# Patient Record
Sex: FEMALE | Race: WHITE | NOT HISPANIC OR LATINO | URBAN - METROPOLITAN AREA
[De-identification: names, ages, dates, MRNs, and addresses within clinical notes are randomized per-mention and may not be internally consistent; named-entity substitution may affect disease eponyms.]

---

## 2017-05-22 ENCOUNTER — EMERGENCY (EMERGENCY)
Facility: HOSPITAL | Age: 82
LOS: 1 days | Discharge: HOME | End: 2017-05-22
Attending: EMERGENCY MEDICINE | Admitting: EMERGENCY MEDICINE
Payer: MEDICARE

## 2017-05-22 VITALS
HEART RATE: 92 BPM | RESPIRATION RATE: 14 BRPM | DIASTOLIC BLOOD PRESSURE: 72 MMHG | TEMPERATURE: 99 F | SYSTOLIC BLOOD PRESSURE: 130 MMHG | OXYGEN SATURATION: 99 %

## 2017-05-22 VITALS
SYSTOLIC BLOOD PRESSURE: 122 MMHG | RESPIRATION RATE: 18 BRPM | WEIGHT: 125 LBS | OXYGEN SATURATION: 93 % | HEART RATE: 92 BPM | TEMPERATURE: 99 F | DIASTOLIC BLOOD PRESSURE: 77 MMHG

## 2017-05-22 DIAGNOSIS — I10 ESSENTIAL (PRIMARY) HYPERTENSION: ICD-10-CM

## 2017-05-22 DIAGNOSIS — I69.959 HEMIPLEGIA AND HEMIPARESIS FOLLOWING UNSPECIFIED CEREBROVASCULAR DISEASE AFFECTING UNSPECIFIED SIDE: ICD-10-CM

## 2017-05-22 DIAGNOSIS — Z79.01 LONG TERM (CURRENT) USE OF ANTICOAGULANTS: ICD-10-CM

## 2017-05-22 DIAGNOSIS — K57.32 DIVERTICULITIS OF LARGE INTESTINE WITHOUT PERFORATION OR ABSCESS WITHOUT BLEEDING: ICD-10-CM

## 2017-05-22 DIAGNOSIS — Z79.82 LONG TERM (CURRENT) USE OF ASPIRIN: ICD-10-CM

## 2017-05-22 DIAGNOSIS — F32.9 MAJOR DEPRESSIVE DISORDER, SINGLE EPISODE, UNSPECIFIED: ICD-10-CM

## 2017-05-22 DIAGNOSIS — R50.9 FEVER, UNSPECIFIED: ICD-10-CM

## 2017-05-22 LAB
ALBUMIN SERPL ELPH-MCNC: 3.4 G/DL — SIGNIFICANT CHANGE UP (ref 3.3–5)
ALP SERPL-CCNC: 58 U/L — SIGNIFICANT CHANGE UP (ref 40–120)
ALT FLD-CCNC: 15 U/L — SIGNIFICANT CHANGE UP (ref 12–78)
ANION GAP SERPL CALC-SCNC: 8 MMOL/L — SIGNIFICANT CHANGE UP (ref 5–17)
ANISOCYTOSIS BLD QL: SLIGHT — SIGNIFICANT CHANGE UP
APTT BLD: 44.1 SEC — HIGH (ref 27.5–37.4)
AST SERPL-CCNC: 19 U/L — SIGNIFICANT CHANGE UP (ref 15–37)
BASOPHILS # BLD AUTO: 0.1 K/UL — SIGNIFICANT CHANGE UP (ref 0–0.2)
BASOPHILS NFR BLD AUTO: 1.4 % — SIGNIFICANT CHANGE UP (ref 0–2)
BILIRUB SERPL-MCNC: 0.4 MG/DL — SIGNIFICANT CHANGE UP (ref 0.2–1.2)
BLD GP AB SCN SERPL QL: SIGNIFICANT CHANGE UP
BUN SERPL-MCNC: 13 MG/DL — SIGNIFICANT CHANGE UP (ref 7–23)
CALCIUM SERPL-MCNC: 8.9 MG/DL — SIGNIFICANT CHANGE UP (ref 8.5–10.1)
CHLORIDE SERPL-SCNC: 103 MMOL/L — SIGNIFICANT CHANGE UP (ref 96–108)
CO2 SERPL-SCNC: 29 MMOL/L — SIGNIFICANT CHANGE UP (ref 22–31)
CREAT SERPL-MCNC: 0.74 MG/DL — SIGNIFICANT CHANGE UP (ref 0.5–1.3)
EOSINOPHIL # BLD AUTO: 0 K/UL — SIGNIFICANT CHANGE UP (ref 0–0.5)
EOSINOPHIL NFR BLD AUTO: 0.7 % — SIGNIFICANT CHANGE UP (ref 0–6)
GLUCOSE SERPL-MCNC: 101 MG/DL — HIGH (ref 70–99)
HCT VFR BLD CALC: 48.5 % — HIGH (ref 34.5–45)
HGB BLD-MCNC: 16.2 G/DL — HIGH (ref 11.5–15.5)
HYPOCHROMIA BLD QL: SLIGHT — SIGNIFICANT CHANGE UP
INR BLD: 4.01 RATIO — HIGH (ref 0.88–1.16)
LACTATE SERPL-SCNC: 1.1 MMOL/L — SIGNIFICANT CHANGE UP (ref 0.7–2)
LG PLATELETS BLD QL AUTO: SLIGHT — SIGNIFICANT CHANGE UP
LYMPHOCYTES # BLD AUTO: 0.6 K/UL — LOW (ref 1–3.3)
LYMPHOCYTES # BLD AUTO: 12 % — LOW (ref 13–44)
MACROCYTES BLD QL: SLIGHT — SIGNIFICANT CHANGE UP
MCHC RBC-ENTMCNC: 33.3 GM/DL — SIGNIFICANT CHANGE UP (ref 32–36)
MCHC RBC-ENTMCNC: 36.3 PG — HIGH (ref 27–34)
MCV RBC AUTO: 109.1 FL — HIGH (ref 80–100)
MONOCYTES # BLD AUTO: 0.5 K/UL — SIGNIFICANT CHANGE UP (ref 0–0.9)
MONOCYTES NFR BLD AUTO: 9.1 % — HIGH (ref 1–9)
NEUTROPHILS # BLD AUTO: 4.2 K/UL — SIGNIFICANT CHANGE UP (ref 1.8–7.4)
NEUTROPHILS NFR BLD AUTO: 76.8 % — SIGNIFICANT CHANGE UP (ref 43–77)
OB PNL STL: NEGATIVE — SIGNIFICANT CHANGE UP
OVALOCYTES BLD QL SMEAR: SLIGHT — SIGNIFICANT CHANGE UP
PLAT MORPH BLD: NORMAL — SIGNIFICANT CHANGE UP
PLATELET # BLD AUTO: 700 K/UL — HIGH (ref 150–400)
POIKILOCYTOSIS BLD QL AUTO: SLIGHT — SIGNIFICANT CHANGE UP
POTASSIUM SERPL-MCNC: 4.1 MMOL/L — SIGNIFICANT CHANGE UP (ref 3.5–5.3)
POTASSIUM SERPL-SCNC: 4.1 MMOL/L — SIGNIFICANT CHANGE UP (ref 3.5–5.3)
PROT SERPL-MCNC: 7.1 G/DL — SIGNIFICANT CHANGE UP (ref 6–8.3)
PROTHROM AB SERPL-ACNC: 45 SEC — HIGH (ref 9.8–12.7)
RBC # BLD: 4.45 M/UL — SIGNIFICANT CHANGE UP (ref 3.8–5.2)
RBC # FLD: 13.5 % — SIGNIFICANT CHANGE UP (ref 10.3–14.5)
RBC BLD AUTO: ABNORMAL
SODIUM SERPL-SCNC: 140 MMOL/L — SIGNIFICANT CHANGE UP (ref 135–145)
WBC # BLD: 5.4 K/UL — SIGNIFICANT CHANGE UP (ref 3.8–10.5)
WBC # FLD AUTO: 5.4 K/UL — SIGNIFICANT CHANGE UP (ref 3.8–10.5)

## 2017-05-22 PROCEDURE — 71010: CPT | Mod: 26

## 2017-05-22 PROCEDURE — 99285 EMERGENCY DEPT VISIT HI MDM: CPT

## 2017-05-22 PROCEDURE — 74176 CT ABD & PELVIS W/O CONTRAST: CPT | Mod: 26

## 2017-05-22 RX ORDER — SODIUM CHLORIDE 9 MG/ML
1000 INJECTION INTRAMUSCULAR; INTRAVENOUS; SUBCUTANEOUS ONCE
Qty: 0 | Refills: 0 | Status: COMPLETED | OUTPATIENT
Start: 2017-05-22 | End: 2017-05-22

## 2017-05-22 RX ORDER — METRONIDAZOLE 500 MG
500 TABLET ORAL ONCE
Qty: 0 | Refills: 0 | Status: COMPLETED | OUTPATIENT
Start: 2017-05-22 | End: 2017-05-22

## 2017-05-22 RX ORDER — PIPERACILLIN AND TAZOBACTAM 4; .5 G/20ML; G/20ML
3.38 INJECTION, POWDER, LYOPHILIZED, FOR SOLUTION INTRAVENOUS ONCE
Qty: 0 | Refills: 0 | Status: COMPLETED | OUTPATIENT
Start: 2017-05-22 | End: 2017-05-22

## 2017-05-22 RX ORDER — SODIUM CHLORIDE 9 MG/ML
1000 INJECTION INTRAMUSCULAR; INTRAVENOUS; SUBCUTANEOUS
Qty: 0 | Refills: 0 | Status: DISCONTINUED | OUTPATIENT
Start: 2017-05-22 | End: 2017-05-26

## 2017-05-22 RX ORDER — LACTOBACILLUS ACIDOPHILUS 100MM CELL
2 CAPSULE ORAL
Qty: 60 | Refills: 0 | OUTPATIENT
Start: 2017-05-22 | End: 2017-06-21

## 2017-05-22 RX ORDER — METRONIDAZOLE 500 MG
1 TABLET ORAL
Qty: 21 | Refills: 0 | OUTPATIENT
Start: 2017-05-22 | End: 2017-05-29

## 2017-05-22 RX ORDER — ONDANSETRON 8 MG/1
4 TABLET, FILM COATED ORAL ONCE
Qty: 0 | Refills: 0 | Status: COMPLETED | OUTPATIENT
Start: 2017-05-22 | End: 2017-05-22

## 2017-05-22 RX ORDER — SODIUM CHLORIDE 9 MG/ML
3 INJECTION INTRAMUSCULAR; INTRAVENOUS; SUBCUTANEOUS ONCE
Qty: 0 | Refills: 0 | Status: COMPLETED | OUTPATIENT
Start: 2017-05-22 | End: 2017-05-22

## 2017-05-22 RX ORDER — IOHEXOL 300 MG/ML
30 INJECTION, SOLUTION INTRAVENOUS ONCE
Qty: 0 | Refills: 0 | Status: COMPLETED | OUTPATIENT
Start: 2017-05-22 | End: 2017-05-22

## 2017-05-22 RX ADMIN — SODIUM CHLORIDE 1000 MILLILITER(S): 9 INJECTION INTRAMUSCULAR; INTRAVENOUS; SUBCUTANEOUS at 09:27

## 2017-05-22 RX ADMIN — IOHEXOL 30 MILLILITER(S): 300 INJECTION, SOLUTION INTRAVENOUS at 09:28

## 2017-05-22 RX ADMIN — ONDANSETRON 4 MILLIGRAM(S): 8 TABLET, FILM COATED ORAL at 09:27

## 2017-05-22 RX ADMIN — SODIUM CHLORIDE 3 MILLILITER(S): 9 INJECTION INTRAMUSCULAR; INTRAVENOUS; SUBCUTANEOUS at 09:26

## 2017-05-22 RX ADMIN — PIPERACILLIN AND TAZOBACTAM 200 GRAM(S): 4; .5 INJECTION, POWDER, LYOPHILIZED, FOR SOLUTION INTRAVENOUS at 13:33

## 2017-05-22 RX ADMIN — Medication 100 MILLIGRAM(S): at 13:33

## 2017-05-22 NOTE — ED PROVIDER NOTE - PROGRESS NOTE DETAILS
as per rn, pt had a voluminous watery diarrhea. stool studies sent pt stable dr josee cheung. dr josee cheung.- aware agrees with plan

## 2017-05-22 NOTE — ED PROVIDER NOTE - CARE PLAN
Principal Discharge DX:	Diarrhea Principal Discharge DX:	Diarrhea  Secondary Diagnosis:	Sigmoid diverticulitis

## 2017-05-22 NOTE — ED PROVIDER NOTE - OBJECTIVE STATEMENT
pt is an  84 yo f who has hx of pt is an  84 yo f who has hx of CVA with r sided weakness, she has hx of diverticulitis pmd dr saucedo.  she also has hx of constipation on laxatives and fiber. but the past 3 days she has been having diarrhea. and today is reported to have had a fever of 100.8. so she was sent to er for eval.  no cp no sob no vomiting but she does report some nausea, no ill contatcs she eats only the foods prepared for her at the assisted living facility. bib ems for eval

## 2017-05-22 NOTE — ED PROVIDER NOTE - MUSCULOSKELETAL, MLM
Spine appears normal, range of motion is not limited, no muscle or joint tenderness EXCEPT TO THE R SIDE (CVA WEAKNESS)

## 2017-05-22 NOTE — ED ADULT NURSE REASSESSMENT NOTE - NS ED NURSE REASSESS COMMENT FT1
Pt had an episode of liquid green diarrhea. All stool cultures sent. Pt straight cathed for urine as stool is mixing with urine.

## 2017-05-22 NOTE — ED ADULT NURSE NOTE - OBJECTIVE STATEMENT
Presents to ER w fever and diarrhea since Saturday.  BS + x4Q, denies abd pain,vomiting.  Rectal temp here in ER 99 Presents to ER w fever and diarrhea since Saturday.  BS + x4Q, denies abd pain,vomiting.  Rectal temp here in ER 99. Pt resides at [facility] secondary to recent CVA this past October.  Right side flaccid.  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 Presents to ER w fever and diarrhea since Saturday.  BS + x4Q, denies abd pain,vomiting.  Rectal temp here in ER 99. Pt resides at [facility] secondary to recent CVA this past October.  Right side flaccid.  Pt states she is wheel chair bound. 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

## 2017-05-23 ENCOUNTER — INPATIENT (INPATIENT)
Facility: HOSPITAL | Age: 82
LOS: 2 days | Discharge: HOME | DRG: 392 | End: 2017-05-26
Attending: INTERNAL MEDICINE | Admitting: INTERNAL MEDICINE
Payer: MEDICARE

## 2017-05-23 VITALS
SYSTOLIC BLOOD PRESSURE: 105 MMHG | DIASTOLIC BLOOD PRESSURE: 71 MMHG | OXYGEN SATURATION: 98 % | WEIGHT: 119.93 LBS | HEART RATE: 90 BPM | TEMPERATURE: 99 F | HEIGHT: 66 IN | RESPIRATION RATE: 14 BRPM

## 2017-05-23 DIAGNOSIS — I10 ESSENTIAL (PRIMARY) HYPERTENSION: ICD-10-CM

## 2017-05-23 DIAGNOSIS — R19.7 DIARRHEA, UNSPECIFIED: ICD-10-CM

## 2017-05-23 DIAGNOSIS — K57.32 DIVERTICULITIS OF LARGE INTESTINE WITHOUT PERFORATION OR ABSCESS WITHOUT BLEEDING: ICD-10-CM

## 2017-05-23 DIAGNOSIS — R10.30 LOWER ABDOMINAL PAIN, UNSPECIFIED: ICD-10-CM

## 2017-05-23 LAB
ALBUMIN SERPL ELPH-MCNC: 3.2 G/DL — LOW (ref 3.3–5)
ALP SERPL-CCNC: 52 U/L — SIGNIFICANT CHANGE UP (ref 40–120)
ALT FLD-CCNC: 17 U/L — SIGNIFICANT CHANGE UP (ref 12–78)
ANION GAP SERPL CALC-SCNC: 13 MMOL/L — SIGNIFICANT CHANGE UP (ref 5–17)
APTT BLD: 59.5 SEC — HIGH (ref 27.5–37.4)
AST SERPL-CCNC: 22 U/L — SIGNIFICANT CHANGE UP (ref 15–37)
BASOPHILS # BLD AUTO: 0 K/UL — SIGNIFICANT CHANGE UP (ref 0–0.2)
BASOPHILS NFR BLD AUTO: 0.6 % — SIGNIFICANT CHANGE UP (ref 0–2)
BILIRUB SERPL-MCNC: 0.4 MG/DL — SIGNIFICANT CHANGE UP (ref 0.2–1.2)
BUN SERPL-MCNC: 10 MG/DL — SIGNIFICANT CHANGE UP (ref 7–23)
CALCIUM SERPL-MCNC: 8.4 MG/DL — LOW (ref 8.5–10.1)
CHLORIDE SERPL-SCNC: 107 MMOL/L — SIGNIFICANT CHANGE UP (ref 96–108)
CO2 SERPL-SCNC: 21 MMOL/L — LOW (ref 22–31)
CREAT SERPL-MCNC: 0.67 MG/DL — SIGNIFICANT CHANGE UP (ref 0.5–1.3)
EOSINOPHIL # BLD AUTO: 0 K/UL — SIGNIFICANT CHANGE UP (ref 0–0.5)
EOSINOPHIL NFR BLD AUTO: 0.2 % — SIGNIFICANT CHANGE UP (ref 0–6)
GLUCOSE SERPL-MCNC: 92 MG/DL — SIGNIFICANT CHANGE UP (ref 70–99)
HCT VFR BLD CALC: 47.5 % — HIGH (ref 34.5–45)
HGB BLD-MCNC: 15.7 G/DL — HIGH (ref 11.5–15.5)
INR BLD: 6.34 RATIO — CRITICAL HIGH (ref 0.88–1.16)
LYMPHOCYTES # BLD AUTO: 0.8 K/UL — LOW (ref 1–3.3)
LYMPHOCYTES # BLD AUTO: 9.9 % — LOW (ref 13–44)
MCHC RBC-ENTMCNC: 33.1 GM/DL — SIGNIFICANT CHANGE UP (ref 32–36)
MCHC RBC-ENTMCNC: 35.7 PG — HIGH (ref 27–34)
MCV RBC AUTO: 107.6 FL — HIGH (ref 80–100)
MONOCYTES # BLD AUTO: 0.7 K/UL — SIGNIFICANT CHANGE UP (ref 0–0.9)
MONOCYTES NFR BLD AUTO: 8.3 % — SIGNIFICANT CHANGE UP (ref 1–9)
NEUTROPHILS # BLD AUTO: 6.9 K/UL — SIGNIFICANT CHANGE UP (ref 1.8–7.4)
NEUTROPHILS NFR BLD AUTO: 81 % — HIGH (ref 43–77)
PLATELET # BLD AUTO: 759 K/UL — HIGH (ref 150–400)
POTASSIUM SERPL-MCNC: 4.7 MMOL/L — SIGNIFICANT CHANGE UP (ref 3.5–5.3)
POTASSIUM SERPL-SCNC: 4.7 MMOL/L — SIGNIFICANT CHANGE UP (ref 3.5–5.3)
PROT SERPL-MCNC: 6.3 G/DL — SIGNIFICANT CHANGE UP (ref 6–8.3)
PROTHROM AB SERPL-ACNC: 71.8 SEC — HIGH (ref 9.8–12.7)
RBC # BLD: 4.41 M/UL — SIGNIFICANT CHANGE UP (ref 3.8–5.2)
RBC # FLD: 12.9 % — SIGNIFICANT CHANGE UP (ref 10.3–14.5)
SODIUM SERPL-SCNC: 141 MMOL/L — SIGNIFICANT CHANGE UP (ref 135–145)
WBC # BLD: 8.5 K/UL — SIGNIFICANT CHANGE UP (ref 3.8–10.5)
WBC # FLD AUTO: 8.5 K/UL — SIGNIFICANT CHANGE UP (ref 3.8–10.5)

## 2017-05-23 PROCEDURE — 93010 ELECTROCARDIOGRAM REPORT: CPT

## 2017-05-23 PROCEDURE — 99285 EMERGENCY DEPT VISIT HI MDM: CPT

## 2017-05-23 RX ORDER — HYDROXYUREA 500 MG/1
500 CAPSULE ORAL DAILY
Qty: 0 | Refills: 0 | Status: DISCONTINUED | OUTPATIENT
Start: 2017-05-23 | End: 2017-05-24

## 2017-05-23 RX ORDER — GABAPENTIN 400 MG/1
100 CAPSULE ORAL
Qty: 0 | Refills: 0 | Status: DISCONTINUED | OUTPATIENT
Start: 2017-05-23 | End: 2017-05-26

## 2017-05-23 RX ORDER — ACETAMINOPHEN 500 MG
650 TABLET ORAL EVERY 6 HOURS
Qty: 0 | Refills: 0 | Status: DISCONTINUED | OUTPATIENT
Start: 2017-05-23 | End: 2017-05-26

## 2017-05-23 RX ORDER — ESCITALOPRAM OXALATE 10 MG/1
20 TABLET, FILM COATED ORAL DAILY
Qty: 0 | Refills: 0 | Status: DISCONTINUED | OUTPATIENT
Start: 2017-05-23 | End: 2017-05-26

## 2017-05-23 RX ORDER — ONDANSETRON 8 MG/1
4 TABLET, FILM COATED ORAL EVERY 6 HOURS
Qty: 0 | Refills: 0 | Status: DISCONTINUED | OUTPATIENT
Start: 2017-05-23 | End: 2017-05-26

## 2017-05-23 RX ORDER — ONDANSETRON 8 MG/1
4 TABLET, FILM COATED ORAL ONCE
Qty: 0 | Refills: 0 | Status: COMPLETED | OUTPATIENT
Start: 2017-05-23 | End: 2017-05-23

## 2017-05-23 RX ORDER — SODIUM CHLORIDE 9 MG/ML
1000 INJECTION INTRAMUSCULAR; INTRAVENOUS; SUBCUTANEOUS
Qty: 0 | Refills: 0 | Status: DISCONTINUED | OUTPATIENT
Start: 2017-05-23 | End: 2017-05-26

## 2017-05-23 RX ORDER — PIPERACILLIN AND TAZOBACTAM 4; .5 G/20ML; G/20ML
3.38 INJECTION, POWDER, LYOPHILIZED, FOR SOLUTION INTRAVENOUS ONCE
Qty: 0 | Refills: 0 | Status: COMPLETED | OUTPATIENT
Start: 2017-05-23 | End: 2017-05-23

## 2017-05-23 RX ORDER — PIPERACILLIN AND TAZOBACTAM 4; .5 G/20ML; G/20ML
3.38 INJECTION, POWDER, LYOPHILIZED, FOR SOLUTION INTRAVENOUS EVERY 8 HOURS
Qty: 0 | Refills: 0 | Status: DISCONTINUED | OUTPATIENT
Start: 2017-05-23 | End: 2017-05-24

## 2017-05-23 RX ORDER — METOPROLOL TARTRATE 50 MG
50 TABLET ORAL
Qty: 0 | Refills: 0 | Status: DISCONTINUED | OUTPATIENT
Start: 2017-05-23 | End: 2017-05-26

## 2017-05-23 RX ORDER — LACTOBACILLUS ACIDOPHILUS 100MM CELL
1 CAPSULE ORAL EVERY 8 HOURS
Qty: 0 | Refills: 0 | Status: DISCONTINUED | OUTPATIENT
Start: 2017-05-23 | End: 2017-05-26

## 2017-05-23 RX ORDER — SODIUM CHLORIDE 9 MG/ML
1000 INJECTION INTRAMUSCULAR; INTRAVENOUS; SUBCUTANEOUS ONCE
Qty: 0 | Refills: 0 | Status: COMPLETED | OUTPATIENT
Start: 2017-05-23 | End: 2017-05-23

## 2017-05-23 RX ORDER — SIMVASTATIN 20 MG/1
10 TABLET, FILM COATED ORAL AT BEDTIME
Qty: 0 | Refills: 0 | Status: DISCONTINUED | OUTPATIENT
Start: 2017-05-23 | End: 2017-05-26

## 2017-05-23 RX ORDER — MORPHINE SULFATE 50 MG/1
1 CAPSULE, EXTENDED RELEASE ORAL EVERY 4 HOURS
Qty: 0 | Refills: 0 | Status: DISCONTINUED | OUTPATIENT
Start: 2017-05-23 | End: 2017-05-26

## 2017-05-23 RX ORDER — TRAMADOL HYDROCHLORIDE 50 MG/1
50 TABLET ORAL EVERY 12 HOURS
Qty: 0 | Refills: 0 | Status: DISCONTINUED | OUTPATIENT
Start: 2017-05-23 | End: 2017-05-23

## 2017-05-23 RX ORDER — PANTOPRAZOLE SODIUM 20 MG/1
40 TABLET, DELAYED RELEASE ORAL
Qty: 0 | Refills: 0 | Status: DISCONTINUED | OUTPATIENT
Start: 2017-05-23 | End: 2017-05-26

## 2017-05-23 RX ADMIN — SIMVASTATIN 10 MILLIGRAM(S): 20 TABLET, FILM COATED ORAL at 23:53

## 2017-05-23 RX ADMIN — Medication 1 TABLET(S): at 23:53

## 2017-05-23 RX ADMIN — HYDROXYUREA 500 MILLIGRAM(S): 500 CAPSULE ORAL at 23:53

## 2017-05-23 RX ADMIN — PIPERACILLIN AND TAZOBACTAM 25 GRAM(S): 4; .5 INJECTION, POWDER, LYOPHILIZED, FOR SOLUTION INTRAVENOUS at 23:52

## 2017-05-23 RX ADMIN — ONDANSETRON 4 MILLIGRAM(S): 8 TABLET, FILM COATED ORAL at 13:41

## 2017-05-23 RX ADMIN — Medication 50 MILLIGRAM(S): at 17:42

## 2017-05-23 RX ADMIN — SODIUM CHLORIDE 1000 MILLILITER(S): 9 INJECTION INTRAMUSCULAR; INTRAVENOUS; SUBCUTANEOUS at 13:42

## 2017-05-23 RX ADMIN — PIPERACILLIN AND TAZOBACTAM 200 GRAM(S): 4; .5 INJECTION, POWDER, LYOPHILIZED, FOR SOLUTION INTRAVENOUS at 13:42

## 2017-05-23 RX ADMIN — GABAPENTIN 100 MILLIGRAM(S): 400 CAPSULE ORAL at 17:42

## 2017-05-23 NOTE — H&P ADULT - ATTENDING COMMENTS
84 yo female admitted with nausea, vomiting and loose BM x 2 days CT abd showed uncomplicated diverticulitis Started on zosyn ,septic workup sent No evidence of sepsis so far.

## 2017-05-23 NOTE — H&P ADULT - RS GEN PE MLT RESP DETAILS PC
airway patent/clear to auscultation bilaterally/respirations non-labored/no chest wall tenderness/good air movement/breath sounds equal

## 2017-05-23 NOTE — ED PROVIDER NOTE - OBJECTIVE STATEMENT
84 y/o F c/o abdominal pain and vomiting for the past 2 days.  Seen here yesterday for same.  Found to have uncomplicated sigmoid diverticulitis on CT.  D/c'ed to [facility] on augmentin and flagyl.  Sent back as she still is having pain and vomiting.  Denies fever.  Denies abdominal pain at present.

## 2017-05-23 NOTE — H&P ADULT - HISTORY OF PRESENT ILLNESS
This is an 85y Female complaining of vomiting, abdominal pain and vomiting for the past 2 days.  Seen at [facility] yesterday for same.  Found to have uncomplicated sigmoid diverticulitis on CT.  Sent back as she still is having pain and vomiting.  Denies fever.  Denies abdominal pain at present. This is an 85y Female complaining of abdominal pain and vomiting for the past 2 days.  Seen at [facility] yesterday for same.  Found to have uncomplicated sigmoid diverticulitis on CT.  Sent back as she still is having pain and vomiting.  Denies fever.  No abdominal pain at present.

## 2017-05-24 LAB
-  AMIKACIN: SIGNIFICANT CHANGE UP
-  AMPICILLIN/SULBACTAM: SIGNIFICANT CHANGE UP
-  AMPICILLIN: SIGNIFICANT CHANGE UP
-  AZTREONAM: SIGNIFICANT CHANGE UP
-  CEFAZOLIN: SIGNIFICANT CHANGE UP
-  CEFEPIME: SIGNIFICANT CHANGE UP
-  CEFOXITIN: SIGNIFICANT CHANGE UP
-  CEFTAZIDIME: SIGNIFICANT CHANGE UP
-  CEFTRIAXONE: SIGNIFICANT CHANGE UP
-  CIPROFLOXACIN: SIGNIFICANT CHANGE UP
-  ERTAPENEM: SIGNIFICANT CHANGE UP
-  GENTAMICIN: SIGNIFICANT CHANGE UP
-  IMIPENEM: SIGNIFICANT CHANGE UP
-  LEVOFLOXACIN: SIGNIFICANT CHANGE UP
-  MEROPENEM: SIGNIFICANT CHANGE UP
-  NITROFURANTOIN: SIGNIFICANT CHANGE UP
-  PIPERACILLIN/TAZOBACTAM: SIGNIFICANT CHANGE UP
-  TOBRAMYCIN: SIGNIFICANT CHANGE UP
-  TRIMETHOPRIM/SULFAMETHOXAZOLE: SIGNIFICANT CHANGE UP
ANION GAP SERPL CALC-SCNC: 6 MMOL/L — SIGNIFICANT CHANGE UP (ref 5–17)
BUN SERPL-MCNC: 5 MG/DL — LOW (ref 7–23)
CALCIUM SERPL-MCNC: 8 MG/DL — LOW (ref 8.5–10.1)
CHLORIDE SERPL-SCNC: 113 MMOL/L — HIGH (ref 96–108)
CO2 SERPL-SCNC: 27 MMOL/L — SIGNIFICANT CHANGE UP (ref 22–31)
CREAT SERPL-MCNC: 0.65 MG/DL — SIGNIFICANT CHANGE UP (ref 0.5–1.3)
CULTURE RESULTS: SIGNIFICANT CHANGE UP
GLUCOSE SERPL-MCNC: 96 MG/DL — SIGNIFICANT CHANGE UP (ref 70–99)
HCT VFR BLD CALC: 39.4 % — SIGNIFICANT CHANGE UP (ref 34.5–45)
HGB BLD-MCNC: 13.1 G/DL — SIGNIFICANT CHANGE UP (ref 11.5–15.5)
INR BLD: 7.1 RATIO — CRITICAL HIGH (ref 0.88–1.16)
MCHC RBC-ENTMCNC: 33.3 GM/DL — SIGNIFICANT CHANGE UP (ref 32–36)
MCHC RBC-ENTMCNC: 35.8 PG — HIGH (ref 27–34)
MCV RBC AUTO: 107.6 FL — HIGH (ref 80–100)
METHOD TYPE: SIGNIFICANT CHANGE UP
ORGANISM # SPEC MICROSCOPIC CNT: SIGNIFICANT CHANGE UP
ORGANISM # SPEC MICROSCOPIC CNT: SIGNIFICANT CHANGE UP
PLATELET # BLD AUTO: 635 K/UL — HIGH (ref 150–400)
POTASSIUM SERPL-MCNC: 3.9 MMOL/L — SIGNIFICANT CHANGE UP (ref 3.5–5.3)
POTASSIUM SERPL-SCNC: 3.9 MMOL/L — SIGNIFICANT CHANGE UP (ref 3.5–5.3)
PROTHROM AB SERPL-ACNC: 80.5 SEC — HIGH (ref 9.8–12.7)
RBC # BLD: 3.66 M/UL — LOW (ref 3.8–5.2)
RBC # FLD: 13.4 % — SIGNIFICANT CHANGE UP (ref 10.3–14.5)
SODIUM SERPL-SCNC: 146 MMOL/L — HIGH (ref 135–145)
SPECIMEN SOURCE: SIGNIFICANT CHANGE UP
WBC # BLD: 5.9 K/UL — SIGNIFICANT CHANGE UP (ref 3.8–10.5)
WBC # FLD AUTO: 5.9 K/UL — SIGNIFICANT CHANGE UP (ref 3.8–10.5)

## 2017-05-24 RX ORDER — CIPROFLOXACIN LACTATE 400MG/40ML
400 VIAL (ML) INTRAVENOUS EVERY 12 HOURS
Qty: 0 | Refills: 0 | Status: DISCONTINUED | OUTPATIENT
Start: 2017-05-25 | End: 2017-05-26

## 2017-05-24 RX ORDER — PHYTONADIONE (VIT K1) 5 MG
2.5 TABLET ORAL ONCE
Qty: 0 | Refills: 0 | Status: COMPLETED | OUTPATIENT
Start: 2017-05-24 | End: 2017-05-24

## 2017-05-24 RX ORDER — METRONIDAZOLE 500 MG
500 TABLET ORAL EVERY 8 HOURS
Qty: 0 | Refills: 0 | Status: DISCONTINUED | OUTPATIENT
Start: 2017-05-25 | End: 2017-05-26

## 2017-05-24 RX ORDER — HYDROXYUREA 500 MG/1
1000 CAPSULE ORAL DAILY
Qty: 0 | Refills: 0 | Status: DISCONTINUED | OUTPATIENT
Start: 2017-05-24 | End: 2017-05-26

## 2017-05-24 RX ORDER — NYSTATIN CREAM 100000 [USP'U]/G
1 CREAM TOPICAL THREE TIMES A DAY
Qty: 0 | Refills: 0 | Status: DISCONTINUED | OUTPATIENT
Start: 2017-05-24 | End: 2017-05-26

## 2017-05-24 RX ADMIN — Medication 50 MILLIGRAM(S): at 06:40

## 2017-05-24 RX ADMIN — Medication 1 TABLET(S): at 06:40

## 2017-05-24 RX ADMIN — ESCITALOPRAM OXALATE 20 MILLIGRAM(S): 10 TABLET, FILM COATED ORAL at 11:35

## 2017-05-24 RX ADMIN — SIMVASTATIN 10 MILLIGRAM(S): 20 TABLET, FILM COATED ORAL at 21:35

## 2017-05-24 RX ADMIN — NYSTATIN CREAM 1 APPLICATION(S): 100000 CREAM TOPICAL at 21:36

## 2017-05-24 RX ADMIN — GABAPENTIN 100 MILLIGRAM(S): 400 CAPSULE ORAL at 17:16

## 2017-05-24 RX ADMIN — HYDROXYUREA 500 MILLIGRAM(S): 500 CAPSULE ORAL at 11:35

## 2017-05-24 RX ADMIN — GABAPENTIN 100 MILLIGRAM(S): 400 CAPSULE ORAL at 06:40

## 2017-05-24 RX ADMIN — PANTOPRAZOLE SODIUM 40 MILLIGRAM(S): 20 TABLET, DELAYED RELEASE ORAL at 06:40

## 2017-05-24 RX ADMIN — Medication 50 MILLIGRAM(S): at 17:16

## 2017-05-24 RX ADMIN — Medication 2.5 MILLIGRAM(S): at 09:50

## 2017-05-24 RX ADMIN — PIPERACILLIN AND TAZOBACTAM 25 GRAM(S): 4; .5 INJECTION, POWDER, LYOPHILIZED, FOR SOLUTION INTRAVENOUS at 13:15

## 2017-05-24 RX ADMIN — SODIUM CHLORIDE 50 MILLILITER(S): 9 INJECTION INTRAMUSCULAR; INTRAVENOUS; SUBCUTANEOUS at 13:16

## 2017-05-24 RX ADMIN — Medication 1 TABLET(S): at 21:35

## 2017-05-24 RX ADMIN — PIPERACILLIN AND TAZOBACTAM 25 GRAM(S): 4; .5 INJECTION, POWDER, LYOPHILIZED, FOR SOLUTION INTRAVENOUS at 06:38

## 2017-05-24 RX ADMIN — PIPERACILLIN AND TAZOBACTAM 25 GRAM(S): 4; .5 INJECTION, POWDER, LYOPHILIZED, FOR SOLUTION INTRAVENOUS at 21:35

## 2017-05-24 RX ADMIN — Medication 2.5 MILLIGRAM(S): at 06:40

## 2017-05-24 RX ADMIN — Medication 1 TABLET(S): at 14:00

## 2017-05-24 NOTE — CONSULT NOTE ADULT - PROBLEM SELECTOR RECOMMENDATION 9
IVF  IVAB   advance diet as tolerated  can dc in AM if tolerates diet on 10 day total of cipro /flagyl

## 2017-05-24 NOTE — CONSULT NOTE ADULT - SUBJECTIVE AND OBJECTIVE BOX
Patient is a 85y old  Female who presents with a chief complaint of Abdominal pain and diarrhea. (23 May 2017 17:57)      HPI:  This is an 85y Female complaining of vomiting, abdominal pain and vomiting for the past 2 days.  Seen at [facility] yesterday for same.  Found to have uncomplicated sigmoid diverticulitis on CT.  Sent back as she still is having pain and vomiting.  Denies fever.  Denies abdominal pain at present. (23 May 2017 17:57)      Asked to see patient for ID Consult    PAST MEDICAL & SURGICAL HISTORY:  Constipation  Depression  CVA (cerebral vascular accident)  Hypertension  No significant past surgical history      Allergies    No Known Allergies    Intolerances        REVIEW OF SYSTEMS:  All systems below were reviewed and are negative [  ]  HEENT:  ID:  Pulmonary:  Cardiac:  GI:  Renal:  Musculoskeletal:  All other systems above were reviewed and are negative   [  ]    HOME MEDICATIONS:    MEDICATIONS  (STANDING):  sodium chloride 0.9%. 1000milliLiter(s) IV Continuous <Continuous>  piperacillin/tazobactam IVPB. 3.375Gram(s) IV Intermittent every 8 hours  pantoprazole    Tablet 40milliGRAM(s) Oral before breakfast  enalapril 2.5milliGRAM(s) Oral daily  gabapentin 100milliGRAM(s) Oral two times a day  escitalopram 20milliGRAM(s) Oral daily  simvastatin 10milliGRAM(s) Oral at bedtime  metoprolol 50milliGRAM(s) Oral two times a day  lactobacillus acidophilus 1Tablet(s) Oral every 8 hours  nystatin Cream 1Application(s) Topical three times a day  hydroxyurea 1000milliGRAM(s) Oral daily    MEDICATIONS  (PRN):  acetaminophen   Tablet 650milliGRAM(s) Oral every 6 hours PRN For Temp greater than 38 C (100.4 F)  acetaminophen   Tablet. 650milliGRAM(s) Oral every 6 hours PRN Mild Pain (1 - 3)  ondansetron Injectable 4milliGRAM(s) IV Push every 6 hours PRN Nausea and/or Vomiting  oxyCODONE  5 mG/acetaminophen 325 mG 1Tablet(s) Oral every 6 hours PRN Moderate Pain  morphine  - Injectable 1milliGRAM(s) IV Push every 4 hours PRN Severe Pain (7 - 10)      Vital Signs Last 24 Hrs  T(C): 37, Max: 37 (05-24 @ 19:58)  T(F): 98.6, Max: 98.6 (05-24 @ 19:58)  HR: 69 (64 - 73)  BP: 111/67 (102/62 - 127/70)  BP(mean): --  RR: 17 (17 - 18)  SpO2: 98% (96% - 98%)    PHYSICAL EXAM:  HEENT:  Neck:  Lungs:  Heart:  Abdomen:  Genital/ Rectal:  Extremities:  Neurologic:  Vascular:    I&O's Summary    I & Os for current day (as of 24 May 2017 21:32)  =============================================  IN: 150 ml / OUT: 0 ml / NET: 150 ml      LABORATORY:                          13.1   5.9   )-----------( 635      ( 24 May 2017 07:15 )             39.4           05-24    146<H>  |  113<H>  |  5<L>  ----------------------------<  96  3.9   |  27  |  0.65    Ca    8.0<L>      24 May 2017 07:15    TPro  6.3  /  Alb  3.2<L>  /  TBili  0.4  /  DBili  x   /  AST  22  /  ALT  17  /  AlkPhos  52  05-23          LABORATORY:    CBC Full  -  ( 24 May 2017 07:15 )  WBC Count : 5.9 K/uL  Hemoglobin : 13.1 g/dL  Hematocrit : 39.4 %  Platelet Count - Automated : 635 K/uL  Mean Cell Volume : 107.6 fl  Mean Cell Hemoglobin : 35.8 pg  Mean Cell Hemoglobin Concentration : 33.3 gm/dL  Auto Neutrophil # : x  Auto Lymphocyte # : x  Auto Monocyte # : x  Auto Eosinophil # : x  Auto Basophil # : x  Auto Neutrophil % : x  Auto Lymphocyte % : x  Auto Monocyte % : x  Auto Eosinophil % : x  Auto Basophil % : x          05-24    146<H>  |  113<H>  |  5<L>  ----------------------------<  96  3.9   |  27  |  0.65    Ca    8.0<L>      24 May 2017 07:15    TPro  6.3  /  Alb  3.2<L>  /  TBili  0.4  /  DBili  x   /  AST  22  /  ALT  17  /  AlkPhos  52  05-23            Blood Culture:           05-22 @ 12:56  Organism Escherichia coli  Gram stain --        Urine Culture:          05-22 @ 12:56  Organism Escherichia coli        Wound culture:                05-22 @ 14:13  Organism --  Culture w/ gram stain --  Specimen Source .Stool Feces    Wound culture:                05-22 @ 13:45  Organism --  Culture w/ gram stain --  Specimen Source .Stool Feces    Wound culture:                05-22 @ 12:56  Organism Escherichia coli  Culture w/ gram stain --  Specimen Source .Urine Clean Catch (Midstream)    Wound culture:                05-22 @ 12:54  Organism --  Culture w/ gram stain --  Specimen Source .Blood Blood-Peripheral        Abscess culture:             05-22 @ 14:13  Organism --  Gram Stain --  Specimen Source .Stool Feces    Abscess culture:             05-22 @ 13:45  Organism --  Gram Stain --  Specimen Source .Stool Feces    Abscess culture:             05-22 @ 12:56  Organism Escherichia coli  Gram Stain --  Specimen Source .Urine Clean Catch (Midstream)    Abscess culture:             05-22 @ 12:54  Organism --  Gram Stain --  Specimen Source .Blood Blood-Peripheral        CSF:              05-22 @ 12:56  Organism Escherichia coli  Gram Stain --        Tissue culture:           05-22 @ 14:13  Organism --  Gram Stain --  Specimen Source .Stool Feces    Tissue culture:           05-22 @ 13:45  Organism --  Gram Stain --  Specimen Source .Stool Feces    Tissue culture:           05-22 @ 12:56  Organism Escherichia coli  Gram Stain --  Specimen Source .Urine Clean Catch (Midstream)    Tissue culture:           05-22 @ 12:54  Organism --  Gram Stain --  Specimen Source .Blood Blood-Peripheral        Body Fluid Smear & Culture:                        05-22 @ 14:13  AFB Smear  --  Culture Acid Fast Body Fluid w/ Smear  --  Culture Acid Fast Smear Concentrated   --    Culture Results:       Testing in progress  Specimen Source .Stool Feces    Body Fluid Smear & Culture:                        05-22 @ 13:45  AFB Smear  --  Culture Acid Fast Body Fluid w/ Smear  --  Culture Acid Fast Smear Concentrated   --    Culture Results:       No enteric pathogens isolated.  (Stool culture examined for Salmonella,  Shigella, Campylobacter, Aeromonas, Plesiomonas,  Vibrio, E.coli O157 and Yersinia)  Specimen Source .Stool Feces    Body Fluid Smear & Culture:                        05-22 @ 12:56  AFB Smear  --  Culture Acid Fast Body Fluid w/ Smear  --  Culture Acid Fast Smear Concentrated   --    Culture Results:       >100,000 CFU/ml Escherichia coli  Specimen Source .Urine Clean Catch (Midstream)    Body Fluid Smear & Culture:                        05-22 @ 12:54  AFB Smear  --  Culture Acid Fast Body Fluid w/ Smear  --  Culture Acid Fast Smear Concentrated   --    Culture Results:       No growth to date.  Specimen Source .Blood Blood-Peripheral Patient is a 85y old  Female who presents with a chief complaint of Abdominal pain and diarrhea. (23 May 2017 17:57)      This is an 85y female with a history of CVA, HTN and depression who was brought to the ED complaining of vomiting, abdominal pain and vomiting for the past 2 days. CT of abdomen and pelvis showed sigmoid diverticulitis. The patient was admitted and placed on IV Zosyn. She is still having watery diarrhea today. She is afebrile with no leukocytosis.       PAST MEDICAL & SURGICAL HISTORY:  Constipation  Depression  CVA (cerebral vascular accident)  Hypertension  No significant past surgical history      Allergies    No Known Allergies    Intolerances        REVIEW OF SYSTEMS:  . No cough or SOB.  . No dysuria or hematuria.    HOME MEDICATIONS:    MEDICATIONS  (STANDING):  sodium chloride 0.9%. 1000milliLiter(s) IV Continuous <Continuous>  piperacillin/tazobactam IVPB. 3.375Gram(s) IV Intermittent every 8 hours  pantoprazole    Tablet 40milliGRAM(s) Oral before breakfast  enalapril 2.5milliGRAM(s) Oral daily  gabapentin 100milliGRAM(s) Oral two times a day  escitalopram 20milliGRAM(s) Oral daily  simvastatin 10milliGRAM(s) Oral at bedtime  metoprolol 50milliGRAM(s) Oral two times a day  lactobacillus acidophilus 1Tablet(s) Oral every 8 hours  nystatin Cream 1Application(s) Topical three times a day  hydroxyurea 1000milliGRAM(s) Oral daily    MEDICATIONS  (PRN):  acetaminophen   Tablet 650milliGRAM(s) Oral every 6 hours PRN For Temp greater than 38 C (100.4 F)  acetaminophen   Tablet. 650milliGRAM(s) Oral every 6 hours PRN Mild Pain (1 - 3)  ondansetron Injectable 4milliGRAM(s) IV Push every 6 hours PRN Nausea and/or Vomiting  oxyCODONE  5 mG/acetaminophen 325 mG 1Tablet(s) Oral every 6 hours PRN Moderate Pain  morphine  - Injectable 1milliGRAM(s) IV Push every 4 hours PRN Severe Pain (7 - 10)      Vital Signs Last 24 Hrs  T(C): 37, Max: 37 (05-24 @ 19:58)  T(F): 98.6, Max: 98.6 (05-24 @ 19:58)  HR: 69 (64 - 73)  BP: 111/67 (102/62 - 127/70)  BP(mean): --  RR: 17 (17 - 18)  SpO2: 98% (96% - 98%)    PHYSICAL EXAM:  HEENT: NC/AT; PERRLA  Neck: Soft; no masses.  Lungs: CTA bilateraly; no wheezing.   Heart: RRR; no murmurs.  Abdomen: Soft; mild tenderness.  Extremities: No ulcers.       I&O's Summary    I & Os for current day (as of 24 May 2017 21:32)  =============================================  IN: 150 ml / OUT: 0 ml / NET: 150 ml      LABORATORY:                          13.1   5.9   )-----------( 635      ( 24 May 2017 07:15 )             39.4           05-24    146<H>  |  113<H>  |  5<L>  ----------------------------<  96  3.9   |  27  |  0.65    Ca    8.0<L>      24 May 2017 07:15    TPro  6.3  /  Alb  3.2<L>  /  TBili  0.4  /  DBili  x   /  AST  22  /  ALT  17  /  AlkPhos  52  05-23        LABORATORY:    CBC Full  -  ( 24 May 2017 07:15 )  WBC Count : 5.9 K/uL  Hemoglobin : 13.1 g/dL  Hematocrit : 39.4 %  Platelet Count - Automated : 635 K/uL  Mean Cell Volume : 107.6 fl  Mean Cell Hemoglobin : 35.8 pg  Mean Cell Hemoglobin Concentration : 33.3 gm/dL  Auto Neutrophil # : x  Auto Lymphocyte # : x  Auto Monocyte # : x  Auto Eosinophil # : x  Auto Basophil # : x  Auto Neutrophil % : x  Auto Lymphocyte % : x  Auto Monocyte % : x  Auto Eosinophil % : x  Auto Basophil % : x          05-24    146<H>  |  113<H>  |  5<L>  ----------------------------<  96  3.9   |  27  |  0.65    Ca    8.0<L>      24 May 2017 07:15    TPro  6.3  /  Alb  3.2<L>  /  TBili  0.4  /  DBili  x   /  AST  22  /  ALT  17  /  AlkPhos  52  05-23            Blood Culture:           05-22 @ 12:56  Organism Escherichia coli  Gram stain --        Urine Culture:          05-22 @ 12:56  Organism Escherichia coli      Assessment/Plan:    1. Sigmoid diverticulitis  2. Persistent diarrhea.    . Discontinue IV Zosyn.   . Add Cipro 400 mg iv q12h and Flagyl 500 mg iv q8h.  . Monitor diarrhea progression.    Thank you for the courtesy of this consultation.

## 2017-05-24 NOTE — CONSULT NOTE ADULT - SUBJECTIVE AND OBJECTIVE BOX
Chief Complaint:  Patient is a 85y old  Female who presents with a chief complaint of Abdominal pain and diarrhea. (23 May 2017 17:57)    Constipation  Depression  CVA (cerebral vascular accident)  Hypertension  No significant past surgical history     HPI:  This is an 85y Female complaining of vomiting, abdominal pain and vomiting for the past 2 days.  Seen at [facility] yesterday for same.  Found to have uncomplicated sigmoid diverticulitis on CT.  Sent back as she still is having pain and vomiting.  Denies fever.  Denies abdominal pain at present. (23 May 2017 17:57)      No Known Allergies      sodium chloride 0.9%. 1000milliLiter(s) IV Continuous <Continuous>  piperacillin/tazobactam IVPB. 3.375Gram(s) IV Intermittent every 8 hours  acetaminophen   Tablet 650milliGRAM(s) Oral every 6 hours PRN  acetaminophen   Tablet. 650milliGRAM(s) Oral every 6 hours PRN  ondansetron Injectable 4milliGRAM(s) IV Push every 6 hours PRN  pantoprazole    Tablet 40milliGRAM(s) Oral before breakfast  oxyCODONE  5 mG/acetaminophen 325 mG 1Tablet(s) Oral every 6 hours PRN  enalapril 2.5milliGRAM(s) Oral daily  gabapentin 100milliGRAM(s) Oral two times a day  escitalopram 20milliGRAM(s) Oral daily  simvastatin 10milliGRAM(s) Oral at bedtime  metoprolol 50milliGRAM(s) Oral two times a day  lactobacillus acidophilus 1Tablet(s) Oral every 8 hours  morphine  - Injectable 1milliGRAM(s) IV Push every 4 hours PRN  hydroxyurea 500milliGRAM(s) Oral daily        FAMILY HISTORY:        Review of Systems:    General:  No wt loss, fevers, chills, night sweats,fatigue,   Eyes:  Good vision, no reported pain  ENT:  No sore throat, pain, runny nose, dysphagia  CV:  No pain, palpitatioins, hypo/hypertension  Resp:  No dyspnea, cough, tachypnea, wheezing  :  No pain, bleeding, incontinence, nocturia  Muscle:  No pain, weakness  Neuro:  No weakness, tingling, memory problems  Psych:  No fatigue, insomnia, mood problems, depression  Endocrine:  No polyuria, polydypsia, cold/heat intolerance  Heme:  No petechiae, ecchymosis, easy bruisability  Skin:  No rash, tattoos, scars, edema    Relevant Family History:       Relevant Social History:       Physical Exam:    Vital Signs:  Vital Signs Last 24 Hrs  T(C): 36.7, Max: 37.4 ( @ 19:09)  T(F): 98, Max: 99.4 ( @ 19:09)  HR: 65 (65 - 90)  BP: 102/62 (102/62 - 127/70)  BP(mean): --  RR: 18 (14 - 18)  SpO2: 96% (96% - 98%)  Daily Height in cm: 167.64 (23 May 2017 13:06)    Daily Weight in k.2 (24 May 2017 05:05)    General:  Appears stated age, well-groomed, well-nourished, no distress  HEENT:  NC/AT,  conjunctivae clear and pink, no thyromegaly, nodules, adenopathy, no JVD  Chest:  Full & symmetric excursion, no increased effort, breath sounds clear  Cardiovascular:  Regular rhythm, S1, S2, no murmur/rub/S3/S4, no abdominal bruit, no edema  Abdomen:  Soft, non-tender, non-distended, normoactive bowel sounds,  no masses ,no hepatosplenomeagaly, no signs of chronic liver disease  Extremities:  no cyanosis,clubbing or edema  Skin:  No rash/erythema/ecchymoses/petechiae/wounds/abscess/warm/dry  Neuro/Psych:  Alert, oriented, no asterixis, no tremor, no encephalopathy    Laboratory:                            13.1   5.9   )-----------( 635      ( 24 May 2017 07:15 )             39.4         146<H>  |  113<H>  |  5<L>  ----------------------------<  96  3.9   |  27  |  0.65    Ca    8.0<L>      24 May 2017 07:15    TPro  6.3  /  Alb  3.2<L>  /  TBili  0.4  /  DBili  x   /  AST  22  /  ALT  17  /  AlkPhos  52      LIVER FUNCTIONS - ( 23 May 2017 13:34 )  Alb: 3.2 g/dL / Pro: 6.3 g/dL / ALK PHOS: 52 U/L / ALT: 17 U/L / AST: 22 U/L / GGT: x           PT/INR - ( 24 May 2017 07:15 )   PT: 80.5 sec;   INR: 7.10 ratio         PTT - ( 23 May 2017 14:14 )  PTT:59.5 sec      Imaging:

## 2017-05-24 NOTE — PROGRESS NOTE ADULT - SUBJECTIVE AND OBJECTIVE BOX
PROGRESS NOTE    OVERNIGHT    SUBJECTIVE    PAST MEDICAL & SURGICAL HISTORY:  Constipation  Depression  CVA (cerebral vascular accident)  Hypertension  No significant past surgical history    HEALTH ISSUES - PROBLEM Dx:  Diarrhea, unspecified type: Diarrhea, unspecified type  Lower abdominal pain: Lower abdominal pain  Essential hypertension: Essential hypertension  Diverticulitis of large intestine without perforation or abscess without bleeding: Diverticulitis of large intestine without perforation or abscess without bleeding          MEDICATIONS  (STANDING):  sodium chloride 0.9%. 1000milliLiter(s) IV Continuous <Continuous>  piperacillin/tazobactam IVPB. 3.375Gram(s) IV Intermittent every 8 hours  pantoprazole    Tablet 40milliGRAM(s) Oral before breakfast  enalapril 2.5milliGRAM(s) Oral daily  gabapentin 100milliGRAM(s) Oral two times a day  escitalopram 20milliGRAM(s) Oral daily  simvastatin 10milliGRAM(s) Oral at bedtime  metoprolol 50milliGRAM(s) Oral two times a day  lactobacillus acidophilus 1Tablet(s) Oral every 8 hours  hydroxyurea 500milliGRAM(s) Oral daily    MEDICATIONS  (PRN):  acetaminophen   Tablet 650milliGRAM(s) Oral every 6 hours PRN For Temp greater than 38 C (100.4 F)  acetaminophen   Tablet. 650milliGRAM(s) Oral every 6 hours PRN Mild Pain (1 - 3)  ondansetron Injectable 4milliGRAM(s) IV Push every 6 hours PRN Nausea and/or Vomiting  oxyCODONE  5 mG/acetaminophen 325 mG 1Tablet(s) Oral every 6 hours PRN Moderate Pain  morphine  - Injectable 1milliGRAM(s) IV Push every 4 hours PRN Severe Pain (7 - 10)      OBJECTIVE    T(C): 36.7, Max: 37.4 (05-23 @ 19:09)  HR: 65 (65 - 90)  BP: 102/62 (102/62 - 127/70)  RR: 18 (14 - 18)  SpO2: 96% (96% - 98%)  Wt(kg): --  I&O's Summary    PROGRESS NOTE    OVERNIGHT    SUBJECTIVE    PAST MEDICAL & SURGICAL HISTORY:  Constipation  Depression  CVA (cerebral vascular accident)  Hypertension  No significant past surgical history    HEALTH ISSUES - PROBLEM Dx:  Diarrhea, unspecified type: Diarrhea, unspecified type  Lower abdominal pain: Lower abdominal pain  Essential hypertension: Essential hypertension  Diverticulitis of large intestine without perforation or abscess without bleeding: Diverticulitis of large intestine without perforation or abscess without bleeding          MEDICATIONS  (STANDING):  sodium chloride 0.9%. 1000milliLiter(s) IV Continuous <Continuous>  piperacillin/tazobactam IVPB. 3.375Gram(s) IV Intermittent every 8 hours  pantoprazole    Tablet 40milliGRAM(s) Oral before breakfast  enalapril 2.5milliGRAM(s) Oral daily  gabapentin 100milliGRAM(s) Oral two times a day  escitalopram 20milliGRAM(s) Oral daily  simvastatin 10milliGRAM(s) Oral at bedtime  metoprolol 50milliGRAM(s) Oral two times a day  lactobacillus acidophilus 1Tablet(s) Oral every 8 hours  hydroxyurea 500milliGRAM(s) Oral daily    MEDICATIONS  (PRN):  acetaminophen   Tablet 650milliGRAM(s) Oral every 6 hours PRN For Temp greater than 38 C (100.4 F)  acetaminophen   Tablet. 650milliGRAM(s) Oral every 6 hours PRN Mild Pain (1 - 3)  ondansetron Injectable 4milliGRAM(s) IV Push every 6 hours PRN Nausea and/or Vomiting  oxyCODONE  5 mG/acetaminophen 325 mG 1Tablet(s) Oral every 6 hours PRN Moderate Pain  morphine  - Injectable 1milliGRAM(s) IV Push every 4 hours PRN Severe Pain (7 - 10)      OBJECTIVE    T(C): 36.7, Max: 37.4 (05-23 @ 19:09)  HR: 65 (65 - 90)  BP: 102/62 (102/62 - 127/70)  RR: 18 (14 - 18)  SpO2: 96% (96% - 98%)  Wt(kg): --  I&O's Summary        PROGRESS NOTE    OVERNIGHT    SUBJECTIVE    PAST MEDICAL & SURGICAL HISTORY:  Constipation  Depression  CVA (cerebral vascular accident)  Hypertension  No significant past surgical history    HEALTH ISSUES - PROBLEM Dx:  Diarrhea, unspecified type: Diarrhea, unspecified type  Lower abdominal pain: Lower abdominal pain  Essential hypertension: Essential hypertension  Diverticulitis of large intestine without perforation or abscess without bleeding: Diverticulitis of large intestine without perforation or abscess without bleeding          MEDICATIONS  (STANDING):  sodium chloride 0.9%. 1000milliLiter(s) IV Continuous <Continuous>  piperacillin/tazobactam IVPB. 3.375Gram(s) IV Intermittent every 8 hours  pantoprazole    Tablet 40milliGRAM(s) Oral before breakfast  enalapril 2.5milliGRAM(s) Oral daily  gabapentin 100milliGRAM(s) Oral two times a day  escitalopram 20milliGRAM(s) Oral daily  simvastatin 10milliGRAM(s) Oral at bedtime  metoprolol 50milliGRAM(s) Oral two times a day  lactobacillus acidophilus 1Tablet(s) Oral every 8 hours  hydroxyurea 500milliGRAM(s) Oral daily    MEDICATIONS  (PRN):  acetaminophen   Tablet 650milliGRAM(s) Oral every 6 hours PRN For Temp greater than 38 C (100.4 F)  acetaminophen   Tablet. 650milliGRAM(s) Oral every 6 hours PRN Mild Pain (1 - 3)  ondansetron Injectable 4milliGRAM(s) IV Push every 6 hours PRN Nausea and/or Vomiting  oxyCODONE  5 mG/acetaminophen 325 mG 1Tablet(s) Oral every 6 hours PRN Moderate Pain  morphine  - Injectable 1milliGRAM(s) IV Push every 4 hours PRN Severe Pain (7 - 10)      OBJECTIVE    T(C): 36.7, Max: 37.4 (05-23 @ 19:09)  HR: 65 (65 - 90)  BP: 102/62 (102/62 - 127/70)  RR: 18 (14 - 18)  SpO2: 96% (96% - 98%)  Wt(kg): --  I&O's Summary        REVIEW OF SYSTEMS:  CONSTITUTIONAL: No fever, weight loss, or fatigue  EYES: No eye pain, visual disturbances, or discharge  ENMT:  No difficulty hearing, tinnitus, vertigo; No sinus or throat pain  NECK: No pain or stiffness  BREASTS: No pain, masses, or nipple discharge  RESPIRATORY: No cough, wheezing, chills or hemoptysis; No shortness of breath  CARDIOVASCULAR: No chest pain, palpitations, dizziness, or leg swelling  GASTROINTESTINAL: No abdominal pain. No nausea, vomiting; No diarrhea or constipation. No melena or hematochezia.  GENITOURINARY: No dysuria, frequency, hematuria, or incontinence  NEUROLOGICAL: No headaches, memory loss, loss of strength, numbness, or tremors  SKIN: No itching, burning, rashes, or lesions   LYMPH NODES: No enlarged glands  MUSCULOSKELETAL: No joint pain or swelling; No muscle, back, or extremity pain  HEME/LYMPH: No easy bruising, or bleeding gums  ALLERY AND IMMUNOLOGIC: No hives or eczema      PHYSICAL EXAM:  Appearance: NAD.   HEENT:   Moist oral mucosa. Conjunctiva clear b/l.   Lymphatic: No cervical lymphadenopathy  Cardiovascular: RRR with no m/r/g.  Respiratory: Lungs CTAB.  Gastrointestinal:  Soft, nontender. No rebound. No rigidity. 	  Skin: No rashes, No ecchymoses, No cyanosis.	  Neurologic: Non-focal. Moving all extremities. Following commands.  Extremities: No edema. No erythema. No calf tenderness. Luis's negative.  Vascular: DP 2+ b/l.  	  LABS:                        13.1   5.9   )-----------( 635      ( 24 May 2017 07:15 )             39.4     05-24    146<H>  |  113<H>  |  5<L>  ----------------------------<  96  3.9   |  27  |  0.65    Ca    8.0<L>      24 May 2017 07:15    TPro  6.3  /  Alb  3.2<L>  /  TBili  0.4  /  DBili  x   /  AST  22  /  ALT  17  /  AlkPhos  52  05-23    PT/INR - ( 24 May 2017 07:15 )   PT: 80.5 sec;   INR: 7.10 ratio         PTT - ( 23 May 2017 14:14 )  PTT:59.5 sec      RADIOLOGY & ADDITIONAL TESTS:    ASSESSMENT/PLAN: 	      	  LABS:                        13.1   5.9   )-----------( 635      ( 24 May 2017 07:15 )             39.4     05-24    146<H>  |  113<H>  |  5<L>  ----------------------------<  96  3.9   |  27  |  0.65    Ca    8.0<L>      24 May 2017 07:15    TPro  6.3  /  Alb  3.2<L>  /  TBili  0.4  /  DBili  x   /  AST  22  /  ALT  17  /  AlkPhos  52  05-23    PT/INR - ( 24 May 2017 07:15 )   PT: 80.5 sec;   INR: 7.10 ratio         PTT - ( 23 May 2017 14:14 )  PTT:59.5 sec      RADIOLOGY & ADDITIONAL TESTS:    ASSESSMENT/PLAN: 	          LABS:                        13.1   5.9   )-----------( 635      ( 24 May 2017 07:15 )             39.4     05-24    146<H>  |  113<H>  |  5<L>  ----------------------------<  96  3.9   |  27  |  0.65    Ca    8.0<L>      24 May 2017 07:15    TPro  6.3  /  Alb  3.2<L>  /  TBili  0.4  /  DBili  x   /  AST  22  /  ALT  17  /  AlkPhos  52  05-23    PT/INR - ( 24 May 2017 07:15 )   PT: 80.5 sec;   INR: 7.10 ratio         PTT - ( 23 May 2017 14:14 )  PTT:59.5 sec      RADIOLOGY & ADDITIONAL TESTS:    ASSESSMENT/PLAN: PROGRESS NOTE    OVERNIGHT  Diarrhea reported ,but stool for c.diff specimen was declined by lab ( found to be formed bm) Denies nausea and vomiting   SUBJECTIVE  NAD ,resting in abed comfortably  PAST MEDICAL & SURGICAL HISTORY:  Constipation  Depression  CVA (cerebral vascular accident)  Hypertension  No significant past surgical history    HEALTH ISSUES - PROBLEM Dx:  Diarrhea, unspecified type: Diarrhea, unspecified type  Lower abdominal pain: Lower abdominal pain  Essential hypertension: Essential hypertension  Diverticulitis of large intestine without perforation or abscess without bleeding: Diverticulitis of large intestine without perforation or abscess without bleeding          MEDICATIONS  (STANDING):  sodium chloride 0.9%. 1000milliLiter(s) IV Continuous <Continuous>  piperacillin/tazobactam IVPB. 3.375Gram(s) IV Intermittent every 8 hours  pantoprazole    Tablet 40milliGRAM(s) Oral before breakfast  enalapril 2.5milliGRAM(s) Oral daily  gabapentin 100milliGRAM(s) Oral two times a day  escitalopram 20milliGRAM(s) Oral daily  simvastatin 10milliGRAM(s) Oral at bedtime  metoprolol 50milliGRAM(s) Oral two times a day  lactobacillus acidophilus 1Tablet(s) Oral every 8 hours  hydroxyurea 500milliGRAM(s) Oral daily    MEDICATIONS  (PRN):  acetaminophen   Tablet 650milliGRAM(s) Oral every 6 hours PRN For Temp greater than 38 C (100.4 F)  acetaminophen   Tablet. 650milliGRAM(s) Oral every 6 hours PRN Mild Pain (1 - 3)  ondansetron Injectable 4milliGRAM(s) IV Push every 6 hours PRN Nausea and/or Vomiting  oxyCODONE  5 mG/acetaminophen 325 mG 1Tablet(s) Oral every 6 hours PRN Moderate Pain  morphine  - Injectable 1milliGRAM(s) IV Push every 4 hours PRN Severe Pain (7 - 10)      OBJECTIVE    T(C): 36.7, Max: 37.4 (05-23 @ 19:09)  HR: 65 (65 - 90)  BP: 102/62 (102/62 - 127/70)  RR: 18 (14 - 18)  SpO2: 96% (96% - 98%)  Wt(kg): --  I&O's Summary    PROGRESS NOTE    OVERNIGHT    SUBJECTIVE    PAST MEDICAL & SURGICAL HISTORY:  Constipation  Depression  CVA (cerebral vascular accident)  Hypertension  No significant past surgical history    HEALTH ISSUES - PROBLEM Dx:  Diarrhea, unspecified type: Diarrhea, unspecified type  Lower abdominal pain: Lower abdominal pain  Essential hypertension: Essential hypertension  Diverticulitis of large intestine without perforation or abscess without bleeding: Diverticulitis of large intestine without perforation or abscess without bleeding          MEDICATIONS  (STANDING):  sodium chloride 0.9%. 1000milliLiter(s) IV Continuous <Continuous>  piperacillin/tazobactam IVPB. 3.375Gram(s) IV Intermittent every 8 hours  pantoprazole    Tablet 40milliGRAM(s) Oral before breakfast  enalapril 2.5milliGRAM(s) Oral daily  gabapentin 100milliGRAM(s) Oral two times a day  escitalopram 20milliGRAM(s) Oral daily  simvastatin 10milliGRAM(s) Oral at bedtime  metoprolol 50milliGRAM(s) Oral two times a day  lactobacillus acidophilus 1Tablet(s) Oral every 8 hours  hydroxyurea 500milliGRAM(s) Oral daily    MEDICATIONS  (PRN):  acetaminophen   Tablet 650milliGRAM(s) Oral every 6 hours PRN For Temp greater than 38 C (100.4 F)  acetaminophen   Tablet. 650milliGRAM(s) Oral every 6 hours PRN Mild Pain (1 - 3)  ondansetron Injectable 4milliGRAM(s) IV Push every 6 hours PRN Nausea and/or Vomiting  oxyCODONE  5 mG/acetaminophen 325 mG 1Tablet(s) Oral every 6 hours PRN Moderate Pain  morphine  - Injectable 1milliGRAM(s) IV Push every 4 hours PRN Severe Pain (7 - 10)      OBJECTIVE    T(C): 36.7, Max: 37.4 (05-23 @ 19:09)  HR: 65 (65 - 90)  BP: 102/62 (102/62 - 127/70)  RR: 18 (14 - 18)  SpO2: 96% (96% - 98%)  Wt(kg): --  I&O's Summary        PROGRESS NOTE    OVERNIGHT    SUBJECTIVE    PAST MEDICAL & SURGICAL HISTORY:  Constipation  Depression  CVA (cerebral vascular accident)  Hypertension  No significant past surgical history    HEALTH ISSUES - PROBLEM Dx:  Diarrhea, unspecified type: Diarrhea, unspecified type  Lower abdominal pain: Lower abdominal pain  Essential hypertension: Essential hypertension  Diverticulitis of large intestine without perforation or abscess without bleeding: Diverticulitis of large intestine without perforation or abscess without bleeding          MEDICATIONS  (STANDING):  sodium chloride 0.9%. 1000milliLiter(s) IV Continuous <Continuous>  piperacillin/tazobactam IVPB. 3.375Gram(s) IV Intermittent every 8 hours  pantoprazole    Tablet 40milliGRAM(s) Oral before breakfast  enalapril 2.5milliGRAM(s) Oral daily  gabapentin 100milliGRAM(s) Oral two times a day  escitalopram 20milliGRAM(s) Oral daily  simvastatin 10milliGRAM(s) Oral at bedtime  metoprolol 50milliGRAM(s) Oral two times a day  lactobacillus acidophilus 1Tablet(s) Oral every 8 hours  hydroxyurea 500milliGRAM(s) Oral daily    MEDICATIONS  (PRN):  acetaminophen   Tablet 650milliGRAM(s) Oral every 6 hours PRN For Temp greater than 38 C (100.4 F)  acetaminophen   Tablet. 650milliGRAM(s) Oral every 6 hours PRN Mild Pain (1 - 3)  ondansetron Injectable 4milliGRAM(s) IV Push every 6 hours PRN Nausea and/or Vomiting  oxyCODONE  5 mG/acetaminophen 325 mG 1Tablet(s) Oral every 6 hours PRN Moderate Pain  morphine  - Injectable 1milliGRAM(s) IV Push every 4 hours PRN Severe Pain (7 - 10)      OBJECTIVE    T(C): 36.7, Max: 37.4 (05-23 @ 19:09)  HR: 65 (65 - 90)  BP: 102/62 (102/62 - 127/70)  RR: 18 (14 - 18)  SpO2: 96% (96% - 98%)  Wt(kg): --  I&O's Summary        REVIEW OF SYSTEMS:  limited ,very weak ,but answers my questions   Generally feels better     PHYSICAL EXAM:  Appearance: NAD. VSS afebrile  HEENT:   Moist oral mucosa. Conjunctiva clear b/l.   Lymphatic: No cervical lymphadenopathy  Cardiovascular: RRR with no m/r/g.s1s2  Respiratory: Lungs CTAB.  Gastrointestinal:  Soft, nontender. No rebound. No rigidity. 	  Skin: No rashes, No ecchymoses, No cyanosis.	  Neurologic: Non-focal. Moving all extremities. Following commands.  Extremities: No edema. No erythema. No calf tenderness. Luis's negative.  Vascular: DP 2+ b/l.  	        	  LABS:                        13.1   5.9   )-----------( 635      ( 24 May 2017 07:15 )             39.4     05-24    146<H>  |  113<H>  |  5<L>  ----------------------------<  96  3.9   |  27  |  0.65    Ca    8.0<L>      24 May 2017 07:15    TPro  6.3  /  Alb  3.2<L>  /  TBili  0.4  /  DBili  x   /  AST  22  /  ALT  17  /  AlkPhos  52  05-23    PT/INR - ( 24 May 2017 07:15 )   PT: 80.5 sec;   INR: 7.10 ratio         PTT - ( 23 May 2017 14:14 )  PTT:59.5 sec              RADIOLOGY & ADDITIONAL TESTS:    ASSESSMENT/PLAN:

## 2017-05-24 NOTE — PROGRESS NOTE ADULT - ASSESSMENT
86 yo female admitted with nausea,vomiting and loose BMS x 2 days CT abd showed uncomlicated diverticulitis Started on zosyn ,septic workup sent No evidence of sepsis so far

## 2017-05-24 NOTE — PROGRESS NOTE ADULT - SUBJECTIVE AND OBJECTIVE BOX
Hematology/Oncology consult     Patient is seen and examined  notes/labs reviewed  pt son is at bedside and d/w pt and pt son.  consult dictated,  Job #      IMPRESSION:      RECOMMENDATION:      Dr Best ,thanks for courtsey of this consult,will follow this pt with you for hem/onc issue while pt is in hospital. Hematology/Oncology consult     Patient is seen and examined  notes/labs reviewed  pt son is at bedside and d/w pt and pt son.  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

## 2017-05-25 DIAGNOSIS — D68.9 COAGULATION DEFECT, UNSPECIFIED: ICD-10-CM

## 2017-05-25 DIAGNOSIS — I63.9 CEREBRAL INFARCTION, UNSPECIFIED: ICD-10-CM

## 2017-05-25 DIAGNOSIS — D47.3 ESSENTIAL (HEMORRHAGIC) THROMBOCYTHEMIA: ICD-10-CM

## 2017-05-25 LAB
ANION GAP SERPL CALC-SCNC: 8 MMOL/L — SIGNIFICANT CHANGE UP (ref 5–17)
APTT BLD: 33.4 SEC — SIGNIFICANT CHANGE UP (ref 27.5–37.4)
BASOPHILS # BLD AUTO: 0 K/UL — SIGNIFICANT CHANGE UP (ref 0–0.2)
BASOPHILS NFR BLD AUTO: 0.7 % — SIGNIFICANT CHANGE UP (ref 0–2)
BUN SERPL-MCNC: 3 MG/DL — LOW (ref 7–23)
CALCIUM SERPL-MCNC: 7.9 MG/DL — LOW (ref 8.5–10.1)
CHLORIDE SERPL-SCNC: 111 MMOL/L — HIGH (ref 96–108)
CO2 SERPL-SCNC: 27 MMOL/L — SIGNIFICANT CHANGE UP (ref 22–31)
CREAT SERPL-MCNC: 0.57 MG/DL — SIGNIFICANT CHANGE UP (ref 0.5–1.3)
EOSINOPHIL # BLD AUTO: 0.2 K/UL — SIGNIFICANT CHANGE UP (ref 0–0.5)
EOSINOPHIL NFR BLD AUTO: 3.5 % — SIGNIFICANT CHANGE UP (ref 0–6)
FERRITIN SERPL-MCNC: 100 NG/ML — SIGNIFICANT CHANGE UP (ref 15–150)
FOLATE SERPL-MCNC: >20 NG/ML — SIGNIFICANT CHANGE UP (ref 4.8–24.2)
GLUCOSE SERPL-MCNC: 93 MG/DL — SIGNIFICANT CHANGE UP (ref 70–99)
HAPTOGLOB SERPL-MCNC: 164 MG/DL — SIGNIFICANT CHANGE UP (ref 34–200)
HCT VFR BLD CALC: 39.2 % — SIGNIFICANT CHANGE UP (ref 34.5–45)
HCT VFR BLD CALC: 40.6 % — SIGNIFICANT CHANGE UP (ref 34.5–45)
HGB BLD-MCNC: 13.2 G/DL — SIGNIFICANT CHANGE UP (ref 11.5–15.5)
HGB BLD-MCNC: 13.3 G/DL — SIGNIFICANT CHANGE UP (ref 11.5–15.5)
INR BLD: 1.53 RATIO — HIGH (ref 0.88–1.16)
IRON SATN MFR SERPL: 27 % — SIGNIFICANT CHANGE UP (ref 14–50)
IRON SATN MFR SERPL: 48 UG/DL — SIGNIFICANT CHANGE UP (ref 30–160)
LDH SERPL L TO P-CCNC: 281 U/L — HIGH (ref 50–242)
LYMPHOCYTES # BLD AUTO: 0.9 K/UL — LOW (ref 1–3.3)
LYMPHOCYTES # BLD AUTO: 12.8 % — LOW (ref 13–44)
MAGNESIUM SERPL-MCNC: 1.9 MG/DL — SIGNIFICANT CHANGE UP (ref 1.6–2.6)
MCHC RBC-ENTMCNC: 32.6 GM/DL — SIGNIFICANT CHANGE UP (ref 32–36)
MCHC RBC-ENTMCNC: 33.7 GM/DL — SIGNIFICANT CHANGE UP (ref 32–36)
MCHC RBC-ENTMCNC: 34.9 PG — HIGH (ref 27–34)
MCHC RBC-ENTMCNC: 36.3 PG — HIGH (ref 27–34)
MCV RBC AUTO: 106.9 FL — HIGH (ref 80–100)
MCV RBC AUTO: 107.8 FL — HIGH (ref 80–100)
MONOCYTES # BLD AUTO: 0.6 K/UL — SIGNIFICANT CHANGE UP (ref 0–0.9)
MONOCYTES NFR BLD AUTO: 8.9 % — SIGNIFICANT CHANGE UP (ref 1–9)
NEUTROPHILS # BLD AUTO: 5 K/UL — SIGNIFICANT CHANGE UP (ref 1.8–7.4)
NEUTROPHILS NFR BLD AUTO: 74.1 % — SIGNIFICANT CHANGE UP (ref 43–77)
PHOSPHATE SERPL-MCNC: 2.3 MG/DL — LOW (ref 2.5–4.5)
PLATELET # BLD AUTO: 670 K/UL — HIGH (ref 150–400)
PLATELET # BLD AUTO: 685 K/UL — HIGH (ref 150–400)
POTASSIUM SERPL-MCNC: 3.4 MMOL/L — LOW (ref 3.5–5.3)
POTASSIUM SERPL-SCNC: 3.4 MMOL/L — LOW (ref 3.5–5.3)
PROTHROM AB SERPL-ACNC: 16.8 SEC — HIGH (ref 9.8–12.7)
RBC # BLD: 3.64 M/UL — LOW (ref 3.8–5.2)
RBC # BLD: 3.7 M/UL — LOW (ref 3.8–5.2)
RBC # BLD: 3.8 M/UL — SIGNIFICANT CHANGE UP (ref 3.8–5.2)
RBC # FLD: 12.8 % — SIGNIFICANT CHANGE UP (ref 10.3–14.5)
RBC # FLD: 13.3 % — SIGNIFICANT CHANGE UP (ref 10.3–14.5)
RETICS #: 75.1 K/UL — SIGNIFICANT CHANGE UP (ref 25–125)
RETICS/RBC NFR: 2 % — SIGNIFICANT CHANGE UP (ref 0.5–2.5)
SODIUM SERPL-SCNC: 146 MMOL/L — HIGH (ref 135–145)
TIBC SERPL-MCNC: 176 UG/DL — LOW (ref 220–430)
UIBC SERPL-MCNC: 128 UG/DL — SIGNIFICANT CHANGE UP (ref 110–370)
URATE SERPL-MCNC: 2.3 MG/DL — LOW (ref 2.5–7)
VIT B12 SERPL-MCNC: 811 PG/ML — SIGNIFICANT CHANGE UP (ref 243–894)
WBC # BLD: 6.6 K/UL — SIGNIFICANT CHANGE UP (ref 3.8–10.5)
WBC # BLD: 6.8 K/UL — SIGNIFICANT CHANGE UP (ref 3.8–10.5)
WBC # FLD AUTO: 6.6 K/UL — SIGNIFICANT CHANGE UP (ref 3.8–10.5)
WBC # FLD AUTO: 6.8 K/UL — SIGNIFICANT CHANGE UP (ref 3.8–10.5)

## 2017-05-25 RX ORDER — SODIUM,POTASSIUM PHOSPHATES 278-250MG
1 POWDER IN PACKET (EA) ORAL
Qty: 0 | Refills: 0 | Status: DISCONTINUED | OUTPATIENT
Start: 2017-05-25 | End: 2017-05-26

## 2017-05-25 RX ORDER — WARFARIN SODIUM 2.5 MG/1
3 TABLET ORAL ONCE
Qty: 0 | Refills: 0 | Status: DISCONTINUED | OUTPATIENT
Start: 2017-05-25 | End: 2017-05-25

## 2017-05-25 RX ORDER — WARFARIN SODIUM 2.5 MG/1
2.5 TABLET ORAL ONCE
Qty: 0 | Refills: 0 | Status: COMPLETED | OUTPATIENT
Start: 2017-05-25 | End: 2017-05-25

## 2017-05-25 RX ORDER — ENOXAPARIN SODIUM 100 MG/ML
40 INJECTION SUBCUTANEOUS DAILY
Qty: 0 | Refills: 0 | Status: DISCONTINUED | OUTPATIENT
Start: 2017-05-25 | End: 2017-05-26

## 2017-05-25 RX ADMIN — NYSTATIN CREAM 1 APPLICATION(S): 100000 CREAM TOPICAL at 22:36

## 2017-05-25 RX ADMIN — SIMVASTATIN 10 MILLIGRAM(S): 20 TABLET, FILM COATED ORAL at 22:37

## 2017-05-25 RX ADMIN — Medication 200 MILLIGRAM(S): at 18:15

## 2017-05-25 RX ADMIN — Medication 100 MILLIGRAM(S): at 05:24

## 2017-05-25 RX ADMIN — GABAPENTIN 100 MILLIGRAM(S): 400 CAPSULE ORAL at 18:15

## 2017-05-25 RX ADMIN — Medication 100 MILLIGRAM(S): at 13:44

## 2017-05-25 RX ADMIN — HYDROXYUREA 1000 MILLIGRAM(S): 500 CAPSULE ORAL at 13:44

## 2017-05-25 RX ADMIN — NYSTATIN CREAM 1 APPLICATION(S): 100000 CREAM TOPICAL at 05:27

## 2017-05-25 RX ADMIN — Medication 1 TABLET(S): at 13:44

## 2017-05-25 RX ADMIN — Medication 50 MILLIGRAM(S): at 18:16

## 2017-05-25 RX ADMIN — PANTOPRAZOLE SODIUM 40 MILLIGRAM(S): 20 TABLET, DELAYED RELEASE ORAL at 06:37

## 2017-05-25 RX ADMIN — ESCITALOPRAM OXALATE 20 MILLIGRAM(S): 10 TABLET, FILM COATED ORAL at 13:44

## 2017-05-25 RX ADMIN — WARFARIN SODIUM 2.5 MILLIGRAM(S): 2.5 TABLET ORAL at 22:36

## 2017-05-25 RX ADMIN — Medication 50 MILLIGRAM(S): at 05:24

## 2017-05-25 RX ADMIN — NYSTATIN CREAM 1 APPLICATION(S): 100000 CREAM TOPICAL at 13:45

## 2017-05-25 RX ADMIN — Medication 2.5 MILLIGRAM(S): at 05:24

## 2017-05-25 RX ADMIN — Medication 1 TABLET(S): at 18:15

## 2017-05-25 RX ADMIN — Medication 1 TABLET(S): at 05:24

## 2017-05-25 RX ADMIN — SODIUM CHLORIDE 50 MILLILITER(S): 9 INJECTION INTRAMUSCULAR; INTRAVENOUS; SUBCUTANEOUS at 05:38

## 2017-05-25 RX ADMIN — Medication 100 MILLIGRAM(S): at 22:36

## 2017-05-25 RX ADMIN — Medication 1 TABLET(S): at 22:37

## 2017-05-25 RX ADMIN — Medication 200 MILLIGRAM(S): at 05:24

## 2017-05-25 RX ADMIN — GABAPENTIN 100 MILLIGRAM(S): 400 CAPSULE ORAL at 05:24

## 2017-05-25 NOTE — PROGRESS NOTE ADULT - PROBLEM SELECTOR PLAN 1
continue current managmnet and medications ,id and gi followup ,ppi ,antiemetics continue current managmnet and medications ,id and gi followup ,ppi ,antiemetics Slowly improving ,anticipate discharge on po abx  in 24 hrs if diarrhea resolves Ruled out for sepsis

## 2017-05-25 NOTE — PROGRESS NOTE ADULT - ASSESSMENT
84 yo female admitted with nausea,vomiting and loose BMS x 2 days CT abd showed uncomlicated diverticulitis Started on zosyn ,septic workup sent No evidence of sepsis so far 84 yo female admitted with nausea,vomiting and loose BMS x 2 days CT abd showed uncomlicated diverticulitis Started on zosyn  in ER ,septic workup sent No evidence of sepsis so far Seen by ID yesterday and abx changed to cipro/flagyl Likely diarrhea was related to diverticulitis and zosyn administartion

## 2017-05-25 NOTE — PROGRESS NOTE ADULT - SUBJECTIVE AND OBJECTIVE BOX
PROGRESS NOTE    OVERNIGHT    SUBJECTIVE    PAST MEDICAL & SURGICAL HISTORY:  Constipation  Depression  CVA (cerebral vascular accident)  Hypertension  No significant past surgical history    HEALTH ISSUES - PROBLEM Dx:  Diarrhea, unspecified type: Diarrhea, unspecified type  Lower abdominal pain: Lower abdominal pain  Essential hypertension: Essential hypertension  Diverticulitis of large intestine without perforation or abscess without bleeding: Diverticulitis of large intestine without perforation or abscess without bleeding          MEDICATIONS  (STANDING):  sodium chloride 0.9%. 1000milliLiter(s) IV Continuous <Continuous>  pantoprazole    Tablet 40milliGRAM(s) Oral before breakfast  enalapril 2.5milliGRAM(s) Oral daily  gabapentin 100milliGRAM(s) Oral two times a day  escitalopram 20milliGRAM(s) Oral daily  simvastatin 10milliGRAM(s) Oral at bedtime  metoprolol 50milliGRAM(s) Oral two times a day  lactobacillus acidophilus 1Tablet(s) Oral every 8 hours  nystatin Cream 1Application(s) Topical three times a day  hydroxyurea 1000milliGRAM(s) Oral daily  metroNIDAZOLE  IVPB 500milliGRAM(s) IV Intermittent every 8 hours  ciprofloxacin   IVPB 400milliGRAM(s) IV Intermittent every 12 hours    MEDICATIONS  (PRN):  acetaminophen   Tablet 650milliGRAM(s) Oral every 6 hours PRN For Temp greater than 38 C (100.4 F)  acetaminophen   Tablet. 650milliGRAM(s) Oral every 6 hours PRN Mild Pain (1 - 3)  ondansetron Injectable 4milliGRAM(s) IV Push every 6 hours PRN Nausea and/or Vomiting  oxyCODONE  5 mG/acetaminophen 325 mG 1Tablet(s) Oral every 6 hours PRN Moderate Pain  morphine  - Injectable 1milliGRAM(s) IV Push every 4 hours PRN Severe Pain (7 - 10)      OBJECTIVE    T(C): 36.7, Max: 37 (05-24 @ 19:58)  HR: 70 (64 - 70)  BP: 135/70 (108/64 - 135/70)  RR: 1REVIEW OF SYSTEMS:  limited ,very weak ,but answers my questions   Generally feels better     PHYSICAL EXAM:  Appearance: NAD. VSS afebrile  HEENT:   Moist oral mucosa. Conjunctiva clear b/l.   Lymphatic: No cervical lymphadenopathy  Cardiovascular: RRR with no m/r/g.s1s2  Respiratory: Lungs CTAB.  Gastrointestinal:  Soft, nontender. No rebound. No rigidity. 	  Skin: No rashes, No ecchymoses, No cyanosis.	  Neurologic: Non-focal. Moving all extremities. Following commands.  Extremities: No edema. No erythema. No calf tenderness. Luis's negative.  Vascular: DP 2+ b/l.  	7 (17 - 18)  SpO2: 94% (94% - 98%)  Wt(kg): --  I&O's Summary    I & Os for current day (as of 25 May 2017 13:00)  =============================================  IN: 850 ml / OUT: 0 ml / NET: 850 ml      LABS:                        13.3   6.6   )-----------( 685      ( 25 May 2017 10:46 )             40.6     05-25    146<H>  |  111<H>  |  3<L>  ----------------------------<  93  3.4<L>   |  27  |  0.57    Ca    7.9<L>      25 May 2017 10:46  Phos  2.3     05-25  Mg     1.9     05-25    TPro  6.3  /  Alb  3.2<L>  /  TBili  0.4  /  DBili  x   /  AST  22  /  ALT  17  /  AlkPhos  52  05-23    PT/INR - ( 25 May 2017 06:26 )   PT: 16.8 sec;   INR: 1.53 ratio         PTT - ( 25 May 2017 06:26 )  PTT:33.4 sec      RADIOLOGY & ADDITIONAL TESTS:    ASSESSMENT/PLAN: PROGRESS NOTE    OVERNIGHT  Loose BM this morning reported   SUBJECTIVE  Patient states -I feel much better but still very weak Seen by PT -may be candidate for rehab  PAST MEDICAL & SURGICAL HISTORY:  Constipation  Depression  CVA (cerebral vascular accident)  Hypertension  No significant past surgical history    HEALTH ISSUES - PROBLEM Dx:  Diarrhea, unspecified type: Diarrhea, unspecified type  Lower abdominal pain: Lower abdominal pain  Essential hypertension: Essential hypertension  Diverticulitis of large intestine without perforation or abscess without bleeding: Diverticulitis of large intestine without perforation or abscess without bleeding          MEDICATIONS  (STANDING):  sodium chloride 0.9%. 1000milliLiter(s) IV Continuous <Continuous>  pantoprazole    Tablet 40milliGRAM(s) Oral before breakfast  enalapril 2.5milliGRAM(s) Oral daily  gabapentin 100milliGRAM(s) Oral two times a day  escitalopram 20milliGRAM(s) Oral daily  simvastatin 10milliGRAM(s) Oral at bedtime  metoprolol 50milliGRAM(s) Oral two times a day  lactobacillus acidophilus 1Tablet(s) Oral every 8 hours  nystatin Cream 1Application(s) Topical three times a day  hydroxyurea 1000milliGRAM(s) Oral daily  metroNIDAZOLE  IVPB 500milliGRAM(s) IV Intermittent every 8 hours  ciprofloxacin   IVPB 400milliGRAM(s) IV Intermittent every 12 hours    MEDICATIONS  (PRN):  acetaminophen   Tablet 650milliGRAM(s) Oral every 6 hours PRN For Temp greater than 38 C (100.4 F)  acetaminophen   Tablet. 650milliGRAM(s) Oral every 6 hours PRN Mild Pain (1 - 3)  ondansetron Injectable 4milliGRAM(s) IV Push every 6 hours PRN Nausea and/or Vomiting  oxyCODONE  5 mG/acetaminophen 325 mG 1Tablet(s) Oral every 6 hours PRN Moderate Pain  morphine  - Injectable 1milliGRAM(s) IV Push every 4 hours PRN Severe Pain (7 - 10)      OBJECTIVE    T(C): 36.7, Max: 37 (05-24 @ 19:58)  HR: 70 (64 - 70)  BP: 135/70 (108/64 - 135/70)  RR: 1REVIEW OF SYSTEMS:  limited ,very weak ,but answers my questions   Generally feels better     PHYSICAL EXAM:  Appearance: NAD. VSS afebrile  HEENT:   Moist oral mucosa. Conjunctiva clear b/l.   Lymphatic: No cervical lymphadenopathy  Cardiovascular: RRR with no m/r/g.s1s2  Respiratory: Lungs CTAB.  Gastrointestinal:  Soft, nontender. No rebound. No rigidity. 	  Skin: No rashes, No ecchymoses, No cyanosis.	  Neurologic: Non-focal. Moving all extremities. Following commands.  Extremities: No edema. No erythema. No calf tenderness. Luis's negative.  Vascular: DP 2+ b/l.  	7 (17 - 18)  SpO2: 94% (94% - 98%)  Wt(kg): --  I&O's Summary    I & Os for current day (as of 25 May 2017 13:00)  =============================================  IN: 850 ml / OUT: 0 ml / NET: 850 ml      LABS:                        13.3   6.6   )-----------( 685      ( 25 May 2017 10:46 )             40.6     05-25    146<H>  |  111<H>  |  3<L>  ----------------------------<  93  3.4<L>   |  27  |  0.57    Ca    7.9<L>      25 May 2017 10:46  Phos  2.3     05-25  Mg     1.9     05-25    TPro  6.3  /  Alb  3.2<L>  /  TBili  0.4  /  DBili  x   /  AST  22  /  ALT  17  /  AlkPhos  52  05-23    PT/INR - ( 25 May 2017 06:26 )   PT: 16.8 sec;   INR: 1.53 ratio         PTT - ( 25 May 2017 06:26 )  PTT:33.4 sec      RADIOLOGY & ADDITIONAL TESTS:    ASSESSMENT/PLAN:

## 2017-05-25 NOTE — PROGRESS NOTE ADULT - SUBJECTIVE AND OBJECTIVE BOX
Patient is a 85y old  Female who presents with a chief complaint of Abdominal pain and diarrhea. (23 May 2017 17:57)       Pt is seen and examined, pt is awake and lying in bed,pt seems comfortable and denies any complaints at this time    HPI:  This is an 85y Female complaining of vomiting, abdominal pain and vomiting for the past 2 days.  Seen at [place] [facility] yesterday for same.  Found to have uncomplicated sigmoid diverticulitis on CT.  Sent back as she still is having pain and vomiting.  Denies fever.  Denies abdominal pain at present. (23 May 2017 17:57)         ROS:  Negative except for:    MEDICATIONS  (STANDING):  sodium chloride 0.9%. 1000milliLiter(s) IV Continuous <Continuous>  pantoprazole    Tablet 40milliGRAM(s) Oral before breakfast  enalapril 2.5milliGRAM(s) Oral daily  gabapentin 100milliGRAM(s) Oral two times a day  escitalopram 20milliGRAM(s) Oral daily  simvastatin 10milliGRAM(s) Oral at bedtime  metoprolol 50milliGRAM(s) Oral two times a day  lactobacillus acidophilus 1Tablet(s) Oral every 8 hours  nystatin Cream 1Application(s) Topical three times a day  hydroxyurea 1000milliGRAM(s) Oral daily  metroNIDAZOLE  IVPB 500milliGRAM(s) IV Intermittent every 8 hours  ciprofloxacin   IVPB 400milliGRAM(s) IV Intermittent every 12 hours  potassium acid phosphate/sodium acid phosphate tablet (K-PHOS No. 2) 1Tablet(s) Oral three times a day with meals    MEDICATIONS  (PRN):  acetaminophen   Tablet 650milliGRAM(s) Oral every 6 hours PRN For Temp greater than 38 C (100.4 F)  acetaminophen   Tablet. 650milliGRAM(s) Oral every 6 hours PRN Mild Pain (1 - 3)  ondansetron Injectable 4milliGRAM(s) IV Push every 6 hours PRN Nausea and/or Vomiting  oxyCODONE  5 mG/acetaminophen 325 mG 1Tablet(s) Oral every 6 hours PRN Moderate Pain  morphine  - Injectable 1milliGRAM(s) IV Push every 4 hours PRN Severe Pain (7 - 10)      Allergies    No Known Allergies    Intolerances        Vital Signs Last 24 Hrs  T(C): 37, Max: 37 (05-24 @ 19:58)  T(F): 98.6, Max: 98.6 (05-24 @ 19:58)  HR: 75 (69 - 75)  BP: 124/76 (111/67 - 135/70)  BP(mean): --  RR: 16 (16 - 17)  SpO2: 95% (94% - 98%)    PHYSICAL EXAM  General: adult in NAD  HEENT: clear oropharynx, anicteric sclera, pink conjunctiva  Neck: supple  CV: normal S1/S2 with no murmur rubs or gallops  Lungs: positive air movement b/l ant lungs,clear to auscultation, no wheezes, no rales  Abdomen: soft non-tender non-distended, no hepatosplenomegaly  Ext: no clubbing cyanosis or edema  Skin: no rashes and no petechiae  Neuro: alert and oriented X 4, no focal deficits  LABS:                          13.3   6.6   )-----------( 685      ( 25 May 2017 10:46 )             40.6         Mean Cell Volume : 106.9 fl  Mean Cell Hemoglobin : 34.9 pg  Mean Cell Hemoglobin Concentration : 32.6 gm/dL  Auto Neutrophil # : x  Auto Lymphocyte # : x  Auto Monocyte # : x  Auto Eosinophil # : x  Auto Basophil # : x  Auto Neutrophil % : x  Auto Lymphocyte % : x  Auto Monocyte % : x  Auto Eosinophil % : x  Auto Basophil % : x    Serial CBC's  05-25 @ 10:46  Hct-40.6 / Hgb-13.3 / Plat-685 / RBC-3.80 / WBC-6.6          Serial CBC's  05-25 @ 08:55  Hct--- / Hgb--- / Plat--- / RBC-3.70 / WBC---          Serial CBC's  05-25 @ 06:26  Hct-39.2 / Hgb-13.2 / Plat-670 / RBC-3.64 / WBC-6.8          Serial CBC's  05-24 @ 07:15  Hct-39.4 / Hgb-13.1 / Plat-635 / RBC-3.66 / WBC-5.9          Serial CBC's  05-23 @ 13:34  Hct-47.5 / Hgb-15.7 / Plat-759 / RBC-4.41 / WBC-8.5            05-25    146<H>  |  111<H>  |  3<L>  ----------------------------<  93  3.4<L>   |  27  |  0.57    Ca    7.9<L>      25 May 2017 10:46  Phos  2.3     05-25  Mg     1.9     05-25        PT/INR - ( 25 May 2017 06:26 )   PT: 16.8 sec;   INR: 1.53 ratio         PTT - ( 25 May 2017 06:26 )  PTT:33.4 sec    Iron - Total Binding Capacity.: 176 ug/dL (05-25 @ 08:55)  Ferritin, Serum: 100.0 ng/mL (05-25 @ 08:55)  Reticulocyte Percent: 2.0 % (05-25 @ 08:55)  Vitamin B12, Serum: 811 pg/mL (05-25 @ 08:55)  Folate, Serum: >20.0 ng/mL (05-25 @ 08:55)        RADIOLOGY & ADDITIONAL STUDIES:    IMPRESSION:  coagulopathy--s/p vit k, had recent abx/diverticulis  hx of e.thrombocythemia--kolby 2 pos since around 2011--[place] [facility] [place] coley/dr camara  elevated platelets  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

## 2017-05-25 NOTE — PROGRESS NOTE ADULT - SUBJECTIVE AND OBJECTIVE BOX
INTERVAL HPI/OVERNIGHT EVENTS:  HPI:  This is an 85y Female complaining of vomiting, abdominal pain and vomiting for the past 2 days.  Seen at [place] ER yesterday for same.  Found to have uncomplicated sigmoid diverticulitis on CT.  Sent back as she still is having pain and vomiting.  Denies fever.  Denies abdominal pain at present. no n/v/d/f/c  No new overnight event.  No N/V/D.    MEDICATIONS  (STANDING):  sodium chloride 0.9%. 1000milliLiter(s) IV Continuous <Continuous>  pantoprazole    Tablet 40milliGRAM(s) Oral before breakfast  enalapril 2.5milliGRAM(s) Oral daily  gabapentin 100milliGRAM(s) Oral two times a day  escitalopram 20milliGRAM(s) Oral daily  simvastatin 10milliGRAM(s) Oral at bedtime  metoprolol 50milliGRAM(s) Oral two times a day  lactobacillus acidophilus 1Tablet(s) Oral every 8 hours  nystatin Cream 1Application(s) Topical three times a day  hydroxyurea 1000milliGRAM(s) Oral daily  metroNIDAZOLE  IVPB 500milliGRAM(s) IV Intermittent every 8 hours  ciprofloxacin   IVPB 400milliGRAM(s) IV Intermittent every 12 hours  potassium acid phosphate/sodium acid phosphate tablet (K-PHOS No. 2) 1Tablet(s) Oral three times a day with meals  enoxaparin Injectable 40milliGRAM(s) SubCutaneous daily  warfarin 2.5milliGRAM(s) Oral once    MEDICATIONS  (PRN):  acetaminophen   Tablet 650milliGRAM(s) Oral every 6 hours PRN For Temp greater than 38 C (100.4 F)  acetaminophen   Tablet. 650milliGRAM(s) Oral every 6 hours PRN Mild Pain (1 - 3)  ondansetron Injectable 4milliGRAM(s) IV Push every 6 hours PRN Nausea and/or Vomiting  oxyCODONE  5 mG/acetaminophen 325 mG 1Tablet(s) Oral every 6 hours PRN Moderate Pain  morphine  - Injectable 1milliGRAM(s) IV Push every 4 hours PRN Severe Pain (7 - 10)      Allergies    No Known Allergies    Intolerances          General:  No wt loss, fevers, chills, night sweats, fatigue,   Eyes:  Good vision, no reported pain  ENT:  No sore throat, pain, runny nose, dysphagia  CV:  No pain, palpitations, hypo/hypertension  Resp:  No dyspnea, cough, tachypnea, wheezing  GI:  No pain, No nausea, No vomiting, No diarrhea, No constipation, No weight loss, No fever, No pruritis, No rectal bleeding, No tarry stools, No dysphagia,  :  No pain, bleeding, incontinence, nocturia  Muscle:  No pain, weakness  Neuro:  No weakness, tingling, memory problems  Psych:  No fatigue, insomnia, mood problems, depression  Endocrine:  No polyuria, polydipsia, cold/heat intolerance  Heme:  No petechiae, ecchymosis, easy bruisability  Skin:  No rash, tattoos, scars, edema      PHYSICAL EXAM:   Vital Signs:  Vital Signs Last 24 Hrs  T(C): 37.1, Max: 37.1 (05-25 @ 19:56)  T(F): 98.8, Max: 98.8 (05-25 @ 19:56)  HR: 72 (70 - 75)  BP: 135/71 (117/69 - 135/71)  BP(mean): --  RR: 18 (16 - 18)  SpO2: 96% (94% - 96%)  Daily     Daily I&O's Summary  I & Os for 24h ending 25 May 2017 07:00  =============================================  IN: 850 ml / OUT: 0 ml / NET: 850 ml    I & Os for current day (as of 25 May 2017 20:44)  =============================================  IN: 700 ml / OUT: 0 ml / NET: 700 ml      GENERAL:  Appears stated age, well-groomed, well-nourished, no distress  HEENT:  NC/AT,  conjunctivae clear and pink, no thyromegaly, nodules, adenopathy, no JVD, sclera -anicteric  CHEST:  Full & symmetric excursion, no increased effort, breath sounds clear  HEART:  Regular rhythm, S1, S2, no murmur/rub/S3/S4, no abdominal bruit, no edema  ABDOMEN:  Soft, non-tender, non-distended, normoactive bowel sounds,  no masses ,no hepato-splenomegaly, no signs of chronic liver disease  EXTEREMITIES:  no cyanosis,clubbing or edema  SKIN:  No rash/erythema/ecchymoses/petechiae/wounds/abscess/warm/dry  NEURO:  Alert, oriented, no asterixis, no tremor, no encephalopathy      LABS:                        13.3   6.6   )-----------( 685      ( 25 May 2017 10:46 )             40.6     05-25    146<H>  |  111<H>  |  3<L>  ----------------------------<  93  3.4<L>   |  27  |  0.57    Ca    7.9<L>      25 May 2017 10:46  Phos  2.3     05-25  Mg     1.9     05-25      PT/INR - ( 25 May 2017 06:26 )   PT: 16.8 sec;   INR: 1.53 ratio         PTT - ( 25 May 2017 06:26 )  PTT:33.4 sec    amylase   lipase  RADIOLOGY & ADDITIONAL TESTS:

## 2017-05-25 NOTE — PROGRESS NOTE ADULT - ASSESSMENT
86 yo female admitted with nausea,vomiting and loose BMS x 2 days CT abd showed uncomlicated diverticulitis Started on zosyn  in ER , Seen by ID.  Hematology was consulted for coagulopathy. pt with h/o kolby 2 positive hx of essential thrombocythemia since around 2011 and was dx and followed by hematology dr coley/dr naqvi in [facility] and pt has been on hydrea. 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

## 2017-05-25 NOTE — PROGRESS NOTE ADULT - ASSESSMENT
84 yo female admitted with nausea,vomiting and loose BMS x 2 days CT abd showed uncomlicated diverticulitis Started on zosyn  in ER ,septic workup sent No evidence of sepsis so far Seen by ID yesterday and abx changed to cipro/flagyl Likely diarrhea was related to diverticulitis and zosyn administartion

## 2017-05-25 NOTE — PROGRESS NOTE ADULT - SUBJECTIVE AND OBJECTIVE BOX
Interval History:    CENTRAL LINE:   [  ] YES       [  ] NO  MUIR:                 [  ] YES       [  ] NO         REVIEW OF SYSTEMS:  All Systems below were reviewed and are negative [  ]  HEENT:  ID:  Pulmonary:  Cardiac:  GI:  Renal:  Musculoskeletal:  All other systems above were reviewed and are negative   [  ]      MEDICATIONS  (STANDING):  sodium chloride 0.9%. 1000milliLiter(s) IV Continuous <Continuous>  pantoprazole    Tablet 40milliGRAM(s) Oral before breakfast  enalapril 2.5milliGRAM(s) Oral daily  gabapentin 100milliGRAM(s) Oral two times a day  escitalopram 20milliGRAM(s) Oral daily  simvastatin 10milliGRAM(s) Oral at bedtime  metoprolol 50milliGRAM(s) Oral two times a day  lactobacillus acidophilus 1Tablet(s) Oral every 8 hours  nystatin Cream 1Application(s) Topical three times a day  hydroxyurea 1000milliGRAM(s) Oral daily  metroNIDAZOLE  IVPB 500milliGRAM(s) IV Intermittent every 8 hours  ciprofloxacin   IVPB 400milliGRAM(s) IV Intermittent every 12 hours  potassium acid phosphate/sodium acid phosphate tablet (K-PHOS No. 2) 1Tablet(s) Oral three times a day with meals  enoxaparin Injectable 40milliGRAM(s) SubCutaneous daily  warfarin 2.5milliGRAM(s) Oral once    MEDICATIONS  (PRN):  acetaminophen   Tablet 650milliGRAM(s) Oral every 6 hours PRN For Temp greater than 38 C (100.4 F)  acetaminophen   Tablet. 650milliGRAM(s) Oral every 6 hours PRN Mild Pain (1 - 3)  ondansetron Injectable 4milliGRAM(s) IV Push every 6 hours PRN Nausea and/or Vomiting  oxyCODONE  5 mG/acetaminophen 325 mG 1Tablet(s) Oral every 6 hours PRN Moderate Pain  morphine  - Injectable 1milliGRAM(s) IV Push every 4 hours PRN Severe Pain (7 - 10)      Vital Signs Last 24 Hrs  T(C): 37.1, Max: 37.1 (05-25 @ 19:56)  T(F): 98.8, Max: 98.8 (05-25 @ 19:56)  HR: 72 (70 - 75)  BP: 135/71 (117/69 - 135/71)  BP(mean): --  RR: 18 (16 - 18)  SpO2: 96% (94% - 96%)    I&O's Summary  I & Os for 24h ending 25 May 2017 07:00  =============================================  IN: 850 ml / OUT: 0 ml / NET: 850 ml    I & Os for current day (as of 25 May 2017 21:31)  =============================================  IN: 700 ml / OUT: 0 ml / NET: 700 ml      PHYSICAL EXAM:  HEENT: NC/AT; PERRLA  Neck: Soft; no tenderness  Lungs: CTA bilaterally; no wheezing.   Heart:  Abdomen:  Genital/ Rectal:  Extremities:  Neurologic:  Vascular:      LABORATORY:    CBC Full  -  ( 25 May 2017 10:46 )  WBC Count : 6.6 K/uL  Hemoglobin : 13.3 g/dL  Hematocrit : 40.6 %  Platelet Count - Automated : 685 K/uL  Mean Cell Volume : 106.9 fl  Mean Cell Hemoglobin : 34.9 pg  Mean Cell Hemoglobin Concentration : 32.6 gm/dL  Auto Neutrophil # : x  Auto Lymphocyte # : x  Auto Monocyte # : x  Auto Eosinophil # : x  Auto Basophil # : x  Auto Neutrophil % : x  Auto Lymphocyte % : x  Auto Monocyte % : x  Auto Eosinophil % : x  Auto Basophil % : x          05-25    146<H>  |  111<H>  |  3<L>  ----------------------------<  93  3.4<L>   |  27  |  0.57    Ca    7.9<L>      25 May 2017 10:46  Phos  2.3     05-25  Mg     1.9     05-25            Assessment and Plan:          Sushil Anguiano MD   [phone]. She is feeling better. No diarrhea today. Denied abdominal pain.  Comfortable.    All other systems above were reviewed and are negative   [  ]      MEDICATIONS  (STANDING):  sodium chloride 0.9%. 1000milliLiter(s) IV Continuous <Continuous>  pantoprazole    Tablet 40milliGRAM(s) Oral before breakfast  enalapril 2.5milliGRAM(s) Oral daily  gabapentin 100milliGRAM(s) Oral two times a day  escitalopram 20milliGRAM(s) Oral daily  simvastatin 10milliGRAM(s) Oral at bedtime  metoprolol 50milliGRAM(s) Oral two times a day  lactobacillus acidophilus 1Tablet(s) Oral every 8 hours  nystatin Cream 1Application(s) Topical three times a day  hydroxyurea 1000milliGRAM(s) Oral daily  metroNIDAZOLE  IVPB 500milliGRAM(s) IV Intermittent every 8 hours  ciprofloxacin   IVPB 400milliGRAM(s) IV Intermittent every 12 hours  potassium acid phosphate/sodium acid phosphate tablet (K-PHOS No. 2) 1Tablet(s) Oral three times a day with meals  enoxaparin Injectable 40milliGRAM(s) SubCutaneous daily  warfarin 2.5milliGRAM(s) Oral once    MEDICATIONS  (PRN):  acetaminophen   Tablet 650milliGRAM(s) Oral every 6 hours PRN For Temp greater than 38 C (100.4 F)  acetaminophen   Tablet. 650milliGRAM(s) Oral every 6 hours PRN Mild Pain (1 - 3)  ondansetron Injectable 4milliGRAM(s) IV Push every 6 hours PRN Nausea and/or Vomiting  oxyCODONE  5 mG/acetaminophen 325 mG 1Tablet(s) Oral every 6 hours PRN Moderate Pain  morphine  - Injectable 1milliGRAM(s) IV Push every 4 hours PRN Severe Pain (7 - 10)      Vital Signs Last 24 Hrs  T(C): 37.1, Max: 37.1 (05-25 @ 19:56)  T(F): 98.8, Max: 98.8 (05-25 @ 19:56)  HR: 72 (70 - 75)  BP: 135/71 (117/69 - 135/71)  BP(mean): --  RR: 18 (16 - 18)  SpO2: 96% (94% - 96%)    I&O's Summary  I & Os for 24h ending 25 May 2017 07:00  =============================================  IN: 850 ml / OUT: 0 ml / NET: 850 ml    I & Os for current day (as of 25 May 2017 21:31)  =============================================  IN: 700 ml / OUT: 0 ml / NET: 700 ml      PHYSICAL EXAM:  HEENT: NC/AT; PERRLA  Neck: Soft; no tenderness  Lungs: CTA bilaterally; no wheezing.   Heart: RRR; no murmurs.   Abdomen: Soft; no tenderness.  Extremities: No ulcers.      LABORATORY:    CBC Full  -  ( 25 May 2017 10:46 )  WBC Count : 6.6 K/uL  Hemoglobin : 13.3 g/dL  Hematocrit : 40.6 %  Platelet Count - Automated : 685 K/uL  Mean Cell Volume : 106.9 fl  Mean Cell Hemoglobin : 34.9 pg  Mean Cell Hemoglobin Concentration : 32.6 gm/dL  Auto Neutrophil # : x  Auto Lymphocyte # : x  Auto Monocyte # : x  Auto Eosinophil # : x  Auto Basophil # : x  Auto Neutrophil % : x  Auto Lymphocyte % : x  Auto Monocyte % : x  Auto Eosinophil % : x  Auto Basophil % : x      05-25    146<H>  |  111<H>  |  3<L>  ----------------------------<  93  3.4<L>   |  27  |  0.57    Ca    7.9<L>      25 May 2017 10:46  Phos  2.3     05-25  Mg     1.9     05-25      Assessment and Plan:    1. Sigmoid diverticulitis  2. Persistent diarrhea.    . Improving.  . Continue Cipro 400 mg iv q12h and Flagyl 500 mg iv q8h.  . Anticipate po Cipro and Flagyl for discharge in 48 hrs.         Sushil Anguiano MD   [phone].

## 2017-05-25 NOTE — PROGRESS NOTE ADULT - PROBLEM SELECTOR PLAN 1
s/p vit k  inr subtherapeutic  pt declines full dose ac  restart coumadin in view of hx of dvt/cva in past  lovenox 40 mg s/c daily till inr over 2 for dvtp

## 2017-05-25 NOTE — PHYSICAL THERAPY INITIAL EVALUATION ADULT - ADDITIONAL COMMENTS
Patient ambulates short distances with assistance but mostly in w/c due to hx of CVA. Patient was getting PT/OT prior to admission.

## 2017-05-25 NOTE — PHYSICAL THERAPY INITIAL EVALUATION ADULT - PERTINENT HX OF CURRENT PROBLEM, REHAB EVAL
This is an 85y Female complaining of vomiting, abdominal pain and vomiting for the past 2 days.  Seen at [place] ER yesterday for same.  Found to have uncomplicated sigmoid diverticulitis on CT.  Sent back as she still is having pain and vomiting.

## 2017-05-25 NOTE — PROGRESS NOTE ADULT - PROBLEM SELECTOR PLAN 1
continue current managmnet and medications ,id and gi followup ,ppi ,antiemetics Slowly improving ,anticipate discharge on po abx  in 24 hrs if diarrhea resolves Ruled out for sepsis

## 2017-05-26 ENCOUNTER — TRANSCRIPTION ENCOUNTER (OUTPATIENT)
Age: 82
End: 2017-05-26

## 2017-05-26 VITALS — SYSTOLIC BLOOD PRESSURE: 127 MMHG | HEART RATE: 71 BPM | DIASTOLIC BLOOD PRESSURE: 74 MMHG

## 2017-05-26 LAB
ANION GAP SERPL CALC-SCNC: 10 MMOL/L — SIGNIFICANT CHANGE UP (ref 5–17)
BUN SERPL-MCNC: 4 MG/DL — LOW (ref 7–23)
CALCIUM SERPL-MCNC: 7.9 MG/DL — LOW (ref 8.5–10.1)
CHLORIDE SERPL-SCNC: 112 MMOL/L — HIGH (ref 96–108)
CO2 SERPL-SCNC: 26 MMOL/L — SIGNIFICANT CHANGE UP (ref 22–31)
CREAT SERPL-MCNC: 0.56 MG/DL — SIGNIFICANT CHANGE UP (ref 0.5–1.3)
GLUCOSE SERPL-MCNC: 89 MG/DL — SIGNIFICANT CHANGE UP (ref 70–99)
HCT VFR BLD CALC: 38.4 % — SIGNIFICANT CHANGE UP (ref 34.5–45)
HGB BLD-MCNC: 12.9 G/DL — SIGNIFICANT CHANGE UP (ref 11.5–15.5)
INR BLD: 1.52 RATIO — HIGH (ref 0.88–1.16)
MCHC RBC-ENTMCNC: 33.6 GM/DL — SIGNIFICANT CHANGE UP (ref 32–36)
MCHC RBC-ENTMCNC: 35.8 PG — HIGH (ref 27–34)
MCV RBC AUTO: 106.6 FL — HIGH (ref 80–100)
PLATELET # BLD AUTO: 624 K/UL — HIGH (ref 150–400)
POTASSIUM SERPL-MCNC: 3.1 MMOL/L — LOW (ref 3.5–5.3)
POTASSIUM SERPL-SCNC: 3.1 MMOL/L — LOW (ref 3.5–5.3)
PROTHROM AB SERPL-ACNC: 16.7 SEC — HIGH (ref 9.8–12.7)
RBC # BLD: 3.6 M/UL — LOW (ref 3.8–5.2)
RBC # FLD: 13 % — SIGNIFICANT CHANGE UP (ref 10.3–14.5)
SODIUM SERPL-SCNC: 148 MMOL/L — HIGH (ref 135–145)
WBC # BLD: 7.1 K/UL — SIGNIFICANT CHANGE UP (ref 3.8–10.5)
WBC # FLD AUTO: 7.1 K/UL — SIGNIFICANT CHANGE UP (ref 3.8–10.5)

## 2017-05-26 PROCEDURE — 74176 CT ABD & PELVIS W/O CONTRAST: CPT

## 2017-05-26 PROCEDURE — 87177 OVA AND PARASITES SMEARS: CPT

## 2017-05-26 PROCEDURE — 82728 ASSAY OF FERRITIN: CPT

## 2017-05-26 PROCEDURE — 99285 EMERGENCY DEPT VISIT HI MDM: CPT | Mod: 25

## 2017-05-26 PROCEDURE — 87186 SC STD MICRODIL/AGAR DIL: CPT

## 2017-05-26 PROCEDURE — 83010 ASSAY OF HAPTOGLOBIN QUANT: CPT

## 2017-05-26 PROCEDURE — 93005 ELECTROCARDIOGRAM TRACING: CPT

## 2017-05-26 PROCEDURE — 87046 STOOL CULTR AEROBIC BACT EA: CPT

## 2017-05-26 PROCEDURE — 96375 TX/PRO/DX INJ NEW DRUG ADDON: CPT

## 2017-05-26 PROCEDURE — 71045 X-RAY EXAM CHEST 1 VIEW: CPT

## 2017-05-26 PROCEDURE — 96366 THER/PROPH/DIAG IV INF ADDON: CPT

## 2017-05-26 PROCEDURE — 87045 FECES CULTURE AEROBIC BACT: CPT

## 2017-05-26 PROCEDURE — 85730 THROMBOPLASTIN TIME PARTIAL: CPT

## 2017-05-26 PROCEDURE — 84550 ASSAY OF BLOOD/URIC ACID: CPT

## 2017-05-26 PROCEDURE — 85610 PROTHROMBIN TIME: CPT

## 2017-05-26 PROCEDURE — 82272 OCCULT BLD FECES 1-3 TESTS: CPT

## 2017-05-26 PROCEDURE — 86901 BLOOD TYPING SEROLOGIC RH(D): CPT

## 2017-05-26 PROCEDURE — 83550 IRON BINDING TEST: CPT

## 2017-05-26 PROCEDURE — 96367 TX/PROPH/DG ADDL SEQ IV INF: CPT

## 2017-05-26 PROCEDURE — 85027 COMPLETE CBC AUTOMATED: CPT

## 2017-05-26 PROCEDURE — 84100 ASSAY OF PHOSPHORUS: CPT

## 2017-05-26 PROCEDURE — 83735 ASSAY OF MAGNESIUM: CPT

## 2017-05-26 PROCEDURE — 86850 RBC ANTIBODY SCREEN: CPT

## 2017-05-26 PROCEDURE — 85045 AUTOMATED RETICULOCYTE COUNT: CPT

## 2017-05-26 PROCEDURE — 87086 URINE CULTURE/COLONY COUNT: CPT

## 2017-05-26 PROCEDURE — 86900 BLOOD TYPING SEROLOGIC ABO: CPT

## 2017-05-26 PROCEDURE — 80053 COMPREHEN METABOLIC PANEL: CPT

## 2017-05-26 PROCEDURE — 80048 BASIC METABOLIC PNL TOTAL CA: CPT

## 2017-05-26 PROCEDURE — 82607 VITAMIN B-12: CPT

## 2017-05-26 PROCEDURE — 87040 BLOOD CULTURE FOR BACTERIA: CPT

## 2017-05-26 PROCEDURE — 96365 THER/PROPH/DIAG IV INF INIT: CPT

## 2017-05-26 PROCEDURE — 82746 ASSAY OF FOLIC ACID SERUM: CPT

## 2017-05-26 PROCEDURE — 83615 LACTATE (LD) (LDH) ENZYME: CPT

## 2017-05-26 PROCEDURE — 99284 EMERGENCY DEPT VISIT MOD MDM: CPT | Mod: 25

## 2017-05-26 PROCEDURE — 83605 ASSAY OF LACTIC ACID: CPT

## 2017-05-26 RX ORDER — SODIUM CHLORIDE 9 MG/ML
1000 INJECTION, SOLUTION INTRAVENOUS
Qty: 0 | Refills: 0 | Status: DISCONTINUED | OUTPATIENT
Start: 2017-05-26 | End: 2017-05-26

## 2017-05-26 RX ORDER — CIPROFLOXACIN LACTATE 400MG/40ML
1 VIAL (ML) INTRAVENOUS
Qty: 20 | Refills: 0 | OUTPATIENT
Start: 2017-05-26 | End: 2017-06-05

## 2017-05-26 RX ORDER — METOPROLOL TARTRATE 50 MG
1 TABLET ORAL
Qty: 0 | Refills: 0 | DISCHARGE
Start: 2017-05-26

## 2017-05-26 RX ORDER — CIPROFLOXACIN LACTATE 400MG/40ML
1 VIAL (ML) INTRAVENOUS
Qty: 0 | Refills: 0 | COMMUNITY

## 2017-05-26 RX ORDER — WARFARIN SODIUM 2.5 MG/1
1 TABLET ORAL
Qty: 5 | Refills: 0 | OUTPATIENT
Start: 2017-05-26 | End: 2017-05-31

## 2017-05-26 RX ORDER — METRONIDAZOLE 500 MG
1 TABLET ORAL
Qty: 30 | Refills: 0 | OUTPATIENT
Start: 2017-05-26 | End: 2017-06-05

## 2017-05-26 RX ORDER — POTASSIUM CHLORIDE 20 MEQ
40 PACKET (EA) ORAL EVERY 4 HOURS
Qty: 0 | Refills: 0 | Status: DISCONTINUED | OUTPATIENT
Start: 2017-05-26 | End: 2017-05-26

## 2017-05-26 RX ORDER — WARFARIN SODIUM 2.5 MG/1
3 TABLET ORAL ONCE
Qty: 0 | Refills: 0 | Status: COMPLETED | OUTPATIENT
Start: 2017-05-26 | End: 2017-05-26

## 2017-05-26 RX ORDER — LACTOBACILLUS ACIDOPHILUS 100MM CELL
2 CAPSULE ORAL
Qty: 20 | Refills: 0 | OUTPATIENT
Start: 2017-05-26 | End: 2017-06-05

## 2017-05-26 RX ORDER — LACTOBACILLUS ACIDOPHILUS 100MM CELL
2 CAPSULE ORAL
Qty: 0 | Refills: 0 | COMMUNITY

## 2017-05-26 RX ORDER — WARFARIN SODIUM 2.5 MG/1
1 TABLET ORAL
Qty: 0 | Refills: 0 | COMMUNITY
Start: 2017-05-26

## 2017-05-26 RX ORDER — POTASSIUM CHLORIDE 20 MEQ
40 PACKET (EA) ORAL ONCE
Qty: 0 | Refills: 0 | Status: COMPLETED | OUTPATIENT
Start: 2017-05-26 | End: 2017-05-26

## 2017-05-26 RX ADMIN — Medication 1 TABLET(S): at 11:34

## 2017-05-26 RX ADMIN — Medication 100 MILLIGRAM(S): at 06:09

## 2017-05-26 RX ADMIN — ESCITALOPRAM OXALATE 20 MILLIGRAM(S): 10 TABLET, FILM COATED ORAL at 11:32

## 2017-05-26 RX ADMIN — NYSTATIN CREAM 1 APPLICATION(S): 100000 CREAM TOPICAL at 06:09

## 2017-05-26 RX ADMIN — SODIUM CHLORIDE 50 MILLILITER(S): 9 INJECTION INTRAMUSCULAR; INTRAVENOUS; SUBCUTANEOUS at 05:37

## 2017-05-26 RX ADMIN — NYSTATIN CREAM 1 APPLICATION(S): 100000 CREAM TOPICAL at 14:15

## 2017-05-26 RX ADMIN — Medication 200 MILLIGRAM(S): at 05:24

## 2017-05-26 RX ADMIN — GABAPENTIN 100 MILLIGRAM(S): 400 CAPSULE ORAL at 17:05

## 2017-05-26 RX ADMIN — Medication 1 TABLET(S): at 14:15

## 2017-05-26 RX ADMIN — Medication 50 MILLIGRAM(S): at 06:09

## 2017-05-26 RX ADMIN — Medication 40 MILLIEQUIVALENT(S): at 11:32

## 2017-05-26 RX ADMIN — PANTOPRAZOLE SODIUM 40 MILLIGRAM(S): 20 TABLET, DELAYED RELEASE ORAL at 06:09

## 2017-05-26 RX ADMIN — Medication 100 MILLIGRAM(S): at 14:14

## 2017-05-26 RX ADMIN — Medication 50 MILLIGRAM(S): at 17:05

## 2017-05-26 RX ADMIN — HYDROXYUREA 1000 MILLIGRAM(S): 500 CAPSULE ORAL at 11:35

## 2017-05-26 RX ADMIN — GABAPENTIN 100 MILLIGRAM(S): 400 CAPSULE ORAL at 06:09

## 2017-05-26 RX ADMIN — Medication 200 MILLIGRAM(S): at 17:05

## 2017-05-26 RX ADMIN — Medication 1 TABLET(S): at 06:09

## 2017-05-26 RX ADMIN — Medication 1 TABLET(S): at 17:05

## 2017-05-26 RX ADMIN — Medication 1 TABLET(S): at 08:23

## 2017-05-26 RX ADMIN — SODIUM CHLORIDE 50 MILLILITER(S): 9 INJECTION INTRAMUSCULAR; INTRAVENOUS; SUBCUTANEOUS at 08:24

## 2017-05-26 RX ADMIN — Medication 2.5 MILLIGRAM(S): at 06:09

## 2017-05-26 RX ADMIN — ENOXAPARIN SODIUM 40 MILLIGRAM(S): 100 INJECTION SUBCUTANEOUS at 11:33

## 2017-05-26 RX ADMIN — WARFARIN SODIUM 3 MILLIGRAM(S): 2.5 TABLET ORAL at 17:05

## 2017-05-26 NOTE — DISCHARGE NOTE ADULT - SECONDARY DIAGNOSIS.
Coagulopathy Diarrhea, unspecified type CVA (cerebral vascular accident) Hypertension Depression Constipation

## 2017-05-26 NOTE — PROGRESS NOTE ADULT - PROBLEM SELECTOR PROBLEM 1
Diverticulitis of large intestine without perforation or abscess without bleeding
Coagulopathy
Diverticulitis of large intestine without perforation or abscess without bleeding
Coagulopathy

## 2017-05-26 NOTE — PROGRESS NOTE ADULT - SUBJECTIVE AND OBJECTIVE BOX
Patient is a 85y old  Female who presents with a chief complaint of Abdominal pain and diarrhea. (23 May 2017 17:57)       Pt is seen and examined  pt is awake and is out of bed to chair  pt seems comfortable and denies any complaints at this time    HPI:  This is an 85y Female complaining of vomiting, abdominal pain and vomiting for the past 2 days.  Seen at [place] ER yesterday for same.  Found to have uncomplicated sigmoid diverticulitis on CT.  Sent back as she still is having pain and vomiting.  Denies fever.  Denies abdominal pain at present. (23 May 2017 17:57)         MEDICATIONS  (STANDING):  pantoprazole    Tablet 40milliGRAM(s) Oral before breakfast  enalapril 2.5milliGRAM(s) Oral daily  gabapentin 100milliGRAM(s) Oral two times a day  escitalopram 20milliGRAM(s) Oral daily  simvastatin 10milliGRAM(s) Oral at bedtime  metoprolol 50milliGRAM(s) Oral two times a day  lactobacillus acidophilus 1Tablet(s) Oral every 8 hours  nystatin Cream 1Application(s) Topical three times a day  hydroxyurea 1000milliGRAM(s) Oral daily  metroNIDAZOLE  IVPB 500milliGRAM(s) IV Intermittent every 8 hours  ciprofloxacin   IVPB 400milliGRAM(s) IV Intermittent every 12 hours  potassium acid phosphate/sodium acid phosphate tablet (K-PHOS No. 2) 1Tablet(s) Oral three times a day with meals  enoxaparin Injectable 40milliGRAM(s) SubCutaneous daily  dextrose 5%. 1000milliLiter(s) IV Continuous <Continuous>  potassium chloride    Tablet ER 40milliEquivalent(s) Oral once    MEDICATIONS  (PRN):  acetaminophen   Tablet 650milliGRAM(s) Oral every 6 hours PRN For Temp greater than 38 C (100.4 F)  acetaminophen   Tablet. 650milliGRAM(s) Oral every 6 hours PRN Mild Pain (1 - 3)  ondansetron Injectable 4milliGRAM(s) IV Push every 6 hours PRN Nausea and/or Vomiting  oxyCODONE  5 mG/acetaminophen 325 mG 1Tablet(s) Oral every 6 hours PRN Moderate Pain  morphine  - Injectable 1milliGRAM(s) IV Push every 4 hours PRN Severe Pain (7 - 10)      Allergies    No Known Allergies    Intolerances        Vital Signs Last 24 Hrs  T(C): 36.9, Max: 37.1 (05-25 @ 19:56)  T(F): 98.5, Max: 98.8 (05-25 @ 19:56)  HR: 69 (69 - 75)  BP: 127/71 (124/76 - 135/71)  BP(mean): --  RR: 18 (16 - 18)  SpO2: 97% (94% - 97%)    PHYSICAL EXAM  General: adult in NAD  HEENT: clear oropharynx, anicteric sclera, pink conjunctiva  Neck: supple  CV: normal S1/S2 with no murmur rubs or gallops  Lungs: positive air movement b/l ant lungs,clear to auscultation, no wheezes, no rales  Abdomen: soft non-tender non-distended, no hepatosplenomegaly  Ext: no clubbing cyanosis or edema  Skin: no rashes and no petechiae  Neuro: alert and oriented X 4, no focal deficits  LABS:                          12.9   7.1   )-----------( 624      ( 26 May 2017 06:15 )             38.4         Mean Cell Volume : 106.6 fl  Mean Cell Hemoglobin : 35.8 pg  Mean Cell Hemoglobin Concentration : 33.6 gm/dL  Auto Neutrophil # : x  Auto Lymphocyte # : x  Auto Monocyte # : x  Auto Eosinophil # : x  Auto Basophil # : x  Auto Neutrophil % : x  Auto Lymphocyte % : x  Auto Monocyte % : x  Auto Eosinophil % : x  Auto Basophil % : x    Serial CBC's  05-26 @ 06:15  Hct-38.4 / Hgb-12.9 / Plat-624 / RBC-3.60 / WBC-7.1          Serial CBC's  05-25 @ 10:46  Hct-40.6 / Hgb-13.3 / Plat-685 / RBC-3.80 / WBC-6.6          Serial CBC's  05-25 @ 08:55  Hct--- / Hgb--- / Plat--- / RBC-3.70 / WBC---          Serial CBC's  05-25 @ 06:26  Hct-39.2 / Hgb-13.2 / Plat-670 / RBC-3.64 / WBC-6.8          Serial CBC's  05-24 @ 07:15  Hct-39.4 / Hgb-13.1 / Plat-635 / RBC-3.66 / WBC-5.9            05-26    148<H>  |  112<H>  |  4<L>  ----------------------------<  89  3.1<L>   |  26  |  0.56    Ca    7.9<L>      26 May 2017 06:15  Phos  2.3     05-25  Mg     1.9     05-25        PT/INR - ( 26 May 2017 06:15 )   PT: 16.7 sec;   INR: 1.52 ratio         PTT - ( 25 May 2017 06:26 )  PTT:33.4 sec    Iron - Total Binding Capacity.: 176 ug/dL (05-25 @ 08:55)  Ferritin, Serum: 100.0 ng/mL (05-25 @ 08:55)  Reticulocyte Percent: 2.0 % (05-25 @ 08:55)  Vitamin B12, Serum: 811 pg/mL (05-25 @ 08:55)  Folate, Serum: >20.0 ng/mL (05-25 @ 08:55)

## 2017-05-26 NOTE — DISCHARGE NOTE ADULT - MEDICATION SUMMARY - MEDICATIONS TO CHANGE
I will SWITCH the dose or number of times a day I take the medications listed below when I get home from the hospital:    warfarin 4 mg oral tablet  -- 1 tab(s) by mouth once (at bedtime),HOLD FOR inr above 3.0 Next INR 03/31 and followup with PMD

## 2017-05-26 NOTE — PROGRESS NOTE ADULT - PROBLEM SELECTOR PLAN 1
continue current managmnet and medications ,id and gi followup ,ppi ,antiemetics Slowly improving ,anticipate discharge on po abx  in 24 hrs if diarrhea resolves Ruled out for sepsis  bacid one tab tid

## 2017-05-26 NOTE — PROGRESS NOTE ADULT - ASSESSMENT
84 yo female admitted with nausea,vomiting and loose BMS x 2 days CT abd showed uncomlicated diverticulitis Started on zosyn  in ER , Seen by ID.  Hematology was consulted for coagulopathy. 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 on earlier/previous consult case was d/w pt hem dr naqvi  coagulopathy--s/p vit k, had recent abx/diverticulis  hx of e.thrombocythemia--kolby 2 pos since around 2011--followed with hem in [place]--dr coley/dr camara  pt is also with hx dvt---2016--s/p ivc filter  pt is with hx of melanoma resection --around 2009/2010--refuses ct chest/any further w/u  pt is with hx of  pancrease cyst--stable on ct scan a/p this admission--refuses mri/tumor markers/further w/u, had nl ca 19-9/cea in 2016

## 2017-05-26 NOTE — DISCHARGE NOTE ADULT - HOSPITAL COURSE
86 yo female admitted with nausea, vomiting and loose BMS x 2 days CT abd showed uncomlicated diverticulitis Started on zosyn ,later changed to iv cipro and flagyl by ID septic workup sent No evidence of sepsis so far Cleared for discharge by ID and GI Had coagulopathy during hospital stay and vit K po was given Dose of coumadin changed to 3 mg as per hem cons recommendation Please obtain INR on monday 05/29/17

## 2017-05-26 NOTE — DISCHARGE NOTE ADULT - CARE PROVIDER_API CALL
Mike Urias (), Gastroenterology; Internal Medicine  [address] [place] [address]  Phone: [phone]  Fax: [phone]    Facundo Valentine), Internal Medicine  [address] [place] [address]  Phone: [phone]  Fax: [phone]

## 2017-05-26 NOTE — DISCHARGE NOTE ADULT - PATIENT PORTAL LINK FT
“You can access the [facility] Patient Portal, offered by [facility], by registering with the following website: [email or web address]”

## 2017-05-26 NOTE — PROGRESS NOTE ADULT - PROBLEM SELECTOR PLAN 1
s/p vit k  inr subtherapeutic  pt declines full dose ac  restarted coumadin 5/25 ,final decision on ac per pcp  s/p coumadin 2.5 mg 5/25, will give 3 mg coumadin today

## 2017-05-26 NOTE — PROGRESS NOTE ADULT - SUBJECTIVE AND OBJECTIVE BOX
INTERVAL HPI/OVERNIGHT EVENTS:  HPI:  This is an 85y Female complaining of vomiting, abdominal pain and vomiting for the past 2 days.  Seen at [facility] yesterday for same.  Found to have uncomplicated sigmoid diverticulitis on CT.  Sent back as she still is having pain and vomiting.  Denies fever.  Denies abdominal pain at present  one loose bm today. No new overnight event.  No N/v.  Tolerating diet.      MEDICATIONS  (STANDING):  pantoprazole    Tablet 40milliGRAM(s) Oral before breakfast  enalapril 2.5milliGRAM(s) Oral daily  gabapentin 100milliGRAM(s) Oral two times a day  escitalopram 20milliGRAM(s) Oral daily  simvastatin 10milliGRAM(s) Oral at bedtime  metoprolol 50milliGRAM(s) Oral two times a day  lactobacillus acidophilus 1Tablet(s) Oral every 8 hours  nystatin Cream 1Application(s) Topical three times a day  hydroxyurea 1000milliGRAM(s) Oral daily  metroNIDAZOLE  IVPB 500milliGRAM(s) IV Intermittent every 8 hours  ciprofloxacin   IVPB 400milliGRAM(s) IV Intermittent every 12 hours  potassium acid phosphate/sodium acid phosphate tablet (K-PHOS No. 2) 1Tablet(s) Oral three times a day with meals  enoxaparin Injectable 40milliGRAM(s) SubCutaneous daily  dextrose 5%. 1000milliLiter(s) IV Continuous <Continuous>  warfarin 3milliGRAM(s) Oral once    MEDICATIONS  (PRN):  acetaminophen   Tablet 650milliGRAM(s) Oral every 6 hours PRN For Temp greater than 38 C (100.4 F)  acetaminophen   Tablet. 650milliGRAM(s) Oral every 6 hours PRN Mild Pain (1 - 3)  ondansetron Injectable 4milliGRAM(s) IV Push every 6 hours PRN Nausea and/or Vomiting  oxyCODONE  5 mG/acetaminophen 325 mG 1Tablet(s) Oral every 6 hours PRN Moderate Pain  morphine  - Injectable 1milliGRAM(s) IV Push every 4 hours PRN Severe Pain (7 - 10)      Allergies    No Known Allergies    Intolerances          General:  No wt loss, fevers, chills, night sweats, fatigue,   Eyes:  Good vision, no reported pain  ENT:  No sore throat, pain, runny nose, dysphagia  CV:  No pain, palpitations, hypo/hypertension  Resp:  No dyspnea, cough, tachypnea, wheezing  GI:  No pain, No nausea, No vomiting, No diarrhea, No constipation, No weight loss, No fever, No pruritis, No rectal bleeding, No tarry stools, No dysphagia,  :  No pain, bleeding, incontinence, nocturia  Muscle:  No pain, weakness  Neuro:  No weakness, tingling, memory problems  Psych:  No fatigue, insomnia, mood problems, depression  Endocrine:  No polyuria, polydipsia, cold/heat intolerance  Heme:  No petechiae, ecchymosis, easy bruisability  Skin:  No rash, tattoos, scars, edema      PHYSICAL EXAM:   Vital Signs:  Vital Signs Last 24 Hrs  T(C): 36.9, Max: 37.1 (05-25 @ 19:56)  T(F): 98.5, Max: 98.8 (05-25 @ 19:56)  HR: 69 (69 - 75)  BP: 127/71 (124/76 - 135/71)  BP(mean): --  RR: 18 (16 - 18)  SpO2: 97% (95% - 97%)  Daily     Daily I&O's Summary  I & Os for 24h ending 26 May 2017 07:00  =============================================  IN: 1700 ml / OUT: 0 ml / NET: 1700 ml    I & Os for current day (as of 26 May 2017 13:03)  =============================================  IN: 240 ml / OUT: 0 ml / NET: 240 ml      GENERAL:  Appears stated age, well-groomed, well-nourished, no distress  HEENT:  NC/AT,  conjunctivae clear and pink, no thyromegaly, nodules, adenopathy, no JVD, sclera -anicteric  CHEST:  Full & symmetric excursion, no increased effort, breath sounds clear  HEART:  Regular rhythm, S1, S2, no murmur/rub/S3/S4, no abdominal bruit, no edema  ABDOMEN:  Soft, non-tender, non-distended, normoactive bowel sounds,  no masses ,no hepato-splenomegaly, no signs of chronic liver disease  EXTEREMITIES:  no cyanosis,clubbing or edema  SKIN:  No rash/erythema/ecchymoses/petechiae/wounds/abscess/warm/dry  NEURO:  Alert, oriented, no asterixis, no tremor, no encephalopathy      LABS:                        12.9   7.1   )-----------( 624      ( 26 May 2017 06:15 )             38.4     05-26    148<H>  |  112<H>  |  4<L>  ----------------------------<  89  3.1<L>   |  26  |  0.56    Ca    7.9<L>      26 May 2017 06:15  Phos  2.3     05-25  Mg     1.9     05-25      PT/INR - ( 26 May 2017 06:15 )   PT: 16.7 sec;   INR: 1.52 ratio         PTT - ( 25 May 2017 06:26 )  PTT:33.4 sec    amylase   lipase  RADIOLOGY & ADDITIONAL TESTS:

## 2017-05-26 NOTE — DISCHARGE NOTE ADULT - CARE PLAN
Principal Discharge DX:	Diverticulitis of large intestine without perforation or abscess without bleeding  Goal:	pain free  Instructions for follow-up, activity and diet:	complete 10 days of po abx and followup with GI consult IN 1-2 WEEKS  Secondary Diagnosis:	Coagulopathy  Instructions for follow-up, activity and diet:	repeat INR on 05/29/17 and call PMD with results  Secondary Diagnosis:	Diarrhea, unspecified type  Secondary Diagnosis:	CVA (cerebral vascular accident)  Instructions for follow-up, activity and diet:	continue home meidcations  Secondary Diagnosis:	Hypertension  Instructions for follow-up, activity and diet:	BP monitoring,continue current antihypertensive meds, low salt diet,followup with PMD in 1-2 weeks  Secondary Diagnosis:	Depression  Instructions for follow-up, activity and diet:	continue home meidcations  Secondary Diagnosis:	Constipation  Instructions for follow-up, activity and diet:	may resume home bowel regime after loose BM resolve

## 2017-05-26 NOTE — PROGRESS NOTE ADULT - PROBLEM SELECTOR PLAN 2
continue home meidcations BP is well controlled
hydrea  dose was increased from 500 mg to 1000 mg a day from 5/24(pt was on 1000 mg a day few yrs ago),,monitor platelets and dose adjust  hydrea per cbc/platelet (to keep platelet in between 250--450 k)  monitor daily platelets
hydrea dose was increased to 1000 mg a day from 5/24  monitor daily platelets
continue home meidcations BP is well controlled

## 2017-05-26 NOTE — PROGRESS NOTE ADULT - PROBLEM SELECTOR PLAN 3
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

## 2017-05-26 NOTE — PROGRESS NOTE ADULT - PROBLEM SELECTOR PROBLEM 3
Lower abdominal pain
Cerebrovascular accident (CVA), unspecified mechanism
Cerebrovascular accident (CVA), unspecified mechanism

## 2017-05-26 NOTE — DISCHARGE NOTE ADULT - MEDICATION SUMMARY - MEDICATIONS TO TAKE
I will START or STAY ON the medications listed below when I get home from the hospital:    Flagyl 500 mg oral tablet  -- 1 tab(s) by mouth 3 times a day  -- Do not drink alcoholic beverages when taking this medication.  Finish all this medication unless otherwise directed by prescriber.  May discolor urine or feces.      -- Indication: For Diverticulitis of large intestine without perforation or abscess without bleeding    traMADol 50 mg oral tablet  -- 1 tab(s) by mouth every 12 hours  -- Indication: For pain    aspirin 81 mg oral delayed release tablet  -- 1 tab(s) by mouth once a day  -- Indication: For Cad    enalapril 2.5 mg oral tablet  -- 1 tab(s) by mouth once a day  -- Indication: For htn    warfarin 3 mg oral tablet  -- 1 tab(s) by mouth once a day,hold for inr ABOVE 3.0  -- Indication: For Cerebrovascular accident (CVA), unspecified mechanism    gabapentin 100 mg oral capsule  -- 1 cap(s) by mouth 2 times a day  -- Indication: For pain    escitalopram 20 mg oral tablet  -- 1 tab(s) by mouth once a day  -- Indication: For Depression    simvastatin 10 mg oral tablet  -- 1 tab(s) by mouth once a day (at bedtime)  -- Indication: For hld    hydroxyurea 500 mg oral capsule  -- 1 cap(s) by mouth once a day  -- Indication: For melanoma    metoprolol tartrate 25 mg oral tablet  -- 1 tab(s) by mouth 2 times a day  -- Indication: For htn    famotidine 20 mg oral tablet  -- 1 tab(s) by mouth once a day  -- Indication: For gerd    ferrous sulfate  -- 325 milligram(s) by mouth once a day  -- Indication: For anemia    potassium chloride 10 mEq oral tablet, extended release  -- 1 tab(s) by mouth once a day  -- Indication: For suppleemnt    baclofen 10 mg oral tablet  -- 1 tab(s) by mouth once a day (at bedtime)  -- Indication: For muscle spasms    Bacid (LAC) oral tablet  -- 2 tab(s) by mouth once a day  -- Indication: For supplement    Cipro 250 mg oral tablet  -- 1 tab(s) by mouth every 12 hours  -- Indication: For Diverticulitis of large intestine without perforation or abscess without bleeding    Thera-Tabs M oral tablet  -- 1 tab(s) by mouth once a day  -- Indication: For suppleemnt I will START or STAY ON the medications listed below when I get home from the hospital:    HOME PT  -- Indication: For Home pt    PT/INR 05/29/17  -- Indication: For inr monitoring    Flagyl 500 mg oral tablet  -- 1 tab(s) by mouth 3 times a day  -- Do not drink alcoholic beverages when taking this medication.  Finish all this medication unless otherwise directed by prescriber.  May discolor urine or feces.      -- Indication: For Diverticulitis of large intestine without perforation or abscess without bleeding    traMADol 50 mg oral tablet  -- 1 tab(s) by mouth every 12 hours  -- Indication: For pain    aspirin 81 mg oral delayed release tablet  -- 1 tab(s) by mouth once a day  -- Indication: For Cad    enalapril 2.5 mg oral tablet  -- 1 tab(s) by mouth once a day  -- Indication: For htn    warfarin 3 mg oral tablet  -- 1 tab(s) by mouth once a day,hold for inr ABOVE 3.0  -- Indication: For Cerebrovascular accident (CVA), unspecified mechanism    gabapentin 100 mg oral capsule  -- 1 cap(s) by mouth 2 times a day  -- Indication: For pain    escitalopram 20 mg oral tablet  -- 1 tab(s) by mouth once a day  -- Indication: For Depression    simvastatin 10 mg oral tablet  -- 1 tab(s) by mouth once a day (at bedtime)  -- Indication: For hld    hydroxyurea 500 mg oral capsule  -- 1 cap(s) by mouth once a day  -- Indication: For melanoma    metoprolol tartrate 25 mg oral tablet  -- 1 tab(s) by mouth 2 times a day  -- Indication: For htn    famotidine 20 mg oral tablet  -- 1 tab(s) by mouth once a day  -- Indication: For gerd    ferrous sulfate  -- 325 milligram(s) by mouth once a day  -- Indication: For anemia    potassium chloride 10 mEq oral tablet, extended release  -- 1 tab(s) by mouth once a day  -- Indication: For suppleemnt    baclofen 10 mg oral tablet  -- 1 tab(s) by mouth once a day (at bedtime)  -- Indication: For muscle spasms    Bacid (LAC) oral tablet  -- 2 tab(s) by mouth once a day  -- Indication: For supplement    Cipro 250 mg oral tablet  -- 1 tab(s) by mouth every 12 hours  -- Indication: For Diverticulitis of large intestine without perforation or abscess without bleeding    Thera-Tabs M oral tablet  -- 1 tab(s) by mouth once a day  -- Indication: For suppleemnt

## 2017-05-26 NOTE — DISCHARGE NOTE ADULT - MEDICATION SUMMARY - MEDICATIONS TO STOP TAKING
I will STOP taking the medications listed below when I get home from the hospital:    acetaminophen 325 mg oral tablet  -- 2 tab(s) by mouth every 6 hours, As needed, Mild Pain    docusate sodium 100 mg oral capsule  -- 1 cap(s) by mouth once a day    senna oral tablet  -- 1 tab(s) by mouth once a day    acetaminophen-oxyCODONE 325 mg-5 mg oral tablet  -- 1 tab(s) by mouth every 6 hours, As Needed -Moderate Pain ( please dont administarte within 2-3  hrs after tramadol is given)    amoxicillin-clavulanate 875 mg-125 mg oral tablet  -- 1 tab(s) by mouth 2 times a day  -- Finish all this medication unless otherwise directed by prescriber.  Take with food or milk.

## 2017-05-26 NOTE — DISCHARGE NOTE ADULT - PLAN OF CARE
pain free complete 10 days of po abx and followup with GI consult IN 1-2 WEEKS repeat INR on 05/29/17 and call PMD with results continue home meidcations BP monitoring,continue current antihypertensive meds, low salt diet,followup with PMD in 1-2 weeks may resume home bowel regime after loose BM resolve

## 2017-05-27 LAB
CULTURE RESULTS: SIGNIFICANT CHANGE UP
CULTURE RESULTS: SIGNIFICANT CHANGE UP
SPECIMEN SOURCE: SIGNIFICANT CHANGE UP
SPECIMEN SOURCE: SIGNIFICANT CHANGE UP

## 2017-05-30 DIAGNOSIS — I10 ESSENTIAL (PRIMARY) HYPERTENSION: ICD-10-CM

## 2017-05-30 DIAGNOSIS — K21.9 GASTRO-ESOPHAGEAL REFLUX DISEASE WITHOUT ESOPHAGITIS: ICD-10-CM

## 2017-05-30 DIAGNOSIS — E86.0 DEHYDRATION: ICD-10-CM

## 2017-05-30 DIAGNOSIS — R19.7 DIARRHEA, UNSPECIFIED: ICD-10-CM

## 2017-05-30 DIAGNOSIS — I25.10 ATHEROSCLEROTIC HEART DISEASE OF NATIVE CORONARY ARTERY WITHOUT ANGINA PECTORIS: ICD-10-CM

## 2017-05-30 DIAGNOSIS — F32.9 MAJOR DEPRESSIVE DISORDER, SINGLE EPISODE, UNSPECIFIED: ICD-10-CM

## 2017-05-30 DIAGNOSIS — K59.00 CONSTIPATION, UNSPECIFIED: ICD-10-CM

## 2017-05-30 DIAGNOSIS — K57.32 DIVERTICULITIS OF LARGE INTESTINE WITHOUT PERFORATION OR ABSCESS WITHOUT BLEEDING: ICD-10-CM

## 2017-05-30 DIAGNOSIS — Z79.82 LONG TERM (CURRENT) USE OF ASPIRIN: ICD-10-CM

## 2017-05-30 DIAGNOSIS — Z86.73 PERSONAL HISTORY OF TRANSIENT ISCHEMIC ATTACK (TIA), AND CEREBRAL INFARCTION WITHOUT RESIDUAL DEFICITS: ICD-10-CM

## 2017-05-30 DIAGNOSIS — Z79.01 LONG TERM (CURRENT) USE OF ANTICOAGULANTS: ICD-10-CM

## 2017-05-30 DIAGNOSIS — D69.6 THROMBOCYTOPENIA, UNSPECIFIED: ICD-10-CM

## 2017-09-11 ENCOUNTER — INPATIENT (INPATIENT)
Facility: HOSPITAL | Age: 82
LOS: 1 days | Discharge: HOME HEALTH | DRG: 690 | End: 2017-09-13
Attending: INTERNAL MEDICINE | Admitting: INTERNAL MEDICINE
Payer: MEDICARE

## 2017-09-11 VITALS
HEIGHT: 66 IN | HEART RATE: 95 BPM | WEIGHT: 125 LBS | TEMPERATURE: 99 F | RESPIRATION RATE: 18 BRPM | OXYGEN SATURATION: 94 % | SYSTOLIC BLOOD PRESSURE: 134 MMHG | DIASTOLIC BLOOD PRESSURE: 63 MMHG

## 2017-09-11 DIAGNOSIS — R50.9 FEVER, UNSPECIFIED: ICD-10-CM

## 2017-09-11 DIAGNOSIS — F32.9 MAJOR DEPRESSIVE DISORDER, SINGLE EPISODE, UNSPECIFIED: ICD-10-CM

## 2017-09-11 DIAGNOSIS — I63.9 CEREBRAL INFARCTION, UNSPECIFIED: ICD-10-CM

## 2017-09-11 DIAGNOSIS — I10 ESSENTIAL (PRIMARY) HYPERTENSION: ICD-10-CM

## 2017-09-11 DIAGNOSIS — N39.0 URINARY TRACT INFECTION, SITE NOT SPECIFIED: ICD-10-CM

## 2017-09-11 DIAGNOSIS — K59.00 CONSTIPATION, UNSPECIFIED: ICD-10-CM

## 2017-09-11 LAB
ALBUMIN SERPL ELPH-MCNC: 3 G/DL — LOW (ref 3.3–5)
ALP SERPL-CCNC: 79 U/L — SIGNIFICANT CHANGE UP (ref 40–120)
ALT FLD-CCNC: 27 U/L — SIGNIFICANT CHANGE UP (ref 12–78)
ANION GAP SERPL CALC-SCNC: 6 MMOL/L — SIGNIFICANT CHANGE UP (ref 5–17)
ANISOCYTOSIS BLD QL: SLIGHT — SIGNIFICANT CHANGE UP
APPEARANCE UR: ABNORMAL
AST SERPL-CCNC: 23 U/L — SIGNIFICANT CHANGE UP (ref 15–37)
BACTERIA # UR AUTO: ABNORMAL
BASOPHILS NFR BLD AUTO: 1 % — SIGNIFICANT CHANGE UP (ref 0–2)
BILIRUB SERPL-MCNC: 0.5 MG/DL — SIGNIFICANT CHANGE UP (ref 0.2–1.2)
BILIRUB UR-MCNC: NEGATIVE — SIGNIFICANT CHANGE UP
BUN SERPL-MCNC: 14 MG/DL — SIGNIFICANT CHANGE UP (ref 7–23)
CALCIUM SERPL-MCNC: 8.9 MG/DL — SIGNIFICANT CHANGE UP (ref 8.5–10.1)
CHLORIDE SERPL-SCNC: 103 MMOL/L — SIGNIFICANT CHANGE UP (ref 96–108)
CO2 SERPL-SCNC: 33 MMOL/L — HIGH (ref 22–31)
COLOR SPEC: YELLOW — SIGNIFICANT CHANGE UP
CREAT SERPL-MCNC: 0.71 MG/DL — SIGNIFICANT CHANGE UP (ref 0.5–1.3)
DIFF PNL FLD: ABNORMAL
EOSINOPHIL NFR BLD AUTO: 4 % — SIGNIFICANT CHANGE UP (ref 0–6)
EPI CELLS # UR: SIGNIFICANT CHANGE UP
GIANT PLATELETS BLD QL SMEAR: SIGNIFICANT CHANGE UP
GLUCOSE SERPL-MCNC: 111 MG/DL — HIGH (ref 70–99)
GLUCOSE UR QL: NEGATIVE — SIGNIFICANT CHANGE UP
HCT VFR BLD CALC: 45.2 % — HIGH (ref 34.5–45)
HGB BLD-MCNC: 15.1 G/DL — SIGNIFICANT CHANGE UP (ref 11.5–15.5)
INR BLD: 1.22 RATIO — HIGH (ref 0.88–1.16)
KETONES UR-MCNC: NEGATIVE — SIGNIFICANT CHANGE UP
LACTATE SERPL-SCNC: 1.7 MMOL/L — SIGNIFICANT CHANGE UP (ref 0.7–2)
LACTATE SERPL-SCNC: 1.9 MMOL/L — SIGNIFICANT CHANGE UP (ref 0.7–2)
LEUKOCYTE ESTERASE UR-ACNC: ABNORMAL
LG PLATELETS BLD QL AUTO: SLIGHT — SIGNIFICANT CHANGE UP
LYMPHOCYTES # BLD AUTO: 9 % — LOW (ref 13–44)
MCHC RBC-ENTMCNC: 33.4 GM/DL — SIGNIFICANT CHANGE UP (ref 32–36)
MCHC RBC-ENTMCNC: 35.9 PG — HIGH (ref 27–34)
MCV RBC AUTO: 107.5 FL — HIGH (ref 80–100)
MONOCYTES NFR BLD AUTO: 8 % — SIGNIFICANT CHANGE UP (ref 1–9)
NEUTROPHILS NFR BLD AUTO: 74 % — SIGNIFICANT CHANGE UP (ref 43–77)
NEUTS BAND # BLD: 3 % — SIGNIFICANT CHANGE UP (ref 0–8)
NITRITE UR-MCNC: POSITIVE
OVALOCYTES BLD QL SMEAR: SLIGHT — SIGNIFICANT CHANGE UP
PH UR: 8 — SIGNIFICANT CHANGE UP (ref 5–8)
PLAT MORPH BLD: NORMAL — SIGNIFICANT CHANGE UP
PLATELET # BLD AUTO: 867 K/UL — HIGH (ref 150–400)
POIKILOCYTOSIS BLD QL AUTO: SLIGHT — SIGNIFICANT CHANGE UP
POLYCHROMASIA BLD QL SMEAR: SLIGHT — SIGNIFICANT CHANGE UP
POTASSIUM SERPL-MCNC: 4.2 MMOL/L — SIGNIFICANT CHANGE UP (ref 3.5–5.3)
POTASSIUM SERPL-SCNC: 4.2 MMOL/L — SIGNIFICANT CHANGE UP (ref 3.5–5.3)
PROT SERPL-MCNC: 7.2 G/DL — SIGNIFICANT CHANGE UP (ref 6–8.3)
PROT UR-MCNC: 75 MG/DL
PROTHROM AB SERPL-ACNC: 13.3 SEC — HIGH (ref 9.8–12.7)
RBC # BLD: 4.2 M/UL — SIGNIFICANT CHANGE UP (ref 3.8–5.2)
RBC # FLD: 13.5 % — SIGNIFICANT CHANGE UP (ref 10.3–14.5)
RBC BLD AUTO: ABNORMAL
RBC CASTS # UR COMP ASSIST: ABNORMAL /HPF (ref 0–4)
SODIUM SERPL-SCNC: 142 MMOL/L — SIGNIFICANT CHANGE UP (ref 135–145)
SP GR SPEC: 1.01 — SIGNIFICANT CHANGE UP (ref 1.01–1.02)
UROBILINOGEN FLD QL: NEGATIVE — SIGNIFICANT CHANGE UP
VARIANT LYMPHS # BLD: 1 % — SIGNIFICANT CHANGE UP (ref 0–6)
WBC # BLD: 12.2 K/UL — HIGH (ref 3.8–10.5)
WBC # FLD AUTO: 12.2 K/UL — HIGH (ref 3.8–10.5)
WBC UR QL: >50

## 2017-09-11 PROCEDURE — 71010: CPT | Mod: 26

## 2017-09-11 PROCEDURE — 99285 EMERGENCY DEPT VISIT HI MDM: CPT

## 2017-09-11 PROCEDURE — 93010 ELECTROCARDIOGRAM REPORT: CPT

## 2017-09-11 RX ORDER — SENNA PLUS 8.6 MG/1
2 TABLET ORAL AT BEDTIME
Qty: 0 | Refills: 0 | Status: DISCONTINUED | OUTPATIENT
Start: 2017-09-11 | End: 2017-09-13

## 2017-09-11 RX ORDER — METOPROLOL TARTRATE 50 MG
25 TABLET ORAL
Qty: 0 | Refills: 0 | Status: DISCONTINUED | OUTPATIENT
Start: 2017-09-11 | End: 2017-09-13

## 2017-09-11 RX ORDER — FAMOTIDINE 10 MG/ML
20 INJECTION INTRAVENOUS DAILY
Qty: 0 | Refills: 0 | Status: DISCONTINUED | OUTPATIENT
Start: 2017-09-11 | End: 2017-09-13

## 2017-09-11 RX ORDER — ACETAMINOPHEN 500 MG
650 TABLET ORAL ONCE
Qty: 0 | Refills: 0 | Status: COMPLETED | OUTPATIENT
Start: 2017-09-11 | End: 2017-09-11

## 2017-09-11 RX ORDER — ESCITALOPRAM OXALATE 10 MG/1
10 TABLET, FILM COATED ORAL DAILY
Qty: 0 | Refills: 0 | Status: DISCONTINUED | OUTPATIENT
Start: 2017-09-11 | End: 2017-09-13

## 2017-09-11 RX ORDER — ACETAMINOPHEN 500 MG
650 TABLET ORAL ONCE
Qty: 0 | Refills: 0 | Status: DISCONTINUED | OUTPATIENT
Start: 2017-09-11 | End: 2017-09-11

## 2017-09-11 RX ORDER — ASPIRIN/CALCIUM CARB/MAGNESIUM 324 MG
81 TABLET ORAL DAILY
Qty: 0 | Refills: 0 | Status: DISCONTINUED | OUTPATIENT
Start: 2017-09-11 | End: 2017-09-13

## 2017-09-11 RX ORDER — SODIUM CHLORIDE 9 MG/ML
1000 INJECTION INTRAMUSCULAR; INTRAVENOUS; SUBCUTANEOUS
Qty: 0 | Refills: 0 | Status: DISCONTINUED | OUTPATIENT
Start: 2017-09-11 | End: 2017-09-13

## 2017-09-11 RX ORDER — METOPROLOL TARTRATE 50 MG
25 TABLET ORAL
Qty: 0 | Refills: 0 | Status: DISCONTINUED | OUTPATIENT
Start: 2017-09-11 | End: 2017-09-11

## 2017-09-11 RX ORDER — HEPARIN SODIUM 5000 [USP'U]/ML
5000 INJECTION INTRAVENOUS; SUBCUTANEOUS EVERY 12 HOURS
Qty: 0 | Refills: 0 | Status: DISCONTINUED | OUTPATIENT
Start: 2017-09-11 | End: 2017-09-13

## 2017-09-11 RX ORDER — CEFTRIAXONE 500 MG/1
1 INJECTION, POWDER, FOR SOLUTION INTRAMUSCULAR; INTRAVENOUS DAILY
Qty: 0 | Refills: 0 | Status: DISCONTINUED | OUTPATIENT
Start: 2017-09-12 | End: 2017-09-13

## 2017-09-11 RX ORDER — PHENAZOPYRIDINE HCL 100 MG
100 TABLET ORAL
Qty: 0 | Refills: 0 | Status: COMPLETED | OUTPATIENT
Start: 2017-09-11 | End: 2017-09-13

## 2017-09-11 RX ORDER — HYDROXYUREA 500 MG/1
500 CAPSULE ORAL DAILY
Qty: 0 | Refills: 0 | Status: DISCONTINUED | OUTPATIENT
Start: 2017-09-12 | End: 2017-09-13

## 2017-09-11 RX ORDER — CEFTRIAXONE 500 MG/1
1 INJECTION, POWDER, FOR SOLUTION INTRAMUSCULAR; INTRAVENOUS ONCE
Qty: 0 | Refills: 0 | Status: COMPLETED | OUTPATIENT
Start: 2017-09-11 | End: 2017-09-11

## 2017-09-11 RX ORDER — SIMVASTATIN 20 MG/1
10 TABLET, FILM COATED ORAL AT BEDTIME
Qty: 0 | Refills: 0 | Status: DISCONTINUED | OUTPATIENT
Start: 2017-09-11 | End: 2017-09-13

## 2017-09-11 RX ORDER — SODIUM CHLORIDE 9 MG/ML
1000 INJECTION INTRAMUSCULAR; INTRAVENOUS; SUBCUTANEOUS ONCE
Qty: 0 | Refills: 0 | Status: COMPLETED | OUTPATIENT
Start: 2017-09-11 | End: 2017-09-11

## 2017-09-11 RX ORDER — GABAPENTIN 400 MG/1
100 CAPSULE ORAL
Qty: 0 | Refills: 0 | Status: DISCONTINUED | OUTPATIENT
Start: 2017-09-11 | End: 2017-09-13

## 2017-09-11 RX ORDER — BACLOFEN 100 %
10 POWDER (GRAM) MISCELLANEOUS AT BEDTIME
Qty: 0 | Refills: 0 | Status: DISCONTINUED | OUTPATIENT
Start: 2017-09-11 | End: 2017-09-13

## 2017-09-11 RX ORDER — ACETAMINOPHEN 500 MG
650 TABLET ORAL EVERY 6 HOURS
Qty: 0 | Refills: 0 | Status: DISCONTINUED | OUTPATIENT
Start: 2017-09-11 | End: 2017-09-13

## 2017-09-11 RX ORDER — FUROSEMIDE 40 MG
20 TABLET ORAL DAILY
Qty: 0 | Refills: 0 | Status: DISCONTINUED | OUTPATIENT
Start: 2017-09-11 | End: 2017-09-11

## 2017-09-11 RX ADMIN — FAMOTIDINE 20 MILLIGRAM(S): 10 INJECTION INTRAVENOUS at 11:44

## 2017-09-11 RX ADMIN — Medication 25 MILLIGRAM(S): at 18:26

## 2017-09-11 RX ADMIN — Medication 100 MILLIGRAM(S): at 21:09

## 2017-09-11 RX ADMIN — HEPARIN SODIUM 5000 UNIT(S): 5000 INJECTION INTRAVENOUS; SUBCUTANEOUS at 17:19

## 2017-09-11 RX ADMIN — CEFTRIAXONE 100 GRAM(S): 500 INJECTION, POWDER, FOR SOLUTION INTRAMUSCULAR; INTRAVENOUS at 08:00

## 2017-09-11 RX ADMIN — SIMVASTATIN 10 MILLIGRAM(S): 20 TABLET, FILM COATED ORAL at 21:09

## 2017-09-11 RX ADMIN — SODIUM CHLORIDE 1000 MILLILITER(S): 9 INJECTION INTRAMUSCULAR; INTRAVENOUS; SUBCUTANEOUS at 08:00

## 2017-09-11 RX ADMIN — GABAPENTIN 100 MILLIGRAM(S): 400 CAPSULE ORAL at 18:25

## 2017-09-11 RX ADMIN — Medication 650 MILLIGRAM(S): at 08:00

## 2017-09-11 RX ADMIN — Medication 10 MILLIGRAM(S): at 21:10

## 2017-09-11 RX ADMIN — SENNA PLUS 2 TABLET(S): 8.6 TABLET ORAL at 21:09

## 2017-09-11 NOTE — PHYSICAL THERAPY INITIAL EVALUATION ADULT - ADDITIONAL COMMENTS
Pt resides at [facility] and has h/o CVA with L sided weakness. Pt ambulates short distances with LLE brace, hemicane and assistance and requires assistance for ADLs. Pt uses wheelchair to and from dining room

## 2017-09-11 NOTE — H&P ADULT - HISTORY OF PRESENT ILLNESS
86 yo WF was brought to ED from [facility] with burning on urination x 1 week ,associated with fever , diagnosed with uti Admitted for septic workup and evaluation,send blood and urine cx,serial lactate levels,monitor vitals closley,ivfs hydration,monitor urine output and renal profile,iv abx initiated ID cons called for abx comanagment  Palliative care consult requested ,to discuss advance directives and complete MOLST

## 2017-09-11 NOTE — PHYSICAL THERAPY INITIAL EVALUATION ADULT - LEVEL OF INDEPENDENCE: GAIT, REHAB EVAL
brace at [facility]- can't ambulate without brace as per patient/unable to perform

## 2017-09-11 NOTE — ED PROVIDER NOTE - CARE PLAN
Principal Discharge DX:	Fever, unspecified fever cause  Secondary Diagnosis:	Urinary tract infection without hematuria, site unspecified

## 2017-09-11 NOTE — GOALS OF CARE CONVERSATION - PERSONAL ADVANCE DIRECTIVE - CONVERSATION DETAILS
spoke to pt about advance directives ,She states her HCP remains unchanged. Her agent is her son Curtis  when asked she stated that she does want CPR attempted and does not wish to talk any further about this subject

## 2017-09-11 NOTE — ED PROVIDER NOTE - OBJECTIVE STATEMENT
85 female presents to ER by ambulance from assisted living, non ambulatory h/o CVA with residual right sided weakness, c/o urinary burning x 1 week, today had 101 fever.

## 2017-09-11 NOTE — H&P ADULT - PMH
Constipation    CVA (cerebral vascular accident)    Depression    Hypertension Constipation    CVA (cerebral vascular accident)    Depression    Hyperlipidemia    Hypertension    Neuropathy

## 2017-09-12 DIAGNOSIS — Z29.9 ENCOUNTER FOR PROPHYLACTIC MEASURES, UNSPECIFIED: ICD-10-CM

## 2017-09-12 LAB
ANION GAP SERPL CALC-SCNC: 7 MMOL/L — SIGNIFICANT CHANGE UP (ref 5–17)
BUN SERPL-MCNC: 11 MG/DL — SIGNIFICANT CHANGE UP (ref 7–23)
CALCIUM SERPL-MCNC: 8.2 MG/DL — LOW (ref 8.5–10.1)
CHLORIDE SERPL-SCNC: 109 MMOL/L — HIGH (ref 96–108)
CO2 SERPL-SCNC: 29 MMOL/L — SIGNIFICANT CHANGE UP (ref 22–31)
CREAT SERPL-MCNC: 0.55 MG/DL — SIGNIFICANT CHANGE UP (ref 0.5–1.3)
GLUCOSE SERPL-MCNC: 95 MG/DL — SIGNIFICANT CHANGE UP (ref 70–99)
HCT VFR BLD CALC: 38 % — SIGNIFICANT CHANGE UP (ref 34.5–45)
HGB BLD-MCNC: 12.6 G/DL — SIGNIFICANT CHANGE UP (ref 11.5–15.5)
LACTATE SERPL-SCNC: 0.6 MMOL/L — LOW (ref 0.7–2)
MCHC RBC-ENTMCNC: 33 GM/DL — SIGNIFICANT CHANGE UP (ref 32–36)
MCHC RBC-ENTMCNC: 35.2 PG — HIGH (ref 27–34)
MCV RBC AUTO: 106.6 FL — HIGH (ref 80–100)
PLATELET # BLD AUTO: 754 K/UL — HIGH (ref 150–400)
POTASSIUM SERPL-MCNC: 3.6 MMOL/L — SIGNIFICANT CHANGE UP (ref 3.5–5.3)
POTASSIUM SERPL-SCNC: 3.6 MMOL/L — SIGNIFICANT CHANGE UP (ref 3.5–5.3)
RBC # BLD: 3.57 M/UL — LOW (ref 3.8–5.2)
RBC # FLD: 13.2 % — SIGNIFICANT CHANGE UP (ref 10.3–14.5)
SODIUM SERPL-SCNC: 145 MMOL/L — SIGNIFICANT CHANGE UP (ref 135–145)
WBC # BLD: 8.1 K/UL — SIGNIFICANT CHANGE UP (ref 3.8–10.5)
WBC # FLD AUTO: 8.1 K/UL — SIGNIFICANT CHANGE UP (ref 3.8–10.5)

## 2017-09-12 RX ADMIN — HYDROXYUREA 500 MILLIGRAM(S): 500 CAPSULE ORAL at 19:39

## 2017-09-12 RX ADMIN — Medication 81 MILLIGRAM(S): at 13:15

## 2017-09-12 RX ADMIN — CEFTRIAXONE 100 GRAM(S): 500 INJECTION, POWDER, FOR SOLUTION INTRAMUSCULAR; INTRAVENOUS at 08:30

## 2017-09-12 RX ADMIN — Medication 25 MILLIGRAM(S): at 05:47

## 2017-09-12 RX ADMIN — SODIUM CHLORIDE 50 MILLILITER(S): 9 INJECTION INTRAMUSCULAR; INTRAVENOUS; SUBCUTANEOUS at 05:47

## 2017-09-12 RX ADMIN — SIMVASTATIN 10 MILLIGRAM(S): 20 TABLET, FILM COATED ORAL at 21:05

## 2017-09-12 RX ADMIN — FAMOTIDINE 20 MILLIGRAM(S): 10 INJECTION INTRAVENOUS at 13:15

## 2017-09-12 RX ADMIN — Medication 10 MILLIGRAM(S): at 21:05

## 2017-09-12 RX ADMIN — SENNA PLUS 2 TABLET(S): 8.6 TABLET ORAL at 21:05

## 2017-09-12 RX ADMIN — HEPARIN SODIUM 5000 UNIT(S): 5000 INJECTION INTRAVENOUS; SUBCUTANEOUS at 05:47

## 2017-09-12 RX ADMIN — Medication 2.5 MILLIGRAM(S): at 05:47

## 2017-09-12 RX ADMIN — GABAPENTIN 100 MILLIGRAM(S): 400 CAPSULE ORAL at 05:47

## 2017-09-12 RX ADMIN — HEPARIN SODIUM 5000 UNIT(S): 5000 INJECTION INTRAVENOUS; SUBCUTANEOUS at 18:37

## 2017-09-12 RX ADMIN — Medication 100 MILLIGRAM(S): at 18:37

## 2017-09-12 RX ADMIN — Medication 25 MILLIGRAM(S): at 18:37

## 2017-09-12 RX ADMIN — ESCITALOPRAM OXALATE 10 MILLIGRAM(S): 10 TABLET, FILM COATED ORAL at 13:15

## 2017-09-12 RX ADMIN — GABAPENTIN 100 MILLIGRAM(S): 400 CAPSULE ORAL at 18:37

## 2017-09-12 RX ADMIN — Medication 100 MILLIGRAM(S): at 05:47

## 2017-09-12 NOTE — CONSULT NOTE ADULT - SUBJECTIVE AND OBJECTIVE BOX
Patient is a 85y old  Female who presents with a chief complaint of burning on urination x 1 week (11 Sep 2017 10:50)      HPI:  84 yo WF was brought to ED from [facility] with burning on urination x 1 week ,associated with fever , diagnosed with uti Admitted for septic workup and evaluation,send blood and urine cx,serial lactate levels,monitor vitals closley,ivfs hydration,monitor urine output and renal profile,iv abx initiated ID cons called for abx comanagment  Palliative care consult requested ,to discuss advance directives and complete MOLST (11 Sep 2017 10:50)      Asked to see patient for ID Consult    PAST MEDICAL & SURGICAL HISTORY:  Hyperlipidemia  Neuropathy  Constipation  Depression  CVA (cerebral vascular accident)  Hypertension  No significant past surgical history      Allergies    No Known Allergies    Intolerances        REVIEW OF SYSTEMS:  All systems below were reviewed and are negative [  ]  HEENT:  ID:  Pulmonary:  Cardiac:  GI:  Renal:  Musculoskeletal:  All other systems above were reviewed and are negative   [  ]    HOME MEDICATIONS:    MEDICATIONS  (STANDING):  sodium chloride 0.9%. 1000 milliLiter(s) (50 mL/Hr) IV Continuous <Continuous>  cefTRIAXone   IVPB 1 Gram(s) IV Intermittent daily  heparin  Injectable 5000 Unit(s) SubCutaneous every 12 hours  famotidine    Tablet 20 milliGRAM(s) Oral daily  aspirin enteric coated 81 milliGRAM(s) Oral daily  enalapril 2.5 milliGRAM(s) Oral daily  gabapentin 100 milliGRAM(s) Oral two times a day  simvastatin 10 milliGRAM(s) Oral at bedtime  metoprolol 25 milliGRAM(s) Oral two times a day  hydroxyurea 500 milliGRAM(s) Oral daily  senna 2 Tablet(s) Oral at bedtime  baclofen 10 milliGRAM(s) Oral at bedtime  escitalopram 10 milliGRAM(s) Oral daily  phenazopyridine 100 milliGRAM(s) Oral two times a day    MEDICATIONS  (PRN):  acetaminophen   Tablet 650 milliGRAM(s) Oral every 6 hours PRN For Temp greater than 38 C (100.4 F)  acetaminophen   Tablet. 650 milliGRAM(s) Oral every 6 hours PRN Mild Pain (1 - 3)      Vital Signs Last 24 Hrs  T(C): 36.6 (12 Sep 2017 20:54), Max: 37.1 (12 Sep 2017 05:15)  T(F): 97.9 (12 Sep 2017 20:54), Max: 98.8 (12 Sep 2017 05:15)  HR: 77 (12 Sep 2017 20:54) (77 - 80)  BP: 137/77 (12 Sep 2017 20:54) (120/79 - 137/77)  BP(mean): --  RR: 16 (12 Sep 2017 20:54) (16 - 16)  SpO2: 93% (12 Sep 2017 20:54) (92% - 96%)    PHYSICAL EXAM:  HEENT:  Neck:  Lungs:  Heart:  Abdomen:  Genital/ Rectal:  Extremities:  Neurologic:  Vascular:    I&O's Summary    11 Sep 2017 07:01  -  12 Sep 2017 07:00  --------------------------------------------------------  IN: 1000 mL / OUT: 0 mL / NET: 1000 mL    12 Sep 2017 07:01  -  12 Sep 2017 21:05  --------------------------------------------------------  IN: 390 mL / OUT: 0 mL / NET: 390 mL        LABORATORY:                          .6   8.1   )-----------( 754      ( 12 Sep 2017 06:48 )             38.0           09-12    145  |  109<H>  |  11  ----------------------------<  95  3.6   |  29  |  0.55    Ca    8.2<L>      12 Sep 2017 06:48    TPro  7.2  /  Alb  3.0<L>  /  TBili  0.5  /  DBili  x   /  AST  23  /  ALT  27  /  AlkPhos  79  09-11          LABORATORY:    CBC Full  -  ( 12 Sep 2017 06:48 )  WBC Count : 8.1 K/uL  Hemoglobin : 12.6 g/dL  Hematocrit : 38.0 %  Platelet Count - Automated : 754 K/uL  Mean Cell Volume : 106.6 fl  Mean Cell Hemoglobin : 35.2 pg  Mean Cell Hemoglobin Concentration : 33.0 gm/dL  Auto Neutrophil # : x  Auto Lymphocyte # : x  Auto Monocyte # : x  Auto Eosinophil # : x  Auto Basophil # : x  Auto Neutrophil % : x  Auto Lymphocyte % : x  Auto Monocyte % : x  Auto Eosinophil % : x  Auto Basophil % : x          09-12    145  |  109<H>  |  11  ----------------------------<  95  3.6   |  29  |  0.55    Ca    8.2<L>      12 Sep 2017 06:48    TPro  7.2  /  Alb  3.0<L>  /  TBili  0.5  /  DBili  x   /  AST  23  /  ALT  27  /  AlkPhos  79  09-11 Patient is a 85y old  Female who presents with a chief complaint of burning on urination x 1 week (11 Sep 2017 10:50)        The patient is an 86 yo WF with a history of HTN, depression and CVA who was brought to ED from [facility] with burning on urination for one week with a fever of 101F. She was admitted and placed on IV Rocephin. Her urine culture is growing E coli. The patient is afebrile today.     PAST MEDICAL & SURGICAL HISTORY:  Hyperlipidemia  Neuropathy  Constipation  Depression  CVA (cerebral vascular accident)  Hypertension  No significant past surgical history      Allergies    No Known Allergies      REVIEW OF SYSTEMS:    No cough or SOB  No abdominal pain or diarrhea.     HOME MEDICATIONS:    MEDICATIONS  (STANDING):  sodium chloride 0.9%. 1000 milliLiter(s) (50 mL/Hr) IV Continuous <Continuous>  cefTRIAXone   IVPB 1 Gram(s) IV Intermittent daily  heparin  Injectable 5000 Unit(s) SubCutaneous every 12 hours  famotidine    Tablet 20 milliGRAM(s) Oral daily  aspirin enteric coated 81 milliGRAM(s) Oral daily  enalapril 2.5 milliGRAM(s) Oral daily  gabapentin 100 milliGRAM(s) Oral two times a day  simvastatin 10 milliGRAM(s) Oral at bedtime  metoprolol 25 milliGRAM(s) Oral two times a day  hydroxyurea 500 milliGRAM(s) Oral daily  senna 2 Tablet(s) Oral at bedtime  baclofen 10 milliGRAM(s) Oral at bedtime  escitalopram 10 milliGRAM(s) Oral daily  phenazopyridine 100 milliGRAM(s) Oral two times a day    MEDICATIONS  (PRN):  acetaminophen   Tablet 650 milliGRAM(s) Oral every 6 hours PRN For Temp greater than 38 C (100.4 F)  acetaminophen   Tablet. 650 milliGRAM(s) Oral every 6 hours PRN Mild Pain (1 - 3)      Vital Signs Last 24 Hrs  T(C): 36.6 (12 Sep 2017 20:54), Max: 37.1 (12 Sep 2017 05:15)  T(F): 97.9 (12 Sep 2017 20:54), Max: 98.8 (12 Sep 2017 05:15)  HR: 77 (12 Sep 2017 20:54) (77 - 80)  BP: 137/77 (12 Sep 2017 20:54) (120/79 - 137/77)  BP(mean): --  RR: 16 (12 Sep 2017 20:54) (16 - 16)  SpO2: 93% (12 Sep 2017 20:54) (92% - 96%)    PHYSICAL EXAM:  HEENT: NC/AT; PERRLA  Neck: Soft; no masses.   Lungs: CTA bilaterally; no wheezing.  Heart: RRR; no murmurs.   Abdomen: Soft; no tenderness.  Extremities: No edema or ulcers.   Neurologic: Awake.     I&O's Summary    11 Sep 2017 07:01  -  12 Sep 2017 07:00  --------------------------------------------------------  IN: 1000 mL / OUT: 0 mL / NET: 1000 mL    12 Sep 2017 07:01  -  12 Sep 2017 21:05  --------------------------------------------------------  IN: 390 mL / OUT: 0 mL / NET: 390 mL        LABORATORY:                          12.6   8.1   )-----------( 754      ( 12 Sep 2017 06:48 )             38.0     09-12    145  |  109<H>  |  11  ----------------------------<  95  3.6   |  29  |  0.55    Ca    8.2<L>      12 Sep 2017 06:48    TPro  7.2  /  Alb  3.0<L>  /  TBili  0.5  /  DBili  x   /  AST  23  /  ALT  27  /  AlkPhos  79  09-11    LABORATORY:    CBC Full  -  ( 12 Sep 2017 06:48 )  WBC Count : 8.1 K/uL  Hemoglobin : 12.6 g/dL  Hematocrit : 38.0 %  Platelet Count - Automated : 754 K/uL  Mean Cell Volume : 106.6 fl  Mean Cell Hemoglobin : 35.2 pg  Mean Cell Hemoglobin Concentration : 33.0 gm/dL  Auto Neutrophil # : x  Auto Lymphocyte # : x  Auto Monocyte # : x  Auto Eosinophil # : x  Auto Basophil # : x  Auto Neutrophil % : x  Auto Lymphocyte % : x  Auto Monocyte % : x  Auto Eosinophil % : x  Auto Basophil % : x      09-12    145  |  109<H>  |  11  ----------------------------<  95  3.6   |  29  |  0.55    Ca    8.2<L>      12 Sep 2017 06:48    TPro  7.2  /  Alb  3.0<L>  /  TBili  0.5  /  DBili  x   /  AST  23  /  ALT  27  /  AlkPhos  79  09-11      Assessment and Plan:    1. UTI with E coli,  2. Fever  3. Weakness.    . Continue IV Rocephin.  . Follow urine culture.  . Anticipate oral abx in 48 hrs for discharge.

## 2017-09-12 NOTE — PROGRESS NOTE ADULT - SUBJECTIVE AND OBJECTIVE BOX
PROGRESS NOTE    OVERNIGHT    SUBJECTIVE    PAST MEDICAL & SURGICAL HISTORY:  Constipation  Depression  CVA (cerebral vascular accident)  Hypertension  No significant past surgical history    HEALTH ISSUES - PROBLEM Dx:  Constipation: Constipation  Depression: Depression  CVA (cerebral vascular accident): CVA (cerebral vascular accident)  Hypertension: Hypertension  Fever, unspecified fever cause: Fever, unspecified fever cause  Urinary tract infection without hematuria, site unspecified: Urinary tract infection without hematuria, site unspecified          MEDICATIONS  (STANDING):  sodium chloride 0.9%. 1000 milliLiter(s) (50 mL/Hr) IV Continuous <Continuous>  cefTRIAXone   IVPB 1 Gram(s) IV Intermittent daily  heparin  Injectable 5000 Unit(s) SubCutaneous every 12 hours  famotidine    Tablet 20 milliGRAM(s) Oral daily  aspirin enteric coated 81 milliGRAM(s) Oral daily  enalapril 2.5 milliGRAM(s) Oral daily  gabapentin 100 milliGRAM(s) Oral two times a day  simvastatin 10 milliGRAM(s) Oral at bedtime  metoprolol 25 milliGRAM(s) Oral two times a day  hydroxyurea 500 milliGRAM(s) Oral daily  senna 2 Tablet(s) Oral at bedtime  baclofen 10 milliGRAM(s) Oral at bedtime  escitalopram 10 milliGRAM(s) Oral daily  phenazopyridine 100 milliGRAM(s) Oral two times a day    MEDICATIONS  (PRN):  acetaminophen   Tablet 650 milliGRAM(s) Oral every 6 hours PRN For Temp greater than 38 C (100.4 F)  acetaminophen   Tablet. 650 milliGRAM(s) Oral every 6 hours PRN Mild Pain (1 - 3)      OBJECTIVE    T(C): 36.9 (17 @ 13:28), Max: 37.7 (17 @ 19:39)  HR: 80 (17 @ 13:28) (78 - 92)  BP: 120/79 (17 @ 13:28) (118/71 - 127/72)  RR: 16 (17 @ 13:28) (16 - 17)  SpO2: 96% (17 @ 13:28) (92% - 96%)  Wt(kg): --  I&O's Summary    11 Sep 2017 07:01  -  12 Sep 2017 07:00  --------------------------------------------------------  IN: 1000 mL / OUT: 0 mL / NET: 1000 mL    12 Sep 2017 07:01  -  12 Sep 2017 14:05  --------------------------------------------------------  IN: 390 mL / OUT: 0 mL / NET: 390 mL          REVIEW OF SYSTEMS:  CONSTITUTIONAL: No fever, weight loss, or fatigue  EYES: No eye pain, visual disturbances, or discharge  ENMT:  No difficulty hearing, tinnitus, vertigo; No sinus or throat pain  NECK: No pain or stiffness  BREASTS: No pain, masses, or nipple discharge  RESPIRATORY: No cough, wheezing, chills or hemoptysis; No shortness of breath  CARDIOVASCULAR: No chest pain, palpitations, dizziness, or leg swelling  GASTROINTESTINAL: No abdominal pain. No nausea, vomiting; No diarrhea or constipation. No melena or hematochezia.  GENITOURINARY: No dysuria, frequency, hematuria, or incontinence  NEUROLOGICAL: No headaches, memory loss, loss of strength, numbness, or tremors  SKIN: No itching, burning, rashes, or lesions   LYMPH NODES: No enlarged glands  MUSCULOSKELETAL: No joint pain or swelling; No muscle, back, or extremity pain  HEME/LYMPH: No easy bruising, or bleeding gums  ALLERY AND IMMUNOLOGIC: No hives or eczema      PHYSICAL EXAM:  Appearance: NAD.   HEENT:   Moist oral mucosa. Conjunctiva clear b/l.   Neck : supple  Cardiovascular: RRR ,S1S2 present  Respiratory: Lungs CTAB.  Gastrointestinal:  Soft, nontender. No rebound. No rigidity. BS present	  Skin: No rashes, No ecchymoses, No cyanosis.	  Neurologic: Non-focal. Moving all extremities. Following commands.  Extremities: No edema. No erythema. No calf tenderness.  	  LABS:                        12.6   8.1   )-----------( 754      ( 12 Sep 2017 06:48 )             38.0     09-12    145  |  109<H>  |  11  ----------------------------<  95  3.6   |  29  |  0.55    Ca    8.2<L>      12 Sep 2017 06:48    TPro  7.2  /  Alb  3.0<L>  /  TBili  0.5  /  DBili  x   /  AST  23  /  ALT  27  /  AlkPhos  79  09-    PT/INR - ( 11 Sep 2017 07:55 )   PT: 13.3 sec;   INR: 1.22 ratio           Urinalysis Basic - ( 11 Sep 2017 07:36 )    Color: Yellow / Appearance: x / S.010 / pH: x  Gluc: x / Ketone: Negative  / Bili: Negative / Urobili: Negative   Blood: x / Protein: 75 mg/dL / Nitrite: Positive   Leuk Esterase: Moderate / RBC: 3-5 /HPF / WBC >50   Sq Epi: x / Non Sq Epi: x / Bacteria: Moderate        RADIOLOGY & ADDITIONAL TESTS:    ASSESSMENT/PLAN: PROGRESS NOTE    OVERNIGHT  No new issues reported by medical staff . All above noted   SUBJECTIVE  Feels much better ,dysuria resolved   PAST MEDICAL & SURGICAL HISTORY:  Constipation  Depression  CVA (cerebral vascular accident)  Hypertension  No significant past surgical history    HEALTH ISSUES - PROBLEM Dx:  Constipation: Constipation  Depression: Depression  CVA (cerebral vascular accident): CVA (cerebral vascular accident)  Hypertension: Hypertension  Fever, unspecified fever cause: Fever, unspecified fever cause  Urinary tract infection without hematuria, site unspecified: Urinary tract infection without hematuria, site unspecified          MEDICATIONS  (STANDING):  sodium chloride 0.9%. 1000 milliLiter(s) (50 mL/Hr) IV Continuous <Continuous>  cefTRIAXone   IVPB 1 Gram(s) IV Intermittent daily  heparin  Injectable 5000 Unit(s) SubCutaneous every 12 hours  famotidine    Tablet 20 milliGRAM(s) Oral daily  aspirin enteric coated 81 milliGRAM(s) Oral daily  enalapril 2.5 milliGRAM(s) Oral daily  gabapentin 100 milliGRAM(s) Oral two times a day  simvastatin 10 milliGRAM(s) Oral at bedtime  metoprolol 25 milliGRAM(s) Oral two times a day  hydroxyurea 500 milliGRAM(s) Oral daily  senna 2 Tablet(s) Oral at bedtime  baclofen 10 milliGRAM(s) Oral at bedtime  escitalopram 10 milliGRAM(s) Oral daily  phenazopyridine 100 milliGRAM(s) Oral two times a day    MEDICATIONS  (PRN):  acetaminophen   Tablet 650 milliGRAM(s) Oral every 6 hours PRN For Temp greater than 38 C (100.4 F)  acetaminophen   Tablet. 650 milliGRAM(s) Oral every 6 hours PRN Mild Pain (1 - 3)      OBJECTIVE    T(C): 36.9 (17 @ 13:28), Max: 37.7 (17 @ 19:39)  HR: 80 (17 @ 13:28) (78 - 92)  BP: 120/79 (17 @ 13:28) (118/71 - 127/72)  RR: 16 (17 @ 13:28) (16 - 17)  SpO2: 96% (17 @ 13:28) (92% - 96%)  Wt(kg): --  I&O's Summary    11 Sep 2017 07:01  -  12 Sep 2017 07:00  --------------------------------------------------------  IN: 1000 mL / OUT: 0 mL / NET: 1000 mL    12 Sep 2017 07:01  -  12 Sep 2017 14:05  --------------------------------------------------------  IN: 390 mL / OUT: 0 mL / NET: 390 mL          REVIEW OF SYSTEMS:  CONSTITUTIONAL: No fever, weight loss, or fatigue  EYES: No eye pain, visual disturbances, or discharge  ENMT:  No difficulty hearing, tinnitus, vertigo; No sinus or throat pain  NECK: No pain or stiffness  BREASTS: No pain, masses, or nipple discharge  RESPIRATORY: No cough, wheezing, chills or hemoptysis; No shortness of breath  CARDIOVASCULAR: No chest pain, palpitations, dizziness, or leg swelling  GASTROINTESTINAL: No abdominal pain. No nausea, vomiting; No diarrhea or constipation. No melena or hematochezia.  GENITOURINARY: No dysuria, frequency, hematuria, or incontinence  NEUROLOGICAL: No headaches, memory loss, loss of strength, numbness, or tremors  SKIN: No itching, burning, rashes, or lesions   LYMPH NODES: No enlarged glands  MUSCULOSKELETAL: No joint pain or swelling; No muscle, back, or extremity pain  HEME/LYMPH: No easy bruising, or bleeding gums  ALLERY AND IMMUNOLOGIC: No hives or eczema      PHYSICAL EXAM:  Appearance: NAD.   HEENT:   Moist oral mucosa. Conjunctiva clear b/l.   Neck : supple  Cardiovascular: RRR ,S1S2 present  Respiratory: Lungs CTAB.  Gastrointestinal:  Soft, nontender. No rebound. No rigidity. BS present	  Skin: No rashes, No ecchymoses, No cyanosis.	  Neurologic: Non-focal. Moving all extremities. Following commands.  Extremities: No edema. No erythema. No calf tenderness.  	  LABS:                        12.6   8.1   )-----------( 754      ( 12 Sep 2017 06:48 )             38.0     09-12    145  |  109<H>  |  11  ----------------------------<  95  3.6   |  29  |  0.55    Ca    8.2<L>      12 Sep 2017 06:48    TPro  7.2  /  Alb  3.0<L>  /  TBili  0.5  /  DBili  x   /  AST  23  /  ALT  27  /  AlkPhos  79  09-11    PT/INR - ( 11 Sep 2017 07:55 )   PT: 13.3 sec;   INR: 1.22 ratio           Urinalysis Basic - ( 11 Sep 2017 07:36 )    Color: Yellow / Appearance: x / S.010 / pH: x  Gluc: x / Ketone: Negative  / Bili: Negative / Urobili: Negative   Blood: x / Protein: 75 mg/dL / Nitrite: Positive   Leuk Esterase: Moderate / RBC: 3-5 /HPF / WBC >50   Sq Epi: x / Non Sq Epi: x / Bacteria: Moderate        RADIOLOGY & ADDITIONAL TESTS: Labs and imaging reviewed electronically     ASSESSMENT/PLAN:

## 2017-09-13 ENCOUNTER — TRANSCRIPTION ENCOUNTER (OUTPATIENT)
Age: 82
End: 2017-09-13

## 2017-09-13 VITALS
OXYGEN SATURATION: 99 % | HEART RATE: 81 BPM | RESPIRATION RATE: 17 BRPM | DIASTOLIC BLOOD PRESSURE: 76 MMHG | TEMPERATURE: 98 F | SYSTOLIC BLOOD PRESSURE: 134 MMHG

## 2017-09-13 LAB
-  AMIKACIN: SIGNIFICANT CHANGE UP
-  AMPICILLIN/SULBACTAM: SIGNIFICANT CHANGE UP
-  AMPICILLIN: SIGNIFICANT CHANGE UP
-  AZTREONAM: SIGNIFICANT CHANGE UP
-  CEFAZOLIN: SIGNIFICANT CHANGE UP
-  CEFEPIME: SIGNIFICANT CHANGE UP
-  CEFOXITIN: SIGNIFICANT CHANGE UP
-  CEFTAZIDIME: SIGNIFICANT CHANGE UP
-  CEFTRIAXONE: SIGNIFICANT CHANGE UP
-  CIPROFLOXACIN: SIGNIFICANT CHANGE UP
-  ERTAPENEM: SIGNIFICANT CHANGE UP
-  GENTAMICIN: SIGNIFICANT CHANGE UP
-  IMIPENEM: SIGNIFICANT CHANGE UP
-  LEVOFLOXACIN: SIGNIFICANT CHANGE UP
-  MEROPENEM: SIGNIFICANT CHANGE UP
-  NITROFURANTOIN: SIGNIFICANT CHANGE UP
-  PIPERACILLIN/TAZOBACTAM: SIGNIFICANT CHANGE UP
-  TOBRAMYCIN: SIGNIFICANT CHANGE UP
-  TRIMETHOPRIM/SULFAMETHOXAZOLE: SIGNIFICANT CHANGE UP
ANION GAP SERPL CALC-SCNC: 9 MMOL/L — SIGNIFICANT CHANGE UP (ref 5–17)
BUN SERPL-MCNC: 11 MG/DL — SIGNIFICANT CHANGE UP (ref 7–23)
CALCIUM SERPL-MCNC: 8.1 MG/DL — LOW (ref 8.5–10.1)
CHLORIDE SERPL-SCNC: 109 MMOL/L — HIGH (ref 96–108)
CO2 SERPL-SCNC: 27 MMOL/L — SIGNIFICANT CHANGE UP (ref 22–31)
CREAT SERPL-MCNC: 0.57 MG/DL — SIGNIFICANT CHANGE UP (ref 0.5–1.3)
CULTURE RESULTS: SIGNIFICANT CHANGE UP
GLUCOSE SERPL-MCNC: 92 MG/DL — SIGNIFICANT CHANGE UP (ref 70–99)
HCT VFR BLD CALC: 39.4 % — SIGNIFICANT CHANGE UP (ref 34.5–45)
HGB BLD-MCNC: 13 G/DL — SIGNIFICANT CHANGE UP (ref 11.5–15.5)
INR BLD: 1.16 RATIO — SIGNIFICANT CHANGE UP (ref 0.88–1.16)
MCHC RBC-ENTMCNC: 33.1 GM/DL — SIGNIFICANT CHANGE UP (ref 32–36)
MCHC RBC-ENTMCNC: 35.2 PG — HIGH (ref 27–34)
MCV RBC AUTO: 106.6 FL — HIGH (ref 80–100)
METHOD TYPE: SIGNIFICANT CHANGE UP
ORGANISM # SPEC MICROSCOPIC CNT: SIGNIFICANT CHANGE UP
ORGANISM # SPEC MICROSCOPIC CNT: SIGNIFICANT CHANGE UP
PLATELET # BLD AUTO: 779 K/UL — HIGH (ref 150–400)
POTASSIUM SERPL-MCNC: 3.9 MMOL/L — SIGNIFICANT CHANGE UP (ref 3.5–5.3)
POTASSIUM SERPL-SCNC: 3.9 MMOL/L — SIGNIFICANT CHANGE UP (ref 3.5–5.3)
PROTHROM AB SERPL-ACNC: 12.7 SEC — SIGNIFICANT CHANGE UP (ref 9.8–12.7)
RBC # BLD: 3.7 M/UL — LOW (ref 3.8–5.2)
RBC # FLD: 13 % — SIGNIFICANT CHANGE UP (ref 10.3–14.5)
SODIUM SERPL-SCNC: 145 MMOL/L — SIGNIFICANT CHANGE UP (ref 135–145)
SPECIMEN SOURCE: SIGNIFICANT CHANGE UP
WBC # BLD: 8.2 K/UL — SIGNIFICANT CHANGE UP (ref 3.8–10.5)
WBC # FLD AUTO: 8.2 K/UL — SIGNIFICANT CHANGE UP (ref 3.8–10.5)

## 2017-09-13 RX ORDER — WARFARIN SODIUM 2.5 MG/1
1 TABLET ORAL
Qty: 0 | Refills: 0 | COMMUNITY

## 2017-09-13 RX ORDER — CEFUROXIME AXETIL 250 MG
1 TABLET ORAL
Qty: 10 | Refills: 0
Start: 2017-09-13 | End: 2017-09-18

## 2017-09-13 RX ORDER — WARFARIN SODIUM 2.5 MG/1
2.5 TABLET ORAL ONCE
Qty: 0 | Refills: 0 | Status: COMPLETED | OUTPATIENT
Start: 2017-09-13 | End: 2017-09-13

## 2017-09-13 RX ORDER — WARFARIN SODIUM 2.5 MG/1
1 TABLET ORAL
Qty: 5 | Refills: 0
Start: 2017-09-13 | End: 2017-09-18

## 2017-09-13 RX ORDER — ACETAMINOPHEN 500 MG
2 TABLET ORAL
Qty: 80 | Refills: 0
Start: 2017-09-13 | End: 2017-09-23

## 2017-09-13 RX ADMIN — FAMOTIDINE 20 MILLIGRAM(S): 10 INJECTION INTRAVENOUS at 12:02

## 2017-09-13 RX ADMIN — Medication 2.5 MILLIGRAM(S): at 06:02

## 2017-09-13 RX ADMIN — ESCITALOPRAM OXALATE 10 MILLIGRAM(S): 10 TABLET, FILM COATED ORAL at 12:01

## 2017-09-13 RX ADMIN — CEFTRIAXONE 100 GRAM(S): 500 INJECTION, POWDER, FOR SOLUTION INTRAMUSCULAR; INTRAVENOUS at 08:21

## 2017-09-13 RX ADMIN — Medication 81 MILLIGRAM(S): at 12:02

## 2017-09-13 RX ADMIN — Medication 100 MILLIGRAM(S): at 06:02

## 2017-09-13 RX ADMIN — GABAPENTIN 100 MILLIGRAM(S): 400 CAPSULE ORAL at 17:40

## 2017-09-13 RX ADMIN — Medication 25 MILLIGRAM(S): at 17:40

## 2017-09-13 RX ADMIN — HEPARIN SODIUM 5000 UNIT(S): 5000 INJECTION INTRAVENOUS; SUBCUTANEOUS at 06:02

## 2017-09-13 RX ADMIN — HYDROXYUREA 500 MILLIGRAM(S): 500 CAPSULE ORAL at 12:03

## 2017-09-13 RX ADMIN — GABAPENTIN 100 MILLIGRAM(S): 400 CAPSULE ORAL at 06:02

## 2017-09-13 RX ADMIN — WARFARIN SODIUM 2.5 MILLIGRAM(S): 2.5 TABLET ORAL at 18:15

## 2017-09-13 RX ADMIN — Medication 25 MILLIGRAM(S): at 06:02

## 2017-09-13 NOTE — DISCHARGE NOTE ADULT - CARE PLAN
Principal Discharge DX:	UTI (urinary tract infection)  Goal:	resolution of dysuria  Instructions for follow-up, activity and diet:	complete 5 days of po abx  Secondary Diagnosis:	Fever, unspecified fever cause  Instructions for follow-up, activity and diet:	continue current managmnet and medications  Secondary Diagnosis:	Hypertension  Instructions for follow-up, activity and diet:	BP monitoring,continue current antihypertensive meds, low salt diet,followup with PMD  Secondary Diagnosis:	CVA (cerebral vascular accident)  Instructions for follow-up, activity and diet:	continue home medications  Secondary Diagnosis:	Hyperlipidemia  Instructions for follow-up, activity and diet:	continue current managmnet and medications  Secondary Diagnosis:	Depression

## 2017-09-13 NOTE — DISCHARGE NOTE ADULT - SECONDARY DIAGNOSIS.
Fever, unspecified fever cause Hypertension CVA (cerebral vascular accident) Hyperlipidemia Depression

## 2017-09-13 NOTE — DISCHARGE NOTE ADULT - MEDICATION SUMMARY - MEDICATIONS TO TAKE
I will START or STAY ON the medications listed below when I get home from the hospital:    aspirin 81 mg oral delayed release tablet  -- 1 tab(s) by mouth once a day  -- Indication: For CVA (cerebral vascular accident)    traMADol 50 mg oral tablet  -- 1 tab(s) by mouth every 12 hours  -- Indication: For Neuropathy    acetaminophen 325 mg oral tablet  -- 2 tab(s) by mouth every 6 hours, As needed, Mild Pain (1 - 3)or temp above 100.2  -- Indication: For Neuropathy    enalapril 2.5 mg oral tablet  -- 1 tab(s) by mouth once a day  -- Indication: For Hypertension    warfarin 2.5 mg oral tablet  -- 1 tab(s) by mouth once a day (at bedtime) ,hold for inr above 3.0  -- Indication: For CVA (cerebral vascular accident)    gabapentin 100 mg oral capsule  -- 1 cap(s) by mouth 2 times a day  -- Indication: For Neuropathy    escitalopram 20 mg oral tablet  -- 1 tab(s) by mouth once a day  -- Indication: For Depression    simvastatin 10 mg oral tablet  -- 1 tab(s) by mouth once a day (at bedtime)  -- Indication: For Hyperlipidemia    hydroxyurea 500 mg oral capsule  -- 1 cap(s) by mouth once a day  -- Indication: For trombocytosis    metoprolol tartrate 25 mg oral tablet  -- 1 tab(s) by mouth 2 times a day  -- Indication: For Htn    Ceftin 250 mg oral tablet  -- 1 tab(s) by mouth every 12 hours   -- Finish all this medication unless otherwise directed by prescriber.  Medication should be taken with plenty of water.  Take with food or milk.    -- Indication: For Uti    furosemide 20 mg oral tablet  -- 1 tab(s) by mouth once a day  -- Indication: For Chf    famotidine 20 mg oral tablet  -- 1 tab(s) by mouth once a day  -- Indication: For gerd    Senna 8.6 mg oral tablet  -- 1 tab(s) by mouth once a day (at bedtime)  -- Indication: For Constipation    docusate sodium 100 mg oral capsule  -- 2 cap(s) by mouth once a day (at bedtime)  -- Indication: For Constipation    baclofen 10 mg oral tablet  -- 1 tab(s) by mouth once a day (at bedtime)  -- Indication: For Pain    Thera-Tabs M oral tablet  -- 1 tab(s) by mouth once a day  -- Indication: For supplement

## 2017-09-13 NOTE — PROGRESS NOTE ADULT - SUBJECTIVE AND OBJECTIVE BOX
Patient was seen and examined on a day of discharge . Plan of care , discharge medications and recommendations discussed with consultants and clearance for discharge obtained .Social service , case managment  and medical staff are aware of plan. Family is notified,spoke to the son on a phone  Discharge summary  is  prepared electronically

## 2017-09-13 NOTE — DISCHARGE NOTE ADULT - CARE PROVIDER_API CALL
Facundo Valentine), Internal Medicine  [address] [place] [id number]  Phone: [phone]  Fax: [phone]

## 2017-09-13 NOTE — DISCHARGE NOTE ADULT - PATIENT PORTAL LINK FT
“You can access the Follow[facility] Patient Portal, offered by [facility], by registering with the following website: [email or web address]”

## 2017-09-13 NOTE — DISCHARGE NOTE ADULT - PLAN OF CARE
resolution of dysuria complete 5 days of po abx continue current managmnet and medications BP monitoring,continue current antihypertensive meds, low salt diet,followup with PMD continue home medications

## 2017-09-13 NOTE — DISCHARGE NOTE ADULT - HOSPITAL COURSE
86 yo WF was brought to ED from [facility] with burning on urination x 1 week ,associated with fever , diagnosed with uti Admitted for septic workup and evaluation,send blood and urine cx,serial lactate levels,monitor vitals closley,ivfs hydration,monitor urine output and renal profile,iv abx initiated ID cons called for abx comanagment Urinary tract infection without hematuria, site unspecified.  Plan: Admitted for septic workup and evaluation,send blood and urine cx,serial lactate levels,monitor vitals closley,ivfs hydration,monitor urine output and renal profile,iv abx initiated.

## 2017-09-16 DIAGNOSIS — K21.9 GASTRO-ESOPHAGEAL REFLUX DISEASE WITHOUT ESOPHAGITIS: ICD-10-CM

## 2017-09-16 DIAGNOSIS — G62.9 POLYNEUROPATHY, UNSPECIFIED: ICD-10-CM

## 2017-09-16 DIAGNOSIS — N39.0 URINARY TRACT INFECTION, SITE NOT SPECIFIED: ICD-10-CM

## 2017-09-16 DIAGNOSIS — I69.351 HEMIPLEGIA AND HEMIPARESIS FOLLOWING CEREBRAL INFARCTION AFFECTING RIGHT DOMINANT SIDE: ICD-10-CM

## 2017-09-16 DIAGNOSIS — E78.5 HYPERLIPIDEMIA, UNSPECIFIED: ICD-10-CM

## 2017-09-16 DIAGNOSIS — K59.00 CONSTIPATION, UNSPECIFIED: ICD-10-CM

## 2017-09-16 DIAGNOSIS — Z79.01 LONG TERM (CURRENT) USE OF ANTICOAGULANTS: ICD-10-CM

## 2017-09-16 DIAGNOSIS — B96.20 UNSPECIFIED ESCHERICHIA COLI [E. COLI] AS THE CAUSE OF DISEASES CLASSIFIED ELSEWHERE: ICD-10-CM

## 2017-09-16 DIAGNOSIS — I10 ESSENTIAL (PRIMARY) HYPERTENSION: ICD-10-CM

## 2017-09-16 DIAGNOSIS — Z79.82 LONG TERM (CURRENT) USE OF ASPIRIN: ICD-10-CM

## 2017-09-16 DIAGNOSIS — F32.9 MAJOR DEPRESSIVE DISORDER, SINGLE EPISODE, UNSPECIFIED: ICD-10-CM

## 2017-09-16 DIAGNOSIS — Z28.21 IMMUNIZATION NOT CARRIED OUT BECAUSE OF PATIENT REFUSAL: ICD-10-CM

## 2017-10-19 PROCEDURE — 80048 BASIC METABOLIC PNL TOTAL CA: CPT

## 2017-10-19 PROCEDURE — 87040 BLOOD CULTURE FOR BACTERIA: CPT

## 2017-10-19 PROCEDURE — 93005 ELECTROCARDIOGRAM TRACING: CPT

## 2017-10-19 PROCEDURE — 83605 ASSAY OF LACTIC ACID: CPT

## 2017-10-19 PROCEDURE — 80053 COMPREHEN METABOLIC PANEL: CPT

## 2017-10-19 PROCEDURE — 96376 TX/PRO/DX INJ SAME DRUG ADON: CPT

## 2017-10-19 PROCEDURE — 96372 THER/PROPH/DIAG INJ SC/IM: CPT | Mod: 59

## 2017-10-19 PROCEDURE — 87186 SC STD MICRODIL/AGAR DIL: CPT

## 2017-10-19 PROCEDURE — 71045 X-RAY EXAM CHEST 1 VIEW: CPT

## 2017-10-19 PROCEDURE — 81001 URINALYSIS AUTO W/SCOPE: CPT

## 2017-10-19 PROCEDURE — 96365 THER/PROPH/DIAG IV INF INIT: CPT

## 2017-10-19 PROCEDURE — 97161 PT EVAL LOW COMPLEX 20 MIN: CPT

## 2017-10-19 PROCEDURE — 85027 COMPLETE CBC AUTOMATED: CPT

## 2017-10-19 PROCEDURE — 97530 THERAPEUTIC ACTIVITIES: CPT

## 2017-10-19 PROCEDURE — 87086 URINE CULTURE/COLONY COUNT: CPT

## 2017-10-19 PROCEDURE — 85610 PROTHROMBIN TIME: CPT

## 2017-10-19 PROCEDURE — 97110 THERAPEUTIC EXERCISES: CPT

## 2017-10-19 PROCEDURE — 99285 EMERGENCY DEPT VISIT HI MDM: CPT | Mod: 25

## 2019-03-22 NOTE — PHYSICAL THERAPY INITIAL EVALUATION ADULT - IMPAIRMENTS FOUND, PT EVAL
[Follow-Up] : a follow-up visit [FreeTextEntry1] : pt c/o pain to left foot gait, locomotion, and balance

## 2019-09-25 NOTE — PATIENT PROFILE ADULT. - PRO ARRIVE FROM
home Full Thickness Lip Wedge Repair (Flap) Text: Given the location of the defect and the proximity to free margins a full thickness wedge repair was deemed most appropriate.  Using a sterile surgical marker, the appropriate repair was drawn incorporating the defect and placing the expected incisions perpendicular to the vermilion border.  The vermilion border was also meticulously outlined to ensure appropriate reapproximation during the repair.  The area thus outlined was incised through and through with a #15 scalpel blade.  The muscularis and dermis were reaproximated with deep sutures following hemostasis. Care was taken to realign the vermilion border before proceeding with the superficial closure.  Once the vermilion was realigned the superfical and mucosal closure was finished.

## 2021-12-29 ENCOUNTER — INPATIENT (INPATIENT)
Facility: HOSPITAL | Age: 86
LOS: 4 days | Discharge: NURSING, REHAB OR LONG-TERM CARE | DRG: 378 | End: 2022-01-03
Attending: INTERNAL MEDICINE | Admitting: INTERNAL MEDICINE
Payer: MEDICARE

## 2021-12-29 VITALS
HEART RATE: 76 BPM | TEMPERATURE: 98 F | WEIGHT: 149.91 LBS | DIASTOLIC BLOOD PRESSURE: 92 MMHG | HEIGHT: 66 IN | SYSTOLIC BLOOD PRESSURE: 142 MMHG | OXYGEN SATURATION: 95 % | RESPIRATION RATE: 15 BRPM

## 2021-12-29 DIAGNOSIS — K92.2 GASTROINTESTINAL HEMORRHAGE, UNSPECIFIED: ICD-10-CM

## 2021-12-29 DIAGNOSIS — I51.89 OTHER ILL-DEFINED HEART DISEASES: ICD-10-CM

## 2021-12-29 DIAGNOSIS — I63.9 CEREBRAL INFARCTION, UNSPECIFIED: ICD-10-CM

## 2021-12-29 DIAGNOSIS — F32.9 MAJOR DEPRESSIVE DISORDER, SINGLE EPISODE, UNSPECIFIED: ICD-10-CM

## 2021-12-29 DIAGNOSIS — K59.00 CONSTIPATION, UNSPECIFIED: ICD-10-CM

## 2021-12-29 DIAGNOSIS — K62.5 HEMORRHAGE OF ANUS AND RECTUM: ICD-10-CM

## 2021-12-29 DIAGNOSIS — G62.9 POLYNEUROPATHY, UNSPECIFIED: ICD-10-CM

## 2021-12-29 DIAGNOSIS — Z29.9 ENCOUNTER FOR PROPHYLACTIC MEASURES, UNSPECIFIED: ICD-10-CM

## 2021-12-29 DIAGNOSIS — I10 ESSENTIAL (PRIMARY) HYPERTENSION: ICD-10-CM

## 2021-12-29 DIAGNOSIS — I48.91 UNSPECIFIED ATRIAL FIBRILLATION: ICD-10-CM

## 2021-12-29 PROBLEM — E78.5 HYPERLIPIDEMIA, UNSPECIFIED: Chronic | Status: ACTIVE | Noted: 2017-09-12

## 2021-12-29 LAB
ALBUMIN SERPL ELPH-MCNC: 2.9 G/DL — LOW (ref 3.3–5)
ALP SERPL-CCNC: 62 U/L — SIGNIFICANT CHANGE UP (ref 40–120)
ALT FLD-CCNC: 14 U/L — SIGNIFICANT CHANGE UP (ref 12–78)
ANION GAP SERPL CALC-SCNC: 5 MMOL/L — SIGNIFICANT CHANGE UP (ref 5–17)
ANION GAP SERPL CALC-SCNC: 6 MMOL/L — SIGNIFICANT CHANGE UP (ref 5–17)
APTT BLD: 43.4 SEC — HIGH (ref 27.5–35.5)
AST SERPL-CCNC: 17 U/L — SIGNIFICANT CHANGE UP (ref 15–37)
BASOPHILS # BLD AUTO: 0.01 K/UL — SIGNIFICANT CHANGE UP (ref 0–0.2)
BASOPHILS NFR BLD AUTO: 0.4 % — SIGNIFICANT CHANGE UP (ref 0–2)
BILIRUB SERPL-MCNC: 0.3 MG/DL — SIGNIFICANT CHANGE UP (ref 0.2–1.2)
BUN SERPL-MCNC: 10 MG/DL — SIGNIFICANT CHANGE UP (ref 7–23)
BUN SERPL-MCNC: 16 MG/DL — SIGNIFICANT CHANGE UP (ref 7–23)
CALCIUM SERPL-MCNC: 8 MG/DL — LOW (ref 8.5–10.1)
CALCIUM SERPL-MCNC: 8.5 MG/DL — SIGNIFICANT CHANGE UP (ref 8.5–10.1)
CHLORIDE SERPL-SCNC: 104 MMOL/L — SIGNIFICANT CHANGE UP (ref 96–108)
CHLORIDE SERPL-SCNC: 109 MMOL/L — HIGH (ref 96–108)
CO2 SERPL-SCNC: 28 MMOL/L — SIGNIFICANT CHANGE UP (ref 22–31)
CO2 SERPL-SCNC: 31 MMOL/L — SIGNIFICANT CHANGE UP (ref 22–31)
CREAT SERPL-MCNC: 0.56 MG/DL — SIGNIFICANT CHANGE UP (ref 0.5–1.3)
CREAT SERPL-MCNC: 0.75 MG/DL — SIGNIFICANT CHANGE UP (ref 0.5–1.3)
EOSINOPHIL # BLD AUTO: 0.04 K/UL — SIGNIFICANT CHANGE UP (ref 0–0.5)
EOSINOPHIL NFR BLD AUTO: 1.5 % — SIGNIFICANT CHANGE UP (ref 0–6)
GLUCOSE SERPL-MCNC: 101 MG/DL — HIGH (ref 70–99)
GLUCOSE SERPL-MCNC: 126 MG/DL — HIGH (ref 70–99)
HCT VFR BLD CALC: 25.2 % — LOW (ref 34.5–45)
HCT VFR BLD CALC: 29.8 % — LOW (ref 34.5–45)
HGB BLD-MCNC: 10.2 G/DL — LOW (ref 11.5–15.5)
HGB BLD-MCNC: 8.7 G/DL — LOW (ref 11.5–15.5)
IMM GRANULOCYTES NFR BLD AUTO: 1.1 % — SIGNIFICANT CHANGE UP (ref 0–1.5)
INR BLD: 2.77 RATIO — HIGH (ref 0.88–1.16)
INR BLD: 3.28 RATIO — HIGH (ref 0.88–1.16)
LYMPHOCYTES # BLD AUTO: 0.66 K/UL — LOW (ref 1–3.3)
LYMPHOCYTES # BLD AUTO: 24.4 % — SIGNIFICANT CHANGE UP (ref 13–44)
MCHC RBC-ENTMCNC: 34.2 GM/DL — SIGNIFICANT CHANGE UP (ref 32–36)
MCHC RBC-ENTMCNC: 34.5 GM/DL — SIGNIFICANT CHANGE UP (ref 32–36)
MCHC RBC-ENTMCNC: 42.7 PG — HIGH (ref 27–34)
MCHC RBC-ENTMCNC: 43.3 PG — HIGH (ref 27–34)
MCV RBC AUTO: 124.7 FL — HIGH (ref 80–100)
MCV RBC AUTO: 125.4 FL — HIGH (ref 80–100)
MONOCYTES # BLD AUTO: 0.27 K/UL — SIGNIFICANT CHANGE UP (ref 0–0.9)
MONOCYTES NFR BLD AUTO: 10 % — SIGNIFICANT CHANGE UP (ref 2–14)
NEUTROPHILS # BLD AUTO: 1.7 K/UL — LOW (ref 1.8–7.4)
NEUTROPHILS NFR BLD AUTO: 62.6 % — SIGNIFICANT CHANGE UP (ref 43–77)
NRBC # BLD: 0 /100 WBCS — SIGNIFICANT CHANGE UP (ref 0–0)
NRBC # BLD: 0 /100 WBCS — SIGNIFICANT CHANGE UP (ref 0–0)
PLATELET # BLD AUTO: 419 K/UL — HIGH (ref 150–400)
PLATELET # BLD AUTO: 516 K/UL — HIGH (ref 150–400)
POTASSIUM SERPL-MCNC: 3.7 MMOL/L — SIGNIFICANT CHANGE UP (ref 3.5–5.3)
POTASSIUM SERPL-MCNC: 4 MMOL/L — SIGNIFICANT CHANGE UP (ref 3.5–5.3)
POTASSIUM SERPL-SCNC: 3.7 MMOL/L — SIGNIFICANT CHANGE UP (ref 3.5–5.3)
POTASSIUM SERPL-SCNC: 4 MMOL/L — SIGNIFICANT CHANGE UP (ref 3.5–5.3)
PROT SERPL-MCNC: 6.8 G/DL — SIGNIFICANT CHANGE UP (ref 6–8.3)
PROTHROM AB SERPL-ACNC: 30.9 SEC — HIGH (ref 10.6–13.6)
PROTHROM AB SERPL-ACNC: 36.3 SEC — HIGH (ref 10.6–13.6)
RBC # BLD: 2.01 M/UL — LOW (ref 3.8–5.2)
RBC # BLD: 2.39 M/UL — LOW (ref 3.8–5.2)
RBC # FLD: 15.9 % — HIGH (ref 10.3–14.5)
RBC # FLD: 16 % — HIGH (ref 10.3–14.5)
SARS-COV-2 RNA SPEC QL NAA+PROBE: SIGNIFICANT CHANGE UP
SODIUM SERPL-SCNC: 140 MMOL/L — SIGNIFICANT CHANGE UP (ref 135–145)
SODIUM SERPL-SCNC: 143 MMOL/L — SIGNIFICANT CHANGE UP (ref 135–145)
WBC # BLD: 1.8 K/UL — LOW (ref 3.8–10.5)
WBC # BLD: 2.71 K/UL — LOW (ref 3.8–10.5)
WBC # FLD AUTO: 1.8 K/UL — LOW (ref 3.8–10.5)
WBC # FLD AUTO: 2.71 K/UL — LOW (ref 3.8–10.5)

## 2021-12-29 PROCEDURE — 99285 EMERGENCY DEPT VISIT HI MDM: CPT

## 2021-12-29 PROCEDURE — 93010 ELECTROCARDIOGRAM REPORT: CPT

## 2021-12-29 PROCEDURE — 71045 X-RAY EXAM CHEST 1 VIEW: CPT | Mod: 26

## 2021-12-29 RX ORDER — HYDROXYUREA 500 MG/1
500 CAPSULE ORAL DAILY
Refills: 0 | Status: DISCONTINUED | OUTPATIENT
Start: 2021-12-29 | End: 2021-12-29

## 2021-12-29 RX ORDER — TRAMADOL HYDROCHLORIDE 50 MG/1
50 TABLET ORAL
Refills: 0 | Status: DISCONTINUED | OUTPATIENT
Start: 2021-12-29 | End: 2022-01-03

## 2021-12-29 RX ORDER — METOPROLOL TARTRATE 50 MG
25 TABLET ORAL
Refills: 0 | Status: DISCONTINUED | OUTPATIENT
Start: 2021-12-29 | End: 2022-01-03

## 2021-12-29 RX ORDER — HYDROCORTISONE 1 %
1 OINTMENT (GRAM) TOPICAL DAILY
Refills: 0 | Status: DISCONTINUED | OUTPATIENT
Start: 2021-12-29 | End: 2022-01-03

## 2021-12-29 RX ORDER — FUROSEMIDE 40 MG
20 TABLET ORAL DAILY
Refills: 0 | Status: DISCONTINUED | OUTPATIENT
Start: 2021-12-29 | End: 2022-01-03

## 2021-12-29 RX ORDER — ACETAMINOPHEN 500 MG
650 TABLET ORAL EVERY 6 HOURS
Refills: 0 | Status: DISCONTINUED | OUTPATIENT
Start: 2021-12-29 | End: 2022-01-03

## 2021-12-29 RX ORDER — PANTOPRAZOLE SODIUM 20 MG/1
40 TABLET, DELAYED RELEASE ORAL DAILY
Refills: 0 | Status: DISCONTINUED | OUTPATIENT
Start: 2021-12-29 | End: 2022-01-03

## 2021-12-29 RX ORDER — ESCITALOPRAM OXALATE 10 MG/1
20 TABLET, FILM COATED ORAL DAILY
Refills: 0 | Status: DISCONTINUED | OUTPATIENT
Start: 2021-12-29 | End: 2022-01-03

## 2021-12-29 RX ORDER — BACLOFEN 100 %
10 POWDER (GRAM) MISCELLANEOUS AT BEDTIME
Refills: 0 | Status: DISCONTINUED | OUTPATIENT
Start: 2021-12-29 | End: 2022-01-03

## 2021-12-29 RX ORDER — PREGABALIN 225 MG/1
1000 CAPSULE ORAL DAILY
Refills: 0 | Status: DISCONTINUED | OUTPATIENT
Start: 2021-12-30 | End: 2022-01-03

## 2021-12-29 RX ORDER — GABAPENTIN 400 MG/1
100 CAPSULE ORAL
Refills: 0 | Status: DISCONTINUED | OUTPATIENT
Start: 2021-12-29 | End: 2022-01-03

## 2021-12-29 RX ORDER — SODIUM CHLORIDE 9 MG/ML
1000 INJECTION, SOLUTION INTRAVENOUS
Refills: 0 | Status: DISCONTINUED | OUTPATIENT
Start: 2021-12-29 | End: 2022-01-03

## 2021-12-29 RX ORDER — ONDANSETRON 8 MG/1
4 TABLET, FILM COATED ORAL EVERY 8 HOURS
Refills: 0 | Status: DISCONTINUED | OUTPATIENT
Start: 2021-12-29 | End: 2022-01-03

## 2021-12-29 RX ORDER — LANOLIN ALCOHOL/MO/W.PET/CERES
3 CREAM (GRAM) TOPICAL AT BEDTIME
Refills: 0 | Status: DISCONTINUED | OUTPATIENT
Start: 2021-12-29 | End: 2022-01-03

## 2021-12-29 RX ORDER — FOLIC ACID 0.8 MG
1 TABLET ORAL DAILY
Refills: 0 | Status: DISCONTINUED | OUTPATIENT
Start: 2021-12-30 | End: 2022-01-03

## 2021-12-29 RX ORDER — SIMVASTATIN 20 MG/1
10 TABLET, FILM COATED ORAL AT BEDTIME
Refills: 0 | Status: DISCONTINUED | OUTPATIENT
Start: 2021-12-29 | End: 2022-01-03

## 2021-12-29 RX ADMIN — SODIUM CHLORIDE 50 MILLILITER(S): 9 INJECTION, SOLUTION INTRAVENOUS at 11:20

## 2021-12-29 RX ADMIN — Medication 25 MILLIGRAM(S): at 17:31

## 2021-12-29 RX ADMIN — ESCITALOPRAM OXALATE 20 MILLIGRAM(S): 10 TABLET, FILM COATED ORAL at 13:13

## 2021-12-29 RX ADMIN — SIMVASTATIN 10 MILLIGRAM(S): 20 TABLET, FILM COATED ORAL at 21:30

## 2021-12-29 RX ADMIN — HYDROXYUREA 500 MILLIGRAM(S): 500 CAPSULE ORAL at 13:13

## 2021-12-29 RX ADMIN — Medication 10 MILLIGRAM(S): at 21:30

## 2021-12-29 RX ADMIN — GABAPENTIN 100 MILLIGRAM(S): 400 CAPSULE ORAL at 17:31

## 2021-12-29 RX ADMIN — PANTOPRAZOLE SODIUM 40 MILLIGRAM(S): 20 TABLET, DELAYED RELEASE ORAL at 11:20

## 2021-12-29 NOTE — ED PROVIDER NOTE - OBJECTIVE STATEMENT
88 yo female hx of afib on coumadin, sent from [facility] c/o rectal bleeding.  Denies any sob, cp.  No dizziness. no sob.

## 2021-12-29 NOTE — ED ADULT NURSE NOTE - OBJECTIVE STATEMENT
Pt arriving from assisted facility via EMS for complaints of rectal bleeding per staff there. Pt remains AAOx3, pt denies CP, -SOB, -AP, -NVD. Pt noticed to have blood in stool during transfer from stretcher to ED bed.

## 2021-12-29 NOTE — H&P ADULT - TIME BILLING
75minutes spent on this visit, 50% visit time spent in care co-ordination with other attendings and counselling patient ,requesting necessary consults ,informing family about status & plan of care .I have discussed care plan with [facility], [facility] wellness/admitting /nursing   department ,outpatient PCP , hospital consultants , ER physician & med staff .

## 2021-12-29 NOTE — ED PROVIDER NOTE - NSICDXPASTMEDICALHX_GEN_ALL_CORE_FT
PAST MEDICAL HISTORY:  Constipation     CVA (cerebral vascular accident)     Depression     Hyperlipidemia     Hypertension     Neuropathy

## 2021-12-29 NOTE — CONSULT NOTE ADULT - ASSESSMENT
[ASSESSMENT and  PLAN]    elderly 89-year-old female on hydroxyurea reasonably for myeloproliferative disorder, ? ET on hydroxyurea    Admitted with bright red blood per rectum? Differential diagnosis includes vaginally bleeding or less likely hematuria.     Mild coagulopathy due to Coumadin  ·	INR 2.77, slightly increase of 3.28  ·	Minimal bleeding currently, Hgb stable    Acute and chronic anemia  ·	baseline hemoglobin mild anemia, with last record of normal hemoglobin in 2011.  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  ·	Baseline hemoglobin appears to be 10 g/dL  Acute anemia due to G.I. 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  Monitor for improvement of WBC.    hold Coumadin tonight.  check INR daily.  If decrease INR tomorrow, can probably resume Coumadin as usual slightly reduce dose.    await further G.I. recommendations.  to start Anusol.    Transfuse PRBC as clinically indicated.   Transfuse PRBC if Hgb <7.0 or if symptomatic.   Follow CBC    Check Anemia studies.   B12, Folate, iron, ferritin, ESR    DVT Prophylaxis  SCD    Thank you for consulting us.     I have discussed the above plan of care with patient/family in detail. They expressed understanding of the treatment plan . Risks, benefits and alternatives discussed in detail. I have asked if they have any questions or concerns and appropriately addressed them; all questions answered to their satisfactions and in lay terms.     Discussed with Dr Best.     [ASSESSMENT and  PLAN]    elderly 89-year-old female on hydroxyurea reasonably for myeloproliferative disorder, ? ET on hydroxyurea    Admitted with bright red blood per rectum? Differential diagnosis includes vaginally bleeding or less likely hematuria.     Mild coagulopathy due to Coumadin  ·	INR 2.77, slightly increase of 3.28  ·	Minimal bleeding currently, Hgb stable    Acute and chronic anemia  ·	baseline hemoglobin mild anemia, with last record of normal hemoglobin in 2011.  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  ·	Baseline hemoglobin appears to be 10 g/dL  Acute anemia due to G.I. 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  Monitor for improvement of WBC.    hold Coumadin tonight.  check INR daily.  If decrease INR tomorrow, can probably resume Coumadin as usual slightly reduce dose.    await further G.I. recommendations.  to start Anusol.    Transfuse PRBC as clinically indicated.   Transfuse PRBC if Hgb <7.0 or if symptomatic.   Follow CBC    Check Anemia studies.   B12, Folate, iron, ferritin, ESR    DVT Prophylaxis  SCD    Thank you for consulting us.     Advised son to have pt followup with Dr Murcia on DC.     I have discussed the above plan of care with patient and only Son Curtis, in detail. They expressed understanding of the treatment plan . Risks, benefits and alternatives discussed in detail. I have asked if they have any questions or concerns and appropriately addressed them; all questions answered to their satisfactions and in lay terms.     Discussed with Dr Best.

## 2021-12-29 NOTE — CONSULT NOTE ADULT - SUBJECTIVE AND OBJECTIVE BOX
Patient is a 89y old  Female who presents with a chief complaint of rectal bleeding (29 Dec 2021 14:36)      HPI:  88 yo Female hx of afib on coumadin, sent from [facility] to ED  c/o rectal bleeding.  Denies any sob, cp.  No dizziness. no sob. Patient was hemodynamically stable with HB above 10 on arrival to ER .blood from rectum [facility] .GI consult Dr Newby called ,cardiology clearance requested for possible endoscopy and further OAC plan .Hematology eval requested for possible INR reversal if bleeding continues ,serial coagulation profiles ordered . (29 Dec 2021 06:37)        PAST MEDICAL & SURGICAL HISTORY:  Hypertension    CVA (cerebral vascular accident)    Depression    Constipation    Neuropathy    Hyperlipidemia    Grade I diastolic dysfunction    Afib    Neuropathy    No significant past surgical history         HEALTH ISSUES - PROBLEM Dx:  Hypertension    CVA (cerebral vascular accident)    Depression    Constipation    Grade I diastolic dysfunction    Afib    Neuropathy    GIB (gastrointestinal bleeding)    Prophylactic measure            Hemorrhage of rectum and anus [K62.5]    Hypertension [I10]    CVA (cerebral vascular accident) [I63.9]    Depression [F32.9]    Constipation [K59.00]    Neuropathy [G62.9]    Constipation [K59.00]    Hyperlipidemia [E78.5]    Grade I diastolic dysfunction [I51.89]    Afib [I48.91]    Neuropathy [G62.9]    No significant past surgical history [[id number]]        FAMILY HISTORY:  No pertinent family history in first degree relatives          [SOCIAL HISTORY: ]     smoking:       EtOH:       illicit drugs:       occupation:       marital status:       Other:       [ALLERGIES/INTOLERANCES:]  Allergies    No Known Allergies    Intolerances          [MEDICATIONS]  MEDICATIONS  (STANDING):  baclofen 10 milliGRAM(s) Oral at bedtime  dextrose 5% + sodium chloride 0.45%. 1000 milliLiter(s) (50 mL/Hr) IV Continuous <Continuous>  enalapril 2.5 milliGRAM(s) Oral daily  escitalopram 20 milliGRAM(s) Oral daily  furosemide    Tablet 20 milliGRAM(s) Oral daily  gabapentin 100 milliGRAM(s) Oral two times a day  hydrocortisone hemorrhoidal Suppository 1 Suppository(s) Rectal daily  hydroxyurea 500 milliGRAM(s) Oral daily  metoprolol tartrate 25 milliGRAM(s) Oral two times a day  pantoprazole  Injectable 40 milliGRAM(s) IV Push daily  simvastatin 10 milliGRAM(s) Oral at bedtime    MEDICATIONS  (PRN):  acetaminophen     Tablet .. 650 milliGRAM(s) Oral every 6 hours PRN Temp greater or equal to 38C (100.4F), Mild Pain (1 - 3)  aluminum hydroxide/magnesium hydroxide/simethicone Suspension 30 milliLiter(s) Oral every 4 hours PRN Dyspepsia  melatonin 3 milliGRAM(s) Oral at bedtime PRN Insomnia  ondansetron Injectable 4 milliGRAM(s) IV Push every 8 hours PRN Nausea and/or Vomiting  traMADol 50 milliGRAM(s) Oral four times a day PRN Moderate Pain (4 - 6)        [REVIEW OF SYSTEMS: ]  CONSTITUTIONAL: normal, no fever, no shakes, no chills   EYES: No eye pain, no visual disturbances, no discharge  ENMT:  no discharge  NECK: No pain, no stiffness  BREASTS: No pain, no masses, no nipple discharge  RESPIRATORY: No cough, no wheezing, no chills, no hemoptysis; No shortness of breath  CARDIOVASCULAR: No chest pain, no palpitations, no dizziness, no leg swelling  GASTROINTESTINAL: No abdominal, no epigastric pain. No nausea, no vomiting, no hematemesis; No diarrhea , no constipation. No melena, no hematochezia.  GENITOURINARY: No dysuria, no frequency, no hematuria, no incontinence  NEUROLOGICAL: No headaches, no memory loss, no loss of strength, no numbness, no tremors  SKIN: No itching, no burning, no rashes, no lesions   LYMPH NODES: No enlarged glands  ENDOCRINE: No heat or cold intolerance; No hair loss  MUSCULOSKELETAL: No joint pain or swelling; No muscle, no back, no extremity pain  PSYCHIATRIC: No depression, no anxiety, no mood swings, no difficulty sleeping  HEME/LYMPH: No easy bruising, no bleeding gums      [VITALS SIGNS 24hrs]  Vital Signs Last 24 Hrs  T(C): 37.1 (29 Dec 2021 10:47), Max: 37.1 (29 Dec 2021 10:47)  T(F): 98.7 (29 Dec 2021 10:47), Max: 98.7 (29 Dec 2021 10:47)  HR: 92 (29 Dec 2021 10:47) (76 - 92)  BP: 121/73 (29 Dec 2021 10:47) (108/56 - 142/92)  BP(mean): --  RR: 16 (29 Dec 2021 10:47) (15 - 18)  SpO2: 94% (29 Dec 2021 10:47) (94% - 99%)  Daily Height in cm: 167.64 (29 Dec 2021 00:10)    Daily     I&O's Summary      Last Menstrual Period      [PHYSICAL EXAM]  General: adult in NAD,  WN,  WD.  HEENT: clear oropharynx, anicteric sclera, pink conjunctivae.  Neck: supple, no masses.  CV: normal S1S2, no murmur, no rubs, no gallops.  Lungs: clear to auscultation, no wheezes, no rales, no rhonchi.  Abdomen: soft, non-tender, non-distended, no hepatosplenomegaly, normal BS, no guarding.  Ext: no clubbing, no cyanosis, no edema.  Skin: no rashes,  no petechiae, no venous stasis changes.  Neuro: alert and oriented X3, no focal motor deficits.  LN: no SC NITA.      [LABS: ]                        8.7    1.80  )-----------( 419      ( 29 Dec 2021 11:34 )             25.2     CBC Full  -  ( 29 Dec 2021 11:34 )  WBC Count : 1.80 K/uL  RBC Count : 2.01 M/uL  Hemoglobin : 8.7 g/dL  Hematocrit : 25.2 %  Platelet Count - Automated : 419 K/uL  Mean Cell Volume : 125.4 fl  Mean Cell Hemoglobin : 43.3 pg  Mean Cell Hemoglobin Concentration : 34.5 gm/dL  Auto Neutrophil # : x  Auto Lymphocyte # : x  Auto Monocyte # : x  Auto Eosinophil # : x  Auto Basophil # : x  Auto Neutrophil % : x  Auto Lymphocyte % : x  Auto Monocyte % : x  Auto Eosinophil % : x  Auto Basophil % : x    12-29    143  |  109<H>  |  10  ----------------------------<  101<H>  3.7   |  28  |  0.56    Ca    8.0<L>      29 Dec 2021 11:34    TPro  6.8  /  Alb  2.9<L>  /  TBili  0.3  /  DBili  x   /  AST  17  /  ALT  14  /  AlkPhos  62  12-29    PT/INR - ( 29 Dec 2021 11:34 )   PT: 36.3 sec;   INR: 3.28 ratio         PTT - ( 29 Dec 2021 00:59 )  PTT:43.4 sec  LIVER FUNCTIONS - ( 29 Dec 2021 00:59 )  Alb: 2.9 g/dL / Pro: 6.8 g/dL / ALK PHOS: 62 U/L / ALT: 14 U/L / AST: 17 U/L / GGT: x                       CBC TREND (5 Days)  WBC Count: 1.80 K/uL (12-29 @ 11:34)  WBC Count: 2.71 K/uL (12-29 @ 00:59)    Hemoglobin: 8.7 g/dL (12-29 @ 11:34)  Hemoglobin: 10.2 g/dL (12-29 @ 00:59)    Hematocrit: 25.2 % (12-29 @ 11:34)  Hematocrit: 29.8 % (12-29 @ 00:59)    Platelet Count - Automated: 419 K/uL (12-29 @ 11:34)  Platelet Count - Automated: 516 K/uL (12-29 @ 00:59)      Anemia Studies                      ***********************  Myeloma Studies                          [MICROBIOLOGY /  VIROLOGY:]  COVID-19 PCR: [place] (29 Dec 2021 00:59)          [PATHOLOGY]       **********************      [RADIOLOGY & ADDITIONAL STUDIES:]        Patient is a 89y old  Female who presents with a chief complaint of rectal bleeding (29 Dec 2021 14:36)    HPI:  89 yo Female hx of afib on coumadin, sent from [facility] to ED  c/o rectal bleeding.  Denies any sob, cp.  No dizziness. no sob. Patient was hemodynamically stable with HB above 10 on arrival to ER .blood from rectum was noted by RN during transfer from stretcher to ED bed on arrival .GI consult Dr Newby called ,cardiology clearance requested for possible endoscopy and further OAC plan .Hematology eval requested for possible INR reversal if bleeding continues ,serial coagulation profiles ordered . (29 Dec 2021 06:37)    hematology has to see patient for anemia, on Coumadin. PMH significant for hx of Afib and BL DVT.  patient unaware of why she's in hospital. Denies seeing private blood per rectum, denies melena,    history of bilateral lower extremity DVT 03/03/16, as well as a right thigh hematoma, with placement of a IVC Huan filter via the right femoral vein, at time seen by Dr. Villagomez [vascular surgery], and was discharged home to [facility] on Coumadin.     Other history includes low B12 levels, history of pancreatic cyst with CA 19-9 and CEA levels  in 2016 a lupus anticoagulant and anticoagulation antibody was checked    PAST MEDICAL & SURGICAL HISTORY:  Hypertension  CVA (cerebral vascular accident)  Depression  Constipation  Neuropathy  Hyperlipidemia  Grade I diastolic dysfunction  Afib  Neuropathy  No significant past surgical history    HEALTH ISSUES - PROBLEM Dx:  Hypertension  CVA (cerebral vascular accident)  Depression  Constipation  Grade I diastolic dysfunction  Afib  Neuropathy  GIB (gastrointestinal bleeding)  Prophylactic measure    Hemorrhage of rectum and anus [K62.5]  Hypertension [I10]  CVA (cerebral vascular accident) [I63.9]  Depression [F32.9]  Constipation [K59.00]  Neuropathy [G62.9]  Constipation [K59.00]  Hyperlipidemia [E78.5]  Grade I diastolic dysfunction [I51.89]  Afib [I48.91]  Neuropathy [G62.9]  No significant past surgical history [[id number]]    FAMILY HISTORY:  No pertinent family history in first degree relatives      [SOCIAL HISTORY: ]     smoking:  no tobacco     EtOH:  no alcohol     illicit drugs:       occupation:  retired     marital status:       Other: Resides in assisted living      [ALLERGIES/INTOLERANCES:]  Allergies      No Known Allergies  Intolerances      [MEDICATIONS]  MEDICATIONS  (STANDING):  baclofen 10 milliGRAM(s) Oral at bedtime  dextrose 5% + sodium chloride 0.45%. 1000 milliLiter(s) (50 mL/Hr) IV Continuous <Continuous>  enalapril 2.5 milliGRAM(s) Oral daily  escitalopram 20 milliGRAM(s) Oral daily  furosemide    Tablet 20 milliGRAM(s) Oral daily  gabapentin 100 milliGRAM(s) Oral two times a day  hydrocortisone hemorrhoidal Suppository 1 Suppository(s) Rectal daily  hydroxyurea 500 milliGRAM(s) Oral daily  metoprolol tartrate 25 milliGRAM(s) Oral two times a day  pantoprazole  Injectable 40 milliGRAM(s) IV Push daily  simvastatin 10 milliGRAM(s) Oral at bedtime      MEDICATIONS  (PRN):  acetaminophen     Tablet .. 650 milliGRAM(s) Oral every 6 hours PRN Temp greater or equal to 38C (100.4F), Mild Pain (1 - 3)  aluminum hydroxide/magnesium hydroxide/simethicone Suspension 30 milliLiter(s) Oral every 4 hours PRN Dyspepsia  melatonin 3 milliGRAM(s) Oral at bedtime PRN Insomnia  ondansetron Injectable 4 milliGRAM(s) IV Push every 8 hours PRN Nausea and/or Vomiting  traMADol 50 milliGRAM(s) Oral four times a day PRN Moderate Pain (4 - 6)      [REVIEW OF SYSTEMS: ]  CONSTITUTIONAL: normal, no fever, no shakes, no chills   EYES: No eye pain, no visual disturbances, no discharge  ENMT:  no discharge  NECK: No pain, no stiffness  BREASTS: No pain, no masses, no nipple discharge  RESPIRATORY: No cough, no wheezing, no chills, no hemoptysis; No shortness of breath  CARDIOVASCULAR: No chest pain, no palpitations, no dizziness, no leg swelling  GASTROINTESTINAL: No abdominal, no epigastric pain. No nausea, no vomiting, no hematemesis; No diarrhea , no constipation. No melena, no hematochezia. + bright red blood per rectum  GENITOURINARY: No dysuria, no frequency, no hematuria, no incontinence  NEUROLOGICAL: No headaches, no memory loss, no loss of strength, no numbness, no tremors  SKIN: No itching, no burning, no rashes, no lesions   LYMPH NODES: No enlarged glands  ENDOCRINE: No heat or cold intolerance; No hair loss  MUSCULOSKELETAL: No joint pain or swelling; No muscle, no back, no extremity pain  PSYCHIATRIC: No depression, no anxiety, no mood swings, no difficulty sleeping  HEME/LYMPH: No easy bruising, no bleeding gums      [VITALS SIGNS 24hrs]  Vital Signs Last 24 Hrs  T(C): 37.1 (29 Dec 2021 10:47), Max: 37.1 (29 Dec 2021 10:47)  T(F): 98.7 (29 Dec 2021 10:47), Max: 98.7 (29 Dec 2021 10:47)  HR: 92 (29 Dec 2021 10:47) (76 - 92)  BP: 121/73 (29 Dec 2021 10:47) (108/56 - 142/92)  BP(mean): --  RR: 16 (29 Dec 2021 10:47) (15 - 18)  SpO2: 94% (29 Dec 2021 10:47) (94% - 99%)  Daily Height in cm: 167.64 (29 Dec 2021 00:10)    Daily       [PHYSICAL EXAM]  General: adult in NAD,  WN,  WD.  HEENT: clear oropharynx, anicteric sclera, pink conjunctivae.  Neck: supple, no masses.  CV: normal S1S2, no murmur, no rubs, no gallops.  Lungs: clear to auscultation, no wheezes, no rales, no rhonchi.  Abdomen: soft, non-tender, non-distended, no hepatosplenomegaly, normal BS, no guarding.  Ext: no clubbing, no cyanosis, no edema.  Skin: no rashes,  no petechiae, no venous stasis changes.  Neuro: alert and oriented X3, no focal motor deficits.  LN: no SC NITA.      [LABS: ]                        8.7    1.80  )-----------( 419      ( 29 Dec 2021 11:34 )             25.2     CBC Full  -  ( 29 Dec 2021 11:34 )  WBC Count : 1.80 K/uL  RBC Count : 2.01 M/uL  Hemoglobin : 8.7 g/dL  Hematocrit : 25.2 %  Platelet Count - Automated : 419 K/uL  Mean Cell Volume : 125.4 fl  Mean Cell Hemoglobin : 43.3 pg  Mean Cell Hemoglobin Concentration : 34.5 gm/dL      12-29  143  |  109<H>  |  10  ----------------------------<  101<H>  3.7   |  28  |  0.56  Ca    8.0<L>      29 Dec 2021 11:34  TPro  6.8  /  Alb  2.9<L>  /  TBili  0.3  /  DBili  x   /  AST  17  /  ALT  14  /  AlkPhos  62  12-29  PT/INR - ( 29 Dec 2021 11:34 )   PT: 36.3 sec;   INR: 3.28 ratio       PTT - ( 29 Dec 2021 00:59 )  PTT:43.4 sec  LIVER FUNCTIONS - ( 29 Dec 2021 00:59 )  Alb: 2.9 g/dL / Pro: 6.8 g/dL / ALK PHOS: 62 U/L / ALT: 14 U/L / AST: 17 U/L / GGT: x             CBC TREND (5 Days)  WBC Count: 1.80 K/uL (12-29 @ 11:34)  WBC Count: 2.71 K/uL (12-29 @ 00:59)    Hemoglobin: 8.7 g/dL (12-29 @ 11:34)  Hemoglobin: 10.2 g/dL (12-29 @ 00:59)    Hematocrit: 25.2 % (12-29 @ 11:34)  Hematocrit: 29.8 % (12-29 @ 00:59)    Platelet Count - Automated: 419 K/uL (12-29 @ 11:34)  Platelet Count - Automated: 516 K/uL (12-29 @ 00:59)      Anemia Studies     Myeloma Studies        [MICROBIOLOGY /  VIROLOGY:]  COVID-19 PCR: [place] (29 Dec 2021 00:59)    [RADIOLOGY & ADDITIONAL STUDIES:]       < from: Xray Chest 1 View AP/PA (12.29.21 @ 02:15) >  ACC: [id number] EXAM:  XR CHEST AP OR PA 1V                        PROCEDURE DATE:  12/29/2021    INTERPRETATION:  AP erect chest on December 29, 2021 at 1:54 AM. Patient   has rectal bleeding.  Patient should obscures the apices. Shallow inspiration crowds the chest.  Heart magnified by technique.  There is a persistent rather small left base pleural pulmonary reaction.  No free intraperitoneal air is evident.  Chest is similar to September 11, 2017.  IMPRESSION: Persistent small left base reaction.  --- End of Report ---  LEANA NAM MD; Attending Radiologist  This document has been electronically signed. Dec 29 2021 12:41PM  < end of copied text >      < from: US Duplex Venous Upper Ext Ltd, Right (03.28.16 @ 18:05) >   EXAM:  US DPLX UPR EXT VEINS LTD RT    PROCEDURE DATE:  03/28/2016  INTERPRETATION:  Clinical Information: Right arm swelling  Comparison: None available.  Technique: Spectral and color Doppler ultrasound of the right upper   extremity.  FINDINGS:  RIGHT -  Normal compression and flow dynamics are present in the right   internal jugular, innominate, subclavian, axillary, brachial, basilic and   cephalic vein..  IMPRESSION: No DVT in the right upper extremity.  NEENA CRANDALL M.D., ATTENDING RADIOLOGIST  This document has been electronically signed. Mar 28 2016  6:35P  < end of copied text >      < from: US Duplex Venous Lower Ext Complete, Bilateral (03.23.16 @ 14:09) >   EXAM:  US DPLX LWR EXT VEINS COMPL BI    PROCEDURE DATE:  03/23/2016  INTERPRETATION:  Bilateral Lower Extremity Venous Doppler  Indication: Lower extremity swelling. Extensive acute bilateral DVT on comparison study.  Comparison: 3/3/2016  Technique: Grey scale, color and spectral doppler ultrasound was performed.  FINDINGS:  RIGHT LOWER EXTREMITY: Common femoral and greater saphenous veins are patent. Low-level echoes with partial compression involving femoral and popliteal veins, with flow being present. Partial compression of the anterior branch of posterior tibial vein. Posterior branch of posterior tibial vein is patent. Subcutaneous edema.  LEFT LOWER EXTREMITY: Great saphenous vein is patent. Low level echoes with partial compression involving common femoral, femoral and popliteal veins, with flow being present. Partial compression of the anterior branch of posterior tibial vein. Posterior branch of posterior tibial vein is patent. Subcutaneous edema.  IMPRESSION: Bilateral DVT, with interval recanalization of bilateral venous system, as above.  VALERIA GARCIA M.D., ATTENDING RADIOLOGIST  This document has been electronically signed. Mar 23 2016  2:30P  < end of copied text >      < from: US Duplex Venous Lower Ext Complete, Bilateral (03.03.16 @ 16:18) >   EXAM:  US DPLX LWR EXT VEINS COMPL BI    PROCEDURE DATE:  03/03/2016  INTERPRETATION:  History: Leg swelling and pain for 2 weeks.  Bilateral lower extremity venous Doppler, real-time, spectral and color-flow.  Extensive echogenicacute nonocclusive thrombus noted within noncompressible bilateral common, superficial femoral, popliteal and posterior tibial veins.  IMPRESSION: Positive for extensive acute bilateral DVT lower extremities.  Critical value discussed with Dr. Best at the time of this dictation  ESVIN CAMPBELL M.D., ATTENDING RADIOLOGIST  This document has been electronically signed. Mar  3 2016  4:54P  < end of copied text >   Patient is a 89y old  Female who presents with a chief complaint of rectal bleeding (29 Dec 2021 14:36)    HPI:  89 yo Female hx of afib on coumadin, sent from [facility] to ED  c/o rectal bleeding.  Denies any sob, cp.  No dizziness. no sob. Patient was hemodynamically stable with HB above 10 on arrival to ER .blood from rectum was noted by RN during transfer from stretcher to ED bed on arrival .GI consult Dr Newby called ,cardiology clearance requested for possible endoscopy and further OAC plan .Hematology eval requested for possible INR reversal if bleeding continues ,serial coagulation profiles ordered . (29 Dec 2021 06:37)    hematology has to see patient for anemia, on Coumadin. PMH significant for hx of Afib and BL DVT.  patient unaware of why she's in hospital. Denies seeing private blood per rectum, denies melena,    history of bilateral lower extremity DVT 03/03/16, as well as a right thigh hematoma, with placement of a IVC Huan filter via the right femoral vein, at time seen by Dr. Villagomez [vascular surgery], and was discharged home to [facility] on Coumadin.     Other history includes low B12 levels, history of pancreatic cyst with CA 19-9 and CEA levels  in 2016 a lupus anticoagulant and anticoagulation antibody was checked      PAST MEDICAL & SURGICAL HISTORY:  Hypertension  CVA (cerebral vascular accident)  Depression  Constipation  Neuropathy  Hyperlipidemia  Grade I diastolic dysfunction  Afib  Neuropathy  No significant past surgical history    HEALTH ISSUES - PROBLEM Dx:  Hypertension  CVA (cerebral vascular accident)  Depression  Constipation  Grade I diastolic dysfunction  Afib  Neuropathy  GIB (gastrointestinal bleeding)  Prophylactic measure    Hemorrhage of rectum and anus [K62.5]  Hypertension [I10]  CVA (cerebral vascular accident) [I63.9]  Depression [F32.9]  Constipation [K59.00]  Neuropathy [G62.9]  Constipation [K59.00]  Hyperlipidemia [E78.5]  Grade I diastolic dysfunction [I51.89]  Afib [I48.91]  Neuropathy [G62.9]  No significant past surgical history [[id number]]    FAMILY HISTORY:  No pertinent family history in first degree relatives      [SOCIAL HISTORY: ]     smoking:  no tobacco     EtOH:  no alcohol     illicit drugs:       occupation:  retired      marital status:       Other: Resides in assisted living  [place] [phone]      [ALLERGIES/INTOLERANCES:]  Allergies      No Known Allergies  Intolerances      [MEDICATIONS]  MEDICATIONS  (STANDING):  baclofen 10 milliGRAM(s) Oral at bedtime  dextrose 5% + sodium chloride 0.45%. 1000 milliLiter(s) (50 mL/Hr) IV Continuous <Continuous>  enalapril 2.5 milliGRAM(s) Oral daily  escitalopram 20 milliGRAM(s) Oral daily  furosemide    Tablet 20 milliGRAM(s) Oral daily  gabapentin 100 milliGRAM(s) Oral two times a day  hydrocortisone hemorrhoidal Suppository 1 Suppository(s) Rectal daily  hydroxyurea 500 milliGRAM(s) Oral daily  metoprolol tartrate 25 milliGRAM(s) Oral two times a day  pantoprazole  Injectable 40 milliGRAM(s) IV Push daily  simvastatin 10 milliGRAM(s) Oral at bedtime      MEDICATIONS  (PRN):  acetaminophen     Tablet .. 650 milliGRAM(s) Oral every 6 hours PRN Temp greater or equal to 38C (100.4F), Mild Pain (1 - 3)  aluminum hydroxide/magnesium hydroxide/simethicone Suspension 30 milliLiter(s) Oral every 4 hours PRN Dyspepsia  melatonin 3 milliGRAM(s) Oral at bedtime PRN Insomnia  ondansetron Injectable 4 milliGRAM(s) IV Push every 8 hours PRN Nausea and/or Vomiting  traMADol 50 milliGRAM(s) Oral four times a day PRN Moderate Pain (4 - 6)      [REVIEW OF SYSTEMS: ]  CONSTITUTIONAL: normal, no fever, no shakes, no chills   EYES: No eye pain, no visual disturbances, no discharge  ENMT:  no discharge  NECK: No pain, no stiffness  BREASTS: No pain, no masses, no nipple discharge  RESPIRATORY: No cough, no wheezing, no chills, no hemoptysis; No shortness of breath  CARDIOVASCULAR: No chest pain, no palpitations, no dizziness, no leg swelling  GASTROINTESTINAL: No abdominal, no epigastric pain. No nausea, no vomiting, no hematemesis; No diarrhea , no constipation. No melena, no hematochezia. + bright red blood per rectum  GENITOURINARY: No dysuria, no frequency, no hematuria, no incontinence  NEUROLOGICAL: No headaches, no memory loss, no loss of strength, no numbness, no tremors  SKIN: No itching, no burning, no rashes, no lesions   LYMPH NODES: No enlarged glands  ENDOCRINE: No heat or cold intolerance; No hair loss  MUSCULOSKELETAL: No joint pain or swelling; No muscle, no back, no extremity pain  PSYCHIATRIC: No depression, no anxiety, no mood swings, no difficulty sleeping  HEME/LYMPH: No easy bruising, no bleeding gums      [VITALS SIGNS 24hrs]  Vital Signs Last 24 Hrs  T(C): 37.1 (29 Dec 2021 10:47), Max: 37.1 (29 Dec 2021 10:47)  T(F): 98.7 (29 Dec 2021 10:47), Max: 98.7 (29 Dec 2021 10:47)  HR: 92 (29 Dec 2021 10:47) (76 - 92)  BP: 121/73 (29 Dec 2021 10:47) (108/56 - 142/92)  BP(mean): --  RR: 16 (29 Dec 2021 10:47) (15 - 18)  SpO2: 94% (29 Dec 2021 10:47) (94% - 99%)  Daily Height in cm: 167.64 (29 Dec 2021 00:10)    Daily       [PHYSICAL EXAM]  General: adult in NAD,  WN,  WD.  HEENT: clear oropharynx, anicteric sclera, pink conjunctivae.  Neck: supple, no masses.  CV: normal S1S2, no murmur, no rubs, no gallops.  Lungs: clear to auscultation, no wheezes, no rales, no rhonchi.  Abdomen: soft, non-tender, non-distended, no hepatosplenomegaly, normal BS, no guarding.  Ext: no clubbing, no cyanosis, no edema.  Skin: no rashes,  no petechiae, no venous stasis changes.  Neuro: alert and oriented X3, no focal motor deficits.  LN: no SC NITA.      [LABS: ]                        8.7    1.80  )-----------( 419      ( 29 Dec 2021 11:34 )             25.2     CBC Full  -  ( 29 Dec 2021 11:34 )  WBC Count : 1.80 K/uL  RBC Count : 2.01 M/uL  Hemoglobin : 8.7 g/dL  Hematocrit : 25.2 %  Platelet Count - Automated : 419 K/uL  Mean Cell Volume : 125.4 fl  Mean Cell Hemoglobin : 43.3 pg  Mean Cell Hemoglobin Concentration : 34.5 gm/dL      12-29  143  |  109<H>  |  10  ----------------------------<  101<H>  3.7   |  28  |  0.56  Ca    8.0<L>      29 Dec 2021 11:34  TPro  6.8  /  Alb  2.9<L>  /  TBili  0.3  /  DBili  x   /  AST  17  /  ALT  14  /  AlkPhos  62  12-29  PT/INR - ( 29 Dec 2021 11:34 )   PT: 36.3 sec;   INR: 3.28 ratio       PTT - ( 29 Dec 2021 00:59 )  PTT:43.4 sec  LIVER FUNCTIONS - ( 29 Dec 2021 00:59 )  Alb: 2.9 g/dL / Pro: 6.8 g/dL / ALK PHOS: 62 U/L / ALT: 14 U/L / AST: 17 U/L / GGT: x             CBC TREND (5 Days)  WBC Count: 1.80 K/uL (12-29 @ 11:34)  WBC Count: 2.71 K/uL (12-29 @ 00:59)    Hemoglobin: 8.7 g/dL (12-29 @ 11:34)  Hemoglobin: 10.2 g/dL (12-29 @ 00:59)    Hematocrit: 25.2 % (12-29 @ 11:34)  Hematocrit: 29.8 % (12-29 @ 00:59)    Platelet Count - Automated: 419 K/uL (12-29 @ 11:34)  Platelet Count - Automated: 516 K/uL (12-29 @ 00:59)      Anemia Studies     Myeloma Studies        [MICROBIOLOGY /  VIROLOGY:]  COVID-19 PCR: NotDetec (29 Dec 2021 00:59)    [RADIOLOGY & ADDITIONAL STUDIES:]       < from: Xray Chest 1 View AP/PA (12.29.21 @ 02:15) >  ACC: [id number] EXAM:  XR CHEST AP OR PA 1V                        PROCEDURE DATE:  12/29/2021    INTERPRETATION:  AP erect chest on December 29, 2021 at 1:54 AM. Patient   has rectal bleeding.  Patient should obscures the apices. Shallow inspiration crowds the chest.  Heart magnified by technique.  There is a persistent rather small left base pleural pulmonary reaction.  No free intraperitoneal air is evident.  Chest is similar to September 11, 2017.  IMPRESSION: Persistent small left base reaction.  --- End of Report ---  LEANA NAM MD; Attending Radiologist  This document has been electronically signed. Dec 29 2021 12:41PM  < end of copied text >      < from: US Duplex Venous Upper Ext Ltd, Right (03.28.16 @ 18:05) >   EXAM:  US DPLX UPR EXT VEINS LTD RT    PROCEDURE DATE:  03/28/2016  INTERPRETATION:  Clinical Information: Right arm swelling  Comparison: None available.  Technique: Spectral and color Doppler ultrasound of the right upper   extremity.  FINDINGS:  RIGHT -  Normal compression and flow dynamics are present in the right   internal jugular, innominate, subclavian, axillary, brachial, basilic and   cephalic vein..  IMPRESSION: No DVT in the right upper extremity.  NEENA CRANDALL M.D., ATTENDING RADIOLOGIST  This document has been electronically signed. Mar 28 2016  6:35P  < end of copied text >      < from: US Duplex Venous Lower Ext Complete, Bilateral (03.23.16 @ 14:09) >   EXAM:  US DPLX LWR EXT VEINS COMPL BI    PROCEDURE DATE:  03/23/2016  INTERPRETATION:  Bilateral Lower Extremity Venous Doppler  Indication: Lower extremity swelling. Extensive acute bilateral DVT on comparison study.  Comparison: 3/3/2016  Technique: Grey scale, color and spectral doppler ultrasound was performed.  FINDINGS:  RIGHT LOWER EXTREMITY: Common femoral and greater saphenous veins are patent. Low-level echoes with partial compression involving femoral and popliteal veins, with flow being present. Partial compression of the anterior branch of posterior tibial vein. Posterior branch of posterior tibial vein is patent. Subcutaneous edema.  LEFT LOWER EXTREMITY: Great saphenous vein is patent. Low level echoes with partial compression involving common femoral, femoral and popliteal veins, with flow being present. Partial compression of the anterior branch of posterior tibial vein. Posterior branch of posterior tibial vein is patent. Subcutaneous edema.  IMPRESSION: Bilateral DVT, with interval recanalization of bilateral venous system, as above.  VALERIA GARCIA M.D., ATTENDING RADIOLOGIST  This document has been electronically signed. Mar 23 2016  2:30P  < end of copied text >      < from: US Duplex Venous Lower Ext Complete, Bilateral (03.03.16 @ 16:18) >   EXAM:  US DPLX LWR EXT VEINS COMPL BI    PROCEDURE DATE:  03/03/2016  INTERPRETATION:  History: Leg swelling and pain for 2 weeks.  Bilateral lower extremity venous Doppler, real-time, spectral and color-flow.  Extensive echogenicacute nonocclusive thrombus noted within noncompressible bilateral common, superficial femoral, popliteal and posterior tibial veins.  IMPRESSION: Positive for extensive acute bilateral DVT lower extremities.  Critical value discussed with Dr. Best at the time of this dictation  ESVIN CAMPBELL M.D., ATTENDING RADIOLOGIST  This document has been electronically signed. Mar  3 2016  4:54P  < end of copied text >

## 2021-12-29 NOTE — CONSULT NOTE ADULT - SUBJECTIVE AND OBJECTIVE BOX
[facility]  Gus Sy PA-C  [facility] [place] [address]  [phone]      Chief Complaint:  Patient is a 89y old  Female who presents with a chief complaint of rectal bleeding (29 Dec 2021 09:51)      HPI:89 yo Female hx of afib on coumadin, sent from [facility] to ED  c/o rectal bleeding.  Denies any sob, cp.  No dizziness. no sob. Patient was hemodynamically stable with HB above 10 on arrival to ER .blood from rectum was noted by RN during transfer from [facility] to ED bed on arrival .GI consult Dr Newby called ,cardiology clearance requested for possible endoscopy and further OAC plan .Hematology eval requested for possible INR reversal if bleeding continues ,serial coagulation profiles ordered .    Allergies:  No Known Allergies      Medications:  acetaminophen     Tablet .. 650 milliGRAM(s) Oral every 6 hours PRN  aluminum hydroxide/magnesium hydroxide/simethicone Suspension 30 milliLiter(s) Oral every 4 hours PRN  baclofen 10 milliGRAM(s) Oral at bedtime  dextrose 5% + sodium chloride 0.45%. 1000 milliLiter(s) IV Continuous <Continuous>  enalapril 2.5 milliGRAM(s) Oral daily  escitalopram 20 milliGRAM(s) Oral daily  furosemide    Tablet 20 milliGRAM(s) Oral daily  gabapentin 100 milliGRAM(s) Oral two times a day  hydroxyurea 500 milliGRAM(s) Oral daily  melatonin 3 milliGRAM(s) Oral at bedtime PRN  metoprolol tartrate 25 milliGRAM(s) Oral two times a day  ondansetron Injectable 4 milliGRAM(s) IV Push every 8 hours PRN  pantoprazole  Injectable 40 milliGRAM(s) IV Push daily  simvastatin 10 milliGRAM(s) Oral at bedtime  traMADol 50 milliGRAM(s) Oral four times a day PRN      PMHX/PSHX:  Hypertension    CVA (cerebral vascular accident)    Depression    Constipation    Neuropathy    Constipation    Hyperlipidemia    Grade I diastolic dysfunction    Afib    Neuropathy    No significant past surgical history        Family history:  No pertinent family history in first degree relatives    No pertinent family history in first degree relatives        Social History:     ROS:     General:  No wt loss, fevers, chills, night sweats, fatigue,   Eyes:  Good vision, no reported pain  ENT:  No sore throat, pain, runny nose, dysphagia  CV:  No pain, palpitations, hypo/hypertension  Resp:  No dyspnea, cough, tachypnea, wheezing  GI:  No pain, No nausea, No vomiting, No diarrhea, No constipation, No weight loss, No fever, No pruritis, No rectal bleeding, No tarry stools, No dysphagia,  :  No pain, bleeding, incontinence, nocturia  Muscle:  No pain, weakness  Neuro:  No weakness, tingling, memory problems  Psych:  No fatigue, insomnia, mood problems, depression  Endocrine:  No polyuria, polydipsia, cold/heat intolerance  Heme:  No petechiae, ecchymosis, easy bruisability  Skin:  No rash, tattoos, scars, edema      PHYSICAL EXAM:   Vital Signs:  Vital Signs Last 24 Hrs  T(C): 37.1 (29 Dec 2021 10:47), Max: 37.1 (29 Dec 2021 10:47)  T(F): 98.7 (29 Dec 2021 10:47), Max: 98.7 (29 Dec 2021 10:47)  HR: 92 (29 Dec 2021 10:47) (76 - 92)  BP: 121/73 (29 Dec 2021 10:47) (108/56 - 142/92)  BP(mean): --  RR: 16 (29 Dec 2021 10:47) (15 - 18)  SpO2: 94% (29 Dec 2021 10:47) (94% - 99%)  Daily Height in cm: 167.64 (29 Dec 2021 00:10)    Daily     GENERAL:  Appears stated age,   HEENT:  NC/AT,    CHEST:  Full & symmetric excursion,   HEART:  Regular rhythm  ABDOMEN:  Soft, non-tender, non-distended,   EXTEREMITIES:  no cyanosis,clubbing or edema  SKIN:  No rash  NEURO:  Alert,    LABS:                        8.7    1.80  )-----------( 419      ( 29 Dec 2021 11:34 )             25.2     12-29    143  |  109<H>  |  10  ----------------------------<  101<H>  3.7   |  28  |  0.56    Ca    8.0<L>      29 Dec 2021 11:34    TPro  6.8  /  Alb  2.9<L>  /  TBili  0.3  /  DBili  x   /  AST  17  /  ALT  14  /  AlkPhos  62  12-29    LIVER FUNCTIONS - ( 29 Dec 2021 00:59 )  Alb: 2.9 g/dL / Pro: 6.8 g/dL / ALK PHOS: 62 U/L / ALT: 14 U/L / AST: 17 U/L / GGT: x           PT/INR - ( 29 Dec 2021 11:34 )   PT: 36.3 sec;   INR: 3.28 ratio         PTT - ( 29 Dec 2021 00:59 )  PTT:43.4 sec        Imaging:

## 2021-12-29 NOTE — CONSULT NOTE ADULT - CONSULT REASON
Anemia  Rectal bleeding   Coagulopathy due to coumadin Anemia  Rectal bleeding. Hemorrhage of rectum and anus [K62.5]  Coagulopathy due to coumadin

## 2021-12-29 NOTE — H&P ADULT - NSICDXPASTMEDICALHX_GEN_ALL_CORE_FT
PAST MEDICAL HISTORY:  Afib     Constipation     CVA (cerebral vascular accident)     Depression     Grade I diastolic dysfunction     Hyperlipidemia     Hypertension     Neuropathy     Neuropathy

## 2021-12-29 NOTE — CONSULT NOTE ADULT - ASSESSMENT
rectal bleed  acute blood loss anemia    Advance diet as tolerated  monitor cbc  hold anticoagulation  will start anusol suppository

## 2021-12-29 NOTE — ED PROVIDER NOTE - NS ED ROS FT
Constitutional: - Fever, - Chills, - Anorexia, - Fatigue, - Night sweats  Eyes: - Discharge, - Irritation, - Redness, - Visual changes, - Light sensitivity, - Pain  EARS: - Ear Pain, - Tinnitus, - Decreased hearing  NOSE: - Congestion, - Bloody nose  MOUTH/THROAT: - Vocal Changes, - Drooling, - Sore throat  NECK: - Lumps, - Stiffness, - Pain  CV: - Palpitations, - Chest Pain, - Edema, - Syncope  RESP:  - Cough, - Shortness of Breath, - Dyspnea on Exertion, - Trouble speaking, - Pleuritic pain - Wheezing  GI: - Diarrhea, - Constipation, + Bloody stools, - Nausea, - Vomiting, - Abdominal Pain  : - Dysuria, -Frequency, - Hematuria, - Hesitancy, - Incontinence, - Saddle Anesthesia, - Abnormal discharge  MSK: - Myalgias, - Arthralgias, - Weakness, - Deformities, - Injuries  SKIN: - Color change, - Rash, - Swelling, - Ecchymosis, - Abrasion, - laceration  NEURO: - Change in behavior, - Dec. Alertness, - Headache, - Dizziness, - Change in speech, - Weakness, - Seizure-like activity, - Difficulty ambulating

## 2021-12-29 NOTE — H&P ADULT - HISTORY OF PRESENT ILLNESS
89 yo Female hx of afib on coumadin, sent from [facility] to ED  c/o rectal bleeding.  Denies any sob, cp.  No dizziness. no sob. Patient was hemodynamically stable with HB above 10 on arrival to ER .blood from rectum was noted by RN during transfer from stretcher to ED bed on arrival .GI consult Dr Newby called ,cardiology clearance requested for possible endoscopy and further OAC plan .Hematology eval requested for possible INR reversal if bleeding continues ,serial coagulation profiles ordered .

## 2021-12-29 NOTE — CONSULT NOTE ADULT - ASSESSMENT
pt with rectal bleeding   ashd  paf - on coumadin  r/o aortic stenosis  s/p cva  no chf .no angina  echo- ordered   ekg- rsr  advanced age .cva.paf , r/o aortic stensis - pt is at intermediate risk for colonoscopy

## 2021-12-29 NOTE — PATIENT PROFILE ADULT - FALL HARM RISK - HARM RISK INTERVENTIONS

## 2021-12-29 NOTE — PROVIDER CONTACT NOTE (OTHER) - SITUATION
Pt son Curtis requesting additional medical update about pt. RN spoke with MD who stated she will give pt's son a call.  MD verified son's contact number with RN

## 2021-12-29 NOTE — ED ADULT NURSE REASSESSMENT NOTE - NS ED NURSE REASSESS COMMENT FT1
0650: Dr. Best contacted regarding patient's refusal to have blood labs drawn. Spoke to Cecilia who reports will send message over to Dr. Анна Best

## 2021-12-29 NOTE — CONSULT NOTE ADULT - SUBJECTIVE AND OBJECTIVE BOX
CARDIOLOGY CONSULT NOTE    Patient is a 89y Female with a known history of : admitted with rectal bleeding  Hypertension [I10]    CVA (cerebral vascular accident) [I63.9]    Depression [F32.9]    Constipation [K59.00]    Grade I diastolic dysfunction [I51.89]    Afib [I48.91]    Neuropathy [G62.9]    GIB (gastrointestinal bleeding) [K92.2]    Prophylactic measure [Z29.9]      HPI:  89 yo Female hx of afib on coumadin, sent from [facility] to ED  c/o rectal bleeding.  Denies any sob, cp.  No dizziness. no sob. Patient was hemodynamically stable with HB above 10 on arrival to ER .blood from rectum was noted by RN during transfer from [facility] to ED bed on arrival .GI consult Dr Newby called ,cardiology clearance requested for possible endoscopy and further OAC plan .Hematology eval requested for possible INR reversal if bleeding continues ,serial coagulation profiles ordered . (29 Dec 2021 06:37)      REVIEW OF SYSTEMS:    CONSTITUTIONAL: No fever, weight loss, or fatigue  EYES: No eye pain, visual disturbances, or discharge  ENMT:  No difficulty hearing, tinnitus, vertigo; No sinus or throat pain  NECK: No pain or stiffness  BREASTS: No pain, masses, or nipple discharge  RESPIRATORY: No cough, wheezing, chills or hemoptysis; No shortness of breath  CARDIOVASCULAR: No chest pain, palpitations, dizziness, or leg swelling  GASTROINTESTINAL: No abdominal or epigastric pain. No nausea, vomiting, or hematemesis; No diarrhea or constipation. No melena or hematochezia.  GENITOURINARY: No dysuria, frequency, hematuria, or incontinence  NEUROLOGICAL: No headaches, memory loss, loss of strength, numbness, or tremors  SKIN: No itching, burning, rashes, or lesions   LYMPH NODES: No enlarged glands  ENDOCRINE: No heat or cold intolerance; No hair loss  MUSCULOSKELETAL: No joint pain or swelling; No muscle, back, or extremity pain  PSYCHIATRIC: No depression, anxiety, mood swings, or difficulty sleeping  HEME/LYMPH: No easy bruising, or bleeding gums  ALLERGY AND IMMUNOLOGIC: No hives or eczema    MEDICATIONS  (STANDING):  baclofen 10 milliGRAM(s) Oral at bedtime  dextrose 5% + sodium chloride 0.45%. 1000 milliLiter(s) (50 mL/Hr) IV Continuous <Continuous>  enalapril 2.5 milliGRAM(s) Oral daily  escitalopram 20 milliGRAM(s) Oral daily  furosemide    Tablet 20 milliGRAM(s) Oral daily  gabapentin 100 milliGRAM(s) Oral two times a day  hydroxyurea 500 milliGRAM(s) Oral daily  metoprolol tartrate 25 milliGRAM(s) Oral two times a day  pantoprazole  Injectable 40 milliGRAM(s) IV Push daily  simvastatin 10 milliGRAM(s) Oral at bedtime    MEDICATIONS  (PRN):  acetaminophen     Tablet .. 650 milliGRAM(s) Oral every 6 hours PRN Temp greater or equal to 38C (100.4F), Mild Pain (1 - 3)  aluminum hydroxide/magnesium hydroxide/simethicone Suspension 30 milliLiter(s) Oral every 4 hours PRN Dyspepsia  melatonin 3 milliGRAM(s) Oral at bedtime PRN Insomnia  ondansetron Injectable 4 milliGRAM(s) IV Push every 8 hours PRN Nausea and/or Vomiting  traMADol 50 milliGRAM(s) Oral four times a day PRN Moderate Pain (4 - 6)      ALLERGIES: No Known Allergies      Social History:     FAMILY HISTORY:  No pertinent family history in first degree relatives        PAST MEDICAL & SURGICAL HISTORY:  Hypertension    CVA (cerebral vascular accident)    Depression    Constipation    Neuropathy    Hyperlipidemia    Grade I diastolic dysfunction    Afib    Neuropathy    No significant past surgical history          PHYSICAL EXAMINATION:  -----------------------------  T(C): 37 (12-29-21 @ 07:33), Max: 37 (12-29-21 @ 07:33)  HR: 86 (12-29-21 @ 07:33) (76 - 90)  BP: 109/60 (12-29-21 @ 07:33) (108/56 - 142/92)  RR: 16 (12-29-21 @ 07:33) (15 - 18)  SpO2: 96% (12-29-21 @ 07:33) (95% - 99%)  Wt(kg): --    Height (cm): 167.6 (12-29 @ 00:10)  Weight (kg): 68 (12-29 @ 00:10)  BMI (kg/m2): 24.2 (12-29 @ 00:10)  BSA (m2): 1.77 (12-29 @ 00:10)    Constitutional: well developed, normal appearance, well groomed, well nourished, no deformities and no acute distress.   Eyes: the conjunctiva exhibited no abnormalities and the eyelids demonstrated no xanthelasmas.   HEENT: normal oral mucosa, no oral pallor and no oral cyanosis.   Neck: normal jugular venous A waves present, normal jugular venous V waves present and no jugular venous castillo A waves.   Pulmonary: no respiratory distress, normal respiratory rhythm and effort, no accessory muscle use and lungs were clear to auscultation bilaterally.   Cardiovascular: heart rate and rhythm were normal, normal S1 and S2 . grade 2/6 systolic  murmur at 2nd sternal border  Abdomen: soft, non-tender, no hepato-splenomegaly and no abdominal mass palpated.   Musculoskeletal: the gait could not be assessed..   Extremities: no clubbing of the fingernails, no localized cyanosis, no petechial hemorrhages and no ischemic changes.   Skin: normal skin color and pigmentation, no rash, no venous stasis, no skin lesions, no skin ulcer and no xanthoma was observed.   Psychiatric: oriented to person, place, and time, the affect was normal, the mood was normal and not feeling anxious.     LABS:   --------  12-29    140  |  104  |  16  ----------------------------<  126<H>  4.0   |  31  |  0.75    Ca    8.5      29 Dec 2021 00:59    TPro  6.8  /  Alb  2.9<L>  /  TBili  0.3  /  DBili  x   /  AST  17  /  ALT  14  /  AlkPhos  62  12-29                         10.2   2.71  )-----------( 516      ( 29 Dec 2021 00:59 )             29.8     PT/INR - ( 29 Dec 2021 00:59 )   PT: 30.9 sec;   INR: 2.77 ratio         PTT - ( 29 Dec 2021 00:59 )  PTT:43.4 sec            RADIOLOGY:  -----------------        ECG:     ECHO

## 2021-12-29 NOTE — H&P ADULT - ASSESSMENT
88 yo Female hx of afib on coumadin, sent from [facility] to ED  c/o rectal bleeding.  Denies any sob, cp.  No dizziness. no sob. Patient was hemodynamically stable with HB above 10 on arrival to ER .blood from rectum was noted by RN during transfer from stretcher to ED bed on arrival .GI consult Dr Newby called ,cardiology clearance requested for possible endoscopy and further OAC plan .Hematology eval requested for possible INR reversal if bleeding continues ,serial coagulation profiles ordered .

## 2021-12-30 LAB
ANION GAP SERPL CALC-SCNC: 6 MMOL/L — SIGNIFICANT CHANGE UP (ref 5–17)
BUN SERPL-MCNC: 7 MG/DL — SIGNIFICANT CHANGE UP (ref 7–23)
CALCIUM SERPL-MCNC: 8.2 MG/DL — LOW (ref 8.5–10.1)
CHLORIDE SERPL-SCNC: 111 MMOL/L — HIGH (ref 96–108)
CO2 SERPL-SCNC: 29 MMOL/L — SIGNIFICANT CHANGE UP (ref 22–31)
CREAT SERPL-MCNC: 0.59 MG/DL — SIGNIFICANT CHANGE UP (ref 0.5–1.3)
CRP SERPL-MCNC: 28 MG/L — HIGH
ERYTHROCYTE [SEDIMENTATION RATE] IN BLOOD: 71 MM/HR — HIGH (ref 0–20)
GLUCOSE SERPL-MCNC: 118 MG/DL — HIGH (ref 70–99)
HCT VFR BLD CALC: 28.7 % — LOW (ref 34.5–45)
HGB BLD-MCNC: 9.8 G/DL — LOW (ref 11.5–15.5)
INR BLD: 2.95 RATIO — HIGH (ref 0.88–1.16)
MCHC RBC-ENTMCNC: 34.1 GM/DL — SIGNIFICANT CHANGE UP (ref 32–36)
MCHC RBC-ENTMCNC: 43 PG — HIGH (ref 27–34)
MCV RBC AUTO: 125.9 FL — HIGH (ref 80–100)
NRBC # BLD: 0 /100 WBCS — SIGNIFICANT CHANGE UP (ref 0–0)
PLATELET # BLD AUTO: 469 K/UL — HIGH (ref 150–400)
POTASSIUM SERPL-MCNC: 3.7 MMOL/L — SIGNIFICANT CHANGE UP (ref 3.5–5.3)
POTASSIUM SERPL-SCNC: 3.7 MMOL/L — SIGNIFICANT CHANGE UP (ref 3.5–5.3)
PROTHROM AB SERPL-ACNC: 32.8 SEC — HIGH (ref 10.6–13.6)
RBC # BLD: 2.28 M/UL — LOW (ref 3.8–5.2)
RBC # BLD: 2.28 M/UL — LOW (ref 3.8–5.2)
RBC # FLD: 15.9 % — HIGH (ref 10.3–14.5)
RETICS #: 47.2 K/UL — SIGNIFICANT CHANGE UP (ref 25–125)
RETICS/RBC NFR: 2.1 % — SIGNIFICANT CHANGE UP (ref 0.5–2.5)
SODIUM SERPL-SCNC: 146 MMOL/L — HIGH (ref 135–145)
WBC # BLD: 2.07 K/UL — LOW (ref 3.8–10.5)
WBC # FLD AUTO: 2.07 K/UL — LOW (ref 3.8–10.5)

## 2021-12-30 RX ADMIN — ESCITALOPRAM OXALATE 20 MILLIGRAM(S): 10 TABLET, FILM COATED ORAL at 11:57

## 2021-12-30 RX ADMIN — GABAPENTIN 100 MILLIGRAM(S): 400 CAPSULE ORAL at 06:15

## 2021-12-30 RX ADMIN — Medication 2.5 MILLIGRAM(S): at 06:15

## 2021-12-30 RX ADMIN — SODIUM CHLORIDE 50 MILLILITER(S): 9 INJECTION, SOLUTION INTRAVENOUS at 07:02

## 2021-12-30 RX ADMIN — PANTOPRAZOLE SODIUM 40 MILLIGRAM(S): 20 TABLET, DELAYED RELEASE ORAL at 11:57

## 2021-12-30 RX ADMIN — PREGABALIN 1000 MICROGRAM(S): 225 CAPSULE ORAL at 11:57

## 2021-12-30 RX ADMIN — Medication 1 SUPPOSITORY(S): at 11:59

## 2021-12-30 RX ADMIN — SIMVASTATIN 10 MILLIGRAM(S): 20 TABLET, FILM COATED ORAL at 21:48

## 2021-12-30 RX ADMIN — Medication 25 MILLIGRAM(S): at 18:05

## 2021-12-30 RX ADMIN — Medication 10 MILLIGRAM(S): at 21:48

## 2021-12-30 RX ADMIN — GABAPENTIN 100 MILLIGRAM(S): 400 CAPSULE ORAL at 18:05

## 2021-12-30 RX ADMIN — Medication 1 MILLIGRAM(S): at 11:57

## 2021-12-30 RX ADMIN — Medication 25 MILLIGRAM(S): at 06:15

## 2021-12-30 RX ADMIN — Medication 20 MILLIGRAM(S): at 06:15

## 2021-12-30 NOTE — PROGRESS NOTE ADULT - SUBJECTIVE AND OBJECTIVE BOX
Interval History:  states bleeding has stopped  continues off coumadin  Chart reviewed and events noted;   Overnight events:    MEDICATIONS  (STANDING):  baclofen 10 milliGRAM(s) Oral at bedtime  cyanocobalamin 1000 MICROGram(s) Oral daily  dextrose 5% + sodium chloride 0.45%. 1000 milliLiter(s) (50 mL/Hr) IV Continuous <Continuous>  enalapril 2.5 milliGRAM(s) Oral daily  escitalopram 20 milliGRAM(s) Oral daily  folic acid 1 milliGRAM(s) Oral daily  furosemide    Tablet 20 milliGRAM(s) Oral daily  gabapentin 100 milliGRAM(s) Oral two times a day  hydrocortisone hemorrhoidal Suppository 1 Suppository(s) Rectal daily  metoprolol tartrate 25 milliGRAM(s) Oral two times a day  pantoprazole  Injectable 40 milliGRAM(s) IV Push daily  simvastatin 10 milliGRAM(s) Oral at bedtime    MEDICATIONS  (PRN):  acetaminophen     Tablet .. 650 milliGRAM(s) Oral every 6 hours PRN Temp greater or equal to 38C (100.4F), Mild Pain (1 - 3)  aluminum hydroxide/magnesium hydroxide/simethicone Suspension 30 milliLiter(s) Oral every 4 hours PRN Dyspepsia  melatonin 3 milliGRAM(s) Oral at bedtime PRN Insomnia  ondansetron Injectable 4 milliGRAM(s) IV Push every 8 hours PRN Nausea and/or Vomiting  traMADol 50 milliGRAM(s) Oral four times a day PRN Moderate Pain (4 - 6)      Vital Signs Last 24 Hrs  T(C): 36.8 (30 Dec 2021 04:32), Max: 37.2 (29 Dec 2021 20:27)  T(F): 98.3 (30 Dec 2021 04:32), Max: 98.9 (29 Dec 2021 20:27)  HR: 93 (30 Dec 2021 04:32) (92 - 99)  BP: 135/65 (30 Dec 2021 04:32) (131/68 - 154/74)  BP(mean): --  RR: 18 (30 Dec 2021 04:32) (18 - 18)  SpO2: 93% (30 Dec 2021 04:32) (92% - 93%)    PHYSICAL EXAM  General: adult in NAD  HEENT: clear oropharynx, anicteric sclera, pink conjunctivae  Neck: supple  CV: normal S1S2 with no murmur rubs or gallops  Lungs: clear to auscultation, no wheezes, no rhales  Abdomen: soft non-tender non-distended, no hepato/splenomegaly  Ext: no clubbing cyanosis or edema  Skin: no rashes and no petichiae  Neuro: alert and oriented X3 no focal deficits      LABS:  CBC Full  -  ( 30 Dec 2021 08:30 )  WBC Count : 2.07 K/uL  RBC Count : 2.28 M/uL  Hemoglobin : 9.8 g/dL  Hematocrit : 28.7 %  Platelet Count - Automated : 469 K/uL  Mean Cell Volume : 125.9 fl  Mean Cell Hemoglobin : 43.0 pg  Mean Cell Hemoglobin Concentration : 34.1 gm/dL  Auto Neutrophil # : x  Auto Lymphocyte # : x  Auto Monocyte # : x  Auto Eosinophil # : x  Auto Basophil # : x  Auto Neutrophil % : x  Auto Lymphocyte % : x  Auto Monocyte % : x  Auto Eosinophil % : x  Auto Basophil % : x    12-30    146<H>  |  111<H>  |  7   ----------------------------<  118<H>  3.7   |  29  |  0.59    Ca    8.2<L>      30 Dec 2021 08:30    TPro  6.8  /  Alb  2.9<L>  /  TBili  0.3  /  DBili  x   /  AST  17  /  ALT  14  /  AlkPhos  62  12-29    PT/INR - ( 30 Dec 2021 08:30 )   PT: 32.8 sec;   INR: 2.95 ratio         PTT - ( 29 Dec 2021 00:59 )  PTT:43.4 sec    fe studies      WBC trend  2.07 K/uL (12-30-21 @ 08:30)  1.80 K/uL (12-29-21 @ 11:34)  2.71 K/uL (12-29-21 @ 00:59)      Hgb trend  9.8 g/dL (12-30-21 @ 08:30)  8.7 g/dL (12-29-21 @ 11:34)  10.2 g/dL (12-29-21 @ 00:59)      plt trend  469 K/uL (12-30-21 @ 08:30)  419 K/uL (12-29-21 @ 11:34)  516 K/uL (12-29-21 @ 00:59)        RADIOLOGY & ADDITIONAL STUDIES:

## 2021-12-30 NOTE — PROGRESS NOTE ADULT - SUBJECTIVE AND OBJECTIVE BOX
PROGRESS NOTE  Patient is a 89y old  Female who presents with a chief complaint of rectal bleeding (30 Dec 2021 14:58)  Chart and available morning labs /imaging are reviewed electronically , urgent issues addressed . More information  is being added upon completion of rounds , when more information is collected and management discussed with consultants , medical staff and social service/case management on the floor     OVERNIGHT -no GIB reported   No new issues reported by medical staff . All above noted   Patient is resting in a bed comfortably .Confused ,poor mentation .No distress noted   HPI:  88 yo Female hx of afib on coumadin, sent from [facility] to ED  c/o rectal bleeding.  Denies any sob, cp.  No dizziness. no sob. Patient was hemodynamically stable with HB above 10 on arrival to ER .blood from rectum was noted by RN during transfer from [facility] ([facility] to ED bed on arrival .GI consult Dr Newby called ,cardiology clearance requested for possible endoscopy and further OAC plan .Hematology eval requested for possible INR reversal if bleeding continues ,serial coagulation profiles ordered . (29 Dec 2021 06:37)    PAST MEDICAL & SURGICAL HISTORY:  Hyperlipidemia    Neuropathy    Hypertension    CVA (cerebral vascular accident)    Depression    Constipation    Grade I diastolic dysfunction    Afib    Neuropathy    No significant past surgical history        MEDICATIONS  (STANDING):  baclofen 10 milliGRAM(s) Oral at bedtime  cyanocobalamin 1000 MICROGram(s) Oral daily  dextrose 5% + sodium chloride 0.45%. 1000 milliLiter(s) (50 mL/Hr) IV Continuous <Continuous>  enalapril 2.5 milliGRAM(s) Oral daily  escitalopram 20 milliGRAM(s) Oral daily  folic acid 1 milliGRAM(s) Oral daily  furosemide    Tablet 20 milliGRAM(s) Oral daily  gabapentin 100 milliGRAM(s) Oral two times a day  hydrocortisone hemorrhoidal Suppository 1 Suppository(s) Rectal daily  metoprolol tartrate 25 milliGRAM(s) Oral two times a day  pantoprazole  Injectable 40 milliGRAM(s) IV Push daily  simvastatin 10 milliGRAM(s) Oral at bedtime    MEDICATIONS  (PRN):  acetaminophen     Tablet .. 650 milliGRAM(s) Oral every 6 hours PRN Temp greater or equal to 38C (100.4F), Mild Pain (1 - 3)  aluminum hydroxide/magnesium hydroxide/simethicone Suspension 30 milliLiter(s) Oral every 4 hours PRN Dyspepsia  melatonin 3 milliGRAM(s) Oral at bedtime PRN Insomnia  ondansetron Injectable 4 milliGRAM(s) IV Push every 8 hours PRN Nausea and/or Vomiting  traMADol 50 milliGRAM(s) Oral four times a day PRN Moderate Pain (4 - 6)      OBJECTIVE    T(C): 37.2 (12-30-21 @ 14:48), Max: 37.2 (12-29-21 @ 20:27)  HR: 99 (12-30-21 @ 14:48) (92 - 99)  BP: 144/83 (12-30-21 @ 14:48) (131/68 - 154/74)  RR: 18 (12-30-21 @ 14:48) (18 - 18)  SpO2: 95% (12-30-21 @ 14:48) (92% - 95%)  Wt(kg): --  I&O's Summary    29 Dec 2021 07:01  -  30 Dec 2021 07:00  --------------------------------------------------------  IN: 650 mL / OUT: 0 mL / NET: 650 mL          REVIEW OF SYSTEMS:  CONSTITUTIONAL: No fever, weight loss, or fatigue  EYES: No eye pain, visual disturbances, or discharge  ENMT:   No sinus or throat pain  NECK: No pain or stiffness  RESPIRATORY: No cough, wheezing, chills or hemoptysis; No shortness of breath  CARDIOVASCULAR: No chest pain, palpitations, dizziness, or leg swelling  GASTROINTESTINAL: No abdominal pain. No nausea, vomiting; No diarrhea or constipation. No melena or hematochezia.  GENITOURINARY: No dysuria, frequency, hematuria, or incontinence  NEUROLOGICAL: No headaches, memory loss, loss of strength, numbness, or tremors  SKIN: No itching, burning, rashes, or lesions   MUSCULOSKELETAL: No joint pain or swelling; No muscle, back, or extremity pain    PHYSICAL EXAM:  Appearance: NAD. VS past 24 hrs -as above   HEENT:   Moist oral mucosa. Conjunctiva clear b/l.   Neck : supple  Respiratory: Lungs CTAB.  Gastrointestinal:  Soft, nontender. No rebound. No rigidity. BS present	  Cardiovascular: RRR ,S1S2 present  Neurologic: Non-focal. Moving all extremities.  Extremities: No edema. No erythema. No calf tenderness.  Skin: No rashes, No ecchymoses, No cyanosis.	  wounds ,skin lesions-See skin assesment flow sheet   LABS:                        9.8    2.07  )-----------( 469      ( 30 Dec 2021 08:30 )             28.7     12-30    146<H>  |  111<H>  |  7   ----------------------------<  118<H>  3.7   |  29  |  0.59    Ca    8.2<L>      30 Dec 2021 08:30    TPro  6.8  /  Alb  2.9<L>  /  TBili  0.3  /  DBili  x   /  AST  17  /  ALT  14  /  AlkPhos  62  12-29    CAPILLARY BLOOD GLUCOSE        PT/INR - ( 30 Dec 2021 08:30 )   PT: 32.8 sec;   INR: 2.95 ratio         PTT - ( 29 Dec 2021 00:59 )  PTT:43.4 sec      RADIOLOGY & ADDITIONAL TESTS:   reviewed elctronically  ASSESSMENT/PLAN: 	  45 minutes aggregate time was spent on this visit, 50% visit time spent in care co-ordination with other attendings and counselling patient .I have discussed care plan with patient / HCP/family member son  ,who expressed understanding of problems treatment and their effect and side effects, alternatives in details. I have asked if they have any questions and concerns and appropriately addressed them to best of my ability.

## 2021-12-30 NOTE — PROGRESS NOTE ADULT - SUBJECTIVE AND OBJECTIVE BOX
[facility]  Gus Sy PA-C  [facility] [place] [address]  [phone]      INTERVAL HPI/OVERNIGHT EVENTS:    no melena or hematochezia    MEDICATIONS  (STANDING):  baclofen 10 milliGRAM(s) Oral at bedtime  cyanocobalamin 1000 MICROGram(s) Oral daily  dextrose 5% + sodium chloride 0.45%. 1000 milliLiter(s) (50 mL/Hr) IV Continuous <Continuous>  enalapril 2.5 milliGRAM(s) Oral daily  escitalopram 20 milliGRAM(s) Oral daily  folic acid 1 milliGRAM(s) Oral daily  furosemide    Tablet 20 milliGRAM(s) Oral daily  gabapentin 100 milliGRAM(s) Oral two times a day  hydrocortisone hemorrhoidal Suppository 1 Suppository(s) Rectal daily  metoprolol tartrate 25 milliGRAM(s) Oral two times a day  pantoprazole  Injectable 40 milliGRAM(s) IV Push daily  simvastatin 10 milliGRAM(s) Oral at bedtime    MEDICATIONS  (PRN):  acetaminophen     Tablet .. 650 milliGRAM(s) Oral every 6 hours PRN Temp greater or equal to 38C (100.4F), Mild Pain (1 - 3)  aluminum hydroxide/magnesium hydroxide/simethicone Suspension 30 milliLiter(s) Oral every 4 hours PRN Dyspepsia  melatonin 3 milliGRAM(s) Oral at bedtime PRN Insomnia  ondansetron Injectable 4 milliGRAM(s) IV Push every 8 hours PRN Nausea and/or Vomiting  traMADol 50 milliGRAM(s) Oral four times a day PRN Moderate Pain (4 - 6)      Allergies    No Known Allergies    Intolerances        ROS:   General:  No wt loss, fevers, chills, night sweats, fatigue,   Eyes:  Good vision, no reported pain  ENT:  No sore throat, pain, runny nose, dysphagia  CV:  No pain, palpitations, hypo/hypertension  Resp:  No dyspnea, cough, tachypnea, wheezing  GI:  No pain, No nausea, No vomiting, No diarrhea, No constipation, No weight loss, No fever, No pruritis, No rectal bleeding, No tarry stools, No dysphagia,  :  No pain, bleeding, incontinence, nocturia  Muscle:  No pain, weakness  Neuro:  No weakness, tingling, memory problems  Psych:  No fatigue, insomnia, mood problems, depression  Endocrine:  No polyuria, polydipsia, cold/heat intolerance  Heme:  No petechiae, ecchymosis, easy bruisability  Skin:  No rash, tattoos, scars, edema      PHYSICAL EXAM:   Vital Signs:  Vital Signs Last 24 Hrs  T(C): 37.2 (30 Dec 2021 14:48), Max: 37.2 (29 Dec 2021 20:27)  T(F): 98.9 (30 Dec 2021 14:48), Max: 98.9 (29 Dec 2021 20:27)  HR: 99 (30 Dec 2021 14:48) (92 - 99)  BP: 144/83 (30 Dec 2021 14:48) (131/68 - 154/74)  BP(mean): --  RR: 18 (30 Dec 2021 14:48) (18 - 18)  SpO2: 95% (30 Dec 2021 14:48) (92% - 95%)  Daily     Daily Weight in k.2 (30 Dec 2021 04:32)    GENERAL:  Appears stated age,   HEENT:  NC/AT,    CHEST:  Full & symmetric excursion,   HEART:  Regular rhythm,  ABDOMEN:  Soft, non-tender, non-distended,  EXTEREMITIES:  no cyanosis  SKIN:  No rash  NEURO:  Alert,       LABS:                        9.8    2.07  )-----------( 469      ( 30 Dec 2021 08:30 )             28.7     12-30    146<H>  |  111<H>  |  7   ----------------------------<  118<H>  3.7   |  29  |  0.59    Ca    8.2<L>      30 Dec 2021 08:30    TPro  6.8  /  Alb  2.9<L>  /  TBili  0.3  /  DBili  x   /  AST  17  /  ALT  14  /  AlkPhos  62  12-29    PT/INR - ( 30 Dec 2021 08:30 )   PT: 32.8 sec;   INR: 2.95 ratio         PTT - ( 29 Dec 2021 00:59 )  PTT:43.4 sec      RADIOLOGY & ADDITIONAL TESTS:

## 2021-12-30 NOTE — PROGRESS NOTE ADULT - SUBJECTIVE AND OBJECTIVE BOX
Patient is a 89y Female with a known history of :  Hypertension [I10]    CVA (cerebral vascular accident) [I63.9]    Depression [F32.9]    Constipation [K59.00]    Grade I diastolic dysfunction [I51.89]    Afib [I48.91]    Neuropathy [G62.9]    GIB (gastrointestinal bleeding) [K92.2]    Prophylactic measure [Z29.9]      HPI:  88 yo Female hx of afib on coumadin, sent from [facility] to ED  c/o rectal bleeding.  Denies any sob, cp.  No dizziness. no sob. Patient was hemodynamically stable with HB above 10 on arrival to ER .blood from rectum was noted by RN during transfer from [facility] to ED bed on arrival .GI consult Dr Newby called ,cardiology clearance requested for possible endoscopy and further OAC plan .Hematology eval requested for possible INR reversal if bleeding continues ,serial coagulation profiles ordered . (29 Dec 2021 06:37)      REVIEW OF SYSTEMS:    CONSTITUTIONAL: No fever, weight loss, or fatigue  EYES: No eye pain, visual disturbances, or discharge  ENMT:  No difficulty hearing, tinnitus, vertigo; No sinus or throat pain  NECK: No pain or stiffness  BREASTS: No pain, masses, or nipple discharge  RESPIRATORY: No cough, wheezing, chills or hemoptysis; No shortness of breath  CARDIOVASCULAR: No chest pain, palpitations, dizziness, or leg swelling  GASTROINTESTINAL: No abdominal or epigastric pain. No nausea, vomiting, or hematemesis; No diarrhea or constipation. No melena or hematochezia.  GENITOURINARY: No dysuria, frequency, hematuria, or incontinence  NEUROLOGICAL: No headaches, memory loss, loss of strength, numbness, or tremors  SKIN: No itching, burning, rashes, or lesions   LYMPH NODES: No enlarged glands  ENDOCRINE: No heat or cold intolerance; No hair loss  MUSCULOSKELETAL: No joint pain or swelling; No muscle, back, or extremity pain  PSYCHIATRIC: No depression, anxiety, mood swings, or difficulty sleeping  HEME/LYMPH: No easy bruising, or bleeding gums  ALLERGY AND IMMUNOLOGIC: No hives or eczema    MEDICATIONS  (STANDING):  baclofen 10 milliGRAM(s) Oral at bedtime  cyanocobalamin 1000 MICROGram(s) Oral daily  dextrose 5% + sodium chloride 0.45%. 1000 milliLiter(s) (50 mL/Hr) IV Continuous <Continuous>  enalapril 2.5 milliGRAM(s) Oral daily  escitalopram 20 milliGRAM(s) Oral daily  folic acid 1 milliGRAM(s) Oral daily  furosemide    Tablet 20 milliGRAM(s) Oral daily  gabapentin 100 milliGRAM(s) Oral two times a day  hydrocortisone hemorrhoidal Suppository 1 Suppository(s) Rectal daily  metoprolol tartrate 25 milliGRAM(s) Oral two times a day  pantoprazole  Injectable 40 milliGRAM(s) IV Push daily  simvastatin 10 milliGRAM(s) Oral at bedtime    MEDICATIONS  (PRN):  acetaminophen     Tablet .. 650 milliGRAM(s) Oral every 6 hours PRN Temp greater or equal to 38C (100.4F), Mild Pain (1 - 3)  aluminum hydroxide/magnesium hydroxide/simethicone Suspension 30 milliLiter(s) Oral every 4 hours PRN Dyspepsia  melatonin 3 milliGRAM(s) Oral at bedtime PRN Insomnia  ondansetron Injectable 4 milliGRAM(s) IV Push every 8 hours PRN Nausea and/or Vomiting  traMADol 50 milliGRAM(s) Oral four times a day PRN Moderate Pain (4 - 6)      ALLERGIES: No Known Allergies      FAMILY HISTORY:  No pertinent family history in first degree relatives        PHYSICAL EXAMINATION:  -----------------------------  T(C): 36.8 (12-30-21 @ 04:32), Max: 37.2 (12-29-21 @ 20:27)  HR: 93 (12-30-21 @ 04:32) (92 - 99)  BP: 135/65 (12-30-21 @ 04:32) (121/73 - 154/74)  RR: 18 (12-30-21 @ 04:32) (16 - 18)  SpO2: 93% (12-30-21 @ 04:32) (92% - 94%)  Wt(kg): --    12-29 @ 07:01  -  12-30 @ 07:00  --------------------------------------------------------  IN:    dextrose 5% + sodium chloride 0.45%: 650 mL  Total IN: 650 mL    OUT:  Total OUT: 0 mL    Total NET: 650 mL            VITALS  T(C): 36.8 (12-30-21 @ 04:32), Max: 37.2 (12-29-21 @ 20:27)  HR: 93 (12-30-21 @ 04:32) (92 - 99)  BP: 135/65 (12-30-21 @ 04:32) (121/73 - 154/74)  RR: 18 (12-30-21 @ 04:32) (16 - 18)  SpO2: 93% (12-30-21 @ 04:32) (92% - 94%)    Constitutional: well developed, normal appearance, well groomed, well nourished, no deformities and no acute distress.   Eyes: the conjunctiva exhibited no abnormalities and the eyelids demonstrated no xanthelasmas.   HEENT: normal oral mucosa, no oral pallor and no oral cyanosis.   Neck: normal jugular venous A waves present, normal jugular venous V waves present and no jugular venous castillo A waves.   Pulmonary: no respiratory distress, normal respiratory rhythm and effort, no accessory muscle use and lungs were clear to auscultation bilaterally.   Cardiovascular: heart rate and rhythm were normal, normal S1 and S2 and no murmur, gallop, rub, heave or thrill are present.   Abdomen: soft, non-tender, no hepato-splenomegaly and no abdominal mass palpated.   Musculoskeletal: the gait could not be assessed..   Extremities: no clubbing of the fingernails, no localized cyanosis, no petechial hemorrhages and no ischemic changes.   Skin: normal skin color and pigmentation, no rash, no venous stasis, no skin lesions, no skin ulcer and no xanthoma was observed.   Psychiatric: oriented to person, place, and time, the affect was normal, the mood was normal and not feeling anxious.     LABS:   --------  12-29    143  |  109<H>  |  10  ----------------------------<  101<H>  3.7   |  28  |  0.56    Ca    8.0<L>      29 Dec 2021 11:34    TPro  6.8  /  Alb  2.9<L>  /  TBili  0.3  /  DBili  x   /  AST  17  /  ALT  14  /  AlkPhos  62  12-29                         9.8    2.07  )-----------( 469      ( 30 Dec 2021 08:30 )             28.7     PT/INR - ( 30 Dec 2021 08:30 )   PT: 32.8 sec;   INR: 2.95 ratio         PTT - ( 29 Dec 2021 00:59 )  PTT:43.4 sec            RADIOLOGY:  -----------------    ECG:     ECHO:

## 2021-12-30 NOTE — DIETITIAN INITIAL EVALUATION ADULT. - PERTINENT LABORATORY DATA
12-30 Na 146 mmol/L<H> Glu 118 mg/dL<H> K+ 3.7 mmol/L Cr  0.59 mg/dL BUN 7 mg/dL Phos n/a   Alb n/a   PAB n/a   Hgb 9.8 g/dL<L> Hct 28.7 %<L>

## 2021-12-30 NOTE — PHYSICAL THERAPY INITIAL EVALUATION ADULT - ADDITIONAL COMMENTS
Patient reports that she lives in an nursing home. States that due to hx of a CVA, has (R) sided weakness, minimal AROM on (R) side. Able to transfer from bed to chair with 1 person assist (stand-pivot). Does not ambulate. Has assist for all ADLs. Gets OOB to w/c daily.

## 2021-12-30 NOTE — PHYSICAL THERAPY INITIAL EVALUATION ADULT - NAME OF CLINICIAN
Progress Note  Dimitri Goldman MD  Hospitalist     LOS: 1 day   Patient Care Team:  Ilana Hess MD as PCP - General (Internal Medicine)    Chief Complaint: L hip pain    Subjective     Interval History:     Patient Complaints: L hip pain / improved after surgery.    History taken from: patient    Medication Review:   Current Facility-Administered Medications   Medication Dose Route Frequency Provider Last Rate Last Dose   • albuterol (PROVENTIL) nebulizer solution 0.083% 2.5 mg/3mL  2.5 mg Nebulization Q4H PRN Tamera Cash MD       • amiodarone (PACERONE) tablet 200 mg  200 mg Oral Daily Tamera Cash MD   200 mg at 12/20/17 0748   • atorvastatin (LIPITOR) tablet 10 mg  10 mg Oral Daily Tamera Cash MD   10 mg at 12/20/17 0747   • calcium carbonate (TUMS) chewable tablet 500 mg (200 mg elemental)  1 tablet Oral Daily PRN Tamera Cash MD       • ceFAZolin (ANCEF) in SWFI 2 g/20ml IV PUSH syringe  2 g Intravenous Once Javan Talley MD       • cholecalciferol (VITAMIN D3) tablet 5,000 Units  5,000 Units Oral Daily Tamera Cash MD   5,000 Units at 12/20/17 0747   • docusate sodium (COLACE) capsule 100 mg  100 mg Oral BID Javan Talley MD   100 mg at 12/20/17 0924   • enoxaparin (LOVENOX) syringe 40 mg  40 mg Subcutaneous Q24H Javan Talley MD   40 mg at 12/20/17 0959   • HYDROcodone-acetaminophen (NORCO) 7.5-325 MG per tablet 1 tablet  1 tablet Oral Q6H PRN Javan Talley MD   1 tablet at 12/20/17 1700   • HYDROmorphone (DILAUDID) injection 1 mg  1 mg Intravenous Q2H PRN Javan Talley MD       • ibuprofen (ADVIL,MOTRIN) tablet 400 mg  400 mg Oral Q6H PRN Dimitri Goldman MD       • levothyroxine (SYNTHROID, LEVOTHROID) tablet 25 mcg  25 mcg Oral Daily Tamera Cash MD   25 mcg at 12/20/17 0747   • metoprolol succinate XL (TOPROL-XL) 24 hr half tablet 12.5 mg  12.5 mg Oral Daily Tamera Cash MD   12.5 mg at 12/20/17 0748   •  mirtazapine (REMERON) tablet 30 mg  30 mg Oral Nightly Tamera Cash MD   30 mg at 12/19/17 2254   • morphine injection 2 mg  2 mg Intravenous Q3H PRN Tamera Cash MD   2 mg at 12/19/17 1356   • OLANZapine (zyPREXA) tablet 5 mg  5 mg Oral Nightly Tamera Cash MD   5 mg at 12/19/17 2253   • pantoprazole (PROTONIX) EC tablet 40 mg  40 mg Oral QAM Tamera Cash MD       • polyethylene glycol (MIRALAX) powder 17 g  17 g Oral BID Tamera Cash MD   17 g at 12/20/17 1705   • promethazine (PHENERGAN) injection 12.5 mg  12.5 mg Intravenous Q6H PRN Javan Talley MD       • sertraline (ZOLOFT) tablet 100 mg  100 mg Oral BID Tamera Cash MD   100 mg at 12/20/17 1704   • sodium chloride 0.9 % with KCl 20 mEq/L infusion  100 mL/hr Intravenous Continuous Javan Talley  mL/hr at 12/20/17 0751 100 mL/hr at 12/20/17 0751   • sodium chloride 1,000 mL with bacitracin 50,000 Units irrigation    PRN Javan Talley MD   10 mL at 12/19/17 1724   • tamsulosin (FLOMAX) 24 hr capsule 0.4 mg  0.4 mg Oral Nightly Tamera Cash MD   0.4 mg at 12/19/17 2254       Review of Systems:   Review of Systems   Constitutional: Positive for fatigue. Negative for fever.   Respiratory: Negative for cough and wheezing.    Gastrointestinal: Negative for abdominal distention, abdominal pain, anal bleeding, nausea and vomiting.   Genitourinary: Negative for dysuria, frequency and urgency.   Musculoskeletal: Positive for arthralgias and back pain. Negative for joint swelling.   Skin: Positive for pallor.   Neurological: Positive for weakness and light-headedness. Negative for seizures and headaches.   Psychiatric/Behavioral: Positive for behavioral problems and confusion. Negative for agitation.   All other systems reviewed and are negative.      Objective     Vital Signs  Temp:  [95.9 °F (35.5 °C)-101.4 °F (38.6 °C)] 100.4 °F (38 °C)  Heart Rate:  [79-93] 91  Resp:  [14-18] 18  BP:  (140-176)/(63-85) 140/63    Physical Exam:  Physical Exam   Constitutional: He appears well-developed and well-nourished. No distress.   HENT:   Head: Atraumatic.   Eyes: EOM are normal. Pupils are equal, round, and reactive to light. No scleral icterus.   Neck: Normal range of motion. Neck supple.   Cardiovascular: Normal rate and regular rhythm.    Pulmonary/Chest: Effort normal and breath sounds normal. No respiratory distress. He has no wheezes.   Abdominal: Soft. Bowel sounds are normal. He exhibits no distension. There is no tenderness.   Musculoskeletal: He exhibits tenderness (L hip). He exhibits no edema.   Neurological: He is alert. No cranial nerve deficit. Coordination abnormal.   Skin: Skin is warm and dry. No rash noted.   Psychiatric:   Confused    Vitals reviewed.       Results Review:    Lab Results (last 24 hours)     Procedure Component Value Units Date/Time    Tissue Pathology Exam - Bone, Hip, Left [[id number]] Collected:  12/19/17 1823    Specimen:  Bone from Hip, Left Updated:  12/20/17 0756    CBC & Differential [[id number]] Collected:  12/20/17 0748    Specimen:  Blood Updated:  12/20/17 0815    Narrative:       The following orders were created for panel order CBC & Differential.  Procedure                               Abnormality         Status                     ---------                               -----------         ------                     CBC Auto Differential[[id number]]        Abnormal            Final result                 Please view results for these tests on the individual orders.    CBC Auto Differential [[id number]]  (Abnormal) Collected:  12/20/17 0748    Specimen:  Blood Updated:  12/20/17 0815     WBC 10.77 (H) 10*3/mm3      RBC 3.26 (L) 10*6/mm3      Hemoglobin 10.0 (L) g/dL      Hematocrit 30.7 (L) %      MCV 94.2 fL      MCH 30.7 pg      MCHC 32.6 g/dL      RDW 13.7 %      RDW-SD 46.9 (H) fl      MPV 10.8 fL      Platelets 154 10*3/mm3      Neutrophil % 82.0 (H) %       Lymphocyte % 7.3 (L) %      Monocyte % 10.0 %      Eosinophil % 0.3 %      Basophil % 0.1 %      Immature Grans % 0.3 %      Neutrophils, Absolute 8.83 (H) 10*3/mm3      Lymphocytes, Absolute 0.79 10*3/mm3      Monocytes, Absolute 1.08 (H) 10*3/mm3      Eosinophils, Absolute 0.03 10*3/mm3      Basophils, Absolute 0.01 10*3/mm3      Immature Grans, Absolute 0.03 (H) 10*3/mm3     Comprehensive Metabolic Panel [[id number]]  (Abnormal) Collected:  12/20/17 0748    Specimen:  Blood Updated:  12/20/17 0828     Glucose 85 mg/dL      BUN 16 mg/dL      Creatinine 0.94 mg/dL      Sodium 136 (L) mmol/L      Potassium 4.6 mmol/L      Chloride 99 mmol/L      CO2 24.0 mmol/L      Calcium 8.3 (L) mg/dL      Total Protein 6.3 g/dL      Albumin 3.40 g/dL      ALT (SGPT) 23 U/L      AST (SGOT) 36 U/L      Alkaline Phosphatase 61 U/L      Total Bilirubin 0.6 mg/dL      eGFR Non African Amer 79 mL/min/1.73      Globulin 2.9 gm/dL      A/G Ratio 1.2 g/dL      BUN/Creatinine Ratio 17.0     Anion Gap 13.0 mmol/L     Narrative:       The MDRD GFR formula is only valid for adults with stable renal function between ages 18 and 70.    Bilirubin, Direct [[id number]]  (Normal) Collected:  12/20/17 0748    Specimen:  Blood Updated:  12/20/17 0828     Bilirubin, Direct 0.0 mg/dL     Blood Culture - Blood, [[id number]] Collected:  12/20/17 1344    Specimen:  Blood from Hand, Left Updated:  12/20/17 1353    Blood Culture - Blood, [[id number]] Collected:  12/20/17 1344    Specimen:  Blood from Arm, Left Updated:  12/20/17 1354          Imaging Results (last 24 hours)     Procedure Component Value Units Date/Time    XR Hip With or Without Pelvis 1 View Left [[id number]] Collected:  12/19/17 2132     Updated:  12/19/17 2223    Narrative:       Left hip single view on 12/19/2017    CLINICAL INDICATION: Status post total hip arthroplasty    COMPARISON: 12/18/2017    FINDINGS: The patient is status post interval left total hip  arthroplasty. Skin staples  are noted in place. Expected air and  edema is noted in the soft tissues. No hardware complication is  noted on limited single view exam. No fracture is noted. There is  no dislocation.      Impression:       No acute abnormality noted on limited single view  exam.    Electronically signed by:  Arnold Abbott  12/19/2017 10:22 PM  [facility] Workstation: [id number]          Assessment/Plan     Principal Problem:    Displaced fracture of base of neck of left femur, initial encounter for closed fracture  Active Problems:    Dementia with behavioral disturbance    Hypertension    Fever    Atrial fibrillation    Fever up to 101.4 - obtain BC, try PRN Tylenol.     Dimitri Goldman MD  12/20/17  5:26 PM       ADEN Moon

## 2021-12-30 NOTE — PHYSICAL THERAPY INITIAL EVALUATION ADULT - PERTINENT HX OF CURRENT PROBLEM, REHAB EVAL
88 yo Female hx of afib on coumadin, sent from [facility] to ED c/o rectal bleeding. Admitted with GIB.

## 2021-12-30 NOTE — DIETITIAN INITIAL EVALUATION ADULT. - PERTINENT MEDS FT
MEDICATIONS  (STANDING):  baclofen 10 milliGRAM(s) Oral at bedtime  cyanocobalamin 1000 MICROGram(s) Oral daily  dextrose 5% + sodium chloride 0.45%. 1000 milliLiter(s) (50 mL/Hr) IV Continuous <Continuous>  enalapril 2.5 milliGRAM(s) Oral daily  escitalopram 20 milliGRAM(s) Oral daily  folic acid 1 milliGRAM(s) Oral daily  furosemide    Tablet 20 milliGRAM(s) Oral daily  gabapentin 100 milliGRAM(s) Oral two times a day  hydrocortisone hemorrhoidal Suppository 1 Suppository(s) Rectal daily  metoprolol tartrate 25 milliGRAM(s) Oral two times a day  pantoprazole  Injectable 40 milliGRAM(s) IV Push daily  simvastatin 10 milliGRAM(s) Oral at bedtime    MEDICATIONS  (PRN):  acetaminophen     Tablet .. 650 milliGRAM(s) Oral every 6 hours PRN Temp greater or equal to 38C (100.4F), Mild Pain (1 - 3)  aluminum hydroxide/magnesium hydroxide/simethicone Suspension 30 milliLiter(s) Oral every 4 hours PRN Dyspepsia  melatonin 3 milliGRAM(s) Oral at bedtime PRN Insomnia  ondansetron Injectable 4 milliGRAM(s) IV Push every 8 hours PRN Nausea and/or Vomiting  traMADol 50 milliGRAM(s) Oral four times a day PRN Moderate Pain (4 - 6)

## 2021-12-30 NOTE — DIETITIAN INITIAL EVALUATION ADULT. - OTHER INFO
89 y/o female adm with rectal bleeding. PMH afib, neuropathy, HLD, constipation, drperssion, CVA, HTN. Pt visited at bedside this am. Pt sleeping soundly in bed. EMR reviewed. Low H/H high MCV, cyanobalamin and folic acid ordered. GI and hematology following. Pt adm from [facility]. Last BM 12/28.

## 2021-12-31 LAB
ANION GAP SERPL CALC-SCNC: 6 MMOL/L — SIGNIFICANT CHANGE UP (ref 5–17)
BUN SERPL-MCNC: 5 MG/DL — LOW (ref 7–23)
CALCIUM SERPL-MCNC: 8.1 MG/DL — LOW (ref 8.5–10.1)
CHLORIDE SERPL-SCNC: 109 MMOL/L — HIGH (ref 96–108)
CO2 SERPL-SCNC: 29 MMOL/L — SIGNIFICANT CHANGE UP (ref 22–31)
CREAT SERPL-MCNC: 0.62 MG/DL — SIGNIFICANT CHANGE UP (ref 0.5–1.3)
GLUCOSE SERPL-MCNC: 108 MG/DL — HIGH (ref 70–99)
HCT VFR BLD CALC: 27.5 % — LOW (ref 34.5–45)
HGB BLD-MCNC: 9.5 G/DL — LOW (ref 11.5–15.5)
INR BLD: 2.8 RATIO — HIGH (ref 0.88–1.16)
MCHC RBC-ENTMCNC: 34.5 GM/DL — SIGNIFICANT CHANGE UP (ref 32–36)
MCHC RBC-ENTMCNC: 42.8 PG — HIGH (ref 27–34)
MCV RBC AUTO: 123.9 FL — HIGH (ref 80–100)
NRBC # BLD: 0 /100 WBCS — SIGNIFICANT CHANGE UP (ref 0–0)
PLATELET # BLD AUTO: 456 K/UL — HIGH (ref 150–400)
POTASSIUM SERPL-MCNC: 3.6 MMOL/L — SIGNIFICANT CHANGE UP (ref 3.5–5.3)
POTASSIUM SERPL-SCNC: 3.6 MMOL/L — SIGNIFICANT CHANGE UP (ref 3.5–5.3)
PROTHROM AB SERPL-ACNC: 31.2 SEC — HIGH (ref 10.6–13.6)
RBC # BLD: 2.22 M/UL — LOW (ref 3.8–5.2)
RBC # FLD: 16 % — HIGH (ref 10.3–14.5)
SODIUM SERPL-SCNC: 144 MMOL/L — SIGNIFICANT CHANGE UP (ref 135–145)
WBC # BLD: 2.22 K/UL — LOW (ref 3.8–10.5)
WBC # FLD AUTO: 2.22 K/UL — LOW (ref 3.8–10.5)

## 2021-12-31 PROCEDURE — 74177 CT ABD & PELVIS W/CONTRAST: CPT | Mod: 26

## 2021-12-31 RX ADMIN — Medication 1 TABLET(S): at 17:41

## 2021-12-31 RX ADMIN — Medication 25 MILLIGRAM(S): at 17:41

## 2021-12-31 RX ADMIN — PANTOPRAZOLE SODIUM 40 MILLIGRAM(S): 20 TABLET, DELAYED RELEASE ORAL at 13:09

## 2021-12-31 RX ADMIN — GABAPENTIN 100 MILLIGRAM(S): 400 CAPSULE ORAL at 05:12

## 2021-12-31 RX ADMIN — Medication 1 SUPPOSITORY(S): at 13:07

## 2021-12-31 RX ADMIN — ESCITALOPRAM OXALATE 20 MILLIGRAM(S): 10 TABLET, FILM COATED ORAL at 13:06

## 2021-12-31 RX ADMIN — PREGABALIN 1000 MICROGRAM(S): 225 CAPSULE ORAL at 13:06

## 2021-12-31 RX ADMIN — SIMVASTATIN 10 MILLIGRAM(S): 20 TABLET, FILM COATED ORAL at 21:12

## 2021-12-31 RX ADMIN — GABAPENTIN 100 MILLIGRAM(S): 400 CAPSULE ORAL at 17:44

## 2021-12-31 RX ADMIN — Medication 2.5 MILLIGRAM(S): at 05:12

## 2021-12-31 RX ADMIN — SODIUM CHLORIDE 50 MILLILITER(S): 9 INJECTION, SOLUTION INTRAVENOUS at 05:19

## 2021-12-31 RX ADMIN — Medication 10 MILLIGRAM(S): at 21:12

## 2021-12-31 RX ADMIN — Medication 20 MILLIGRAM(S): at 05:12

## 2021-12-31 RX ADMIN — Medication 25 MILLIGRAM(S): at 05:12

## 2021-12-31 RX ADMIN — Medication 1 MILLIGRAM(S): at 13:06

## 2021-12-31 NOTE — SWALLOW BEDSIDE ASSESSMENT ADULT - SWALLOW EVAL: DIAGNOSIS
1- pt demonstrates functional oral management given puree, mildly thick liquid and thin liquid textures marked by adequate bolus collection, transfer and transport. 2- mild oral dysphagia given regular/solid marked by delayed oral preparation/AP transit. trace lingual residue remains post swallow which reduces given liquid wash. 3- mild pharyngeal dysphagia given regular/solid, puree, mildly thick liquid and thin liquid textures marked by delayed swallow trigger and reduced hyolaryngeal excursion. no overt s/s of penetration/aspiration noted.

## 2021-12-31 NOTE — CONSULT NOTE ADULT - SUBJECTIVE AND OBJECTIVE BOX
Patient is a 89y old  Female who presents with a chief complaint of rectal bleeding (31 Dec 2021 14:34)      HPI:  89 yo Female hx of afib on coumadin, sent from [facility] to ED  c/o rectal bleeding.  Denies any sob, cp.  No dizziness. no sob. Patient was hemodynamically stable with HB above 10 on arrival to ER .blood from rectum was noted by RN during transfer from stretcher to ED bed on arrival .GI consult Dr Newby called ,cardiology clearance requested for possible endoscopy and further OAC plan .Hematology eval requested for possible INR reversal if bleeding continues ,serial coagulation profiles ordered . (29 Dec 2021 06:37)      Asked to see patient for ID Consult    PAST MEDICAL & SURGICAL HISTORY:  Hypertension    CVA (cerebral vascular accident)    Depression    Constipation    Neuropathy    Hyperlipidemia    Grade I diastolic dysfunction    Afib    Neuropathy    No significant past surgical history        Allergies    No Known Allergies    Intolerances        REVIEW OF SYSTEMS:  All systems below were reviewed and are negative [  ]  HEENT:  ID:  Pulmonary:  Cardiac:  GI:  Renal:  Musculoskeletal:  All other systems above were reviewed and are negative   [  ]    HOME MEDICATIONS:    MEDICATIONS  (STANDING):  amoxicillin  875 milliGRAM(s)/clavulanate 1 Tablet(s) Oral two times a day  baclofen 10 milliGRAM(s) Oral at bedtime  cyanocobalamin 1000 MICROGram(s) Oral daily  dextrose 5% + sodium chloride 0.45%. 1000 milliLiter(s) (50 mL/Hr) IV Continuous <Continuous>  enalapril 2.5 milliGRAM(s) Oral daily  escitalopram 20 milliGRAM(s) Oral daily  folic acid 1 milliGRAM(s) Oral daily  furosemide    Tablet 20 milliGRAM(s) Oral daily  gabapentin 100 milliGRAM(s) Oral two times a day  hydrocortisone hemorrhoidal Suppository 1 Suppository(s) Rectal daily  metoprolol tartrate 25 milliGRAM(s) Oral two times a day  pantoprazole  Injectable 40 milliGRAM(s) IV Push daily  simvastatin 10 milliGRAM(s) Oral at bedtime    MEDICATIONS  (PRN):  acetaminophen     Tablet .. 650 milliGRAM(s) Oral every 6 hours PRN Temp greater or equal to 38C (100.4F), Mild Pain (1 - 3)  aluminum hydroxide/magnesium hydroxide/simethicone Suspension 30 milliLiter(s) Oral every 4 hours PRN Dyspepsia  melatonin 3 milliGRAM(s) Oral at bedtime PRN Insomnia  ondansetron Injectable 4 milliGRAM(s) IV Push every 8 hours PRN Nausea and/or Vomiting  traMADol 50 milliGRAM(s) Oral four times a day PRN Moderate Pain (4 - 6)      Vital Signs Last 24 Hrs  T(C): 36.9 (31 Dec 2021 04:30), Max: 36.9 (31 Dec 2021 04:30)  T(F): 98.5 (31 Dec 2021 04:30), Max: 98.5 (31 Dec 2021 04:30)  HR: 82 (31 Dec 2021 04:30) (82 - 82)  BP: 127/66 (31 Dec 2021 04:30) (127/66 - 127/66)  BP(mean): --  RR: 18 (31 Dec 2021 04:30) (18 - 18)  SpO2: 93% (31 Dec 2021 04:30) (93% - 93%)    PHYSICAL EXAM:  HEENT:  Neck:  Lungs:  Heart:  Abdomen:  Genital/ Rectal:  Extremities:  Neurologic:  Vascular:    I&O's Summary    30 Dec 2021 07:01  -  31 Dec 2021 07:00  --------------------------------------------------------  IN: 1150 mL / OUT: 1100 mL / NET: 50 mL    31 Dec 2021 07:01  -  31 Dec 2021 20:04  --------------------------------------------------------  IN: 1140 mL / OUT: 501 mL / NET: 639 mL        LABORATORY:                          9.5    2.22  )-----------( 456      ( 31 Dec 2021 07:31 )             27.5       ESR:                   12-30 @ 11:16  --    C-Reactive Protein:     12-30 @ 11:16  28    Procalcitonin:           12-30 @ 11:16   --  ESR:                   12-30 @ 08:30  71    C-Reactive Protein:     12-30 @ 08:30  --    Procalcitonin:           12-30 @ 08:30   --      12-31    144  |  109<H>  |  5<L>  ----------------------------<  108<H>  3.6   |  29  |  0.62    Ca    8.1<L>      31 Dec 2021 07:31            LABORATORY:    CBC Full  -  ( 31 Dec 2021 07:31 )  WBC Count : 2.22 K/uL  RBC Count : 2.22 M/uL  Hemoglobin : 9.5 g/dL  Hematocrit : 27.5 %  Platelet Count - Automated : 456 K/uL  Mean Cell Volume : 123.9 fl  Mean Cell Hemoglobin : 42.8 pg  Mean Cell Hemoglobin Concentration : 34.5 gm/dL  Auto Neutrophil # : x  Auto Lymphocyte # : x  Auto Monocyte # : x  Auto Eosinophil # : x  Auto Basophil # : x  Auto Neutrophil % : x  Auto Lymphocyte % : x  Auto Monocyte % : x  Auto Eosinophil % : x  Auto Basophil % : x        ESR:                   12-30 @ 11:16  --    C-Reactive Protein:     12-30 @ 11:16  28    Procalcitonin:           12-30 @ 11:16   --    ESR:                   12-30 @ 08:30  71    C-Reactive Protein:     12-30 @ 08:30  --    Procalcitonin:           12-30 @ 08:30   --      12-31    144  |  109<H>  |  5<L>  ----------------------------<  108<H>  3.6   |  29  |  0.62    Ca    8.1<L>      31 Dec 2021 07:31                                                     Patient is a 89y old  Female who presents with a chief complaint of rectal bleeding (31 Dec 2021 14:34)      HPI:  89 yo Female hx of afib on coumadin, sent from [facility] to ED  c/o rectal bleeding.  Denies any sob, cp.  No dizziness. no sob. Patient was hemodynamically stable with HB above 10 on arrival to ER .blood from rectum was noted by RN during transfer from stretcher to ED bed on arrival .GI consult Dr Newby called ,cardiology clearance requested for possible endoscopy and further OAC plan .Hematology eval requested for possible INR reversal if bleeding continues ,serial coagulation profiles ordered . (29 Dec 2021 06:37)      Asked to see patient for ID Consult    PAST MEDICAL & SURGICAL HISTORY:  Hypertension    CVA (cerebral vascular accident)    Depression    Constipation    Neuropathy    Hyperlipidemia    Grade I diastolic dysfunction    Afib    Neuropathy    No significant past surgical history        Allergies    No Known Allergies    Intolerances        REVIEW OF SYSTEMS:  No cough or SOB  No diarrhea.      HOME MEDICATIONS:    MEDICATIONS  (STANDING):  amoxicillin  875 milliGRAM(s)/clavulanate 1 Tablet(s) Oral two times a day  baclofen 10 milliGRAM(s) Oral at bedtime  cyanocobalamin 1000 MICROGram(s) Oral daily  dextrose 5% + sodium chloride 0.45%. 1000 milliLiter(s) (50 mL/Hr) IV Continuous <Continuous>  enalapril 2.5 milliGRAM(s) Oral daily  escitalopram 20 milliGRAM(s) Oral daily  folic acid 1 milliGRAM(s) Oral daily  furosemide    Tablet 20 milliGRAM(s) Oral daily  gabapentin 100 milliGRAM(s) Oral two times a day  hydrocortisone hemorrhoidal Suppository 1 Suppository(s) Rectal daily  metoprolol tartrate 25 milliGRAM(s) Oral two times a day  pantoprazole  Injectable 40 milliGRAM(s) IV Push daily  simvastatin 10 milliGRAM(s) Oral at bedtime    MEDICATIONS  (PRN):  acetaminophen     Tablet .. 650 milliGRAM(s) Oral every 6 hours PRN Temp greater or equal to 38C (100.4F), Mild Pain (1 - 3)  aluminum hydroxide/magnesium hydroxide/simethicone Suspension 30 milliLiter(s) Oral every 4 hours PRN Dyspepsia  melatonin 3 milliGRAM(s) Oral at bedtime PRN Insomnia  ondansetron Injectable 4 milliGRAM(s) IV Push every 8 hours PRN Nausea and/or Vomiting  traMADol 50 milliGRAM(s) Oral four times a day PRN Moderate Pain (4 - 6)      Vital Signs Last 24 Hrs  T(C): 36.9 (31 Dec 2021 04:30), Max: 36.9 (31 Dec 2021 04:30)  T(F): 98.5 (31 Dec 2021 04:30), Max: 98.5 (31 Dec 2021 04:30)  HR: 82 (31 Dec 2021 04:30) (82 - 82)  BP: 127/66 (31 Dec 2021 04:30) (127/66 - 127/66)  BP(mean): --  RR: 18 (31 Dec 2021 04:30) (18 - 18)  SpO2: 93% (31 Dec 2021 04:30) (93% - 93%)    PHYSICAL EXAM:  HEENT: NC/AT  Neck: Soft, no tenderness.  Lungs: Coarse BS bilaterally; no wheezing.   Heart: RRR, no murmurs.   Abdomen: Soft, no tenderness.   Genital/ Rectal: No salas   Extremities: No ulcers  Neurologic: Confused.         I&O's Summary    30 Dec 2021 07:01  -  31 Dec 2021 07:00  --------------------------------------------------------  IN: 1150 mL / OUT: 1100 mL / NET: 50 mL    31 Dec 2021 07:01  -  31 Dec 2021 20:04  --------------------------------------------------------  IN: 1140 mL / OUT: 501 mL / NET: 639 mL        LABORATORY:                          9.5    2.22  )-----------( 456      ( 31 Dec 2021 07:31 )             27.5       ESR:                   12-30 @ 11:16  --    C-Reactive Protein:     12-30 @ 11:16  28    Procalcitonin:           12-30 @ 11:16   --  ESR:                   12-30 @ 08:30  71    C-Reactive Protein:     12-30 @ 08:30  --    Procalcitonin:           12-30 @ 08:30   --      12-31    144  |  109<H>  |  5<L>  ----------------------------<  108<H>  3.6   |  29  |  0.62    Ca    8.1<L>      31 Dec 2021 07:31            LABORATORY:    CBC Full  -  ( 31 Dec 2021 07:31 )  WBC Count : 2.22 K/uL  RBC Count : 2.22 M/uL  Hemoglobin : 9.5 g/dL  Hematocrit : 27.5 %  Platelet Count - Automated : 456 K/uL  Mean Cell Volume : 123.9 fl  Mean Cell Hemoglobin : 42.8 pg  Mean Cell Hemoglobin Concentration : 34.5 gm/dL  Auto Neutrophil # : x  Auto Lymphocyte # : x  Auto Monocyte # : x  Auto Eosinophil # : x  Auto Basophil # : x  Auto Neutrophil % : x  Auto Lymphocyte % : x  Auto Monocyte % : x  Auto Eosinophil % : x  Auto Basophil % : x        ESR:                   12-30 @ 11:16  --    C-Reactive Protein:     12-30 @ 11:16  28    Procalcitonin:           12-30 @ 11:16   --    ESR:                   12-30 @ 08:30  71    C-Reactive Protein:     12-30 @ 08:30  --    Procalcitonin:           12-30 @ 08:30   --      12-31    144  |  109<H>  |  5<L>  ----------------------------<  108<H>  3.6   |  29  |  0.62    Ca    8.1<L>      31 Dec 2021 07:31      Assessment and plan:    1. Probable diverticulitis.  2. WILL bleed.    . Continue po Augmentin for now.  . Monitor the progression of abdominal pain.    Thank you

## 2021-12-31 NOTE — SWALLOW BEDSIDE ASSESSMENT ADULT - SWALLOW EVAL: RECOMMENDED FEEDING/EATING TECHNIQUES
allow for swallow between intakes/alternate food with liquid/position upright (90 degrees)/small sips/bites

## 2021-12-31 NOTE — SWALLOW BEDSIDE ASSESSMENT ADULT - COMMENTS
Pt was alert and cooperative for a clinical assessment of swallow function this AM. Per charting, pt is an '89 yo Female hx of afib on coumadin, sent from [facility] to ED  c/o rectal bleeding.  Denies any sob, cp.  No dizziness. no sob. Patient was hemodynamically stable with HB above 10 on arrival to ER .blood from rectum was noted by RN during transfer from stretcher to ED bed on arrival .GI consult Dr Newby called ,cardiology clearance requested for possible endoscopy and further OAC plan .Hematology eval requested for possible INR reversal if bleeding continues ,serial coagulation profiles ordered ." Recent GI note stating advance diet as tolerated. Recent CXR revealed "Persistent small left base reaction."

## 2021-12-31 NOTE — PROGRESS NOTE ADULT - SUBJECTIVE AND OBJECTIVE BOX
[facility]  Gus Sy PA-C  [facility] [place] [address]  [phone]      INTERVAL HPI/OVERNIGHT EVENTS:    no melena or hematochezia    MEDICATIONS  (STANDING):  baclofen 10 milliGRAM(s) Oral at bedtime  cyanocobalamin 1000 MICROGram(s) Oral daily  dextrose 5% + sodium chloride 0.45%. 1000 milliLiter(s) (50 mL/Hr) IV Continuous <Continuous>  enalapril 2.5 milliGRAM(s) Oral daily  escitalopram 20 milliGRAM(s) Oral daily  folic acid 1 milliGRAM(s) Oral daily  furosemide    Tablet 20 milliGRAM(s) Oral daily  gabapentin 100 milliGRAM(s) Oral two times a day  hydrocortisone hemorrhoidal Suppository 1 Suppository(s) Rectal daily  metoprolol tartrate 25 milliGRAM(s) Oral two times a day  pantoprazole  Injectable 40 milliGRAM(s) IV Push daily  simvastatin 10 milliGRAM(s) Oral at bedtime    MEDICATIONS  (PRN):  acetaminophen     Tablet .. 650 milliGRAM(s) Oral every 6 hours PRN Temp greater or equal to 38C (100.4F), Mild Pain (1 - 3)  aluminum hydroxide/magnesium hydroxide/simethicone Suspension 30 milliLiter(s) Oral every 4 hours PRN Dyspepsia  melatonin 3 milliGRAM(s) Oral at bedtime PRN Insomnia  ondansetron Injectable 4 milliGRAM(s) IV Push every 8 hours PRN Nausea and/or Vomiting  traMADol 50 milliGRAM(s) Oral four times a day PRN Moderate Pain (4 - 6)      Allergies    No Known Allergies    Intolerances        ROS:   General:  No wt loss, fevers, chills, night sweats, fatigue,   Eyes:  Good vision, no reported pain  ENT:  No sore throat, pain, runny nose, dysphagia  CV:  No pain, palpitations, hypo/hypertension  Resp:  No dyspnea, cough, tachypnea, wheezing  GI:  No pain, No nausea, No vomiting, No diarrhea, No constipation, No weight loss, No fever, No pruritis, No rectal bleeding, No tarry stools, No dysphagia,  :  No pain, bleeding, incontinence, nocturia  Muscle:  No pain, weakness  Neuro:  No weakness, tingling, memory problems  Psych:  No fatigue, insomnia, mood problems, depression  Endocrine:  No polyuria, polydipsia, cold/heat intolerance  Heme:  No petechiae, ecchymosis, easy bruisability  Skin:  No rash, tattoos, scars, edema      PHYSICAL EXAM:   Vital Signs:  Vital Signs Last 24 Hrs  T(C): 37.2 (30 Dec 2021 14:48), Max: 37.2 (29 Dec 2021 20:27)  T(F): 98.9 (30 Dec 2021 14:48), Max: 98.9 (29 Dec 2021 20:27)  HR: 99 (30 Dec 2021 14:48) (92 - 99)  BP: 144/83 (30 Dec 2021 14:48) (131/68 - 154/74)  BP(mean): --  RR: 18 (30 Dec 2021 14:48) (18 - 18)  SpO2: 95% (30 Dec 2021 14:48) (92% - 95%)  Daily     Daily Weight in k.2 (30 Dec 2021 04:32)    GENERAL:  Appears stated age,   HEENT:  NC/AT,    CHEST:  Full & symmetric excursion,   HEART:  Regular rhythm,  ABDOMEN:  Soft, non-tender, non-distended,  EXTEREMITIES:  no cyanosis  SKIN:  No rash  NEURO:  Alert,       LABS:                        9.8    2.07  )-----------( 469      ( 30 Dec 2021 08:30 )             28.7     12-30    146<H>  |  111<H>  |  7   ----------------------------<  118<H>  3.7   |  29  |  0.59    Ca    8.2<L>      30 Dec 2021 08:30    TPro  6.8  /  Alb  2.9<L>  /  TBili  0.3  /  DBili  x   /  AST  17  /  ALT  14  /  AlkPhos  62  12-29    PT/INR - ( 30 Dec 2021 08:30 )   PT: 32.8 sec;   INR: 2.95 ratio         PTT - ( 29 Dec 2021 00:59 )  PTT:43.4 sec      RADIOLOGY & ADDITIONAL TESTS:

## 2021-12-31 NOTE — PROGRESS NOTE ADULT - SUBJECTIVE AND OBJECTIVE BOX
All interim records and events noted.    comfortable in bed      MEDICATIONS  (STANDING):  baclofen 10 milliGRAM(s) Oral at bedtime  cyanocobalamin 1000 MICROGram(s) Oral daily  dextrose 5% + sodium chloride 0.45%. 1000 milliLiter(s) (50 mL/Hr) IV Continuous <Continuous>  enalapril 2.5 milliGRAM(s) Oral daily  escitalopram 20 milliGRAM(s) Oral daily  folic acid 1 milliGRAM(s) Oral daily  furosemide    Tablet 20 milliGRAM(s) Oral daily  gabapentin 100 milliGRAM(s) Oral two times a day  hydrocortisone hemorrhoidal Suppository 1 Suppository(s) Rectal daily  metoprolol tartrate 25 milliGRAM(s) Oral two times a day  pantoprazole  Injectable 40 milliGRAM(s) IV Push daily  simvastatin 10 milliGRAM(s) Oral at bedtime    MEDICATIONS  (PRN):  acetaminophen     Tablet .. 650 milliGRAM(s) Oral every 6 hours PRN Temp greater or equal to 38C (100.4F), Mild Pain (1 - 3)  aluminum hydroxide/magnesium hydroxide/simethicone Suspension 30 milliLiter(s) Oral every 4 hours PRN Dyspepsia  melatonin 3 milliGRAM(s) Oral at bedtime PRN Insomnia  ondansetron Injectable 4 milliGRAM(s) IV Push every 8 hours PRN Nausea and/or Vomiting  traMADol 50 milliGRAM(s) Oral four times a day PRN Moderate Pain (4 - 6)      Vital Signs Last 24 Hrs  T(C): 36.9 (31 Dec 2021 04:30), Max: 37.4 (30 Dec 2021 19:57)  T(F): 98.5 (31 Dec 2021 04:30), Max: 99.3 (30 Dec 2021 19:57)  HR: 82 (31 Dec 2021 04:30) (82 - 100)  BP: 127/66 (31 Dec 2021 04:30) (127/66 - 159/72)  BP(mean): --  RR: 18 (31 Dec 2021 04:30) (18 - 18)  SpO2: 93% (31 Dec 2021 04:30) (93% - 95%)    PHYSICAL EXAM  General: well developed  well nourished, in no acute distress  Head: atraumatic, normocephalic  ENT: sclera anicteric  Neck: supple, trachea midline  CV: S1 S2, regular rate and rhythm  Lungs: clear to auscultation, no wheezes/rhonchi  Abdomen: soft, nontender, bowel sounds present, no palpable masses, pouchy  Extrem: no clubbing/cyanosis/edema  Skin: no significant increased ecchymosis/petechiae  Neuro: alert and oriented,  no focal deficits      LABS:             9.5    2.22  )-----------( 456      ( 12-31 @ 07:31 )             27.5                9.8    2.07  )-----------( 469      ( 12-30 @ 08:30 )             28.7                8.7    1.80  )-----------( 419      ( 12-29 @ 11:34 )             25.2                10.2   2.71  )-----------( 516      ( 12-29 @ 00:59 )             29.8       12-31    144  |  109<H>  |  5<L>  ----------------------------<  108<H>  3.6   |  29  |  0.62    Ca    8.1<L>      31 Dec 2021 07:31      12-31 @ 07:31  PT31.2 INR2.80  PTT--  12-30 @ 08:30  PT32.8 INR2.95  PTT--  12-29 @ 11:34  PT36.3 INR3.28  PTT--  12-29 @ 00:59  PT30.9 INR2.77  PTT43.4      RADIOLOGY & ADDITIONAL STUDIES:    IMPRESSION/RECOMMENDATIONS:

## 2021-12-31 NOTE — PROGRESS NOTE ADULT - SUBJECTIVE AND OBJECTIVE BOX
PROGRESS NOTE  Patient is a 89y old  Female who presents with a chief complaint of rectal bleeding (31 Dec 2021 10:18)  Chart and available morning labs /imaging are reviewed electronically , urgent issues addressed . More information  is being added upon completion of rounds , when more information is collected and management discussed with consultants , medical staff and social service/case management on the floor     OVERNIGHT      HPI:  89 yo Female hx of afib on coumadin, sent from [facility] to ED  c/o rectal bleeding.  Denies any sob, cp.  No dizziness. no sob. Patient was hemodynamically stable with HB above 10 on arrival to ER .[facility] .GI consult Dr Newby called ,cardiology clearance requested for possible endoscopy and further OAC plan .Hematology eval requested for possible INR reversal if bleeding continues ,serial coagulation profiles ordered . (29 Dec 2021 06:37)    PAST MEDICAL & SURGICAL HISTORY:  Hyperlipidemia    Neuropathy    Hypertension    CVA (cerebral vascular accident)    Depression    Constipation    Grade I diastolic dysfunction    Afib    Neuropathy    No significant past surgical history        MEDICATIONS  (STANDING):  baclofen 10 milliGRAM(s) Oral at bedtime  cyanocobalamin 1000 MICROGram(s) Oral daily  dextrose 5% + sodium chloride 0.45%. 1000 milliLiter(s) (50 mL/Hr) IV Continuous <Continuous>  enalapril 2.5 milliGRAM(s) Oral daily  escitalopram 20 milliGRAM(s) Oral daily  folic acid 1 milliGRAM(s) Oral daily  furosemide    Tablet 20 milliGRAM(s) Oral daily  gabapentin 100 milliGRAM(s) Oral two times a day  hydrocortisone hemorrhoidal Suppository 1 Suppository(s) Rectal daily  metoprolol tartrate 25 milliGRAM(s) Oral two times a day  pantoprazole  Injectable 40 milliGRAM(s) IV Push daily  simvastatin 10 milliGRAM(s) Oral at bedtime    MEDICATIONS  (PRN):  acetaminophen     Tablet .. 650 milliGRAM(s) Oral every 6 hours PRN Temp greater or equal to 38C (100.4F), Mild Pain (1 - 3)  aluminum hydroxide/magnesium hydroxide/simethicone Suspension 30 milliLiter(s) Oral every 4 hours PRN Dyspepsia  melatonin 3 milliGRAM(s) Oral at bedtime PRN Insomnia  ondansetron Injectable 4 milliGRAM(s) IV Push every 8 hours PRN Nausea and/or Vomiting  traMADol 50 milliGRAM(s) Oral four times a day PRN Moderate Pain (4 - 6)      OBJECTIVE    T(C): 36.9 (12-31-21 @ 04:30), Max: 37.4 (12-30-21 @ 19:57)  HR: 82 (12-31-21 @ 04:30) (82 - 100)  BP: 127/66 (12-31-21 @ 04:30) (127/66 - 159/72)  RR: 18 (12-31-21 @ 04:30) (18 - 18)  SpO2: 93% (12-31-21 @ 04:30) (93% - 95%)  Wt(kg): --  I&O's Summary    30 Dec 2021 07:01  -  31 Dec 2021 07:00  --------------------------------------------------------  IN: 1150 mL / OUT: 1100 mL / NET: 50 mL          REVIEW OF SYSTEMS:  CONSTITUTIONAL: No fever, weight loss, or fatigue  EYES: No eye pain, visual disturbances, or discharge  ENMT:   No sinus or throat pain  NECK: No pain or stiffness  RESPIRATORY: No cough, wheezing, chills or hemoptysis; No shortness of breath  CARDIOVASCULAR: No chest pain, palpitations, dizziness, or leg swelling  GASTROINTESTINAL: No abdominal pain. No nausea, vomiting; No diarrhea or constipation. No melena or hematochezia.  GENITOURINARY: No dysuria, frequency, hematuria, or incontinence  NEUROLOGICAL: No headaches, memory loss, loss of strength, numbness, or tremors  SKIN: No itching, burning, rashes, or lesions   MUSCULOSKELETAL: No joint pain or swelling; No muscle, back, or extremity pain    PHYSICAL EXAM:  Appearance: NAD. VS past 24 hrs -as above   HEENT:   Moist oral mucosa. Conjunctiva clear b/l.   Neck : supple  Respiratory: Lungs CTAB.  Gastrointestinal:  Soft, nontender. No rebound. No rigidity. BS present	  Cardiovascular: RRR ,S1S2 present  Neurologic: Non-focal. Moving all extremities.  Extremities: No edema. No erythema. No calf tenderness.  Skin: No rashes, No ecchymoses, No cyanosis.	  wounds ,skin lesions-See skin assesment flow sheet   LABS:                        9.5    2.22  )-----------( 456      ( 31 Dec 2021 07:31 )             27.5     12-31    144  |  109<H>  |  5<L>  ----------------------------<  108<H>  3.6   |  29  |  0.62    Ca    8.1<L>      31 Dec 2021 07:31      CAPILLARY BLOOD GLUCOSE        PT/INR - ( 31 Dec 2021 07:31 )   PT: 31.2 sec;   INR: 2.80 ratio               RADIOLOGY & ADDITIONAL TESTS:   reviewed elctronically  ASSESSMENT/PLAN:

## 2021-12-31 NOTE — PROGRESS NOTE ADULT - SUBJECTIVE AND OBJECTIVE BOX
Patient is a 89y Female with a known history of :  Hypertension [I10]    CVA (cerebral vascular accident) [I63.9]    Depression [F32.9]    Constipation [K59.00]    Grade I diastolic dysfunction [I51.89]    Afib [I48.91]    Neuropathy [G62.9]    GIB (gastrointestinal bleeding) [K92.2]    Prophylactic measure [Z29.9]      HPI:  88 yo Female hx of afib on coumadin, sent from [facility] to ED  c/o rectal bleeding.  Denies any sob, cp.  No dizziness. no sob. Patient was hemodynamically stable with HB above 10 on arrival to ER .blood from rectum was noted by RN during transfer from [facility] to ED bed on arrival .GI consult Dr Newby called ,cardiology clearance requested for possible endoscopy and further OAC plan .Hematology eval requested for possible INR reversal if bleeding continues ,serial coagulation profiles ordered . (29 Dec 2021 06:37)      REVIEW OF SYSTEMS:    CONSTITUTIONAL: No fever, weight loss, or fatigue  EYES: No eye pain, visual disturbances, or discharge  ENMT:  No difficulty hearing, tinnitus, vertigo; No sinus or throat pain  NECK: No pain or stiffness  BREASTS: No pain, masses, or nipple discharge  RESPIRATORY: No cough, wheezing, chills or hemoptysis; No shortness of breath  CARDIOVASCULAR: No chest pain, palpitations, dizziness, or leg swelling  GASTROINTESTINAL: No abdominal or epigastric pain. No nausea, vomiting, or hematemesis; No diarrhea or constipation. No melena or hematochezia.  GENITOURINARY: No dysuria, frequency, hematuria, or incontinence  NEUROLOGICAL: No headaches, memory loss, loss of strength, numbness, or tremors  SKIN: No itching, burning, rashes, or lesions   LYMPH NODES: No enlarged glands  ENDOCRINE: No heat or cold intolerance; No hair loss  MUSCULOSKELETAL: No joint pain or swelling; No muscle, back, or extremity pain  PSYCHIATRIC: No depression, anxiety, mood swings, or difficulty sleeping  HEME/LYMPH: No easy bruising, or bleeding gums  ALLERGY AND IMMUNOLOGIC: No hives or eczema    MEDICATIONS  (STANDING):  baclofen 10 milliGRAM(s) Oral at bedtime  cyanocobalamin 1000 MICROGram(s) Oral daily  dextrose 5% + sodium chloride 0.45%. 1000 milliLiter(s) (50 mL/Hr) IV Continuous <Continuous>  enalapril 2.5 milliGRAM(s) Oral daily  escitalopram 20 milliGRAM(s) Oral daily  folic acid 1 milliGRAM(s) Oral daily  furosemide    Tablet 20 milliGRAM(s) Oral daily  gabapentin 100 milliGRAM(s) Oral two times a day  hydrocortisone hemorrhoidal Suppository 1 Suppository(s) Rectal daily  metoprolol tartrate 25 milliGRAM(s) Oral two times a day  pantoprazole  Injectable 40 milliGRAM(s) IV Push daily  simvastatin 10 milliGRAM(s) Oral at bedtime    MEDICATIONS  (PRN):  acetaminophen     Tablet .. 650 milliGRAM(s) Oral every 6 hours PRN Temp greater or equal to 38C (100.4F), Mild Pain (1 - 3)  aluminum hydroxide/magnesium hydroxide/simethicone Suspension 30 milliLiter(s) Oral every 4 hours PRN Dyspepsia  melatonin 3 milliGRAM(s) Oral at bedtime PRN Insomnia  ondansetron Injectable 4 milliGRAM(s) IV Push every 8 hours PRN Nausea and/or Vomiting  traMADol 50 milliGRAM(s) Oral four times a day PRN Moderate Pain (4 - 6)      ALLERGIES: No Known Allergies      FAMILY HISTORY:  No pertinent family history in first degree relatives        PHYSICAL EXAMINATION:  -----------------------------  T(C): 36.9 (12-31-21 @ 04:30), Max: 37.4 (12-30-21 @ 19:57)  HR: 82 (12-31-21 @ 04:30) (82 - 100)  BP: 127/66 (12-31-21 @ 04:30) (127/66 - 159/72)  RR: 18 (12-31-21 @ 04:30) (18 - 18)  SpO2: 93% (12-31-21 @ 04:30) (93% - 95%)  Wt(kg): --    12-30 @ 07:01  -  12-31 @ 07:00  --------------------------------------------------------  IN:    dextrose 5% + sodium chloride 0.45%: 1150 mL  Total IN: 1150 mL    OUT:    Voided (mL): 1100 mL  Total OUT: 1100 mL    Total NET: 50 mL            VITALS  T(C): 36.9 (12-31-21 @ 04:30), Max: 37.4 (12-30-21 @ 19:57)  HR: 82 (12-31-21 @ 04:30) (82 - 100)  BP: 127/66 (12-31-21 @ 04:30) (127/66 - 159/72)  RR: 18 (12-31-21 @ 04:30) (18 - 18)  SpO2: 93% (12-31-21 @ 04:30) (93% - 95%)    Constitutional: well developed, normal appearance, well groomed, well nourished, no deformities and no acute distress.   Eyes: the conjunctiva exhibited no abnormalities and the eyelids demonstrated no xanthelasmas.   HEENT: normal oral mucosa, no oral pallor and no oral cyanosis.   Neck: normal jugular venous A waves present, normal jugular venous V waves present and no jugular venous castillo A waves.   Pulmonary: no respiratory distress, normal respiratory rhythm and effort, no accessory muscle use and lungs were clear to auscultation bilaterally.   Cardiovascular: heart rate and rhythm were normal, normal S1 and S2 and no murmur, gallop, rub, heave or thrill are present.   Abdomen: soft, non-tender, no hepato-splenomegaly and no abdominal mass palpated.   Musculoskeletal: the gait could not be assessed..   Extremities: no clubbing of the fingernails, no localized cyanosis, no petechial hemorrhages and no ischemic changes.   Skin: normal skin color and pigmentation, no rash, no venous stasis, no skin lesions, no skin ulcer and no xanthoma was observed.   Psychiatric: oriented to person, place, and time, the affect was normal, the mood was normal and not feeling anxious.     LABS:   --------  12-31    144  |  109<H>  |  5<L>  ----------------------------<  108<H>  3.6   |  29  |  0.62    Ca    8.1<L>      31 Dec 2021 07:31                           9.5    2.22  )-----------( 456      ( 31 Dec 2021 07:31 )             27.5     PT/INR - ( 31 Dec 2021 07:31 )   PT: 31.2 sec;   INR: 2.80 ratio                     RADIOLOGY:  -----------------    ECG:     ECHO:

## 2022-01-01 LAB
ALBUMIN SERPL ELPH-MCNC: 2.7 G/DL — LOW (ref 3.3–5)
ALP SERPL-CCNC: 55 U/L — SIGNIFICANT CHANGE UP (ref 40–120)
ALT FLD-CCNC: 14 U/L — SIGNIFICANT CHANGE UP (ref 12–78)
ANION GAP SERPL CALC-SCNC: 7 MMOL/L — SIGNIFICANT CHANGE UP (ref 5–17)
AST SERPL-CCNC: 12 U/L — LOW (ref 15–37)
BILIRUB SERPL-MCNC: 0.5 MG/DL — SIGNIFICANT CHANGE UP (ref 0.2–1.2)
BUN SERPL-MCNC: 6 MG/DL — LOW (ref 7–23)
CALCIUM SERPL-MCNC: 8.2 MG/DL — LOW (ref 8.5–10.1)
CHLORIDE SERPL-SCNC: 108 MMOL/L — SIGNIFICANT CHANGE UP (ref 96–108)
CO2 SERPL-SCNC: 28 MMOL/L — SIGNIFICANT CHANGE UP (ref 22–31)
CREAT SERPL-MCNC: 0.63 MG/DL — SIGNIFICANT CHANGE UP (ref 0.5–1.3)
GLUCOSE SERPL-MCNC: 114 MG/DL — HIGH (ref 70–99)
HCT VFR BLD CALC: 28.4 % — LOW (ref 34.5–45)
HGB BLD-MCNC: 9.9 G/DL — LOW (ref 11.5–15.5)
INR BLD: 2 RATIO — HIGH (ref 0.88–1.16)
MCHC RBC-ENTMCNC: 34.9 GM/DL — SIGNIFICANT CHANGE UP (ref 32–36)
MCHC RBC-ENTMCNC: 43.2 PG — HIGH (ref 27–34)
MCV RBC AUTO: 124 FL — HIGH (ref 80–100)
NRBC # BLD: 1 /100 WBCS — HIGH (ref 0–0)
PLATELET # BLD AUTO: 465 K/UL — HIGH (ref 150–400)
POTASSIUM SERPL-MCNC: 3.5 MMOL/L — SIGNIFICANT CHANGE UP (ref 3.5–5.3)
POTASSIUM SERPL-SCNC: 3.5 MMOL/L — SIGNIFICANT CHANGE UP (ref 3.5–5.3)
PROCALCITONIN SERPL-MCNC: <0.05 — SIGNIFICANT CHANGE UP (ref 0–0.04)
PROT SERPL-MCNC: 6.3 G/DL — SIGNIFICANT CHANGE UP (ref 6–8.3)
PROTHROM AB SERPL-ACNC: 22.6 SEC — HIGH (ref 10.6–13.6)
RBC # BLD: 2.29 M/UL — LOW (ref 3.8–5.2)
RBC # FLD: 16.4 % — HIGH (ref 10.3–14.5)
SODIUM SERPL-SCNC: 143 MMOL/L — SIGNIFICANT CHANGE UP (ref 135–145)
WBC # BLD: 2.43 K/UL — LOW (ref 3.8–10.5)
WBC # FLD AUTO: 2.43 K/UL — LOW (ref 3.8–10.5)

## 2022-01-01 RX ADMIN — Medication 25 MILLIGRAM(S): at 18:01

## 2022-01-01 RX ADMIN — GABAPENTIN 100 MILLIGRAM(S): 400 CAPSULE ORAL at 18:01

## 2022-01-01 RX ADMIN — PREGABALIN 1000 MICROGRAM(S): 225 CAPSULE ORAL at 11:28

## 2022-01-01 RX ADMIN — Medication 2.5 MILLIGRAM(S): at 07:06

## 2022-01-01 RX ADMIN — Medication 10 MILLIGRAM(S): at 21:27

## 2022-01-01 RX ADMIN — GABAPENTIN 100 MILLIGRAM(S): 400 CAPSULE ORAL at 07:09

## 2022-01-01 RX ADMIN — ESCITALOPRAM OXALATE 20 MILLIGRAM(S): 10 TABLET, FILM COATED ORAL at 11:28

## 2022-01-01 RX ADMIN — Medication 1 MILLIGRAM(S): at 11:28

## 2022-01-01 RX ADMIN — SIMVASTATIN 10 MILLIGRAM(S): 20 TABLET, FILM COATED ORAL at 21:27

## 2022-01-01 RX ADMIN — Medication 25 MILLIGRAM(S): at 07:06

## 2022-01-01 RX ADMIN — Medication 875 MILLIGRAM(S): at 18:03

## 2022-01-01 RX ADMIN — SODIUM CHLORIDE 50 MILLILITER(S): 9 INJECTION, SOLUTION INTRAVENOUS at 04:01

## 2022-01-01 RX ADMIN — Medication 875 MILLIGRAM(S): at 08:12

## 2022-01-01 RX ADMIN — Medication 1 SUPPOSITORY(S): at 11:28

## 2022-01-01 RX ADMIN — PANTOPRAZOLE SODIUM 40 MILLIGRAM(S): 20 TABLET, DELAYED RELEASE ORAL at 11:29

## 2022-01-01 RX ADMIN — Medication 20 MILLIGRAM(S): at 07:06

## 2022-01-01 NOTE — PROGRESS NOTE ADULT - SUBJECTIVE AND OBJECTIVE BOX
[facility]  Gus Sy PA-C  [facility] [place] [address]  [phone]      INTERVAL HPI/OVERNIGHT EVENTS:    no melena or hematochezia  large brown stool overnight   donavan diet    MEDICATIONS  (STANDING):  baclofen 10 milliGRAM(s) Oral at bedtime  cyanocobalamin 1000 MICROGram(s) Oral daily  dextrose 5% + sodium chloride 0.45%. 1000 milliLiter(s) (50 mL/Hr) IV Continuous <Continuous>  enalapril 2.5 milliGRAM(s) Oral daily  escitalopram 20 milliGRAM(s) Oral daily  folic acid 1 milliGRAM(s) Oral daily  furosemide    Tablet 20 milliGRAM(s) Oral daily  gabapentin 100 milliGRAM(s) Oral two times a day  hydrocortisone hemorrhoidal Suppository 1 Suppository(s) Rectal daily  metoprolol tartrate 25 milliGRAM(s) Oral two times a day  pantoprazole  Injectable 40 milliGRAM(s) IV Push daily  simvastatin 10 milliGRAM(s) Oral at bedtime    MEDICATIONS  (PRN):  acetaminophen     Tablet .. 650 milliGRAM(s) Oral every 6 hours PRN Temp greater or equal to 38C (100.4F), Mild Pain (1 - 3)  aluminum hydroxide/magnesium hydroxide/simethicone Suspension 30 milliLiter(s) Oral every 4 hours PRN Dyspepsia  melatonin 3 milliGRAM(s) Oral at bedtime PRN Insomnia  ondansetron Injectable 4 milliGRAM(s) IV Push every 8 hours PRN Nausea and/or Vomiting  traMADol 50 milliGRAM(s) Oral four times a day PRN Moderate Pain (4 - 6)      Allergies    No Known Allergies    Intolerances        ROS:   General:  No wt loss, fevers, chills, night sweats, fatigue,   Eyes:  Good vision, no reported pain  ENT:  No sore throat, pain, runny nose, dysphagia  CV:  No pain, palpitations, hypo/hypertension  Resp:  No dyspnea, cough, tachypnea, wheezing  GI:  No pain, No nausea, No vomiting, No diarrhea, No constipation, No weight loss, No fever, No pruritis, No rectal bleeding, No tarry stools, No dysphagia,  :  No pain, bleeding, incontinence, nocturia  Muscle:  No pain, weakness  Neuro:  No weakness, tingling, memory problems  Psych:  No fatigue, insomnia, mood problems, depression  Endocrine:  No polyuria, polydipsia, cold/heat intolerance  Heme:  No petechiae, ecchymosis, easy bruisability  Skin:  No rash, tattoos, scars, edema      PHYSICAL EXAM:   Vital Signs:  Vital Signs Last 24 Hrs  T(C): 37.2 (30 Dec 2021 14:48), Max: 37.2 (29 Dec 2021 20:27)  T(F): 98.9 (30 Dec 2021 14:48), Max: 98.9 (29 Dec 2021 20:27)  HR: 99 (30 Dec 2021 14:48) (92 - 99)  BP: 144/83 (30 Dec 2021 14:48) (131/68 - 154/74)  BP(mean): --  RR: 18 (30 Dec 2021 14:48) (18 - 18)  SpO2: 95% (30 Dec 2021 14:48) (92% - 95%)  Daily     Daily Weight in k.2 (30 Dec 2021 04:32)    GENERAL:  Appears stated age,   HEENT:  NC/AT,    CHEST:  Full & symmetric excursion,   HEART:  Regular rhythm,  ABDOMEN:  Soft, non-tender, non-distended,  EXTEREMITIES:  no cyanosis  SKIN:  No rash  NEURO:  Alert,       LABS:                        9.8    2.07  )-----------( 469      ( 30 Dec 2021 08:30 )             28.7     12-30    146<H>  |  111<H>  |  7   ----------------------------<  118<H>  3.7   |  29  |  0.59    Ca    8.2<L>      30 Dec 2021 08:30    TPro  6.8  /  Alb  2.9<L>  /  TBili  0.3  /  DBili  x   /  AST  17  /  ALT  14  /  AlkPhos  62  12-    PT/INR - ( 30 Dec 2021 08:30 )   PT: 32.8 sec;   INR: 2.95 ratio         PTT - ( 29 Dec 2021 00:59 )  PTT:43.4 sec      RADIOLOGY & ADDITIONAL TESTS:

## 2022-01-01 NOTE — CHART NOTE - NSCHARTNOTEFT_GEN_A_CORE
Called by RN for pt c/o Left foot numbness/tingling. Pt seen and examined at bedside. Pt is AAOx3. Admits to numbness of the left foot, which she states has happened in the past. Also admits to residual R arm weakness 2/2 prior CVA. Currently off coumadin in setting of GI bleed.     T(C): 37.1 (12-31-21 @ 19:27), Max: 37.1 (12-31-21 @ 19:27)  HR: 98 (12-31-21 @ 19:27) (82 - 98)  BP: 113/65 (12-31-21 @ 19:27) (113/65 - 127/66)  RR: 18 (12-31-21 @ 19:27) (18 - 18)  SpO2: 92% (12-31-21 @ 19:27) (92% - 93%)    GENERAL: patient appears well, no acute distress, appropriately interactive  EYES: sclera clear, no exudates; ROBERT  LUNGS: clear to auscultation bilaterally, no wheezing or rhonchi appreciated  HEART: soft S1/S2, regular rate and rhythm, no murmurs noted  NEUROLOGIC: awake, alert, oriented x3, 5/5 muscle strength in Left arm and bilateral legs; CN II-XII intact b/l      A/P: 89 yo Female hx of afib on coumadin, CVA, HTN, sent from [facility] for GI bleed.     -Do not suspect acute cva, numbness likely 2/2 neuropathy which pt takes gabapentin for   -coumadin on hold in setting of GI bleed, cont SCDs  -RN to call for any significant changes   -Will continue to monitor

## 2022-01-01 NOTE — PROGRESS NOTE ADULT - SUBJECTIVE AND OBJECTIVE BOX
All interim records and events noted.    no pain no sig fatigue      MEDICATIONS  (STANDING):  amoxicillin   80 mG/mL/clavulanate Suspension 875 milliGRAM(s) Oral two times a day  baclofen 10 milliGRAM(s) Oral at bedtime  cyanocobalamin 1000 MICROGram(s) Oral daily  dextrose 5% + sodium chloride 0.45%. 1000 milliLiter(s) (50 mL/Hr) IV Continuous <Continuous>  enalapril 2.5 milliGRAM(s) Oral daily  escitalopram 20 milliGRAM(s) Oral daily  folic acid 1 milliGRAM(s) Oral daily  furosemide    Tablet 20 milliGRAM(s) Oral daily  gabapentin 100 milliGRAM(s) Oral two times a day  hydrocortisone hemorrhoidal Suppository 1 Suppository(s) Rectal daily  metoprolol tartrate 25 milliGRAM(s) Oral two times a day  pantoprazole  Injectable 40 milliGRAM(s) IV Push daily  simvastatin 10 milliGRAM(s) Oral at bedtime    MEDICATIONS  (PRN):  acetaminophen     Tablet .. 650 milliGRAM(s) Oral every 6 hours PRN Temp greater or equal to 38C (100.4F), Mild Pain (1 - 3)  aluminum hydroxide/magnesium hydroxide/simethicone Suspension 30 milliLiter(s) Oral every 4 hours PRN Dyspepsia  melatonin 3 milliGRAM(s) Oral at bedtime PRN Insomnia  ondansetron Injectable 4 milliGRAM(s) IV Push every 8 hours PRN Nausea and/or Vomiting  traMADol 50 milliGRAM(s) Oral four times a day PRN Moderate Pain (4 - 6)      Vital Signs Last 24 Hrs  T(C): 37.2 (01 Jan 2022 05:15), Max: 37.2 (01 Jan 2022 05:15)  T(F): 98.9 (01 Jan 2022 05:15), Max: 98.9 (01 Jan 2022 05:15)  HR: 78 (01 Jan 2022 05:15) (78 - 98)  BP: 124/73 (01 Jan 2022 05:15) (113/65 - 124/73)  BP(mean): --  RR: 18 (01 Jan 2022 05:15) (18 - 18)  SpO2: 90% (01 Jan 2022 05:15) (90% - 92%)    PHYSICAL EXAM  General: well developed  well nourished, in no acute distress  Head: atraumatic, normocephalic  ENT: sclera anicteric, buccal mucosa moist  Neck: supple, trachea midline  CV: S1 S2, regular rate and rhythm  Lungs: clear to auscultation, no wheezes/rhonchi  Abdomen: soft, nontender, bowel sounds present, no palpable masses, pouchy  Extrem: no clubbing/cyanosis/edema  Skin: no significant increased ecchymosis/petechiae  Neuro: alert and oriented X3,  no focal deficits      LABS:             9.9    2.43  )-----------( 465      ( 01-01 @ 09:37 )             28.4                9.5    2.22  )-----------( 456      ( 12-31 @ 07:31 )             27.5                9.8    2.07  )-----------( 469      ( 12-30 @ 08:30 )             28.7                8.7    1.80  )-----------( 419      ( 12-29 @ 11:34 )             25.2       01-01    143  |  108  |  6<L>  ----------------------------<  114<H>  3.5   |  28  |  0.63    Ca    8.2<L>      01 Jan 2022 09:37    TPro  6.3  /  Alb  2.7<L>  /  TBili  0.5  /  DBili  x   /  AST  12<L>  /  ALT  14  /  AlkPhos  55  01-01 01-01 @ 09:37  PT22.6 INR2.00  PTT--  12-31 @ 07:31  PT31.2 INR2.80  PTT--  12-30 @ 08:30  PT32.8 INR2.95  PTT--  12-29 @ 11:34  PT36.3 INR3.28  PTT--  12-29 @ 00:59  PT30.9 INR2.77  PTT43.4      RADIOLOGY & ADDITIONAL STUDIES:    IMPRESSION/RECOMMENDATIONS:

## 2022-01-01 NOTE — PROGRESS NOTE ADULT - SUBJECTIVE AND OBJECTIVE BOX
Patient is a 89y Female with a known history of :  Hypertension [I10]    CVA (cerebral vascular accident) [I63.9]    Depression [F32.9]    Constipation [K59.00]    Grade I diastolic dysfunction [I51.89]    Afib [I48.91]    Neuropathy [G62.9]    GIB (gastrointestinal bleeding) [K92.2]    Prophylactic measure [Z29.9]      HPI:  88 yo Female hx of afib on coumadin, sent from [facility] to ED  c/o rectal bleeding.  Denies any sob, cp.  No dizziness. no sob. Patient was hemodynamically stable with HB above 10 on arrival to ER .blood from rectum was noted by RN during transfer from [facility] to ED bed on arrival .GI consult Dr Newby called ,cardiology clearance requested for possible endoscopy and further OAC plan .Hematology eval requested for possible INR reversal if bleeding continues ,serial coagulation profiles ordered . (29 Dec 2021 06:37)      REVIEW OF SYSTEMS:    CONSTITUTIONAL: No fever, weight loss, or fatigue  EYES: No eye pain, visual disturbances, or discharge  ENMT:  No difficulty hearing, tinnitus, vertigo; No sinus or throat pain  NECK: No pain or stiffness  BREASTS: No pain, masses, or nipple discharge  RESPIRATORY: No cough, wheezing, chills or hemoptysis; No shortness of breath  CARDIOVASCULAR: No chest pain, palpitations, dizziness, or leg swelling  GASTROINTESTINAL: No abdominal or epigastric pain. No nausea, vomiting, or hematemesis; No diarrhea or constipation. No melena or hematochezia.  GENITOURINARY: No dysuria, frequency, hematuria, or incontinence  NEUROLOGICAL: No headaches, memory loss, loss of strength, numbness, or tremors  SKIN: No itching, burning, rashes, or lesions   LYMPH NODES: No enlarged glands  ENDOCRINE: No heat or cold intolerance; No hair loss  MUSCULOSKELETAL: No joint pain or swelling; No muscle, back, or extremity pain  PSYCHIATRIC: No depression, anxiety, mood swings, or difficulty sleeping  HEME/LYMPH: No easy bruising, or bleeding gums  ALLERGY AND IMMUNOLOGIC: No hives or eczema    MEDICATIONS  (STANDING):  amoxicillin   80 mG/mL/clavulanate Suspension 875 milliGRAM(s) Oral two times a day  baclofen 10 milliGRAM(s) Oral at bedtime  cyanocobalamin 1000 MICROGram(s) Oral daily  dextrose 5% + sodium chloride 0.45%. 1000 milliLiter(s) (50 mL/Hr) IV Continuous <Continuous>  enalapril 2.5 milliGRAM(s) Oral daily  escitalopram 20 milliGRAM(s) Oral daily  folic acid 1 milliGRAM(s) Oral daily  furosemide    Tablet 20 milliGRAM(s) Oral daily  gabapentin 100 milliGRAM(s) Oral two times a day  hydrocortisone hemorrhoidal Suppository 1 Suppository(s) Rectal daily  metoprolol tartrate 25 milliGRAM(s) Oral two times a day  pantoprazole  Injectable 40 milliGRAM(s) IV Push daily  simvastatin 10 milliGRAM(s) Oral at bedtime    MEDICATIONS  (PRN):  acetaminophen     Tablet .. 650 milliGRAM(s) Oral every 6 hours PRN Temp greater or equal to 38C (100.4F), Mild Pain (1 - 3)  aluminum hydroxide/magnesium hydroxide/simethicone Suspension 30 milliLiter(s) Oral every 4 hours PRN Dyspepsia  melatonin 3 milliGRAM(s) Oral at bedtime PRN Insomnia  ondansetron Injectable 4 milliGRAM(s) IV Push every 8 hours PRN Nausea and/or Vomiting  traMADol 50 milliGRAM(s) Oral four times a day PRN Moderate Pain (4 - 6)      ALLERGIES: No Known Allergies      FAMILY HISTORY:  No pertinent family history in first degree relatives        PHYSICAL EXAMINATION:  -----------------------------  T(C): 37.2 (01-01-22 @ 05:15), Max: 37.2 (01-01-22 @ 05:15)  HR: 78 (01-01-22 @ 05:15) (78 - 98)  BP: 124/73 (01-01-22 @ 05:15) (113/65 - 124/73)  RR: 18 (01-01-22 @ 05:15) (18 - 18)  SpO2: 90% (01-01-22 @ 05:15) (90% - 92%)  Wt(kg): --    12-31 @ 07:01  -  01-01 @ 07:00  --------------------------------------------------------  IN:    IV PiggyBack: 600 mL    Oral Fluid: 540 mL  Total IN: 1140 mL    OUT:    Voided (mL): 501 mL  Total OUT: 501 mL    Total NET: 639 mL            VITALS  T(C): 37.2 (01-01-22 @ 05:15), Max: 37.2 (01-01-22 @ 05:15)  HR: 78 (01-01-22 @ 05:15) (78 - 98)  BP: 124/73 (01-01-22 @ 05:15) (113/65 - 124/73)  RR: 18 (01-01-22 @ 05:15) (18 - 18)  SpO2: 90% (01-01-22 @ 05:15) (90% - 92%)    Constitutional: well developed, normal appearance, well groomed, well nourished, no deformities and no acute distress.   Eyes: the conjunctiva exhibited no abnormalities and the eyelids demonstrated no xanthelasmas.   HEENT: normal oral mucosa, no oral pallor and no oral cyanosis.   Neck: normal jugular venous A waves present, normal jugular venous V waves present and no jugular venous castillo A waves.   Pulmonary: no respiratory distress, normal respiratory rhythm and effort, no accessory muscle use and lungs were clear to auscultation bilaterally.   Cardiovascular: heart rate and rhythm were normal, normal S1 and S2 and no murmur, gallop, rub, heave or thrill are present.   Abdomen: soft, non-tender, no hepato-splenomegaly and no abdominal mass palpated.   Musculoskeletal: the gait could not be assessed..   Extremities: no clubbing of the fingernails, no localized cyanosis, no petechial hemorrhages and no ischemic changes.   Skin: normal skin color and pigmentation, no rash, no venous stasis, no skin lesions, no skin ulcer and no xanthoma was observed.   Psychiatric: oriented to person, place, and time, the affect was normal, the mood was normal and not feeling anxious.     LABS:   --------  01-01    143  |  108  |  6<L>  ----------------------------<  114<H>  3.5   |  28  |  0.63    Ca    8.2<L>      01 Jan 2022 09:37    TPro  6.3  /  Alb  2.7<L>  /  TBili  0.5  /  DBili  x   /  AST  12<L>  /  ALT  14  /  AlkPhos  55  01-01                         9.9    2.43  )-----------( 465      ( 01 Jan 2022 09:37 )             28.4     PT/INR - ( 01 Jan 2022 09:37 )   PT: 22.6 sec;   INR: 2.00 ratio                     RADIOLOGY:  -----------------    ECG:     ECHO:

## 2022-01-02 LAB
APTT BLD: 33.3 SEC — SIGNIFICANT CHANGE UP (ref 27.5–35.5)
HCT VFR BLD CALC: 28.2 % — LOW (ref 34.5–45)
HGB BLD-MCNC: 9.8 G/DL — LOW (ref 11.5–15.5)
INR BLD: 1.48 RATIO — HIGH (ref 0.88–1.16)
INR BLD: >15 RATIO (ref 0.88–1.16)
MCHC RBC-ENTMCNC: 34.8 GM/DL — SIGNIFICANT CHANGE UP (ref 32–36)
MCHC RBC-ENTMCNC: 43 PG — HIGH (ref 27–34)
MCV RBC AUTO: 123.7 FL — HIGH (ref 80–100)
NRBC # BLD: 0 /100 WBCS — SIGNIFICANT CHANGE UP (ref 0–0)
PLATELET # BLD AUTO: 454 K/UL — HIGH (ref 150–400)
PROTHROM AB SERPL-ACNC: 17 SEC — HIGH (ref 10.6–13.6)
PROTHROM AB SERPL-ACNC: > 200 SEC (ref 10.6–13.6)
RBC # BLD: 2.28 M/UL — LOW (ref 3.8–5.2)
RBC # FLD: 16.1 % — HIGH (ref 10.3–14.5)
SARS-COV-2 RNA SPEC QL NAA+PROBE: SIGNIFICANT CHANGE UP
WBC # BLD: 3.54 K/UL — LOW (ref 3.8–10.5)
WBC # FLD AUTO: 3.54 K/UL — LOW (ref 3.8–10.5)

## 2022-01-02 RX ADMIN — PANTOPRAZOLE SODIUM 40 MILLIGRAM(S): 20 TABLET, DELAYED RELEASE ORAL at 11:20

## 2022-01-02 RX ADMIN — Medication 1 MILLIGRAM(S): at 11:20

## 2022-01-02 RX ADMIN — PREGABALIN 1000 MICROGRAM(S): 225 CAPSULE ORAL at 11:20

## 2022-01-02 RX ADMIN — SIMVASTATIN 10 MILLIGRAM(S): 20 TABLET, FILM COATED ORAL at 22:45

## 2022-01-02 RX ADMIN — Medication 25 MILLIGRAM(S): at 17:46

## 2022-01-02 RX ADMIN — ESCITALOPRAM OXALATE 20 MILLIGRAM(S): 10 TABLET, FILM COATED ORAL at 11:20

## 2022-01-02 RX ADMIN — GABAPENTIN 100 MILLIGRAM(S): 400 CAPSULE ORAL at 06:02

## 2022-01-02 RX ADMIN — Medication 875 MILLIGRAM(S): at 07:03

## 2022-01-02 RX ADMIN — Medication 10 MILLIGRAM(S): at 22:45

## 2022-01-02 RX ADMIN — Medication 1 SUPPOSITORY(S): at 11:21

## 2022-01-02 RX ADMIN — Medication 875 MILLIGRAM(S): at 17:47

## 2022-01-02 RX ADMIN — Medication 20 MILLIGRAM(S): at 06:02

## 2022-01-02 RX ADMIN — Medication 25 MILLIGRAM(S): at 06:02

## 2022-01-02 RX ADMIN — GABAPENTIN 100 MILLIGRAM(S): 400 CAPSULE ORAL at 17:47

## 2022-01-02 RX ADMIN — SODIUM CHLORIDE 50 MILLILITER(S): 9 INJECTION, SOLUTION INTRAVENOUS at 06:09

## 2022-01-02 RX ADMIN — Medication 2.5 MILLIGRAM(S): at 06:02

## 2022-01-02 NOTE — PROGRESS NOTE ADULT - SUBJECTIVE AND OBJECTIVE BOX
Patient is a 89y Female with a known history of :  Hypertension [I10]    CVA (cerebral vascular accident) [I63.9]    Depression [F32.9]    Constipation [K59.00]    Grade I diastolic dysfunction [I51.89]    Afib [I48.91]    Neuropathy [G62.9]    GIB (gastrointestinal bleeding) [K92.2]    Prophylactic measure [Z29.9]      HPI:  88 yo Female hx of afib on coumadin, sent from [facility] to ED  c/o rectal bleeding.  Denies any sob, cp.  No dizziness. no sob. Patient was hemodynamically stable with HB above 10 on arrival to ER .blood from rectum was noted by RN during transfer from [facility] to ED bed on arrival .GI consult Dr Newby called ,cardiology clearance requested for possible endoscopy and further OAC plan .Hematology eval requested for possible INR reversal if bleeding continues ,serial coagulation profiles ordered . (29 Dec 2021 06:37)      REVIEW OF SYSTEMS:    CONSTITUTIONAL: No fever, weight loss, or fatigue  EYES: No eye pain, visual disturbances, or discharge  ENMT:  No difficulty hearing, tinnitus, vertigo; No sinus or throat pain  NECK: No pain or stiffness  BREASTS: No pain, masses, or nipple discharge  RESPIRATORY: No cough, wheezing, chills or hemoptysis; No shortness of breath  CARDIOVASCULAR: No chest pain, palpitations, dizziness, or leg swelling  GASTROINTESTINAL: No abdominal or epigastric pain. No nausea, vomiting, or hematemesis; No diarrhea or constipation. No melena or hematochezia.  GENITOURINARY: No dysuria, frequency, hematuria, or incontinence  NEUROLOGICAL: No headaches, memory loss, loss of strength, numbness, or tremors  SKIN: No itching, burning, rashes, or lesions   LYMPH NODES: No enlarged glands  ENDOCRINE: No heat or cold intolerance; No hair loss  MUSCULOSKELETAL: No joint pain or swelling; No muscle, back, or extremity pain  PSYCHIATRIC: No depression, anxiety, mood swings, or difficulty sleeping  HEME/LYMPH: No easy bruising, or bleeding gums  ALLERGY AND IMMUNOLOGIC: No hives or eczema    MEDICATIONS  (STANDING):  amoxicillin   80 mG/mL/clavulanate Suspension 875 milliGRAM(s) Oral two times a day  baclofen 10 milliGRAM(s) Oral at bedtime  cyanocobalamin 1000 MICROGram(s) Oral daily  dextrose 5% + sodium chloride 0.45%. 1000 milliLiter(s) (50 mL/Hr) IV Continuous <Continuous>  enalapril 2.5 milliGRAM(s) Oral daily  escitalopram 20 milliGRAM(s) Oral daily  folic acid 1 milliGRAM(s) Oral daily  furosemide    Tablet 20 milliGRAM(s) Oral daily  gabapentin 100 milliGRAM(s) Oral two times a day  hydrocortisone hemorrhoidal Suppository 1 Suppository(s) Rectal daily  metoprolol tartrate 25 milliGRAM(s) Oral two times a day  pantoprazole  Injectable 40 milliGRAM(s) IV Push daily  simvastatin 10 milliGRAM(s) Oral at bedtime    MEDICATIONS  (PRN):  acetaminophen     Tablet .. 650 milliGRAM(s) Oral every 6 hours PRN Temp greater or equal to 38C (100.4F), Mild Pain (1 - 3)  aluminum hydroxide/magnesium hydroxide/simethicone Suspension 30 milliLiter(s) Oral every 4 hours PRN Dyspepsia  melatonin 3 milliGRAM(s) Oral at bedtime PRN Insomnia  ondansetron Injectable 4 milliGRAM(s) IV Push every 8 hours PRN Nausea and/or Vomiting  traMADol 50 milliGRAM(s) Oral four times a day PRN Moderate Pain (4 - 6)      ALLERGIES: No Known Allergies      FAMILY HISTORY:  No pertinent family history in first degree relatives        PHYSICAL EXAMINATION:  -----------------------------  T(C): 36.6 (01-02-22 @ 09:40), Max: 37.1 (01-02-22 @ 04:33)  HR: 89 (01-02-22 @ 09:40) (76 - 92)  BP: 119/62 (01-02-22 @ 09:40) (119/62 - 146/74)  RR: 19 (01-02-22 @ 09:40) (18 - 19)  SpO2: 94% (01-02-22 @ 09:40) (92% - 94%)  Wt(kg): --    01-01 @ 07:01  -  01-02 @ 07:00  --------------------------------------------------------  IN:    dextrose 5% + sodium chloride 0.45%: 600 mL  Total IN: 600 mL    OUT:    Voided (mL): 1200 mL  Total OUT: 1200 mL    Total NET: -600 mL            VITALS  T(C): 36.6 (01-02-22 @ 09:40), Max: 37.1 (01-02-22 @ 04:33)  HR: 89 (01-02-22 @ 09:40) (76 - 92)  BP: 119/62 (01-02-22 @ 09:40) (119/62 - 146/74)  RR: 19 (01-02-22 @ 09:40) (18 - 19)  SpO2: 94% (01-02-22 @ 09:40) (92% - 94%)    Constitutional: well developed, normal appearance, well groomed, well nourished, no deformities and no acute distress.   Eyes: the conjunctiva exhibited no abnormalities and the eyelids demonstrated no xanthelasmas.   HEENT: normal oral mucosa, no oral pallor and no oral cyanosis.   Neck: normal jugular venous A waves present, normal jugular venous V waves present and no jugular venous castillo A waves.   Pulmonary: no respiratory distress, normal respiratory rhythm and effort, no accessory muscle use and lungs were clear to auscultation bilaterally.   Cardiovascular: heart rate and rhythm were normal, normal S1 and S2 and no murmur, gallop, rub, heave or thrill are present.   Abdomen: soft, non-tender, no hepato-splenomegaly and no abdominal mass palpated.   Musculoskeletal: the gait could not be assessed..   Extremities: no clubbing of the fingernails, no localized cyanosis, no petechial hemorrhages and no ischemic changes.   Skin: normal skin color and pigmentation, no rash, no venous stasis, no skin lesions, no skin ulcer and no xanthoma was observed.   Psychiatric: oriented to person, place, and time, the affect was normal, the mood was normal and not feeling anxious.     LABS:   --------  01-01    143  |  108  |  6<L>  ----------------------------<  114<H>  3.5   |  28  |  0.63    Ca    8.2<L>      01 Jan 2022 09:37    TPro  6.3  /  Alb  2.7<L>  /  TBili  0.5  /  DBili  x   /  AST  12<L>  /  ALT  14  /  AlkPhos  55  01-01                         9.8    3.54  )-----------( 454      ( 02 Jan 2022 07:39 )             28.2     PT/INR - ( 02 Jan 2022 10:35 )   PT: 17.0 sec;   INR: 1.48 ratio         PTT - ( 02 Jan 2022 10:35 )  PTT:33.3 sec            RADIOLOGY:  -----------------    ECG:     ECHO:

## 2022-01-02 NOTE — PROGRESS NOTE ADULT - SUBJECTIVE AND OBJECTIVE BOX
All interim records and events noted.    some pain right heel stroke affected side      MEDICATIONS  (STANDING):  amoxicillin   80 mG/mL/clavulanate Suspension 875 milliGRAM(s) Oral two times a day  baclofen 10 milliGRAM(s) Oral at bedtime  cyanocobalamin 1000 MICROGram(s) Oral daily  dextrose 5% + sodium chloride 0.45%. 1000 milliLiter(s) (50 mL/Hr) IV Continuous <Continuous>  enalapril 2.5 milliGRAM(s) Oral daily  escitalopram 20 milliGRAM(s) Oral daily  folic acid 1 milliGRAM(s) Oral daily  furosemide    Tablet 20 milliGRAM(s) Oral daily  gabapentin 100 milliGRAM(s) Oral two times a day  hydrocortisone hemorrhoidal Suppository 1 Suppository(s) Rectal daily  metoprolol tartrate 25 milliGRAM(s) Oral two times a day  pantoprazole  Injectable 40 milliGRAM(s) IV Push daily  simvastatin 10 milliGRAM(s) Oral at bedtime    MEDICATIONS  (PRN):  acetaminophen     Tablet .. 650 milliGRAM(s) Oral every 6 hours PRN Temp greater or equal to 38C (100.4F), Mild Pain (1 - 3)  aluminum hydroxide/magnesium hydroxide/simethicone Suspension 30 milliLiter(s) Oral every 4 hours PRN Dyspepsia  melatonin 3 milliGRAM(s) Oral at bedtime PRN Insomnia  ondansetron Injectable 4 milliGRAM(s) IV Push every 8 hours PRN Nausea and/or Vomiting  traMADol 50 milliGRAM(s) Oral four times a day PRN Moderate Pain (4 - 6)      Vital Signs Last 24 Hrs  T(C): 37.1 (02 Jan 2022 04:33), Max: 37.1 (02 Jan 2022 04:33)  T(F): 98.7 (02 Jan 2022 04:33), Max: 98.7 (02 Jan 2022 04:33)  HR: 78 (02 Jan 2022 04:33) (76 - 92)  BP: 121/70 (02 Jan 2022 04:33) (119/76 - 146/74)  BP(mean): --  RR: 18 (02 Jan 2022 04:33) (18 - 19)  SpO2: 93% (02 Jan 2022 04:33) (92% - 93%)    PHYSICAL EXAM  General: well developed  well nourished, in no acute distress  Head: atraumatic, normocephalic  ENT: sclera anicteric  Neck: supple, trachea midline  CV: S1 S2, regular rate and rhythm  Lungs: clear to auscultation, no wheezes/rhonchi  Abdomen: soft, nontender, bowel sounds present, no palpable masses  Extrem: no clubbing/cyanosis/edema, no open wound right heel  Skin: no significant increased ecchymosis/petechiae  Neuro: alert and oriented X3,  no focal deficits      LABS:             9.8    3.54  )-----------( 454      ( 01-02 @ 07:39 )             28.2                9.9    2.43  )-----------( 465      ( 01-01 @ 09:37 )             28.4                9.5    2.22  )-----------( 456      ( 12-31 @ 07:31 )             27.5       01-01    143  |  108  |  6<L>  ----------------------------<  114<H>  3.5   |  28  |  0.63    Ca    8.2<L>      01 Jan 2022 09:37    TPro  6.3  /  Alb  2.7<L>  /  TBili  0.5  /  DBili  x   /  AST  12<L>  /  ALT  14  /  AlkPhos  55  01-01 01-02 @ 07:39  PT> 200 INR--  PTT--  01-01 @ 09:37  PT22.6 INR2.00  PTT--  12-31 @ 07:31  PT31.2 INR2.80  PTT--  12-30 @ 08:30  PT32.8 INR2.95  PTT--  12-29 @ 11:34  PT36.3 INR3.28  PTT--  12-29 @ 00:59  PT30.9 INR2.77  PTT43.4      RADIOLOGY & ADDITIONAL STUDIES:    IMPRESSION/RECOMMENDATIONS:

## 2022-01-02 NOTE — PROGRESS NOTE ADULT - SUBJECTIVE AND OBJECTIVE BOX
Interval History:    CENTRAL LINE:   [  ] YES       [  ] NO  MUIR:                 [  ] YES       [  ] NO         REVIEW OF SYSTEMS:  All Systems below were reviewed and are negative [  ]  HEENT:  ID:  Pulmonary:  Cardiac:  GI:  Renal:  Musculoskeletal:  All other systems above were reviewed and are negative   [  ]      MEDICATIONS  (STANDING):  amoxicillin   80 mG/mL/clavulanate Suspension 875 milliGRAM(s) Oral two times a day  baclofen 10 milliGRAM(s) Oral at bedtime  cyanocobalamin 1000 MICROGram(s) Oral daily  dextrose 5% + sodium chloride 0.45%. 1000 milliLiter(s) (50 mL/Hr) IV Continuous <Continuous>  enalapril 2.5 milliGRAM(s) Oral daily  escitalopram 20 milliGRAM(s) Oral daily  folic acid 1 milliGRAM(s) Oral daily  furosemide    Tablet 20 milliGRAM(s) Oral daily  gabapentin 100 milliGRAM(s) Oral two times a day  hydrocortisone hemorrhoidal Suppository 1 Suppository(s) Rectal daily  metoprolol tartrate 25 milliGRAM(s) Oral two times a day  pantoprazole  Injectable 40 milliGRAM(s) IV Push daily  simvastatin 10 milliGRAM(s) Oral at bedtime    MEDICATIONS  (PRN):  acetaminophen     Tablet .. 650 milliGRAM(s) Oral every 6 hours PRN Temp greater or equal to 38C (100.4F), Mild Pain (1 - 3)  aluminum hydroxide/magnesium hydroxide/simethicone Suspension 30 milliLiter(s) Oral every 4 hours PRN Dyspepsia  melatonin 3 milliGRAM(s) Oral at bedtime PRN Insomnia  ondansetron Injectable 4 milliGRAM(s) IV Push every 8 hours PRN Nausea and/or Vomiting  traMADol 50 milliGRAM(s) Oral four times a day PRN Moderate Pain (4 - 6)      Vital Signs Last 24 Hrs  T(C): 37.2 (02 Jan 2022 12:47), Max: 37.2 (02 Jan 2022 12:47)  T(F): 99 (02 Jan 2022 12:47), Max: 99 (02 Jan 2022 12:47)  HR: 80 (02 Jan 2022 12:47) (78 - 92)  BP: 115/72 (02 Jan 2022 12:47) (115/72 - 123/70)  BP(mean): --  RR: 18 (02 Jan 2022 12:47) (18 - 19)  SpO2: 91% (02 Jan 2022 12:47) (91% - 94%)    I&O's Summary    01 Jan 2022 07:01  -  02 Jan 2022 07:00  --------------------------------------------------------  IN: 600 mL / OUT: 1200 mL / NET: -600 mL    02 Jan 2022 07:01  -  02 Jan 2022 19:07  --------------------------------------------------------  IN: 0 mL / OUT: 300 mL / NET: -300 mL        PHYSICAL EXAM:  HEENT: NC/AT; PERRLA  Neck: Soft; no tenderness  Lungs: CTA bilaterally; no wheezing.   Heart:  Abdomen:  Genital/ Rectal:  Extremities:  Neurologic:  Vascular:      LABORATORY:    CBC Full  -  ( 02 Jan 2022 07:39 )  WBC Count : 3.54 K/uL  RBC Count : 2.28 M/uL  Hemoglobin : 9.8 g/dL  Hematocrit : 28.2 %  Platelet Count - Automated : 454 K/uL  Mean Cell Volume : 123.7 fl  Mean Cell Hemoglobin : 43.0 pg  Mean Cell Hemoglobin Concentration : 34.8 gm/dL  Auto Neutrophil # : x  Auto Lymphocyte # : x  Auto Monocyte # : x  Auto Eosinophil # : x  Auto Basophil # : x  Auto Neutrophil % : x  Auto Lymphocyte % : x  Auto Monocyte % : x  Auto Eosinophil % : x  Auto Basophil % : x      ESR:                   01-01 @ 09:37  --    C-Reactive Protein:     01-01 @ 09:37  --    Procalcitonin:           01-01 @ 09:37   <0.05  ESR:                   12-30 @ 11:16  --    C-Reactive Protein:     12-30 @ 11:16  28    Procalcitonin:           12-30 @ 11:16   --  ESR:                   12-30 @ 08:30  71    C-Reactive Protein:     12-30 @ 08:30  --    Procalcitonin:           12-30 @ 08:30   --      01-01    143  |  108  |  6<L>  ----------------------------<  114<H>  3.5   |  28  |  0.63    Ca    8.2<L>      01 Jan 2022 09:37    TPro  6.3  /  Alb  2.7<L>  /  TBili  0.5  /  DBili  x   /  AST  12<L>  /  ALT  14  /  AlkPhos  55  01-01          Assessment and Plan:          Sushil Anguiano MD   [phone].    She is afebrile  Comfortable      MEDICATIONS  (STANDING):  amoxicillin   80 mG/mL/clavulanate Suspension 875 milliGRAM(s) Oral two times a day  baclofen 10 milliGRAM(s) Oral at bedtime  cyanocobalamin 1000 MICROGram(s) Oral daily  dextrose 5% + sodium chloride 0.45%. 1000 milliLiter(s) (50 mL/Hr) IV Continuous <Continuous>  enalapril 2.5 milliGRAM(s) Oral daily  escitalopram 20 milliGRAM(s) Oral daily  folic acid 1 milliGRAM(s) Oral daily  furosemide    Tablet 20 milliGRAM(s) Oral daily  gabapentin 100 milliGRAM(s) Oral two times a day  hydrocortisone hemorrhoidal Suppository 1 Suppository(s) Rectal daily  metoprolol tartrate 25 milliGRAM(s) Oral two times a day  pantoprazole  Injectable 40 milliGRAM(s) IV Push daily  simvastatin 10 milliGRAM(s) Oral at bedtime    MEDICATIONS  (PRN):  acetaminophen     Tablet .. 650 milliGRAM(s) Oral every 6 hours PRN Temp greater or equal to 38C (100.4F), Mild Pain (1 - 3)  aluminum hydroxide/magnesium hydroxide/simethicone Suspension 30 milliLiter(s) Oral every 4 hours PRN Dyspepsia  melatonin 3 milliGRAM(s) Oral at bedtime PRN Insomnia  ondansetron Injectable 4 milliGRAM(s) IV Push every 8 hours PRN Nausea and/or Vomiting  traMADol 50 milliGRAM(s) Oral four times a day PRN Moderate Pain (4 - 6)      Vital Signs Last 24 Hrs  T(C): 37.2 (02 Jan 2022 12:47), Max: 37.2 (02 Jan 2022 12:47)  T(F): 99 (02 Jan 2022 12:47), Max: 99 (02 Jan 2022 12:47)  HR: 80 (02 Jan 2022 12:47) (78 - 92)  BP: 115/72 (02 Jan 2022 12:47) (115/72 - 123/70)  BP(mean): --  RR: 18 (02 Jan 2022 12:47) (18 - 19)  SpO2: 91% (02 Jan 2022 12:47) (91% - 94%)    I&O's Summary    01 Jan 2022 07:01 - 02 Jan 2022 07:00  --------------------------------------------------------  IN: 600 mL / OUT: 1200 mL / NET: -600 mL    02 Jan 2022 07:01  -  02 Jan 2022 19:07  --------------------------------------------------------  IN: 0 mL / OUT: 300 mL / NET: -300 mL      PHYSICAL EXAM:  HEENT: NC/AT; PERRLA  Neck: Soft; no tenderness  Lungs: Coarse BS bilaterally; no wheezing.   Heart: RRR, no murmurs.   Abdomen: Soft, no tenderness.   Genital/ Rectal: No salas catheter.   Extremities: No ulcers.   Neurologic: Awake,         LABORATORY:    CBC Full  -  ( 02 Jan 2022 07:39 )  WBC Count : 3.54 K/uL  RBC Count : 2.28 M/uL  Hemoglobin : 9.8 g/dL  Hematocrit : 28.2 %  Platelet Count - Automated : 454 K/uL  Mean Cell Volume : 123.7 fl  Mean Cell Hemoglobin : 43.0 pg  Mean Cell Hemoglobin Concentration : 34.8 gm/dL  Auto Neutrophil # : x  Auto Lymphocyte # : x  Auto Monocyte # : x  Auto Eosinophil # : x  Auto Basophil # : x  Auto Neutrophil % : x  Auto Lymphocyte % : x  Auto Monocyte % : x  Auto Eosinophil % : x  Auto Basophil % : x      ESR:                   01-01 @ 09:37  --    C-Reactive Protein:     01-01 @ 09:37  --    Procalcitonin:           01-01 @ 09:37   <0.05  ESR:                   12-30 @ 11:16  --    C-Reactive Protein:     12-30 @ 11:16  28    Procalcitonin:           12-30 @ 11:16   --  ESR:                   12-30 @ 08:30  71    C-Reactive Protein:     12-30 @ 08:30  --    Procalcitonin:           12-30 @ 08:30   --      01-01    143  |  108  |  6<L>  ----------------------------<  114<H>  3.5   |  28  |  0.63    Ca    8.2<L>      01 Jan 2022 09:37    TPro  6.3  /  Alb  2.7<L>  /  TBili  0.5  /  DBili  x   /  AST  12<L>  /  ALT  14  /  AlkPhos  55  01-01    Assessment and Plan:    1. Probable diverticulitis.  2. GI bleed.    . Continue po Augmentin for mild diverticulitis noted on CT of abdomen and pelvis.   . Monitor the progression of GI bleed.   . GI follow up.         Sushil Anguiano MD   [phone].

## 2022-01-02 NOTE — PROGRESS NOTE ADULT - SUBJECTIVE AND OBJECTIVE BOX
[facility]  Gus Sy PA-C  [facility] [place] [address]  [phone]      INTERVAL HPI/OVERNIGHT EVENTS:    no melena or hematochezia  large brown stool overnight   donavan diet    MEDICATIONS  (STANDING):  baclofen 10 milliGRAM(s) Oral at bedtime  cyanocobalamin 1000 MICROGram(s) Oral daily  dextrose 5% + sodium chloride 0.45%. 1000 milliLiter(s) (50 mL/Hr) IV Continuous <Continuous>  enalapril 2.5 milliGRAM(s) Oral daily  escitalopram 20 milliGRAM(s) Oral daily  folic acid 1 milliGRAM(s) Oral daily  furosemide    Tablet 20 milliGRAM(s) Oral daily  gabapentin 100 milliGRAM(s) Oral two times a day  hydrocortisone hemorrhoidal Suppository 1 Suppository(s) Rectal daily  metoprolol tartrate 25 milliGRAM(s) Oral two times a day  pantoprazole  Injectable 40 milliGRAM(s) IV Push daily  simvastatin 10 milliGRAM(s) Oral at bedtime    MEDICATIONS  (PRN):  acetaminophen     Tablet .. 650 milliGRAM(s) Oral every 6 hours PRN Temp greater or equal to 38C (100.4F), Mild Pain (1 - 3)  aluminum hydroxide/magnesium hydroxide/simethicone Suspension 30 milliLiter(s) Oral every 4 hours PRN Dyspepsia  melatonin 3 milliGRAM(s) Oral at bedtime PRN Insomnia  ondansetron Injectable 4 milliGRAM(s) IV Push every 8 hours PRN Nausea and/or Vomiting  traMADol 50 milliGRAM(s) Oral four times a day PRN Moderate Pain (4 - 6)      Allergies    No Known Allergies    Intolerances        ROS:   General:  No wt loss, fevers, chills, night sweats, fatigue,   Eyes:  Good vision, no reported pain  ENT:  No sore throat, pain, runny nose, dysphagia  CV:  No pain, palpitations, hypo/hypertension  Resp:  No dyspnea, cough, tachypnea, wheezing  GI:  No pain, No nausea, No vomiting, No diarrhea, No constipation, No weight loss, No fever, No pruritis, No rectal bleeding, No tarry stools, No dysphagia,  :  No pain, bleeding, incontinence, nocturia  Muscle:  No pain, weakness  Neuro:  No weakness, tingling, memory problems  Psych:  No fatigue, insomnia, mood problems, depression  Endocrine:  No polyuria, polydipsia, cold/heat intolerance  Heme:  No petechiae, ecchymosis, easy bruisability  Skin:  No rash, tattoos, scars, edema      PHYSICAL EXAM:   Vital Signs:  Vital Signs Last 24 Hrs  T(C): 37.2 (30 Dec 2021 14:48), Max: 37.2 (29 Dec 2021 20:27)  T(F): 98.9 (30 Dec 2021 14:48), Max: 98.9 (29 Dec 2021 20:27)  HR: 99 (30 Dec 2021 14:48) (92 - 99)  BP: 144/83 (30 Dec 2021 14:48) (131/68 - 154/74)  BP(mean): --  RR: 18 (30 Dec 2021 14:48) (18 - 18)  SpO2: 95% (30 Dec 2021 14:48) (92% - 95%)  Daily     Daily Weight in k.2 (30 Dec 2021 04:32)    GENERAL:  Appears stated age,   HEENT:  NC/AT,    CHEST:  Full & symmetric excursion,   HEART:  Regular rhythm,  ABDOMEN:  Soft, non-tender, non-distended,  EXTEREMITIES:  no cyanosis  SKIN:  No rash  NEURO:  Alert,       LABS:                        9.8    2.07  )-----------( 469      ( 30 Dec 2021 08:30 )             28.7     12-30    146<H>  |  111<H>  |  7   ----------------------------<  118<H>  3.7   |  29  |  0.59    Ca    8.2<L>      30 Dec 2021 08:30    TPro  6.8  /  Alb  2.9<L>  /  TBili  0.3  /  DBili  x   /  AST  17  /  ALT  14  /  AlkPhos  62  12-    PT/INR - ( 30 Dec 2021 08:30 )   PT: 32.8 sec;   INR: 2.95 ratio         PTT - ( 29 Dec 2021 00:59 )  PTT:43.4 sec      RADIOLOGY & ADDITIONAL TESTS:

## 2022-01-02 NOTE — CHART NOTE - NSCHARTNOTEFT_GEN_A_CORE
Called by RN for patient with PT >200 and INR >15. On labs yesterday, PT was 22.6 and INR 2.00. Patient on coumadin at home but has not received any blood thinners since admission on 12/29 as she was admitted for rectal bleeding. No active bleeding at this time, last BM was 12/31. Will repeat labs stat. Call placed out to Dr. Best's service, awaiting call back. Called by RN for patient with PT >200 and INR >15. On labs yesterday, PT was 22.6 and INR 2.00. Patient on coumadin at home but has not received any blood thinners since admission on 12/29 as she was admitted for rectal bleeding. No active bleeding at this time, last BM was 12/31. Will repeat labs stat. Dr. Best made aware, she is being covered by Dr. Frazier today. She agrees with repeating blood work, and will reach out to heme/onc Dr. Haynes. Called by RN for patient with PT >200 and INR >15. On labs yesterday, PT was 22.6 and INR 2.00. Patient on coumadin at home but has not received any blood thinners since admission on 12/29 as she was admitted for rectal bleeding. No active bleeding at this time, last BM was 12/31. AM H/H stable, CMP pending. Will repeat PT/INR stat. Dr. Best made aware, she is being covered by Dr. Frazier today. She agrees with repeating blood work, and will reach out to heme/onc Dr. Haynes.

## 2022-01-03 ENCOUNTER — TRANSCRIPTION ENCOUNTER (OUTPATIENT)
Age: 87
End: 2022-01-03

## 2022-01-03 VITALS
OXYGEN SATURATION: 92 % | RESPIRATION RATE: 18 BRPM | HEART RATE: 94 BPM | DIASTOLIC BLOOD PRESSURE: 75 MMHG | SYSTOLIC BLOOD PRESSURE: 124 MMHG | TEMPERATURE: 98 F

## 2022-01-03 DIAGNOSIS — K57.92 DIVERTICULITIS OF INTESTINE, PART UNSPECIFIED, WITHOUT PERFORATION OR ABSCESS WITHOUT BLEEDING: ICD-10-CM

## 2022-01-03 LAB
ANION GAP SERPL CALC-SCNC: 8 MMOL/L — SIGNIFICANT CHANGE UP (ref 5–17)
BUN SERPL-MCNC: 6 MG/DL — LOW (ref 7–23)
CALCIUM SERPL-MCNC: 7.9 MG/DL — LOW (ref 8.5–10.1)
CHLORIDE SERPL-SCNC: 108 MMOL/L — SIGNIFICANT CHANGE UP (ref 96–108)
CO2 SERPL-SCNC: 26 MMOL/L — SIGNIFICANT CHANGE UP (ref 22–31)
CREAT SERPL-MCNC: 0.59 MG/DL — SIGNIFICANT CHANGE UP (ref 0.5–1.3)
GLUCOSE SERPL-MCNC: 95 MG/DL — SIGNIFICANT CHANGE UP (ref 70–99)
HCT VFR BLD CALC: 27.1 % — LOW (ref 34.5–45)
HGB BLD-MCNC: 9.2 G/DL — LOW (ref 11.5–15.5)
INR BLD: 1.54 RATIO — HIGH (ref 0.88–1.16)
MCHC RBC-ENTMCNC: 33.9 GM/DL — SIGNIFICANT CHANGE UP (ref 32–36)
MCHC RBC-ENTMCNC: 42.4 PG — HIGH (ref 27–34)
MCV RBC AUTO: 124.9 FL — HIGH (ref 80–100)
NRBC # BLD: 0 /100 WBCS — SIGNIFICANT CHANGE UP (ref 0–0)
PLATELET # BLD AUTO: 390 K/UL — SIGNIFICANT CHANGE UP (ref 150–400)
POTASSIUM SERPL-MCNC: 3.8 MMOL/L — SIGNIFICANT CHANGE UP (ref 3.5–5.3)
POTASSIUM SERPL-SCNC: 3.8 MMOL/L — SIGNIFICANT CHANGE UP (ref 3.5–5.3)
PROTHROM AB SERPL-ACNC: 17.6 SEC — HIGH (ref 10.6–13.6)
RBC # BLD: 2.17 M/UL — LOW (ref 3.8–5.2)
RBC # FLD: 15.9 % — HIGH (ref 10.3–14.5)
SODIUM SERPL-SCNC: 142 MMOL/L — SIGNIFICANT CHANGE UP (ref 135–145)
WBC # BLD: 3.49 K/UL — LOW (ref 3.8–10.5)
WBC # FLD AUTO: 3.49 K/UL — LOW (ref 3.8–10.5)

## 2022-01-03 PROCEDURE — 84145 PROCALCITONIN (PCT): CPT

## 2022-01-03 PROCEDURE — 93306 TTE W/DOPPLER COMPLETE: CPT

## 2022-01-03 PROCEDURE — 85652 RBC SED RATE AUTOMATED: CPT

## 2022-01-03 PROCEDURE — U0003: CPT

## 2022-01-03 PROCEDURE — 85045 AUTOMATED RETICULOCYTE COUNT: CPT

## 2022-01-03 PROCEDURE — 99285 EMERGENCY DEPT VISIT HI MDM: CPT | Mod: 25

## 2022-01-03 PROCEDURE — 93005 ELECTROCARDIOGRAM TRACING: CPT

## 2022-01-03 PROCEDURE — 85610 PROTHROMBIN TIME: CPT

## 2022-01-03 PROCEDURE — 97161 PT EVAL LOW COMPLEX 20 MIN: CPT

## 2022-01-03 PROCEDURE — 74177 CT ABD & PELVIS W/CONTRAST: CPT

## 2022-01-03 PROCEDURE — 80053 COMPREHEN METABOLIC PANEL: CPT

## 2022-01-03 PROCEDURE — 87635 SARS-COV-2 COVID-19 AMP PRB: CPT

## 2022-01-03 PROCEDURE — 86850 RBC ANTIBODY SCREEN: CPT

## 2022-01-03 PROCEDURE — 86140 C-REACTIVE PROTEIN: CPT

## 2022-01-03 PROCEDURE — 86901 BLOOD TYPING SEROLOGIC RH(D): CPT

## 2022-01-03 PROCEDURE — 86900 BLOOD TYPING SEROLOGIC ABO: CPT

## 2022-01-03 PROCEDURE — 85027 COMPLETE CBC AUTOMATED: CPT

## 2022-01-03 PROCEDURE — 36415 COLL VENOUS BLD VENIPUNCTURE: CPT

## 2022-01-03 PROCEDURE — 92610 EVALUATE SWALLOWING FUNCTION: CPT

## 2022-01-03 PROCEDURE — 71045 X-RAY EXAM CHEST 1 VIEW: CPT

## 2022-01-03 PROCEDURE — 85730 THROMBOPLASTIN TIME PARTIAL: CPT

## 2022-01-03 PROCEDURE — 80048 BASIC METABOLIC PNL TOTAL CA: CPT

## 2022-01-03 PROCEDURE — U0005: CPT

## 2022-01-03 PROCEDURE — 85025 COMPLETE CBC W/AUTO DIFF WBC: CPT

## 2022-01-03 RX ORDER — LEVOTHYROXINE SODIUM 125 MCG
25 TABLET ORAL DAILY
Refills: 0 | Status: DISCONTINUED | OUTPATIENT
Start: 2022-01-03 | End: 2022-01-03

## 2022-01-03 RX ORDER — PREGABALIN 225 MG/1
1 CAPSULE ORAL
Qty: 15 | Refills: 0
Start: 2022-01-03 | End: 2022-01-17

## 2022-01-03 RX ORDER — FOLIC ACID 0.8 MG
1 TABLET ORAL
Qty: 15 | Refills: 0
Start: 2022-01-03 | End: 2022-01-17

## 2022-01-03 RX ORDER — LACTOBACILLUS ACIDOPHILUS 100MM CELL
1 CAPSULE ORAL
Refills: 0 | Status: DISCONTINUED | OUTPATIENT
Start: 2022-01-03 | End: 2022-01-03

## 2022-01-03 RX ORDER — LACTOBACILLUS ACIDOPHILUS 100MM CELL
2 CAPSULE ORAL
Qty: 30 | Refills: 0
Start: 2022-01-03 | End: 2022-01-17

## 2022-01-03 RX ORDER — ACETAMINOPHEN 500 MG
2 TABLET ORAL
Qty: 0 | Refills: 0 | DISCHARGE
Start: 2022-01-03

## 2022-01-03 RX ORDER — LEVOTHYROXINE SODIUM 125 MCG
1 TABLET ORAL
Qty: 0 | Refills: 0 | DISCHARGE
Start: 2022-01-03

## 2022-01-03 RX ORDER — CHOLECALCIFEROL (VITAMIN D3) 125 MCG
1 CAPSULE ORAL
Qty: 15 | Refills: 0
Start: 2022-01-03 | End: 2022-01-17

## 2022-01-03 RX ADMIN — Medication 875 MILLIGRAM(S): at 06:50

## 2022-01-03 RX ADMIN — Medication 25 MILLIGRAM(S): at 06:47

## 2022-01-03 RX ADMIN — ESCITALOPRAM OXALATE 20 MILLIGRAM(S): 10 TABLET, FILM COATED ORAL at 12:46

## 2022-01-03 RX ADMIN — Medication 1 MILLIGRAM(S): at 12:46

## 2022-01-03 RX ADMIN — SODIUM CHLORIDE 50 MILLILITER(S): 9 INJECTION, SOLUTION INTRAVENOUS at 10:44

## 2022-01-03 RX ADMIN — PANTOPRAZOLE SODIUM 40 MILLIGRAM(S): 20 TABLET, DELAYED RELEASE ORAL at 12:46

## 2022-01-03 RX ADMIN — Medication 2.5 MILLIGRAM(S): at 06:47

## 2022-01-03 RX ADMIN — PREGABALIN 1000 MICROGRAM(S): 225 CAPSULE ORAL at 12:46

## 2022-01-03 RX ADMIN — Medication 20 MILLIGRAM(S): at 06:47

## 2022-01-03 RX ADMIN — GABAPENTIN 100 MILLIGRAM(S): 400 CAPSULE ORAL at 06:47

## 2022-01-03 NOTE — PROGRESS NOTE ADULT - PROBLEM SELECTOR PLAN 3
continue home medications ,cardiology consult , coumadin is on hold due to GIB
continue home medications ,coumadin is on hold due to GIB
continue home medications ,cardiology consult , coumadin is on hold due to GIB

## 2022-01-03 NOTE — PROGRESS NOTE ADULT - PROBLEM SELECTOR PLAN 4
continue home medications ,cardiology consult , coumadin is on hold due to GIB
bowel regimen
bowel regimen

## 2022-01-03 NOTE — PROGRESS NOTE ADULT - SUBJECTIVE AND OBJECTIVE BOX
All interim records and events noted.    right heel pain better      MEDICATIONS  (STANDING):  amoxicillin   80 mG/mL/clavulanate Suspension 875 milliGRAM(s) Oral two times a day  baclofen 10 milliGRAM(s) Oral at bedtime  cyanocobalamin 1000 MICROGram(s) Oral daily  dextrose 5% + sodium chloride 0.45%. 1000 milliLiter(s) (50 mL/Hr) IV Continuous <Continuous>  enalapril 2.5 milliGRAM(s) Oral daily  escitalopram 20 milliGRAM(s) Oral daily  folic acid 1 milliGRAM(s) Oral daily  furosemide    Tablet 20 milliGRAM(s) Oral daily  gabapentin 100 milliGRAM(s) Oral two times a day  hydrocortisone hemorrhoidal Suppository 1 Suppository(s) Rectal daily  metoprolol tartrate 25 milliGRAM(s) Oral two times a day  pantoprazole  Injectable 40 milliGRAM(s) IV Push daily  simvastatin 10 milliGRAM(s) Oral at bedtime    MEDICATIONS  (PRN):  acetaminophen     Tablet .. 650 milliGRAM(s) Oral every 6 hours PRN Temp greater or equal to 38C (100.4F), Mild Pain (1 - 3)  aluminum hydroxide/magnesium hydroxide/simethicone Suspension 30 milliLiter(s) Oral every 4 hours PRN Dyspepsia  melatonin 3 milliGRAM(s) Oral at bedtime PRN Insomnia  ondansetron Injectable 4 milliGRAM(s) IV Push every 8 hours PRN Nausea and/or Vomiting  traMADol 50 milliGRAM(s) Oral four times a day PRN Moderate Pain (4 - 6)      Vital Signs Last 24 Hrs  T(C): 36.7 (03 Jan 2022 04:31), Max: 37.3 (02 Jan 2022 22:49)  T(F): 98.1 (03 Jan 2022 04:31), Max: 99.1 (02 Jan 2022 22:49)  HR: 83 (03 Jan 2022 04:31) (80 - 94)  BP: 138/67 (03 Jan 2022 04:31) (109/64 - 138/67)  BP(mean): --  RR: 18 (03 Jan 2022 04:31) (18 - 19)  SpO2: 91% (03 Jan 2022 04:31) (91% - 94%)    PHYSICAL EXAM  General: well developed  well nourished, but frail appearing overweight elderly woman in bed, in no acute distress  Head: atraumatic, normocephalic  ENT: sclera anicteric  Neck: supple, trachea midline, no palpable masses  CV: S1 S2, regular rate and rhythm  Lungs: clear to auscultation, no wheezes/rhonchi  Abdomen: soft, nontender, bowel sounds present, no palpable masses, pouchy  Extrem: no clubbing/cyanosis/edema  Skin: no significant increased ecchymosis/petechiae  Neuro: alert and oriented,  no focal deficits      LABS:             9.8    3.54  )-----------( 454      ( 01-02 @ 07:39 )             28.2                9.9    2.43  )-----------( 465      ( 01-01 @ 09:37 )             28.4                9.5    2.22  )-----------( 456      ( 12-31 @ 07:31 )             27.5       01-01    143  |  108  |  6<L>  ----------------------------<  114<H>  3.5   |  28  |  0.63    Ca    8.2<L>      01 Jan 2022 09:37    TPro  6.3  /  Alb  2.7<L>  /  TBili  0.5  /  DBili  x   /  AST  12<L>  /  ALT  14  /  AlkPhos  55  01-01 01-02 @ 10:35  PT17.0 INR1.48  PTT33.3  01-02 @ 07:39  PT> 200 INR>15  PTT--  01-01 @ 09:37  PT22.6 INR2.00  PTT--  12-31 @ 07:31  PT31.2 INR2.80  PTT--  12-30 @ 08:30  PT32.8 INR2.95  PTT--  12-29 @ 11:34  PT36.3 INR3.28  PTT--      RADIOLOGY & ADDITIONAL STUDIES:    IMPRESSION/RECOMMENDATIONS:

## 2022-01-03 NOTE — PROGRESS NOTE ADULT - PROBLEM SELECTOR PLAN 7
monitor fluid status closely ,ins/outs ,cardiology consult
continue home medications
monitor fluid status closely ,ins/outs ,cardiology consult

## 2022-01-03 NOTE — DISCHARGE NOTE PROVIDER - CARE PROVIDER_API CALL
Esteban Collins (DO)  Gastroenterology; Internal Medicine  [address] [place] [address]  Phone: [phone]  Fax: [phone]  Follow Up Time: 1 week    Iram Haynes)  Medical Oncology  [address] [facility], [place] [address]  Phone: [phone]  Fax: [phone]  Follow Up Time: 1 week   Esteban Collins (DO)  Gastroenterology; Internal Medicine  [address] [place] [address]  Phone: [phone]  Fax: [phone]  Follow Up Time: 2 weeks    Iram Haynes)  Medical Oncology  [address] [facility], [place] [address]  Phone: [phone]  Fax: [phone]  Follow Up Time: 1 week

## 2022-01-03 NOTE — DISCHARGE NOTE PROVIDER - NSDCCPCAREPLAN_GEN_ALL_CORE_FT
PRINCIPAL DISCHARGE DIAGNOSIS  Diagnosis: Rectal bleeding  Assessment and Plan of Treatment:       SECONDARY DISCHARGE DIAGNOSES  Diagnosis: Acute diverticulitis  Assessment and Plan of Treatment:     Diagnosis: Pancreatic cyst  Assessment and Plan of Treatment:     Diagnosis: Afib  Assessment and Plan of Treatment:     Diagnosis: Hypertension  Assessment and Plan of Treatment:     Diagnosis: Depression  Assessment and Plan of Treatment:     Diagnosis: Grade I diastolic dysfunction  Assessment and Plan of Treatment:     Diagnosis: Constipation  Assessment and Plan of Treatment:

## 2022-01-03 NOTE — DISCHARGE NOTE PROVIDER - PROVIDER TOKENS
PROVIDER:[TOKEN:[[id number]],FOLLOWUP:[1 week]],PROVIDER:[TOKEN:[[id number]],FOLLOWUP:[1 week]] PROVIDER:[TOKEN:[[id number]],FOLLOWUP:[2 weeks]],PROVIDER:[TOKEN:[[id number]],FOLLOWUP:[1 week]]

## 2022-01-03 NOTE — DISCHARGE NOTE PROVIDER - NSDCFUADDINST_GEN_ALL_CORE_FT
COUMADIN IS ON HOLD AS PER GI  RECOMMENDATION ,PLEASE RESUME IN 7-10 DAYS IF NO SIGNS OF BLEEDING AND CBC IS STABLE .HYDROXYUREA IS ON HOLD AS PER HEMATOLOGIST   RECOMMENDATION -MAY BE RESUMED AFTER F/UP WITH PRIVATE HEMATOLOGIST .PLEASE SCHEDULE FOLLOWUP WITH GI  REGARDING WORKUP MOF PANCREATIC LESION IN 1 WEEKS  COUMADIN IS ON HOLD AS PER GI  RECOMMENDATION ,PLEASE RESUME IN 7-10 DAYS IF NO SIGNS OF BLEEDING AND CBC IS STABLE .HYDROXYUREA IS ON HOLD AS PER HEMATOLOGIST   RECOMMENDATION -MAY BE RESUMED AFTER F/UP WITH PRIVATE HEMATOLOGIST .PLEASE SCHEDULE FOLLOWUP WITH GI  REGARDING SCHEDULING COLONOSCOPY AND WORKUP OF PANCREATIC LESION IN 2  WEEKS  1)PLEASE PROVIDE  MECHANICAL SOFT DIET 2) PLEASE NOTE  COUMADIN IS ON HOLD AS PER GI  RECOMMENDATION ,PLEASE FOLLOWUP WITH PCP AND RESUME IN 7-10 DAYS IF OK WITH PCP ,  NO SIGNS OF BLEEDING AND CBC IS STABLE .3) PLEASE NOTE HYDROXYUREA IS ON HOLD AS PER HEMATOLOGIST   RECOMMENDATION -MAY BE RESUMED AFTER F/UP WITH PRIVATE HEMATOLOGIST .PLEASE SCHEDULE FOLLOWUP WITH GI  REGARDING SCHEDULING COLONOSCOPY AND WORKUP OF PANCREATIC LESION IN 2  WEEKS

## 2022-01-03 NOTE — PROGRESS NOTE ADULT - PROBLEM SELECTOR PLAN 5
continue home medications ,cardiology is following
continue home medications ,cardiology is following
bowel regimen

## 2022-01-03 NOTE — DISCHARGE NOTE PROVIDER - NSDCMRMEDTOKEN_GEN_ALL_CORE_FT
acetaminophen 500 mg oral tablet: 2 tab(s) orally every 6 hours, As Needed  amoxicillin-clavulanate 875 mg-125 mg oral tablet: 1 tab(s) orally every 12 hours   baclofen 10 mg oral tablet: 1 tab(s) orally once a day (at bedtime)  cyanocobalamin 1000 mcg oral tablet: 1 tab(s) orally once a day  docusate sodium 100 mg oral capsule: 2 cap(s) orally once a day (at bedtime)  enalapril 2.5 mg oral tablet: 1 tab(s) orally once a day  escitalopram 20 mg oral tablet: 1 tab(s) orally once a day  famotidine 20 mg oral tablet: 1 tab(s) orally once a day  folic acid 1 mg oral tablet: 1 tab(s) orally once a day  furosemide 20 mg oral tablet: 1 tab(s) orally once a day  gabapentin 100 mg oral capsule: 1 cap(s) orally 2 times a day  levothyroxine 25 mcg (0.025 mg) oral tablet: 1 tab(s) orally once a day  metoprolol tartrate 25 mg oral tablet: 1 tab(s) orally 2 times a day  Senna 8.6 mg oral tablet: 1 tab(s) orally once a day (at bedtime)  simvastatin 10 mg oral tablet: 1 tab(s) orally once a day (at bedtime)  traMADol 50 mg oral tablet: 1 tab(s) orally every 12 hours  Vitamin D3 25 mcg (1000 intl units) oral tablet: 1 tab(s) orally once a day   acetaminophen 500 mg oral tablet: 2 tab(s) orally every 6 hours, As Needed  amoxicillin-clavulanate 875 mg-125 mg oral tablet: 1 tab(s) orally every 12 hours   baclofen 10 mg oral tablet: 1 tab(s) orally once a day (at bedtime)  cyanocobalamin 1000 mcg oral tablet: 1 tab(s) orally once a day  docusate sodium 100 mg oral capsule: 2 cap(s) orally once a day (at bedtime)  enalapril 2.5 mg oral tablet: 1 tab(s) orally once a day  escitalopram 20 mg oral tablet: 1 tab(s) orally once a day  famotidine 20 mg oral tablet: 1 tab(s) orally once a day  folic acid 1 mg oral tablet: 1 tab(s) orally once a day  furosemide 20 mg oral tablet: 1 tab(s) orally once a day  gabapentin 100 mg oral capsule: 1 cap(s) orally 2 times a day  lactobacillus acidophilus oral tablet: 2 tab(s) orally once a day  levothyroxine 25 mcg (0.025 mg) oral tablet: 1 tab(s) orally once a day  metoprolol tartrate 25 mg oral tablet: 1 tab(s) orally 2 times a day  Senna 8.6 mg oral tablet: 1 tab(s) orally once a day (at bedtime)  simvastatin 10 mg oral tablet: 1 tab(s) orally once a day (at bedtime)  traMADol 50 mg oral tablet: 1 tab(s) orally every 12 hours  Vitamin D3 25 mcg (1000 intl units) oral tablet: 1 tab(s) orally once a day

## 2022-01-03 NOTE — PROGRESS NOTE ADULT - SUBJECTIVE AND OBJECTIVE BOX
Patient is a 89y Female with a known history of :  Hypertension [I10]    CVA (cerebral vascular accident) [I63.9]    Depression [F32.9]    Constipation [K59.00]    Grade I diastolic dysfunction [I51.89]    Afib [I48.91]    Neuropathy [G62.9]    GIB (gastrointestinal bleeding) [K92.2]    Prophylactic measure [Z29.9]      HPI:  88 yo Female hx of afib on coumadin, sent from [facility] to ED  c/o rectal bleeding.  Denies any sob, cp.  No dizziness. no sob. Patient was hemodynamically stable with HB above 10 on arrival to ER .blood from rectum was noted by RN during transfer from [facility] [place] [facility] to ED bed on arrival .GI consult Dr Newby called ,cardiology clearance requested for possible endoscopy and further OAC plan .Hematology eval requested for possible INR reversal if bleeding continues ,serial coagulation profiles ordered . (29 Dec 2021 06:37)      REVIEW OF SYSTEMS:    CONSTITUTIONAL: No fever, weight loss, or fatigue  EYES: No eye pain, visual disturbances, or discharge  ENMT:  No difficulty hearing, tinnitus, vertigo; No sinus or throat pain  NECK: No pain or stiffness  BREASTS: No pain, masses, or nipple discharge  RESPIRATORY: No cough, wheezing, chills or hemoptysis; No shortness of breath  CARDIOVASCULAR: No chest pain, palpitations, dizziness, or leg swelling  GASTROINTESTINAL: No abdominal or epigastric pain. No nausea, vomiting, or hematemesis; No diarrhea or constipation. No melena or hematochezia.  GENITOURINARY: No dysuria, frequency, hematuria, or incontinence  NEUROLOGICAL: No headaches, memory loss, loss of strength, numbness, or tremors  SKIN: No itching, burning, rashes, or lesions   LYMPH NODES: No enlarged glands  ENDOCRINE: No heat or cold intolerance; No hair loss  MUSCULOSKELETAL: No joint pain or swelling; No muscle, back, or extremity pain  PSYCHIATRIC: No depression, anxiety, mood swings, or difficulty sleeping  HEME/LYMPH: No easy bruising, or bleeding gums  ALLERGY AND IMMUNOLOGIC: No hives or eczema    MEDICATIONS  (STANDING):  amoxicillin   80 mG/mL/clavulanate Suspension 875 milliGRAM(s) Oral two times a day  baclofen 10 milliGRAM(s) Oral at bedtime  cyanocobalamin 1000 MICROGram(s) Oral daily  dextrose 5% + sodium chloride 0.45%. 1000 milliLiter(s) (50 mL/Hr) IV Continuous <Continuous>  enalapril 2.5 milliGRAM(s) Oral daily  escitalopram 20 milliGRAM(s) Oral daily  folic acid 1 milliGRAM(s) Oral daily  furosemide    Tablet 20 milliGRAM(s) Oral daily  gabapentin 100 milliGRAM(s) Oral two times a day  hydrocortisone hemorrhoidal Suppository 1 Suppository(s) Rectal daily  metoprolol tartrate 25 milliGRAM(s) Oral two times a day  pantoprazole  Injectable 40 milliGRAM(s) IV Push daily  simvastatin 10 milliGRAM(s) Oral at bedtime    MEDICATIONS  (PRN):  acetaminophen     Tablet .. 650 milliGRAM(s) Oral every 6 hours PRN Temp greater or equal to 38C (100.4F), Mild Pain (1 - 3)  aluminum hydroxide/magnesium hydroxide/simethicone Suspension 30 milliLiter(s) Oral every 4 hours PRN Dyspepsia  melatonin 3 milliGRAM(s) Oral at bedtime PRN Insomnia  ondansetron Injectable 4 milliGRAM(s) IV Push every 8 hours PRN Nausea and/or Vomiting  traMADol 50 milliGRAM(s) Oral four times a day PRN Moderate Pain (4 - 6)      ALLERGIES: No Known Allergies      FAMILY HISTORY:  No pertinent family history in first degree relatives        PHYSICAL EXAMINATION:  -----------------------------  T(C): 36.7 (01-03-22 @ 04:31), Max: 37.3 (01-02-22 @ 22:49)  HR: 83 (01-03-22 @ 04:31) (80 - 94)  BP: 138/67 (01-03-22 @ 04:31) (109/64 - 138/67)  RR: 18 (01-03-22 @ 04:31) (18 - 19)  SpO2: 91% (01-03-22 @ 04:31) (91% - 94%)  Wt(kg): --    01-02 @ 07:01  -  01-03 @ 07:00  --------------------------------------------------------  IN:  Total IN: 0 mL    OUT:    Voided (mL): 700 mL  Total OUT: 700 mL    Total NET: -700 mL            VITALS  T(C): 36.7 (01-03-22 @ 04:31), Max: 37.3 (01-02-22 @ 22:49)  HR: 83 (01-03-22 @ 04:31) (80 - 94)  BP: 138/67 (01-03-22 @ 04:31) (109/64 - 138/67)  RR: 18 (01-03-22 @ 04:31) (18 - 19)  SpO2: 91% (01-03-22 @ 04:31) (91% - 94%)    Constitutional: well developed, normal appearance, well groomed, well nourished, no deformities and no acute distress.   Eyes: the conjunctiva exhibited no abnormalities and the eyelids demonstrated no xanthelasmas.   HEENT: normal oral mucosa, no oral pallor and no oral cyanosis.   Neck: normal jugular venous A waves present, normal jugular venous V waves present and no jugular venous castillo A waves.   Pulmonary: no respiratory distress, normal respiratory rhythm and effort, no accessory muscle use and lungs were clear to auscultation bilaterally.   Cardiovascular: heart rate and rhythm were normal, normal S1 and S2 and no murmur, gallop, rub, heave or thrill are present.   Abdomen: soft, non-tender, no hepato-splenomegaly and no abdominal mass palpated.   Musculoskeletal: the gait could not be assessed..   Extremities: no clubbing of the fingernails, no localized cyanosis, no petechial hemorrhages and no ischemic changes.   Skin: normal skin color and pigmentation, no rash, no venous stasis, no skin lesions, no skin ulcer and no xanthoma was observed.   Psychiatric: oriented to person, place, and time, the affect was normal, the mood was normal and not feeling anxious.     LABS:   --------  01-03    142  |  108  |  6<L>  ----------------------------<  95  3.8   |  26  |  0.59    Ca    7.9<L>      03 Jan 2022 07:48    TPro  6.3  /  Alb  2.7<L>  /  TBili  0.5  /  DBili  x   /  AST  12<L>  /  ALT  14  /  AlkPhos  55  01-01                         9.2    3.49  )-----------( 390      ( 03 Jan 2022 07:48 )             27.1     PT/INR - ( 03 Jan 2022 07:48 )   PT: 17.6 sec;   INR: 1.54 ratio         PTT - ( 02 Jan 2022 10:35 )  PTT:33.3 sec            RADIOLOGY:  -----------------    ECG:     ECHO:

## 2022-01-03 NOTE — PROGRESS NOTE ADULT - PROBLEM SELECTOR PLAN 1
serial h/h ,ppi ,GI consult ,iv hydration
serial h/h ,ppi ,GI consult ,iv hydration
on Augmentin ,cleared by Gi for return to senior care ,case d/w Dr Collins today

## 2022-01-03 NOTE — PROGRESS NOTE ADULT - REASON FOR ADMISSION
rectal bleeding

## 2022-01-03 NOTE — DISCHARGE NOTE PROVIDER - HOSPITAL COURSE
89 yo Female hx of afib on coumadin, sent from [facility] to ED  c/o rectal bleeding.  Denies any sob, cp.  No dizziness. no sob. Patient was hemodynamically stable with HB above 10 on arrival to ER .[facility] .GI consult Dr Newby called ,cardiology clearance requested for possible endoscopy and further OAC plan .Hematology eval requested for possible INR reversal if bleeding continues ,serial coagulation profiles ordered .    Problem/Plan - 1:  ·  Problem: GIB (gastrointestinal bleeding).   ·  Plan: serial h/h ,ppi ,GI consult ,iv hydration.    Problem/Plan - 2:  ·  Problem: CVA (cerebral vascular accident).   ·  Plan: continue home medications ,coumadin is on hold due to GIB.    Problem/Plan - 3:  ·  Problem: Afib.   ·  Plan: continue home medications ,cardiology consult , coumadin is on hold due to GIB.    Problem/Plan - 4:  ·  Problem: Constipation.   ·  Plan: bowel regimen.    Problem/Plan - 5:  ·  Problem: Hypertension.   ·  Plan: continue home medications ,cardiology is following.    Problem/Plan - 6:  ·  Problem: Depression.   ·  Plan: continue home medications.    Problem/Plan - 7:  ·  Problem: Grade I diastolic dysfunction.   ·  Plan: monitor fluid status closely ,ins/outs ,cardiology consult.    Problem/Plan - 8:  ·  Problem: Neuropathy.   ·  Plan: pain management ,PT/OT.    Problem/Plan - 9:  ·  Problem: Prophylactic measure.   ·  Plan: Gastrointestinal stress ulcer prophylaxis and DVT prophylaxis administered.

## 2022-01-03 NOTE — DISCHARGE NOTE PROVIDER - NSDCHHNEEDSERVICE_GEN_ALL_CORE
Medication teaching and assessment/Observation and assessment/Ostomy care and management/Rehabilitation services/Teaching and training

## 2022-01-03 NOTE — PROGRESS NOTE ADULT - ASSESSMENT
89-year-old womae on hydroxyurea for myeloproliferative disorder, ? Essential Thrombocythemia, followed by Dr Dangelo River group in [facility]  Admitted with bright red blood per rectum? Differential diagnosis includes vaginally bleeding or less likely hematuria.   Mild coagulopathy due to Coumadin  ·	INR 2.77, slightly increase of 3.28  ·	Minimal bleeding currently, Hgb stable    Acute on chronic anemia  ·	baseline hemoglobin mild anemia, with last record of normal hemoglobin in 2011.  ·	08/19/2011 WBC 7.3, Hgb 13.7 g/dL, ,   ·	03/03/2016 WBC 2.2, Hgb 10.2, ,   ·	09/13/2017 WBC 8.2, Hgb 13.0, Plt 779,   Chronic anemia likely secondary to hydroxyurea and known marrow disease  ·	Baseline hemoglobin appears to be 10 g/dL  Acute anemia due to G.I. 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  ·	Negative lupus anticoagulant [03/04/16]  history of right thigh hematoma  history of Huan IVC filter placed 03/25/16      history of B12 deficiency  ·	Last B-12 Vit level 811 on [05/25/17], folate >20.0    history of pancreatic cyst  ·	CA 19-9 was 16.8 on [03/04/16]  ·	CEA 0.8 on [03/04/16]      RECOMMENDATIONS  -remains off Hydrea, WBC cont to increase, today 3+, Hgb stable, plts still adequate at 400s  -cont acute Rx  -monitor serial CBC, at present level can still hold Hydrea, will restart pending CBCs   -discussed w pt
 89-year-old womae on hydroxyurea for myeloproliferative disorder, ? Essential Thrombocythemia, followed by Dr Dangelo River group in [facility]  Admitted with bright red blood per rectum? Differential diagnosis includes vaginally bleeding or less likely hematuria.   Mild coagulopathy due to Coumadin  ·	INR 2.77, slightly increase of 3.28  ·	Minimal bleeding currently, Hgb stable    Acute on chronic anemia  ·	baseline hemoglobin mild anemia, with last record of normal hemoglobin in 2011.  ·	08/19/2011 WBC 7.3, Hgb 13.7 g/dL, ,   ·	03/03/2016 WBC 2.2, Hgb 10.2, ,   ·	09/13/2017 WBC 8.2, Hgb 13.0, Plt 779,   Chronic anemia likely secondary to hydroxyurea and known marrow disease  ·	Baseline hemoglobin appears to be 10 g/dL  Acute anemia due to G.I. 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  ·	Negative lupus anticoagulant [03/04/16]  history of right thigh hematoma  history of Huan IVC filter placed 03/25/16      history of B12 deficiency  ·	Last B-12 Vit level 811 on [05/25/17], folate >20.0    history of pancreatic cyst  ·	CA 19-9 was 16.8 on [03/04/16]  ·	CEA 0.8 on [03/04/16]      RECOMMENDATIONS    -remains off Hydrea, WBC normalizing,,plts still adequate contral. Today CBCpending  -monitor serial CBC, at present level can still hold Hydrea, will restart pending CBCs   -discussed w pt
pt with rectal bleeding   ashd  paf - on coumadin  r/o aortic stenosis  s/p cva  no chf .no angina  echo- ordered   ekg- rsr  advanced age .cva.paf , r/o aortic stensis - pt is at intermediate risk for colonoscopy  no more acive bleeding 12/30
pt with rectal bleeding   ashd  paf - on coumadin  r/o aortic stenosis  s/p cva  no chf .no angina  echo- ordered   ekg- rsr  advanced age .cva.paf , r/o aortic stensis - pt is at intermediate risk for colonoscopy  no more acive bleeding 12/31
pt with rectal bleeding   ashd  paf - on coumadin  r/o aortic stenosis  s/p cva  no chf .no angina  echo- ordered   ekg- rsr  advanced age .cva.paf , r/o aortic stensis - pt is at intermediate risk for colonoscopy  no more acive bleeding 12/31
rectal bleed  acute blood loss anemia    Advance diet as tolerated  monitor cbc  hold anticoagulation  will start anusol suppository  h/o diverticulitis in past  repeat CT showed diverticulitis  will start augmentin  would defer colonoscopy consideration for at least 8 weeks      I reviewed the overnight course of events on the unit, re-confirming the patient history. I discussed the care with the patient and their family  The plan of care was discussed with the physician assistant and modifications were made to the notation where appropriate.   Differential diagnosis and plan of care discussed with patient after the evaluation  35 minutes spent on total encounter of which more than fifty percent of the encounter was spent counseling and/or coordinating care by the attending physician.  Advanced care planning was discussed with patient and family.  Advanced care planning forms were reviewed and discussed.  Risks, benefits and alternatives of gastroenterologic procedures were discussed in detail and all questions were answered.    
 89-year-old womae on hydroxyurea for myeloproliferative disorder, ? Essential Thrombocythemia, followed by Dr Dangelo River group in [facility]  Admitted with bright red blood per rectum? Differential diagnosis includes vaginally bleeding or less likely hematuria.   Mild coagulopathy due to Coumadin  ·	INR 2.77, slightly increase of 3.28  ·	Minimal bleeding currently, Hgb stable    Acute on chronic anemia  ·	baseline hemoglobin mild anemia, with last record of normal hemoglobin in 2011.  ·	08/19/2011 WBC 7.3, Hgb 13.7 g/dL, ,   ·	03/03/2016 WBC 2.2, Hgb 10.2, ,   ·	09/13/2017 WBC 8.2, Hgb 13.0, Plt 779,   Chronic anemia likely secondary to hydroxyurea and known marrow disease  ·	Baseline hemoglobin appears to be 10 g/dL  Acute anemia due to G.I. 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  ·	Negative lupus anticoagulant [03/04/16]  history of right thigh hematoma  history of Huan IVC filter placed 03/25/16      history of B12 deficiency  ·	Last B-12 Vit level 811 on [05/25/17], folate >20.0    history of pancreatic cyst  ·	CA 19-9 was 16.8 on [03/04/16]  ·	CEA 0.8 on [03/04/16]      RECOMMENDATIONS  -pt continues off Hydrea, today WBC cont increasing, Hgb stable, plts remains adequate at 400s  -can hold Hydrea, will restart when appropriate  -monitor serial CBC  
 89-year-old womae on hydroxyurea for myeloproliferative disorder, ? Essential Thrombocythemia, followed by Dr Dangelo River group in [facility]  Admitted with bright red blood per rectum? Differential diagnosis includes vaginally bleeding or less likely hematuria.   Mild coagulopathy due to Coumadin  ·	INR 2.77, slightly increase of 3.28  ·	Minimal bleeding currently, Hgb stable    Acute on chronic anemia  ·	baseline hemoglobin mild anemia, with last record of normal hemoglobin in 2011.  ·	08/19/2011 WBC 7.3, Hgb 13.7 g/dL, ,   ·	03/03/2016 WBC 2.2, Hgb 10.2, ,   ·	09/13/2017 WBC 8.2, Hgb 13.0, Plt 779,   Chronic anemia likely secondary to hydroxyurea and known marrow disease  ·	Baseline hemoglobin appears to be 10 g/dL  Acute anemia due to G.I. 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  ·	Negative lupus anticoagulant [03/04/16]  history of right thigh hematoma  history of Huan IVC filter placed 03/25/16      history of B12 deficiency  ·	Last B-12 Vit level 811 on [05/25/17], folate >20.0    history of pancreatic cyst  ·	CA 19-9 was 16.8 on [03/04/16]  ·	CEA 0.8 on [03/04/16]      RECOMMENDATIONS  -pt continues off Hydrea, WBC increasing, Hgb stable, plts still adequate at 400s  -cont acute Rx  -monitor serial CBC, at present level can still hold Hydrea, will restart pending CBCs  -discussed w pt
[ASSESSMENT and  PLAN]    elderly 89-year-old female on hydroxyurea reasonably for myeloproliferative disorder, ? ET on hydroxyurea    Admitted with bright red blood per rectum? Differential diagnosis includes vaginally bleeding or less likely hematuria.     Mild coagulopathy due to Coumadin  ·	INR 2.77, slightly increase of 3.28  ·	Minimal bleeding currently, Hgb stable    Acute and chronic anemia  ·	baseline hemoglobin mild anemia, with last record of normal hemoglobin in 2011.  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  ·	Baseline hemoglobin appears to be 10 g/dL  Acute anemia due to G.I. 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  ·	Negative lupus anticoagulant [03/04/16]  history of right thigh hematoma  history of [place] IVC filter placed 03/25/16      history of B12 deficiency  ·	Last B-12 Vit level 811 on [05/25/17], folate >20.0    history of pancreatic cyst  ·	CA 19-9 was 16.8 on [03/04/16]  ·	CEA 0.8 on [03/04/16]      RECOMMENDATIONS  continue to hold hydrea.  Monitor for improvement of WBC.    coumadin as per cardiology  check INR daily.  If decrease INR tomorrow, can probably resume Coumadin as usual slightly reduce dose.    continue GI evaluation    Transfuse PRBC as clinically indicated.   Transfuse PRBC if Hgb <7.0 or if symptomatic.   Follow CBC    Check Anemia studies.   B12, Folate, iron, ferritin, ESR    DVT Prophylaxis  SCD    Thank you for consulting us.     Advised son to have pt followup with Dr Murcia on DC.     Discussed with Dr Best.    
rectal bleed  acute blood loss anemia     diet as tolerated  monitor cbc  hold anticoagulation  will start anusol suppository  h/o diverticulitis in past  repeat CT showed diverticulitis  will start augmentin  would defer colonoscopy consideration for at least 8 weeks  would not resume coumadin for 10 days  dc planning      I reviewed the overnight course of events on the unit, re-confirming the patient history. I discussed the care with the patient and their family  The plan of care was discussed with the physician assistant and modifications were made to the notation where appropriate.   Differential diagnosis and plan of care discussed with patient after the evaluation  35 minutes spent on total encounter of which more than fifty percent of the encounter was spent counseling and/or coordinating care by the attending physician.  Advanced care planning was discussed with patient and family.  Advanced care planning forms were reviewed and discussed.  Risks, benefits and alternatives of gastroenterologic procedures were discussed in detail and all questions were answered.    
rectal bleed  acute blood loss anemia     diet as tolerated  monitor cbc  hold anticoagulation  will start anusol suppository  h/o diverticulitis in past  repeat CT showed diverticulitis  will start augmentin  would defer colonoscopy consideration for at least 8 weeks  would not resume coumadin for 10 days  dc planning      I reviewed the overnight course of events on the unit, re-confirming the patient history. I discussed the care with the patient and their family  The plan of care was discussed with the physician assistant and modifications were made to the notation where appropriate.   Differential diagnosis and plan of care discussed with patient after the evaluation  35 minutes spent on total encounter of which more than fifty percent of the encounter was spent counseling and/or coordinating care by the attending physician.  Advanced care planning was discussed with patient and family.  Advanced care planning forms were reviewed and discussed.  Risks, benefits and alternatives of gastroenterologic procedures were discussed in detail and all questions were answered.    
rectal bleed  acute blood loss anemia    Advance diet as tolerated  monitor cbc  hold anticoagulation  will start anusol suppository  h/o diverticulitis in past  will check CT a/p with IV contrast  d/w son    I reviewed the overnight course of events on the unit, re-confirming the patient history. I discussed the care with the patient and their family  The plan of care was discussed with the physician assistant and modifications were made to the notation where appropriate.   Differential diagnosis and plan of care discussed with patient after the evaluation  35 minutes spent on total encounter of which more than fifty percent of the encounter was spent counseling and/or coordinating care by the attending physician.  Advanced care planning was discussed with patient and family.  Advanced care planning forms were reviewed and discussed.  Risks, benefits and alternatives of gastroenterologic procedures were discussed in detail and all questions were answered.    
rectal bleed  acute blood loss anemia    diet as tolerated  monitor cbc  hold anticoagulation  will start anusol suppository  h/o diverticulitis in past  repeat CT showed diverticulitis  cont augmentin  would defer colonoscopy consideration for at least 8 weeks  would not resume coumadin for 10 days  dc planning    Advanced care planning was discussed with patient and family.  Advanced care planning forms were reviewed and discussed.  Risks, benefits and alternatives of gastroenterologic procedures were discussed in detail and all questions were answered.    30 minutes spent.  
pt with rectal bleeding   ashd  paf - on coumadin  r/o aortic stenosis  s/p cva  no chf .no angina  echo- ordered   ekg- rsr  advanced age .cva.paf , r/o aortic stensis - pt is at intermediate risk for colonoscopy  no more acive bleeding 12/31
pt with rectal bleeding   ashd  paf - on coumadin  r/o aortic stenosis  s/p cva  no chf .no angina  echo- ordered   ekg- rsr  advanced age .cva.paf , r/o aortic stensis - pt is at intermediate risk for colonoscopy  no more acive bleeding 12/31
89 yo Female hx of afib on coumadin, sent from [facility] to ED  c/o rectal bleeding.  Denies any sob, cp.  No dizziness. no sob. Patient was hemodynamically stable with HB above 10 on arrival to ER .blood from rectum was noted by RN during transfer from stretcher to ED bed on arrival .GI consult Dr Newby called ,cardiology clearance requested for possible endoscopy and further OAC plan .Hematology eval requested for possible INR reversal if bleeding continues ,serial coagulation profiles ordered .
89 yo Female hx of afib on coumadin, sent from [facility] to ED  c/o rectal bleeding.  Denies any sob, cp.  No dizziness. no sob. Patient was hemodynamically stable with HB above 10 on arrival to ER .blood from rectum was noted by RN during transfer from stretcher to ED bed on arrival .GI consult Dr Newby called ,cardiology clearance requested for possible endoscopy and further OAC plan .Hematology eval requested for possible INR reversal if bleeding continues ,serial coagulation profiles ordered .
89 yo Female hx of afib on coumadin, sent from [facility] to ED  c/o rectal bleeding.  Denies any sob, cp.  No dizziness. no sob. Patient was hemodynamically stable with HB above 10 on arrival to ER .blood from rectum was noted by RN during transfer from stretcher to ED bed on arrival .GI consult Dr Newby called ,cardiology clearance requested for possible endoscopy and further OAC plan .Hematology eval requested for possible INR reversal if bleeding continues ,serial coagulation profiles ordered .

## 2022-01-03 NOTE — PROGRESS NOTE ADULT - PROVIDER SPECIALTY LIST ADULT
Gastroenterology
Heme/Onc
Internal Medicine
Gastroenterology
Gastroenterology
Heme/Onc
Heme/Onc
Infectious Disease
Internal Medicine
Cardiology
Cardiology
Heme/Onc
Hospitalist
Gastroenterology
Gastroenterology
Heme/Onc
Cardiology
Hospitalist
Hospitalist

## 2022-01-03 NOTE — DISCHARGE NOTE NURSING/CASE MANAGEMENT/SOCIAL WORK - PATIENT PORTAL LINK FT
You can access the FollowMyHealth Patient Portal offered by [facility] by registering at the following website: [email or web address]. By joining [employer]’s FollowMyHealth portal, you will also be able to view your health information using other applications (apps) compatible with our system.

## 2022-01-03 NOTE — PROGRESS NOTE ADULT - PROBLEM SELECTOR PLAN 9
Gastrointestinal stress ulcer prophylaxis and DVT prophylaxis administered
Gastrointestinal stress ulcer prophylaxis and DVT prophylaxis administered
pain management ,PT/OT

## 2022-01-03 NOTE — DISCHARGE NOTE NURSING/CASE MANAGEMENT/SOCIAL WORK - NSDCPEFALRISK_GEN_ALL_CORE
For information on Fall & Injury Prevention, visit: [email or web address] OR  [email or web address] OR  https://www.cdc.gov/steadi/patient.html

## 2022-01-03 NOTE — PROGRESS NOTE ADULT - SUBJECTIVE AND OBJECTIVE BOX
PROGRESS NOTE  Patient is a 89y old  Female who presents with a chief complaint of rectal bleeding (03 Jan 2022 13:14)  Chart and available morning labs /imaging are reviewed electronically , urgent issues addressed . More information  is being added upon completion of rounds , when more information is collected and management discussed with consultants , medical staff and social service/case management on the floor     OVERNIGHT    No new issues reported by medical staff . All above noted   HPI:  89 yo Female hx of afib on coumadin, sent from [facility] to ED  c/o rectal bleeding.  Denies any sob, cp.  No dizziness. no sob. Patient was hemodynamically stable with HB above 10 on arrival to ER .[facility] .GI consult Dr Newyb called ,cardiology clearance requested for possible endoscopy and further OAC plan .Hematology eval requested for possible INR reversal if bleeding continues ,serial coagulation profiles ordered . (29 Dec 2021 06:37)    PAST MEDICAL & SURGICAL HISTORY:  Hypertension    CVA (cerebral vascular accident)    Depression    Constipation    Neuropathy    Hyperlipidemia    Grade I diastolic dysfunction    Afib    Neuropathy    No significant past surgical history        MEDICATIONS  (STANDING):  amoxicillin   80 mG/mL/clavulanate Suspension 875 milliGRAM(s) Oral two times a day  baclofen 10 milliGRAM(s) Oral at bedtime  cyanocobalamin 1000 MICROGram(s) Oral daily  enalapril 2.5 milliGRAM(s) Oral daily  escitalopram 20 milliGRAM(s) Oral daily  folic acid 1 milliGRAM(s) Oral daily  furosemide    Tablet 20 milliGRAM(s) Oral daily  gabapentin 100 milliGRAM(s) Oral two times a day  hydrocortisone hemorrhoidal Suppository 1 Suppository(s) Rectal daily  lactobacillus acidophilus 1 Tablet(s) Oral two times a day  levothyroxine 25 MICROGram(s) Oral daily  metoprolol tartrate 25 milliGRAM(s) Oral two times a day  pantoprazole  Injectable 40 milliGRAM(s) IV Push daily  simvastatin 10 milliGRAM(s) Oral at bedtime    MEDICATIONS  (PRN):  acetaminophen     Tablet .. 650 milliGRAM(s) Oral every 6 hours PRN Temp greater or equal to 38C (100.4F), Mild Pain (1 - 3)  aluminum hydroxide/magnesium hydroxide/simethicone Suspension 30 milliLiter(s) Oral every 4 hours PRN Dyspepsia  melatonin 3 milliGRAM(s) Oral at bedtime PRN Insomnia  ondansetron Injectable 4 milliGRAM(s) IV Push every 8 hours PRN Nausea and/or Vomiting  traMADol 50 milliGRAM(s) Oral four times a day PRN Moderate Pain (4 - 6)      OBJECTIVE    T(C): 36.7 (01-03-22 @ 04:31), Max: 37.3 (01-02-22 @ 22:49)  HR: 83 (01-03-22 @ 04:31) (83 - 94)  BP: 138/67 (01-03-22 @ 04:31) (109/64 - 138/67)  RR: 18 (01-03-22 @ 04:31) (18 - 19)  SpO2: 91% (01-03-22 @ 04:31) (91% - 92%)  Wt(kg): --  I&O's Summary    02 Jan 2022 07:01  -  03 Jan 2022 07:00  --------------------------------------------------------  IN: 0 mL / OUT: 700 mL / NET: -700 mL          REVIEW OF SYSTEMS:  CONSTITUTIONAL: No fever, weight loss, or fatigue  EYES: No eye pain, visual disturbances, or discharge  ENMT:   No sinus or throat pain  NECK: No pain or stiffness  RESPIRATORY: No cough, wheezing, chills or hemoptysis; No shortness of breath  CARDIOVASCULAR: No chest pain, palpitations, dizziness, or leg swelling  GASTROINTESTINAL: No abdominal pain. No nausea, vomiting; No diarrhea or constipation. No melena or hematochezia.  GENITOURINARY: No dysuria, frequency, hematuria, or incontinence  NEUROLOGICAL: No headaches, memory loss, loss of strength, numbness, or tremors  SKIN: No itching, burning, rashes, or lesions   MUSCULOSKELETAL: No joint pain or swelling; No muscle, back, or extremity pain    PHYSICAL EXAM:  Appearance: NAD. VS past 24 hrs -as above   HEENT:   Moist oral mucosa. Conjunctiva clear b/l.   Neck : supple  Respiratory: Lungs CTAB.  Gastrointestinal:  Soft, nontender. No rebound. No rigidity. BS present	  Cardiovascular: RRR ,S1S2 present  Neurologic: Non-focal. Moving all extremities.  Extremities: No edema. No erythema. No calf tenderness.  Skin: No rashes, No ecchymoses, No cyanosis.	  wounds ,skin lesions-See skin assesment flow sheet   LABS:                        9.2    3.49  )-----------( 390      ( 03 Jan 2022 07:48 )             27.1     01-03    142  |  108  |  6<L>  ----------------------------<  95  3.8   |  26  |  0.59    Ca    7.9<L>      03 Jan 2022 07:48      CAPILLARY BLOOD GLUCOSE        PT/INR - ( 03 Jan 2022 07:48 )   PT: 17.6 sec;   INR: 1.54 ratio         PTT - ( 02 Jan 2022 10:35 )  PTT:33.3 sec      RADIOLOGY & ADDITIONAL TESTS:   reviewed elctronically  ASSESSMENT/PLAN:

## 2022-01-03 NOTE — PROGRESS NOTE ADULT - PROBLEM SELECTOR PLAN 8
pain management ,PT/OT
pain management ,PT/OT
monitor fluid status closely ,ins/outs ,cardiology consult

## 2022-01-03 NOTE — PROGRESS NOTE ADULT - SUBJECTIVE AND OBJECTIVE BOX
[facility]  Gus Sy PA-C  [facility] [address]  [phone]      INTERVAL HPI/OVERNIGHT EVENTS:  Pt s/e  Pt reports she has no further rectal bleeding  Denies melena and hematochezia and further GI complaints    MEDICATIONS  (STANDING):  amoxicillin   80 mG/mL/clavulanate Suspension 875 milliGRAM(s) Oral two times a day  baclofen 10 milliGRAM(s) Oral at bedtime  cyanocobalamin 1000 MICROGram(s) Oral daily  dextrose 5% + sodium chloride 0.45%. 1000 milliLiter(s) (50 mL/Hr) IV Continuous <Continuous>  enalapril 2.5 milliGRAM(s) Oral daily  escitalopram 20 milliGRAM(s) Oral daily  folic acid 1 milliGRAM(s) Oral daily  furosemide    Tablet 20 milliGRAM(s) Oral daily  gabapentin 100 milliGRAM(s) Oral two times a day  hydrocortisone hemorrhoidal Suppository 1 Suppository(s) Rectal daily  metoprolol tartrate 25 milliGRAM(s) Oral two times a day  pantoprazole  Injectable 40 milliGRAM(s) IV Push daily  simvastatin 10 milliGRAM(s) Oral at bedtime    MEDICATIONS  (PRN):  acetaminophen     Tablet .. 650 milliGRAM(s) Oral every 6 hours PRN Temp greater or equal to 38C (100.4F), Mild Pain (1 - 3)  aluminum hydroxide/magnesium hydroxide/simethicone Suspension 30 milliLiter(s) Oral every 4 hours PRN Dyspepsia  melatonin 3 milliGRAM(s) Oral at bedtime PRN Insomnia  ondansetron Injectable 4 milliGRAM(s) IV Push every 8 hours PRN Nausea and/or Vomiting  traMADol 50 milliGRAM(s) Oral four times a day PRN Moderate Pain (4 - 6)      Allergies:  No Known Allergies    PHYSICAL EXAM:   Vital Signs:  Vital Signs Last 24 Hrs  T(C): 36.7 (03 Jan 2022 04:31), Max: 37.3 (02 Jan 2022 22:49)  T(F): 98.1 (03 Jan 2022 04:31), Max: 99.1 (02 Jan 2022 22:49)  HR: 83 (03 Jan 2022 04:31) (80 - 94)  BP: 138/67 (03 Jan 2022 04:31) (109/64 - 138/67)  BP(mean): --  RR: 18 (03 Jan 2022 04:31) (18 - 19)  SpO2: 91% (03 Jan 2022 04:31) (91% - 92%)  Daily     Daily     GENERAL:  Appears stated age,   HEENT:  NC/AT,    CHEST:  Full & symmetric excursion,   HEART:  Regular rhythm,  ABDOMEN:  Soft, non-tender, non-distended,  EXTEREMITIES:  no cyanosis  SKIN:  No rash  NEURO:  Alert      LABS:                        9.2    3.49  )-----------( 390      ( 03 Jan 2022 07:48 )             27.1     01-03    142  |  108  |  6<L>  ----------------------------<  95  3.8   |  26  |  0.59    Ca    7.9<L>      03 Jan 2022 07:48      PT/INR - ( 03 Jan 2022 07:48 )   PT: 17.6 sec;   INR: 1.54 ratio         PTT - ( 02 Jan 2022 10:35 )  PTT:33.3 sec

## 2022-01-03 NOTE — PROGRESS NOTE ADULT - PROBLEM SELECTOR PLAN 6
continue home medications
continue home medications ,cardiology is following
continue home medications

## 2022-01-03 NOTE — PROGRESS NOTE ADULT - PROBLEM SELECTOR PLAN 2
serial h/h ,ppi ,GI consult ,iv hydration
continue home medications ,coumadin is on hold due to GIB
continue home medications ,coumadin is on hold due to GIB

## 2022-01-05 NOTE — ED PROVIDER NOTE - HEAD, MLM
Alayna Gutierrez is 11 year old 1 month old, here for a preventive care visit.    Assessment & Plan     (Z00.129) Encounter for routine child health examination w/o abnormal findings  (primary encounter diagnosis)  Comment: No concerns with growth and development. Alayna received only 3 vaccines today and will be rescheduling a vaccine visit for her HPV and Meningitis.   Plan: BEHAVIORAL/EMOTIONAL ASSESSMENT (81800),         SCREENING TEST, PURE TONE, AIR ONLY, SCREENING,        VISUAL ACUITY, QUANTITATIVE, BILAT, Tdap         (Adacel, Boostrix), HEP A PED/ADOL, INFLUENZA         VACCINE IM > 6 MONTHS VALENT IIV4         (AFLURIA/FLUZONE)             Growth        Normal height and weight    No weight concerns.    Immunizations     I provided face to face vaccine counseling, answered questions, and explained the benefits and risks of the vaccine components ordered today including:  DTaP-IPV-Hep B (Pediarix ), Hepatitis A - Pediatric 2 dose and Influenza - Quadrivalent Preserve Free 3yrs+      Anticipatory Guidance    Reviewed age appropriate anticipatory guidance. This includes body changes with puberty and sexuality, including STIs as appropriate.    The following topics were discussed:  SOCIAL/ FAMILY:    Peer pressure    Bullying    Increased responsibility    Parent/ teen communication    Limits/consequences    Social media    TV/ media    School/ homework  NUTRITION:    Healthy food choices    Family meals    Calcium    Vitamins/supplements    Weight management  HEALTH/ SAFETY:    Adequate sleep/ exercise    Sleep issues    Dental care    Drugs, ETOH, smoking    Body image    Seat belts    Swim/ water safety    Sunscreen/ insect repellent    Contact sports    Bike/ sport helmets  SEXUALITY:    Body changes with puberty    Menstruation    Wet dreams    Dating/ relationships    Encourage abstinence        Referrals/Ongoing Specialty Care  Verbal referral for routine dental care    Follow Up      No follow-ups  on file.    Subjective     No flowsheet data found.  Patient has been advised of split billing requirements and indicates understanding: Yes  Review of external notes as documented elsewhere in note  20 minutes spent on the date of the encounter doing chart review, history and exam, documentation and further activities per the note        Social 1/5/2022   Who does your child live with? Parent(s)   Has your child experienced any stressful family events recently? None   In the past 12 months, has lack of transportation kept you from medical appointments or from getting medications? No   In the last 12 months, was there a time when you were not able to pay the mortgage or rent on time? No   In the last 12 months, was there a time when you did not have a steady place to sleep or slept in a shelter (including now)? No       Health Risks/Safety 1/5/2022   Where does your child sit in the car?  (!) FRONT SEAT   Does your child always wear a seat belt? Yes          TB Screening 1/5/2022   Since your last Well Child visit, have any of your child's family members or close contacts had tuberculosis or a positive tuberculosis test? No   Since your last Well Child Visit, has your child or any of their family members or close contacts traveled or lived outside of the [place]? No   Since your last Well Child visit, has your child lived in a high-risk group setting like a correctional facility, health care facility, homeless shelter, or refugee camp? No        Dyslipidemia Screening 1/5/2022   Have any of the child's parents or grandparents had a stroke or heart attack before age 55 for males or before age 65 for females?  No   Do either of the child's parents have high cholesterol or are currently taking medications to treat cholesterol? No    Risk Factors: None      Dental Screening 1/5/2022   Has your child seen a dentist? Yes   When was the last visit? 3 months to 6 months ago   Has your child had cavities in the last 3  years? No   Has your child s parent(s), caregiver, or sibling(s) had any cavities in the last 2 years?  No     Dental Fluoride Varnish:   No, goes to dentist.  Diet 1/5/2022   Do you have questions about your child's height or weight? No   What does your child regularly drink? Water, Cow's milk   What type of milk? Skim   What type of water? Tap   How often does your family eat meals together? Every day   How many servings of fruits and vegetables does your child eat a day? (!) 3-4   Does your child get at least 3 servings of food or beverages that have calcium each day (dairy, green leafy vegetables, etc)? Yes   Within the past 12 months, you worried that your food would run out before you got money to buy more. Never true     Elimination 1/5/2022   Do you have any concerns about your child's bladder or bowels? No concerns         Activity 1/5/2022   On average, how many days per week does your child engage in moderate to strenuous exercise (like walking fast, running, jogging, dancing, swimming, biking, or other activities that cause a light or heavy sweat)? (!) 3 DAYS   On average, how many minutes does your child engage in exercise at this level? (!) 30 MINUTES   What does your child do for exercise?  Swim, walk, run   What activities is your child involved with?  Friends, swim, music     Media Use 1/5/2022   How many hours per day is your child viewing a screen for entertainment?    1 or 2   Does your child use a screen in their bedroom? No     Sleep 1/5/2022   Do you have any concerns about your child's sleep?  No concerns, sleeps well through the night       Vision/Hearing 1/5/2022   Do you have any concerns about your child's hearing or vision?  No concerns     Vision Screen  Vision Screen Details  Does the patient have corrective lenses (glasses/contacts)?: No  No Corrective Lenses, PLUS LENS REQUIRED: Pass  Vision Acuity Screen  Vision Acuity Tool: [place]  RIGHT EYE: 10/8 (20/16)  LEFT EYE: 10/8 (20/16)  Is  there a two line difference?: No  Vision Screen Results: Pass    Hearing Screen  RIGHT EAR  1000 Hz on Level 40 dB (Conditioning sound): Pass  1000 Hz on Level 20 dB: Pass  2000 Hz on Level 20 dB: Pass  4000 Hz on Level 20 dB: Pass  6000 Hz on Level 20 dB: Pass  8000 Hz on Level 20 dB: Pass  LEFT EAR  8000 Hz on Level 20 dB: Pass  6000 Hz on Level 20 dB: Pass  4000 Hz on Level 20 dB: Pass  2000 Hz on Level 20 dB: Pass  1000 Hz on Level 20 dB: Pass  500 Hz on Level 25 dB: Pass  RIGHT EAR  500 Hz on Level 25 dB: Pass  Results  Hearing Screen Results: Pass      School 1/5/2022   Do you have any concerns about your child's learning in school? No concerns   What grade is your child in school? 5th Grade   What school does your child attend? [employer]   Does your child typically miss more than 2 days of school per month? No   Do you have concerns about your child's friendships or peer relationships?  No     Development / Social-Emotional Screen 1/5/2022   Does your child receive any special educational services? No     Psycho-Social/Depression - PSC-17 required for C&TC through age 18  General screening:  Electronic PSC   PSC SCORES 1/5/2022   Inattentive / Hyperactive Symptoms Subtotal 0   Externalizing Symptoms Subtotal 0   Internalizing Symptoms Subtotal 1   PSC - 17 Total Score 1       Follow up:  PSC-17 PASS (<15), no follow up necessary       [place] [employer] Physical 1/5/2022   Do you have any concerns that you would like to discuss with your provider? No   Has a provider ever denied or restricted your participation in sports for any reason? No   Do you have any ongoing medical issues or recent illness? No   Have you ever passed out or nearly passed out during or after exercise? No   Does your heart ever race, flutter in your chest, or skip beats (irregular beats) during exercise? No   Has a doctor ever told you that you have any heart problems? No   Has a doctor ever requested a test for  your heart? For example, electrocardiography (ECG) or echocardiography. No   Do you ever get light-headed or feel shorter of breath than your friends during exercise?  No   Have you ever had a seizure?  No   Has any family member or relative  of heart problems or had an unexpected or unexplained sudden death before age 35 years (including drowning or unexplained car crash)? No   Does anyone in your family have a genetic heart problem such as hypertrophic cardiomyopathy (HCM), Marfan syndrome, arrhythmogenic right ventricular cardiomyopathy (ARVC), long QT syndrome (LQTS), short QT syndrome (SQTS), Brugada syndrome, or catecholaminergic polymorphic ventricular tachycardia (CPVT)?   No   Has anyone in your family had a pacemaker or an implanted defibrillator before age 35? No   Have you ever had a stress fracture or an injury to a bone, muscle, ligament, joint, or tendon that caused you to miss a practice or game? No   Do you have a bone, muscle, ligament, or joint injury that bothers you?  No   Do you cough, wheeze, or have difficulty breathing during or after exercise?   No   Are you missing a kidney, an eye, a testicle (males), your spleen, or any other organ? No   Have you had a concussion or head injury that caused confusion, a prolonged headache, or memory problems? No   Have you ever had numbness, tingling, weakness in your arms or legs, or been unable to move your arms or legs after being hit or falling? No   Have you ever become ill while exercising in the heat? No   Do you or does someone in your family have sickle cell trait or disease? No   Have you ever had, or do you have any problems with your eyes or vision? No   Do you worry about your weight? No   Are you trying to or has anyone recommended that you gain or lose weight? No   Have you ever had an eating disorder? No   Have you ever had a menstrual period? No     Review of Systems       Objective     Exam  /71   Pulse 83   Temp 98.3  F  "(36.8  C) (Oral)   Ht 5' 1.54\" (1.563 m)   Wt 99 lb 12.8 oz (45.3 kg)   BMI 18.53 kg/m    94 %ile (Z= 1.55) based on [facility] (Girls, 2-20 Years) Stature-for-age data based on Stature recorded on 1/5/2022.  80 %ile (Z= 0.83) based on [facility] (Girls, 2-20 Years) weight-for-age data using vitals from 1/5/2022.  64 %ile (Z= 0.36) based on [facility] (Girls, 2-20 Years) BMI-for-age based on BMI available as of 1/5/2022.  Blood pressure percentiles are 49 % systolic and 83 % diastolic based on the 2017 AAP Clinical Practice Guideline. This reading is in the normal blood pressure range.  Physical Exam  GENERAL: Active, alert, in no acute distress.  SKIN: Clear. No significant rash, abnormal pigmentation or lesions  HEAD: Normocephalic  EYES: Pupils equal, round, reactive, Extraocular muscles intact. Normal conjunctivae.  EARS: Normal canals. Tympanic membranes are normal; gray and translucent.  NOSE: Normal without discharge.  MOUTH/THROAT: Clear. No oral lesions. Teeth without obvious abnormalities.  NECK: Supple, no masses.  No thyromegaly.  LYMPH NODES: No adenopathy  LUNGS: Clear. No rales, rhonchi, wheezing or retractions  HEART: Regular rhythm. Normal S1/S2. No murmurs. Normal pulses.  ABDOMEN: Soft, non-tender, not distended, no masses or hepatosplenomegaly. Bowel sounds normal.   NEUROLOGIC: No focal findings. Cranial nerves grossly intact: DTR's normal. Normal gait, strength and tone  BACK: Spine is straight, no scoliosis.  EXTREMITIES: Full range of motion, no deformities  : Normal female external genitalia, Amadeo stage 2.   BREASTS:  Amadeo stage 2.  No abnormalities.     No Marfan stigmata: kyphoscoliosis, high-arched palate, pectus excavatuM, arachnodactyly, arm span > height, hyperlaxity, myopia, MVP, aortic insufficieny)  Eyes: normal fundoscopic and pupils  Cardiovascular: normal PMI, simultaneous femoral/radial pulses, no murmurs (standing, supine, Valsalva)  Skin: no HSV, MRSA, tinea corporis  Musculoskeletal    " Neck: normal    Back: normal    Shoulder/arm: normal    Elbow/forearm: normal    Wrist/hand/fingers: normal    Hip/thigh: normal    Knee: normal    Leg/ankle: normal    Foot/toes: normal    Functional (Single Leg Hop or Squat): normal      MORRO Walls CNP  [facility]   Head is atraumatic. Head shape is symmetrical.

## 2022-01-17 NOTE — ED ADULT TRIAGE NOTE - ARRIVAL FROM
Mabel Cabrera is a 78 year old female presenting today for a BP check.    Blood Pressure: 146/64  Pulse: 66  Time: 11:30 AM    Blood Pressure: 140/63  Time: 11:35 AM     Assisted living facility

## 2022-03-14 NOTE — H&P ADULT - NEGATIVE ENDOCRINE SYMPTOMS
no cold intolerance/no heat intolerance/no striae

## 2022-03-28 RX ORDER — GUAIFENESIN/PSEUDOEPHEDRNE HCL 1200-120MG
1 TABLET, EXTENDED RELEASE 12 HR ORAL
Qty: 0 | Refills: 0 | DISCHARGE
Start: 2022-03-28 | End: 2022-04-04

## 2022-03-30 ENCOUNTER — EMERGENCY (EMERGENCY)
Facility: HOSPITAL | Age: 87
LOS: 1 days | Discharge: HOME | End: 2022-03-30
Attending: EMERGENCY MEDICINE | Admitting: EMERGENCY MEDICINE
Payer: MEDICARE

## 2022-03-30 VITALS
RESPIRATION RATE: 18 BRPM | OXYGEN SATURATION: 97 % | HEART RATE: 74 BPM | DIASTOLIC BLOOD PRESSURE: 84 MMHG | TEMPERATURE: 98 F | SYSTOLIC BLOOD PRESSURE: 146 MMHG

## 2022-03-30 VITALS
HEART RATE: 118 BPM | DIASTOLIC BLOOD PRESSURE: 78 MMHG | RESPIRATION RATE: 16 BRPM | WEIGHT: 125 LBS | TEMPERATURE: 98 F | SYSTOLIC BLOOD PRESSURE: 141 MMHG | HEIGHT: 66 IN | OXYGEN SATURATION: 94 %

## 2022-03-30 DIAGNOSIS — I83.228 VARICOSE VEINS OF LEFT LOWER EXTREMITY WITH BOTH ULCER OF OTHER PART OF LOWER EXTREMITY AND INFLAMMATION: ICD-10-CM

## 2022-03-30 DIAGNOSIS — I83.218 VARICOSE VEINS OF RIGHT LOWER EXTREMITY WITH BOTH ULCER OF OTHER PART OF LOWER EXTREMITY AND INFLAMMATION: ICD-10-CM

## 2022-03-30 DIAGNOSIS — I87.2 VENOUS INSUFFICIENCY (CHRONIC) (PERIPHERAL): ICD-10-CM

## 2022-03-30 PROBLEM — G62.9 POLYNEUROPATHY, UNSPECIFIED: Chronic | Status: ACTIVE | Noted: 2021-12-29

## 2022-03-30 PROBLEM — I48.91 UNSPECIFIED ATRIAL FIBRILLATION: Chronic | Status: ACTIVE | Noted: 2021-12-29

## 2022-03-30 PROBLEM — I51.89 OTHER ILL-DEFINED HEART DISEASES: Chronic | Status: ACTIVE | Noted: 2021-12-29

## 2022-03-30 LAB
ALBUMIN SERPL ELPH-MCNC: 3.1 G/DL — LOW (ref 3.3–5)
ALP SERPL-CCNC: 64 U/L — SIGNIFICANT CHANGE UP (ref 40–120)
ALT FLD-CCNC: 14 U/L — SIGNIFICANT CHANGE UP (ref 12–78)
ANION GAP SERPL CALC-SCNC: 6 MMOL/L — SIGNIFICANT CHANGE UP (ref 5–17)
APTT BLD: 54.8 SEC — HIGH (ref 27.5–35.5)
AST SERPL-CCNC: 12 U/L — LOW (ref 15–37)
BASOPHILS # BLD AUTO: 0.06 K/UL — SIGNIFICANT CHANGE UP (ref 0–0.2)
BASOPHILS NFR BLD AUTO: 1.3 % — SIGNIFICANT CHANGE UP (ref 0–2)
BILIRUB SERPL-MCNC: 0.4 MG/DL — SIGNIFICANT CHANGE UP (ref 0.2–1.2)
BUN SERPL-MCNC: 10 MG/DL — SIGNIFICANT CHANGE UP (ref 7–23)
CALCIUM SERPL-MCNC: 8.9 MG/DL — SIGNIFICANT CHANGE UP (ref 8.5–10.1)
CHLORIDE SERPL-SCNC: 107 MMOL/L — SIGNIFICANT CHANGE UP (ref 96–108)
CO2 SERPL-SCNC: 29 MMOL/L — SIGNIFICANT CHANGE UP (ref 22–31)
CREAT SERPL-MCNC: 0.77 MG/DL — SIGNIFICANT CHANGE UP (ref 0.5–1.3)
EGFR: 73 ML/MIN/1.73M2 — SIGNIFICANT CHANGE UP
EOSINOPHIL # BLD AUTO: 0.09 K/UL — SIGNIFICANT CHANGE UP (ref 0–0.5)
EOSINOPHIL NFR BLD AUTO: 2 % — SIGNIFICANT CHANGE UP (ref 0–6)
GLUCOSE SERPL-MCNC: 107 MG/DL — HIGH (ref 70–99)
HCT VFR BLD CALC: 37.5 % — SIGNIFICANT CHANGE UP (ref 34.5–45)
HGB BLD-MCNC: 11.5 G/DL — SIGNIFICANT CHANGE UP (ref 11.5–15.5)
IMM GRANULOCYTES NFR BLD AUTO: 1.8 % — HIGH (ref 0–1.5)
INR BLD: 3.82 RATIO — HIGH (ref 0.88–1.16)
LACTATE SERPL-SCNC: 1 MMOL/L — SIGNIFICANT CHANGE UP (ref 0.7–2)
LYMPHOCYTES # BLD AUTO: 0.58 K/UL — LOW (ref 1–3.3)
LYMPHOCYTES # BLD AUTO: 12.9 % — LOW (ref 13–44)
MCHC RBC-ENTMCNC: 28.3 PG — SIGNIFICANT CHANGE UP (ref 27–34)
MCHC RBC-ENTMCNC: 30.7 GM/DL — LOW (ref 32–36)
MCV RBC AUTO: 92.4 FL — SIGNIFICANT CHANGE UP (ref 80–100)
MONOCYTES # BLD AUTO: 0.41 K/UL — SIGNIFICANT CHANGE UP (ref 0–0.9)
MONOCYTES NFR BLD AUTO: 9.2 % — SIGNIFICANT CHANGE UP (ref 2–14)
NEUTROPHILS # BLD AUTO: 3.26 K/UL — SIGNIFICANT CHANGE UP (ref 1.8–7.4)
NEUTROPHILS NFR BLD AUTO: 72.8 % — SIGNIFICANT CHANGE UP (ref 43–77)
NRBC # BLD: 0 /100 WBCS — SIGNIFICANT CHANGE UP (ref 0–0)
PLATELET # BLD AUTO: 571 K/UL — HIGH (ref 150–400)
POTASSIUM SERPL-MCNC: 3.8 MMOL/L — SIGNIFICANT CHANGE UP (ref 3.5–5.3)
POTASSIUM SERPL-SCNC: 3.8 MMOL/L — SIGNIFICANT CHANGE UP (ref 3.5–5.3)
PROT SERPL-MCNC: 7.3 G/DL — SIGNIFICANT CHANGE UP (ref 6–8.3)
PROTHROM AB SERPL-ACNC: 45.3 SEC — HIGH (ref 10.5–13.4)
RBC # BLD: 4.06 M/UL — SIGNIFICANT CHANGE UP (ref 3.8–5.2)
RBC # FLD: 19.2 % — HIGH (ref 10.3–14.5)
SARS-COV-2 RNA SPEC QL NAA+PROBE: SIGNIFICANT CHANGE UP
SODIUM SERPL-SCNC: 142 MMOL/L — SIGNIFICANT CHANGE UP (ref 135–145)
WBC # BLD: 4.48 K/UL — SIGNIFICANT CHANGE UP (ref 3.8–10.5)
WBC # FLD AUTO: 4.48 K/UL — SIGNIFICANT CHANGE UP (ref 3.8–10.5)

## 2022-03-30 PROCEDURE — 85610 PROTHROMBIN TIME: CPT

## 2022-03-30 PROCEDURE — 93005 ELECTROCARDIOGRAM TRACING: CPT

## 2022-03-30 PROCEDURE — 71045 X-RAY EXAM CHEST 1 VIEW: CPT

## 2022-03-30 PROCEDURE — 99285 EMERGENCY DEPT VISIT HI MDM: CPT | Mod: 25

## 2022-03-30 PROCEDURE — 99282 EMERGENCY DEPT VISIT SF MDM: CPT

## 2022-03-30 PROCEDURE — 99285 EMERGENCY DEPT VISIT HI MDM: CPT | Mod: FS

## 2022-03-30 PROCEDURE — 85730 THROMBOPLASTIN TIME PARTIAL: CPT

## 2022-03-30 PROCEDURE — 87040 BLOOD CULTURE FOR BACTERIA: CPT

## 2022-03-30 PROCEDURE — 96375 TX/PRO/DX INJ NEW DRUG ADDON: CPT

## 2022-03-30 PROCEDURE — 71045 X-RAY EXAM CHEST 1 VIEW: CPT | Mod: 26

## 2022-03-30 PROCEDURE — 93970 EXTREMITY STUDY: CPT

## 2022-03-30 PROCEDURE — 93010 ELECTROCARDIOGRAM REPORT: CPT

## 2022-03-30 PROCEDURE — 80053 COMPREHEN METABOLIC PANEL: CPT

## 2022-03-30 PROCEDURE — 93970 EXTREMITY STUDY: CPT | Mod: 26

## 2022-03-30 PROCEDURE — 36415 COLL VENOUS BLD VENIPUNCTURE: CPT

## 2022-03-30 PROCEDURE — 83605 ASSAY OF LACTIC ACID: CPT

## 2022-03-30 PROCEDURE — 85025 COMPLETE CBC W/AUTO DIFF WBC: CPT

## 2022-03-30 PROCEDURE — 87635 SARS-COV-2 COVID-19 AMP PRB: CPT

## 2022-03-30 PROCEDURE — 96374 THER/PROPH/DIAG INJ IV PUSH: CPT

## 2022-03-30 RX ORDER — ACETAMINOPHEN 500 MG
650 TABLET ORAL ONCE
Refills: 0 | Status: COMPLETED | OUTPATIENT
Start: 2022-03-30 | End: 2022-03-30

## 2022-03-30 RX ORDER — OXYCODONE AND ACETAMINOPHEN 5; 325 MG/1; MG/1
1 TABLET ORAL ONCE
Refills: 0 | Status: DISCONTINUED | OUTPATIENT
Start: 2022-03-30 | End: 2022-03-30

## 2022-03-30 RX ORDER — PIPERACILLIN AND TAZOBACTAM 4; .5 G/20ML; G/20ML
3.38 INJECTION, POWDER, LYOPHILIZED, FOR SOLUTION INTRAVENOUS ONCE
Refills: 0 | Status: COMPLETED | OUTPATIENT
Start: 2022-03-30 | End: 2022-03-30

## 2022-03-30 RX ORDER — DOCUSATE SODIUM 100 MG
2 CAPSULE ORAL
Qty: 0 | Refills: 0 | DISCHARGE

## 2022-03-30 RX ORDER — SENNA PLUS 8.6 MG/1
1 TABLET ORAL
Qty: 0 | Refills: 0 | DISCHARGE

## 2022-03-30 RX ORDER — FUROSEMIDE 40 MG
1 TABLET ORAL
Qty: 0 | Refills: 0 | DISCHARGE

## 2022-03-30 RX ORDER — COLLAGENASE CLOSTRIDIUM HIST. 250 UNIT/G
1 OINTMENT (GRAM) TOPICAL
Qty: 1 | Refills: 0
Start: 2022-03-30 | End: 2022-04-03

## 2022-03-30 RX ORDER — MORPHINE SULFATE 50 MG/1
4 CAPSULE, EXTENDED RELEASE ORAL ONCE
Refills: 0 | Status: DISCONTINUED | OUTPATIENT
Start: 2022-03-30 | End: 2022-03-30

## 2022-03-30 RX ORDER — VANCOMYCIN HCL 1 G
1000 VIAL (EA) INTRAVENOUS ONCE
Refills: 0 | Status: COMPLETED | OUTPATIENT
Start: 2022-03-30 | End: 2022-03-30

## 2022-03-30 RX ORDER — SODIUM CHLORIDE 9 MG/ML
1750 INJECTION INTRAMUSCULAR; INTRAVENOUS; SUBCUTANEOUS ONCE
Refills: 0 | Status: COMPLETED | OUTPATIENT
Start: 2022-03-30 | End: 2022-03-30

## 2022-03-30 RX ORDER — COLLAGENASE CLOSTRIDIUM HIST. 250 UNIT/G
1 OINTMENT (GRAM) TOPICAL ONCE
Refills: 0 | Status: DISCONTINUED | OUTPATIENT
Start: 2022-03-30 | End: 2022-04-02

## 2022-03-30 RX ADMIN — PIPERACILLIN AND TAZOBACTAM 200 GRAM(S): 4; .5 INJECTION, POWDER, LYOPHILIZED, FOR SOLUTION INTRAVENOUS at 12:11

## 2022-03-30 RX ADMIN — Medication 250 MILLIGRAM(S): at 12:11

## 2022-03-30 RX ADMIN — OXYCODONE AND ACETAMINOPHEN 1 TABLET(S): 5; 325 TABLET ORAL at 19:38

## 2022-03-30 RX ADMIN — Medication 650 MILLIGRAM(S): at 12:11

## 2022-03-30 RX ADMIN — SODIUM CHLORIDE 1750 MILLILITER(S): 9 INJECTION INTRAMUSCULAR; INTRAVENOUS; SUBCUTANEOUS at 12:11

## 2022-03-30 RX ADMIN — MORPHINE SULFATE 4 MILLIGRAM(S): 50 CAPSULE, EXTENDED RELEASE ORAL at 12:18

## 2022-03-30 NOTE — ED PROVIDER NOTE - PATIENT PORTAL LINK FT
You can access the FollowMyHealth Patient Portal offered by [facility] by registering at the following website: [email or web address]. By joining [facility]’s FollowMyHealth portal, you will also be able to view your health information using other applications (apps) compatible with our system.

## 2022-03-30 NOTE — CONSULT NOTE ADULT - PROBLEM SELECTOR RECOMMENDATION 9
santyl collagenase, gauze, myra qd  leg elevation  f/u with Wound care center next wk  Vascular consult

## 2022-03-30 NOTE — ED ADULT NURSE NOTE - NSIMPLEMENTINTERV_GEN_ALL_ED
Implemented All Fall with Harm Risk Interventions:  [place] to call system. Call bell, personal items and telephone within reach. Instruct patient to call for assistance. Room bathroom lighting operational. Non-slip footwear when patient is off stretcher. Physically safe environment: no spills, clutter or unnecessary equipment. Stretcher in lowest position, wheels locked, appropriate side rails in place. Provide visual cue, wrist band, yellow gown, etc. Monitor gait and stability. Monitor for mental status changes and reorient to person, place, and time. Review medications for side effects contributing to fall risk. Reinforce activity limits and safety measures with patient and family. Provide visual clues: red socks.

## 2022-03-30 NOTE — ED ADULT NURSE NOTE - NURSING MUSC HAND GRASP
DISCHARGE SUMMARY:  Discharge Time: 3355  Via:  medix  Accompanied by:  medix  Verbal Instructions given: Yes  Verbalized understanding: Yes  Written Instructions given: Yes  Discharge Signature Obtained: No  Patient Able to Void: Yes (aranda)  Discharged Stable Per MD Order: Yes     strong left, weak right

## 2022-03-30 NOTE — ED PROVIDER NOTE - NS ED ATTENDING STATEMENT MOD
This was a shared visit with the BELEN. I reviewed and verified the documentation and independently performed the documented:

## 2022-03-30 NOTE — ED ADULT NURSE NOTE - OBJECTIVE STATEMENT
Pt received in bed alert and oriented and agitated. Pt initially was refusing blood work and further care. Pt here with the c/o b/l LE redness, swelling and pain. As per Md's orders IV oli placed blood specimen obtained and sent to the lab. Pt stable and meds given and tolerated well.  Nursing care ongoing and safety maintained.

## 2022-03-30 NOTE — ED PROVIDER NOTE - CLINICAL SUMMARY MEDICAL DECISION MAKING FREE TEXT BOX
Tigre: pt with b/l LE leg pain 2/2 to ulcer formation over the past couple of months, wound care consult

## 2022-03-30 NOTE — CONSULT NOTE ADULT - SUBJECTIVE AND OBJECTIVE BOX
Chief Complaint: bilateral lower leg ulcers    HPI: 89 yo WF, presents to the ER with her son, c/o bilateral lower leg pain along with bilateral LE ulcers that have both been present since 12/21. Occasionally some oozing from BLE. Doppler done earlier today was neg. for DVT. Asked to see pt to evaluate the BLE ulcers.     Pt with a h/o CVA and DVT in the past.    PAST MEDICAL & SURGICAL HISTORY:  Hypertension    CVA (cerebral vascular accident)    Depression    Constipation    Neuropathy    Hyperlipidemia    Grade I diastolic dysfunction    Afib    Neuropathy    No significant past surgical history        Allergies    No Known Allergies    Intolerances        MEDICATIONS  (STANDING):  collagenase Ointment 1 Application(s) Topical Once    MEDICATIONS  (PRN):      FAMILY HISTORY:          ROS:  CONSTITUTIONAL: No fever, weight loss, or fatigue  EYES: No eye pain, visual disturbances, or discharge  ENMT:  No difficulty hearing, tinnitus, vertigo; No sinus or throat pain  NECK: No pain or stiffness  BREASTS: No pain, masses, or nipple discharge  RESPIRATORY: No cough, wheezing, chills or hemoptysis; No shortness of breath  CARDIOVASCULAR: No chest pain, palpitations, dizziness, or leg swelling  GASTROINTESTINAL: No abdominal or epigastric pain. No nausea, vomiting, or hematemesis; No diarrhea or constipation. No melena or hematochezia.  GENITOURINARY: No dysuria, frequency, hematuria, or incontinence  NEUROLOGICAL: No headaches, memory loss, loss of strength, numbness, or tremors  SKIN: No itching, burning, rashes, or lesions   LYMPH NODES: No enlarged glands  ENDOCRINE: No heat or cold intolerance; No hair loss  MUSCULOSKELETAL: No joint pain or swelling; No muscle, back, or extremity pain  PSYCHIATRIC: No depression, anxiety, mood swings, or difficulty sleeping  HEME/LYMPH: No easy bruising, or bleeding gums  ALLERGY AND IMMUNOLOGIC: No hives or eczema    PHYSICAL EXAM-    Height (cm): 167.6 (03-30 @ 10:17)  Weight (kg): 56.7 (03-30 @ 10:17)  BMI (kg/m2): 20.2 (03-30 @ 10:17)  Vital Signs Last 24 Hrs  T(C): 36.7 (30 Mar 2022 10:17), Max: 36.7 (30 Mar 2022 10:17)  T(F): 98.1 (30 Mar 2022 10:17), Max: 98.1 (30 Mar 2022 10:17)  HR: 118 (30 Mar 2022 10:17) (118 - 118)  BP: 141/78 (30 Mar 2022 10:17) (141/78 - 141/78)  BP(mean): --  RR: 16 (30 Mar 2022 10:17) (16 - 16)  SpO2: 94% (30 Mar 2022 10:17) (94% - 94%)    Constitutional: well developed, well nourished, no apparent distress, alert, oriented x 3. Contracture of RLE   Pulmonary: no respiratory distress, normal respiratory rhythm and effort, lungs are clear to auscultation/percussion. No CVA tenderness.  Cardiovascular: heart rate normal, normal sinus rhythm; no murmurs, gallops, rubs, heaves or thrills   Abdomen: soft, non-tender, +BS, no guarding/rebound/rigidity.    LLE-mod edema; no erythema/cellulitis; superficial round ulcer on ant. aspect-2.5cm size; covered by yellow/brown slough/necrotic tissue    RLE-mod edema; no erythema/cellulitis; 2 superficial round ulcers also on ant aspect-2.5 cm size; covered by brown slough/yellow slough/necrotic tissue.    Bilateral PT, DP not palpable.                            11.5   4.48  )-----------( 571      ( 30 Mar 2022 12:26 )             37.5     03-30    142  |  107  |  10  ----------------------------<  107<H>  3.8   |  29  |  0.77    Ca    8.9      30 Mar 2022 12:26    TPro  7.3  /  Alb  3.1<L>  /  TBili  0.4  /  DBili  x   /  AST  12<L>  /  ALT  14  /  AlkPhos  64  03-30      Radiology

## 2022-03-30 NOTE — ED PROVIDER NOTE - PHYSICAL EXAMINATION
Constitutional: Awake, Alert, non-toxic. NAD. Well appearing, well nourished.   HEAD: Normocephalic, atraumatic.   EYES: EOM intact, conjunctiva and sclera are clear bilaterally.   ENT: No rhinorrhea, patent, mucous membranes pink/moist, no drooling or stridor.   NECK: Supple, non-tender  CARDIOVASCULAR: Normal S1, S2; regular rate and rhythm.  RESPIRATORY: Normal respiratory effort; breath sounds CTAB, no wheezes, rhonchi, or rales. Speaking in full sentences. No accessory muscle use.   ABDOMEN: Soft; non-tender, non-distended.   EXTREMITIES: (+) RLE contraction and limited ROM due 2/2 prior CVA, (+) right lateral mid tib/fib 1.5 cm ulcer, (+) RLE edema and erythema. (+) LLE erythema.  non-tender to palpation; distal pulses palpable and symmetric  SKIN: Warm, dry; good skin turgor, no apparent lesions or rashes, no ecchymosis, brisk capillary refill.  NEURO: A&O x3. Sensory and motor functions are grossly intact. Speech is normal. Appearance and judgement seem appropriate for gender and age.

## 2022-03-30 NOTE — ED PROVIDER NOTE - CARE PROVIDER_API CALL
Ti Girard (DPM)  Podiatric Medicine and Surgery  [address] [place] [address]  Phone: [phone]  Fax: [phone]  Follow Up Time: 1-3 Days

## 2022-03-30 NOTE — ED PROVIDER NOTE - NSFOLLOWUPINSTRUCTIONS_ED_ALL_ED_FT
Follow up with your primary care doctor and wound care. Return for worsening pain fever, discharge, increased pain.       WOUND CARE:   [facility]  [address] [place] [address] Rd, [place] [address]  [phone]      Venous Ulcer      A venous ulcer is a shallow sore on your lower leg. Venous ulcer is the most common type of lower leg ulcer. You may have venous ulcers on one leg or on both legs. This condition most often develops around your ankles. This type of ulcer may last for a long time (chronic ulcer) or it may return often (recurrent ulcer).      What are the causes?    This condition is caused by poor blood flow in your legs. The poor flow causes blood to pool in your legs. This can break the skin, causing an ulcer.      What increases the risk?    You are more likely to develop this condition if:  •You are 65 years of age or older.      •You are female.      •You are overweight.      •You are not active.      •You have had a leg ulcer in the past.      •You have varicose veins.      •You have clots in your lower leg veins (deep vein thrombosis).      •You have inflammation of your leg veins (phlebitis).      •You have recently been pregnant.      •You smoke.        What are the signs or symptoms?     The main symptom of this condition is an open sore near your ankle. Other symptoms may include:  •Swelling.      •Thick skin.      •Fluid coming from the ulcer.      •Bleeding.      •Itching.      •Pain and swelling. This gets worse when you stand up and feels better when you raise your leg.      •Blotchy skin.      •Dark skin.        How is this treated?    This condition may be treated by:  •Keeping your leg raised (elevated).      •Wearing a type of bandage or stocking to keep pressure (compression) on the veins of your leg.      •Taking medicines, including antibiotic medicines.      •Cleaning your ulcer and removing any dead tissue from the wound.      •Using bandages and wraps that have medicines in them to cover your ulcer.      •Closing the wound using a piece of skin taken from another area of your body (graft).        Follow these instructions at home:    Medicines     •Take or apply over-the-counter and prescription medicines only as told by your doctor.      •If you were prescribed an antibiotic medicine, take it as told by your doctor. Do not stop using the antibiotic even if you start to feel better.      •Ask your doctor if you should take aspirin before long trips.      Wound care   •Follow instructions from your doctor about how to take care of your wound. Make sure you:  •Wash your hands with soap and water before and after you change your bandage (dressing). If you cannot use soap and water, use hand .      •Change your bandage as told by your doctor.      •If you had a skin graft, leave stitches (sutures) in place. These may need to stay in place for 2 weeks or longer.      •Ask when you should remove your bandage. If your bandage is dry and sticks to your leg when you try to remove it, moisten or wet the bandage with saline solution or water to make it easier to remove.      •Once your bandage is off, check your wound each day for signs of infection. Have a caregiver do this for you if you are not able to do it yourself. Check for:  •More redness, swelling, or pain.      •More fluid or blood.      •Warmth.      •Pus or a bad smell.        Activity     • Do not sit for a long time without moving. Get up to take short walks every 1–2 hours. This is important. Ask for help if you feel weak or unsteady.      •Ask your doctor what level of activity is safe for you.      •Rest with your legs raised during the day. If you can, keep your legs above the level of your heart for 30 minutes, 3–4 times a day, or as told by your doctor.      • Do not sit with your legs crossed.        General instructions      •Wear elastic stockings, compression stockings, or support hose as told by your doctor.      •Raise the foot of your bed as told by your doctor.      • Do not use any products that contain nicotine or tobacco, such as cigarettes, e-cigarettes, and chewing tobacco. If you need help quitting, ask your doctor.      •Keep all follow-up visits as told by your doctor. This is important.        Contact a doctor if:    •Your ulcer is getting larger or is not healing.      •Your pain gets worse.        Get help right away if:    •You have more redness, swelling, or pain around your ulcer.      •You have more fluid or blood coming from your ulcer.      •Your ulcer feels warm to the touch.      •You have pus or a bad smell coming from your ulcer.      •You have a fever.        Summary    •A venous ulcer is a shallow sore on your lower leg.      •Follow instructions from your doctor about how to take care of your wound.      •Check your wound each day for signs of infection.      •Take over-the-counter and prescription medicines only as told by your doctor.      •Keep all follow-up visits as told by your doctor. This is important.      This information is not intended to replace advice given to you by your health care provider. Make sure you discuss any questions you have with your health care provider.

## 2022-03-30 NOTE — ED PROVIDER NOTE - PROGRESS NOTE DETAILS
Dr. beverly Dozier advised may dc with Santylcollaganese cream and wound care follow up.  All questions were answered. Discussed the importance of prompt, close medical follow-up. Patient will return with any changes, concerns or persistent/worsening symptoms.  Patient verbalized understanding.

## 2022-03-30 NOTE — ED PROVIDER NOTE - OBJECTIVE STATEMENT
89 y/o female with PMHX HTN, HLD, A Fib, CVA, and DVT (on Coumadin) BIBA from [facility]. pt reports she has been having pain for months. Pt reports pain to both legs but right greater than left. pt describes pain as aching, non-radiating, and currently 10/10. Pt reports pain at areas of ulcers. pt denies chest pain, SOB, fever, vomiting, trauma, numbness/weakness, or any other complaints.

## 2022-04-06 NOTE — ED PROVIDER NOTE - CARDIAC, MLM
Thyroid slightly low.  I you are taking your levothyroxine on an empty stomach?
Normal rate, regular rhythm.  soft hannah

## 2022-04-19 ENCOUNTER — INPATIENT (INPATIENT)
Facility: HOSPITAL | Age: 87
LOS: 5 days | Discharge: NURSING, REHAB OR LONG-TERM CARE | DRG: 871 | End: 2022-04-25
Attending: INTERNAL MEDICINE | Admitting: INTERNAL MEDICINE
Payer: MEDICARE

## 2022-04-19 VITALS
OXYGEN SATURATION: 100 % | DIASTOLIC BLOOD PRESSURE: 72 MMHG | HEIGHT: 66 IN | TEMPERATURE: 97 F | SYSTOLIC BLOOD PRESSURE: 127 MMHG | HEART RATE: 108 BPM | RESPIRATION RATE: 18 BRPM | WEIGHT: 139.99 LBS

## 2022-04-19 DIAGNOSIS — F32.9 MAJOR DEPRESSIVE DISORDER, SINGLE EPISODE, UNSPECIFIED: ICD-10-CM

## 2022-04-19 DIAGNOSIS — I63.9 CEREBRAL INFARCTION, UNSPECIFIED: ICD-10-CM

## 2022-04-19 DIAGNOSIS — E87.2 ACIDOSIS: ICD-10-CM

## 2022-04-19 DIAGNOSIS — Z95.828 PRESENCE OF OTHER VASCULAR IMPLANTS AND GRAFTS: ICD-10-CM

## 2022-04-19 DIAGNOSIS — L03.119 CELLULITIS OF UNSPECIFIED PART OF LIMB: ICD-10-CM

## 2022-04-19 DIAGNOSIS — R41.82 ALTERED MENTAL STATUS, UNSPECIFIED: ICD-10-CM

## 2022-04-19 DIAGNOSIS — I10 ESSENTIAL (PRIMARY) HYPERTENSION: ICD-10-CM

## 2022-04-19 DIAGNOSIS — Z29.9 ENCOUNTER FOR PROPHYLACTIC MEASURES, UNSPECIFIED: ICD-10-CM

## 2022-04-19 DIAGNOSIS — I35.0 NONRHEUMATIC AORTIC (VALVE) STENOSIS: ICD-10-CM

## 2022-04-19 DIAGNOSIS — I51.89 OTHER ILL-DEFINED HEART DISEASES: ICD-10-CM

## 2022-04-19 LAB
ALBUMIN SERPL ELPH-MCNC: 2.7 G/DL — LOW (ref 3.3–5)
ALP SERPL-CCNC: 63 U/L — SIGNIFICANT CHANGE UP (ref 40–120)
ALT FLD-CCNC: 14 U/L — SIGNIFICANT CHANGE UP (ref 12–78)
AMMONIA BLD-MCNC: 39 UMOL/L — HIGH (ref 11–32)
ANION GAP SERPL CALC-SCNC: 11 MMOL/L — SIGNIFICANT CHANGE UP (ref 5–17)
ANISOCYTOSIS BLD QL: SIGNIFICANT CHANGE UP
APTT BLD: 53.1 SEC — HIGH (ref 27.5–35.5)
AST SERPL-CCNC: 19 U/L — SIGNIFICANT CHANGE UP (ref 15–37)
BASE EXCESS BLDA CALC-SCNC: 3.8 MMOL/L — HIGH (ref -2–3)
BASOPHILS # BLD AUTO: 0.1 K/UL — SIGNIFICANT CHANGE UP (ref 0–0.2)
BASOPHILS NFR BLD AUTO: 1 % — SIGNIFICANT CHANGE UP (ref 0–2)
BILIRUB SERPL-MCNC: 0.3 MG/DL — SIGNIFICANT CHANGE UP (ref 0.2–1.2)
BLOOD GAS COMMENTS ARTERIAL: SIGNIFICANT CHANGE UP
BUN SERPL-MCNC: 12 MG/DL — SIGNIFICANT CHANGE UP (ref 7–23)
CALCIUM SERPL-MCNC: 8.9 MG/DL — SIGNIFICANT CHANGE UP (ref 8.5–10.1)
CHLORIDE SERPL-SCNC: 102 MMOL/L — SIGNIFICANT CHANGE UP (ref 96–108)
CO2 SERPL-SCNC: 27 MMOL/L — SIGNIFICANT CHANGE UP (ref 22–31)
CREAT SERPL-MCNC: 0.82 MG/DL — SIGNIFICANT CHANGE UP (ref 0.5–1.3)
EGFR: 68 ML/MIN/1.73M2 — SIGNIFICANT CHANGE UP
EOSINOPHIL # BLD AUTO: 0.1 K/UL — SIGNIFICANT CHANGE UP (ref 0–0.5)
EOSINOPHIL NFR BLD AUTO: 1 % — SIGNIFICANT CHANGE UP (ref 0–6)
GAS PNL BLDA: SIGNIFICANT CHANGE UP
GIANT PLATELETS BLD QL SMEAR: PRESENT — SIGNIFICANT CHANGE UP
GLUCOSE SERPL-MCNC: 115 MG/DL — HIGH (ref 70–99)
HCO3 BLDA-SCNC: 28 MMOL/L — SIGNIFICANT CHANGE UP (ref 21–28)
HCT VFR BLD CALC: 36.2 % — SIGNIFICANT CHANGE UP (ref 34.5–45)
HGB BLD-MCNC: 10.8 G/DL — LOW (ref 11.5–15.5)
INR BLD: 4.86 RATIO — HIGH (ref 0.88–1.16)
LACTATE SERPL-SCNC: 2.1 MMOL/L — HIGH (ref 0.7–2)
LACTATE SERPL-SCNC: 2.9 MMOL/L — HIGH (ref 0.7–2)
LG PLATELETS BLD QL AUTO: SLIGHT — SIGNIFICANT CHANGE UP
LYMPHOCYTES # BLD AUTO: 0.77 K/UL — LOW (ref 1–3.3)
LYMPHOCYTES # BLD AUTO: 8 % — LOW (ref 13–44)
MACROCYTES BLD QL: SLIGHT — SIGNIFICANT CHANGE UP
MANUAL SMEAR VERIFICATION: SIGNIFICANT CHANGE UP
MCHC RBC-ENTMCNC: 27.9 PG — SIGNIFICANT CHANGE UP (ref 27–34)
MCHC RBC-ENTMCNC: 29.8 GM/DL — LOW (ref 32–36)
MCV RBC AUTO: 93.5 FL — SIGNIFICANT CHANGE UP (ref 80–100)
MICROCYTES BLD QL: SLIGHT — SIGNIFICANT CHANGE UP
MONOCYTES # BLD AUTO: 0.77 K/UL — SIGNIFICANT CHANGE UP (ref 0–0.9)
MONOCYTES NFR BLD AUTO: 8 % — SIGNIFICANT CHANGE UP (ref 2–14)
NEUTROPHILS # BLD AUTO: 7.9 K/UL — HIGH (ref 1.8–7.4)
NEUTROPHILS NFR BLD AUTO: 82 % — HIGH (ref 43–77)
NEUTS HYPERSEG # BLD: PRESENT — SIGNIFICANT CHANGE UP
NRBC # BLD: 1 — SIGNIFICANT CHANGE UP
NRBC # BLD: SIGNIFICANT CHANGE UP /100 WBCS (ref 0–0)
OVALOCYTES BLD QL SMEAR: SLIGHT — SIGNIFICANT CHANGE UP
PCO2 BLDA: 40 MMHG — HIGH (ref 32–35)
PH BLDA: 7.45 — SIGNIFICANT CHANGE UP (ref 7.35–7.45)
PLAT MORPH BLD: ABNORMAL
PLATELET # BLD AUTO: 589 K/UL — HIGH (ref 150–400)
PO2 BLDA: 68 MMHG — LOW (ref 83–108)
POIKILOCYTOSIS BLD QL AUTO: SLIGHT — SIGNIFICANT CHANGE UP
POLYCHROMASIA BLD QL SMEAR: SLIGHT — SIGNIFICANT CHANGE UP
POTASSIUM SERPL-MCNC: 4.1 MMOL/L — SIGNIFICANT CHANGE UP (ref 3.5–5.3)
POTASSIUM SERPL-SCNC: 4.1 MMOL/L — SIGNIFICANT CHANGE UP (ref 3.5–5.3)
PROT SERPL-MCNC: 7.3 G/DL — SIGNIFICANT CHANGE UP (ref 6–8.3)
PROTHROM AB SERPL-ACNC: 57.8 SEC — HIGH (ref 10.5–13.4)
RBC # BLD: 3.87 M/UL — SIGNIFICANT CHANGE UP (ref 3.8–5.2)
RBC # FLD: 20.8 % — HIGH (ref 10.3–14.5)
RBC BLD AUTO: ABNORMAL
SAO2 % BLDA: 95.6 % — SIGNIFICANT CHANGE UP (ref 94–98)
SARS-COV-2 RNA SPEC QL NAA+PROBE: SIGNIFICANT CHANGE UP
SODIUM SERPL-SCNC: 140 MMOL/L — SIGNIFICANT CHANGE UP (ref 135–145)
TROPONIN I, HIGH SENSITIVITY RESULT: 14.9 NG/L — SIGNIFICANT CHANGE UP
WBC # BLD: 9.64 K/UL — SIGNIFICANT CHANGE UP (ref 3.8–10.5)
WBC # FLD AUTO: 9.64 K/UL — SIGNIFICANT CHANGE UP (ref 3.8–10.5)

## 2022-04-19 PROCEDURE — 73590 X-RAY EXAM OF LOWER LEG: CPT | Mod: 26,50

## 2022-04-19 PROCEDURE — 99221 1ST HOSP IP/OBS SF/LOW 40: CPT

## 2022-04-19 PROCEDURE — 93970 EXTREMITY STUDY: CPT | Mod: 26

## 2022-04-19 PROCEDURE — 71045 X-RAY EXAM CHEST 1 VIEW: CPT | Mod: 26

## 2022-04-19 PROCEDURE — 93010 ELECTROCARDIOGRAM REPORT: CPT

## 2022-04-19 PROCEDURE — 74177 CT ABD & PELVIS W/CONTRAST: CPT | Mod: 26

## 2022-04-19 PROCEDURE — 99285 EMERGENCY DEPT VISIT HI MDM: CPT

## 2022-04-19 PROCEDURE — 70450 CT HEAD/BRAIN W/O DYE: CPT | Mod: 26

## 2022-04-19 RX ORDER — ESCITALOPRAM OXALATE 10 MG/1
20 TABLET, FILM COATED ORAL DAILY
Refills: 0 | Status: DISCONTINUED | OUTPATIENT
Start: 2022-04-19 | End: 2022-04-25

## 2022-04-19 RX ORDER — HYDROXYUREA 500 MG/1
500 CAPSULE ORAL
Refills: 0 | Status: DISCONTINUED | OUTPATIENT
Start: 2022-04-19 | End: 2022-04-25

## 2022-04-19 RX ORDER — SIMVASTATIN 20 MG/1
10 TABLET, FILM COATED ORAL AT BEDTIME
Refills: 0 | Status: DISCONTINUED | OUTPATIENT
Start: 2022-04-19 | End: 2022-04-25

## 2022-04-19 RX ORDER — BACLOFEN 100 %
10 POWDER (GRAM) MISCELLANEOUS EVERY 8 HOURS
Refills: 0 | Status: DISCONTINUED | OUTPATIENT
Start: 2022-04-19 | End: 2022-04-25

## 2022-04-19 RX ORDER — SENNA PLUS 8.6 MG/1
1 TABLET ORAL AT BEDTIME
Refills: 0 | Status: DISCONTINUED | OUTPATIENT
Start: 2022-04-19 | End: 2022-04-25

## 2022-04-19 RX ORDER — GABAPENTIN 400 MG/1
300 CAPSULE ORAL EVERY 8 HOURS
Refills: 0 | Status: DISCONTINUED | OUTPATIENT
Start: 2022-04-19 | End: 2022-04-24

## 2022-04-19 RX ORDER — SODIUM CHLORIDE 9 MG/ML
500 INJECTION INTRAMUSCULAR; INTRAVENOUS; SUBCUTANEOUS ONCE
Refills: 0 | Status: COMPLETED | OUTPATIENT
Start: 2022-04-19 | End: 2022-04-19

## 2022-04-19 RX ORDER — PIPERACILLIN AND TAZOBACTAM 4; .5 G/20ML; G/20ML
3.38 INJECTION, POWDER, LYOPHILIZED, FOR SOLUTION INTRAVENOUS EVERY 8 HOURS
Refills: 0 | Status: COMPLETED | OUTPATIENT
Start: 2022-04-19 | End: 2022-04-24

## 2022-04-19 RX ORDER — FUROSEMIDE 40 MG
1 TABLET ORAL
Qty: 0 | Refills: 0 | DISCHARGE

## 2022-04-19 RX ORDER — TRAMADOL HYDROCHLORIDE 50 MG/1
50 TABLET ORAL
Refills: 0 | Status: DISCONTINUED | OUTPATIENT
Start: 2022-04-19 | End: 2022-04-22

## 2022-04-19 RX ORDER — LACTOBACILLUS ACIDOPHILUS 100MM CELL
1 CAPSULE ORAL
Refills: 0 | Status: DISCONTINUED | OUTPATIENT
Start: 2022-04-19 | End: 2022-04-25

## 2022-04-19 RX ORDER — ONDANSETRON 8 MG/1
4 TABLET, FILM COATED ORAL EVERY 8 HOURS
Refills: 0 | Status: DISCONTINUED | OUTPATIENT
Start: 2022-04-19 | End: 2022-04-25

## 2022-04-19 RX ORDER — LANOLIN ALCOHOL/MO/W.PET/CERES
3 CREAM (GRAM) TOPICAL AT BEDTIME
Refills: 0 | Status: DISCONTINUED | OUTPATIENT
Start: 2022-04-19 | End: 2022-04-25

## 2022-04-19 RX ORDER — CHOLECALCIFEROL (VITAMIN D3) 125 MCG
1000 CAPSULE ORAL DAILY
Refills: 0 | Status: DISCONTINUED | OUTPATIENT
Start: 2022-04-19 | End: 2022-04-25

## 2022-04-19 RX ORDER — METOPROLOL TARTRATE 50 MG
25 TABLET ORAL
Refills: 0 | Status: DISCONTINUED | OUTPATIENT
Start: 2022-04-19 | End: 2022-04-25

## 2022-04-19 RX ORDER — SODIUM CHLORIDE 9 MG/ML
1000 INJECTION, SOLUTION INTRAVENOUS
Refills: 0 | Status: DISCONTINUED | OUTPATIENT
Start: 2022-04-19 | End: 2022-04-21

## 2022-04-19 RX ORDER — FUROSEMIDE 40 MG
20 TABLET ORAL DAILY
Refills: 0 | Status: DISCONTINUED | OUTPATIENT
Start: 2022-04-19 | End: 2022-04-25

## 2022-04-19 RX ORDER — PIPERACILLIN AND TAZOBACTAM 4; .5 G/20ML; G/20ML
3.38 INJECTION, POWDER, LYOPHILIZED, FOR SOLUTION INTRAVENOUS ONCE
Refills: 0 | Status: COMPLETED | OUTPATIENT
Start: 2022-04-19 | End: 2022-04-19

## 2022-04-19 RX ORDER — SODIUM CHLORIDE 9 MG/ML
1950 INJECTION INTRAMUSCULAR; INTRAVENOUS; SUBCUTANEOUS ONCE
Refills: 0 | Status: COMPLETED | OUTPATIENT
Start: 2022-04-19 | End: 2022-04-19

## 2022-04-19 RX ORDER — GABAPENTIN 400 MG/1
100 CAPSULE ORAL THREE TIMES A DAY
Refills: 0 | Status: DISCONTINUED | OUTPATIENT
Start: 2022-04-19 | End: 2022-04-19

## 2022-04-19 RX ORDER — ACETAMINOPHEN 500 MG
650 TABLET ORAL EVERY 6 HOURS
Refills: 0 | Status: DISCONTINUED | OUTPATIENT
Start: 2022-04-19 | End: 2022-04-25

## 2022-04-19 RX ORDER — PREGABALIN 225 MG/1
1000 CAPSULE ORAL DAILY
Refills: 0 | Status: DISCONTINUED | OUTPATIENT
Start: 2022-04-19 | End: 2022-04-25

## 2022-04-19 RX ORDER — PANTOPRAZOLE SODIUM 20 MG/1
40 TABLET, DELAYED RELEASE ORAL DAILY
Refills: 0 | Status: DISCONTINUED | OUTPATIENT
Start: 2022-04-19 | End: 2022-04-25

## 2022-04-19 RX ORDER — FOLIC ACID 0.8 MG
1 TABLET ORAL DAILY
Refills: 0 | Status: DISCONTINUED | OUTPATIENT
Start: 2022-04-19 | End: 2022-04-25

## 2022-04-19 RX ORDER — BACLOFEN 100 %
1 POWDER (GRAM) MISCELLANEOUS
Qty: 0 | Refills: 0 | DISCHARGE

## 2022-04-19 RX ORDER — VANCOMYCIN HCL 1 G
1000 VIAL (EA) INTRAVENOUS ONCE
Refills: 0 | Status: COMPLETED | OUTPATIENT
Start: 2022-04-19 | End: 2022-04-19

## 2022-04-19 RX ORDER — LEVOTHYROXINE SODIUM 125 MCG
25 TABLET ORAL DAILY
Refills: 0 | Status: DISCONTINUED | OUTPATIENT
Start: 2022-04-19 | End: 2022-04-20

## 2022-04-19 RX ADMIN — SODIUM CHLORIDE 1950 MILLILITER(S): 9 INJECTION INTRAMUSCULAR; INTRAVENOUS; SUBCUTANEOUS at 13:00

## 2022-04-19 RX ADMIN — TRAMADOL HYDROCHLORIDE 50 MILLIGRAM(S): 50 TABLET ORAL at 20:10

## 2022-04-19 RX ADMIN — HYDROXYUREA 500 MILLIGRAM(S): 500 CAPSULE ORAL at 19:36

## 2022-04-19 RX ADMIN — Medication 3 MILLIGRAM(S): at 22:14

## 2022-04-19 RX ADMIN — Medication 250 MILLIGRAM(S): at 14:30

## 2022-04-19 RX ADMIN — TRAMADOL HYDROCHLORIDE 50 MILLIGRAM(S): 50 TABLET ORAL at 19:36

## 2022-04-19 RX ADMIN — PIPERACILLIN AND TAZOBACTAM 200 GRAM(S): 4; .5 INJECTION, POWDER, LYOPHILIZED, FOR SOLUTION INTRAVENOUS at 13:17

## 2022-04-19 RX ADMIN — PIPERACILLIN AND TAZOBACTAM 25 GRAM(S): 4; .5 INJECTION, POWDER, LYOPHILIZED, FOR SOLUTION INTRAVENOUS at 22:15

## 2022-04-19 RX ADMIN — SODIUM CHLORIDE 500 MILLILITER(S): 9 INJECTION INTRAMUSCULAR; INTRAVENOUS; SUBCUTANEOUS at 16:00

## 2022-04-19 RX ADMIN — PIPERACILLIN AND TAZOBACTAM 3.38 GRAM(S): 4; .5 INJECTION, POWDER, LYOPHILIZED, FOR SOLUTION INTRAVENOUS at 13:50

## 2022-04-19 RX ADMIN — Medication 1 TABLET(S): at 19:34

## 2022-04-19 RX ADMIN — SODIUM CHLORIDE 50 MILLILITER(S): 9 INJECTION, SOLUTION INTRAVENOUS at 19:59

## 2022-04-19 RX ADMIN — Medication 25 MILLIGRAM(S): at 19:35

## 2022-04-19 RX ADMIN — SODIUM CHLORIDE 50 MILLILITER(S): 9 INJECTION, SOLUTION INTRAVENOUS at 16:00

## 2022-04-19 RX ADMIN — SENNA PLUS 1 TABLET(S): 8.6 TABLET ORAL at 22:14

## 2022-04-19 RX ADMIN — Medication 650 MILLIGRAM(S): at 19:59

## 2022-04-19 RX ADMIN — SIMVASTATIN 10 MILLIGRAM(S): 20 TABLET, FILM COATED ORAL at 22:15

## 2022-04-19 RX ADMIN — Medication 10 MILLIGRAM(S): at 22:00

## 2022-04-19 NOTE — H&P ADULT - SKIN COMMENTS
RLE 3 x 3 x 0.1 cm; 1 x 1 x 0.1 cm; 3 x 3 x 0.2 cm ulceration noted to the Right anterior leg (x2) and posterior leg, respectively , fibrotic wound bed with erythema and increase warmth to the RLE. No probe to bone, no drainage, no fluctuance, no malodor.  LLE 5 x 4 x 0.2 cm; ulceration noted to the Left anterior leg, fibrotic and overlying scab wound bed with mild erythema distally. No probe to bone, no drainage, no fluctuance, no malodor

## 2022-04-19 NOTE — PATIENT PROFILE ADULT - FALL HARM RISK - HARM RISK INTERVENTIONS
Assistance with ambulation/Assistance OOB with selected safe patient handling equipment/Communicate Risk of Fall with Harm to all staff/Discuss with provider need for PT consult/Monitor gait and stability/Reinforce activity limits and safety measures with patient and family/Tailored Fall Risk Interventions/Visual Cue: Yellow wristband and red socks/Bed in lowest position, wheels locked, appropriate side rails in place/Call bell, personal items and telephone in reach/Instruct patient to call for assistance before getting out of bed or chair/Non-slip footwear when patient is out of bed/[place] to call system/Physically safe environment - no spills, clutter or unnecessary equipment/Purposeful Proactive Rounding/Room/bathroom lighting operational, light cord in reach

## 2022-04-19 NOTE — CONSULT NOTE ADULT - NS ATTEND AMEND GEN_ALL_CORE FT
Render Post-Care Instructions In Note?: no
Detail Level: Detailed
Consent: The patient's consent was obtained including but not limited to risks of crusting, scabbing, blistering, scarring, darker or lighter pigmentary change, recurrence, incomplete removal and infection.
Agree with above. Imaging reviewed. Recommend AC. No intervention is needed.
Duration Of Freeze Thaw-Cycle (Seconds): 0
Number Of Freeze-Thaw Cycles: 2 freeze-thaw cycles
Post-Care Instructions: I reviewed with the patient in detail post-care instructions. Patient is to wear sunprotection, and avoid picking at any of the treated lesions. Pt may apply Vaseline to crusted or scabbing areas.

## 2022-04-19 NOTE — ED PROVIDER NOTE - CONSTITUTIONAL, MLM
normal... chronically ill female who leans to the right keeps eyes closed, until upset then is wide awake, she follows all instructions and in no apparent distress.

## 2022-04-19 NOTE — CONSULT NOTE ADULT - SUBJECTIVE AND OBJECTIVE BOX
CARDIOLOGY CONSULT NOTE    Patient is a 89y Female with a known history of : admitted for evaluation wound rt leg  Lower extremity cellulitis [L03.119]    Hypertension [I10]    CVA (cerebral vascular accident) [I63.9]    Depression [F32.9]    Aortic valvar stenosis [I35.0]    Grade I diastolic dysfunction [I51.89]    AMS (altered mental status) [R41.82]    Lactic acidosis [E87.2]    Prophylactic measure [Z29.9]      HPI:      REVIEW OF SYSTEMS:    CONSTITUTIONAL: No fever, weight loss, or fatigue  EYES: No eye pain, visual disturbances, or discharge  ENMT:  No difficulty hearing, tinnitus, vertigo; No sinus or throat pain  NECK: No pain or stiffness  BREASTS: No pain, masses, or nipple discharge  RESPIRATORY: No cough, wheezing, chills or hemoptysis; No shortness of breath  CARDIOVASCULAR: No chest pain, palpitations, dizziness, or leg swelling  GASTROINTESTINAL: No abdominal or epigastric pain. No nausea, vomiting, or hematemesis; No diarrhea or constipation. No melena or hematochezia.  GENITOURINARY: No dysuria, frequency, hematuria, or incontinence  NEUROLOGICAL: No headaches, memory loss, loss of strength, numbness, or tremors  SKIN: No itching, burning, rashes, or lesions   LYMPH NODES: No enlarged glands  ENDOCRINE: No heat or cold intolerance; No hair loss  MUSCULOSKELETAL: No joint pain or swelling; No muscle, back, or extremity pain  PSYCHIATRIC: No depression, anxiety, mood swings, or difficulty sleeping  HEME/LYMPH: No easy bruising, or bleeding gums  ALLERGY AND IMMUNOLOGIC: No hives or eczema    MEDICATIONS  (STANDING):  baclofen 10 milliGRAM(s) Oral every 8 hours  cholecalciferol 1000 Unit(s) Oral daily  cyanocobalamin 1000 MICROGram(s) Oral daily  dextrose 5% + sodium chloride 0.45%. 1000 milliLiter(s) (50 mL/Hr) IV Continuous <Continuous>  escitalopram 20 milliGRAM(s) Oral daily  folic acid 1 milliGRAM(s) Oral daily  furosemide    Tablet 20 milliGRAM(s) Oral daily  gabapentin 100 milliGRAM(s) Oral three times a day  hydroxyurea 500 milliGRAM(s) Oral two times a day  lactobacillus acidophilus 1 Tablet(s) Oral two times a day  levothyroxine 25 MICROGram(s) Oral daily  metoprolol tartrate 25 milliGRAM(s) Oral two times a day  pantoprazole   Suspension 40 milliGRAM(s) Oral daily  piperacillin/tazobactam IVPB.. 3.375 Gram(s) IV Intermittent every 8 hours  senna 1 Tablet(s) Oral at bedtime  simvastatin 10 milliGRAM(s) Oral at bedtime    MEDICATIONS  (PRN):  acetaminophen     Tablet .. 650 milliGRAM(s) Oral every 6 hours PRN Temp greater or equal to 38C (100.4F), Mild Pain (1 - 3)  aluminum hydroxide/magnesium hydroxide/simethicone Suspension 30 milliLiter(s) Oral every 4 hours PRN Dyspepsia  melatonin 3 milliGRAM(s) Oral at bedtime PRN Insomnia  ondansetron Injectable 4 milliGRAM(s) IV Push every 8 hours PRN Nausea and/or Vomiting  traMADol 50 milliGRAM(s) Oral four times a day PRN Moderate Pain (4 - 6)      ALLERGIES: No Known Allergies      Social History:     FAMILY HISTORY:      PAST MEDICAL & SURGICAL HISTORY:  Hypertension    CVA (cerebral vascular accident)    Depression    Constipation    Neuropathy    Hyperlipidemia    Grade I diastolic dysfunction    Afib    Neuropathy    VHD (valvular heart disease)    Aortic valvar stenosis    No significant past surgical history          PHYSICAL EXAMINATION:  -----------------------------  T(C): 37.7 (04-19-22 @ 13:00), Max: 37.7 (04-19-22 @ 13:00)  HR: 108 (04-19-22 @ 11:40) (108 - 108)  BP: 127/72 (04-19-22 @ 11:40) (127/72 - 127/72)  RR: 18 (04-19-22 @ 11:40) (18 - 18)  SpO2: 100% (04-19-22 @ 11:40) (100% - 100%)  Wt(kg): --    Height (cm): 167.6 (04-19 @ 11:40)  Weight (kg): 63.5 (04-19 @ 11:40)  BMI (kg/m2): 22.6 (04-19 @ 11:40)  BSA (m2): 1.72 (04-19 @ 11:40)    Constitutional: well developed, normal appearance, well groomed, well nourished, no deformities and no acute distress.   Eyes: the conjunctiva exhibited no abnormalities and the eyelids demonstrated no xanthelasmas.   HEENT: normal oral mucosa, no oral pallor and no oral cyanosis.   Neck: normal jugular venous A waves present, normal jugular venous V waves present and no jugular venous castillo A waves.   Pulmonary: no respiratory distress, normal respiratory rhythm and effort, no accessory muscle use and lungs were clear to auscultation bilaterally.   Cardiovascular: heart rate and rhythm were normal, normal S1 and S2 and no murmur, gallop, rub, heave or thrill are present.   Abdomen: soft, non-tender, no hepato-splenomegaly and no abdominal mass palpated.   Musculoskeletal: the gait could not be assessed..   Extremities: no clubbing of the fingernails, no localized cyanosis, no petechial hemorrhages and no ischemic changes.   Skin: normal skin color and pigmentation, no rash, no venous stasis, no skin lesions, no skin ulcer and no xanthoma was observed.   Psychiatric: oriented to person, place, and time, the affect was normal, the mood was normal and not feeling anxious.     LABS:   --------  04-19    140  |  102  |  12  ----------------------------<  115<H>  4.1   |  27  |  0.82    Ca    8.9      19 Apr 2022 13:24    TPro  7.3  /  Alb  2.7<L>  /  TBili  0.3  /  DBili  x   /  AST  19  /  ALT  14  /  AlkPhos  63  04-19                         10.8   9.64  )-----------( 589      ( 19 Apr 2022 13:24 )             36.2     PT/INR - ( 19 Apr 2022 13:24 )   PT: 57.8 sec;   INR: 4.86 ratio         PTT - ( 19 Apr 2022 13:24 )  PTT:53.1 sec            RADIOLOGY:  -----------------        ECG:     ECHO

## 2022-04-19 NOTE — CONSULT NOTE ADULT - SUBJECTIVE AND OBJECTIVE BOX
Patient is a 89 yo Female with PMHx of CVA with R hemiplegia, DVT w/IVC filter, anemia, phlebitis, AFib on coumadin, HLD, melanoma of skin, GERD, depression, diverticulitis, uti, hypothyroid presenting from [facility] assisted living for eval of AMS and leg wounds.. pt has no complaints .As per [facility] med staff patient was sent to ED for evaluation of AMS ,associated with hypoxia and difficulties swallowing .Also her R leg wounds are worsening and ID /WOUND care consult requested .Septic workup sent ,found to have lactate elevation Admitted for septic workup and evaluation,send blood and urine cx,serial lactate levels,monitor vitals closley,ivfs hydration,monitor urine output and renal profile,iv abx initiated  .[facility] Palliative care consult requested ,to discuss advance directives and complete MOLST  (19 Apr 2022 17:08)      PAST MEDICAL & SURGICAL HISTORY:  Hypertension    CVA (cerebral vascular accident)    Depression    Constipation    Neuropathy    Hyperlipidemia    Grade I diastolic dysfunction    Afib    Neuropathy    VHD (valvular heart disease)    Aortic valvar stenosis    No significant past surgical history        Constitutional: Denies fever, fatigue or weight loss.  Skin: Denies rash.  Eyes: Denies recent vision problems or eye pain.  ENT: Denies congestion, ear pain, or sore throat.  Endocrine: Denies thyroid problems.  Cardiovascular: Denies chest pain or palpation.  Respiratory: Denies cough, shortness of breath, congestion, or wheezing.  Gastrointestinal: Denies abdominal pain, nausea, vomiting, or diarrhea.  Genitourinary: Denies dysuria.  Musculoskeletal: SEE HPI  Neurologic: Denies headache.      MEDICATIONS  (STANDING):  baclofen 10 milliGRAM(s) Oral every 8 hours  cholecalciferol 1000 Unit(s) Oral daily  cyanocobalamin 1000 MICROGram(s) Oral daily  dextrose 5% + sodium chloride 0.45%. 1000 milliLiter(s) (75 mL/Hr) IV Continuous <Continuous>  escitalopram 20 milliGRAM(s) Oral daily  folic acid 1 milliGRAM(s) Oral daily  furosemide    Tablet 20 milliGRAM(s) Oral daily  gabapentin 300 milliGRAM(s) Oral every 8 hours  hydroxyurea 500 milliGRAM(s) Oral two times a day  lactobacillus acidophilus 1 Tablet(s) Oral two times a day  levothyroxine 25 MICROGram(s) Oral daily  metoprolol tartrate 25 milliGRAM(s) Oral two times a day  pantoprazole   Suspension 40 milliGRAM(s) Oral daily  piperacillin/tazobactam IVPB.. 3.375 Gram(s) IV Intermittent every 8 hours  senna 1 Tablet(s) Oral at bedtime  simvastatin 10 milliGRAM(s) Oral at bedtime    MEDICATIONS  (PRN):  acetaminophen     Tablet .. 650 milliGRAM(s) Oral every 6 hours PRN Temp greater or equal to 38C (100.4F), Mild Pain (1 - 3)  aluminum hydroxide/magnesium hydroxide/simethicone Suspension 30 milliLiter(s) Oral every 4 hours PRN Dyspepsia  melatonin 3 milliGRAM(s) Oral at bedtime PRN Insomnia  ondansetron Injectable 4 milliGRAM(s) IV Push every 8 hours PRN Nausea and/or Vomiting  traMADol 50 milliGRAM(s) Oral four times a day PRN Moderate Pain (4 - 6)      Allergies    No Known Allergies    Intolerances        SOCIAL HISTORY:      Vital Signs Last 24 Hrs  T(C): 38.8 (19 Apr 2022 19:35), Max: 38.8 (19 Apr 2022 19:35)  T(F): 101.8 (19 Apr 2022 19:35), Max: 101.8 (19 Apr 2022 19:35)  HR: 120 (19 Apr 2022 19:35) (102 - 120)  BP: 143/68 (19 Apr 2022 19:35) (124/74 - 143/68)  BP(mean): --  RR: 18 (19 Apr 2022 19:35) (18 - 18)  SpO2: 95% (19 Apr 2022 19:35) (95% - 100%)    PHYSICAL EXAM:  GENERAL: No acute distress, well-developed  HEAD:  Atraumatic, Normocephalic  LOWER EXTREM: b/l lower leg cellulitis. Dopplers reveal DP and PT pulses b/l.    LABS:                        10.8   9.64  )-----------( 589      ( 19 Apr 2022 13:24 )             36.2     04-19    140  |  102  |  12  ----------------------------<  115<H>  4.1   |  27  |  0.82    Ca    8.9      19 Apr 2022 13:24    TPro  7.3  /  Alb  2.7<L>  /  TBili  0.3  /  DBili  x   /  AST  19  /  ALT  14  /  AlkPhos  63  04-19    PT/INR - ( 19 Apr 2022 13:24 )   PT: 57.8 sec;   INR: 4.86 ratio         PTT - ( 19 Apr 2022 13:24 )  PTT:53.1 sec      RADIOLOGY & ADDITIONAL STUDIES  < from: CT Abdomen and Pelvis w/ IV Cont (04.19.22 @ 18:40) >  FINDINGS:  LOWER CHEST: Mucus plugging in the right lower lobe bronchi with near   complete atelectasis of the right lower lobe.    LIVER: Left hepatic lobe cyst.  BILE DUCTS: Normal caliber.  GALLBLADDER: Cholelithiasis.  SPLEEN: Within normal limits.  PANCREAS: A 3.2 cm cystic lesion in the pancreatic head is unchanged from   2016, when remeasured in a similar fashion. No pancreatic ductal   dilatation.  ADRENALS: Within normal limits.  KIDNEYS/URETERS: No hydronephrosis. Bilateral renal hypodensities too   small to characterize.    BLADDER: Within normal limits.  REPRODUCTIVE ORGANS: Small calcified uterine myoma. No adnexal mass.    BOWEL: Moderate hiatal hernia. No bowel obstruction. Appendix is not   visualized. No evidence of inflammation in the pericecal region.   Extensive sigmoid diverticulosis without evidence of acute diverticulitis.  PERITONEUM: No ascites.  VESSELS: Atherosclerotic changes. Infrarenal IVC filter. Focal thrombus   in the infrarenal IVC along the right aspect of the filter apex with   extension above the filter.  RETROPERITONEUM/LYMPH NODES: No lymphadenopathy.  ABDOMINAL WALL: Within normal limits.  BONES: Degenerative changes.    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    Mucus plugging in the right lower lobe bronchi with near complete   atelectasis of the right lower lobe.    Findings were discussed with Dr. Choe 4/19/2022 9:31 PM by Dr. Vallejo   with read backconfirmation.    --- End of Report ---    TAL VALLEJO MD; Attending Radiologist  This document has been electronically signed. Apr 19 2022  9:36PM    < end of copied text >

## 2022-04-19 NOTE — CONSULT NOTE ADULT - PROBLEM SELECTOR RECOMMENDATION 9
Pt seen and evaluated  - At this time, cellulitis noted with NON-infected wounds to b/l lower extremity  - Wound cleansed and dressed with DSD  - Wound culture obtained  - Recommending admission for IV abx for cellulitis  - Recommending ID consult for IV abx  - Ordered bilateral tib/fib x-ray, will follow  - f/u venous doppler studies to r/o DVT
-Case d/w Dr. Bedolla  -Pt currently with supratheraputic INR  -Recc is AC when appropriate

## 2022-04-19 NOTE — ED ADULT NURSE NOTE - OBJECTIVE STATEMENT
patient comes to ED for evaluation of wounds to bilateral lower legs pt has large wound left lower leg and several small wounds right lower leg

## 2022-04-19 NOTE — ED ADULT TRIAGE NOTE - CHIEF COMPLAINT QUOTE
Patient is a 89yo female complaining of left lower leg wound possible infection and sepsis Patient is tachy and has ams since yesterday

## 2022-04-19 NOTE — ED PROVIDER NOTE - ENMT, MLM
Airway patent, Nasal mucosa clear. Mouth with poor dentition,  Throat has no vesicles, no oropharyngeal exudates and uvula is midline.

## 2022-04-19 NOTE — H&P ADULT - NSICDXPASTMEDICALHX_GEN_ALL_CORE_FT
PAST MEDICAL HISTORY:  Afib     Aortic valvar stenosis     Constipation     CVA (cerebral vascular accident)     Depression     Grade I diastolic dysfunction     Hyperlipidemia     Hypertension     Neuropathy     Neuropathy     VHD (valvular heart disease)

## 2022-04-19 NOTE — CONSULT NOTE ADULT - SUBJECTIVE AND OBJECTIVE BOX
HPI: 89 yo female with a PMHx of hx of CVA with r hemiplegia, DVT,  Anemia, Phlebitis, Afib, HLD, Melanoma of skin, GERD, Depression, Diverticulitis, UTI, and Hypothyroid, sent from [facility] for evaluation of her bilateral leg wounds. Pt poor historian, pt seen at bedside with her son, Curtis Wilcox, who states that patient was found this morning to be lethargic and verbally unresponsive with elevated HR and BP, therefore she was sent to the ED to rule out septic. Pt son states that, bilateral leg wounds are being treated by a vascular surgeon and pt is currently undergoing wound care 2x week at her assisted facility where Unna boot dressing are being applied. Pt complain of pain to the RLE "pulling". No other pedal complaints. Denies any fever, chills, nausea, vomiting, or calf pain at this time.    REVIEW OF SYSTEMS    PAST MEDICAL & SURGICAL HISTORY:  Hypertension    CVA (cerebral vascular accident)    Depression    Constipation    Neuropathy    Hyperlipidemia    Grade I diastolic dysfunction    Afib    Neuropathy    No significant past surgical history        Allergies    No Known Allergies    Intolerances        MEDICATIONS  (STANDING):  sodium chloride 0.9% Bolus 1950 milliLiter(s) IV Bolus once  vancomycin  IVPB 1000 milliGRAM(s) IV Intermittent once    MEDICATIONS  (PRN):      Social History:      FAMILY HISTORY:      Vital Signs Last 24 Hrs  T(C): 37.7 (19 Apr 2022 13:00), Max: 37.7 (19 Apr 2022 13:00)  T(F): 99.9 (19 Apr 2022 13:00), Max: 99.9 (19 Apr 2022 13:00)  HR: 108 (19 Apr 2022 11:40) (108 - 108)  BP: 127/72 (19 Apr 2022 11:40) (127/72 - 127/72)  RR: 18 (19 Apr 2022 11:40) (18 - 18)  SpO2: 100% (19 Apr 2022 11:40) (100% - 100%)    PHYSICAL EXAM:  Vascular: DP palpable bilaterally & PT non-palpable bilaterally, Capillary refill 4 seconds, Digital hair absent bilaterally  Neurological: Light touch sensation intact bilaterally  Musculoskeletal: 5/5 strength in all quadrants bilaterally, AJ & STJ ROM intact  Dermatological: RLE 3 x 3 x 0.1 cm; 1 x 1 x 0.1 cm; 3 x 3 x 0.2 cm ulceration noted to the Right anterior leg (x2) and posterior leg, respectively , fibrotic wound bed with erythema and increase warmth to the RLE. No probe to bone, no drainage, no fluctuance, no malodor.  LLE 5 x 4 x 0.2 cm; ulceration noted to the Left anterior leg, fibrotic and overlying scab wound bed with mild erythema distally. No probe to bone, no drainage, no fluctuance, no malodor.     CBC Full  -  ( 19 Apr 2022 13:24 )  WBC Count : 9.64 K/uL  RBC Count : 3.87 M/uL  Hemoglobin : 10.8 g/dL  Hematocrit : 36.2 %  Platelet Count - Automated : 589 K/uL  Mean Cell Volume : 93.5 fl  Mean Cell Hemoglobin : 27.9 pg  Mean Cell Hemoglobin Concentration : 29.8 gm/dL

## 2022-04-19 NOTE — CONSULT NOTE ADULT - ASSESSMENT
rt leg wound - on zosyn  ashd, atrial fib  dvt  coumadin - on hold for increased inr  hypertension  dyslipidemia  aortic stenosis  ams rt leg wound - c ellulitis- on zosyn  ashd, atrial fib  dvt  coumadin - on hold for increased inr  hypertension  dyslipidemia  aortic stenosis  ams  abdominal pain rt lower- ct abdomen ordered r/o acute appedicitis- discussed with son at bed side

## 2022-04-19 NOTE — CONSULT NOTE ADULT - ASSESSMENT
AB MENDOZA 89 f 4/19/2022 3/15/1932 DR YURI MEANS     Initial evaluation/Pulmonary Critical Care consultation requested on  4/19/2022 by Dr BLANCO from Dr De La Rosa   Patient examined chart reviewed    HOSPITAL ADMISSION   PATIENT CAME  FROM (if information available)      AB MENDOZA 89 f 4/19/2022 3/15/1932 DR YURI MEANS     REVIEW OF SYMPTOMS      Able to give (reliable) ROS  NO     PHYSICAL EXAM    HEENT Unremarkable  atraumatic   RESP Fair air entry EXP prolonged    Harsh breath sound Resp distres mild   CARDIAC S1 S2 No S3     NO JVD    ABDOMEN SOFT BS PRESENT NOT DISTENDED No hepatosplenomegaly   PEDAL EDEMA present No calf tenderness  NO rash       PATIENT PRESENTATION.  4/19/2022  89 yo f whose pmd is dr means, sent from [facility] for eval of her leg wounds on r leg.  she has hx of cva with r hemiplegia, dvt anemia, phlebitis, afib, hld, melanoma of skin, gerd depression, diverticulitis, uti, hypothyroid,   pt is poor historian, per ems to the triage rn they adv pt may be septic due to wounds on her leg.  Pulm crit care consulted 4/19/2022                                           DOA/CC/PROBLEMS poa .  89 f  doa 4/19/2022   cc Patient is a 89yo female complaining of left lower leg wound possible infection and sepsis Patient is tachy and has ams since 4/18      PMH-PSH .  pmh DVT  pmh Anemia  pmh leg wounds  pmh A fib  pmh GERD  pmh     PROSTHETICS/TUBES/LINES.    NOTABLE HOME MEDS.  coumadin 2.5  enalapril 2.5  lasix 20      COVID/ICU/CODE STATUS.                       COVID  STATUS.           ICU STAY. none  GOC.      BEST PRACTICE ISSUES.                                                  HEAD OF BED ELEVATION. Yes  DVT PROPHYLAXIS.   4/19/2022 on coumadin at home   GONZALES PROPHYLAXIS.   4/19/2022 protonix 40                                                                                      DIET.   4/19/2022 npo     VITALS/PO/IO/VENT/DRIPS.     4/19/2022 99 120/70         PROBLEM DATA/ASSESSMENT RECOMMENDATIONS (A/R).    HEMODYNAMICS.   Monitor bp Target MAP 65 (+)    RESP.   Monitor po Target po 90-95%    ABG.  4/19/2022 ra 745/40/68    PMH/PSH PROBLEMS.  Management continued/modified as indicated    OXYGEN REQUIREMENTS.  4/19/2022 nrb 100%   Will need home oxygen at discharge if ra rest or ra exercise PO drops below 88%        INFECTION SOURCE.   Cellulitis lle poa 4/19/2022   INFECTION A/R.   Cellulitis lle on zosyn    INFECTION ANDREWS.  W 4/19/2022 w 9.6   MICROBIO.  pending  ABIO.  4/19/2022 zosyn       Lacticemia  la 4/19/2022 2.1   monitor    RO DVT.  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  tr 4/19/2022 tr 14   4/19/2022 simvastat 10  no active ischemia      A fib  inr 4/19/2022 inr 4.8  4/19/2022 metoprolol 25.2   on rate control and anticoagn    CHF.  4/19/2022 lasix 20     HYPERAMMONEMIA.  Amm 4/19/2022 amm 39     ANEMIA.  Hb 4/19/2022 Hb 10.6   monito    RENAL.  Cr 4/19/2022 Cr .8   monitor    HYPOTHYROID.  4/19/2022 levoxyl 25    IV fl.  4/19/2022 d5 1/2 50      TIME SPENT   Over 55 minutes aggregate care time spent on encounter; activities included   direct patient care, counseling and/or coordinating care reviewing notes, lab data/ imaging , discussion with multidisciplinary team/ patient  /family and explaining in detail risks, benefits, alternatives  of the recommendations     AB MENDOZA 89 f 4/19/2022 3/15/1932 DR YURI MEANS

## 2022-04-19 NOTE — H&P ADULT - PROBLEM SELECTOR PLAN 1
2/2 to acute metabolic encephalopathy secondary to infectious process ( cellulitis and possible pneumonia 2/2 to aspiration ,poa )

## 2022-04-19 NOTE — ED PROVIDER NOTE - CLINICAL SUMMARY MEDICAL DECISION MAKING FREE TEXT BOX
pt is a 89 yo f who has hx of stroke r hemiplegia melanoma and leg wounds undergoing wound care resident of assisted living unit and found by NP of PMD to be lethargic having dysphagia and was hypoxic.  with concern for sepsis pt sent for eval and admission  I was also directed to examine pt;s leg as source of pain and infection  pt stated she had pain in legs and is leaning to the right she is a poor historian so data must be gleaned from her medical records, her assisted living facility documents and the pmd  ct head ro ich doppler legs ro dvt labs cultures ammonia level, abg ekg abx xray chest admit to hospital

## 2022-04-19 NOTE — H&P ADULT - ADDITIONAL PE
RLE 3 x 3 x 0.1 cm; 1 x 1 x 0.1 cm; 3 x 3 x 0.2 cm ulceration noted to the Right anterior leg (x2) and posterior leg, respectively , fibrotic wound bed with erythema and increase warmth to the RLE. No probe to bone, no drainage, no fluctuance, no malodor.  LLE 5 x 4 x 0.2 cm; ulceration noted to the Left anterior leg, fibrotic and overlying scab wound bed with mild erythema distally. No probe to bone, no drainage, no fluctuance, no malodor.

## 2022-04-19 NOTE — ED PROVIDER NOTE - SKIN, MLM
Skin normal color for race, warm, with multiple wounds on r leg with dressings in the wounds a compression stocking and wrap on the r leg

## 2022-04-19 NOTE — ED PROVIDER NOTE - PROGRESS NOTE DETAILS
dr tripp pagelouisa for information on case she will call dr means and re-contact me  wound care consult called for order in the computer wound center [facility] will send someone over. per message from pmd:  pt was difficult to awaken, could not swallow (hence food in teeth on my exam) was hypoxic and tachycardic.

## 2022-04-19 NOTE — ED PROVIDER NOTE - MUSCULOSKELETAL, MLM
pt has r hemiplegia, has neuropathy of her legs and unable to tolerate exam of her legs due to pain from tacticle exam, dressings were removed from her r leg and wounds evaluated

## 2022-04-19 NOTE — ED PROVIDER NOTE - OBJECTIVE STATEMENT
pt is a 89 yo f whose pmd is dr means, sent from [facility] for eval of her leg wounds on r leg.  she has hx of cva with r hemiplegia, dvt anemia, phlebitis, afib, hld, melamooma o fskin, gerd depression, diverticulitis, uti, hypothryoid,   pt is poor historian, per ems to the triage rn they adv pt may be septic due to wounds on her leg. pt is a 89 yo f whose pmd is dr means, sent from [facility] for eval of her leg wounds on r leg.  she has hx of cva with r hemiplegia, dvt anemia, phlebitis, afib, hld, melanoma of skin, gerd depression, diverticulitis, uti, hypothyroid,   pt is poor historian, per ems to the triage rn they adv pt may be septic due to wounds on her leg.  pt has no complaints

## 2022-04-19 NOTE — CONSULT NOTE ADULT - ASSESSMENT
Patient is a 89 yo Female with PMHx of CVA with R hemiplegia, DVT w/IVC filter, anemia, phlebitis, AFib on coumadin, HLD, melanoma of skin, GERD, depression, diverticulitis, uti, hypothyroid presenting from [facility] for eval of AMS and leg wounds.

## 2022-04-19 NOTE — H&P ADULT - TIME BILLING
75minutes spent on this visit, 50% visit time spent in care co-ordination with other attendings and counselling patient ,writing admission orders ( see complete and current orders and order section) ,requesting necessary consults ,informing family about status & plan of care .I have discussed care plan with [facility] wellness/admitting /nursing   department ,outpatient PCP , hospital consultants , ER physician & med staff .

## 2022-04-19 NOTE — H&P ADULT - HISTORY OF PRESENT ILLNESS
Patient is a 89 yo Female ,[facility] resident  sent from [facility] living for eval of AMS and  her leg wounds on R leg.  She has hx of CVA  with R hemiplegia, dvt anemia, phlebitis, afib, hld, melanoma of skin, gerd depression, diverticulitis, uti, hypothyroid, pt is poor historian, per ems to the triage rn they adv pt may be septic due to wounds on her leg. pt has no complaints .As per [facility] med staff patient was sent to ED for evaluation of AMS ,associated with hypoxia and difficulties swallowing .Also her R leg wounds are worsening and ID /WOUND care consult requested .Septic workup sent ,found to have lactate elevation Admitted for septic workup and evaluation,send blood and urine cx,serial lactate levels,monitor vitals closley,ivfs hydration,monitor urine output and renal profile,iv abx initiated  .[facility] Palliative care consult requested ,to discuss advance directives and complete MOLST

## 2022-04-19 NOTE — CONSULT NOTE ADULT - SUBJECTIVE AND OBJECTIVE BOX
Patient is a 89y old  Female who presents with a chief complaint of AMS (19 Apr 2022 17:08)      HPI:  Patient is a 89 yo Female ,[facility] resident  sent from [facility] for eval of AMS and  her leg wounds on R leg.  She has hx of CVA  with R hemiplegia, dvt anemia, phlebitis, afib, hld, melanoma of skin, gerd depression, diverticulitis, uti, hypothyroid, pt is poor historian, per ems to the triage rn they adv pt may be septic due to wounds on her leg. pt has no complaints .As per [facility] med staff patient was sent to ED for evaluation of AMS ,associated with hypoxia and difficulties swallowing .Also her R leg wounds are worsening and ID /WOUND care consult requested .Septic workup sent ,found to have lactate elevation Admitted for septic workup and evaluation,send blood and urine cx,serial lactate levels,monitor vitals closley,ivfs hydration,monitor urine output and renal profile,iv abx initiated  .[facility] Palliative care consult requested ,to discuss advance directives and complete MOLST  (19 Apr 2022 17:08)      Asked to see patient for ID Consult    PAST MEDICAL & SURGICAL HISTORY:  Hypertension    CVA (cerebral vascular accident)    Depression    Constipation    Neuropathy    Hyperlipidemia    Grade I diastolic dysfunction    Afib    Neuropathy    VHD (valvular heart disease)    Aortic valvar stenosis    No significant past surgical history        Allergies    No Known Allergies    Intolerances        REVIEW OF SYSTEMS:  No abdominal pain  No hematuria      HOME MEDICATIONS:    MEDICATIONS  (STANDING):  baclofen 10 milliGRAM(s) Oral every 8 hours  cholecalciferol 1000 Unit(s) Oral daily  cyanocobalamin 1000 MICROGram(s) Oral daily  dextrose 5% + sodium chloride 0.45%. 1000 milliLiter(s) (50 mL/Hr) IV Continuous <Continuous>  escitalopram 20 milliGRAM(s) Oral daily  folic acid 1 milliGRAM(s) Oral daily  furosemide    Tablet 20 milliGRAM(s) Oral daily  gabapentin 100 milliGRAM(s) Oral three times a day  hydroxyurea 500 milliGRAM(s) Oral two times a day  lactobacillus acidophilus 1 Tablet(s) Oral two times a day  levothyroxine 25 MICROGram(s) Oral daily  metoprolol tartrate 25 milliGRAM(s) Oral two times a day  pantoprazole   Suspension 40 milliGRAM(s) Oral daily  piperacillin/tazobactam IVPB.. 3.375 Gram(s) IV Intermittent every 8 hours  senna 1 Tablet(s) Oral at bedtime  simvastatin 10 milliGRAM(s) Oral at bedtime    MEDICATIONS  (PRN):  acetaminophen     Tablet .. 650 milliGRAM(s) Oral every 6 hours PRN Temp greater or equal to 38C (100.4F), Mild Pain (1 - 3)  aluminum hydroxide/magnesium hydroxide/simethicone Suspension 30 milliLiter(s) Oral every 4 hours PRN Dyspepsia  melatonin 3 milliGRAM(s) Oral at bedtime PRN Insomnia  ondansetron Injectable 4 milliGRAM(s) IV Push every 8 hours PRN Nausea and/or Vomiting  traMADol 50 milliGRAM(s) Oral four times a day PRN Moderate Pain (4 - 6)      Vital Signs Last 24 Hrs  T(C): 37.7 (19 Apr 2022 13:00), Max: 37.7 (19 Apr 2022 13:00)  T(F): 99.9 (19 Apr 2022 13:00), Max: 99.9 (19 Apr 2022 13:00)  HR: 108 (19 Apr 2022 11:40) (108 - 108)  BP: 127/72 (19 Apr 2022 11:40) (127/72 - 127/72)  BP(mean): --  RR: 18 (19 Apr 2022 11:40) (18 - 18)  SpO2: 100% (19 Apr 2022 11:40) (100% - 100%)    PHYSICAL EXAM:  HEENT: NC/AT, Erythema rash on face and upper chest.   Neck: Soft  Lungs: Coarse BS bilaterally, no wheezing.  Heart: RRR, no murmurs.   Abdomen: Soft, no tenderness.   Genital/ Rectal: No salas.   Extremities: Moderate erythema of RLE with opened blister.   Neurologic: Confused.     I&O's Summary      LABORATORY:                          10.8   9.64  )-----------( 589      ( 19 Apr 2022 13:24 )             36.2           04-19    140  |  102  |  12  ----------------------------<  115<H>  4.1   |  27  |  0.82    Ca    8.9      19 Apr 2022 13:24    TPro  7.3  /  Alb  2.7<L>  /  TBili  0.3  /  DBili  x   /  AST  19  /  ALT  14  /  AlkPhos  63  04-19          LABORATORY:    CBC Full  -  ( 19 Apr 2022 13:24 )  WBC Count : 9.64 K/uL  RBC Count : 3.87 M/uL  Hemoglobin : 10.8 g/dL  Hematocrit : 36.2 %  Platelet Count - Automated : 589 K/uL  Mean Cell Volume : 93.5 fl  Mean Cell Hemoglobin : 27.9 pg  Mean Cell Hemoglobin Concentration : 29.8 gm/dL  Auto Neutrophil # : 7.90 K/uL  Auto Lymphocyte # : 0.77 K/uL  Auto Monocyte # : 0.77 K/uL  Auto Eosinophil # : 0.10 K/uL  Auto Basophil # : 0.10 K/uL  Auto Neutrophil % : 82.0 %  Auto Lymphocyte % : 8.0 %  Auto Monocyte % : 8.0 %  Auto Eosinophil % : 1.0 %  Auto Basophil % : 1.0 %      140  |  102  |  12  ----------------------------<  115<H>  4.1   |  27  |  0.82    Ca    8.9      19 Apr 2022 13:24    TPro  7.3  /  Alb  2.7<L>  /  TBili  0.3  /  DBili  x   /  AST  19  /  ALT  14  /  AlkPhos  63  04-19      Assessment and plan:    1. RLE cellulitis.    . Continue IV Zosyn  . Add po Doxy  . No further IV Vanco for now.    Thank you                                             Patient is a 89y old  Female who presents with a chief complaint of AMS (19 Apr 2022 17:08)      The patient is a 89 yo female [facility] resident with a PMH of CVA and R hemiplegia, depression, Afib, constipation, HTN, neuropathy and who was sent from [facility] for eval of AMS and  worsened pain her of leg wounds on R leg. She had increased edema of lower extremities and had an opened blister on RLE recently.  In the ED she was afebrile. She was given IV Zosyn and Vancomycin in the ED. Her son noted facial erythema in the ED which was new for her.       PAST MEDICAL & SURGICAL HISTORY:  Hypertension    CVA (cerebral vascular accident)    Depression    Constipation    Neuropathy    Hyperlipidemia    Grade I diastolic dysfunction    Afib    Neuropathy    VHD (valvular heart disease)    Aortic valvar stenosis    No significant past surgical history        Allergies    No Known Allergies    Intolerances        REVIEW OF SYSTEMS:  No abdominal pain  No hematuria      HOME MEDICATIONS:    MEDICATIONS  (STANDING):  baclofen 10 milliGRAM(s) Oral every 8 hours  cholecalciferol 1000 Unit(s) Oral daily  cyanocobalamin 1000 MICROGram(s) Oral daily  dextrose 5% + sodium chloride 0.45%. 1000 milliLiter(s) (50 mL/Hr) IV Continuous <Continuous>  escitalopram 20 milliGRAM(s) Oral daily  folic acid 1 milliGRAM(s) Oral daily  furosemide    Tablet 20 milliGRAM(s) Oral daily  gabapentin 100 milliGRAM(s) Oral three times a day  hydroxyurea 500 milliGRAM(s) Oral two times a day  lactobacillus acidophilus 1 Tablet(s) Oral two times a day  levothyroxine 25 MICROGram(s) Oral daily  metoprolol tartrate 25 milliGRAM(s) Oral two times a day  pantoprazole   Suspension 40 milliGRAM(s) Oral daily  piperacillin/tazobactam IVPB.. 3.375 Gram(s) IV Intermittent every 8 hours  senna 1 Tablet(s) Oral at bedtime  simvastatin 10 milliGRAM(s) Oral at bedtime    MEDICATIONS  (PRN):  acetaminophen     Tablet .. 650 milliGRAM(s) Oral every 6 hours PRN Temp greater or equal to 38C (100.4F), Mild Pain (1 - 3)  aluminum hydroxide/magnesium hydroxide/simethicone Suspension 30 milliLiter(s) Oral every 4 hours PRN Dyspepsia  melatonin 3 milliGRAM(s) Oral at bedtime PRN Insomnia  ondansetron Injectable 4 milliGRAM(s) IV Push every 8 hours PRN Nausea and/or Vomiting  traMADol 50 milliGRAM(s) Oral four times a day PRN Moderate Pain (4 - 6)      Vital Signs Last 24 Hrs  T(C): 37.7 (19 Apr 2022 13:00), Max: 37.7 (19 Apr 2022 13:00)  T(F): 99.9 (19 Apr 2022 13:00), Max: 99.9 (19 Apr 2022 13:00)  HR: 108 (19 Apr 2022 11:40) (108 - 108)  BP: 127/72 (19 Apr 2022 11:40) (127/72 - 127/72)  BP(mean): --  RR: 18 (19 Apr 2022 11:40) (18 - 18)  SpO2: 100% (19 Apr 2022 11:40) (100% - 100%)    PHYSICAL EXAM:  HEENT: NC/AT, Erythema rash on face and upper chest.   Neck: Soft  Lungs: Coarse BS bilaterally, no wheezing.  Heart: RRR, no murmurs.   Abdomen: Soft, no tenderness.   Genital/ Rectal: No salas.   Extremities: Moderate erythema of RLE with opened blister. Chronic edema of lower extremities.   Neurologic: Confused.     I&O's Summary      LABORATORY:                          10.8   9.64  )-----------( 589      ( 19 Apr 2022 13:24 )             36.2           04-19    140  |  102  |  12  ----------------------------<  115<H>  4.1   |  27  |  0.82    Ca    8.9      19 Apr 2022 13:24    TPro  7.3  /  Alb  2.7<L>  /  TBili  0.3  /  DBili  x   /  AST  19  /  ALT  14  /  AlkPhos  63  04-19        LABORATORY:    CBC Full  -  ( 19 Apr 2022 13:24 )  WBC Count : 9.64 K/uL  RBC Count : 3.87 M/uL  Hemoglobin : 10.8 g/dL  Hematocrit : 36.2 %  Platelet Count - Automated : 589 K/uL  Mean Cell Volume : 93.5 fl  Mean Cell Hemoglobin : 27.9 pg  Mean Cell Hemoglobin Concentration : 29.8 gm/dL  Auto Neutrophil # : 7.90 K/uL  Auto Lymphocyte # : 0.77 K/uL  Auto Monocyte # : 0.77 K/uL  Auto Eosinophil # : 0.10 K/uL  Auto Basophil # : 0.10 K/uL  Auto Neutrophil % : 82.0 %  Auto Lymphocyte % : 8.0 %  Auto Monocyte % : 8.0 %  Auto Eosinophil % : 1.0 %  Auto Basophil % : 1.0 %      140  |  102  |  12  ----------------------------<  115<H>  4.1   |  27  |  0.82    Ca    8.9      19 Apr 2022 13:24    TPro  7.3  /  Alb  2.7<L>  /  TBili  0.3  /  DBili  x   /  AST  19  /  ALT  14  /  AlkPhos  63  04-19      Assessment and plan:    1. RLE cellulitis.  2. Chronic edema of lower extremities.   3. Weakness.     . Continue IV Zosyn  . Add po Doxy  . No further IV Vanco for now as the patient probably had any syndrome with facial erythema.  . Monitor the progression of RLE cellulitis.       Thank you

## 2022-04-19 NOTE — CONSULT NOTE ADULT - SUBJECTIVE AND OBJECTIVE BOX
REJI BERGER    PLV APER 03    Allergies    No Known Allergies    Intolerances        PAST MEDICAL & SURGICAL HISTORY:  Hypertension    CVA (cerebral vascular accident)    Depression    Constipation    Neuropathy    Hyperlipidemia    Grade I diastolic dysfunction    Afib    Neuropathy    VHD (valvular heart disease)    Aortic valvar stenosis    No significant past surgical history        FAMILY HISTORY:      Home Medications:  acetaminophen 500 mg oral tablet: 2 tab(s) orally once a day (at bedtime) (19 Apr 2022 14:19)  acetaminophen 500 mg oral tablet: 2 tab(s) orally every 12 hours, As Needed (19 Apr 2022 14:19)  baclofen 10 mg oral tablet: 1 tab(s) orally every 8 hours (19 Apr 2022 14:19)  Biofreeze 4% topical gel: Apply topically to R shoulder &amp; L knee topically 2 times a day (19 Apr 2022 14:19)  Coumadin 1 mg oral tablet: 1 tab(s) orally once a day (at bedtime) (19 Apr 2022 14:19)  cyanocobalamin 1000 mcg oral tablet: 1 tab(s) orally once a day (19 Apr 2022 14:19)  docusate sodium 100 mg oral capsule: 2 cap(s) orally once a day (19 Apr 2022 14:19)  enalapril 2.5 mg oral tablet: 1 tab(s) orally once a day (19 Apr 2022 14:19)  escitalopram 20 mg oral tablet: 1 tab(s) orally once a day (19 Apr 2022 14:19)  famotidine 20 mg oral tablet: 1 tab(s) orally once a day (19 Apr 2022 14:19)  famotidine 20 mg oral tablet: 1 tab(s) orally once a day (19 Apr 2022 14:19)  folic acid 1 mg oral tablet: 1 tab(s) orally once a day (19 Apr 2022 14:19)  furosemide 20 mg oral tablet: 2 tab(s) orally once a day (19 Apr 2022 14:19)  gabapentin 100 mg oral capsule: 3 cap(s) orally 3 times a day (19 Apr 2022 14:19)  hydroxyurea 500 mg oral capsule: 1 cap(s) orally 2 times a day (19 Apr 2022 14:19)  levothyroxine 25 mcg (0.025 mg) oral tablet: 1 tab(s) orally once a day (19 Apr 2022 14:19)  Metoprolol Tartrate 25 mg oral tablet: 1 tab(s) orally 2 times a day (19 Apr 2022 14:19)  Senna 8.6 mg oral tablet: 1 tab(s) orally once a day (at bedtime) (19 Apr 2022 14:19)  simvastatin 10 mg oral tablet: 1 tab(s) orally once a day (at bedtime) (19 Apr 2022 14:19)  traMADol 50 mg oral tablet: 1 tab(s) orally 2 times a day (19 Apr 2022 14:19)  Vitamin D3: 1 tab(s) orally once a day (19 Apr 2022 14:19)      MEDICATIONS  (STANDING):  baclofen 10 milliGRAM(s) Oral every 8 hours  cholecalciferol 1000 Unit(s) Oral daily  cyanocobalamin 1000 MICROGram(s) Oral daily  dextrose 5% + sodium chloride 0.45%. 1000 milliLiter(s) (50 mL/Hr) IV Continuous <Continuous>  escitalopram 20 milliGRAM(s) Oral daily  folic acid 1 milliGRAM(s) Oral daily  furosemide    Tablet 20 milliGRAM(s) Oral daily  gabapentin 100 milliGRAM(s) Oral three times a day  hydroxyurea 500 milliGRAM(s) Oral two times a day  lactobacillus acidophilus 1 Tablet(s) Oral two times a day  levothyroxine 25 MICROGram(s) Oral daily  metoprolol tartrate 25 milliGRAM(s) Oral two times a day  pantoprazole   Suspension 40 milliGRAM(s) Oral daily  piperacillin/tazobactam IVPB.. 3.375 Gram(s) IV Intermittent every 8 hours  senna 1 Tablet(s) Oral at bedtime  simvastatin 10 milliGRAM(s) Oral at bedtime    MEDICATIONS  (PRN):  acetaminophen     Tablet .. 650 milliGRAM(s) Oral every 6 hours PRN Temp greater or equal to 38C (100.4F), Mild Pain (1 - 3)  aluminum hydroxide/magnesium hydroxide/simethicone Suspension 30 milliLiter(s) Oral every 4 hours PRN Dyspepsia  melatonin 3 milliGRAM(s) Oral at bedtime PRN Insomnia  ondansetron Injectable 4 milliGRAM(s) IV Push every 8 hours PRN Nausea and/or Vomiting  traMADol 50 milliGRAM(s) Oral four times a day PRN Moderate Pain (4 - 6)      Diet, NPO:   Except Medications (04-19-22 @ 15:25) [Active]          Vital Signs Last 24 Hrs  T(C): 37.7 (19 Apr 2022 13:00), Max: 37.7 (19 Apr 2022 13:00)  T(F): 99.9 (19 Apr 2022 13:00), Max: 99.9 (19 Apr 2022 13:00)  HR: 108 (19 Apr 2022 11:40) (108 - 108)  BP: 127/72 (19 Apr 2022 11:40) (127/72 - 127/72)  BP(mean): --  RR: 18 (19 Apr 2022 11:40) (18 - 18)  SpO2: 100% (19 Apr 2022 11:40) (100% - 100%)              LABS:                        10.8   9.64  )-----------( 589      ( 19 Apr 2022 13:24 )             36.2     04-19    140  |  102  |  12  ----------------------------<  115<H>  4.1   |  27  |  0.82    Ca    8.9      19 Apr 2022 13:24    TPro  7.3  /  Alb  2.7<L>  /  TBili  0.3  /  DBili  x   /  AST  19  /  ALT  14  /  AlkPhos  63  04-19    PT/INR - ( 19 Apr 2022 13:24 )   PT: 57.8 sec;   INR: 4.86 ratio         PTT - ( 19 Apr 2022 13:24 )  PTT:53.1 sec      ABG - ( 19 Apr 2022 13:38 )  pH, Arterial: 7.45  pH, Blood: x     /  pCO2: 40    /  pO2: 68    / HCO3: 28    / Base Excess: 3.8   /  SaO2: 95.6                WBC:  WBC Count: 9.64 K/uL (04-19 @ 13:24)      MICROBIOLOGY:  RECENT CULTURES:              PT/INR - ( 19 Apr 2022 13:24 )   PT: 57.8 sec;   INR: 4.86 ratio         PTT - ( 19 Apr 2022 13:24 )  PTT:53.1 sec    Sodium:  Sodium, Serum: 140 mmol/L (04-19 @ 13:24)      0.82 mg/dL 04-19 @ 13:24      Hemoglobin:  Hemoglobin: 10.8 g/dL (04-19 @ 13:24)      Platelets: Platelet Count - Automated: 589 K/uL (04-19 @ 13:24)      LIVER FUNCTIONS - ( 19 Apr 2022 13:24 )  Alb: 2.7 g/dL / Pro: 7.3 g/dL / ALK PHOS: 63 U/L / ALT: 14 U/L / AST: 19 U/L / GGT: x                 RADIOLOGY & ADDITIONAL STUDIES:      MICROBIOLOGY:  RECENT CULTURES:

## 2022-04-19 NOTE — H&P ADULT - RS GEN PE MLT RESP DETAILS PC
breath sounds equal/good air movement/respirations non-labored/diminished breath sounds, L/diminished breath sounds, R

## 2022-04-19 NOTE — ED PROVIDER NOTE - NEUROLOGICAL, MLM
r hemiplegia when removing dressings she stated to me the dressings were to remain for another day, and that my exam was uncomfortable

## 2022-04-19 NOTE — H&P ADULT - NSHPLABSRESULTS_GEN_ALL_CORE
< from: CT Head No Cont (04.19.22 @ 14:26) >    INTERPRETATION:  CLINICAL INDICATIONS:  ams    COMPARISON: CT head dated 2/8/2009    TECHNIQUE: Noncontrast CT of the head. Multiplanar reformations are   submitted.    FINDINGS:  Moderate motion artifact.  There is periventricular and subcortical white matter hypodensity without   mass effect, nonspecific, likely representing moderate chronic   microvascular ischemic changes. There is no compelling evidence for an   acute transcortical infarction. There is no evidence of mass, mass   effect, midline shift or extra-axial fluid collection. The lateral   ventricles and cortical sulci are age-appropriate in size and   configuration. Moderate to severe polypoid inflammatory mucosal changes   are seen throughout the various portions of the paranasal sinuses. The   orbits and mastoid air cells are unremarkable. . The calvarium is intact.   Consider MRI as clinically warranted.    IMPRESSION:  Moderate chronic microvascular changes without evidence of   an acute transcortical infarction or hemorrhage.    < end of copied text >    < from: US Duplex Venous Lower Ext Complete, Bilateral (04.19.22 @ 14:18) >    FINDINGS:    RIGHT:    Normal compressibility of the right common femoral and popliteal veins.   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 One of the   superficial femoral vein branches (posterior branch) demonstrates partial   compressibility and thickening suggestive of chronic venous changes. No   acute DVT is identified. Right posterior tibial vein is patent. Remaining   calf veins are poorly visualized.    LEFT:    Normal compressibility of the LEFT common femoral, femoral and popliteal   veins. Doppler examination shows normal spontaneous and phasic flow. Calf   veins are poorly visualized.    IMPRESSION:    No acute DVT of the lower extremities. 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 Limited calf vein evaluation as above.    Please note that venous duplex ultrasound is only moderately sensitive   for evaluation of calf vein thrombosis. If there are persistent symptoms   and concern for central propagation of calf vein thrombus which may be   unseen on this study, consider correlation with repeat venous duplex   ultrasound in 7-10 days.    < end of copied text >< from: TTE Echo Complete w/o Contrast w/ Doppler (12.29.21 @ 14:09) >      IMPRESSION:  Difficult study and limited views  Left ventricle was of normal size, normal LV systolic function EF 60%  Moderate aortic stenosis with peak gradient 25 mmHg and aortic valve area   of1.5 sq cm  Mitral annular calcification    < end of copied text >

## 2022-04-20 DIAGNOSIS — D68.9 COAGULATION DEFECT, UNSPECIFIED: ICD-10-CM

## 2022-04-20 DIAGNOSIS — S81.809A UNSPECIFIED OPEN WOUND, UNSPECIFIED LOWER LEG, INITIAL ENCOUNTER: ICD-10-CM

## 2022-04-20 DIAGNOSIS — I82.90 ACUTE EMBOLISM AND THROMBOSIS OF UNSPECIFIED VEIN: ICD-10-CM

## 2022-04-20 DIAGNOSIS — I83.029 VARICOSE VEINS OF LEFT LOWER EXTREMITY WITH ULCER OF UNSPECIFIED SITE: ICD-10-CM

## 2022-04-20 LAB
-  STAPHYLOCOCCUS EPIDERMIDIS, METHICILLIN RESISTANT: SIGNIFICANT CHANGE UP
ALBUMIN SERPL ELPH-MCNC: 2.2 G/DL — LOW (ref 3.3–5)
ALP SERPL-CCNC: 54 U/L — SIGNIFICANT CHANGE UP (ref 40–120)
ALT FLD-CCNC: 12 U/L — SIGNIFICANT CHANGE UP (ref 12–78)
ANION GAP SERPL CALC-SCNC: 8 MMOL/L — SIGNIFICANT CHANGE UP (ref 5–17)
APPEARANCE UR: CLEAR — SIGNIFICANT CHANGE UP
AST SERPL-CCNC: 15 U/L — SIGNIFICANT CHANGE UP (ref 15–37)
BACTERIA # UR AUTO: ABNORMAL
BASOPHILS # BLD AUTO: 0.07 K/UL — SIGNIFICANT CHANGE UP (ref 0–0.2)
BASOPHILS NFR BLD AUTO: 1 % — SIGNIFICANT CHANGE UP (ref 0–2)
BILIRUB SERPL-MCNC: 0.4 MG/DL — SIGNIFICANT CHANGE UP (ref 0.2–1.2)
BILIRUB UR-MCNC: NEGATIVE — SIGNIFICANT CHANGE UP
BUN SERPL-MCNC: 8 MG/DL — SIGNIFICANT CHANGE UP (ref 7–23)
CALCIUM SERPL-MCNC: 8.2 MG/DL — LOW (ref 8.5–10.1)
CHLORIDE SERPL-SCNC: 107 MMOL/L — SIGNIFICANT CHANGE UP (ref 96–108)
CO2 SERPL-SCNC: 28 MMOL/L — SIGNIFICANT CHANGE UP (ref 22–31)
COLOR SPEC: YELLOW — SIGNIFICANT CHANGE UP
CREAT SERPL-MCNC: 0.63 MG/DL — SIGNIFICANT CHANGE UP (ref 0.5–1.3)
DIFF PNL FLD: ABNORMAL
EGFR: 84 ML/MIN/1.73M2 — SIGNIFICANT CHANGE UP
EOSINOPHIL # BLD AUTO: 0.23 K/UL — SIGNIFICANT CHANGE UP (ref 0–0.5)
EOSINOPHIL NFR BLD AUTO: 3.2 % — SIGNIFICANT CHANGE UP (ref 0–6)
EPI CELLS # UR: SIGNIFICANT CHANGE UP
GLUCOSE SERPL-MCNC: 90 MG/DL — SIGNIFICANT CHANGE UP (ref 70–99)
GLUCOSE UR QL: NEGATIVE — SIGNIFICANT CHANGE UP
GRAM STN FLD: SIGNIFICANT CHANGE UP
GRAM STN FLD: SIGNIFICANT CHANGE UP
HCT VFR BLD CALC: 32.6 % — LOW (ref 34.5–45)
HGB BLD-MCNC: 9.7 G/DL — LOW (ref 11.5–15.5)
IMM GRANULOCYTES NFR BLD AUTO: 7.2 % — HIGH (ref 0–1.5)
INR BLD: 5.49 RATIO — CRITICAL HIGH (ref 0.88–1.16)
KETONES UR-MCNC: NEGATIVE — SIGNIFICANT CHANGE UP
LACTATE SERPL-SCNC: 1 MMOL/L — SIGNIFICANT CHANGE UP (ref 0.7–2)
LEUKOCYTE ESTERASE UR-ACNC: ABNORMAL
LYMPHOCYTES # BLD AUTO: 0.62 K/UL — LOW (ref 1–3.3)
LYMPHOCYTES # BLD AUTO: 8.7 % — LOW (ref 13–44)
MCHC RBC-ENTMCNC: 28.3 PG — SIGNIFICANT CHANGE UP (ref 27–34)
MCHC RBC-ENTMCNC: 29.8 GM/DL — LOW (ref 32–36)
MCV RBC AUTO: 95 FL — SIGNIFICANT CHANGE UP (ref 80–100)
METHOD TYPE: SIGNIFICANT CHANGE UP
MONOCYTES # BLD AUTO: 0.67 K/UL — SIGNIFICANT CHANGE UP (ref 0–0.9)
MONOCYTES NFR BLD AUTO: 9.4 % — SIGNIFICANT CHANGE UP (ref 2–14)
NEUTROPHILS # BLD AUTO: 5.02 K/UL — SIGNIFICANT CHANGE UP (ref 1.8–7.4)
NEUTROPHILS NFR BLD AUTO: 70.5 % — SIGNIFICANT CHANGE UP (ref 43–77)
NITRITE UR-MCNC: NEGATIVE — SIGNIFICANT CHANGE UP
NRBC # BLD: 0 /100 WBCS — SIGNIFICANT CHANGE UP (ref 0–0)
PH UR: 6.5 — SIGNIFICANT CHANGE UP (ref 5–8)
PLATELET # BLD AUTO: 508 K/UL — HIGH (ref 150–400)
POTASSIUM SERPL-MCNC: 3.8 MMOL/L — SIGNIFICANT CHANGE UP (ref 3.5–5.3)
POTASSIUM SERPL-SCNC: 3.8 MMOL/L — SIGNIFICANT CHANGE UP (ref 3.5–5.3)
PREALB SERPL-MCNC: 8 MG/DL — LOW (ref 20–40)
PROCALCITONIN SERPL-MCNC: 0.09 NG/ML — HIGH (ref 0–0.04)
PROCALCITONIN SERPL-MCNC: 0.12 NG/ML — HIGH (ref 0–0.04)
PROT SERPL-MCNC: 6.2 G/DL — SIGNIFICANT CHANGE UP (ref 6–8.3)
PROT UR-MCNC: 15
PROTHROM AB SERPL-ACNC: 65.3 SEC — HIGH (ref 10.5–13.4)
RBC # BLD: 3.43 M/UL — LOW (ref 3.8–5.2)
RBC # FLD: 20.9 % — HIGH (ref 10.3–14.5)
SODIUM SERPL-SCNC: 143 MMOL/L — SIGNIFICANT CHANGE UP (ref 135–145)
SP GR SPEC: 1 — LOW (ref 1.01–1.02)
TSH SERPL-MCNC: 10.3 UIU/ML — HIGH (ref 0.36–3.74)
UROBILINOGEN FLD QL: NEGATIVE — SIGNIFICANT CHANGE UP
WBC # BLD: 7.12 K/UL — SIGNIFICANT CHANGE UP (ref 3.8–10.5)
WBC # FLD AUTO: 7.12 K/UL — SIGNIFICANT CHANGE UP (ref 3.8–10.5)
WBC UR QL: SIGNIFICANT CHANGE UP

## 2022-04-20 PROCEDURE — 99232 SBSQ HOSP IP/OBS MODERATE 35: CPT

## 2022-04-20 RX ORDER — LEVOTHYROXINE SODIUM 125 MCG
50 TABLET ORAL DAILY
Refills: 0 | Status: DISCONTINUED | OUTPATIENT
Start: 2022-04-21 | End: 2022-04-25

## 2022-04-20 RX ORDER — GUAIFENESIN/DEXTROMETHORPHAN 600MG-30MG
10 TABLET, EXTENDED RELEASE 12 HR ORAL EVERY 6 HOURS
Refills: 0 | Status: DISCONTINUED | OUTPATIENT
Start: 2022-04-20 | End: 2022-04-21

## 2022-04-20 RX ADMIN — Medication 1000 UNIT(S): at 11:21

## 2022-04-20 RX ADMIN — GABAPENTIN 300 MILLIGRAM(S): 400 CAPSULE ORAL at 01:26

## 2022-04-20 RX ADMIN — SODIUM CHLORIDE 50 MILLILITER(S): 9 INJECTION, SOLUTION INTRAVENOUS at 21:49

## 2022-04-20 RX ADMIN — Medication 20 MILLIGRAM(S): at 05:55

## 2022-04-20 RX ADMIN — Medication 25 MILLIGRAM(S): at 18:31

## 2022-04-20 RX ADMIN — Medication 10 MILLILITER(S): at 21:36

## 2022-04-20 RX ADMIN — HYDROXYUREA 500 MILLIGRAM(S): 500 CAPSULE ORAL at 18:30

## 2022-04-20 RX ADMIN — Medication 100 MILLIGRAM(S): at 18:30

## 2022-04-20 RX ADMIN — PIPERACILLIN AND TAZOBACTAM 25 GRAM(S): 4; .5 INJECTION, POWDER, LYOPHILIZED, FOR SOLUTION INTRAVENOUS at 14:31

## 2022-04-20 RX ADMIN — GABAPENTIN 300 MILLIGRAM(S): 400 CAPSULE ORAL at 14:31

## 2022-04-20 RX ADMIN — HYDROXYUREA 500 MILLIGRAM(S): 500 CAPSULE ORAL at 05:56

## 2022-04-20 RX ADMIN — PIPERACILLIN AND TAZOBACTAM 25 GRAM(S): 4; .5 INJECTION, POWDER, LYOPHILIZED, FOR SOLUTION INTRAVENOUS at 05:53

## 2022-04-20 RX ADMIN — Medication 1 MILLIGRAM(S): at 11:22

## 2022-04-20 RX ADMIN — Medication 10 MILLIGRAM(S): at 05:53

## 2022-04-20 RX ADMIN — Medication 10 MILLIGRAM(S): at 21:39

## 2022-04-20 RX ADMIN — Medication 25 MICROGRAM(S): at 05:55

## 2022-04-20 RX ADMIN — PIPERACILLIN AND TAZOBACTAM 25 GRAM(S): 4; .5 INJECTION, POWDER, LYOPHILIZED, FOR SOLUTION INTRAVENOUS at 21:36

## 2022-04-20 RX ADMIN — Medication 650 MILLIGRAM(S): at 18:29

## 2022-04-20 RX ADMIN — Medication 650 MILLIGRAM(S): at 01:27

## 2022-04-20 RX ADMIN — SIMVASTATIN 10 MILLIGRAM(S): 20 TABLET, FILM COATED ORAL at 21:35

## 2022-04-20 RX ADMIN — Medication 100 MILLIGRAM(S): at 05:56

## 2022-04-20 RX ADMIN — GABAPENTIN 300 MILLIGRAM(S): 400 CAPSULE ORAL at 07:00

## 2022-04-20 RX ADMIN — ESCITALOPRAM OXALATE 20 MILLIGRAM(S): 10 TABLET, FILM COATED ORAL at 11:23

## 2022-04-20 RX ADMIN — Medication 25 MILLIGRAM(S): at 05:55

## 2022-04-20 RX ADMIN — Medication 1 TABLET(S): at 05:54

## 2022-04-20 RX ADMIN — SENNA PLUS 1 TABLET(S): 8.6 TABLET ORAL at 21:35

## 2022-04-20 RX ADMIN — GABAPENTIN 300 MILLIGRAM(S): 400 CAPSULE ORAL at 21:36

## 2022-04-20 RX ADMIN — PREGABALIN 1000 MICROGRAM(S): 225 CAPSULE ORAL at 11:23

## 2022-04-20 RX ADMIN — SODIUM CHLORIDE 75 MILLILITER(S): 9 INJECTION, SOLUTION INTRAVENOUS at 11:23

## 2022-04-20 RX ADMIN — Medication 1 TABLET(S): at 18:30

## 2022-04-20 RX ADMIN — Medication 10 MILLIGRAM(S): at 14:31

## 2022-04-20 RX ADMIN — PANTOPRAZOLE SODIUM 40 MILLIGRAM(S): 20 TABLET, DELAYED RELEASE ORAL at 11:22

## 2022-04-20 NOTE — DIETITIAN INITIAL EVALUATION ADULT - PERTINENT LABORATORY DATA
04-20    143  |  107  |  8   ----------------------------<  90  3.8   |  28  |  0.63    Ca    8.2<L>      20 Apr 2022 09:13    TPro  6.2  /  Alb  2.2<L>  /  TBili  0.4  /  DBili  x   /  AST  15  /  ALT  12  /  AlkPhos  54  04-20

## 2022-04-20 NOTE — PROGRESS NOTE ADULT - ASSESSMENT
rt leg wound -  cllulitis- on  doxycycline  ashd, atrial fib  dvt  coumadin - on hold for increased inr  hypertension  dyslipidemia  aortic stenosis  ams  abdominal pain rt lower- ct abdomen ordered r/o acute appedicitis- discussed with son at bed side  ct abdomen - no acute appendicitis 4/19

## 2022-04-20 NOTE — DIETITIAN INITIAL EVALUATION ADULT - PERTINENT MEDS FT
MEDICATIONS  (STANDING):  baclofen 10 milliGRAM(s) Oral every 8 hours  cholecalciferol 1000 Unit(s) Oral daily  cyanocobalamin 1000 MICROGram(s) Oral daily  dextrose 5% + sodium chloride 0.45%. 1000 milliLiter(s) (75 mL/Hr) IV Continuous <Continuous>  doxycycline hyclate Capsule 100 milliGRAM(s) Oral every 12 hours  escitalopram 20 milliGRAM(s) Oral daily  folic acid 1 milliGRAM(s) Oral daily  furosemide    Tablet 20 milliGRAM(s) Oral daily  gabapentin 300 milliGRAM(s) Oral every 8 hours  hydroxyurea 500 milliGRAM(s) Oral two times a day  lactobacillus acidophilus 1 Tablet(s) Oral two times a day  levothyroxine 25 MICROGram(s) Oral daily  metoprolol tartrate 25 milliGRAM(s) Oral two times a day  pantoprazole   Suspension 40 milliGRAM(s) Oral daily  piperacillin/tazobactam IVPB.. 3.375 Gram(s) IV Intermittent every 8 hours  senna 1 Tablet(s) Oral at bedtime  simvastatin 10 milliGRAM(s) Oral at bedtime    MEDICATIONS  (PRN):  acetaminophen     Tablet .. 650 milliGRAM(s) Oral every 6 hours PRN Temp greater or equal to 38C (100.4F), Mild Pain (1 - 3)  aluminum hydroxide/magnesium hydroxide/simethicone Suspension 30 milliLiter(s) Oral every 4 hours PRN Dyspepsia  melatonin 3 milliGRAM(s) Oral at bedtime PRN Insomnia  ondansetron Injectable 4 milliGRAM(s) IV Push every 8 hours PRN Nausea and/or Vomiting  traMADol 50 milliGRAM(s) Oral four times a day PRN Moderate Pain (4 - 6)

## 2022-04-20 NOTE — PROGRESS NOTE ADULT - ASSESSMENT
AB MENDOZA 89 f 4/19/2022 3/15/1932 DR YURI CARRINGTON     REVIEW OF SYMPTOMS      Able to give (reliable) ROS  NO     PHYSICAL EXAM    HEENT Unremarkable  atraumatic   RESP Fair air entry EXP prolonged    Harsh breath sound Resp distres mild   CARDIAC S1 S2 No S3     NO JVD    ABDOMEN SOFT BS PRESENT NOT DISTENDED No hepatosplenomegaly   PEDAL EDEMA present No calf tenderness  NO rash       DOA/CC/PROBLEMS poa .  89 f  doa 4/19/2022   cc Patient is a 89yo female complaining of left lower leg wound possible infection and sepsis Patient is tachy and has ams since 4/18      PMH-PSH .  pmh DVT  pmh Anemia  pmh leg wounds  pmh A fib  pmh GERD  pmh     PROSTHETICS/TUBES/LINES.    NOTABLE HOME MEDS.  coumadin 2.5  enalapril 2.5  lasix 20    COVID/ICU/CODE STATUS.                       COVID  STATUS.           ICU STAY. none  GOC.      BEST PRACTICE ISSUES.                                                  HEAD OF BED ELEVATION. Yes  DVT PROPHYLAXIS.   4/19/2022 on coumadin at home   GONZALES PROPHYLAXIS.   4/19/2022 protonix 40                                                                                      DIET.  4/20/2022 mince moist    VITALS/PO/IO/VENT/DRIPS.     4/20/2022 tmax 101 90 110/60     PROBLEM DATA/ASSESSMENT RECOMMENDATIONS (A/R).    HEMODYNAMICS.   Monitor bp Target MAP 65 (+)    RESP.   Monitor po Target po 90-95%    ABG.  4/19/2022 ra 745/40/68    PMH/PSH PROBLEMS.  Management continued/modified as indicated    OXYGEN REQUIREMENTS.  4/20/2022 ra 92%      INFECTION SOURCE.   Cellulitis lle poa 4/19/2022   INFECTION A/R.   Cellulitis lle on zosyn    INFECTION ANDREWS.  W 4/19-4/20/2022 w 9.6 - 7.1    pr 4/20/2022 .12   MICROBIO.  blod c 3/30 (-)   ABIO.  4/19/2022 zosyn   4/19 doxy         Lacticemia  la 4/19/2022 2.1   monitor    RO DVT.  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  tr 4/19/2022 tr 14   4/19/2022 simvastat 10  no active ischemia        A fib  inr 4/19-4/20/2022 inr 4.8 - 5.4  4/19/2022 metoprolol 25.2   on rate control and anticoagn    CHF.  4/19/2022 lasix 20       HYPERAMMONEMIA.  Amm 4/19/2022 amm 39     ANEMIA.  Hb 4/19-4/20/2022 Hb 10.6 - 9.7   monito    RENAL.  Cr 4/19/2022 Cr .8   monitor    HYPOTHYROID.  4/20/2022 tsh 10   4/19/2022 levoxyl 25 -> 4/20 levox 50     OVERALL ISSUES.  LEG CELLULITIS   A fib   chronic dvt  coagulopathy    IV fl.  4/19/2022 d5 1/2 50      TIME SPENT   Over 25 minutes aggregate care time spent on encounter; activities included   direct patient care, counseling and/or coordinating care reviewing notes, lab data/ imaging , discussion with multidisciplinary team/ patient  /family and explaining in detail risks, benefits, alternatives  of the recommendations     AB MENDOZA 89 f 4/19/2022 3/15/1932 DR YURI CARRINGTON

## 2022-04-20 NOTE — SWALLOW BEDSIDE ASSESSMENT ADULT - ORAL PHASE
Decreased anterior-posterior movement of the bolus/Delayed oral transit time suspected posterior loss at BOT/Decreased anterior-posterior movement of the bolus/Delayed oral transit time Decreased anterior-posterior movement of the bolus/Delayed oral transit time/Lingual stasis

## 2022-04-20 NOTE — PROGRESS NOTE ADULT - SUBJECTIVE AND OBJECTIVE BOX
PROGRESS NOTE  Patient is a 89y old  Female who presents with a chief complaint of AMS (2022 13:39)  Chart and available morning labs /imaging are reviewed electronically , urgent issues addressed . More information  is being added upon completion of rounds , when more information is collected and management discussed with consultants , medical staff and social service/case management on the floor     OVERNIGHT  No new issues reported by medical staff . All above noted Patient is resting in a bed comfortably .Confused ,poor mentation .No distress noted     HPI:  Patient is a 89 yo Female ,[facility] resident  sent from [facility] for eval of AMS and  her leg wounds on R leg.  She has hx of CVA  with R hemiplegia, dvt anemia, phlebitis, afib, hld, melanoma of skin, gerd depression, diverticulitis, uti, hypothyroid, pt is poor historian, per ems to the triage rn they adv pt may be septic due to wounds on her leg. pt has no complaints .As per [facility] med staff patient was sent to ED for evaluation of AMS ,associated with hypoxia and difficulties swallowing .Also her R leg wounds are worsening and ID /WOUND care consult requested .Septic workup sent ,found to have lactate elevation Admitted for septic workup and evaluation,send blood and urine cx,serial lactate levels,monitor vitals closley,ivfs hydration,monitor urine output and renal profile,iv abx initiated  .[facility] Palliative care consult requested ,to discuss advance directives and complete MOLST  (2022 17:08)    PAST MEDICAL & SURGICAL HISTORY:  Hypertension    CVA (cerebral vascular accident)    Depression    Constipation    Neuropathy    Hyperlipidemia    Grade I diastolic dysfunction    Afib    Neuropathy    VHD (valvular heart disease)    Aortic valvar stenosis    No significant past surgical history        MEDICATIONS  (STANDING):  baclofen 10 milliGRAM(s) Oral every 8 hours  cholecalciferol 1000 Unit(s) Oral daily  cyanocobalamin 1000 MICROGram(s) Oral daily  dextrose 5% + sodium chloride 0.45%. 1000 milliLiter(s) (50 mL/Hr) IV Continuous <Continuous>  doxycycline hyclate Capsule 100 milliGRAM(s) Oral every 12 hours  escitalopram 20 milliGRAM(s) Oral daily  folic acid 1 milliGRAM(s) Oral daily  furosemide    Tablet 20 milliGRAM(s) Oral daily  gabapentin 300 milliGRAM(s) Oral every 8 hours  hydroxyurea 500 milliGRAM(s) Oral two times a day  lactobacillus acidophilus 1 Tablet(s) Oral two times a day  levothyroxine 50 MICROGram(s) Oral daily  metoprolol tartrate 25 milliGRAM(s) Oral two times a day  pantoprazole   Suspension 40 milliGRAM(s) Oral daily  piperacillin/tazobactam IVPB.. 3.375 Gram(s) IV Intermittent every 8 hours  senna 1 Tablet(s) Oral at bedtime  simvastatin 10 milliGRAM(s) Oral at bedtime    MEDICATIONS  (PRN):  acetaminophen     Tablet .. 650 milliGRAM(s) Oral every 6 hours PRN Temp greater or equal to 38C (100.4F), Mild Pain (1 - 3)  aluminum hydroxide/magnesium hydroxide/simethicone Suspension 30 milliLiter(s) Oral every 4 hours PRN Dyspepsia  melatonin 3 milliGRAM(s) Oral at bedtime PRN Insomnia  ondansetron Injectable 4 milliGRAM(s) IV Push every 8 hours PRN Nausea and/or Vomiting  traMADol 50 milliGRAM(s) Oral four times a day PRN Moderate Pain (4 - 6)      OBJECTIVE    T(C): 36.6 (22 @ 11:50), Max: 38.8 (22 @ 19:35)  HR: 79 (22 @ 11:50) (79 - 120)  BP: 127/68 (22 @ 11:50) (110/67 - 143/68)  RR: 18 (22 @ 11:50) (18 - 18)  SpO2: 92% (22 @ 11:50) (92% - 97%)  Wt(kg): --  I&O's Summary        REVIEW OF SYSTEMS:  CONSTITUTIONAL: No fever, weight loss, or fatigue  EYES: No eye pain, visual disturbances, or discharge  ENMT:   No sinus or throat pain  NECK: No pain or stiffness  RESPIRATORY: No cough, wheezing, chills or hemoptysis; No shortness of breath  CARDIOVASCULAR: No chest pain, palpitations, dizziness, or leg swelling  GASTROINTESTINAL: No abdominal pain. No nausea, vomiting; No diarrhea or constipation. No melena or hematochezia.  GENITOURINARY: No dysuria, frequency, hematuria, or incontinence  NEUROLOGICAL: No headaches, memory loss, loss of strength, numbness, or tremors  SKIN: No itching, burning, rashes, or lesions   MUSCULOSKELETAL: No joint pain or swelling; No muscle, back, or extremity pain    PHYSICAL EXAM:  Appearance: NAD. VS past 24 hrs -as above   HEENT:   Moist oral mucosa. Conjunctiva clear b/l.   Neck : supple  Respiratory: Lungs CTAB.  Gastrointestinal:  Soft, nontender. No rebound. No rigidity. BS present	  Cardiovascular: RRR ,S1S2 present  Neurologic: Non-focal. Moving all extremities.  Extremities: No edema. No erythema. No calf tenderness.  Skin: No rashes, No ecchymoses, No cyanosis.	  wounds ,skin lesions-See skin assesment flow sheet   LABS:                        9.7    7.12  )-----------( 508      ( 2022 09:13 )             32.6     04-20    143  |  107  |  8   ----------------------------<  90  3.8   |  28  |  0.63    Ca    8.2<L>      2022 09:13    TPro  6.2  /  Alb  2.2<L>  /  TBili  0.4  /  DBili  x   /  AST  15  /  ALT  12  /  AlkPhos  54  04-20    CAPILLARY BLOOD GLUCOSE        PT/INR - ( 2022 09:13 )   PT: 65.3 sec;   INR: 5.49 ratio         PTT - ( 2022 13:24 )  PTT:53.1 sec  Urinalysis Basic - ( 2022 12:29 )    Color: Yellow / Appearance: Clear / S.005 / pH: x  Gluc: x / Ketone: Negative  / Bili: Negative / Urobili: Negative   Blood: x / Protein: 15 / Nitrite: Negative   Leuk Esterase: Small / RBC: x / WBC 3-5   Sq Epi: x / Non Sq Epi: Few / Bacteria: Few        RADIOLOGY & ADDITIONAL TESTS:< from: CT Abdomen and Pelvis w/ IV Cont (22 @ 18:40) >  ACC: [id number] EXAM:  CT ABDOMEN AND PELVIS IC                          PROCEDURE DATE:  2022          INTERPRETATION:  CLINICAL INFORMATION: Right lower abdominal pain.    COMPARISON: CT abdomen pelvis 2021.    CONTRAST/COMPLICATIONS:  IV Contrast: Omnipaque 350  90 cc administered   10 cc discarded  Oral Contrast: NONE  Complications: None reported at time of study completion    PROCEDURE:  CT of the Abdomen and Pelvis was performed.  Sagittal and coronal reformats were performed.    FINDINGS:  LOWER CHEST: Mucus plugging in the right lower lobe bronchi with near   complete atelectasis of the right lower lobe.    LIVER: Left hepatic lobe cyst.  BILE DUCTS: Normal caliber.  GALLBLADDER: Cholelithiasis.  SPLEEN: Within normal limits.  PANCREAS: A 3.2 cm cystic lesion in the pancreatic head is unchanged from   2016, when remeasured in a similar fashion. No pancreatic ductal   dilatation.  ADRENALS: Within normal limits.  KIDNEYS/URETERS: No hydronephrosis. Bilateral renal hypodensities too   small to characterize.    BLADDER: Within normal limits.  REPRODUCTIVE ORGANS: Small calcified uterine myoma. No adnexal mass.    BOWEL: Moderate hiatal hernia. No bowel obstruction. Appendix is not   visualized. No evidence of inflammation in the pericecal region.   Extensive sigmoid diverticulosis without evidence of acute diverticulitis.  PERITONEUM: No ascites.  VESSELS: Atherosclerotic changes. Infrarenal IVC filter. Focal thrombus   in the infrarenal IVC along the right aspect of the filter apex with   extension above the filter.  RETROPERITONEUM/LYMPH NODES: No lymphadenopathy.  ABDOMINAL WALL: Within normal limits.  BONES: Degenerative changes.    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    Mucus plugging in the right lower lobe bronchi with near complete   atelectasis of the right lower lobe.    < end of copied text >     reviewed elctronically  ASSESSMENT/PLAN: 	  45 minutes aggregate time was spent on this visit, 50% visit time spent in care co-ordination with other attendings and counselling patient .I have discussed care plan with patient / HCP/family member ,who expressed understanding of problems treatment and their effect and side effects, alternatives in details. I have asked if they have any questions and concerns and appropriately addressed them to best of my ability. Case d/w vasc sx ,pulm ,wound care team and

## 2022-04-20 NOTE — PROGRESS NOTE ADULT - SUBJECTIVE AND OBJECTIVE BOX
89y year old Female seen at [facility] for b/l LE wound with cellulitis. Pt complaining of pain to RLE. Pt denies any fever, chills, nausea, vomiting, chest pain, shortness of breath, or calf pain at this time.    Allergies    No Known Allergies    Intolerances        MEDICATIONS  (STANDING):  baclofen 10 milliGRAM(s) Oral every 8 hours  cholecalciferol 1000 Unit(s) Oral daily  cyanocobalamin 1000 MICROGram(s) Oral daily  dextrose 5% + sodium chloride 0.45%. 1000 milliLiter(s) (75 mL/Hr) IV Continuous <Continuous>  doxycycline hyclate Capsule 100 milliGRAM(s) Oral every 12 hours  escitalopram 20 milliGRAM(s) Oral daily  folic acid 1 milliGRAM(s) Oral daily  furosemide    Tablet 20 milliGRAM(s) Oral daily  gabapentin 300 milliGRAM(s) Oral every 8 hours  hydroxyurea 500 milliGRAM(s) Oral two times a day  lactobacillus acidophilus 1 Tablet(s) Oral two times a day  levothyroxine 25 MICROGram(s) Oral daily  metoprolol tartrate 25 milliGRAM(s) Oral two times a day  pantoprazole   Suspension 40 milliGRAM(s) Oral daily  piperacillin/tazobactam IVPB.. 3.375 Gram(s) IV Intermittent every 8 hours  senna 1 Tablet(s) Oral at bedtime  simvastatin 10 milliGRAM(s) Oral at bedtime    MEDICATIONS  (PRN):  acetaminophen     Tablet .. 650 milliGRAM(s) Oral every 6 hours PRN Temp greater or equal to 38C (100.4F), Mild Pain (1 - 3)  aluminum hydroxide/magnesium hydroxide/simethicone Suspension 30 milliLiter(s) Oral every 4 hours PRN Dyspepsia  melatonin 3 milliGRAM(s) Oral at bedtime PRN Insomnia  ondansetron Injectable 4 milliGRAM(s) IV Push every 8 hours PRN Nausea and/or Vomiting  traMADol 50 milliGRAM(s) Oral four times a day PRN Moderate Pain (4 - 6)      Vital Signs Last 24 Hrs  T(C): 36.6 (20 Apr 2022 11:50), Max: 38.8 (19 Apr 2022 19:35)  T(F): 97.9 (20 Apr 2022 11:50), Max: 101.8 (19 Apr 2022 19:35)  HR: 79 (20 Apr 2022 11:50) (79 - 120)  BP: 127/68 (20 Apr 2022 11:50) (110/67 - 143/68)  BP(mean): --  RR: 18 (20 Apr 2022 11:50) (18 - 18)  SpO2: 92% (20 Apr 2022 11:50) (92% - 97%)    PHYSICAL EXAM:  Vascular: DP palpable bilaterally & PT non-palpable bilaterally, Capillary refill 4 seconds, Digital hair absent bilaterally  Neurological: Light touch sensation intact bilaterally  Musculoskeletal: 5/5 strength in all quadrants bilaterally, AJ & STJ ROM intact  Dermatological: RLE 3 x 3 x 0.1 cm; 1 x 1 x 0.1 cm; 3 x 3 x 0.2 cm ulceration noted to the Right anterior leg (x2) and posterior leg, respectively , fibrotic wound bed with erythema and increase warmth to the RLE. No probe to bone, no drainage, no fluctuance, no malodor.  LLE 5 x 4 x 0.2 cm; ulceration noted to the Left anterior leg, fibrotic and overlying scab wound bed with mild erythema distally. No probe to bone, no drainage, no fluctuance, no malodor.     CBC Full  -  ( 20 Apr 2022 09:13 )  WBC Count : 7.12 K/uL  RBC Count : 3.43 M/uL  Hemoglobin : 9.7 g/dL  Hematocrit : 32.6 %  Platelet Count - Automated : 508 K/uL  Mean Cell Volume : 95.0 fl  Mean Cell Hemoglobin : 28.3 pg  Mean Cell Hemoglobin Concentration : 29.8 gm/dL  Auto Neutrophil # : 5.02 K/uL  Auto Lymphocyte # : 0.62 K/uL  Auto Monocyte # : 0.67 K/uL  Auto Eosinophil # : 0.23 K/uL  Auto Basophil # : 0.07 K/uL  Auto Neutrophil % : 70.5 %  Auto Lymphocyte % : 8.7 %  Auto Monocyte % : 9.4 %  Auto Eosinophil % : 3.2 %  Auto Basophil % : 1.0 %      ----------CHEM PANEL----------    PT/INR - ( 20 Apr 2022 09:13 )   PT: 65.3 sec;   INR: 5.49 ratio         PTT - ( 19 Apr 2022 13:24 )  PTT:53.1 sec        ACC: [id number] EXAM: US DPLX LWR EXT VEINS COMPL BI    PROCEDURE DATE: 04/19/2022        INTERPRETATION: CLINICAL INFORMATION: Leg pain with wounds. Evaluate for DVT.    COMPARISON: Venous duplex ultrasound 3/30/2022.    TECHNIQUE: Duplex sonography of the BILATERAL LOWER extremity veins with color and spectral Doppler, with and without compression.    FINDINGS:    RIGHT:    Normal compressibility of the right common femoral and popliteal veins. 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 One of the superficial femoral vein branches (posterior branch) demonstrates partial compressibility and thickening suggestive of chronic venous changes. No acute DVT is identified. Right posterior tibial vein is patent. Remaining calf veins are poorly visualized.    LEFT:    Normal compressibility of the LEFT common femoral, femoral and popliteal veins. Doppler examination shows normal spontaneous and phasic flow. Calf veins are poorly visualized.    IMPRESSION:      - . 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 Limited calf vein evaluation as above.    Please note that venous duplex ultrasound is only moderately sensitive for evaluation of calf vein thrombosis. If there are persistent symptoms and concern for central propagation of calf vein thrombus which may be unseen on this study, consider correlation with repeat venous duplex ultrasound in 7-10 days.    --- End of Report ---

## 2022-04-20 NOTE — DIETITIAN NUTRITION RISK NOTIFICATION - TREATMENT: THE FOLLOWING DIET HAS BEEN RECOMMENDED
Diet, Minced and Moist:   Mildly Thick Liquids (MILDTHICKLIQS)  No Straws (04-20-22 @ 10:13) [Pending Verification By Attending]  Diet, NPO:   Except Medications (04-19-22 @ 15:25) [Active]

## 2022-04-20 NOTE — PROGRESS NOTE ADULT - SUBJECTIVE AND OBJECTIVE BOX
Interval History:    CENTRAL LINE:   [  ] YES       [  ] NO  MUIR:                 [  ] YES       [  ] NO         REVIEW OF SYSTEMS:  All Systems below were reviewed and are negative [  ]  HEENT:  ID:  Pulmonary:  Cardiac:  GI:  Renal:  Musculoskeletal:  All other systems above were reviewed and are negative   [  ]      MEDICATIONS  (STANDING):  baclofen 10 milliGRAM(s) Oral every 8 hours  cholecalciferol 1000 Unit(s) Oral daily  cyanocobalamin 1000 MICROGram(s) Oral daily  dextrose 5% + sodium chloride 0.45%. 1000 milliLiter(s) (50 mL/Hr) IV Continuous <Continuous>  doxycycline hyclate Capsule 100 milliGRAM(s) Oral every 12 hours  escitalopram 20 milliGRAM(s) Oral daily  folic acid 1 milliGRAM(s) Oral daily  furosemide    Tablet 20 milliGRAM(s) Oral daily  gabapentin 300 milliGRAM(s) Oral every 8 hours  hydroxyurea 500 milliGRAM(s) Oral two times a day  lactobacillus acidophilus 1 Tablet(s) Oral two times a day  levothyroxine 50 MICROGram(s) Oral daily  metoprolol tartrate 25 milliGRAM(s) Oral two times a day  pantoprazole   Suspension 40 milliGRAM(s) Oral daily  piperacillin/tazobactam IVPB.. 3.375 Gram(s) IV Intermittent every 8 hours  senna 1 Tablet(s) Oral at bedtime  simvastatin 10 milliGRAM(s) Oral at bedtime    MEDICATIONS  (PRN):  acetaminophen     Tablet .. 650 milliGRAM(s) Oral every 6 hours PRN Temp greater or equal to 38C (100.4F), Mild Pain (1 - 3)  aluminum hydroxide/magnesium hydroxide/simethicone Suspension 30 milliLiter(s) Oral every 4 hours PRN Dyspepsia  guaifenesin/dextromethorphan Oral Liquid 10 milliLiter(s) Oral every 6 hours PRN Cough  melatonin 3 milliGRAM(s) Oral at bedtime PRN Insomnia  ondansetron Injectable 4 milliGRAM(s) IV Push every 8 hours PRN Nausea and/or Vomiting  traMADol 50 milliGRAM(s) Oral four times a day PRN Moderate Pain (4 - 6)      Vital Signs Last 24 Hrs  T(C): 38.3 (20 Apr 2022 18:01), Max: 38.3 (20 Apr 2022 18:01)  T(F): 100.9 (20 Apr 2022 18:01), Max: 100.9 (20 Apr 2022 18:01)  HR: 112 (20 Apr 2022 18:01) (79 - 112)  BP: 131/67 (20 Apr 2022 18:01) (110/67 - 131/67)  BP(mean): --  RR: 18 (20 Apr 2022 11:50) (18 - 18)  SpO2: 92% (20 Apr 2022 11:50) (92% - 92%)    I&O's Summary      PHYSICAL EXAM:  HEENT: NC/AT; PERRLA  Neck: Soft; no tenderness  Lungs: CTA bilaterally; no wheezing.   Heart:  Abdomen:  Genital/ Rectal:  Extremities:  Neurologic:  Vascular:      LABORATORY:    CBC Full  -  ( 20 Apr 2022 09:13 )  WBC Count : 7.12 K/uL  RBC Count : 3.43 M/uL  Hemoglobin : 9.7 g/dL  Hematocrit : 32.6 %  Platelet Count - Automated : 508 K/uL  Mean Cell Volume : 95.0 fl  Mean Cell Hemoglobin : 28.3 pg  Mean Cell Hemoglobin Concentration : 29.8 gm/dL  Auto Neutrophil # : 5.02 K/uL  Auto Lymphocyte # : 0.62 K/uL  Auto Monocyte # : 0.67 K/uL  Auto Eosinophil # : 0.23 K/uL  Auto Basophil # : 0.07 K/uL  Auto Neutrophil % : 70.5 %  Auto Lymphocyte % : 8.7 %  Auto Monocyte % : 9.4 %  Auto Eosinophil % : 3.2 %  Auto Basophil % : 1.0 %      ESR:                   04-20 @ 09:13  --    C-Reactive Protein:     04-20 @ 09:13  --    Procalcitonin:           04-20 @ 09:13   0.12      04-20    143  |  107  |  8   ----------------------------<  90  3.8   |  28  |  0.63    Ca    8.2<L>      20 Apr 2022 09:13    TPro  6.2  /  Alb  2.2<L>  /  TBili  0.4  /  DBili  x   /  AST  15  /  ALT  12  /  AlkPhos  54  04-20          Assessment and Plan:          Sushil Anguiano MD   [phone].    She is more alert  She had fevers of 101F today  Still has a cough.   Facial erythema is better.     MEDICATIONS  (STANDING):  baclofen 10 milliGRAM(s) Oral every 8 hours  cholecalciferol 1000 Unit(s) Oral daily  cyanocobalamin 1000 MICROGram(s) Oral daily  dextrose 5% + sodium chloride 0.45%. 1000 milliLiter(s) (50 mL/Hr) IV Continuous <Continuous>  doxycycline hyclate Capsule 100 milliGRAM(s) Oral every 12 hours  escitalopram 20 milliGRAM(s) Oral daily  folic acid 1 milliGRAM(s) Oral daily  furosemide    Tablet 20 milliGRAM(s) Oral daily  gabapentin 300 milliGRAM(s) Oral every 8 hours  hydroxyurea 500 milliGRAM(s) Oral two times a day  lactobacillus acidophilus 1 Tablet(s) Oral two times a day  levothyroxine 50 MICROGram(s) Oral daily  metoprolol tartrate 25 milliGRAM(s) Oral two times a day  pantoprazole   Suspension 40 milliGRAM(s) Oral daily  piperacillin/tazobactam IVPB.. 3.375 Gram(s) IV Intermittent every 8 hours  senna 1 Tablet(s) Oral at bedtime  simvastatin 10 milliGRAM(s) Oral at bedtime    MEDICATIONS  (PRN):  acetaminophen     Tablet .. 650 milliGRAM(s) Oral every 6 hours PRN Temp greater or equal to 38C (100.4F), Mild Pain (1 - 3)  aluminum hydroxide/magnesium hydroxide/simethicone Suspension 30 milliLiter(s) Oral every 4 hours PRN Dyspepsia  guaifenesin/dextromethorphan Oral Liquid 10 milliLiter(s) Oral every 6 hours PRN Cough  melatonin 3 milliGRAM(s) Oral at bedtime PRN Insomnia  ondansetron Injectable 4 milliGRAM(s) IV Push every 8 hours PRN Nausea and/or Vomiting  traMADol 50 milliGRAM(s) Oral four times a day PRN Moderate Pain (4 - 6)      Vital Signs Last 24 Hrs  T(C): 38.3 (20 Apr 2022 18:01), Max: 38.3 (20 Apr 2022 18:01)  T(F): 100.9 (20 Apr 2022 18:01), Max: 100.9 (20 Apr 2022 18:01)  HR: 112 (20 Apr 2022 18:01) (79 - 112)  BP: 131/67 (20 Apr 2022 18:01) (110/67 - 131/67)  BP(mean): --  RR: 18 (20 Apr 2022 11:50) (18 - 18)  SpO2: 92% (20 Apr 2022 11:50) (92% - 92%)    I&O's Summary      PHYSICAL EXAM:  HEENT: NC/AT; PERRLA  Neck: Soft; no tenderness  Lungs: CTA bilaterally; no wheezing.   Heart:  Abdomen:  Genital/ Rectal:  Extremities:  Neurologic:  Vascular:      LABORATORY:    CBC Full  -  ( 20 Apr 2022 09:13 )  WBC Count : 7.12 K/uL  RBC Count : 3.43 M/uL  Hemoglobin : 9.7 g/dL  Hematocrit : 32.6 %  Platelet Count - Automated : 508 K/uL  Mean Cell Volume : 95.0 fl  Mean Cell Hemoglobin : 28.3 pg  Mean Cell Hemoglobin Concentration : 29.8 gm/dL  Auto Neutrophil # : 5.02 K/uL  Auto Lymphocyte # : 0.62 K/uL  Auto Monocyte # : 0.67 K/uL  Auto Eosinophil # : 0.23 K/uL  Auto Basophil # : 0.07 K/uL  Auto Neutrophil % : 70.5 %  Auto Lymphocyte % : 8.7 %  Auto Monocyte % : 9.4 %  Auto Eosinophil % : 3.2 %  Auto Basophil % : 1.0 %      ESR:                   04-20 @ 09:13  --    C-Reactive Protein:     04-20 @ 09:13  --    Procalcitonin:           04-20 @ 09:13   0.12      04-20    143  |  107  |  8   ----------------------------<  90  3.8   |  28  |  0.63    Ca    8.2<L>      20 Apr 2022 09:13    TPro  6.2  /  Alb  2.2<L>  /  TBili  0.4  /  DBili  x   /  AST  15  /  ALT  12  /  AlkPhos  54  04-20    Assessment and Plan:    1. RLE cellulitis.  2. Chronic edema of lower extremities.   3. Weakness.     . Continue IV Zosyn  . Add po Doxy  . Monitor the progression of RLE cellulitis.       Sushil Anguiano MD   [phone].    She is more alert  She had fevers of 101.8F today  Still has a cough.   Facial erythema is better.       MEDICATIONS  (STANDING):  baclofen 10 milliGRAM(s) Oral every 8 hours  cholecalciferol 1000 Unit(s) Oral daily  cyanocobalamin 1000 MICROGram(s) Oral daily  dextrose 5% + sodium chloride 0.45%. 1000 milliLiter(s) (50 mL/Hr) IV Continuous <Continuous>  doxycycline hyclate Capsule 100 milliGRAM(s) Oral every 12 hours  escitalopram 20 milliGRAM(s) Oral daily  folic acid 1 milliGRAM(s) Oral daily  furosemide    Tablet 20 milliGRAM(s) Oral daily  gabapentin 300 milliGRAM(s) Oral every 8 hours  hydroxyurea 500 milliGRAM(s) Oral two times a day  lactobacillus acidophilus 1 Tablet(s) Oral two times a day  levothyroxine 50 MICROGram(s) Oral daily  metoprolol tartrate 25 milliGRAM(s) Oral two times a day  pantoprazole   Suspension 40 milliGRAM(s) Oral daily  piperacillin/tazobactam IVPB.. 3.375 Gram(s) IV Intermittent every 8 hours  senna 1 Tablet(s) Oral at bedtime  simvastatin 10 milliGRAM(s) Oral at bedtime    MEDICATIONS  (PRN):  acetaminophen     Tablet .. 650 milliGRAM(s) Oral every 6 hours PRN Temp greater or equal to 38C (100.4F), Mild Pain (1 - 3)  aluminum hydroxide/magnesium hydroxide/simethicone Suspension 30 milliLiter(s) Oral every 4 hours PRN Dyspepsia  guaifenesin/dextromethorphan Oral Liquid 10 milliLiter(s) Oral every 6 hours PRN Cough  melatonin 3 milliGRAM(s) Oral at bedtime PRN Insomnia  ondansetron Injectable 4 milliGRAM(s) IV Push every 8 hours PRN Nausea and/or Vomiting  traMADol 50 milliGRAM(s) Oral four times a day PRN Moderate Pain (4 - 6)      Vital Signs Last 24 Hrs  T(C): 38.3 (20 Apr 2022 18:01), Max: 38.3 (20 Apr 2022 18:01)  T(F): 100.9 (20 Apr 2022 18:01), Max: 100.9 (20 Apr 2022 18:01)  HR: 112 (20 Apr 2022 18:01) (79 - 112)  BP: 131/67 (20 Apr 2022 18:01) (110/67 - 131/67)  BP(mean): --  RR: 18 (20 Apr 2022 11:50) (18 - 18)  SpO2: 92% (20 Apr 2022 11:50) (92% - 92%)    I&O's Summary      PHYSICAL EXAM:  HEENT: NC/AT; PERRLA. Decreased erythema of face and upper chest.   Neck: Soft; no tenderness  Lungs: Coarse BS bilaterally; no wheezing.   Heart: RRR, no murmurs.   Abdomen: Soft, no tenderness. No masses.   Genital/ Rectal: No salas catheter.   Extremities: Decreased erythema and warmth of RLE.   Neurologic: Confused.         LABORATORY:    CBC Full  -  ( 20 Apr 2022 09:13 )  WBC Count : 7.12 K/uL  RBC Count : 3.43 M/uL  Hemoglobin : 9.7 g/dL  Hematocrit : 32.6 %  Platelet Count - Automated : 508 K/uL  Mean Cell Volume : 95.0 fl  Mean Cell Hemoglobin : 28.3 pg  Mean Cell Hemoglobin Concentration : 29.8 gm/dL  Auto Neutrophil # : 5.02 K/uL  Auto Lymphocyte # : 0.62 K/uL  Auto Monocyte # : 0.67 K/uL  Auto Eosinophil # : 0.23 K/uL  Auto Basophil # : 0.07 K/uL  Auto Neutrophil % : 70.5 %  Auto Lymphocyte % : 8.7 %  Auto Monocyte % : 9.4 %  Auto Eosinophil % : 3.2 %  Auto Basophil % : 1.0 %      ESR:                   04-20 @ 09:13  --    C-Reactive Protein:     04-20 @ 09:13  --    Procalcitonin:           04-20 @ 09:13   0.12      04-20    143  |  107  |  8   ----------------------------<  90  3.8   |  28  |  0.63    Ca    8.2<L>      20 Apr 2022 09:13    TPro  6.2  /  Alb  2.2<L>  /  TBili  0.4  /  DBili  x   /  AST  15  /  ALT  12  /  AlkPhos  54  04-20    Assessment and Plan:    1. RLE cellulitis.  2. Chronic edema of lower extremities.   3. Weakness.     . Continue IV Zosyn and po Doxy  . Monitor the progression of RLE cellulitis.   . Repeat blood cultures. Monitor fever progression.   . Possible any syndrome vs allergy to IV Vanco, Monitor facial erythema.       Sushil Anguiano MD   [phone].

## 2022-04-20 NOTE — DIETITIAN NUTRITION RISK NOTIFICATION - ADDITIONAL COMMENTS/DIETITIAN RECOMMENDATIONS
Advance per SLP to minced/moist, mildly thick liquids  chocolate Ensure Enlive bid  daily MVI, Vit C 500mg bid

## 2022-04-20 NOTE — CONSULT NOTE ADULT - SUBJECTIVE AND OBJECTIVE BOX
[facility]  Gus Sy PA-C  [address] [place] [address]  [phone]      Chief Complaint:  Patient is a 89y old  Female who presents with a chief complaint of AMS (2022 11:27)      HPI:  89 yo Female ,[facility] resident  sent from [facility] for eval of AMS and  her leg wounds on R leg.  She has hx of CVA  with R hemiplegia, dvt anemia, phlebitis, afib, hld, melanoma of skin, gerd depression, diverticulitis, uti, hypothyroid, pt is poor historian, per ems to the triage rn they adv pt may be septic due to wounds on her leg. pt has no complaints .As per [facility] med staff patient was sent to ED for evaluation of AMS ,associated with hypoxia and difficulties swallowing .Also her R leg wounds are worsening and ID /WOUND care consult requested .Septic workup sent ,found to have lactate elevation Admitted for septic workup and evaluation,send blood and urine cx,serial lactate levels,monitor vitals closley,ivfs hydration,monitor urine output and renal profile,iv abx initiated  .[facility] Palliative care consult requested ,to discuss advance directives and complete MOLST     INTERVAL HPI:  Pt s/e  Pt states she has no abdominal pain and hasn't had any abdominal pain overnight  Per charting pt is a poor historian  Denies further GI complaints    Allergies:  No Known Allergies      Medications:  acetaminophen     Tablet .. 650 milliGRAM(s) Oral every 6 hours PRN  aluminum hydroxide/magnesium hydroxide/simethicone Suspension 30 milliLiter(s) Oral every 4 hours PRN  baclofen 10 milliGRAM(s) Oral every 8 hours  cholecalciferol 1000 Unit(s) Oral daily  cyanocobalamin 1000 MICROGram(s) Oral daily  dextrose 5% + sodium chloride 0.45%. 1000 milliLiter(s) IV Continuous <Continuous>  doxycycline hyclate Capsule 100 milliGRAM(s) Oral every 12 hours  escitalopram 20 milliGRAM(s) Oral daily  folic acid 1 milliGRAM(s) Oral daily  furosemide    Tablet 20 milliGRAM(s) Oral daily  gabapentin 300 milliGRAM(s) Oral every 8 hours  hydroxyurea 500 milliGRAM(s) Oral two times a day  lactobacillus acidophilus 1 Tablet(s) Oral two times a day  levothyroxine 25 MICROGram(s) Oral daily  melatonin 3 milliGRAM(s) Oral at bedtime PRN  metoprolol tartrate 25 milliGRAM(s) Oral two times a day  ondansetron Injectable 4 milliGRAM(s) IV Push every 8 hours PRN  pantoprazole   Suspension 40 milliGRAM(s) Oral daily  piperacillin/tazobactam IVPB.. 3.375 Gram(s) IV Intermittent every 8 hours  senna 1 Tablet(s) Oral at bedtime  simvastatin 10 milliGRAM(s) Oral at bedtime  traMADol 50 milliGRAM(s) Oral four times a day PRN      PMHX/PSHX:  Hypertension    CVA (cerebral vascular accident)    Depression    Constipation    Neuropathy    Constipation    Hyperlipidemia    Grade I diastolic dysfunction    Afib    Neuropathy    VHD (valvular heart disease)    Aortic valvar stenosis    No significant past surgical history        Family history:  No pertinent family history in first degree relatives    No pertinent family history in first degree relatives        ROS:   General:  No wt loss, fevers, chills, night sweats, fatigue,   Eyes:  Good vision, no reported pain  ENT:  No sore throat, pain, runny nose, dysphagia  CV:  No pain, palpitations, hypo/hypertension  Resp:  No dyspnea, cough, tachypnea, wheezing  GI:  No pain, No nausea, No vomiting, No diarrhea, No constipation, No weight loss, No fever, No pruritis, No rectal bleeding, No tarry stools, No dysphagia,  :  No pain, bleeding, incontinence, nocturia  Muscle:  No pain, weakness  Neuro:  No weakness, tingling, memory problems  Psych:  No fatigue, insomnia, mood problems, depression  Endocrine:  No polyuria, polydipsia, cold/heat intolerance  Heme:  No petechiae, ecchymosis, easy bruisability  Skin:  No rash, tattoos, scars, edema      PHYSICAL EXAM:   Vital Signs:  Vital Signs Last 24 Hrs  T(C): 36.6 (2022 11:50), Max: 38.8 (2022 19:35)  T(F): 97.9 (2022 11:50), Max: 101.8 (2022 19:35)  HR: 79 (2022 11:50) (79 - 120)  BP: 127/68 (2022 11:50) (110/67 - 143/68)  BP(mean): --  RR: 18 (2022 11:50) (18 - 18)  SpO2: 92% (2022 11:50) (92% - 97%)  Daily     Daily Weight in k.5 (2022 04:50)    GENERAL:  Appears stated age  ABDOMEN:  Soft, non-tender, non-distended  NEURO:  Alert    LABS:                        9.7    7.12  )-----------( 508      ( 2022 09:13 )             32.6     04-20    143  |  107  |  8   ----------------------------<  90  3.8   |  28  |  0.63    Ca    8.2<L>      2022 09:13    TPro  6.2  /  Alb  2.2<L>  /  TBili  0.4  /  DBili  x   /  AST  15  /  ALT  12  /  AlkPhos  54  04-20    LIVER FUNCTIONS - ( 2022 09:13 )  Alb: 2.2 g/dL / Pro: 6.2 g/dL / ALK PHOS: 54 U/L / ALT: 12 U/L / AST: 15 U/L / GGT: x           PT/INR - ( 2022 09:13 )   PT: 65.3 sec;   INR: 5.49 ratio         PTT - ( 2022 13:24 )  PTT:53.1 sec  Urinalysis Basic - ( 2022 12:29 )    Color: Yellow / Appearance: Clear / S.005 / pH: x  Gluc: x / Ketone: Negative  / Bili: Negative / Urobili: Negative   Blood: x / Protein: 15 / Nitrite: Negative   Leuk Esterase: Small / RBC: x / WBC x   Sq Epi: x / Non Sq Epi: x / Bacteria: x      [id number]      Imaging:  < from: CT Abdomen and Pelvis w/ IV Cont (22 @ 18:40) >    ACC: [id number] EXAM:  CT ABDOMEN AND PELVIS IC                          PROCEDURE DATE:  2022          INTERPRETATION:  CLINICAL INFORMATION: Right lower abdominal pain.    COMPARISON: CT abdomen pelvis 2021.    CONTRAST/COMPLICATIONS:  IV Contrast: Omnipaque 350  90 cc administered   10 cc discarded  Oral Contrast: NONE  Complications: None reported at time of study completion    PROCEDURE:  CT of the Abdomen and Pelvis was performed.  Sagittal and coronal reformats were performed.    FINDINGS:  LOWER CHEST: Mucus plugging in the right lower lobe bronchi with near   complete atelectasis of the right lower lobe.    LIVER: Left hepatic lobe cyst.  BILE DUCTS: Normal caliber.  GALLBLADDER: Cholelithiasis.  SPLEEN: Within normal limits.  PANCREAS: A 3.2 cm cystic lesion in the pancreatic head is unchanged from   2016, when remeasured in a similar fashion. No pancreatic ductal   dilatation.  ADRENALS: Within normal limits.  KIDNEYS/URETERS: No hydronephrosis. Bilateral renal hypodensities too   small to characterize.    BLADDER: Within normal limits.  REPRODUCTIVE ORGANS: Small calcified uterine myoma. No adnexal mass.    BOWEL: Moderate hiatal hernia. No bowel obstruction. Appendix is not   visualized. No evidence of inflammation in the pericecal region.   Extensive sigmoid diverticulosis without evidence of acute diverticulitis.  PERITONEUM: No ascites.  VESSELS: Atherosclerotic changes. Infrarenal IVC filter. Focal thrombus   in the infrarenal IVC along the right aspect of the filter apex with   extension above the filter.  RETROPERITONEUM/LYMPH NODES: No lymphadenopathy.  ABDOMINAL WALL: Within normal limits.  BONES: Degenerative changes.    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    Mucus plugging in the right lower lobe bronchi with near complete   atelectasis of the right lower lobe.    Findings were discussed with Dr. Choe 2022 9:31 PM by Dr. Vallejo   with read backconfirmation.    --- End of Report ---            TAL VALLEJO MD; Attending Radiologist  This document has been electronically signed. 2022  9:36PM    < end of copied text >

## 2022-04-20 NOTE — DIETITIAN INITIAL EVALUATION ADULT - OTHER INFO
Conflicting wt hx noted:  December 2021 150#, transfer papers dated 4/13/2021- 1 week ago 154#, admit wt 140#??  10# wt loss x 1 week??

## 2022-04-20 NOTE — PROGRESS NOTE ADULT - SUBJECTIVE AND OBJECTIVE BOX
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 resident  sent from [facility] for eval of AMS and  her leg wounds on R leg.  She has hx of CVA  with R hemiplegia, dvt anemia, phlebitis, afib, hld, melanoma of skin, gerd depression, diverticulitis, uti, hypothyroid, pt is poor historian, per ems to the triage rn they adv pt may be septic due to wounds on her leg. pt has no complaints .As per [facility] med staff patient was sent to ED for evaluation of AMS ,associated with hypoxia and difficulties swallowing .Also her R leg wounds are worsening and ID /WOUND care consult requested .Septic workup sent ,found to have lactate elevation Admitted for septic workup and evaluation,send blood and urine cx,serial lactate levels,monitor vitals closley,ivfs hydration,monitor urine output and renal profile,iv abx initiated  .[facility], later [facility] Palliative care consult requested ,to discuss advance directives and complete MOLST  (19 Apr 2022 17:08)      REVIEW OF SYSTEMS:    CONSTITUTIONAL: No fever, weight loss, or fatigue  EYES: No eye pain, visual disturbances, or discharge  ENMT:  No difficulty hearing, tinnitus, vertigo; No sinus or throat pain  NECK: No pain or stiffness  BREASTS: No pain, masses, or nipple discharge  RESPIRATORY: No cough, wheezing, chills or hemoptysis; No shortness of breath  CARDIOVASCULAR: No chest pain, palpitations, dizziness, or leg swelling  GASTROINTESTINAL: No abdominal or epigastric pain. No nausea, vomiting, or hematemesis; No diarrhea or constipation. No melena or hematochezia.  GENITOURINARY: No dysuria, frequency, hematuria, or incontinence  NEUROLOGICAL: No headaches, memory loss, loss of strength, numbness, or tremors  SKIN: No itching, burning, rashes, or lesions   LYMPH NODES: No enlarged glands  ENDOCRINE: No heat or cold intolerance; No hair loss  MUSCULOSKELETAL: No joint pain or swelling; No muscle, back, or extremity pain  PSYCHIATRIC: No depression, anxiety, mood swings, or difficulty sleeping  HEME/LYMPH: No easy bruising, or bleeding gums  ALLERGY AND IMMUNOLOGIC: No hives or eczema    MEDICATIONS  (STANDING):  baclofen 10 milliGRAM(s) Oral every 8 hours  cholecalciferol 1000 Unit(s) Oral daily  cyanocobalamin 1000 MICROGram(s) Oral daily  dextrose 5% + sodium chloride 0.45%. 1000 milliLiter(s) (75 mL/Hr) IV Continuous <Continuous>  doxycycline hyclate Capsule 100 milliGRAM(s) Oral every 12 hours  escitalopram 20 milliGRAM(s) Oral daily  folic acid 1 milliGRAM(s) Oral daily  furosemide    Tablet 20 milliGRAM(s) Oral daily  gabapentin 300 milliGRAM(s) Oral every 8 hours  hydroxyurea 500 milliGRAM(s) Oral two times a day  lactobacillus acidophilus 1 Tablet(s) Oral two times a day  levothyroxine 25 MICROGram(s) Oral daily  metoprolol tartrate 25 milliGRAM(s) Oral two times a day  pantoprazole   Suspension 40 milliGRAM(s) Oral daily  piperacillin/tazobactam IVPB.. 3.375 Gram(s) IV Intermittent every 8 hours  senna 1 Tablet(s) Oral at bedtime  simvastatin 10 milliGRAM(s) Oral at bedtime    MEDICATIONS  (PRN):  acetaminophen     Tablet .. 650 milliGRAM(s) Oral every 6 hours PRN Temp greater or equal to 38C (100.4F), Mild Pain (1 - 3)  aluminum hydroxide/magnesium hydroxide/simethicone Suspension 30 milliLiter(s) Oral every 4 hours PRN Dyspepsia  melatonin 3 milliGRAM(s) Oral at bedtime PRN Insomnia  ondansetron Injectable 4 milliGRAM(s) IV Push every 8 hours PRN Nausea and/or Vomiting  traMADol 50 milliGRAM(s) Oral four times a day PRN Moderate Pain (4 - 6)      ALLERGIES: No Known Allergies      FAMILY HISTORY:      PHYSICAL EXAMINATION:  -----------------------------  T(C): 36.9 (04-20-22 @ 04:50), Max: 38.8 (04-19-22 @ 19:35)  HR: 94 (04-20-22 @ 04:50) (94 - 120)  BP: 110/67 (04-20-22 @ 04:50) (110/67 - 143/68)  RR: 18 (04-20-22 @ 04:50) (18 - 18)  SpO2: 92% (04-20-22 @ 04:50) (92% - 100%)  Wt(kg): --    Height (cm): 167.6 (04-19 @ 11:40)  Weight (kg): 63.5 (04-19 @ 11:40)  BMI (kg/m2): 22.6 (04-19 @ 11:40)  BSA (m2): 1.72 (04-19 @ 11:40)    VITALS  T(C): 36.9 (04-20-22 @ 04:50), Max: 38.8 (04-19-22 @ 19:35)  HR: 94 (04-20-22 @ 04:50) (94 - 120)  BP: 110/67 (04-20-22 @ 04:50) (110/67 - 143/68)  RR: 18 (04-20-22 @ 04:50) (18 - 18)  SpO2: 92% (04-20-22 @ 04:50) (92% - 100%)    Constitutional: well developed, normal appearance, well groomed, well nourished, no deformities and no acute distress.   Eyes: the conjunctiva exhibited no abnormalities and the eyelids demonstrated no xanthelasmas.   HEENT: normal oral mucosa, no oral pallor and no oral cyanosis.   Neck: normal jugular venous A waves present, normal jugular venous V waves present and no jugular venous castillo A waves.   Pulmonary: no respiratory distress, normal respiratory rhythm and effort, no accessory muscle use and lungs were clear to auscultation bilaterally.   Cardiovascular: heart rate and rhythm were normal, normal S1 and S2 and no murmur, gallop, rub, heave or thrill are present.   Abdomen: soft, non-tender, no hepato-splenomegaly and no abdominal mass palpated.   Musculoskeletal: the gait could not be assessed..   Extremities: no clubbing of the fingernails, no localized cyanosis, no petechial hemorrhages and no ischemic changes.   Skin: normal skin color and pigmentation, no rash, no venous stasis, no skin lesions, no skin ulcer and no xanthoma was observed.   Psychiatric: oriented to person, place, and time, the affect was normal, the mood was normal and not feeling anxious.     LABS:   --------  04-19    140  |  102  |  12  ----------------------------<  115<H>  4.1   |  27  |  0.82    Ca    8.9      19 Apr 2022 13:24    TPro  7.3  /  Alb  2.7<L>  /  TBili  0.3  /  DBili  x   /  AST  19  /  ALT  14  /  AlkPhos  63  04-19                         10.8   9.64  )-----------( 589      ( 19 Apr 2022 13:24 )             36.2     PT/INR - ( 19 Apr 2022 13:24 )   PT: 57.8 sec;   INR: 4.86 ratio         PTT - ( 19 Apr 2022 13:24 )  PTT:53.1 sec            RADIOLOGY:  -----------------    ECG:     ECHO:

## 2022-04-20 NOTE — CONSULT NOTE ADULT - ASSESSMENT
?Abdominal pain    CT noted  Vascular team following for thrombus in IVC  No further intra-abdominal abnormalities on CT  Pt reporting no abdominal pain  No abdominal pain on exam  No GI objection to start diet as per SLP recs  Will follow    I reviewed the overnight course of events on the unit, re-confirming the patient history. I discussed the care with the patient and their family  Differential diagnosis and plan of care discussed with patient after the evaluation  35 minutes spent on total encounter of which more than fifty percent of the encounter was spent counseling and/or coordinating care by the attending physician.  Advanced care planning was discussed with patient and family.  Advanced care planning forms were reviewed and discussed.  Risks, benefits and alternatives of gastroenterologic procedures were discussed in detail and all questions were answered.

## 2022-04-20 NOTE — DIETITIAN INITIAL EVALUATION ADULT - ORAL INTAKE PTA/DIET HISTORY
Per transfer papers, pt was on regular diet with thin liquids PTA.  Pt states she refused to eat "mushed up food".  States hasn't been eating (currently NPO).

## 2022-04-20 NOTE — PROGRESS NOTE ADULT - SUBJECTIVE AND OBJECTIVE BOX
REJI BERGER    PLV 2NOR [address] W[address]    Allergies    No Known Allergies    Intolerances        PAST MEDICAL & SURGICAL HISTORY:  Hypertension    CVA (cerebral vascular accident)    Depression    Constipation    Neuropathy    Hyperlipidemia    Grade I diastolic dysfunction    Afib    Neuropathy    VHD (valvular heart disease)    Aortic valvar stenosis    No significant past surgical history        FAMILY HISTORY:      Home Medications:  acetaminophen 500 mg oral tablet: 2 tab(s) orally once a day (at bedtime) (19 Apr 2022 14:19)  acetaminophen 500 mg oral tablet: 2 tab(s) orally every 12 hours, As Needed (19 Apr 2022 14:19)  baclofen 10 mg oral tablet: 1 tab(s) orally every 8 hours (19 Apr 2022 14:19)  Biofreeze 4% topical gel: Apply topically to R shoulder &amp; L knee topically 2 times a day (19 Apr 2022 14:19)  Coumadin 1 mg oral tablet: 1 tab(s) orally once a day (at bedtime) (19 Apr 2022 14:19)  cyanocobalamin 1000 mcg oral tablet: 1 tab(s) orally once a day (19 Apr 2022 14:19)  docusate sodium 100 mg oral capsule: 2 cap(s) orally once a day (19 Apr 2022 14:19)  enalapril 2.5 mg oral tablet: 1 tab(s) orally once a day (19 Apr 2022 14:19)  escitalopram 20 mg oral tablet: 1 tab(s) orally once a day (19 Apr 2022 14:19)  famotidine 20 mg oral tablet: 1 tab(s) orally once a day (19 Apr 2022 14:19)  famotidine 20 mg oral tablet: 1 tab(s) orally once a day (19 Apr 2022 14:19)  folic acid 1 mg oral tablet: 1 tab(s) orally once a day (19 Apr 2022 14:19)  furosemide 20 mg oral tablet: 2 tab(s) orally once a day (19 Apr 2022 14:19)  gabapentin 100 mg oral capsule: 3 cap(s) orally 3 times a day (19 Apr 2022 14:19)  hydroxyurea 500 mg oral capsule: 1 cap(s) orally 2 times a day (19 Apr 2022 14:19)  levothyroxine 25 mcg (0.025 mg) oral tablet: 1 tab(s) orally once a day (19 Apr 2022 14:19)  Metoprolol Tartrate 25 mg oral tablet: 1 tab(s) orally 2 times a day (19 Apr 2022 14:19)  Senna 8.6 mg oral tablet: 1 tab(s) orally once a day (at bedtime) (19 Apr 2022 14:19)  simvastatin 10 mg oral tablet: 1 tab(s) orally once a day (at bedtime) (19 Apr 2022 14:19)  traMADol 50 mg oral tablet: 1 tab(s) orally 2 times a day (19 Apr 2022 14:19)  Vitamin D3: 1 tab(s) orally once a day (19 Apr 2022 14:19)      MEDICATIONS  (STANDING):  baclofen 10 milliGRAM(s) Oral every 8 hours  cholecalciferol 1000 Unit(s) Oral daily  cyanocobalamin 1000 MICROGram(s) Oral daily  dextrose 5% + sodium chloride 0.45%. 1000 milliLiter(s) (75 mL/Hr) IV Continuous <Continuous>  doxycycline hyclate Capsule 100 milliGRAM(s) Oral every 12 hours  escitalopram 20 milliGRAM(s) Oral daily  folic acid 1 milliGRAM(s) Oral daily  furosemide    Tablet 20 milliGRAM(s) Oral daily  gabapentin 300 milliGRAM(s) Oral every 8 hours  hydroxyurea 500 milliGRAM(s) Oral two times a day  lactobacillus acidophilus 1 Tablet(s) Oral two times a day  levothyroxine 25 MICROGram(s) Oral daily  metoprolol tartrate 25 milliGRAM(s) Oral two times a day  pantoprazole   Suspension 40 milliGRAM(s) Oral daily  piperacillin/tazobactam IVPB.. 3.375 Gram(s) IV Intermittent every 8 hours  senna 1 Tablet(s) Oral at bedtime  simvastatin 10 milliGRAM(s) Oral at bedtime    MEDICATIONS  (PRN):  acetaminophen     Tablet .. 650 milliGRAM(s) Oral every 6 hours PRN Temp greater or equal to 38C (100.4F), Mild Pain (1 - 3)  aluminum hydroxide/magnesium hydroxide/simethicone Suspension 30 milliLiter(s) Oral every 4 hours PRN Dyspepsia  melatonin 3 milliGRAM(s) Oral at bedtime PRN Insomnia  ondansetron Injectable 4 milliGRAM(s) IV Push every 8 hours PRN Nausea and/or Vomiting  traMADol 50 milliGRAM(s) Oral four times a day PRN Moderate Pain (4 - 6)      Diet, NPO:   Except Medications (04-19-22 @ 15:25) [Active]          Vital Signs Last 24 Hrs  T(C): 36.9 (20 Apr 2022 04:50), Max: 38.8 (19 Apr 2022 19:35)  T(F): 98.5 (20 Apr 2022 04:50), Max: 101.8 (19 Apr 2022 19:35)  HR: 94 (20 Apr 2022 04:50) (94 - 120)  BP: 110/67 (20 Apr 2022 04:50) (110/67 - 143/68)  BP(mean): --  RR: 18 (20 Apr 2022 04:50) (18 - 18)  SpO2: 92% (20 Apr 2022 04:50) (92% - 100%)              LABS:                        10.8   9.64  )-----------( 589      ( 19 Apr 2022 13:24 )             36.2     04-19    140  |  102  |  12  ----------------------------<  115<H>  4.1   |  27  |  0.82    Ca    8.9      19 Apr 2022 13:24    TPro  7.3  /  Alb  2.7<L>  /  TBili  0.3  /  DBili  x   /  AST  19  /  ALT  14  /  AlkPhos  63  04-19    PT/INR - ( 19 Apr 2022 13:24 )   PT: 57.8 sec;   INR: 4.86 ratio         PTT - ( 19 Apr 2022 13:24 )  PTT:53.1 sec      ABG - ( 19 Apr 2022 13:38 )  pH, Arterial: 7.45  pH, Blood: x     /  pCO2: 40    /  pO2: 68    / HCO3: 28    / Base Excess: 3.8   /  SaO2: 95.6                WBC:  WBC Count: 9.64 K/uL (04-19 @ 13:24)      MICROBIOLOGY:  RECENT CULTURES:              PT/INR - ( 19 Apr 2022 13:24 )   PT: 57.8 sec;   INR: 4.86 ratio         PTT - ( 19 Apr 2022 13:24 )  PTT:53.1 sec    Sodium:  Sodium, Serum: 140 mmol/L (04-19 @ 13:24)      0.82 mg/dL 04-19 @ 13:24      Hemoglobin:  Hemoglobin: 10.8 g/dL (04-19 @ 13:24)      Platelets: Platelet Count - Automated: 589 K/uL (04-19 @ 13:24)      LIVER FUNCTIONS - ( 19 Apr 2022 13:24 )  Alb: 2.7 g/dL / Pro: 7.3 g/dL / ALK PHOS: 63 U/L / ALT: 14 U/L / AST: 19 U/L / GGT: x                 RADIOLOGY & ADDITIONAL STUDIES:      MICROBIOLOGY:  RECENT CULTURES:

## 2022-04-20 NOTE — CONSULT NOTE ADULT - SUBJECTIVE AND OBJECTIVE BOX
Altered in mental status,   For Altered in mental status and lethargy, suspect most likely this is metabolic encephalopathy, which is multifactorial secondary to underlying infection type process along with thyroid dysfunction pain medication and oxygen issues   For history of sepsis, antibiotics as needed.  monitor oxygen as needed  adjust thyroid medications as needed   tramadol made prn by medical team  spoke to son

## 2022-04-20 NOTE — PROGRESS NOTE ADULT - ASSESSMENT
Patient is a 89 yo Female ,[facility] resident  sent from [facility] living for eval of AMS and  her leg wounds on R leg.  She has hx of CVA  with R hemiplegia, dvt anemia, phlebitis, afib, hld, melanoma of skin, gerd depression, diverticulitis, uti, hypothyroid, pt is poor historian, per ems to the triage rn they adv pt may be septic due to wounds on her leg. pt has no complaints .As per [facility] med staff patient was sent to ED for evaluation of AMS ,associated with hypoxia and difficulties swallowing .Also her R leg wounds are worsening and ID /WOUND care consult requested .Septic workup sent found to have lactate elevation Admitted for septic workup and evaluation,send blood and urine cx,serial lactate levels,monitor vitals closley,ivfs hydration,monitor urine output and renal profile,iv abx initiated  .[facility] Palliative care consult requested ,to discuss advance directives and complete MOLST

## 2022-04-20 NOTE — CONSULT NOTE ADULT - CONSULT REASON
Evaluation for RLE cellulitis.
.
Wound check
cardiac evaluation
?Abdominal pain
IVC filter clot
seps

## 2022-04-20 NOTE — SWALLOW BEDSIDE ASSESSMENT ADULT - COMMENTS
Consult received and chart reviewed. Patient seen at bedside this AM for initial assessment of swallow function, at which time she was alert, cooperative, and denied pain. Consult received and chart reviewed. Patient seen at bedside this AM for initial assessment of swallow function, at which time she was alert, cooperative, and denied pain. Patient demonstrates ability to follow low level commands, with prompting. Vocal quality and speech production WFL.    Per charting, the patient is a "89 yo Female ,[facility]  sent from [facility] for eval of AMS and  her leg wounds on R leg.  She has hx of CVA  with R hemiplegia, dvt anemia, phlebitis, afib, hld, melanoma of skin, gerd depression, diverticulitis, uti, hypothyroid, pt is poor historian, per ems to the triage rn they adv pt may be septic due to wounds on her leg. pt has no complaints .As per [facility] staff patient was sent to ED for evaluation of AMS ,associated with hypoxia and difficulties swallowing .Also her R leg wounds are worsening and ID /WOUND care consult requested .Septic workup sent ,found to have lactate elevation Admitted for septic workup and evaluation,send blood and urine cx,serial lactate levels,monitor vitals closley,ivfs hydration,monitor urine output and renal profile,iv abx initiated  .[facility] Palliative care consult requested ,to discuss advance directives and complete MOLST"     WBC WFL.   CXR 4/19/22, results pending.  CT Head 4/19/22 revealed "Moderate chronic microvascular changes without evidence of an acute transcortical infarction or hemorrhage."    Patient familiar to this department from previous admission and was seen on 12/31/21, at which time a soft & bite-sized and thin liquid diet level was recommended (see report for details).     Discussed results and recommendations with the patient, RN, and call out to MD.

## 2022-04-20 NOTE — CONSULT NOTE ADULT - CONSULT REQUESTED DATE/TIME
19-Apr-2022
19-Apr-2022 13:44
20-Apr-2022 11:27
19-Apr-2022
19-Apr-2022 22:46
20-Apr-2022 12:40
19-Apr-2022 17:31

## 2022-04-20 NOTE — ADVANCED PRACTICE NURSE CONSULT - ASSESSMENT
Patient is under the care of podiatry     HPI:  Patient is a 89 yo Female ,[facility] resident  sent from [facility] for eval of AMS and  her leg wounds on R leg.  She has hx of CVA  with R hemiplegia, dvt anemia, phlebitis, afib, hld, melanoma of skin, gerd depression, diverticulitis, uti, hypothyroid, pt is poor historian,    PAST MEDICAL & SURGICAL HISTORY:  Hypertension    CVA (cerebral vascular accident)    Depression    Constipation    Neuropathy    Hyperlipidemia    Grade I diastolic dysfunction    Afib    Neuropathy    VHD (valvular heart disease)    Aortic valvar stenosis    No significant past surgical history          Allergies    No Known Allergies    Intolerances        SOCIAL HISTORY:  / single/ ; (+)HHA; Denies smoking, ETOH, drugs    FAMILY HISTORY:      Vital Signs Last 24 Hrs  T(C): 36.6 (20 Apr 2022 11:50), Max: 38.8 (19 Apr 2022 19:35)  T(F): 97.9 (20 Apr 2022 11:50), Max: 101.8 (19 Apr 2022 19:35)  HR: 79 (20 Apr 2022 11:50) (79 - 120)  BP: 127/68 (20 Apr 2022 11:50) (110/67 - 143/68)  BP(mean): --  RR: 18 (20 Apr 2022 11:50) (18 - 18)  SpO2: 92% (20 Apr 2022 11:50) (92% - 97%)    NAD / gaurded but stable,  A&Ox3/ Alert  cachectic/ MO/ Obese  within defined normal limits  Total Care Sport/ Versa Care P500 bed                            9.7    7.12  )-----------( 508      ( 20 Apr 2022 09:13 )             32.6     04-20    143  |  107  |  8   ----------------------------<  90  3.8   |  28  |  0.63    Ca    8.2<L>      20 Apr 2022 09:13    TPro  6.2  /  Alb  2.2<L>  /  TBili  0.4  /  DBili  x   /  AST  15  /  AL

## 2022-04-20 NOTE — DIETITIAN INITIAL EVALUATION ADULT - SIGNS/SYMPTOMS
evidenced by swallow eval results evidenced by mild- mod muscle wasting, fat loss, ?wt loss x 1 week

## 2022-04-20 NOTE — GOALS OF CARE CONVERSATION - ADVANCED CARE PLANNING - CONVERSATION DETAILS
Writer spoke with pt son. Reviewed patient's medical and social history as well as events leading to patient's hospitalization. Writer discussed patient's current diagnosis cellulitis with leg wounds, afib, HTN, advanced age, impaired mental status, debility,  medical condition and management,  prognosis, and life expectancy. Inquired about patient's wishes regarding extent of medical care to be provided including escalation of medical care into the ICU , intubation with vent  and use of vasopressor support. In addition, the writer inquired about thoughts regarding cardiopulmonary resuscitation, artificial nutrition and hydration including use of feeding tubes and IVF, antibiotics, and further investigative studies such as blood draws and radiology. son  showed some  insight into medical condition. All questions answered. Writer recommended completion of MOLST. Pt son refused stating he did want resuscitation attempted. Educated him regarding the process of CRP and possible outcomes given pt age and condition. He states he will reconsider and contacy writer tomorrow.

## 2022-04-20 NOTE — DIETITIAN INITIAL EVALUATION ADULT - NSFNSNUTRCHEWSWALLOWFT_GEN_A_CORE
Pt denies having difficulty.  Swallow eval done today --> minced/moist with mildly thick liquids recommended, no straw. Small sips/bites, crush meds.

## 2022-04-21 LAB
-  AMIKACIN: SIGNIFICANT CHANGE UP
-  AMOXICILLIN/CLAVULANIC ACID: SIGNIFICANT CHANGE UP
-  AMOXICILLIN/CLAVULANIC ACID: SIGNIFICANT CHANGE UP
-  AMPICILLIN/SULBACTAM: SIGNIFICANT CHANGE UP
-  AMPICILLIN/SULBACTAM: SIGNIFICANT CHANGE UP
-  AMPICILLIN: SIGNIFICANT CHANGE UP
-  AMPICILLIN: SIGNIFICANT CHANGE UP
-  AZTREONAM: SIGNIFICANT CHANGE UP
-  CEFAZOLIN: SIGNIFICANT CHANGE UP
-  CEFAZOLIN: SIGNIFICANT CHANGE UP
-  CEFEPIME: SIGNIFICANT CHANGE UP
-  CEFOXITIN: SIGNIFICANT CHANGE UP
-  CEFTRIAXONE: SIGNIFICANT CHANGE UP
-  CEFTRIAXONE: SIGNIFICANT CHANGE UP
-  CIPROFLOXACIN: SIGNIFICANT CHANGE UP
-  CIPROFLOXACIN: SIGNIFICANT CHANGE UP
-  CLINDAMYCIN: SIGNIFICANT CHANGE UP
-  ERTAPENEM: SIGNIFICANT CHANGE UP
-  ERYTHROMYCIN: SIGNIFICANT CHANGE UP
-  GENTAMICIN: SIGNIFICANT CHANGE UP
-  IMIPENEM: SIGNIFICANT CHANGE UP
-  LEVOFLOXACIN: SIGNIFICANT CHANGE UP
-  LEVOFLOXACIN: SIGNIFICANT CHANGE UP
-  MEROPENEM: SIGNIFICANT CHANGE UP
-  MEROPENEM: SIGNIFICANT CHANGE UP
-  OXACILLIN: SIGNIFICANT CHANGE UP
-  PENICILLIN: SIGNIFICANT CHANGE UP
-  PIPERACILLIN/TAZOBACTAM: SIGNIFICANT CHANGE UP
-  RIFAMPIN: SIGNIFICANT CHANGE UP
-  TOBRAMYCIN: SIGNIFICANT CHANGE UP
-  TRIMETHOPRIM/SULFAMETHOXAZOLE: SIGNIFICANT CHANGE UP
-  TRIMETHOPRIM/SULFAMETHOXAZOLE: SIGNIFICANT CHANGE UP
-  VANCOMYCIN: SIGNIFICANT CHANGE UP
ANION GAP SERPL CALC-SCNC: 5 MMOL/L — SIGNIFICANT CHANGE UP (ref 5–17)
BUN SERPL-MCNC: 6 MG/DL — LOW (ref 7–23)
CALCIUM SERPL-MCNC: 8.4 MG/DL — LOW (ref 8.5–10.1)
CHLORIDE SERPL-SCNC: 109 MMOL/L — HIGH (ref 96–108)
CO2 SERPL-SCNC: 30 MMOL/L — SIGNIFICANT CHANGE UP (ref 22–31)
CREAT SERPL-MCNC: 0.69 MG/DL — SIGNIFICANT CHANGE UP (ref 0.5–1.3)
CULTURE RESULTS: SIGNIFICANT CHANGE UP
CULTURE RESULTS: SIGNIFICANT CHANGE UP
EGFR: 82 ML/MIN/1.73M2 — SIGNIFICANT CHANGE UP
FOLATE SERPL-MCNC: >20 NG/ML — SIGNIFICANT CHANGE UP
GLUCOSE SERPL-MCNC: 128 MG/DL — HIGH (ref 70–99)
GRAM STN FLD: SIGNIFICANT CHANGE UP
HCT VFR BLD CALC: 32.8 % — LOW (ref 34.5–45)
HGB BLD-MCNC: 9.7 G/DL — LOW (ref 11.5–15.5)
INR BLD: 4.61 RATIO — HIGH (ref 0.88–1.16)
MCHC RBC-ENTMCNC: 28.3 PG — SIGNIFICANT CHANGE UP (ref 27–34)
MCHC RBC-ENTMCNC: 29.6 GM/DL — LOW (ref 32–36)
MCV RBC AUTO: 95.6 FL — SIGNIFICANT CHANGE UP (ref 80–100)
METHOD TYPE: SIGNIFICANT CHANGE UP
METHOD TYPE: SIGNIFICANT CHANGE UP
NRBC # BLD: 0 /100 WBCS — SIGNIFICANT CHANGE UP (ref 0–0)
ORGANISM # SPEC MICROSCOPIC CNT: SIGNIFICANT CHANGE UP
PLATELET # BLD AUTO: 630 K/UL — HIGH (ref 150–400)
POTASSIUM SERPL-MCNC: 4.1 MMOL/L — SIGNIFICANT CHANGE UP (ref 3.5–5.3)
POTASSIUM SERPL-SCNC: 4.1 MMOL/L — SIGNIFICANT CHANGE UP (ref 3.5–5.3)
PROTHROM AB SERPL-ACNC: 54.8 SEC — HIGH (ref 10.5–13.4)
RBC # BLD: 3.43 M/UL — LOW (ref 3.8–5.2)
RBC # FLD: 20.6 % — HIGH (ref 10.3–14.5)
SODIUM SERPL-SCNC: 144 MMOL/L — SIGNIFICANT CHANGE UP (ref 135–145)
SPECIMEN SOURCE: SIGNIFICANT CHANGE UP
SPECIMEN SOURCE: SIGNIFICANT CHANGE UP
VIT B12 SERPL-MCNC: 1738 PG/ML — HIGH (ref 232–1245)
WBC # BLD: 6.91 K/UL — SIGNIFICANT CHANGE UP (ref 3.8–10.5)
WBC # FLD AUTO: 6.91 K/UL — SIGNIFICANT CHANGE UP (ref 3.8–10.5)

## 2022-04-21 PROCEDURE — 99231 SBSQ HOSP IP/OBS SF/LOW 25: CPT | Mod: GC

## 2022-04-21 RX ADMIN — Medication 1 TABLET(S): at 05:34

## 2022-04-21 RX ADMIN — HYDROXYUREA 500 MILLIGRAM(S): 500 CAPSULE ORAL at 17:40

## 2022-04-21 RX ADMIN — Medication 50 MICROGRAM(S): at 05:32

## 2022-04-21 RX ADMIN — Medication 1 MILLIGRAM(S): at 11:33

## 2022-04-21 RX ADMIN — HYDROXYUREA 500 MILLIGRAM(S): 500 CAPSULE ORAL at 18:49

## 2022-04-21 RX ADMIN — Medication 1000 UNIT(S): at 11:33

## 2022-04-21 RX ADMIN — Medication 25 MILLIGRAM(S): at 05:34

## 2022-04-21 RX ADMIN — PANTOPRAZOLE SODIUM 40 MILLIGRAM(S): 20 TABLET, DELAYED RELEASE ORAL at 11:34

## 2022-04-21 RX ADMIN — Medication 100 MILLIGRAM(S): at 05:33

## 2022-04-21 RX ADMIN — PIPERACILLIN AND TAZOBACTAM 25 GRAM(S): 4; .5 INJECTION, POWDER, LYOPHILIZED, FOR SOLUTION INTRAVENOUS at 13:52

## 2022-04-21 RX ADMIN — Medication 200 MILLIGRAM(S): at 13:55

## 2022-04-21 RX ADMIN — PIPERACILLIN AND TAZOBACTAM 25 GRAM(S): 4; .5 INJECTION, POWDER, LYOPHILIZED, FOR SOLUTION INTRAVENOUS at 05:33

## 2022-04-21 RX ADMIN — Medication 20 MILLIGRAM(S): at 05:32

## 2022-04-21 RX ADMIN — Medication 1 TABLET(S): at 17:40

## 2022-04-21 RX ADMIN — Medication 10 MILLIGRAM(S): at 13:51

## 2022-04-21 RX ADMIN — Medication 25 MILLIGRAM(S): at 17:40

## 2022-04-21 RX ADMIN — Medication 10 MILLIGRAM(S): at 21:08

## 2022-04-21 RX ADMIN — Medication 100 MILLIGRAM(S): at 17:40

## 2022-04-21 RX ADMIN — Medication 10 MILLIGRAM(S): at 05:32

## 2022-04-21 RX ADMIN — GABAPENTIN 300 MILLIGRAM(S): 400 CAPSULE ORAL at 21:08

## 2022-04-21 RX ADMIN — GABAPENTIN 300 MILLIGRAM(S): 400 CAPSULE ORAL at 05:32

## 2022-04-21 RX ADMIN — SODIUM CHLORIDE 50 MILLILITER(S): 9 INJECTION, SOLUTION INTRAVENOUS at 11:34

## 2022-04-21 RX ADMIN — PIPERACILLIN AND TAZOBACTAM 25 GRAM(S): 4; .5 INJECTION, POWDER, LYOPHILIZED, FOR SOLUTION INTRAVENOUS at 21:10

## 2022-04-21 RX ADMIN — PREGABALIN 1000 MICROGRAM(S): 225 CAPSULE ORAL at 11:33

## 2022-04-21 RX ADMIN — TRAMADOL HYDROCHLORIDE 50 MILLIGRAM(S): 50 TABLET ORAL at 21:08

## 2022-04-21 RX ADMIN — Medication 3 MILLIGRAM(S): at 21:08

## 2022-04-21 RX ADMIN — Medication 200 MILLIGRAM(S): at 21:09

## 2022-04-21 RX ADMIN — TRAMADOL HYDROCHLORIDE 50 MILLIGRAM(S): 50 TABLET ORAL at 22:07

## 2022-04-21 RX ADMIN — GABAPENTIN 300 MILLIGRAM(S): 400 CAPSULE ORAL at 13:49

## 2022-04-21 RX ADMIN — ESCITALOPRAM OXALATE 20 MILLIGRAM(S): 10 TABLET, FILM COATED ORAL at 11:33

## 2022-04-21 RX ADMIN — SIMVASTATIN 10 MILLIGRAM(S): 20 TABLET, FILM COATED ORAL at 21:07

## 2022-04-21 RX ADMIN — SENNA PLUS 1 TABLET(S): 8.6 TABLET ORAL at 21:07

## 2022-04-21 NOTE — SWALLOW BEDSIDE ASSESSMENT ADULT - SWALLOW EVAL: RECOMMENDED FEEDING/EATING TECHNIQUES
allow for swallow between intakes/check mouth frequently for oral residue/pocketing/crush medication (when feasible)/maintain upright posture during/after eating for 30 mins/no straws/oral hygiene/position upright (90 degrees)/small sips/bites
allow for swallow between intakes/alternate food with liquid/check mouth frequently for oral residue/pocketing/crush medication (when feasible)/maintain upright posture during/after eating for 30 mins/no straws/oral hygiene/position upright (90 degrees)/small sips/bites

## 2022-04-21 NOTE — SWALLOW BEDSIDE ASSESSMENT ADULT - DIET PRIOR TO ADMI
Patient familiar to this department from previous admission and was seen on 12/31/21, at which time a soft & bite-sized and thin liquid diet level was recommended (see report for details).

## 2022-04-21 NOTE — PHYSICAL THERAPY INITIAL EVALUATION ADULT - PERTINENT HX OF CURRENT PROBLEM, REHAB EVAL
Pt is a 89 yo f whose pmd is dr Valentine, sent from [facility] for eval of her leg wounds on r leg.  she has hx of cva with r hemiplegia, dvt anemia, phlebitis, afib, hld, melanoma of skin, Gerd depression, diverticulitis, uti, hypothyroid.

## 2022-04-21 NOTE — SWALLOW BEDSIDE ASSESSMENT ADULT - SWALLOW EVAL: PROGNOSIS
Good for rx'd diet
DIAGNOSIS CONTINUED: suggestive of airway penetration/aspiration. 5. Recommend minced & moist and mildly thick liquids, with aspiration precautions, as tolerated. Facilitate upright position, slow pacing, single/small bites/sips, and alternate solids with liquids. Continue to monitor and notify SLP if changes occur. PROGNOSIS: fair; continue to monitor closely

## 2022-04-21 NOTE — PROGRESS NOTE ADULT - SUBJECTIVE AND OBJECTIVE BOX
89y year old Female seen at [facility] 2NOR 229 W1 for b/l LE wound with cellulitis. Pt complaining of pain to RLE, slight improvement. Pt denies any fever, chills, nausea, vomiting, chest pain, shortness of breath, or calf pain at this time.    Allergies    No Known Allergies    Intolerances    MEDICATIONS  (STANDING):  baclofen 10 milliGRAM(s) Oral every 8 hours  cholecalciferol 1000 Unit(s) Oral daily  cyanocobalamin 1000 MICROGram(s) Oral daily  dextrose 5% + sodium chloride 0.45%. 1000 milliLiter(s) (50 mL/Hr) IV Continuous <Continuous>  doxycycline hyclate Capsule 100 milliGRAM(s) Oral every 12 hours  escitalopram 20 milliGRAM(s) Oral daily  folic acid 1 milliGRAM(s) Oral daily  furosemide    Tablet 20 milliGRAM(s) Oral daily  gabapentin 300 milliGRAM(s) Oral every 8 hours  hydroxyurea 500 milliGRAM(s) Oral two times a day  lactobacillus acidophilus 1 Tablet(s) Oral two times a day  levothyroxine 50 MICROGram(s) Oral daily  metoprolol tartrate 25 milliGRAM(s) Oral two times a day  pantoprazole   Suspension 40 milliGRAM(s) Oral daily  piperacillin/tazobactam IVPB.. 3.375 Gram(s) IV Intermittent every 8 hours  senna 1 Tablet(s) Oral at bedtime  simvastatin 10 milliGRAM(s) Oral at bedtime    MEDICATIONS  (PRN):  acetaminophen     Tablet .. 650 milliGRAM(s) Oral every 6 hours PRN Temp greater or equal to 38C (100.4F), Mild Pain (1 - 3)  aluminum hydroxide/magnesium hydroxide/simethicone Suspension 30 milliLiter(s) Oral every 4 hours PRN Dyspepsia  guaiFENesin Oral Liquid (Sugar-Free) 200 milliGRAM(s) Oral every 6 hours PRN Cough  melatonin 3 milliGRAM(s) Oral at bedtime PRN Insomnia  ondansetron Injectable 4 milliGRAM(s) IV Push every 8 hours PRN Nausea and/or Vomiting  traMADol 50 milliGRAM(s) Oral four times a day PRN Moderate Pain (4 - 6)    ICU Vital Signs Last 24 Hrs  T(C): 37.2 (21 Apr 2022 11:29), Max: 38.3 (20 Apr 2022 18:01)  T(F): 99 (21 Apr 2022 11:29), Max: 100.9 (20 Apr 2022 18:01)  HR: 80 (21 Apr 2022 11:29) (80 - 112)  BP: 130/72 (21 Apr 2022 11:29) (124/50 - 145/70)  RR: 18 (21 Apr 2022 11:29) (18 - 18)  SpO2: 93% (21 Apr 2022 11:29) (91% - 99%)    CBC Full  -  ( 21 Apr 2022 07:36 )  WBC Count : 6.91 K/uL  RBC Count : 3.43 M/uL  Hemoglobin : 9.7 g/dL  Hematocrit : 32.8 %  Platelet Count - Automated : 630 K/uL  Mean Cell Volume : 95.6 fl  Mean Cell Hemoglobin : 28.3 pg  Mean Cell Hemoglobin Concentration : 29.6 gm/dL      PHYSICAL EXAM:  Vascular: DP palpable bilaterally & PT non-palpable bilaterally, Capillary refill 4 seconds, Digital hair absent bilaterally  Neurological: Light touch sensation intact bilaterally  Musculoskeletal: 5/5 strength in all quadrants bilaterally, AJ & STJ ROM intact  Dermatological: RLE 3 x 3 x 0.1 cm; 1 x 1 x 0.1 cm; 3 x 3 x 0.2 cm ulceration noted to the Right anterior leg (x2) and posterior leg, respectively , fibrotic wound bed with erythema and increase warmth to the RLE. No probe to bone, no drainage, no fluctuance, no malodor.  LLE 5 x 4 x 0.2 cm; ulceration noted to the Left anterior leg, fibrotic and overlying scab wound bed with mild erythema distally. No probe to bone, no drainage, no fluctuance, no malodor.     CBC Full  -  ( 20 Apr 2022 09:13 )  WBC Count : 7.12 K/uL  RBC Count : 3.43 M/uL  Hemoglobin : 9.7 g/dL  Hematocrit : 32.6 %  Platelet Count - Automated : 508 K/uL  Mean Cell Volume : 95.0 fl  Mean Cell Hemoglobin : 28.3 pg  Mean Cell Hemoglobin Concentration : 29.8 gm/dL  Auto Neutrophil # : 5.02 K/uL  Auto Lymphocyte # : 0.62 K/uL  Auto Monocyte # : 0.67 K/uL  Auto Eosinophil # : 0.23 K/uL  Auto Basophil # : 0.07 K/uL  Auto Neutrophil % : 70.5 %  Auto Lymphocyte % : 8.7 %  Auto Monocyte % : 9.4 %  Auto Eosinophil % : 3.2 %  Auto Basophil % : 1.0 %      ----------CHEM PANEL----------    PT/INR - ( 20 Apr 2022 09:13 )   PT: 65.3 sec;   INR: 5.49 ratio         PTT - ( 19 Apr 2022 13:24 )  PTT:53.1 sec        ACC: [id number] EXAM: US DPLX LWR EXT VEINS COMPL BI    PROCEDURE DATE: 04/19/2022        INTERPRETATION: CLINICAL INFORMATION: Leg pain with wounds. Evaluate for DVT.    COMPARISON: Venous duplex ultrasound 3/30/2022.    TECHNIQUE: Duplex sonography of the BILATERAL LOWER extremity veins with color and spectral Doppler, with and without compression.    FINDINGS:    RIGHT:    Normal compressibility of the right common femoral and popliteal veins. 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 One of the superficial femoral vein branches (posterior branch) demonstrates partial compressibility and thickening suggestive of chronic venous changes. No acute DVT is identified. Right posterior tibial vein is patent. Remaining calf veins are poorly visualized.    LEFT:    Normal compressibility of the LEFT common femoral, femoral and popliteal veins. Doppler examination shows normal spontaneous and phasic flow. Calf veins are poorly visualized.    IMPRESSION:      - . 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 Limited calf vein evaluation as above.    Please note that venous duplex ultrasound is only moderately sensitive for evaluation of calf vein thrombosis. If there are persistent symptoms and concern for central propagation of calf vein thrombus which may be unseen on this study, consider correlation with repeat venous duplex ultrasound in 7-10 days.    --- End of Report ---

## 2022-04-21 NOTE — PROGRESS NOTE ADULT - ASSESSMENT
Patient is a 89 yo Female ,[facility] resident  sent from [facility] living for eval of AMS and  her leg wounds on R leg.  She has hx of CVA  with R hemiplegia, dvt anemia, phlebitis, afib, hld, melanoma of skin, gerd depression, diverticulitis, uti, hypothyroid, pt is poor historian, per ems to the triage rn they adv pt may be septic due to wounds on her leg. pt has no complaints .As per [facility] med staff patient was sent to ED for evaluation of AMS ,associated with hypoxia and difficulties swallowing .Also her R leg wounds are worsening and ID /WOUND care consult requested .Septic workup sent found to have lactate elevation Admitted for septic workup and evaluation,send blood and urine cx,serial lactate levels,monitor vitals closley,ivfs hydration,monitor urine output and renal profile,iv abx initiated  .[facility] Palliative care consult requested ,to discuss advance directives and complete MOLST

## 2022-04-21 NOTE — PROGRESS NOTE ADULT - SUBJECTIVE AND OBJECTIVE BOX
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  She has hx of CVA  with R hemiplegia, dvt anemia, phlebitis, afib, hld, melanoma of skin, gerd depression, diverticulitis, uti, hypothyroid, pt is poor historian, per ems to the triage rn they adv pt may be septic due to wounds on her leg. pt has no complaints .As per [facility] med staff patient was sent to ED for evaluation of AMS ,associated with hypoxia and difficulties swallowing .Also her R leg wounds are worsening and ID /WOUND care consult requested .Septic workup sent ,found to have lactate elevation Admitted for septic workup and evaluation,send blood and urine cx,serial lactate levels,monitor vitals closley,ivfs hydration,monitor urine output and renal profile,iv abx initiated  .[facility] Palliative care consult requested ,to discuss advance directives and complete MOLST  (19 Apr 2022 17:08)      REVIEW OF SYSTEMS:    CONSTITUTIONAL: No fever, weight loss, or fatigue  EYES: No eye pain, visual disturbances, or discharge  ENMT:  No difficulty hearing, tinnitus, vertigo; No sinus or throat pain  NECK: No pain or stiffness  BREASTS: No pain, masses, or nipple discharge  RESPIRATORY: No cough, wheezing, chills or hemoptysis; No shortness of breath  CARDIOVASCULAR: No chest pain, palpitations, dizziness, or leg swelling  GASTROINTESTINAL: No abdominal or epigastric pain. No nausea, vomiting, or hematemesis; No diarrhea or constipation. No melena or hematochezia.  GENITOURINARY: No dysuria, frequency, hematuria, or incontinence  NEUROLOGICAL: No headaches, memory loss, loss of strength, numbness, or tremors  SKIN: No itching, burning, rashes, or lesions   LYMPH NODES: No enlarged glands  ENDOCRINE: No heat or cold intolerance; No hair loss  MUSCULOSKELETAL: No joint pain or swelling; No muscle, back, or extremity pain  PSYCHIATRIC: No depression, anxiety, mood swings, or difficulty sleeping  HEME/LYMPH: No easy bruising, or bleeding gums  ALLERGY AND IMMUNOLOGIC: No hives or eczema    MEDICATIONS  (STANDING):  baclofen 10 milliGRAM(s) Oral every 8 hours  cholecalciferol 1000 Unit(s) Oral daily  cyanocobalamin 1000 MICROGram(s) Oral daily  dextrose 5% + sodium chloride 0.45%. 1000 milliLiter(s) (50 mL/Hr) IV Continuous <Continuous>  doxycycline hyclate Capsule 100 milliGRAM(s) Oral every 12 hours  escitalopram 20 milliGRAM(s) Oral daily  folic acid 1 milliGRAM(s) Oral daily  furosemide    Tablet 20 milliGRAM(s) Oral daily  gabapentin 300 milliGRAM(s) Oral every 8 hours  hydroxyurea 500 milliGRAM(s) Oral two times a day  lactobacillus acidophilus 1 Tablet(s) Oral two times a day  levothyroxine 50 MICROGram(s) Oral daily  metoprolol tartrate 25 milliGRAM(s) Oral two times a day  pantoprazole   Suspension 40 milliGRAM(s) Oral daily  piperacillin/tazobactam IVPB.. 3.375 Gram(s) IV Intermittent every 8 hours  senna 1 Tablet(s) Oral at bedtime  simvastatin 10 milliGRAM(s) Oral at bedtime    MEDICATIONS  (PRN):  acetaminophen     Tablet .. 650 milliGRAM(s) Oral every 6 hours PRN Temp greater or equal to 38C (100.4F), Mild Pain (1 - 3)  aluminum hydroxide/magnesium hydroxide/simethicone Suspension 30 milliLiter(s) Oral every 4 hours PRN Dyspepsia  guaifenesin/dextromethorphan Oral Liquid 10 milliLiter(s) Oral every 6 hours PRN Cough  melatonin 3 milliGRAM(s) Oral at bedtime PRN Insomnia  ondansetron Injectable 4 milliGRAM(s) IV Push every 8 hours PRN Nausea and/or Vomiting  traMADol 50 milliGRAM(s) Oral four times a day PRN Moderate Pain (4 - 6)      ALLERGIES: No Known Allergies      FAMILY HISTORY:      PHYSICAL EXAMINATION:  -----------------------------  T(C): 37.2 (04-21-22 @ 05:22), Max: 38.3 (04-20-22 @ 18:01)  HR: 85 (04-21-22 @ 05:22) (79 - 112)  BP: 145/70 (04-21-22 @ 05:22) (124/50 - 145/70)  RR: 18 (04-21-22 @ 05:22) (18 - 18)  SpO2: 99% (04-21-22 @ 05:22) (91% - 99%)  Wt(kg): --    04-20 @ 07:01  -  04-21 @ 07:00  --------------------------------------------------------  IN:    dextrose 5% + sodium chloride 0.45%: 600 mL  Total IN: 600 mL    OUT:    Voided (mL): 1300 mL  Total OUT: 1300 mL    Total NET: -700 mL            VITALS  T(C): 37.2 (04-21-22 @ 05:22), Max: 38.3 (04-20-22 @ 18:01)  HR: 85 (04-21-22 @ 05:22) (79 - 112)  BP: 145/70 (04-21-22 @ 05:22) (124/50 - 145/70)  RR: 18 (04-21-22 @ 05:22) (18 - 18)  SpO2: 99% (04-21-22 @ 05:22) (91% - 99%)    Constitutional: well developed, normal appearance, well groomed, well nourished, no deformities and no acute distress.   Eyes: the conjunctiva exhibited no abnormalities and the eyelids demonstrated no xanthelasmas.   HEENT: normal oral mucosa, no oral pallor and no oral cyanosis.   Neck: normal jugular venous A waves present, normal jugular venous V waves present and no jugular venous castillo A waves.   Pulmonary: no respiratory distress, normal respiratory rhythm and effort, no accessory muscle use and lungs were clear to auscultation bilaterally.   Cardiovascular: heart rate and rhythm were normal, normal S1 and S2 and no murmur, gallop, rub, heave or thrill are present.   Abdomen: soft, non-tender, no hepato-splenomegaly and no abdominal mass palpated.   Musculoskeletal: the gait could not be assessed..   Extremities: no clubbing of the fingernails, no localized cyanosis, no petechial hemorrhages and no ischemic changes.   Skin: normal skin color and pigmentation, no rash, no venous stasis, no skin lesions, no skin ulcer and no xanthoma was observed.   Psychiatric: oriented to person, place, and time, the affect was normal, the mood was normal and not feeling anxious.     LABS:   --------  04-21    144  |  109<H>  |  6<L>  ----------------------------<  128<H>  4.1   |  30  |  0.69    Ca    8.4<L>      21 Apr 2022 07:36    TPro  6.2  /  Alb  2.2<L>  /  TBili  0.4  /  DBili  x   /  AST  15  /  ALT  12  /  AlkPhos  54  04-20                         9.7    6.91  )-----------( 630      ( 21 Apr 2022 07:36 )             32.8     PT/INR - ( 21 Apr 2022 07:36 )   PT: 54.8 sec;   INR: 4.61 ratio         PTT - ( 19 Apr 2022 13:24 )  PTT:53.1 sec        Culture Results:   Growth in aerobic bottle: Gram Positive Cocci in Clusters (04-19 @ 18:07)  Culture Results:   Growth in aerobic bottle: Gram Positive Cocci in Clusters  ***Blood Panel PCR results on this specimen are available  approximately 3 hours after the Gram stain result.***  Gram stain, PCR, and/or culture results may not always  correspond due to difference in methodologies.  ************************************************************  This PCR assay was performed by multiplex PCR. This  Assay tests for 66 bacterial and resistance gene targets.  Please refer to the [facility] test directory  at [email or web address] for details. (04-19 @ 18:07)      RADIOLOGY:  -----------------    ECG:     ECHO: [id number]

## 2022-04-21 NOTE — PROGRESS NOTE ADULT - SUBJECTIVE AND OBJECTIVE BOX
REJI BERGER    [facility] [address] W1    Allergies    No Known Allergies    Intolerances        PAST MEDICAL & SURGICAL HISTORY:  Hypertension    CVA (cerebral vascular accident)    Depression    Constipation    Neuropathy    Hyperlipidemia    Grade I diastolic dysfunction    Afib    Neuropathy    VHD (valvular heart disease)    Aortic valvar stenosis    No significant past surgical history        FAMILY HISTORY:      Home Medications:  acetaminophen 500 mg oral tablet: 2 tab(s) orally once a day (at bedtime) (2022 14:19)  acetaminophen 500 mg oral tablet: 2 tab(s) orally every 12 hours, As Needed (2022 14:19)  baclofen 10 mg oral tablet: 1 tab(s) orally every 8 hours (2022 14:19)  Biofreeze 4% topical gel: Apply topically to R shoulder &amp; L knee topically 2 times a day (2022 14:19)  Coumadin 1 mg oral tablet: 1 tab(s) orally once a day (at bedtime) (2022 14:19)  cyanocobalamin 1000 mcg oral tablet: 1 tab(s) orally once a day (2022 14:19)  docusate sodium 100 mg oral capsule: 2 cap(s) orally once a day (2022 14:19)  enalapril 2.5 mg oral tablet: 1 tab(s) orally once a day (2022 14:19)  escitalopram 20 mg oral tablet: 1 tab(s) orally once a day (2022 14:19)  famotidine 20 mg oral tablet: 1 tab(s) orally once a day (2022 14:19)  famotidine 20 mg oral tablet: 1 tab(s) orally once a day (2022 14:19)  folic acid 1 mg oral tablet: 1 tab(s) orally once a day (2022 14:19)  furosemide 20 mg oral tablet: 2 tab(s) orally once a day (2022 14:19)  gabapentin 100 mg oral capsule: 3 cap(s) orally 3 times a day (2022 14:19)  hydroxyurea 500 mg oral capsule: 1 cap(s) orally 2 times a day (2022 14:19)  levothyroxine 25 mcg (0.025 mg) oral tablet: 1 tab(s) orally once a day (2022 14:19)  Metoprolol Tartrate 25 mg oral tablet: 1 tab(s) orally 2 times a day (2022 14:19)  Senna 8.6 mg oral tablet: 1 tab(s) orally once a day (at bedtime) (2022 14:19)  simvastatin 10 mg oral tablet: 1 tab(s) orally once a day (at bedtime) (2022 14:19)  traMADol 50 mg oral tablet: 1 tab(s) orally 2 times a day (2022 14:19)  Vitamin D3: 1 tab(s) orally once a day (2022 14:19)      MEDICATIONS  (STANDING):  baclofen 10 milliGRAM(s) Oral every 8 hours  cholecalciferol 1000 Unit(s) Oral daily  cyanocobalamin 1000 MICROGram(s) Oral daily  dextrose 5% + sodium chloride 0.45%. 1000 milliLiter(s) (50 mL/Hr) IV Continuous <Continuous>  doxycycline hyclate Capsule 100 milliGRAM(s) Oral every 12 hours  escitalopram 20 milliGRAM(s) Oral daily  folic acid 1 milliGRAM(s) Oral daily  furosemide    Tablet 20 milliGRAM(s) Oral daily  gabapentin 300 milliGRAM(s) Oral every 8 hours  hydroxyurea 500 milliGRAM(s) Oral two times a day  lactobacillus acidophilus 1 Tablet(s) Oral two times a day  levothyroxine 50 MICROGram(s) Oral daily  metoprolol tartrate 25 milliGRAM(s) Oral two times a day  pantoprazole   Suspension 40 milliGRAM(s) Oral daily  piperacillin/tazobactam IVPB.. 3.375 Gram(s) IV Intermittent every 8 hours  senna 1 Tablet(s) Oral at bedtime  simvastatin 10 milliGRAM(s) Oral at bedtime    MEDICATIONS  (PRN):  acetaminophen     Tablet .. 650 milliGRAM(s) Oral every 6 hours PRN Temp greater or equal to 38C (100.4F), Mild Pain (1 - 3)  aluminum hydroxide/magnesium hydroxide/simethicone Suspension 30 milliLiter(s) Oral every 4 hours PRN Dyspepsia  guaifenesin/dextromethorphan Oral Liquid 10 milliLiter(s) Oral every 6 hours PRN Cough  melatonin 3 milliGRAM(s) Oral at bedtime PRN Insomnia  ondansetron Injectable 4 milliGRAM(s) IV Push every 8 hours PRN Nausea and/or Vomiting  traMADol 50 milliGRAM(s) Oral four times a day PRN Moderate Pain (4 - 6)              Vital Signs Last 24 Hrs  T(C): 37.2 (2022 05:22), Max: 38.3 (2022 18:01)  T(F): 99 (2022 05:22), Max: 100.9 (2022 18:01)  HR: 85 (:) (79 - 112)  BP: 145/70 (:22) (124/50 - 145/70)  BP(mean): --  RR: 18 (:) (18 - 18)  SpO2: 99% (:) (91% - 99%)      22 @ 07:01  -  22 @ 07:00  --------------------------------------------------------  IN: 600 mL / OUT: 1300 mL / NET: -700 mL              LABS:                        9.7    6.91  )-----------( 630      ( 2022 07:36 )             32.8     04    144  |  109<H>  |  6<L>  ----------------------------<  128<H>  4.1   |  30  |  0.69    Ca    8.4<L>      2022 07:36    TPro  6.2  /  Alb  2.2<L>  /  TBili  0.4  /  DBili  x   /  AST  15  /  ALT  12  /  AlkPhos  54  04-20    PT/INR - ( 2022 07:36 )   PT: 54.8 sec;   INR: 4.61 ratio         PTT - ( 2022 13:24 )  PTT:53.1 sec  Urinalysis Basic - ( 2022 12:29 )    Color: Yellow / Appearance: Clear / S.005 / pH: x  Gluc: x / Ketone: Negative  / Bili: Negative / Urobili: Negative   Blood: x / Protein: 15 / Nitrite: Negative   Leuk Esterase: Small / RBC: x / WBC 3-5   Sq Epi: x / Non Sq Epi: Few / Bacteria: Few        ABG - ( 2022 13:38 )  pH, Arterial: 7.45  pH, Blood: x     /  pCO2: 40    /  pO2: 68    / HCO3: 28    / Base Excess: 3.8   /  SaO2: 95.6                WBC:  WBC Count: 6.91 K/uL ( @ 07:36)  WBC Count: 7.12 K/uL ( @ 09:13)  WBC Count: 9.64 K/uL ( @ 13:24)      MICROBIOLOGY:  RECENT CULTURES:   .Blood Blood-Peripheral Blood Culture PCR   Growth in aerobic bottle: Gram Positive Cocci in Clusters   Growth in aerobic bottle: Gram Positive Cocci in Clusters                PT/INR - ( 2022 07:36 )   PT: 54.8 sec;   INR: 4.61 ratio         PTT - ( 2022 13:24 )  PTT:53.1 sec    Sodium:  Sodium, Serum: 144 mmol/L ( @ 07:36)  Sodium, Serum: 143 mmol/L ( @ 09:13)  Sodium, Serum: 140 mmol/L ( @ 13:24)      0.69 mg/dL  @ 07:36  0.63 mg/dL  @ 09:13  0.82 mg/dL  @ 13:24      Hemoglobin:  Hemoglobin: 9.7 g/dL ( @ 07:36)  Hemoglobin: 9.7 g/dL ( @ 09:13)  Hemoglobin: 10.8 g/dL ( @ 13:24)      Platelets: Platelet Count - Automated: 630 K/uL ( @ 07:36)  Platelet Count - Automated: 508 K/uL ( @ 09:13)  Platelet Count - Automated: 589 K/uL ( @ 13:24)      LIVER FUNCTIONS - ( 2022 09:13 )  Alb: 2.2 g/dL / Pro: 6.2 g/dL / ALK PHOS: 54 U/L / ALT: 12 U/L / AST: 15 U/L / GGT: x             Urinalysis Basic - ( 2022 12:29 )    Color: Yellow / Appearance: Clear / S.005 / pH: x  Gluc: x / Ketone: Negative  / Bili: Negative / Urobili: Negative   Blood: x / Protein: 15 / Nitrite: Negative   Leuk Esterase: Small / RBC: x / WBC 3-5   Sq Epi: x / Non Sq Epi: Few / Bacteria: Few        RADIOLOGY & ADDITIONAL STUDIES:      MICROBIOLOGY:  RECENT CULTURES:   .Blood Blood-Peripheral Blood Culture PCR   Growth in aerobic bottle: Gram Positive Cocci in Clusters   Growth in aerobic bottle: Gram Positive Cocci in Clusters

## 2022-04-21 NOTE — SWALLOW BEDSIDE ASSESSMENT ADULT - SWALLOW EVAL: RECOMMENDED DIET
minced & moist and mildly thick liquids, aspiration precautions, as tolerated
Soft & bite sized with Mildly thick liquids, as tolerated

## 2022-04-21 NOTE — SWALLOW BEDSIDE ASSESSMENT ADULT - SLP GENERAL OBSERVATIONS
Pt received upright on bedside recliner, A&A Ox2, reduced cognition, reduced speech intelligiblity, +O2NC, +congested baseline cough, pain scale 0/10 pre & post eval

## 2022-04-21 NOTE — SWALLOW BEDSIDE ASSESSMENT ADULT - SWALLOW EVAL: DIAGNOSIS
Mild-moderate oral dysphagia with regular solids marked by prolonged mastication time, suspect posterior loss across administered consistencies.  Suspect pharyngeal dysphagia marked by reduced laryngeal elevation across palpation, +wet vocal quality and increased WOB with thin liquids.  No overt s/s aspiration with all food consistencies or mildly thick liquids.  Rx MBS for objective view of pharyngeal stage, to assess candidacy for diet advancement, due to current presentation and chest imaging.  Pt educated on current presentation Rx and rationale for MBS, pt refused MBS stating "I don't have trouble swallowing".  Discussed with Dr. Estephania MD to discuss completion of MBS to assess candidacy for diet advancement.
1. Patient demonstrates a mild oral dysphagia for pureed, moderately thick, and mildly thick liquids marked by prolonged manipulation resulting in delayed collection, transfer, and transport. 2. Patient demonstrates a mild-moderate oral dysphagia for soft & bite-sized and thin liquids marked by prolonged mastication/manipulation resulting in delayed collection, transfer, and transport. Trace stasis observed post swallow with solids, which reduced with liquid wash. Suspected posterior loss at BOT noted with thin liquids. 3. Patient demonstrates a mild pharyngeal dysphagia for pureed, soft & bite-sized, moderately thick, and mildly thick liquids marked by suspected delayed pharyngeal swallow trigger and hyolaryngeal elevation noted by digital palpation without evidence of airway penetration/aspiration. 4. Patient demonstrates a moderate-severe pharyngeal dysphagia for thin liquids marked by suspected delayed pharyngeal swallow trigger and a change in vocal quality to a "wet" tone

## 2022-04-21 NOTE — PROGRESS NOTE ADULT - SUBJECTIVE AND OBJECTIVE BOX
[facility]  Gus Sy PA-C  [address] [place] [address]  [phone]      INTERVAL HPI/OVERNIGHT EVENTS:  Pt s/e  Pt is a poor historian but reports no GI complaints  No reported GI events    MEDICATIONS  (STANDING):  baclofen 10 milliGRAM(s) Oral every 8 hours  cholecalciferol 1000 Unit(s) Oral daily  cyanocobalamin 1000 MICROGram(s) Oral daily  dextrose 5% + sodium chloride 0.45%. 1000 milliLiter(s) (50 mL/Hr) IV Continuous <Continuous>  doxycycline hyclate Capsule 100 milliGRAM(s) Oral every 12 hours  escitalopram 20 milliGRAM(s) Oral daily  folic acid 1 milliGRAM(s) Oral daily  furosemide    Tablet 20 milliGRAM(s) Oral daily  gabapentin 300 milliGRAM(s) Oral every 8 hours  hydroxyurea 500 milliGRAM(s) Oral two times a day  lactobacillus acidophilus 1 Tablet(s) Oral two times a day  levothyroxine 50 MICROGram(s) Oral daily  metoprolol tartrate 25 milliGRAM(s) Oral two times a day  pantoprazole   Suspension 40 milliGRAM(s) Oral daily  piperacillin/tazobactam IVPB.. 3.375 Gram(s) IV Intermittent every 8 hours  senna 1 Tablet(s) Oral at bedtime  simvastatin 10 milliGRAM(s) Oral at bedtime    MEDICATIONS  (PRN):  acetaminophen     Tablet .. 650 milliGRAM(s) Oral every 6 hours PRN Temp greater or equal to 38C (100.4F), Mild Pain (1 - 3)  aluminum hydroxide/magnesium hydroxide/simethicone Suspension 30 milliLiter(s) Oral every 4 hours PRN Dyspepsia  guaifenesin/dextromethorphan Oral Liquid 10 milliLiter(s) Oral every 6 hours PRN Cough  melatonin 3 milliGRAM(s) Oral at bedtime PRN Insomnia  ondansetron Injectable 4 milliGRAM(s) IV Push every 8 hours PRN Nausea and/or Vomiting  traMADol 50 milliGRAM(s) Oral four times a day PRN Moderate Pain (4 - 6)      Allergies  No Known Allergies      PHYSICAL EXAM:   Vital Signs:  Vital Signs Last 24 Hrs  T(C): 37.2 (2022 05:22), Max: 38.3 (2022 18:01)  T(F): 99 (2022 05:22), Max: 100.9 (2022 18:01)  HR: 85 (2022 05:22) (79 - 112)  BP: 145/70 (2022 05:22) (124/50 - 145/70)  BP(mean): --  RR: 18 (2022 05:22) (18 - 18)  SpO2: 99% (2022 05:22) (91% - 99%)  Daily     Daily     GENERAL:  Appears stated age  ABDOMEN:  Soft, non-tender, non-distended  NEURO:  Alert      LABS:                        9.7    6.91  )-----------( 630      ( 2022 07:36 )             32.8     04-21    144  |  109<H>  |  6<L>  ----------------------------<  128<H>  4.1   |  30  |  0.69    Ca    8.4<L>      2022 07:36    TPro  6.2  /  Alb  2.2<L>  /  TBili  0.4  /  DBili  x   /  AST  15  /  ALT  12  /  AlkPhos  54  04-20    PT/INR - ( 2022 07:36 )   PT: 54.8 sec;   INR: 4.61 ratio         PTT - ( 2022 13:24 )  PTT:53.1 sec  Urinalysis Basic - ( 2022 12:29 )    Color: Yellow / Appearance: Clear / S.005 / pH: x  Gluc: x / Ketone: Negative  / Bili: Negative / Urobili: Negative   Blood: x / Protein: 15 / Nitrite: Negative   Leuk Esterase: Small / RBC: x / WBC 3-5   Sq Epi: x / Non Sq Epi: Few / Bacteria: Few

## 2022-04-21 NOTE — SWALLOW BEDSIDE ASSESSMENT ADULT - COMMENTS
Chart reviewed order received for repeat swallow eval.  Pt received upright on bedside recliner, A&A Ox2, reduced cognition, reduced speech intelligiblity, +O2NC, +congested baseline cough, pain scale 0/10 pre & post eval.  Swallow eval completed see below for details.  Pt educated on current presentation & rx's, pt stated "I don't have difficulty swallowing" & refused MBS despite education for rationale.  Pt left as received NAD RN Margaret (covering for [facility]) & Dr. Best notified of rx's.  Discussed with MD pt declining MBS, MD to discuss MBS with pt.  Will follow.    Per charting, the patient is a "89 yo Female ,[facility] resident  sent from [facility] for eval of AMS and  her leg wounds on R leg.  She has hx of CVA  with R hemiplegia, dvt anemia, phlebitis, afib, hld, melanoma of skin, gerd depression, diverticulitis, uti, hypothyroid, pt is poor historian, per ems to the triage rn they adv pt may be septic due to wounds on her leg. pt has no complaints .As per [facility] med staff patient was sent to ED for evaluation of AMS ,associated with hypoxia and difficulties swallowing .Also her R leg wounds are worsening and ID /WOUND care consult requested .Septic workup sent ,found to have lactate elevation Admitted for septic workup and evaluation,send blood and urine cx,serial lactate levels,monitor vitals closley,ivfs hydration,monitor urine output and renal profile,iv abx initiated  .[facility] Palliative care consult requested ,to discuss advance directives and complete MOLST"     WBC 4/21/22: "6.91"     CXR 4/19/22: "Patient's chin obscures the apices. Shallow inspiration crowds the chest.  Heart magnified by technique. Minimal blunting left base laterally. Chest is similar to December 29, 2021. IMPRESSION: No change from prior."    CT Head 4/19/22 revealed "Moderate chronic microvascular changes without evidence of an acute transcortical infarction or hemorrhage."

## 2022-04-21 NOTE — PROGRESS NOTE ADULT - SUBJECTIVE AND OBJECTIVE BOX
Neurology follow up note    REJI [id number]      Interval History:    Patient feels ok no new complaints.    Allergies    No Known Allergies    Intolerances        MEDICATIONS    acetaminophen     Tablet .. 650 milliGRAM(s) Oral every 6 hours PRN  aluminum hydroxide/magnesium hydroxide/simethicone Suspension 30 milliLiter(s) Oral every 4 hours PRN  baclofen 10 milliGRAM(s) Oral every 8 hours  cholecalciferol 1000 Unit(s) Oral daily  cyanocobalamin 1000 MICROGram(s) Oral daily  dextrose 5% + sodium chloride 0.45%. 1000 milliLiter(s) IV Continuous <Continuous>  doxycycline hyclate Capsule 100 milliGRAM(s) Oral every 12 hours  escitalopram 20 milliGRAM(s) Oral daily  folic acid 1 milliGRAM(s) Oral daily  furosemide    Tablet 20 milliGRAM(s) Oral daily  gabapentin 300 milliGRAM(s) Oral every 8 hours  guaifenesin/dextromethorphan Oral Liquid 10 milliLiter(s) Oral every 6 hours PRN  hydroxyurea 500 milliGRAM(s) Oral two times a day  lactobacillus acidophilus 1 Tablet(s) Oral two times a day  levothyroxine 50 MICROGram(s) Oral daily  melatonin 3 milliGRAM(s) Oral at bedtime PRN  metoprolol tartrate 25 milliGRAM(s) Oral two times a day  ondansetron Injectable 4 milliGRAM(s) IV Push every 8 hours PRN  pantoprazole   Suspension 40 milliGRAM(s) Oral daily  piperacillin/tazobactam IVPB.. 3.375 Gram(s) IV Intermittent every 8 hours  senna 1 Tablet(s) Oral at bedtime  simvastatin 10 milliGRAM(s) Oral at bedtime  traMADol 50 milliGRAM(s) Oral four times a day PRN              Vital Signs Last 24 Hrs  T(C): 37.2 (2022 05:22), Max: 38.3 (2022 18:01)  T(F): 99 (2022 05:22), Max: 100.9 (2022 18:01)  HR: 85 (2022 05:22) (79 - 112)  BP: 145/70 (2022 05:22) (124/50 - 145/70)  BP(mean): --  RR: 18 (2022 05:22) (18 - 18)  SpO2: 99% (2022 05:22) (91% - 99%)    REVIEW OF SYSTEMS:  Constitutionally, the patient denies fever, chills, or night sweats.  Head:  No headaches.  Eyes:  No double vision or blurry vision.  Ears:  No ringing in the ears.  Neck:  No neck pain.  Cardiovascular:  No chest pain.  Respiratory:  No shortness of breath.  Abdomen:  No nausea, vomiting, or abdominal pain.  Extremities/Neurological:  Positive pain in both lower extremities which is not new.  Musculoskeletal:  Occasional joint pain.    PHYSICAL EXAMINATION:   HEENT:  Head:  Normocephalic, atraumatic.  Eyes:  No scleral icterus.  Ears:  Hearing bilaterally intact.  NECK:  Supple.  CARDIOVASCULAR:  S1 and S2 heard.  RESPIRATORY:  Good air entry bilaterally.  ABDOMEN:  Soft and nontender.  EXTREMITIES:  No clubbing or cyanosis noted.      NEUROLOGIC:  The patient was awake and alert.  [facility], 2022,  She was able to name simple objects, was able to follow simple commands.  Extraocular movements were intact.  Speech was fluent.  Smile symmetric.  Motor:  Right upper 0/5.  Right lower, slight movement at the hip and knee.  Left upper 4/5, left lower 3+/5.  Sensory:  Bilateral upper and lower intact to light touch.  Bilateral lower extremities, positive dressings were on both of her legs.              LABS:  CBC Full  -  ( 2022 07:36 )  WBC Count : 6.91 K/uL  RBC Count : 3.43 M/uL  Hemoglobin : 9.7 g/dL  Hematocrit : 32.8 %  Platelet Count - Automated : 630 K/uL  Mean Cell Volume : 95.6 fl  Mean Cell Hemoglobin : 28.3 pg  Mean Cell Hemoglobin Concentration : 29.6 gm/dL  Auto Neutrophil # : x  Auto Lymphocyte # : x  Auto Monocyte # : x  Auto Eosinophil # : x  Auto Basophil # : x  Auto Neutrophil % : x  Auto Lymphocyte % : x  Auto Monocyte % : x  Auto Eosinophil % : x  Auto Basophil % : x    Urinalysis Basic - ( 2022 12:29 )    Color: Yellow / Appearance: Clear / S.005 / pH: x  Gluc: x / Ketone: Negative  / Bili: Negative / Urobili: Negative   Blood: x / Protein: 15 / Nitrite: Negative   Leuk Esterase: Small / RBC: x / WBC 3-5   Sq Epi: x / Non Sq Epi: Few / Bacteria: Few      -    144  |  109<H>  |  6<L>  ----------------------------<  128<H>  4.1   |  30  |  0.69    Ca    8.4<L>      2022 07:36    TPro  6.2  /  Alb  2.2<L>  /  TBili  0.4  /  DBili  x   /  AST  15  /  ALT  12  /  AlkPhos  54  04-20    Hemoglobin A1C:     LIVER FUNCTIONS - ( 2022 09:13 )  Alb: 2.2 g/dL / Pro: 6.2 g/dL / ALK PHOS: 54 U/L / ALT: 12 U/L / AST: 15 U/L / GGT: x           Vitamin B12   PT/INR - ( 2022 07:36 )   PT: 54.8 sec;   INR: 4.61 ratio         PTT - ( 2022 13:24 )  PTT:53.1 sec      RADIOLOGY    ANALYSIS AND PLAN:  This is a 89-year-old with altered mental status and history of cerebrovascular accident.  For episode of altered mental status, I suspect most likely metabolic encephalopathy along with possibly underlying mild cognitive impairment.  Metabolic encephalopathy is most likely multifactorial secondary to thyroid dysfunction, possibly underlying septic issues with the patient having temperatures and also respiratory issues with the patient having low oxygen saturation, suspect less likely this is a primary central nervous system event.  I would recommend sepsis workup, antibiotics as needed.  Pulmonary followup, monitor oxygen saturation.  We will recommend to rule out other cause of metabolic encephalopathy.  We will check vitamin 12, folate, and ammonia levels.  For history of cerebrovascular accident with underlying atrial fibrillation, anticoagulation with a goal INR between 2 and 3.  For hypothyroidism, adjustment of the patient's Synthroid as needed.  The patient's pain medications have been adjusted.  The patient will be still on the gabapentin 900 mg a day, baclofen 30 mg a day, but appears that the patient's tramadol has been made p.r.n.  We will see how the patient does mentally if she is more awake with the p.r.n. dose of tramadol and see if she continues to take that.  For hypertension, monitor systolic blood pressure.  For hyperlipidemia, continue the patient on statin.    I spoke with son, Curtis, at [phone].  He does understand while in the hospital setting, he may become more disoriented.    Greater than 40 minutes of time was spent with the patient, planning care, reviewing data, speaking to multidisciplinary healthcare team with greater than 50% of that time in counseling and care coordination.

## 2022-04-21 NOTE — PROGRESS NOTE ADULT - SUBJECTIVE AND OBJECTIVE BOX
PROGRESS NOTE  Patient is a 89y old  Female who presents with a chief complaint of AMS (2022 18:51)    Chart and available morning labs /imaging are reviewed electronically , urgent issues addressed . More information  is being added upon completion of rounds , when more information is collected and management discussed with consultants , medical staff and social service/case management on the floor   OVERNIGHT  No new issues reported by medical staff . All above noted Patient is resting in a bed comfortably .Confused ,poor mentation .No distress noted   PAIN IS WELL CONTROLLED  HPI:  Patient is a 89 yo Female ,[facility] resident  sent from [facility] for eval of AMS and  her leg wounds on R leg.  She has hx of CVA  with R hemiplegia, dvt anemia, phlebitis, afib, hld, melanoma of skin, gerd depression, diverticulitis, uti, hypothyroid, pt is poor historian, per ems to the triage rn they adv pt may be septic due to wounds on her leg. pt has no complaints .As per [facility] med staff patient was sent to ED for evaluation of AMS ,associated with hypoxia and difficulties swallowing .Also her R leg wounds are worsening and ID /WOUND care consult requested .Septic workup sent ,found to have lactate elevation Admitted for septic workup and evaluation,send blood and urine cx,serial lactate levels,monitor vitals closley,ivfs hydration,monitor urine output and renal profile,iv abx initiated  .[facility] Palliative care consult requested ,to discuss advance directives and complete MOLST  (2022 17:08)    PAST MEDICAL & SURGICAL HISTORY:  Hypertension    CVA (cerebral vascular accident)    Depression    Constipation    Neuropathy    Hyperlipidemia    Grade I diastolic dysfunction    Afib    Neuropathy    VHD (valvular heart disease)    Aortic valvar stenosis    No significant past surgical history        MEDICATIONS  (STANDING):  baclofen 10 milliGRAM(s) Oral every 8 hours  cholecalciferol 1000 Unit(s) Oral daily  cyanocobalamin 1000 MICROGram(s) Oral daily  doxycycline hyclate Capsule 100 milliGRAM(s) Oral every 12 hours  escitalopram 20 milliGRAM(s) Oral daily  folic acid 1 milliGRAM(s) Oral daily  furosemide    Tablet 20 milliGRAM(s) Oral daily  gabapentin 300 milliGRAM(s) Oral every 8 hours  hydroxyurea 500 milliGRAM(s) Oral two times a day  lactobacillus acidophilus 1 Tablet(s) Oral two times a day  levothyroxine 50 MICROGram(s) Oral daily  MediHoney Gel Wound Dressing 1 Application(s) 1 Application(s) Topical every 3 days  metoprolol tartrate 25 milliGRAM(s) Oral two times a day  pantoprazole   Suspension 40 milliGRAM(s) Oral daily  piperacillin/tazobactam IVPB.. 3.375 Gram(s) IV Intermittent every 8 hours  senna 1 Tablet(s) Oral at bedtime  simvastatin 10 milliGRAM(s) Oral at bedtime    MEDICATIONS  (PRN):  acetaminophen     Tablet .. 650 milliGRAM(s) Oral every 6 hours PRN Temp greater or equal to 38C (100.4F), Mild Pain (1 - 3)  aluminum hydroxide/magnesium hydroxide/simethicone Suspension 30 milliLiter(s) Oral every 4 hours PRN Dyspepsia  guaiFENesin Oral Liquid (Sugar-Free) 200 milliGRAM(s) Oral every 6 hours PRN Cough  melatonin 3 milliGRAM(s) Oral at bedtime PRN Insomnia  ondansetron Injectable 4 milliGRAM(s) IV Push every 8 hours PRN Nausea and/or Vomiting  traMADol 50 milliGRAM(s) Oral four times a day PRN Moderate Pain (4 - 6)      OBJECTIVE    T(C): 37.2 (22 @ 11:29), Max: 37.9 (22 @ 21:35)  HR: 101 (22 @ 17:37) (80 - 101)  BP: 132/69 (22 @ 17:37) (124/50 - 145/70)  RR: 18 (22 @ 17:37) (18 - 18)  SpO2: 92% (22 @ 17:37) (92% - 99%)  Wt(kg): --  I&O's Summary    2022 07:01  -  2022 07:00  --------------------------------------------------------  IN: 600 mL / OUT: 1300 mL / NET: -700 mL          REVIEW OF SYSTEMS:  CONSTITUTIONAL: No fever, weight loss, or fatigue  EYES: No eye pain, visual disturbances, or discharge  ENMT:   No sinus or throat pain  NECK: No pain or stiffness  RESPIRATORY: No cough, wheezing, chills or hemoptysis; No shortness of breath  CARDIOVASCULAR: No chest pain, palpitations, dizziness, or leg swelling  GASTROINTESTINAL: No abdominal pain. No nausea, vomiting; No diarrhea or constipation. No melena or hematochezia.  GENITOURINARY: No dysuria, frequency, hematuria, or incontinence  NEUROLOGICAL: No headaches, memory loss, loss of strength, numbness, or tremors  SKIN: No itching, burning, rashes, or lesions   MUSCULOSKELETAL: No joint pain or swelling; No muscle, back, or extremity pain    PHYSICAL EXAM:  Appearance: NAD. VS past 24 hrs -as above   HEENT:   Moist oral mucosa. Conjunctiva clear b/l.   Neck : supple  Respiratory: Lungs CTAB.  Gastrointestinal:  Soft, nontender. No rebound. No rigidity. BS present	  Cardiovascular: RRR ,S1S2 present  Neurologic: Non-focal. Moving all extremities.  Extremities: No edema. No erythema. No calf tenderness.  Skin: No rashes, No ecchymoses, No cyanosis.	  wounds ,skin lesions-See skin assesment flow sheet   LABS:                        9.7    6.91  )-----------( 630      ( 2022 07:36 )             32.8     04-21    144  |  109<H>  |  6<L>  ----------------------------<  128<H>  4.1   |  30  |  0.69    Ca    8.4<L>      2022 07:36    TPro  6.2  /  Alb  2.2<L>  /  TBili  0.4  /  DBili  x   /  AST  15  /  ALT  12  /  AlkPhos  54  04-20    CAPILLARY BLOOD GLUCOSE        PT/INR - ( 2022 07:36 )   PT: 54.8 sec;   INR: 4.61 ratio           Urinalysis Basic - ( 2022 12:29 )    Color: Yellow / Appearance: Clear / S.005 / pH: x  Gluc: x / Ketone: Negative  / Bili: Negative / Urobili: Negative   Blood: x / Protein: 15 / Nitrite: Negative   Leuk Esterase: Small / RBC: x / WBC 3-5   Sq Epi: x / Non Sq Epi: Few / Bacteria: Few        Culture - Other (collected 2022 21:19)  Source: Wound Wound  Final Report (2022 17:09):    Numerous Staphylococcus aureus    Few Enterobacter cloacae complex  Organism: Staphylococcus aureus  Enterobacter cloacae complex (2022 17:09)  Organism: Enterobacter cloacae complex (2022 17:09)  Organism: Staphylococcus aureus (2022 17:09)    Culture - Blood (collected 2022 18:07)  Source: .Blood Blood-Peripheral  Gram Stain (2022 22:24):    Growth in aerobic bottle: Gram Positive Cocci in Clusters  Preliminary Report (2022 22:25):    Growth in aerobic bottle: Gram Positive Cocci in Clusters    ***Blood Panel PCR results on this specimen are available    approximately 3 hours after the Gram stain result.***    Gram stain, PCR, and/or culture results may not always    correspond due to difference in methodologies.    ************************************************************    This PCR assay was performed by multiplex PCR. This    Assay tests for 66 bacterial and resistance gene targets.    Please refer to the [facility] test directory    at [email or web address] for details.  Organism: Blood Culture PCR (2022 23:33)  Organism: Blood Culture PCR (2022 23:33)    Culture - Blood (collected 2022 18:07)  Source: .Blood Blood-Peripheral  Gram Stain (2022 11:44):    Growth in aerobic bottle: Gram Positive Cocci in Clusters    Growth in anaerobic bottle: Gram Positive Cocci in Clusters  Preliminary Report (2022 11:45):    Growth in aerobic bottle: Gram Positive Cocci in Clusters    Growth in anaerobic bottle: Gram Positive Cocci in Clusters      RADIOLOGY & ADDITIONAL TESTS:   reviewed elctronically  ASSESSMENT/PLAN: 	    25 minutes aggregate time was spent on this visit, 50% visit time spent in care co-ordination with other attendings and counselling patient .I have discussed care plan with patient / HCP/family member natali Acevedo on a phone today  ,who expressed understanding of problems treatment and their effect and side effects, alternatives in details. I have asked if they have any questions and concerns and appropriately addressed them to best of my ability. Advance care planning was discussed , pallitaive care issues ,CMO ,hospice levels of care were discussed in details , forms ,advance directives were reviewed .All questions were answered to the best of my knowledge .Additional 25 min spent.

## 2022-04-21 NOTE — SWALLOW BEDSIDE ASSESSMENT ADULT - ADDITIONAL RECOMMENDATIONS
Consider GOC conversation regarding nutrition/hydration with pt/family if pt continues to refuse MBS for diet advancement.
This department will continue to follow the patient for diet tolerance/advancement during this admission, as schedule permits

## 2022-04-21 NOTE — SWALLOW BEDSIDE ASSESSMENT ADULT - SLP PERTINENT HISTORY OF CURRENT PROBLEM
r/o dysphagia
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 Mucus plugging in the right lower lobe bronchi with near complete atelectasis of the right lower lobe."

## 2022-04-21 NOTE — PROGRESS NOTE ADULT - SUBJECTIVE AND OBJECTIVE BOX
Interval History:    CENTRAL LINE:   [  ] YES       [  ] NO  MUIR:                 [  ] YES       [  ] NO         REVIEW OF SYSTEMS:  All Systems below were reviewed and are negative [  ]  HEENT:  ID:  Pulmonary:  Cardiac:  GI:  Renal:  Musculoskeletal:  All other systems above were reviewed and are negative   [  ]      MEDICATIONS  (STANDING):  baclofen 10 milliGRAM(s) Oral every 8 hours  cholecalciferol 1000 Unit(s) Oral daily  cyanocobalamin 1000 MICROGram(s) Oral daily  dextrose 5% + sodium chloride 0.45%. 1000 milliLiter(s) (50 mL/Hr) IV Continuous <Continuous>  doxycycline hyclate Capsule 100 milliGRAM(s) Oral every 12 hours  escitalopram 20 milliGRAM(s) Oral daily  folic acid 1 milliGRAM(s) Oral daily  furosemide    Tablet 20 milliGRAM(s) Oral daily  gabapentin 300 milliGRAM(s) Oral every 8 hours  hydroxyurea 500 milliGRAM(s) Oral two times a day  lactobacillus acidophilus 1 Tablet(s) Oral two times a day  levothyroxine 50 MICROGram(s) Oral daily  MediHoney Gel Wound Dressing 1 Application(s) 1 Application(s) Topical every 3 days  metoprolol tartrate 25 milliGRAM(s) Oral two times a day  pantoprazole   Suspension 40 milliGRAM(s) Oral daily  piperacillin/tazobactam IVPB.. 3.375 Gram(s) IV Intermittent every 8 hours  senna 1 Tablet(s) Oral at bedtime  simvastatin 10 milliGRAM(s) Oral at bedtime    MEDICATIONS  (PRN):  acetaminophen     Tablet .. 650 milliGRAM(s) Oral every 6 hours PRN Temp greater or equal to 38C (100.4F), Mild Pain (1 - 3)  aluminum hydroxide/magnesium hydroxide/simethicone Suspension 30 milliLiter(s) Oral every 4 hours PRN Dyspepsia  guaiFENesin Oral Liquid (Sugar-Free) 200 milliGRAM(s) Oral every 6 hours PRN Cough  melatonin 3 milliGRAM(s) Oral at bedtime PRN Insomnia  ondansetron Injectable 4 milliGRAM(s) IV Push every 8 hours PRN Nausea and/or Vomiting  traMADol 50 milliGRAM(s) Oral four times a day PRN Moderate Pain (4 - 6)      Vital Signs Last 24 Hrs  T(C): 37.2 (21 Apr 2022 11:29), Max: 37.9 (20 Apr 2022 21:35)  T(F): 99 (21 Apr 2022 11:29), Max: 100.3 (20 Apr 2022 21:35)  HR: 101 (21 Apr 2022 17:37) (80 - 101)  BP: 132/69 (21 Apr 2022 17:37) (124/50 - 145/70)  BP(mean): --  RR: 18 (21 Apr 2022 17:37) (18 - 18)  SpO2: 92% (21 Apr 2022 17:37) (92% - 99%)    I&O's Summary    20 Apr 2022 07:01  -  21 Apr 2022 07:00  --------------------------------------------------------  IN: 600 mL / OUT: 1300 mL / NET: -700 mL        PHYSICAL EXAM:  HEENT: NC/AT; PERRLA  Neck: Soft; no tenderness  Lungs: CTA bilaterally; no wheezing.   Heart:  Abdomen:  Genital/ Rectal:  Extremities:  Neurologic:  Vascular:      LABORATORY:    CBC Full  -  ( 21 Apr 2022 07:36 )  WBC Count : 6.91 K/uL  RBC Count : 3.43 M/uL  Hemoglobin : 9.7 g/dL  Hematocrit : 32.8 %  Platelet Count - Automated : 630 K/uL  Mean Cell Volume : 95.6 fl  Mean Cell Hemoglobin : 28.3 pg  Mean Cell Hemoglobin Concentration : 29.6 gm/dL  Auto Neutrophil # : x  Auto Lymphocyte # : x  Auto Monocyte # : x  Auto Eosinophil # : x  Auto Basophil # : x  Auto Neutrophil % : x  Auto Lymphocyte % : x  Auto Monocyte % : x  Auto Eosinophil % : x  Auto Basophil % : x      ESR:                   04-20 @ 09:13  --    C-Reactive Protein:     04-20 @ 09:13  --    Procalcitonin:           04-20 @ 09:13   0.12      04-21    144  |  109<H>  |  6<L>  ----------------------------<  128<H>  4.1   |  30  |  0.69    Ca    8.4<L>      21 Apr 2022 07:36    TPro  6.2  /  Alb  2.2<L>  /  TBili  0.4  /  DBili  x   /  AST  15  /  ALT  12  /  AlkPhos  54  04-20          Assessment and Plan:          Sushil Anguiano MD   [phone].  No fevers  Comfortable     MEDICATIONS  (STANDING):  baclofen 10 milliGRAM(s) Oral every 8 hours  cholecalciferol 1000 Unit(s) Oral daily  cyanocobalamin 1000 MICROGram(s) Oral daily  dextrose 5% + sodium chloride 0.45%. 1000 milliLiter(s) (50 mL/Hr) IV Continuous <Continuous>  doxycycline hyclate Capsule 100 milliGRAM(s) Oral every 12 hours  escitalopram 20 milliGRAM(s) Oral daily  folic acid 1 milliGRAM(s) Oral daily  furosemide    Tablet 20 milliGRAM(s) Oral daily  gabapentin 300 milliGRAM(s) Oral every 8 hours  hydroxyurea 500 milliGRAM(s) Oral two times a day  lactobacillus acidophilus 1 Tablet(s) Oral two times a day  levothyroxine 50 MICROGram(s) Oral daily  MediHoney Gel Wound Dressing 1 Application(s) 1 Application(s) Topical every 3 days  metoprolol tartrate 25 milliGRAM(s) Oral two times a day  pantoprazole   Suspension 40 milliGRAM(s) Oral daily  piperacillin/tazobactam IVPB.. 3.375 Gram(s) IV Intermittent every 8 hours  senna 1 Tablet(s) Oral at bedtime  simvastatin 10 milliGRAM(s) Oral at bedtime    MEDICATIONS  (PRN):  acetaminophen     Tablet .. 650 milliGRAM(s) Oral every 6 hours PRN Temp greater or equal to 38C (100.4F), Mild Pain (1 - 3)  aluminum hydroxide/magnesium hydroxide/simethicone Suspension 30 milliLiter(s) Oral every 4 hours PRN Dyspepsia  guaiFENesin Oral Liquid (Sugar-Free) 200 milliGRAM(s) Oral every 6 hours PRN Cough  melatonin 3 milliGRAM(s) Oral at bedtime PRN Insomnia  ondansetron Injectable 4 milliGRAM(s) IV Push every 8 hours PRN Nausea and/or Vomiting  traMADol 50 milliGRAM(s) Oral four times a day PRN Moderate Pain (4 - 6)      Vital Signs Last 24 Hrs  T(C): 37.2 (21 Apr 2022 11:29), Max: 37.9 (20 Apr 2022 21:35)  T(F): 99 (21 Apr 2022 11:29), Max: 100.3 (20 Apr 2022 21:35)  HR: 101 (21 Apr 2022 17:37) (80 - 101)  BP: 132/69 (21 Apr 2022 17:37) (124/50 - 145/70)  BP(mean): --  RR: 18 (21 Apr 2022 17:37) (18 - 18)  SpO2: 92% (21 Apr 2022 17:37) (92% - 99%)    I&O's Summary    20 Apr 2022 07:01  -  21 Apr 2022 07:00  --------------------------------------------------------  IN: 600 mL / OUT: 1300 mL / NET: -700 mL        PHYSICAL EXAM:  HEENT: NC/AT; PERRLA  Neck: Soft; no tenderness  Lungs: CTA bilaterally; no wheezing.   Heart:  Abdomen:  Genital/ Rectal:  Extremities:  Neurologic:  Vascular:      LABORATORY:    CBC Full  -  ( 21 Apr 2022 07:36 )  WBC Count : 6.91 K/uL  RBC Count : 3.43 M/uL  Hemoglobin : 9.7 g/dL  Hematocrit : 32.8 %  Platelet Count - Automated : 630 K/uL  Mean Cell Volume : 95.6 fl  Mean Cell Hemoglobin : 28.3 pg  Mean Cell Hemoglobin Concentration : 29.6 gm/dL  Auto Neutrophil # : x  Auto Lymphocyte # : x  Auto Monocyte # : x  Auto Eosinophil # : x  Auto Basophil # : x  Auto Neutrophil % : x  Auto Lymphocyte % : x  Auto Monocyte % : x  Auto Eosinophil % : x  Auto Basophil % : x      ESR:                   04-20 @ 09:13  --    C-Reactive Protein:     04-20 @ 09:13  --    Procalcitonin:           04-20 @ 09:13   0.12      04-21    144  |  109<H>  |  6<L>  ----------------------------<  128<H>  4.1   |  30  |  0.69    Ca    8.4<L>      21 Apr 2022 07:36    TPro  6.2  /  Alb  2.2<L>  /  TBili  0.4  /  DBili  x   /  AST  15  /  ALT  12  /  AlkPhos  54  04-20          Assessment and Plan:        . Positive blood cultures withy Staph epidermidis likely due to skin contamination  . Follow repeated blood cultures.    Sushil Anguiano MD   [phone].  No fevers  Comfortable   On RA. NO hypoxia.     MEDICATIONS  (STANDING):  baclofen 10 milliGRAM(s) Oral every 8 hours  cholecalciferol 1000 Unit(s) Oral daily  cyanocobalamin 1000 MICROGram(s) Oral daily  dextrose 5% + sodium chloride 0.45%. 1000 milliLiter(s) (50 mL/Hr) IV Continuous <Continuous>  doxycycline hyclate Capsule 100 milliGRAM(s) Oral every 12 hours  escitalopram 20 milliGRAM(s) Oral daily  folic acid 1 milliGRAM(s) Oral daily  furosemide    Tablet 20 milliGRAM(s) Oral daily  gabapentin 300 milliGRAM(s) Oral every 8 hours  hydroxyurea 500 milliGRAM(s) Oral two times a day  lactobacillus acidophilus 1 Tablet(s) Oral two times a day  levothyroxine 50 MICROGram(s) Oral daily  MediHoney Gel Wound Dressing 1 Application(s) 1 Application(s) Topical every 3 days  metoprolol tartrate 25 milliGRAM(s) Oral two times a day  pantoprazole   Suspension 40 milliGRAM(s) Oral daily  piperacillin/tazobactam IVPB.. 3.375 Gram(s) IV Intermittent every 8 hours  senna 1 Tablet(s) Oral at bedtime  simvastatin 10 milliGRAM(s) Oral at bedtime    MEDICATIONS  (PRN):  acetaminophen     Tablet .. 650 milliGRAM(s) Oral every 6 hours PRN Temp greater or equal to 38C (100.4F), Mild Pain (1 - 3)  aluminum hydroxide/magnesium hydroxide/simethicone Suspension 30 milliLiter(s) Oral every 4 hours PRN Dyspepsia  guaiFENesin Oral Liquid (Sugar-Free) 200 milliGRAM(s) Oral every 6 hours PRN Cough  melatonin 3 milliGRAM(s) Oral at bedtime PRN Insomnia  ondansetron Injectable 4 milliGRAM(s) IV Push every 8 hours PRN Nausea and/or Vomiting  traMADol 50 milliGRAM(s) Oral four times a day PRN Moderate Pain (4 - 6)      Vital Signs Last 24 Hrs  T(C): 37.2 (21 Apr 2022 11:29), Max: 37.9 (20 Apr 2022 21:35)  T(F): 99 (21 Apr 2022 11:29), Max: 100.3 (20 Apr 2022 21:35)  HR: 101 (21 Apr 2022 17:37) (80 - 101)  BP: 132/69 (21 Apr 2022 17:37) (124/50 - 145/70)  BP(mean): --  RR: 18 (21 Apr 2022 17:37) (18 - 18)  SpO2: 92% (21 Apr 2022 17:37) (92% - 99%)    I&O's Summary    20 Apr 2022 07:01  -  21 Apr 2022 07:00  --------------------------------------------------------  IN: 600 mL / OUT: 1300 mL / NET: -700 mL        PHYSICAL EXAM:  HEENT: NC/AT; PERRLA  Neck: Soft; no tenderness  Lungs: Coarse BS bilaterally; no wheezing.   Heart: RRR, no murmurs.   Abdomen: Soft, no tenderness.   Genital/ Rectal: No salas.   Extremities: Decreased erythema of RLE. Small blister wound on RLE.   Neurologic: Confused.       LABORATORY:    CBC Full  -  ( 21 Apr 2022 07:36 )  WBC Count : 6.91 K/uL  RBC Count : 3.43 M/uL  Hemoglobin : 9.7 g/dL  Hematocrit : 32.8 %  Platelet Count - Automated : 630 K/uL  Mean Cell Volume : 95.6 fl  Mean Cell Hemoglobin : 28.3 pg  Mean Cell Hemoglobin Concentration : 29.6 gm/dL  Auto Neutrophil # : x  Auto Lymphocyte # : x  Auto Monocyte # : x  Auto Eosinophil # : x  Auto Basophil # : x  Auto Neutrophil % : x  Auto Lymphocyte % : x  Auto Monocyte % : x  Auto Eosinophil % : x  Auto Basophil % : x      ESR:                   04-20 @ 09:13  --    C-Reactive Protein:     04-20 @ 09:13  --    Procalcitonin:           04-20 @ 09:13   0.12      04-21    144  |  109<H>  |  6<L>  ----------------------------<  128<H>  4.1   |  30  |  0.69    Ca    8.4<L>      21 Apr 2022 07:36    TPro  6.2  /  Alb  2.2<L>  /  TBili  0.4  /  DBili  x   /  AST  15  /  ALT  12  /  AlkPhos  54  04-20    Assessment and Plan:    1. RLE cellulitis.  2. Chronic edema of lower extremities.   3. Weakness.   4. Positive blood cultures.     . Continue IV Zosyn and po Doxy  . RLE cellulitis is improving,. Follow wound culture  . Positive blood cultures with Staph epidermidis likely due to skin contamination  . Follow repeated blood cultures.    Sushil Anguiano MD   [phone].

## 2022-04-21 NOTE — PROGRESS NOTE ADULT - ASSESSMENT
AB MENDOZA 89 f 4/19/2022 3/15/1932 DR YURI CARRINGTON     REVIEW OF SYMPTOMS      Able to give (reliable) ROS  NO     PHYSICAL EXAM    HEENT Unremarkable  atraumatic   RESP Fair air entry EXP prolonged    Harsh breath sound Resp distres mild   CARDIAC S1 S2 No S3     NO JVD    ABDOMEN SOFT BS PRESENT NOT DISTENDED No hepatosplenomegaly   PEDAL EDEMA present No calf tenderness  NO rash       DOA/CC/PROBLEMS poa .  89 f  doa 4/19/2022   cc Patient is a 89yo female complaining of left lower leg wound possible infection and sepsis Patient is tachy and has ams since 4/18      PMH-PSH .  pmh DVT  pmh Anemia  pmh leg wounds  pmh A fib  pmh GERD  pmh     PROSTHETICS/TUBES/LINES.    NOTABLE HOME MEDS.  coumadin 2.5  enalapril 2.5  lasix 20      COVID/ICU/CODE STATUS.                       COVID  STATUS.           ICU STAY. none  GOC.      BEST PRACTICE ISSUES.                                                  HEAD OF BED ELEVATION. Yes  DVT PROPHYLAXIS.   4/19/2022 on coumadin at home   GONZALES PROPHYLAXIS.   4/19/2022 protonix 40                                                                                      DIET.  4/20/2022 mince moist    VITALS/PO/IO/VENT/DRIPS.    4/21/2022 99f 80 130/70       PROBLEM DATA/ASSESSMENT RECOMMENDATIONS (A/R).    HEMODYNAMICS.   Monitor bp Target MAP 65 (+)    RESP.   Monitor po Target po 90-95%    ABG.  4/19/2022 ra 745/40/68    PMH/PSH PROBLEMS.  Management continued/modified as indicated    OXYGEN REQUIREMENTS.  4/21/2022 ra 93%      INFECTION SOURCE.   Cellulitis lle poa 4/19/2022   INFECTION A/R.   Cellulitis lle on zosyn    INFECTION ANDREWS.  W 4/19-4/20/2022 w 9.6 - 7.1    pr 4/20/2022 .12   MICROBIO.  blod c 3/30 (-)   wound 4/19 staph enterococcus   blod c 4/19 mrse   ABIO.  4/19/2022 zosyn   4/19 doxy     A fib  inr 4/19-4/20/2022 inr 4.8 - 5.4  4/19/2022 metoprolol 25.2   on rate control and anticoagn    CHF.  4/19/2022 lasix 20     HYPERAMMONEMIA.  Amm 4/19/2022 amm 39     ANEMIA.  Hb 4/19-4/20-4/21/2022 Hb 10.6 - 9.7 - 9.7   monito    RENAL.  Cr 4/19/2022 Cr .8   monitor    HYPOTHYROID.  4/20/2022 tsh 10   4/19/2022 levoxyl 25 -> 4/20 levox 50     OVERALL ISSUES.  LEG CELLULITIS   A fib   chronic dvt  coagulopathy    IV fl.  4/19/2022 d5 1/2 50    TIME SPENT   Over 25 minutes aggregate care time spent on encounter; activities included   direct patient care, counseling and/or coordinating care reviewing notes, lab data/ imaging , discussion with multidisciplinary team/ patient  /family and explaining in detail risks, benefits, alternatives  of the recommendations     AB MENDOZA 89 f 4/19/2022 3/15/1932 DR YURI CARRINGTON

## 2022-04-21 NOTE — SWALLOW BEDSIDE ASSESSMENT ADULT - ASR SWALLOW RECOMMEND DIAG
VFSS/MBS
objective testing is not warranted d/t overt signs noted on upgraded consistencies; will continue to monitor at bedside

## 2022-04-22 DIAGNOSIS — R13.10 DYSPHAGIA, UNSPECIFIED: ICD-10-CM

## 2022-04-22 LAB
ANION GAP SERPL CALC-SCNC: 9 MMOL/L — SIGNIFICANT CHANGE UP (ref 5–17)
BUN SERPL-MCNC: 6 MG/DL — LOW (ref 7–23)
CALCIUM SERPL-MCNC: 8.4 MG/DL — LOW (ref 8.5–10.1)
CHLORIDE SERPL-SCNC: 108 MMOL/L — SIGNIFICANT CHANGE UP (ref 96–108)
CO2 SERPL-SCNC: 28 MMOL/L — SIGNIFICANT CHANGE UP (ref 22–31)
CREAT SERPL-MCNC: 0.68 MG/DL — SIGNIFICANT CHANGE UP (ref 0.5–1.3)
CULTURE RESULTS: NO GROWTH — SIGNIFICANT CHANGE UP
CULTURE RESULTS: SIGNIFICANT CHANGE UP
EGFR: 83 ML/MIN/1.73M2 — SIGNIFICANT CHANGE UP
GLUCOSE SERPL-MCNC: 128 MG/DL — HIGH (ref 70–99)
GRAM STN FLD: SIGNIFICANT CHANGE UP
HCT VFR BLD CALC: 32 % — LOW (ref 34.5–45)
HGB BLD-MCNC: 9.4 G/DL — LOW (ref 11.5–15.5)
INR BLD: 4.15 RATIO — HIGH (ref 0.88–1.16)
MCHC RBC-ENTMCNC: 28.3 PG — SIGNIFICANT CHANGE UP (ref 27–34)
MCHC RBC-ENTMCNC: 29.4 GM/DL — LOW (ref 32–36)
MCV RBC AUTO: 96.4 FL — SIGNIFICANT CHANGE UP (ref 80–100)
NRBC # BLD: 0 /100 WBCS — SIGNIFICANT CHANGE UP (ref 0–0)
PLATELET # BLD AUTO: 563 K/UL — HIGH (ref 150–400)
POTASSIUM SERPL-MCNC: 3.2 MMOL/L — LOW (ref 3.5–5.3)
POTASSIUM SERPL-SCNC: 3.2 MMOL/L — LOW (ref 3.5–5.3)
PROTHROM AB SERPL-ACNC: 49.2 SEC — HIGH (ref 10.5–13.4)
RBC # BLD: 3.32 M/UL — LOW (ref 3.8–5.2)
RBC # FLD: 20.7 % — HIGH (ref 10.3–14.5)
SODIUM SERPL-SCNC: 145 MMOL/L — SIGNIFICANT CHANGE UP (ref 135–145)
SPECIMEN SOURCE: SIGNIFICANT CHANGE UP
WBC # BLD: 6.69 K/UL — SIGNIFICANT CHANGE UP (ref 3.8–10.5)
WBC # FLD AUTO: 6.69 K/UL — SIGNIFICANT CHANGE UP (ref 3.8–10.5)

## 2022-04-22 PROCEDURE — 74230 X-RAY XM SWLNG FUNCJ C+: CPT | Mod: 26

## 2022-04-22 PROCEDURE — 99232 SBSQ HOSP IP/OBS MODERATE 35: CPT

## 2022-04-22 RX ORDER — POTASSIUM CHLORIDE 20 MEQ
40 PACKET (EA) ORAL EVERY 4 HOURS
Refills: 0 | Status: COMPLETED | OUTPATIENT
Start: 2022-04-22 | End: 2022-04-22

## 2022-04-22 RX ORDER — TRAMADOL HYDROCHLORIDE 50 MG/1
25 TABLET ORAL DAILY
Refills: 0 | Status: DISCONTINUED | OUTPATIENT
Start: 2022-04-22 | End: 2022-04-25

## 2022-04-22 RX ADMIN — PREGABALIN 1000 MICROGRAM(S): 225 CAPSULE ORAL at 11:38

## 2022-04-22 RX ADMIN — Medication 1 TABLET(S): at 05:48

## 2022-04-22 RX ADMIN — Medication 650 MILLIGRAM(S): at 22:33

## 2022-04-22 RX ADMIN — Medication 40 MILLIEQUIVALENT(S): at 13:55

## 2022-04-22 RX ADMIN — Medication 40 MILLIEQUIVALENT(S): at 18:01

## 2022-04-22 RX ADMIN — Medication 100 MILLIGRAM(S): at 18:00

## 2022-04-22 RX ADMIN — PIPERACILLIN AND TAZOBACTAM 25 GRAM(S): 4; .5 INJECTION, POWDER, LYOPHILIZED, FOR SOLUTION INTRAVENOUS at 22:32

## 2022-04-22 RX ADMIN — Medication 1 MILLIGRAM(S): at 11:38

## 2022-04-22 RX ADMIN — Medication 10 MILLIGRAM(S): at 22:33

## 2022-04-22 RX ADMIN — PANTOPRAZOLE SODIUM 40 MILLIGRAM(S): 20 TABLET, DELAYED RELEASE ORAL at 11:38

## 2022-04-22 RX ADMIN — Medication 100 MILLIGRAM(S): at 05:48

## 2022-04-22 RX ADMIN — PIPERACILLIN AND TAZOBACTAM 25 GRAM(S): 4; .5 INJECTION, POWDER, LYOPHILIZED, FOR SOLUTION INTRAVENOUS at 05:48

## 2022-04-22 RX ADMIN — Medication 650 MILLIGRAM(S): at 23:15

## 2022-04-22 RX ADMIN — SIMVASTATIN 10 MILLIGRAM(S): 20 TABLET, FILM COATED ORAL at 22:33

## 2022-04-22 RX ADMIN — Medication 10 MILLIGRAM(S): at 13:55

## 2022-04-22 RX ADMIN — PIPERACILLIN AND TAZOBACTAM 25 GRAM(S): 4; .5 INJECTION, POWDER, LYOPHILIZED, FOR SOLUTION INTRAVENOUS at 14:09

## 2022-04-22 RX ADMIN — TRAMADOL HYDROCHLORIDE 25 MILLIGRAM(S): 50 TABLET ORAL at 11:51

## 2022-04-22 RX ADMIN — Medication 20 MILLIGRAM(S): at 05:48

## 2022-04-22 RX ADMIN — SENNA PLUS 1 TABLET(S): 8.6 TABLET ORAL at 22:32

## 2022-04-22 RX ADMIN — ESCITALOPRAM OXALATE 20 MILLIGRAM(S): 10 TABLET, FILM COATED ORAL at 11:38

## 2022-04-22 RX ADMIN — Medication 10 MILLIGRAM(S): at 05:48

## 2022-04-22 RX ADMIN — GABAPENTIN 300 MILLIGRAM(S): 400 CAPSULE ORAL at 13:55

## 2022-04-22 RX ADMIN — HYDROXYUREA 500 MILLIGRAM(S): 500 CAPSULE ORAL at 18:00

## 2022-04-22 RX ADMIN — Medication 25 MILLIGRAM(S): at 05:47

## 2022-04-22 RX ADMIN — Medication 1 TABLET(S): at 18:00

## 2022-04-22 RX ADMIN — GABAPENTIN 300 MILLIGRAM(S): 400 CAPSULE ORAL at 05:50

## 2022-04-22 RX ADMIN — Medication 50 MICROGRAM(S): at 05:47

## 2022-04-22 RX ADMIN — HYDROXYUREA 500 MILLIGRAM(S): 500 CAPSULE ORAL at 05:48

## 2022-04-22 RX ADMIN — Medication 25 MILLIGRAM(S): at 17:59

## 2022-04-22 RX ADMIN — Medication 1000 UNIT(S): at 11:38

## 2022-04-22 RX ADMIN — GABAPENTIN 300 MILLIGRAM(S): 400 CAPSULE ORAL at 22:33

## 2022-04-22 NOTE — SWALLOW VFSS/MBS ASSESSMENT ADULT - SLP PERTINENT HISTORY OF CURRENT PROBLEM
Pt seen for multiple bedside swallow assessments, last seen 4/21/22 Rx Soft & bite sized and Mildly thick liquids and MBS, see report for details.

## 2022-04-22 NOTE — PROGRESS NOTE ADULT - SUBJECTIVE AND OBJECTIVE BOX
Neurology follow up note    REJI [id number]      Interval History:    Patient feels ok no new complaints.    Allergies    No Known Allergies    Intolerances        MEDICATIONS    acetaminophen     Tablet .. 650 milliGRAM(s) Oral every 6 hours PRN  aluminum hydroxide/magnesium hydroxide/simethicone Suspension 30 milliLiter(s) Oral every 4 hours PRN  baclofen 10 milliGRAM(s) Oral every 8 hours  cholecalciferol 1000 Unit(s) Oral daily  cyanocobalamin 1000 MICROGram(s) Oral daily  doxycycline hyclate Capsule 100 milliGRAM(s) Oral every 12 hours  escitalopram 20 milliGRAM(s) Oral daily  folic acid 1 milliGRAM(s) Oral daily  furosemide    Tablet 20 milliGRAM(s) Oral daily  gabapentin 300 milliGRAM(s) Oral every 8 hours  guaiFENesin Oral Liquid (Sugar-Free) 200 milliGRAM(s) Oral every 6 hours PRN  hydroxyurea 500 milliGRAM(s) Oral two times a day  lactobacillus acidophilus 1 Tablet(s) Oral two times a day  levothyroxine 50 MICROGram(s) Oral daily  MediHoney Gel Wound Dressing 1 Application(s) 1 Application(s) Topical every 3 days  melatonin 3 milliGRAM(s) Oral at bedtime PRN  metoprolol tartrate 25 milliGRAM(s) Oral two times a day  ondansetron Injectable 4 milliGRAM(s) IV Push every 8 hours PRN  pantoprazole   Suspension 40 milliGRAM(s) Oral daily  piperacillin/tazobactam IVPB.. 3.375 Gram(s) IV Intermittent every 8 hours  senna 1 Tablet(s) Oral at bedtime  simvastatin 10 milliGRAM(s) Oral at bedtime  traMADol 50 milliGRAM(s) Oral four times a day PRN              Vital Signs Last 24 Hrs  T(C): 36.8 (2022 04:50), Max: 37.6 (2022 20:12)  T(F): 98.3 (2022 04:50), Max: 99.7 (2022 20:12)  HR: 80 (2022 04:50) (80 - 101)  BP: 133/82 (2022 04:50) (130/72 - 133/82)  BP(mean): --  RR: 18 (2022 04:50) (18 - 18)  SpO2: 96% (2022 04:50) (92% - 96%)      REVIEW OF SYSTEMS:  Constitutionally, the patient denies fever, chills, or night sweats.  Head:  No headaches.  Eyes:  No double vision or blurry vision.  Ears:  No ringing in the ears.  Neck:  No neck pain.  Cardiovascular:  No chest pain.  Respiratory:  No shortness of breath.  Abdomen:  No nausea, vomiting, or abdominal pain.  Extremities/Neurological:  Positive pain in both lower extremities which is not new.  Musculoskeletal:  Occasional joint pain.    PHYSICAL EXAMINATION:   HEENT:  Head:  Normocephalic, atraumatic.  Eyes:  No scleral icterus.  Ears:  Hearing bilaterally intact.  NECK:  Supple.  CARDIOVASCULAR:  S1 and S2 heard.  RESPIRATORY:  Good air entry bilaterally.  ABDOMEN:  Soft and nontender.  EXTREMITIES:  No clubbing or cyanosis noted.      NEUROLOGIC:  The patient was awake and alert.  [facility], 2022,  She was able to name simple objects, able to tell sons name  was able to follow simple commands.  Extraocular movements were intact.  Speech was fluent.  Smile symmetric.  Motor:  Right upper 0/5.  Right lower, slight movement at the hip and knee.  Left upper 4/5, left lower 3+/5.  Sensory:  Bilateral upper and lower intact to light touch.  Bilateral lower extremities, positive dressings were on both of her legs.            LABS:  CBC Full  -  ( 2022 07:32 )  WBC Count : 6.69 K/uL  RBC Count : 3.32 M/uL  Hemoglobin : 9.4 g/dL  Hematocrit : 32.0 %  Platelet Count - Automated : 563 K/uL  Mean Cell Volume : 96.4 fl  Mean Cell Hemoglobin : 28.3 pg  Mean Cell Hemoglobin Concentration : 29.4 gm/dL  Auto Neutrophil # : x  Auto Lymphocyte # : x  Auto Monocyte # : x  Auto Eosinophil # : x  Auto Basophil # : x  Auto Neutrophil % : x  Auto Lymphocyte % : x  Auto Monocyte % : x  Auto Eosinophil % : x  Auto Basophil % : x    Urinalysis Basic - ( 2022 12:29 )    Color: Yellow / Appearance: Clear / S.005 / pH: x  Gluc: x / Ketone: Negative  / Bili: Negative / Urobili: Negative   Blood: x / Protein: 15 / Nitrite: Negative   Leuk Esterase: Small / RBC: x / WBC 3-5   Sq Epi: x / Non Sq Epi: Few / Bacteria: Few          145  |  108  |  6<L>  ----------------------------<  128<H>  3.2<L>   |  28  |  0.68    Ca    8.4<L>      2022 07:32      Hemoglobin A1C:       Vitamin B12 Vitamin B12, Serum: 1738 pg/mL ( @ 11:18)    PT/INR - ( 2022 07:32 )   PT: 49.2 sec;   INR: 4.15 ratio               RADIOLOGY      ANALYSIS AND PLAN:  This is a 89-year-old with altered mental status and history of cerebrovascular accident.  For episode of altered mental status, I suspect most likely metabolic encephalopathy along with possibly underlying mild cognitive impairment.  Metabolic encephalopathy is most likely multifactorial secondary to thyroid dysfunction, possibly underlying septic issues with the patient having temperatures and also respiratory issues with the patient having low oxygen saturation, suspect less likely this is a primary central nervous system event.  I would recommend sepsis workup, antibiotics as needed.  Pulmonary followup, monitor oxygen saturation.  We will recommend to rule out other cause of metabolic encephalopathy.  We will check vitamin 12, folate, and ammonia levels.  For history of cerebrovascular accident with underlying atrial fibrillation, anticoagulation with a goal INR between 2 and 3.  For hypothyroidism, adjustment of the patient's Synthroid as needed.  The patient's pain medications have been adjusted.  The patient will be still on the gabapentin 900 mg a day, baclofen 30 mg a day, but appears that the patient's tramadol has been made p.r.n.  We will see how the patient does mentally if she is more awake with the p.r.n. dose of tramadol and see if she continues to take that.  For hypertension, monitor systolic blood pressure.  For hyperlipidemia, continue the patient on statin.  patient does agree to try low dose tramadol 25 mg once a day to help with pain     I spoke with son, Curtis, at [phone].  He does understand while in the hospital setting, he may become more disoriented.    Greater than 34 minutes of time was spent with the patient, planning care, reviewing data, speaking to multidisciplinary healthcare team with greater than 50% of that time in counseling and care coordination.

## 2022-04-22 NOTE — PROGRESS NOTE ADULT - SUBJECTIVE AND OBJECTIVE BOX
[facility]  Gus Sy PA-C  [facility] [place] [address]  [phone]      INTERVAL HPI/OVERNIGHT EVENTS:  Pt s/e  Pt is a poor historian, however reports no GI complaints    MEDICATIONS  (STANDING):  baclofen 10 milliGRAM(s) Oral every 8 hours  cholecalciferol 1000 Unit(s) Oral daily  cyanocobalamin 1000 MICROGram(s) Oral daily  doxycycline hyclate Capsule 100 milliGRAM(s) Oral every 12 hours  escitalopram 20 milliGRAM(s) Oral daily  folic acid 1 milliGRAM(s) Oral daily  furosemide    Tablet 20 milliGRAM(s) Oral daily  gabapentin 300 milliGRAM(s) Oral every 8 hours  hydroxyurea 500 milliGRAM(s) Oral two times a day  lactobacillus acidophilus 1 Tablet(s) Oral two times a day  levothyroxine 50 MICROGram(s) Oral daily  MediHoney Gel Wound Dressing 1 Application(s) 1 Application(s) Topical every 3 days  metoprolol tartrate 25 milliGRAM(s) Oral two times a day  pantoprazole   Suspension 40 milliGRAM(s) Oral daily  piperacillin/tazobactam IVPB.. 3.375 Gram(s) IV Intermittent every 8 hours  potassium chloride   Powder 40 milliEquivalent(s) Oral every 4 hours  senna 1 Tablet(s) Oral at bedtime  simvastatin 10 milliGRAM(s) Oral at bedtime  traMADol 25 milliGRAM(s) Oral daily    MEDICATIONS  (PRN):  acetaminophen     Tablet .. 650 milliGRAM(s) Oral every 6 hours PRN Temp greater or equal to 38C (100.4F), Mild Pain (1 - 3)  aluminum hydroxide/magnesium hydroxide/simethicone Suspension 30 milliLiter(s) Oral every 4 hours PRN Dyspepsia  guaiFENesin Oral Liquid (Sugar-Free) 200 milliGRAM(s) Oral every 6 hours PRN Cough  melatonin 3 milliGRAM(s) Oral at bedtime PRN Insomnia  ondansetron Injectable 4 milliGRAM(s) IV Push every 8 hours PRN Nausea and/or Vomiting      Allergies  No Known Allergies    PHYSICAL EXAM:   Vital Signs:  Vital Signs Last 24 Hrs  T(C): 36.8 (2022 04:50), Max: 37.6 (2022 20:12)  T(F): 98.3 (2022 04:50), Max: 99.7 (2022 20:12)  HR: 71 (2022 09:10) (71 - 101)  BP: 133/82 (2022 04:50) (130/72 - 133/82)  BP(mean): --  RR: 18 (2022 04:50) (18 - 18)  SpO2: 96% (2022 04:50) (92% - 96%)  Daily     Daily     GENERAL:  Appears stated age  ABDOMEN:  Soft, non-tender, non-distended  NEURO:  Alert      LABS:                        9.4    6.69  )-----------( 563      ( 2022 07:32 )             32.0     04-22    145  |  108  |  6<L>  ----------------------------<  128<H>  3.2<L>   |  28  |  0.68    Ca    8.4<L>      2022 07:32      PT/INR - ( 2022 07:32 )   PT: 49.2 sec;   INR: 4.15 ratio           Urinalysis Basic - ( 2022 12:29 )    Color: Yellow / Appearance: Clear / S.005 / pH: x  Gluc: x / Ketone: Negative  / Bili: Negative / Urobili: Negative   Blood: x / Protein: 15 / Nitrite: Negative   Leuk Esterase: Small / RBC: x / WBC 3-5   Sq Epi: x / Non Sq Epi: Few / Bacteria: Few

## 2022-04-22 NOTE — PROGRESS NOTE ADULT - SUBJECTIVE AND OBJECTIVE BOX
REJI BERGER    PLV 2NOR 229 W1    Allergies    No Known Allergies    Intolerances        PAST MEDICAL & SURGICAL HISTORY:  Hypertension    CVA (cerebral vascular accident)    Depression    Constipation    Neuropathy    Hyperlipidemia    Grade I diastolic dysfunction    Afib    Neuropathy    VHD (valvular heart disease)    Aortic valvar stenosis    No significant past surgical history        FAMILY HISTORY:      Home Medications:  acetaminophen 500 mg oral tablet: 2 tab(s) orally once a day (at bedtime) (2022 14:19)  acetaminophen 500 mg oral tablet: 2 tab(s) orally every 12 hours, As Needed (2022 14:19)  baclofen 10 mg oral tablet: 1 tab(s) orally every 8 hours (2022 14:19)  Biofreeze 4% topical gel: Apply topically to R shoulder &amp; L knee topically 2 times a day (2022 14:19)  Coumadin 1 mg oral tablet: 1 tab(s) orally once a day (at bedtime) (2022 14:19)  cyanocobalamin 1000 mcg oral tablet: 1 tab(s) orally once a day (2022 14:19)  docusate sodium 100 mg oral capsule: 2 cap(s) orally once a day (2022 14:19)  enalapril 2.5 mg oral tablet: 1 tab(s) orally once a day (2022 14:19)  escitalopram 20 mg oral tablet: 1 tab(s) orally once a day (2022 14:19)  famotidine 20 mg oral tablet: 1 tab(s) orally once a day (2022 14:19)  famotidine 20 mg oral tablet: 1 tab(s) orally once a day (2022 14:19)  folic acid 1 mg oral tablet: 1 tab(s) orally once a day (2022 14:19)  furosemide 20 mg oral tablet: 2 tab(s) orally once a day (2022 14:19)  gabapentin 100 mg oral capsule: 3 cap(s) orally 3 times a day (2022 14:19)  hydroxyurea 500 mg oral capsule: 1 cap(s) orally 2 times a day (2022 14:19)  levothyroxine 25 mcg (0.025 mg) oral tablet: 1 tab(s) orally once a day (2022 14:19)  Metoprolol Tartrate 25 mg oral tablet: 1 tab(s) orally 2 times a day (2022 14:19)  Senna 8.6 mg oral tablet: 1 tab(s) orally once a day (at bedtime) (2022 14:19)  simvastatin 10 mg oral tablet: 1 tab(s) orally once a day (at bedtime) (2022 14:19)  traMADol 50 mg oral tablet: 1 tab(s) orally 2 times a day (2022 14:19)  Vitamin D3: 1 tab(s) orally once a day (2022 14:19)      MEDICATIONS  (STANDING):  baclofen 10 milliGRAM(s) Oral every 8 hours  cholecalciferol 1000 Unit(s) Oral daily  cyanocobalamin 1000 MICROGram(s) Oral daily  doxycycline hyclate Capsule 100 milliGRAM(s) Oral every 12 hours  escitalopram 20 milliGRAM(s) Oral daily  folic acid 1 milliGRAM(s) Oral daily  furosemide    Tablet 20 milliGRAM(s) Oral daily  gabapentin 300 milliGRAM(s) Oral every 8 hours  hydroxyurea 500 milliGRAM(s) Oral two times a day  lactobacillus acidophilus 1 Tablet(s) Oral two times a day  levothyroxine 50 MICROGram(s) Oral daily  MediHoney Gel Wound Dressing 1 Application(s) 1 Application(s) Topical every 3 days  metoprolol tartrate 25 milliGRAM(s) Oral two times a day  pantoprazole   Suspension 40 milliGRAM(s) Oral daily  piperacillin/tazobactam IVPB.. 3.375 Gram(s) IV Intermittent every 8 hours  senna 1 Tablet(s) Oral at bedtime  simvastatin 10 milliGRAM(s) Oral at bedtime    MEDICATIONS  (PRN):  acetaminophen     Tablet .. 650 milliGRAM(s) Oral every 6 hours PRN Temp greater or equal to 38C (100.4F), Mild Pain (1 - 3)  aluminum hydroxide/magnesium hydroxide/simethicone Suspension 30 milliLiter(s) Oral every 4 hours PRN Dyspepsia  guaiFENesin Oral Liquid (Sugar-Free) 200 milliGRAM(s) Oral every 6 hours PRN Cough  melatonin 3 milliGRAM(s) Oral at bedtime PRN Insomnia  ondansetron Injectable 4 milliGRAM(s) IV Push every 8 hours PRN Nausea and/or Vomiting  traMADol 50 milliGRAM(s) Oral four times a day PRN Moderate Pain (4 - 6)              Vital Signs Last 24 Hrs  T(C): 36.8 (2022 04:50), Max: 37.6 (2022 20:12)  T(F): 98.3 (2022 04:50), Max: 99.7 (2022 20:12)  HR: 80 (2022 04:50) (80 - 101)  BP: 133/82 (2022 04:50) (130/72 - 133/82)  BP(mean): --  RR: 18 (2022 04:50) (18 - 18)  SpO2: 96% (2022 04:50) (92% - 96%)      22 @ 07:01  -  22 @ 07:00  --------------------------------------------------------  IN: 0 mL / OUT: 600 mL / NET: -600 mL              LABS:                        9.4    6.69  )-----------( 563      ( 2022 07:32 )             32.0     04-22    145  |  108  |  6<L>  ----------------------------<  128<H>  3.2<L>   |  28  |  0.68    Ca    8.4<L>      2022 07:32    TPro  6.2  /  Alb  2.2<L>  /  TBili  0.4  /  DBili  x   /  AST  15  /  ALT  12  /  AlkPhos  54  04-20    PT/INR - ( 2022 07:32 )   PT: 49.2 sec;   INR: 4.15 ratio           Urinalysis Basic - ( 2022 12:29 )    Color: Yellow / Appearance: Clear / S.005 / pH: x  Gluc: x / Ketone: Negative  / Bili: Negative / Urobili: Negative   Blood: x / Protein: 15 / Nitrite: Negative   Leuk Esterase: Small / RBC: x / WBC 3-5   Sq Epi: x / Non Sq Epi: Few / Bacteria: Few            WBC:  WBC Count: 6.69 K/uL ( @ 07:32)  WBC Count: 6.91 K/uL ( @ 07:36)  WBC Count: 7.12 K/uL ( @ 09:13)  WBC Count: 9.64 K/uL ( 13:24)      MICROBIOLOGY:  RECENT CULTURES:   Clean Catch Clean Catch (Midstream) XXXX XXXX   No growth     Wound Wound Staphylococcus aureus  Enterobacter cloacae complex XXXX   Numerous Staphylococcus aureus  Few Enterobacter cloacae complex     .Blood Blood-Peripheral Blood Culture PCR   Growth in aerobic bottle: Gram Positive Cocci in Clusters  Growth in anaerobic bottle: Gram Positive Cocci in Clusters   Growth in aerobic bottle: Staphylococcus pettenkoferi  Coag Negative Staphylococcus  Single set isolate, possible contaminant. Contact  Microbiology if susceptibility testing clinically  indicated.  Growth in anaerobic bottle: Gram Positive Cocci in Clusters                PT/INR - ( 2022 07:32 )   PT: 49.2 sec;   INR: 4.15 ratio             Sodium:  Sodium, Serum: 145 mmol/L ( @ 07:32)  Sodium, Serum: 144 mmol/L ( @ 07:36)  Sodium, Serum: 143 mmol/L ( @ 09:13)  Sodium, Serum: 140 mmol/L ( @ 13:24)      0.68 mg/dL  07:32  0.69 mg/dL  07:36  0.63 mg/dL  09:13  0.82 mg/dL :24      Hemoglobin:  Hemoglobin: 9.4 g/dL ( @ 07:32)  Hemoglobin: 9.7 g/dL ( @ 07:36)  Hemoglobin: 9.7 g/dL ( @ 09:13)  Hemoglobin: 10.8 g/dL ( @ 13:24)      Platelets: Platelet Count - Automated: 563 K/uL ( @ 07:32)  Platelet Count - Automated: 630 K/uL ( @ 07:36)  Platelet Count - Automated: 508 K/uL ( @ 09:13)  Platelet Count - Automated: 589 K/uL ( @ 13:24)      LIVER FUNCTIONS - ( 2022 09:13 )  Alb: 2.2 g/dL / Pro: 6.2 g/dL / ALK PHOS: 54 U/L / ALT: 12 U/L / AST: 15 U/L / GGT: x             Urinalysis Basic - ( 2022 12:29 )    Color: Yellow / Appearance: Clear / S.005 / pH: x  Gluc: x / Ketone: Negative  / Bili: Negative / Urobili: Negative   Blood: x / Protein: 15 / Nitrite: Negative   Leuk Esterase: Small / RBC: x / WBC 3-5   Sq Epi: x / Non Sq Epi: Few / Bacteria: Few        RADIOLOGY & ADDITIONAL STUDIES:      MICROBIOLOGY:  RECENT CULTURES:   Clean Catch Clean Catch (Midstream) XXXX XXXX   No growth     Wound Wound Staphylococcus aureus  Enterobacter cloacae complex XXXX   Numerous Staphylococcus aureus  Few Enterobacter cloacae complex     .Blood Blood-Peripheral Blood Culture PCR   Growth in aerobic bottle: Gram Positive Cocci in Clusters  Growth in anaerobic bottle: Gram Positive Cocci in Clusters   Growth in aerobic bottle: Staphylococcus pettenkoferi  Coag Negative Staphylococcus  Single set isolate, possible contaminant. Contact  Microbiology if susceptibility testing clinically  indicated.  Growth in anaerobic bottle: Gram Positive Cocci in Clusters

## 2022-04-22 NOTE — SWALLOW VFSS/MBS ASSESSMENT ADULT - COMMENTS
Chart reviewed order received for MBS.  Pt received as above.  MBS completed see below for details.  Pt left as received NAD RN Estelle & Dr. Best notified.  Will follow.     Per charting, the patient is a "89 yo Female ,nursing home resident  sent from [facility] for eval of AMS and  her leg wounds on R leg.  She has hx of CVA  with R hemiplegia, dvt anemia, phlebitis, afib, hld, melanoma of skin, gerd depression, diverticulitis, uti, hypothyroid, pt is poor historian, per ems to the triage rn they adv pt may be septic due to wounds on her leg. pt has no complaints .As per nursing home med staff patient was sent to ED for evaluation of AMS ,associated with hypoxia and difficulties swallowing .Also her R leg wounds are worsening and ID /WOUND care consult requested .Septic workup sent ,found to have lactate elevation Admitted for septic workup and evaluation,send blood and urine cx,serial lactate levels,monitor vitals closley,ivfs hydration,monitor urine output and renal profile,iv abx initiated  .[facility] Palliative care consult requested ,to discuss advance directives and complete MOLST"    CXR 4/19/22: "Patient's chin obscures the apices. Shallow inspiration crowds the chest.  Heart magnified by technique. Minimal blunting left base laterally. Chest is similar to December 29, 2021. IMPRESSION: No change from prior."    CT Head 4/19/22 revealed "Moderate chronic microvascular changes without evidence of an acute transcortical infarction or hemorrhage." Chart reviewed order received for MBS.  Pt received as above.  MBS completed see below for details.  Pt left with Radiology staff awaiting transport to unit NAD, educated on rx's, however, pt stating she wants a regular diet with thin liquids, RN Estelle & Dr. Best notified.  Will follow.     Per charting, the patient is a "89 yo Female ,[facility] resident  sent from [facility] for eval of AMS and  her leg wounds on R leg.  She has hx of CVA  with R hemiplegia, dvt anemia, phlebitis, afib, hld, melanoma of skin, gerd depression, diverticulitis, uti, hypothyroid, pt is poor historian, per ems to the triage rn they adv pt may be septic due to wounds on her leg. pt has no complaints .As per [facility] med staff patient was sent to ED for evaluation of AMS ,associated with hypoxia and difficulties swallowing .Also her R leg wounds are worsening and ID /WOUND care consult requested .Septic workup sent ,found to have lactate elevation Admitted for septic workup and evaluation,send blood and urine cx,serial lactate levels,monitor vitals closley,ivfs hydration,monitor urine output and renal profile,iv abx initiated  .[facility] Palliative care consult requested ,to discuss advance directives and complete MOLST"    CXR 4/19/22: "Patient's chin obscures the apices. Shallow inspiration crowds the chest.  Heart magnified by technique. Minimal blunting left base laterally. Chest is similar to December 29, 2021. IMPRESSION: No change from prior."    CT Head 4/19/22 revealed "Moderate chronic microvascular changes without evidence of an acute transcortical infarction or hemorrhage."

## 2022-04-22 NOTE — PROGRESS NOTE ADULT - SUBJECTIVE AND OBJECTIVE BOX
89y year old Female seen at bedside for b/l LE wound with cellulitis. Pt complaining of pain to RLE,  improvement. Pt denies any fever, chills, nausea, vomiting, chest pain, shortness of breath, or calf pain at this time.    Allergies    No Known Allergies    Intolerances    MEDICATIONS  (STANDING):  baclofen 10 milliGRAM(s) Oral every 8 hours  cholecalciferol 1000 Unit(s) Oral daily  cyanocobalamin 1000 MICROGram(s) Oral daily  doxycycline hyclate Capsule 100 milliGRAM(s) Oral every 12 hours  escitalopram 20 milliGRAM(s) Oral daily  folic acid 1 milliGRAM(s) Oral daily  furosemide    Tablet 20 milliGRAM(s) Oral daily  gabapentin 300 milliGRAM(s) Oral every 8 hours  hydroxyurea 500 milliGRAM(s) Oral two times a day  lactobacillus acidophilus 1 Tablet(s) Oral two times a day  levothyroxine 50 MICROGram(s) Oral daily  MediHoney Gel Wound Dressing 1 Application(s) 1 Application(s) Topical every 3 days  metoprolol tartrate 25 milliGRAM(s) Oral two times a day  pantoprazole   Suspension 40 milliGRAM(s) Oral daily  piperacillin/tazobactam IVPB.. 3.375 Gram(s) IV Intermittent every 8 hours  potassium chloride   Powder 40 milliEquivalent(s) Oral every 4 hours  senna 1 Tablet(s) Oral at bedtime  simvastatin 10 milliGRAM(s) Oral at bedtime  traMADol 25 milliGRAM(s) Oral daily    MEDICATIONS  (PRN):  acetaminophen     Tablet .. 650 milliGRAM(s) Oral every 6 hours PRN Temp greater or equal to 38C (100.4F), Mild Pain (1 - 3)  aluminum hydroxide/magnesium hydroxide/simethicone Suspension 30 milliLiter(s) Oral every 4 hours PRN Dyspepsia  guaiFENesin Oral Liquid (Sugar-Free) 200 milliGRAM(s) Oral every 6 hours PRN Cough  melatonin 3 milliGRAM(s) Oral at bedtime PRN Insomnia  ondansetron Injectable 4 milliGRAM(s) IV Push every 8 hours PRN Nausea and/or Vomiting      ICU Vital Signs Last 24 Hrs  T(C): 37.3 (22 Apr 2022 13:32), Max: 37.6 (21 Apr 2022 20:12)  T(F): 99.2 (22 Apr 2022 13:32), Max: 99.7 (21 Apr 2022 20:12)  HR: 75 (22 Apr 2022 13:32) (71 - 101)  BP: 123/65 (22 Apr 2022 13:32) (123/65 - 133/82)  RR: 18 (22 Apr 2022 13:32) (18 - 18)  SpO2: 91% (22 Apr 2022 13:32) (91% - 96%)    CBC Full  -  ( 22 Apr 2022 07:32 )  WBC Count : 6.69 K/uL  RBC Count : 3.32 M/uL  Hemoglobin : 9.4 g/dL  Hematocrit : 32.0 %  Platelet Count - Automated : 563 K/uL  Mean Cell Volume : 96.4 fl  Mean Cell Hemoglobin : 28.3 pg  Mean Cell Hemoglobin Concentration : 29.4 gm/dL      PHYSICAL EXAM:  Vascular: DP palpable bilaterally & PT non-palpable bilaterally, Capillary refill 4 seconds, Digital hair absent bilaterally  Neurological: Light touch sensation intact bilaterally  Musculoskeletal: 5/5 strength in all quadrants bilaterally, AJ & STJ ROM intact  Dermatological: RLE 3 x 3 x 0.1 cm; 1 x 1 x 0.1 cm; 3 x 3 x 0.2 cm ulceration noted to the Right anterior leg (x2) and posterior leg, respectively , fibrotic wound bed with erythema and increase warmth to the RLE. No probe to bone, no drainage, no fluctuance, no malodor.  LLE 5 x 4 x 0.2 cm; ulceration noted to the Left anterior leg, fibrotic and overlying scab wound bed with mild erythema distally. No probe to bone, no drainage, no fluctuance, no malodor.     CBC Full  -  ( 22 Apr 2022 07:32 )  WBC Count : 6.69 K/uL  RBC Count : 3.32 M/uL  Hemoglobin : 9.4 g/dL  Hematocrit : 32.0 %  Platelet Count - Automated : 563 K/uL  Mean Cell Volume : 96.4 fl  Mean Cell Hemoglobin : 28.3 pg  Mean Cell Hemoglobin Concentration : 29.4 gm/dL      ACC: [id number] EXAM: US DPLX LWR EXT VEINS COMPL BI    PROCEDURE DATE: 04/19/2022        INTERPRETATION: CLINICAL INFORMATION: Leg pain with wounds. Evaluate for DVT.    COMPARISON: Venous duplex ultrasound 3/30/2022.    TECHNIQUE: Duplex sonography of the BILATERAL LOWER extremity veins with color and spectral Doppler, with and without compression.    FINDINGS:    RIGHT:    Normal compressibility of the right common femoral and popliteal veins. 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 One of the superficial femoral vein branches (posterior branch) demonstrates partial compressibility and thickening suggestive of chronic venous changes. No acute DVT is identified. Right posterior tibial vein is patent. Remaining calf veins are poorly visualized.    LEFT:    Normal compressibility of the LEFT common femoral, femoral and popliteal veins. Doppler examination shows normal spontaneous and phasic flow. Calf veins are poorly visualized.    IMPRESSION:      - . 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 Limited calf vein evaluation as above.    Please note that venous duplex ultrasound is only moderately sensitive for evaluation of calf vein thrombosis. If there are persistent symptoms and concern for central propagation of calf vein thrombus which may be unseen on this study, consider correlation with repeat venous duplex ultrasound in 7-10 days.    --- End of Report ---

## 2022-04-22 NOTE — PROVIDER CONTACT NOTE (CRITICAL VALUE NOTIFICATION) - TEST AND RESULT REPORTED:
LACTATE 2.1
aerobic bottle gram + cocci in clusters
BC -4/19/22, 1255 Amended - Growth in Aerobic btl staph epidermidis coag neg single set iso;ate possible contaminate contact microbiology if susceptibility testing clinically indicated growth in anerobic btl gram + cocci in clusters
LACTATE 2.9
INR 5.49

## 2022-04-22 NOTE — PROGRESS NOTE ADULT - ASSESSMENT
Patient is a 89 yo Female ,[facility] resident  sent from [facility] living for eval of AMS and  her leg wounds on R leg.  She has hx of CVA  with R hemiplegia, dvt anemia, phlebitis, afib, hld, melanoma of skin, gerd depression, diverticulitis, uti, hypothyroid, pt is poor historian, per ems to the triage rn they adv pt may be septic due to wounds on her leg. pt has no complaints .As per [facility] med staff patient was sent to ED for evaluation of AMS ,associated with hypoxia and difficulties swallowing .Also her R leg wounds are worsening and ID /WOUND care consult requested .Septic workup sent found to have lactate elevation Admitted for septic workup and evaluation,send blood and urine cx,serial lactate levels,monitor vitals closley,ivfs hydration,monitor urine output and renal profile,iv abx initiated  .[facility] Palliative care consult requested ,to discuss advance directives and complete MOLST

## 2022-04-22 NOTE — SWALLOW VFSS/MBS ASSESSMENT ADULT - DIAGNOSTIC IMPRESSIONS
Mild oral dysphagia with puree, soft & bite sized, mildly thick and thin liquids marked by reduced lingual ROM/strength/coordination, +posterior loss to oropharynx.  Mild-moderate oral dysphagia with regular solid marked by prolonged mastication time, reduced lingual ROM/strength/coordination, +posterior loss to oropharynx.  Trace-mild pharyngeal dysphagia with puree, soft & bite sized, regular solid and mildly thick liquids marked by reduced tongue base retraction, reduced hyolaryngeal excursion, reduced pharyngeal contractility, +trace-mild pharyngeal stasis cleared with subsequent swallow.  No penetration, no aspiration observed.  Mild-moderate pharyngeal dysphagia with thin liquids marked reduced tongue base retraction, reduced hyolaryngeal excursion, reduced pharyngeal contractility, reduced laryngeal elevation, reduced airway closure, +trace pharyngeal stasis cleared with subsequent swallow, +silent penetration above the vocal folds without clearance in 2/7 trials, +silent penetration above the vocal folds with clearance in 5/7 trials with head neutral and 3/3 trials with chin tuck.  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

## 2022-04-22 NOTE — PROGRESS NOTE ADULT - ASSESSMENT
AB MENDOZA 89 f 4/19/2022 3/15/1932 DR YURI CARRINGTON     REVIEW OF SYMPTOMS      Able to give (reliable) ROS  NO     PHYSICAL EXAM    HEENT Unremarkable  atraumatic   RESP Fair air entry EXP prolonged    Harsh breath sound Resp distres mild   CARDIAC S1 S2 No S3     NO JVD    ABDOMEN SOFT BS PRESENT NOT DISTENDED No hepatosplenomegaly   PEDAL EDEMA present No calf tenderness  NO rash       DOA/CC/PROBLEMS poa .  89 f  doa 4/19/2022   cc Patient is a 89yo female complaining of left lower leg wound possible infection and sepsis Patient is tachy and has ams since 4/18      PMH-PSH .  pmh DVT  pmh Anemia  pmh leg wounds  pmh A fib  pmh GERD  pmh     PROSTHETICS/TUBES/LINES.    NOTABLE HOME MEDS.  coumadin 2.5  enalapril 2.5  lasix 20      COVID/ICU/CODE STATUS.                       COVID  STATUS.           ICU STAY. none  GOC.      BEST PRACTICE ISSUES.                                                  HEAD OF BED ELEVATION. Yes  DVT PROPHYLAXIS.   4/19/2022 on coumadin at home   GONZALES PROPHYLAXIS.   4/19/2022 protonix 40                                                                                      DIET.  4/20/2022 mince moist    VITALS/PO/IO/VENT/DRIPS.    4/22/2022 100f 75 120/60      PROBLEM DATA/ASSESSMENT RECOMMENDATIONS (A/R).    HEMODYNAMICS.   Monitor bp Target MAP 65 (+)    RESP.   Monitor po Target po 90-95%    ABG.  4/19/2022 ra 745/40/68    PMH/PSH PROBLEMS.  Management continued/modified as indicated    OXYGEN REQUIREMENTS.  4/21/2022 ra 93%      INFECTION SOURCE.   Cellulitis lle poa 4/19/2022   INFECTION A/R.   Cellulitis lle on zosyn    INFECTION ANDREWS.  W 4/19-4/20-4/22/2022 w 9.6 - 7.1  - 6.6   pr 4/20/2022 .12   MICROBIO.  blod c 3/30 (-)   wound 4/19 staph enterococcus   blod c 4/19 mrse   ABIO.  4/19/2022 zosyn     Lacticemia  la 4/19/2022 2.1   monitor    ATELECTASIS RLL 4/19 ct   4/21/2022 chest pt    RO DVT.  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  tr 4/19/2022 tr 14   4/19/2022 simvastat 10  no active ischemia        HYPERAMMONEMIA.  Amm 4/19/2022 amm 39     ANEMIA.  Hb 4/19-4/20-4/21-4/22/2022 Hb 10.6 - 9.7 - 9.7 - 8.4   monito    RENAL.  Cr 4/19-4/22/2022 Cr .8 - .6   monitor    HYPOTHYROID.  4/20/2022 tsh 10   4/19/2022 levoxyl 25 -> 4/20 levox 50     OVERALL ISSUES.  LEG CELLULITIS   A fib   chronic dvt  coagulopathy    IV fl.  4/19/2022 d5 1/2 50      TIME SPENT   Over 25 minutes aggregate care time spent on encounter; activities included   direct patient care, counseling and/or coordinating care reviewing notes, lab data/ imaging , discussion with multidisciplinary team/ patient  /family and explaining in detail risks, benefits, alternatives  of the recommendations     AB MENDOZA 89 f 4/19/2022 3/15/1932 DR YURI CARRINGTON

## 2022-04-22 NOTE — PHARMACOTHERAPY INTERVENTION NOTE - COMMENTS
Patient ordered for Zosyn 3.375g q8 and doxycycline 100mg q12. Discussed therapy with ID Dr. Anguiano and  Inquired if MD would like to de-escalate from zosyn to cefepime, MD prefers to keep zosyn on for now. Suggested discontinuing doxycyline since cultures indicate MSSA, MD agreed to discontinue order after tonight's dose. Updated order to reflect change in therapy.

## 2022-04-22 NOTE — PROGRESS NOTE ADULT - ASSESSMENT
89 yo Female ,shelter resident  sent from [facility] assisted living for eval of AMS and  her leg wounds on R leg.  She has hx of CVA  with R hemiplegia, dvt anemia, phlebitis, afib, hld, melanoma of skin, gerd depression, diverticulitis, uti, hypothyroid, pt is poor historian, per ems to the triage rn they adv pt may be septic due to wounds on her leg    on emp ABX  ID following  monitor VS and Sat  assist with needs - ADL  pt is full code - dc planning - poss shelter - hospice discussion - GOC documented   SLP eval noted - PO Diet - HOB elev - asp prec - oral hygiene  prognosis guarded

## 2022-04-22 NOTE — SWALLOW VFSS/MBS ASSESSMENT ADULT - ADDITIONAL RECOMMENDATIONS
Dysphagia tx, as schedule permits 1) Dysphagia tx, as schedule permits  2) Consider GOC conversation regarding nutrition/hydration with pt/family, as pt requesting regular diet with thin liquids despite education

## 2022-04-22 NOTE — SWALLOW VFSS/MBS ASSESSMENT ADULT - RESIDUE IN VALLECULAE
Clears with subsequent swallow/Mild Cleared with subsequent swallow/Trace Reduced to trace with subsequent swallow/Mild

## 2022-04-22 NOTE — PROGRESS NOTE ADULT - SUBJECTIVE AND OBJECTIVE BOX
Date/Time Patient Seen:  		  Referring MD:   Data Reviewed	       Patient is a 89y old  Female who presents with a chief complaint of AMS (21 Apr 2022 18:51)      Subjective/HPI     PAST MEDICAL & SURGICAL HISTORY:  Hypertension    CVA (cerebral vascular accident)    Depression    Constipation    Neuropathy    Constipation    Hyperlipidemia    Grade I diastolic dysfunction    Afib    Neuropathy    VHD (valvular heart disease)    Aortic valvar stenosis    No significant past surgical history          Medication list         MEDICATIONS  (STANDING):  baclofen 10 milliGRAM(s) Oral every 8 hours  cholecalciferol 1000 Unit(s) Oral daily  cyanocobalamin 1000 MICROGram(s) Oral daily  doxycycline hyclate Capsule 100 milliGRAM(s) Oral every 12 hours  escitalopram 20 milliGRAM(s) Oral daily  folic acid 1 milliGRAM(s) Oral daily  furosemide    Tablet 20 milliGRAM(s) Oral daily  gabapentin 300 milliGRAM(s) Oral every 8 hours  hydroxyurea 500 milliGRAM(s) Oral two times a day  lactobacillus acidophilus 1 Tablet(s) Oral two times a day  levothyroxine 50 MICROGram(s) Oral daily  MediHoney Gel Wound Dressing 1 Application(s) 1 Application(s) Topical every 3 days  metoprolol tartrate 25 milliGRAM(s) Oral two times a day  pantoprazole   Suspension 40 milliGRAM(s) Oral daily  piperacillin/tazobactam IVPB.. 3.375 Gram(s) IV Intermittent every 8 hours  senna 1 Tablet(s) Oral at bedtime  simvastatin 10 milliGRAM(s) Oral at bedtime    MEDICATIONS  (PRN):  acetaminophen     Tablet .. 650 milliGRAM(s) Oral every 6 hours PRN Temp greater or equal to 38C (100.4F), Mild Pain (1 - 3)  aluminum hydroxide/magnesium hydroxide/simethicone Suspension 30 milliLiter(s) Oral every 4 hours PRN Dyspepsia  guaiFENesin Oral Liquid (Sugar-Free) 200 milliGRAM(s) Oral every 6 hours PRN Cough  melatonin 3 milliGRAM(s) Oral at bedtime PRN Insomnia  ondansetron Injectable 4 milliGRAM(s) IV Push every 8 hours PRN Nausea and/or Vomiting  traMADol 50 milliGRAM(s) Oral four times a day PRN Moderate Pain (4 - 6)         Vitals log        ICU Vital Signs Last 24 Hrs  T(C): 36.8 (22 Apr 2022 04:50), Max: 37.6 (21 Apr 2022 20:12)  T(F): 98.3 (22 Apr 2022 04:50), Max: 99.7 (21 Apr 2022 20:12)  HR: 80 (22 Apr 2022 04:50) (80 - 101)  BP: 133/82 (22 Apr 2022 04:50) (130/72 - 133/82)  BP(mean): --  ABP: --  ABP(mean): --  RR: 18 (22 Apr 2022 04:50) (18 - 18)  SpO2: 96% (22 Apr 2022 04:50) (92% - 96%)           Input and Output:  I&O's Detail    20 Apr 2022 07:01  -  21 Apr 2022 07:00  --------------------------------------------------------  IN:    dextrose 5% + sodium chloride 0.45%: 600 mL  Total IN: 600 mL    OUT:    Voided (mL): 1300 mL  Total OUT: 1300 mL    Total NET: -700 mL          Lab Data                        9.7    6.91  )-----------( 630      ( 21 Apr 2022 07:36 )             32.8     04-21    144  |  109<H>  |  6<L>  ----------------------------<  128<H>  4.1   |  30  |  0.69    Ca    8.4<L>      21 Apr 2022 07:36    TPro  6.2  /  Alb  2.2<L>  /  TBili  0.4  /  DBili  x   /  AST  15  /  ALT  12  /  AlkPhos  54  04-20            Review of Systems	      Objective     Physical Examination    heart s1s2  lung dec BS  abd soft      Pertinent Lab findings & Imaging      Summer:  NO   Adequate UO     I&O's Detail    20 Apr 2022 07:01  -  21 Apr 2022 07:00  --------------------------------------------------------  IN:    dextrose 5% + sodium chloride 0.45%: 600 mL  Total IN: 600 mL    OUT:    Voided (mL): 1300 mL  Total OUT: 1300 mL    Total NET: -700 mL               Discussed with:     Cultures:	        Radiology

## 2022-04-22 NOTE — SWALLOW VFSS/MBS ASSESSMENT ADULT - SLP GENERAL OBSERVATIONS
Pt received in Radiology upright on Cine chair A&A Ox2, reduced cognition, on RA SpO2 93-95%, pain scale 0/10 pre & post eval

## 2022-04-22 NOTE — PHARMACOTHERAPY INTERVENTION NOTE - INTERVENTION CATEGORIES
Awaiting HPV.    Zara Ramirez RN BSN, Pap Tracking  
Please inform of results.  Vitamin D and fasting blood sugar normal.  Cholesterol elevated --total, LDL and triglycerides all above recommended goal.  For LDL, needs to watch trans and saturated fat intake, increase fruits and vegetable intake, add more healthy fats including fish or fish oil to diet.  For TGs, also needs to watch sugar and carb intake.  Work on weight loss.  Limit alcohol use.  Will plan to repeat fasting labs again in one year at annual exam
ASP

## 2022-04-22 NOTE — PROGRESS NOTE ADULT - SUBJECTIVE AND OBJECTIVE BOX
PROGRESS NOTE  Patient is a 89y old  Female who presents with a chief complaint of AMS (22 Apr 2022 11:13)  Chart and available morning labs /imaging are reviewed electronically , urgent issues addressed . More information  is being added upon completion of rounds , when more information is collected and management discussed with consultants , medical staff and social service/case management on the floor   OVERNIGHT  No new issues reported by medical staff . All above noted Patient is resting in a bed comfortably .Confused ,poor mentation .No distress noted   Denies complains MBS noted and diet adjusted DENTON placement d/w sw on 2 N and son on a phone   HPI:  Patient is a 89 yo Female ,[facility] resident  sent from [facility] for eval of AMS and  her leg wounds on R leg.  She has hx of CVA  with R hemiplegia, dvt anemia, phlebitis, afib, hld, melanoma of skin, gerd depression, diverticulitis, uti, hypothyroid, pt is poor historian, per ems to the triage rn they adv pt may be septic due to wounds on her leg. pt has no complaints .As per [facility] med staff patient was sent to ED for evaluation of AMS ,associated with hypoxia and difficulties swallowing .Also her R leg wounds are worsening and ID /WOUND care consult requested .Septic workup sent ,found to have lactate elevation Admitted for septic workup and evaluation,send blood and urine cx,serial lactate levels,monitor vitals closley,ivfs hydration,monitor urine output and renal profile,iv abx initiated  .[facility] Palliative care consult requested ,to discuss advance directives and complete MOLST  (19 Apr 2022 17:08)    PAST MEDICAL & SURGICAL HISTORY:  Hypertension    CVA (cerebral vascular accident)    Depression    Constipation    Neuropathy    Hyperlipidemia    Grade I diastolic dysfunction    Afib    Neuropathy    VHD (valvular heart disease)    Aortic valvar stenosis    No significant past surgical history        MEDICATIONS  (STANDING):  baclofen 10 milliGRAM(s) Oral every 8 hours  cholecalciferol 1000 Unit(s) Oral daily  cyanocobalamin 1000 MICROGram(s) Oral daily  doxycycline hyclate Capsule 100 milliGRAM(s) Oral every 12 hours  escitalopram 20 milliGRAM(s) Oral daily  folic acid 1 milliGRAM(s) Oral daily  furosemide    Tablet 20 milliGRAM(s) Oral daily  gabapentin 300 milliGRAM(s) Oral every 8 hours  hydroxyurea 500 milliGRAM(s) Oral two times a day  lactobacillus acidophilus 1 Tablet(s) Oral two times a day  levothyroxine 50 MICROGram(s) Oral daily  MediHoney Gel Wound Dressing 1 Application(s) 1 Application(s) Topical every 3 days  metoprolol tartrate 25 milliGRAM(s) Oral two times a day  pantoprazole   Suspension 40 milliGRAM(s) Oral daily  piperacillin/tazobactam IVPB.. 3.375 Gram(s) IV Intermittent every 8 hours  potassium chloride   Powder 40 milliEquivalent(s) Oral every 4 hours  senna 1 Tablet(s) Oral at bedtime  simvastatin 10 milliGRAM(s) Oral at bedtime  traMADol 25 milliGRAM(s) Oral daily    MEDICATIONS  (PRN):  acetaminophen     Tablet .. 650 milliGRAM(s) Oral every 6 hours PRN Temp greater or equal to 38C (100.4F), Mild Pain (1 - 3)  aluminum hydroxide/magnesium hydroxide/simethicone Suspension 30 milliLiter(s) Oral every 4 hours PRN Dyspepsia  guaiFENesin Oral Liquid (Sugar-Free) 200 milliGRAM(s) Oral every 6 hours PRN Cough  melatonin 3 milliGRAM(s) Oral at bedtime PRN Insomnia  ondansetron Injectable 4 milliGRAM(s) IV Push every 8 hours PRN Nausea and/or Vomiting      OBJECTIVE    T(C): 37.3 (04-22-22 @ 13:32), Max: 37.6 (04-21-22 @ 20:12)  HR: 75 (04-22-22 @ 13:32) (71 - 101)  BP: 123/65 (04-22-22 @ 13:32) (123/65 - 133/82)  RR: 18 (04-22-22 @ 13:32) (18 - 18)  SpO2: 91% (04-22-22 @ 13:32) (91% - 96%)  Wt(kg): --  I&O's Summary    21 Apr 2022 07:01  -  22 Apr 2022 07:00  --------------------------------------------------------  IN: 0 mL / OUT: 600 mL / NET: -600 mL          REVIEW OF SYSTEMS:  CONSTITUTIONAL: No fever, weight loss, or fatigue  EYES: No eye pain, visual disturbances, or discharge  ENMT:   No sinus or throat pain  NECK: No pain or stiffness  RESPIRATORY: No cough, wheezing, chills or hemoptysis; No shortness of breath  CARDIOVASCULAR: No chest pain, palpitations, dizziness, or leg swelling  GASTROINTESTINAL: No abdominal pain. No nausea, vomiting; No diarrhea or constipation. No melena or hematochezia.  GENITOURINARY: No dysuria, frequency, hematuria, or incontinence  NEUROLOGICAL: No headaches, memory loss, loss of strength, numbness, or tremors  SKIN: No itching, burning, rashes, or lesions   MUSCULOSKELETAL: No joint pain or swelling; No muscle, back, or extremity pain    PHYSICAL EXAM:  Appearance: NAD. VS past 24 hrs -as above   HEENT:   Moist oral mucosa. Conjunctiva clear b/l.   Neck : supple  Respiratory: Lungs CTAB.  Gastrointestinal:  Soft, nontender. No rebound. No rigidity. BS present	  Cardiovascular: RRR ,S1S2 present  Neurologic: Non-focal. Moving all extremities.  Extremities: No edema. No erythema. No calf tenderness. Leg venous ulcers   Skin: No rashes, No ecchymoses, No cyanosis.	  wounds ,skin lesions-See skin assesment flow sheet   LABS:                        9.4    6.69  )-----------( 563      ( 22 Apr 2022 07:32 )             32.0     04-22    145  |  108  |  6<L>  ----------------------------<  128<H>  3.2<L>   |  28  |  0.68    Ca    8.4<L>      22 Apr 2022 07:32      CAPILLARY BLOOD GLUCOSE        PT/INR - ( 22 Apr 2022 07:32 )   PT: 49.2 sec;   INR: 4.15 ratio               Culture - Blood (collected 21 Apr 2022 11:16)  Source: .Blood Blood  Preliminary Report (22 Apr 2022 12:01):    No growth to date.    Culture - Urine (collected 20 Apr 2022 20:54)  Source: Clean Catch Clean Catch (Midstream)  Final Report (22 Apr 2022 00:34):    No growth    Culture - Other (collected 19 Apr 2022 21:19)  Source: Wound Wound  Final Report (21 Apr 2022 17:09):    Numerous Staphylococcus aureus    Few Enterobacter cloacae complex  Organism: Staphylococcus aureus  Enterobacter cloacae complex (21 Apr 2022 17:09)  Organism: Enterobacter cloacae complex (21 Apr 2022 17:09)  Organism: Staphylococcus aureus (21 Apr 2022 17:09)    Culture - Blood (collected 19 Apr 2022 18:07)  Source: .Blood Blood-Peripheral  Gram Stain (22 Apr 2022 12:35):    Growth in aerobic bottle: Gram Positive Cocci in Clusters    Growth in anaerobic bottle: Gram Positive Cocci in Clusters  Final Report (22 Apr 2022 12:35):    Growth in aerobic bottle: Staphylococcus epidermidis    Coag Negative Staphylococcus    Single set isolate, possible contaminant. Contact    Microbiology if susceptibility testing clinically    indicated.    Growth in anaerobic bottle: Gram Positive Cocci inClusters    ***Blood Panel PCR results on this specimen are available    approximately 3 hours after the Gram stain result.***    Gram stain, PCR, and/or culture results may not always    correspond due to difference in methodologies.    ************************************************************    This PCR assay was performed by multiplex PCR. This    Assay tests for 66 bacterial and resistance gene targets.    Please refer to the [facility] test directory    at [email or web address] for details.  Organism: Blood Culture PCR (22 Apr 2022 12:35)  Organism: Blood Culture PCR (22 Apr 2022 12:35)    Culture - Blood (collected 19 Apr 2022 18:07)  Source: .Blood Blood-Peripheral  Gram Stain (21 Apr 2022 11:44):    Growth in aerobic bottle: Gram Positive Cocci in Clusters    Growth in anaerobic bottle: Gram Positive Cocci in Clusters  Preliminary Report (22 Apr 2022 09:46):    Growth in aerobic bottle: Staphylococcus pettenkoferi    Coag Negative Staphylococcus    Single set isolate, possible contaminant. Contact    Microbiology if susceptibility testing clinically    indicated.    Growth in anaerobic bottle: Staphylococcus epidermidis Susceptibility to    follow.      RADIOLOGY & ADDITIONAL TESTS:< from: Xray Cinesophagram Swallow Function w/ Contrast (04.22.22 @ 11:16) >  ACC: [id number] EXAM:  XR SWAL FUNC ROSIE VID CON STDY                          PROCEDURE DATE:  04/22/2022          INTERPRETATION:  MODIFIED BARIUM SWALLOW    CLINICAL INFORMATION: Dysphasia.    PROCEDURE: Fluoroscopy provided to speech-language pathology service for   evaluation of dysphagia.    FINDINGS/  IMPRESSION:    Please see speech/language pathology report for further detail.    --- End of Report ---    < end of copied text >  · Successful Strategies Trialed During Procedure	chin tuck; Max cues required to complete due to reduced cognition    Recommendations:   · Diagnostic Impressions	Mild oral dysphagia with puree, soft & bite sized, mildly thick and thin liquids marked by reduced lingual ROM/strength/coordination, +posterior loss to oropharynx.  Mild-moderate oral dysphagia with regular solid marked by prolonged mastication time, reduced lingual ROM/strength/coordination, +posterior loss to oropharynx.  Trace-mild pharyngeal dysphagia with puree, soft & bite sized, regular solid and mildly thick liquids marked by reduced tongue base retraction, reduced hyolaryngeal excursion, reduced pharyngeal contractility, +trace-mild pharyngeal stasis cleared with subsequent swallow.  No penetration, no aspiration observed.  Mild-moderate pharyngeal dysphagia with thin liquids marked reduced tongue base retraction, reduced hyolaryngeal excursion, reduced pharyngeal contractility, reduced laryngeal elevation, reduced airway closure, +trace pharyngeal stasis cleared with subsequent swallow, +silent penetration above the vocal folds without clearance in 2/7 trials, +silent penetration above the vocal folds with clearance in 5/7 trials with head neutral and 3/3 trials with chin tuck.  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  · Recommended Consistencies	Soft & bite sized with Mildly thick liquids  · Recommended Feeding/Eating Techniques	allow for swallow between intakes; crush medication (when feasible); maintain upright posture during/after eating for 30 mins; oral hygiene; position upright (90 degrees); small sips/bites  · Feeding Assistance	Supervision and assistance with all PO  · Aspiration Precautions	yes  · Monitor for Signs of Aspiration	change of breathing pattern; oral hygiene; position upright (90Y); cough; gurgly voice; fever; pneumonia; throat clearing; upper respiratory infection  · Demonstrates Need for Referral to Another Service	RD services to ensure adequate daily caloric nutritional intake  · Additional Recommendations	1) Dysphagia tx, as schedule permits  2) Consider GOC conversation regarding nutrition/hydration with pt/family, as pt requesting regular diet with thin liquids despite education  · The above findings were discussed with	Physician; Nursing; Patient  	  25 minutes aggregate time was spent on this visit, 50% visit time spent in care co-ordination with other attendings and counselling patient .I have discussed care plan with patient / HCP/family member natail Acevedo on a phone  ,who expressed understanding of problems treatment and their effect and side effects, alternatives in details. I have asked if they have any questions and concerns and appropriately addressed them to best of my ability. Advance care planning was discussed , pallitaive care issues ,CMO ,hospice levels of care were discussed in details , forms ,advance directives were reviewed .All questions were answered to the best of my knowledge .Additional 25 min spent. Son requested DENTON placement ,he states that [facility] is not able to provide proper wound care "thats why leg ulcers are not healing "

## 2022-04-22 NOTE — SWALLOW VFSS/MBS ASSESSMENT ADULT - ORAL PHASE
Uncontrolled bolus / spillover in kathryn-pharynx intermittent piecemeal deglutition/Uncontrolled bolus / spillover in kathryn-pharynx

## 2022-04-22 NOTE — SWALLOW VFSS/MBS ASSESSMENT ADULT - ROSENBEK'S PENETRATION ASPIRATION SCALE
(1) no aspiration, contrast does not enter airway in 2/7 trials; 2 = contrast enters airway, remains above the vocal cords, no residue remains in 5/7 trials with head neutral and 3/3 trials with chin tuck/(3) contrast remains above the vocal cords, visible residue remains (penetration)

## 2022-04-22 NOTE — PROGRESS NOTE ADULT - SUBJECTIVE AND OBJECTIVE BOX
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  She has hx of CVA  with R hemiplegia, dvt anemia, phlebitis, afib, hld, melanoma of skin, gerd depression, diverticulitis, uti, hypothyroid, pt is poor historian, per ems to the triage rn they adv pt may be septic due to wounds on her leg. pt has no complaints .As per [facility] med staff patient was sent to ED for evaluation of AMS ,associated with hypoxia and difficulties swallowing .Also her R leg wounds are worsening and ID /WOUND care consult requested .Septic workup sent ,found to have lactate elevation Admitted for septic workup and evaluation,send blood and urine cx,serial lactate levels,monitor vitals closley,ivfs hydration,monitor urine output and renal profile,iv abx initiated  .[facility] Palliative care consult requested ,to discuss advance directives and complete MOLST  (19 Apr 2022 17:08)      REVIEW OF SYSTEMS:    CONSTITUTIONAL: No fever, weight loss, or fatigue  EYES: No eye pain, visual disturbances, or discharge  ENMT:  No difficulty hearing, tinnitus, vertigo; No sinus or throat pain  NECK: No pain or stiffness  BREASTS: No pain, masses, or nipple discharge  RESPIRATORY: No cough, wheezing, chills or hemoptysis; No shortness of breath  CARDIOVASCULAR: No chest pain, palpitations, dizziness, or leg swelling  GASTROINTESTINAL: No abdominal or epigastric pain. No nausea, vomiting, or hematemesis; No diarrhea or constipation. No melena or hematochezia.  GENITOURINARY: No dysuria, frequency, hematuria, or incontinence  NEUROLOGICAL: No headaches, memory loss, loss of strength, numbness, or tremors  SKIN: No itching, burning, rashes, or lesions   LYMPH NODES: No enlarged glands  ENDOCRINE: No heat or cold intolerance; No hair loss  MUSCULOSKELETAL: No joint pain or swelling; No muscle, back, or extremity pain  PSYCHIATRIC: No depression, anxiety, mood swings, or difficulty sleeping  HEME/LYMPH: No easy bruising, or bleeding gums  ALLERGY AND IMMUNOLOGIC: No hives or eczema    MEDICATIONS  (STANDING):  baclofen 10 milliGRAM(s) Oral every 8 hours  cholecalciferol 1000 Unit(s) Oral daily  cyanocobalamin 1000 MICROGram(s) Oral daily  doxycycline hyclate Capsule 100 milliGRAM(s) Oral every 12 hours  escitalopram 20 milliGRAM(s) Oral daily  folic acid 1 milliGRAM(s) Oral daily  furosemide    Tablet 20 milliGRAM(s) Oral daily  gabapentin 300 milliGRAM(s) Oral every 8 hours  hydroxyurea 500 milliGRAM(s) Oral two times a day  lactobacillus acidophilus 1 Tablet(s) Oral two times a day  levothyroxine 50 MICROGram(s) Oral daily  MediHoney Gel Wound Dressing 1 Application(s) 1 Application(s) Topical every 3 days  metoprolol tartrate 25 milliGRAM(s) Oral two times a day  pantoprazole   Suspension 40 milliGRAM(s) Oral daily  piperacillin/tazobactam IVPB.. 3.375 Gram(s) IV Intermittent every 8 hours  senna 1 Tablet(s) Oral at bedtime  simvastatin 10 milliGRAM(s) Oral at bedtime    MEDICATIONS  (PRN):  acetaminophen     Tablet .. 650 milliGRAM(s) Oral every 6 hours PRN Temp greater or equal to 38C (100.4F), Mild Pain (1 - 3)  aluminum hydroxide/magnesium hydroxide/simethicone Suspension 30 milliLiter(s) Oral every 4 hours PRN Dyspepsia  guaiFENesin Oral Liquid (Sugar-Free) 200 milliGRAM(s) Oral every 6 hours PRN Cough  melatonin 3 milliGRAM(s) Oral at bedtime PRN Insomnia  ondansetron Injectable 4 milliGRAM(s) IV Push every 8 hours PRN Nausea and/or Vomiting  traMADol 50 milliGRAM(s) Oral four times a day PRN Moderate Pain (4 - 6)      ALLERGIES: No Known Allergies      FAMILY HISTORY:      PHYSICAL EXAMINATION:  -----------------------------  T(C): 36.8 (04-22-22 @ 04:50), Max: 37.6 (04-21-22 @ 20:12)  HR: 80 (04-22-22 @ 04:50) (80 - 101)  BP: 133/82 (04-22-22 @ 04:50) (130/72 - 133/82)  RR: 18 (04-22-22 @ 04:50) (18 - 18)  SpO2: 96% (04-22-22 @ 04:50) (92% - 96%)  Wt(kg): --    04-21 @ 07:01  -  04-22 @ 07:00  --------------------------------------------------------  IN:  Total IN: 0 mL    OUT:    Voided (mL): 600 mL  Total OUT: 600 mL    Total NET: -600 mL            VITALS  T(C): 36.8 (04-22-22 @ 04:50), Max: 37.6 (04-21-22 @ 20:12)  HR: 80 (04-22-22 @ 04:50) (80 - 101)  BP: 133/82 (04-22-22 @ 04:50) (130/72 - 133/82)  RR: 18 (04-22-22 @ 04:50) (18 - 18)  SpO2: 96% (04-22-22 @ 04:50) (92% - 96%)    Constitutional: well developed, normal appearance, well groomed, well nourished, no deformities and no acute distress.   Eyes: the conjunctiva exhibited no abnormalities and the eyelids demonstrated no xanthelasmas.   HEENT: normal oral mucosa, no oral pallor and no oral cyanosis.   Neck: normal jugular venous A waves present, normal jugular venous V waves present and no jugular venous castillo A waves.   Pulmonary: no respiratory distress, normal respiratory rhythm and effort, no accessory muscle use and lungs were clear to auscultation bilaterally.   Cardiovascular: heart rate and rhythm were normal, normal S1 and S2 and no murmur, gallop, rub, heave or thrill are present.   Abdomen: soft, non-tender, no hepato-splenomegaly and no abdominal mass palpated.   Musculoskeletal: the gait could not be assessed..   Extremities: no clubbing of the fingernails, no localized cyanosis, no petechial hemorrhages and no ischemic changes.   Skin: normal skin color and pigmentation, no rash, no venous stasis, no skin lesions, no skin ulcer and no xanthoma was observed.   Psychiatric: oriented to person, place, and time, the affect was normal, the mood was normal and not feeling anxious.     LABS:   --------  04-22    145  |  108  |  6<L>  ----------------------------<  128<H>  3.2<L>   |  28  |  0.68    Ca    8.4<L>      22 Apr 2022 07:32    TPro  6.2  /  Alb  2.2<L>  /  TBili  0.4  /  DBili  x   /  AST  15  /  ALT  12  /  AlkPhos  54  04-20                         9.4    6.69  )-----------( 563      ( 22 Apr 2022 07:32 )             32.0     PT/INR - ( 21 Apr 2022 07:36 )   PT: 54.8 sec;   INR: 4.61 ratio                 Culture Results:   No growth (04-20 @ 20:54)      RADIOLOGY:  -----------------    ECG:     ECHO:

## 2022-04-23 LAB
-  AMPICILLIN/SULBACTAM: SIGNIFICANT CHANGE UP
-  CEFAZOLIN: SIGNIFICANT CHANGE UP
-  CLINDAMYCIN: SIGNIFICANT CHANGE UP
-  ERYTHROMYCIN: SIGNIFICANT CHANGE UP
-  GENTAMICIN: SIGNIFICANT CHANGE UP
-  OXACILLIN: SIGNIFICANT CHANGE UP
-  PENICILLIN: SIGNIFICANT CHANGE UP
-  RIFAMPIN: SIGNIFICANT CHANGE UP
-  TETRACYCLINE: SIGNIFICANT CHANGE UP
-  TRIMETHOPRIM/SULFAMETHOXAZOLE: SIGNIFICANT CHANGE UP
-  VANCOMYCIN: SIGNIFICANT CHANGE UP
ANION GAP SERPL CALC-SCNC: 8 MMOL/L — SIGNIFICANT CHANGE UP (ref 5–17)
BUN SERPL-MCNC: 9 MG/DL — SIGNIFICANT CHANGE UP (ref 7–23)
CALCIUM SERPL-MCNC: 8.5 MG/DL — SIGNIFICANT CHANGE UP (ref 8.5–10.1)
CHLORIDE SERPL-SCNC: 109 MMOL/L — HIGH (ref 96–108)
CO2 SERPL-SCNC: 27 MMOL/L — SIGNIFICANT CHANGE UP (ref 22–31)
CREAT SERPL-MCNC: 0.69 MG/DL — SIGNIFICANT CHANGE UP (ref 0.5–1.3)
CULTURE RESULTS: SIGNIFICANT CHANGE UP
CULTURE RESULTS: SIGNIFICANT CHANGE UP
EGFR: 82 ML/MIN/1.73M2 — SIGNIFICANT CHANGE UP
GLUCOSE SERPL-MCNC: 86 MG/DL — SIGNIFICANT CHANGE UP (ref 70–99)
HCT VFR BLD CALC: 33.4 % — LOW (ref 34.5–45)
HGB BLD-MCNC: 9.6 G/DL — LOW (ref 11.5–15.5)
INR BLD: 3.41 RATIO — HIGH (ref 0.88–1.16)
MCHC RBC-ENTMCNC: 28 PG — SIGNIFICANT CHANGE UP (ref 27–34)
MCHC RBC-ENTMCNC: 28.7 GM/DL — LOW (ref 32–36)
MCV RBC AUTO: 97.4 FL — SIGNIFICANT CHANGE UP (ref 80–100)
METHOD TYPE: SIGNIFICANT CHANGE UP
NRBC # BLD: 0 /100 WBCS — SIGNIFICANT CHANGE UP (ref 0–0)
ORGANISM # SPEC MICROSCOPIC CNT: SIGNIFICANT CHANGE UP
ORGANISM # SPEC MICROSCOPIC CNT: SIGNIFICANT CHANGE UP
PLATELET # BLD AUTO: 675 K/UL — HIGH (ref 150–400)
POTASSIUM SERPL-MCNC: 3.8 MMOL/L — SIGNIFICANT CHANGE UP (ref 3.5–5.3)
POTASSIUM SERPL-SCNC: 3.8 MMOL/L — SIGNIFICANT CHANGE UP (ref 3.5–5.3)
PROTHROM AB SERPL-ACNC: 40.4 SEC — HIGH (ref 10.5–13.4)
RBC # BLD: 3.43 M/UL — LOW (ref 3.8–5.2)
RBC # FLD: 21.1 % — HIGH (ref 10.3–14.5)
SODIUM SERPL-SCNC: 144 MMOL/L — SIGNIFICANT CHANGE UP (ref 135–145)
SPECIMEN SOURCE: SIGNIFICANT CHANGE UP
WBC # BLD: 7.43 K/UL — SIGNIFICANT CHANGE UP (ref 3.8–10.5)
WBC # FLD AUTO: 7.43 K/UL — SIGNIFICANT CHANGE UP (ref 3.8–10.5)

## 2022-04-23 RX ADMIN — Medication 200 MILLIGRAM(S): at 12:46

## 2022-04-23 RX ADMIN — GABAPENTIN 300 MILLIGRAM(S): 400 CAPSULE ORAL at 06:55

## 2022-04-23 RX ADMIN — Medication 1 TABLET(S): at 06:56

## 2022-04-23 RX ADMIN — PIPERACILLIN AND TAZOBACTAM 25 GRAM(S): 4; .5 INJECTION, POWDER, LYOPHILIZED, FOR SOLUTION INTRAVENOUS at 22:22

## 2022-04-23 RX ADMIN — PIPERACILLIN AND TAZOBACTAM 25 GRAM(S): 4; .5 INJECTION, POWDER, LYOPHILIZED, FOR SOLUTION INTRAVENOUS at 13:03

## 2022-04-23 RX ADMIN — PANTOPRAZOLE SODIUM 40 MILLIGRAM(S): 20 TABLET, DELAYED RELEASE ORAL at 12:35

## 2022-04-23 RX ADMIN — HYDROXYUREA 500 MILLIGRAM(S): 500 CAPSULE ORAL at 06:56

## 2022-04-23 RX ADMIN — SIMVASTATIN 10 MILLIGRAM(S): 20 TABLET, FILM COATED ORAL at 22:21

## 2022-04-23 RX ADMIN — Medication 3 MILLIGRAM(S): at 22:21

## 2022-04-23 RX ADMIN — Medication 25 MILLIGRAM(S): at 17:27

## 2022-04-23 RX ADMIN — Medication 1 MILLIGRAM(S): at 12:36

## 2022-04-23 RX ADMIN — PREGABALIN 1000 MICROGRAM(S): 225 CAPSULE ORAL at 12:58

## 2022-04-23 RX ADMIN — HYDROXYUREA 500 MILLIGRAM(S): 500 CAPSULE ORAL at 17:27

## 2022-04-23 RX ADMIN — Medication 1 TABLET(S): at 17:27

## 2022-04-23 RX ADMIN — GABAPENTIN 300 MILLIGRAM(S): 400 CAPSULE ORAL at 13:02

## 2022-04-23 RX ADMIN — Medication 650 MILLIGRAM(S): at 14:42

## 2022-04-23 RX ADMIN — PIPERACILLIN AND TAZOBACTAM 25 GRAM(S): 4; .5 INJECTION, POWDER, LYOPHILIZED, FOR SOLUTION INTRAVENOUS at 06:55

## 2022-04-23 RX ADMIN — Medication 25 MILLIGRAM(S): at 06:56

## 2022-04-23 RX ADMIN — GABAPENTIN 300 MILLIGRAM(S): 400 CAPSULE ORAL at 22:22

## 2022-04-23 RX ADMIN — TRAMADOL HYDROCHLORIDE 25 MILLIGRAM(S): 50 TABLET ORAL at 22:23

## 2022-04-23 RX ADMIN — Medication 10 MILLIGRAM(S): at 13:02

## 2022-04-23 RX ADMIN — TRAMADOL HYDROCHLORIDE 25 MILLIGRAM(S): 50 TABLET ORAL at 23:23

## 2022-04-23 RX ADMIN — Medication 10 MILLIGRAM(S): at 22:21

## 2022-04-23 RX ADMIN — Medication 10 MILLIGRAM(S): at 06:56

## 2022-04-23 RX ADMIN — ESCITALOPRAM OXALATE 20 MILLIGRAM(S): 10 TABLET, FILM COATED ORAL at 12:58

## 2022-04-23 RX ADMIN — Medication 20 MILLIGRAM(S): at 06:56

## 2022-04-23 RX ADMIN — Medication 1000 UNIT(S): at 12:36

## 2022-04-23 RX ADMIN — SENNA PLUS 1 TABLET(S): 8.6 TABLET ORAL at 22:20

## 2022-04-23 RX ADMIN — Medication 650 MILLIGRAM(S): at 15:32

## 2022-04-23 RX ADMIN — Medication 50 MICROGRAM(S): at 06:56

## 2022-04-23 NOTE — PROGRESS NOTE ADULT - SUBJECTIVE AND OBJECTIVE BOX
Interval History:    CENTRAL LINE:   [  ] YES       [  ] NO  MUIR:                 [  ] YES       [  ] NO         REVIEW OF SYSTEMS:  All Systems below were reviewed and are negative [  ]  HEENT:  ID:  Pulmonary:  Cardiac:  GI:  Renal:  Musculoskeletal:  All other systems above were reviewed and are negative   [  ]      MEDICATIONS  (STANDING):  baclofen 10 milliGRAM(s) Oral every 8 hours  cholecalciferol 1000 Unit(s) Oral daily  cyanocobalamin 1000 MICROGram(s) Oral daily  escitalopram 20 milliGRAM(s) Oral daily  folic acid 1 milliGRAM(s) Oral daily  furosemide    Tablet 20 milliGRAM(s) Oral daily  gabapentin 300 milliGRAM(s) Oral every 8 hours  hydroxyurea 500 milliGRAM(s) Oral two times a day  lactobacillus acidophilus 1 Tablet(s) Oral two times a day  levothyroxine 50 MICROGram(s) Oral daily  MediHoney Gel Wound Dressing 1 Application(s) 1 Application(s) Topical every 3 days  metoprolol tartrate 25 milliGRAM(s) Oral two times a day  pantoprazole   Suspension 40 milliGRAM(s) Oral daily  piperacillin/tazobactam IVPB.. 3.375 Gram(s) IV Intermittent every 8 hours  senna 1 Tablet(s) Oral at bedtime  simvastatin 10 milliGRAM(s) Oral at bedtime  traMADol 25 milliGRAM(s) Oral daily    MEDICATIONS  (PRN):  acetaminophen     Tablet .. 650 milliGRAM(s) Oral every 6 hours PRN Temp greater or equal to 38C (100.4F), Mild Pain (1 - 3)  aluminum hydroxide/magnesium hydroxide/simethicone Suspension 30 milliLiter(s) Oral every 4 hours PRN Dyspepsia  guaiFENesin Oral Liquid (Sugar-Free) 200 milliGRAM(s) Oral every 6 hours PRN Cough  melatonin 3 milliGRAM(s) Oral at bedtime PRN Insomnia  ondansetron Injectable 4 milliGRAM(s) IV Push every 8 hours PRN Nausea and/or Vomiting      Vital Signs Last 24 Hrs  T(C): 36.3 (23 Apr 2022 20:25), Max: 36.9 (23 Apr 2022 05:13)  T(F): 97.3 (23 Apr 2022 20:25), Max: 98.5 (23 Apr 2022 05:13)  HR: 78 (23 Apr 2022 20:25) (78 - 99)  BP: 132/70 (23 Apr 2022 20:25) (126/72 - 134/60)  BP(mean): --  RR: 18 (23 Apr 2022 20:25) (18 - 18)  SpO2: 93% (23 Apr 2022 20:25) (91% - 93%)    I&O's Summary    23 Apr 2022 07:01  -  23 Apr 2022 21:07  --------------------------------------------------------  IN: 100 mL / OUT: 0 mL / NET: 100 mL        PHYSICAL EXAM:  HEENT: NC/AT; PERRLA  Neck: Soft; no tenderness  Lungs: CTA bilaterally; no wheezing.   Heart:  Abdomen:  Genital/ Rectal:  Extremities:  Neurologic:  Vascular:      LABORATORY:    CBC Full  -  ( 23 Apr 2022 08:01 )  WBC Count : 7.43 K/uL  RBC Count : 3.43 M/uL  Hemoglobin : 9.6 g/dL  Hematocrit : 33.4 %  Platelet Count - Automated : 675 K/uL  Mean Cell Volume : 97.4 fl  Mean Cell Hemoglobin : 28.0 pg  Mean Cell Hemoglobin Concentration : 28.7 gm/dL  Auto Neutrophil # : x  Auto Lymphocyte # : x  Auto Monocyte # : x  Auto Eosinophil # : x  Auto Basophil # : x  Auto Neutrophil % : x  Auto Lymphocyte % : x  Auto Monocyte % : x  Auto Eosinophil % : x  Auto Basophil % : x      ESR:                   04-20 @ 09:13  --    C-Reactive Protein:     04-20 @ 09:13  --    Procalcitonin:           04-20 @ 09:13   0.12      04-23    144  |  109<H>  |  9   ----------------------------<  86  3.8   |  27  |  0.69    Ca    8.5      23 Apr 2022 08:01            Assessment and Plan:          Sushil Anguiano MD   [phone].  She is more alert  No fevers.      MEDICATIONS  (STANDING):  baclofen 10 milliGRAM(s) Oral every 8 hours  cholecalciferol 1000 Unit(s) Oral daily  cyanocobalamin 1000 MICROGram(s) Oral daily  escitalopram 20 milliGRAM(s) Oral daily  folic acid 1 milliGRAM(s) Oral daily  furosemide    Tablet 20 milliGRAM(s) Oral daily  gabapentin 300 milliGRAM(s) Oral every 8 hours  hydroxyurea 500 milliGRAM(s) Oral two times a day  lactobacillus acidophilus 1 Tablet(s) Oral two times a day  levothyroxine 50 MICROGram(s) Oral daily  MediHoney Gel Wound Dressing 1 Application(s) 1 Application(s) Topical every 3 days  metoprolol tartrate 25 milliGRAM(s) Oral two times a day  pantoprazole   Suspension 40 milliGRAM(s) Oral daily  piperacillin/tazobactam IVPB.. 3.375 Gram(s) IV Intermittent every 8 hours  senna 1 Tablet(s) Oral at bedtime  simvastatin 10 milliGRAM(s) Oral at bedtime  traMADol 25 milliGRAM(s) Oral daily    MEDICATIONS  (PRN):  acetaminophen     Tablet .. 650 milliGRAM(s) Oral every 6 hours PRN Temp greater or equal to 38C (100.4F), Mild Pain (1 - 3)  aluminum hydroxide/magnesium hydroxide/simethicone Suspension 30 milliLiter(s) Oral every 4 hours PRN Dyspepsia  guaiFENesin Oral Liquid (Sugar-Free) 200 milliGRAM(s) Oral every 6 hours PRN Cough  melatonin 3 milliGRAM(s) Oral at bedtime PRN Insomnia  ondansetron Injectable 4 milliGRAM(s) IV Push every 8 hours PRN Nausea and/or Vomiting      Vital Signs Last 24 Hrs  T(C): 36.3 (23 Apr 2022 20:25), Max: 36.9 (23 Apr 2022 05:13)  T(F): 97.3 (23 Apr 2022 20:25), Max: 98.5 (23 Apr 2022 05:13)  HR: 78 (23 Apr 2022 20:25) (78 - 99)  BP: 132/70 (23 Apr 2022 20:25) (126/72 - 134/60)  BP(mean): --  RR: 18 (23 Apr 2022 20:25) (18 - 18)  SpO2: 93% (23 Apr 2022 20:25) (91% - 93%)    I&O's Summary    23 Apr 2022 07:01  -  23 Apr 2022 21:07  --------------------------------------------------------  IN: 100 mL / OUT: 0 mL / NET: 100 mL        PHYSICAL EXAM:  HEENT: NC/AT; PERRLA  Neck: Soft; no tenderness  Lungs: Coarse BS bilaterally; no wheezing.   Heart: RRR, no murmurs.   Abdomen: Soft, no tenderness.   Genital/ Rectal: No salas.   Extremities: Decreased erythema and swelling of RLE.   Neurologic: Confused      LABORATORY:    CBC Full  -  ( 23 Apr 2022 08:01 )  WBC Count : 7.43 K/uL  RBC Count : 3.43 M/uL  Hemoglobin : 9.6 g/dL  Hematocrit : 33.4 %  Platelet Count - Automated : 675 K/uL  Mean Cell Volume : 97.4 fl  Mean Cell Hemoglobin : 28.0 pg  Mean Cell Hemoglobin Concentration : 28.7 gm/dL  Auto Neutrophil # : x  Auto Lymphocyte # : x  Auto Monocyte # : x  Auto Eosinophil # : x  Auto Basophil # : x  Auto Neutrophil % : x  Auto Lymphocyte % : x  Auto Monocyte % : x  Auto Eosinophil % : x  Auto Basophil % : x    C-Reactive Protein:     04-20 @ 09:13  --    Procalcitonin:           04-20 @ 09:13   0.12      04-23    144  |  109<H>  |  9   ----------------------------<  86  3.8   |  27  |  0.69    Ca    8.5      23 Apr 2022 08:01    Assessment and Plan:  1. RLE cellulitis.  2. Chronic edema of lower extremities.   3. Weakness.   4. Positive blood cultures.     . Continue IV Zosyn.   . RLE cellulitis is improving,. Follow wound culture  . Positive blood cultures with Staph epidermidis likely due to skin contamination  . Anticipate oral antibiotics in 48 hrs for discharge.           Sushil Anguiano MD   [phone].

## 2022-04-23 NOTE — PROGRESS NOTE ADULT - ASSESSMENT
Patient is a 89 yo Female ,[facility] resident  sent from [facility] living for eval of AMS and  her leg wounds on R leg.  She has hx of CVA  with R hemiplegia, dvt anemia, phlebitis, afib, hld, melanoma of skin, gerd depression, diverticulitis, uti, hypothyroid, pt is poor historian, per ems to the triage rn they adv pt may be septic due to wounds on her leg. pt has no complaints .As per [facility] med staff patient was sent to ED for evaluation of AMS ,associated with hypoxia and difficulties swallowing .Also her R leg wounds are worsening and ID /WOUND care consult requested .Septic workup sent found to have lactate elevation Admitted for septic workup and evaluation,send blood and urine cx,serial lactate levels,monitor vitals closley,ivfs hydration,monitor urine output and renal profile,iv abx initiated  .[facility] Palliative care consult requested ,to discuss advance directives and complete MOLST

## 2022-04-23 NOTE — PROGRESS NOTE ADULT - SUBJECTIVE AND OBJECTIVE BOX
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  She has hx of CVA  with R hemiplegia, dvt anemia, phlebitis, afib, hld, melanoma of skin, gerd depression, diverticulitis, uti, hypothyroid, pt is poor historian, per ems to the triage rn they adv pt may be septic due to wounds on her leg. pt has no complaints .As per [facility] med staff patient was sent to ED for evaluation of AMS ,associated with hypoxia and difficulties swallowing .Also her R leg wounds are worsening and ID /WOUND care consult requested .Septic workup sent ,found to have lactate elevation Admitted for septic workup and evaluation,send blood and urine cx,serial lactate levels,monitor vitals closley,ivfs hydration,monitor urine output and renal profile,iv abx initiated  .[facility] Palliative care consult requested ,to discuss advance directives and complete MOLST  (19 Apr 2022 17:08)      REVIEW OF SYSTEMS:    CONSTITUTIONAL: No fever, weight loss, or fatigue  EYES: No eye pain, visual disturbances, or discharge  ENMT:  No difficulty hearing, tinnitus, vertigo; No sinus or throat pain  NECK: No pain or stiffness  BREASTS: No pain, masses, or nipple discharge  RESPIRATORY: No cough, wheezing, chills or hemoptysis; No shortness of breath  CARDIOVASCULAR: No chest pain, palpitations, dizziness, or leg swelling  GASTROINTESTINAL: No abdominal or epigastric pain. No nausea, vomiting, or hematemesis; No diarrhea or constipation. No melena or hematochezia.  GENITOURINARY: No dysuria, frequency, hematuria, or incontinence  NEUROLOGICAL: No headaches, memory loss, loss of strength, numbness, or tremors  SKIN: No itching, burning, rashes, or lesions   LYMPH NODES: No enlarged glands  ENDOCRINE: No heat or cold intolerance; No hair loss  MUSCULOSKELETAL: No joint pain or swelling; No muscle, back, or extremity pain  PSYCHIATRIC: No depression, anxiety, mood swings, or difficulty sleeping  HEME/LYMPH: No easy bruising, or bleeding gums  ALLERGY AND IMMUNOLOGIC: No hives or eczema    MEDICATIONS  (STANDING):  baclofen 10 milliGRAM(s) Oral every 8 hours  cholecalciferol 1000 Unit(s) Oral daily  cyanocobalamin 1000 MICROGram(s) Oral daily  escitalopram 20 milliGRAM(s) Oral daily  folic acid 1 milliGRAM(s) Oral daily  furosemide    Tablet 20 milliGRAM(s) Oral daily  gabapentin 300 milliGRAM(s) Oral every 8 hours  hydroxyurea 500 milliGRAM(s) Oral two times a day  lactobacillus acidophilus 1 Tablet(s) Oral two times a day  levothyroxine 50 MICROGram(s) Oral daily  MediHoney Gel Wound Dressing 1 Application(s) 1 Application(s) Topical every 3 days  metoprolol tartrate 25 milliGRAM(s) Oral two times a day  pantoprazole   Suspension 40 milliGRAM(s) Oral daily  piperacillin/tazobactam IVPB.. 3.375 Gram(s) IV Intermittent every 8 hours  senna 1 Tablet(s) Oral at bedtime  simvastatin 10 milliGRAM(s) Oral at bedtime  traMADol 25 milliGRAM(s) Oral daily    MEDICATIONS  (PRN):  acetaminophen     Tablet .. 650 milliGRAM(s) Oral every 6 hours PRN Temp greater or equal to 38C (100.4F), Mild Pain (1 - 3)  aluminum hydroxide/magnesium hydroxide/simethicone Suspension 30 milliLiter(s) Oral every 4 hours PRN Dyspepsia  guaiFENesin Oral Liquid (Sugar-Free) 200 milliGRAM(s) Oral every 6 hours PRN Cough  melatonin 3 milliGRAM(s) Oral at bedtime PRN Insomnia  ondansetron Injectable 4 milliGRAM(s) IV Push every 8 hours PRN Nausea and/or Vomiting      ALLERGIES: No Known Allergies      FAMILY HISTORY:      PHYSICAL EXAMINATION:  -----------------------------  T(C): 36.9 (04-23-22 @ 05:13), Max: 38 (04-22-22 @ 21:04)  HR: 78 (04-23-22 @ 05:13) (71 - 87)  BP: 126/72 (04-23-22 @ 05:13) (118/62 - 126/72)  RR: 18 (04-23-22 @ 05:13) (18 - 18)  SpO2: 91% (04-23-22 @ 05:13) (91% - 92%)  Wt(kg): --        VITALS  T(C): 36.9 (04-23-22 @ 05:13), Max: 38 (04-22-22 @ 21:04)  HR: 78 (04-23-22 @ 05:13) (71 - 87)  BP: 126/72 (04-23-22 @ 05:13) (118/62 - 126/72)  RR: 18 (04-23-22 @ 05:13) (18 - 18)  SpO2: 91% (04-23-22 @ 05:13) (91% - 92%)    Constitutional: well developed, normal appearance, well groomed, well nourished, no deformities and no acute distress.   Eyes: the conjunctiva exhibited no abnormalities and the eyelids demonstrated no xanthelasmas.   HEENT: normal oral mucosa, no oral pallor and no oral cyanosis.   Neck: normal jugular venous A waves present, normal jugular venous V waves present and no jugular venous castillo A waves.   Pulmonary: no respiratory distress, normal respiratory rhythm and effort, no accessory muscle use and lungs were clear to auscultation bilaterally.   Cardiovascular: heart rate and rhythm were normal, normal S1 and S2 and no murmur, gallop, rub, heave or thrill are present.   Abdomen: soft, non-tender, no hepato-splenomegaly and no abdominal mass palpated.   Musculoskeletal: the gait could not be assessed..   Extremities: no clubbing of the fingernails, no localized cyanosis, no petechial hemorrhages and no ischemic changes.   Skin: normal skin color and pigmentation, no rash, no venous stasis, no skin lesions, no skin ulcer and no xanthoma was observed.   Psychiatric: oriented to person, place, and time, the affect was normal, the mood was normal and not feeling anxious.     LABS:   --------  04-22    145  |  108  |  6<L>  ----------------------------<  128<H>  3.2<L>   |  28  |  0.68    Ca    8.4<L>      22 Apr 2022 07:32                           9.4    6.69  )-----------( 563      ( 22 Apr 2022 07:32 )             32.0     PT/INR - ( 22 Apr 2022 07:32 )   PT: 49.2 sec;   INR: 4.15 ratio                 Culture Results:   No growth to date. (04-21 @ 11:16)      RADIOLOGY:  -----------------    ECG:     ECHO:

## 2022-04-23 NOTE — PROGRESS NOTE ADULT - ASSESSMENT
89 yo Female ,nursing home resident  sent from [facility] assisted living for eval of AMS and  her leg wounds on R leg.  She has hx of CVA  with R hemiplegia, dvt anemia, phlebitis, afib, hld, melanoma of skin, gerd depression, diverticulitis, uti, hypothyroid, pt is poor historian, per ems to the triage rn they adv pt may be septic due to wounds on her leg    fever overnight, on Zosyn  family is researching [facility] facilities      on emp ABX  ID following  monitor VS and Sat  assist with needs - ADL  pt is full code - dc planning - poss [facility] - hospice discussion - GOC documented   SLP eval noted - PO Diet - HOB elev - asp prec - oral hygiene  prognosis guarded

## 2022-04-23 NOTE — PROGRESS NOTE ADULT - ASSESSMENT
AB MENDOZA 89 f 4/19/2022 3/15/1932 DR YURI CARRINGTON     REVIEW OF SYMPTOMS      Able to give (reliable) ROS  NO     PHYSICAL EXAM    HEENT Unremarkable  atraumatic   RESP Fair air entry EXP prolonged    Harsh breath sound Resp distres mild   CARDIAC S1 S2 No S3     NO JVD    ABDOMEN SOFT BS PRESENT NOT DISTENDED No hepatosplenomegaly   PEDAL EDEMA present No calf tenderness  NO rash     DOA/CC/PROBLEMS poa .  89 f  doa 4/19/2022   cc Patient is a 89yo female complaining of left lower leg wound possible infection and sepsis Patient is tachy and has ams since 4/18      PMH-PSH .  pmh DVT  pmh Anemia  pmh leg wounds  pmh A fib  pmh GERD  pmh     PROSTHETICS/TUBES/LINES.    NOTABLE HOME MEDS.  coumadin 2.5  enalapril 2.5  lasix 20      COVID/ICU/CODE STATUS.                       COVID  STATUS.           ICU STAY. none  GOC.  4/23/2022 full code    BEST PRACTICE ISSUES.                                                  HEAD OF BED ELEVATION. Yes  DVT PROPHYLAXIS.   4/19/2022 on coumadin at home   GONZALES PROPHYLAXIS.   4/19/2022 protonix 40                                                                                      DIET.  4/20/2022 mince moist     PROBLEM DATA/ASSESSMENT RECOMMENDATIONS (A/R).    HEMODYNAMICS.   Monitor bp Target MAP 65 (+)    RESP.   Monitor po Target po 90-95%    ABG.  4/19/2022 ra 745/40/68    PMH/PSH PROBLEMS.  Management continued/modified as indicated    OXYGEN REQUIREMENTS.  4/21/2022 ra 93%      INFECTION SOURCE.   Cellulitis lle poa 4/19/2022   INFECTION A/R.   Cellulitis lle on zosyn    INFECTION ANDREWS.  W 4/19-4/20-4/22/2022 w 9.6 - 7.1  - 6.6   pr 4/20/2022 .12   MICROBIO.  blod c 3/30 (-)   wound 4/19 staph enterococcus   blod c 4/19 mrse   ABIO.  4/19/2022 zosyn     Lacticemia  la 4/19/2022 2.1   monitor    ATELECTASIS RLL 4/19 ct   4/21/2022 chest pt    RO DVT.  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  tr 4/19/2022 tr 14   4/19/2022 simvastat 10  no active ischemia        HYPERAMMONEMIA.  Amm 4/19/2022 amm 39     ANEMIA.  Hb 4/19-4/20-4/21-4/22/2022 Hb 10.6 - 9.7 - 9.7 - 8.4   monito    RENAL.  Cr 4/19-4/22/2022 Cr .8 - .6   monitor    HYPOTHYROID.  4/20/2022 tsh 10   4/19/2022 levoxyl 25 -> 4/20 levox 50     OVERALL ISSUES.  LEG CELLULITIS   A fib   chronic dvt  coagulopathy    IV fl.  4/19/2022 d5 1/2 50    TIME SPENT   Over 25 minutes aggregate care time spent on encounter; activities included   direct patient care, counseling and/or coordinating care reviewing notes, lab data/ imaging , discussion with multidisciplinary team/ patient  /family and explaining in detail risks, benefits, alternatives  of the recommendations     AB MENDOZA 89 f 4/19/2022 3/15/1932 DR YURI CARRINGTON

## 2022-04-23 NOTE — CHART NOTE - NSCHARTNOTEFT_GEN_A_CORE
On evening rounds, alerted by RN that patient had fever of 100.4F. Last fever was 4/20/22 at nighttime. Patient is a 89yo female with PMH of CVA with residual right hemiplegia, AFib, history of DVT, anemia, hypothyroidism, depression, HLD, GERD who presented with altered mental status and right lower extremity wounds/cellulitis. Per chart review, previous blood cultures grew staph epidermidis in one bottle, likely contaminate and wound culture showing staph aureus and enterobacter cloacae. Patient had repeat blood cultures drawn on 4/21/22 which is preliminarily showing no growth to date.  She is currently being treated with Zosyn. Will not send repeat cultures at this time as she has a culture from yesterday with this fever just at the 48 hour dali. PRN Tylenol administered. RN to call with any changes.
Nutrition Follow Up Note    Seen for malnutrition follow up    Source: EMR, RN, Patient     Chart reviewed, events noted.     Per chart, 89 yo Female ,[facility] resident  sent from [facility] for eval of AMS and  her leg wounds on R leg.  She has hx of CVA  with R hemiplegia, dvt anemia, phlebitis, afib, hld, melanoma of skin, gerd depression, diverticulitis, uti, hypothyroid, pt is poor historian, per ems to the triage rn they adv pt may be septic due to wounds on her leg and possible aspiration PNA.     Diet : Soft and Bite Sized:   Mildly Thick Liquids (MILDTHICKLIQS)  Lactose Restricted (Milk Sugar Intoler.)  Single Sips Only (04-22-22 @ 14:23) [Active]      Nutrition Events:   -Intake: per RN and pt, very poor intake 2/2 food preferences; RN and pt both state that she will only eat peanut butter and jelly sandwich, which is not warranted on the current IDDSI diet she is on. Attempted to receive additional food preferences, pt denied. Contacted Dr. Best to make her aware of current situation;  approves for pt to have peanut butter and jelly sandwiches.   -GI: last BM documented 4/22; bowel regimen ordered (Senna)   -Renal: Lasix ordered, monitor lytes closely   -Neuro: presented with AMS; at time of visit, pt was able to carry full conversation appropriately       Pertinent Medications:  MEDICATIONS  (STANDING):  baclofen 10 milliGRAM(s) Oral every 8 hours  cholecalciferol 1000 Unit(s) Oral daily  cyanocobalamin 1000 MICROGram(s) Oral daily  escitalopram 20 milliGRAM(s) Oral daily  folic acid 1 milliGRAM(s) Oral daily  furosemide    Tablet 20 milliGRAM(s) Oral daily  gabapentin 300 milliGRAM(s) Oral every 8 hours  hydroxyurea 500 milliGRAM(s) Oral two times a day  lactobacillus acidophilus 1 Tablet(s) Oral two times a day  levothyroxine 50 MICROGram(s) Oral daily  MediHoney Gel Wound Dressing 1 Application(s) 1 Application(s) Topical every 3 days  metoprolol tartrate 25 milliGRAM(s) Oral two times a day  pantoprazole   Suspension 40 milliGRAM(s) Oral daily  piperacillin/tazobactam IVPB.. 3.375 Gram(s) IV Intermittent every 8 hours  senna 1 Tablet(s) Oral at bedtime  simvastatin 10 milliGRAM(s) Oral at bedtime  traMADol 25 milliGRAM(s) Oral daily    MEDICATIONS  (PRN):  acetaminophen     Tablet .. 650 milliGRAM(s) Oral every 6 hours PRN Temp greater or equal to 38C (100.4F), Mild Pain (1 - 3)  aluminum hydroxide/magnesium hydroxide/simethicone Suspension 30 milliLiter(s) Oral every 4 hours PRN Dyspepsia  guaiFENesin Oral Liquid (Sugar-Free) 200 milliGRAM(s) Oral every 6 hours PRN Cough  melatonin 3 milliGRAM(s) Oral at bedtime PRN Insomnia  ondansetron Injectable 4 milliGRAM(s) IV Push every 8 hours PRN Nausea and/or Vomiting        Pertinent Labs:  04-23 Na144 mmol/L Glu 86 mg/dL K+ 3.8 mmol/L Cr  0.69 mg/dL BUN 9 mg/dL 04-20 Alb 2.2 g/dL<L> 04-20 PAB 8 mg/dL<L>     CAPILLARY BLOOD GLUCOSE           Pressure Injury per chart: none noted     Edema per chart:   -nonpitting: left leg; right leg    Estimated Needs: based on 140 pounds   [x] no change since previous assessment  -Energy: 25-30kcal/kg (1587-1907kcal/day)  -Protein: 1.0-1.2g/kg (63-76g/day)       Previous Nutrition Diagnosis: malnutrition; swallowing difficulty    Nutrition Diagnosis is: ongoing      New Nutrition Diagnosis: N/A     Interventions and Recommendations  1) Provide Peanut butter and jelly sandwiches x3/day per MD's approval     Monitoring and Evaluation:     [X] PO intake [X] Tolerance to diet prescription [X] weight trends [x] skin integrity     RD remains available and will follow up per protocol.
Received call from Radiologist Dr. Vallejo concerning CT abdomen/pelvis read from admission. Patient was found to have focal thrombus in the infrarenal IVC along the right aspect of the filter apex with extension above the filter. Patient is currently anticoagulated with Coumadin, found to have supra therapeutic INR of 4.86. Bilateral venous dopplers negative for DVT. Case discussed with Surgical ESME Luke, who will evaluate patient for further recommendations.     CT abdomen and pelvis also revealing mucus plugging in the right lower lobe bronchi with near complete atelectasis of the right lower lobe. She is currently being treated for suspected cellulitis with IV Zosyn and PO doxycycline. Continue current antibiotic regimen for now, follow culture results.

## 2022-04-23 NOTE — PROGRESS NOTE ADULT - SUBJECTIVE AND OBJECTIVE BOX
PROGRESS NOTE  Patient is a 89y old  Female who presents with a chief complaint of AMS (23 Apr 2022 10:42)    Chart and available morning labs /imaging are reviewed electronically , urgent issues addressed . More information  is being added upon completion of rounds , when more information is collected and management discussed with consultants , medical staff and social service/case management on the floor   OVERNIGHT  No new issues reported by medical staff . All above noted Patient is resting in a bed comfortably .Confused ,poor mentation .No distress noted     HPI:  Patient is a 89 yo Female ,[facility] resident  sent from [facility] for eval of AMS and  her leg wounds on R leg.  She has hx of CVA  with R hemiplegia, dvt anemia, phlebitis, afib, hld, melanoma of skin, gerd depression, diverticulitis, uti, hypothyroid, pt is poor historian, per ems to the triage rn they adv pt may be septic due to wounds on her leg. pt has no complaints .As per [facility] med staff patient was sent to ED for evaluation of AMS ,associated with hypoxia and difficulties swallowing .Also her R leg wounds are worsening and ID /WOUND care consult requested .Septic workup sent ,found to have lactate elevation Admitted for septic workup and evaluation,send blood and urine cx,serial lactate levels,monitor vitals closley,ivfs hydration,monitor urine output and renal profile,iv abx initiated  .[facility] Palliative care consult requested ,to discuss advance directives and complete MOLST  (19 Apr 2022 17:08)    PAST MEDICAL & SURGICAL HISTORY:  Hypertension    CVA (cerebral vascular accident)    Depression    Constipation    Neuropathy    Hyperlipidemia    Grade I diastolic dysfunction    Afib    Neuropathy    VHD (valvular heart disease)    Aortic valvar stenosis    No significant past surgical history        MEDICATIONS  (STANDING):  baclofen 10 milliGRAM(s) Oral every 8 hours  cholecalciferol 1000 Unit(s) Oral daily  cyanocobalamin 1000 MICROGram(s) Oral daily  escitalopram 20 milliGRAM(s) Oral daily  folic acid 1 milliGRAM(s) Oral daily  furosemide    Tablet 20 milliGRAM(s) Oral daily  gabapentin 300 milliGRAM(s) Oral every 8 hours  hydroxyurea 500 milliGRAM(s) Oral two times a day  lactobacillus acidophilus 1 Tablet(s) Oral two times a day  levothyroxine 50 MICROGram(s) Oral daily  MediHoney Gel Wound Dressing 1 Application(s) 1 Application(s) Topical every 3 days  metoprolol tartrate 25 milliGRAM(s) Oral two times a day  pantoprazole   Suspension 40 milliGRAM(s) Oral daily  piperacillin/tazobactam IVPB.. 3.375 Gram(s) IV Intermittent every 8 hours  senna 1 Tablet(s) Oral at bedtime  simvastatin 10 milliGRAM(s) Oral at bedtime  traMADol 25 milliGRAM(s) Oral daily    MEDICATIONS  (PRN):  acetaminophen     Tablet .. 650 milliGRAM(s) Oral every 6 hours PRN Temp greater or equal to 38C (100.4F), Mild Pain (1 - 3)  aluminum hydroxide/magnesium hydroxide/simethicone Suspension 30 milliLiter(s) Oral every 4 hours PRN Dyspepsia  guaiFENesin Oral Liquid (Sugar-Free) 200 milliGRAM(s) Oral every 6 hours PRN Cough  melatonin 3 milliGRAM(s) Oral at bedtime PRN Insomnia  ondansetron Injectable 4 milliGRAM(s) IV Push every 8 hours PRN Nausea and/or Vomiting      OBJECTIVE    T(C): 36.7 (04-23-22 @ 12:20), Max: 38 (04-22-22 @ 21:04)  HR: 79 (04-23-22 @ 12:20) (78 - 87)  BP: 134/60 (04-23-22 @ 12:20) (118/62 - 134/60)  RR: 18 (04-23-22 @ 12:20) (18 - 18)  SpO2: 92% (04-23-22 @ 12:20) (91% - 92%)  Wt(kg): --  I&O's Summary    23 Apr 2022 07:01  -  23 Apr 2022 13:51  --------------------------------------------------------  IN: 100 mL / OUT: 0 mL / NET: 100 mL          REVIEW OF SYSTEMS:  CONSTITUTIONAL: No fever, weight loss, or fatigue  EYES: No eye pain, visual disturbances, or discharge  ENMT:   No sinus or throat pain  NECK: No pain or stiffness  RESPIRATORY: No cough, wheezing, chills or hemoptysis; No shortness of breath  CARDIOVASCULAR: No chest pain, palpitations, dizziness, or leg swelling  GASTROINTESTINAL: No abdominal pain. No nausea, vomiting; No diarrhea or constipation. No melena or hematochezia.  GENITOURINARY: No dysuria, frequency, hematuria, or incontinence  NEUROLOGICAL: No headaches, memory loss, loss of strength, numbness, or tremors  SKIN: No itching, burning, rashes, or lesions   MUSCULOSKELETAL: No joint pain or swelling; No muscle, back, or extremity pain    PHYSICAL EXAM:  Appearance: NAD. VS past 24 hrs -as above   HEENT:   Moist oral mucosa. Conjunctiva clear b/l.   Neck : supple  Respiratory: Lungs CTAB.  Gastrointestinal:  Soft, nontender. No rebound. No rigidity. BS present	  Cardiovascular: RRR ,S1S2 present  Neurologic: Non-focal. Moving all extremities.  Extremities: No edema. No erythema. No calf tenderness.  Skin: No rashes, No ecchymoses, No cyanosis.	  wounds ,skin lesions-See skin assesment flow sheet   LABS:                        9.6    7.43  )-----------( 675      ( 23 Apr 2022 08:01 )             33.4     04-23    144  |  109<H>  |  9   ----------------------------<  86  3.8   |  27  |  0.69    Ca    8.5      23 Apr 2022 08:01      CAPILLARY BLOOD GLUCOSE        PT/INR - ( 23 Apr 2022 08:01 )   PT: 40.4 sec;   INR: 3.41 ratio               Culture - Blood (collected 21 Apr 2022 11:16)  Source: .Blood Blood  Preliminary Report (22 Apr 2022 12:01):    No growth to date.    Culture - Urine (collected 20 Apr 2022 20:54)  Source: Clean Catch Clean Catch (Midstream)  Final Report (22 Apr 2022 00:34):    No growth    Culture - Other (collected 19 Apr 2022 21:19)  Source: Wound Wound  Final Report (21 Apr 2022 17:09):    Numerous Staphylococcus aureus    Few Enterobacter cloacae complex  Organism: Staphylococcus aureus  Enterobacter cloacae complex (21 Apr 2022 17:09)  Organism: Enterobacter cloacae complex (21 Apr 2022 17:09)  Organism: Staphylococcus aureus (21 Apr 2022 17:09)    Culture - Blood (collected 19 Apr 2022 18:07)  Source: .Blood Blood-Peripheral  Gram Stain (22 Apr 2022 12:35):    Growth in aerobic bottle: Gram Positive Cocci in Clusters    Growth in anaerobic bottle: Gram Positive Cocci in Clusters  Final Report (23 Apr 2022 10:01):    Growth in aerobic and anaerobic bottles: Staphylococcus epidermidis    "Susceptibilities not performed"    ***Blood Panel PCR results on this specimen are available    approximately 3 hours after the Gram stain result.***    Gram stain, PCR, and/or cultureresults may not always    correspond due to difference in methodologies.    ************************************************************    This PCR assay was performed by multiplex PCR. This    Assay tests for 66 bacterial and resistance gene targets.    Please refer to the [facility] test directory    at [email or web address] for details.  Organism: Blood Culture PCR (22 Apr 2022 12:35)  Organism: Blood Culture PCR (22 Apr 2022 12:35)    Culture - Blood (collected 19 Apr 2022 18:07)  Source: .Blood Blood-Peripheral  Gram Stain (21 Apr 2022 11:44):    Growth in aerobic bottle: Gram Positive Cocci in Clusters    Growth in anaerobic bottle: Gram Positive Cocci in Clusters  Final Report (23 Apr 2022 10:23):    Growth in aerobic bottle: Staphylococcus pettenkoferi    Coag Negative Staphylococcus    Single set isolate, possible contaminant. Contact    Microbiology if susceptibility testing clinically    indicated.    Growth in anaerobic bottle: Staphylococcus epidermidis  Organism: Staphylococcus epidermidis (23 Apr 2022 10:23)  Organism: Staphylococcus epidermidis (23 Apr 2022 10:23)      RADIOLOGY & ADDITIONAL TESTS:   reviewed elctronically  ASSESSMENT/PLAN: 	    25 minutes aggregate time was spent on this visit, 50% visit time spent in care co-ordination with other attendings and counselling patient .I have discussed care plan with patient / HCP/family member ,who expressed understanding of problems treatment and their effect and side effects, alternatives in details. I have asked if they have any questions and concerns and appropriately addressed them to best of my ability.

## 2022-04-23 NOTE — PROGRESS NOTE ADULT - SUBJECTIVE AND OBJECTIVE BOX
REJI BERGER    [facility] [address]    Allergies    No Known Allergies    Intolerances        PAST MEDICAL & SURGICAL HISTORY:  Hypertension    CVA (cerebral vascular accident)    Depression    Constipation    Neuropathy    Hyperlipidemia    Grade I diastolic dysfunction    Afib    Neuropathy    VHD (valvular heart disease)    Aortic valvar stenosis    No significant past surgical history        FAMILY HISTORY:      Home Medications:  acetaminophen 500 mg oral tablet: 2 tab(s) orally once a day (at bedtime) (19 Apr 2022 14:19)  acetaminophen 500 mg oral tablet: 2 tab(s) orally every 12 hours, As Needed (19 Apr 2022 14:19)  baclofen 10 mg oral tablet: 1 tab(s) orally every 8 hours (19 Apr 2022 14:19)  Biofreeze 4% topical gel: Apply topically to R shoulder &amp; L knee topically 2 times a day (19 Apr 2022 14:19)  Coumadin 1 mg oral tablet: 1 tab(s) orally once a day (at bedtime) (19 Apr 2022 14:19)  cyanocobalamin 1000 mcg oral tablet: 1 tab(s) orally once a day (19 Apr 2022 14:19)  docusate sodium 100 mg oral capsule: 2 cap(s) orally once a day (19 Apr 2022 14:19)  enalapril 2.5 mg oral tablet: 1 tab(s) orally once a day (19 Apr 2022 14:19)  escitalopram 20 mg oral tablet: 1 tab(s) orally once a day (19 Apr 2022 14:19)  famotidine 20 mg oral tablet: 1 tab(s) orally once a day (19 Apr 2022 14:19)  famotidine 20 mg oral tablet: 1 tab(s) orally once a day (19 Apr 2022 14:19)  folic acid 1 mg oral tablet: 1 tab(s) orally once a day (19 Apr 2022 14:19)  furosemide 20 mg oral tablet: 2 tab(s) orally once a day (19 Apr 2022 14:19)  gabapentin 100 mg oral capsule: 3 cap(s) orally 3 times a day (19 Apr 2022 14:19)  hydroxyurea 500 mg oral capsule: 1 cap(s) orally 2 times a day (19 Apr 2022 14:19)  levothyroxine 25 mcg (0.025 mg) oral tablet: 1 tab(s) orally once a day (19 Apr 2022 14:19)  Metoprolol Tartrate 25 mg oral tablet: 1 tab(s) orally 2 times a day (19 Apr 2022 14:19)  Senna 8.6 mg oral tablet: 1 tab(s) orally once a day (at bedtime) (19 Apr 2022 14:19)  simvastatin 10 mg oral tablet: 1 tab(s) orally once a day (at bedtime) (19 Apr 2022 14:19)  traMADol 50 mg oral tablet: 1 tab(s) orally 2 times a day (19 Apr 2022 14:19)  Vitamin D3: 1 tab(s) orally once a day (19 Apr 2022 14:19)      MEDICATIONS  (STANDING):  baclofen 10 milliGRAM(s) Oral every 8 hours  cholecalciferol 1000 Unit(s) Oral daily  cyanocobalamin 1000 MICROGram(s) Oral daily  escitalopram 20 milliGRAM(s) Oral daily  folic acid 1 milliGRAM(s) Oral daily  furosemide    Tablet 20 milliGRAM(s) Oral daily  gabapentin 300 milliGRAM(s) Oral every 8 hours  hydroxyurea 500 milliGRAM(s) Oral two times a day  lactobacillus acidophilus 1 Tablet(s) Oral two times a day  levothyroxine 50 MICROGram(s) Oral daily  MediHoney Gel Wound Dressing 1 Application(s) 1 Application(s) Topical every 3 days  metoprolol tartrate 25 milliGRAM(s) Oral two times a day  pantoprazole   Suspension 40 milliGRAM(s) Oral daily  piperacillin/tazobactam IVPB.. 3.375 Gram(s) IV Intermittent every 8 hours  senna 1 Tablet(s) Oral at bedtime  simvastatin 10 milliGRAM(s) Oral at bedtime  traMADol 25 milliGRAM(s) Oral daily    MEDICATIONS  (PRN):  acetaminophen     Tablet .. 650 milliGRAM(s) Oral every 6 hours PRN Temp greater or equal to 38C (100.4F), Mild Pain (1 - 3)  aluminum hydroxide/magnesium hydroxide/simethicone Suspension 30 milliLiter(s) Oral every 4 hours PRN Dyspepsia  guaiFENesin Oral Liquid (Sugar-Free) 200 milliGRAM(s) Oral every 6 hours PRN Cough  melatonin 3 milliGRAM(s) Oral at bedtime PRN Insomnia  ondansetron Injectable 4 milliGRAM(s) IV Push every 8 hours PRN Nausea and/or Vomiting              Vital Signs Last 24 Hrs  T(C): 36.9 (23 Apr 2022 05:13), Max: 38 (22 Apr 2022 21:04)  T(F): 98.5 (23 Apr 2022 05:13), Max: 100.4 (22 Apr 2022 21:04)  HR: 78 (23 Apr 2022 05:13) (75 - 87)  BP: 126/72 (23 Apr 2022 05:13) (118/62 - 126/72)  BP(mean): --  RR: 18 (23 Apr 2022 05:13) (18 - 18)  SpO2: 91% (23 Apr 2022 05:13) (91% - 92%)              LABS:                        9.6    7.43  )-----------( 675      ( 23 Apr 2022 08:01 )             33.4     04-23    144  |  109<H>  |  9   ----------------------------<  86  3.8   |  27  |  0.69    Ca    8.5      23 Apr 2022 08:01      PT/INR - ( 23 Apr 2022 08:01 )   PT: 40.4 sec;   INR: 3.41 ratio                   WBC:  WBC Count: 7.43 K/uL (04-23 @ 08:01)  WBC Count: 6.69 K/uL (04-22 @ 07:32)  WBC Count: 6.91 K/uL (04-21 @ 07:36)  WBC Count: 7.12 K/uL (04-20 @ 09:13)  WBC Count: 9.64 K/uL (04-19 @ 13:24)      MICROBIOLOGY:  RECENT CULTURES:  04-21 .Blood Blood XXXX XXXX   No growth to date.    04-20 Clean Catch Clean Catch (Midstream) XXXX XXXX   No growth    04-19 Wound Wound Staphylococcus aureus  Enterobacter cloacae complex XXXX   Numerous Staphylococcus aureus  Few Enterobacter cloacae complex    04-19 .Blood Blood-Peripheral Staphylococcus epidermidis   Growth in aerobic bottle: Gram Positive Cocci in Clusters  Growth in anaerobic bottle: Gram Positive Cocci in Clusters   Growth in aerobic bottle: Staphylococcus pettenkoferi  Coag Negative Staphylococcus  Single set isolate, possible contaminant. Contact  Microbiology if susceptibility testing clinically  indicated.  Growth in anaerobic bottle: Staphylococcus epidermidis                PT/INR - ( 23 Apr 2022 08:01 )   PT: 40.4 sec;   INR: 3.41 ratio             Sodium:  Sodium, Serum: 144 mmol/L (04-23 @ 08:01)  Sodium, Serum: 145 mmol/L (04-22 @ 07:32)  Sodium, Serum: 144 mmol/L (04-21 @ 07:36)  Sodium, Serum: 143 mmol/L (04-20 @ 09:13)  Sodium, Serum: 140 mmol/L (04-19 @ 13:24)      0.69 mg/dL 04-23 @ 08:01  0.68 mg/dL 04-22 @ 07:32  0.69 mg/dL 04-21 @ 07:36  0.63 mg/dL 04-20 @ 09:13  0.82 mg/dL 04-19 @ 13:24      Hemoglobin:  Hemoglobin: 9.6 g/dL (04-23 @ 08:01)  Hemoglobin: 9.4 g/dL (04-22 @ 07:32)  Hemoglobin: 9.7 g/dL (04-21 @ 07:36)  Hemoglobin: 9.7 g/dL (04-20 @ 09:13)  Hemoglobin: 10.8 g/dL (04-19 @ 13:24)      Platelets: Platelet Count - Automated: 675 K/uL (04-23 @ 08:01)  Platelet Count - Automated: 563 K/uL (04-22 @ 07:32)  Platelet Count - Automated: 630 K/uL (04-21 @ 07:36)  Platelet Count - Automated: 508 K/uL (04-20 @ 09:13)  Platelet Count - Automated: 589 K/uL (04-19 @ 13:24)              RADIOLOGY & ADDITIONAL STUDIES:      MICROBIOLOGY:  RECENT CULTURES:  04-21 .Blood Blood XXXX XXXX   No growth to date.    04-20 Clean Catch Clean Catch (Midstream) XXXX XXXX   No growth    04-19 Wound Wound Staphylococcus aureus  Enterobacter cloacae complex XXXX   Numerous Staphylococcus aureus  Few Enterobacter cloacae complex    04-19 .Blood Blood-Peripheral Staphylococcus epidermidis   Growth in aerobic bottle: Gram Positive Cocci in Clusters  Growth in anaerobic bottle: Gram Positive Cocci in Clusters   Growth in aerobic bottle: Staphylococcus pettenkoferi  Coag Negative Staphylococcus  Single set isolate, possible contaminant. Contact  Microbiology if susceptibility testing clinically  indicated.  Growth in anaerobic bottle: Staphylococcus epidermidis

## 2022-04-23 NOTE — PROGRESS NOTE ADULT - SUBJECTIVE AND OBJECTIVE BOX
[facility]  Gus Sy PA-C  Formerly [facility] [place] [address]  [phone]      INTERVAL HPI/OVERNIGHT EVENTS:  Pt s/e  no acute events overnight as per nursing staff   Pt is a poor historian, however reports no GI complaints        MEDICATIONS  (STANDING):  baclofen 10 milliGRAM(s) Oral every 8 hours  cholecalciferol 1000 Unit(s) Oral daily  cyanocobalamin 1000 MICROGram(s) Oral daily  escitalopram 20 milliGRAM(s) Oral daily  folic acid 1 milliGRAM(s) Oral daily  furosemide    Tablet 20 milliGRAM(s) Oral daily  gabapentin 300 milliGRAM(s) Oral every 8 hours  hydroxyurea 500 milliGRAM(s) Oral two times a day  lactobacillus acidophilus 1 Tablet(s) Oral two times a day  levothyroxine 50 MICROGram(s) Oral daily  MediHoney Gel Wound Dressing 1 Application(s) 1 Application(s) Topical every 3 days  metoprolol tartrate 25 milliGRAM(s) Oral two times a day  pantoprazole   Suspension 40 milliGRAM(s) Oral daily  piperacillin/tazobactam IVPB.. 3.375 Gram(s) IV Intermittent every 8 hours  senna 1 Tablet(s) Oral at bedtime  simvastatin 10 milliGRAM(s) Oral at bedtime  traMADol 25 milliGRAM(s) Oral daily    MEDICATIONS  (PRN):  acetaminophen     Tablet .. 650 milliGRAM(s) Oral every 6 hours PRN Temp greater or equal to 38C (100.4F), Mild Pain (1 - 3)  aluminum hydroxide/magnesium hydroxide/simethicone Suspension 30 milliLiter(s) Oral every 4 hours PRN Dyspepsia  guaiFENesin Oral Liquid (Sugar-Free) 200 milliGRAM(s) Oral every 6 hours PRN Cough  melatonin 3 milliGRAM(s) Oral at bedtime PRN Insomnia  ondansetron Injectable 4 milliGRAM(s) IV Push every 8 hours PRN Nausea and/or Vomiting      Allergies  No Known Allergies    Intolerances      Vital Signs Last 24 Hrs  T(C): 36.9 (23 Apr 2022 05:13), Max: 38 (22 Apr 2022 21:04)  T(F): 98.5 (23 Apr 2022 05:13), Max: 100.4 (22 Apr 2022 21:04)  HR: 78 (23 Apr 2022 05:13) (71 - 87)  BP: 126/72 (23 Apr 2022 05:13) (118/62 - 126/72)  BP(mean): --  RR: 18 (23 Apr 2022 05:13) (18 - 18)  SpO2: 91% (23 Apr 2022 05:13) (91% - 92%)      GENERAL:  Appears stated age  ABDOMEN:  Soft, non-tender, non-distended  NEURO:  Alert              LABS:                        9.6    7.43  )-----------( 675      ( 23 Apr 2022 08:01 )             33.4     04-23    144  |  109<H>  |  9   ----------------------------<  86  3.8   |  27  |  0.69    Ca    8.5      23 Apr 2022 08:01      PT/INR - ( 23 Apr 2022 08:01 )   PT: 40.4 sec;   INR: 3.41 ratio             Culture - Blood (collected 21 Apr 2022 11:16)  Source: .Blood Blood  Preliminary Report (22 Apr 2022 12:01):    No growth to date.    Culture - Urine (collected 20 Apr 2022 20:54)  Source: Clean Catch Clean Catch (Midstream)  Final Report (22 Apr 2022 00:34):    No growth

## 2022-04-23 NOTE — PROGRESS NOTE ADULT - SUBJECTIVE AND OBJECTIVE BOX
Neurology follow up note    REJI [id number]      Interval History:    Patient feels ok no new complaints.    Allergies    No Known Allergies    Intolerances        MEDICATIONS    acetaminophen     Tablet .. 650 milliGRAM(s) Oral every 6 hours PRN  aluminum hydroxide/magnesium hydroxide/simethicone Suspension 30 milliLiter(s) Oral every 4 hours PRN  baclofen 10 milliGRAM(s) Oral every 8 hours  cholecalciferol 1000 Unit(s) Oral daily  cyanocobalamin 1000 MICROGram(s) Oral daily  escitalopram 20 milliGRAM(s) Oral daily  folic acid 1 milliGRAM(s) Oral daily  furosemide    Tablet 20 milliGRAM(s) Oral daily  gabapentin 300 milliGRAM(s) Oral every 8 hours  guaiFENesin Oral Liquid (Sugar-Free) 200 milliGRAM(s) Oral every 6 hours PRN  hydroxyurea 500 milliGRAM(s) Oral two times a day  lactobacillus acidophilus 1 Tablet(s) Oral two times a day  levothyroxine 50 MICROGram(s) Oral daily  MediHoney Gel Wound Dressing 1 Application(s) 1 Application(s) Topical every 3 days  melatonin 3 milliGRAM(s) Oral at bedtime PRN  metoprolol tartrate 25 milliGRAM(s) Oral two times a day  ondansetron Injectable 4 milliGRAM(s) IV Push every 8 hours PRN  pantoprazole   Suspension 40 milliGRAM(s) Oral daily  piperacillin/tazobactam IVPB.. 3.375 Gram(s) IV Intermittent every 8 hours  senna 1 Tablet(s) Oral at bedtime  simvastatin 10 milliGRAM(s) Oral at bedtime  traMADol 25 milliGRAM(s) Oral daily              Vital Signs Last 24 Hrs  T(C): 36.9 (23 Apr 2022 05:13), Max: 38 (22 Apr 2022 21:04)  T(F): 98.5 (23 Apr 2022 05:13), Max: 100.4 (22 Apr 2022 21:04)  HR: 78 (23 Apr 2022 05:13) (71 - 87)  BP: 126/72 (23 Apr 2022 05:13) (118/62 - 126/72)  BP(mean): --  RR: 18 (23 Apr 2022 05:13) (18 - 18)  SpO2: 91% (23 Apr 2022 05:13) (91% - 92%)    REVIEW OF SYSTEMS:  Constitutionally, the patient denies fever, chills, or night sweats.  Head:  No headaches.  Eyes:  No double vision or blurry vision.  Ears:  No ringing in the ears.  Neck:  No neck pain.  Cardiovascular:  No chest pain.  Respiratory:  No shortness of breath.  Abdomen:  No nausea, vomiting, or abdominal pain.  Extremities/Neurological:  Positive pain in both lower extremities which is not new.  Musculoskeletal:  Occasional joint pain.    PHYSICAL EXAMINATION:   HEENT:  Head:  Normocephalic, atraumatic.  Eyes:  No scleral icterus.  Ears:  Hearing bilaterally intact.  NECK:  Supple.  CARDIOVASCULAR:  S1 and S2 heard.  RESPIRATORY:  Good air entry bilaterally.  ABDOMEN:  Soft and nontender.  EXTREMITIES:  No clubbing or cyanosis noted.      NEUROLOGIC:  The patient was awake and alert.  [facility], year 2022,  She was able to name simple objects, able to tell sons name  was able to follow simple commands.  Extraocular movements were intact.  Speech was fluent.  Smile symmetric.  Motor:  Right upper 0/5.  Right lower, slight movement at the hip and knee.  Left upper 4/5, left lower 3+/5.  Sensory:  Bilateral upper and lower intact to light touch.  Bilateral lower extremities, positive dressings were on both of her legs.                 LABS:  CBC Full  -  ( 22 Apr 2022 07:32 )  WBC Count : 6.69 K/uL  RBC Count : 3.32 M/uL  Hemoglobin : 9.4 g/dL  Hematocrit : 32.0 %  Platelet Count - Automated : 563 K/uL  Mean Cell Volume : 96.4 fl  Mean Cell Hemoglobin : 28.3 pg  Mean Cell Hemoglobin Concentration : 29.4 gm/dL  Auto Neutrophil # : x  Auto Lymphocyte # : x  Auto Monocyte # : x  Auto Eosinophil # : x  Auto Basophil # : x  Auto Neutrophil % : x  Auto Lymphocyte % : x  Auto Monocyte % : x  Auto Eosinophil % : x  Auto Basophil % : x      04-22    145  |  108  |  6<L>  ----------------------------<  128<H>  3.2<L>   |  28  |  0.68    Ca    8.4<L>      22 Apr 2022 07:32      Hemoglobin A1C:       Vitamin B12   PT/INR - ( 22 Apr 2022 07:32 )   PT: 49.2 sec;   INR: 4.15 ratio               RADIOLOGY      ANALYSIS AND PLAN:  This is a 89-year-old with altered mental status and history of cerebrovascular accident.  For episode of altered mental status, I suspect most likely metabolic encephalopathy along with possibly underlying mild cognitive impairment.  Metabolic encephalopathy is most likely multifactorial secondary to thyroid dysfunction, possibly underlying septic issues with the patient having temperatures and also respiratory issues with the patient having low oxygen saturation, suspect less likely this is a primary central nervous system event.  I would recommend sepsis workup, antibiotics as needed.  Pulmonary followup, monitor oxygen saturation.  We will recommend to rule out other cause of metabolic encephalopathy.  We will check vitamin 12, folate, and ammonia levels.  For history of cerebrovascular accident with underlying atrial fibrillation, anticoagulation with a goal INR between 2 and 3.  For hypothyroidism, adjustment of the patient's Synthroid as needed.  The patient's pain medications have been adjusted.  The patient will be still on the gabapentin 900 mg a day, baclofen 30 mg a day, but appears that the patient's tramadol has been made p.r.n.  We will see how the patient does mentally if she is more awake with the p.r.n. dose of tramadol and see if she continues to take that.  For hypertension, monitor systolic blood pressure.  For hyperlipidemia, continue the patient on statin.  patient does agree to try low dose tramadol 25 mg once a day to help with pain     I spoke with son, Curtis, at [phone].  He does understand while in the hospital setting, he may become more disoriented.    Greater than 34 minutes of time was spent with the patient, planning care, reviewing data, speaking to multidisciplinary healthcare team with greater than 50% of that time in counseling and care coordination.     Neurology follow up note    REJI [id number]      Interval History:    Patient feels ok no new complaints.    Allergies    No Known Allergies    Intolerances        MEDICATIONS    acetaminophen     Tablet .. 650 milliGRAM(s) Oral every 6 hours PRN  aluminum hydroxide/magnesium hydroxide/simethicone Suspension 30 milliLiter(s) Oral every 4 hours PRN  baclofen 10 milliGRAM(s) Oral every 8 hours  cholecalciferol 1000 Unit(s) Oral daily  cyanocobalamin 1000 MICROGram(s) Oral daily  escitalopram 20 milliGRAM(s) Oral daily  folic acid 1 milliGRAM(s) Oral daily  furosemide    Tablet 20 milliGRAM(s) Oral daily  gabapentin 300 milliGRAM(s) Oral every 8 hours  guaiFENesin Oral Liquid (Sugar-Free) 200 milliGRAM(s) Oral every 6 hours PRN  hydroxyurea 500 milliGRAM(s) Oral two times a day  lactobacillus acidophilus 1 Tablet(s) Oral two times a day  levothyroxine 50 MICROGram(s) Oral daily  MediHoney Gel Wound Dressing 1 Application(s) 1 Application(s) Topical every 3 days  melatonin 3 milliGRAM(s) Oral at bedtime PRN  metoprolol tartrate 25 milliGRAM(s) Oral two times a day  ondansetron Injectable 4 milliGRAM(s) IV Push every 8 hours PRN  pantoprazole   Suspension 40 milliGRAM(s) Oral daily  piperacillin/tazobactam IVPB.. 3.375 Gram(s) IV Intermittent every 8 hours  senna 1 Tablet(s) Oral at bedtime  simvastatin 10 milliGRAM(s) Oral at bedtime  traMADol 25 milliGRAM(s) Oral daily              Vital Signs Last 24 Hrs  T(C): 36.9 (23 Apr 2022 05:13), Max: 38 (22 Apr 2022 21:04)  T(F): 98.5 (23 Apr 2022 05:13), Max: 100.4 (22 Apr 2022 21:04)  HR: 78 (23 Apr 2022 05:13) (71 - 87)  BP: 126/72 (23 Apr 2022 05:13) (118/62 - 126/72)  BP(mean): --  RR: 18 (23 Apr 2022 05:13) (18 - 18)  SpO2: 91% (23 Apr 2022 05:13) (91% - 92%)    REVIEW OF SYSTEMS:  Constitutionally, the patient denies fever, chills, or night sweats.  Head:  No headaches.  Eyes:  No double vision or blurry vision.  Ears:  No ringing in the ears.  Neck:  No neck pain.  Cardiovascular:  No chest pain.  Respiratory:  No shortness of breath.  Abdomen:  No nausea, vomiting, or abdominal pain.  Extremities/Neurological:  Positive pain in both lower extremities which is not new.  Musculoskeletal:  Occasional joint pain.    PHYSICAL EXAMINATION:   HEENT:  Head:  Normocephalic, atraumatic.  Eyes:  No scleral icterus.  Ears:  Hearing bilaterally intact.  NECK:  Supple.  CARDIOVASCULAR:  S1 and S2 heard.  RESPIRATORY:  Good air entry bilaterally.  ABDOMEN:  Soft and nontender.  EXTREMITIES:  No clubbing or cyanosis noted.      NEUROLOGIC:  The patient was awake and alert.  [facility], year 2022,  She was able to name simple objects, able to tell sons name  was able to follow simple commands.  Extraocular movements were intact.  Speech was fluent.  Smile symmetric.  Motor:  Right upper 0/5.  Right lower, slight movement at the hip and knee.  Left upper 4/5, left lower 3+/5.  Sensory:  Bilateral upper and lower intact to light touch.  Bilateral lower extremities, positive dressings were on both of her legs.                 LABS:  CBC Full  -  ( 22 Apr 2022 07:32 )  WBC Count : 6.69 K/uL  RBC Count : 3.32 M/uL  Hemoglobin : 9.4 g/dL  Hematocrit : 32.0 %  Platelet Count - Automated : 563 K/uL  Mean Cell Volume : 96.4 fl  Mean Cell Hemoglobin : 28.3 pg  Mean Cell Hemoglobin Concentration : 29.4 gm/dL  Auto Neutrophil # : x  Auto Lymphocyte # : x  Auto Monocyte # : x  Auto Eosinophil # : x  Auto Basophil # : x  Auto Neutrophil % : x  Auto Lymphocyte % : x  Auto Monocyte % : x  Auto Eosinophil % : x  Auto Basophil % : x      04-22    145  |  108  |  6<L>  ----------------------------<  128<H>  3.2<L>   |  28  |  0.68    Ca    8.4<L>      22 Apr 2022 07:32      Hemoglobin A1C:       Vitamin B12   PT/INR - ( 22 Apr 2022 07:32 )   PT: 49.2 sec;   INR: 4.15 ratio               RADIOLOGY      ANALYSIS AND PLAN:  This is a 89-year-old with altered mental status and history of cerebrovascular accident.  For episode of altered mental status, I suspect most likely metabolic encephalopathy along with possibly underlying mild cognitive impairment.  Metabolic encephalopathy is most likely multifactorial secondary to thyroid dysfunction, possibly underlying septic issues with the patient having temperatures and also respiratory issues with the patient having low oxygen saturation, suspect less likely this is a primary central nervous system event.  I would recommend sepsis workup, antibiotics as needed.  Pulmonary followup, monitor oxygen saturation.  We will recommend to rule out other cause of metabolic encephalopathy.  We will check vitamin 12, folate, and ammonia levels.  For history of cerebrovascular accident with underlying atrial fibrillation, anticoagulation with a goal INR between 2 and 3.  For hypothyroidism, adjustment of the patient's Synthroid as needed.  The patient's pain medications have been adjusted.  The patient will be still on the gabapentin 900 mg a day, baclofen 30 mg a day, but appears that the patient's tramadol has been made p.r.n.  We will see how the patient does mentally if she is more awake with the p.r.n. dose of tramadol and see if she continues to take that.  For hypertension, monitor systolic blood pressure.  For hyperlipidemia, continue the patient on statin.  patient does agree to try low dose tramadol 25 mg once a day to help with pain patient states pain is better and does not feel more confused at present  so will continue current medications     I spoke with son, Curtis, at [phone] 4/23.  He does understand while in the hospital setting, he may become more disoriented.    Greater than 31  minutes of time was spent with the patient, planning care, reviewing data, speaking to multidisciplinary healthcare team with greater than 50% of that time in counseling and care coordination.

## 2022-04-23 NOTE — PROGRESS NOTE ADULT - SUBJECTIVE AND OBJECTIVE BOX
Date/Time Patient Seen:  		  Referring MD:   Data Reviewed	       Patient is a 89y old  Female who presents with a chief complaint of AMS (22 Apr 2022 16:03)      Subjective/HPI     PAST MEDICAL & SURGICAL HISTORY:  Hypertension    CVA (cerebral vascular accident)    Depression    Constipation    Neuropathy    Constipation    Hyperlipidemia    Grade I diastolic dysfunction    Afib    Neuropathy    VHD (valvular heart disease)    Aortic valvar stenosis    No significant past surgical history          Medication list         MEDICATIONS  (STANDING):  baclofen 10 milliGRAM(s) Oral every 8 hours  cholecalciferol 1000 Unit(s) Oral daily  cyanocobalamin 1000 MICROGram(s) Oral daily  escitalopram 20 milliGRAM(s) Oral daily  folic acid 1 milliGRAM(s) Oral daily  furosemide    Tablet 20 milliGRAM(s) Oral daily  gabapentin 300 milliGRAM(s) Oral every 8 hours  hydroxyurea 500 milliGRAM(s) Oral two times a day  lactobacillus acidophilus 1 Tablet(s) Oral two times a day  levothyroxine 50 MICROGram(s) Oral daily  MediHoney Gel Wound Dressing 1 Application(s) 1 Application(s) Topical every 3 days  metoprolol tartrate 25 milliGRAM(s) Oral two times a day  pantoprazole   Suspension 40 milliGRAM(s) Oral daily  piperacillin/tazobactam IVPB.. 3.375 Gram(s) IV Intermittent every 8 hours  senna 1 Tablet(s) Oral at bedtime  simvastatin 10 milliGRAM(s) Oral at bedtime  traMADol 25 milliGRAM(s) Oral daily    MEDICATIONS  (PRN):  acetaminophen     Tablet .. 650 milliGRAM(s) Oral every 6 hours PRN Temp greater or equal to 38C (100.4F), Mild Pain (1 - 3)  aluminum hydroxide/magnesium hydroxide/simethicone Suspension 30 milliLiter(s) Oral every 4 hours PRN Dyspepsia  guaiFENesin Oral Liquid (Sugar-Free) 200 milliGRAM(s) Oral every 6 hours PRN Cough  melatonin 3 milliGRAM(s) Oral at bedtime PRN Insomnia  ondansetron Injectable 4 milliGRAM(s) IV Push every 8 hours PRN Nausea and/or Vomiting         Vitals log        ICU Vital Signs Last 24 Hrs  T(C): 36.9 (23 Apr 2022 05:13), Max: 38 (22 Apr 2022 21:04)  T(F): 98.5 (23 Apr 2022 05:13), Max: 100.4 (22 Apr 2022 21:04)  HR: 78 (23 Apr 2022 05:13) (71 - 87)  BP: 126/72 (23 Apr 2022 05:13) (118/62 - 126/72)  BP(mean): --  ABP: --  ABP(mean): --  RR: 18 (23 Apr 2022 05:13) (18 - 18)  SpO2: 91% (23 Apr 2022 05:13) (91% - 92%)           Input and Output:  I&O's Detail    21 Apr 2022 07:01  -  22 Apr 2022 07:00  --------------------------------------------------------  IN:  Total IN: 0 mL    OUT:    Voided (mL): 600 mL  Total OUT: 600 mL    Total NET: -600 mL          Lab Data                        9.4    6.69  )-----------( 563      ( 22 Apr 2022 07:32 )             32.0     04-22    145  |  108  |  6<L>  ----------------------------<  128<H>  3.2<L>   |  28  |  0.68    Ca    8.4<L>      22 Apr 2022 07:32              Review of Systems	      Objective     Physical Examination    heart s1s2  lung dec BS  abd soft      Pertinent Lab findings & Imaging      Summer:  NO   Adequate UO     I&O's Detail    21 Apr 2022 07:01  -  22 Apr 2022 07:00  --------------------------------------------------------  IN:  Total IN: 0 mL    OUT:    Voided (mL): 600 mL  Total OUT: 600 mL    Total NET: -600 mL               Discussed with:     Cultures:	        Radiology

## 2022-04-24 LAB
ANION GAP SERPL CALC-SCNC: 7 MMOL/L — SIGNIFICANT CHANGE UP (ref 5–17)
BUN SERPL-MCNC: 9 MG/DL — SIGNIFICANT CHANGE UP (ref 7–23)
CALCIUM SERPL-MCNC: 8.6 MG/DL — SIGNIFICANT CHANGE UP (ref 8.5–10.1)
CHLORIDE SERPL-SCNC: 105 MMOL/L — SIGNIFICANT CHANGE UP (ref 96–108)
CO2 SERPL-SCNC: 29 MMOL/L — SIGNIFICANT CHANGE UP (ref 22–31)
CREAT SERPL-MCNC: 0.72 MG/DL — SIGNIFICANT CHANGE UP (ref 0.5–1.3)
EGFR: 79 ML/MIN/1.73M2 — SIGNIFICANT CHANGE UP
GLUCOSE SERPL-MCNC: 92 MG/DL — SIGNIFICANT CHANGE UP (ref 70–99)
HCT VFR BLD CALC: 33.4 % — LOW (ref 34.5–45)
HGB BLD-MCNC: 9.7 G/DL — LOW (ref 11.5–15.5)
INR BLD: 2.13 RATIO — HIGH (ref 0.88–1.16)
MCHC RBC-ENTMCNC: 27.5 PG — SIGNIFICANT CHANGE UP (ref 27–34)
MCHC RBC-ENTMCNC: 29 GM/DL — LOW (ref 32–36)
MCV RBC AUTO: 94.6 FL — SIGNIFICANT CHANGE UP (ref 80–100)
NRBC # BLD: 0 /100 WBCS — SIGNIFICANT CHANGE UP (ref 0–0)
PLATELET # BLD AUTO: 798 K/UL — HIGH (ref 150–400)
POTASSIUM SERPL-MCNC: 4.1 MMOL/L — SIGNIFICANT CHANGE UP (ref 3.5–5.3)
POTASSIUM SERPL-SCNC: 4.1 MMOL/L — SIGNIFICANT CHANGE UP (ref 3.5–5.3)
PROTHROM AB SERPL-ACNC: 25.1 SEC — HIGH (ref 10.5–13.4)
RBC # BLD: 3.53 M/UL — LOW (ref 3.8–5.2)
RBC # FLD: 20.8 % — HIGH (ref 10.3–14.5)
SODIUM SERPL-SCNC: 141 MMOL/L — SIGNIFICANT CHANGE UP (ref 135–145)
WBC # BLD: 8.73 K/UL — SIGNIFICANT CHANGE UP (ref 3.8–10.5)
WBC # FLD AUTO: 8.73 K/UL — SIGNIFICANT CHANGE UP (ref 3.8–10.5)

## 2022-04-24 RX ORDER — NYSTATIN CREAM 100000 [USP'U]/G
1 CREAM TOPICAL
Refills: 0 | Status: DISCONTINUED | OUTPATIENT
Start: 2022-04-24 | End: 2022-04-25

## 2022-04-24 RX ORDER — GABAPENTIN 400 MG/1
400 CAPSULE ORAL EVERY 8 HOURS
Refills: 0 | Status: DISCONTINUED | OUTPATIENT
Start: 2022-04-24 | End: 2022-04-25

## 2022-04-24 RX ADMIN — PIPERACILLIN AND TAZOBACTAM 25 GRAM(S): 4; .5 INJECTION, POWDER, LYOPHILIZED, FOR SOLUTION INTRAVENOUS at 06:19

## 2022-04-24 RX ADMIN — PANTOPRAZOLE SODIUM 40 MILLIGRAM(S): 20 TABLET, DELAYED RELEASE ORAL at 11:24

## 2022-04-24 RX ADMIN — Medication 10 MILLIGRAM(S): at 06:18

## 2022-04-24 RX ADMIN — GABAPENTIN 300 MILLIGRAM(S): 400 CAPSULE ORAL at 06:18

## 2022-04-24 RX ADMIN — HYDROXYUREA 500 MILLIGRAM(S): 500 CAPSULE ORAL at 06:18

## 2022-04-24 RX ADMIN — Medication 10 MILLIGRAM(S): at 21:49

## 2022-04-24 RX ADMIN — HYDROXYUREA 500 MILLIGRAM(S): 500 CAPSULE ORAL at 17:05

## 2022-04-24 RX ADMIN — Medication 25 MILLIGRAM(S): at 17:05

## 2022-04-24 RX ADMIN — PIPERACILLIN AND TAZOBACTAM 25 GRAM(S): 4; .5 INJECTION, POWDER, LYOPHILIZED, FOR SOLUTION INTRAVENOUS at 21:50

## 2022-04-24 RX ADMIN — TRAMADOL HYDROCHLORIDE 25 MILLIGRAM(S): 50 TABLET ORAL at 21:50

## 2022-04-24 RX ADMIN — GABAPENTIN 400 MILLIGRAM(S): 400 CAPSULE ORAL at 13:12

## 2022-04-24 RX ADMIN — Medication 200 MILLIGRAM(S): at 11:36

## 2022-04-24 RX ADMIN — Medication 50 MICROGRAM(S): at 06:18

## 2022-04-24 RX ADMIN — SENNA PLUS 1 TABLET(S): 8.6 TABLET ORAL at 21:49

## 2022-04-24 RX ADMIN — PIPERACILLIN AND TAZOBACTAM 25 GRAM(S): 4; .5 INJECTION, POWDER, LYOPHILIZED, FOR SOLUTION INTRAVENOUS at 13:08

## 2022-04-24 RX ADMIN — PREGABALIN 1000 MICROGRAM(S): 225 CAPSULE ORAL at 11:25

## 2022-04-24 RX ADMIN — Medication 20 MILLIGRAM(S): at 06:18

## 2022-04-24 RX ADMIN — Medication 1 TABLET(S): at 17:05

## 2022-04-24 RX ADMIN — ESCITALOPRAM OXALATE 20 MILLIGRAM(S): 10 TABLET, FILM COATED ORAL at 11:24

## 2022-04-24 RX ADMIN — SIMVASTATIN 10 MILLIGRAM(S): 20 TABLET, FILM COATED ORAL at 21:49

## 2022-04-24 RX ADMIN — Medication 3 MILLIGRAM(S): at 21:49

## 2022-04-24 RX ADMIN — Medication 1 MILLIGRAM(S): at 11:24

## 2022-04-24 RX ADMIN — Medication 10 MILLIGRAM(S): at 13:12

## 2022-04-24 RX ADMIN — GABAPENTIN 400 MILLIGRAM(S): 400 CAPSULE ORAL at 21:49

## 2022-04-24 RX ADMIN — Medication 650 MILLIGRAM(S): at 12:03

## 2022-04-24 RX ADMIN — TRAMADOL HYDROCHLORIDE 25 MILLIGRAM(S): 50 TABLET ORAL at 22:50

## 2022-04-24 RX ADMIN — Medication 25 MILLIGRAM(S): at 06:18

## 2022-04-24 RX ADMIN — Medication 1 TABLET(S): at 06:19

## 2022-04-24 RX ADMIN — Medication 650 MILLIGRAM(S): at 11:25

## 2022-04-24 RX ADMIN — Medication 1000 UNIT(S): at 11:25

## 2022-04-24 NOTE — PROGRESS NOTE ADULT - SUBJECTIVE AND OBJECTIVE BOX
[facility]  Gus Sy PA-C  [facility] [place] [address]  [phone]      INTERVAL HPI/OVERNIGHT EVENTS:  Pt s/e  no acute events overnight as per nursing staff   Pt is a poor historian, however reports no GI complaints      MEDICATIONS  (STANDING):  baclofen 10 milliGRAM(s) Oral every 8 hours  cholecalciferol 1000 Unit(s) Oral daily  cyanocobalamin 1000 MICROGram(s) Oral daily  escitalopram 20 milliGRAM(s) Oral daily  folic acid 1 milliGRAM(s) Oral daily  furosemide    Tablet 20 milliGRAM(s) Oral daily  gabapentin 300 milliGRAM(s) Oral every 8 hours  hydroxyurea 500 milliGRAM(s) Oral two times a day  lactobacillus acidophilus 1 Tablet(s) Oral two times a day  levothyroxine 50 MICROGram(s) Oral daily  MediHoney Gel Wound Dressing 1 Application(s) 1 Application(s) Topical every 3 days  metoprolol tartrate 25 milliGRAM(s) Oral two times a day  pantoprazole   Suspension 40 milliGRAM(s) Oral daily  piperacillin/tazobactam IVPB.. 3.375 Gram(s) IV Intermittent every 8 hours  senna 1 Tablet(s) Oral at bedtime  simvastatin 10 milliGRAM(s) Oral at bedtime  traMADol 25 milliGRAM(s) Oral daily    MEDICATIONS  (PRN):  acetaminophen     Tablet .. 650 milliGRAM(s) Oral every 6 hours PRN Temp greater or equal to 38C (100.4F), Mild Pain (1 - 3)  aluminum hydroxide/magnesium hydroxide/simethicone Suspension 30 milliLiter(s) Oral every 4 hours PRN Dyspepsia  guaiFENesin Oral Liquid (Sugar-Free) 200 milliGRAM(s) Oral every 6 hours PRN Cough  melatonin 3 milliGRAM(s) Oral at bedtime PRN Insomnia  ondansetron Injectable 4 milliGRAM(s) IV Push every 8 hours PRN Nausea and/or Vomiting      Allergies  No Known Allergies    Intolerances      Vital Signs Last 24 Hrs  T(C): 36.9 (24 Apr 2022 05:09), Max: 36.9 (24 Apr 2022 05:09)  T(F): 98.5 (24 Apr 2022 05:09), Max: 98.5 (24 Apr 2022 05:09)  HR: 95 (24 Apr 2022 05:09) (78 - 99)  BP: 147/81 (24 Apr 2022 05:09) (132/70 - 147/81)  BP(mean): --  RR: 18 (24 Apr 2022 05:09) (18 - 18)  SpO2: 91% (24 Apr 2022 05:09) (91% - 93%)    04-23-22 @ 07:01  -  04-24-22 @ 07:00  --------------------------------------------------------  IN: 100 mL / OUT: 150 mL / NET: -50 mL          GENERAL:  Appears stated age  ABDOMEN:  Soft, non-tender, non-distended  NEURO:  Alert        LABS:                        9.6    7.43  )-----------( 675      ( 23 Apr 2022 08:01 )             33.4     04-23    144  |  109<H>  |  9   ----------------------------<  86  3.8   |  27  |  0.69    Ca    8.5      23 Apr 2022 08:01      PT/INR - ( 23 Apr 2022 08:01 )   PT: 40.4 sec;   INR: 3.41 ratio         Culture - Blood (collected 21 Apr 2022 11:16)  Source: .Blood Blood  Preliminary Report (22 Apr 2022 12:01):    No growth to date.

## 2022-04-24 NOTE — PROGRESS NOTE ADULT - SUBJECTIVE AND OBJECTIVE BOX
Neurology follow up note    REJI [id number]      Interval History:    Patient feels ok no new complaints.    Allergies    No Known Allergies    Intolerances        MEDICATIONS    acetaminophen     Tablet .. 650 milliGRAM(s) Oral every 6 hours PRN  aluminum hydroxide/magnesium hydroxide/simethicone Suspension 30 milliLiter(s) Oral every 4 hours PRN  baclofen 10 milliGRAM(s) Oral every 8 hours  cholecalciferol 1000 Unit(s) Oral daily  cyanocobalamin 1000 MICROGram(s) Oral daily  escitalopram 20 milliGRAM(s) Oral daily  folic acid 1 milliGRAM(s) Oral daily  furosemide    Tablet 20 milliGRAM(s) Oral daily  gabapentin 300 milliGRAM(s) Oral every 8 hours  guaiFENesin Oral Liquid (Sugar-Free) 200 milliGRAM(s) Oral every 6 hours PRN  hydroxyurea 500 milliGRAM(s) Oral two times a day  lactobacillus acidophilus 1 Tablet(s) Oral two times a day  levothyroxine 50 MICROGram(s) Oral daily  MediHoney Gel Wound Dressing 1 Application(s) 1 Application(s) Topical every 3 days  melatonin 3 milliGRAM(s) Oral at bedtime PRN  metoprolol tartrate 25 milliGRAM(s) Oral two times a day  ondansetron Injectable 4 milliGRAM(s) IV Push every 8 hours PRN  pantoprazole   Suspension 40 milliGRAM(s) Oral daily  piperacillin/tazobactam IVPB.. 3.375 Gram(s) IV Intermittent every 8 hours  senna 1 Tablet(s) Oral at bedtime  simvastatin 10 milliGRAM(s) Oral at bedtime  traMADol 25 milliGRAM(s) Oral daily              Vital Signs Last 24 Hrs  T(C): 36.9 (24 Apr 2022 05:09), Max: 36.9 (24 Apr 2022 05:09)  T(F): 98.5 (24 Apr 2022 05:09), Max: 98.5 (24 Apr 2022 05:09)  HR: 95 (24 Apr 2022 05:09) (78 - 99)  BP: 147/81 (24 Apr 2022 05:09) (132/70 - 147/81)  BP(mean): --  RR: 18 (24 Apr 2022 05:09) (18 - 18)  SpO2: 91% (24 Apr 2022 05:09) (91% - 93%)      REVIEW OF SYSTEMS:  Constitutionally, the patient denies fever, chills, or night sweats.  Head:  No headaches.  Eyes:  No double vision or blurry vision.  Ears:  No ringing in the ears.  Neck:  No neck pain.  Cardiovascular:  No chest pain.  Respiratory:  No shortness of breath.  Abdomen:  No nausea, vomiting, or abdominal pain.  Extremities/Neurological:  Positive pain in both lower extremities which is not new.  Musculoskeletal:  Occasional joint pain.    PHYSICAL EXAMINATION:   HEENT:  Head:  Normocephalic, atraumatic.  Eyes:  No scleral icterus.  Ears:  Hearing bilaterally intact.  NECK:  Supple.  CARDIOVASCULAR:  S1 and S2 heard.  RESPIRATORY:  Good air entry bilaterally.  ABDOMEN:  Soft and nontender.  EXTREMITIES:  No clubbing or cyanosis noted.      NEUROLOGIC:  The patient was awake and alert.  [facility], year 2022,  She was able to name simple objects, able to tell sons name  was able to follow simple commands.  Extraocular movements were intact.  Speech was fluent.  Smile symmetric.  Motor:  Right upper 0/5.  Right lower, slight movement at the hip and knee.  Left upper 4/5, left lower 3+/5.  Sensory:  Bilateral upper and lower intact to light touch.  Bilateral lower extremities, positive dressings were on both of her legs.               LABS:  CBC Full  -  ( 24 Apr 2022 08:39 )  WBC Count : 8.73 K/uL  RBC Count : 3.53 M/uL  Hemoglobin : 9.7 g/dL  Hematocrit : 33.4 %  Platelet Count - Automated : 798 K/uL  Mean Cell Volume : 94.6 fl  Mean Cell Hemoglobin : 27.5 pg  Mean Cell Hemoglobin Concentration : 29.0 gm/dL  Auto Neutrophil # : x  Auto Lymphocyte # : x  Auto Monocyte # : x  Auto Eosinophil # : x  Auto Basophil # : x  Auto Neutrophil % : x  Auto Lymphocyte % : x  Auto Monocyte % : x  Auto Eosinophil % : x  Auto Basophil % : x      04-24    141  |  105  |  9   ----------------------------<  92  4.1   |  29  |  0.72    Ca    8.6      24 Apr 2022 08:39      Hemoglobin A1C:       Vitamin B12   PT/INR - ( 24 Apr 2022 08:39 )   PT: 25.1 sec;   INR: 2.13 ratio               RADIOLOGY    ANALYSIS AND PLAN:  This is a 89-year-old with altered mental status and history of cerebrovascular accident.  For episode of altered mental status, I suspect most likely metabolic encephalopathy along with possibly underlying mild cognitive impairment.  Metabolic encephalopathy is most likely multifactorial secondary to thyroid dysfunction, possibly underlying septic issues with the patient having temperatures and also respiratory issues with the patient having low oxygen saturation, suspect less likely this is a primary central nervous system event.  I would recommend sepsis workup, antibiotics as needed.  Pulmonary followup, monitor oxygen saturation.  We will recommend to rule out other cause of metabolic encephalopathy.  We will check vitamin 12, folate, and ammonia levels.  For history of cerebrovascular accident with underlying atrial fibrillation, anticoagulation with a goal INR between 2 and 3.  For hypothyroidism, adjustment of the patient's Synthroid as needed.  The patient's pain medications have been adjusted.  The patient will be still on the gabapentin 900 mg a day, baclofen 30 mg a day, but appears that the patient's tramadol has been made p.r.n.  We will see how the patient does mentally if she is more awake with the p.r.n. dose of tramadol and see if she continues to take that.  For hypertension, monitor systolic blood pressure.  For hyperlipidemia, continue the patient on statin.  patient does agree to try low dose tramadol 25 mg once a day to help with pain patient states pain is better and does not feel more confused at present  so will continue current medications     I spoke with son, Curtis, at [phone] 4/24.  He does understand while in the hospital setting, he may become more disoriented.    Greater than 27  minutes of time was spent with the patient, planning care, reviewing data, speaking to multidisciplinary healthcare team with greater than 50% of that time in counseling and care coordination.     Neurology follow up note    REJI [id number]      Interval History:    Patient feels ok no new complaints.    Allergies    No Known Allergies    Intolerances        MEDICATIONS    acetaminophen     Tablet .. 650 milliGRAM(s) Oral every 6 hours PRN  aluminum hydroxide/magnesium hydroxide/simethicone Suspension 30 milliLiter(s) Oral every 4 hours PRN  baclofen 10 milliGRAM(s) Oral every 8 hours  cholecalciferol 1000 Unit(s) Oral daily  cyanocobalamin 1000 MICROGram(s) Oral daily  escitalopram 20 milliGRAM(s) Oral daily  folic acid 1 milliGRAM(s) Oral daily  furosemide    Tablet 20 milliGRAM(s) Oral daily  gabapentin 300 milliGRAM(s) Oral every 8 hours  guaiFENesin Oral Liquid (Sugar-Free) 200 milliGRAM(s) Oral every 6 hours PRN  hydroxyurea 500 milliGRAM(s) Oral two times a day  lactobacillus acidophilus 1 Tablet(s) Oral two times a day  levothyroxine 50 MICROGram(s) Oral daily  MediHoney Gel Wound Dressing 1 Application(s) 1 Application(s) Topical every 3 days  melatonin 3 milliGRAM(s) Oral at bedtime PRN  metoprolol tartrate 25 milliGRAM(s) Oral two times a day  ondansetron Injectable 4 milliGRAM(s) IV Push every 8 hours PRN  pantoprazole   Suspension 40 milliGRAM(s) Oral daily  piperacillin/tazobactam IVPB.. 3.375 Gram(s) IV Intermittent every 8 hours  senna 1 Tablet(s) Oral at bedtime  simvastatin 10 milliGRAM(s) Oral at bedtime  traMADol 25 milliGRAM(s) Oral daily              Vital Signs Last 24 Hrs  T(C): 36.9 (24 Apr 2022 05:09), Max: 36.9 (24 Apr 2022 05:09)  T(F): 98.5 (24 Apr 2022 05:09), Max: 98.5 (24 Apr 2022 05:09)  HR: 95 (24 Apr 2022 05:09) (78 - 99)  BP: 147/81 (24 Apr 2022 05:09) (132/70 - 147/81)  BP(mean): --  RR: 18 (24 Apr 2022 05:09) (18 - 18)  SpO2: 91% (24 Apr 2022 05:09) (91% - 93%)      REVIEW OF SYSTEMS:  Constitutionally, the patient denies fever, chills, or night sweats.  Head:  No headaches.  Eyes:  No double vision or blurry vision.  Ears:  No ringing in the ears.  Neck:  No neck pain.  Cardiovascular:  No chest pain.  Respiratory:  No shortness of breath.  Abdomen:  No nausea, vomiting, or abdominal pain.  Extremities/Neurological:  Positive pain in both lower extremities which is not new.  Musculoskeletal:  Occasional joint pain.    PHYSICAL EXAMINATION:   HEENT:  Head:  Normocephalic, atraumatic.  Eyes:  No scleral icterus.  Ears:  Hearing bilaterally intact.  NECK:  Supple.  CARDIOVASCULAR:  S1 and S2 heard.  RESPIRATORY:  Good air entry bilaterally.  ABDOMEN:  Soft and nontender.  EXTREMITIES:  No clubbing or cyanosis noted.      NEUROLOGIC:  The patient was awake and alert.  [facility], year 2022,  She was able to name simple objects, able to tell sons name  was able to follow simple commands.  Extraocular movements were intact.  Speech was fluent.  Smile symmetric.  Motor:  Right upper 0/5.  Right lower, slight movement at the hip and knee.  Left upper 4/5, left lower 3+/5.  Sensory:  Bilateral upper and lower intact to light touch.  Bilateral lower extremities, positive dressings were on both of her legs.               LABS:  CBC Full  -  ( 24 Apr 2022 08:39 )  WBC Count : 8.73 K/uL  RBC Count : 3.53 M/uL  Hemoglobin : 9.7 g/dL  Hematocrit : 33.4 %  Platelet Count - Automated : 798 K/uL  Mean Cell Volume : 94.6 fl  Mean Cell Hemoglobin : 27.5 pg  Mean Cell Hemoglobin Concentration : 29.0 gm/dL  Auto Neutrophil # : x  Auto Lymphocyte # : x  Auto Monocyte # : x  Auto Eosinophil # : x  Auto Basophil # : x  Auto Neutrophil % : x  Auto Lymphocyte % : x  Auto Monocyte % : x  Auto Eosinophil % : x  Auto Basophil % : x      04-24    141  |  105  |  9   ----------------------------<  92  4.1   |  29  |  0.72    Ca    8.6      24 Apr 2022 08:39      Hemoglobin A1C:       Vitamin B12   PT/INR - ( 24 Apr 2022 08:39 )   PT: 25.1 sec;   INR: 2.13 ratio               RADIOLOGY    ANALYSIS AND PLAN:  This is a 89-year-old with altered mental status and history of cerebrovascular accident.  For episode of altered mental status, I suspect most likely metabolic encephalopathy along with possibly underlying mild cognitive impairment.  Metabolic encephalopathy is most likely multifactorial secondary to thyroid dysfunction, possibly underlying septic issues with the patient having temperatures and also respiratory issues with the patient having low oxygen saturation, suspect less likely this is a primary central nervous system event.  I would recommend sepsis workup, antibiotics as needed.  Pulmonary followup, monitor oxygen saturation.  For history of cerebrovascular accident with underlying atrial fibrillation, anticoagulation with a goal INR between 2 and 3.  For hypothyroidism, adjustment of the patient's Synthroid as needed.  The patient's pain medications have been adjusted.  The patient will be still on the gabapentin 900 mg a day, baclofen 30 mg a day, but appears that the patient's tramadol has been made p.r.n.  We will see how the patient does mentally if she is more awake with the p.r.n. dose of tramadol and see if she continues to take that.  For hypertension, monitor systolic blood pressure.  For hyperlipidemia, continue the patient on statin.  patient does agree to try low dose tramadol 25 mg once a day to help with pain patient states pain is better and does not feel more confused at present  so will continue current medications     I spoke with son, Curtis, at [phone] 4/24.  He does understand while in the hospital setting, he may become more disoriented.    Greater than 27  minutes of time was spent with the patient, planning care, reviewing data, speaking to multidisciplinary healthcare team with greater than 50% of that time in counseling and care coordination.     Neurology follow up note    REJI [id number]      Interval History:    Patient feels ok no new complaints.    Allergies    No Known Allergies    Intolerances        MEDICATIONS    acetaminophen     Tablet .. 650 milliGRAM(s) Oral every 6 hours PRN  aluminum hydroxide/magnesium hydroxide/simethicone Suspension 30 milliLiter(s) Oral every 4 hours PRN  baclofen 10 milliGRAM(s) Oral every 8 hours  cholecalciferol 1000 Unit(s) Oral daily  cyanocobalamin 1000 MICROGram(s) Oral daily  escitalopram 20 milliGRAM(s) Oral daily  folic acid 1 milliGRAM(s) Oral daily  furosemide    Tablet 20 milliGRAM(s) Oral daily  gabapentin 300 milliGRAM(s) Oral every 8 hours  guaiFENesin Oral Liquid (Sugar-Free) 200 milliGRAM(s) Oral every 6 hours PRN  hydroxyurea 500 milliGRAM(s) Oral two times a day  lactobacillus acidophilus 1 Tablet(s) Oral two times a day  levothyroxine 50 MICROGram(s) Oral daily  MediHoney Gel Wound Dressing 1 Application(s) 1 Application(s) Topical every 3 days  melatonin 3 milliGRAM(s) Oral at bedtime PRN  metoprolol tartrate 25 milliGRAM(s) Oral two times a day  ondansetron Injectable 4 milliGRAM(s) IV Push every 8 hours PRN  pantoprazole   Suspension 40 milliGRAM(s) Oral daily  piperacillin/tazobactam IVPB.. 3.375 Gram(s) IV Intermittent every 8 hours  senna 1 Tablet(s) Oral at bedtime  simvastatin 10 milliGRAM(s) Oral at bedtime  traMADol 25 milliGRAM(s) Oral daily              Vital Signs Last 24 Hrs  T(C): 36.9 (24 Apr 2022 05:09), Max: 36.9 (24 Apr 2022 05:09)  T(F): 98.5 (24 Apr 2022 05:09), Max: 98.5 (24 Apr 2022 05:09)  HR: 95 (24 Apr 2022 05:09) (78 - 99)  BP: 147/81 (24 Apr 2022 05:09) (132/70 - 147/81)  BP(mean): --  RR: 18 (24 Apr 2022 05:09) (18 - 18)  SpO2: 91% (24 Apr 2022 05:09) (91% - 93%)      REVIEW OF SYSTEMS:  Constitutionally, the patient denies fever, chills, or night sweats.  Head:  No headaches.  Eyes:  No double vision or blurry vision.  Ears:  No ringing in the ears.  Neck:  No neck pain.  Cardiovascular:  No chest pain.  Respiratory:  No shortness of breath.  Abdomen:  No nausea, vomiting, or abdominal pain.  Extremities/Neurological:  Positive pain in both lower extremities which is not new.  Musculoskeletal:  Occasional joint pain.    PHYSICAL EXAMINATION:   HEENT:  Head:  Normocephalic, atraumatic.  Eyes:  No scleral icterus.  Ears:  Hearing bilaterally intact.  NECK:  Supple.  CARDIOVASCULAR:  S1 and S2 heard.  RESPIRATORY:  Good air entry bilaterally.  ABDOMEN:  Soft and nontender.  EXTREMITIES:  No clubbing or cyanosis noted.      NEUROLOGIC:  The patient was awake and alert.  [facility], year 2022,  She was able to name simple objects, able to tell sons name  was able to follow simple commands.  Extraocular movements were intact.  Speech was fluent.  Smile symmetric.  Motor:  Right upper 0/5.  Right lower, slight movement at the hip and knee.  Left upper 4/5, left lower 3+/5.  Sensory:  Bilateral upper and lower intact to light touch.  Bilateral lower extremities, positive dressings were on both of her legs.               LABS:  CBC Full  -  ( 24 Apr 2022 08:39 )  WBC Count : 8.73 K/uL  RBC Count : 3.53 M/uL  Hemoglobin : 9.7 g/dL  Hematocrit : 33.4 %  Platelet Count - Automated : 798 K/uL  Mean Cell Volume : 94.6 fl  Mean Cell Hemoglobin : 27.5 pg  Mean Cell Hemoglobin Concentration : 29.0 gm/dL  Auto Neutrophil # : x  Auto Lymphocyte # : x  Auto Monocyte # : x  Auto Eosinophil # : x  Auto Basophil # : x  Auto Neutrophil % : x  Auto Lymphocyte % : x  Auto Monocyte % : x  Auto Eosinophil % : x  Auto Basophil % : x      04-24    141  |  105  |  9   ----------------------------<  92  4.1   |  29  |  0.72    Ca    8.6      24 Apr 2022 08:39      Hemoglobin A1C:       Vitamin B12   PT/INR - ( 24 Apr 2022 08:39 )   PT: 25.1 sec;   INR: 2.13 ratio               RADIOLOGY    ANALYSIS AND PLAN:  This is a 89-year-old with altered mental status and history of cerebrovascular accident.  For episode of altered mental status, I suspect most likely metabolic encephalopathy along with possibly underlying mild cognitive impairment.  Metabolic encephalopathy is most likely multifactorial secondary to thyroid dysfunction, possibly underlying septic issues with the patient having temperatures and also respiratory issues with the patient having low oxygen saturation, suspect less likely this is a primary central nervous system event.  I would recommend sepsis workup, antibiotics as needed.  Pulmonary followup, monitor oxygen saturation.  For history of cerebrovascular accident with underlying atrial fibrillation, anticoagulation with a goal INR between 2 and 3.  For hypothyroidism, adjustment of the patient's Synthroid as needed.  The patient's pain medications have been adjusted.  The patient will be still on the gabapentin 1200 mg a day, baclofen 30 mg a day, but appears that the patient's tramadol has been made p.r.n.  We will see how the patient does mentally if she is more awake with the p.r.n. dose of tramadol and see if she continues to take that.  For hypertension, monitor systolic blood pressure.  For hyperlipidemia, continue the patient on statin.  patient does agree to try low dose tramadol 25 mg once a day to help with pain patient states pain is better and does not feel more confused at present  so will continue current medications     I spoke with son, Curtis, at [phone] 4/24.  He does understand while in the hospital setting, he may become more disoriented.    Greater than 27  minutes of time was spent with the patient, planning care, reviewing data, speaking to multidisciplinary healthcare team with greater than 50% of that time in counseling and care coordination.

## 2022-04-24 NOTE — PROGRESS NOTE ADULT - SUBJECTIVE AND OBJECTIVE BOX
REJI BERGER    [facility] [address]    Allergies    No Known Allergies    Intolerances        PAST MEDICAL & SURGICAL HISTORY:  Hypertension    CVA (cerebral vascular accident)    Depression    Constipation    Neuropathy    Hyperlipidemia    Grade I diastolic dysfunction    Afib    Neuropathy    VHD (valvular heart disease)    Aortic valvar stenosis    No significant past surgical history        FAMILY HISTORY:      Home Medications:  acetaminophen 500 mg oral tablet: 2 tab(s) orally once a day (at bedtime) (19 Apr 2022 14:19)  acetaminophen 500 mg oral tablet: 2 tab(s) orally every 12 hours, As Needed (19 Apr 2022 14:19)  baclofen 10 mg oral tablet: 1 tab(s) orally every 8 hours (19 Apr 2022 14:19)  Biofreeze 4% topical gel: Apply topically to R shoulder &amp; L knee topically 2 times a day (19 Apr 2022 14:19)  Coumadin 1 mg oral tablet: 1 tab(s) orally once a day (at bedtime) (19 Apr 2022 14:19)  cyanocobalamin 1000 mcg oral tablet: 1 tab(s) orally once a day (19 Apr 2022 14:19)  docusate sodium 100 mg oral capsule: 2 cap(s) orally once a day (19 Apr 2022 14:19)  enalapril 2.5 mg oral tablet: 1 tab(s) orally once a day (19 Apr 2022 14:19)  escitalopram 20 mg oral tablet: 1 tab(s) orally once a day (19 Apr 2022 14:19)  famotidine 20 mg oral tablet: 1 tab(s) orally once a day (19 Apr 2022 14:19)  famotidine 20 mg oral tablet: 1 tab(s) orally once a day (19 Apr 2022 14:19)  folic acid 1 mg oral tablet: 1 tab(s) orally once a day (19 Apr 2022 14:19)  furosemide 20 mg oral tablet: 2 tab(s) orally once a day (19 Apr 2022 14:19)  gabapentin 100 mg oral capsule: 3 cap(s) orally 3 times a day (19 Apr 2022 14:19)  hydroxyurea 500 mg oral capsule: 1 cap(s) orally 2 times a day (19 Apr 2022 14:19)  levothyroxine 25 mcg (0.025 mg) oral tablet: 1 tab(s) orally once a day (19 Apr 2022 14:19)  Metoprolol Tartrate 25 mg oral tablet: 1 tab(s) orally 2 times a day (19 Apr 2022 14:19)  Senna 8.6 mg oral tablet: 1 tab(s) orally once a day (at bedtime) (19 Apr 2022 14:19)  simvastatin 10 mg oral tablet: 1 tab(s) orally once a day (at bedtime) (19 Apr 2022 14:19)  traMADol 50 mg oral tablet: 1 tab(s) orally 2 times a day (19 Apr 2022 14:19)  Vitamin D3: 1 tab(s) orally once a day (19 Apr 2022 14:19)      MEDICATIONS  (STANDING):  baclofen 10 milliGRAM(s) Oral every 8 hours  cholecalciferol 1000 Unit(s) Oral daily  cyanocobalamin 1000 MICROGram(s) Oral daily  escitalopram 20 milliGRAM(s) Oral daily  folic acid 1 milliGRAM(s) Oral daily  furosemide    Tablet 20 milliGRAM(s) Oral daily  gabapentin 300 milliGRAM(s) Oral every 8 hours  hydroxyurea 500 milliGRAM(s) Oral two times a day  lactobacillus acidophilus 1 Tablet(s) Oral two times a day  levothyroxine 50 MICROGram(s) Oral daily  MediHoney Gel Wound Dressing 1 Application(s) 1 Application(s) Topical every 3 days  metoprolol tartrate 25 milliGRAM(s) Oral two times a day  pantoprazole   Suspension 40 milliGRAM(s) Oral daily  piperacillin/tazobactam IVPB.. 3.375 Gram(s) IV Intermittent every 8 hours  senna 1 Tablet(s) Oral at bedtime  simvastatin 10 milliGRAM(s) Oral at bedtime  traMADol 25 milliGRAM(s) Oral daily    MEDICATIONS  (PRN):  acetaminophen     Tablet .. 650 milliGRAM(s) Oral every 6 hours PRN Temp greater or equal to 38C (100.4F), Mild Pain (1 - 3)  aluminum hydroxide/magnesium hydroxide/simethicone Suspension 30 milliLiter(s) Oral every 4 hours PRN Dyspepsia  guaiFENesin Oral Liquid (Sugar-Free) 200 milliGRAM(s) Oral every 6 hours PRN Cough  melatonin 3 milliGRAM(s) Oral at bedtime PRN Insomnia  ondansetron Injectable 4 milliGRAM(s) IV Push every 8 hours PRN Nausea and/or Vomiting              Vital Signs Last 24 Hrs  T(C): 36.9 (24 Apr 2022 05:09), Max: 36.9 (24 Apr 2022 05:09)  T(F): 98.5 (24 Apr 2022 05:09), Max: 98.5 (24 Apr 2022 05:09)  HR: 95 (24 Apr 2022 05:09) (78 - 99)  BP: 147/81 (24 Apr 2022 05:09) (132/70 - 147/81)  BP(mean): --  RR: 18 (24 Apr 2022 05:09) (18 - 18)  SpO2: 91% (24 Apr 2022 05:09) (91% - 93%)      04-23-22 @ 07:01  -  04-24-22 @ 07:00  --------------------------------------------------------  IN: 100 mL / OUT: 150 mL / NET: -50 mL              LABS:                        9.7    8.73  )-----------( 798      ( 24 Apr 2022 08:39 )             33.4     04-23    144  |  109<H>  |  9   ----------------------------<  86  3.8   |  27  |  0.69    Ca    8.5      23 Apr 2022 08:01      PT/INR - ( 24 Apr 2022 08:39 )   PT: 25.1 sec;   INR: 2.13 ratio                   WBC:  WBC Count: 8.73 K/uL (04-24 @ 08:39)  WBC Count: 7.43 K/uL (04-23 @ 08:01)  WBC Count: 6.69 K/uL (04-22 @ 07:32)  WBC Count: 6.91 K/uL (04-21 @ 07:36)      MICROBIOLOGY:  RECENT CULTURES:  04-21 .Blood Blood XXXX XXXX   No growth to date.    04-20 Clean Catch Clean Catch (Midstream) XXXX XXXX   No growth    04-19 Wound Wound Staphylococcus aureus  Enterobacter cloacae complex XXXX   Numerous Staphylococcus aureus  Few Enterobacter cloacae complex    04-19 .Blood Blood-Peripheral Staphylococcus epidermidis   Growth in aerobic bottle: Gram Positive Cocci in Clusters  Growth in anaerobic bottle: Gram Positive Cocci in Clusters   Growth in aerobic bottle: Staphylococcus pettenkoferi  Coag Negative Staphylococcus  Single set isolate, possible contaminant. Contact  Microbiology if susceptibility testing clinically  indicated.  Growth in anaerobic bottle: Staphylococcus epidermidis                PT/INR - ( 24 Apr 2022 08:39 )   PT: 25.1 sec;   INR: 2.13 ratio             Sodium:  Sodium, Serum: 144 mmol/L (04-23 @ 08:01)  Sodium, Serum: 145 mmol/L (04-22 @ 07:32)  Sodium, Serum: 144 mmol/L (04-21 @ 07:36)      0.69 mg/dL 04-23 @ 08:01  0.68 mg/dL 04-22 @ 07:32  0.69 mg/dL 04-21 @ 07:36      Hemoglobin:  Hemoglobin: 9.7 g/dL (04-24 @ 08:39)  Hemoglobin: 9.6 g/dL (04-23 @ 08:01)  Hemoglobin: 9.4 g/dL (04-22 @ 07:32)  Hemoglobin: 9.7 g/dL (04-21 @ 07:36)      Platelets: Platelet Count - Automated: 798 K/uL (04-24 @ 08:39)  Platelet Count - Automated: 675 K/uL (04-23 @ 08:01)  Platelet Count - Automated: 563 K/uL (04-22 @ 07:32)  Platelet Count - Automated: 630 K/uL (04-21 @ 07:36)              RADIOLOGY & ADDITIONAL STUDIES:      MICROBIOLOGY:  RECENT CULTURES:  04-21 .Blood Blood XXXX XXXX   No growth to date.    04-20 Clean Catch Clean Catch (Midstream) XXXX XXXX   No growth    04-19 Wound Wound Staphylococcus aureus  Enterobacter cloacae complex XXXX   Numerous Staphylococcus aureus  Few Enterobacter cloacae complex    04-19 .Blood Blood-Peripheral Staphylococcus epidermidis   Growth in aerobic bottle: Gram Positive Cocci in Clusters  Growth in anaerobic bottle: Gram Positive Cocci in Clusters   Growth in aerobic bottle: Staphylococcus pettenkoferi  Coag Negative Staphylococcus  Single set isolate, possible contaminant. Contact  Microbiology if susceptibility testing clinically  indicated.  Growth in anaerobic bottle: Staphylococcus epidermidis

## 2022-04-24 NOTE — PROGRESS NOTE ADULT - SUBJECTIVE AND OBJECTIVE BOX
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  She has hx of CVA  with R hemiplegia, dvt anemia, phlebitis, afib, hld, melanoma of skin, gerd depression, diverticulitis, uti, hypothyroid, pt is poor historian, per ems to the triage rn they adv pt may be septic due to wounds on her leg. pt has no complaints .As per [facility] med staff patient was sent to ED for evaluation of AMS ,associated with hypoxia and difficulties swallowing .Also her R leg wounds are worsening and ID /WOUND care consult requested .Septic workup sent ,found to have lactate elevation Admitted for septic workup and evaluation,send blood and urine cx,serial lactate levels,monitor vitals closley,ivfs hydration,monitor urine output and renal profile,iv abx initiated  .[facility] Palliative care consult requested ,to discuss advance directives and complete MOLST  (19 Apr 2022 17:08)      REVIEW OF SYSTEMS:    CONSTITUTIONAL: No fever, weight loss, or fatigue  EYES: No eye pain, visual disturbances, or discharge  ENMT:  No difficulty hearing, tinnitus, vertigo; No sinus or throat pain  NECK: No pain or stiffness  BREASTS: No pain, masses, or nipple discharge  RESPIRATORY: No cough, wheezing, chills or hemoptysis; No shortness of breath  CARDIOVASCULAR: No chest pain, palpitations, dizziness, or leg swelling  GASTROINTESTINAL: No abdominal or epigastric pain. No nausea, vomiting, or hematemesis; No diarrhea or constipation. No melena or hematochezia.  GENITOURINARY: No dysuria, frequency, hematuria, or incontinence  NEUROLOGICAL: No headaches, memory loss, loss of strength, numbness, or tremors  SKIN: No itching, burning, rashes, or lesions   LYMPH NODES: No enlarged glands  ENDOCRINE: No heat or cold intolerance; No hair loss  MUSCULOSKELETAL: No joint pain or swelling; No muscle, back, or extremity pain  PSYCHIATRIC: No depression, anxiety, mood swings, or difficulty sleeping  HEME/LYMPH: No easy bruising, or bleeding gums  ALLERGY AND IMMUNOLOGIC: No hives or eczema    MEDICATIONS  (STANDING):  baclofen 10 milliGRAM(s) Oral every 8 hours  cholecalciferol 1000 Unit(s) Oral daily  cyanocobalamin 1000 MICROGram(s) Oral daily  escitalopram 20 milliGRAM(s) Oral daily  folic acid 1 milliGRAM(s) Oral daily  furosemide    Tablet 20 milliGRAM(s) Oral daily  gabapentin 300 milliGRAM(s) Oral every 8 hours  hydroxyurea 500 milliGRAM(s) Oral two times a day  lactobacillus acidophilus 1 Tablet(s) Oral two times a day  levothyroxine 50 MICROGram(s) Oral daily  MediHoney Gel Wound Dressing 1 Application(s) 1 Application(s) Topical every 3 days  metoprolol tartrate 25 milliGRAM(s) Oral two times a day  pantoprazole   Suspension 40 milliGRAM(s) Oral daily  piperacillin/tazobactam IVPB.. 3.375 Gram(s) IV Intermittent every 8 hours  senna 1 Tablet(s) Oral at bedtime  simvastatin 10 milliGRAM(s) Oral at bedtime  traMADol 25 milliGRAM(s) Oral daily    MEDICATIONS  (PRN):  acetaminophen     Tablet .. 650 milliGRAM(s) Oral every 6 hours PRN Temp greater or equal to 38C (100.4F), Mild Pain (1 - 3)  aluminum hydroxide/magnesium hydroxide/simethicone Suspension 30 milliLiter(s) Oral every 4 hours PRN Dyspepsia  guaiFENesin Oral Liquid (Sugar-Free) 200 milliGRAM(s) Oral every 6 hours PRN Cough  melatonin 3 milliGRAM(s) Oral at bedtime PRN Insomnia  ondansetron Injectable 4 milliGRAM(s) IV Push every 8 hours PRN Nausea and/or Vomiting      ALLERGIES: No Known Allergies      FAMILY HISTORY:      PHYSICAL EXAMINATION:  -----------------------------  T(C): 36.9 (04-24-22 @ 05:09), Max: 36.9 (04-24-22 @ 05:09)  HR: 95 (04-24-22 @ 05:09) (78 - 99)  BP: 147/81 (04-24-22 @ 05:09) (132/70 - 147/81)  RR: 18 (04-24-22 @ 05:09) (18 - 18)  SpO2: 91% (04-24-22 @ 05:09) (91% - 93%)  Wt(kg): --    04-23 @ 07:01  -  04-24 @ 07:00  --------------------------------------------------------  IN:    IV PiggyBack: 100 mL  Total IN: 100 mL    OUT:    Voided (mL): 150 mL  Total OUT: 150 mL    Total NET: -50 mL            VITALS  T(C): 36.9 (04-24-22 @ 05:09), Max: 36.9 (04-24-22 @ 05:09)  HR: 95 (04-24-22 @ 05:09) (78 - 99)  BP: 147/81 (04-24-22 @ 05:09) (132/70 - 147/81)  RR: 18 (04-24-22 @ 05:09) (18 - 18)  SpO2: 91% (04-24-22 @ 05:09) (91% - 93%)    Constitutional: well developed, normal appearance, well groomed, well nourished, no deformities and no acute distress.   Eyes: the conjunctiva exhibited no abnormalities and the eyelids demonstrated no xanthelasmas.   HEENT: normal oral mucosa, no oral pallor and no oral cyanosis.   Neck: normal jugular venous A waves present, normal jugular venous V waves present and no jugular venous castillo A waves.   Pulmonary: no respiratory distress, normal respiratory rhythm and effort, no accessory muscle use and lungs were clear to auscultation bilaterally.   Cardiovascular: heart rate and rhythm were normal, normal S1 and S2 and no murmur, gallop, rub, heave or thrill are present.   Abdomen: soft, non-tender, no hepato-splenomegaly and no abdominal mass palpated.   Musculoskeletal: the gait could not be assessed..   Extremities: no clubbing of the fingernails, no localized cyanosis, no petechial hemorrhages and no ischemic changes.   Skin: normal skin color and pigmentation, no rash, no venous stasis, no skin lesions, no skin ulcer and no xanthoma was observed.   Psychiatric: oriented to person, place, and time, the affect was normal, the mood was normal and not feeling anxious.     LABS:   --------  04-24    141  |  105  |  9   ----------------------------<  92  4.1   |  29  |  0.72    Ca    8.6      24 Apr 2022 08:39                           9.7    8.73  )-----------( 798      ( 24 Apr 2022 08:39 )             33.4     PT/INR - ( 24 Apr 2022 08:39 )   PT: 25.1 sec;   INR: 2.13 ratio                     RADIOLOGY:  -----------------    ECG:     ECHO:

## 2022-04-24 NOTE — PROGRESS NOTE ADULT - ASSESSMENT
?Abdominal pain    CT noted  Vascular team following for thrombus in IVC  No further intra-abdominal abnormalities on CT  Pt reporting no abdominal pain  No abdominal pain on exam  No GI objection to start diet as per SLP recs  Will follow    I reviewed the overnight course of events on the unit, re-confirming the patient history. I discussed the care with the patient and their family  Differential diagnosis and plan of care discussed with patient after the evaluation  35 minutes spent on total encounter of which more than fifty percent of the encounter was spent counseling and/or coordinating care by the attending physician.  Advanced care planning was discussed with patient and family.  Advanced care planning forms were reviewed and discussed.  Risks, benefits and alternatives of gastroenterologic procedures were discussed in detail and all questions were answered. Abdominal pain    CT noted  Vascular team following for thrombus in IVC  No further intra-abdominal abnormalities on CT  Pt reporting no abdominal pain  No abdominal pain on exam  No GI objection to start diet as per SLP recs  Will follow    I reviewed the overnight course of events on the unit, re-confirming the patient history. I discussed the care with the patient and their family  Differential diagnosis and plan of care discussed with patient after the evaluation  35 minutes spent on total encounter of which more than fifty percent of the encounter was spent counseling and/or coordinating care by the attending physician.  Advanced care planning was discussed with patient and family.  Advanced care planning forms were reviewed and discussed.  Risks, benefits and alternatives of gastroenterologic procedures were discussed in detail and all questions were answered.

## 2022-04-24 NOTE — PROGRESS NOTE ADULT - ASSESSMENT
AB MENDOZA 89 f 4/19/2022 3/15/1932 DR YURI CARRINGTON     REVIEW OF SYMPTOMS      Able to give ROS  Yes     RELIABLE +/-   CONSTITUTIONAL Weakness Yes  Chills No   ENDOCRINE  No heat or cold intolerance    ALLERGY No hives  Sore throat No stridor  RESP Coughing blood no  Shortness of breath YES   NEURO No Headache  Confusion Pain neck No   CARDIAC No Chest pain No Palpitations   GI  Pain abdomen NO   Vomiting NO     PHYSICAL EXAM    HEENT Unremarkable  atraumatic   RESP Fair air entry EXP prolonged    Harsh breath sound Resp distres mild   CARDIAC S1 S2 No S3     NO JVD    ABDOMEN SOFT BS PRESENT NOT DISTENDED No hepatosplenomegaly PEDAL EDEMA present No calf tenderness  NO rash       DOA/CC/PROBLEMS poa .  89 f  doa 4/19/2022   cc Patient is a 89yo female complaining of left lower leg wound possible infection and sepsis Patient is tachy and has ams since 4/18      PMH-PSH .  pmh DVT  pmh Anemia  pmh leg wounds  pmh A fib  pmh GERD  pmh     PROSTHETICS/TUBES/LINES.    NOTABLE HOME MEDS.  coumadin 2.5  enalapril 2.5  lasix 20      COVID/ICU/CODE STATUS.                       COVID  STATUS.           ICU STAY. none  GOC.  4/23/2022 full code    BEST PRACTICE ISSUES.                                                  HEAD OF BED ELEVATION. Yes  DVT PROPHYLAXIS.   4/19/2022 on coumadin at home   SPEECH SW RECOMMENDATIONS.   4/22 mbs soft bite size mild thick  GONZALES PROPHYLAXIS.   4/19/2022 protonix 40                                                                                      DIET.  4/20/2022 mince moist     PROBLEM DATA/ASSESSMENT RECOMMENDATIONS (A/R).    HEMODYNAMICS.   Monitor bp Target MAP 65 (+)    RESP.   Monitor po Target po 90-95%    ABG.  4/19/2022 ra 745/40/68    PMH/PSH PROBLEMS.  Management continued/modified as indicated    OXYGEN REQUIREMENTS.  4/24/2022 ra 92%      INFECTION SOURCE.   Cellulitis lle poa 4/19/2022   INFECTION A/R.   Cellulitis lle on bactrim    INFECTION ANDREWS.  W 4/19-4/20-4/22/2022 w 9.6 - 7.1  - 6.6   pr 4/20/2022 .12   MICROBIO.  blod c 3/30 (-)   wound 4/19 staph enterococcus   blod c 4/19 mrse   blod c 4/23 (-)  ABIO.  4/24/2022 bactrm 160 800 bid x 5d     A fib  inr 4/19-4/20/2022 inr 4.8 - 5.4  4/19/2022 metoprolol 25.2   on rate control and anticoagn    CHF.  4/19/2022 lasix 20       HYPERAMMONEMIA.  Amm 4/19/2022 amm 39     ANEMIA.  Hb 4/19-4/20-4/21-4/22/2022 Hb 10.6 - 9.7 - 9.7 - 8.4   monito    RENAL.  Cr 4/19-4/22/2022 Cr .8 - .6   monitor    HYPOTHYROID.  4/20/2022 tsh 10   4/19/2022 levoxyl 25 -> 4/20 levox 50     OVERALL ISSUES.  LEG CELLULITIS   A fib   chronic dvt  coagulopathy    IV fl.  4/19/2022 d5 1/2 50      TIME SPENT   Over 25 minutes aggregate care time spent on encounter; activities included   direct patient care, counseling and/or coordinating care reviewing notes, lab data/ imaging , discussion with multidisciplinary team/ patient  /family and explaining in detail risks, benefits, alternatives  of the recommendations     AB MENDOZA 89 f 4/19/2022 3/15/1932 DR YURI CARRINGTON

## 2022-04-24 NOTE — PROGRESS NOTE ADULT - ASSESSMENT
89 yo Female ,[place] resident  sent from [facility] assisted living for eval of AMS and  her leg wounds on R leg.  She has hx of CVA  with R hemiplegia, dvt anemia, phlebitis, afib, hld, melanoma of skin, gerd depression, diverticulitis, uti, hypothyroid, pt is poor historian, per ems to the triage rn they adv pt may be septic due to wounds on her leg    vs noted  on ABX  Labs reviewed  remains on LASIX    on emp ABX  ID following  monitor VS and Sat  assist with needs - ADL  pt is full code - dc planning - poss ROSARIO - hospice discussion - GOC documented   SLP eval noted - PO Diet - HOB elev - asp prec - oral hygiene  prognosis guarded

## 2022-04-24 NOTE — PROGRESS NOTE ADULT - SUBJECTIVE AND OBJECTIVE BOX
Date/Time Patient Seen:  		  Referring MD:   Data Reviewed	       Patient is a 89y old  Female who presents with a chief complaint of AMS (23 Apr 2022 21:07)      Subjective/HPI     PAST MEDICAL & SURGICAL HISTORY:  Hypertension    CVA (cerebral vascular accident)    Depression    Constipation    Neuropathy    Constipation    Hyperlipidemia    Grade I diastolic dysfunction    Afib    Neuropathy    VHD (valvular heart disease)    Aortic valvar stenosis    No significant past surgical history          Medication list         MEDICATIONS  (STANDING):  baclofen 10 milliGRAM(s) Oral every 8 hours  cholecalciferol 1000 Unit(s) Oral daily  cyanocobalamin 1000 MICROGram(s) Oral daily  escitalopram 20 milliGRAM(s) Oral daily  folic acid 1 milliGRAM(s) Oral daily  furosemide    Tablet 20 milliGRAM(s) Oral daily  gabapentin 300 milliGRAM(s) Oral every 8 hours  hydroxyurea 500 milliGRAM(s) Oral two times a day  lactobacillus acidophilus 1 Tablet(s) Oral two times a day  levothyroxine 50 MICROGram(s) Oral daily  MediHoney Gel Wound Dressing 1 Application(s) 1 Application(s) Topical every 3 days  metoprolol tartrate 25 milliGRAM(s) Oral two times a day  pantoprazole   Suspension 40 milliGRAM(s) Oral daily  piperacillin/tazobactam IVPB.. 3.375 Gram(s) IV Intermittent every 8 hours  senna 1 Tablet(s) Oral at bedtime  simvastatin 10 milliGRAM(s) Oral at bedtime  traMADol 25 milliGRAM(s) Oral daily    MEDICATIONS  (PRN):  acetaminophen     Tablet .. 650 milliGRAM(s) Oral every 6 hours PRN Temp greater or equal to 38C (100.4F), Mild Pain (1 - 3)  aluminum hydroxide/magnesium hydroxide/simethicone Suspension 30 milliLiter(s) Oral every 4 hours PRN Dyspepsia  guaiFENesin Oral Liquid (Sugar-Free) 200 milliGRAM(s) Oral every 6 hours PRN Cough  melatonin 3 milliGRAM(s) Oral at bedtime PRN Insomnia  ondansetron Injectable 4 milliGRAM(s) IV Push every 8 hours PRN Nausea and/or Vomiting         Vitals log        ICU Vital Signs Last 24 Hrs  T(C): 36.9 (24 Apr 2022 05:09), Max: 36.9 (24 Apr 2022 05:09)  T(F): 98.5 (24 Apr 2022 05:09), Max: 98.5 (24 Apr 2022 05:09)  HR: 95 (24 Apr 2022 05:09) (78 - 99)  BP: 147/81 (24 Apr 2022 05:09) (132/70 - 147/81)  BP(mean): --  ABP: --  ABP(mean): --  RR: 18 (24 Apr 2022 05:09) (18 - 18)  SpO2: 91% (24 Apr 2022 05:09) (91% - 93%)           Input and Output:  I&O's Detail    23 Apr 2022 07:01  -  24 Apr 2022 05:20  --------------------------------------------------------  IN:    IV PiggyBack: 100 mL  Total IN: 100 mL    OUT:    Voided (mL): 150 mL  Total OUT: 150 mL    Total NET: -50 mL          Lab Data                        9.6    7.43  )-----------( 675      ( 23 Apr 2022 08:01 )             33.4     04-23    144  |  109<H>  |  9   ----------------------------<  86  3.8   |  27  |  0.69    Ca    8.5      23 Apr 2022 08:01              Review of Systems	      Objective     Physical Examination    heart s1s2  lung dec bS  abd soft      Pertinent Lab findings & Imaging      Summer:  NO   Adequate UO     I&O's Detail    23 Apr 2022 07:01  -  24 Apr 2022 05:20  --------------------------------------------------------  IN:    IV PiggyBack: 100 mL  Total IN: 100 mL    OUT:    Voided (mL): 150 mL  Total OUT: 150 mL    Total NET: -50 mL               Discussed with:     Cultures:	        Radiology

## 2022-04-25 ENCOUNTER — TRANSCRIPTION ENCOUNTER (OUTPATIENT)
Age: 87
End: 2022-04-25

## 2022-04-25 VITALS
TEMPERATURE: 98 F | OXYGEN SATURATION: 91 % | DIASTOLIC BLOOD PRESSURE: 75 MMHG | SYSTOLIC BLOOD PRESSURE: 110 MMHG | RESPIRATION RATE: 17 BRPM | HEART RATE: 77 BPM

## 2022-04-25 LAB
ANION GAP SERPL CALC-SCNC: 8 MMOL/L — SIGNIFICANT CHANGE UP (ref 5–17)
BUN SERPL-MCNC: 12 MG/DL — SIGNIFICANT CHANGE UP (ref 7–23)
CALCIUM SERPL-MCNC: 8.1 MG/DL — LOW (ref 8.5–10.1)
CHLORIDE SERPL-SCNC: 108 MMOL/L — SIGNIFICANT CHANGE UP (ref 96–108)
CO2 SERPL-SCNC: 27 MMOL/L — SIGNIFICANT CHANGE UP (ref 22–31)
CREAT SERPL-MCNC: 0.7 MG/DL — SIGNIFICANT CHANGE UP (ref 0.5–1.3)
EGFR: 82 ML/MIN/1.73M2 — SIGNIFICANT CHANGE UP
GLUCOSE SERPL-MCNC: 114 MG/DL — HIGH (ref 70–99)
HCT VFR BLD CALC: 32.7 % — LOW (ref 34.5–45)
HGB BLD-MCNC: 9.7 G/DL — LOW (ref 11.5–15.5)
INR BLD: 2.06 RATIO — HIGH (ref 0.88–1.16)
MCHC RBC-ENTMCNC: 28.2 PG — SIGNIFICANT CHANGE UP (ref 27–34)
MCHC RBC-ENTMCNC: 29.7 GM/DL — LOW (ref 32–36)
MCV RBC AUTO: 95.1 FL — SIGNIFICANT CHANGE UP (ref 80–100)
NRBC # BLD: 0 /100 WBCS — SIGNIFICANT CHANGE UP (ref 0–0)
PLATELET # BLD AUTO: 648 K/UL — HIGH (ref 150–400)
POTASSIUM SERPL-MCNC: 3.3 MMOL/L — LOW (ref 3.5–5.3)
POTASSIUM SERPL-SCNC: 3.3 MMOL/L — LOW (ref 3.5–5.3)
PROTHROM AB SERPL-ACNC: 24.3 SEC — HIGH (ref 10.5–13.4)
RBC # BLD: 3.44 M/UL — LOW (ref 3.8–5.2)
RBC # FLD: 21.2 % — HIGH (ref 10.3–14.5)
SARS-COV-2 RNA SPEC QL NAA+PROBE: SIGNIFICANT CHANGE UP
SODIUM SERPL-SCNC: 143 MMOL/L — SIGNIFICANT CHANGE UP (ref 135–145)
WBC # BLD: 6.25 K/UL — SIGNIFICANT CHANGE UP (ref 3.8–10.5)
WBC # FLD AUTO: 6.25 K/UL — SIGNIFICANT CHANGE UP (ref 3.8–10.5)

## 2022-04-25 PROCEDURE — 80048 BASIC METABOLIC PNL TOTAL CA: CPT

## 2022-04-25 PROCEDURE — 93970 EXTREMITY STUDY: CPT

## 2022-04-25 PROCEDURE — 82140 ASSAY OF AMMONIA: CPT

## 2022-04-25 PROCEDURE — 84134 ASSAY OF PREALBUMIN: CPT

## 2022-04-25 PROCEDURE — 87040 BLOOD CULTURE FOR BACTERIA: CPT

## 2022-04-25 PROCEDURE — 92610 EVALUATE SWALLOWING FUNCTION: CPT

## 2022-04-25 PROCEDURE — 87077 CULTURE AEROBIC IDENTIFY: CPT

## 2022-04-25 PROCEDURE — 82803 BLOOD GASES ANY COMBINATION: CPT

## 2022-04-25 PROCEDURE — 99232 SBSQ HOSP IP/OBS MODERATE 35: CPT

## 2022-04-25 PROCEDURE — 85027 COMPLETE CBC AUTOMATED: CPT

## 2022-04-25 PROCEDURE — 92611 MOTION FLUOROSCOPY/SWALLOW: CPT

## 2022-04-25 PROCEDURE — 71045 X-RAY EXAM CHEST 1 VIEW: CPT

## 2022-04-25 PROCEDURE — 99285 EMERGENCY DEPT VISIT HI MDM: CPT

## 2022-04-25 PROCEDURE — 97112 NEUROMUSCULAR REEDUCATION: CPT

## 2022-04-25 PROCEDURE — 85730 THROMBOPLASTIN TIME PARTIAL: CPT

## 2022-04-25 PROCEDURE — 84145 PROCALCITONIN (PCT): CPT

## 2022-04-25 PROCEDURE — 74230 X-RAY XM SWLNG FUNCJ C+: CPT

## 2022-04-25 PROCEDURE — U0003: CPT

## 2022-04-25 PROCEDURE — 83605 ASSAY OF LACTIC ACID: CPT

## 2022-04-25 PROCEDURE — 74177 CT ABD & PELVIS W/CONTRAST: CPT

## 2022-04-25 PROCEDURE — 84484 ASSAY OF TROPONIN QUANT: CPT

## 2022-04-25 PROCEDURE — 87150 DNA/RNA AMPLIFIED PROBE: CPT

## 2022-04-25 PROCEDURE — U0005: CPT

## 2022-04-25 PROCEDURE — 85025 COMPLETE CBC W/AUTO DIFF WBC: CPT

## 2022-04-25 PROCEDURE — 87186 SC STD MICRODIL/AGAR DIL: CPT

## 2022-04-25 PROCEDURE — 82607 VITAMIN B-12: CPT

## 2022-04-25 PROCEDURE — 85610 PROTHROMBIN TIME: CPT

## 2022-04-25 PROCEDURE — 73590 X-RAY EXAM OF LOWER LEG: CPT

## 2022-04-25 PROCEDURE — 82746 ASSAY OF FOLIC ACID SERUM: CPT

## 2022-04-25 PROCEDURE — 36415 COLL VENOUS BLD VENIPUNCTURE: CPT

## 2022-04-25 PROCEDURE — 93005 ELECTROCARDIOGRAM TRACING: CPT

## 2022-04-25 PROCEDURE — 70450 CT HEAD/BRAIN W/O DYE: CPT | Mod: MA

## 2022-04-25 PROCEDURE — 81001 URINALYSIS AUTO W/SCOPE: CPT

## 2022-04-25 PROCEDURE — 84443 ASSAY THYROID STIM HORMONE: CPT

## 2022-04-25 PROCEDURE — 87070 CULTURE OTHR SPECIMN AEROBIC: CPT

## 2022-04-25 PROCEDURE — 97110 THERAPEUTIC EXERCISES: CPT

## 2022-04-25 PROCEDURE — 80053 COMPREHEN METABOLIC PANEL: CPT

## 2022-04-25 PROCEDURE — 94760 N-INVAS EAR/PLS OXIMETRY 1: CPT

## 2022-04-25 PROCEDURE — A9698: CPT

## 2022-04-25 PROCEDURE — 87635 SARS-COV-2 COVID-19 AMP PRB: CPT

## 2022-04-25 PROCEDURE — 87086 URINE CULTURE/COLONY COUNT: CPT

## 2022-04-25 RX ORDER — ESCITALOPRAM OXALATE 10 MG/1
1 TABLET, FILM COATED ORAL
Qty: 0 | Refills: 0 | DISCHARGE

## 2022-04-25 RX ORDER — TRAMADOL HYDROCHLORIDE 50 MG/1
1 TABLET ORAL
Qty: 0 | Refills: 0 | DISCHARGE

## 2022-04-25 RX ORDER — ACETAMINOPHEN 500 MG
2 TABLET ORAL
Qty: 0 | Refills: 0 | DISCHARGE

## 2022-04-25 RX ORDER — GABAPENTIN 400 MG/1
3 CAPSULE ORAL
Qty: 0 | Refills: 0 | DISCHARGE

## 2022-04-25 RX ORDER — ESCITALOPRAM OXALATE 10 MG/1
1 TABLET, FILM COATED ORAL
Qty: 0 | Refills: 0 | DISCHARGE
Start: 2022-04-25

## 2022-04-25 RX ORDER — TRAMADOL HYDROCHLORIDE 50 MG/1
0.5 TABLET ORAL
Qty: 0 | Refills: 0 | DISCHARGE
Start: 2022-04-25

## 2022-04-25 RX ORDER — METOPROLOL TARTRATE 50 MG
1 TABLET ORAL
Qty: 0 | Refills: 0 | DISCHARGE

## 2022-04-25 RX ORDER — WARFARIN SODIUM 2.5 MG/1
1 TABLET ORAL
Qty: 0 | Refills: 0 | DISCHARGE

## 2022-04-25 RX ORDER — DOCUSATE SODIUM 100 MG
2 CAPSULE ORAL
Qty: 0 | Refills: 0 | DISCHARGE

## 2022-04-25 RX ORDER — LEVOTHYROXINE SODIUM 125 MCG
1 TABLET ORAL
Qty: 0 | Refills: 0 | DISCHARGE
Start: 2022-04-25

## 2022-04-25 RX ORDER — WARFARIN SODIUM 2.5 MG/1
1 TABLET ORAL
Qty: 0 | Refills: 0 | DISCHARGE
Start: 2022-04-25

## 2022-04-25 RX ORDER — FAMOTIDINE 10 MG/ML
1 INJECTION INTRAVENOUS
Qty: 0 | Refills: 0 | DISCHARGE

## 2022-04-25 RX ORDER — POTASSIUM CHLORIDE 20 MEQ
40 PACKET (EA) ORAL ONCE
Refills: 0 | Status: COMPLETED | OUTPATIENT
Start: 2022-04-25 | End: 2022-04-25

## 2022-04-25 RX ORDER — WARFARIN SODIUM 2.5 MG/1
1 TABLET ORAL ONCE
Refills: 0 | Status: DISCONTINUED | OUTPATIENT
Start: 2022-04-25 | End: 2022-04-25

## 2022-04-25 RX ORDER — GABAPENTIN 400 MG/1
1 CAPSULE ORAL
Qty: 0 | Refills: 0 | DISCHARGE
Start: 2022-04-25

## 2022-04-25 RX ORDER — FUROSEMIDE 40 MG
2 TABLET ORAL
Qty: 0 | Refills: 0 | DISCHARGE

## 2022-04-25 RX ORDER — SENNA PLUS 8.6 MG/1
1 TABLET ORAL
Qty: 0 | Refills: 0 | DISCHARGE

## 2022-04-25 RX ORDER — NYSTATIN CREAM 100000 [USP'U]/G
1 CREAM TOPICAL THREE TIMES A DAY
Refills: 0 | Status: DISCONTINUED | OUTPATIENT
Start: 2022-04-25 | End: 2022-04-25

## 2022-04-25 RX ORDER — ACETAMINOPHEN 500 MG
2 TABLET ORAL
Qty: 0 | Refills: 0 | DISCHARGE
Start: 2022-04-25

## 2022-04-25 RX ORDER — NYSTATIN CREAM 100000 [USP'U]/G
1 CREAM TOPICAL
Qty: 0 | Refills: 0 | DISCHARGE
Start: 2022-04-25

## 2022-04-25 RX ORDER — FUROSEMIDE 40 MG
1 TABLET ORAL
Qty: 0 | Refills: 0 | DISCHARGE
Start: 2022-04-25

## 2022-04-25 RX ORDER — METOPROLOL TARTRATE 50 MG
1 TABLET ORAL
Qty: 0 | Refills: 0 | DISCHARGE
Start: 2022-04-25

## 2022-04-25 RX ORDER — LACTOBACILLUS ACIDOPHILUS 100MM CELL
2 CAPSULE ORAL
Qty: 0 | Refills: 0 | DISCHARGE
Start: 2022-04-25

## 2022-04-25 RX ORDER — PANTOPRAZOLE SODIUM 20 MG/1
0 TABLET, DELAYED RELEASE ORAL
Qty: 0 | Refills: 0 | DISCHARGE
Start: 2022-04-25

## 2022-04-25 RX ORDER — SENNA PLUS 8.6 MG/1
1 TABLET ORAL
Qty: 0 | Refills: 0 | DISCHARGE
Start: 2022-04-25

## 2022-04-25 RX ORDER — LEVOTHYROXINE SODIUM 125 MCG
1 TABLET ORAL
Qty: 0 | Refills: 0 | DISCHARGE

## 2022-04-25 RX ADMIN — HYDROXYUREA 500 MILLIGRAM(S): 500 CAPSULE ORAL at 06:37

## 2022-04-25 RX ADMIN — Medication 20 MILLIGRAM(S): at 06:36

## 2022-04-25 RX ADMIN — Medication 10 MILLIGRAM(S): at 06:36

## 2022-04-25 RX ADMIN — Medication 50 MICROGRAM(S): at 06:37

## 2022-04-25 RX ADMIN — Medication 1000 UNIT(S): at 12:00

## 2022-04-25 RX ADMIN — Medication 25 MILLIGRAM(S): at 06:37

## 2022-04-25 RX ADMIN — Medication 40 MILLIEQUIVALENT(S): at 13:13

## 2022-04-25 RX ADMIN — NYSTATIN CREAM 1 APPLICATION(S): 100000 CREAM TOPICAL at 13:11

## 2022-04-25 RX ADMIN — Medication 1 MILLIGRAM(S): at 12:00

## 2022-04-25 RX ADMIN — ESCITALOPRAM OXALATE 20 MILLIGRAM(S): 10 TABLET, FILM COATED ORAL at 12:00

## 2022-04-25 RX ADMIN — PREGABALIN 1000 MICROGRAM(S): 225 CAPSULE ORAL at 12:00

## 2022-04-25 RX ADMIN — PANTOPRAZOLE SODIUM 40 MILLIGRAM(S): 20 TABLET, DELAYED RELEASE ORAL at 11:59

## 2022-04-25 RX ADMIN — Medication 650 MILLIGRAM(S): at 06:37

## 2022-04-25 RX ADMIN — GABAPENTIN 400 MILLIGRAM(S): 400 CAPSULE ORAL at 06:36

## 2022-04-25 RX ADMIN — Medication 10 MILLIGRAM(S): at 13:12

## 2022-04-25 RX ADMIN — Medication 1 TABLET(S): at 06:37

## 2022-04-25 RX ADMIN — GABAPENTIN 400 MILLIGRAM(S): 400 CAPSULE ORAL at 13:11

## 2022-04-25 NOTE — PROGRESS NOTE ADULT - SUBJECTIVE AND OBJECTIVE BOX
89y year old Female seen at bedside for b/l LE wound with cellulitis. Pt complaining of minor pain to RLE,  improvement. Pt denies any fever, chills, nausea, vomiting, chest pain, shortness of breath, or calf pain at this time.    Allergies    No Known Allergies    Intolerances    MEDICATIONS  (STANDING):  baclofen 10 milliGRAM(s) Oral every 8 hours  cholecalciferol 1000 Unit(s) Oral daily  cyanocobalamin 1000 MICROGram(s) Oral daily  doxycycline hyclate Capsule 100 milliGRAM(s) Oral every 12 hours  escitalopram 20 milliGRAM(s) Oral daily  folic acid 1 milliGRAM(s) Oral daily  furosemide    Tablet 20 milliGRAM(s) Oral daily  gabapentin 300 milliGRAM(s) Oral every 8 hours  hydroxyurea 500 milliGRAM(s) Oral two times a day  lactobacillus acidophilus 1 Tablet(s) Oral two times a day  levothyroxine 50 MICROGram(s) Oral daily  MediHoney Gel Wound Dressing 1 Application(s) 1 Application(s) Topical every 3 days  metoprolol tartrate 25 milliGRAM(s) Oral two times a day  pantoprazole   Suspension 40 milliGRAM(s) Oral daily  piperacillin/tazobactam IVPB.. 3.375 Gram(s) IV Intermittent every 8 hours  potassium chloride   Powder 40 milliEquivalent(s) Oral every 4 hours  senna 1 Tablet(s) Oral at bedtime  simvastatin 10 milliGRAM(s) Oral at bedtime  traMADol 25 milliGRAM(s) Oral daily    MEDICATIONS  (PRN):  acetaminophen     Tablet .. 650 milliGRAM(s) Oral every 6 hours PRN Temp greater or equal to 38C (100.4F), Mild Pain (1 - 3)  aluminum hydroxide/magnesium hydroxide/simethicone Suspension 30 milliLiter(s) Oral every 4 hours PRN Dyspepsia  guaiFENesin Oral Liquid (Sugar-Free) 200 milliGRAM(s) Oral every 6 hours PRN Cough  melatonin 3 milliGRAM(s) Oral at bedtime PRN Insomnia  ondansetron Injectable 4 milliGRAM(s) IV Push every 8 hours PRN Nausea and/or Vomiting      ICU Vital Signs Last 24 Hrs  T(C): 36.9 (25 Apr 2022 11:57), Max: 37.8 (25 Apr 2022 05:27)  T(F): 98.4 (25 Apr 2022 11:57), Max: 100 (25 Apr 2022 05:27)  HR: 77 (25 Apr 2022 11:57) (67 - 89)  BP: 110/75 (25 Apr 2022 11:57) (110/75 - 144/73)  BP(mean): --  ABP: --  ABP(mean): --  RR: 17 (25 Apr 2022 11:57) (17 - 18)  SpO2: 91% (25 Apr 2022 11:57) (91% - 93%)    CBC Full  -  ( 25 Apr 2022 07:43 )  WBC Count : 6.25 K/uL  RBC Count : 3.44 M/uL  Hemoglobin : 9.7 g/dL  Hematocrit : 32.7 %  Platelet Count - Automated : 648 K/uL  Mean Cell Volume : 95.1 fl  Mean Cell Hemoglobin : 28.2 pg  Mean Cell Hemoglobin Concentration : 29.7 gm/dL        PHYSICAL EXAM:  Vascular: DP palpable bilaterally & PT non-palpable bilaterally, Capillary refill 4 seconds, Digital hair absent bilaterally  Neurological: Light touch sensation intact bilaterally  Musculoskeletal: 5/5 strength in all quadrants bilaterally, AJ & STJ ROM intact  Dermatological: RLE 3 x 3 x 0.1 cm; 1 x 1 x 0.1 cm; 3 x 3 x 0.2 cm ulceration noted to the Right anterior leg (x2) and posterior leg, respectively , fibrotic wound bed with erythema and increase warmth to the RLE. No probe to bone, no drainage, no fluctuance, no malodor.  LLE 5 x 4 x 0.2 cm; ulceration noted to the Left anterior leg, fibrotic and overlying scab wound bed with mild erythema distally. No probe to bone, no drainage, no fluctuance, no malodor.     CBC Full  -  ( 22 Apr 2022 07:32 )  WBC Count : 6.69 K/uL  RBC Count : 3.32 M/uL  Hemoglobin : 9.4 g/dL  Hematocrit : 32.0 %  Platelet Count - Automated : 563 K/uL  Mean Cell Volume : 96.4 fl  Mean Cell Hemoglobin : 28.3 pg  Mean Cell Hemoglobin Concentration : 29.4 gm/dL      ACC: [id number] EXAM: US DPLX LWR EXT VEINS COMPL BI    PROCEDURE DATE: 04/19/2022        INTERPRETATION: CLINICAL INFORMATION: Leg pain with wounds. Evaluate for DVT.    COMPARISON: Venous duplex ultrasound 3/30/2022.    TECHNIQUE: Duplex sonography of the BILATERAL LOWER extremity veins with color and spectral Doppler, with and without compression.    FINDINGS:    RIGHT:    Normal compressibility of the right common femoral and popliteal veins. 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 One of the superficial femoral vein branches (posterior branch) demonstrates partial compressibility and thickening suggestive of chronic venous changes. No acute DVT is identified. Right posterior tibial vein is patent. Remaining calf veins are poorly visualized.    LEFT:    Normal compressibility of the LEFT common femoral, femoral and popliteal veins. Doppler examination shows normal spontaneous and phasic flow. Calf veins are poorly visualized.    IMPRESSION:      - . 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 Limited calf vein evaluation as above.    Please note that venous duplex ultrasound is only moderately sensitive for evaluation of calf vein thrombosis. If there are persistent symptoms and concern for central propagation of calf vein thrombus which may be unseen on this study, consider correlation with repeat venous duplex ultrasound in 7-10 days.    --- End of Report ---

## 2022-04-25 NOTE — PROGRESS NOTE ADULT - PROBLEM/PLAN-2
DISPLAY PLAN FREE TEXT
DISPLAY PLAN FREE TEXT
Pt sleeping on back. Even, unlabored breathing.   
DISPLAY PLAN FREE TEXT

## 2022-04-25 NOTE — PROGRESS NOTE ADULT - PROBLEM SELECTOR PLAN 9
serial pt/inr ,resume OAC when INR is below 3.0
cardiology consult
serial pt/inr ,resume OAC when INR is below 3.0
cardiology consult
cardiology consult

## 2022-04-25 NOTE — PROGRESS NOTE ADULT - PROBLEM SELECTOR PLAN 3
continue home medications ,cardiology consult
septic workup ,serial lactate levels ,iv abx and iv hydration ,ID consult
continue home medications ,cardiology consult
septic workup ,serial lactate levels ,iv abx and iv hydration ,ID consult
continue home medications ,cardiology consult

## 2022-04-25 NOTE — PROGRESS NOTE ADULT - SUBJECTIVE AND OBJECTIVE BOX
Date/Time Patient Seen:  		  Referring MD:   Data Reviewed	       Patient is a 89y old  Female who presents with a chief complaint of AMS (24 Apr 2022 10:24)      Subjective/HPI     PAST MEDICAL & SURGICAL HISTORY:  Hypertension    CVA (cerebral vascular accident)    Depression    Constipation    Neuropathy    Constipation    Hyperlipidemia    Grade I diastolic dysfunction    Afib    Neuropathy    VHD (valvular heart disease)    Aortic valvar stenosis    No significant past surgical history          Medication list         MEDICATIONS  (STANDING):  baclofen 10 milliGRAM(s) Oral every 8 hours  cholecalciferol 1000 Unit(s) Oral daily  cyanocobalamin 1000 MICROGram(s) Oral daily  escitalopram 20 milliGRAM(s) Oral daily  folic acid 1 milliGRAM(s) Oral daily  furosemide    Tablet 20 milliGRAM(s) Oral daily  gabapentin 400 milliGRAM(s) Oral every 8 hours  hydroxyurea 500 milliGRAM(s) Oral two times a day  lactobacillus acidophilus 1 Tablet(s) Oral two times a day  levothyroxine 50 MICROGram(s) Oral daily  MediHoney Gel Wound Dressing 1 Application(s) 1 Application(s) Topical every 3 days  metoprolol tartrate 25 milliGRAM(s) Oral two times a day  pantoprazole   Suspension 40 milliGRAM(s) Oral daily  senna 1 Tablet(s) Oral at bedtime  simvastatin 10 milliGRAM(s) Oral at bedtime  traMADol 25 milliGRAM(s) Oral daily  trimethoprim  160 mG/sulfamethoxazole 800 mG 1 Tablet(s) Oral two times a day    MEDICATIONS  (PRN):  acetaminophen     Tablet .. 650 milliGRAM(s) Oral every 6 hours PRN Temp greater or equal to 38C (100.4F), Mild Pain (1 - 3)  aluminum hydroxide/magnesium hydroxide/simethicone Suspension 30 milliLiter(s) Oral every 4 hours PRN Dyspepsia  guaiFENesin Oral Liquid (Sugar-Free) 200 milliGRAM(s) Oral every 6 hours PRN Cough  melatonin 3 milliGRAM(s) Oral at bedtime PRN Insomnia  nystatin Powder 1 Application(s) Topical two times a day PRN Rash/Itching  ondansetron Injectable 4 milliGRAM(s) IV Push every 8 hours PRN Nausea and/or Vomiting         Vitals log        ICU Vital Signs Last 24 Hrs  T(C): 37.1 (24 Apr 2022 21:49), Max: 37.1 (24 Apr 2022 21:49)  T(F): 98.7 (24 Apr 2022 21:49), Max: 98.7 (24 Apr 2022 21:49)  HR: 67 (24 Apr 2022 21:49) (67 - 94)  BP: 144/73 (24 Apr 2022 21:49) (109/71 - 144/73)  BP(mean): --  ABP: --  ABP(mean): --  RR: 18 (24 Apr 2022 21:49) (18 - 18)  SpO2: 92% (24 Apr 2022 21:49) (92% - 93%)           Input and Output:  I&O's Detail    23 Apr 2022 07:01  -  24 Apr 2022 07:00  --------------------------------------------------------  IN:    IV PiggyBack: 100 mL  Total IN: 100 mL    OUT:    Voided (mL): 150 mL  Total OUT: 150 mL    Total NET: -50 mL          Lab Data                        9.7    8.73  )-----------( 798      ( 24 Apr 2022 08:39 )             33.4     04-24    141  |  105  |  9   ----------------------------<  92  4.1   |  29  |  0.72    Ca    8.6      24 Apr 2022 08:39              Review of Systems	      Objective     Physical Examination    heart s1s2  lung dec BS  abd soft  head nc      Pertinent Lab findings & Imaging      Summer:  NO   Adequate UO     I&O's Detail    23 Apr 2022 07:01  -  24 Apr 2022 07:00  --------------------------------------------------------  IN:    IV PiggyBack: 100 mL  Total IN: 100 mL    OUT:    Voided (mL): 150 mL  Total OUT: 150 mL    Total NET: -50 mL               Discussed with:     Cultures:	        Radiology

## 2022-04-25 NOTE — PROGRESS NOTE ADULT - SUBJECTIVE AND OBJECTIVE BOX
[facility]  Gus Sy PA-C  [facility] [place] [address]  [phone]      INTERVAL HPI/OVERNIGHT EVENTS:  Pt s/e  no acute events overnight as per nursing staff   Pt is a poor historian, however reports no GI complaints      MEDICATIONS  (STANDING):  baclofen 10 milliGRAM(s) Oral every 8 hours  cholecalciferol 1000 Unit(s) Oral daily  cyanocobalamin 1000 MICROGram(s) Oral daily  escitalopram 20 milliGRAM(s) Oral daily  folic acid 1 milliGRAM(s) Oral daily  furosemide    Tablet 20 milliGRAM(s) Oral daily  gabapentin 300 milliGRAM(s) Oral every 8 hours  hydroxyurea 500 milliGRAM(s) Oral two times a day  lactobacillus acidophilus 1 Tablet(s) Oral two times a day  levothyroxine 50 MICROGram(s) Oral daily  MediHoney Gel Wound Dressing 1 Application(s) 1 Application(s) Topical every 3 days  metoprolol tartrate 25 milliGRAM(s) Oral two times a day  pantoprazole   Suspension 40 milliGRAM(s) Oral daily  piperacillin/tazobactam IVPB.. 3.375 Gram(s) IV Intermittent every 8 hours  senna 1 Tablet(s) Oral at bedtime  simvastatin 10 milliGRAM(s) Oral at bedtime  traMADol 25 milliGRAM(s) Oral daily    MEDICATIONS  (PRN):  acetaminophen     Tablet .. 650 milliGRAM(s) Oral every 6 hours PRN Temp greater or equal to 38C (100.4F), Mild Pain (1 - 3)  aluminum hydroxide/magnesium hydroxide/simethicone Suspension 30 milliLiter(s) Oral every 4 hours PRN Dyspepsia  guaiFENesin Oral Liquid (Sugar-Free) 200 milliGRAM(s) Oral every 6 hours PRN Cough  melatonin 3 milliGRAM(s) Oral at bedtime PRN Insomnia  ondansetron Injectable 4 milliGRAM(s) IV Push every 8 hours PRN Nausea and/or Vomiting      Allergies  No Known Allergies    Intolerances      Vital Signs Last 24 Hrs  T(C): 36.9 (24 Apr 2022 05:09), Max: 36.9 (24 Apr 2022 05:09)  T(F): 98.5 (24 Apr 2022 05:09), Max: 98.5 (24 Apr 2022 05:09)  HR: 95 (24 Apr 2022 05:09) (78 - 99)  BP: 147/81 (24 Apr 2022 05:09) (132/70 - 147/81)  BP(mean): --  RR: 18 (24 Apr 2022 05:09) (18 - 18)  SpO2: 91% (24 Apr 2022 05:09) (91% - 93%)    04-23-22 @ 07:01  -  04-24-22 @ 07:00  --------------------------------------------------------  IN: 100 mL / OUT: 150 mL / NET: -50 mL          GENERAL:  Appears stated age  ABDOMEN:  Soft, non-tender, non-distended  NEURO:  Alert        LABS:                        9.6    7.43  )-----------( 675      ( 23 Apr 2022 08:01 )             33.4     04-23    144  |  109<H>  |  9   ----------------------------<  86  3.8   |  27  |  0.69    Ca    8.5      23 Apr 2022 08:01      PT/INR - ( 23 Apr 2022 08:01 )   PT: 40.4 sec;   INR: 3.41 ratio         Culture - Blood (collected 21 Apr 2022 11:16)  Source: .Blood Blood  Preliminary Report (22 Apr 2022 12:01):    No growth to date.

## 2022-04-25 NOTE — PROGRESS NOTE ADULT - PROBLEM SELECTOR PROBLEM 1
Venous stasis ulcers of both lower extremities
AMS (altered mental status)

## 2022-04-25 NOTE — PROGRESS NOTE ADULT - NUTRITIONAL ASSESSMENT
This patient has been assessed with a concern for Malnutrition and has been determined to have a diagnosis/diagnoses of Moderate protein-calorie malnutrition.    This patient is being managed with:   Diet Soft and Bite Sized-  Mildly Thick Liquids (MILDTHICKLIQS)  Lactose Restricted (Milk Sugar Intoler.)  Single Sips Only  Entered: Apr 22 2022  2:23PM    
This patient has been assessed with a concern for Malnutrition and has been determined to have a diagnosis/diagnoses of Moderate protein-calorie malnutrition.    This patient is being managed with:   Diet Minced and Moist-  Mildly Thick Liquids (MILDTHICKLIQS)  No Straws  Entered: Apr 20 2022 10:13AM    
This patient has been assessed with a concern for Malnutrition and has been determined to have a diagnosis/diagnoses of Moderate protein-calorie malnutrition.    This patient is being managed with:   Diet Soft and Bite Sized-  Mildly Thick Liquids (MILDTHICKLIQS)  Lactose Restricted (Milk Sugar Intoler.)  Single Sips Only  Entered: Apr 22 2022  2:23PM    
This patient has been assessed with a concern for Malnutrition and has been determined to have a diagnosis/diagnoses of Moderate protein-calorie malnutrition.    This patient is being managed with:   Diet Minced and Moist-  Mildly Thick Liquids (MILDTHICKLIQS)  No Straws  Entered: Apr 20 2022 10:13AM    
This patient has been assessed with a concern for Malnutrition and has been determined to have a diagnosis/diagnoses of Moderate protein-calorie malnutrition.    This patient is being managed with:   Diet Soft and Bite Sized-  Mildly Thick Liquids (MILDTHICKLIQS)  Lactose Restricted (Milk Sugar Intoler.)  Single Sips Only  Entered: Apr 22 2022  2:23PM

## 2022-04-25 NOTE — DISCHARGE NOTE PROVIDER - NSDCFUADDINST_GEN_ALL_CORE_FT
Wound Care Instructions  1. Remove the previous dressings with care and cleansed the wound with saline, dry it with sterile gauze  2. Apply xeroform to the wound bed and dry sterile dressings. Change every 2-3 day.  3. Patient to follow-up in Wound Care/Hyperbaric Clinic within 3-5 days  with Dr. Girard or Dr. Gan. Patient to call [phone] to make an appointment after discharge.    4. Patient to watch for any signs of infection, included but not limited to fever, chills, nausea, excessive drainage, foul odor. Patient to report to the ED immediately.

## 2022-04-25 NOTE — PROGRESS NOTE ADULT - PROBLEM SELECTOR PROBLEM 9
Grade I diastolic dysfunction
Coagulopathy
Grade I diastolic dysfunction
Grade I diastolic dysfunction
Coagulopathy

## 2022-04-25 NOTE — PROGRESS NOTE ADULT - ASSESSMENT
rt leg wound -  cllulitis- on  doxycycline  ashd, atrial fib  dvt  coumadin - on hold for increased inr  hypertension  dyslipidemia  aortic stenosis  ams  abdominal pain rt lower- ct abdomen ordered r/o acute appedicitis- discussed with son at bed side  ct abdomen - no acute appendicitis 4/19  coumadin - has been started 4/25

## 2022-04-25 NOTE — DISCHARGE NOTE NURSING/CASE MANAGEMENT/SOCIAL WORK - PATIENT PORTAL LINK FT
You can access the FollowMyHealth Patient Portal offered by [facility] by registering at the following website: [email or web address]. By joining [employer]’s FollowMyHealth portal, you will also be able to view your health information using other applications (apps) compatible with our system.

## 2022-04-25 NOTE — DISCHARGE NOTE PROVIDER - DETAILS OF MALNUTRITION DIAGNOSIS/DIAGNOSES
This patient has been assessed with a concern for Malnutrition and was treated during this hospitalization for the following Nutrition diagnosis/diagnoses:     -  04/20/2022: Moderate protein-calorie malnutrition

## 2022-04-25 NOTE — PROGRESS NOTE ADULT - ASSESSMENT
Abdominal pain    CT noted  Vascular team following for thrombus in IVC  No further intra-abdominal abnormalities on CT  Pt reporting no abdominal pain  No abdominal pain on exam  No GI objection to start diet as per SLP recs  Will follow    I reviewed the overnight course of events on the unit, re-confirming the patient history. I discussed the care with the patient and their family  Differential diagnosis and plan of care discussed with patient after the evaluation  35 minutes spent on total encounter of which more than fifty percent of the encounter was spent counseling and/or coordinating care by the attending physician.  Advanced care planning was discussed with patient and family.  Advanced care planning forms were reviewed and discussed.  Risks, benefits and alternatives of gastroenterologic procedures were discussed in detail and all questions were answered.

## 2022-04-25 NOTE — PROGRESS NOTE ADULT - PROBLEM SELECTOR PROBLEM 5
Venous stasis ulcers of both lower extremities
Aortic valvar stenosis
Aortic valvar stenosis

## 2022-04-25 NOTE — PROGRESS NOTE ADULT - SUBJECTIVE AND OBJECTIVE BOX
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  She has hx of CVA  with R hemiplegia, dvt anemia, phlebitis, afib, hld, melanoma of skin, gerd depression, diverticulitis, uti, hypothyroid, pt is poor historian, per ems to the triage rn they adv pt may be septic due to wounds on her leg. pt has no complaints .As per [facility] med staff patient was sent to ED for evaluation of AMS ,associated with hypoxia and difficulties swallowing .Also her R leg wounds are worsening and ID /WOUND care consult requested .Septic workup sent ,found to have lactate elevation Admitted for septic workup and evaluation,send blood and urine cx,serial lactate levels,monitor vitals closley,ivfs hydration,monitor urine output and renal profile,iv abx initiated  .[facility] Palliative care consult requested ,to discuss advance directives and complete MOLST  (19 Apr 2022 17:08)      REVIEW OF SYSTEMS:    CONSTITUTIONAL: No fever, weight loss, or fatigue  EYES: No eye pain, visual disturbances, or discharge  ENMT:  No difficulty hearing, tinnitus, vertigo; No sinus or throat pain  NECK: No pain or stiffness  BREASTS: No pain, masses, or nipple discharge  RESPIRATORY: No cough, wheezing, chills or hemoptysis; No shortness of breath  CARDIOVASCULAR: No chest pain, palpitations, dizziness, or leg swelling  GASTROINTESTINAL: No abdominal or epigastric pain. No nausea, vomiting, or hematemesis; No diarrhea or constipation. No melena or hematochezia.  GENITOURINARY: No dysuria, frequency, hematuria, or incontinence  NEUROLOGICAL: No headaches, memory loss, loss of strength, numbness, or tremors  SKIN: No itching, burning, rashes, or lesions   LYMPH NODES: No enlarged glands  ENDOCRINE: No heat or cold intolerance; No hair loss  MUSCULOSKELETAL: No joint pain or swelling; No muscle, back, or extremity pain  PSYCHIATRIC: No depression, anxiety, mood swings, or difficulty sleeping  HEME/LYMPH: No easy bruising, or bleeding gums  ALLERGY AND IMMUNOLOGIC: No hives or eczema    MEDICATIONS  (STANDING):  baclofen 10 milliGRAM(s) Oral every 8 hours  cholecalciferol 1000 Unit(s) Oral daily  cyanocobalamin 1000 MICROGram(s) Oral daily  escitalopram 20 milliGRAM(s) Oral daily  folic acid 1 milliGRAM(s) Oral daily  furosemide    Tablet 20 milliGRAM(s) Oral daily  gabapentin 400 milliGRAM(s) Oral every 8 hours  hydroxyurea 500 milliGRAM(s) Oral two times a day  lactobacillus acidophilus 1 Tablet(s) Oral two times a day  levothyroxine 50 MICROGram(s) Oral daily  MediHoney Gel Wound Dressing 1 Application(s) 1 Application(s) Topical every 3 days  metoprolol tartrate 25 milliGRAM(s) Oral two times a day  pantoprazole   Suspension 40 milliGRAM(s) Oral daily  senna 1 Tablet(s) Oral at bedtime  simvastatin 10 milliGRAM(s) Oral at bedtime  traMADol 25 milliGRAM(s) Oral daily  trimethoprim  160 mG/sulfamethoxazole 800 mG 1 Tablet(s) Oral two times a day  warfarin 1 milliGRAM(s) Oral at bedtime    MEDICATIONS  (PRN):  acetaminophen     Tablet .. 650 milliGRAM(s) Oral every 6 hours PRN Temp greater or equal to 38C (100.4F), Mild Pain (1 - 3)  aluminum hydroxide/magnesium hydroxide/simethicone Suspension 30 milliLiter(s) Oral every 4 hours PRN Dyspepsia  guaiFENesin Oral Liquid (Sugar-Free) 200 milliGRAM(s) Oral every 6 hours PRN Cough  melatonin 3 milliGRAM(s) Oral at bedtime PRN Insomnia  nystatin Powder 1 Application(s) Topical two times a day PRN Rash/Itching  ondansetron Injectable 4 milliGRAM(s) IV Push every 8 hours PRN Nausea and/or Vomiting      ALLERGIES: No Known Allergies      FAMILY HISTORY:      PHYSICAL EXAMINATION:  -----------------------------  T(C): 37.8 (04-25-22 @ 05:27), Max: 37.8 (04-25-22 @ 05:27)  HR: 89 (04-25-22 @ 05:27) (67 - 94)  BP: 120/62 (04-25-22 @ 05:27) (109/71 - 144/73)  RR: 18 (04-25-22 @ 05:27) (18 - 18)  SpO2: 93% (04-25-22 @ 05:27) (92% - 93%)  Wt(kg): --    04-24 @ 07:01  -  04-25 @ 07:00  --------------------------------------------------------  IN:  Total IN: 0 mL    OUT:    Voided (mL): 150 mL  Total OUT: 150 mL    Total NET: -150 mL            VITALS  T(C): 37.8 (04-25-22 @ 05:27), Max: 37.8 (04-25-22 @ 05:27)  HR: 89 (04-25-22 @ 05:27) (67 - 94)  BP: 120/62 (04-25-22 @ 05:27) (109/71 - 144/73)  RR: 18 (04-25-22 @ 05:27) (18 - 18)  SpO2: 93% (04-25-22 @ 05:27) (92% - 93%)    Constitutional: well developed, normal appearance, well groomed, well nourished, no deformities and no acute distress.   Eyes: the conjunctiva exhibited no abnormalities and the eyelids demonstrated no xanthelasmas.   HEENT: normal oral mucosa, no oral pallor and no oral cyanosis.   Neck: normal jugular venous A waves present, normal jugular venous V waves present and no jugular venous castillo A waves.   Pulmonary: no respiratory distress, normal respiratory rhythm and effort, no accessory muscle use and lungs were clear to auscultation bilaterally.   Cardiovascular: heart rate and rhythm were normal, normal S1 and S2 and no murmur, gallop, rub, heave or thrill are present.   Abdomen: soft, non-tender, no hepato-splenomegaly and no abdominal mass palpated.   Musculoskeletal: the gait could not be assessed..   Extremities: no clubbing of the fingernails, no localized cyanosis, no petechial hemorrhages and no ischemic changes.   Skin: normal skin color and pigmentation, no rash, no venous stasis, no skin lesions, no skin ulcer and no xanthoma was observed.   Psychiatric: oriented to person, place, and time, the affect was normal, the mood was normal and not feeling anxious.     LABS:   --------  04-25    143  |  108  |  12  ----------------------------<  114<H>  3.3<L>   |  27  |  0.70    Ca    8.1<L>      25 Apr 2022 07:43                           9.7    6.25  )-----------( 648      ( 25 Apr 2022 07:43 )             32.7     PT/INR - ( 25 Apr 2022 07:43 )   PT: 24.3 sec;   INR: 2.06 ratio                 Culture Results:   No growth to date. (04-23 @ 11:01)  Culture Results:   No growth to date. (04-23 @ 11:01)      RADIOLOGY:  -----------------    ECG:     ECHO:

## 2022-04-25 NOTE — DISCHARGE NOTE PROVIDER - NSDCMRMEDTOKEN_GEN_ALL_CORE_FT
acetaminophen 325 mg oral tablet: 2 tab(s) orally every 6 hours, As needed, Temp greater or equal to 38C (100.4F), Mild Pain (1 - 3)  baclofen 10 mg oral tablet: 1 tab(s) orally every 8 hours  benzonatate 100 mg oral capsule: 1 cap(s) orally 3 times a day, As needed, Cough  cyanocobalamin 1000 mcg oral tablet: 1 tab(s) orally once a day  escitalopram 20 mg oral tablet: 1 tab(s) orally once a day  folic acid 1 mg oral tablet: 1 tab(s) orally once a day  furosemide 20 mg oral tablet: 1 tab(s) orally once a day  gabapentin 400 mg oral capsule: 1 cap(s) orally every 8 hours  guaiFENesin 600 mg oral tablet, extended release: 1 tab(s) orally every 12 hours X 5 DAYS   hydroxyurea 500 mg oral capsule: 1 cap(s) orally 2 times a day  lactobacillus acidophilus oral tablet: 2 tab(s) orally once a day  levothyroxine 50 mcg (0.05 mg) oral tablet: 1 tab(s) orally once a day  metoprolol tartrate 25 mg oral tablet: 1 tab(s) orally 2 times a day  nystatin 100,000 units/g topical powder: 1 application topically 3 times a day  pantoprazole 40 mg oral granule, delayed release: orally once a day  senna oral tablet: 1 tab(s) orally once a day (at bedtime)  simvastatin 10 mg oral tablet: 1 tab(s) orally once a day (at bedtime)  sulfamethoxazole-trimethoprim 800 mg-160 mg oral tablet: 1 tab(s) orally 2 times a day X4 DAYS   traMADol 50 mg oral tablet: 0.5 tab(s) orally once a day  Vitamin D3: 1 tab(s) orally once a day  warfarin 1 mg oral tablet: 1 tab(s) orally once (at bedtime) HOLD FOR INR ABIOVE 3.0

## 2022-04-25 NOTE — PROGRESS NOTE ADULT - PROBLEM SELECTOR PLAN 11
- Labs and x-rays reviewed, vascular studies reviewed  - venous doppler: No acute DVT of the lower extremities  - Wound cleansed and dressed  xeroform with DSD by wound care team  - Continue Zosyn per ID   - Vascular consult appreciated, f/u recs
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
- Labs and x-rays reviewed, vascular studies reviewed  - venous doppler: No acute DVT of the lower extremities  - Wound cleansed and dressed  xeroform with DSD by wound care team  - Continue Zosyn per ID   - Vascular consult appreciated, f/u recs

## 2022-04-25 NOTE — PROGRESS NOTE ADULT - PROBLEM SELECTOR PLAN 1
2/2 to acute metabolic encephalopathy secondary to infectious process ( cellulitis and possible pneumonia 2/2 to aspiration ,poa )
Pt seen and evaluated  - Labs and x-rays reviewed, vascular studies reviewed  - venous doppler: No acute DVT of the lower extremities  - Cellulitis to b/l LE improved  - B/L LE dressing clean, dry and intact, changed yesterday.  Previously, dressed Medihoney with DSD  - Continue Zosyn & Doxycycline per ID   - Vascular consult appreciated, f/u recs  - Wound cultures: Staph aureus & Enterobacter cloacae   - Med management per primary team  - Pt is stable for from podiatry standpoint  - Plan: Local wound care    Wound Care Instructions  1. Remove the previous dressings with care and cleansed the wound with saline, dry it with sterile gauze  2. Apply xeroform to the wound bed and dry sterile dressings. Change every 2-3 day.  3. Patient to follow-up in Wound Care/Hyperbaric Clinic within 3-5 days  with Dr. Girard or Dr. Gan. Patient to call [phone] to make an appointment after discharge.    4. Patient to watch for any signs of infection, included but not limited to fever, chills, nausea, excessive drainage, foul odor. Patient to report to the ED immediately.       Podiatry to follow
Pt seen and evaluated  - Labs and x-rays reviewed, vascular studies reviewed  - venous doppler: No acute DVT of the lower extremities  - Wound cleansed and dressed  xeroform with DSD by wound care team  - Continue Zosyn per ID   - Vascular consult appreciated, f/u recs  - Med management per primary team    Podiatry to follow
Pt seen and evaluated  - Labs and x-rays reviewed, vascular studies reviewed  - venous doppler: No acute DVT of the lower extremities  - Wound cleansed and dressed xeroform with DSD  - Continue Zosyn per ID   - Vascular consult appreciated, f/u recs  - Med management per primary team  - Plan: Local wound care    Wound Care Instructions  1. Remove the previous dressings with care and cleansed the wound with saline, dry it with sterile gauze  2. Apply xeroform to the wound bed and dry sterile dressings. Change every 2-3 day.  3. Patient to follow-up in Wound Care/Hyperbaric Clinic within 3-5 days  with Dr. Girard or Dr. Gan. Patient to call [phone] to make an appointment after discharge.    4. Patient to watch for any signs of infection, included but not limited to fever, chills, nausea, excessive drainage, foul odor. Patient to report to the ED immediately.       Podiatry to follow
Pt seen and evaluated  - Labs and x-rays reviewed, vascular studies reviewed  - venous doppler: No acute DVT of the lower extremities  - Cellulitis to b/l LE improving  - B/L LE dressing clean, dry and intact. Previously dressed medihoney with DSD  - Continue Zosyn & Doxycycline per ID   - Vascular consult appreciated, f/u recs  - Wound cultures: Staph aureus & Enterobacter cloacae   - Med management per primary team  - Pt is stable for from podiatry standpoint  - Plan: Local wound care and IV abx    Wound Care Instructions  1. Remove the previous dressings with care and cleansed the wound with saline, dry it with sterile gauze  2. Apply xeroform to the wound bed and dry sterile dressings. Change every 2-3 day.  3. Patient to follow-up in Wound Care/Hyperbaric Clinic within 3-5 days  with Dr. Girard or Dr. Gan. Patient to call [phone] to make an appointment after discharge.    4. Patient to watch for any signs of infection, included but not limited to fever, chills, nausea, excessive drainage, foul odor. Patient to report to the ED immediately.       Podiatry to follow
2/2 to acute metabolic encephalopathy secondary to infectious process ( cellulitis and possible pneumonia 2/2 to aspiration ,poa )

## 2022-04-25 NOTE — PROGRESS NOTE ADULT - ASSESSMENT
AB MENDOZA 89 f 4/19/2022 3/15/1932 DR YURI CARRINGTON     REVIEW OF SYMPTOMS      Able to give ROS  Yes     RELIABLE +/-   CONSTITUTIONAL Weakness Yes  Chills No   ENDOCRINE  No heat or cold intolerance    ALLERGY No hives  Sore throat No stridor  RESP Coughing blood no  Shortness of breath YES   NEURO No Headache  Confusion Pain neck No   CARDIAC No Chest pain No Palpitations   GI  Pain abdomen NO   Vomiting NO     PHYSICAL EXAM    HEENT Unremarkable  atraumatic   RESP Fair air entry EXP prolonged    Harsh breath sound Resp distres mild   CARDIAC S1 S2 No S3     NO JVD    ABDOMEN SOFT BS PRESENT NOT DISTENDED No hepatosplenomegaly PEDAL EDEMA present No calf tenderness  NO rash       DOA/CC/PROBLEMS poa .  89 f  doa 4/19/2022   cc Patient is a 89yo female complaining of left lower leg wound possible infection and sepsis Patient is tachy and has ams since 4/18      PMH-PSH .  pmh DVT  pmh Anemia  pmh leg wounds  pmh A fib  pmh GERD  pmh     PROSTHETICS/TUBES/LINES.    NOTABLE HOME MEDS.  coumadin 2.5  enalapril 2.5  lasix 20      COVID/ICU/CODE STATUS.                       COVID  STATUS.           ICU STAY. none  GOC.  4/23/2022 full code    BEST PRACTICE ISSUES.                                                  HEAD OF BED ELEVATION. Yes  DVT PROPHYLAXIS.   4/19/2022 on coumadin at home   SPEECH SW RECOMMENDATIONS.   4/22 mbs soft bite size mild thick  GONZALES PROPHYLAXIS.   4/19/2022 protonix 40                                                                                      DIET.  4/20/2022 mince moist     PROBLEM DATA/ASSESSMENT RECOMMENDATIONS (A/R).    HEMODYNAMICS.   Monitor bp Target MAP 65 (+)    RESP.   Monitor po Target po 90-95%    ABG.  4/19/2022 ra 745/40/68    PMH/PSH PROBLEMS.  Management continued/modified as indicated    OXYGEN REQUIREMENTS.  4/24/2022 ra 92%      INFECTION SOURCE.   Cellulitis lle poa 4/19/2022   INFECTION A/R.   Cellulitis lle on bactrim    INFECTION ANDREWS.  W 4/19-4/20-4/22/2022 w 9.6 - 7.1  - 6.6   pr 4/20/2022 .12   MICROBIO.  blod c 3/30 (-)   wound 4/19 staph enterococcus   blod c 4/19 mrse   blod c 4/23 (-)  ABIO.  4/24/2022 bactrm 160 800 bid x 5d     Lacticemia  la 4/19/2022 2.1   monitor    ATELECTASIS RLL 4/19 ct   4/21/2022 chest pt    RO DVT.  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  tr 4/19/2022 tr 14   4/19/2022 simvastat 10  no active ischemia      HYPERAMMONEMIA.  Amm 4/19/2022 amm 39     ANEMIA.  Hb 4/19-4/20-4/21-4/22/2022 Hb 10.6 - 9.7 - 9.7 - 8.4   monito    RENAL.  Cr 4/19-4/22/2022 Cr .8 - .6   monitor    HYPOTHYROID.  4/20/2022 tsh 10   4/19/2022 levoxyl 25 -> 4/20 levox 50     OVERALL ISSUES.  LEG CELLULITIS   A fib   chronic dvt  coagulopathy    IV fl.  4/19/2022 d5 1/2 50      TIME SPENT   Over 25 minutes aggregate care time spent on encounter; activities included   direct patient care, counseling and/or coordinating care reviewing notes, lab data/ imaging , discussion with multidisciplinary team/ patient  /family and explaining in detail risks, benefits, alternatives  of the recommendations     AB MENDOZA 89 f 4/19/2022 3/15/1932 DR YURI CARRINGTON

## 2022-04-25 NOTE — PROGRESS NOTE ADULT - PROBLEM SELECTOR PLAN 7
continue home medications

## 2022-04-25 NOTE — PROGRESS NOTE ADULT - PROBLEM SELECTOR PROBLEM 11
Thrombus
Venous stasis ulcers of both lower extremities
Venous stasis ulcers of both lower extremities
Thrombus
Thrombus

## 2022-04-25 NOTE — DISCHARGE NOTE PROVIDER - CARE PROVIDER_API CALL
Lorelei Quinteros)  Vascular Surgery  [address] [place] [address]  Phone: [phone]  Fax: [phone]  Follow Up Time: 2 weeks    Adrian Gan (DPSANNA)  Podiatric Medicine  [address] [place] [address]  Phone: [phone]  Fax: [phone]  Follow Up Time: 1 week

## 2022-04-25 NOTE — DISCHARGE NOTE PROVIDER - NSDCCAREPROVSEEN_GEN_ALL_CORE_FT
Freddie, Rachael Cade, Gus Anguiano, Sushil Best, Анна Garcia, Popeye Maldonado, Ishmael De La Rosa, Swapnil Collins, Esteban

## 2022-04-25 NOTE — PROGRESS NOTE ADULT - TIME-BASED
Retinal tear and detachment warning symptoms reviewed and patient instructed to call immediately if increasing floaters, flashes, or decreasing peripheral vision.
30
39
30

## 2022-04-25 NOTE — PROGRESS NOTE ADULT - PROBLEM SELECTOR PLAN 2
septic workup ,iv hydration ,ID consult
septic workup ,serial lactate levels ,iv abx and iv hydration ,ID consult .Ruled out for sepsis
Continue Zosyn per ID
septic workup ,serial lactate levels ,iv abx and iv hydration ,ID consult .Ruled out for sepsis
septic workup ,iv hydration ,ID consult
septic workup ,serial lactate levels ,iv abx and iv hydration ,ID consult .Ruled out for sepsis

## 2022-04-25 NOTE — PROGRESS NOTE ADULT - SUBJECTIVE AND OBJECTIVE BOX
Neurology follow up note    REJI [id number]      Interval History:    Patient feels ok no new complaints.    Allergies    No Known Allergies    Intolerances        MEDICATIONS    acetaminophen     Tablet .. 650 milliGRAM(s) Oral every 6 hours PRN  aluminum hydroxide/magnesium hydroxide/simethicone Suspension 30 milliLiter(s) Oral every 4 hours PRN  baclofen 10 milliGRAM(s) Oral every 8 hours  cholecalciferol 1000 Unit(s) Oral daily  cyanocobalamin 1000 MICROGram(s) Oral daily  escitalopram 20 milliGRAM(s) Oral daily  folic acid 1 milliGRAM(s) Oral daily  furosemide    Tablet 20 milliGRAM(s) Oral daily  gabapentin 400 milliGRAM(s) Oral every 8 hours  guaiFENesin Oral Liquid (Sugar-Free) 200 milliGRAM(s) Oral every 6 hours PRN  hydroxyurea 500 milliGRAM(s) Oral two times a day  lactobacillus acidophilus 1 Tablet(s) Oral two times a day  levothyroxine 50 MICROGram(s) Oral daily  MediHoney Gel Wound Dressing 1 Application(s) 1 Application(s) Topical every 3 days  melatonin 3 milliGRAM(s) Oral at bedtime PRN  metoprolol tartrate 25 milliGRAM(s) Oral two times a day  nystatin Powder 1 Application(s) Topical two times a day PRN  ondansetron Injectable 4 milliGRAM(s) IV Push every 8 hours PRN  pantoprazole   Suspension 40 milliGRAM(s) Oral daily  senna 1 Tablet(s) Oral at bedtime  simvastatin 10 milliGRAM(s) Oral at bedtime  traMADol 25 milliGRAM(s) Oral daily  trimethoprim  160 mG/sulfamethoxazole 800 mG 1 Tablet(s) Oral two times a day  warfarin 1 milliGRAM(s) Oral at bedtime              Vital Signs Last 24 Hrs  T(C): 37.8 (25 Apr 2022 05:27), Max: 37.8 (25 Apr 2022 05:27)  T(F): 100 (25 Apr 2022 05:27), Max: 100 (25 Apr 2022 05:27)  HR: 89 (25 Apr 2022 05:27) (67 - 94)  BP: 120/62 (25 Apr 2022 05:27) (109/71 - 144/73)  BP(mean): --  RR: 18 (25 Apr 2022 05:27) (18 - 18)  SpO2: 93% (25 Apr 2022 05:27) (92% - 93%)    REVIEW OF SYSTEMS:  Constitutionally, the patient denies fever, chills, or night sweats.  Head:  No headaches.  Eyes:  No double vision or blurry vision.  Ears:  No ringing in the ears.  Neck:  No neck pain.  Cardiovascular:  No chest pain.  Respiratory:  No shortness of breath.  Abdomen:  No nausea, vomiting, or abdominal pain.  Extremities/Neurological:  Positive pain in both lower extremities which is not new.  Musculoskeletal:  Occasional joint pain.    PHYSICAL EXAMINATION:   HEENT:  Head:  Normocephalic, atraumatic.  Eyes:  No scleral icterus.  Ears:  Hearing bilaterally intact.  NECK:  Supple.  CARDIOVASCULAR:  S1 and S2 heard.  RESPIRATORY:  Good air entry bilaterally.  ABDOMEN:  Soft and nontender.  EXTREMITIES:  No clubbing or cyanosis noted.      NEUROLOGIC:  The patient was awake and alert.  [facility], year 2022,  She was able to name simple objects, able to tell sons name  was able to follow simple commands.  Extraocular movements were intact.  Speech was fluent.  Smile symmetric.  Motor:  Right upper 0/5.  Right lower, slight movement at the hip and knee.  Left upper 4/5, left lower 3+/5.  Sensory:  Bilateral upper and lower intact to light touch.  Bilateral lower extremities, positive dressings were on both of her legs.                    LABS:  CBC Full  -  ( 24 Apr 2022 08:39 )  WBC Count : 8.73 K/uL  RBC Count : 3.53 M/uL  Hemoglobin : 9.7 g/dL  Hematocrit : 33.4 %  Platelet Count - Automated : 798 K/uL  Mean Cell Volume : 94.6 fl  Mean Cell Hemoglobin : 27.5 pg  Mean Cell Hemoglobin Concentration : 29.0 gm/dL  Auto Neutrophil # : x  Auto Lymphocyte # : x  Auto Monocyte # : x  Auto Eosinophil # : x  Auto Basophil # : x  Auto Neutrophil % : x  Auto Lymphocyte % : x  Auto Monocyte % : x  Auto Eosinophil % : x  Auto Basophil % : x      04-24    141  |  105  |  9   ----------------------------<  92  4.1   |  29  |  0.72    Ca    8.6      24 Apr 2022 08:39      Hemoglobin A1C:       Vitamin B12   PT/INR - ( 24 Apr 2022 08:39 )   PT: 25.1 sec;   INR: 2.13 ratio               RADIOLOGY    ANALYSIS AND PLAN:  This is a 89-year-old with altered mental status and history of cerebrovascular accident.  For episode of altered mental status, I suspect most likely metabolic encephalopathy along with possibly underlying mild cognitive impairment.  Metabolic encephalopathy is most likely multifactorial secondary to thyroid dysfunction, possibly underlying septic issues with the patient having temperatures and also respiratory issues with the patient having low oxygen saturation, suspect less likely this is a primary central nervous system event.  I would recommend sepsis workup, antibiotics as needed.  Pulmonary followup, monitor oxygen saturation.  For history of cerebrovascular accident with underlying atrial fibrillation, anticoagulation with a goal INR between 2 and 3.  For hypothyroidism, adjustment of the patient's Synthroid as needed.  The patient's pain medications have been adjusted.  The patient will be still on the gabapentin 1200 mg a day, baclofen 30 mg a day, but appears that the patient's tramadol has been made p.r.n.  We will see how the patient does mentally if she is more awake with the p.r.n. dose of tramadol and see if she continues to take that.  For hypertension, monitor systolic blood pressure.  For hyperlipidemia, continue the patient on statin.  patient does agree to try low dose tramadol 25 mg once a day to help with pain patient states pain is better and does not feel more confused at present  so will continue current medications     I spoke with son, Curtis, at [phone] 4/24.  He does understand while in the hospital setting, he may become more disoriented.    Greater than 27  minutes of time was spent with the patient, planning care, reviewing data, speaking to multidisciplinary healthcare team with greater than 50% of that time in counseling and care coordination.     Neurology follow up note    REJI [id number]      Interval History:    Patient feels ok no new complaints.    Allergies    No Known Allergies    Intolerances        MEDICATIONS    acetaminophen     Tablet .. 650 milliGRAM(s) Oral every 6 hours PRN  aluminum hydroxide/magnesium hydroxide/simethicone Suspension 30 milliLiter(s) Oral every 4 hours PRN  baclofen 10 milliGRAM(s) Oral every 8 hours  cholecalciferol 1000 Unit(s) Oral daily  cyanocobalamin 1000 MICROGram(s) Oral daily  escitalopram 20 milliGRAM(s) Oral daily  folic acid 1 milliGRAM(s) Oral daily  furosemide    Tablet 20 milliGRAM(s) Oral daily  gabapentin 400 milliGRAM(s) Oral every 8 hours  guaiFENesin Oral Liquid (Sugar-Free) 200 milliGRAM(s) Oral every 6 hours PRN  hydroxyurea 500 milliGRAM(s) Oral two times a day  lactobacillus acidophilus 1 Tablet(s) Oral two times a day  levothyroxine 50 MICROGram(s) Oral daily  MediHoney Gel Wound Dressing 1 Application(s) 1 Application(s) Topical every 3 days  melatonin 3 milliGRAM(s) Oral at bedtime PRN  metoprolol tartrate 25 milliGRAM(s) Oral two times a day  nystatin Powder 1 Application(s) Topical two times a day PRN  ondansetron Injectable 4 milliGRAM(s) IV Push every 8 hours PRN  pantoprazole   Suspension 40 milliGRAM(s) Oral daily  senna 1 Tablet(s) Oral at bedtime  simvastatin 10 milliGRAM(s) Oral at bedtime  traMADol 25 milliGRAM(s) Oral daily  trimethoprim  160 mG/sulfamethoxazole 800 mG 1 Tablet(s) Oral two times a day  warfarin 1 milliGRAM(s) Oral at bedtime              Vital Signs Last 24 Hrs  T(C): 37.8 (25 Apr 2022 05:27), Max: 37.8 (25 Apr 2022 05:27)  T(F): 100 (25 Apr 2022 05:27), Max: 100 (25 Apr 2022 05:27)  HR: 89 (25 Apr 2022 05:27) (67 - 94)  BP: 120/62 (25 Apr 2022 05:27) (109/71 - 144/73)  BP(mean): --  RR: 18 (25 Apr 2022 05:27) (18 - 18)  SpO2: 93% (25 Apr 2022 05:27) (92% - 93%)    REVIEW OF SYSTEMS:  Constitutionally, the patient denies fever, chills, or night sweats.  Head:  No headaches.  Eyes:  No double vision or blurry vision.  Ears:  No ringing in the ears.  Neck:  No neck pain.  Cardiovascular:  No chest pain.  Respiratory:  No shortness of breath.  Abdomen:  No nausea, vomiting, or abdominal pain.  Extremities/Neurological:  Positive pain in both lower extremities which is not new.  Musculoskeletal:  Occasional joint pain.    PHYSICAL EXAMINATION:   HEENT:  Head:  Normocephalic, atraumatic.  Eyes:  No scleral icterus.  Ears:  Hearing bilaterally intact.  NECK:  Supple.  CARDIOVASCULAR:  S1 and S2 heard.  RESPIRATORY:  Good air entry bilaterally.  ABDOMEN:  Soft and nontender.  EXTREMITIES:  No clubbing or cyanosis noted.      NEUROLOGIC:  The patient was awake and alert.  [facility], year 2022,  She was able to name simple objects, able to tell sons name  was able to follow simple commands.  Extraocular movements were intact.  Speech was fluent.  Smile symmetric.  Motor:  Right upper 0/5.  Right lower, slight movement at the hip and knee.  Left upper 4/5, left lower 3+/5.  Sensory:  Bilateral upper and lower intact to light touch.  Bilateral lower extremities, positive dressings were on both of her legs.                    LABS:  CBC Full  -  ( 24 Apr 2022 08:39 )  WBC Count : 8.73 K/uL  RBC Count : 3.53 M/uL  Hemoglobin : 9.7 g/dL  Hematocrit : 33.4 %  Platelet Count - Automated : 798 K/uL  Mean Cell Volume : 94.6 fl  Mean Cell Hemoglobin : 27.5 pg  Mean Cell Hemoglobin Concentration : 29.0 gm/dL  Auto Neutrophil # : x  Auto Lymphocyte # : x  Auto Monocyte # : x  Auto Eosinophil # : x  Auto Basophil # : x  Auto Neutrophil % : x  Auto Lymphocyte % : x  Auto Monocyte % : x  Auto Eosinophil % : x  Auto Basophil % : x      04-24    141  |  105  |  9   ----------------------------<  92  4.1   |  29  |  0.72    Ca    8.6      24 Apr 2022 08:39      Hemoglobin A1C:       Vitamin B12   PT/INR - ( 24 Apr 2022 08:39 )   PT: 25.1 sec;   INR: 2.13 ratio               RADIOLOGY    ANALYSIS AND PLAN:  This is a 89-year-old with altered mental status and history of cerebrovascular accident.  For episode of altered mental status, I suspect most likely metabolic encephalopathy along with possibly underlying mild cognitive impairment.  Metabolic encephalopathy is most likely multifactorial secondary to thyroid dysfunction, possibly underlying septic issues with the patient having temperatures and also respiratory issues with the patient having low oxygen saturation, suspect less likely this is a primary central nervous system event.  I would recommend sepsis workup, antibiotics as needed.  Pulmonary followup, monitor oxygen saturation.  For history of cerebrovascular accident with underlying atrial fibrillation, anticoagulation with a goal INR between 2 and 3.  For hypothyroidism, adjustment of the patient's Synthroid as needed.  The patient's pain medications have been adjusted.  The patient will be still on the gabapentin 1200 mg a day, baclofen 30 mg a day, but appears that the patient's tramadol has been made p.r.n.  We will see how the patient does mentally if she is more awake with the p.r.n. dose of tramadol and see if she continues to take that.  For hypertension, monitor systolic blood pressure.  For hyperlipidemia, continue the patient on statin.  patient does agree to try low dose tramadol 25 mg once a day to help with pain patient states pain is better and does not feel more confused at present  so will continue current medications     I spoke with son, Curtis, at [phone] 4/25  He does understand while in the hospital setting, he may become more disoriented.    Greater than 24  minutes of time was spent with the patient, planning care, reviewing data, speaking to multidisciplinary healthcare team with greater than 50% of that time in counseling and care coordination.

## 2022-04-25 NOTE — PROGRESS NOTE ADULT - PROBLEM SELECTOR PROBLEM 8
CVA (cerebral vascular accident)
Grade I diastolic dysfunction
Grade I diastolic dysfunction
CVA (cerebral vascular accident)
CVA (cerebral vascular accident)

## 2022-04-25 NOTE — PROGRESS NOTE ADULT - PROBLEM SELECTOR PLAN 10
serial pt/inr ,resume OAC when INR is below 3.0
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
serial pt/inr ,resume OAC when INR is below 3.0
serial pt/inr ,resume OAC when INR is below 3.0

## 2022-04-25 NOTE — PROGRESS NOTE ADULT - SUBJECTIVE AND OBJECTIVE BOX
REJI BERGER    PLV 2NOR 229 W1    Allergies    No Known Allergies    Intolerances        PAST MEDICAL & SURGICAL HISTORY:  Hypertension    CVA (cerebral vascular accident)    Depression    Constipation    Neuropathy    Hyperlipidemia    Grade I diastolic dysfunction    Afib    Neuropathy    VHD (valvular heart disease)    Aortic valvar stenosis    No significant past surgical history        FAMILY HISTORY:      Home Medications:  acetaminophen 500 mg oral tablet: 2 tab(s) orally once a day (at bedtime) (19 Apr 2022 14:19)  acetaminophen 500 mg oral tablet: 2 tab(s) orally every 12 hours, As Needed (19 Apr 2022 14:19)  baclofen 10 mg oral tablet: 1 tab(s) orally every 8 hours (19 Apr 2022 14:19)  Biofreeze 4% topical gel: Apply topically to R shoulder &amp; L knee topically 2 times a day (19 Apr 2022 14:19)  Coumadin 1 mg oral tablet: 1 tab(s) orally once a day (at bedtime) (19 Apr 2022 14:19)  cyanocobalamin 1000 mcg oral tablet: 1 tab(s) orally once a day (19 Apr 2022 14:19)  docusate sodium 100 mg oral capsule: 2 cap(s) orally once a day (19 Apr 2022 14:19)  enalapril 2.5 mg oral tablet: 1 tab(s) orally once a day (19 Apr 2022 14:19)  escitalopram 20 mg oral tablet: 1 tab(s) orally once a day (19 Apr 2022 14:19)  famotidine 20 mg oral tablet: 1 tab(s) orally once a day (19 Apr 2022 14:19)  famotidine 20 mg oral tablet: 1 tab(s) orally once a day (19 Apr 2022 14:19)  folic acid 1 mg oral tablet: 1 tab(s) orally once a day (19 Apr 2022 14:19)  furosemide 20 mg oral tablet: 2 tab(s) orally once a day (19 Apr 2022 14:19)  gabapentin 100 mg oral capsule: 3 cap(s) orally 3 times a day (19 Apr 2022 14:19)  hydroxyurea 500 mg oral capsule: 1 cap(s) orally 2 times a day (19 Apr 2022 14:19)  levothyroxine 25 mcg (0.025 mg) oral tablet: 1 tab(s) orally once a day (19 Apr 2022 14:19)  Metoprolol Tartrate 25 mg oral tablet: 1 tab(s) orally 2 times a day (19 Apr 2022 14:19)  Senna 8.6 mg oral tablet: 1 tab(s) orally once a day (at bedtime) (19 Apr 2022 14:19)  simvastatin 10 mg oral tablet: 1 tab(s) orally once a day (at bedtime) (19 Apr 2022 14:19)  traMADol 50 mg oral tablet: 1 tab(s) orally 2 times a day (19 Apr 2022 14:19)  Vitamin D3: 1 tab(s) orally once a day (19 Apr 2022 14:19)      MEDICATIONS  (STANDING):  baclofen 10 milliGRAM(s) Oral every 8 hours  cholecalciferol 1000 Unit(s) Oral daily  cyanocobalamin 1000 MICROGram(s) Oral daily  escitalopram 20 milliGRAM(s) Oral daily  folic acid 1 milliGRAM(s) Oral daily  furosemide    Tablet 20 milliGRAM(s) Oral daily  gabapentin 400 milliGRAM(s) Oral every 8 hours  hydroxyurea 500 milliGRAM(s) Oral two times a day  lactobacillus acidophilus 1 Tablet(s) Oral two times a day  levothyroxine 50 MICROGram(s) Oral daily  MediHoney Gel Wound Dressing 1 Application(s) 1 Application(s) Topical every 3 days  metoprolol tartrate 25 milliGRAM(s) Oral two times a day  pantoprazole   Suspension 40 milliGRAM(s) Oral daily  senna 1 Tablet(s) Oral at bedtime  simvastatin 10 milliGRAM(s) Oral at bedtime  traMADol 25 milliGRAM(s) Oral daily  trimethoprim  160 mG/sulfamethoxazole 800 mG 1 Tablet(s) Oral two times a day  warfarin 1 milliGRAM(s) Oral once    MEDICATIONS  (PRN):  acetaminophen     Tablet .. 650 milliGRAM(s) Oral every 6 hours PRN Temp greater or equal to 38C (100.4F), Mild Pain (1 - 3)  aluminum hydroxide/magnesium hydroxide/simethicone Suspension 30 milliLiter(s) Oral every 4 hours PRN Dyspepsia  guaiFENesin Oral Liquid (Sugar-Free) 200 milliGRAM(s) Oral every 6 hours PRN Cough  melatonin 3 milliGRAM(s) Oral at bedtime PRN Insomnia  nystatin Powder 1 Application(s) Topical two times a day PRN Rash/Itching  ondansetron Injectable 4 milliGRAM(s) IV Push every 8 hours PRN Nausea and/or Vomiting              Vital Signs Last 24 Hrs  T(C): 37.8 (25 Apr 2022 05:27), Max: 37.8 (25 Apr 2022 05:27)  T(F): 100 (25 Apr 2022 05:27), Max: 100 (25 Apr 2022 05:27)  HR: 89 (25 Apr 2022 05:27) (67 - 94)  BP: 120/62 (25 Apr 2022 05:27) (109/71 - 144/73)  BP(mean): --  RR: 18 (25 Apr 2022 05:27) (18 - 18)  SpO2: 93% (25 Apr 2022 05:27) (92% - 93%)      04-24-22 @ 07:01  -  04-25-22 @ 07:00  --------------------------------------------------------  IN: 0 mL / OUT: 150 mL / NET: -150 mL              LABS:                        9.7    6.25  )-----------( 648      ( 25 Apr 2022 07:43 )             32.7     04-25    143  |  108  |  12  ----------------------------<  114<H>  3.3<L>   |  27  |  0.70    Ca    8.1<L>      25 Apr 2022 07:43      PT/INR - ( 25 Apr 2022 07:43 )   PT: 24.3 sec;   INR: 2.06 ratio                   WBC:  WBC Count: 6.25 K/uL (04-25 @ 07:43)  WBC Count: 8.73 K/uL (04-24 @ 08:39)  WBC Count: 7.43 K/uL (04-23 @ 08:01)  WBC Count: 6.69 K/uL (04-22 @ 07:32)      MICROBIOLOGY:  RECENT CULTURES:  04-23 .Blood Blood XXXX XXXX   No growth to date.    04-21 .Blood Blood XXXX XXXX   No growth to date.    04-20 Clean Catch Clean Catch (Midstream) XXXX XXXX   No growth    04-20 .Other right leg XXXX XXXX   Numerous Staphylococcus aureus  Numerous Enterobacter cloacae complex    04-19 Wound Wound Staphylococcus aureus  Enterobacter cloacae complex XXXX   Numerous Staphylococcus aureus  Few Enterobacter cloacae complex    04-19 .Blood Blood-Peripheral Staphylococcus epidermidis   Growth in aerobic bottle: Gram Positive Cocci in Clusters  Growth in anaerobic bottle: Gram Positive Cocci in Clusters   Growth in aerobic bottle: Staphylococcus pettenkoferi  Coag Negative Staphylococcus  Single set isolate, possible contaminant. Contact  Microbiology if susceptibility testing clinically  indicated.  Growth in anaerobic bottle: Staphylococcus epidermidis                PT/INR - ( 25 Apr 2022 07:43 )   PT: 24.3 sec;   INR: 2.06 ratio             Sodium:  Sodium, Serum: 143 mmol/L (04-25 @ 07:43)  Sodium, Serum: 141 mmol/L (04-24 @ 08:39)  Sodium, Serum: 144 mmol/L (04-23 @ 08:01)  Sodium, Serum: 145 mmol/L (04-22 @ 07:32)      0.70 mg/dL 04-25 @ 07:43  0.72 mg/dL 04-24 @ 08:39  0.69 mg/dL 04-23 @ 08:01  0.68 mg/dL 04-22 @ 07:32      Hemoglobin:  Hemoglobin: 9.7 g/dL (04-25 @ 07:43)  Hemoglobin: 9.7 g/dL (04-24 @ 08:39)  Hemoglobin: 9.6 g/dL (04-23 @ 08:01)  Hemoglobin: 9.4 g/dL (04-22 @ 07:32)      Platelets: Platelet Count - Automated: 648 K/uL (04-25 @ 07:43)  Platelet Count - Automated: 798 K/uL (04-24 @ 08:39)  Platelet Count - Automated: 675 K/uL (04-23 @ 08:01)  Platelet Count - Automated: 563 K/uL (04-22 @ 07:32)              RADIOLOGY & ADDITIONAL STUDIES:      MICROBIOLOGY:  RECENT CULTURES:  04-23 .Blood Blood XXXX XXXX   No growth to date.    04-21 .Blood Blood XXXX XXXX   No growth to date.    04-20 Clean Catch Clean Catch (Midstream) XXXX XXXX   No growth    04-20 .Other right leg XXXX XXXX   Numerous Staphylococcus aureus  Numerous Enterobacter cloacae complex    04-19 Wound Wound Staphylococcus aureus  Enterobacter cloacae complex XXXX   Numerous Staphylococcus aureus  Few Enterobacter cloacae complex    04-19 .Blood Blood-Peripheral Staphylococcus epidermidis   Growth in aerobic bottle: Gram Positive Cocci in Clusters  Growth in anaerobic bottle: Gram Positive Cocci in Clusters   Growth in aerobic bottle: Staphylococcus pettenkoferi  Coag Negative Staphylococcus  Single set isolate, possible contaminant. Contact  Microbiology if susceptibility testing clinically  indicated.  Growth in anaerobic bottle: Staphylococcus epidermidis

## 2022-04-25 NOTE — PROGRESS NOTE ADULT - PROBLEM SELECTOR PLAN 4
continue home medications ,cardiology consult
continue home medications ,cardiology consult
Soft & bite sized with Mildly thick liquids  allow for swallow between intakes; crush medication (when feasible); maintain upright posture during/after eating for 30 mins; oral hygiene; position upright (90 degrees); small sips/bites

## 2022-04-25 NOTE — PROGRESS NOTE ADULT - PROBLEM SELECTOR PLAN 5
- Labs and x-rays reviewed, vascular studies reviewed  - venous doppler: No acute DVT of the lower extremities  - Wound cleansed and dressed  xeroform with DSD by wound care team  - Continue Zosyn per ID   - Vascular consult appreciated, f/u recs
continue home medications
continue home medications

## 2022-04-25 NOTE — PROGRESS NOTE ADULT - PROBLEM SELECTOR PLAN 8
cardiology consult
continue home medications
cardiology consult

## 2022-04-25 NOTE — PROGRESS NOTE ADULT - SUBJECTIVE AND OBJECTIVE BOX
PROGRESS NOTE  Patient is a 89y old  Female who presents with a chief complaint of AMS (25 Apr 2022 14:55)  Chart and available morning labs /imaging are reviewed electronically , urgent issues addressed . More information  is being added upon completion of rounds , when more information is collected and management discussed with consultants , medical staff and social service/case management on the floor   OVERNIGHT  No new issues reported by medical staff . All above noted Patient is resting in a bed comfortably .Confused ,poor mentation .No distress noted   HPI:  Patient is a 89 yo Female ,[facility] resident  sent from [facility] for eval of AMS and  her leg wounds on R leg.  She has hx of CVA  with R hemiplegia, dvt anemia, phlebitis, afib, hld, melanoma of skin, gerd depression, diverticulitis, uti, hypothyroid, pt is poor historian, per ems to the triage rn they adv pt may be septic due to wounds on her leg. pt has no complaints .As per [facility] med staff patient was sent to ED for evaluation of AMS ,associated with hypoxia and difficulties swallowing .Also her R leg wounds are worsening and ID /WOUND care consult requested .Septic workup sent ,found to have lactate elevation Admitted for septic workup and evaluation,send blood and urine cx,serial lactate levels,monitor vitals closley,ivfs hydration,monitor urine output and renal profile,iv abx initiated  .[facility] Palliative care consult requested ,to discuss advance directives and complete [facility]  (19 Apr 2022 17:08)    PAST MEDICAL & SURGICAL HISTORY:  Hypertension    CVA (cerebral vascular accident)    Depression    Constipation    Neuropathy    Hyperlipidemia    Grade I diastolic dysfunction    Afib    Neuropathy    VHD (valvular heart disease)    Aortic valvar stenosis    No significant past surgical history        MEDICATIONS  (STANDING):  baclofen 10 milliGRAM(s) Oral every 8 hours  cholecalciferol 1000 Unit(s) Oral daily  cyanocobalamin 1000 MICROGram(s) Oral daily  escitalopram 20 milliGRAM(s) Oral daily  folic acid 1 milliGRAM(s) Oral daily  furosemide    Tablet 20 milliGRAM(s) Oral daily  gabapentin 400 milliGRAM(s) Oral every 8 hours  guaiFENesin  milliGRAM(s) Oral every 12 hours  hydroxyurea 500 milliGRAM(s) Oral two times a day  lactobacillus acidophilus 1 Tablet(s) Oral two times a day  levothyroxine 50 MICROGram(s) Oral daily  MediHoney Gel Wound Dressing 1 Application(s) 1 Application(s) Topical every 3 days  metoprolol tartrate 25 milliGRAM(s) Oral two times a day  nystatin Powder 1 Application(s) Topical three times a day  pantoprazole   Suspension 40 milliGRAM(s) Oral daily  senna 1 Tablet(s) Oral at bedtime  simvastatin 10 milliGRAM(s) Oral at bedtime  traMADol 25 milliGRAM(s) Oral daily  trimethoprim  160 mG/sulfamethoxazole 800 mG 1 Tablet(s) Oral two times a day  warfarin 1 milliGRAM(s) Oral once    MEDICATIONS  (PRN):  acetaminophen     Tablet .. 650 milliGRAM(s) Oral every 6 hours PRN Temp greater or equal to 38C (100.4F), Mild Pain (1 - 3)  aluminum hydroxide/magnesium hydroxide/simethicone Suspension 30 milliLiter(s) Oral every 4 hours PRN Dyspepsia  benzonatate 100 milliGRAM(s) Oral three times a day PRN Cough  melatonin 3 milliGRAM(s) Oral at bedtime PRN Insomnia  ondansetron Injectable 4 milliGRAM(s) IV Push every 8 hours PRN Nausea and/or Vomiting      OBJECTIVE    T(C): 36.9 (04-25-22 @ 11:57), Max: 37.8 (04-25-22 @ 05:27)  HR: 77 (04-25-22 @ 11:57) (67 - 89)  BP: 110/75 (04-25-22 @ 11:57) (110/75 - 144/73)  RR: 17 (04-25-22 @ 11:57) (17 - 18)  SpO2: 91% (04-25-22 @ 11:57) (91% - 93%)  Wt(kg): --  I&O's Summary    24 Apr 2022 07:01  -  25 Apr 2022 07:00  --------------------------------------------------------  IN: 0 mL / OUT: 150 mL / NET: -150 mL          REVIEW OF SYSTEMS:  Patient is  unable to provide any information/ROS  due to baseline mental status.     PHYSICAL EXAM:  Appearance: NAD. VS past 24 hrs -as above   HEENT:   Moist oral mucosa. Conjunctiva clear b/l.   Neck : supple  Respiratory: Lungs CTAB.  Gastrointestinal:  Soft, nontender. No rebound. No rigidity. BS present	  Cardiovascular: RRR ,S1S2 present  Neurologic: Non-focal. Moving all extremities.  Extremities: No edema. No erythema. No calf tenderness.  Skin: No rashes, No ecchymoses, No cyanosis.	  wounds ,skin lesions-See skin assesment flow sheet   LABS:                        9.7    6.25  )-----------( 648      ( 25 Apr 2022 07:43 )             32.7     04-25    143  |  108  |  12  ----------------------------<  114<H>  3.3<L>   |  27  |  0.70    Ca    8.1<L>      25 Apr 2022 07:43      CAPILLARY BLOOD GLUCOSE        PT/INR - ( 25 Apr 2022 07:43 )   PT: 24.3 sec;   INR: 2.06 ratio               Culture - Blood (collected 23 Apr 2022 11:01)  Source: .Blood Blood  Preliminary Report (24 Apr 2022 12:01):    No growth to date.    Culture - Blood (collected 23 Apr 2022 11:01)  Source: .Blood Blood  Preliminary Report (24 Apr 2022 12:01):    No growth to date.    Culture - Blood (collected 21 Apr 2022 11:16)  Source: .Blood Blood  Preliminary Report (22 Apr 2022 12:01):    No growth to date.    Culture - Urine (collected 20 Apr 2022 20:54)  Source: Clean Catch Clean Catch (Midstream)  Final Report (22 Apr 2022 00:34):    No growth    Culture - Other (collected 20 Apr 2022 09:27)  Source: .Other right leg  Preliminary Report (24 Apr 2022 20:14):    Numerous Staphylococcus aureus    Numerous Enterobacter cloacae complex    Culture - Other (collected 19 Apr 2022 21:19)  Source: Wound Wound  Final Report (21 Apr 2022 17:09):    Numerous Staphylococcus aureus    Few Enterobacter cloacae complex  Organism: Staphylococcus aureus  Enterobacter cloacae complex (21 Apr 2022 17:09)  Organism: Enterobacter cloacae complex (21 Apr 2022 17:09)  Organism: Staphylococcus aureus (21 Apr 2022 17:09)    Culture - Blood (collected 19 Apr 2022 18:07)  Source: .Blood Blood-Peripheral  Gram Stain (22 Apr 2022 12:35):    Growth in aerobic bottle: Gram Positive Cocci in Clusters    Growth in anaerobic bottle: Gram Positive Cocci in Clusters  Final Report (23 Apr 2022 10:01):    Growth in aerobic and anaerobic bottles: Staphylococcus epidermidis    "Susceptibilities not performed"    ***Blood Panel PCR results on this specimen are available    approximately 3 hours after the Gram stain result.***    Gram stain, PCR, and/or cultureresults may not always    correspond due to difference in methodologies.    ************************************************************    This PCR assay was performed by multiplex PCR. This    Assay tests for 66 bacterial and resistance gene targets.    Please refer to the [facility] test directory    at [email or web address] for details.  Organism: Blood Culture PCR (22 Apr 2022 12:35)  Organism: Blood Culture PCR (22 Apr 2022 12:35)    Culture - Blood (collected 19 Apr 2022 18:07)  Source: .Blood Blood-Peripheral  Gram Stain (21 Apr 2022 11:44):    Growth in aerobic bottle: Gram Positive Cocci in Clusters    Growth in anaerobic bottle: Gram Positive Cocci in Clusters  Final Report (23 Apr 2022 10:23):    Growth in aerobic bottle: Staphylococcus pettenkoferi    Coag Negative Staphylococcus    Single set isolate, possible contaminant. Contact    Microbiology if susceptibility testing clinically    indicated.    Growth in anaerobic bottle: Staphylococcus epidermidis  Organism: Staphylococcus epidermidis (23 Apr 2022 10:23)  Organism: Staphylococcus epidermidis (23 Apr 2022 10:23)      RADIOLOGY & ADDITIONAL TESTS:   reviewed elctronically  ASSESSMENT/PLAN: 	  Patient was seen and examined on a day of discharge . Plan of care , discharge medications and recommendations discussed with consultants and clearance for discharge obtained .Social service , case management  and medical staff are aware of plan. Family is notified. Discharge summary  is  prepared electronically-see separate document prepared by me .75minutes spent on this visit, 50% visit time spent in care co-ordination with other attendings and counselling patient  I have discussed care plan with patient and HCP son Curtis on a phone in length today ,expressed understanding of problems treatment and their effect and side effects, alternatives in detail,I have asked if they have any questions and concerns and appropriately addressed them to best of my ability Spoke to Dr Anguiano ,he recommended 4 days of po bactrim .

## 2022-04-25 NOTE — DISCHARGE NOTE PROVIDER - HOSPITAL COURSE
Patient is a 89 yo Female ,[facility] resident  sent from [facility] living for eval of AMS and  her leg wounds on R leg.  She has hx of CVA  with R hemiplegia, dvt anemia, phlebitis, afib, hld, melanoma of skin, gerd depression, diverticulitis, uti, hypothyroid, pt is poor historian, per ems to the triage rn they adv pt may be septic due to wounds on her leg. pt has no complaints .As per [facility] med staff patient was sent to ED for evaluation of AMS ,associated with hypoxia and difficulties swallowing .Also her R leg wounds are worsening and ID /WOUND care consult requested .Septic workup sent found to have lactate elevation Admitted for septic workup and evaluation,send blood and urine cx,serial lactate levels,monitor vitals closley,ivfs hydration,monitor urine output and renal profile,iv abx initiated  .[facility] Palliative care consult requested ,to discuss advance directives and complete [facility]     Nutritional Assessment:  · Nutritional Assessment  This patient has been assessed with a concern for Malnutrition and has been determined to have a diagnosis/diagnoses of Moderate protein-calorie malnutrition.    This patient is being managed with:   Diet Soft and Bite Sized-  Mildly Thick Liquids (MILDTHICKLIQS)  Lactose Restricted (Milk Sugar Intoler.)  Single Sips Only  Entered: Apr 22 2022  2:23PM    Problem/Plan - 1:  ·  Problem: AMS (altered mental status).   ·  Plan: 2/2 to acute metabolic encephalopathy secondary to infectious process ( cellulitis and possible pneumonia 2/2 to aspiration ,poa ).    Problem/Plan - 2:  ·  Problem: Lower extremity cellulitis.   ·  Plan: septic workup ,serial lactate levels ,iv abx and iv hydration ,ID consult .Ruled out for sepsis.    Problem/Plan - 3:  ·  Problem: Hypertension.   ·  Plan: continue home medications ,cardiology consult.    Problem/Plan - 4:  ·  Problem: Dysphagia.   ·  Plan: Soft & bite sized with Mildly thick liquids  allow for swallow between intakes; crush medication (when feasible); maintain upright posture during/after eating for 30 mins; oral hygiene; position upright (90 degrees); small sips/bites.    Problem/Plan - 5:  ·  Problem: Venous stasis ulcers of both lower extremities.   ·  Plan: - Labs and x-rays reviewed, vascular studies reviewed  - venous doppler: No acute DVT of the lower extremities  - Wound cleansed and dressed  xeroform with DSD by wound care team  - Continue Zosyn per ID   - Vascular consult appreciated, f/u recs.    Problem/Plan - 6:  ·  Problem: Aortic valvar stenosis.   ·  Plan: continue home medications.    Problem/Plan - 7:  ·  Problem: Depression.   ·  Plan: continue home medications.    Problem/Plan - 8:  ·  Problem: CVA (cerebral vascular accident).   ·  Plan: continue home medications.    Problem/Plan - 9:  ·  Problem: Grade I diastolic dysfunction.   ·  Plan: cardiology consult.    Problem/Plan - 10:  ·  Problem: Coagulopathy.   ·  Plan; serial pt/inr ,resume OAC when INR is below 3.0.    Problem/Plan - 11:  ·  Problem: Thrombus.   ·  Plan: Focal thrombus in the infrarenal IVC along the right aspect of the filter apex with extension above the filter.    Problem/Plan - 12:  ·  Problem: Prophylactic measure.   ·  Plan: Gastrointestinal stress ulcer prophylaxis and DVT prophylaxis administered.

## 2022-04-25 NOTE — PROGRESS NOTE ADULT - PROBLEM SELECTOR PROBLEM 2
Lower extremity cellulitis
Lactic acidosis
Lower extremity cellulitis
Lower extremity cellulitis
Lactic acidosis
Lower extremity cellulitis

## 2022-04-25 NOTE — DISCHARGE NOTE PROVIDER - NSDCCPCAREPLAN_GEN_ALL_CORE_FT
PRINCIPAL DISCHARGE DIAGNOSIS  Diagnosis: Altered mental status  Assessment and Plan of Treatment: 2/2 TO ACUTE NETABOLIC ENCEPHALOPATHY 2/2 TO INFECTION ( RLE CELLULITIS ) POA      SECONDARY DISCHARGE DIAGNOSES  Diagnosis: Lower extremity cellulitis  Assessment and Plan of Treatment:     Diagnosis: Hypertension  Assessment and Plan of Treatment:     Diagnosis: Venous stasis ulcers of both lower extremities  Assessment and Plan of Treatment:     Diagnosis: Dysphagia  Assessment and Plan of Treatment:     Diagnosis: Depression  Assessment and Plan of Treatment:     Diagnosis: Grade I diastolic dysfunction  Assessment and Plan of Treatment:     Diagnosis: Thrombus  Assessment and Plan of Treatment:     Diagnosis: Presence of IVC filter  Assessment and Plan of Treatment:

## 2022-04-25 NOTE — DISCHARGE NOTE PROVIDER - PROVIDER TOKENS
PROVIDER:[TOKEN:[[id number]],FOLLOWUP:[2 weeks]],PROVIDER:[TOKEN:[[id number]],FOLLOWUP:[1 week]]

## 2022-04-25 NOTE — PROGRESS NOTE ADULT - PROVIDER SPECIALTY LIST ADULT
Infectious Disease
Neurology
Neurology
Palliative Care
Pulmonology
Pulmonology
Cardiology
Gastroenterology
Gastroenterology
Infectious Disease
Neurology
Neurology
Palliative Care
Podiatry
Pulmonology
Pulmonology
Cardiology
Infectious Disease
Neurology
Palliative Care
Podiatry
Pulmonology
Pulmonology
Cardiology
Cardiology
Gastroenterology
Palliative Care
Cardiology
Hospitalist
Podiatry
Internal Medicine
Podiatry
Hospitalist

## 2022-04-25 NOTE — PROGRESS NOTE ADULT - REASON FOR ADMISSION
AMS

## 2022-04-26 PROBLEM — I35.0 NONRHEUMATIC AORTIC (VALVE) STENOSIS: Chronic | Status: ACTIVE | Noted: 2022-04-19

## 2022-04-26 PROBLEM — I38 ENDOCARDITIS, VALVE UNSPECIFIED: Chronic | Status: ACTIVE | Noted: 2022-04-19

## 2022-04-26 LAB
CULTURE RESULTS: SIGNIFICANT CHANGE UP
SPECIMEN SOURCE: SIGNIFICANT CHANGE UP

## 2022-05-03 ENCOUNTER — APPOINTMENT (OUTPATIENT)
Dept: WOUND CARE | Facility: HOSPITAL | Age: 87
End: 2022-05-03

## 2022-05-05 ENCOUNTER — APPOINTMENT (OUTPATIENT)
Dept: WOUND CARE | Facility: HOSPITAL | Age: 87
End: 2022-05-05

## 2022-08-29 ENCOUNTER — NON-APPOINTMENT (OUTPATIENT)
Age: 87
End: 2022-08-29

## 2022-09-02 ENCOUNTER — APPOINTMENT (OUTPATIENT)
Dept: ULTRASOUND IMAGING | Facility: IMAGING CENTER | Age: 87
End: 2022-09-02

## 2022-09-02 ENCOUNTER — APPOINTMENT (OUTPATIENT)
Dept: SURGICAL ONCOLOGY | Facility: CLINIC | Age: 87
End: 2022-09-02

## 2022-09-02 ENCOUNTER — APPOINTMENT (OUTPATIENT)
Dept: RADIOLOGY | Facility: IMAGING CENTER | Age: 87
End: 2022-09-02

## 2022-09-02 ENCOUNTER — OUTPATIENT (OUTPATIENT)
Dept: OUTPATIENT SERVICES | Facility: HOSPITAL | Age: 87
LOS: 1 days | End: 2022-09-02
Payer: MEDICARE

## 2022-09-02 VITALS
OXYGEN SATURATION: 96 % | HEART RATE: 75 BPM | DIASTOLIC BLOOD PRESSURE: 56 MMHG | RESPIRATION RATE: 16 BRPM | WEIGHT: 130 LBS | HEIGHT: 66 IN | TEMPERATURE: 97.6 F | BODY MASS INDEX: 20.89 KG/M2 | SYSTOLIC BLOOD PRESSURE: 91 MMHG

## 2022-09-02 DIAGNOSIS — C44.729 SQUAMOUS CELL CARCINOMA OF SKIN OF LEFT LOWER LIMB, INCLUDING HIP: ICD-10-CM

## 2022-09-02 PROCEDURE — 73590 X-RAY EXAM OF LOWER LEG: CPT | Mod: 26,LT

## 2022-09-02 PROCEDURE — 73590 X-RAY EXAM OF LOWER LEG: CPT

## 2022-09-02 PROCEDURE — 71046 X-RAY EXAM CHEST 2 VIEWS: CPT | Mod: 26

## 2022-09-02 PROCEDURE — 99205 OFFICE O/P NEW HI 60 MIN: CPT

## 2022-09-02 PROCEDURE — 71046 X-RAY EXAM CHEST 2 VIEWS: CPT

## 2022-09-19 ENCOUNTER — OUTPATIENT (OUTPATIENT)
Dept: OUTPATIENT SERVICES | Facility: HOSPITAL | Age: 87
LOS: 1 days | End: 2022-09-19
Payer: MEDICARE

## 2022-09-19 DIAGNOSIS — C44.729 SQUAMOUS CELL CARCINOMA OF SKIN OF LEFT LOWER LIMB, INCLUDING HIP: ICD-10-CM

## 2022-09-21 ENCOUNTER — RESULT REVIEW (OUTPATIENT)
Age: 87
End: 2022-09-21

## 2022-09-21 PROCEDURE — 88321 CONSLTJ&REPRT SLD PREP ELSWR: CPT

## 2022-09-22 LAB — SURGICAL PATHOLOGY STUDY: SIGNIFICANT CHANGE UP

## 2022-11-07 ENCOUNTER — TRANSCRIPTION ENCOUNTER (OUTPATIENT)
Age: 87
End: 2022-11-07

## 2022-11-08 ENCOUNTER — TRANSCRIPTION ENCOUNTER (OUTPATIENT)
Age: 87
End: 2022-11-08

## 2022-11-08 ENCOUNTER — OUTPATIENT (OUTPATIENT)
Dept: OUTPATIENT SERVICES | Facility: HOSPITAL | Age: 87
LOS: 1 days | Discharge: HOME | End: 2022-11-08

## 2022-11-08 ENCOUNTER — APPOINTMENT (OUTPATIENT)
Dept: SURGICAL ONCOLOGY | Facility: HOSPITAL | Age: 87
End: 2022-11-08

## 2022-11-08 ENCOUNTER — RESULT REVIEW (OUTPATIENT)
Age: 87
End: 2022-11-08

## 2022-11-08 VITALS
WEIGHT: 130.07 LBS | DIASTOLIC BLOOD PRESSURE: 59 MMHG | SYSTOLIC BLOOD PRESSURE: 132 MMHG | TEMPERATURE: 98 F | HEIGHT: 66 IN | OXYGEN SATURATION: 95 % | HEART RATE: 70 BPM | RESPIRATION RATE: 16 BRPM

## 2022-11-08 VITALS
SYSTOLIC BLOOD PRESSURE: 113 MMHG | RESPIRATION RATE: 15 BRPM | OXYGEN SATURATION: 95 % | HEART RATE: 75 BPM | DIASTOLIC BLOOD PRESSURE: 59 MMHG

## 2022-11-08 DIAGNOSIS — C44.729 SQUAMOUS CELL CARCINOMA OF SKIN OF LEFT LOWER LIMB, INCLUDING HIP: ICD-10-CM

## 2022-11-08 PROCEDURE — 88305 TISSUE EXAM BY PATHOLOGIST: CPT | Mod: 26

## 2022-11-08 PROCEDURE — 27618 EXC LEG/ANKLE TUM < 3 CM: CPT

## 2022-11-08 RX ORDER — ACETAMINOPHEN 500 MG
650 TABLET ORAL EVERY 6 HOURS
Refills: 0 | Status: DISCONTINUED | OUTPATIENT
Start: 2022-11-08 | End: 2022-11-22

## 2022-11-08 RX ORDER — ONDANSETRON 8 MG/1
4 TABLET, FILM COATED ORAL ONCE
Refills: 0 | Status: DISCONTINUED | OUTPATIENT
Start: 2022-11-08 | End: 2022-11-22

## 2022-11-08 RX ORDER — FENTANYL CITRATE 50 UG/ML
50 INJECTION INTRAVENOUS
Refills: 0 | Status: DISCONTINUED | OUTPATIENT
Start: 2022-11-08 | End: 2022-11-08

## 2022-11-08 RX ORDER — FENTANYL CITRATE 50 UG/ML
25 INJECTION INTRAVENOUS
Refills: 0 | Status: DISCONTINUED | OUTPATIENT
Start: 2022-11-08 | End: 2022-11-08

## 2022-11-08 RX ORDER — OXYCODONE HYDROCHLORIDE 5 MG/1
5 TABLET ORAL ONCE
Refills: 0 | Status: DISCONTINUED | OUTPATIENT
Start: 2022-11-08 | End: 2022-11-08

## 2022-11-08 RX ADMIN — FENTANYL CITRATE 25 MICROGRAM(S): 50 INJECTION INTRAVENOUS at 12:50

## 2022-11-08 RX ADMIN — FENTANYL CITRATE 25 MICROGRAM(S): 50 INJECTION INTRAVENOUS at 12:38

## 2022-11-08 NOTE — H&P ADULT - HISTORY OF PRESENT ILLNESS
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    PMH/meds:  Thrombocytosis treated with hydroxyurea.  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  + Daily warfarin.  + CAD/PTCA/stent.  + Hypertension, treated with Lasix and metoprolol.  She takes simvastatin for hypercholesterolemia.

## 2022-11-08 NOTE — BRIEF OPERATIVE NOTE - OPERATION/FINDINGS
Locally advanced squamous cell carcinoma left cheek and excised with a minimum 1 cm margin, reconstruction
LLE melanoma 4.5 x 4.0 cm excised with 1 cm margins. Specimen sent to path. STSG taken from left thigh with poor quality. The decision was made to take a FTSG taken from left lower abdomen. FTSG tacked to skin with staples with piecrusting. Bolster dressing was placed over STSG. FTSG donor site closed primarily. Areas of full-thickness exposure at the STSG donor site were closed with interrupted 4-0 nylons.

## 2022-11-08 NOTE — ASU PREOP CHECKLIST - AICD PRESENT
Plan  1.  ReGarding anxiety and panic attacks, she was previously on citalopram and it sounds like she probably was tolerating this quite well therefore we will go ahead and restart it. Would have her take 10 mg by mouth daily.  2.  We will also do a prescription for Atarax. She continues this 50 mg as needed up to 3 times daily for anxiety as a rescue medicine. She is aware that this is a very similar medication to Benadryl therefore she should not take Atarax and Benadryl at the same time. If she takes an Atarax, she should not take a Benadryl for at least 6 hours.  3.  She will continue with her current Klonopin recommendations of up to 1 tablet by mouth daily as needed for severe anxiety.  New Prescription for Klonopin will be due June 17 however will probably need to print out either June 12 or 13th.  She is welcome to request that we printed this out and postdate it any time after June 1.  4.  Regarding benign paroxysmal positional vertigo (BPPV) we'll do referral to physical therapy however as transportation and a lability will be a challenge would recommend that they contact [facility] services on [facility] at [phone] to see what their options are for that clinic and possibly other clinics. If I need to redo referral for a different clinic I am more than happy to do so.  If findings at physical therapy are different, or if she does not get relief from her vertigo with physical therapy would have her follow-up with ENT, has already seen Dr. Caceres regarding hearing loss.  5.  Keep a 2 to three-day diet history to see if she can tease out any foods that are causing the salty taste in her mouth. If she is unable to find a cause, then she should contact her clinic for further instructions. Labs reviewed today, could not appreciate any abnormal findings from February that could account for this. Also reviewed medications. Could not account for any medications that could account for this.  Could review medication list with pharmacist, could also reviewed case with my collaborating physician if necessary.   no

## 2022-11-08 NOTE — ASU DISCHARGE PLAN (ADULT/PEDIATRIC) - NS MD DC FALL RISK RISK
For information on Fall & Injury Prevention, visit: [email or web address] OR  [email or web address] OR  https://www.cdc.gov/steadi/patient.html

## 2022-11-08 NOTE — ASU DISCHARGE PLAN (ADULT/PEDIATRIC) - CARE PROVIDER_API CALL
Valentin Patel)  Plastic Surgery  [address] [place] [address]  Phone: [phone]  Fax: [phone]  Follow Up Time: 1 week

## 2022-11-08 NOTE — ASU PREOP CHECKLIST - 4.
2 silver colored rings - one with clear stone on left hand " unable to take off " 2 silver colored rings - one with clear stone on left hand " unable to take off " Dr Rao aware Rings taped up with gauze as per Dr Rao

## 2022-11-08 NOTE — ASU DISCHARGE PLAN (ADULT/PEDIATRIC) - NURSING INSTRUCTIONS
You received IV Tylenol for pain management at _930 AM__. Please DO NOT take any Tylenol (Acetaminophen) containing products, such as Vicodin, Percocet, Excedrin, and cold medications for the next 6 hours (until _330_ PM). DO NOT TAKE MORE THAN 3000 MG OF TYLENOL in a 24 hour period.    You received IV Toradol for pain management at _1000 AM__. Please DO NOT take Motrin/Ibuprofen/Advil/Aleve/NSAIDs (Non-Steroidal Anti-Inflammatory Drugs) for the next 6 hours (until __400 PM__).

## 2022-11-08 NOTE — BRIEF OPERATIVE NOTE - NSICDXBRIEFPROCEDURE_GEN_ALL_CORE_FT
PROCEDURES:  Wide local excision, lesion, extremity, with repair using split-thickness skin graft 08-Nov-2022 10:49:12  Sirena Santiago

## 2022-11-08 NOTE — H&P ADULT - ASSESSMENT
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 Patel.  Indications,, risks, benefits, alternatives, and possible surgical is reviewed with her son, and again today.    All questions answered, consent on chart

## 2022-11-08 NOTE — ASU DISCHARGE PLAN (ADULT/PEDIATRIC) - ASU DC SPECIAL INSTRUCTIONSFT
Initial followup with plastic surgery in the next 5-15 days, regarding matters of bandages, activities, and showering.    Dr. Rao should call with pathology report in approximately 2 weeks.  The conversation will determine further management. Initial followup with plastic surgery in the next 7 days, regarding matters of bandages, activities, and showering.  Keep dressings dry and in place until follow up with plastic surgery.   Take antibiotics as prescribed.  Take over the counter pain medication as prescribed on the bottle.    Dr. Rao should call with pathology report in approximately 2 weeks.  The conversation will determine further management.

## 2022-11-09 ENCOUNTER — EMERGENCY (EMERGENCY)
Facility: HOSPITAL | Age: 87
LOS: 1 days | Discharge: HOME | End: 2022-11-09
Attending: EMERGENCY MEDICINE | Admitting: EMERGENCY MEDICINE
Payer: MEDICARE

## 2022-11-09 VITALS
HEART RATE: 85 BPM | RESPIRATION RATE: 16 BRPM | TEMPERATURE: 98 F | DIASTOLIC BLOOD PRESSURE: 70 MMHG | SYSTOLIC BLOOD PRESSURE: 147 MMHG | OXYGEN SATURATION: 97 %

## 2022-11-09 VITALS
HEIGHT: 66 IN | DIASTOLIC BLOOD PRESSURE: 75 MMHG | HEART RATE: 85 BPM | SYSTOLIC BLOOD PRESSURE: 132 MMHG | OXYGEN SATURATION: 93 % | RESPIRATION RATE: 16 BRPM | TEMPERATURE: 98 F

## 2022-11-09 LAB
ALBUMIN SERPL ELPH-MCNC: 3 G/DL — LOW (ref 3.3–5)
ALP SERPL-CCNC: 56 U/L — SIGNIFICANT CHANGE UP (ref 40–120)
ALT FLD-CCNC: 8 U/L — LOW (ref 12–78)
ANION GAP SERPL CALC-SCNC: 6 MMOL/L — SIGNIFICANT CHANGE UP (ref 5–17)
AST SERPL-CCNC: 22 U/L — SIGNIFICANT CHANGE UP (ref 15–37)
BILIRUB SERPL-MCNC: 0.4 MG/DL — SIGNIFICANT CHANGE UP (ref 0.2–1.2)
BUN SERPL-MCNC: 19 MG/DL — SIGNIFICANT CHANGE UP (ref 7–23)
CALCIUM SERPL-MCNC: 8.9 MG/DL — SIGNIFICANT CHANGE UP (ref 8.5–10.1)
CHLORIDE SERPL-SCNC: 106 MMOL/L — SIGNIFICANT CHANGE UP (ref 96–108)
CO2 SERPL-SCNC: 29 MMOL/L — SIGNIFICANT CHANGE UP (ref 22–31)
CREAT SERPL-MCNC: 0.82 MG/DL — SIGNIFICANT CHANGE UP (ref 0.5–1.3)
EGFR: 68 ML/MIN/1.73M2 — SIGNIFICANT CHANGE UP
GLUCOSE SERPL-MCNC: 100 MG/DL — HIGH (ref 70–99)
HCT VFR BLD CALC: 30.2 % — LOW (ref 34.5–45)
HGB BLD-MCNC: 9.7 G/DL — LOW (ref 11.5–15.5)
INR BLD: 1.33 RATIO — HIGH (ref 0.88–1.16)
MCHC RBC-ENTMCNC: 32.1 GM/DL — SIGNIFICANT CHANGE UP (ref 32–36)
MCHC RBC-ENTMCNC: 41.6 PG — HIGH (ref 27–34)
MCV RBC AUTO: 129.6 FL — HIGH (ref 80–100)
NRBC # BLD: 0 /100 WBCS — SIGNIFICANT CHANGE UP (ref 0–0)
PLATELET # BLD AUTO: 357 K/UL — SIGNIFICANT CHANGE UP (ref 150–400)
POTASSIUM SERPL-MCNC: 5.1 MMOL/L — SIGNIFICANT CHANGE UP (ref 3.5–5.3)
POTASSIUM SERPL-SCNC: 5.1 MMOL/L — SIGNIFICANT CHANGE UP (ref 3.5–5.3)
PROT SERPL-MCNC: 6.8 G/DL — SIGNIFICANT CHANGE UP (ref 6–8.3)
PROTHROM AB SERPL-ACNC: 15.6 SEC — HIGH (ref 10.5–13.4)
RBC # BLD: 2.33 M/UL — LOW (ref 3.8–5.2)
RBC # FLD: 14.7 % — HIGH (ref 10.3–14.5)
SODIUM SERPL-SCNC: 141 MMOL/L — SIGNIFICANT CHANGE UP (ref 135–145)
WBC # BLD: 3.59 K/UL — LOW (ref 3.8–10.5)
WBC # FLD AUTO: 3.59 K/UL — LOW (ref 3.8–10.5)

## 2022-11-09 PROCEDURE — 96372 THER/PROPH/DIAG INJ SC/IM: CPT

## 2022-11-09 PROCEDURE — 99284 EMERGENCY DEPT VISIT MOD MDM: CPT | Mod: 25

## 2022-11-09 PROCEDURE — 36415 COLL VENOUS BLD VENIPUNCTURE: CPT

## 2022-11-09 PROCEDURE — 99284 EMERGENCY DEPT VISIT MOD MDM: CPT

## 2022-11-09 PROCEDURE — 85610 PROTHROMBIN TIME: CPT

## 2022-11-09 PROCEDURE — 85027 COMPLETE CBC AUTOMATED: CPT

## 2022-11-09 PROCEDURE — 80053 COMPREHEN METABOLIC PANEL: CPT

## 2022-11-09 RX ORDER — MORPHINE SULFATE 50 MG/1
1 CAPSULE, EXTENDED RELEASE ORAL ONCE
Refills: 0 | Status: DISCONTINUED | OUTPATIENT
Start: 2022-11-09 | End: 2022-11-09

## 2022-11-09 RX ADMIN — MORPHINE SULFATE 1 MILLIGRAM(S): 50 CAPSULE, EXTENDED RELEASE ORAL at 03:57

## 2022-11-09 NOTE — ED ADULT NURSE NOTE - CHIEF COMPLAINT QUOTE
per ems from [facility] with bleeding from skin graft to left thigh, done 3 days ago. saturated two chucks per ems within a couple hours. on coumadin

## 2022-11-09 NOTE — ED ADULT TRIAGE NOTE - CHIEF COMPLAINT QUOTE
per ems from [facility] with bleeding from skin graft to left thigh, done 3 days ago. saturated two chucks per ems within a couple hours. per ems from [facility] with bleeding from skin graft to left thigh, done 3 days ago. saturated two chucks per ems within a couple hours. on coumadin Barbra

## 2022-11-09 NOTE — ED PROVIDER NOTE - PATIENT PORTAL LINK FT
You can access the FollowMyHealth Patient Portal offered by [facility] by registering at the following website: [email or web address]. By joining [employer]’s FollowMyHealth portal, you will also be able to view your health information using other applications (apps) compatible with our system.

## 2022-11-09 NOTE — ED PROVIDER NOTE - OBJECTIVE STATEMENT
bleeding from L medial thigh which was skin graft donor site. Xeroform gauze stapled over wound. No active bleeding. Mild pressure dressing applied.   check labs and reassess

## 2022-11-09 NOTE — ED PROVIDER NOTE - PROGRESS NOTE DETAILS
Dressing changed. Pt observed in ED. Dressing is clean and dry.  Labs reviewed, Hgb at base. INR not supratherapeutic.   Pt is stable to return to her assisted living facility

## 2022-11-09 NOTE — ED PROVIDER NOTE - CONSTITUTIONAL NEGATIVE STATEMENT, MLM
Admitting physician at bedside to evaluate pt.       Susanna Martinez RN  05/12/19 9032 no fever and no chills.

## 2022-11-10 LAB — SURGICAL PATHOLOGY STUDY: SIGNIFICANT CHANGE UP

## 2022-11-21 NOTE — ED PROVIDER NOTE - DATE/TIME 1
Agency/Facility Name: [facility]   Outcome: DPA left v-mail requesting update on referral.    1006-  Agency/Facility Name: [facility]   Spoke To: Krishna  Outcome: DPA requested update on referral, informed that Pt was declined last week. DPA updated request status to declined.     1009-  Agency/Facility Name: [facility]   Spoke To: Kimberly  Outcome: DPA requested update on referral, SNF asking that DPA resend referral. DPA resending now.     1014-  Agency/Facility Name: [facility]  Outcome: DPA tried leaving v-mail for admissions office, but phone kept ringing, unable to leave v-mail.     1529-  Agency/Facility Name: [facility]  Outcome: DPA left v-mail requesting update on referral.     1541-  Agency/Facility Name: [facility]   Spoke To: Blanche  Outcome: DPA informed that referral is in review with LARR[facility].                22-May-2017 11:08

## 2022-11-28 NOTE — ASSESSMENT
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.\par \par \par For preoperative assessment I have recommended.\par Chest x-ray.\par Left groin ultrasound.\par X-ray of the left leg.\par \par Prescriptions entered.\par \par \par I have asked him to call me a week after the imaging to discuss the results.\par \par As long as there are no worrisome findings on the above imaging, I offered surgical resection as an outpatient procedure, coordinated with plastic surgery.\par \par Reviewed in detail, all questions answered.\par \par They would like to proceed with surgery.\par Paperwork for scheduling submitted.\par \par \par Note dictated to the referring physician.\par \par \par 9/6/2022:\par I called her son but had to leave a voicemail.\par September 2, 2022, she had the following imaging and results.\par 1.  Chest x-ray: Small pleural effusion.\par 2.  X-ray of the left leg: No bony erosion.\par 3.  She declined the inguinal ultrasound.\par \par \par 11/28/2022.\par I called her son (Curtis: [phone]) but had to leave a voicemail.\par November 8, 2022, she had wide excision of a squamous cell cancer from her left shin.\par Plastics: Dr. Valentin Patel.\par Surgical pathology: + Residual squamous cell carcinoma.\par Negative margins.\par \par Presently, no further intervention is warranted.\par \par My message indicated that I should see her in the springtime, sooner if needed.\par Note dictated to her referring physicians.

## 2022-11-28 NOTE — REASON FOR VISIT
[Initial Consultation] : an initial consultation for [Other: _____] : [unfilled] [FreeTextEntry2] : Locally advanced left shin squamous cell cancer

## 2022-11-28 NOTE — REVIEW OF SYSTEMS
[Negative] : Heme/Lymph [FreeTextEntry5] : CAD [FreeTextEntry6] : DVT [de-identified] : Skin cancer

## 2022-11-28 NOTE — HISTORY OF PRESENT ILLNESS
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 Jordan BLANCO).\par She has been referred by dermatology, and plastics (Dr. Valentin JOE).\par \par She recalls a growth, associated with intermittent sharp pain, on her left shin since the spring 2022.\par This area was separate, different, and new from bilateral lower extremity circulation wound problems that she has been having since .\par \par \par + Prior personal history:\par 2011:\par I performed wide excision of a 0.9 mm melanoma from the left chest (infraclavicular) with negative margins.\par Primary closure\par 2010:\par Mohs procedure for a nonmelanoma skin cancer on the upper nose, left side (Dr. Koffi Bell)\par \par \par + History of THROMBOCYTOSIS\par Treated with hydroxyurea.\par Hematology: Dr. Braulio CANDELARIO.\par \par \par No relatives with a history of skin cancer.\par \par Family history of malignancy:\par Mother: Stomach cancer\par \par \par Derm: Dr. Jordan BLANCO\par \par \par PMD: Dr. Facundo CARRINGTON.\par \par No pacemaker or defibrillator.\par No anticoagulants.\par \par 2022.\par She was hospitalized at [facility] with altered mental status.\par \par :\par She had a Stroke (CVA).\par + Residual right hemiplegia.\par Since that time she has been in an assisted living facility.\par \par 2016:\par + History of DVT and PE.\par + IVC filter.\par Vascular surgery: Dr. Lorelei Ferreira.\par \par +WARFARIN.\par + CAD/PTCA/stent ().\par \par No pacemaker or defibrillator.\par \par + Hypertension.\par Treated with Lasix and metoprolol.\par + Hypercholesterolemia for which She takes simvastatin.\par She currently does not see a cardiologist\par \par + Thrombocytosis.\par Treated with hydroxyurea.\par Hematology: Dr. Braulio CANDELARIO\par + Osteoporosis.\par \par + Chronic leg wounds.\par Treated at the wound care center by Dr. Adrian GARAY and Dr. RUVALCABA.\par \par \par GYN:\par Menarche at 13.\par  1, para 1 at 32.\par Natural menopause at 50.\par No HRT\par \par \par No recent breast imaging.\par No recent GYN visits.\par No recent colonoscopy

## 2023-01-10 NOTE — ED ADULT NURSE NOTE - JUGULAR VENOUS DISTENTION
Per Dr. Fulton, Ms. Cardona has been called with recent lab results & recommendations.  Continue current medications and follow-up as planned or sooner if any problems.  absent

## 2023-03-05 PROBLEM — C44.729 SQUAMOUS CELL CARCINOMA OF SKIN OF LEFT LOWER EXTREMITY: Status: ACTIVE | Noted: 2022-09-02

## 2023-03-06 ENCOUNTER — APPOINTMENT (OUTPATIENT)
Dept: SURGICAL ONCOLOGY | Facility: CLINIC | Age: 88
End: 2023-03-06
Payer: MEDICARE

## 2023-03-06 VITALS
HEART RATE: 72 BPM | DIASTOLIC BLOOD PRESSURE: 72 MMHG | TEMPERATURE: 97.7 F | WEIGHT: 130 LBS | RESPIRATION RATE: 16 BRPM | OXYGEN SATURATION: 97 % | SYSTOLIC BLOOD PRESSURE: 123 MMHG | BODY MASS INDEX: 20.89 KG/M2 | HEIGHT: 66 IN

## 2023-03-06 DIAGNOSIS — C44.729 SQUAMOUS CELL CARCINOMA OF SKIN OF LEFT LOWER LIMB, INCLUDING HIP: ICD-10-CM

## 2023-03-06 PROCEDURE — 99215 OFFICE O/P EST HI 40 MIN: CPT

## 2023-03-06 NOTE — HISTORY OF PRESENT ILLNESS
[de-identified] : 89-year-old lady.\par \par She resides at [facility] assisted living in [place].\par \par ~4-week history of some redness, and tenderness, and the area around the skin graft on her left shin.\par No other specific or constitutional signs or symptoms.\par \par No preceding injury.\par \par She was started on oral antibiotics a week prior, at the assisted living facility.\par \par \par 2022:\par Wide excision of a locally advanced squamous cell cancer from her LEFT SHIN.\par Plastics: Dr. 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 Jordan BLANCO).\par She has been referred by dermatology, and plastics (Dr. Valentin JOE).\par \par She recalls a growth, associated with intermittent sharp pain, on her left shin since the spring 2022.\par This area was separate, different, and new from bilateral lower extremity circulation wound problems that she has been having since .\par \par \par + Prior personal history:\par 2011:\par I performed wide excision of a 0.9 mm melanoma from the left chest (infraclavicular) with negative margins.\par Primary closure\par 2010:\par Mohs procedure for a nonmelanoma skin cancer on the upper nose, left side (Dr. Koffi Bell)\par \par \par + History of THROMBOCYTOSIS\par Treated with hydroxyurea.\par Hematology: Dr. Braulio CANDELARIO.\par \par \par No relatives with a history of skin cancer.\par \par Family history of malignancy:\par Mother: Stomach cancer\par \par \par Derm: Dr. Jordan BLANCO\par \par \par PMD: Dr. Facundo CARRINGTON.\par \par No pacemaker or defibrillator.\par No anticoagulants.\par \par 2022.\par She was hospitalized at [facility] with altered mental status.\par \par :\par She had a Stroke (CVA).\par + Residual right hemiplegia.\par Since that time she has been in an assisted living facility.\par \par 2016:\par + History of DVT and PE.\par + IVC filter.\par Vascular surgery: Dr. Lorelei Ferreira.\par \par +WARFARIN.\par + CAD/PTCA/stent ().\par \par No pacemaker or defibrillator.\par \par + Hypertension.\par Treated with Lasix and metoprolol.\par + Hypercholesterolemia for which She takes simvastatin.\par She currently does not see a cardiologist\par \par + Thrombocytosis.\par Treated with hydroxyurea.\par Hematology: Dr. Braulio CANDELARIO\par + Osteoporosis.\par \par + Chronic leg wounds.\par Treated at the wound care center by Dr. Adrian GARAY and Dr. RUVALCABA.\par \par \par GYN:\par Menarche at 13.\par  1, para 1 at 32.\par Natural menopause at 50.\par No HRT\par \par \par No recent breast imaging.\par No recent GYN visits.\par No recent colonoscopy

## 2023-03-06 NOTE — REASON FOR VISIT
[Follow-Up Visit] : a follow-up visit for [Other: _____] : [unfilled] [FreeTextEntry2] : Squamous cell carcinoma of the left shin, with associated cellulitic changes x4 weeks

## 2023-03-06 NOTE — PHYSICAL EXAM
[Normal] : supple, no neck mass and thyroid not enlarged [Normal Neck Lymph Nodes] : normal neck lymph nodes  [Normal Supraclavicular Lymph Nodes] : normal supraclavicular lymph nodes [Normal Groin Lymph Nodes] : normal groin lymph nodes [Normal Axillary Lymph Nodes] : normal axillary lymph nodes [Normal] : full range of motion and no deformities appreciated [de-identified] : Below

## 2023-03-06 NOTE — ASSESSMENT
[FreeTextEntry1] : Clinically, the erythema, and increased pain, is concerning for a nonsuppurative cellulitic process.\par She has been started on oral antibiotics a week ago.\par She has no constitutional signs or symptoms.\par \par I recommended continuing the oral antibiotics and elevating the left lower extremity is much as possible.\par \par As an alternative, I offered that we could admit her to the hospital for bedrest, and intravenous antibiotics.\par She and her son prefer not to pursue this avenue unless things deteriorate.\par \par If she is not having any problems, I have asked to see her in a 2 or 3-week interval, sooner if needed.\par \par Reviewed in detail, all questions answered.

## 2023-03-06 NOTE — REVIEW OF SYSTEMS
[Negative] : Endocrine [FreeTextEntry5] : History of DVT [FreeTextEntry6] : History of PE [de-identified] : History of skin cancer [de-identified] : History of CVA [FreeTextEntry1] : Thrombocytosis
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
## 2023-03-27 ENCOUNTER — APPOINTMENT (OUTPATIENT)
Dept: SURGICAL ONCOLOGY | Facility: CLINIC | Age: 88
End: 2023-03-27

## 2023-04-11 NOTE — ED ADULT TRIAGE NOTE - RESPIRATORY RATE (BREATHS/MIN)
- trach; now returned to ICU requiring vent; hypercapnic   - respiratory status greatly affected during periods of anxiety during admit   - noted; management per hospital primary and PCCM    18

## 2023-06-26 ENCOUNTER — INPATIENT (INPATIENT)
Facility: HOSPITAL | Age: 88
LOS: 3 days | Discharge: NURSING, REHAB OR LONG-TERM CARE | DRG: 689 | End: 2023-06-30
Attending: INTERNAL MEDICINE | Admitting: INTERNAL MEDICINE
Payer: MEDICARE

## 2023-06-26 VITALS
RESPIRATION RATE: 23 BRPM | SYSTOLIC BLOOD PRESSURE: 137 MMHG | HEIGHT: 66 IN | WEIGHT: 134.92 LBS | HEART RATE: 99 BPM | TEMPERATURE: 98 F | OXYGEN SATURATION: 93 % | DIASTOLIC BLOOD PRESSURE: 78 MMHG

## 2023-06-26 DIAGNOSIS — M79.606 PAIN IN LEG, UNSPECIFIED: ICD-10-CM

## 2023-06-26 DIAGNOSIS — N39.0 URINARY TRACT INFECTION, SITE NOT SPECIFIED: ICD-10-CM

## 2023-06-26 DIAGNOSIS — F32.9 MAJOR DEPRESSIVE DISORDER, SINGLE EPISODE, UNSPECIFIED: ICD-10-CM

## 2023-06-26 DIAGNOSIS — G93.41 METABOLIC ENCEPHALOPATHY: ICD-10-CM

## 2023-06-26 DIAGNOSIS — I51.89 OTHER ILL-DEFINED HEART DISEASES: ICD-10-CM

## 2023-06-26 DIAGNOSIS — I48.91 UNSPECIFIED ATRIAL FIBRILLATION: ICD-10-CM

## 2023-06-26 DIAGNOSIS — Z29.9 ENCOUNTER FOR PROPHYLACTIC MEASURES, UNSPECIFIED: ICD-10-CM

## 2023-06-26 DIAGNOSIS — I10 ESSENTIAL (PRIMARY) HYPERTENSION: ICD-10-CM

## 2023-06-26 DIAGNOSIS — R45.1 RESTLESSNESS AND AGITATION: ICD-10-CM

## 2023-06-26 DIAGNOSIS — I63.9 CEREBRAL INFARCTION, UNSPECIFIED: ICD-10-CM

## 2023-06-26 DIAGNOSIS — G62.9 POLYNEUROPATHY, UNSPECIFIED: ICD-10-CM

## 2023-06-26 DIAGNOSIS — I38 ENDOCARDITIS, VALVE UNSPECIFIED: ICD-10-CM

## 2023-06-26 DIAGNOSIS — K59.00 CONSTIPATION, UNSPECIFIED: ICD-10-CM

## 2023-06-26 LAB
ALBUMIN SERPL ELPH-MCNC: 3.1 G/DL — LOW (ref 3.3–5)
ALP SERPL-CCNC: 54 U/L — SIGNIFICANT CHANGE UP (ref 40–120)
ALT FLD-CCNC: 11 U/L — LOW (ref 12–78)
ANION GAP SERPL CALC-SCNC: 5 MMOL/L — SIGNIFICANT CHANGE UP (ref 5–17)
APPEARANCE UR: ABNORMAL
APTT BLD: 46.4 SEC — HIGH (ref 27.5–35.5)
AST SERPL-CCNC: 10 U/L — LOW (ref 15–37)
BASOPHILS # BLD AUTO: 0.03 K/UL — SIGNIFICANT CHANGE UP (ref 0–0.2)
BASOPHILS NFR BLD AUTO: 0.7 % — SIGNIFICANT CHANGE UP (ref 0–2)
BILIRUB SERPL-MCNC: 0.4 MG/DL — SIGNIFICANT CHANGE UP (ref 0.2–1.2)
BILIRUB UR-MCNC: NEGATIVE — SIGNIFICANT CHANGE UP
BUN SERPL-MCNC: 22 MG/DL — SIGNIFICANT CHANGE UP (ref 7–23)
CALCIUM SERPL-MCNC: 8.7 MG/DL — SIGNIFICANT CHANGE UP (ref 8.5–10.1)
CHLORIDE SERPL-SCNC: 109 MMOL/L — HIGH (ref 96–108)
CO2 SERPL-SCNC: 30 MMOL/L — SIGNIFICANT CHANGE UP (ref 22–31)
COLOR SPEC: YELLOW — SIGNIFICANT CHANGE UP
CREAT SERPL-MCNC: 0.68 MG/DL — SIGNIFICANT CHANGE UP (ref 0.5–1.3)
DIFF PNL FLD: NEGATIVE — SIGNIFICANT CHANGE UP
EGFR: 82 ML/MIN/1.73M2 — SIGNIFICANT CHANGE UP
EOSINOPHIL # BLD AUTO: 0.08 K/UL — SIGNIFICANT CHANGE UP (ref 0–0.5)
EOSINOPHIL NFR BLD AUTO: 2 % — SIGNIFICANT CHANGE UP (ref 0–6)
GLUCOSE SERPL-MCNC: 100 MG/DL — HIGH (ref 70–99)
GLUCOSE UR QL: NEGATIVE MG/DL — SIGNIFICANT CHANGE UP
HCT VFR BLD CALC: 34.9 % — SIGNIFICANT CHANGE UP (ref 34.5–45)
HGB BLD-MCNC: 10.6 G/DL — LOW (ref 11.5–15.5)
IMM GRANULOCYTES NFR BLD AUTO: 1 % — HIGH (ref 0–0.9)
INR BLD: 2.82 RATIO — HIGH (ref 0.88–1.16)
KETONES UR-MCNC: NEGATIVE MG/DL — SIGNIFICANT CHANGE UP
LACTATE SERPL-SCNC: 0.6 MMOL/L — LOW (ref 0.7–2)
LEUKOCYTE ESTERASE UR-ACNC: ABNORMAL
LYMPHOCYTES # BLD AUTO: 0.53 K/UL — LOW (ref 1–3.3)
LYMPHOCYTES # BLD AUTO: 13 % — SIGNIFICANT CHANGE UP (ref 13–44)
MAGNESIUM SERPL-MCNC: 2.3 MG/DL — SIGNIFICANT CHANGE UP (ref 1.6–2.6)
MCHC RBC-ENTMCNC: 30.4 GM/DL — LOW (ref 32–36)
MCHC RBC-ENTMCNC: 35.3 PG — HIGH (ref 27–34)
MCV RBC AUTO: 116.3 FL — HIGH (ref 80–100)
MONOCYTES # BLD AUTO: 0.24 K/UL — SIGNIFICANT CHANGE UP (ref 0–0.9)
MONOCYTES NFR BLD AUTO: 5.9 % — SIGNIFICANT CHANGE UP (ref 2–14)
NEUTROPHILS # BLD AUTO: 3.15 K/UL — SIGNIFICANT CHANGE UP (ref 1.8–7.4)
NEUTROPHILS NFR BLD AUTO: 77.4 % — HIGH (ref 43–77)
NITRITE UR-MCNC: POSITIVE
NRBC # BLD: 0 /100 WBCS — SIGNIFICANT CHANGE UP (ref 0–0)
PH UR: 6 — SIGNIFICANT CHANGE UP (ref 5–8)
PLATELET # BLD AUTO: 417 K/UL — HIGH (ref 150–400)
POTASSIUM SERPL-MCNC: 4.1 MMOL/L — SIGNIFICANT CHANGE UP (ref 3.5–5.3)
POTASSIUM SERPL-SCNC: 4.1 MMOL/L — SIGNIFICANT CHANGE UP (ref 3.5–5.3)
PROT SERPL-MCNC: 7.1 G/DL — SIGNIFICANT CHANGE UP (ref 6–8.3)
PROT UR-MCNC: SIGNIFICANT CHANGE UP MG/DL
PROTHROM AB SERPL-ACNC: 33.3 SEC — HIGH (ref 10.5–13.4)
RAPID RVP RESULT: SIGNIFICANT CHANGE UP
RBC # BLD: 3 M/UL — LOW (ref 3.8–5.2)
RBC # FLD: 17.3 % — HIGH (ref 10.3–14.5)
SARS-COV-2 RNA SPEC QL NAA+PROBE: SIGNIFICANT CHANGE UP
SODIUM SERPL-SCNC: 144 MMOL/L — SIGNIFICANT CHANGE UP (ref 135–145)
SP GR SPEC: 1.02 — SIGNIFICANT CHANGE UP (ref 1–1.03)
TROPONIN I, HIGH SENSITIVITY RESULT: 10.8 NG/L — SIGNIFICANT CHANGE UP
TSH SERPL-MCNC: 2 UIU/ML — SIGNIFICANT CHANGE UP (ref 0.36–3.74)
UROBILINOGEN FLD QL: 0.2 MG/DL — SIGNIFICANT CHANGE UP (ref 0.2–1)
WBC # BLD: 4.07 K/UL — SIGNIFICANT CHANGE UP (ref 3.8–10.5)
WBC # FLD AUTO: 4.07 K/UL — SIGNIFICANT CHANGE UP (ref 3.8–10.5)

## 2023-06-26 PROCEDURE — 99285 EMERGENCY DEPT VISIT HI MDM: CPT

## 2023-06-26 PROCEDURE — 71045 X-RAY EXAM CHEST 1 VIEW: CPT | Mod: 26

## 2023-06-26 PROCEDURE — 70450 CT HEAD/BRAIN W/O DYE: CPT | Mod: 26,MA

## 2023-06-26 RX ORDER — LANOLIN ALCOHOL/MO/W.PET/CERES
3 CREAM (GRAM) TOPICAL AT BEDTIME
Refills: 0 | Status: DISCONTINUED | OUTPATIENT
Start: 2023-06-26 | End: 2023-06-30

## 2023-06-26 RX ORDER — MORPHINE SULFATE 50 MG/1
2 CAPSULE, EXTENDED RELEASE ORAL EVERY 4 HOURS
Refills: 0 | Status: DISCONTINUED | OUTPATIENT
Start: 2023-06-26 | End: 2023-06-30

## 2023-06-26 RX ORDER — FUROSEMIDE 40 MG
20 TABLET ORAL DAILY
Refills: 0 | Status: DISCONTINUED | OUTPATIENT
Start: 2023-06-26 | End: 2023-06-30

## 2023-06-26 RX ORDER — ONDANSETRON 8 MG/1
4 TABLET, FILM COATED ORAL EVERY 8 HOURS
Refills: 0 | Status: DISCONTINUED | OUTPATIENT
Start: 2023-06-26 | End: 2023-06-30

## 2023-06-26 RX ORDER — CHOLECALCIFEROL (VITAMIN D3) 125 MCG
2000 CAPSULE ORAL DAILY
Refills: 0 | Status: DISCONTINUED | OUTPATIENT
Start: 2023-06-26 | End: 2023-06-30

## 2023-06-26 RX ORDER — ACETAMINOPHEN 500 MG
650 TABLET ORAL EVERY 6 HOURS
Refills: 0 | Status: DISCONTINUED | OUTPATIENT
Start: 2023-06-26 | End: 2023-06-30

## 2023-06-26 RX ORDER — FOLIC ACID 0.8 MG
1 TABLET ORAL DAILY
Refills: 0 | Status: DISCONTINUED | OUTPATIENT
Start: 2023-06-26 | End: 2023-06-30

## 2023-06-26 RX ORDER — FAMOTIDINE 10 MG/ML
20 INJECTION INTRAVENOUS DAILY
Refills: 0 | Status: DISCONTINUED | OUTPATIENT
Start: 2023-06-26 | End: 2023-06-30

## 2023-06-26 RX ORDER — SODIUM CHLORIDE 9 MG/ML
1000 INJECTION INTRAMUSCULAR; INTRAVENOUS; SUBCUTANEOUS ONCE
Refills: 0 | Status: COMPLETED | OUTPATIENT
Start: 2023-06-26 | End: 2023-06-26

## 2023-06-26 RX ORDER — ESCITALOPRAM OXALATE 10 MG/1
20 TABLET, FILM COATED ORAL DAILY
Refills: 0 | Status: DISCONTINUED | OUTPATIENT
Start: 2023-06-26 | End: 2023-06-30

## 2023-06-26 RX ORDER — PREGABALIN 225 MG/1
1000 CAPSULE ORAL DAILY
Refills: 0 | Status: DISCONTINUED | OUTPATIENT
Start: 2023-06-26 | End: 2023-06-30

## 2023-06-26 RX ORDER — GABAPENTIN 400 MG/1
300 CAPSULE ORAL THREE TIMES A DAY
Refills: 0 | Status: DISCONTINUED | OUTPATIENT
Start: 2023-06-26 | End: 2023-06-30

## 2023-06-26 RX ORDER — BACLOFEN 100 %
10 POWDER (GRAM) MISCELLANEOUS EVERY 12 HOURS
Refills: 0 | Status: DISCONTINUED | OUTPATIENT
Start: 2023-06-26 | End: 2023-06-30

## 2023-06-26 RX ORDER — POLYETHYLENE GLYCOL 3350 17 G/17G
17 POWDER, FOR SOLUTION ORAL
Refills: 0 | Status: DISCONTINUED | OUTPATIENT
Start: 2023-06-26 | End: 2023-06-30

## 2023-06-26 RX ORDER — LEVOTHYROXINE SODIUM 125 MCG
50 TABLET ORAL DAILY
Refills: 0 | Status: DISCONTINUED | OUTPATIENT
Start: 2023-06-26 | End: 2023-06-30

## 2023-06-26 RX ORDER — TRAMADOL HYDROCHLORIDE 50 MG/1
50 TABLET ORAL
Refills: 0 | Status: DISCONTINUED | OUTPATIENT
Start: 2023-06-26 | End: 2023-06-30

## 2023-06-26 RX ORDER — CEFTRIAXONE 500 MG/1
1000 INJECTION, POWDER, FOR SOLUTION INTRAMUSCULAR; INTRAVENOUS ONCE
Refills: 0 | Status: COMPLETED | OUTPATIENT
Start: 2023-06-26 | End: 2023-06-26

## 2023-06-26 RX ORDER — MORPHINE SULFATE 50 MG/1
4 CAPSULE, EXTENDED RELEASE ORAL ONCE
Refills: 0 | Status: DISCONTINUED | OUTPATIENT
Start: 2023-06-26 | End: 2023-06-26

## 2023-06-26 RX ORDER — OLANZAPINE 15 MG/1
2.5 TABLET, FILM COATED ORAL EVERY 6 HOURS
Refills: 0 | Status: DISCONTINUED | OUTPATIENT
Start: 2023-06-26 | End: 2023-06-30

## 2023-06-26 RX ORDER — HYDROXYUREA 500 MG/1
500 CAPSULE ORAL
Refills: 0 | Status: DISCONTINUED | OUTPATIENT
Start: 2023-06-26 | End: 2023-06-30

## 2023-06-26 RX ORDER — METOPROLOL TARTRATE 50 MG
25 TABLET ORAL
Refills: 0 | Status: DISCONTINUED | OUTPATIENT
Start: 2023-06-26 | End: 2023-06-30

## 2023-06-26 RX ORDER — SENNA PLUS 8.6 MG/1
2 TABLET ORAL AT BEDTIME
Refills: 0 | Status: DISCONTINUED | OUTPATIENT
Start: 2023-06-26 | End: 2023-06-30

## 2023-06-26 RX ORDER — AMLODIPINE BESYLATE 2.5 MG/1
5 TABLET ORAL DAILY
Refills: 0 | Status: DISCONTINUED | OUTPATIENT
Start: 2023-06-26 | End: 2023-06-30

## 2023-06-26 RX ORDER — CEFTRIAXONE 500 MG/1
1000 INJECTION, POWDER, FOR SOLUTION INTRAMUSCULAR; INTRAVENOUS EVERY 24 HOURS
Refills: 0 | Status: COMPLETED | OUTPATIENT
Start: 2023-06-27 | End: 2023-06-29

## 2023-06-26 RX ORDER — LACTOBACILLUS ACIDOPHILUS 100MM CELL
1 CAPSULE ORAL EVERY 12 HOURS
Refills: 0 | Status: DISCONTINUED | OUTPATIENT
Start: 2023-06-26 | End: 2023-06-30

## 2023-06-26 RX ORDER — SIMVASTATIN 20 MG/1
10 TABLET, FILM COATED ORAL AT BEDTIME
Refills: 0 | Status: DISCONTINUED | OUTPATIENT
Start: 2023-06-26 | End: 2023-06-30

## 2023-06-26 RX ORDER — CHOLECALCIFEROL (VITAMIN D3) 125 MCG
1 CAPSULE ORAL
Qty: 0 | Refills: 0 | DISCHARGE

## 2023-06-26 RX ORDER — BACLOFEN 100 %
1 POWDER (GRAM) MISCELLANEOUS
Qty: 0 | Refills: 0 | DISCHARGE

## 2023-06-26 RX ORDER — SODIUM CHLORIDE 9 MG/ML
1000 INJECTION, SOLUTION INTRAVENOUS
Refills: 0 | Status: DISCONTINUED | OUTPATIENT
Start: 2023-06-26 | End: 2023-06-30

## 2023-06-26 RX ORDER — ACETAMINOPHEN 500 MG
1000 TABLET ORAL ONCE
Refills: 0 | Status: COMPLETED | OUTPATIENT
Start: 2023-06-26 | End: 2023-06-26

## 2023-06-26 RX ORDER — MAGNESIUM HYDROXIDE 400 MG/1
30 TABLET, CHEWABLE ORAL DAILY
Refills: 0 | Status: DISCONTINUED | OUTPATIENT
Start: 2023-06-26 | End: 2023-06-30

## 2023-06-26 RX ORDER — OLANZAPINE 15 MG/1
5 TABLET, FILM COATED ORAL ONCE
Refills: 0 | Status: DISCONTINUED | OUTPATIENT
Start: 2023-06-26 | End: 2023-06-26

## 2023-06-26 RX ADMIN — GABAPENTIN 300 MILLIGRAM(S): 400 CAPSULE ORAL at 21:50

## 2023-06-26 RX ADMIN — CEFTRIAXONE 100 MILLIGRAM(S): 500 INJECTION, POWDER, FOR SOLUTION INTRAMUSCULAR; INTRAVENOUS at 11:46

## 2023-06-26 RX ADMIN — AMLODIPINE BESYLATE 5 MILLIGRAM(S): 2.5 TABLET ORAL at 21:50

## 2023-06-26 RX ADMIN — POLYETHYLENE GLYCOL 3350 17 GRAM(S): 17 POWDER, FOR SOLUTION ORAL at 19:18

## 2023-06-26 RX ADMIN — MORPHINE SULFATE 4 MILLIGRAM(S): 50 CAPSULE, EXTENDED RELEASE ORAL at 11:41

## 2023-06-26 RX ADMIN — Medication 0.5 MILLIGRAM(S): at 21:50

## 2023-06-26 RX ADMIN — Medication 1000 MILLIGRAM(S): at 09:09

## 2023-06-26 RX ADMIN — Medication 400 MILLIGRAM(S): at 08:21

## 2023-06-26 RX ADMIN — SIMVASTATIN 10 MILLIGRAM(S): 20 TABLET, FILM COATED ORAL at 21:50

## 2023-06-26 RX ADMIN — SODIUM CHLORIDE 1000 MILLILITER(S): 9 INJECTION INTRAMUSCULAR; INTRAVENOUS; SUBCUTANEOUS at 08:21

## 2023-06-26 RX ADMIN — Medication 0.5 MILLIGRAM(S): at 13:52

## 2023-06-26 RX ADMIN — Medication 0.5 MILLIGRAM(S): at 13:51

## 2023-06-26 RX ADMIN — SENNA PLUS 2 TABLET(S): 8.6 TABLET ORAL at 21:50

## 2023-06-26 RX ADMIN — MORPHINE SULFATE 4 MILLIGRAM(S): 50 CAPSULE, EXTENDED RELEASE ORAL at 12:00

## 2023-06-26 NOTE — ED ADULT NURSE NOTE - NSFALLHARMRISKINTERV_ED_ALL_ED
Assistance OOB with selected safe patient handling equipment if applicable/Assistance with ambulation/Communicate risk of Fall with Harm to all staff, patient, and family/Monitor gait and stability/Monitor for mental status changes and reorient to person, place, and time, as needed/Provide visual cue: red socks, yellow wristband, yellow gown, etc/Reinforce activity limits and safety measures with patient and family/Toileting schedule using arm’s reach rule for commode and bathroom/Use of alarms - bed, stretcher, chair and/or video monitoring/Bed in lowest position, wheels locked, appropriate side rails in place/Call bell, personal items and telephone in reach/Instruct patient to call for assistance before getting out of bed/chair/stretcher/Non-slip footwear applied when patient is off stretcher/[place] to call system/Physically safe environment - no spills, clutter or unnecessary equipment/Purposeful Proactive Rounding/Room/bathroom lighting operational, light cord in reach

## 2023-06-26 NOTE — ED PROVIDER NOTE - PHYSICAL EXAMINATION
Constitutional: Awake, Alert, non-toxic  HEAD: Normocephalic, atraumatic.   EYES: PERRL, EOM intact, conjunctiva and sclera are clear bilaterally.  ENT: External ears normal. No rhinorrhea, no tracheal deviation   NECK: Supple, non-tender  CARDIOVASCULAR: regular rate and rhythm.  RESPIRATORY: Normal respiratory effort; breath sounds CTAB, no wheezes, rhonchi, or rales. Speaking in full sentences. No accessory muscle use.   ABDOMEN: Soft; non-tender, non-distended. No rebound or guarding.   MSK:  no lower extremity edema, no deformities  SKIN: Warm, dry  NEURO: A&O x2. normal strength/sensation in LUE and LLE, diminished in RUE and RLE. Speech is normal. No facial droop.

## 2023-06-26 NOTE — CARE COORDINATION ASSESSMENT. - REASON FOR CONSULT
SW met with pt and her son/Curtis Wilcox in the ED at bedside in order to conduct assessment and to discuss SW roles with good understanding./coordination of care/discharge planning

## 2023-06-26 NOTE — CARE COORDINATION ASSESSMENT. - NSCAREPROVIDERS_GEN_ALL_CORE_FT
CARE PROVIDERS:  Accepting Physician: Анна Best  Access Services: Ladarius Mcgraw  Administration: Tyler Mckenzie  Administration: Stephanie Villalobos  Admitting: Анна Best  Attending: Анна Best  Clinical Doc. Improvement: Rosangela Castañeda  Covering Nurse: Jerel Butcher  Covering Team: Deandre Kevin  Covering Team: Daniela Vergara  ED Attending: Valentin Pruett ED Nurse: Alicja Ragsdale  Nurse: Irlanda Daily  Ordered: ADM, User  Ordered: ServiceAccount, SCMMLM  Outpatient Provider: Mike Urias  Outpatient Provider: Facundo Valentine  Outpatient Provider: Pipe Prince  : Irish Ulrich  Speech Pathology: Melisa Quezada  UR// Supp. Assoc.: Yisel Anderson  UR// Supp. Assoc.: Smith Monsivais

## 2023-06-26 NOTE — ED PROVIDER NOTE - CLINICAL SUMMARY MEDICAL DECISION MAKING FREE TEXT BOX
Altered mental status with unclear origin, will assess for infection.  We will also assess for uremia or other metabolic abnormality.  Given on anticoagulation will check CT head for possible spontaneous bleeding.  Possible delirium as well given age and coming from assisted living facility.  Plan to check lab work, imaging, provide IV fluids and pain medication

## 2023-06-26 NOTE — ED ADULT NURSE NOTE - OBJECTIVE STATEMENT
Received pt from outgoing RN resting in stretcher.  Pt alert, calm and confused at this time.  Per family at bedside pt normally oriented and forgetful but never confused at baseline.   Respirations even and unlabored.   Family states pt was recently treated for UTI.   Unknown fevers at SNF.   BLLE with swelling and redness noted.   Pt in no acute distress. BN

## 2023-06-26 NOTE — CARE COORDINATION ASSESSMENT. - NSPASTMEDSURGHISTORY_GEN_ALL_CORE_FT
PAST MEDICAL & SURGICAL HISTORY:  Constipation      Depression      CVA (cerebral vascular accident)      Hypertension      No significant past surgical history      Hyperlipidemia      Neuropathy      Neuropathy      Afib      Grade I diastolic dysfunction      Aortic valvar stenosis      VHD (valvular heart disease)

## 2023-06-26 NOTE — H&P ADULT - TIME BILLING
75minutes spent on this visit, 50% visit time spent in care co-ordination with other attendings and counselling patient ,writing admission orders ( see complete and current orders and order section) ,requesting necessary consults ,informing family about status & plan of care .I have discussed care plan with [facility] wellness/admitting /nursing   department ,outpatient PCP , hospital consultants , ER physician & med staff .

## 2023-06-26 NOTE — ED PROVIDER NOTE - PROGRESS NOTE DETAILS
Patient with UTI.  Given failure of outpatient antibiotic therapy, will admit at this time.  Spoke with Dr. Best for admission.  Valentin Pruett MD.

## 2023-06-26 NOTE — ED ADULT TRIAGE NOTE - MEANS OF ARRIVAL
stretcher impairments found/subacute rehab/functional limitations in following categories/predicted duration of therapy intervention/therapy frequency/anticipated discharge recommendation/risk reduction/prevention/rehab potential

## 2023-06-26 NOTE — CONSULT NOTE ADULT - SUBJECTIVE AND OBJECTIVE BOX
CHIEF COMPLAINT/ REASON FOR VISIT  .. Patient was seen to address the  issue listed under PROBLEM LIST which is located toward bottom of this note     REJI AB SOLIS 3WES 354 D1    Allergies    per son &quot;issues with certain anitibiotics&quot; does not remember the names (Unknown)    Intolerances        PAST MEDICAL & SURGICAL HISTORY:  Hypertension      CVA (cerebral vascular accident)      Depression      Constipation      Neuropathy      Hyperlipidemia      Grade I diastolic dysfunction      Afib      Neuropathy      VHD (valvular heart disease)      Aortic valvar stenosis      No significant past surgical history          FAMILY HISTORY:      Home Medications:  amLODIPine 5 mg oral tablet: 1 tab(s) orally once a day (2023 12:41)  baclofen 10 mg oral tablet: 1 tab(s) orally 2 times a day (2023 12:41)  benzonatate 100 mg oral capsule: 1 cap(s) orally 3 times a day, As needed, Cough (2023 12:16)  cholecalciferol 50 mcg (2000 intl units) oral tablet: 1 tab(s) orally once a day (2023 12:41)  cyanocobalamin 1000 mcg oral tablet: 1 tab(s) orally once a day (2023 12:16)  docusate sodium 100 mg oral capsule: 2 cap(s) orally once a day (at bedtime) (2023 12:41)  escitalopram 20 mg oral tablet: 1 tab(s) orally once a day (2023 12:16)  famotidine 20 mg oral tablet: 1 tab(s) orally once a day (2023 12:41)  folic acid 1 mg oral tablet: 1 tab(s) orally once a day (2023 12:16)  furosemide 20 mg oral tablet: 1 tab(s) orally once a day (2023 12:16)  gabapentin 300 mg oral capsule: 1 cap(s) orally 3 times a day (2023 12:41)  hydroxyurea 500 mg oral capsule: 1 cap(s) orally 2 times a day (2023 12:16)  lactobacillus acidophilus oral tablet: 2 tab(s) orally once a day (2023 12:16)  levothyroxine 50 mcg (0.05 mg) oral tablet: 1 tab(s) orally once a day (2023 12:16)  metoprolol tartrate 25 mg oral tablet: 1 tab(s) orally 2 times a day (2023 12:16)  senna oral tablet: 1 tab(s) orally once a day (at bedtime) (2023 12:16)  simvastatin 10 mg oral tablet: 1 tab(s) orally once a day (at bedtime) (2023 12:16)  traMADol 50 mg oral tablet: 0.5 tab(s) orally 3 times a day PRN for pain (2023 12:41)  Tylenol 500 mg oral tablet: 1 tab(s) orally 2 times a day (2023 12:53)  Tylenol 500 mg oral tablet: 2 tab(s) orally once a day in the morning (2023 12:41)  warfarin 1 mg oral tablet: 1 tab(s) orally once (at bedtime) HOLD FOR INR ABIOVE 3.0 (2023 12:16)      MEDICATIONS  (STANDING):  amLODIPine   Tablet 5 milliGRAM(s) Oral daily  baclofen 10 milliGRAM(s) Oral every 12 hours  cholecalciferol 2000 Unit(s) Oral daily  cyanocobalamin 1000 MICROGram(s) Oral daily  escitalopram 20 milliGRAM(s) Oral daily  famotidine    Tablet 20 milliGRAM(s) Oral daily  folic acid 1 milliGRAM(s) Oral daily  furosemide    Tablet 20 milliGRAM(s) Oral daily  gabapentin 300 milliGRAM(s) Oral three times a day  hydroxyurea 500 milliGRAM(s) Oral two times a day  lactobacillus acidophilus 1 Tablet(s) Oral every 12 hours  levothyroxine 50 MICROGram(s) Oral daily  metoprolol tartrate 25 milliGRAM(s) Oral two times a day  polyethylene glycol 3350 17 Gram(s) Oral two times a day  senna 2 Tablet(s) Oral at bedtime  simvastatin 10 milliGRAM(s) Oral at bedtime    MEDICATIONS  (PRN):  acetaminophen     Tablet .. 650 milliGRAM(s) Oral every 6 hours PRN Temp greater or equal to 38C (100.4F), Mild Pain (1 - 3)  aluminum hydroxide/magnesium hydroxide/simethicone Suspension 30 milliLiter(s) Oral every 4 hours PRN Dyspepsia  LORazepam   Injectable 0.5 milliGRAM(s) IV Push every 8 hours PRN severe agitation  magnesium hydroxide Suspension 30 milliLiter(s) Oral daily PRN Constipation  melatonin 3 milliGRAM(s) Oral at bedtime PRN Insomnia  morphine  - Injectable 2 milliGRAM(s) IV Push every 4 hours PRN Severe Pain (7 - 10)  OLANZapine Injectable 2.5 milliGRAM(s) IntraMuscular every 6 hours PRN agitation  ondansetron Injectable 4 milliGRAM(s) IV Push every 8 hours PRN Nausea and/or Vomiting  traMADol 50 milliGRAM(s) Oral four times a day PRN Moderate Pain (4 - 6)              Vital Signs Last 24 Hrs  T(C): 36.7 (2023 20:18), Max: 37.1 (2023 08:20)  T(F): 98.1 (2023 20:18), Max: 98.8 (2023 08:20)  HR: 110 (2023 20:18) (86 - 110)  BP: 155/78 (2023 20:18) (136/68 - 155/78)  BP(mean): --  RR: 18 (2023 20:18) (18 - 23)  SpO2: 95% (2023 20:18) (93% - 97%)    Parameters below as of 2023 20:18  Patient On (Oxygen Delivery Method): room air                  LABS:                        10.6   4.07  )-----------( 417      ( 2023 07:00 )             34.9         144  |  109<H>  |  22  ----------------------------<  100<H>  4.1   |  30  |  0.68    Ca    8.7      2023 07:00  Mg     2.3         TPro  7.1  /  Alb  3.1<L>  /  TBili  0.4  /  DBili  x   /  AST  10<L>  /  ALT  11<L>  /  AlkPhos  54      PT/INR - ( 2023 07:00 )   PT: 33.3 sec;   INR: 2.82 ratio         PTT - ( 2023 07:00 )  PTT:46.4 sec  Urinalysis Basic - ( 2023 10:35 )    Color: Yellow / Appearance: Cloudy / S.017 / pH: x  Gluc: x / Ketone: Negative mg/dL  / Bili: Negative / Urobili: 0.2 mg/dL   Blood: x / Protein: Trace mg/dL / Nitrite: Positive   Leuk Esterase: Large / RBC: 1 /HPF / WBC 80 /HPF   Sq Epi: x / Non Sq Epi: x / Bacteria: Many /HPF            WBC:  WBC Count: 4.07 K/uL ( @ 07:00)      MICROBIOLOGY:  RECENT CULTURES:              PT/INR - ( 2023 07:00 )   PT: 33.3 sec;   INR: 2.82 ratio         PTT - ( 2023 07:00 )  PTT:46.4 sec    Sodium:  Sodium: 144 mmol/L ( @ 07:00)      0.68 mg/dL  @ 07:00      Hemoglobin:  Hemoglobin: 10.6 g/dL ( @ 07:00)      Platelets: Platelet Count - Automated: 417 K/uL ( @ 07:00)      LIVER FUNCTIONS - ( 2023 07:00 )  Alb: 3.1 g/dL / Pro: 7.1 g/dL / ALK PHOS: 54 U/L / ALT: 11 U/L / AST: 10 U/L / GGT: x             Urinalysis Basic - ( 2023 10:35 )    Color: Yellow / Appearance: Cloudy / S.017 / pH: x  Gluc: x / Ketone: Negative mg/dL  / Bili: Negative / Urobili: 0.2 mg/dL   Blood: x / Protein: Trace mg/dL / Nitrite: Positive   Leuk Esterase: Large / RBC: 1 /HPF / WBC 80 /HPF   Sq Epi: x / Non Sq Epi: x / Bacteria: Many /HPF        RADIOLOGY & ADDITIONAL STUDIES:      MICROBIOLOGY:  RECENT CULTURES:

## 2023-06-26 NOTE — H&P ADULT - MUSCULOSKELETAL
Healthy Baby - Follow-up with your pediatrician within 48 hours of discharge.   Routine Home Care Instructions:  - Please call us for help if you feel sad, blue or overwhelmed for more than a few days after discharge    - Umbilical cord care:        - Please keep your baby's cord clean and dry (do not apply alcohol)        - Please keep your baby's diaper below the umbilical cord until it has fallen off (~10-14 days)        - Please do not submerge your baby in a bath until the cord has fallen off (sponge bath instead)    - Continue feeding your child on demand at all times. Your child should have 8-12 proper feedings each day.  - Breastfeeding babies generally regain their birth-weight within 2 weeks. Thus, it is important for you to follow-up with your pediatrician within 48 hours of discharge and then again at 2 weeks of birth in order to make sure your baby has passed his/her birth-weight.    Please contact your pediatrician and return to the hospital if you notice any of the following:   - Fever  (T > 100.4)  - Reduced amount of wet diapers (< 5-6 per day) or no wet diaper in 12 hours  - Increased fussiness, irritability, or crying inconsolably  - Lethargy (excessively sleepy, difficult to arouse)  - Breathing difficulties (noisy breathing, breathing fast, using belly and neck muscles to breath)  - Changes in the baby’s color (yellow, blue, pale, gray)  - Seizure or loss of consciousness no joint swelling/no joint erythema/no joint warmth/no calf tenderness/no chest wall tenderness not applicable

## 2023-06-26 NOTE — PROGRESS NOTE ADULT - SUBJECTIVE AND OBJECTIVE BOX
Chart reviewed  89 year old female with UTI and encephalopathy  Was treated with po Bactrim but did not get better.  Received IV Rocephin in the ED.  Add IV Rocephin  Full consult to follow.

## 2023-06-26 NOTE — CARE COORDINATION ASSESSMENT. - NSDCPLANSERVICES_GEN_ALL_CORE
Pt to return to [facility] when medically cleared. The patient is a long term resident. CM to continue to follow up. SW will remain available fo any additional needs./Other

## 2023-06-26 NOTE — H&P ADULT - ASSESSMENT
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  Present for past 2 weeks per son.  Was recently treated for suspected urinary tract infection at facility last week with no improvement of symptoms.  Son states decreased p.o. liquid intake during this time.  Usually she is alert and conversational but now she has become more forgetful.  Has chronic leg pain.Develped agitation in ER ,lorazepam was given

## 2023-06-26 NOTE — CARE COORDINATION ASSESSMENT. - CURRENT MENTAL STATUS/COGNITIVE FUNCTIONING
Pt admitted with AMS pt's son at bedside shared that the pt has become increasingly confused over the past two weeks./unable to assess

## 2023-06-26 NOTE — CONSULT NOTE ADULT - SUBJECTIVE AND OBJECTIVE BOX
[facility] 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  Present for past 2 weeks per son.  Was recently treated for suspected urinary tract infection at facility last week with no improvement of symptoms.  Son states decreased p.o. liquid intake during this time.  Usually she is alert and conversational but now she has become more forgetful.  Has chronic leg pain.Develped agitation in ER ,lorazepam was given  (2023 13:30)      HPI:    89y Female for Cardiology Consult    PAST MEDICAL & SURGICAL HISTORY:  Hypertension      CVA (cerebral vascular accident)      Depression      Constipation      Neuropathy      Hyperlipidemia      Grade I diastolic dysfunction      Afib      Neuropathy      VHD (valvular heart disease)      Aortic valvar stenosis      No significant past surgical history          FAMILY HISTORY:      SOCIAL HISTORY:   Alcohol:  Smoking:    Allergies    per son &quot;issues with certain anitibiotics&quot; does not remember the names (Unknown)    Intolerances        MEDICATIONS  (STANDING):  amLODIPine   Tablet 5 milliGRAM(s) Oral daily  baclofen 10 milliGRAM(s) Oral every 12 hours  cholecalciferol 2000 Unit(s) Oral daily  cyanocobalamin 1000 MICROGram(s) Oral daily  escitalopram 20 milliGRAM(s) Oral daily  famotidine    Tablet 20 milliGRAM(s) Oral daily  folic acid 1 milliGRAM(s) Oral daily  furosemide    Tablet 20 milliGRAM(s) Oral daily  gabapentin 300 milliGRAM(s) Oral three times a day  hydroxyurea 500 milliGRAM(s) Oral two times a day  lactobacillus acidophilus 1 Tablet(s) Oral every 12 hours  levothyroxine 50 MICROGram(s) Oral daily  metoprolol tartrate 25 milliGRAM(s) Oral two times a day  polyethylene glycol 3350 17 Gram(s) Oral two times a day  senna 2 Tablet(s) Oral at bedtime  simvastatin 10 milliGRAM(s) Oral at bedtime    MEDICATIONS  (PRN):  acetaminophen     Tablet .. 650 milliGRAM(s) Oral every 6 hours PRN Temp greater or equal to 38C (100.4F), Mild Pain (1 - 3)  aluminum hydroxide/magnesium hydroxide/simethicone Suspension 30 milliLiter(s) Oral every 4 hours PRN Dyspepsia  LORazepam   Injectable 0.5 milliGRAM(s) IV Push every 8 hours PRN severe agitation  magnesium hydroxide Suspension 30 milliLiter(s) Oral daily PRN Constipation  melatonin 3 milliGRAM(s) Oral at bedtime PRN Insomnia  morphine  - Injectable 2 milliGRAM(s) IV Push every 4 hours PRN Severe Pain (7 - 10)  OLANZapine Injectable 2.5 milliGRAM(s) IntraMuscular every 6 hours PRN agitation  ondansetron Injectable 4 milliGRAM(s) IV Push every 8 hours PRN Nausea and/or Vomiting  traMADol 50 milliGRAM(s) Oral four times a day PRN Moderate Pain (4 - 6)      REVIEW OF SYSTEMS:  CONSTITUTIONAL: No fever, weight loss, chills, shakes, or fat  RESPIRATORY: No cough, wheezing, hemoptysis, or shortness of breath  CARDIOVASCULAR: No chest pain, dyspnea, palpitations, dizziness, syncope, paroxysmal nocturnal dyspnea, orthopnea, or arm or leg swelling  GASTROINTESTINAL: No abdominal  or epigastric pain, nausea, vomiting, hematemesis, diarrhea, constipation, melena or bright red bloo  NEUROLOGICAL: No headaches, memory loss, slurred speech, limb weakness, loss of strength, numbness, or tremors  SKIN: No itching, burning, rashes, or lesions  ENDOCRINE: No heat or cold intolerance, or hair loss  MUSCULOSKELETAL: No joint pain or swelling, muscle, back, or extremity pain  HEME/LYMPH: No easy bruising or bleeding gums  ALLERY AND IMMUNOLOGIC: No hives or rash.    Vital Signs Last 24 Hrs  T(C): 36.7 (2023 21:25), Max: 37.1 (2023 08:20)  T(F): 98.1 (2023 21:25), Max: 98.8 (2023 08:20)  HR: 126 (2023 21:25) (86 - 126)  BP: 159/83 (2023 21:25) (136/68 - 159/83)  BP(mean): --  RR: 18 (2023 21:25) (18 - 23)  SpO2: 90% (2023 21:25) (90% - 97%)    Parameters below as of 2023 21:25  Patient On (Oxygen Delivery Method): room air        PHYSICAL EXAM:  HEAD:  Atraumatic, Normocephalic  EYES: EOMI, PERRLA, conjunctiva and sclera clear  NECK: Supple and normal thyroid.  No JVD or carotid bruit.   HEART: S1, S2 regular , 1/6 soft ejection systolic murmur in mitral area , no thrill and no gallops .  PULMONARY: Bilateral vesicular breathing , few scattered ronchi and few scattered rales are present .  ABDOMEN: Soft nontender and positive bowl sounds   EXTREMITIES:  No clubbing, cyanosis, or pedal  edema  NEUROLOGICAL: AAOX3 , no focal deficit .    LABS:                        10.6   4.07  )-----------( 417      ( 2023 07:00 )             34.9     06-    144  |  109<H>  |  22  ----------------------------<  100<H>  4.1   |  30  |  0.68    Ca    8.7      2023 07:00  Mg     2.3     06-    TPro  7.1  /  Alb  3.1<L>  /  TBili  0.4  /  DBili  x   /  AST  10<L>  /  ALT  11<L>  /  AlkPhos  54  06-26        PT/INR - ( 2023 07:00 )   PT: 33.3 sec;   INR: 2.82 ratio         PTT - ( 2023 07:00 )  PTT:46.4 sec  Urinalysis Basic - ( 2023 10:35 )    Color: Yellow / Appearance: Cloudy / S.017 / pH: x  Gluc: x / Ketone: Negative mg/dL  / Bili: Negative / Urobili: 0.2 mg/dL   Blood: x / Protein: Trace mg/dL / Nitrite: Positive   Leuk Esterase: Large / RBC: 1 /HPF / WBC 80 /HPF   Sq Epi: x / Non Sq Epi: x / Bacteria: Many /HPF      BNP      EKG:  ECHO:  IMAGING:    Assessment and Plan :     Will continue to follow during hospital course and give further recommendations as needed . Thanks for your referral . if any questions please contact me at office ([phone])cell [phone])  JIM ARELLANO MD [place] [address]  [address]  OFFICE : [phone]   CELL : [phone]  CARDIOLOGY CONSULT :     Patient is a 89y old  Female who presents with a chief complaint of ams (2023 21:33)  Chief Complaint: altered mental status.    · Chief Complaint: The patient is a 89y Female complaining of altered mental status.  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  Present for past 2 weeks per son.  Was recently treated for suspected urinary tract infection at facility last week with no improvement of symptoms.  Son states decreased p.o. liquid intake during this time.  Usually she is alert and conversational but now she has become more forgetful.  Has chronic leg pain.      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  Present for past 2 weeks per son.  Was recently treated for suspected urinary tract infection at facility last week with no improvement of symptoms.  Son states decreased p.o. liquid intake during this time.  Usually she is alert and conversational but now she has become more forgetful.  Has chronic leg pain.Develped agitation in ER ,lorazepam was given  (2023 13:30)      HPI:    89y Female for Cardiology Consult    PAST MEDICAL & SURGICAL HISTORY:  Hypertension      CVA (cerebral vascular accident)      Depression      Constipation      Neuropathy      Hyperlipidemia      Grade I diastolic dysfunction      Afib      Neuropathy      VHD (valvular heart disease)      Aortic valvar stenosis      No significant past surgical history          FAMILY HISTORY:      SOCIAL HISTORY:   Alcohol:  Smoking:    Allergies    per son &quot;issues with certain anitibiotics&quot; does not remember the names (Unknown)    Intolerances        MEDICATIONS  (STANDING):  amLODIPine   Tablet 5 milliGRAM(s) Oral daily  baclofen 10 milliGRAM(s) Oral every 12 hours  cholecalciferol 2000 Unit(s) Oral daily  cyanocobalamin 1000 MICROGram(s) Oral daily  escitalopram 20 milliGRAM(s) Oral daily  famotidine    Tablet 20 milliGRAM(s) Oral daily  folic acid 1 milliGRAM(s) Oral daily  furosemide    Tablet 20 milliGRAM(s) Oral daily  gabapentin 300 milliGRAM(s) Oral three times a day  hydroxyurea 500 milliGRAM(s) Oral two times a day  lactobacillus acidophilus 1 Tablet(s) Oral every 12 hours  levothyroxine 50 MICROGram(s) Oral daily  metoprolol tartrate 25 milliGRAM(s) Oral two times a day  polyethylene glycol 3350 17 Gram(s) Oral two times a day  senna 2 Tablet(s) Oral at bedtime  simvastatin 10 milliGRAM(s) Oral at bedtime    MEDICATIONS  (PRN):  acetaminophen     Tablet .. 650 milliGRAM(s) Oral every 6 hours PRN Temp greater or equal to 38C (100.4F), Mild Pain (1 - 3)  aluminum hydroxide/magnesium hydroxide/simethicone Suspension 30 milliLiter(s) Oral every 4 hours PRN Dyspepsia  LORazepam   Injectable 0.5 milliGRAM(s) IV Push every 8 hours PRN severe agitation  magnesium hydroxide Suspension 30 milliLiter(s) Oral daily PRN Constipation  melatonin 3 milliGRAM(s) Oral at bedtime PRN Insomnia  morphine  - Injectable 2 milliGRAM(s) IV Push every 4 hours PRN Severe Pain (7 - 10)  OLANZapine Injectable 2.5 milliGRAM(s) IntraMuscular every 6 hours PRN agitation  ondansetron Injectable 4 milliGRAM(s) IV Push every 8 hours PRN Nausea and/or Vomiting  traMADol 50 milliGRAM(s) Oral four times a day PRN Moderate Pain (4 - 6)      Vital Signs Last 24 Hrs  T(C): 36.7 (2023 21:25), Max: 37.1 (2023 08:20)  T(F): 98.1 (2023 21:25), Max: 98.8 (2023 08:20)  HR: 126 (2023 21:25) (86 - 126)  BP: 159/83 (2023 21:25) (136/68 - 159/83)  BP(mean): --  RR: 18 (2023 21:25) (18 - 23)  SpO2: 90% (2023 21:25) (90% - 97%)    Parameters below as of 2023 21:25  Patient On (Oxygen Delivery Method): room air      LABS:                        10.6   4.07  )-----------( 417      ( 2023 07:00 )             34.9         144  |  109<H>  |  22  ----------------------------<  100<H>  4.1   |  30  |  0.68    Ca    8.7      2023 07:00  Mg     2.3         TPro  7.1  /  Alb  3.1<L>  /  TBili  0.4  /  DBili  x   /  AST  10<L>  /  ALT  11<L>  /  AlkPhos  54          PT/INR - ( 2023 07:00 )   PT: 33.3 sec;   INR: 2.82 ratio         PTT - ( 2023 07:00 )  PTT:46.4 sec  Urinalysis Basic - ( 2023 10:35 )    Color: Yellow / Appearance: Cloudy / S.017 / pH: x  Gluc: x / Ketone: Negative mg/dL  / Bili: Negative / Urobili: 0.2 mg/dL   Blood: x / Protein: Trace mg/dL / Nitrite: Positive   Leuk Esterase: Large / RBC: 1 /HPF / WBC 80 /HPF   Sq Epi: x / Non Sq Epi: x / Bacteria: Many /HPF    Assessment and Plan :   FULL CONSULT DICTATED   89 years old female with H/O CVA , paroxysmal atrial fibrillation ,  aortic stenosis , hypertension has confusion lethargy and has urosepsis and mild dehydration and will like to R/O pneumonia . INR is therapeutic . Continue I/V hydration as tolerated . Will send  Troponin I at frequent intervals to R/O NON-SEMI   Will continue to follow during hospital course and give further recommendations as needed . Thanks for your referral . if any questions please contact me at office ([phone] cell [phone])

## 2023-06-26 NOTE — ED ADULT TRIAGE NOTE - NS ED NURSE BANDS TYPE
Reason For Visit  MARTHA RUIZ is an established patient here today for. medication refill.   :  services not used.   A chaperone is not applicable. She is unaccompanied.        Quality    Adult Wellness CI height documented, discussion of regular exercise, exercising regularly, printed information given for activities, discussion of nutritional quality of diet, patient education given about proper diet, not using alcohol, colonoscopy not performed, no tobacco use, mammogram performed: 04/28/2018, pap smear performed: 04/14/2016, does not have feelings of hopelessness (PHQ-2), no Anhedonia (PHQ-2), not referred to local mental health center, not taking medication for depression, no monitoring patient, no preventive medicine therapy for influenza, no preventive medicine therapy for pneumococcal, pain scale level reviewed, has not fallen within the last 12 months, able to walk, no surrogate decision maker documented, not taking aspirin, discussion of risks and benefits of Aspirin and no medications withdrawn because of contraindication.   Smoking Cessation CI no tobacco use.      History of Present Illness  Pt new to me, formerly w/ Dr. Stein, here for medication refills but she does not know what medicines she needs. Seems to get prescriptions from multiple physicians between [facility] [place].     Hx DVT right femoral, July 2018 after breast reduction surgery. Was told she would need to be on Eliquis for 3 months after surgery, so should be finishing up. Unclear why 6 months not indicated; seems to be first incidence of DVT.     Also states she needs DME orders for new \"medical shoes\" and right LE AFO - S/P fracture repair.     Sees Dr. Waddell for arthritis, joint problems. Sees Dr. Ellis, neurology, for seizure d/o.      Review of Systems    Const: Normal.   CV: lower extremity edema, but no chest pain and no palpitations.   Resp: no hemoptysis, not expressed as feeling short of breath and  Normal.   Musc: joint pain, joint swelling and joint stiffness.   Neuro: difficulty walking, but no numbness.       Allergies  Codeine  Promethazine HCl TABS    Current Meds   1. Atorvastatin Calcium 10 MG Oral Tablet; TAKE 1 TABLET BY MOUTH AT BEDTIME;   Therapy: 37Akt9613 to (Evaluate:55Fix6964)  Requested for: 04Dqx8544; Last   Rx:[id number] Ordered   2. B-12 1000 MCG Oral Capsule; TAKE 3 CAPSULE DAILY;   Therapy: 05Hie7179 to Recorded   3. Calcium + D3 600-200 MG-UNIT Oral Tablet;   Therapy: 54Xdd3889 to Recorded   4. Calcium Carbonate-Vitamin D 600-400 MG-UNIT Oral Tablet; Take one table twice daily;   Therapy: 72Tjb5057 to (Last Rx:17Jan2018)  Requested for: 17Jan2018; Status: ACTIVE   - Transmit to Pharmacy - Awaiting Verification Ordered   5. ClonazePAM 0.5 MG Oral Tablet; Take 1 tablet at bedtime for 1 week and then increase to   twice daily;   Therapy: 06Jun2018 to (Evaluate:04Oct2018)  Requested for: 06Jun2018; Last   Rx:06Jun2018 Ordered   6. DocQLace 100 MG CAPS; TAKE 1 CAPSULE BY MOUTH TWICE DAILY;   Therapy: 08Jun2015 to (Evaluate:55Rpc9926)  Requested for: 17Jan2018; Last   Rx:17Jan2018; Status: ACTIVE - Transmit to Pharmacy - Awaiting Verification Ordered   7. Docusate Sodium 100 MG Oral Capsule; TAKE ONE CAPSULE BY MOUTH TWICE   DAILY;   Therapy: 54Qnd6270 to (Evaluate:04Zjk5346)  Requested for: 96Tod2536; Last   Rx:[id number] Ordered   8.  MG Oral Capsule; TAKE ONE CAPSULE BY MOUTH TWICE DAILY;   Therapy: 27Jun2018 to (Evaluate:50Lfg2005)  Requested for: 15Hek0044; Last   Rx:[id number] Ordered   9. DULoxetine HCl - 30 MG Oral Capsule Delayed Release Particles; TAKE 1 CAPSULE BY   MOUTH EVERY DAY;   Therapy: 21Jun2017 to (Evaluate:55Pmj8568)  Requested for: 53Eby9307; Last   Rx:[id number] Ordered   10. Eliquis 5 MG Oral Tablet; TAKE ONE TABLET BY MOUTH EVERY 12 HOURS;    Therapy: 23Oms8858 to (Evaluate:29Nov2018)  Requested for: 23Yac5637; Last    Rx:[id number] Ordered   11. Fexofenadine HCl -  180 MG Oral Tablet; TAKE 1 TABLET DAILY;    Therapy: 43Tdc4351 to (Evaluate:14Tlc2338)  Requested for: 67Kpk7499; Last    Rx:[id number]; Status: ACTIVE - Transmit to Pharmacy - Awaiting Verification Ordered   12. Furosemide 40 MG Oral Tablet; take one tablet by mouth one time daily;    Therapy: 95Gwp7399 to (Evaluate:36Vei7984)  Requested for: 33Ttt1237; Last    Rx:[id number] Ordered   13. Gabapentin 300 MG Oral Capsule; TAKE 1 CAPSULE BY MOUTH ONCE IN THE    MORNING AND 2 CAPSULES AT BEDTIME;    Therapy: 23Guo4823 to (Evaluate:12Wud9644)  Requested for: 59Gld0953; Last    Rx:[id number] Ordered   14. LevETIRAcetam 1000 MG Oral Tablet; TAKE ONE TABLET BY MOUTH TWICE DAILY;    Therapy: 12Egj6506 to (Evaluate:26Xgh4820)  Requested for: 10Iew3762; Last    Rx:[id number] Ordered   15. Levothyroxine Sodium 50 MCG Oral Tablet; TAKE ONE TABLET BY MOUTH  EVERY DAY;    Therapy: 67Mru7912 to (Evaluate:43Hqz6309)  Requested for: 25Sey3450; Last    Rx:[id number] Ordered   16. Lidocaine 5 % External Ointment; apply to affected areas three times daily as needed;    Therapy: 85Fws1090 to (Last Rx:23Jan2018)  Requested for: 23Jan2018 Ordered   17. Mapap 500 MG Oral Capsule; TAKE 1 tablet by mouth three times daily.As needed;    Therapy: 73Egd3211 to (Evaluate:31Wwy7974)  Requested for: 41Csl2046; Last    Rx:[id number] Ordered   18. Omeprazole 20 MG Oral Capsule Delayed Release; Take one capsule by mouth every    morning;    Therapy: 88Jpx4319 to (Evaluate:51Hpf0389)  Requested for: 51Zxk5997; Last    Rx:05Hdy2426 Ordered   19. Oxybutynin Chloride ER 10 MG Oral Tablet Extended Release 24 Hour; TAKE 1 TABLET    DAILY;    Therapy: 83Aee4283 to (Evaluate:03Duo4332)  Requested for: 53Ubx0466; Last    Rx:[id number] Ordered   20. Phenytoin 50 MG Oral Tablet Chewable; CHEW AND SWALLOW ONE TABLET BY    MOUTH ONE TIME DAILY ALONG WITH OTHER DILANTIN CAPSULES;    Therapy: 13Oct2017 to (Evaluate:20Oct2018)  Requested for: 23Apr2018; Last     Rx:23Apr2018 Ordered   21. Phenytoin Sodium Extended 100 MG Oral Capsule; TAKE 3 CAPSULES (300 MG) BY    ORAL ROUTE ONCE DAILY AT BEDTIME;    Therapy: 03May2015 to (Evaluate:17May2018)  Requested for: 17Jan2018; Last    Rx:17Jan2018 Ordered   22. Prochlorperazine Maleate 5 MG Oral Tablet; TAKE 1 TABLET EVERY 6 HOURS AS    NEEDED FOR NAUSEA;    Therapy: 26Jan2017 to Recorded   23. Tamsulosin HCl - 0.4 MG Oral Capsule; TAKE ONE CAPSULE BY MOUTH ONE TIME    DAILY;    Therapy: 03May2015 to (Evaluate:47Llu1123)  Requested for: 21Fqz6918; Last    Rx:[id number] Ordered   24. Ursodiol 300 MG Oral Capsule; TAKE 1 CAPSULE 2 TIMES DAILY;    Therapy: 26Jan2017 to Recorded   25. Zoster (Zostavax); INJECT 0.65  ML Subcutaneous;    Therapy: 13Oct2016 to (Last Rx:13Oct2016) Ordered    Active Problems  Acquired bilateral flat feet (M21.41,M21.42)  Acute deep vein thrombosis (DVT) of proximal vein of right lower extremity (I82.4Y1)  Arthralgia of multiple sites (M25.50)  Bunion, left foot (M21.612)  Depressed (F32.9)  Edema leg (R60.0)  Elevated blood protein (E88.09)  Encounter for Pap smear of cervix with HPV DNA cotesting (Z12.4)  Epilepsy (G40.909)  Facial rash (R21)  Facial swelling (R22.0)  Gastric ulcer (K25.9)  Groin pain, left (R10.32)  Hammer toe of left foot (M20.42)  Hypercalcemia (E83.52)  Hyperlipidemia (E78.5)  Hypertension (I10)  Hypothyroidism (E03.9)  Morbid obesity (E66.01)  Nausea (R11.0)  Obesity (BMI 30-39.9) (E66.9)  HOSSEIN on CPAP (G47.33,Z99.89)  Osteoarthritis (M19.90)  Osteoarthritis, localized, knee (M17.10)  Panic attacks (F41.0)  Pre-op examination (Z01.818)  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)  Seizure disorder (G40.909)  Strain of shoulder, left (S46.912A)  Urinary frequency (R35.0)  Urinary incontinence (R32)    Past Medical History  History of Acute upper respiratory infection (J06.9)  History of Ankle fracture, right (S82.521A)   · Non union  History of Anxiety (F41.9)  History of Cellulitis  (L03.90)  History of Chronic headache disorder (R51)  History of kidney stones (Z87.442)  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)  History of Obesity, Class III, BMI 40-49.9 (morbid obesity) (E66.01)  History of Obesity, morbid, BMI 50 or higher (E66.01)    Surgical History  History of Biopsy Brain  History of Lithotomy  History of Treatment Of Foot Fracture    Family History  Mother   Family history of Cancer of bone  Family history of malignant neoplasm (Z80.9)  Sister   Family history of hypertension (Z82.49)    Social History  Never smoker    Vitals  Signs   Recorded: 09Oct2018 11:41AM   Height: 5 ft 3 in  Weight: 222 lb   BMI Calculated: 39.33  BSA Calculated: 2.02  Systolic: 131, LUE, Sitting  Diastolic: 86, LUE, Sitting  Temperature: 97.9 F, Oral  Heart Rate: 70  Respiration: 16  O2 Saturation: 100  Pain Scale: 00    Physical Exam  Constitutional: alert, in no acute distress and current vital signs reviewed . Obese.   Pulmonary: no respiratory distress, normal respiratory rate and effort and no accessory muscle use. breath sounds clear to auscultation bilaterally.   Cardiovascular: normal rate, no murmurs were heard, regular rhythm, normal S1 and normal S2.   Musculoskeletal: RLE in AFO; B/L knee, ankle deformities, joint enlargement   Neurologic: cranial nerves grossly intact. Ambulates w/ walker.      Immunizations  Immunization Status: Declined influenza vaccine.      Assessment  Acquired bilateral flat feet (M21.41,M21.42)   · Assessed By: DAMION HERNÁNDEZ (Primary Care); Last Assessed: 09 Oct 2018  Bunion, left foot (M21.612)   · Assessed By: DAMION HERNÁNDEZ (Primary Care); Last Assessed: 09 Oct 2018  Hammer toe of left foot (M20.42)   · Assessed By: DAMION HERNÁNDEZ (Primary Care); Last Assessed: 09 Oct 2018  Osteoarthritis, localized, knee (M17.10)   · Assessed By: DAMION HERNÁNDEZ (Primary Care); Last Assessed: 09 Oct 2018  Seizure disorder (G40.909)   · Assessed By: DAMION HERNÁNDEZ (Primary Care); Last  Assessed: 09 Oct 2018    Plan  Furosemide 40 MG Oral Tablet; take one tablet by mouth one time daily  Gabapentin 300 MG Oral Capsule; TAKE 1 CAPSULE BY MOUTH ONCE IN THE  MORNING AND 2 CAPSULES AT BEDTIME  Oxybutynin Chloride ER 10 MG Oral Tablet Extended Release 24 Hour; TAKE 1  TABLET DAILY  Tamsulosin HCl - 0.4 MG Oral Capsule; TAKE ONE CAPSULE BY MOUTH ONE  TIME DAILY  COMP METABOLIC PANEL WITH CBCA (CPNL,CBCA); Status:Active; Requested  for:09Oct2018;   DILANTIN (PHENYTOIN); Status:Active; Requested for:09Oct2018;   DME/Orthotics/Prosthetics Treatment and Evaluation  Medical shoes - needs  replacement for size    AFO right lower extremity - needs replacement  Status: Active  Requested for: 09Oct2018  Care Summary provided. : Yes  Plans:     Plan:   DME given for needed supplies.    Meds refilled that were out; she gets phenytoin from neuro; pt. will contact that office.    Update labs per orders.     Will need new PCP, to schedule f/u visit to establish care.    I will consult w/ anticoag. clinic prior to d/c Eliquis i.e. 3 vs. 6 months therapy.   Medical compliance with plan discussed and risks of non-compliance reviewed.    Patient education completed on disease process, etiology & prognosis.    Patient expresses understanding of the plan.    Proper usage and side effects of medications reviewed & discussed.    Refer to orders.    Return to clinic as clinically indicated as discussed with patient who verbalized understanding of & agreement with the plan.      Signatures   Electronically signed by : MICHAEL Matthews; Oct  9 2018 11:43AM CST    Electronically signed by : MAZIN ZULUAGA; Oct  9 2018 12:39PM CST     Name band;

## 2023-06-26 NOTE — ED PROVIDER NOTE - OBJECTIVE STATEMENT
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  Present for past 2 weeks per son.  Was recently treated for suspected urinary tract infection at facility last week with no improvement of symptoms.  Son states decreased p.o. liquid intake during this time.  Usually she is alert and conversational but now she has become more forgetful.  Has chronic leg pain.

## 2023-06-27 LAB
ALBUMIN SERPL ELPH-MCNC: 3 G/DL — LOW (ref 3.3–5)
ALP SERPL-CCNC: 57 U/L — SIGNIFICANT CHANGE UP (ref 40–120)
ALT FLD-CCNC: 7 U/L — LOW (ref 12–78)
ANION GAP SERPL CALC-SCNC: 5 MMOL/L — SIGNIFICANT CHANGE UP (ref 5–17)
AST SERPL-CCNC: 12 U/L — LOW (ref 15–37)
BASOPHILS # BLD AUTO: 0.03 K/UL — SIGNIFICANT CHANGE UP (ref 0–0.2)
BASOPHILS NFR BLD AUTO: 0.6 % — SIGNIFICANT CHANGE UP (ref 0–2)
BILIRUB SERPL-MCNC: 0.5 MG/DL — SIGNIFICANT CHANGE UP (ref 0.2–1.2)
BUN SERPL-MCNC: 10 MG/DL — SIGNIFICANT CHANGE UP (ref 7–23)
CALCIUM SERPL-MCNC: 8.6 MG/DL — SIGNIFICANT CHANGE UP (ref 8.5–10.1)
CHLORIDE SERPL-SCNC: 107 MMOL/L — SIGNIFICANT CHANGE UP (ref 96–108)
CHOLEST SERPL-MCNC: 88 MG/DL — SIGNIFICANT CHANGE UP
CO2 SERPL-SCNC: 29 MMOL/L — SIGNIFICANT CHANGE UP (ref 22–31)
CREAT SERPL-MCNC: 0.6 MG/DL — SIGNIFICANT CHANGE UP (ref 0.5–1.3)
CULTURE RESULTS: SIGNIFICANT CHANGE UP
EGFR: 85 ML/MIN/1.73M2 — SIGNIFICANT CHANGE UP
EOSINOPHIL # BLD AUTO: 0.04 K/UL — SIGNIFICANT CHANGE UP (ref 0–0.5)
EOSINOPHIL NFR BLD AUTO: 0.8 % — SIGNIFICANT CHANGE UP (ref 0–6)
GLUCOSE SERPL-MCNC: 116 MG/DL — HIGH (ref 70–99)
HCT VFR BLD CALC: 34.4 % — LOW (ref 34.5–45)
HDLC SERPL-MCNC: 40 MG/DL — LOW
HGB BLD-MCNC: 10.6 G/DL — LOW (ref 11.5–15.5)
IMM GRANULOCYTES NFR BLD AUTO: 7.5 % — HIGH (ref 0–0.9)
INR BLD: 3.25 RATIO — HIGH (ref 0.88–1.16)
LIPID PNL WITH DIRECT LDL SERPL: 34 MG/DL — SIGNIFICANT CHANGE UP
LYMPHOCYTES # BLD AUTO: 0.43 K/UL — LOW (ref 1–3.3)
LYMPHOCYTES # BLD AUTO: 9 % — LOW (ref 13–44)
MAGNESIUM SERPL-MCNC: 2.1 MG/DL — SIGNIFICANT CHANGE UP (ref 1.6–2.6)
MCHC RBC-ENTMCNC: 30.8 GM/DL — LOW (ref 32–36)
MCHC RBC-ENTMCNC: 35.6 PG — HIGH (ref 27–34)
MCV RBC AUTO: 115.4 FL — HIGH (ref 80–100)
MONOCYTES # BLD AUTO: 0.38 K/UL — SIGNIFICANT CHANGE UP (ref 0–0.9)
MONOCYTES NFR BLD AUTO: 7.9 % — SIGNIFICANT CHANGE UP (ref 2–14)
NEUTROPHILS # BLD AUTO: 3.56 K/UL — SIGNIFICANT CHANGE UP (ref 1.8–7.4)
NEUTROPHILS NFR BLD AUTO: 74.2 % — SIGNIFICANT CHANGE UP (ref 43–77)
NON HDL CHOLESTEROL: 48 MG/DL — SIGNIFICANT CHANGE UP
NRBC # BLD: 0 /100 WBCS — SIGNIFICANT CHANGE UP (ref 0–0)
NT-PROBNP SERPL-SCNC: 1229 PG/ML — HIGH (ref 0–450)
PLATELET # BLD AUTO: 422 K/UL — HIGH (ref 150–400)
POTASSIUM SERPL-MCNC: 3.6 MMOL/L — SIGNIFICANT CHANGE UP (ref 3.5–5.3)
POTASSIUM SERPL-SCNC: 3.6 MMOL/L — SIGNIFICANT CHANGE UP (ref 3.5–5.3)
PROCALCITONIN SERPL-MCNC: 0.06 NG/ML — SIGNIFICANT CHANGE UP
PROT SERPL-MCNC: 6.8 G/DL — SIGNIFICANT CHANGE UP (ref 6–8.3)
PROTHROM AB SERPL-ACNC: 38.5 SEC — HIGH (ref 10.5–13.4)
RBC # BLD: 2.98 M/UL — LOW (ref 3.8–5.2)
RBC # FLD: 17.2 % — HIGH (ref 10.3–14.5)
SODIUM SERPL-SCNC: 141 MMOL/L — SIGNIFICANT CHANGE UP (ref 135–145)
SPECIMEN SOURCE: SIGNIFICANT CHANGE UP
TRIGL SERPL-MCNC: 68 MG/DL — SIGNIFICANT CHANGE UP
TROPONIN I, HIGH SENSITIVITY RESULT: 42.1 NG/L — SIGNIFICANT CHANGE UP
TSH SERPL-MCNC: 3.37 UIU/ML — SIGNIFICANT CHANGE UP (ref 0.36–3.74)
WBC # BLD: 4.8 K/UL — SIGNIFICANT CHANGE UP (ref 3.8–10.5)
WBC # FLD AUTO: 4.8 K/UL — SIGNIFICANT CHANGE UP (ref 3.8–10.5)

## 2023-06-27 PROCEDURE — 71250 CT THORAX DX C-: CPT | Mod: 26

## 2023-06-27 PROCEDURE — 93010 ELECTROCARDIOGRAM REPORT: CPT

## 2023-06-27 RX ADMIN — PREGABALIN 1000 MICROGRAM(S): 225 CAPSULE ORAL at 11:59

## 2023-06-27 RX ADMIN — HYDROXYUREA 500 MILLIGRAM(S): 500 CAPSULE ORAL at 06:33

## 2023-06-27 RX ADMIN — Medication 1 MILLIGRAM(S): at 11:59

## 2023-06-27 RX ADMIN — Medication 10 MILLIGRAM(S): at 17:18

## 2023-06-27 RX ADMIN — SODIUM CHLORIDE 50 MILLILITER(S): 9 INJECTION, SOLUTION INTRAVENOUS at 00:26

## 2023-06-27 RX ADMIN — GABAPENTIN 300 MILLIGRAM(S): 400 CAPSULE ORAL at 13:47

## 2023-06-27 RX ADMIN — Medication 25 MILLIGRAM(S): at 06:32

## 2023-06-27 RX ADMIN — GABAPENTIN 300 MILLIGRAM(S): 400 CAPSULE ORAL at 22:10

## 2023-06-27 RX ADMIN — SIMVASTATIN 10 MILLIGRAM(S): 20 TABLET, FILM COATED ORAL at 22:11

## 2023-06-27 RX ADMIN — AMLODIPINE BESYLATE 5 MILLIGRAM(S): 2.5 TABLET ORAL at 06:33

## 2023-06-27 RX ADMIN — POLYETHYLENE GLYCOL 3350 17 GRAM(S): 17 POWDER, FOR SOLUTION ORAL at 17:19

## 2023-06-27 RX ADMIN — Medication 1 TABLET(S): at 06:32

## 2023-06-27 RX ADMIN — SENNA PLUS 2 TABLET(S): 8.6 TABLET ORAL at 22:10

## 2023-06-27 RX ADMIN — Medication 50 MICROGRAM(S): at 06:33

## 2023-06-27 RX ADMIN — Medication 20 MILLIGRAM(S): at 06:32

## 2023-06-27 RX ADMIN — POLYETHYLENE GLYCOL 3350 17 GRAM(S): 17 POWDER, FOR SOLUTION ORAL at 06:33

## 2023-06-27 RX ADMIN — CEFTRIAXONE 100 MILLIGRAM(S): 500 INJECTION, POWDER, FOR SOLUTION INTRAMUSCULAR; INTRAVENOUS at 06:33

## 2023-06-27 RX ADMIN — Medication 1 TABLET(S): at 17:19

## 2023-06-27 RX ADMIN — Medication 25 MILLIGRAM(S): at 17:19

## 2023-06-27 RX ADMIN — ESCITALOPRAM OXALATE 20 MILLIGRAM(S): 10 TABLET, FILM COATED ORAL at 11:59

## 2023-06-27 RX ADMIN — Medication 2000 UNIT(S): at 11:59

## 2023-06-27 RX ADMIN — FAMOTIDINE 20 MILLIGRAM(S): 10 INJECTION INTRAVENOUS at 11:58

## 2023-06-27 RX ADMIN — GABAPENTIN 300 MILLIGRAM(S): 400 CAPSULE ORAL at 06:44

## 2023-06-27 RX ADMIN — Medication 10 MILLIGRAM(S): at 06:32

## 2023-06-27 NOTE — DIETITIAN INITIAL EVALUATION ADULT - OTHER INFO
Chart review wt hx 140# a year ago. Seen by SLP then with recommendation for minced/moist diet mildly thick liquids.  Pt is also known to refuse "mushed up" food.  Swallow eval ordered this admission.    Pt with elevated MCV level- on B12/folic acid.  Recommend levels of same.

## 2023-06-27 NOTE — PROGRESS NOTE ADULT - SUBJECTIVE AND OBJECTIVE BOX
CHIEF COMPLAINT/ REASON FOR VISIT  .. Patient was seen to address the  issue listed under PROBLEM LIST which is located toward bottom of this note     REJI JOSEJOEL    IRMA 3WES 359 W1    Allergies    per son &quot;issues with certain anitibiotics&quot; does not remember the names (Unknown)    Intolerances        PAST MEDICAL & SURGICAL HISTORY:  Hypertension      CVA (cerebral vascular accident)      Depression      Constipation      Neuropathy      Hyperlipidemia      Grade I diastolic dysfunction      Afib      Neuropathy      VHD (valvular heart disease)      Aortic valvar stenosis      No significant past surgical history          FAMILY HISTORY:      Home Medications:  amLODIPine 5 mg oral tablet: 1 tab(s) orally once a day (26 Jun 2023 12:41)  baclofen 10 mg oral tablet: 1 tab(s) orally 2 times a day (26 Jun 2023 12:41)  benzonatate 100 mg oral capsule: 1 cap(s) orally 3 times a day, As needed, Cough (26 Jun 2023 12:16)  cholecalciferol 50 mcg (2000 intl units) oral tablet: 1 tab(s) orally once a day (26 Jun 2023 12:41)  cyanocobalamin 1000 mcg oral tablet: 1 tab(s) orally once a day (26 Jun 2023 12:16)  docusate sodium 100 mg oral capsule: 2 cap(s) orally once a day (at bedtime) (26 Jun 2023 12:41)  escitalopram 20 mg oral tablet: 1 tab(s) orally once a day (26 Jun 2023 12:16)  famotidine 20 mg oral tablet: 1 tab(s) orally once a day (26 Jun 2023 12:41)  folic acid 1 mg oral tablet: 1 tab(s) orally once a day (26 Jun 2023 12:16)  furosemide 20 mg oral tablet: 1 tab(s) orally once a day (26 Jun 2023 12:16)  gabapentin 300 mg oral capsule: 1 cap(s) orally 3 times a day (26 Jun 2023 12:41)  hydroxyurea 500 mg oral capsule: 1 cap(s) orally 2 times a day (26 Jun 2023 12:16)  lactobacillus acidophilus oral tablet: 2 tab(s) orally once a day (26 Jun 2023 12:16)  levothyroxine 50 mcg (0.05 mg) oral tablet: 1 tab(s) orally once a day (26 Jun 2023 12:16)  metoprolol tartrate 25 mg oral tablet: 1 tab(s) orally 2 times a day (26 Jun 2023 12:16)  senna oral tablet: 1 tab(s) orally once a day (at bedtime) (26 Jun 2023 12:16)  simvastatin 10 mg oral tablet: 1 tab(s) orally once a day (at bedtime) (26 Jun 2023 12:16)  traMADol 50 mg oral tablet: 0.5 tab(s) orally 3 times a day PRN for pain (26 Jun 2023 12:41)  Tylenol 500 mg oral tablet: 1 tab(s) orally 2 times a day (26 Jun 2023 12:53)  Tylenol 500 mg oral tablet: 2 tab(s) orally once a day in the morning (26 Jun 2023 12:41)  warfarin 1 mg oral tablet: 1 tab(s) orally once (at bedtime) HOLD FOR INR ABIOVE 3.0 (26 Jun 2023 12:16)      MEDICATIONS  (STANDING):  amLODIPine   Tablet 5 milliGRAM(s) Oral daily  baclofen 10 milliGRAM(s) Oral every 12 hours  cefTRIAXone   IVPB 1000 milliGRAM(s) IV Intermittent every 24 hours  cholecalciferol 2000 Unit(s) Oral daily  cyanocobalamin 1000 MICROGram(s) Oral daily  dextrose 5% + sodium chloride 0.45%. 1000 milliLiter(s) (50 mL/Hr) IV Continuous <Continuous>  escitalopram 20 milliGRAM(s) Oral daily  famotidine    Tablet 20 milliGRAM(s) Oral daily  folic acid 1 milliGRAM(s) Oral daily  furosemide    Tablet 20 milliGRAM(s) Oral daily  gabapentin 300 milliGRAM(s) Oral three times a day  hydroxyurea 500 milliGRAM(s) Oral two times a day  lactobacillus acidophilus 1 Tablet(s) Oral every 12 hours  levothyroxine 50 MICROGram(s) Oral daily  metoprolol tartrate 25 milliGRAM(s) Oral two times a day  polyethylene glycol 3350 17 Gram(s) Oral two times a day  senna 2 Tablet(s) Oral at bedtime  simvastatin 10 milliGRAM(s) Oral at bedtime    MEDICATIONS  (PRN):  acetaminophen     Tablet .. 650 milliGRAM(s) Oral every 6 hours PRN Temp greater or equal to 38C (100.4F), Mild Pain (1 - 3)  aluminum hydroxide/magnesium hydroxide/simethicone Suspension 30 milliLiter(s) Oral every 4 hours PRN Dyspepsia  LORazepam   Injectable 0.5 milliGRAM(s) IV Push every 8 hours PRN severe agitation  magnesium hydroxide Suspension 30 milliLiter(s) Oral daily PRN Constipation  melatonin 3 milliGRAM(s) Oral at bedtime PRN Insomnia  morphine  - Injectable 2 milliGRAM(s) IV Push every 4 hours PRN Severe Pain (7 - 10)  OLANZapine Injectable 2.5 milliGRAM(s) IntraMuscular every 6 hours PRN agitation  ondansetron Injectable 4 milliGRAM(s) IV Push every 8 hours PRN Nausea and/or Vomiting  traMADol 50 milliGRAM(s) Oral four times a day PRN Moderate Pain (4 - 6)              Vital Signs Last 24 Hrs  T(C): 37.3 (27 Jun 2023 11:54), Max: 37.3 (27 Jun 2023 11:54)  T(F): 99.1 (27 Jun 2023 11:54), Max: 99.1 (27 Jun 2023 11:54)  HR: 95 (27 Jun 2023 11:54) (86 - 126)  BP: 128/67 (27 Jun 2023 11:54) (128/67 - 159/83)  BP(mean): --  RR: 17 (27 Jun 2023 11:54) (17 - 19)  SpO2: 94% (27 Jun 2023 11:54) (90% - 97%)    Parameters below as of 27 Jun 2023 11:54  Patient On (Oxygen Delivery Method): room air          06-26-23 @ 07:01  -  06-27-23 @ 07:00  --------------------------------------------------------  IN: 0 mL / OUT: 500 mL / NET: -500 mL              LABS:                        10.6   4.80  )-----------( 422      ( 27 Jun 2023 06:10 )             34.4     06-27    141  |  107  |  10  ----------------------------<  116<H>  3.6   |  29  |  0.60    Ca    8.6      27 Jun 2023 06:10  Mg     2.1     06-27    TPro  6.8  /  Alb  3.0<L>  /  TBili  0.5  /  DBili  x   /  AST  12<L>  /  ALT  7<L>  /  AlkPhos  57  06-27    PT/INR - ( 27 Jun 2023 06:10 )   PT: 38.5 sec;   INR: 3.25 ratio         PTT - ( 26 Jun 2023 07:00 )  PTT:46.4 sec  Urinalysis Basic - ( 27 Jun 2023 06:10 )    Color: x / Appearance: x / SG: x / pH: x  Gluc: 116 mg/dL / Ketone: x  / Bili: x / Urobili: x   Blood: x / Protein: x / Nitrite: x   Leuk Esterase: x / RBC: x / WBC x   Sq Epi: x / Non Sq Epi: x / Bacteria: x            WBC:  WBC Count: 4.80 K/uL (06-27 @ 06:10)  WBC Count: 4.07 K/uL (06-26 @ 07:00)      MICROBIOLOGY:  RECENT CULTURES:  06-26 .Blood Blood-Peripheral XXXX XXXX   No growth at 24 hours    06-26 .Blood Blood-Peripheral XXXX XXXX   No growth at 24 hours                PT/INR - ( 27 Jun 2023 06:10 )   PT: 38.5 sec;   INR: 3.25 ratio         PTT - ( 26 Jun 2023 07:00 )  PTT:46.4 sec    Sodium:  Sodium: 141 mmol/L (06-27 @ 06:10)  Sodium: 144 mmol/L (06-26 @ 07:00)      0.60 mg/dL 06-27 @ 06:10  0.68 mg/dL 06-26 @ 07:00      Hemoglobin:  Hemoglobin: 10.6 g/dL (06-27 @ 06:10)  Hemoglobin: 10.6 g/dL (06-26 @ 07:00)      Platelets: Platelet Count - Automated: 422 K/uL (06-27 @ 06:10)  Platelet Count - Automated: 417 K/uL (06-26 @ 07:00)      LIVER FUNCTIONS - ( 27 Jun 2023 06:10 )  Alb: 3.0 g/dL / Pro: 6.8 g/dL / ALK PHOS: 57 U/L / ALT: 7 U/L / AST: 12 U/L / GGT: x             Urinalysis Basic - ( 27 Jun 2023 06:10 )    Color: x / Appearance: x / SG: x / pH: x  Gluc: 116 mg/dL / Ketone: x  / Bili: x / Urobili: x   Blood: x / Protein: x / Nitrite: x   Leuk Esterase: x / RBC: x / WBC x   Sq Epi: x / Non Sq Epi: x / Bacteria: x        RADIOLOGY & ADDITIONAL STUDIES:      MICROBIOLOGY:  RECENT CULTURES:  06-26 .Blood Blood-Peripheral XXXX XXXX   No growth at 24 hours    06-26 .Blood Blood-Peripheral XXXX XXXX   No growth at 24 hours

## 2023-06-27 NOTE — PROGRESS NOTE ADULT - SUBJECTIVE AND OBJECTIVE BOX
PROGRESS NOTE  Patient is a 89y old  Female who presents with a chief complaint of ams (27 Jun 2023 15:27)    Chart and available morning labs /imaging are reviewed electronically , urgent issues addressed . More information  is being added upon completion of rounds , when more information is collected and management discussed with consultants , medical staff and social service/case management on the floor   OVERNIGHT  No new issues reported by medical staff . All above noted Patient is resting in a bed comfortably .Confused ,poor mentation .No distress noted     HPI:  Patient with a past medical history of prior CVA on warfarin with right-sided weakness, hypertension, hyperlipidemia, coronary artery disease who is coming from her assisted living facility with altered mental status.  Present for past 2 weeks per son.  Was recently treated for suspected urinary tract infection at facility last week with no improvement of symptoms.  Son states decreased p.o. liquid intake during this time.  Usually she is alert and conversational but now she has become more forgetful.  Has chronic leg pain.Develped agitation in ER ,lorazepam was given  (26 Jun 2023 13:30)    PAST MEDICAL & SURGICAL HISTORY:  Hypertension      CVA (cerebral vascular accident)      Depression      Constipation      Neuropathy      Hyperlipidemia      Grade I diastolic dysfunction      Afib      Neuropathy      VHD (valvular heart disease)      Aortic valvar stenosis      No significant past surgical history          MEDICATIONS  (STANDING):  amLODIPine   Tablet 5 milliGRAM(s) Oral daily  baclofen 10 milliGRAM(s) Oral every 12 hours  cefTRIAXone   IVPB 1000 milliGRAM(s) IV Intermittent every 24 hours  cholecalciferol 2000 Unit(s) Oral daily  cyanocobalamin 1000 MICROGram(s) Oral daily  dextrose 5% + sodium chloride 0.45%. 1000 milliLiter(s) (50 mL/Hr) IV Continuous <Continuous>  escitalopram 20 milliGRAM(s) Oral daily  famotidine    Tablet 20 milliGRAM(s) Oral daily  folic acid 1 milliGRAM(s) Oral daily  furosemide    Tablet 20 milliGRAM(s) Oral daily  gabapentin 300 milliGRAM(s) Oral three times a day  hydroxyurea 500 milliGRAM(s) Oral two times a day  lactobacillus acidophilus 1 Tablet(s) Oral every 12 hours  levothyroxine 50 MICROGram(s) Oral daily  metoprolol tartrate 25 milliGRAM(s) Oral two times a day  polyethylene glycol 3350 17 Gram(s) Oral two times a day  senna 2 Tablet(s) Oral at bedtime  simvastatin 10 milliGRAM(s) Oral at bedtime    MEDICATIONS  (PRN):  acetaminophen     Tablet .. 650 milliGRAM(s) Oral every 6 hours PRN Temp greater or equal to 38C (100.4F), Mild Pain (1 - 3)  aluminum hydroxide/magnesium hydroxide/simethicone Suspension 30 milliLiter(s) Oral every 4 hours PRN Dyspepsia  LORazepam   Injectable 0.5 milliGRAM(s) IV Push every 8 hours PRN severe agitation  magnesium hydroxide Suspension 30 milliLiter(s) Oral daily PRN Constipation  melatonin 3 milliGRAM(s) Oral at bedtime PRN Insomnia  morphine  - Injectable 2 milliGRAM(s) IV Push every 4 hours PRN Severe Pain (7 - 10)  OLANZapine Injectable 2.5 milliGRAM(s) IntraMuscular every 6 hours PRN agitation  ondansetron Injectable 4 milliGRAM(s) IV Push every 8 hours PRN Nausea and/or Vomiting  traMADol 50 milliGRAM(s) Oral four times a day PRN Moderate Pain (4 - 6)      OBJECTIVE    T(C): 37.3 (06-27-23 @ 11:54), Max: 37.3 (06-27-23 @ 11:54)  HR: 95 (06-27-23 @ 11:54) (92 - 126)  BP: 128/67 (06-27-23 @ 11:54) (128/67 - 159/83)  RR: 17 (06-27-23 @ 11:54) (17 - 19)  SpO2: 94% (06-27-23 @ 11:54) (90% - 95%)  Wt(kg): --  I&O's Summary    26 Jun 2023 07:01  -  27 Jun 2023 07:00  --------------------------------------------------------  IN: 0 mL / OUT: 500 mL / NET: -500 mL          REVIEW OF SYSTEMS:  CONSTITUTIONAL: No fever, weight loss, or fatigue  EYES: No eye pain, visual disturbances, or discharge  ENMT:   No sinus or throat pain  NECK: No pain or stiffness  RESPIRATORY: No cough, wheezing, chills or hemoptysis; No shortness of breath  CARDIOVASCULAR: No chest pain, palpitations, dizziness, or leg swelling  GASTROINTESTINAL: No abdominal pain. No nausea, vomiting; No diarrhea or constipation. No melena or hematochezia.  GENITOURINARY: No dysuria, frequency, hematuria, or incontinence  NEUROLOGICAL: No headaches, memory loss, loss of strength, numbness, or tremors  SKIN: No itching, burning, rashes, or lesions   MUSCULOSKELETAL: No joint pain or swelling; No muscle, back, or extremity pain    PHYSICAL EXAM:  Appearance: NAD. VS past 24 hrs -as above   HEENT:   Moist oral mucosa. Conjunctiva clear b/l.   Neck : supple  Respiratory: Lungs CTAB.  Gastrointestinal:  Soft, nontender. No rebound. No rigidity. BS present	  Cardiovascular: RRR ,S1S2 present  Neurologic: Non-focal. Moving all extremities.  Extremities: No edema. No erythema. No calf tenderness.  Skin: No rashes, No ecchymoses, No cyanosis.	  wounds ,skin lesions-See skin assesment flow sheet   LABS:                        10.6   4.80  )-----------( 422      ( 27 Jun 2023 06:10 )             34.4     06-27    141  |  107  |  10  ----------------------------<  116<H>  3.6   |  29  |  0.60    Ca    8.6      27 Jun 2023 06:10  Mg     2.1     06-27    TPro  6.8  /  Alb  3.0<L>  /  TBili  0.5  /  DBili  x   /  AST  12<L>  /  ALT  7<L>  /  AlkPhos  57  06-27    CAPILLARY BLOOD GLUCOSE      POCT Blood Glucose.: 103 mg/dL (26 Jun 2023 21:48)    PT/INR - ( 27 Jun 2023 06:10 )   PT: 38.5 sec;   INR: 3.25 ratio         PTT - ( 26 Jun 2023 07:00 )  PTT:46.4 sec  Urinalysis Basic - ( 27 Jun 2023 06:10 )    Color: x / Appearance: x / SG: x / pH: x  Gluc: 116 mg/dL / Ketone: x  / Bili: x / Urobili: x   Blood: x / Protein: x / Nitrite: x   Leuk Esterase: x / RBC: x / WBC x   Sq Epi: x / Non Sq Epi: x / Bacteria: x        Culture - Blood (collected 26 Jun 2023 07:00)  Source: .Blood Blood-Peripheral  Preliminary Report (27 Jun 2023 13:02):    No growth at 24 hours    Culture - Blood (collected 26 Jun 2023 06:30)  Source: .Blood Blood-Peripheral  Preliminary Report (27 Jun 2023 13:02):    No growth at 24 hours      RADIOLOGY & ADDITIONAL TESTS:   reviewed elctronically  ASSESSMENT/PLAN: 	    25 minutes aggregate time was spent on this visit, 50% visit time spent in care co-ordination with other attendings and counselling patient .I have discussed care plan with patient / HCP/family member ,who expressed understanding of problems treatment and their effect and side effects, alternatives in details. I have asked if they have any questions and concerns and appropriately addressed them to best of my ability. ACP-Advance care planning was discussed , pallitaive care issues ,CMO ,GOC ,MOLST  form ,advance directives were reviewed .All questions were answered to the best of my knowledge - 25 min spent.

## 2023-06-27 NOTE — PHYSICAL THERAPY INITIAL EVALUATION ADULT - PERTINENT HX OF CURRENT PROBLEM, REHAB EVAL
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  Present for past 2 weeks per son.  Was recently treated for suspected urinary tract infection at facility last week with no improvement of symptoms.  Son states decreased p.o. liquid intake during this time.  Usually she is alert and conversational but now she has become more forgetful.

## 2023-06-27 NOTE — PROGRESS NOTE ADULT - SUBJECTIVE AND OBJECTIVE BOX
[facility] [place]       HPI:  Patient with a past medical history of prior CVA on warfarin with right-sided weakness, hypertension, hyperlipidemia, coronary artery disease who is coming from her assisted living facility with altered mental status.  Present for past 2 weeks per son.  Was recently treated for suspected urinary tract infection at facility last week with no improvement of symptoms.  Son states decreased p.o. liquid intake during this time.  Usually she is alert and conversational but now she has become more forgetful.  Has chronic leg pain.Develped agitation in ER ,lorazepam was given  (26 Jun 2023 13:30)        SUBJECTIVE:      ALLERGIES:  Allergies    per son &quot;issues with certain anitibiotics&quot; does not remember the names (Unknown)    Intolerances          MEDICATIONS  (STANDING):  amLODIPine   Tablet 5 milliGRAM(s) Oral daily  baclofen 10 milliGRAM(s) Oral every 12 hours  cefTRIAXone   IVPB 1000 milliGRAM(s) IV Intermittent every 24 hours  cholecalciferol 2000 Unit(s) Oral daily  cyanocobalamin 1000 MICROGram(s) Oral daily  dextrose 5% + sodium chloride 0.45%. 1000 milliLiter(s) (50 mL/Hr) IV Continuous <Continuous>  escitalopram 20 milliGRAM(s) Oral daily  famotidine    Tablet 20 milliGRAM(s) Oral daily  folic acid 1 milliGRAM(s) Oral daily  furosemide    Tablet 20 milliGRAM(s) Oral daily  gabapentin 300 milliGRAM(s) Oral three times a day  hydroxyurea 500 milliGRAM(s) Oral two times a day  lactobacillus acidophilus 1 Tablet(s) Oral every 12 hours  levothyroxine 50 MICROGram(s) Oral daily  metoprolol tartrate 25 milliGRAM(s) Oral two times a day  polyethylene glycol 3350 17 Gram(s) Oral two times a day  senna 2 Tablet(s) Oral at bedtime  simvastatin 10 milliGRAM(s) Oral at bedtime    MEDICATIONS  (PRN):  acetaminophen     Tablet .. 650 milliGRAM(s) Oral every 6 hours PRN Temp greater or equal to 38C (100.4F), Mild Pain (1 - 3)  aluminum hydroxide/magnesium hydroxide/simethicone Suspension 30 milliLiter(s) Oral every 4 hours PRN Dyspepsia  LORazepam   Injectable 0.5 milliGRAM(s) IV Push every 8 hours PRN severe agitation  magnesium hydroxide Suspension 30 milliLiter(s) Oral daily PRN Constipation  melatonin 3 milliGRAM(s) Oral at bedtime PRN Insomnia  morphine  - Injectable 2 milliGRAM(s) IV Push every 4 hours PRN Severe Pain (7 - 10)  OLANZapine Injectable 2.5 milliGRAM(s) IntraMuscular every 6 hours PRN agitation  ondansetron Injectable 4 milliGRAM(s) IV Push every 8 hours PRN Nausea and/or Vomiting  traMADol 50 milliGRAM(s) Oral four times a day PRN Moderate Pain (4 - 6)      REVIEW OF SYSTEMS:  CONSTITUTIONAL: No fever,  RESPIRATORY: No cough, wheezing, shortness of breath  CARDIOVASCULAR: No chest pain, dyspnea, palpitations, dizziness, syncope, paroxysmal nocturnal dyspnea, orthopnea, or arm or leg swelling  GASTROINTESTINAL: No abdominal  or epigastric pain, nausea, vomiting,  diarrhea  NEUROLOGICAL: No headaches,  loss of strength, numbness, or tremors    Vital Signs Last 24 Hrs  T(C): 36.8 (28 Jun 2023 20:55), Max: 36.9 (28 Jun 2023 05:30)  T(F): 98.2 (28 Jun 2023 20:55), Max: 98.4 (28 Jun 2023 05:30)  HR: 69 (28 Jun 2023 20:55) (69 - 95)  BP: 116/70 (28 Jun 2023 20:55) (116/70 - 159/77)  BP(mean): --  RR: 16 (28 Jun 2023 20:55) (16 - 16)  SpO2: 95% (28 Jun 2023 20:55) (91% - 95%)    Parameters below as of 28 Jun 2023 20:55  Patient On (Oxygen Delivery Method): room air        PHYSICAL EXAM:  HEAD:  Atraumatic, Normocephalic  NECK: Supple and normal thyroid.  No JVD or carotid bruit.   HEART: S1, S2 regular , 1/6 soft ejection systolic murmur in mitral area , no thrill and no gallops .  PULMONARY: Bilateral vesicular breathing , few scattered ronchi and few scattered rales are present .  ABDOMEN: Soft nontender and positive bowl sounds   EXTREMITIES:  No clubbing, cyanosis, or pedal  edema  NEUROLOGICAL: AAOX3 , no focal deficit .    LABS:                        10.6   4.80  )-----------( 422      ( 27 Jun 2023 06:10 )             34.4     06-27    141  |  107  |  10  ----------------------------<  116<H>  3.6   |  29  |  0.60    Ca    8.6      27 Jun 2023 06:10  Mg     2.1     06-27    TPro  6.8  /  Alb  3.0<L>  /  TBili  0.5  /  DBili  x   /  AST  12<L>  /  ALT  7<L>  /  AlkPhos  57  06-27        PT/INR - ( 28 Jun 2023 07:30 )   PT: 28.0 sec;   INR: 2.37 ratio           Urinalysis Basic - ( 27 Jun 2023 06:10 )    Color: x / Appearance: x / SG: x / pH: x  Gluc: 116 mg/dL / Ketone: x  / Bili: x / Urobili: x   Blood: x / Protein: x / Nitrite: x   Leuk Esterase: x / RBC: x / WBC x   Sq Epi: x / Non Sq Epi: x / Bacteria: x      BNP      EKG:  ECHO:  IMAGING:    Assessment/Plan    Will continue to follow during hospital course and give further recommendations as needed . Thanks for your referral . if any questions please contact me at office ([phone])cell [phone])  JIM ARELLANO MD [place] [address]  [address]  OFFICE : [phone]   CELL : [phone]  CARDIOLOGY F/U  :       HPI:  Patient with a past medical history of prior CVA on warfarin with right-sided weakness, hypertension, hyperlipidemia, coronary artery disease who is coming from her assisted living facility with altered mental status.  Present for past 2 weeks per son.  Was recently treated for suspected urinary tract infection at facility last week with no improvement of symptoms.  Son states decreased p.o. liquid intake during this time.  Usually she is alert and conversational but now she has become more forgetful.  Has chronic leg pain.Develped agitation in ER ,lorazepam was given  (26 Jun 2023 13:30)        SUBJECTIVE:      ALLERGIES:  Allergies    per son &quot;issues with certain anitibiotics&quot; does not remember the names (Unknown)    Intolerances          MEDICATIONS  (STANDING):  amLODIPine   Tablet 5 milliGRAM(s) Oral daily  baclofen 10 milliGRAM(s) Oral every 12 hours  cefTRIAXone   IVPB 1000 milliGRAM(s) IV Intermittent every 24 hours  cholecalciferol 2000 Unit(s) Oral daily  cyanocobalamin 1000 MICROGram(s) Oral daily  dextrose 5% + sodium chloride 0.45%. 1000 milliLiter(s) (50 mL/Hr) IV Continuous <Continuous>  escitalopram 20 milliGRAM(s) Oral daily  famotidine    Tablet 20 milliGRAM(s) Oral daily  folic acid 1 milliGRAM(s) Oral daily  furosemide    Tablet 20 milliGRAM(s) Oral daily  gabapentin 300 milliGRAM(s) Oral three times a day  hydroxyurea 500 milliGRAM(s) Oral two times a day  lactobacillus acidophilus 1 Tablet(s) Oral every 12 hours  levothyroxine 50 MICROGram(s) Oral daily  metoprolol tartrate 25 milliGRAM(s) Oral two times a day  polyethylene glycol 3350 17 Gram(s) Oral two times a day  senna 2 Tablet(s) Oral at bedtime  simvastatin 10 milliGRAM(s) Oral at bedtime    MEDICATIONS  (PRN):  acetaminophen     Tablet .. 650 milliGRAM(s) Oral every 6 hours PRN Temp greater or equal to 38C (100.4F), Mild Pain (1 - 3)  aluminum hydroxide/magnesium hydroxide/simethicone Suspension 30 milliLiter(s) Oral every 4 hours PRN Dyspepsia  LORazepam   Injectable 0.5 milliGRAM(s) IV Push every 8 hours PRN severe agitation  magnesium hydroxide Suspension 30 milliLiter(s) Oral daily PRN Constipation  melatonin 3 milliGRAM(s) Oral at bedtime PRN Insomnia  morphine  - Injectable 2 milliGRAM(s) IV Push every 4 hours PRN Severe Pain (7 - 10)  OLANZapine Injectable 2.5 milliGRAM(s) IntraMuscular every 6 hours PRN agitation  ondansetron Injectable 4 milliGRAM(s) IV Push every 8 hours PRN Nausea and/or Vomiting  traMADol 50 milliGRAM(s) Oral four times a day PRN Moderate Pain (4 - 6)      REVIEW OF SYSTEMS:  CONSTITUTIONAL: No fever,  RESPIRATORY: No cough, wheezing, shortness of breath  CARDIOVASCULAR: No chest pain, dyspnea, palpitations, dizziness, syncope, paroxysmal nocturnal dyspnea, orthopnea, or arm or leg swelling  GASTROINTESTINAL: No abdominal  or epigastric pain, nausea, vomiting,  diarrhea  NEUROLOGICAL: No headaches,  loss of strength, numbness, or tremors    Vital Signs Last 24 Hrs  T(C): 36.8 (28 Jun 2023 20:55), Max: 36.9 (28 Jun 2023 05:30)  T(F): 98.2 (28 Jun 2023 20:55), Max: 98.4 (28 Jun 2023 05:30)  HR: 69 (28 Jun 2023 20:55) (69 - 95)  BP: 116/70 (28 Jun 2023 20:55) (116/70 - 159/77)  BP(mean): --  RR: 16 (28 Jun 2023 20:55) (16 - 16)  SpO2: 95% (28 Jun 2023 20:55) (91% - 95%)    Parameters below as of 28 Jun 2023 20:55  Patient On (Oxygen Delivery Method): room air        PHYSICAL EXAM:  HEAD:  Atraumatic, Normocephalic  NECK: Supple and normal thyroid.  No JVD or carotid bruit.   HEART: S1, S2 regular , 1/6 soft ejection systolic murmur in mitral area , no thrill and no gallops .  PULMONARY: Bilateral vesicular breathing , few scattered ronchi and few scattered rales are present .  ABDOMEN: Soft nontender and positive bowl sounds   EXTREMITIES:  No clubbing, cyanosis, or pedal  edema  NEUROLOGICAL: AAOX3 , no focal deficit .    LABS:                        10.6   4.80  )-----------( 422      ( 27 Jun 2023 06:10 )             34.4     06-27    141  |  107  |  10  ----------------------------<  116<H>  3.6   |  29  |  0.60    Ca    8.6      27 Jun 2023 06:10  Mg     2.1     06-27    TPro  6.8  /  Alb  3.0<L>  /  TBili  0.5  /  DBili  x   /  AST  12<L>  /  ALT  7<L>  /  AlkPhos  57  06-27        PT/INR - ( 28 Jun 2023 07:30 )   PT: 28.0 sec;   INR: 2.37 ratio           Urinalysis Basic - ( 27 Jun 2023 06:10 )    Color: x / Appearance: x / SG: x / pH: x  Gluc: 116 mg/dL / Ketone: x  / Bili: x / Urobili: x   Blood: x / Protein: x / Nitrite: x   Leuk Esterase: x / RBC: x / WBC x   Sq Epi: x / Non Sq Epi: x / Bacteria: x      BNP      EKG:  ECHO:  IMAGING:    Assessment/Plan    Will continue to follow during hospital course and give further recommendations as needed . Thanks for your referral . if any questions please contact me at office ([phone])cell [phone])  JIM ARELLANO MD [place] [address]  [address]  OFFICE : [phone]   CELL : [phone]  CARDIOLOGY F/U  :       HPI:  Patient with a past medical history of prior CVA on warfarin with right-sided weakness, hypertension, hyperlipidemia, coronary artery disease who is coming from her assisted living facility with altered mental status.  Present for past 2 weeks per son.  Was recently treated for suspected urinary tract infection at facility last week with no improvement of symptoms.  Son states decreased p.o. liquid intake during this time.  Usually she is alert and conversational but now she has become more forgetful.  Has chronic leg pain.Develped agitation in ER ,lorazepam was given  (26 Jun 2023 13:30)        SUBJECTIVE:      ALLERGIES:  Allergies    per son &quot;issues with certain anitibiotics&quot; does not remember the names (Unknown)    Intolerances          MEDICATIONS  (STANDING):  amLODIPine   Tablet 5 milliGRAM(s) Oral daily  baclofen 10 milliGRAM(s) Oral every 12 hours  cefTRIAXone   IVPB 1000 milliGRAM(s) IV Intermittent every 24 hours  cholecalciferol 2000 Unit(s) Oral daily  cyanocobalamin 1000 MICROGram(s) Oral daily  dextrose 5% + sodium chloride 0.45%. 1000 milliLiter(s) (50 mL/Hr) IV Continuous <Continuous>  escitalopram 20 milliGRAM(s) Oral daily  famotidine    Tablet 20 milliGRAM(s) Oral daily  folic acid 1 milliGRAM(s) Oral daily  furosemide    Tablet 20 milliGRAM(s) Oral daily  gabapentin 300 milliGRAM(s) Oral three times a day  hydroxyurea 500 milliGRAM(s) Oral two times a day  lactobacillus acidophilus 1 Tablet(s) Oral every 12 hours  levothyroxine 50 MICROGram(s) Oral daily  metoprolol tartrate 25 milliGRAM(s) Oral two times a day  polyethylene glycol 3350 17 Gram(s) Oral two times a day  senna 2 Tablet(s) Oral at bedtime  simvastatin 10 milliGRAM(s) Oral at bedtime    MEDICATIONS  (PRN):  acetaminophen     Tablet .. 650 milliGRAM(s) Oral every 6 hours PRN Temp greater or equal to 38C (100.4F), Mild Pain (1 - 3)  aluminum hydroxide/magnesium hydroxide/simethicone Suspension 30 milliLiter(s) Oral every 4 hours PRN Dyspepsia  LORazepam   Injectable 0.5 milliGRAM(s) IV Push every 8 hours PRN severe agitation  magnesium hydroxide Suspension 30 milliLiter(s) Oral daily PRN Constipation  melatonin 3 milliGRAM(s) Oral at bedtime PRN Insomnia  morphine  - Injectable 2 milliGRAM(s) IV Push every 4 hours PRN Severe Pain (7 - 10)  OLANZapine Injectable 2.5 milliGRAM(s) IntraMuscular every 6 hours PRN agitation  ondansetron Injectable 4 milliGRAM(s) IV Push every 8 hours PRN Nausea and/or Vomiting  traMADol 50 milliGRAM(s) Oral four times a day PRN Moderate Pain (4 - 6)      REVIEW OF SYSTEMS:  CONSTITUTIONAL: No fever,  RESPIRATORY: No cough, wheezing, shortness of breath  CARDIOVASCULAR: No chest pain, dyspnea, palpitations, dizziness, syncope, paroxysmal nocturnal dyspnea, orthopnea, or arm or leg swelling  GASTROINTESTINAL: No abdominal  or epigastric pain, nausea, vomiting,  diarrhea  NEUROLOGICAL: No headaches,  loss of strength, numbness, or tremors    Vital Signs Last 24 Hrs  T(C): 36.8 (28 Jun 2023 20:55), Max: 36.9 (28 Jun 2023 05:30)  T(F): 98.2 (28 Jun 2023 20:55), Max: 98.4 (28 Jun 2023 05:30)  HR: 69 (28 Jun 2023 20:55) (69 - 95)  BP: 116/70 (28 Jun 2023 20:55) (116/70 - 159/77)  BP(mean): --  RR: 16 (28 Jun 2023 20:55) (16 - 16)  SpO2: 95% (28 Jun 2023 20:55) (91% - 95%)    Parameters below as of 28 Jun 2023 20:55  Patient On (Oxygen Delivery Method): room air        PHYSICAL EXAM:  HEAD:  Atraumatic, Normocephalic  NECK: Supple and normal thyroid.  No JVD or carotid bruit.   HEART: S1, S2 irregular , 1/6 soft ejection systolic murmur in mitral area , no thrill and no gallops .  PULMONARY: Bilateral vesicular breathing , few scattered rhonchi and few scattered rales are present .  ABDOMEN: Soft nontender and positive bowl sounds   EXTREMITIES:  No clubbing, cyanosis, or pedal  edema  NEUROLOGICAL: AA and confused .   Skin : No rashes .  Musculoskeletal : No joint swellings .    LABS:                        10.6   4.80  )-----------( 422      ( 27 Jun 2023 06:10 )             34.4     06-27    141  |  107  |  10  ----------------------------<  116<H>  3.6   |  29  |  0.60    Ca    8.6      27 Jun 2023 06:10  Mg     2.1     06-27    TPro  6.8  /  Alb  3.0<L>  /  TBili  0.5  /  DBili  x   /  AST  12<L>  /  ALT  7<L>  /  AlkPhos  57  06-27        PT/INR - ( 28 Jun 2023 07:30 )   PT: 28.0 sec;   INR: 2.37 ratio           Urinalysis Basic - ( 27 Jun 2023 06:10 )    Color: x / Appearance: x / SG: x / pH: x  Gluc: 116 mg/dL / Ketone: x  / Bili: x / Urobili: x   Blood: x / Protein: x / Nitrite: x   Leuk Esterase: x / RBC: x / WBC x   Sq Epi: x / Non Sq Epi: x / Bacteria: x      Assessment/Plan    Will continue to follow during hospital course and give further recommendations as needed . Thanks for your referral . if any questions please contact me at office ([phone])cell [phone])  JIM ARELLANO MD [place] [address]  [address]  OFFICE : [phone]   CELL : [phone]  CARDIOLOGY F/U  :       HPI:  Patient with a past medical history of prior CVA on warfarin with right-sided weakness, hypertension, hyperlipidemia, coronary artery disease who is coming from her assisted living facility with altered mental status.  Present for past 2 weeks per son.  Was recently treated for suspected urinary tract infection at facility last week with no improvement of symptoms.  Son states decreased p.o. liquid intake during this time.  Usually she is alert and conversational but now she has become more forgetful.  Has chronic leg pain.Develped agitation in ER ,lorazepam was given  (26 Jun 2023 13:30)        SUBJECTIVE:      ALLERGIES:  Allergies    per son &quot;issues with certain anitibiotics&quot; does not remember the names (Unknown)    Intolerances          MEDICATIONS  (STANDING):  amLODIPine   Tablet 5 milliGRAM(s) Oral daily  baclofen 10 milliGRAM(s) Oral every 12 hours  cefTRIAXone   IVPB 1000 milliGRAM(s) IV Intermittent every 24 hours  cholecalciferol 2000 Unit(s) Oral daily  cyanocobalamin 1000 MICROGram(s) Oral daily  dextrose 5% + sodium chloride 0.45%. 1000 milliLiter(s) (50 mL/Hr) IV Continuous <Continuous>  escitalopram 20 milliGRAM(s) Oral daily  famotidine    Tablet 20 milliGRAM(s) Oral daily  folic acid 1 milliGRAM(s) Oral daily  furosemide    Tablet 20 milliGRAM(s) Oral daily  gabapentin 300 milliGRAM(s) Oral three times a day  hydroxyurea 500 milliGRAM(s) Oral two times a day  lactobacillus acidophilus 1 Tablet(s) Oral every 12 hours  levothyroxine 50 MICROGram(s) Oral daily  metoprolol tartrate 25 milliGRAM(s) Oral two times a day  polyethylene glycol 3350 17 Gram(s) Oral two times a day  senna 2 Tablet(s) Oral at bedtime  simvastatin 10 milliGRAM(s) Oral at bedtime    MEDICATIONS  (PRN):  acetaminophen     Tablet .. 650 milliGRAM(s) Oral every 6 hours PRN Temp greater or equal to 38C (100.4F), Mild Pain (1 - 3)  aluminum hydroxide/magnesium hydroxide/simethicone Suspension 30 milliLiter(s) Oral every 4 hours PRN Dyspepsia  LORazepam   Injectable 0.5 milliGRAM(s) IV Push every 8 hours PRN severe agitation  magnesium hydroxide Suspension 30 milliLiter(s) Oral daily PRN Constipation  melatonin 3 milliGRAM(s) Oral at bedtime PRN Insomnia  morphine  - Injectable 2 milliGRAM(s) IV Push every 4 hours PRN Severe Pain (7 - 10)  OLANZapine Injectable 2.5 milliGRAM(s) IntraMuscular every 6 hours PRN agitation  ondansetron Injectable 4 milliGRAM(s) IV Push every 8 hours PRN Nausea and/or Vomiting  traMADol 50 milliGRAM(s) Oral four times a day PRN Moderate Pain (4 - 6)      REVIEW OF SYSTEMS:  CONSTITUTIONAL: No fever,  RESPIRATORY: No cough, wheezing, shortness of breath  CARDIOVASCULAR: No chest pain, dyspnea, palpitations, dizziness, syncope, paroxysmal nocturnal dyspnea, orthopnea, or arm or leg swelling  GASTROINTESTINAL: No abdominal  or epigastric pain, nausea, vomiting,  diarrhea  NEUROLOGICAL: No headaches,  numbness, or tremors  Skin : No itching .  Hematology : No active bleeding .  Endocrinology : No heat and cold intolerance .  psychiatry : Patient is confused .  Genitourinary : No dysuria .  Musculoskeletal : patient has mild arthritis .    Vital Signs Last 24 Hrs  T(C): 36.8 (28 Jun 2023 20:55), Max: 36.9 (28 Jun 2023 05:30)  T(F): 98.2 (28 Jun 2023 20:55), Max: 98.4 (28 Jun 2023 05:30)  HR: 69 (28 Jun 2023 20:55) (69 - 95)  BP: 116/70 (28 Jun 2023 20:55) (116/70 - 159/77)  BP(mean): --  RR: 16 (28 Jun 2023 20:55) (16 - 16)  SpO2: 95% (28 Jun 2023 20:55) (91% - 95%)    Parameters below as of 28 Jun 2023 20:55  Patient On (Oxygen Delivery Method): room air        PHYSICAL EXAM:  HEAD:  Atraumatic, Normocephalic  NECK: Supple and normal thyroid.  No JVD or carotid bruit.   HEART: S1, S2 irregular , 1/6 soft ejection systolic murmur in mitral area , no thrill and no gallops .  PULMONARY: Bilateral vesicular breathing , few scattered rhonchi and few scattered rales are present .  ABDOMEN: Soft nontender and positive bowl sounds   EXTREMITIES:  No clubbing, cyanosis, or pedal  edema  NEUROLOGICAL: AA and confused .   Skin : No rashes .  Musculoskeletal : No joint swellings .    LABS:                        10.6   4.80  )-----------( 422      ( 27 Jun 2023 06:10 )             34.4     06-27    141  |  107  |  10  ----------------------------<  116<H>  3.6   |  29  |  0.60    Ca    8.6      27 Jun 2023 06:10  Mg     2.1     06-27    TPro  6.8  /  Alb  3.0<L>  /  TBili  0.5  /  DBili  x   /  AST  12<L>  /  ALT  7<L>  /  AlkPhos  57  06-27        PT/INR - ( 28 Jun 2023 07:30 )   PT: 28.0 sec;   INR: 2.37 ratio           Urinalysis Basic - ( 27 Jun 2023 06:10 )    Color: x / Appearance: x / SG: x / pH: x  Gluc: 116 mg/dL / Ketone: x  / Bili: x / Urobili: x   Blood: x / Protein: x / Nitrite: x   Leuk Esterase: x / RBC: x / WBC x   Sq Epi: x / Non Sq Epi: x / Bacteria: x      Assessment/Plan    Will continue to follow during hospital course and give further recommendations as needed . Thanks for your referral . if any questions please contact me at office ([phone])cell [phone])  JIM ARELLANO MD [place] [address]  [address]  OFFICE : [phone]   CELL : [phone]  CARDIOLOGY F/U  :       HPI:  Patient with a past medical history of prior CVA on warfarin with right-sided weakness, hypertension, hyperlipidemia, coronary artery disease who is coming from her assisted living facility with altered mental status.  Present for past 2 weeks per son.  Was recently treated for suspected urinary tract infection at facility last week with no improvement of symptoms.  Son states decreased p.o. liquid intake during this time.  Usually she is alert and conversational but now she has become more forgetful.  Has chronic leg pain.Develped agitation in ER ,lorazepam was given  (26 Jun 2023 13:30)        SUBJECTIVE:      ALLERGIES:  Allergies    per son &quot;issues with certain anitibiotics&quot; does not remember the names (Unknown)    Intolerances          MEDICATIONS  (STANDING):  amLODIPine   Tablet 5 milliGRAM(s) Oral daily  baclofen 10 milliGRAM(s) Oral every 12 hours  cefTRIAXone   IVPB 1000 milliGRAM(s) IV Intermittent every 24 hours  cholecalciferol 2000 Unit(s) Oral daily  cyanocobalamin 1000 MICROGram(s) Oral daily  dextrose 5% + sodium chloride 0.45%. 1000 milliLiter(s) (50 mL/Hr) IV Continuous <Continuous>  escitalopram 20 milliGRAM(s) Oral daily  famotidine    Tablet 20 milliGRAM(s) Oral daily  folic acid 1 milliGRAM(s) Oral daily  furosemide    Tablet 20 milliGRAM(s) Oral daily  gabapentin 300 milliGRAM(s) Oral three times a day  hydroxyurea 500 milliGRAM(s) Oral two times a day  lactobacillus acidophilus 1 Tablet(s) Oral every 12 hours  levothyroxine 50 MICROGram(s) Oral daily  metoprolol tartrate 25 milliGRAM(s) Oral two times a day  polyethylene glycol 3350 17 Gram(s) Oral two times a day  senna 2 Tablet(s) Oral at bedtime  simvastatin 10 milliGRAM(s) Oral at bedtime    MEDICATIONS  (PRN):  acetaminophen     Tablet .. 650 milliGRAM(s) Oral every 6 hours PRN Temp greater or equal to 38C (100.4F), Mild Pain (1 - 3)  aluminum hydroxide/magnesium hydroxide/simethicone Suspension 30 milliLiter(s) Oral every 4 hours PRN Dyspepsia  LORazepam   Injectable 0.5 milliGRAM(s) IV Push every 8 hours PRN severe agitation  magnesium hydroxide Suspension 30 milliLiter(s) Oral daily PRN Constipation  melatonin 3 milliGRAM(s) Oral at bedtime PRN Insomnia  morphine  - Injectable 2 milliGRAM(s) IV Push every 4 hours PRN Severe Pain (7 - 10)  OLANZapine Injectable 2.5 milliGRAM(s) IntraMuscular every 6 hours PRN agitation  ondansetron Injectable 4 milliGRAM(s) IV Push every 8 hours PRN Nausea and/or Vomiting  traMADol 50 milliGRAM(s) Oral four times a day PRN Moderate Pain (4 - 6)      REVIEW OF SYSTEMS:  CONSTITUTIONAL: No fever,  RESPIRATORY: No cough, wheezing, shortness of breath  CARDIOVASCULAR: No chest pain, dyspnea, palpitations, dizziness, syncope, paroxysmal nocturnal dyspnea, orthopnea, or arm or leg swelling  GASTROINTESTINAL: No abdominal  or epigastric pain, nausea, vomiting,  diarrhea  NEUROLOGICAL: No headaches,  numbness, or tremors  Skin : No itching .  Hematology : No active bleeding .  Endocrinology : No heat and cold intolerance .  psychiatry : Patient is confused .  Genitourinary : No dysuria .  Musculoskeletal : patient has mild arthritis .    Vital Signs Last 24 Hrs  T(C): 36.8 (28 Jun 2023 20:55), Max: 36.9 (28 Jun 2023 05:30)  T(F): 98.2 (28 Jun 2023 20:55), Max: 98.4 (28 Jun 2023 05:30)  HR: 69 (28 Jun 2023 20:55) (69 - 95)  BP: 116/70 (28 Jun 2023 20:55) (116/70 - 159/77)  BP(mean): --  RR: 16 (28 Jun 2023 20:55) (16 - 16)  SpO2: 95% (28 Jun 2023 20:55) (91% - 95%)    Parameters below as of 28 Jun 2023 20:55  Patient On (Oxygen Delivery Method): room air        PHYSICAL EXAM:  HEAD:  Atraumatic, Normocephalic  NECK: Supple and normal thyroid.  No JVD or carotid bruit.   HEART: S1, S2 irregular , 1/6 soft ejection systolic murmur in mitral area , no thrill and no gallops .  PULMONARY: Bilateral vesicular breathing , few scattered rhonchi and few scattered rales are present .  ABDOMEN: Soft nontender and positive bowl sounds   EXTREMITIES:  No clubbing, cyanosis, or pedal  edema  NEUROLOGICAL: AA and confused .   Skin : No rashes .  Musculoskeletal : No joint swellings .    LABS:                        10.6   4.80  )-----------( 422      ( 27 Jun 2023 06:10 )             34.4     06-27    141  |  107  |  10  ----------------------------<  116<H>  3.6   |  29  |  0.60    Ca    8.6      27 Jun 2023 06:10  Mg     2.1     06-27    TPro  6.8  /  Alb  3.0<L>  /  TBili  0.5  /  DBili  x   /  AST  12<L>  /  ALT  7<L>  /  AlkPhos  57  06-27        PT/INR - ( 28 Jun 2023 07:30 )   PT: 28.0 sec;   INR: 2.37 ratio           Urinalysis Basic - ( 27 Jun 2023 06:10 )    Color: x / Appearance: x / SG: x / pH: x  Gluc: 116 mg/dL / Ketone: x  / Bili: x / Urobili: x   Blood: x / Protein: x / Nitrite: x   Leuk Esterase: x / RBC: x / WBC x   Sq Epi: x / Non Sq Epi: x / Bacteria: x      Assessment/Plan  Patient has :  1) Altered mental status and urosepsis and better slightly .  2) R/O pneumonia .  3) H/O CVA with right sided weakness .  4) Hypertension and stable .  5) Paroxysmal atrial fibrillation and stable   6) Mild chronic diastolic heart failure and stable .  7) H/O CAD   8) Dementia   Plan : 1) I/V antibiotics as per PMD 2) Monitor hemoglobin and electrolytes 3) Monitor PT and INR 4) Rest of medications as such 5) Prognosis guarded .  Will continue to follow during hospital course and give further recommendations as needed . Thanks for your referral . if any questions please contact me at office ([phone] cell [phone])

## 2023-06-27 NOTE — PATIENT PROFILE ADULT - FUNCTIONAL ASSESSMENT - BASIC MOBILITY 6.
1-calculated by average/Not able to assess (calculate score using [facility] averaging method)

## 2023-06-27 NOTE — DIETITIAN INITIAL EVALUATION ADULT - ORAL INTAKE PTA/DIET HISTORY
per transfer papers, ROZ regular diet.  Unable to rouse pt for interview, pt started yelling out with eyes closed, did not respond to name being called, kept eyes shut.

## 2023-06-27 NOTE — DIETITIAN INITIAL EVALUATION ADULT - REASON FOR ADMISSION
Urinary tract infection    89 yo F with PMH of prior CVA on warfarin with right-sided weakness, hypertension, hyperlipidemia, coronary artery disease who is coming from her assisted living facility with altered mental status. +UTI

## 2023-06-27 NOTE — DIETITIAN INITIAL EVALUATION ADULT - PERTINENT LABORATORY DATA
06-26    144  |  109<H>  |  22  ----------------------------<  100<H>  4.1   |  30  |  0.68    Ca    8.7      26 Jun 2023 07:00  Mg     2.3     06-26    TPro  7.1  /  Alb  3.1<L>  /  TBili  0.4  /  DBili  x   /  AST  10<L>  /  ALT  11<L>  /  AlkPhos  54  06-26  POCT Blood Glucose.: 103 mg/dL (06-26-23 @ 21:48)

## 2023-06-27 NOTE — PATIENT PROFILE ADULT - TOBACCO USE
Never smoker - Continue regular diet.  - Increase ambulation.  - Continue PCEA for pain.  - HSQ for DVT ppx  -Continue to monitor BP for gHTN  - F/u AM CBC  -Depo for BC prior to discharge    Brooke Wilson PGY-1

## 2023-06-27 NOTE — DIETITIAN INITIAL EVALUATION ADULT - PERTINENT MEDS FT
MEDICATIONS  (STANDING):  amLODIPine   Tablet 5 milliGRAM(s) Oral daily  baclofen 10 milliGRAM(s) Oral every 12 hours  cefTRIAXone   IVPB 1000 milliGRAM(s) IV Intermittent every 24 hours  cholecalciferol 2000 Unit(s) Oral daily  cyanocobalamin 1000 MICROGram(s) Oral daily  dextrose 5% + sodium chloride 0.45%. 1000 milliLiter(s) (50 mL/Hr) IV Continuous <Continuous>  escitalopram 20 milliGRAM(s) Oral daily  famotidine    Tablet 20 milliGRAM(s) Oral daily  folic acid 1 milliGRAM(s) Oral daily  furosemide    Tablet 20 milliGRAM(s) Oral daily  gabapentin 300 milliGRAM(s) Oral three times a day  hydroxyurea 500 milliGRAM(s) Oral two times a day  lactobacillus acidophilus 1 Tablet(s) Oral every 12 hours  levothyroxine 50 MICROGram(s) Oral daily  metoprolol tartrate 25 milliGRAM(s) Oral two times a day  polyethylene glycol 3350 17 Gram(s) Oral two times a day  senna 2 Tablet(s) Oral at bedtime  simvastatin 10 milliGRAM(s) Oral at bedtime    MEDICATIONS  (PRN):  acetaminophen     Tablet .. 650 milliGRAM(s) Oral every 6 hours PRN Temp greater or equal to 38C (100.4F), Mild Pain (1 - 3)  aluminum hydroxide/magnesium hydroxide/simethicone Suspension 30 milliLiter(s) Oral every 4 hours PRN Dyspepsia  LORazepam   Injectable 0.5 milliGRAM(s) IV Push every 8 hours PRN severe agitation  magnesium hydroxide Suspension 30 milliLiter(s) Oral daily PRN Constipation  melatonin 3 milliGRAM(s) Oral at bedtime PRN Insomnia  morphine  - Injectable 2 milliGRAM(s) IV Push every 4 hours PRN Severe Pain (7 - 10)  OLANZapine Injectable 2.5 milliGRAM(s) IntraMuscular every 6 hours PRN agitation  ondansetron Injectable 4 milliGRAM(s) IV Push every 8 hours PRN Nausea and/or Vomiting  traMADol 50 milliGRAM(s) Oral four times a day PRN Moderate Pain (4 - 6)

## 2023-06-27 NOTE — PHYSICAL THERAPY INITIAL EVALUATION ADULT - GENERAL OBSERVATIONS, REHAB EVAL
Pt rec'd supine in bed, left knee contracted, Right arm contracted, limited mobility in her right LE.

## 2023-06-27 NOTE — PATIENT PROFILE ADULT - FALL HARM RISK - HARM RISK INTERVENTIONS
Assistance with ambulation/Assistance OOB with selected safe patient handling equipment/Communicate Risk of Fall with Harm to all staff/Discuss with provider need for PT consult/Monitor gait and stability/Reinforce activity limits and safety measures with patient and family/Tailored Fall Risk Interventions/Visual Cue: Yellow wristband and red socks/Bed in lowest position, wheels locked, appropriate side rails in place/Call bell, personal items and telephone in reach/Instruct patient to call for assistance before getting out of bed or chair/Non-slip footwear when patient is out of bed/[place] to call system/Physically safe environment - no spills, clutter or unnecessary equipment/Purposeful Proactive Rounding/Room/bathroom lighting operational, light cord in reach

## 2023-06-28 LAB
INR BLD: 2.37 RATIO — HIGH (ref 0.88–1.16)
PROTHROM AB SERPL-ACNC: 28 SEC — HIGH (ref 10.5–13.4)

## 2023-06-28 RX ADMIN — CEFTRIAXONE 100 MILLIGRAM(S): 500 INJECTION, POWDER, FOR SOLUTION INTRAMUSCULAR; INTRAVENOUS at 05:46

## 2023-06-28 RX ADMIN — GABAPENTIN 300 MILLIGRAM(S): 400 CAPSULE ORAL at 05:49

## 2023-06-28 RX ADMIN — Medication 25 MILLIGRAM(S): at 17:50

## 2023-06-28 RX ADMIN — SIMVASTATIN 10 MILLIGRAM(S): 20 TABLET, FILM COATED ORAL at 21:56

## 2023-06-28 RX ADMIN — Medication 10 MILLIGRAM(S): at 05:48

## 2023-06-28 RX ADMIN — Medication 1 TABLET(S): at 05:47

## 2023-06-28 RX ADMIN — Medication 2000 UNIT(S): at 11:37

## 2023-06-28 RX ADMIN — Medication 1 TABLET(S): at 17:51

## 2023-06-28 RX ADMIN — Medication 50 MICROGRAM(S): at 05:47

## 2023-06-28 RX ADMIN — POLYETHYLENE GLYCOL 3350 17 GRAM(S): 17 POWDER, FOR SOLUTION ORAL at 17:51

## 2023-06-28 RX ADMIN — POLYETHYLENE GLYCOL 3350 17 GRAM(S): 17 POWDER, FOR SOLUTION ORAL at 05:47

## 2023-06-28 RX ADMIN — Medication 25 MILLIGRAM(S): at 05:48

## 2023-06-28 RX ADMIN — HYDROXYUREA 500 MILLIGRAM(S): 500 CAPSULE ORAL at 17:51

## 2023-06-28 RX ADMIN — SENNA PLUS 2 TABLET(S): 8.6 TABLET ORAL at 21:56

## 2023-06-28 RX ADMIN — PREGABALIN 1000 MICROGRAM(S): 225 CAPSULE ORAL at 11:37

## 2023-06-28 RX ADMIN — FAMOTIDINE 20 MILLIGRAM(S): 10 INJECTION INTRAVENOUS at 11:37

## 2023-06-28 RX ADMIN — Medication 20 MILLIGRAM(S): at 05:47

## 2023-06-28 RX ADMIN — Medication 1 MILLIGRAM(S): at 11:37

## 2023-06-28 RX ADMIN — GABAPENTIN 300 MILLIGRAM(S): 400 CAPSULE ORAL at 13:48

## 2023-06-28 RX ADMIN — Medication 10 MILLIGRAM(S): at 17:51

## 2023-06-28 RX ADMIN — SODIUM CHLORIDE 50 MILLILITER(S): 9 INJECTION, SOLUTION INTRAVENOUS at 11:40

## 2023-06-28 RX ADMIN — HYDROXYUREA 500 MILLIGRAM(S): 500 CAPSULE ORAL at 05:48

## 2023-06-28 RX ADMIN — SODIUM CHLORIDE 50 MILLILITER(S): 9 INJECTION, SOLUTION INTRAVENOUS at 06:22

## 2023-06-28 RX ADMIN — GABAPENTIN 300 MILLIGRAM(S): 400 CAPSULE ORAL at 21:56

## 2023-06-28 RX ADMIN — ESCITALOPRAM OXALATE 20 MILLIGRAM(S): 10 TABLET, FILM COATED ORAL at 11:37

## 2023-06-28 RX ADMIN — AMLODIPINE BESYLATE 5 MILLIGRAM(S): 2.5 TABLET ORAL at 05:48

## 2023-06-28 NOTE — PROGRESS NOTE ADULT - SUBJECTIVE AND OBJECTIVE BOX
[facility]       HPI:  Patient with a past medical history of prior CVA on warfarin with right-sided weakness, hypertension, hyperlipidemia, coronary artery disease who is coming from her assisted living facility with altered mental status.  Present for past 2 weeks per son.  Was recently treated for suspected urinary tract infection at facility last week with no improvement of symptoms.  Son states decreased p.o. liquid intake during this time.  Usually she is alert and conversational but now she has become more forgetful.  Has chronic leg pain.Develped agitation in ER ,lorazepam was given  (26 Jun 2023 13:30)        SUBJECTIVE:      ALLERGIES:  Allergies    per son &quot;issues with certain anitibiotics&quot; does not remember the names (Unknown)    Intolerances          MEDICATIONS  (STANDING):  amLODIPine   Tablet 5 milliGRAM(s) Oral daily  baclofen 10 milliGRAM(s) Oral every 12 hours  cefTRIAXone   IVPB 1000 milliGRAM(s) IV Intermittent every 24 hours  cholecalciferol 2000 Unit(s) Oral daily  cyanocobalamin 1000 MICROGram(s) Oral daily  dextrose 5% + sodium chloride 0.45%. 1000 milliLiter(s) (50 mL/Hr) IV Continuous <Continuous>  escitalopram 20 milliGRAM(s) Oral daily  famotidine    Tablet 20 milliGRAM(s) Oral daily  folic acid 1 milliGRAM(s) Oral daily  furosemide    Tablet 20 milliGRAM(s) Oral daily  gabapentin 300 milliGRAM(s) Oral three times a day  hydroxyurea 500 milliGRAM(s) Oral two times a day  lactobacillus acidophilus 1 Tablet(s) Oral every 12 hours  levothyroxine 50 MICROGram(s) Oral daily  metoprolol tartrate 25 milliGRAM(s) Oral two times a day  polyethylene glycol 3350 17 Gram(s) Oral two times a day  senna 2 Tablet(s) Oral at bedtime  simvastatin 10 milliGRAM(s) Oral at bedtime    MEDICATIONS  (PRN):  acetaminophen     Tablet .. 650 milliGRAM(s) Oral every 6 hours PRN Temp greater or equal to 38C (100.4F), Mild Pain (1 - 3)  aluminum hydroxide/magnesium hydroxide/simethicone Suspension 30 milliLiter(s) Oral every 4 hours PRN Dyspepsia  LORazepam   Injectable 0.5 milliGRAM(s) IV Push every 8 hours PRN severe agitation  magnesium hydroxide Suspension 30 milliLiter(s) Oral daily PRN Constipation  melatonin 3 milliGRAM(s) Oral at bedtime PRN Insomnia  morphine  - Injectable 2 milliGRAM(s) IV Push every 4 hours PRN Severe Pain (7 - 10)  OLANZapine Injectable 2.5 milliGRAM(s) IntraMuscular every 6 hours PRN agitation  ondansetron Injectable 4 milliGRAM(s) IV Push every 8 hours PRN Nausea and/or Vomiting  traMADol 50 milliGRAM(s) Oral four times a day PRN Moderate Pain (4 - 6)      REVIEW OF SYSTEMS:  CONSTITUTIONAL: No fever,  RESPIRATORY: No cough, wheezing, shortness of breath  CARDIOVASCULAR: No chest pain, dyspnea, palpitations, dizziness, syncope, paroxysmal nocturnal dyspnea, orthopnea, or arm or leg swelling  GASTROINTESTINAL: No abdominal  or epigastric pain, nausea, vomiting,  diarrhea  NEUROLOGICAL: No headaches,  loss of strength, numbness, or tremors    Vital Signs Last 24 Hrs  T(C): 36.8 (28 Jun 2023 20:55), Max: 36.9 (28 Jun 2023 05:30)  T(F): 98.2 (28 Jun 2023 20:55), Max: 98.4 (28 Jun 2023 05:30)  HR: 69 (28 Jun 2023 20:55) (69 - 95)  BP: 116/70 (28 Jun 2023 20:55) (116/70 - 159/77)  BP(mean): --  RR: 16 (28 Jun 2023 20:55) (16 - 16)  SpO2: 95% (28 Jun 2023 20:55) (91% - 95%)    Parameters below as of 28 Jun 2023 20:55  Patient On (Oxygen Delivery Method): room air        PHYSICAL EXAM:  HEAD:  Atraumatic, Normocephalic  NECK: Supple and normal thyroid.  No JVD or carotid bruit.   HEART: S1, S2 regular , 1/6 soft ejection systolic murmur in mitral area , no thrill and no gallops .  PULMONARY: Bilateral vesicular breathing , few scattered ronchi and few scattered rales are present .  ABDOMEN: Soft nontender and positive bowl sounds   EXTREMITIES:  No clubbing, cyanosis, or pedal  edema  NEUROLOGICAL: AAOX3 , no focal deficit .    LABS:                        10.6   4.80  )-----------( 422      ( 27 Jun 2023 06:10 )             34.4     06-27    141  |  107  |  10  ----------------------------<  116<H>  3.6   |  29  |  0.60    Ca    8.6      27 Jun 2023 06:10  Mg     2.1     06-27    TPro  6.8  /  Alb  3.0<L>  /  TBili  0.5  /  DBili  x   /  AST  12<L>  /  ALT  7<L>  /  AlkPhos  57  06-27        PT/INR - ( 28 Jun 2023 07:30 )   PT: 28.0 sec;   INR: 2.37 ratio           Urinalysis Basic - ( 27 Jun 2023 06:10 )    Color: x / Appearance: x / SG: x / pH: x  Gluc: 116 mg/dL / Ketone: x  / Bili: x / Urobili: x   Blood: x / Protein: x / Nitrite: x   Leuk Esterase: x / RBC: x / WBC x   Sq Epi: x / Non Sq Epi: x / Bacteria: x      BNP      EKG:  ECHO:  IMAGING:    Assessment/Plan    Will continue to follow during hospital course and give further recommendations as needed . Thanks for your referral . if any questions please contact me at office ([phone])cell [phone])  JIM ARELLANO MD [place] [address]  [address]  OFFICE : [phone]   CELL : [phone]  CARDIOLOGY F/U  :       HPI:  Patient with a past medical history of prior CVA on warfarin with right-sided weakness, hypertension, hyperlipidemia, coronary artery disease who is coming from her assisted living facility with altered mental status.  Present for past 2 weeks per son.  Was recently treated for suspected urinary tract infection at facility last week with no improvement of symptoms.  Son states decreased p.o. liquid intake during this time.  Usually she is alert and conversational but now she has become more forgetful.  Has chronic leg pain.Develped agitation in ER ,lorazepam was given  (26 Jun 2023 13:30)        SUBJECTIVE: Patient feeling better       ALLERGIES:  Allergies    per son &quot;issues with certain anitibiotics&quot; does not remember the names (Unknown)    Intolerances          MEDICATIONS  (STANDING):  amLODIPine   Tablet 5 milliGRAM(s) Oral daily  baclofen 10 milliGRAM(s) Oral every 12 hours  cefTRIAXone   IVPB 1000 milliGRAM(s) IV Intermittent every 24 hours  cholecalciferol 2000 Unit(s) Oral daily  cyanocobalamin 1000 MICROGram(s) Oral daily  dextrose 5% + sodium chloride 0.45%. 1000 milliLiter(s) (50 mL/Hr) IV Continuous <Continuous>  escitalopram 20 milliGRAM(s) Oral daily  famotidine    Tablet 20 milliGRAM(s) Oral daily  folic acid 1 milliGRAM(s) Oral daily  furosemide    Tablet 20 milliGRAM(s) Oral daily  gabapentin 300 milliGRAM(s) Oral three times a day  hydroxyurea 500 milliGRAM(s) Oral two times a day  lactobacillus acidophilus 1 Tablet(s) Oral every 12 hours  levothyroxine 50 MICROGram(s) Oral daily  metoprolol tartrate 25 milliGRAM(s) Oral two times a day  polyethylene glycol 3350 17 Gram(s) Oral two times a day  senna 2 Tablet(s) Oral at bedtime  simvastatin 10 milliGRAM(s) Oral at bedtime    MEDICATIONS  (PRN):  acetaminophen     Tablet .. 650 milliGRAM(s) Oral every 6 hours PRN Temp greater or equal to 38C (100.4F), Mild Pain (1 - 3)  aluminum hydroxide/magnesium hydroxide/simethicone Suspension 30 milliLiter(s) Oral every 4 hours PRN Dyspepsia  LORazepam   Injectable 0.5 milliGRAM(s) IV Push every 8 hours PRN severe agitation  magnesium hydroxide Suspension 30 milliLiter(s) Oral daily PRN Constipation  melatonin 3 milliGRAM(s) Oral at bedtime PRN Insomnia  morphine  - Injectable 2 milliGRAM(s) IV Push every 4 hours PRN Severe Pain (7 - 10)  OLANZapine Injectable 2.5 milliGRAM(s) IntraMuscular every 6 hours PRN agitation  ondansetron Injectable 4 milliGRAM(s) IV Push every 8 hours PRN Nausea and/or Vomiting  traMADol 50 milliGRAM(s) Oral four times a day PRN Moderate Pain (4 - 6)      REVIEW OF SYSTEMS:  CONSTITUTIONAL: No fever,  RESPIRATORY: No cough, wheezing, shortness of breath  CARDIOVASCULAR: No chest pain, dyspnea, palpitations, dizziness, syncope, paroxysmal nocturnal dyspnea, orthopnea, or arm or leg swelling  GASTROINTESTINAL: No abdominal  or epigastric pain, nausea, vomiting,  diarrhea  NEUROLOGICAL: No headaches,  numbness, or tremors  Skin : No itching .  Hematology : No active bleeding .  Endocrinology : No heat and cold intolerance .  psychiatry : Patient is confused .  Genitourinary : No dysuria .  Musculoskeletal : patient has mild arthritis .    Vital Signs Last 24 Hrs  T(C): 36.8 (28 Jun 2023 20:55), Max: 36.9 (28 Jun 2023 05:30)  T(F): 98.2 (28 Jun 2023 20:55), Max: 98.4 (28 Jun 2023 05:30)  HR: 69 (28 Jun 2023 20:55) (69 - 95)  BP: 116/70 (28 Jun 2023 20:55) (116/70 - 159/77)  BP(mean): --  RR: 16 (28 Jun 2023 20:55) (16 - 16)  SpO2: 95% (28 Jun 2023 20:55) (91% - 95%)    Parameters below as of 28 Jun 2023 20:55  Patient On (Oxygen Delivery Method): room air        PHYSICAL EXAM:  HEAD:  Atraumatic, Normocephalic  NECK: Supple and normal thyroid.  No JVD or carotid bruit.   HEART: S1, S2 regular , 1/6 soft ejection systolic murmur in mitral area , no thrill and no gallops .  PULMONARY: Bilateral vesicular breathing , few scattered rhonchi and few scattered rales are present .  ABDOMEN: Soft nontender and positive bowl sounds   EXTREMITIES:  No clubbing, cyanosis, or pedal  edema  NEUROLOGICAL: AA and confused .    Skin : No rashes .  Musculoskeletal : No joint swellings .    LABS:                        10.6   4.80  )-----------( 422      ( 27 Jun 2023 06:10 )             34.4     06-27    141  |  107  |  10  ----------------------------<  116<H>  3.6   |  29  |  0.60    Ca    8.6      27 Jun 2023 06:10  Mg     2.1     06-27    TPro  6.8  /  Alb  3.0<L>  /  TBili  0.5  /  DBili  x   /  AST  12<L>  /  ALT  7<L>  /  AlkPhos  57  06-27        PT/INR - ( 28 Jun 2023 07:30 )   PT: 28.0 sec;   INR: 2.37 ratio           Urinalysis Basic - ( 27 Jun 2023 06:10 )    Color: x / Appearance: x / SG: x / pH: x  Gluc: 116 mg/dL / Ketone: x  / Bili: x / Urobili: x   Blood: x / Protein: x / Nitrite: x   Leuk Esterase: x / RBC: x / WBC x   Sq Epi: x / Non Sq Epi: x / Bacteria: x      Assessment/Plan    Will continue to follow during hospital course and give further recommendations as needed . Thanks for your referral . if any questions please contact me at office ([phone] [phone])  JIM ARELLANO MD [place] [address]  [address]  OFFICE : [phone]   CELL : [phone]  CARDIOLOGY F/U  :       HPI:  Patient with a past medical history of prior CVA on warfarin with right-sided weakness, hypertension, hyperlipidemia, coronary artery disease who is coming from her assisted living facility with altered mental status.  Present for past 2 weeks per son.  Was recently treated for suspected urinary tract infection at facility last week with no improvement of symptoms.  Son states decreased p.o. liquid intake during this time.  Usually she is alert and conversational but now she has become more forgetful.  Has chronic leg pain.Develped agitation in ER ,lorazepam was given  (26 Jun 2023 13:30)        SUBJECTIVE: Patient feeling better       ALLERGIES:  Allergies    per son &quot;issues with certain anitibiotics&quot; does not remember the names (Unknown)    Intolerances          MEDICATIONS  (STANDING):  amLODIPine   Tablet 5 milliGRAM(s) Oral daily  baclofen 10 milliGRAM(s) Oral every 12 hours  cefTRIAXone   IVPB 1000 milliGRAM(s) IV Intermittent every 24 hours  cholecalciferol 2000 Unit(s) Oral daily  cyanocobalamin 1000 MICROGram(s) Oral daily  dextrose 5% + sodium chloride 0.45%. 1000 milliLiter(s) (50 mL/Hr) IV Continuous <Continuous>  escitalopram 20 milliGRAM(s) Oral daily  famotidine    Tablet 20 milliGRAM(s) Oral daily  folic acid 1 milliGRAM(s) Oral daily  furosemide    Tablet 20 milliGRAM(s) Oral daily  gabapentin 300 milliGRAM(s) Oral three times a day  hydroxyurea 500 milliGRAM(s) Oral two times a day  lactobacillus acidophilus 1 Tablet(s) Oral every 12 hours  levothyroxine 50 MICROGram(s) Oral daily  metoprolol tartrate 25 milliGRAM(s) Oral two times a day  polyethylene glycol 3350 17 Gram(s) Oral two times a day  senna 2 Tablet(s) Oral at bedtime  simvastatin 10 milliGRAM(s) Oral at bedtime    MEDICATIONS  (PRN):  acetaminophen     Tablet .. 650 milliGRAM(s) Oral every 6 hours PRN Temp greater or equal to 38C (100.4F), Mild Pain (1 - 3)  aluminum hydroxide/magnesium hydroxide/simethicone Suspension 30 milliLiter(s) Oral every 4 hours PRN Dyspepsia  LORazepam   Injectable 0.5 milliGRAM(s) IV Push every 8 hours PRN severe agitation  magnesium hydroxide Suspension 30 milliLiter(s) Oral daily PRN Constipation  melatonin 3 milliGRAM(s) Oral at bedtime PRN Insomnia  morphine  - Injectable 2 milliGRAM(s) IV Push every 4 hours PRN Severe Pain (7 - 10)  OLANZapine Injectable 2.5 milliGRAM(s) IntraMuscular every 6 hours PRN agitation  ondansetron Injectable 4 milliGRAM(s) IV Push every 8 hours PRN Nausea and/or Vomiting  traMADol 50 milliGRAM(s) Oral four times a day PRN Moderate Pain (4 - 6)      REVIEW OF SYSTEMS:  CONSTITUTIONAL: No fever,  RESPIRATORY: No cough, wheezing, shortness of breath  CARDIOVASCULAR: No chest pain, dyspnea, palpitations, dizziness, syncope, paroxysmal nocturnal dyspnea, orthopnea, or arm or leg swelling  GASTROINTESTINAL: No abdominal  or epigastric pain, nausea, vomiting,  diarrhea  NEUROLOGICAL: No headaches,  numbness, or tremors  Skin : No itching .  Hematology : No active bleeding .  Endocrinology : No heat and cold intolerance .  psychiatry : Patient is confused .  Genitourinary : No dysuria .  Musculoskeletal : patient has mild arthritis .    Vital Signs Last 24 Hrs  T(C): 36.8 (28 Jun 2023 20:55), Max: 36.9 (28 Jun 2023 05:30)  T(F): 98.2 (28 Jun 2023 20:55), Max: 98.4 (28 Jun 2023 05:30)  HR: 69 (28 Jun 2023 20:55) (69 - 95)  BP: 116/70 (28 Jun 2023 20:55) (116/70 - 159/77)  BP(mean): --  RR: 16 (28 Jun 2023 20:55) (16 - 16)  SpO2: 95% (28 Jun 2023 20:55) (91% - 95%)    Parameters below as of 28 Jun 2023 20:55  Patient On (Oxygen Delivery Method): room air        PHYSICAL EXAM:  HEAD:  Atraumatic, Normocephalic  NECK: Supple and normal thyroid.  No JVD or carotid bruit.   HEART: S1, S2 regular , 1/6 soft ejection systolic murmur in mitral area , no thrill and no gallops .  PULMONARY: Bilateral vesicular breathing , few scattered rhonchi and few scattered rales are present .  ABDOMEN: Soft nontender and positive bowl sounds   EXTREMITIES:  No clubbing, cyanosis, or pedal  edema  NEUROLOGICAL: AA and confused .    Skin : No rashes .  Musculoskeletal : No joint swellings .    LABS:                        10.6   4.80  )-----------( 422      ( 27 Jun 2023 06:10 )             34.4     06-27    141  |  107  |  10  ----------------------------<  116<H>  3.6   |  29  |  0.60    Ca    8.6      27 Jun 2023 06:10  Mg     2.1     06-27    TPro  6.8  /  Alb  3.0<L>  /  TBili  0.5  /  DBili  x   /  AST  12<L>  /  ALT  7<L>  /  AlkPhos  57  06-27        PT/INR - ( 28 Jun 2023 07:30 )   PT: 28.0 sec;   INR: 2.37 ratio           Urinalysis Basic - ( 27 Jun 2023 06:10 )    Color: x / Appearance: x / SG: x / pH: x  Gluc: 116 mg/dL / Ketone: x  / Bili: x / Urobili: x   Blood: x / Protein: x / Nitrite: x   Leuk Esterase: x / RBC: x / WBC x   Sq Epi: x / Non Sq Epi: x / Bacteria: x      Assessment/Plan  Patient has :  1) Altered mental status and urosepsis and better slightly .  2) R/O pneumonia .  3) H/O CVA with right sided weakness .  4) Hypertension and stable .  5) Paroxysmal atrial fibrillation and stable   6) Mild chronic diastolic heart failure and stable .  7) H/O CAD   8) Dementia   Plan : 1) I/V antibiotics as per PMD 2) Monitor hemoglobin and electrolytes 3) Monitor PT and INR 4) Rest of medications as such 5) Prognosis guarded .  Will continue to follow during hospital course and give further recommendations as needed . Thanks for your referral . if any questions please contact me at office ([phone] cell [phone])

## 2023-06-28 NOTE — PROGRESS NOTE ADULT - SUBJECTIVE AND OBJECTIVE BOX
CHIEF COMPLAINT/ REASON FOR VISIT  .. Patient was seen to address the  issue listed under PROBLEM LIST which is located toward bottom of this note     REJI JOSEJOEL    IRMA 3WES 359 W1    Allergies    per son &quot;issues with certain anitibiotics&quot; does not remember the names (Unknown)    Intolerances        PAST MEDICAL & SURGICAL HISTORY:  Hypertension      CVA (cerebral vascular accident)      Depression      Constipation      Neuropathy      Hyperlipidemia      Grade I diastolic dysfunction      Afib      Neuropathy      VHD (valvular heart disease)      Aortic valvar stenosis      No significant past surgical history          FAMILY HISTORY:      Home Medications:  amLODIPine 5 mg oral tablet: 1 tab(s) orally once a day (26 Jun 2023 12:41)  baclofen 10 mg oral tablet: 1 tab(s) orally 2 times a day (26 Jun 2023 12:41)  benzonatate 100 mg oral capsule: 1 cap(s) orally 3 times a day, As needed, Cough (26 Jun 2023 12:16)  cholecalciferol 50 mcg (2000 intl units) oral tablet: 1 tab(s) orally once a day (26 Jun 2023 12:41)  cyanocobalamin 1000 mcg oral tablet: 1 tab(s) orally once a day (26 Jun 2023 12:16)  docusate sodium 100 mg oral capsule: 2 cap(s) orally once a day (at bedtime) (26 Jun 2023 12:41)  escitalopram 20 mg oral tablet: 1 tab(s) orally once a day (26 Jun 2023 12:16)  famotidine 20 mg oral tablet: 1 tab(s) orally once a day (26 Jun 2023 12:41)  folic acid 1 mg oral tablet: 1 tab(s) orally once a day (26 Jun 2023 12:16)  furosemide 20 mg oral tablet: 1 tab(s) orally once a day (26 Jun 2023 12:16)  gabapentin 300 mg oral capsule: 1 cap(s) orally 3 times a day (26 Jun 2023 12:41)  hydroxyurea 500 mg oral capsule: 1 cap(s) orally 2 times a day (26 Jun 2023 12:16)  lactobacillus acidophilus oral tablet: 2 tab(s) orally once a day (26 Jun 2023 12:16)  levothyroxine 50 mcg (0.05 mg) oral tablet: 1 tab(s) orally once a day (26 Jun 2023 12:16)  metoprolol tartrate 25 mg oral tablet: 1 tab(s) orally 2 times a day (26 Jun 2023 12:16)  senna oral tablet: 1 tab(s) orally once a day (at bedtime) (26 Jun 2023 12:16)  simvastatin 10 mg oral tablet: 1 tab(s) orally once a day (at bedtime) (26 Jun 2023 12:16)  traMADol 50 mg oral tablet: 0.5 tab(s) orally 3 times a day PRN for pain (26 Jun 2023 12:41)  Tylenol 500 mg oral tablet: 1 tab(s) orally 2 times a day (26 Jun 2023 12:53)  Tylenol 500 mg oral tablet: 2 tab(s) orally once a day in the morning (26 Jun 2023 12:41)  warfarin 1 mg oral tablet: 1 tab(s) orally once (at bedtime) HOLD FOR INR ABIOVE 3.0 (26 Jun 2023 12:16)      MEDICATIONS  (STANDING):  amLODIPine   Tablet 5 milliGRAM(s) Oral daily  baclofen 10 milliGRAM(s) Oral every 12 hours  cefTRIAXone   IVPB 1000 milliGRAM(s) IV Intermittent every 24 hours  cholecalciferol 2000 Unit(s) Oral daily  cyanocobalamin 1000 MICROGram(s) Oral daily  dextrose 5% + sodium chloride 0.45%. 1000 milliLiter(s) (50 mL/Hr) IV Continuous <Continuous>  escitalopram 20 milliGRAM(s) Oral daily  famotidine    Tablet 20 milliGRAM(s) Oral daily  folic acid 1 milliGRAM(s) Oral daily  furosemide    Tablet 20 milliGRAM(s) Oral daily  gabapentin 300 milliGRAM(s) Oral three times a day  hydroxyurea 500 milliGRAM(s) Oral two times a day  lactobacillus acidophilus 1 Tablet(s) Oral every 12 hours  levothyroxine 50 MICROGram(s) Oral daily  metoprolol tartrate 25 milliGRAM(s) Oral two times a day  polyethylene glycol 3350 17 Gram(s) Oral two times a day  senna 2 Tablet(s) Oral at bedtime  simvastatin 10 milliGRAM(s) Oral at bedtime    MEDICATIONS  (PRN):  acetaminophen     Tablet .. 650 milliGRAM(s) Oral every 6 hours PRN Temp greater or equal to 38C (100.4F), Mild Pain (1 - 3)  aluminum hydroxide/magnesium hydroxide/simethicone Suspension 30 milliLiter(s) Oral every 4 hours PRN Dyspepsia  LORazepam   Injectable 0.5 milliGRAM(s) IV Push every 8 hours PRN severe agitation  magnesium hydroxide Suspension 30 milliLiter(s) Oral daily PRN Constipation  melatonin 3 milliGRAM(s) Oral at bedtime PRN Insomnia  morphine  - Injectable 2 milliGRAM(s) IV Push every 4 hours PRN Severe Pain (7 - 10)  OLANZapine Injectable 2.5 milliGRAM(s) IntraMuscular every 6 hours PRN agitation  ondansetron Injectable 4 milliGRAM(s) IV Push every 8 hours PRN Nausea and/or Vomiting  traMADol 50 milliGRAM(s) Oral four times a day PRN Moderate Pain (4 - 6)              Vital Signs Last 24 Hrs  T(C): 36.9 (28 Jun 2023 05:30), Max: 37.9 (27 Jun 2023 19:51)  T(F): 98.4 (28 Jun 2023 05:30), Max: 100.2 (27 Jun 2023 19:51)  HR: 93 (28 Jun 2023 05:30) (92 - 97)  BP: 134/79 (28 Jun 2023 05:30) (109/55 - 134/79)  BP(mean): --  RR: 16 (28 Jun 2023 00:16) (16 - 17)  SpO2: 95% (28 Jun 2023 05:30) (90% - 95%)    Parameters below as of 28 Jun 2023 05:30  Patient On (Oxygen Delivery Method): room air          06-26-23 @ 07:01  -  06-27-23 @ 07:00  --------------------------------------------------------  IN: 0 mL / OUT: 500 mL / NET: -500 mL              LABS:                        10.6   4.80  )-----------( 422      ( 27 Jun 2023 06:10 )             34.4     06-27    141  |  107  |  10  ----------------------------<  116<H>  3.6   |  29  |  0.60    Ca    8.6      27 Jun 2023 06:10  Mg     2.1     06-27    TPro  6.8  /  Alb  3.0<L>  /  TBili  0.5  /  DBili  x   /  AST  12<L>  /  ALT  7<L>  /  AlkPhos  57  06-27    PT/INR - ( 27 Jun 2023 06:10 )   PT: 38.5 sec;   INR: 3.25 ratio         PTT - ( 26 Jun 2023 07:00 )  PTT:46.4 sec  Urinalysis Basic - ( 27 Jun 2023 06:10 )    Color: x / Appearance: x / SG: x / pH: x  Gluc: 116 mg/dL / Ketone: x  / Bili: x / Urobili: x   Blood: x / Protein: x / Nitrite: x   Leuk Esterase: x / RBC: x / WBC x   Sq Epi: x / Non Sq Epi: x / Bacteria: x            WBC:  WBC Count: 4.80 K/uL (06-27 @ 06:10)  WBC Count: 4.07 K/uL (06-26 @ 07:00)      MICROBIOLOGY:  RECENT CULTURES:  06-26 Clean Catch Clean Catch (Midstream) XXXX XXXX   >=3 organisms. Probable collection contamination.    06-26 .Blood Blood-Peripheral XXXX XXXX   No growth at 24 hours    06-26 .Blood Blood-Peripheral XXXX XXXX   No growth at 24 hours                PT/INR - ( 27 Jun 2023 06:10 )   PT: 38.5 sec;   INR: 3.25 ratio         PTT - ( 26 Jun 2023 07:00 )  PTT:46.4 sec    Sodium:  Sodium: 141 mmol/L (06-27 @ 06:10)  Sodium: 144 mmol/L (06-26 @ 07:00)      0.60 mg/dL 06-27 @ 06:10  0.68 mg/dL 06-26 @ 07:00      Hemoglobin:  Hemoglobin: 10.6 g/dL (06-27 @ 06:10)  Hemoglobin: 10.6 g/dL (06-26 @ 07:00)      Platelets: Platelet Count - Automated: 422 K/uL (06-27 @ 06:10)  Platelet Count - Automated: 417 K/uL (06-26 @ 07:00)      LIVER FUNCTIONS - ( 27 Jun 2023 06:10 )  Alb: 3.0 g/dL / Pro: 6.8 g/dL / ALK PHOS: 57 U/L / ALT: 7 U/L / AST: 12 U/L / GGT: x             Urinalysis Basic - ( 27 Jun 2023 06:10 )    Color: x / Appearance: x / SG: x / pH: x  Gluc: 116 mg/dL / Ketone: x  / Bili: x / Urobili: x   Blood: x / Protein: x / Nitrite: x   Leuk Esterase: x / RBC: x / WBC x   Sq Epi: x / Non Sq Epi: x / Bacteria: x        RADIOLOGY & ADDITIONAL STUDIES:      MICROBIOLOGY:  RECENT CULTURES:  06-26 Clean Catch Clean Catch (Midstream) XXXX XXXX   >=3 organisms. Probable collection contamination.    06-26 .Blood Blood-Peripheral XXXX XXXX   No growth at 24 hours    06-26 .Blood Blood-Peripheral XXXX XXXX   No growth at 24 hours

## 2023-06-28 NOTE — PROGRESS NOTE ADULT - SUBJECTIVE AND OBJECTIVE BOX
PROGRESS NOTE  Patient is a 89y old  Female who presents with a chief complaint of ams (28 Jun 2023 06:17)    Chart and available morning labs /imaging are reviewed electronically , urgent issues addressed . More information  is being added upon completion of rounds , when more information is collected and management discussed with consultants , medical staff and social service/case management on the floor   OVERNIGHT  No new issues reported by medical staff . All above noted     HPI:  Patient with a past medical history of prior CVA on warfarin with right-sided weakness, hypertension, hyperlipidemia, coronary artery disease who is coming from her assisted living facility with altered mental status.  Present for past 2 weeks per son.  Was recently treated for suspected urinary tract infection at facility last week with no improvement of symptoms.  Son states decreased p.o. liquid intake during this time.  Usually she is alert and conversational but now she has become more forgetful.  Has chronic leg pain.Develped agitation in ER ,lorazepam was given  (26 Jun 2023 13:30)    PAST MEDICAL & SURGICAL HISTORY:  Hypertension      CVA (cerebral vascular accident)      Depression      Constipation      Neuropathy      Hyperlipidemia      Grade I diastolic dysfunction      Afib      Neuropathy      VHD (valvular heart disease)      Aortic valvar stenosis      No significant past surgical history          MEDICATIONS  (STANDING):  amLODIPine   Tablet 5 milliGRAM(s) Oral daily  baclofen 10 milliGRAM(s) Oral every 12 hours  cefTRIAXone   IVPB 1000 milliGRAM(s) IV Intermittent every 24 hours  cholecalciferol 2000 Unit(s) Oral daily  cyanocobalamin 1000 MICROGram(s) Oral daily  dextrose 5% + sodium chloride 0.45%. 1000 milliLiter(s) (50 mL/Hr) IV Continuous <Continuous>  escitalopram 20 milliGRAM(s) Oral daily  famotidine    Tablet 20 milliGRAM(s) Oral daily  folic acid 1 milliGRAM(s) Oral daily  furosemide    Tablet 20 milliGRAM(s) Oral daily  gabapentin 300 milliGRAM(s) Oral three times a day  hydroxyurea 500 milliGRAM(s) Oral two times a day  lactobacillus acidophilus 1 Tablet(s) Oral every 12 hours  levothyroxine 50 MICROGram(s) Oral daily  metoprolol tartrate 25 milliGRAM(s) Oral two times a day  polyethylene glycol 3350 17 Gram(s) Oral two times a day  senna 2 Tablet(s) Oral at bedtime  simvastatin 10 milliGRAM(s) Oral at bedtime    MEDICATIONS  (PRN):  acetaminophen     Tablet .. 650 milliGRAM(s) Oral every 6 hours PRN Temp greater or equal to 38C (100.4F), Mild Pain (1 - 3)  aluminum hydroxide/magnesium hydroxide/simethicone Suspension 30 milliLiter(s) Oral every 4 hours PRN Dyspepsia  LORazepam   Injectable 0.5 milliGRAM(s) IV Push every 8 hours PRN severe agitation  magnesium hydroxide Suspension 30 milliLiter(s) Oral daily PRN Constipation  melatonin 3 milliGRAM(s) Oral at bedtime PRN Insomnia  morphine  - Injectable 2 milliGRAM(s) IV Push every 4 hours PRN Severe Pain (7 - 10)  OLANZapine Injectable 2.5 milliGRAM(s) IntraMuscular every 6 hours PRN agitation  ondansetron Injectable 4 milliGRAM(s) IV Push every 8 hours PRN Nausea and/or Vomiting  traMADol 50 milliGRAM(s) Oral four times a day PRN Moderate Pain (4 - 6)      OBJECTIVE    T(C): 36.8 (06-28-23 @ 12:26), Max: 37.9 (06-27-23 @ 19:51)  HR: 95 (06-28-23 @ 12:26) (93 - 97)  BP: 159/77 (06-28-23 @ 12:26) (109/55 - 159/77)  RR: 16 (06-28-23 @ 12:26) (16 - 17)  SpO2: 91% (06-28-23 @ 12:26) (90% - 95%)  Wt(kg): --  I&O's Summary    28 Jun 2023 07:01  -  28 Jun 2023 18:31  --------------------------------------------------------  IN: 0 mL / OUT: 150 mL / NET: -150 mL          REVIEW OF SYSTEMS:  Patient is  unable to provide any information/ROS  due to baseline mental status.     PHYSICAL EXAM:  Appearance: NAD. VS past 24 hrs -as above   HEENT:   Moist oral mucosa. Conjunctiva clear b/l.   Neck : supple  Respiratory: Lungs CTAB.  Gastrointestinal:  Soft, nontender. No rebound. No rigidity. BS present	  Cardiovascular: RRR ,S1S2 present  Neurologic: Non-focal. Moving all extremities.  Extremities: No edema. No erythema. No calf tenderness.  Skin: No rashes, No ecchymoses, No cyanosis.	  wounds ,skin lesions-See skin assesment flow sheet   LABS:                        10.6   4.80  )-----------( 422      ( 27 Jun 2023 06:10 )             34.4     06-27    141  |  107  |  10  ----------------------------<  116<H>  3.6   |  29  |  0.60    Ca    8.6      27 Jun 2023 06:10  Mg     2.1     06-27    TPro  6.8  /  Alb  3.0<L>  /  TBili  0.5  /  DBili  x   /  AST  12<L>  /  ALT  7<L>  /  AlkPhos  57  06-27    CAPILLARY BLOOD GLUCOSE        PT/INR - ( 28 Jun 2023 07:30 )   PT: 28.0 sec;   INR: 2.37 ratio           Urinalysis Basic - ( 27 Jun 2023 06:10 )    Color: x / Appearance: x / SG: x / pH: x  Gluc: 116 mg/dL / Ketone: x  / Bili: x / Urobili: x   Blood: x / Protein: x / Nitrite: x   Leuk Esterase: x / RBC: x / WBC x   Sq Epi: x / Non Sq Epi: x / Bacteria: x        Culture - Urine (collected 26 Jun 2023 10:35)  Source: Clean Catch Clean Catch (Midstream)  Final Report (27 Jun 2023 19:26):    >=3 organisms. Probable collection contamination.    Culture - Blood (collected 26 Jun 2023 07:00)  Source: .Blood Blood-Peripheral  Preliminary Report (28 Jun 2023 13:01):    No growth at 48 Hours    Culture - Blood (collected 26 Jun 2023 06:30)  Source: .Blood Blood-Peripheral  Preliminary Report (28 Jun 2023 13:01):    No growth at 48 Hours      RADIOLOGY & ADDITIONAL TESTS:   reviewed elctronically  ASSESSMENT/PLAN: 	    25 minutes aggregate time was spent on this visit, 50% visit time spent in care co-ordination with other attendings and counselling patient .I have discussed care plan with patient / HCP/family member ,who expressed understanding of problems treatment and their effect and side effects, alternatives in details. I have asked if they have any questions and concerns and appropriately addressed them to best of my ability.

## 2023-06-28 NOTE — CONSULT NOTE ADULT - SUBJECTIVE AND OBJECTIVE BOX
Patient is a 89y old  Female who presents with a chief complaint of ams (28 Jun 2023 10:31)      HPI:  Patient with a past medical history of prior CVA on warfarin with right-sided weakness, hypertension, hyperlipidemia, coronary artery disease who is coming from her assisted living facility with altered mental status.  Present for past 2 weeks per son.  Was recently treated for suspected urinary tract infection at facility last week with no improvement of symptoms.  Son states decreased p.o. liquid intake during this time.  Usually she is alert and conversational but now she has become more forgetful.  Has chronic leg pain.Develped agitation in ER ,lorazepam was given  (26 Jun 2023 13:30)      Asked to see patient for ID Consult    PAST MEDICAL & SURGICAL HISTORY:  Hypertension      CVA (cerebral vascular accident)      Depression      Constipation      Neuropathy      Hyperlipidemia      Grade I diastolic dysfunction      Afib      Neuropathy      VHD (valvular heart disease)      Aortic valvar stenosis      No significant past surgical history          Allergies    per son &quot;issues with certain anitibiotics&quot; does not remember the names (Unknown)    Intolerances        REVIEW OF SYSTEMS:  All systems below were reviewed and are negative [  ]  HEENT:  ID:  Pulmonary:  Cardiac:  GI:  Renal:  Musculoskeletal:  All other systems above were reviewed and are negative   [  ]    HOME MEDICATIONS:    MEDICATIONS  (STANDING):  amLODIPine   Tablet 5 milliGRAM(s) Oral daily  baclofen 10 milliGRAM(s) Oral every 12 hours  cefTRIAXone   IVPB 1000 milliGRAM(s) IV Intermittent every 24 hours  cholecalciferol 2000 Unit(s) Oral daily  cyanocobalamin 1000 MICROGram(s) Oral daily  dextrose 5% + sodium chloride 0.45%. 1000 milliLiter(s) (50 mL/Hr) IV Continuous <Continuous>  escitalopram 20 milliGRAM(s) Oral daily  famotidine    Tablet 20 milliGRAM(s) Oral daily  folic acid 1 milliGRAM(s) Oral daily  furosemide    Tablet 20 milliGRAM(s) Oral daily  gabapentin 300 milliGRAM(s) Oral three times a day  hydroxyurea 500 milliGRAM(s) Oral two times a day  lactobacillus acidophilus 1 Tablet(s) Oral every 12 hours  levothyroxine 50 MICROGram(s) Oral daily  metoprolol tartrate 25 milliGRAM(s) Oral two times a day  polyethylene glycol 3350 17 Gram(s) Oral two times a day  senna 2 Tablet(s) Oral at bedtime  simvastatin 10 milliGRAM(s) Oral at bedtime    MEDICATIONS  (PRN):  acetaminophen     Tablet .. 650 milliGRAM(s) Oral every 6 hours PRN Temp greater or equal to 38C (100.4F), Mild Pain (1 - 3)  aluminum hydroxide/magnesium hydroxide/simethicone Suspension 30 milliLiter(s) Oral every 4 hours PRN Dyspepsia  LORazepam   Injectable 0.5 milliGRAM(s) IV Push every 8 hours PRN severe agitation  magnesium hydroxide Suspension 30 milliLiter(s) Oral daily PRN Constipation  melatonin 3 milliGRAM(s) Oral at bedtime PRN Insomnia  morphine  - Injectable 2 milliGRAM(s) IV Push every 4 hours PRN Severe Pain (7 - 10)  OLANZapine Injectable 2.5 milliGRAM(s) IntraMuscular every 6 hours PRN agitation  ondansetron Injectable 4 milliGRAM(s) IV Push every 8 hours PRN Nausea and/or Vomiting  traMADol 50 milliGRAM(s) Oral four times a day PRN Moderate Pain (4 - 6)      Vital Signs Last 24 Hrs  T(C): 36.8 (28 Jun 2023 20:55), Max: 36.9 (28 Jun 2023 05:30)  T(F): 98.2 (28 Jun 2023 20:55), Max: 98.4 (28 Jun 2023 05:30)  HR: 69 (28 Jun 2023 20:55) (69 - 97)  BP: 116/70 (28 Jun 2023 20:55) (116/70 - 159/77)  BP(mean): --  RR: 16 (28 Jun 2023 20:55) (16 - 16)  SpO2: 95% (28 Jun 2023 20:55) (91% - 95%)    Parameters below as of 28 Jun 2023 20:55  Patient On (Oxygen Delivery Method): room air        PHYSICAL EXAM:  HEENT:  Neck:  Lungs:  Heart:  Abdomen:  Genital/ Rectal:  Extremities:  Neurologic:  Vascular:    I&O's Summary    28 Jun 2023 07:01  -  28 Jun 2023 21:16  --------------------------------------------------------  IN: 0 mL / OUT: 150 mL / NET: -150 mL        LABORATORY:                          10.6   4.80  )-----------( 422      ( 27 Jun 2023 06:10 )             34.4       ESR:                   06-27 @ 06:10  --    C-Reactive Protein:     06-27 @ 06:10  --    Procalcitonin:           06-27 @ 06:10   0.06      06-27    141  |  107  |  10  ----------------------------<  116<H>  3.6   |  29  |  0.60    Ca    8.6      27 Jun 2023 06:10  Mg     2.1     06-27    TPro  6.8  /  Alb  3.0<L>  /  TBili  0.5  /  DBili  x   /  AST  12<L>  /  ALT  7<L>  /  AlkPhos  57  06-27      Rapid Respiratory Viral Panel Result        06-26 @ 09:21  Rapid RVP [facility]  Coronovirus --  Adenovirus --  Bordetella Pertussis --  Chlamydia Pneumonia --  Entero/Rhinovirus--  HKU1 Coronovirus --  HMPV Coronovirus --  Influenza A --  Influenza AH1 --  Influenza AH1 2009 --  Influenza AH3 --  Influenza B --  Mycoplasma Pneumoniae --  NL63 Coronovirus --  OC43 Coronovirus --  Parainfluenza 1 --  Parainfluenza 2 --  Parainfluenza 3 --  Parainfluenza 4 --  Resp Syncytial Virus --      LABORATORY:    CBC Full  -  ( 27 Jun 2023 06:10 )  WBC Count : 4.80 K/uL  RBC Count : 2.98 M/uL  Hemoglobin : 10.6 g/dL  Hematocrit : 34.4 %  Platelet Count - Automated : 422 K/uL  Mean Cell Volume : 115.4 fl  Mean Cell Hemoglobin : 35.6 pg  Mean Cell Hemoglobin Concentration : 30.8 gm/dL  Auto Neutrophil # : 3.56 K/uL  Auto Lymphocyte # : 0.43 K/uL  Auto Monocyte # : 0.38 K/uL  Auto Eosinophil # : 0.04 K/uL  Auto Basophil # : 0.03 K/uL  Auto Neutrophil % : 74.2 %  Auto Lymphocyte % : 9.0 %  Auto Monocyte % : 7.9 %  Auto Eosinophil % : 0.8 %  Auto Basophil % : 0.6 %        ESR:                   06-27 @ 06:10  --    C-Reactive Protein:     06-27 @ 06:10  --    Procalcitonin:           06-27 @ 06:10   0.06      06-27    141  |  107  |  10  ----------------------------<  116<H>  3.6   |  29  |  0.60    Ca    8.6      27 Jun 2023 06:10  Mg     2.1     06-27    TPro  6.8  /  Alb  3.0<L>  /  TBili  0.5  /  DBili  x   /  AST  12<L>  /  ALT  7<L>  /  AlkPhos  57  06-27                    Wound culture:                06-26 @ 10:35  Organism --  Culture w/ gram stain --  Specimen Source Clean Catch Clean Catch (Midstream)    Wound culture:                06-26 @ 07:00  Organism --  Culture w/ gram stain --  Specimen Source .Blood Blood-Peripheral    Wound culture:                06-26 @ 06:30  Organism --  Culture w/ gram stain --  Specimen Source .Blood Blood-Peripheral        Abscess culture:             06-26 @ 10:35  Organism --  Gram Stain --  Specimen Source Clean Catch Clean Catch (Midstream)    Abscess culture:             06-26 @ 07:00  Organism --  Gram Stain --  Specimen Source .Blood Blood-Peripheral    Abscess culture:             06-26 @ 06:30  Organism --  Gram Stain --  Specimen Source .Blood Blood-Peripheral            Tissue culture:           06-26 @ 10:35  Organism --  Gram Stain --  Specimen Source Clean Catch Clean Catch (Midstream)    Tissue culture:           06-26 @ 07:00  Organism --  Gram Stain --  Specimen Source .Blood Blood-Peripheral    Tissue culture:           06-26 @ 06:30  Organism --  Gram Stain --  Specimen Source .Blood Blood-Peripheral        Body Fluid Smear & Culture:                        06-26 @ 10:35  AFB Smear  --  Culture Acid Fast Body Fluid w/ Smear  --  Culture Acid Fast Smear Concentrated   --    Culture Results:       >=3 organisms. Probable collection contamination.  Specimen Source Clean Catch Clean Catch (Midstream)    Body Fluid Smear & Culture:                        06-26 @ 07:00  AFB Smear  --  Culture Acid Fast Body Fluid w/ Smear  --  Culture Acid Fast Smear Concentrated   --    Culture Results:       No growth at 48 Hours  Specimen Source .Blood Blood-Peripheral    Body Fluid Smear & Culture:                        06-26 @ 06:30  AFB Smear  --  Culture Acid Fast Body Fluid w/ Smear  --  Culture Acid Fast Smear Concentrated   --    Culture Results:       No growth at 48 Hours  Specimen Source .Blood Blood-Peripheral            Rapid Respiratory Viral Panel Result        06-26 @ 09:21  Rapid RVP [facility]  Coronovirus --  Adenovirus --  Bordetella Pertussis --  Chlamydia Pneumonia --  Entero/Rhinovirus--  HKU1 Coronovirus --  HMPV Coronovirus --  Influenza A --  Influenza AH1 --  Influenza AH1 2009 --  Influenza AH3 --  Influenza B --  Mycoplasma Pneumoniae --  NL63 Coronovirus --  OC43 Coronovirus --  Parainfluenza 1 --  Parainfluenza 2 --  Parainfluenza 3 --  Parainfluenza 4 --  Resp Syncytial Virus --           Patient is a 89y old  Female who presents with a chief complaint of ams (28 Jun 2023 10:31)      HPI:  Patient with a past medical history of prior CVA on warfarin with right-sided weakness, hypertension, hyperlipidemia, coronary artery disease who is coming from her assisted living facility with altered mental status.  Present for past 2 weeks per son.  Was recently treated for suspected urinary tract infection at facility last week with no improvement of symptoms.  Son states decreased p.o. liquid intake during this time.  Usually she is alert and conversational but now she has become more forgetful.  Has chronic leg pain.Develped agitation in ER ,lorazepam was given  (26 Jun 2023 13:30)      Asked to see patient for ID Consult    PAST MEDICAL & SURGICAL HISTORY:  Hypertension      CVA (cerebral vascular accident)      Depression      Constipation      Neuropathy      Hyperlipidemia      Grade I diastolic dysfunction      Afib      Neuropathy      VHD (valvular heart disease)      Aortic valvar stenosis      No significant past surgical history          Allergies    per son &quot;issues with certain anitibiotics&quot; does not remember the names (Unknown)    Intolerances        REVIEW OF SYSTEMS:  All systems below were reviewed and are negative [  ]  HEENT:  ID:  Pulmonary:  Cardiac:  GI:  Renal:  Musculoskeletal:  All other systems above were reviewed and are negative   [  ]    HOME MEDICATIONS:    MEDICATIONS  (STANDING):  amLODIPine   Tablet 5 milliGRAM(s) Oral daily  baclofen 10 milliGRAM(s) Oral every 12 hours  cefTRIAXone   IVPB 1000 milliGRAM(s) IV Intermittent every 24 hours  cholecalciferol 2000 Unit(s) Oral daily  cyanocobalamin 1000 MICROGram(s) Oral daily  dextrose 5% + sodium chloride 0.45%. 1000 milliLiter(s) (50 mL/Hr) IV Continuous <Continuous>  escitalopram 20 milliGRAM(s) Oral daily  famotidine    Tablet 20 milliGRAM(s) Oral daily  folic acid 1 milliGRAM(s) Oral daily  furosemide    Tablet 20 milliGRAM(s) Oral daily  gabapentin 300 milliGRAM(s) Oral three times a day  hydroxyurea 500 milliGRAM(s) Oral two times a day  lactobacillus acidophilus 1 Tablet(s) Oral every 12 hours  levothyroxine 50 MICROGram(s) Oral daily  metoprolol tartrate 25 milliGRAM(s) Oral two times a day  polyethylene glycol 3350 17 Gram(s) Oral two times a day  senna 2 Tablet(s) Oral at bedtime  simvastatin 10 milliGRAM(s) Oral at bedtime    MEDICATIONS  (PRN):  acetaminophen     Tablet .. 650 milliGRAM(s) Oral every 6 hours PRN Temp greater or equal to 38C (100.4F), Mild Pain (1 - 3)  aluminum hydroxide/magnesium hydroxide/simethicone Suspension 30 milliLiter(s) Oral every 4 hours PRN Dyspepsia  LORazepam   Injectable 0.5 milliGRAM(s) IV Push every 8 hours PRN severe agitation  magnesium hydroxide Suspension 30 milliLiter(s) Oral daily PRN Constipation  melatonin 3 milliGRAM(s) Oral at bedtime PRN Insomnia  morphine  - Injectable 2 milliGRAM(s) IV Push every 4 hours PRN Severe Pain (7 - 10)  OLANZapine Injectable 2.5 milliGRAM(s) IntraMuscular every 6 hours PRN agitation  ondansetron Injectable 4 milliGRAM(s) IV Push every 8 hours PRN Nausea and/or Vomiting  traMADol 50 milliGRAM(s) Oral four times a day PRN Moderate Pain (4 - 6)      Vital Signs Last 24 Hrs  T(C): 36.8 (28 Jun 2023 20:55), Max: 36.9 (28 Jun 2023 05:30)  T(F): 98.2 (28 Jun 2023 20:55), Max: 98.4 (28 Jun 2023 05:30)  HR: 69 (28 Jun 2023 20:55) (69 - 97)  BP: 116/70 (28 Jun 2023 20:55) (116/70 - 159/77)  BP(mean): --  RR: 16 (28 Jun 2023 20:55) (16 - 16)  SpO2: 95% (28 Jun 2023 20:55) (91% - 95%)    Parameters below as of 28 Jun 2023 20:55  Patient On (Oxygen Delivery Method): room air        PHYSICAL EXAM:  HEENT: NC/AT, PERRLA  Neck: Soft  Lungs: Coarse BS bilaterally, no wheezing.  Heart: RRR, no murmurs.  Abdomen: Soft, no tenderness.   Genital/ Rectal: No salas catheter.  Extremities: No ulcers  Neurologic: Confused.         I&O's Summary    28 Jun 2023 07:01  -  28 Jun 2023 21:16  --------------------------------------------------------  IN: 0 mL / OUT: 150 mL / NET: -150 mL        LABORATORY:                          10.6   4.80  )-----------( 422      ( 27 Jun 2023 06:10 )             34.4       ESR:                   06-27 @ 06:10  --    C-Reactive Protein:     06-27 @ 06:10  --    Procalcitonin:           06-27 @ 06:10   0.06      06-27    141  |  107  |  10  ----------------------------<  116<H>  3.6   |  29  |  0.60    Ca    8.6      27 Jun 2023 06:10  Mg     2.1     06-27    TPro  6.8  /  Alb  3.0<L>  /  TBili  0.5  /  DBili  x   /  AST  12<L>  /  ALT  7<L>  /  AlkPhos  57  06-27      Rapid Respiratory Viral Panel Result        06-26 @ 09:21  Rapid RVP [facility]  Coronovirus --  Adenovirus --  Bordetella Pertussis --  Chlamydia Pneumonia --  Entero/Rhinovirus--  HKU1 Coronovirus --  HMPV Coronovirus --  Influenza A --  Influenza AH1 --  Influenza AH1 2009 --  Influenza AH3 --  Influenza B --  Mycoplasma Pneumoniae --  NL63 Coronovirus --  OC43 Coronovirus --  Parainfluenza 1 --  Parainfluenza 2 --  Parainfluenza 3 --  Parainfluenza 4 --  Resp Syncytial Virus --      LABORATORY:    CBC Full  -  ( 27 Jun 2023 06:10 )  WBC Count : 4.80 K/uL  RBC Count : 2.98 M/uL  Hemoglobin : 10.6 g/dL  Hematocrit : 34.4 %  Platelet Count - Automated : 422 K/uL  Mean Cell Volume : 115.4 fl  Mean Cell Hemoglobin : 35.6 pg  Mean Cell Hemoglobin Concentration : 30.8 gm/dL  Auto Neutrophil # : 3.56 K/uL  Auto Lymphocyte # : 0.43 K/uL  Auto Monocyte # : 0.38 K/uL  Auto Eosinophil # : 0.04 K/uL  Auto Basophil # : 0.03 K/uL  Auto Neutrophil % : 74.2 %  Auto Lymphocyte % : 9.0 %  Auto Monocyte % : 7.9 %  Auto Eosinophil % : 0.8 %  Auto Basophil % : 0.6 %        ESR:                   06-27 @ 06:10  --    C-Reactive Protein:     06-27 @ 06:10  --    Procalcitonin:           06-27 @ 06:10   0.06      06-27    141  |  107  |  10  ----------------------------<  116<H>  3.6   |  29  |  0.60    Ca    8.6      27 Jun 2023 06:10  Mg     2.1     06-27    TPro  6.8  /  Alb  3.0<L>  /  TBili  0.5  /  DBili  x   /  AST  12<L>  /  ALT  7<L>  /  AlkPhos  57  06-27                    Wound culture:                06-26 @ 10:35  Organism --  Culture w/ gram stain --  Specimen Source Clean Catch Clean Catch (Midstream)    Wound culture:                06-26 @ 07:00  Organism --  Culture w/ gram stain --  Specimen Source .Blood Blood-Peripheral    Wound culture:                06-26 @ 06:30  Organism --  Culture w/ gram stain --  Specimen Source .Blood Blood-Peripheral        Abscess culture:             06-26 @ 10:35  Organism --  Gram Stain --  Specimen Source Clean Catch Clean Catch (Midstream)    Abscess culture:             06-26 @ 07:00  Organism --  Gram Stain --  Specimen Source .Blood Blood-Peripheral    Abscess culture:             06-26 @ 06:30  Organism --  Gram Stain --  Specimen Source .Blood Blood-Peripheral            Tissue culture:           06-26 @ 10:35  Organism --  Gram Stain --  Specimen Source Clean Catch Clean Catch (Midstream)    Tissue culture:           06-26 @ 07:00  Organism --  Gram Stain --  Specimen Source .Blood Blood-Peripheral    Tissue culture:           06-26 @ 06:30  Organism --  Gram Stain --  Specimen Source .Blood Blood-Peripheral        Body Fluid Smear & Culture:                        06-26 @ 10:35  AFB Smear  --  Culture Acid Fast Body Fluid w/ Smear  --  Culture Acid Fast Smear Concentrated   --    Culture Results:       >=3 organisms. Probable collection contamination.  Specimen Source Clean Catch Clean Catch (Midstream)    Body Fluid Smear & Culture:                        06-26 @ 07:00  AFB Smear  --  Culture Acid Fast Body Fluid w/ Smear  --  Culture Acid Fast Smear Concentrated   --    Culture Results:       No growth at 48 Hours  Specimen Source .Blood Blood-Peripheral    Body Fluid Smear & Culture:                        06-26 @ 06:30  AFB Smear  --  Culture Acid Fast Body Fluid w/ Smear  --  Culture Acid Fast Smear Concentrated   --    Culture Results:       No growth at 48 Hours  Specimen Source .Blood Blood-Peripheral      Assessment and plan:    1. UTI.  2. Toxic metabolic encephalopathy.    . Complete IV Rocephin tomorrow.  . Supportive care.    Thank you.

## 2023-06-29 ENCOUNTER — TRANSCRIPTION ENCOUNTER (OUTPATIENT)
Age: 88
End: 2023-06-29

## 2023-06-29 DIAGNOSIS — G93.40 ENCEPHALOPATHY, UNSPECIFIED: ICD-10-CM

## 2023-06-29 DIAGNOSIS — S81.802D UNSPECIFIED OPEN WOUND, LEFT LOWER LEG, SUBSEQUENT ENCOUNTER: ICD-10-CM

## 2023-06-29 LAB
ANION GAP SERPL CALC-SCNC: 5 MMOL/L — SIGNIFICANT CHANGE UP (ref 5–17)
BUN SERPL-MCNC: 9 MG/DL — SIGNIFICANT CHANGE UP (ref 7–23)
CALCIUM SERPL-MCNC: 8.4 MG/DL — LOW (ref 8.5–10.1)
CHLORIDE SERPL-SCNC: 107 MMOL/L — SIGNIFICANT CHANGE UP (ref 96–108)
CO2 SERPL-SCNC: 28 MMOL/L — SIGNIFICANT CHANGE UP (ref 22–31)
CREAT SERPL-MCNC: 0.5 MG/DL — SIGNIFICANT CHANGE UP (ref 0.5–1.3)
EGFR: 88 ML/MIN/1.73M2 — SIGNIFICANT CHANGE UP
GLUCOSE SERPL-MCNC: 126 MG/DL — HIGH (ref 70–99)
HCT VFR BLD CALC: 32.2 % — LOW (ref 34.5–45)
HGB BLD-MCNC: 10.3 G/DL — LOW (ref 11.5–15.5)
INR BLD: 1.83 RATIO — HIGH (ref 0.88–1.16)
MCHC RBC-ENTMCNC: 32 GM/DL — SIGNIFICANT CHANGE UP (ref 32–36)
MCHC RBC-ENTMCNC: 35.4 PG — HIGH (ref 27–34)
MCV RBC AUTO: 110.7 FL — HIGH (ref 80–100)
NRBC # BLD: 0 /100 WBCS — SIGNIFICANT CHANGE UP (ref 0–0)
PLATELET # BLD AUTO: 406 K/UL — HIGH (ref 150–400)
POTASSIUM SERPL-MCNC: 3.6 MMOL/L — SIGNIFICANT CHANGE UP (ref 3.5–5.3)
POTASSIUM SERPL-SCNC: 3.6 MMOL/L — SIGNIFICANT CHANGE UP (ref 3.5–5.3)
PROTHROM AB SERPL-ACNC: 21.6 SEC — HIGH (ref 10.5–13.4)
RBC # BLD: 2.91 M/UL — LOW (ref 3.8–5.2)
RBC # FLD: 17 % — HIGH (ref 10.3–14.5)
SODIUM SERPL-SCNC: 140 MMOL/L — SIGNIFICANT CHANGE UP (ref 135–145)
TROPONIN I, HIGH SENSITIVITY RESULT: 20.5 NG/L — SIGNIFICANT CHANGE UP
WBC # BLD: 5.35 K/UL — SIGNIFICANT CHANGE UP (ref 3.8–10.5)
WBC # FLD AUTO: 5.35 K/UL — SIGNIFICANT CHANGE UP (ref 3.8–10.5)

## 2023-06-29 PROCEDURE — 99221 1ST HOSP IP/OBS SF/LOW 40: CPT

## 2023-06-29 RX ORDER — COLLAGENASE CLOSTRIDIUM HIST. 250 UNIT/G
1 OINTMENT (GRAM) TOPICAL DAILY
Refills: 0 | Status: DISCONTINUED | OUTPATIENT
Start: 2023-06-29 | End: 2023-06-30

## 2023-06-29 RX ORDER — WARFARIN SODIUM 2.5 MG/1
1.5 TABLET ORAL ONCE
Refills: 0 | Status: COMPLETED | OUTPATIENT
Start: 2023-06-29 | End: 2023-06-29

## 2023-06-29 RX ORDER — WARFARIN SODIUM 2.5 MG/1
2 TABLET ORAL ONCE
Refills: 0 | Status: DISCONTINUED | OUTPATIENT
Start: 2023-06-29 | End: 2023-06-29

## 2023-06-29 RX ADMIN — HYDROXYUREA 500 MILLIGRAM(S): 500 CAPSULE ORAL at 18:48

## 2023-06-29 RX ADMIN — GABAPENTIN 300 MILLIGRAM(S): 400 CAPSULE ORAL at 21:07

## 2023-06-29 RX ADMIN — Medication 1 TABLET(S): at 06:05

## 2023-06-29 RX ADMIN — Medication 25 MILLIGRAM(S): at 06:05

## 2023-06-29 RX ADMIN — GABAPENTIN 300 MILLIGRAM(S): 400 CAPSULE ORAL at 06:28

## 2023-06-29 RX ADMIN — FAMOTIDINE 20 MILLIGRAM(S): 10 INJECTION INTRAVENOUS at 12:15

## 2023-06-29 RX ADMIN — Medication 10 MILLIGRAM(S): at 06:05

## 2023-06-29 RX ADMIN — POLYETHYLENE GLYCOL 3350 17 GRAM(S): 17 POWDER, FOR SOLUTION ORAL at 17:34

## 2023-06-29 RX ADMIN — SIMVASTATIN 10 MILLIGRAM(S): 20 TABLET, FILM COATED ORAL at 21:06

## 2023-06-29 RX ADMIN — SENNA PLUS 2 TABLET(S): 8.6 TABLET ORAL at 21:07

## 2023-06-29 RX ADMIN — CEFTRIAXONE 100 MILLIGRAM(S): 500 INJECTION, POWDER, FOR SOLUTION INTRAMUSCULAR; INTRAVENOUS at 06:28

## 2023-06-29 RX ADMIN — HYDROXYUREA 500 MILLIGRAM(S): 500 CAPSULE ORAL at 06:28

## 2023-06-29 RX ADMIN — Medication 20 MILLIGRAM(S): at 06:05

## 2023-06-29 RX ADMIN — WARFARIN SODIUM 1.5 MILLIGRAM(S): 2.5 TABLET ORAL at 21:07

## 2023-06-29 RX ADMIN — Medication 2000 UNIT(S): at 12:12

## 2023-06-29 RX ADMIN — AMLODIPINE BESYLATE 5 MILLIGRAM(S): 2.5 TABLET ORAL at 06:05

## 2023-06-29 RX ADMIN — PREGABALIN 1000 MICROGRAM(S): 225 CAPSULE ORAL at 12:13

## 2023-06-29 RX ADMIN — Medication 10 MILLIGRAM(S): at 17:59

## 2023-06-29 RX ADMIN — Medication 50 MICROGRAM(S): at 06:05

## 2023-06-29 RX ADMIN — Medication 1 TABLET(S): at 17:33

## 2023-06-29 RX ADMIN — SODIUM CHLORIDE 50 MILLILITER(S): 9 INJECTION, SOLUTION INTRAVENOUS at 07:43

## 2023-06-29 RX ADMIN — Medication 1 MILLIGRAM(S): at 12:13

## 2023-06-29 RX ADMIN — ESCITALOPRAM OXALATE 20 MILLIGRAM(S): 10 TABLET, FILM COATED ORAL at 12:13

## 2023-06-29 RX ADMIN — Medication 25 MILLIGRAM(S): at 17:33

## 2023-06-29 RX ADMIN — POLYETHYLENE GLYCOL 3350 17 GRAM(S): 17 POWDER, FOR SOLUTION ORAL at 06:01

## 2023-06-29 RX ADMIN — GABAPENTIN 300 MILLIGRAM(S): 400 CAPSULE ORAL at 15:52

## 2023-06-29 RX ADMIN — SODIUM CHLORIDE 50 MILLILITER(S): 9 INJECTION, SOLUTION INTRAVENOUS at 06:00

## 2023-06-29 NOTE — SWALLOW BEDSIDE ASSESSMENT ADULT - SLP PERTINENT HISTORY OF CURRENT PROBLEM
Pt known to speech tx from previous admission, had MBS 4/22/22 Rx Soft & bite sized with Mildly thick liquids, at that time, pt requesting regular with thin, GOC conversation regarding nutrition/hydration rx'd due to pt request, see report for details.
Pt known to speech tx from previous admission, had MBS 4/22/22 Rx Soft & bite sized with Mildly thick liquids, at that time, pt requesting regular with thin, GOC conversation regarding nutrition/hydration rx'd due to pt request, see report for details.

## 2023-06-29 NOTE — SWALLOW BEDSIDE ASSESSMENT ADULT - SWALLOW EVAL: DIAGNOSIS
Mild oral dysphagia with soft & bite sized and thin liquids marked by suspected posterior loss.  Mild oral dysphagia with easy to chew and regular solid marked by prolonged mastication time, suspected posterior loss.  Pharyngeal stage deemed functional, no overt s/s penetration/aspiration noted.

## 2023-06-29 NOTE — PROGRESS NOTE ADULT - SUBJECTIVE AND OBJECTIVE BOX
PROGRESS NOTE  Patient is a 89y old  Female who presents with a chief complaint of ams (29 Jun 2023 09:23)  Chart and available morning labs /imaging are reviewed electronically , urgent issues addressed . More information  is being added upon completion of rounds , when more information is collected and management discussed with consultants , medical staff and social service/case management on the floor   OVERNIGHT  No new issues reported by medical staff . All above noted Patient is resting in a bed comfortably .Confused ,poor mentation .No distress noted   HPI:  Patient with a past medical history of prior CVA on warfarin with right-sided weakness, hypertension, hyperlipidemia, coronary artery disease who is coming from her assisted living facility with altered mental status.  Present for past 2 weeks per son.  Was recently treated for suspected urinary tract infection at facility last week with no improvement of symptoms.  Son states decreased p.o. liquid intake during this time.  Usually she is alert and conversational but now she has become more forgetful.  Has chronic leg pain.Develped agitation in ER ,lorazepam was given  (26 Jun 2023 13:30)    PAST MEDICAL & SURGICAL HISTORY:  Hypertension      CVA (cerebral vascular accident)      Depression      Constipation      Neuropathy      Hyperlipidemia      Grade I diastolic dysfunction      Afib      Neuropathy      VHD (valvular heart disease)      Aortic valvar stenosis      No significant past surgical history          MEDICATIONS  (STANDING):  amLODIPine   Tablet 5 milliGRAM(s) Oral daily  baclofen 10 milliGRAM(s) Oral every 12 hours  cholecalciferol 2000 Unit(s) Oral daily  collagenase Ointment 1 Application(s) Topical daily  cyanocobalamin 1000 MICROGram(s) Oral daily  dextrose 5% + sodium chloride 0.45%. 1000 milliLiter(s) (50 mL/Hr) IV Continuous <Continuous>  escitalopram 20 milliGRAM(s) Oral daily  famotidine    Tablet 20 milliGRAM(s) Oral daily  folic acid 1 milliGRAM(s) Oral daily  furosemide    Tablet 20 milliGRAM(s) Oral daily  gabapentin 300 milliGRAM(s) Oral three times a day  hydroxyurea 500 milliGRAM(s) Oral two times a day  lactobacillus acidophilus 1 Tablet(s) Oral every 12 hours  levothyroxine 50 MICROGram(s) Oral daily  metoprolol tartrate 25 milliGRAM(s) Oral two times a day  polyethylene glycol 3350 17 Gram(s) Oral two times a day  senna 2 Tablet(s) Oral at bedtime  simvastatin 10 milliGRAM(s) Oral at bedtime  warfarin 2 milliGRAM(s) Oral once    MEDICATIONS  (PRN):  acetaminophen     Tablet .. 650 milliGRAM(s) Oral every 6 hours PRN Temp greater or equal to 38C (100.4F), Mild Pain (1 - 3)  aluminum hydroxide/magnesium hydroxide/simethicone Suspension 30 milliLiter(s) Oral every 4 hours PRN Dyspepsia  LORazepam   Injectable 0.5 milliGRAM(s) IV Push every 8 hours PRN severe agitation  magnesium hydroxide Suspension 30 milliLiter(s) Oral daily PRN Constipation  melatonin 3 milliGRAM(s) Oral at bedtime PRN Insomnia  morphine  - Injectable 2 milliGRAM(s) IV Push every 4 hours PRN Severe Pain (7 - 10)  OLANZapine Injectable 2.5 milliGRAM(s) IntraMuscular every 6 hours PRN agitation  ondansetron Injectable 4 milliGRAM(s) IV Push every 8 hours PRN Nausea and/or Vomiting  traMADol 50 milliGRAM(s) Oral four times a day PRN Moderate Pain (4 - 6)      OBJECTIVE    T(C): 36.7 (06-29-23 @ 12:46), Max: 36.8 (06-28-23 @ 20:55)  HR: 82 (06-29-23 @ 12:46) (69 - 97)  BP: 124/79 (06-29-23 @ 12:46) (116/70 - 133/84)  RR: 17 (06-29-23 @ 12:46) (16 - 17)  SpO2: 92% (06-29-23 @ 12:46) (92% - 95%)  Wt(kg): --  I&O's Summary    28 Jun 2023 07:01 - 29 Jun 2023 07:00  --------------------------------------------------------  IN: 650 mL / OUT: 350 mL / NET: 300 mL    29 Jun 2023 07:01 - 29 Jun 2023 16:39  --------------------------------------------------------  IN: 0 mL / OUT: 600 mL / NET: -600 mL          REVIEW OF SYSTEMS:  CONSTITUTIONAL: No fever, weight loss, or fatigue  EYES: No eye pain, visual disturbances, or discharge  ENMT:   No sinus or throat pain  NECK: No pain or stiffness  RESPIRATORY: No cough, wheezing, chills or hemoptysis; No shortness of breath  CARDIOVASCULAR: No chest pain, palpitations, dizziness, or leg swelling  GASTROINTESTINAL: No abdominal pain. No nausea, vomiting; No diarrhea or constipation. No melena or hematochezia.  GENITOURINARY: No dysuria, frequency, hematuria, or incontinence  NEUROLOGICAL: No headaches, memory loss, loss of strength, numbness, or tremors  SKIN: No itching, burning, rashes, or lesions   MUSCULOSKELETAL: No joint pain or swelling; No muscle, back, or extremity pain    PHYSICAL EXAM:  Appearance: NAD. VS past 24 hrs -as above   HEENT:   Moist oral mucosa. Conjunctiva clear b/l.   Neck : supple  Respiratory: Lungs CTAB.  Gastrointestinal:  Soft, nontender. No rebound. No rigidity. BS present	  Cardiovascular: RRR ,S1S2 present  Neurologic: Non-focal. Moving all extremities.  Extremities: No edema. No erythema. No calf tenderness.  Skin: No rashes, No ecchymoses, No cyanosis.	 left shin wound -spoke to  clinic and consult requested   wounds ,skin lesions-See skin assesment flow sheet   LABS:                        10.3   5.35  )-----------( 406      ( 29 Jun 2023 06:03 )             32.2     06-29    140  |  107  |  9   ----------------------------<  126<H>  3.6   |  28  |  0.50    Ca    8.4<L>      29 Jun 2023 06:03      CAPILLARY BLOOD GLUCOSE        PT/INR - ( 28 Jun 2023 07:30 )   PT: 28.0 sec;   INR: 2.37 ratio           Urinalysis Basic - ( 29 Jun 2023 06:03 )    Color: x / Appearance: x / SG: x / pH: x  Gluc: 126 mg/dL / Ketone: x  / Bili: x / Urobili: x   Blood: x / Protein: x / Nitrite: x   Leuk Esterase: x / RBC: x / WBC x   Sq Epi: x / Non Sq Epi: x / Bacteria: x        Culture - Urine (collected 26 Jun 2023 10:35)  Source: Clean Catch Clean Catch (Midstream)  Final Report (27 Jun 2023 19:26):    >=3 organisms. Probable collection contamination.    Culture - Blood (collected 26 Jun 2023 07:00)  Source: .Blood Blood-Peripheral  Preliminary Report (29 Jun 2023 13:01):    No growth at 72 Hours    Culture - Blood (collected 26 Jun 2023 06:30)  Source: .Blood Blood-Peripheral  Preliminary Report (29 Jun 2023 13:01):    No growth at 72 Hours      RADIOLOGY & ADDITIONAL TESTS:   reviewed elctronically  ASSESSMENT/PLAN: 	25 minutes aggregate time was spent on this visit, 50% visit time spent in care co-ordination with other attendings and counselling patient .I have discussed care plan with patient / HCP/family member  natali davis on a phone today ,who expressed understanding of problems treatment and their effect and side effects, alternatives in details. I have asked if they have any questions and concerns and appropriately addressed them to best of my ability. ACP-Advance care planning was discussed , pallitaive care issues ,CMO ,GOC ,MOLST  form ,advance directives were reviewed .All questions were answered to the best of my knowledge - 25 min spent.

## 2023-06-29 NOTE — DISCHARGE NOTE PROVIDER - CARE PROVIDER_API CALL
Facundo Valentine  Infectious Disease  [address] [place] [address]  Phone: [phone]  Fax: [phone]  Follow Up Time: 1 week    Twan Moralez  Cardiology  [address] [place] [address], [facility] [address]  [place] [address]  Phone: [phone]  Fax: [phone]  Follow Up Time: 2 weeks    Lynn Proctor  Neurology  [address] [place] [address]  Phone: [phone]  Fax: [phone]  Follow Up Time: 1 month    Adrian Gan [facility]kle [facility]  [address] [place] [id number]  Phone: [phone]  Fax: [phone]  Follow Up Time: 2 weeks   Facundo Valentine  Infectious Disease  [address] [place] [address]  Phone: [phone]  Fax: [phone]  Follow Up Time: 1 week    Twan Moralez  Cardiology  [address] [place] [address], [facility] [address]  [place] [address]  Phone: [phone]  Fax: [phone]  Follow Up Time: 2 weeks    Lynn Proctor  Neurology  [address] [place] [address]  Phone: [phone]  Fax: [phone]  Follow Up Time: 1 month    Krish Foote  Plastic Surgery  [address] [place] [address]  Phone: [phone]  Fax: [phone]  Follow Up Time: 2 weeks

## 2023-06-29 NOTE — DISCHARGE NOTE PROVIDER - NSDCMRMEDTOKEN_GEN_ALL_CORE_FT
amLODIPine 5 mg oral tablet: 1 tab(s) orally once a day  baclofen 10 mg oral tablet: 1 tab(s) orally 2 times a day  cholecalciferol 50 mcg (2000 intl units) oral tablet: 1 tab(s) orally once a day  cyanocobalamin 1000 mcg oral tablet: 1 tab(s) orally once a day  docusate sodium 100 mg oral capsule: 2 cap(s) orally once a day (at bedtime)  escitalopram 20 mg oral tablet: 1 tab(s) orally once a day  famotidine 20 mg oral tablet: 1 tab(s) orally once a day  folic acid 1 mg oral tablet: 1 tab(s) orally once a day  furosemide 20 mg oral tablet: 1 tab(s) orally once a day  gabapentin 300 mg oral capsule: 1 cap(s) orally 3 times a day  hydroxyurea 500 mg oral capsule: 1 cap(s) orally 2 times a day  lactobacillus acidophilus oral tablet: 2 tab(s) orally once a day  levothyroxine 50 mcg (0.05 mg) oral tablet: 1 tab(s) orally once a day  metoprolol tartrate 25 mg oral tablet: 1 tab(s) orally 2 times a day  senna oral tablet: 1 tab(s) orally once a day (at bedtime)  simvastatin 10 mg oral tablet: 1 tab(s) orally once a day (at bedtime)  traMADol 50 mg oral tablet: 0.5 tab(s) orally 3 times a day PRN for pain  Tylenol 500 mg oral tablet: 1 tab(s) orally 2 times a day  Tylenol 500 mg oral tablet: 2 tab(s) orally once a day in the morning  warfarin 1 mg oral tablet: 1 tab(s) orally once (at bedtime) HOLD FOR INR ABIOVE 3.0   amLODIPine 5 mg oral tablet: 1 tab(s) orally once a day  baclofen 10 mg oral tablet: 1 tab(s) orally 2 times a day  cholecalciferol 50 mcg (2000 intl units) oral tablet: 1 tab(s) orally once a day  collagenase 250 units/g topical ointment: Apply topically to affected area once a day TO LEFT SHIN WOUND  cyanocobalamin 1000 mcg oral tablet: 1 tab(s) orally once a day  docusate sodium 100 mg oral capsule: 2 cap(s) orally once a day (at bedtime)  escitalopram 20 mg oral tablet: 1 tab(s) orally once a day  famotidine 20 mg oral tablet: 1 tab(s) orally once a day  folic acid 1 mg oral tablet: 1 tab(s) orally once a day  furosemide 20 mg oral tablet: 1 tab(s) orally once a day  gabapentin 300 mg oral capsule: 1 cap(s) orally 3 times a day  hydroxyurea 500 mg oral capsule: 1 cap(s) orally 2 times a day  lactobacillus acidophilus oral tablet: 2 tab(s) orally once a day  levothyroxine 50 mcg (0.05 mg) oral tablet: 1 tab(s) orally once a day  metoprolol tartrate 25 mg oral tablet: 1 tab(s) orally 2 times a day  senna oral tablet: 1 tab(s) orally once a day (at bedtime)  simvastatin 10 mg oral tablet: 1 tab(s) orally once a day (at bedtime)  traMADol 50 mg oral tablet: 0.5 tab(s) orally 3 times a day PRN for pain  Tylenol 500 mg oral tablet: 1 tab(s) orally 2 times a day  Tylenol 500 mg oral tablet: 2 tab(s) orally once a day in the morning  warfarin 1 mg oral tablet: 1 tab(s) orally once a day HOLD FOR INR ABOVE 3.0 .NEXT INR 07/03/23 .

## 2023-06-29 NOTE — DISCHARGE NOTE PROVIDER - PROVIDER TOKENS
PROVIDER:[TOKEN:[[id number]],FOLLOWUP:[1 week]],PROVIDER:[TOKEN:[[id number]],FOLLOWUP:[2 weeks]],PROVIDER:[TOKEN:[[id number]],FOLLOWUP:[1 month]],PROVIDER:[TOKEN:[[id number]],FOLLOWUP:[2 weeks]] PROVIDER:[TOKEN:[[id number]],FOLLOWUP:[1 week]],PROVIDER:[TOKEN:[[id number]],FOLLOWUP:[2 weeks]],PROVIDER:[TOKEN:[[id number]],FOLLOWUP:[1 month]],PROVIDER:[TOKEN:[[id number]],FOLLOWUP:[2 weeks]]

## 2023-06-29 NOTE — CONSULT NOTE ADULT - ASSESSMENT
Initial evaluation/Pulmonary Critical Care consultation requested on 6/26/2023   by Dr BLANCO   from Dr De La Rosa   Patient examined chart reviewed    HOSPITAL ADMISSION   PATIENT CAME  FROM (if information available)      REASON FOR VISIT  .. Management of problems listed below      NOTEWORTHY FINDINGS.     PHYSICAL EXAM    HEENT Unremarkable  atraumatic   RESP Fair air entry  Harsh breath sound   CARDIAC S1 S2 No S3     NO JVD    ABDOMEN No hepatosplenomegaly   PEDAL EDEMA present No calf tenderness  NO rash     GENERAL DATA .     GOC.    .. 6/26/2023 full code   ICU STAY.   .. none  COVID.   .. scv2 6/26/2023 scv2 (-)      BEST PRACTICE ISSUES.    HOB ELEVATN.   .. Yes  DVT PPLX.   ..    6/26/2023 On coumadin at home On arrival INR therapeutic   STRESS ULCER PPLX.   ..      INFECTION PPLX.   ..    SP SW RICKY.    ..        DIET.    ..  6/26/2023 soft bite size  IV fl.  ..      PROCEDURES.  ..   PAST PROCEDURES.    ..     GENERAL DATA .     ALLGY.  ..   certain abio                      WT.  ..  6/26/2023 61  BMI.  ..    6/26/2023 21      ABGS.    VS/ PO/IO/ VENT/ DRIPS.   6/26/2023 afeb 86 130/60        CC/DOA.  . 6/26/2023 AMS combative   . (Normally a O x 4)    PRESENTATION.  . 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  Present for past 2 weeks per son.  Was recently treated for suspected urinary tract infection at facility last week with no improvement of symptoms.  Son states decreased p.o. liquid intake during this time.  Usually she is alert and conversational but now she has become more forgetful.  Has chronic leg pain.    PMH.  . pmh DVT  . pmh Anemia  . pmh leg wounds  . pmh A fib  . pmh GERD  . pmh CVA r sided weakness     HOME MEDS.   . Cefadroxil (recent uti 6/2023) levoxyl 50 warfarin hydrea 500.2         CC/DOA.  . 6/26/2023 89 f AMS combative   . (Normally a O x 4)    . 6/26/2023 Pulm consulted for pleural effs seen on CXR     PROBLEMS/ASSESSMENT/RECOMMENDATIONS (A/R).  PULMONARY.  . GAS EXCHANGE.  .. A/R. Monitor and target po 90-95%    . Left Pl effsn 6/26/2023 CXR   .. CXR 6/26/2023 cw 9/2/2022  .... CM   .... small l pl effsn / scarring   .. A/R 6/26/2023 Will check ct ch to further characterize abnormality l lower zone     ID.  . INFECTION.  .. w 6/26/2023 w 4   .. ua 6/26/2023 w 80 bact many nitr (+) L estrase large  .. rvp 6/26/2023 rvp (-)    .. A/R Doubt infection         CARDIO.  . CAD.   .. Tr 6/26/2023 Tr 10.8   .. 6/26/2023 simvastat 10  .. 6/26/2023 metoprolol 25.2   .. A/R Monitor     . Hypertension.  .. 6/26/2023 amlodipine 5   .. Optimize bp     . CHF.  .. 6/26/2023 lasix 20   .. A/R Adjust rx for chf Monitor lytes     . A fib on coumadin at home 6/26/2023 .  .. 6/26/2023 metoprolol 25.2  .. 6/26/2023 On coumadin at home   .. A/R cont bb and coumadin  Target INR 2-3    HEMAT.  . ANEMIA.  .. Hb 6/26/2023 Hb 10.6  .. A/R Monitor    RENAL.  .. Na 6/26/2023 Na 144  .. K 6/26/2023 K 4.1   .. CO2 6/26/2023 CO2 30   .. Cr 6/26/2023 Cr .6     NEURO  .. ALTERED MENTAL STATUS 6/26/2023 (Normally AO x 4)   .. CT h 6/26/2023 ct h (-) nsc cw 4/2022        . POST DISCHARGE  RECOMMENDATIONS (A/R).  .. A fib on coumadin        . CURRENT PROBLEMS.   .. Left pl effsn 6/26/2023 CXR   .. A fib on coumadin at home 6/26/2023  .. ANEMIA 6/26/2023 Hb 10.6   .. ALTERED MENTAL STATUS 6/26/2023 (Normally AO x 4) 6/26/2023 CT h (-)     . TIME SPENT   . About 55 minutes aggregate care time spent on encounter; activities included   direct patient care, counseling and/or coordinating care reviewing notes, lab data/ imaging , discussion with multidisciplinary team/ patient  /family and explaining in detail risks, benefits, alternatives  of the recommendations     AB MENDOZA 89 f 6/26/2023 3/15/1932 DR YURI CARRINGTON   
wound left shin appears superficial

## 2023-06-29 NOTE — PROGRESS NOTE ADULT - SUBJECTIVE AND OBJECTIVE BOX
[facility] [place]       HPI:  Patient with a past medical history of prior CVA on warfarin with right-sided weakness, hypertension, hyperlipidemia, coronary artery disease who is coming from her assisted living facility with altered mental status.  Present for past 2 weeks per son.  Was recently treated for suspected urinary tract infection at facility last week with no improvement of symptoms.  Son states decreased p.o. liquid intake during this time.  Usually she is alert and conversational but now she has become more forgetful.  Has chronic leg pain.Develped agitation in ER ,lorazepam was given  (26 Jun 2023 13:30)        SUBJECTIVE:      ALLERGIES:  Allergies    per son &quot;issues with certain anitibiotics&quot; does not remember the names (Unknown)    Intolerances          MEDICATIONS  (STANDING):  amLODIPine   Tablet 5 milliGRAM(s) Oral daily  baclofen 10 milliGRAM(s) Oral every 12 hours  cholecalciferol 2000 Unit(s) Oral daily  collagenase Ointment 1 Application(s) Topical daily  cyanocobalamin 1000 MICROGram(s) Oral daily  dextrose 5% + sodium chloride 0.45%. 1000 milliLiter(s) (50 mL/Hr) IV Continuous <Continuous>  escitalopram 20 milliGRAM(s) Oral daily  famotidine    Tablet 20 milliGRAM(s) Oral daily  folic acid 1 milliGRAM(s) Oral daily  furosemide    Tablet 20 milliGRAM(s) Oral daily  gabapentin 300 milliGRAM(s) Oral three times a day  hydroxyurea 500 milliGRAM(s) Oral two times a day  lactobacillus acidophilus 1 Tablet(s) Oral every 12 hours  levothyroxine 50 MICROGram(s) Oral daily  metoprolol tartrate 25 milliGRAM(s) Oral two times a day  polyethylene glycol 3350 17 Gram(s) Oral two times a day  senna 2 Tablet(s) Oral at bedtime  simvastatin 10 milliGRAM(s) Oral at bedtime    MEDICATIONS  (PRN):  acetaminophen     Tablet .. 650 milliGRAM(s) Oral every 6 hours PRN Temp greater or equal to 38C (100.4F), Mild Pain (1 - 3)  aluminum hydroxide/magnesium hydroxide/simethicone Suspension 30 milliLiter(s) Oral every 4 hours PRN Dyspepsia  LORazepam   Injectable 0.5 milliGRAM(s) IV Push every 8 hours PRN severe agitation  magnesium hydroxide Suspension 30 milliLiter(s) Oral daily PRN Constipation  melatonin 3 milliGRAM(s) Oral at bedtime PRN Insomnia  morphine  - Injectable 2 milliGRAM(s) IV Push every 4 hours PRN Severe Pain (7 - 10)  OLANZapine Injectable 2.5 milliGRAM(s) IntraMuscular every 6 hours PRN agitation  ondansetron Injectable 4 milliGRAM(s) IV Push every 8 hours PRN Nausea and/or Vomiting  traMADol 50 milliGRAM(s) Oral four times a day PRN Moderate Pain (4 - 6)      REVIEW OF SYSTEMS:  CONSTITUTIONAL: No fever,  RESPIRATORY: No cough, wheezing, shortness of breath  CARDIOVASCULAR: No chest pain, dyspnea, palpitations, dizziness, syncope, paroxysmal nocturnal dyspnea, orthopnea, or arm or leg swelling  GASTROINTESTINAL: No abdominal  or epigastric pain, nausea, vomiting,  diarrhea  NEUROLOGICAL: No headaches,  loss of strength, numbness, or tremors    Vital Signs Last 24 Hrs  T(C): 36.8 (29 Jun 2023 20:09), Max: 36.8 (29 Jun 2023 04:35)  T(F): 98.2 (29 Jun 2023 20:09), Max: 98.2 (29 Jun 2023 04:35)  HR: 82 (29 Jun 2023 20:09) (82 - 97)  BP: 106/66 (29 Jun 2023 20:09) (106/66 - 135/80)  BP(mean): --  RR: 17 (29 Jun 2023 20:09) (16 - 17)  SpO2: 92% (29 Jun 2023 20:09) (92% - 92%)    Parameters below as of 29 Jun 2023 20:09  Patient On (Oxygen Delivery Method): room air        PHYSICAL EXAM:  HEAD:  Atraumatic, Normocephalic  NECK: Supple and normal thyroid.  No JVD or carotid bruit.   HEART: S1, S2 regular , 1/6 soft ejection systolic murmur in mitral area , no thrill and no gallops .  PULMONARY: Bilateral vesicular breathing , few scattered ronchi and few scattered rales are present .  ABDOMEN: Soft nontender and positive bowl sounds   EXTREMITIES:  No clubbing, cyanosis, or pedal  edema  NEUROLOGICAL: AAOX3 , no focal deficit .    LABS:                        10.3   5.35  )-----------( 406      ( 29 Jun 2023 06:03 )             32.2     06-29    140  |  107  |  9   ----------------------------<  126<H>  3.6   |  28  |  0.50    Ca    8.4<L>      29 Jun 2023 06:03          PT/INR - ( 29 Jun 2023 16:35 )   PT: 21.6 sec;   INR: 1.83 ratio           Urinalysis Basic - ( 29 Jun 2023 06:03 )    Color: x / Appearance: x / SG: x / pH: x  Gluc: 126 mg/dL / Ketone: x  / Bili: x / Urobili: x   Blood: x / Protein: x / Nitrite: x   Leuk Esterase: x / RBC: x / WBC x   Sq Epi: x / Non Sq Epi: x / Bacteria: x      BNP      EKG:  ECHO:  IMAGING:    Assessment/Plan    Will continue to follow during hospital course and give further recommendations as needed . Thanks for your referral . if any questions please contact me at office ([phone])cell [phone])  JIM ARELLANO MD [place] [address]  [address]  OFFICE : [phone]   CELL : [phone]  CARDIOLOGY F/U  :       HPI:  Patient with a past medical history of prior CVA on warfarin with right-sided weakness, hypertension, hyperlipidemia, coronary artery disease who is coming from her assisted living facility with altered mental status.  Present for past 2 weeks per son.  Was recently treated for suspected urinary tract infection at facility last week with no improvement of symptoms.  Son states decreased p.o. liquid intake during this time.  Usually she is alert and conversational but now she has become more forgetful.  Has chronic leg pain.Develped agitation in ER ,lorazepam was given  (26 Jun 2023 13:30)        SUBJECTIVE: Patient feeling better .      ALLERGIES:  Allergies    per son &quot;issues with certain anitibiotics&quot; does not remember the names (Unknown)    Intolerances          MEDICATIONS  (STANDING):  amLODIPine   Tablet 5 milliGRAM(s) Oral daily  baclofen 10 milliGRAM(s) Oral every 12 hours  cholecalciferol 2000 Unit(s) Oral daily  collagenase Ointment 1 Application(s) Topical daily  cyanocobalamin 1000 MICROGram(s) Oral daily  dextrose 5% + sodium chloride 0.45%. 1000 milliLiter(s) (50 mL/Hr) IV Continuous <Continuous>  escitalopram 20 milliGRAM(s) Oral daily  famotidine    Tablet 20 milliGRAM(s) Oral daily  folic acid 1 milliGRAM(s) Oral daily  furosemide    Tablet 20 milliGRAM(s) Oral daily  gabapentin 300 milliGRAM(s) Oral three times a day  hydroxyurea 500 milliGRAM(s) Oral two times a day  lactobacillus acidophilus 1 Tablet(s) Oral every 12 hours  levothyroxine 50 MICROGram(s) Oral daily  metoprolol tartrate 25 milliGRAM(s) Oral two times a day  polyethylene glycol 3350 17 Gram(s) Oral two times a day  senna 2 Tablet(s) Oral at bedtime  simvastatin 10 milliGRAM(s) Oral at bedtime    MEDICATIONS  (PRN):  acetaminophen     Tablet .. 650 milliGRAM(s) Oral every 6 hours PRN Temp greater or equal to 38C (100.4F), Mild Pain (1 - 3)  aluminum hydroxide/magnesium hydroxide/simethicone Suspension 30 milliLiter(s) Oral every 4 hours PRN Dyspepsia  LORazepam   Injectable 0.5 milliGRAM(s) IV Push every 8 hours PRN severe agitation  magnesium hydroxide Suspension 30 milliLiter(s) Oral daily PRN Constipation  melatonin 3 milliGRAM(s) Oral at bedtime PRN Insomnia  morphine  - Injectable 2 milliGRAM(s) IV Push every 4 hours PRN Severe Pain (7 - 10)  OLANZapine Injectable 2.5 milliGRAM(s) IntraMuscular every 6 hours PRN agitation  ondansetron Injectable 4 milliGRAM(s) IV Push every 8 hours PRN Nausea and/or Vomiting  traMADol 50 milliGRAM(s) Oral four times a day PRN Moderate Pain (4 - 6)      REVIEW OF SYSTEMS:  CONSTITUTIONAL: No fever,  RESPIRATORY: No cough, wheezing, shortness of breath  CARDIOVASCULAR: No chest pain, dyspnea, palpitations, dizziness, syncope, paroxysmal nocturnal dyspnea, orthopnea, or arm or leg swelling  GASTROINTESTINAL: No abdominal  or epigastric pain, nausea, vomiting,  diarrhea  NEUROLOGICAL: No headaches,  loss of strength, numbness, or tremors    Vital Signs Last 24 Hrs  T(C): 36.8 (29 Jun 2023 20:09), Max: 36.8 (29 Jun 2023 04:35)  T(F): 98.2 (29 Jun 2023 20:09), Max: 98.2 (29 Jun 2023 04:35)  HR: 82 (29 Jun 2023 20:09) (82 - 97)  BP: 106/66 (29 Jun 2023 20:09) (106/66 - 135/80)  BP(mean): --  RR: 17 (29 Jun 2023 20:09) (16 - 17)  SpO2: 92% (29 Jun 2023 20:09) (92% - 92%)    Parameters below as of 29 Jun 2023 20:09  Patient On (Oxygen Delivery Method): room air        PHYSICAL EXAM:  HEAD:  Atraumatic, Normocephalic  NECK: Supple and normal thyroid.  No JVD or carotid bruit.   HEART: S1, S2 regular , 1/6 soft ejection systolic murmur in mitral area , no thrill and no gallops .  PULMONARY: Bilateral vesicular breathing , few scattered ronchi and few scattered rales are present .  ABDOMEN: Soft nontender and positive bowl sounds   EXTREMITIES:  No clubbing, cyanosis, or pedal  edema  NEUROLOGICAL: AAOX3 , no focal deficit .    LABS:                        10.3   5.35  )-----------( 406      ( 29 Jun 2023 06:03 )             32.2     06-29    140  |  107  |  9   ----------------------------<  126<H>  3.6   |  28  |  0.50    Ca    8.4<L>      29 Jun 2023 06:03          PT/INR - ( 29 Jun 2023 16:35 )   PT: 21.6 sec;   INR: 1.83 ratio           Urinalysis Basic - ( 29 Jun 2023 06:03 )    Color: x / Appearance: x / SG: x / pH: x  Gluc: 126 mg/dL / Ketone: x  / Bili: x / Urobili: x   Blood: x / Protein: x / Nitrite: x   Leuk Esterase: x / RBC: x / WBC x   Sq Epi: x / Non Sq Epi: x / Bacteria: x      BNP      EKG:  ECHO:  IMAGING:    Assessment/Plan    Will continue to follow during hospital course and give further recommendations as needed . Thanks for your referral . if any questions please contact me at office ([phone])cell [phone])  JIM ARELLANO MD [place] [address]  [address]  OFFICE : [phone]   CELL : [phone]  CARDIOLOGY F/U  :       HPI:  Patient with a past medical history of prior CVA on warfarin with right-sided weakness, hypertension, hyperlipidemia, coronary artery disease who is coming from her assisted living facility with altered mental status.  Present for past 2 weeks per son.  Was recently treated for suspected urinary tract infection at facility last week with no improvement of symptoms.  Son states decreased p.o. liquid intake during this time.  Usually she is alert and conversational but now she has become more forgetful.  Has chronic leg pain.Develped agitation in ER ,lorazepam was given  (26 Jun 2023 13:30)        SUBJECTIVE: Patient feeling better .      ALLERGIES:  Allergies    per son &quot;issues with certain anitibiotics&quot; does not remember the names (Unknown)    Intolerances          MEDICATIONS  (STANDING):  amLODIPine   Tablet 5 milliGRAM(s) Oral daily  baclofen 10 milliGRAM(s) Oral every 12 hours  cholecalciferol 2000 Unit(s) Oral daily  collagenase Ointment 1 Application(s) Topical daily  cyanocobalamin 1000 MICROGram(s) Oral daily  dextrose 5% + sodium chloride 0.45%. 1000 milliLiter(s) (50 mL/Hr) IV Continuous <Continuous>  escitalopram 20 milliGRAM(s) Oral daily  famotidine    Tablet 20 milliGRAM(s) Oral daily  folic acid 1 milliGRAM(s) Oral daily  furosemide    Tablet 20 milliGRAM(s) Oral daily  gabapentin 300 milliGRAM(s) Oral three times a day  hydroxyurea 500 milliGRAM(s) Oral two times a day  lactobacillus acidophilus 1 Tablet(s) Oral every 12 hours  levothyroxine 50 MICROGram(s) Oral daily  metoprolol tartrate 25 milliGRAM(s) Oral two times a day  polyethylene glycol 3350 17 Gram(s) Oral two times a day  senna 2 Tablet(s) Oral at bedtime  simvastatin 10 milliGRAM(s) Oral at bedtime    MEDICATIONS  (PRN):  acetaminophen     Tablet .. 650 milliGRAM(s) Oral every 6 hours PRN Temp greater or equal to 38C (100.4F), Mild Pain (1 - 3)  aluminum hydroxide/magnesium hydroxide/simethicone Suspension 30 milliLiter(s) Oral every 4 hours PRN Dyspepsia  LORazepam   Injectable 0.5 milliGRAM(s) IV Push every 8 hours PRN severe agitation  magnesium hydroxide Suspension 30 milliLiter(s) Oral daily PRN Constipation  melatonin 3 milliGRAM(s) Oral at bedtime PRN Insomnia  morphine  - Injectable 2 milliGRAM(s) IV Push every 4 hours PRN Severe Pain (7 - 10)  OLANZapine Injectable 2.5 milliGRAM(s) IntraMuscular every 6 hours PRN agitation  ondansetron Injectable 4 milliGRAM(s) IV Push every 8 hours PRN Nausea and/or Vomiting  traMADol 50 milliGRAM(s) Oral four times a day PRN Moderate Pain (4 - 6)      REVIEW OF SYSTEMS:  CONSTITUTIONAL: No fever,  RESPIRATORY: No cough, wheezing, shortness of breath  CARDIOVASCULAR: No chest pain, dyspnea, palpitations, dizziness, syncope, paroxysmal nocturnal dyspnea, orthopnea, or arm or leg swelling  GASTROINTESTINAL: No abdominal  or epigastric pain, nausea, vomiting,  diarrhea  NEUROLOGICAL: No headaches,  loss of strength, numbness, or tremors    Vital Signs Last 24 Hrs  T(C): 36.8 (29 Jun 2023 20:09), Max: 36.8 (29 Jun 2023 04:35)  T(F): 98.2 (29 Jun 2023 20:09), Max: 98.2 (29 Jun 2023 04:35)  HR: 82 (29 Jun 2023 20:09) (82 - 97)  BP: 106/66 (29 Jun 2023 20:09) (106/66 - 135/80)  BP(mean): --  RR: 17 (29 Jun 2023 20:09) (16 - 17)  SpO2: 92% (29 Jun 2023 20:09) (92% - 92%)    Parameters below as of 29 Jun 2023 20:09  Patient On (Oxygen Delivery Method): room air        PHYSICAL EXAM:  HEAD:  Atraumatic, Normocephalic  NECK: Supple and normal thyroid.  No JVD or carotid bruit.   HEART: S1, S2 regular , 1/6 soft ejection systolic murmur in mitral area , no thrill and no gallops .  PULMONARY: Bilateral vesicular breathing , few scattered ronchi and few scattered rales are present .  ABDOMEN: Soft nontender and positive bowl sounds   EXTREMITIES:  No clubbing, cyanosis, or pedal  edema  NEUROLOGICAL: AAOX3 , no focal deficit .    LABS:                        10.3   5.35  )-----------( 406      ( 29 Jun 2023 06:03 )             32.2     06-29    140  |  107  |  9   ----------------------------<  126<H>  3.6   |  28  |  0.50    Ca    8.4<L>      29 Jun 2023 06:03          PT/INR - ( 29 Jun 2023 16:35 )   PT: 21.6 sec;   INR: 1.83 ratio           Urinalysis Basic - ( 29 Jun 2023 06:03 )    Color: x / Appearance: x / SG: x / pH: x  Gluc: 126 mg/dL / Ketone: x  / Bili: x / Urobili: x   Blood: x / Protein: x / Nitrite: x   Leuk Esterase: x / RBC: x / WBC x   Sq Epi: x / Non Sq Epi: x / Bacteria: x    Assessment/Plan  Patient has :  1) Altered mental status and urosepsis and better slightly .  2) R/O pneumonia .  3) H/O CVA with right sided weakness .  4) Hypertension and stable .  5) Paroxysmal atrial fibrillation and stable   6) Mild chronic diastolic heart failure and stable .  7) H/O CAD   8) Dementia   Plan : 1) I/V antibiotics as per PMD 2) Monitor hemoglobin and electrolytes 3) Monitor PT and INR 4) Rest of medications as such 5) Prognosis guarded .  Will continue to follow during hospital course and give further recommendations as needed . Thanks for your referral . if any questions please contact me at office ([phone] cell [phone])      JIM ARELLANO MD [place] [address]  [address]  OFFICE : [phone]   CELL : [phone]  CARDIOLOGY F/U  :       HPI:  Patient with a past medical history of prior CVA on warfarin with right-sided weakness, hypertension, hyperlipidemia, coronary artery disease who is coming from her assisted living facility with altered mental status.  Present for past 2 weeks per son.  Was recently treated for suspected urinary tract infection at facility last week with no improvement of symptoms.  Son states decreased p.o. liquid intake during this time.  Usually she is alert and conversational but now she has become more forgetful.  Has chronic leg pain.Develped agitation in ER ,lorazepam was given  (26 Jun 2023 13:30)        SUBJECTIVE: Patient feeling better .      ALLERGIES:  Allergies    per son &quot;issues with certain anitibiotics&quot; does not remember the names (Unknown)    Intolerances          MEDICATIONS  (STANDING):  amLODIPine   Tablet 5 milliGRAM(s) Oral daily  baclofen 10 milliGRAM(s) Oral every 12 hours  cholecalciferol 2000 Unit(s) Oral daily  collagenase Ointment 1 Application(s) Topical daily  cyanocobalamin 1000 MICROGram(s) Oral daily  dextrose 5% + sodium chloride 0.45%. 1000 milliLiter(s) (50 mL/Hr) IV Continuous <Continuous>  escitalopram 20 milliGRAM(s) Oral daily  famotidine    Tablet 20 milliGRAM(s) Oral daily  folic acid 1 milliGRAM(s) Oral daily  furosemide    Tablet 20 milliGRAM(s) Oral daily  gabapentin 300 milliGRAM(s) Oral three times a day  hydroxyurea 500 milliGRAM(s) Oral two times a day  lactobacillus acidophilus 1 Tablet(s) Oral every 12 hours  levothyroxine 50 MICROGram(s) Oral daily  metoprolol tartrate 25 milliGRAM(s) Oral two times a day  polyethylene glycol 3350 17 Gram(s) Oral two times a day  senna 2 Tablet(s) Oral at bedtime  simvastatin 10 milliGRAM(s) Oral at bedtime    MEDICATIONS  (PRN):  acetaminophen     Tablet .. 650 milliGRAM(s) Oral every 6 hours PRN Temp greater or equal to 38C (100.4F), Mild Pain (1 - 3)  aluminum hydroxide/magnesium hydroxide/simethicone Suspension 30 milliLiter(s) Oral every 4 hours PRN Dyspepsia  LORazepam   Injectable 0.5 milliGRAM(s) IV Push every 8 hours PRN severe agitation  magnesium hydroxide Suspension 30 milliLiter(s) Oral daily PRN Constipation  melatonin 3 milliGRAM(s) Oral at bedtime PRN Insomnia  morphine  - Injectable 2 milliGRAM(s) IV Push every 4 hours PRN Severe Pain (7 - 10)  OLANZapine Injectable 2.5 milliGRAM(s) IntraMuscular every 6 hours PRN agitation  ondansetron Injectable 4 milliGRAM(s) IV Push every 8 hours PRN Nausea and/or Vomiting  traMADol 50 milliGRAM(s) Oral four times a day PRN Moderate Pain (4 - 6)      REVIEW OF SYSTEMS:  CONSTITUTIONAL: No fever,  RESPIRATORY: No cough, wheezing, shortness of breath  CARDIOVASCULAR: No chest pain, dyspnea, palpitations, dizziness, syncope, paroxysmal nocturnal dyspnea, orthopnea, or arm or leg swelling  GASTROINTESTINAL: No abdominal  or epigastric pain, nausea, vomiting,  diarrhea  NEUROLOGICAL: No headaches,  numbness, or tremors  Skin : No itching .  Hematology : No active bleeding .  Endocrinology : No heat and cold intolerance .  psychiatry : Patient is confused .  Genitourinary : No dysuria .  Musculoskeletal : patient has mild arthritis .    Vital Signs Last 24 Hrs  T(C): 36.8 (29 Jun 2023 20:09), Max: 36.8 (29 Jun 2023 04:35)  T(F): 98.2 (29 Jun 2023 20:09), Max: 98.2 (29 Jun 2023 04:35)  HR: 82 (29 Jun 2023 20:09) (82 - 97)  BP: 106/66 (29 Jun 2023 20:09) (106/66 - 135/80)  BP(mean): --  RR: 17 (29 Jun 2023 20:09) (16 - 17)  SpO2: 92% (29 Jun 2023 20:09) (92% - 92%)    Parameters below as of 29 Jun 2023 20:09  Patient On (Oxygen Delivery Method): room air        PHYSICAL EXAM:  HEAD:  Atraumatic, Normocephalic  NECK: Supple and normal thyroid.  No JVD or carotid bruit.   HEART: S1, S2 irregular , 1/6 soft ejection systolic murmur in mitral area , no thrill and no gallops .  PULMONARY: Bilateral vesicular breathing , few scattered rhonchi and few scattered rales are present .  ABDOMEN: Soft nontender and positive bowl sounds   EXTREMITIES:  No clubbing, cyanosis, or pedal  edema  NEUROLOGICAL: AA and mild confused  , no focal deficit .  Skin : No rashes .  Musculoskeletal : No joint swellings     LABS:                        10.3   5.35  )-----------( 406      ( 29 Jun 2023 06:03 )             32.2     06-29    140  |  107  |  9   ----------------------------<  126<H>  3.6   |  28  |  0.50    Ca    8.4<L>      29 Jun 2023 06:03          PT/INR - ( 29 Jun 2023 16:35 )   PT: 21.6 sec;   INR: 1.83 ratio           Urinalysis Basic - ( 29 Jun 2023 06:03 )    Color: x / Appearance: x / SG: x / pH: x  Gluc: 126 mg/dL / Ketone: x  / Bili: x / Urobili: x   Blood: x / Protein: x / Nitrite: x   Leuk Esterase: x / RBC: x / WBC x   Sq Epi: x / Non Sq Epi: x / Bacteria: x    Assessment/Plan  Patient has :  1) Altered mental status and urosepsis and better slightly .  2) R/O pneumonia .  3) H/O CVA with right sided weakness .  4) Hypertension and stable .  5) Paroxysmal atrial fibrillation and stable   6) Mild chronic diastolic heart failure and stable .  7) H/O CAD   8) Dementia   Plan : 1)  antibiotics as per PMD and ID  2) Monitor hemoglobin and electrolytes 3) Monitor PT and INR 4) Rest of medications as such 5) Prognosis guarded .  Will continue to follow during hospital course and give further recommendations as needed . Thanks for your referral . if any questions please contact me at office ([phone] cell [phone])

## 2023-06-29 NOTE — SWALLOW BEDSIDE ASSESSMENT ADULT - COMMENTS
Chart reviewed order received for swallow eval.  Pt received sleeping in bed, on RA, arousable with cues, however, pt with difficulty maintaining arousal despite consistent stimulation, reduced cognition, reduced command following noted, pain scale 0/10 pre & post assessment via non verbal pain scale.  Due to lethargy, unable to complete swallow eval at this time, will defer continuation of PO diet to MD.  Pt left as received NAD ADEN [place] & Dr. Best notified.  Will follow.     Per charting, pt is a "Patient with a past medical history of prior CVA on warfarin with right-sided weakness, hypertension, hyperlipidemia, coronary artery disease who is coming from her assisted living facility with altered mental status.  Present for past 2 weeks per son.  Was recently treated for suspected urinary tract infection at facility last week with no improvement of symptoms.  Son states decreased p.o. liquid intake during this time.  Usually she is alert and conversational but now she has become more forgetful.  Has chronic leg pain.Develped agitation in ER ,lorazepam was given"
Chart reviewed pt seen to reattempt swallow eval.  Pt received sleeping in bed, arousable with cues, A&A Ox2 grossly and positioned upright for eval, on RA, reduced cognition, calm and cooperative, pain scale 0/10 pre & post eval.  Swallow eval completed see below for details.  Pt left as received NAD ADEN Ayala & Dr. Best notified.  Will follow.     Per charting, pt is a "Patient with a past medical history of prior CVA on warfarin with right-sided weakness, hypertension, hyperlipidemia, coronary artery disease who is coming from her assisted living facility with altered mental status.  Present for past 2 weeks per son.  Was recently treated for suspected urinary tract infection at facility last week with no improvement of symptoms.  Son states decreased p.o. liquid intake during this time.  Usually she is alert and conversational but now she has become more forgetful.  Has chronic leg pain.Develped agitation in ER ,lorazepam was given"    CT Chest 6/27/23: "Trace bilateral pleural effusions."

## 2023-06-29 NOTE — SWALLOW BEDSIDE ASSESSMENT ADULT - DIET PRIOR TO ADMI
Regular with Thin per transfer paperwork from care home
Regular with Thin per transfer paperwork from residential

## 2023-06-29 NOTE — DISCHARGE NOTE PROVIDER - NSDCCPCAREPLAN_GEN_ALL_CORE_FT
PRINCIPAL DISCHARGE DIAGNOSIS  Diagnosis: Acute metabolic encephalopathy  Assessment and Plan of Treatment:       SECONDARY DISCHARGE DIAGNOSES  Diagnosis: Bacterial UTI  Assessment and Plan of Treatment:     Diagnosis: Afib  Assessment and Plan of Treatment:     Diagnosis: VHD (valvular heart disease)  Assessment and Plan of Treatment:     Diagnosis: Hypertension  Assessment and Plan of Treatment:     Diagnosis: Grade I diastolic dysfunction  Assessment and Plan of Treatment:     Diagnosis: Depression  Assessment and Plan of Treatment:     Diagnosis: Agitation  Assessment and Plan of Treatment:     Diagnosis: Neuropathy  Assessment and Plan of Treatment:     Diagnosis: Chronic leg pain  Assessment and Plan of Treatment:     Diagnosis: Constipation  Assessment and Plan of Treatment:     Diagnosis: Prophylactic measure  Assessment and Plan of Treatment:     Diagnosis: Acute encephalopathy  Assessment and Plan of Treatment:

## 2023-06-29 NOTE — SWALLOW BEDSIDE ASSESSMENT ADULT - ASR SWALLOW RECOMMEND DIAG
Objective assessment not rx'd at this time due to no overt s/s penetration/aspiration noted and pna not indicated on chest imaging.

## 2023-06-29 NOTE — CONSULT NOTE ADULT - PROBLEM SELECTOR RECOMMENDATION 9
collagenase, adaptic touch ,DD, QOD  because wound is tender beyond expectation would recommend lower leg X Ray

## 2023-06-29 NOTE — PROGRESS NOTE ADULT - TIME BILLING
in direct care of patient , reviewing labs and other results and adjusting medications and in discussion with other consultants , RN and PMD
in direct care of patient , reviewing albs and other results and adjusting medications and in discussion with other consultants , RN and PMD
in direct care of patient , reviewing labs and other results and adjusting medications and in discussion with other consultants , RN and PMD

## 2023-06-29 NOTE — DISCHARGE NOTE PROVIDER - HOSPITAL COURSE
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  Present for past 2 weeks per son.  Was recently treated for suspected urinary tract infection at facility last week with no improvement of symptoms.  Son states decreased p.o. liquid intake during this time.  Usually she is alert and conversational but now she has become more forgetful.  Has chronic leg pain.Develped agitation in ER ,lorazepam was given       Nutritional Assessment:  · Nutritional Assessment  This patient has been assessed with a concern for Malnutrition and has been determined to have a diagnosis/diagnoses of Moderate protein-calorie malnutrition.    This patient is being managed with:   Diet Soft and Bite Sized-  Entered: Jun 26 2023  1:36PM    Problem/Plan - 1:  ·  Problem: Acute metabolic encephalopathy.   ·  Plan: 2/2 to UTI -improved , POA.    Problem/Plan - 2:  ·  Problem: Bacterial UTI.   ·  Plan: RESOLVED ( urine cx was contaminated ).    Problem/Plan - 3:  ·  Problem: Afib.   ·  Plan: continue home medications.    Problem/Plan - 4:  ·  Problem: VHD (valvular heart disease).   ·  Plan: continue home medications.    Problem/Plan - 5:  ·  Problem: Hypertension.   ·  Plan: continue home medications.    Problem/Plan - 6:  ·  Problem: Grade I diastolic dysfunction.   ·  Plan: continue home medications.    Problem/Plan - 7:  ·  Problem: Depression.   ·  Plan: continue home medications.    Problem/Plan - 8:  ·  Problem: Agitation.   ·  Plan: Supportive care,frequent redirection,continue home medications,management of agitation as needed.

## 2023-06-29 NOTE — PROGRESS NOTE ADULT - SUBJECTIVE AND OBJECTIVE BOX
CHIEF COMPLAINT/ REASON FOR VISIT  .. Patient was seen to address the  issue listed under PROBLEM LIST which is located toward bottom of this note     REJI JOSEJOEL    IRMA 3WES 359 W1    Allergies    per son &quot;issues with certain anitibiotics&quot; does not remember the names (Unknown)    Intolerances        PAST MEDICAL & SURGICAL HISTORY:  Hypertension      CVA (cerebral vascular accident)      Depression      Constipation      Neuropathy      Hyperlipidemia      Grade I diastolic dysfunction      Afib      Neuropathy      VHD (valvular heart disease)      Aortic valvar stenosis      No significant past surgical history          FAMILY HISTORY:      Home Medications:  amLODIPine 5 mg oral tablet: 1 tab(s) orally once a day (26 Jun 2023 12:41)  baclofen 10 mg oral tablet: 1 tab(s) orally 2 times a day (26 Jun 2023 12:41)  benzonatate 100 mg oral capsule: 1 cap(s) orally 3 times a day, As needed, Cough (26 Jun 2023 12:16)  cholecalciferol 50 mcg (2000 intl units) oral tablet: 1 tab(s) orally once a day (26 Jun 2023 12:41)  cyanocobalamin 1000 mcg oral tablet: 1 tab(s) orally once a day (26 Jun 2023 12:16)  docusate sodium 100 mg oral capsule: 2 cap(s) orally once a day (at bedtime) (26 Jun 2023 12:41)  escitalopram 20 mg oral tablet: 1 tab(s) orally once a day (26 Jun 2023 12:16)  famotidine 20 mg oral tablet: 1 tab(s) orally once a day (26 Jun 2023 12:41)  folic acid 1 mg oral tablet: 1 tab(s) orally once a day (26 Jun 2023 12:16)  furosemide 20 mg oral tablet: 1 tab(s) orally once a day (26 Jun 2023 12:16)  gabapentin 300 mg oral capsule: 1 cap(s) orally 3 times a day (26 Jun 2023 12:41)  hydroxyurea 500 mg oral capsule: 1 cap(s) orally 2 times a day (26 Jun 2023 12:16)  lactobacillus acidophilus oral tablet: 2 tab(s) orally once a day (26 Jun 2023 12:16)  levothyroxine 50 mcg (0.05 mg) oral tablet: 1 tab(s) orally once a day (26 Jun 2023 12:16)  metoprolol tartrate 25 mg oral tablet: 1 tab(s) orally 2 times a day (26 Jun 2023 12:16)  senna oral tablet: 1 tab(s) orally once a day (at bedtime) (26 Jun 2023 12:16)  simvastatin 10 mg oral tablet: 1 tab(s) orally once a day (at bedtime) (26 Jun 2023 12:16)  traMADol 50 mg oral tablet: 0.5 tab(s) orally 3 times a day PRN for pain (26 Jun 2023 12:41)  Tylenol 500 mg oral tablet: 1 tab(s) orally 2 times a day (26 Jun 2023 12:53)  Tylenol 500 mg oral tablet: 2 tab(s) orally once a day in the morning (26 Jun 2023 12:41)  warfarin 1 mg oral tablet: 1 tab(s) orally once (at bedtime) HOLD FOR INR ABIOVE 3.0 (26 Jun 2023 12:16)      MEDICATIONS  (STANDING):  amLODIPine   Tablet 5 milliGRAM(s) Oral daily  baclofen 10 milliGRAM(s) Oral every 12 hours  cholecalciferol 2000 Unit(s) Oral daily  cyanocobalamin 1000 MICROGram(s) Oral daily  dextrose 5% + sodium chloride 0.45%. 1000 milliLiter(s) (50 mL/Hr) IV Continuous <Continuous>  escitalopram 20 milliGRAM(s) Oral daily  famotidine    Tablet 20 milliGRAM(s) Oral daily  folic acid 1 milliGRAM(s) Oral daily  furosemide    Tablet 20 milliGRAM(s) Oral daily  gabapentin 300 milliGRAM(s) Oral three times a day  hydroxyurea 500 milliGRAM(s) Oral two times a day  lactobacillus acidophilus 1 Tablet(s) Oral every 12 hours  levothyroxine 50 MICROGram(s) Oral daily  metoprolol tartrate 25 milliGRAM(s) Oral two times a day  polyethylene glycol 3350 17 Gram(s) Oral two times a day  senna 2 Tablet(s) Oral at bedtime  simvastatin 10 milliGRAM(s) Oral at bedtime    MEDICATIONS  (PRN):  acetaminophen     Tablet .. 650 milliGRAM(s) Oral every 6 hours PRN Temp greater or equal to 38C (100.4F), Mild Pain (1 - 3)  aluminum hydroxide/magnesium hydroxide/simethicone Suspension 30 milliLiter(s) Oral every 4 hours PRN Dyspepsia  LORazepam   Injectable 0.5 milliGRAM(s) IV Push every 8 hours PRN severe agitation  magnesium hydroxide Suspension 30 milliLiter(s) Oral daily PRN Constipation  melatonin 3 milliGRAM(s) Oral at bedtime PRN Insomnia  morphine  - Injectable 2 milliGRAM(s) IV Push every 4 hours PRN Severe Pain (7 - 10)  OLANZapine Injectable 2.5 milliGRAM(s) IntraMuscular every 6 hours PRN agitation  ondansetron Injectable 4 milliGRAM(s) IV Push every 8 hours PRN Nausea and/or Vomiting  traMADol 50 milliGRAM(s) Oral four times a day PRN Moderate Pain (4 - 6)              Vital Signs Last 24 Hrs  T(C): 36.8 (29 Jun 2023 04:35), Max: 36.8 (28 Jun 2023 12:26)  T(F): 98.2 (29 Jun 2023 04:35), Max: 98.3 (28 Jun 2023 12:26)  HR: 97 (29 Jun 2023 04:35) (69 - 97)  BP: 133/84 (29 Jun 2023 04:35) (116/70 - 159/77)  BP(mean): --  RR: 16 (29 Jun 2023 04:35) (16 - 16)  SpO2: 92% (29 Jun 2023 04:35) (91% - 95%)    Parameters below as of 29 Jun 2023 04:35  Patient On (Oxygen Delivery Method): room air          06-28-23 @ 07:01  -  06-29-23 @ 07:00  --------------------------------------------------------  IN: 650 mL / OUT: 350 mL / NET: 300 mL              LABS:                        10.3   5.35  )-----------( 406      ( 29 Jun 2023 06:03 )             32.2     06-29    140  |  107  |  9   ----------------------------<  126<H>  3.6   |  28  |  0.50    Ca    8.4<L>      29 Jun 2023 06:03      PT/INR - ( 28 Jun 2023 07:30 )   PT: 28.0 sec;   INR: 2.37 ratio           Urinalysis Basic - ( 29 Jun 2023 06:03 )    Color: x / Appearance: x / SG: x / pH: x  Gluc: 126 mg/dL / Ketone: x  / Bili: x / Urobili: x   Blood: x / Protein: x / Nitrite: x   Leuk Esterase: x / RBC: x / WBC x   Sq Epi: x / Non Sq Epi: x / Bacteria: x            WBC:  WBC Count: 5.35 K/uL (06-29 @ 06:03)  WBC Count: 4.80 K/uL (06-27 @ 06:10)  WBC Count: 4.07 K/uL (06-26 @ 07:00)      MICROBIOLOGY:  RECENT CULTURES:  06-26 Clean Catch Clean Catch (Midstream) XXXX XXXX   >=3 organisms. Probable collection contamination.    06-26 .Blood Blood-Peripheral XXXX XXXX   No growth at 48 Hours    06-26 .Blood Blood-Peripheral XXXX XXXX   No growth at 48 Hours                PT/INR - ( 28 Jun 2023 07:30 )   PT: 28.0 sec;   INR: 2.37 ratio             Sodium:  Sodium: 140 mmol/L (06-29 @ 06:03)  Sodium: 141 mmol/L (06-27 @ 06:10)  Sodium: 144 mmol/L (06-26 @ 07:00)      0.50 mg/dL 06-29 @ 06:03  0.60 mg/dL 06-27 @ 06:10  0.68 mg/dL 06-26 @ 07:00      Hemoglobin:  Hemoglobin: 10.3 g/dL (06-29 @ 06:03)  Hemoglobin: 10.6 g/dL (06-27 @ 06:10)  Hemoglobin: 10.6 g/dL (06-26 @ 07:00)      Platelets: Platelet Count - Automated: 406 K/uL (06-29 @ 06:03)  Platelet Count - Automated: 422 K/uL (06-27 @ 06:10)  Platelet Count - Automated: 417 K/uL (06-26 @ 07:00)          Urinalysis Basic - ( 29 Jun 2023 06:03 )    Color: x / Appearance: x / SG: x / pH: x  Gluc: 126 mg/dL / Ketone: x  / Bili: x / Urobili: x   Blood: x / Protein: x / Nitrite: x   Leuk Esterase: x / RBC: x / WBC x   Sq Epi: x / Non Sq Epi: x / Bacteria: x        RADIOLOGY & ADDITIONAL STUDIES:      MICROBIOLOGY:  RECENT CULTURES:  06-26 Clean Catch Clean Catch (Midstream) XXXX XXXX   >=3 organisms. Probable collection contamination.    06-26 .Blood Blood-Peripheral XXXX XXXX   No growth at 48 Hours    06-26 .Blood Blood-Peripheral XXXX XXXX   No growth at 48 Hours

## 2023-06-29 NOTE — SWALLOW BEDSIDE ASSESSMENT ADULT - ASR SWALLOW ASPIRATION MONITOR
if any s/s penetration/aspiration noted, d/c PO & initiate NPO with SLP to reassess/change of breathing pattern/oral hygiene/position upright (90Y)/cough/gurgly voice/fever/pneumonia/throat clearing/upper respiratory infection

## 2023-06-29 NOTE — SWALLOW BEDSIDE ASSESSMENT ADULT - SLP GENERAL OBSERVATIONS
Pt received sleeping in bed, arousable with cues, A&A Ox2 grossly and positioned upright for eval, on RA, reduced cognition, calm and cooperative, pain scale 0/10 pre & post eval

## 2023-06-29 NOTE — CONSULT NOTE ADULT - SUBJECTIVE AND OBJECTIVE BOX
Chief Complaint:     HPI:     PAST MEDICAL & SURGICAL HISTORY:  Hypertension      CVA (cerebral vascular accident)      Depression      Constipation      Neuropathy      Hyperlipidemia      Grade I diastolic dysfunction      Afib      Neuropathy      VHD (valvular heart disease)      Aortic valvar stenosis      No significant past surgical history          Allergies    per son &quot;issues with certain anitibiotics&quot; does not remember the names (Unknown)    Intolerances        MEDICATIONS  (STANDING):  amLODIPine   Tablet 5 milliGRAM(s) Oral daily  baclofen 10 milliGRAM(s) Oral every 12 hours  cholecalciferol 2000 Unit(s) Oral daily  collagenase Ointment 1 Application(s) Topical daily  cyanocobalamin 1000 MICROGram(s) Oral daily  dextrose 5% + sodium chloride 0.45%. 1000 milliLiter(s) (50 mL/Hr) IV Continuous <Continuous>  escitalopram 20 milliGRAM(s) Oral daily  famotidine    Tablet 20 milliGRAM(s) Oral daily  folic acid 1 milliGRAM(s) Oral daily  furosemide    Tablet 20 milliGRAM(s) Oral daily  gabapentin 300 milliGRAM(s) Oral three times a day  hydroxyurea 500 milliGRAM(s) Oral two times a day  lactobacillus acidophilus 1 Tablet(s) Oral every 12 hours  levothyroxine 50 MICROGram(s) Oral daily  metoprolol tartrate 25 milliGRAM(s) Oral two times a day  polyethylene glycol 3350 17 Gram(s) Oral two times a day  senna 2 Tablet(s) Oral at bedtime  simvastatin 10 milliGRAM(s) Oral at bedtime  warfarin 2 milliGRAM(s) Oral once    MEDICATIONS  (PRN):  acetaminophen     Tablet .. 650 milliGRAM(s) Oral every 6 hours PRN Temp greater or equal to 38C (100.4F), Mild Pain (1 - 3)  aluminum hydroxide/magnesium hydroxide/simethicone Suspension 30 milliLiter(s) Oral every 4 hours PRN Dyspepsia  LORazepam   Injectable 0.5 milliGRAM(s) IV Push every 8 hours PRN severe agitation  magnesium hydroxide Suspension 30 milliLiter(s) Oral daily PRN Constipation  melatonin 3 milliGRAM(s) Oral at bedtime PRN Insomnia  morphine  - Injectable 2 milliGRAM(s) IV Push every 4 hours PRN Severe Pain (7 - 10)  OLANZapine Injectable 2.5 milliGRAM(s) IntraMuscular every 6 hours PRN agitation  ondansetron Injectable 4 milliGRAM(s) IV Push every 8 hours PRN Nausea and/or Vomiting  traMADol 50 milliGRAM(s) Oral four times a day PRN Moderate Pain (4 - 6)      FAMILY HISTORY:          ROS:  CONSTITUTIONAL: No fever, weight loss, or fatigue  EYES: No eye pain, visual disturbances, or discharge  ENMT:  No difficulty hearing, tinnitus, vertigo; No sinus or throat pain  NECK: No pain or stiffness  BREASTS: No pain, masses, or nipple discharge  RESPIRATORY: No cough, wheezing, chills or hemoptysis; No shortness of breath  CARDIOVASCULAR: No chest pain, palpitations, dizziness, or leg swelling  GASTROINTESTINAL: No abdominal or epigastric pain. No nausea, vomiting, or hematemesis; No diarrhea or constipation. No melena or hematochezia.  GENITOURINARY: No dysuria, frequency, hematuria, or incontinence  NEUROLOGICAL: No headaches, memory loss, loss of strength, numbness, or tremors  SKIN: No itching, burning, rashes, or lesions   LYMPH NODES: No enlarged glands  ENDOCRINE: No heat or cold intolerance; No hair loss  MUSCULOSKELETAL: No joint pain or swelling; No muscle, back, or extremity pain  PSYCHIATRIC: No depression, anxiety, mood swings, or difficulty sleeping  HEME/LYMPH: No easy bruising, or bleeding gums  ALLERGY AND IMMUNOLOGIC: No hives or eczema    PHYSICAL EXAM-      Vital Signs Last 24 Hrs  T(C): 36.7 (29 Jun 2023 12:46), Max: 36.8 (28 Jun 2023 20:55)  T(F): 98.1 (29 Jun 2023 12:46), Max: 98.2 (28 Jun 2023 20:55)  HR: 82 (29 Jun 2023 12:46) (69 - 97)  BP: 124/79 (29 Jun 2023 12:46) (116/70 - 133/84)  BP(mean): --  RR: 17 (29 Jun 2023 12:46) (16 - 17)  SpO2: 92% (29 Jun 2023 12:46) (92% - 95%)    Parameters below as of 29 Jun 2023 12:46  Patient On (Oxygen Delivery Method): room air        Constitutional: well developed, well nourished, no apparent distress, alert, oriented x 3.   Pulmonary: no respiratory distress, normal respiratory rhythm and effort, lungs are clear to auscultation/percussion. No CVA tenderness.  Cardiovascular: heart rate normal, normal sinus rhythm; no murmurs, gallops, rubs, heaves or thrills   Abdomen: soft, non-tender, +BS, no guarding/rebound/rigidity.  Vascular:   ENMT:  Neck:  Extremites:  Skin:                            10.3   5.35  )-----------( 406      ( 29 Jun 2023 06:03 )             32.2     06-29    140  |  107  |  9   ----------------------------<  126<H>  3.6   |  28  |  0.50    Ca    8.4<L>      29 Jun 2023 06:03        Radiology      Impression:      Recommendations: Chief Complaint: painful wound left shin    HPI: 89 Y/O white female in NAD with wound of unknown duration, unknown etiology, Patient has memory deficits. No erythema, no edema. No signs of infection and tenderness out of proportion to wound.    PAST MEDICAL & SURGICAL HISTORY:  Hypertension      CVA (cerebral vascular accident)      Depression      Constipation      Neuropathy      Hyperlipidemia      Grade I diastolic dysfunction      Afib      Neuropathy      VHD (valvular heart disease)      Aortic valvar stenosis      No significant past surgical history          Allergies    per son &quot;issues with certain anitibiotics&quot; does not remember the names (Unknown)    Intolerances        MEDICATIONS  (STANDING):  amLODIPine   Tablet 5 milliGRAM(s) Oral daily  baclofen 10 milliGRAM(s) Oral every 12 hours  cholecalciferol 2000 Unit(s) Oral daily  collagenase Ointment 1 Application(s) Topical daily  cyanocobalamin 1000 MICROGram(s) Oral daily  dextrose 5% + sodium chloride 0.45%. 1000 milliLiter(s) (50 mL/Hr) IV Continuous <Continuous>  escitalopram 20 milliGRAM(s) Oral daily  famotidine    Tablet 20 milliGRAM(s) Oral daily  folic acid 1 milliGRAM(s) Oral daily  furosemide    Tablet 20 milliGRAM(s) Oral daily  gabapentin 300 milliGRAM(s) Oral three times a day  hydroxyurea 500 milliGRAM(s) Oral two times a day  lactobacillus acidophilus 1 Tablet(s) Oral every 12 hours  levothyroxine 50 MICROGram(s) Oral daily  metoprolol tartrate 25 milliGRAM(s) Oral two times a day  polyethylene glycol 3350 17 Gram(s) Oral two times a day  senna 2 Tablet(s) Oral at bedtime  simvastatin 10 milliGRAM(s) Oral at bedtime  warfarin 2 milliGRAM(s) Oral once    MEDICATIONS  (PRN):  acetaminophen     Tablet .. 650 milliGRAM(s) Oral every 6 hours PRN Temp greater or equal to 38C (100.4F), Mild Pain (1 - 3)  aluminum hydroxide/magnesium hydroxide/simethicone Suspension 30 milliLiter(s) Oral every 4 hours PRN Dyspepsia  LORazepam   Injectable 0.5 milliGRAM(s) IV Push every 8 hours PRN severe agitation  magnesium hydroxide Suspension 30 milliLiter(s) Oral daily PRN Constipation  melatonin 3 milliGRAM(s) Oral at bedtime PRN Insomnia  morphine  - Injectable 2 milliGRAM(s) IV Push every 4 hours PRN Severe Pain (7 - 10)  OLANZapine Injectable 2.5 milliGRAM(s) IntraMuscular every 6 hours PRN agitation  ondansetron Injectable 4 milliGRAM(s) IV Push every 8 hours PRN Nausea and/or Vomiting  traMADol 50 milliGRAM(s) Oral four times a day PRN Moderate Pain (4 - 6)      FAMILY HISTORY:          ROS:  CONSTITUTIONAL: No fever, weight loss, or fatigue  EYES: No eye pain, visual disturbances, or discharge  ENMT:  No difficulty hearing, tinnitus, vertigo; No sinus or throat pain  NECK: No pain or stiffness  BREASTS: No pain, masses, or nipple discharge  RESPIRATORY: No cough, wheezing, chills or hemoptysis; No shortness of breath  CARDIOVASCULAR: No chest pain, palpitations, dizziness, or leg swelling  GASTROINTESTINAL: No abdominal or epigastric pain. No nausea, vomiting, or hematemesis; No diarrhea or constipation. No melena or hematochezia.  GENITOURINARY: No dysuria, frequency, hematuria, or incontinence  NEUROLOGICAL: No headaches, memory loss, loss of strength, numbness, or tremors  SKIN: No itching, burning, rashes, or lesions   LYMPH NODES: No enlarged glands  ENDOCRINE: No heat or cold intolerance; No hair loss  MUSCULOSKELETAL: No joint pain or swelling; No muscle, back, or extremity pain  PSYCHIATRIC: No depression, anxiety, mood swings, or difficulty sleeping  HEME/LYMPH: No easy bruising, or bleeding gums  ALLERGY AND IMMUNOLOGIC: No hives or eczema    PHYSICAL EXAM-      Vital Signs Last 24 Hrs  T(C): 36.7 (29 Jun 2023 12:46), Max: 36.8 (28 Jun 2023 20:55)  T(F): 98.1 (29 Jun 2023 12:46), Max: 98.2 (28 Jun 2023 20:55)  HR: 82 (29 Jun 2023 12:46) (69 - 97)  BP: 124/79 (29 Jun 2023 12:46) (116/70 - 133/84)  BP(mean): --  RR: 17 (29 Jun 2023 12:46) (16 - 17)  SpO2: 92% (29 Jun 2023 12:46) (92% - 95%)    Parameters below as of 29 Jun 2023 12:46  Patient On (Oxygen Delivery Method): room air        Constitutional: well developed, well nourished, no apparent distress, alert, oriented x 3.   Pulmonary: no respiratory distress, normal respiratory rhythm and effort, lungs are clear to auscultation/percussion. No CVA tenderness.  Cardiovascular: heart rate normal, normal sinus rhythm; no murmurs, gallops, rubs, heaves or thrills   Abdomen: soft, non-tender, +BS, no guarding/rebound/rigidity.  Vascular:   ENMT:  Neck:  Extremites:  Skin: left shin with vertically orient wound with some slough on surface. no signs of infection                            10.3   5.35  )-----------( 406      ( 29 Jun 2023 06:03 )             32.2     06-29    140  |  107  |  9   ----------------------------<  126<H>  3.6   |  28  |  0.50    Ca    8.4<L>      29 Jun 2023 06:03        Radiology      Impression:      Recommendations:

## 2023-06-29 NOTE — DISCHARGE NOTE PROVIDER - NPI NUMBER (FOR [employer] USE ONLY) :
[[id number]] [[id number]]

## 2023-06-30 ENCOUNTER — TRANSCRIPTION ENCOUNTER (OUTPATIENT)
Age: 88
End: 2023-06-30

## 2023-06-30 VITALS
DIASTOLIC BLOOD PRESSURE: 85 MMHG | OXYGEN SATURATION: 92 % | SYSTOLIC BLOOD PRESSURE: 110 MMHG | TEMPERATURE: 98 F | RESPIRATION RATE: 17 BRPM | HEART RATE: 82 BPM

## 2023-06-30 LAB
INR BLD: 1.63 RATIO — HIGH (ref 0.88–1.16)
PROTHROM AB SERPL-ACNC: 19.2 SEC — HIGH (ref 10.5–13.4)
SARS-COV-2 RNA SPEC QL NAA+PROBE: SIGNIFICANT CHANGE UP

## 2023-06-30 PROCEDURE — 80053 COMPREHEN METABOLIC PANEL: CPT

## 2023-06-30 PROCEDURE — 71250 CT THORAX DX C-: CPT

## 2023-06-30 PROCEDURE — 36415 COLL VENOUS BLD VENIPUNCTURE: CPT

## 2023-06-30 PROCEDURE — 73590 X-RAY EXAM OF LOWER LEG: CPT | Mod: 26,LT

## 2023-06-30 PROCEDURE — 81001 URINALYSIS AUTO W/SCOPE: CPT

## 2023-06-30 PROCEDURE — 73590 X-RAY EXAM OF LOWER LEG: CPT

## 2023-06-30 PROCEDURE — 84145 PROCALCITONIN (PCT): CPT

## 2023-06-30 PROCEDURE — 93005 ELECTROCARDIOGRAM TRACING: CPT

## 2023-06-30 PROCEDURE — 83880 ASSAY OF NATRIURETIC PEPTIDE: CPT

## 2023-06-30 PROCEDURE — 87040 BLOOD CULTURE FOR BACTERIA: CPT

## 2023-06-30 PROCEDURE — 70450 CT HEAD/BRAIN W/O DYE: CPT | Mod: MA

## 2023-06-30 PROCEDURE — 97162 PT EVAL MOD COMPLEX 30 MIN: CPT

## 2023-06-30 PROCEDURE — 80061 LIPID PANEL: CPT

## 2023-06-30 PROCEDURE — 85610 PROTHROMBIN TIME: CPT

## 2023-06-30 PROCEDURE — 83735 ASSAY OF MAGNESIUM: CPT

## 2023-06-30 PROCEDURE — 96374 THER/PROPH/DIAG INJ IV PUSH: CPT

## 2023-06-30 PROCEDURE — 71045 X-RAY EXAM CHEST 1 VIEW: CPT

## 2023-06-30 PROCEDURE — 92610 EVALUATE SWALLOWING FUNCTION: CPT

## 2023-06-30 PROCEDURE — 85025 COMPLETE CBC W/AUTO DIFF WBC: CPT

## 2023-06-30 PROCEDURE — 80048 BASIC METABOLIC PNL TOTAL CA: CPT

## 2023-06-30 PROCEDURE — 99285 EMERGENCY DEPT VISIT HI MDM: CPT | Mod: 25

## 2023-06-30 PROCEDURE — 84484 ASSAY OF TROPONIN QUANT: CPT

## 2023-06-30 PROCEDURE — 85027 COMPLETE CBC AUTOMATED: CPT

## 2023-06-30 PROCEDURE — 0225U NFCT DS DNA&RNA 21 SARSCOV2: CPT

## 2023-06-30 PROCEDURE — 87635 SARS-COV-2 COVID-19 AMP PRB: CPT

## 2023-06-30 PROCEDURE — 82962 GLUCOSE BLOOD TEST: CPT

## 2023-06-30 PROCEDURE — 83605 ASSAY OF LACTIC ACID: CPT

## 2023-06-30 PROCEDURE — 85730 THROMBOPLASTIN TIME PARTIAL: CPT

## 2023-06-30 PROCEDURE — 93306 TTE W/DOPPLER COMPLETE: CPT

## 2023-06-30 PROCEDURE — 87086 URINE CULTURE/COLONY COUNT: CPT

## 2023-06-30 PROCEDURE — 96375 TX/PRO/DX INJ NEW DRUG ADDON: CPT

## 2023-06-30 PROCEDURE — 84443 ASSAY THYROID STIM HORMONE: CPT

## 2023-06-30 RX ORDER — WARFARIN SODIUM 2.5 MG/1
2.5 TABLET ORAL ONCE
Refills: 0 | Status: DISCONTINUED | OUTPATIENT
Start: 2023-06-30 | End: 2023-06-30

## 2023-06-30 RX ORDER — WARFARIN SODIUM 2.5 MG/1
1.5 TABLET ORAL ONCE
Refills: 0 | Status: COMPLETED | OUTPATIENT
Start: 2023-06-30 | End: 2023-06-30

## 2023-06-30 RX ORDER — COLLAGENASE CLOSTRIDIUM HIST. 250 UNIT/G
1 OINTMENT (GRAM) TOPICAL
Qty: 1 | Refills: 0
Start: 2023-06-30

## 2023-06-30 RX ORDER — WARFARIN SODIUM 2.5 MG/1
1 TABLET ORAL
Qty: 0 | Refills: 0 | DISCHARGE
Start: 2023-06-30

## 2023-06-30 RX ADMIN — AMLODIPINE BESYLATE 5 MILLIGRAM(S): 2.5 TABLET ORAL at 06:31

## 2023-06-30 RX ADMIN — Medication 1 TABLET(S): at 06:31

## 2023-06-30 RX ADMIN — PREGABALIN 1000 MICROGRAM(S): 225 CAPSULE ORAL at 13:14

## 2023-06-30 RX ADMIN — Medication 650 MILLIGRAM(S): at 08:27

## 2023-06-30 RX ADMIN — SODIUM CHLORIDE 50 MILLILITER(S): 9 INJECTION, SOLUTION INTRAVENOUS at 01:55

## 2023-06-30 RX ADMIN — TRAMADOL HYDROCHLORIDE 50 MILLIGRAM(S): 50 TABLET ORAL at 06:31

## 2023-06-30 RX ADMIN — Medication 650 MILLIGRAM(S): at 09:25

## 2023-06-30 RX ADMIN — FAMOTIDINE 20 MILLIGRAM(S): 10 INJECTION INTRAVENOUS at 13:50

## 2023-06-30 RX ADMIN — HYDROXYUREA 500 MILLIGRAM(S): 500 CAPSULE ORAL at 06:33

## 2023-06-30 RX ADMIN — Medication 50 MICROGRAM(S): at 06:31

## 2023-06-30 RX ADMIN — SODIUM CHLORIDE 50 MILLILITER(S): 9 INJECTION, SOLUTION INTRAVENOUS at 08:27

## 2023-06-30 RX ADMIN — GABAPENTIN 300 MILLIGRAM(S): 400 CAPSULE ORAL at 06:31

## 2023-06-30 RX ADMIN — Medication 20 MILLIGRAM(S): at 06:31

## 2023-06-30 RX ADMIN — WARFARIN SODIUM 1.5 MILLIGRAM(S): 2.5 TABLET ORAL at 15:20

## 2023-06-30 RX ADMIN — Medication 25 MILLIGRAM(S): at 06:32

## 2023-06-30 RX ADMIN — Medication 1 APPLICATION(S): at 15:22

## 2023-06-30 RX ADMIN — Medication 10 MILLIGRAM(S): at 06:31

## 2023-06-30 RX ADMIN — Medication 2000 UNIT(S): at 13:14

## 2023-06-30 RX ADMIN — Medication 1 MILLIGRAM(S): at 13:50

## 2023-06-30 RX ADMIN — GABAPENTIN 300 MILLIGRAM(S): 400 CAPSULE ORAL at 13:17

## 2023-06-30 RX ADMIN — ESCITALOPRAM OXALATE 20 MILLIGRAM(S): 10 TABLET, FILM COATED ORAL at 13:15

## 2023-06-30 NOTE — DISCHARGE NOTE NURSING/CASE MANAGEMENT/SOCIAL WORK - PATIENT PORTAL LINK FT
You can access the FollowMyHealth Patient Portal offered by [facility] by registering at the following website: [email or web address]. By joining [employer]’s FollowMyHealth portal, you will also be able to view your health information using other applications (apps) compatible with our system.

## 2023-06-30 NOTE — PROGRESS NOTE ADULT - PROBLEM SELECTOR PROBLEM 6
Grade I diastolic dysfunction

## 2023-06-30 NOTE — PROGRESS NOTE ADULT - PROBLEM SELECTOR PLAN 6
continue home medications
continue home medications
continue home medications Admit to monitored unit for cardiac monitoring, obtain echo to evaluate LVEF, intravenous diuresis as per card consult , monitor ins/outs, monitor renal profile and electrolytes closely ,send 3 sets of enzymes, O2 supply, serial chest xrays, monitor weights and oral intake of fluids, nutritionist consult

## 2023-06-30 NOTE — PROGRESS NOTE ADULT - PROBLEM SELECTOR PLAN 3
continue home medications
Admitted  to telemetry unit for monitoring , send 3 sets of cardiac enzymes to rule out acute coronary event, obtain ECHO to evaluate LVEF, cardiology consult  ,continue current management, O2 supply, anticoagulation plan as per cardiology consult
continue home medications

## 2023-06-30 NOTE — PROGRESS NOTE ADULT - PROBLEM SELECTOR PROBLEM 1
Acute metabolic encephalopathy
65.4

## 2023-06-30 NOTE — PROGRESS NOTE ADULT - SUBJECTIVE AND OBJECTIVE BOX
CHIEF COMPLAINT/ REASON FOR VISIT  .. Patient was seen to address the  issue listed under PROBLEM LIST which is located toward bottom of this note     REJI JOSEJOEL    IRMA 3WES 359 W1    Allergies    per son &quot;issues with certain anitibiotics&quot; does not remember the names (Unknown)    Intolerances        PAST MEDICAL & SURGICAL HISTORY:  Hypertension      CVA (cerebral vascular accident)      Depression      Constipation      Neuropathy      Hyperlipidemia      Grade I diastolic dysfunction      Afib      Neuropathy      VHD (valvular heart disease)      Aortic valvar stenosis      No significant past surgical history          FAMILY HISTORY:      Home Medications:  amLODIPine 5 mg oral tablet: 1 tab(s) orally once a day (26 Jun 2023 12:41)  baclofen 10 mg oral tablet: 1 tab(s) orally 2 times a day (26 Jun 2023 12:41)  cholecalciferol 50 mcg (2000 intl units) oral tablet: 1 tab(s) orally once a day (26 Jun 2023 12:41)  cyanocobalamin 1000 mcg oral tablet: 1 tab(s) orally once a day (26 Jun 2023 12:16)  docusate sodium 100 mg oral capsule: 2 cap(s) orally once a day (at bedtime) (26 Jun 2023 12:41)  escitalopram 20 mg oral tablet: 1 tab(s) orally once a day (26 Jun 2023 12:16)  famotidine 20 mg oral tablet: 1 tab(s) orally once a day (26 Jun 2023 12:41)  folic acid 1 mg oral tablet: 1 tab(s) orally once a day (26 Jun 2023 12:16)  furosemide 20 mg oral tablet: 1 tab(s) orally once a day (26 Jun 2023 12:16)  gabapentin 300 mg oral capsule: 1 cap(s) orally 3 times a day (26 Jun 2023 12:41)  hydroxyurea 500 mg oral capsule: 1 cap(s) orally 2 times a day (26 Jun 2023 12:16)  lactobacillus acidophilus oral tablet: 2 tab(s) orally once a day (26 Jun 2023 12:16)  levothyroxine 50 mcg (0.05 mg) oral tablet: 1 tab(s) orally once a day (26 Jun 2023 12:16)  metoprolol tartrate 25 mg oral tablet: 1 tab(s) orally 2 times a day (26 Jun 2023 12:16)  senna oral tablet: 1 tab(s) orally once a day (at bedtime) (26 Jun 2023 12:16)  simvastatin 10 mg oral tablet: 1 tab(s) orally once a day (at bedtime) (26 Jun 2023 12:16)  traMADol 50 mg oral tablet: 0.5 tab(s) orally 3 times a day PRN for pain (26 Jun 2023 12:41)  Tylenol 500 mg oral tablet: 1 tab(s) orally 2 times a day (26 Jun 2023 12:53)  Tylenol 500 mg oral tablet: 2 tab(s) orally once a day in the morning (26 Jun 2023 12:41)  warfarin 1 mg oral tablet: 1 tab(s) orally once (at bedtime) HOLD FOR INR ABIOVE 3.0 (26 Jun 2023 12:16)      MEDICATIONS  (STANDING):  amLODIPine   Tablet 5 milliGRAM(s) Oral daily  baclofen 10 milliGRAM(s) Oral every 12 hours  cholecalciferol 2000 Unit(s) Oral daily  collagenase Ointment 1 Application(s) Topical daily  cyanocobalamin 1000 MICROGram(s) Oral daily  dextrose 5% + sodium chloride 0.45%. 1000 milliLiter(s) (50 mL/Hr) IV Continuous <Continuous>  escitalopram 20 milliGRAM(s) Oral daily  famotidine    Tablet 20 milliGRAM(s) Oral daily  folic acid 1 milliGRAM(s) Oral daily  furosemide    Tablet 20 milliGRAM(s) Oral daily  gabapentin 300 milliGRAM(s) Oral three times a day  hydroxyurea 500 milliGRAM(s) Oral two times a day  lactobacillus acidophilus 1 Tablet(s) Oral every 12 hours  levothyroxine 50 MICROGram(s) Oral daily  metoprolol tartrate 25 milliGRAM(s) Oral two times a day  polyethylene glycol 3350 17 Gram(s) Oral two times a day  senna 2 Tablet(s) Oral at bedtime  simvastatin 10 milliGRAM(s) Oral at bedtime    MEDICATIONS  (PRN):  acetaminophen     Tablet .. 650 milliGRAM(s) Oral every 6 hours PRN Temp greater or equal to 38C (100.4F), Mild Pain (1 - 3)  aluminum hydroxide/magnesium hydroxide/simethicone Suspension 30 milliLiter(s) Oral every 4 hours PRN Dyspepsia  LORazepam   Injectable 0.5 milliGRAM(s) IV Push every 8 hours PRN severe agitation  magnesium hydroxide Suspension 30 milliLiter(s) Oral daily PRN Constipation  melatonin 3 milliGRAM(s) Oral at bedtime PRN Insomnia  morphine  - Injectable 2 milliGRAM(s) IV Push every 4 hours PRN Severe Pain (7 - 10)  OLANZapine Injectable 2.5 milliGRAM(s) IntraMuscular every 6 hours PRN agitation  ondansetron Injectable 4 milliGRAM(s) IV Push every 8 hours PRN Nausea and/or Vomiting  traMADol 50 milliGRAM(s) Oral four times a day PRN Moderate Pain (4 - 6)              Vital Signs Last 24 Hrs  T(C): 36.8 (29 Jun 2023 20:09), Max: 36.8 (29 Jun 2023 20:09)  T(F): 98.2 (29 Jun 2023 20:09), Max: 98.2 (29 Jun 2023 20:09)  HR: 82 (29 Jun 2023 20:09) (82 - 90)  BP: 106/66 (29 Jun 2023 20:09) (106/66 - 135/80)  BP(mean): --  RR: 17 (29 Jun 2023 20:09) (17 - 17)  SpO2: 92% (29 Jun 2023 20:09) (92% - 92%)    Parameters below as of 29 Jun 2023 20:09  Patient On (Oxygen Delivery Method): room air          06-28-23 @ 07:01  -  06-29-23 @ 07:00  --------------------------------------------------------  IN: 650 mL / OUT: 350 mL / NET: 300 mL    06-29-23 @ 07:01  -  06-30-23 @ 04:38  --------------------------------------------------------  IN: 650 mL / OUT: 950 mL / NET: -300 mL              LABS:                        10.3   5.35  )-----------( 406      ( 29 Jun 2023 06:03 )             32.2     06-29    140  |  107  |  9   ----------------------------<  126<H>  3.6   |  28  |  0.50    Ca    8.4<L>      29 Jun 2023 06:03      PT/INR - ( 29 Jun 2023 16:35 )   PT: 21.6 sec;   INR: 1.83 ratio           Urinalysis Basic - ( 29 Jun 2023 06:03 )    Color: x / Appearance: x / SG: x / pH: x  Gluc: 126 mg/dL / Ketone: x  / Bili: x / Urobili: x   Blood: x / Protein: x / Nitrite: x   Leuk Esterase: x / RBC: x / WBC x   Sq Epi: x / Non Sq Epi: x / Bacteria: x            WBC:  WBC Count: 5.35 K/uL (06-29 @ 06:03)  WBC Count: 4.80 K/uL (06-27 @ 06:10)  WBC Count: 4.07 K/uL (06-26 @ 07:00)      MICROBIOLOGY:  RECENT CULTURES:  06-26 Clean Catch Clean Catch (Midstream) XXXX XXXX   >=3 organisms. Probable collection contamination.    06-26 .Blood Blood-Peripheral XXXX XXXX   No growth at 72 Hours    06-26 .Blood Blood-Peripheral XXXX XXXX   No growth at 72 Hours                PT/INR - ( 29 Jun 2023 16:35 )   PT: 21.6 sec;   INR: 1.83 ratio             Sodium:  Sodium: 140 mmol/L (06-29 @ 06:03)  Sodium: 141 mmol/L (06-27 @ 06:10)  Sodium: 144 mmol/L (06-26 @ 07:00)      0.50 mg/dL 06-29 @ 06:03  0.60 mg/dL 06-27 @ 06:10  0.68 mg/dL 06-26 @ 07:00      Hemoglobin:  Hemoglobin: 10.3 g/dL (06-29 @ 06:03)  Hemoglobin: 10.6 g/dL (06-27 @ 06:10)  Hemoglobin: 10.6 g/dL (06-26 @ 07:00)      Platelets: Platelet Count - Automated: 406 K/uL (06-29 @ 06:03)  Platelet Count - Automated: 422 K/uL (06-27 @ 06:10)  Platelet Count - Automated: 417 K/uL (06-26 @ 07:00)          Urinalysis Basic - ( 29 Jun 2023 06:03 )    Color: x / Appearance: x / SG: x / pH: x  Gluc: 126 mg/dL / Ketone: x  / Bili: x / Urobili: x   Blood: x / Protein: x / Nitrite: x   Leuk Esterase: x / RBC: x / WBC x   Sq Epi: x / Non Sq Epi: x / Bacteria: x        RADIOLOGY & ADDITIONAL STUDIES:      MICROBIOLOGY:  RECENT CULTURES:  06-26 Clean Catch Clean Catch (Midstream) XXXX XXXX   >=3 organisms. Probable collection contamination.    06-26 .Blood Blood-Peripheral XXXX XXXX   No growth at 72 Hours    06-26 .Blood Blood-Peripheral XXXX XXXX   No growth at 72 Hours

## 2023-06-30 NOTE — PROVIDER CONTACT NOTE (OTHER) - ACTION/TREATMENT ORDERED:
per md diet orders are recommendation by speech and swallow. no new orders for now.
per md give tylenol prn as ordered. ok for telephone orders to give next  Tramadol dose at 930am today. no other new orders.
per md she will order wound consult . no new orders at this time.

## 2023-06-30 NOTE — PHARMACOTHERAPY INTERVENTION NOTE - COMMENTS
Patient is a 89 y/o F ordered for warfarin 2 mg x 1 dose for Afib, INR today is 1.83 (INR Goal 2- 3). Discussed with Dr. Best and recommended decreasing dose based on patient’s home dose (1 mg daily). MD agreed to give 1.5 mg today and order was updated. 
Medication history completed with [facility] Medication List and outpatient prescription fills. Medication reconciliation completed. Findings relayed to ED Team. 
Patient w/ PMH A Fib is ordered for warfarin 2.5mg X 1 (home dose 1mg). INR is 1.63. Discussed w/ Dr. Best and recommended decreasing dose to 1.5mg X 1. MD agreed and would like to enter STAT order as patient is being discharged. Entered order as per discussion

## 2023-06-30 NOTE — CASE MANAGEMENT PROGRESS NOTE - NSCMPROGRESSNOTE_GEN_ALL_CORE
Discussed patient with Anticipate DC Sat to home w/ daughter. I s/w daughter Juliana to confirm she will be at bedside today to learn/admin insulin admin. Floor RN/diabetic NP aware and will educate daughter. [facility] ref sent for VN to reinforce teaching at home. [facility] HEMA Chairez aware of plan and will have HHA put on stand-by for tomorrow. HEMA to fax DC summary to [facility] @ [phone]. HEMA Chairez #[phone]. Daughter to transport home tomorrow.   Discussed patient with Dr. Bhagat. Leg xray completed and patient being transitioned to [facility] at 4pm today.  3122 and neg COVID fax to Grecia at [facility]. I spoke with [facility] who confirmed [facility] over W/E but later today is OK. [facility] accepted for VN/PT ZENAIDA on 7/1. Ambulance to  between 3-4pm by Tania. I left a VM for patient's son Curtis    Discussed patient with Dr. Bhagat. Leg xray completed and patient being transitioned to [facility] at 4pm today.  3122 and neg COVID fax to Grecia at [facility]. I spoke with Grecia who confirmed skilled nursing cannot accept pt over W/E but later today is OK. [facility] accepted for VN/PT ZENAIDA on 7/1. Ambulance to  between 3-4pm by Ambuln. I left a VM for patient's son Curtis with transition plan @ [phone]. CM will continue to follow.

## 2023-06-30 NOTE — CHART NOTE - NSCHARTNOTEFT_GEN_A_CORE
Assessment:   Pt seen for nutrition follow up.  Chart reviewed, hospital course noted.     Brief History:   "89 yo F with PMH of prior CVA on warfarin with right-sided weakness, hypertension, hyperlipidemia, coronary artery disease who is coming from her assisted living facility with altered mental status. +UTI"    Seen at bedside, pt asking for coffee and cake, donuts.  Pt evaluated by SLP 6/29, soft /bite sized diet thin liquids recommended.  Pt unhappy with recommended diet, per RN, son states pt eats regular food at home.   Fecal incontinence noted 6/29, on bowel regimen.  L shin wound present- seen by WC-RN.    Factors impacting intake: [ ] none [ ] nausea  [ ] vomiting [ ] diarrhea [ ] constipation  [ ]chewing problems [ ] swallowing issues  [x] other: dislike of dysphagia diet    Diet Prescription: Diet, Soft and Bite Sized (06-26-23 @ 13:36) [Active]      Intake: fair    Current Weight: 6/28 135#      Pertinent Medications: MEDICATIONS  (STANDING):  amLODIPine   Tablet 5 milliGRAM(s) Oral daily  baclofen 10 milliGRAM(s) Oral every 12 hours  cholecalciferol 2000 Unit(s) Oral daily  collagenase Ointment 1 Application(s) Topical daily  cyanocobalamin 1000 MICROGram(s) Oral daily  dextrose 5% + sodium chloride 0.45%. 1000 milliLiter(s) (50 mL/Hr) IV Continuous <Continuous>  escitalopram 20 milliGRAM(s) Oral daily  famotidine    Tablet 20 milliGRAM(s) Oral daily  folic acid 1 milliGRAM(s) Oral daily  furosemide    Tablet 20 milliGRAM(s) Oral daily  gabapentin 300 milliGRAM(s) Oral three times a day  hydroxyurea 500 milliGRAM(s) Oral two times a day  lactobacillus acidophilus 1 Tablet(s) Oral every 12 hours  levothyroxine 50 MICROGram(s) Oral daily  metoprolol tartrate 25 milliGRAM(s) Oral two times a day  polyethylene glycol 3350 17 Gram(s) Oral two times a day  senna 2 Tablet(s) Oral at bedtime  simvastatin 10 milliGRAM(s) Oral at bedtime    MEDICATIONS  (PRN):  acetaminophen     Tablet .. 650 milliGRAM(s) Oral every 6 hours PRN Temp greater or equal to 38C (100.4F), Mild Pain (1 - 3)  aluminum hydroxide/magnesium hydroxide/simethicone Suspension 30 milliLiter(s) Oral every 4 hours PRN Dyspepsia  LORazepam   Injectable 0.5 milliGRAM(s) IV Push every 8 hours PRN severe agitation  magnesium hydroxide Suspension 30 milliLiter(s) Oral daily PRN Constipation  melatonin 3 milliGRAM(s) Oral at bedtime PRN Insomnia  morphine  - Injectable 2 milliGRAM(s) IV Push every 4 hours PRN Severe Pain (7 - 10)  OLANZapine Injectable 2.5 milliGRAM(s) IntraMuscular every 6 hours PRN agitation  ondansetron Injectable 4 milliGRAM(s) IV Push every 8 hours PRN Nausea and/or Vomiting  traMADol 50 milliGRAM(s) Oral four times a day PRN Moderate Pain (4 - 6)    Pertinent Labs: 06-29 Na140 mmol/L Glu 126 mg/dL<H> K+ 3.6 mmol/L Cr  0.50 mg/dL BUN 9 mg/dL 06-27 Alb 3.0 g/dL<L> 06-27 Chol 88 mg/dL LDL --    HDL 40 mg/dL<L> Trig 68 mg/dL        Skin:  L shin wound  Edema:  none observed    Estimated Needs:   [x] no change since previous assessment on: 6/27 based on 134#    kcal/day:  3011-4209 paulo (25-30 paulo/kg)  gms protein/day: 60-72gm (1-1.2gm/kg)    Previous Nutrition Diagnosis:   [x]  Malnutrition (chronic, moderate)    Nutrition Diagnosis is [x] ongoing  [ ] resolved [ ] not applicable     New Nutrition Diagnosis: [x] not applicable       Interventions:   Recommend  [x] Continue current diet  [ ] Change Diet To:  [x] Nutrition Supplement- add Ensure Enlive 8oz bid  [ ] Nutrition Support  [x] Other: recommend daily MVI, Vit C 500mg bid    Monitoring and Evaluation:   [x] PO intake [ x ] Tolerance to diet prescription [ x ] weights [ x ] labs [ x ] follow up per protocol  [x] other: s/s GI distress, bowel function, skin integrity/ edema Assessment:   Pt seen for nutrition follow up.  Chart reviewed, hospital course noted.     Brief History:   "89 yo F with PMH of prior CVA on warfarin with right-sided weakness, hypertension, hyperlipidemia, coronary artery disease who is coming from her assisted living facility with altered mental status. +UTI"    Seen at bedside, pt asking for coffee and cake, donuts.  Pt evaluated by SLP 6/29, soft /bite sized diet thin liquids recommended.  Pt unhappy with recommended diet, per RN, son states pt eats regular food at home.   Fecal incontinence noted 6/29, on bowel regimen.  L shin wound present- seen by WC-RN.    Factors impacting intake: [ ] none [ ] nausea  [ ] vomiting [ ] diarrhea [ ] constipation  [ ]chewing problems [ ] swallowing issues  [x] other: dislike of dysphagia diet    Diet Prescription: Diet, Soft and Bite Sized (06-26-23 @ 13:36) [Active]      Intake: fair    Current Weight: 6/28 135#      Pertinent Medications: MEDICATIONS  (STANDING):  amLODIPine   Tablet 5 milliGRAM(s) Oral daily  baclofen 10 milliGRAM(s) Oral every 12 hours  cholecalciferol 2000 Unit(s) Oral daily  collagenase Ointment 1 Application(s) Topical daily  cyanocobalamin 1000 MICROGram(s) Oral daily  dextrose 5% + sodium chloride 0.45%. 1000 milliLiter(s) (50 mL/Hr) IV Continuous <Continuous>  escitalopram 20 milliGRAM(s) Oral daily  famotidine    Tablet 20 milliGRAM(s) Oral daily  folic acid 1 milliGRAM(s) Oral daily  furosemide    Tablet 20 milliGRAM(s) Oral daily  gabapentin 300 milliGRAM(s) Oral three times a day  hydroxyurea 500 milliGRAM(s) Oral two times a day  lactobacillus acidophilus 1 Tablet(s) Oral every 12 hours  levothyroxine 50 MICROGram(s) Oral daily  metoprolol tartrate 25 milliGRAM(s) Oral two times a day  polyethylene glycol 3350 17 Gram(s) Oral two times a day  senna 2 Tablet(s) Oral at bedtime  simvastatin 10 milliGRAM(s) Oral at bedtime    MEDICATIONS  (PRN):  acetaminophen     Tablet .. 650 milliGRAM(s) Oral every 6 hours PRN Temp greater or equal to 38C (100.4F), Mild Pain (1 - 3)  aluminum hydroxide/magnesium hydroxide/simethicone Suspension 30 milliLiter(s) Oral every 4 hours PRN Dyspepsia  LORazepam   Injectable 0.5 milliGRAM(s) IV Push every 8 hours PRN severe agitation  magnesium hydroxide Suspension 30 milliLiter(s) Oral daily PRN Constipation  melatonin 3 milliGRAM(s) Oral at bedtime PRN Insomnia  morphine  - Injectable 2 milliGRAM(s) IV Push every 4 hours PRN Severe Pain (7 - 10)  OLANZapine Injectable 2.5 milliGRAM(s) IntraMuscular every 6 hours PRN agitation  ondansetron Injectable 4 milliGRAM(s) IV Push every 8 hours PRN Nausea and/or Vomiting  traMADol 50 milliGRAM(s) Oral four times a day PRN Moderate Pain (4 - 6)    Pertinent Labs: 06-29 Na140 mmol/L Glu 126 mg/dL<H> K+ 3.6 mmol/L Cr  0.50 mg/dL BUN 9 mg/dL 06-27 Alb 3.0 g/dL<L> 06-27 Chol 88 mg/dL LDL --    HDL 40 mg/dL<L> Trig 68 mg/dL        Skin:  L shin wound  Edema:  none observed    Estimated Needs:   [x] no change since previous assessment on: 6/27 based on 134#    kcal/day:  6555-2629 paulo (25-30 paulo/kg)  gms protein/day: 60-72gm (1-1.2gm/kg)    Previous Nutrition Diagnosis:   [x]  Malnutrition (chronic, moderate)    Nutrition Diagnosis is [x] ongoing  [ ] resolved [ ] not applicable     New Nutrition Diagnosis: [x] not applicable       Interventions:   Recommend  [x] Continue current diet  [ ] Change Diet To:  [x] Nutrition Supplement- add Ensure Enlive 8oz bid (Pending order placed- awaiting verification- MD informed)  [ ] Nutrition Support  [x] Other: recommend daily MVI, Vit C 500mg bid    Monitoring and Evaluation:   [x] PO intake [ x ] Tolerance to diet prescription [ x ] weights [ x ] labs [ x ] follow up per protocol  [x] other: s/s GI distress, bowel function, skin integrity/ edema

## 2023-06-30 NOTE — PROVIDER CONTACT NOTE (OTHER) - ASSESSMENT
pt awake . pt noted to be screaming and calling out. removed heel boot as pt stated pain with boot on. pt screaming in pain upon touch to left foot.
wound noted on left shin. painful to touch/ pt "screams in pain upon dressing change" slough noted.

## 2023-06-30 NOTE — PROGRESS NOTE ADULT - PROBLEM SELECTOR PLAN 2
RESOLVED ( urine cx was contaminated )
RESOLVED ( urine cx was contaminated )
Was treated with po Bactrim but did not get better.  Received IV Rocephin in the ED.  Add IV Rocephin

## 2023-06-30 NOTE — PROGRESS NOTE ADULT - PROVIDER SPECIALTY LIST ADULT
Cardiology
Neurology
Cardiology
Infectious Disease
Pulmonology
Cardiology
Hospitalist

## 2023-06-30 NOTE — PROGRESS NOTE ADULT - PROBLEM SELECTOR PLAN 4
continue home medications
continue home medications
Admitted  to telemetry unit for monitoring , send 3 sets of cardiac enzymes to rule out acute coronary event, obtain ECHO to evaluate LVEF, cardiology consult  ,continue current management, O2 supply, anticoagulation plan as per cardiology consult

## 2023-06-30 NOTE — PROGRESS NOTE ADULT - NUTRITIONAL ASSESSMENT
This patient has been assessed with a concern for Malnutrition and has been determined to have a diagnosis/diagnoses of Moderate protein-calorie malnutrition.    This patient is being managed with:   Diet Soft and Bite Sized-  Entered: Jun 26 2023  1:36PM  
This patient has been assessed with a concern for Malnutrition and has been determined to have a diagnosis/diagnoses of Moderate protein-calorie malnutrition.    This patient is being managed with:   Diet Soft and Bite Sized-  Supplement Feeding Modality:  Oral  Ensure Enlive Cans or Servings Per Day:  1       Frequency:  Two Times a day  Entered: Jun 30 2023 10:08AM    Diet Soft and Bite Sized-  Entered: Jun 26 2023  1:36PM    The following pending diet order is being considered for treatment of Moderate protein-calorie malnutrition:null

## 2023-06-30 NOTE — PROGRESS NOTE ADULT - PROBLEM SELECTOR PLAN 8
Supportive care,frequent redirection,continue home medications,management of agitation as needed
Supportive care,frequent redirection,continue home medications,management of agitation as needed
Supportive care ,frequent redirection ,continue home medications ,management of agitation as needed

## 2023-06-30 NOTE — PROGRESS NOTE ADULT - SUBJECTIVE AND OBJECTIVE BOX
Neurology follow up note    REJI [id number]      Interval History:    Patient feels ok no new complaints.    Allergies    per son &quot;issues with certain anitibiotics&quot; does not remember the names (Unknown)    Intolerances        MEDICATIONS    acetaminophen     Tablet .. 650 milliGRAM(s) Oral every 6 hours PRN  aluminum hydroxide/magnesium hydroxide/simethicone Suspension 30 milliLiter(s) Oral every 4 hours PRN  amLODIPine   Tablet 5 milliGRAM(s) Oral daily  baclofen 10 milliGRAM(s) Oral every 12 hours  cholecalciferol 2000 Unit(s) Oral daily  collagenase Ointment 1 Application(s) Topical daily  cyanocobalamin 1000 MICROGram(s) Oral daily  dextrose 5% + sodium chloride 0.45%. 1000 milliLiter(s) IV Continuous <Continuous>  escitalopram 20 milliGRAM(s) Oral daily  famotidine    Tablet 20 milliGRAM(s) Oral daily  folic acid 1 milliGRAM(s) Oral daily  furosemide    Tablet 20 milliGRAM(s) Oral daily  gabapentin 300 milliGRAM(s) Oral three times a day  hydroxyurea 500 milliGRAM(s) Oral two times a day  lactobacillus acidophilus 1 Tablet(s) Oral every 12 hours  levothyroxine 50 MICROGram(s) Oral daily  LORazepam   Injectable 0.5 milliGRAM(s) IV Push every 8 hours PRN  magnesium hydroxide Suspension 30 milliLiter(s) Oral daily PRN  melatonin 3 milliGRAM(s) Oral at bedtime PRN  metoprolol tartrate 25 milliGRAM(s) Oral two times a day  morphine  - Injectable 2 milliGRAM(s) IV Push every 4 hours PRN  OLANZapine Injectable 2.5 milliGRAM(s) IntraMuscular every 6 hours PRN  ondansetron Injectable 4 milliGRAM(s) IV Push every 8 hours PRN  polyethylene glycol 3350 17 Gram(s) Oral two times a day  senna 2 Tablet(s) Oral at bedtime  simvastatin 10 milliGRAM(s) Oral at bedtime  traMADol 50 milliGRAM(s) Oral four times a day PRN              Vital Signs Last 24 Hrs  T(C): 36.7 (30 Jun 2023 05:09), Max: 36.8 (29 Jun 2023 20:09)  T(F): 98.1 (30 Jun 2023 05:09), Max: 98.2 (29 Jun 2023 20:09)  HR: 85 (30 Jun 2023 05:09) (82 - 90)  BP: 138/81 (30 Jun 2023 05:09) (106/66 - 138/81)  BP(mean): --  RR: 17 (30 Jun 2023 05:09) (17 - 17)  SpO2: 90% (30 Jun 2023 05:09) (90% - 92%)    Parameters below as of 30 Jun 2023 05:09  Patient On (Oxygen Delivery Method): room air      REVIEW OF SYSTEMS:  Constitutional:  The patient denies fever, chills, or night sweats.  Head:  No headache.  Eyes:  No double vision or blurry vision.  Ears:  No ringing in the ears.  Neck:  No neck pain.  Respiratory:  No shortness of breath.  Cardiovascular:  No chest pain.  Abdomen:  No nausea, vomiting, or abdominal pain.  Extremities/Neurological:  No numbness or tingling.  Musculoskeletal:  No joint pain, but does have a history of possibly underlying neuropathy of lower extremities which is not new.    PHYSICAL EXAMINATION:    HEENT:  Head:  Normocephalic, atraumatic.  Eyes:  No scleral icterus.  Ears:  Hearing was intact.  NECK:  Supple.  RESPIRATORY:  Air entry bilaterally.  CARDIOVASCULAR:  S1 and S2 heard.  ABDOMEN:  Soft and nontender.  EXTREMITIES:  No clubbing or cyanosis was noted.      NEUROLOGIC:  The patient is awake and alert.  Location was hospital.  Recall was 0/3 within 3 minutes.  Extraocular movements were intact.  Was able to name the number of fingers in each eye.  Speech was fluent.  Smile symmetric.  Motor:  Right upper and right lower 0/5, left upper 4/5, left lower 3/5.  Sensory:  Bilateral upper and lower intact to light touch.      LABS:  CBC Full  -  ( 29 Jun 2023 06:03 )  WBC Count : 5.35 K/uL  RBC Count : 2.91 M/uL  Hemoglobin : 10.3 g/dL  Hematocrit : 32.2 %  Platelet Count - Automated : 406 K/uL  Mean Cell Volume : 110.7 fl  Mean Cell Hemoglobin : 35.4 pg  Mean Cell Hemoglobin Concentration : 32.0 gm/dL  Auto Neutrophil # : x  Auto Lymphocyte # : x  Auto Monocyte # : x  Auto Eosinophil # : x  Auto Basophil # : x  Auto Neutrophil % : x  Auto Lymphocyte % : x  Auto Monocyte % : x  Auto Eosinophil % : x  Auto Basophil % : x    Urinalysis Basic - ( 29 Jun 2023 06:03 )    Color: x / Appearance: x / SG: x / pH: x  Gluc: 126 mg/dL / Ketone: x  / Bili: x / Urobili: x   Blood: x / Protein: x / Nitrite: x   Leuk Esterase: x / RBC: x / WBC x   Sq Epi: x / Non Sq Epi: x / Bacteria: x      06-29    140  |  107  |  9   ----------------------------<  126<H>  3.6   |  28  |  0.50    Ca    8.4<L>      29 Jun 2023 06:03      Hemoglobin A1C:       Vitamin B12   PT/INR - ( 29 Jun 2023 16:35 )   PT: 21.6 sec;   INR: 1.83 ratio               RADIOLOGY    ANALYSIS AND PLAN:  This is a 89-year-old with episode of altered mental status.  For episode of altered mental status, most likely metabolic encephalopathy secondary to underlying urinary tract infection, also I do believe the patient does have underlying mild cognitive impairment versus subtle dementia becoming more prominent in the hospital setting.  Suspect less likely this is a primary new central nervous system event.  For history of hypothyroidism, continue the patient on her Synthroid.  For history of hypertension, monitor systolic blood pressure.  For history of cerebrovascular accident and atrial fibrillation, continue the patient on her anticoagulation with goal INR between 2 and 3.  For history of neuropathy, continue the patient on her baclofen and gabapentin.  For hyperlipidemia, continue the patient on her statin.  For mild cognitive impairment, we can check vitamin B12, folate, and TSH as needed.    The patient's cognitive status was evaluated to the best of my ability.    Spoke with son, Curtis, at [phone]     Greater than 45 minutes of time was spent with the patient, plan of care, reviewing data, with greater than 50% of the visit was spent counseling and/or coordinating care with multidisciplinary healthcare team   Neurology follow up note    REJI [id number]      Interval History:    Patient feels ok no new complaints.    Allergies    per son &quot;issues with certain anitibiotics&quot; does not remember the names (Unknown)    Intolerances        MEDICATIONS    acetaminophen     Tablet .. 650 milliGRAM(s) Oral every 6 hours PRN  aluminum hydroxide/magnesium hydroxide/simethicone Suspension 30 milliLiter(s) Oral every 4 hours PRN  amLODIPine   Tablet 5 milliGRAM(s) Oral daily  baclofen 10 milliGRAM(s) Oral every 12 hours  cholecalciferol 2000 Unit(s) Oral daily  collagenase Ointment 1 Application(s) Topical daily  cyanocobalamin 1000 MICROGram(s) Oral daily  dextrose 5% + sodium chloride 0.45%. 1000 milliLiter(s) IV Continuous <Continuous>  escitalopram 20 milliGRAM(s) Oral daily  famotidine    Tablet 20 milliGRAM(s) Oral daily  folic acid 1 milliGRAM(s) Oral daily  furosemide    Tablet 20 milliGRAM(s) Oral daily  gabapentin 300 milliGRAM(s) Oral three times a day  hydroxyurea 500 milliGRAM(s) Oral two times a day  lactobacillus acidophilus 1 Tablet(s) Oral every 12 hours  levothyroxine 50 MICROGram(s) Oral daily  LORazepam   Injectable 0.5 milliGRAM(s) IV Push every 8 hours PRN  magnesium hydroxide Suspension 30 milliLiter(s) Oral daily PRN  melatonin 3 milliGRAM(s) Oral at bedtime PRN  metoprolol tartrate 25 milliGRAM(s) Oral two times a day  morphine  - Injectable 2 milliGRAM(s) IV Push every 4 hours PRN  OLANZapine Injectable 2.5 milliGRAM(s) IntraMuscular every 6 hours PRN  ondansetron Injectable 4 milliGRAM(s) IV Push every 8 hours PRN  polyethylene glycol 3350 17 Gram(s) Oral two times a day  senna 2 Tablet(s) Oral at bedtime  simvastatin 10 milliGRAM(s) Oral at bedtime  traMADol 50 milliGRAM(s) Oral four times a day PRN              Vital Signs Last 24 Hrs  T(C): 36.7 (30 Jun 2023 05:09), Max: 36.8 (29 Jun 2023 20:09)  T(F): 98.1 (30 Jun 2023 05:09), Max: 98.2 (29 Jun 2023 20:09)  HR: 85 (30 Jun 2023 05:09) (82 - 90)  BP: 138/81 (30 Jun 2023 05:09) (106/66 - 138/81)  BP(mean): --  RR: 17 (30 Jun 2023 05:09) (17 - 17)  SpO2: 90% (30 Jun 2023 05:09) (90% - 92%)    Parameters below as of 30 Jun 2023 05:09  Patient On (Oxygen Delivery Method): room air      REVIEW OF SYSTEMS:  Constitutional:  The patient denies fever, chills, or night sweats.  Head:  No headache.  Eyes:  No double vision or blurry vision.  Ears:  No ringing in the ears.  Neck:  No neck pain.  Respiratory:  No shortness of breath.  Cardiovascular:  No chest pain.  Abdomen:  No nausea, vomiting, or abdominal pain.  Extremities/Neurological:  No numbness or tingling.  Musculoskeletal:  No joint pain, but does have a history of possibly underlying neuropathy of lower extremities which is not new.    PHYSICAL EXAMINATION:    HEENT:  Head:  Normocephalic, atraumatic.  Eyes:  No scleral icterus.  Ears:  Hearing was intact.  NECK:  Supple.  RESPIRATORY:  Air entry bilaterally.  CARDIOVASCULAR:  S1 and S2 heard.  ABDOMEN:  Soft and nontender.  EXTREMITIES:  No clubbing or cyanosis was noted.      NEUROLOGIC:  The patient is awake and alert.  Location was hospital.  Recall was 0/3 within 3 minutes.  Extraocular movements were intact.  Was able to name the number of fingers in each eye.  Speech was fluent.  Smile symmetric.  Motor:  Right upper and right lower 0/5, left upper 4/5, left lower 3/5.  Sensory:  Bilateral upper and lower intact to light touch.      LABS:  CBC Full  -  ( 29 Jun 2023 06:03 )  WBC Count : 5.35 K/uL  RBC Count : 2.91 M/uL  Hemoglobin : 10.3 g/dL  Hematocrit : 32.2 %  Platelet Count - Automated : 406 K/uL  Mean Cell Volume : 110.7 fl  Mean Cell Hemoglobin : 35.4 pg  Mean Cell Hemoglobin Concentration : 32.0 gm/dL  Auto Neutrophil # : x  Auto Lymphocyte # : x  Auto Monocyte # : x  Auto Eosinophil # : x  Auto Basophil # : x  Auto Neutrophil % : x  Auto Lymphocyte % : x  Auto Monocyte % : x  Auto Eosinophil % : x  Auto Basophil % : x    Urinalysis Basic - ( 29 Jun 2023 06:03 )    Color: x / Appearance: x / SG: x / pH: x  Gluc: 126 mg/dL / Ketone: x  / Bili: x / Urobili: x   Blood: x / Protein: x / Nitrite: x   Leuk Esterase: x / RBC: x / WBC x   Sq Epi: x / Non Sq Epi: x / Bacteria: x      06-29    140  |  107  |  9   ----------------------------<  126<H>  3.6   |  28  |  0.50    Ca    8.4<L>      29 Jun 2023 06:03      Hemoglobin A1C:       Vitamin B12   PT/INR - ( 29 Jun 2023 16:35 )   PT: 21.6 sec;   INR: 1.83 ratio               RADIOLOGY    ANALYSIS AND PLAN:  This is a 89-year-old with episode of altered mental status.  For episode of altered mental status, most likely metabolic encephalopathy secondary to underlying urinary tract infection, also I do believe the patient does have underlying mild cognitive impairment versus subtle dementia becoming more prominent in the hospital setting.  Suspect less likely this is a primary new central nervous system event.  For history of hypothyroidism, continue the patient on her Synthroid.  For history of hypertension, monitor systolic blood pressure.  For history of cerebrovascular accident and atrial fibrillation, continue the patient on her anticoagulation with goal INR between 2 and 3.  For history of neuropathy, continue the patient on her baclofen and gabapentin.  For hyperlipidemia, continue the patient on her statin.  For mild cognitive impairment, we can check vitamin B12, folate, and TSH as needed.  neurologic wise stable dc planning     Spoke with son, Curtis, at [phone] 6/30    Greater than 45 minutes of time was spent with the patient, plan of care, reviewing data, with greater than 50% of the visit was spent counseling and/or coordinating care with multidisciplinary healthcare team

## 2023-06-30 NOTE — PROGRESS NOTE ADULT - ASSESSMENT
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  Present for past 2 weeks per son.  Was recently treated for suspected urinary tract infection at facility last week with no improvement of symptoms.  Son states decreased p.o. liquid intake during this time.  Usually she is alert and conversational but now she has become more forgetful.  Has chronic leg pain.Develped agitation in ER ,lorazepam was given 
  REASON FOR VISIT  .. Management of problems listed below      NOTEWORTHY FINDINGS.     PHYSICAL EXAM    HEENT Unremarkable  atraumatic   RESP Fair air entry  Harsh breath sound   CARDIAC S1 S2 No S3     NO JVD    ABDOMEN No hepatosplenomegaly   PEDAL EDEMA present No calf tenderness  NO rash       GENERAL DATA .     GOC.    .. 6/26/2023 full code   ICU STAY.   .. none  COVID.   .. scv2 6/26/2023 scv2 (-)      BEST PRACTICE ISSUES.    HOB ELEVATN.   .. Yes  DVT PPLX.   ..    6/26/2023 On coumadin at home On arrival INR therapeutic   STRESS ULCER PPLX.   ..      INFECTION PPLX.   ..    SP SW IRCKY.    ..        DIET.    ..  6/26/2023 soft bite size  IV fl.  .. 6/26 d5 1/2 ns 50      PROCEDURES.  ..   PAST PROCEDURES.    ..     GENERAL DATA .     ALLGY.  ..   certain abio                      WT.  ..  6/26/2023 61  BMI.  ..    6/26/2023 21    ABGS.    VS/ PO/IO/ VENT/ DRIPS.  6/27/2023 99f 90 140/70   6/27/2023 ra 91%      CC/DOA.  . 6/26/2023 AMS combative   . (Normally a O x 4)    PRESENTATION.  . 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  Present for past 2 weeks per son.  Was recently treated for suspected urinary tract infection at facility last week with no improvement of symptoms.  Son states decreased p.o. liquid intake during this time.  Usually she is alert and conversational but now she has become more forgetful.  Has chronic leg pain.    PMH.  . pmh DVT  . pmh Anemia  . pmh leg wounds  . pmh A fib  . pmh GERD  . pmh CVA r sided weakness     HOME MEDS.   . Cefadroxil (recent uti 6/2023) levoxyl 50 warfarin hydrea 500.2         CC/DOA.  . 6/26/2023 89 f AMS combative   . (Normally a O x 4)    . 6/26/2023 Pulm consulted for pleural effs seen on CXR     PROBLEMS/ASSESSMENT/RECOMMENDATIONS (A/R).  PULMONARY.  . GAS EXCHANGE.  .. A/R. Monitor and target po 90-95%    . Left Pl effsn 6/26/2023 CXR   .. CXR 6/26/2023 cw 9/2/2022  .... CM   .... small l pl effsn / scarring  .. CT ch 6/27/2023 ct ch cw 2/8/.2009   .... Tr bl effs    .. EVENTS  ..  6/26/2023 Will check ct ch to further characterize abnormality l lower zone   .. A/R Pl effsns too small to tap will monitor/obsevre     ID.  . INFECTION.  .. w 6/26-6/27/2023 w 4 - 4.6   .. pr 6/27/2023 (-)   .. ua 6/26/2023 w 80 bact many nitr (+) L estrase large  .. rvp 6/26/2023 rvp (-)  .. 6/26 rocephin     .. A/R On Rocephin started 6/27/2023 by Dr Reese for urine infection         CARDIO.  . CAD.   .. Tr 6/26/2023 Tr 10.8   .. 6/26/2023 simvastat 10  .. 6/26/2023 metoprolol 25.2   .. A/R Monitor     . Hypertension.  .. 6/26/2023 amlodipine 5   .. Optimize bp     . CHF.  .. 6/26/2023 lasix 20   .. A/R Adjust rx for chf Monitor lytes     . A fib on coumadin at home 6/26/2023 .  .. 6/26/2023 metoprolol 25.2  .. 6/26/2023 On coumadin at home   .. A/R cont bb and coumadin  Target INR 2-3    . COUMADIN   .. INR 6/27/2023 inr 3.2    HEMAT.  . ANEMIA.  .. Hb 6/26-6/27/2023 Hb 10.6 - 10.6   .. A/R Monitor    RENAL.  .. Na 6/26/2023 Na 144  .. K 6/26/2023 K 4.1   .. CO2 6/26/2023 CO2 30   .. Cr 6/26/2023 Cr .6     NEURO  .. ALTERED MENTAL STATUS 6/26/2023 (Normally AO x 4)   .. CT h 6/26/2023 ct h (-) nsc cw 4/2022      . POST DISCHARGE  RECOMMENDATIONS (A/R).  .. A fib on coumadin        . CURRENT PROBLEMS.   .. Left pl effsn 6/26/2023 CXR   .. A fib on coumadin at home 6/26/2023  .. ANEMIA 6/26/2023 Hb 10.6   .. ALTERED MENTAL STATUS 6/26/2023 (Normally AO x 4) 6/26/2023 CT h (-)     . TIME SPENT   . About 36 minutes aggregate care time spent on encounter; activities included   direct patient care, counseling and/or coordinating care reviewing notes, lab data/ imaging , discussion with multidisciplinary team/ patient  /family and explaining in detail risks, benefits, alternatives  of the recommendations     AB MENDOZA 89 f 6/26/2023 3/15/1932 DR YURI CARRINGTON   
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  Present for past 2 weeks per son.  Was recently treated for suspected urinary tract infection at facility last week with no improvement of symptoms.  Son states decreased p.o. liquid intake during this time.  Usually she is alert and conversational but now she has become more forgetful.  Has chronic leg pain.Develped agitation in ER ,lorazepam was given 
    REASON FOR VISIT  .. Management of problems listed below      NOTEWORTHY FINDINGS.     PHYSICAL EXAM    HEENT Unremarkable  atraumatic   RESP Fair air entry  Harsh breath sound   CARDIAC S1 S2 No S3     NO JVD    ABDOMEN No hepatosplenomegaly   PEDAL EDEMA present No calf tenderness  NO rash     GENERAL DATA .     GOC.    .. 6/26/2023 full code   ICU STAY.   .. none  COVID.   .. scv2 6/26/2023 scv2 (-)      BEST PRACTICE ISSUES.    HOB ELEVATN.   .. Yes  DVT PPLX.   ..    6/26/2023 On coumadin at home On arrival INR therapeutic   STRESS ULCER PPLX.   ..      INFECTION PPLX.   ..    SP SW RICKY.    ..        DIET.    ..  6/26/2023 soft bite size  IV fl.  .. 6/26 d5 1/2 ns 50      PROCEDURES.  ..   PAST PROCEDURES.    ..     GENERAL DATA .     ALLGY.  ..   certain abio                      WT.  ..  6/26/2023 61  BMI.  ..    6/26/2023 21      ABGS.    VS/ PO/IO/ VENT/ DRIPS.  6/29/2023 afeb 82 120/70   6/29/2023 ra 92%    CC/DOA.  . 6/26/2023 AMS combative   . (Normally a O x 4)    PRESENTATION.  . 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  Present for past 2 weeks per son.  Was recently treated for suspected urinary tract infection at facility last week with no improvement of symptoms.  Son states decreased p.o. liquid intake during this time.  Usually she is alert and conversational but now she has become more forgetful.  Has chronic leg pain.    PMH.  . pmh DVT  . pmh Anemia  . pmh leg wounds  . pmh A fib  . pmh GERD  . pmh CVA r sided weakness     HOME MEDS.   . Cefadroxil (recent uti 6/2023) levoxyl 50 warfarin hydrea 500.2       CC/DOA.  . 6/26/2023 89 f AMS combative   . (Normally a O x 4)    . 6/26/2023 Pulm consulted for pleural effs seen on CXR     PROBLEMS/ASSESSMENT/RECOMMENDATIONS (A/R).  PULMONARY.  . GAS EXCHANGE.  .. A/R. Monitor and target po 90-95%    . Left Pl effsn 6/26/2023 CXR   .. CXR 6/26/2023 cw 9/2/2022  .... CM   .... small l pl effsn / scarring  .. CT ch 6/27/2023 ct ch cw 2/8/.2009   .... Tr bl effs    .. EVENTS  ..  6/26/2023 Will check ct ch to further characterize abnormality l lower zone   .. A/R Pl effsns too small to tap will monitor/obsevre     ID.  . INFECTION.  .. w 6/26-6/27/2023 w 4 - 4.6   .. pr 6/27/2023 (-)   .. ua 6/26/2023 w 80 bact many nitr (+) L estrase large  .. rvp 6/26/2023 rvp (-)  .. 6/26 rocephin     .. A/R On Rocephin started 6/27/2023 by Dr Reese for urine infection         CARDIO.  . CAD.   .. Tr 6/26/2023 Tr 10.8   .. 6/26/2023 simvastat 10  .. 6/26/2023 metoprolol 25.2   .. A/R Monitor     . Hypertension.  .. 6/26/2023 amlodipine 5   .. Optimize bp     . CHF.  .. 6/28/2023 echo pasp 39 la sl dilated n lvsf  .. 6/26/2023 lasix 20   .. A/R Adjust rx for chf Monitor lytes     . A fib on coumadin at home 6/26/2023 .  .. 6/26/2023 metoprolol 25.2  .. 6/26/2023 On coumadin at home   .. A/R cont bb and coumadin  Target INR 2-3    . COUMADIN   .. INR 6/27/2023 inr 3.2    HEMAT.  . ANEMIA.  .. Hb 6/26-6/27-6/29/2023 Hb 10.6 - 10.6 - 10.3   .. A/R Monitor    RENAL.  .. Na 6/26/2023 Na 144  .. K 6/26/2023 K 4.1   .. CO2 6/26/2023 CO2 30   .. Cr 6/26/2023 Cr .6     NEURO  .. ALTERED MENTAL STATUS 6/26/2023 (Normally AO x 4)   .. CT h 6/26/2023 ct h (-) nsc cw 4/2022      . POST DISCHARGE  RECOMMENDATIONS (A/R).  .. A fib on coumadin        . CURRENT PROBLEMS.   .. Left pl effsn 6/26/2023 CXR   .. A fib on coumadin at home 6/26/2023  .. ANEMIA 6/26/2023 Hb 10.6   .. ALTERED MENTAL STATUS 6/26/2023 (Normally AO x 4) 6/26/2023 CT h (-)     . TIME SPENT   . About 36 minutes aggregate care time spent on encounter; activities included   direct patient care, counseling and/or coordinating care reviewing notes, lab data/ imaging , discussion with multidisciplinary team/ patient  /family and explaining in detail risks, benefits, alternatives  of the recommendations     AB MENDOZA 89 f 6/26/2023 3/15/1932 DR YURI CARRINGTON     
  REASON FOR VISIT  .. Management of problems listed below      NOTEWORTHY FINDINGS.     PHYSICAL EXAM    HEENT Unremarkable  atraumatic   RESP Fair air entry  Harsh breath sound   CARDIAC S1 S2 No S3     NO JVD    ABDOMEN No hepatosplenomegaly   PEDAL EDEMA present No calf tenderness  NO rash       GENERAL DATA .     GOC.    .. 6/26/2023 full code   ICU STAY.   .. none  COVID.   .. scv2 6/26/2023 scv2 (-)      BEST PRACTICE ISSUES.    HOB ELEVATN.   .. Yes  DVT PPLX.   ..    6/26/2023 On coumadin at home On arrival INR therapeutic   STRESS ULCER PPLX.   ..      INFECTION PPLX.   ..    SP SW RICKY.    ..        DIET.    ..  6/26/2023 soft bite size  IV fl.  .. 6/26 d5 1/2 ns 50      PROCEDURES.  ..   PAST PROCEDURES.    ..     GENERAL DATA .     ALLGY.  ..   certain abio                      WT.  ..  6/26/2023 61  BMI.  ..    6/26/2023 21    ABGS.    VS/ PO/IO/ VENT/ DRIPS.  6/28/2023 afeb 90 150/70   6/28/2023 ra 91%    CC/DOA.  . 6/26/2023 AMS combative   . (Normally a O x 4)    PRESENTATION.  . 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  Present for past 2 weeks per son.  Was recently treated for suspected urinary tract infection at facility last week with no improvement of symptoms.  Son states decreased p.o. liquid intake during this time.  Usually she is alert and conversational but now she has become more forgetful.  Has chronic leg pain.    PMH.  . pmh DVT  . pmh Anemia  . pmh leg wounds  . pmh A fib  . pmh GERD  . pmh CVA r sided weakness     HOME MEDS.   . Cefadroxil (recent uti 6/2023) levoxyl 50 warfarin hydrea 500.2         CC/DOA.  . 6/26/2023 89 f AMS combative   . (Normally a O x 4)    . 6/26/2023 Pulm consulted for pleural effs seen on CXR     PROBLEMS/ASSESSMENT/RECOMMENDATIONS (A/R).  PULMONARY.  . GAS EXCHANGE.  .. A/R. Monitor and target po 90-95%    . Left Pl effsn 6/26/2023 CXR   .. CXR 6/26/2023 cw 9/2/2022  .... CM   .... small l pl effsn / scarring  .. CT ch 6/27/2023 ct ch cw 2/8/.2009   .... Tr bl effs    .. EVENTS  ..  6/26/2023 Will check ct ch to further characterize abnormality l lower zone   .. A/R Pl effsns too small to tap will monitor/obsevre     ID.  . INFECTION.  .. w 6/26-6/27/2023 w 4 - 4.6   .. pr 6/27/2023 (-)   .. ua 6/26/2023 w 80 bact many nitr (+) L estrase large  .. rvp 6/26/2023 rvp (-)  .. 6/26 rocephin     .. A/R On Rocephin started 6/27/2023 by Dr Reese for urine infection         CARDIO.  . CAD.   .. Tr 6/26/2023 Tr 10.8   .. 6/26/2023 simvastat 10  .. 6/26/2023 metoprolol 25.2   .. A/R Monitor     . Hypertension.  .. 6/26/2023 amlodipine 5   .. Optimize bp     . CHF.  .. 6/26/2023 lasix 20   .. A/R Adjust rx for chf Monitor lytes     . A fib on coumadin at home 6/26/2023 .  .. 6/26/2023 metoprolol 25.2  .. 6/26/2023 On coumadin at home   .. A/R cont bb and coumadin  Target INR 2-3    . COUMADIN   .. INR 6/27/2023 inr 3.2    HEMAT.  . ANEMIA.  .. Hb 6/26-6/27/2023 Hb 10.6 - 10.6   .. A/R Monitor    RENAL.  .. Na 6/26/2023 Na 144  .. K 6/26/2023 K 4.1   .. CO2 6/26/2023 CO2 30   .. Cr 6/26/2023 Cr .6     NEURO  .. ALTERED MENTAL STATUS 6/26/2023 (Normally AO x 4)   .. CT h 6/26/2023 ct h (-) nsc cw 4/2022        . POST DISCHARGE  RECOMMENDATIONS (A/R).  .. A fib on coumadin        . CURRENT PROBLEMS.   .. Left pl effsn 6/26/2023 CXR   .. A fib on coumadin at home 6/26/2023  .. ANEMIA 6/26/2023 Hb 10.6   .. ALTERED MENTAL STATUS 6/26/2023 (Normally AO x 4) 6/26/2023 CT h (-)     . TIME SPENT   . About 36 minutes aggregate care time spent on encounter; activities included   direct patient care, counseling and/or coordinating care reviewing notes, lab data/ imaging , discussion with multidisciplinary team/ patient  /family and explaining in detail risks, benefits, alternatives  of the recommendations     AB MENDOZA 89 f 6/26/2023 3/15/1932 DR YURI CARRINGTON   
  REASON FOR VISIT  .. Management of problems listed below      NOTEWORTHY FINDINGS.     PHYSICAL EXAM    HEENT Unremarkable  atraumatic   RESP Fair air entry  Harsh breath sound   CARDIAC S1 S2 No S3     NO JVD    ABDOMEN No hepatosplenomegaly   PEDAL EDEMA present No calf tenderness  NO rash       GENERAL DATA .     GOC.    .. 6/26/2023 full code   ICU STAY.   .. none  COVID.   .. scv2 6/26/2023 scv2 (-)      BEST PRACTICE ISSUES.    HOB ELEVATN.   .. Yes  DVT PPLX.   ..    6/26/2023 On coumadin at home On arrival INR therapeutic   STRESS ULCER PPLX.   ..      INFECTION PPLX.   ..    SP SW RICKY.    ..        DIET.    ..  6/26/2023 soft bite size  IV fl.  .. 6/26 d5 1/2 ns 50      PROCEDURES.  ..   PAST PROCEDURES.    ..     GENERAL DATA .     ALLGY.  ..   certain abio                      WT.  ..  6/26/2023 61  BMI.  ..    6/26/2023 21    ABGS.    VS/ PO/IO/ VENT/ DRIPS.  6/30/2023 afeb 80 130/80   6/30/2023 ra 90%      CC/DOA.  . 6/26/2023 89 f AMS combative   . (Normally a O x 4)    . 6/26/2023 Pulm consulted for pleural effs seen on CXR     PROBLEMS/ASSESSMENT/RECOMMENDATIONS (A/R).  PULMONARY.  . GAS EXCHANGE.  .. A/R. Monitor and target po 90-95%    . Left Pl effsn 6/26/2023 CXR   .. CXR 6/26/2023 cw 9/2/2022  .... CM   .... small l pl effsn / scarring  .. CT ch 6/27/2023 ct ch cw 2/8/.2009   .... Tr bl effs    .. EVENTS  ..  6/26/2023 Will check ct ch to further characterize abnormality l lower zone   .. A/R Pl effsns too small to tap will monitor/obsevre     ID.  . INFECTION.  .. w 6/26-6/27/2023 w 4 - 4.6   .. pr 6/27/2023 (-)   .. ua 6/26/2023 w 80 bact many nitr (+) L estrase large  .. rvp 6/26/2023 rvp (-)  .. 6/26 rocephin     .. A/R On Rocephin started 6/27/2023 by Dr Reese for urine infection         CARDIO.  . CAD.   .. Tr 6/26/2023 Tr 10.8   .. 6/26/2023 simvastat 10  .. 6/26/2023 metoprolol 25.2   .. A/R Monitor     . Hypertension.  .. 6/26/2023 amlodipine 5   .. Optimize bp     . CHF.  .. 6/28/2023 echo pasp 39 la sl dilated n lvsf  .. 6/26/2023 lasix 20   .. A/R Adjust rx for chf Monitor lytes     . A fib on coumadin at home 6/26/2023 .  .. 6/26/2023 metoprolol 25.2  .. 6/26/2023 On coumadin at home   .. A/R cont bb and coumadin  Target INR 2-3    . COUMADIN   .. INR 6/27/2023 inr 3.2    HEMAT.  . ANEMIA.  .. Hb 6/26-6/27-6/29/2023 Hb 10.6 - 10.6 - 10.3   .. A/R Monitor    RENAL.  .. Na 6/26/2023 Na 144  .. K 6/26/2023 K 4.1   .. CO2 6/26/2023 CO2 30   .. Cr 6/26/2023 Cr .6     NEURO  .. ALTERED MENTAL STATUS 6/26/2023 (Normally AO x 4)   .. CT h 6/26/2023 ct h (-) nsc cw 4/2022      . POST DISCHARGE  RECOMMENDATIONS (A/R).  .. A fib on coumadin        . CURRENT PROBLEMS.   .. Left pl effsn 6/26/2023 CXR   .. A fib on coumadin at home 6/26/2023  .. ANEMIA 6/26/2023 Hb 10.6   .. ALTERED MENTAL STATUS 6/26/2023 (Normally AO x 4) 6/26/2023 CT h (-)     . TIME SPENT   . About 36 minutes aggregate care time spent on encounter; activities included   direct patient care, counseling and/or coordinating care reviewing notes, lab data/ imaging , discussion with multidisciplinary team/ patient  /family and explaining in detail risks, benefits, alternatives  of the recommendations     AB Lopes f 6/26/2023 3/15/1932 DR YURI CARRINGTON  
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  Present for past 2 weeks per son.  Was recently treated for suspected urinary tract infection at facility last week with no improvement of symptoms.  Son states decreased p.o. liquid intake during this time.  Usually she is alert and conversational but now she has become more forgetful.  Has chronic leg pain.Develped agitation in ER ,lorazepam was given 
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  Present for past 2 weeks per son.  Was recently treated for suspected urinary tract infection at facility last week with no improvement of symptoms.  Son states decreased p.o. liquid intake during this time.  Usually she is alert and conversational but now she has become more forgetful.  Has chronic leg pain.Develped agitation in ER ,lorazepam was given

## 2023-06-30 NOTE — DISCHARGE NOTE NURSING/CASE MANAGEMENT/SOCIAL WORK - [id number]_ALL_CORE
For information on Fall & Injury Prevention, visit: [email or web address] OR  [email or web address] OR  https://www.cdc.gov/steadi/patient.html

## 2023-06-30 NOTE — PROGRESS NOTE ADULT - SUBJECTIVE AND OBJECTIVE BOX
PROGRESS NOTE  Patient is a 89y old  Female who presents with a chief complaint of ams (30 Jun 2023 06:44)    Chart and available morning labs /imaging are reviewed electronically , urgent issues addressed . More information  is being added upon completion of rounds , when more information is collected and management discussed with consultants , medical staff and social service/case management on the floor   OVERNIGHT  No new issues reported by medical staff . All above noted Patient is resting in a bed comfortably .Confused ,poor mentation .No distress noted     HPI:  Patient with a past medical history of prior CVA on warfarin with right-sided weakness, hypertension, hyperlipidemia, coronary artery disease who is coming from her assisted living facility with altered mental status.  Present for past 2 weeks per son.  Was recently treated for suspected urinary tract infection at facility last week with no improvement of symptoms.  Son states decreased p.o. liquid intake during this time.  Usually she is alert and conversational but now she has become more forgetful.  Has chronic leg pain.Develped agitation in ER ,lorazepam was given  (26 Jun 2023 13:30)    PAST MEDICAL & SURGICAL HISTORY:  Hypertension      CVA (cerebral vascular accident)      Depression      Constipation      Neuropathy      Hyperlipidemia      Grade I diastolic dysfunction      Afib      Neuropathy      VHD (valvular heart disease)      Aortic valvar stenosis      No significant past surgical history          MEDICATIONS  (STANDING):  amLODIPine   Tablet 5 milliGRAM(s) Oral daily  baclofen 10 milliGRAM(s) Oral every 12 hours  cholecalciferol 2000 Unit(s) Oral daily  collagenase Ointment 1 Application(s) Topical daily  cyanocobalamin 1000 MICROGram(s) Oral daily  dextrose 5% + sodium chloride 0.45%. 1000 milliLiter(s) (50 mL/Hr) IV Continuous <Continuous>  escitalopram 20 milliGRAM(s) Oral daily  famotidine    Tablet 20 milliGRAM(s) Oral daily  folic acid 1 milliGRAM(s) Oral daily  furosemide    Tablet 20 milliGRAM(s) Oral daily  gabapentin 300 milliGRAM(s) Oral three times a day  hydroxyurea 500 milliGRAM(s) Oral two times a day  lactobacillus acidophilus 1 Tablet(s) Oral every 12 hours  levothyroxine 50 MICROGram(s) Oral daily  metoprolol tartrate 25 milliGRAM(s) Oral two times a day  polyethylene glycol 3350 17 Gram(s) Oral two times a day  senna 2 Tablet(s) Oral at bedtime  simvastatin 10 milliGRAM(s) Oral at bedtime  warfarin 2.5 milliGRAM(s) Oral once    MEDICATIONS  (PRN):  acetaminophen     Tablet .. 650 milliGRAM(s) Oral every 6 hours PRN Temp greater or equal to 38C (100.4F), Mild Pain (1 - 3)  aluminum hydroxide/magnesium hydroxide/simethicone Suspension 30 milliLiter(s) Oral every 4 hours PRN Dyspepsia  LORazepam   Injectable 0.5 milliGRAM(s) IV Push every 8 hours PRN severe agitation  magnesium hydroxide Suspension 30 milliLiter(s) Oral daily PRN Constipation  melatonin 3 milliGRAM(s) Oral at bedtime PRN Insomnia  morphine  - Injectable 2 milliGRAM(s) IV Push every 4 hours PRN Severe Pain (7 - 10)  OLANZapine Injectable 2.5 milliGRAM(s) IntraMuscular every 6 hours PRN agitation  ondansetron Injectable 4 milliGRAM(s) IV Push every 8 hours PRN Nausea and/or Vomiting  traMADol 50 milliGRAM(s) Oral four times a day PRN Moderate Pain (4 - 6)      OBJECTIVE    T(C): 36.7 (06-30-23 @ 13:33), Max: 36.8 (06-29-23 @ 20:09)  HR: 82 (06-30-23 @ 13:33) (82 - 90)  BP: 110/85 (06-30-23 @ 13:33) (106/66 - 138/81)  RR: 17 (06-30-23 @ 13:33) (17 - 17)  SpO2: 92% (06-30-23 @ 13:33) (90% - 92%)  Wt(kg): --  I&O's Summary    29 Jun 2023 07:01  -  30 Jun 2023 07:00  --------------------------------------------------------  IN: 650 mL / OUT: 1150 mL / NET: -500 mL          REVIEW OF SYSTEMS:  CONSTITUTIONAL: No fever, weight loss, or fatigue  EYES: No eye pain, visual disturbances, or discharge  ENMT:   No sinus or throat pain  NECK: No pain or stiffness  RESPIRATORY: No cough, wheezing, chills or hemoptysis; No shortness of breath  CARDIOVASCULAR: No chest pain, palpitations, dizziness, or leg swelling  GASTROINTESTINAL: No abdominal pain. No nausea, vomiting; No diarrhea or constipation. No melena or hematochezia.  GENITOURINARY: No dysuria, frequency, hematuria, or incontinence  NEUROLOGICAL: No headaches, memory loss, loss of strength, numbness, or tremors  SKIN: No itching, burning, rashes, or lesions   MUSCULOSKELETAL: No joint pain or swelling; No muscle, back, or extremity pain    PHYSICAL EXAM:  Appearance: NAD. VS past 24 hrs -as above   HEENT:   Moist oral mucosa. Conjunctiva clear b/l.   Neck : supple  Respiratory: Lungs CTAB.  Gastrointestinal:  Soft, nontender. No rebound. No rigidity. BS present	  Cardiovascular: RRR ,S1S2 present  Neurologic: Non-focal. Moving all extremities.  Extremities: No edema. No erythema. No calf tenderness.  Skin: No rashes, No ecchymoses, No cyanosis.	  wounds ,skin lesions-See skin assesment flow sheet   LABS:                        10.3   5.35  )-----------( 406      ( 29 Jun 2023 06:03 )             32.2     06-29    140  |  107  |  9   ----------------------------<  126<H>  3.6   |  28  |  0.50    Ca    8.4<L>      29 Jun 2023 06:03      CAPILLARY BLOOD GLUCOSE        PT/INR - ( 30 Jun 2023 06:40 )   PT: 19.2 sec;   INR: 1.63 ratio           Urinalysis Basic - ( 29 Jun 2023 06:03 )    Color: x / Appearance: x / SG: x / pH: x  Gluc: 126 mg/dL / Ketone: x  / Bili: x / Urobili: x   Blood: x / Protein: x / Nitrite: x   Leuk Esterase: x / RBC: x / WBC x   Sq Epi: x / Non Sq Epi: x / Bacteria: x        Culture - Urine (collected 26 Jun 2023 10:35)  Source: Clean Catch Clean Catch (Midstream)  Final Report (27 Jun 2023 19:26):    >=3 organisms. Probable collection contamination.    Culture - Blood (collected 26 Jun 2023 07:00)  Source: .Blood Blood-Peripheral  Preliminary Report (30 Jun 2023 13:01):    No growth at 4 days    Culture - Blood (collected 26 Jun 2023 06:30)  Source: .Blood Blood-Peripheral  Preliminary Report (30 Jun 2023 13:01):    No growth at 4 days      RADIOLOGY & ADDITIONAL TESTS:   reviewed elctronically  ASSESSMENT/PLAN: 	  Patient was seen and examined on a day of discharge . Plan of care , discharge medications and recommendations discussed with consultants and clearance for discharge obtained .Social service , case management  and medical staff are aware of plan. Family is notified. Discharge summary  is  prepared electronically-see separate document prepared by me .75minutes spent on this visit, 50% visit time spent in care co-ordination with other attendings and counselling patient  I have discussed care plan with patient and HCP,expressed understanding of problems treatment and their effect and side effects, alternatives in detail,I have asked if they have any questions and concerns and appropriately addressed them to best of my ability

## 2023-08-01 ENCOUNTER — INPATIENT (INPATIENT)
Facility: HOSPITAL | Age: 88
LOS: 6 days | Discharge: NURSING, REHAB OR LONG-TERM CARE | DRG: 602 | End: 2023-08-08
Attending: INTERNAL MEDICINE | Admitting: INTERNAL MEDICINE
Payer: MEDICARE

## 2023-08-01 VITALS
DIASTOLIC BLOOD PRESSURE: 80 MMHG | WEIGHT: 130.07 LBS | HEART RATE: 82 BPM | RESPIRATION RATE: 18 BRPM | OXYGEN SATURATION: 97 % | SYSTOLIC BLOOD PRESSURE: 140 MMHG | TEMPERATURE: 98 F | HEIGHT: 66 IN

## 2023-08-01 LAB
ALBUMIN SERPL ELPH-MCNC: 3 G/DL — LOW (ref 3.3–5)
ALP SERPL-CCNC: 79 U/L — SIGNIFICANT CHANGE UP (ref 40–120)
ALT FLD-CCNC: 13 U/L — SIGNIFICANT CHANGE UP (ref 12–78)
ANION GAP SERPL CALC-SCNC: 7 MMOL/L — SIGNIFICANT CHANGE UP (ref 5–17)
APPEARANCE UR: ABNORMAL
APTT BLD: 56.4 SEC — HIGH (ref 24.5–35.6)
AST SERPL-CCNC: 19 U/L — SIGNIFICANT CHANGE UP (ref 15–37)
BASOPHILS # BLD AUTO: 0.02 K/UL — SIGNIFICANT CHANGE UP (ref 0–0.2)
BASOPHILS NFR BLD AUTO: 0.5 % — SIGNIFICANT CHANGE UP (ref 0–2)
BILIRUB SERPL-MCNC: 0.5 MG/DL — SIGNIFICANT CHANGE UP (ref 0.2–1.2)
BILIRUB UR-MCNC: NEGATIVE — SIGNIFICANT CHANGE UP
BUN SERPL-MCNC: 27 MG/DL — HIGH (ref 7–23)
CALCIUM SERPL-MCNC: 8.8 MG/DL — SIGNIFICANT CHANGE UP (ref 8.5–10.1)
CHLORIDE SERPL-SCNC: 107 MMOL/L — SIGNIFICANT CHANGE UP (ref 96–108)
CO2 SERPL-SCNC: 29 MMOL/L — SIGNIFICANT CHANGE UP (ref 22–31)
COLOR SPEC: YELLOW — SIGNIFICANT CHANGE UP
CREAT SERPL-MCNC: 0.85 MG/DL — SIGNIFICANT CHANGE UP (ref 0.5–1.3)
DIFF PNL FLD: NEGATIVE — SIGNIFICANT CHANGE UP
EGFR: 65 ML/MIN/1.73M2 — SIGNIFICANT CHANGE UP
EOSINOPHIL # BLD AUTO: 0.03 K/UL — SIGNIFICANT CHANGE UP (ref 0–0.5)
EOSINOPHIL NFR BLD AUTO: 0.8 % — SIGNIFICANT CHANGE UP (ref 0–6)
GLUCOSE SERPL-MCNC: 119 MG/DL — HIGH (ref 70–99)
GLUCOSE UR QL: NEGATIVE MG/DL — SIGNIFICANT CHANGE UP
HCT VFR BLD CALC: 35.6 % — SIGNIFICANT CHANGE UP (ref 34.5–45)
HGB BLD-MCNC: 11.2 G/DL — LOW (ref 11.5–15.5)
IMM GRANULOCYTES NFR BLD AUTO: 1.3 % — HIGH (ref 0–0.9)
INR BLD: 3.67 RATIO — HIGH (ref 0.85–1.18)
KETONES UR-MCNC: NEGATIVE MG/DL — SIGNIFICANT CHANGE UP
LACTATE SERPL-SCNC: 1.1 MMOL/L — SIGNIFICANT CHANGE UP (ref 0.7–2)
LEUKOCYTE ESTERASE UR-ACNC: ABNORMAL
LIDOCAIN IGE QN: 98 U/L — SIGNIFICANT CHANGE UP (ref 73–393)
LYMPHOCYTES # BLD AUTO: 0.6 K/UL — LOW (ref 1–3.3)
LYMPHOCYTES # BLD AUTO: 15.9 % — SIGNIFICANT CHANGE UP (ref 13–44)
MCHC RBC-ENTMCNC: 31.5 GM/DL — LOW (ref 32–36)
MCHC RBC-ENTMCNC: 37.5 PG — HIGH (ref 27–34)
MCV RBC AUTO: 119.1 FL — HIGH (ref 80–100)
MONOCYTES # BLD AUTO: 0.31 K/UL — SIGNIFICANT CHANGE UP (ref 0–0.9)
MONOCYTES NFR BLD AUTO: 8.2 % — SIGNIFICANT CHANGE UP (ref 2–14)
NEUTROPHILS # BLD AUTO: 2.77 K/UL — SIGNIFICANT CHANGE UP (ref 1.8–7.4)
NEUTROPHILS NFR BLD AUTO: 73.3 % — SIGNIFICANT CHANGE UP (ref 43–77)
NITRITE UR-MCNC: NEGATIVE — SIGNIFICANT CHANGE UP
NRBC # BLD: 0 /100 WBCS — SIGNIFICANT CHANGE UP (ref 0–0)
PH UR: 5 — SIGNIFICANT CHANGE UP (ref 5–8)
PLATELET # BLD AUTO: 484 K/UL — HIGH (ref 150–400)
POTASSIUM SERPL-MCNC: 4.2 MMOL/L — SIGNIFICANT CHANGE UP (ref 3.5–5.3)
POTASSIUM SERPL-SCNC: 4.2 MMOL/L — SIGNIFICANT CHANGE UP (ref 3.5–5.3)
PROT SERPL-MCNC: 7.1 G/DL — SIGNIFICANT CHANGE UP (ref 6–8.3)
PROT UR-MCNC: 30 MG/DL
PROTHROM AB SERPL-ACNC: 41.3 SEC — HIGH (ref 9.5–13)
RBC # BLD: 2.99 M/UL — LOW (ref 3.8–5.2)
RBC # FLD: 18.3 % — HIGH (ref 10.3–14.5)
SODIUM SERPL-SCNC: 143 MMOL/L — SIGNIFICANT CHANGE UP (ref 135–145)
SP GR SPEC: 1.02 — SIGNIFICANT CHANGE UP (ref 1–1.03)
UROBILINOGEN FLD QL: 0.2 MG/DL — SIGNIFICANT CHANGE UP (ref 0.2–1)
WBC # BLD: 3.78 K/UL — LOW (ref 3.8–10.5)
WBC # FLD AUTO: 3.78 K/UL — LOW (ref 3.8–10.5)

## 2023-08-01 PROCEDURE — 93925 LOWER EXTREMITY STUDY: CPT | Mod: 26

## 2023-08-01 PROCEDURE — 99285 EMERGENCY DEPT VISIT HI MDM: CPT

## 2023-08-01 PROCEDURE — 73590 X-RAY EXAM OF LOWER LEG: CPT | Mod: 26,LT

## 2023-08-01 PROCEDURE — 93970 EXTREMITY STUDY: CPT | Mod: 26

## 2023-08-01 PROCEDURE — 93010 ELECTROCARDIOGRAM REPORT: CPT

## 2023-08-01 RX ORDER — MORPHINE SULFATE 50 MG/1
2 CAPSULE, EXTENDED RELEASE ORAL ONCE
Refills: 0 | Status: DISCONTINUED | OUTPATIENT
Start: 2023-08-01 | End: 2023-08-01

## 2023-08-01 RX ORDER — OLANZAPINE 15 MG/1
2.5 TABLET, FILM COATED ORAL ONCE
Refills: 0 | Status: COMPLETED | OUTPATIENT
Start: 2023-08-01 | End: 2023-08-01

## 2023-08-01 RX ORDER — HYDROXYUREA 500 MG/1
500 CAPSULE ORAL
Refills: 0 | Status: DISCONTINUED | OUTPATIENT
Start: 2023-08-01 | End: 2023-08-08

## 2023-08-01 RX ORDER — PIPERACILLIN AND TAZOBACTAM 4; .5 G/20ML; G/20ML
3.38 INJECTION, POWDER, LYOPHILIZED, FOR SOLUTION INTRAVENOUS ONCE
Refills: 0 | Status: COMPLETED | OUTPATIENT
Start: 2023-08-01 | End: 2023-08-02

## 2023-08-01 RX ORDER — GABAPENTIN 400 MG/1
300 CAPSULE ORAL THREE TIMES A DAY
Refills: 0 | Status: DISCONTINUED | OUTPATIENT
Start: 2023-08-01 | End: 2023-08-08

## 2023-08-01 RX ORDER — POTASSIUM CHLORIDE 20 MEQ
10 PACKET (EA) ORAL DAILY
Refills: 0 | Status: DISCONTINUED | OUTPATIENT
Start: 2023-08-01 | End: 2023-08-02

## 2023-08-01 RX ORDER — VANCOMYCIN HCL 1 G
1000 VIAL (EA) INTRAVENOUS ONCE
Refills: 0 | Status: COMPLETED | OUTPATIENT
Start: 2023-08-01 | End: 2023-08-01

## 2023-08-01 RX ORDER — PIPERACILLIN AND TAZOBACTAM 4; .5 G/20ML; G/20ML
3.38 INJECTION, POWDER, LYOPHILIZED, FOR SOLUTION INTRAVENOUS ONCE
Refills: 0 | Status: COMPLETED | OUTPATIENT
Start: 2023-08-01 | End: 2023-08-01

## 2023-08-01 RX ORDER — MORPHINE SULFATE 50 MG/1
1 CAPSULE, EXTENDED RELEASE ORAL EVERY 4 HOURS
Refills: 0 | Status: DISCONTINUED | OUTPATIENT
Start: 2023-08-01 | End: 2023-08-02

## 2023-08-01 RX ORDER — HALOPERIDOL DECANOATE 100 MG/ML
5 INJECTION INTRAMUSCULAR ONCE
Refills: 0 | Status: COMPLETED | OUTPATIENT
Start: 2023-08-01 | End: 2023-08-01

## 2023-08-01 RX ORDER — SODIUM CHLORIDE 9 MG/ML
1000 INJECTION INTRAMUSCULAR; INTRAVENOUS; SUBCUTANEOUS ONCE
Refills: 0 | Status: COMPLETED | OUTPATIENT
Start: 2023-08-01 | End: 2023-08-01

## 2023-08-01 RX ORDER — ESCITALOPRAM OXALATE 10 MG/1
10 TABLET, FILM COATED ORAL AT BEDTIME
Refills: 0 | Status: DISCONTINUED | OUTPATIENT
Start: 2023-08-01 | End: 2023-08-08

## 2023-08-01 RX ORDER — FUROSEMIDE 40 MG
40 TABLET ORAL DAILY
Refills: 0 | Status: DISCONTINUED | OUTPATIENT
Start: 2023-08-01 | End: 2023-08-02

## 2023-08-01 RX ADMIN — MORPHINE SULFATE 1 MILLIGRAM(S): 50 CAPSULE, EXTENDED RELEASE ORAL at 22:26

## 2023-08-01 RX ADMIN — Medication 40 MILLIGRAM(S): at 21:14

## 2023-08-01 RX ADMIN — MORPHINE SULFATE 2 MILLIGRAM(S): 50 CAPSULE, EXTENDED RELEASE ORAL at 20:17

## 2023-08-01 RX ADMIN — MORPHINE SULFATE 2 MILLIGRAM(S): 50 CAPSULE, EXTENDED RELEASE ORAL at 19:18

## 2023-08-01 RX ADMIN — OLANZAPINE 2.5 MILLIGRAM(S): 15 TABLET, FILM COATED ORAL at 21:12

## 2023-08-01 RX ADMIN — MORPHINE SULFATE 2 MILLIGRAM(S): 50 CAPSULE, EXTENDED RELEASE ORAL at 22:44

## 2023-08-01 RX ADMIN — PIPERACILLIN AND TAZOBACTAM 3.38 GRAM(S): 4; .5 INJECTION, POWDER, LYOPHILIZED, FOR SOLUTION INTRAVENOUS at 21:32

## 2023-08-01 RX ADMIN — SODIUM CHLORIDE 1000 MILLILITER(S): 9 INJECTION INTRAMUSCULAR; INTRAVENOUS; SUBCUTANEOUS at 19:10

## 2023-08-01 RX ADMIN — Medication 250 MILLIGRAM(S): at 20:05

## 2023-08-01 RX ADMIN — PIPERACILLIN AND TAZOBACTAM 200 GRAM(S): 4; .5 INJECTION, POWDER, LYOPHILIZED, FOR SOLUTION INTRAVENOUS at 20:05

## 2023-08-01 RX ADMIN — MORPHINE SULFATE 2 MILLIGRAM(S): 50 CAPSULE, EXTENDED RELEASE ORAL at 21:44

## 2023-08-01 RX ADMIN — SODIUM CHLORIDE 1000 MILLILITER(S): 9 INJECTION INTRAMUSCULAR; INTRAVENOUS; SUBCUTANEOUS at 20:00

## 2023-08-01 RX ADMIN — MORPHINE SULFATE 1 MILLIGRAM(S): 50 CAPSULE, EXTENDED RELEASE ORAL at 21:26

## 2023-08-01 NOTE — ED PROVIDER NOTE - PHYSICAL EXAMINATION
bilateral venous stasis changes noted with open wounds noted to the LLE and surrounding cellulitis mild increased warmth.

## 2023-08-01 NOTE — ED ADULT NURSE REASSESSMENT NOTE - NS ED NURSE REASSESS COMMENT FT1
pt given small amount of juice to trail and pt unable to swallow noted to cough. MD Lopez made aware.

## 2023-08-01 NOTE — ED ADULT NURSE REASSESSMENT NOTE - NS ED NURSE REASSESS COMMENT FT1
Pt received from ADEN Tuttle at this time. pt awake and alert x 2 person and place not to time. speaking in full sentences. denies any SOB or chest pain. c.o pain 10/10 to left shin - hx of cellulitis. area wrapped at this time. attempting to visualize area however pt screaming in pain. morphine administered. pt to be admitted to hospital. Son at bedside aware of plan of care. safety maintained.

## 2023-08-01 NOTE — PROGRESS NOTE ADULT - SUBJECTIVE AND OBJECTIVE BOX
The patient was brought to the ED for suspected UTI and cellulitis of lower extremities  Received IV Vanco and Zosyn in the ED  Continue IV Zosyn  Full consult to follow.

## 2023-08-01 NOTE — ED ADULT NURSE REASSESSMENT NOTE - NS ED NURSE REASSESS COMMENT FT1
pt c.o to scream due to pain despite multiple rounds of pain medication. MD Love aware. pt still pending CT at this time. will attempt to given more medication to help relax patient,

## 2023-08-01 NOTE — ED PROVIDER NOTE - OBJECTIVE STATEMENT
Patient is a 89-year-old female presents the emergency room with chief complaint of altered mental status.  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  Patient presents from assisted living with altered mental status concern for UTI.  Patient was last admitted to the hospital June 26 through June 30, 2023 was altered mental status x2 weeks.  Was ultimately diagnosed with acute metabolic encephalopathy secondary to UTI improved.  Patient stabilized and was ultimately discharged back to assisted living.  Presents today lethargic with urinary frequency and confusion. Patient is a 89-year-old female presents the emergency room with chief complaint of altered mental status.  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  Patient presents from assisted living with altered mental status concern for UTI.  Patient was last admitted to the hospital June 26 through June 30, 2023 was altered mental status x2 weeks.  Was ultimately diagnosed with acute metabolic encephalopathy secondary to UTI improved.  Patient stabilized and was ultimately discharged back to assisted living.  Presents today lethargic with urinary frequency and confusion. Son reports that there is also concern for possible cellulitis to the LLE at the site of a skin graft. Pt recently completed a course of po abx for this with no significant improvement. She has been c/o severe pain to the LLE for weeks.

## 2023-08-01 NOTE — CONSULT NOTE ADULT - SUBJECTIVE AND OBJECTIVE BOX
[facility] [place]     Patient is a 89y old  Female who presents with a chief complaint of     HPI:      HPI:    89y Female for Cardiology Consult    PAST MEDICAL & SURGICAL HISTORY:  Hypertension      CVA (cerebral vascular accident)      Depression      Constipation      Neuropathy      Hyperlipidemia      Grade I diastolic dysfunction      Afib      Neuropathy      VHD (valvular heart disease)      Aortic valvar stenosis      No significant past surgical history          FAMILY HISTORY:      SOCIAL HISTORY:   Alcohol:  Smoking:    Allergies    per son &quot;issues with certain anitibiotics&quot; does not remember the names (Unknown)    Intolerances        MEDICATIONS  (STANDING):  piperacillin/tazobactam IVPB... 3.375 Gram(s) IV Intermittent once  vancomycin  IVPB. 1000 milliGRAM(s) IV Intermittent once    MEDICATIONS  (PRN):      REVIEW OF SYSTEMS:  CONSTITUTIONAL: No fever, weight loss, chills, shakes, or fat  RESPIRATORY: No cough, wheezing, hemoptysis, or shortness of breath  CARDIOVASCULAR: No chest pain, dyspnea, palpitations, dizziness, syncope, paroxysmal nocturnal dyspnea, orthopnea, or arm or leg swelling  GASTROINTESTINAL: No abdominal  or epigastric pain, nausea, vomiting, hematemesis, diarrhea, constipation, melena or bright red bloo  NEUROLOGICAL: No headaches, memory loss, slurred speech, limb weakness, loss of strength, numbness, or tremors  SKIN: No itching, burning, rashes, or lesions  ENDOCRINE: No heat or cold intolerance, or hair loss  MUSCULOSKELETAL: No joint pain or swelling, muscle, back, or extremity pain  HEME/LYMPH: No easy bruising or bleeding gums  ALLERY AND IMMUNOLOGIC: No hives or rash.    Vital Signs Last 24 Hrs  T(C): 36.7 (01 Aug 2023 16:10), Max: 36.7 (01 Aug 2023 16:10)  T(F): 98 (01 Aug 2023 16:10), Max: 98 (01 Aug 2023 16:10)  HR: 82 (01 Aug 2023 16:10) (82 - 82)  BP: 140/80 (01 Aug 2023 16:10) (140/80 - 140/80)  BP(mean): --  RR: 18 (01 Aug 2023 16:10) (18 - 18)  SpO2: 97% (01 Aug 2023 16:10) (97% - 97%)    Parameters below as of 01 Aug 2023 16:10  Patient On (Oxygen Delivery Method): room air        PHYSICAL EXAM:  HEAD:  Atraumatic, Normocephalic  EYES: EOMI, PERRLA, conjunctiva and sclera clear  NECK: Supple and normal thyroid.  No JVD or carotid bruit.   HEART: S1, S2 regular , 1/6 soft ejection systolic murmur in mitral area , no thrill and no gallops .  PULMONARY: Bilateral vesicular breathing , few scattered ronchi and few scattered rales are present .  ABDOMEN: Soft nontender and positive bowl sounds   EXTREMITIES:  No clubbing, cyanosis, or pedal  edema  NEUROLOGICAL: AAOX3 , no focal deficit .    LABS:                        11.2   3.78  )-----------( 484      ( 01 Aug 2023 18:30 )             35.6     08-01    143  |  107  |  27<H>  ----------------------------<  119<H>  4.2   |  29  |  0.85    Ca    8.8      01 Aug 2023 18:30    TPro  7.1  /  Alb  3.0<L>  /  TBili  0.5  /  DBili  x   /  AST  19  /  ALT  13  /  AlkPhos  79  08-01        PT/INR - ( 01 Aug 2023 18:30 )   PT: 41.3 sec;   INR: 3.67 ratio         PTT - ( 01 Aug 2023 18:30 )  PTT:56.4 sec  Urinalysis Basic - ( 01 Aug 2023 18:30 )    Color: x / Appearance: x / SG: x / pH: x  Gluc: 119 mg/dL / Ketone: x  / Bili: x / Urobili: x   Blood: x / Protein: x / Nitrite: x   Leuk Esterase: x / RBC: x / WBC x   Sq Epi: x / Non Sq Epi: x / Bacteria: x      BNP      EKG:  ECHO:  IMAGING:    Assessment and Plan :     Will continue to follow during hospital course and give further recommendations as needed . Thanks for your referral . if any questions please contact me at office ([phone] [phone])

## 2023-08-01 NOTE — ED PROVIDER NOTE - CLINICAL SUMMARY MEDICAL DECISION MAKING FREE TEXT BOX
Patient is a 89-year-old female presents the emergency room with chief complaint of altered mental status.  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  Patient presents from assisted living with altered mental status concern for UTI.  Patient was last admitted to the hospital June 26 through June 30, 2023 was altered mental status x2 weeks.  Was ultimately diagnosed with acute metabolic encephalopathy secondary to UTI improved.  Patient stabilized and was ultimately discharged back to assisted living.  Presents today lethargic with urinary frequency and confusion. Son reports that there is also concern for possible cellulitis to the LLE at the site of a skin graft. Pt recently completed a course of po abx for this with no significant improvement. She has been c/o severe pain to the LLE for weeks.   Independent review of EKG reveals NSR at 77 bpm

## 2023-08-02 DIAGNOSIS — I87.2 VENOUS INSUFFICIENCY (CHRONIC) (PERIPHERAL): ICD-10-CM

## 2023-08-02 DIAGNOSIS — G93.41 METABOLIC ENCEPHALOPATHY: ICD-10-CM

## 2023-08-02 DIAGNOSIS — L03.90 CELLULITIS, UNSPECIFIED: ICD-10-CM

## 2023-08-02 DIAGNOSIS — S81.802A UNSPECIFIED OPEN WOUND, LEFT LOWER LEG, INITIAL ENCOUNTER: ICD-10-CM

## 2023-08-02 DIAGNOSIS — R39.89 OTHER SYMPTOMS AND SIGNS INVOLVING THE GENITOURINARY SYSTEM: ICD-10-CM

## 2023-08-02 DIAGNOSIS — F03.90 UNSPECIFIED DEMENTIA WITHOUT BEHAVIORAL DISTURBANCE: ICD-10-CM

## 2023-08-02 DIAGNOSIS — I73.9 PERIPHERAL VASCULAR DISEASE, UNSPECIFIED: ICD-10-CM

## 2023-08-02 DIAGNOSIS — R41.82 ALTERED MENTAL STATUS, UNSPECIFIED: ICD-10-CM

## 2023-08-02 DIAGNOSIS — M19.90 UNSPECIFIED OSTEOARTHRITIS, UNSPECIFIED SITE: ICD-10-CM

## 2023-08-02 DIAGNOSIS — I10 ESSENTIAL (PRIMARY) HYPERTENSION: ICD-10-CM

## 2023-08-02 DIAGNOSIS — Z29.9 ENCOUNTER FOR PROPHYLACTIC MEASURES, UNSPECIFIED: ICD-10-CM

## 2023-08-02 DIAGNOSIS — I48.91 UNSPECIFIED ATRIAL FIBRILLATION: ICD-10-CM

## 2023-08-02 LAB
RAPID RVP RESULT: SIGNIFICANT CHANGE UP
SARS-COV-2 RNA SPEC QL NAA+PROBE: SIGNIFICANT CHANGE UP

## 2023-08-02 PROCEDURE — 74176 CT ABD & PELVIS W/O CONTRAST: CPT | Mod: 26,MA

## 2023-08-02 PROCEDURE — 71250 CT THORAX DX C-: CPT | Mod: 26,MA

## 2023-08-02 PROCEDURE — 99221 1ST HOSP IP/OBS SF/LOW 40: CPT

## 2023-08-02 PROCEDURE — 73700 CT LOWER EXTREMITY W/O DYE: CPT | Mod: 26,LT,MD

## 2023-08-02 PROCEDURE — 70450 CT HEAD/BRAIN W/O DYE: CPT | Mod: 26,MA

## 2023-08-02 PROCEDURE — 93922 UPR/L XTREMITY ART 2 LEVELS: CPT | Mod: 26

## 2023-08-02 PROCEDURE — 99222 1ST HOSP IP/OBS MODERATE 55: CPT

## 2023-08-02 RX ORDER — PANTOPRAZOLE SODIUM 20 MG/1
40 TABLET, DELAYED RELEASE ORAL DAILY
Refills: 0 | Status: DISCONTINUED | OUTPATIENT
Start: 2023-08-02 | End: 2023-08-08

## 2023-08-02 RX ORDER — SENNA PLUS 8.6 MG/1
1 TABLET ORAL AT BEDTIME
Refills: 0 | Status: DISCONTINUED | OUTPATIENT
Start: 2023-08-02 | End: 2023-08-08

## 2023-08-02 RX ORDER — ONDANSETRON 8 MG/1
4 TABLET, FILM COATED ORAL EVERY 8 HOURS
Refills: 0 | Status: DISCONTINUED | OUTPATIENT
Start: 2023-08-02 | End: 2023-08-08

## 2023-08-02 RX ORDER — CHOLECALCIFEROL (VITAMIN D3) 125 MCG
2000 CAPSULE ORAL DAILY
Refills: 0 | Status: DISCONTINUED | OUTPATIENT
Start: 2023-08-02 | End: 2023-08-08

## 2023-08-02 RX ORDER — ACETAMINOPHEN 500 MG
1000 TABLET ORAL ONCE
Refills: 0 | Status: COMPLETED | OUTPATIENT
Start: 2023-08-02 | End: 2023-08-02

## 2023-08-02 RX ORDER — AMLODIPINE BESYLATE 2.5 MG/1
5 TABLET ORAL DAILY
Refills: 0 | Status: DISCONTINUED | OUTPATIENT
Start: 2023-08-02 | End: 2023-08-08

## 2023-08-02 RX ORDER — FOLIC ACID 0.8 MG
1 TABLET ORAL DAILY
Refills: 0 | Status: DISCONTINUED | OUTPATIENT
Start: 2023-08-02 | End: 2023-08-08

## 2023-08-02 RX ORDER — SODIUM CHLORIDE 9 MG/ML
1000 INJECTION, SOLUTION INTRAVENOUS
Refills: 0 | Status: DISCONTINUED | OUTPATIENT
Start: 2023-08-02 | End: 2023-08-08

## 2023-08-02 RX ORDER — LANOLIN ALCOHOL/MO/W.PET/CERES
3 CREAM (GRAM) TOPICAL AT BEDTIME
Refills: 0 | Status: DISCONTINUED | OUTPATIENT
Start: 2023-08-02 | End: 2023-08-08

## 2023-08-02 RX ORDER — ACETAMINOPHEN 500 MG
650 TABLET ORAL EVERY 6 HOURS
Refills: 0 | Status: DISCONTINUED | OUTPATIENT
Start: 2023-08-02 | End: 2023-08-04

## 2023-08-02 RX ORDER — KETOROLAC TROMETHAMINE 30 MG/ML
30 SYRINGE (ML) INJECTION EVERY 6 HOURS
Refills: 0 | Status: DISCONTINUED | OUTPATIENT
Start: 2023-08-02 | End: 2023-08-02

## 2023-08-02 RX ORDER — LACTOBACILLUS ACIDOPHILUS 100MM CELL
1 CAPSULE ORAL EVERY 12 HOURS
Refills: 0 | Status: DISCONTINUED | OUTPATIENT
Start: 2023-08-02 | End: 2023-08-08

## 2023-08-02 RX ORDER — BACLOFEN 100 %
10 POWDER (GRAM) MISCELLANEOUS EVERY 12 HOURS
Refills: 0 | Status: DISCONTINUED | OUTPATIENT
Start: 2023-08-02 | End: 2023-08-03

## 2023-08-02 RX ORDER — PREGABALIN 225 MG/1
1 CAPSULE ORAL
Qty: 0 | Refills: 0 | DISCHARGE

## 2023-08-02 RX ORDER — LEVOTHYROXINE SODIUM 125 MCG
25 TABLET ORAL AT BEDTIME
Refills: 0 | Status: DISCONTINUED | OUTPATIENT
Start: 2023-08-02 | End: 2023-08-08

## 2023-08-02 RX ORDER — TRAMADOL HYDROCHLORIDE 50 MG/1
0.5 TABLET ORAL
Refills: 0 | DISCHARGE

## 2023-08-02 RX ORDER — MORPHINE SULFATE 50 MG/1
2 CAPSULE, EXTENDED RELEASE ORAL EVERY 4 HOURS
Refills: 0 | Status: DISCONTINUED | OUTPATIENT
Start: 2023-08-02 | End: 2023-08-08

## 2023-08-02 RX ORDER — PIPERACILLIN AND TAZOBACTAM 4; .5 G/20ML; G/20ML
3.38 INJECTION, POWDER, LYOPHILIZED, FOR SOLUTION INTRAVENOUS EVERY 8 HOURS
Refills: 0 | Status: DISCONTINUED | OUTPATIENT
Start: 2023-08-02 | End: 2023-08-06

## 2023-08-02 RX ADMIN — PIPERACILLIN AND TAZOBACTAM 25 GRAM(S): 4; .5 INJECTION, POWDER, LYOPHILIZED, FOR SOLUTION INTRAVENOUS at 21:47

## 2023-08-02 RX ADMIN — MORPHINE SULFATE 1 MILLIGRAM(S): 50 CAPSULE, EXTENDED RELEASE ORAL at 09:00

## 2023-08-02 RX ADMIN — Medication 1000 MILLIGRAM(S): at 22:00

## 2023-08-02 RX ADMIN — HALOPERIDOL DECANOATE 5 MILLIGRAM(S): 100 INJECTION INTRAMUSCULAR at 00:03

## 2023-08-02 RX ADMIN — MORPHINE SULFATE 2 MILLIGRAM(S): 50 CAPSULE, EXTENDED RELEASE ORAL at 11:58

## 2023-08-02 RX ADMIN — Medication 400 MILLIGRAM(S): at 21:47

## 2023-08-02 RX ADMIN — Medication 1 MILLIGRAM(S): at 01:42

## 2023-08-02 RX ADMIN — Medication 0.5 MILLIGRAM(S): at 20:38

## 2023-08-02 RX ADMIN — PIPERACILLIN AND TAZOBACTAM 25 GRAM(S): 4; .5 INJECTION, POWDER, LYOPHILIZED, FOR SOLUTION INTRAVENOUS at 12:01

## 2023-08-02 RX ADMIN — Medication 25 MICROGRAM(S): at 21:47

## 2023-08-02 RX ADMIN — MORPHINE SULFATE 2 MILLIGRAM(S): 50 CAPSULE, EXTENDED RELEASE ORAL at 11:30

## 2023-08-02 RX ADMIN — MORPHINE SULFATE 1 MILLIGRAM(S): 50 CAPSULE, EXTENDED RELEASE ORAL at 01:18

## 2023-08-02 RX ADMIN — MORPHINE SULFATE 1 MILLIGRAM(S): 50 CAPSULE, EXTENDED RELEASE ORAL at 10:15

## 2023-08-02 RX ADMIN — MORPHINE SULFATE 2 MILLIGRAM(S): 50 CAPSULE, EXTENDED RELEASE ORAL at 19:21

## 2023-08-02 RX ADMIN — PIPERACILLIN AND TAZOBACTAM 25 GRAM(S): 4; .5 INJECTION, POWDER, LYOPHILIZED, FOR SOLUTION INTRAVENOUS at 15:14

## 2023-08-02 RX ADMIN — MORPHINE SULFATE 1 MILLIGRAM(S): 50 CAPSULE, EXTENDED RELEASE ORAL at 02:18

## 2023-08-02 RX ADMIN — Medication 1 MILLIGRAM(S): at 00:03

## 2023-08-02 RX ADMIN — PIPERACILLIN AND TAZOBACTAM 25 GRAM(S): 4; .5 INJECTION, POWDER, LYOPHILIZED, FOR SOLUTION INTRAVENOUS at 00:22

## 2023-08-02 RX ADMIN — MORPHINE SULFATE 2 MILLIGRAM(S): 50 CAPSULE, EXTENDED RELEASE ORAL at 19:45

## 2023-08-02 NOTE — CARE COORDINATION ASSESSMENT. - NSCAREPROVIDERS_GEN_ALL_CORE_FT
CARE PROVIDERS:  Accepting Physician: Анна Best  Administration: Eugenio Dasilva  Administration: Jennifer Bermudez  Administration: Megha Stroud  Admitting: Анна Best  Attending: Анна Best  Case Management: Laurie Nassar  Consultant: Twan Moralez  Consultant: Koffi Stearns  Consultant: Keith Ruiz  Consultant: Swapnil De La Rosa  Consultant: Lorelei Tao  Consultant: Sushil Anguiano  Consultant: Pipe Prince  Consultant: Anish Fisher  ED Attending: Tanvi Hough  ED Nurse: Vineet Díaz  Nurse: Ines Ashley  Nurse: Jalen Davis  Ordered: ADM, User  Outpatient Provider: Fritz Kauffman  Outpatient Provider: Facundo Valentine  Outpatient Provider: Pipe Prince  Outpatient Provider: Mike Urias  PCA/Nursing Assistant: Parviz Tate  Primary Team: Анна Best  Primary Team: Leydi Love  Radiology Technician: Myesha Barrientos  Registered Dietitian: Alissa Rizvi// Supp. Assoc.: Dipika Solorio

## 2023-08-02 NOTE — CONSULT NOTE ADULT - SUBJECTIVE AND OBJECTIVE BOX
SURGERY PA CONSULT NOTE:    CHIEF COMPLAINT:  Patient is a 89y old  Female who presents with a chief complaint of lower extremity cellulitis (01 Aug 2023 20:00)    HPI FROM ED:  Patient is a 89-year-old female presents the emergency room with chief complaint of altered mental status.  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  Patient presents from assisted living with altered mental status concern for UTI.  Patient was last admitted to the hospital June 26 through June 30, 2023 was altered mental status x2 weeks.  Was ultimately diagnosed with acute metabolic encephalopathy secondary to UTI improved.  Patient stabilized and was ultimately discharged back to assisted living.  Presents today lethargic with urinary frequency and confusion. Son reports that there is also concern for possible cellulitis to the LLE at the site of a skin graft. Pt recently completed a course of po abx for this with no significant improvement. She has been c/o severe pain to the LLE for weeks.    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, hypertension hyperlipidemia CAD history of A-fib on Coumadin    PAST MEDICAL HISTORY:  PAST MEDICAL & SURGICAL HISTORY:  Hypertension      CVA (cerebral vascular accident)      Depression      Constipation      Neuropathy      Hyperlipidemia      Grade I diastolic dysfunction      Afib      Neuropathy      VHD (valvular heart disease)      Aortic valvar stenosis      No significant past surgical history          PAST SURGICAL HISTORY:    REVIEW OF SYSTEMS:  General/Constitutional: No acute distress, no headache, weakness, fevers, or chills   HEENT: Denies auditory or visual changes/disturbances, no vertigo, no throat pain, no dysphagia    Neck: Denies neck pain/stiffness, denies swelling/lumps/hoarseness   Lymphatic: Denies lumps or swelling in the axillae, groin, or neck bilaterally   Respiratory: Denies cough/hemoptysis, denies wheezing/SOB/dyspnea  Cardiac: Denies chest pain, palpitations  Abdomen: Denies abdominal bloating/fullness, nausea or vomiting, denies abdominal pain  Extremities: Denies sores, swelling, discoloration bilat UE/LE  Genitourinary: Denies urinary issues or complaints, denies dysuria/hematuria  Neuro: Denies weakness, paraesthesias, paralysis, syncope, loss of vision  Skin: Denies pruritus, pain, rashes  Psych: Denies hallucinations, visual disturbances, or depression    MEDICATIONS:  Home Medications:  amLODIPine 5 mg oral tablet: 1 tab(s) orally once a day (26 Jun 2023 12:41)  baclofen 10 mg oral tablet: 1 tab(s) orally 2 times a day (26 Jun 2023 12:41)  cholecalciferol 50 mcg (2000 intl units) oral tablet: 1 tab(s) orally once a day (26 Jun 2023 12:41)  cyanocobalamin 1000 mcg oral tablet: 1 tab(s) orally once a day (26 Jun 2023 12:16)  docusate sodium 100 mg oral capsule: 2 cap(s) orally once a day (at bedtime) (26 Jun 2023 12:41)  escitalopram 20 mg oral tablet: 1 tab(s) orally once a day (26 Jun 2023 12:16)  famotidine 20 mg oral tablet: 1 tab(s) orally once a day (26 Jun 2023 12:41)  folic acid 1 mg oral tablet: 1 tab(s) orally once a day (26 Jun 2023 12:16)  furosemide 20 mg oral tablet: 1 tab(s) orally once a day (26 Jun 2023 12:16)  gabapentin 300 mg oral capsule: 1 cap(s) orally 3 times a day (26 Jun 2023 12:41)  hydroxyurea 500 mg oral capsule: 1 cap(s) orally 2 times a day (26 Jun 2023 12:16)  lactobacillus acidophilus oral tablet: 2 tab(s) orally once a day (26 Jun 2023 12:16)  levothyroxine 50 mcg (0.05 mg) oral tablet: 1 tab(s) orally once a day (26 Jun 2023 12:16)  metoprolol tartrate 25 mg oral tablet: 1 tab(s) orally 2 times a day (26 Jun 2023 12:16)  senna oral tablet: 1 tab(s) orally once a day (at bedtime) (26 Jun 2023 12:16)  simvastatin 10 mg oral tablet: 1 tab(s) orally once a day (at bedtime) (26 Jun 2023 12:16)  traMADol 50 mg oral tablet: 0.5 tab(s) orally 3 times a day PRN for pain (26 Jun 2023 12:41)  Tylenol 500 mg oral tablet: 1 tab(s) orally 2 times a day (26 Jun 2023 12:53)  Tylenol 500 mg oral tablet: 2 tab(s) orally once a day in the morning (26 Jun 2023 12:41)  warfarin 1 mg oral tablet: 1 tab(s) orally once a day HOLD FOR INR ABOVE 3.0 .NEXT INR 07/03/23 . (30 Jun 2023 14:44)    MEDICATIONS  (STANDING):  escitalopram 10 milliGRAM(s) Oral at bedtime  furosemide    Tablet 40 milliGRAM(s) Oral daily  gabapentin 300 milliGRAM(s) Oral three times a day  hydroxyurea 500 milliGRAM(s) Oral two times a day  potassium chloride    Tablet ER 10 milliEquivalent(s) Oral daily    MEDICATIONS  (PRN):  morphine  - Injectable 1 milliGRAM(s) IV Push every 4 hours PRN Moderate Pain (4 - 6)      ALLERGIES:  Allergies    per son &quot;issues with certain anitibiotics&quot; does not remember the names (Unknown)    Intolerances        SOCIAL HISTORY:  Social History:    Smoking: Yes [ ]  No [ ]   ______pk yrs  ETOH  Yes [ ]  No [ ]  Social [ ]  DRUGS:  Yes [ ]  No [ ]  if so what______________    FAMILY HISTORY:  FAMILY HISTORY:      VITAL SIGNS:  Vital Signs Last 24 Hrs  T(C): 36.4 (01 Aug 2023 20:50), Max: 36.7 (01 Aug 2023 16:10)  T(F): 97.5 (01 Aug 2023 20:50), Max: 98 (01 Aug 2023 16:10)  HR: 78 (01 Aug 2023 20:50) (78 - 82)  BP: 116/69 (01 Aug 2023 20:50) (116/69 - 140/80)  BP(mean): --  RR: 17 (01 Aug 2023 20:50) (17 - 18)  SpO2: 98% (01 Aug 2023 20:50) (97% - 98%)    Parameters below as of 01 Aug 2023 20:50  Patient On (Oxygen Delivery Method): room air        PHYSICAL EXAM:  General: No acute distress, appears comfortable, well nourished, well-groomed, appears stated age  Head, Eyes, Ears, Nose, Throat: Normal cephalic/atraumatic, anicteric, conjunctiva-non injected and moist, vision grossly intact, hearing grossly intact, no nasal discharge, ears and nose symmetrical and atraumatic.  Nasal, oral, and oropharyngeal mucosa pink moist with no evidence of ulceration  Neck: Supple, carotids have good upstroke, trachea in the midline, without JVD or thyromegaly  Lymphatic: No evidence of masses or lymphadenopathy in the head, neck, trunk, axillary, inguinal, or supraclavicular regions  Chest: Lungs are clear to P&A, no wheezing, no rales, no ronchi, with good inspiratory effort  Heart: Heart rhythm regular, no murmurs  Abdomen: Soft, non tender, good bowel sounds present in all four quadrants.  No guarding, rebound, and no peritoneal signs.  No evidence of hepatosplenomegaly.  No evidence of abdominal wall hernias.  Inguinal regions are unremarkable with no evidence of hernias.   Extremity: No swelling, or open sores, no gross deformities,  good range of motion, 2+ peripheral pulses bilat UE/LE, no edema,  negative Luis's sign, no lymphadenopathy  Neuro: Alert and oriented x3, motor and sensory intact  Psychiatric: Awake , alert, oriented x3 with an appropriate affect.   Skin: Good color, turgor, texture with no gross lesions, no eruptions, no rashes, no subcutaneous nodules and normal temperature.     LABS:                        11.2   3.78  )-----------( 484      ( 01 Aug 2023 18:30 )             35.6     08-01    143  |  107  |  27<H>  ----------------------------<  119<H>  4.2   |  29  |  0.85    Ca    8.8      01 Aug 2023 18:30    TPro  7.1  /  Alb  3.0<L>  /  TBili  0.5  /  DBili  x   /  AST  19  /  ALT  13  /  AlkPhos  79  08-01    PT/INR - ( 01 Aug 2023 18:30 )   PT: 41.3 sec;   INR: 3.67 ratio         PTT - ( 01 Aug 2023 18:30 )  PTT:56.4 sec  Urinalysis Basic - ( 01 Aug 2023 18:30 )    Color: x / Appearance: x / SG: x / pH: x  Gluc: 119 mg/dL / Ketone: x  / Bili: x / Urobili: x   Blood: x / Protein: x / Nitrite: x   Leuk Esterase: x / RBC: x / WBC x   Sq Epi: x / Non Sq Epi: x / Bacteria: x      LIVER FUNCTIONS - ( 01 Aug 2023 18:30 )  Alb: 3.0 g/dL / Pro: 7.1 g/dL / ALK PHOS: 79 U/L / ALT: 13 U/L / AST: 19 U/L / GGT: x               RADIOLOGY & ADDITIONAL STUDIES:    ASSESSMENT:    PLAN: SURGERY PA CONSULT NOTE:    CHIEF COMPLAINT:  Patient is a 89y old  Female who presents with a chief complaint of lower extremity cellulitis (01 Aug 2023 20:00)    HPI FROM ED:  Patient is a 89-year-old female presents the emergency room with chief complaint of altered mental status.  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  Patient presents from assisted living with altered mental status concern for UTI.  Patient was last admitted to the hospital June 26 through June 30, 2023 was altered mental status x2 weeks.  Was ultimately diagnosed with acute metabolic encephalopathy secondary to UTI improved.  Patient stabilized and was ultimately discharged back to assisted living.  Presents today lethargic with urinary frequency and confusion. Son reports that there is also concern for possible cellulitis to the LLE at the site of a skin graft. Pt recently completed a course of po abx for this with no significant improvement. She has been c/o severe pain to the LLE for weeks.    INTERVAL:  History reviewed primarily via chart review. 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 Pt recently admitted to [facility] in June w/ AMS x2 weeks, believed to be from UTI. She presents now w/ similar complaints. Pt also c/o LLE pain for several weeks and pt's son reported possible infection of LLE at site of prior skin graft. Per chart review, pt underwent LLE melanoma excision w/ skin graft repair in November at [facility]. Pt followed up w/ her surgeon w/ c/o redness and pain to graft site for several weeks, which she was given unspecified oral antibiotics for. Unsure if this yielded any improvement in her symptoms.  Vascular surgery ultimately consulted by ER att. after wet read of lower ext. arterial doppler suggested significantly diminished flow of L distal SFA.  On encounter in the ER pt is found in her stretcher screaming and asking for help. pt was somewhat redirectable at first and was able to elaborate on where her pain was and answer some questions during exam of her extremities, however she would frequently resort back to screaming. Thus, meaningful history was not able to be obtained.  ROS limited 2/2 above.    PAST MEDICAL & SURGICAL HISTORY:  Hypertension    CVA (cerebral vascular accident)    Depression    Constipation    Neuropathy    Hyperlipidemia    Grade I diastolic dysfunction    Afib    Neuropathy    VHD (valvular heart disease)    Aortic valvar stenosis    No significant past surgical history    REVIEW OF SYSTEMS:  General/Constitutional: No acute distress, no headache, weakness, fevers, or chills   HEENT: Denies auditory or visual changes/disturbances, no vertigo, no throat pain, no dysphagia    Neck: Denies neck pain/stiffness, denies swelling/lumps/hoarseness   Lymphatic: Denies lumps or swelling in the axillae, groin, or neck bilaterally   Respiratory: Denies cough/hemoptysis, denies wheezing/SOB/dyspnea  Cardiac: Denies chest pain, palpitations  Abdomen: Denies abdominal bloating/fullness, nausea or vomiting, denies abdominal pain  Extremities: Denies sores, swelling, discoloration bilat UE/LE  Genitourinary: Denies urinary issues or complaints, denies dysuria/hematuria  Neuro: Denies weakness, paraesthesias, paralysis, syncope, loss of vision  Skin: Denies pruritus, pain, rashes  Psych: Denies hallucinations, visual disturbances, or depression    MEDICATIONS:  Home Medications:  amLODIPine 5 mg oral tablet: 1 tab(s) orally once a day (26 Jun 2023 12:41)  baclofen 10 mg oral tablet: 1 tab(s) orally 2 times a day (26 Jun 2023 12:41)  cholecalciferol 50 mcg (2000 intl units) oral tablet: 1 tab(s) orally once a day (26 Jun 2023 12:41)  cyanocobalamin 1000 mcg oral tablet: 1 tab(s) orally once a day (26 Jun 2023 12:16)  docusate sodium 100 mg oral capsule: 2 cap(s) orally once a day (at bedtime) (26 Jun 2023 12:41)  escitalopram 20 mg oral tablet: 1 tab(s) orally once a day (26 Jun 2023 12:16)  famotidine 20 mg oral tablet: 1 tab(s) orally once a day (26 Jun 2023 12:41)  folic acid 1 mg oral tablet: 1 tab(s) orally once a day (26 Jun 2023 12:16)  furosemide 20 mg oral tablet: 1 tab(s) orally once a day (26 Jun 2023 12:16)  gabapentin 300 mg oral capsule: 1 cap(s) orally 3 times a day (26 Jun 2023 12:41)  hydroxyurea 500 mg oral capsule: 1 cap(s) orally 2 times a day (26 Jun 2023 12:16)  lactobacillus acidophilus oral tablet: 2 tab(s) orally once a day (26 Jun 2023 12:16)  levothyroxine 50 mcg (0.05 mg) oral tablet: 1 tab(s) orally once a day (26 Jun 2023 12:16)  metoprolol tartrate 25 mg oral tablet: 1 tab(s) orally 2 times a day (26 Jun 2023 12:16)  senna oral tablet: 1 tab(s) orally once a day (at bedtime) (26 Jun 2023 12:16)  simvastatin 10 mg oral tablet: 1 tab(s) orally once a day (at bedtime) (26 Jun 2023 12:16)  traMADol 50 mg oral tablet: 0.5 tab(s) orally 3 times a day PRN for pain (26 Jun 2023 12:41)  Tylenol 500 mg oral tablet: 1 tab(s) orally 2 times a day (26 Jun 2023 12:53)  Tylenol 500 mg oral tablet: 2 tab(s) orally once a day in the morning (26 Jun 2023 12:41)  warfarin 1 mg oral tablet: 1 tab(s) orally once a day HOLD FOR INR ABOVE 3.0 .NEXT INR 07/03/23 . (30 Jun 2023 14:44)    MEDICATIONS  (STANDING):  escitalopram 10 milliGRAM(s) Oral at bedtime  furosemide    Tablet 40 milliGRAM(s) Oral daily  gabapentin 300 milliGRAM(s) Oral three times a day  hydroxyurea 500 milliGRAM(s) Oral two times a day  potassium chloride    Tablet ER 10 milliEquivalent(s) Oral daily    MEDICATIONS  (PRN):  morphine  - Injectable 1 milliGRAM(s) IV Push every 4 hours PRN Moderate Pain (4 - 6)    ALLERGIES:  per son &quot;issues with certain anitibiotics&quot; does not remember the names (Unknown)    SOCIAL HISTORY:  Unable to obtain 2/2 AMS    VITAL SIGNS:  Vital Signs Last 24 Hrs  T(C): 36.4 (01 Aug 2023 20:50), Max: 36.7 (01 Aug 2023 16:10)  T(F): 97.5 (01 Aug 2023 20:50), Max: 98 (01 Aug 2023 16:10)  HR: 78 (01 Aug 2023 20:50) (78 - 82)  BP: 116/69 (01 Aug 2023 20:50) (116/69 - 140/80)  BP(mean): --  RR: 17 (01 Aug 2023 20:50) (17 - 18)  SpO2: 98% (01 Aug 2023 20:50) (97% - 98%)    Parameters below as of 01 Aug 2023 20:50  Patient On (Oxygen Delivery Method): room air    PHYSICAL EXAM:  General: Pt laying in stretcher and found to be screaming, asking for help and her son Curtis, appears uncomfortable, appears stated age elderly female  Head, Eyes, Ears, Nose, Throat: Normal cephalic/atraumatic, anicteric, conjunctiva-non injected and moist, vision grossly intact, hearing grossly intact  Extremity: LLE w/ non-adherent dressing to skin graft site, which appears w/ erythematous borders and is warm to touch. Pt's lower ext is warm to touch and w/ preserved dermatome sensation b/L throughout. Pt w/ 2/5 motor strength of L foot digits, ankle and knee. Passive/active ROM demonstrated without pain. Unable to Manipulate RLE.  Right                                                                        Left                  Popliteal  2+ [ ]  1+ [ ] doppler [x]                         Popliteal  2+ [ ]  1+ [ ] doppler [x]               Dorsalis Pedis  2+ [x]  1+ [ ] doppler [ ]                Dorsalis Pedis  2+ [ ]  1+ [ ] doppler [x]               Posterior Tibial  2+ []  1+ [x] doppler [ ]              Posterior Tibial  2+ [ ]  1+ [ ] doppler [x]  Neuro: Unable to participate in exam 2/2 extreme distress    LABS:                      11.2   3.78  )-----------( 484      ( 01 Aug 2023 18:30 )             35.6     08-01    143  |  107  |  27<H>  ----------------------------<  119<H>  4.2   |  29  |  0.85    Ca    8.8      01 Aug 2023 18:30    TPro  7.1  /  Alb  3.0<L>  /  TBili  0.5  /  DBili  x   /  AST  19  /  ALT  13  /  AlkPhos  79  08-01    PT/INR - ( 01 Aug 2023 18:30 )   PT: 41.3 sec;   INR: 3.67 ratio    PTT - ( 01 Aug 2023 18:30 )  PTT:56.4 sec  Urinalysis Basic - ( 01 Aug 2023 18:30 )    Color: x / Appearance: x / SG: x / pH: x  Gluc: 119 mg/dL / Ketone: x  / Bili: x / Urobili: x   Blood: x / Protein: x / Nitrite: x   Leuk Esterase: x / RBC: x / WBC x   Sq Epi: x / Non Sq Epi: x / Bacteria: x    LIVER FUNCTIONS - ( 01 Aug 2023 18:30 )  Alb: 3.0 g/dL / Pro: 7.1 g/dL / ALK PHOS: 79 U/L / ALT: 13 U/L / AST: 19 U/L / GGT: x           RADIOLOGY & ADDITIONAL STUDIES:  ACC: [id number] EXAM:  US DPLX LWR EXT ARTS COMPL BI   ORDERED BY: YAHIR GALLAGHER   PROCEDURE DATE:  08/01/2023      FINDINGS:  The study is limited by patient condition.  RIGHT:  The common femoral and superficial femoral arteries are patent with   biphasic waveforms.  The popliteal, anterior tibial and dorsalis pedis   arteries are patent with monophasic waveforms.  The posterior tibial and   peroneal arteries are not visualized.    LEFT:  The common femoral artery is patent with biphasic waveforms. The proximal   to mid superficial femoral artery demonstrate monophasic waveforms.  No   flow is detected in the distal femoral artery.  The popliteal, peroneal,   anterior and posterior tibial, and dorsalis pedis arteries are patent   with monophasic waveforms.    IMPRESSION:  Diffuse atherosclerotic disease.  RIGHT: Likely severe stenosis in right femoropopliteal region.  Posterior   tibial and peroneal arteries not visualized.  LEFT: Severe stenosis proximal to the left femoral artery.  No flow   detected in the distal femoral artery.  The distal vessels are patent   with monophasic waveforms.    --- End of Report ---  PAIGE GROMAN MD; Attending Radiologist  ------------------------------------------------------------  ACC: [id number] EXAM:  US DPLX LWR EXT VEINS COMPL BI   ORDERED BY: YAHIR GALLAGHER   PROCEDURE DATE:  08/01/2023    COMPARISON: 4/19/2022    FINDINGS:    RIGHT:  Normal compressibility of the RIGHT common femoral, femoral and popliteal   veins.  Doppler examination shows normal spontaneous and phasic flow.  Limited visualized of the RIGHT calf veins. No RIGHT calf vein thrombosis   detected.    LEFT:  Normal compressibility of the LEFT common femoral, femoral and popliteal   veins.  Doppler examination shows normal spontaneous and phasic flow.  No LEFT calf vein thrombosis is detected.    IMPRESSION:  No evidence of deep venous thrombosis in the visualized veins in either   lower extremity.    --- End of Report ---  ANALI TELLEZ MD; Attending Radiologist    ASSESSMENT:    PLAN: SURGERY PA CONSULT NOTE:    CHIEF COMPLAINT:  Patient is a 89y old  Female who presents with a chief complaint of lower extremity cellulitis (01 Aug 2023 20:00)    HPI FROM ED:  Patient is a 89-year-old female presents the emergency room with chief complaint of altered mental status.  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  Patient presents from assisted living with altered mental status concern for UTI.  Patient was last admitted to the hospital June 26 through June 30, 2023 was altered mental status x2 weeks.  Was ultimately diagnosed with acute metabolic encephalopathy secondary to UTI improved.  Patient stabilized and was ultimately discharged back to assisted living.  Presents today lethargic with urinary frequency and confusion. Son reports that there is also concern for possible cellulitis to the LLE at the site of a skin graft. Pt recently completed a course of po abx for this with no significant improvement. She has been c/o severe pain to the LLE for weeks.    INTERVAL:  History reviewed primarily via chart review. 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 Pt recently admitted to [facility] in June w/ AMS x2 weeks, believed to be from UTI. She presents now w/ similar complaints. Pt also c/o LLE pain for several weeks and pt's son reported possible infection of LLE at site of prior skin graft. Per chart review, pt underwent LLE melanoma excision w/ skin graft repair in November at [facility]. Pt followed up w/ her surgeon w/ c/o redness and pain to graft site for several weeks, which she was given unspecified oral antibiotics for. Unsure if this yielded any improvement in her symptoms.  Vascular surgery ultimately consulted by ER att. after wet read of lower ext. arterial doppler suggested significantly diminished flow of L distal SFA.  On encounter in the ER pt is found in her stretcher screaming and asking for help. pt was somewhat redirectable at first and was able to elaborate on where her pain was and answer some questions during exam of her extremities, however she would frequently resort back to screaming. Thus, meaningful history was not able to be obtained.  ROS limited 2/2 above.    PAST MEDICAL & SURGICAL HISTORY:  Hypertension    CVA (cerebral vascular accident)    Depression    Constipation    Neuropathy    Hyperlipidemia    Grade I diastolic dysfunction    Afib    Neuropathy    VHD (valvular heart disease)    Aortic valvar stenosis    No significant past surgical history    REVIEW OF SYSTEMS:  General/Constitutional: No acute distress, no headache, weakness, fevers, or chills   HEENT: Denies auditory or visual changes/disturbances, no vertigo, no throat pain, no dysphagia    Neck: Denies neck pain/stiffness, denies swelling/lumps/hoarseness   Lymphatic: Denies lumps or swelling in the axillae, groin, or neck bilaterally   Respiratory: Denies cough/hemoptysis, denies wheezing/SOB/dyspnea  Cardiac: Denies chest pain, palpitations  Abdomen: Denies abdominal bloating/fullness, nausea or vomiting, denies abdominal pain  Extremities: Denies sores, swelling, discoloration bilat UE/LE  Genitourinary: Denies urinary issues or complaints, denies dysuria/hematuria  Neuro: Denies weakness, paraesthesias, paralysis, syncope, loss of vision  Skin: Denies pruritus, pain, rashes  Psych: Denies hallucinations, visual disturbances, or depression    MEDICATIONS:  Home Medications:  amLODIPine 5 mg oral tablet: 1 tab(s) orally once a day (26 Jun 2023 12:41)  baclofen 10 mg oral tablet: 1 tab(s) orally 2 times a day (26 Jun 2023 12:41)  cholecalciferol 50 mcg (2000 intl units) oral tablet: 1 tab(s) orally once a day (26 Jun 2023 12:41)  cyanocobalamin 1000 mcg oral tablet: 1 tab(s) orally once a day (26 Jun 2023 12:16)  docusate sodium 100 mg oral capsule: 2 cap(s) orally once a day (at bedtime) (26 Jun 2023 12:41)  escitalopram 20 mg oral tablet: 1 tab(s) orally once a day (26 Jun 2023 12:16)  famotidine 20 mg oral tablet: 1 tab(s) orally once a day (26 Jun 2023 12:41)  folic acid 1 mg oral tablet: 1 tab(s) orally once a day (26 Jun 2023 12:16)  furosemide 20 mg oral tablet: 1 tab(s) orally once a day (26 Jun 2023 12:16)  gabapentin 300 mg oral capsule: 1 cap(s) orally 3 times a day (26 Jun 2023 12:41)  hydroxyurea 500 mg oral capsule: 1 cap(s) orally 2 times a day (26 Jun 2023 12:16)  lactobacillus acidophilus oral tablet: 2 tab(s) orally once a day (26 Jun 2023 12:16)  levothyroxine 50 mcg (0.05 mg) oral tablet: 1 tab(s) orally once a day (26 Jun 2023 12:16)  metoprolol tartrate 25 mg oral tablet: 1 tab(s) orally 2 times a day (26 Jun 2023 12:16)  senna oral tablet: 1 tab(s) orally once a day (at bedtime) (26 Jun 2023 12:16)  simvastatin 10 mg oral tablet: 1 tab(s) orally once a day (at bedtime) (26 Jun 2023 12:16)  traMADol 50 mg oral tablet: 0.5 tab(s) orally 3 times a day PRN for pain (26 Jun 2023 12:41)  Tylenol 500 mg oral tablet: 1 tab(s) orally 2 times a day (26 Jun 2023 12:53)  Tylenol 500 mg oral tablet: 2 tab(s) orally once a day in the morning (26 Jun 2023 12:41)  warfarin 1 mg oral tablet: 1 tab(s) orally once a day HOLD FOR INR ABOVE 3.0 .NEXT INR 07/03/23 . (30 Jun 2023 14:44)    MEDICATIONS  (STANDING):  escitalopram 10 milliGRAM(s) Oral at bedtime  furosemide    Tablet 40 milliGRAM(s) Oral daily  gabapentin 300 milliGRAM(s) Oral three times a day  hydroxyurea 500 milliGRAM(s) Oral two times a day  potassium chloride    Tablet ER 10 milliEquivalent(s) Oral daily    MEDICATIONS  (PRN):  morphine  - Injectable 1 milliGRAM(s) IV Push every 4 hours PRN Moderate Pain (4 - 6)    ALLERGIES:  per son &quot;issues with certain anitibiotics&quot; does not remember the names (Unknown)    SOCIAL HISTORY:  Unable to obtain 2/2 AMS    VITAL SIGNS:  Vital Signs Last 24 Hrs  T(C): 36.4 (01 Aug 2023 20:50), Max: 36.7 (01 Aug 2023 16:10)  T(F): 97.5 (01 Aug 2023 20:50), Max: 98 (01 Aug 2023 16:10)  HR: 78 (01 Aug 2023 20:50) (78 - 82)  BP: 116/69 (01 Aug 2023 20:50) (116/69 - 140/80)  BP(mean): --  RR: 17 (01 Aug 2023 20:50) (17 - 18)  SpO2: 98% (01 Aug 2023 20:50) (97% - 98%)    Parameters below as of 01 Aug 2023 20:50  Patient On (Oxygen Delivery Method): room air    PHYSICAL EXAM:  General: Pt laying in stretcher and found to be screaming, asking for help and her son Curtis, appears uncomfortable, appears stated age elderly female  Head, Eyes, Ears, Nose, Throat: Normal cephalic/atraumatic, anicteric, conjunctiva-non injected and moist, vision grossly intact, hearing grossly intact  Extremity: LLE w/ non-adherent dressing to skin graft site, which appears w/ erythematous borders and is warm to touch. Pt's lower ext is warm to touch and w/ preserved dermatome sensation b/L throughout. Pt w/ 2/5 motor strength of L foot digits, ankle and knee. Passive/active ROM demonstrated without pain. Unable to Manipulate RLE.  Right                                                                        Left                  Popliteal  2+ [ ]  1+ [ ] doppler [x]                         Popliteal  2+ [ ]  1+ [ ] doppler [x]               Dorsalis Pedis  2+ [x]  1+ [ ] doppler [ ]                Dorsalis Pedis  2+ [ ]  1+ [ ] doppler [x]               Posterior Tibial  2+ []  1+ [x] doppler [ ]              Posterior Tibial  2+ [ ]  1+ [ ] doppler [x]  Neuro: Unable to participate in exam 2/2 extreme distress    LABS:                      11.2   3.78  )-----------( 484      ( 01 Aug 2023 18:30 )             35.6     08-01    143  |  107  |  27<H>  ----------------------------<  119<H>  4.2   |  29  |  0.85    Ca    8.8      01 Aug 2023 18:30    TPro  7.1  /  Alb  3.0<L>  /  TBili  0.5  /  DBili  x   /  AST  19  /  ALT  13  /  AlkPhos  79  08-01    PT/INR - ( 01 Aug 2023 18:30 )   PT: 41.3 sec;   INR: 3.67 ratio    PTT - ( 01 Aug 2023 18:30 )  PTT:56.4 sec  Urinalysis Basic - ( 01 Aug 2023 18:30 )    Color: x / Appearance: x / SG: x / pH: x  Gluc: 119 mg/dL / Ketone: x  / Bili: x / Urobili: x   Blood: x / Protein: x / Nitrite: x   Leuk Esterase: x / RBC: x / WBC x   Sq Epi: x / Non Sq Epi: x / Bacteria: x    LIVER FUNCTIONS - ( 01 Aug 2023 18:30 )  Alb: 3.0 g/dL / Pro: 7.1 g/dL / ALK PHOS: 79 U/L / ALT: 13 U/L / AST: 19 U/L / GGT: x           RADIOLOGY & ADDITIONAL STUDIES:  ACC: [id number] EXAM:  US DPLX LWR EXT ARTS COMPL BI   ORDERED BY: YAHIR GALLAGHER   PROCEDURE DATE:  08/01/2023      FINDINGS:  The study is limited by patient condition.  RIGHT:  The common femoral and superficial femoral arteries are patent with   biphasic waveforms.  The popliteal, anterior tibial and dorsalis pedis   arteries are patent with monophasic waveforms.  The posterior tibial and   peroneal arteries are not visualized.    LEFT:  The common femoral artery is patent with biphasic waveforms. The proximal   to mid superficial femoral artery demonstrate monophasic waveforms.  No   flow is detected in the distal femoral artery.  The popliteal, peroneal,   anterior and posterior tibial, and dorsalis pedis arteries are patent   with monophasic waveforms.    IMPRESSION:  Diffuse atherosclerotic disease.  RIGHT: Likely severe stenosis in right femoropopliteal region.  Posterior   tibial and peroneal arteries not visualized.  LEFT: Severe stenosis proximal to the left femoral artery.  No flow   detected in the distal femoral artery.  The distal vessels are patent   with monophasic waveforms.    --- End of Report ---  PAIGE GORMAN MD; Attending Radiologist  ------------------------------------------------------------  ACC: [id number] EXAM:  US DPLX LWR EXT VEINS COMPL BI   ORDERED BY: YAHIR GALLAGHER   PROCEDURE DATE:  08/01/2023    COMPARISON: 4/19/2022    FINDINGS:    RIGHT:  Normal compressibility of the RIGHT common femoral, femoral and popliteal   veins.  Doppler examination shows normal spontaneous and phasic flow.  Limited visualized of the RIGHT calf veins. No RIGHT calf vein thrombosis   detected.    LEFT:  Normal compressibility of the LEFT common femoral, femoral and popliteal   veins.  Doppler examination shows normal spontaneous and phasic flow.  No LEFT calf vein thrombosis is detected.    IMPRESSION:  No evidence of deep venous thrombosis in the visualized veins in either   lower extremity.    --- End of Report ---  ANALI TELLEZ MD; Attending Radiologist    ASSESSMENT:  89 y/o female w/ PMH CVA on Coumadin with right-sided weakness, hypertension hyperlipidemia CAD history of A-fib on Coumadin presenting from assisted living w/ AMS.  Vascular surgery consulted after pt shown to have severe LLE pain for multiple weeks and suggestion of absent L distal SFA flow on U/s Arterial Duplex. Official read does confirm this however also shows reconstitution of flow distally in the pop, AT/PT and DP arteries.  VSS in the ER. Labs w/ borderline normal/low WBC. Supratherapeutic INR noted (pt on Coumadin for Afib).  Exam reveals preserved sensorimotor function of affected LLE as well as doppler + signals of the L pop, DP/PT arteries. Also noted is an approximately 4x6 cm ulcerated wound on pt's L shin, previously a skin graft site s/p melanoma excision this past November. It is tender to touch w/ surrounding erythema, suggesting some degree of cellulitis.    PLAN:    - IV Zosyn given in ER; F/u full ID recs  - ABIs/PVRs  - Pending above results, pt may require vascular intervention. However, in light of her recent hospitalizations, comorbid conditions, and general functional decline, unsure this would yield more benefit than harm.  - No objections to continuing Coumadin for Afib at present  - pain control, supportive care  - Will follow; further recs pending above studies  - Case d/w Dr. Pizarro SURGERY PA CONSULT NOTE:    CHIEF COMPLAINT:  Patient is a 89y old  Female who presents with a chief complaint of lower extremity cellulitis (01 Aug 2023 20:00)    HPI FROM ED:  Patient is a 89-year-old female presents the emergency room with chief complaint of altered mental status.  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  Patient presents from assisted living with altered mental status concern for UTI.  Patient was last admitted to the hospital June 26 through June 30, 2023 was altered mental status x2 weeks.  Was ultimately diagnosed with acute metabolic encephalopathy secondary to UTI improved.  Patient stabilized and was ultimately discharged back to assisted living.  Presents today lethargic with urinary frequency and confusion. Son reports that there is also concern for possible cellulitis to the LLE at the site of a skin graft. Pt recently completed a course of po abx for this with no significant improvement. She has been c/o severe pain to the LLE for weeks.    INTERVAL:  History reviewed primarily via chart review. 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 Pt recently admitted to [facility] in June w/ AMS x2 weeks, believed to be from UTI. She presents now w/ similar complaints. Pt also c/o LLE pain for several weeks and pt's son reported possible infection of LLE at site of prior skin graft. Per chart review, pt underwent LLE melanoma excision w/ skin graft repair in November at [facility]. Pt followed up w/ her surgeon w/ c/o redness and pain to graft site for several weeks, which she was given unspecified oral antibiotics for. Unsure if this yielded any improvement in her symptoms.  Vascular surgery ultimately consulted by ER att. after wet read of lower ext. arterial doppler suggested significantly diminished flow of L distal SFA.  On encounter in the ER pt is found in her stretcher screaming and asking for help. pt was somewhat redirectable at first and was able to elaborate on where her pain was and answer some questions during exam of her extremities, however she would frequently resort back to screaming. Thus, meaningful history was not able to be obtained.  ROS limited 2/2 above.    PAST MEDICAL & SURGICAL HISTORY:  Hypertension    CVA (cerebral vascular accident)    Depression    Constipation    Neuropathy    Hyperlipidemia    Grade I diastolic dysfunction    Afib    Neuropathy    VHD (valvular heart disease)    Aortic valvar stenosis    No significant past surgical history    REVIEW OF SYSTEMS:  Unable to obtain 2/2 AMS    MEDICATIONS:  Home Medications:  amLODIPine 5 mg oral tablet: 1 tab(s) orally once a day (26 Jun 2023 12:41)  baclofen 10 mg oral tablet: 1 tab(s) orally 2 times a day (26 Jun 2023 12:41)  cholecalciferol 50 mcg (2000 intl units) oral tablet: 1 tab(s) orally once a day (26 Jun 2023 12:41)  cyanocobalamin 1000 mcg oral tablet: 1 tab(s) orally once a day (26 Jun 2023 12:16)  docusate sodium 100 mg oral capsule: 2 cap(s) orally once a day (at bedtime) (26 Jun 2023 12:41)  escitalopram 20 mg oral tablet: 1 tab(s) orally once a day (26 Jun 2023 12:16)  famotidine 20 mg oral tablet: 1 tab(s) orally once a day (26 Jun 2023 12:41)  folic acid 1 mg oral tablet: 1 tab(s) orally once a day (26 Jun 2023 12:16)  furosemide 20 mg oral tablet: 1 tab(s) orally once a day (26 Jun 2023 12:16)  gabapentin 300 mg oral capsule: 1 cap(s) orally 3 times a day (26 Jun 2023 12:41)  hydroxyurea 500 mg oral capsule: 1 cap(s) orally 2 times a day (26 Jun 2023 12:16)  lactobacillus acidophilus oral tablet: 2 tab(s) orally once a day (26 Jun 2023 12:16)  levothyroxine 50 mcg (0.05 mg) oral tablet: 1 tab(s) orally once a day (26 Jun 2023 12:16)  metoprolol tartrate 25 mg oral tablet: 1 tab(s) orally 2 times a day (26 Jun 2023 12:16)  senna oral tablet: 1 tab(s) orally once a day (at bedtime) (26 Jun 2023 12:16)  simvastatin 10 mg oral tablet: 1 tab(s) orally once a day (at bedtime) (26 Jun 2023 12:16)  traMADol 50 mg oral tablet: 0.5 tab(s) orally 3 times a day PRN for pain (26 Jun 2023 12:41)  Tylenol 500 mg oral tablet: 1 tab(s) orally 2 times a day (26 Jun 2023 12:53)  Tylenol 500 mg oral tablet: 2 tab(s) orally once a day in the morning (26 Jun 2023 12:41)  warfarin 1 mg oral tablet: 1 tab(s) orally once a day HOLD FOR INR ABOVE 3.0 .NEXT INR 07/03/23 . (30 Jun 2023 14:44)    MEDICATIONS  (STANDING):  escitalopram 10 milliGRAM(s) Oral at bedtime  furosemide    Tablet 40 milliGRAM(s) Oral daily  gabapentin 300 milliGRAM(s) Oral three times a day  hydroxyurea 500 milliGRAM(s) Oral two times a day  potassium chloride    Tablet ER 10 milliEquivalent(s) Oral daily    MEDICATIONS  (PRN):  morphine  - Injectable 1 milliGRAM(s) IV Push every 4 hours PRN Moderate Pain (4 - 6)    ALLERGIES:  per son &quot;issues with certain anitibiotics&quot; does not remember the names (Unknown)    SOCIAL HISTORY:  Unable to obtain 2/2 AMS    VITAL SIGNS:  Vital Signs Last 24 Hrs  T(C): 36.4 (01 Aug 2023 20:50), Max: 36.7 (01 Aug 2023 16:10)  T(F): 97.5 (01 Aug 2023 20:50), Max: 98 (01 Aug 2023 16:10)  HR: 78 (01 Aug 2023 20:50) (78 - 82)  BP: 116/69 (01 Aug 2023 20:50) (116/69 - 140/80)  BP(mean): --  RR: 17 (01 Aug 2023 20:50) (17 - 18)  SpO2: 98% (01 Aug 2023 20:50) (97% - 98%)    Parameters below as of 01 Aug 2023 20:50  Patient On (Oxygen Delivery Method): room air    PHYSICAL EXAM:  General: Pt laying in stretcher and found to be screaming, asking for help and her son Curtis, appears uncomfortable, appears stated age elderly female  Head, Eyes, Ears, Nose, Throat: Normal cephalic/atraumatic, anicteric, conjunctiva-non injected and moist, vision grossly intact, hearing grossly intact  Extremity: LLE w/ non-adherent dressing to skin graft site, which appears w/ erythematous borders and is warm to touch. Pt's lower ext is warm to touch and w/ preserved dermatome sensation b/L throughout. Pt w/ 2/5 motor strength of L foot digits, ankle and knee. Passive/active ROM demonstrated without pain. Unable to Manipulate RLE.  Right                                                                        Left                  Popliteal  2+ [ ]  1+ [ ] doppler [x]                         Popliteal  2+ [ ]  1+ [ ] doppler [x]               Dorsalis Pedis  2+ [x]  1+ [ ] doppler [ ]                Dorsalis Pedis  2+ [ ]  1+ [ ] doppler [x]               Posterior Tibial  2+ []  1+ [x] doppler [ ]              Posterior Tibial  2+ [ ]  1+ [ ] doppler [x]  Neuro: Unable to participate in exam 2/2 extreme distress    LABS:                      11.2   3.78  )-----------( 484      ( 01 Aug 2023 18:30 )             35.6     08-01    143  |  107  |  27<H>  ----------------------------<  119<H>  4.2   |  29  |  0.85    Ca    8.8      01 Aug 2023 18:30    TPro  7.1  /  Alb  3.0<L>  /  TBili  0.5  /  DBili  x   /  AST  19  /  ALT  13  /  AlkPhos  79  08-01    PT/INR - ( 01 Aug 2023 18:30 )   PT: 41.3 sec;   INR: 3.67 ratio    PTT - ( 01 Aug 2023 18:30 )  PTT:56.4 sec  Urinalysis Basic - ( 01 Aug 2023 18:30 )    Color: x / Appearance: x / SG: x / pH: x  Gluc: 119 mg/dL / Ketone: x  / Bili: x / Urobili: x   Blood: x / Protein: x / Nitrite: x   Leuk Esterase: x / RBC: x / WBC x   Sq Epi: x / Non Sq Epi: x / Bacteria: x    LIVER FUNCTIONS - ( 01 Aug 2023 18:30 )  Alb: 3.0 g/dL / Pro: 7.1 g/dL / ALK PHOS: 79 U/L / ALT: 13 U/L / AST: 19 U/L / GGT: x           RADIOLOGY & ADDITIONAL STUDIES:  ACC: [id number] EXAM:  US DPLX LWR EXT ARTS COMPL BI   ORDERED BY: YAHIRMARGRET GALLAGHER   PROCEDURE DATE:  08/01/2023      FINDINGS:  The study is limited by patient condition.  RIGHT:  The common femoral and superficial femoral arteries are patent with   biphasic waveforms.  The popliteal, anterior tibial and dorsalis pedis   arteries are patent with monophasic waveforms.  The posterior tibial and   peroneal arteries are not visualized.    LEFT:  The common femoral artery is patent with biphasic waveforms. The proximal   to mid superficial femoral artery demonstrate monophasic waveforms.  No   flow is detected in the distal femoral artery.  The popliteal, peroneal,   anterior and posterior tibial, and dorsalis pedis arteries are patent   with monophasic waveforms.    IMPRESSION:  Diffuse atherosclerotic disease.  RIGHT: Likely severe stenosis in right femoropopliteal region.  Posterior   tibial and peroneal arteries not visualized.  LEFT: Severe stenosis proximal to the left femoral artery.  No flow   detected in the distal femoral artery.  The distal vessels are patent   with monophasic waveforms.    --- End of Report ---  PAIGE GORMAN MD; Attending Radiologist  ------------------------------------------------------------  ACC: [id number] EXAM:  US DPLX LWR EXT VEINS COMPL BI   ORDERED BY: YAHIRMARGRET GALLAGHER   PROCEDURE DATE:  08/01/2023    COMPARISON: 4/19/2022    FINDINGS:    RIGHT:  Normal compressibility of the RIGHT common femoral, femoral and popliteal   veins.  Doppler examination shows normal spontaneous and phasic flow.  Limited visualized of the RIGHT calf veins. No RIGHT calf vein thrombosis   detected.    LEFT:  Normal compressibility of the LEFT common femoral, femoral and popliteal   veins.  Doppler examination shows normal spontaneous and phasic flow.  No LEFT calf vein thrombosis is detected.    IMPRESSION:  No evidence of deep venous thrombosis in the visualized veins in either   lower extremity.    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  Vascular surgery consulted after pt shown to have severe LLE pain for multiple weeks and suggestion of absent L distal SFA flow on U/s Arterial Duplex. Official read does confirm this however also shows reconstitution of flow distally in the pop, AT/PT and DP arteries.  VSS in the ER. Labs w/ borderline normal/low WBC. Supratherapeutic INR noted (pt on Coumadin for Afib).  Exam reveals preserved sensorimotor function of affected LLE as well as doppler + signals of the L pop, DP/PT arteries. Also noted is an approximately 4x6 cm ulcerated wound on pt's L shin, previously a skin graft site s/p melanoma excision this past November. It is tender to touch w/ surrounding erythema, suggesting some degree of cellulitis.    PLAN:    - IV Zosyn given in ER; F/u full ID recs  - ABIs/PVRs  - Pending above results, pt may require vascular intervention. However, in light of her recent hospitalizations, comorbid conditions, and general functional decline, unsure this would yield more benefit than harm.  - No objections to continuing Coumadin for Afib at present  - pain control, supportive care  - Will follow; further recs pending above studies  - Case d/w Dr. Pizarro

## 2023-08-02 NOTE — H&P ADULT - NSHPSOCIALHISTORY_GEN_ALL_CORE
Health Maintenance Due   Topic Date Due   â¢ Medicare Advantage- Medicare Wellness Visit  01/01/2022   â¢ Depression Screening  06/28/2022       Patient is due for topics as listed above but is not proceeding with [facility] (Medicare Wellness Visit) at this time. Resident in [facility] ,no ETOH or TOBACCO reported.

## 2023-08-02 NOTE — CONSULT NOTE ADULT - SUBJECTIVE AND OBJECTIVE BOX
Date/Time Patient Seen:  		  Referring MD:   Data Reviewed	       Patient is a 89y old  Female who presents with a chief complaint of lower extremity cellulitis (01 Aug 2023 20:00)      Subjective/HPI   · Chief Complaint: The patient is a 89y Female complaining of urinary symptoms.  · HPI Objective Statement: Patient is a 89-year-old female presents the emergency room with chief complaint of altered mental status.  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  Patient presents from assisted living with altered mental status concern for UTI.  Patient was last admitted to the hospital June 26 through June 30, 2023 was altered mental status x2 weeks.  Was ultimately diagnosed with acute metabolic encephalopathy secondary to UTI improved.  Patient stabilized and was ultimately discharged back to assisted living.  Presents today lethargic with urinary frequency and confusion. Son reports that there is also concern for possible cellulitis to the LLE at the site of a skin graft. Pt recently completed a course of po abx for this with no significant improvement. She has been c/o severe pain to the LLE for weeks.    PAST MEDICAL & SURGICAL HISTORY:  Hypertension    CVA (cerebral vascular accident)    Depression    Constipation    Neuropathy    Constipation    Hyperlipidemia    Grade I diastolic dysfunction    Afib    Neuropathy    VHD (valvular heart disease)    Aortic valvar stenosis    No significant past surgical history    PAST MEDICAL/SURGICAL/FAMILY/SOCIAL HISTORY:    Tobacco Usage:  · Tobacco Usage	Unknown if ever smoked    ALLERGIES AND HOME MEDICATIONS:   Allergies:        Allergies:  	per son "issues with certain anitibiotics" does not remember the names [freetext allergy; no alerts]: Drug, Unknown, per son "issues with certain anitibiotics" does not remember the names    Home Medications:   * Patient Currently Takes Medications as of 30-Jun-2023 15:09 documented in Structured Notes  · 	collagenase 250 units/g topical ointment: Apply topically to affected area once a day TO LEFT SHIN WOUND  · 	warfarin 1 mg oral tablet: 1 tab(s) orally once a day HOLD FOR INR ABOVE 3.0 .NEXT INR 07/03/23 .  · 	senna oral tablet: 1 tab(s) orally once a day (at bedtime)  · 	lactobacillus acidophilus oral tablet: 2 tab(s) orally once a day  · 	levothyroxine 50 mcg (0.05 mg) oral tablet: 1 tab(s) orally once a day  · 	furosemide 20 mg oral tablet: 1 tab(s) orally once a day  · 	metoprolol tartrate 25 mg oral tablet: 1 tab(s) orally 2 times a day  · 	escitalopram 20 mg oral tablet: 1 tab(s) orally once a day  · 	traMADol 50 mg oral tablet: 0.5 tab(s) orally 3 times a day PRN for pain  · 	Tylenol 500 mg oral tablet: 2 tab(s) orally once a day in the morning  · 	Tylenol 500 mg oral tablet: 1 tab(s) orally 2 times a day  · 	gabapentin 300 mg oral capsule: 1 cap(s) orally 3 times a day  · 	cholecalciferol 50 mcg (2000 intl units) oral tablet: 1 tab(s) orally once a day  · 	hydroxyurea 500 mg oral capsule: 1 cap(s) orally 2 times a day  · 	simvastatin 10 mg oral tablet: 1 tab(s) orally once a day (at bedtime)  · 	amLODIPine 5 mg oral tablet: 1 tab(s) orally once a day  · 	cyanocobalamin 1000 mcg oral tablet: 1 tab(s) orally once a day  · 	folic acid 1 mg oral tablet: 1 tab(s) orally once a day  · 	famotidine 20 mg oral tablet: 1 tab(s) orally once a day  · 	baclofen 10 mg oral tablet: 1 tab(s) orally 2 times a day  · 	docusate sodium 100 mg oral capsule: 2 cap(s) orally once a day (at bedtime)    VITAL SIGNS ([place]):    ,,ED ADULT Flow Sheet:    01-Aug-2023 16:07  · Patient is active on the Oncology Registry in [facility]?: Yes    16:10  · Temp (F): 98  · Temp (C) Temp (C): 36.7  · Temp site Temp Site: oral  · Heart Rate Heart Rate (beats/min): 82  · BP Systolic Systolic: 140  · BP Diastolic Diastolic (mm Hg): 80  · Respiration Rate (breaths/min) Respiration Rate (breaths/min): 18  · SpO2 (%) SpO2 (%): 97  · O2 Delivery/Oxygen Delivery Method Patient On (Oxygen Delivery Method): room air  · Dosing Weight (KILOGRAMS) Dosing Weight (KILOGRAMS): 59  · Dosing Weight  (POUNDS) Dosing Weight (POUNDS): 130  · Height type Height Type: stated  · Height (FEET) Height (FEET): 5  · Height (INCHES) Height (INCHES): 6  · Height (CENTIMETERS) Height (CENTIMETERS): 167.64  · BSA (m2): 1.67  · BMI (kG/m2) BMI (kG/m2): 21  · Ideal Body Weight(kg) Ideal Body Weight(kg): 59  · SpO2 (%) SpO2 (%): 97  · Preferred Language to Address Healthcare Preferred Language to Address Healthcare: English    19:03  · Peripheral IV Insertion Date: 01-Aug-2023  · Inserted by: RN  · Peripheral IV Location: Left:; basilic vein; cephalic vein  · Device Device: Angiocath  · Peripheral IV Gauge/Length: 20 gauge  · IV Device Number of Insertion Attempts: 2  · IV Device Dressing/Securement: dressing dry and intact; catheter securement device utilized  · IV Device Patency/Placement: flushed without difficulty; blood return present    20:17  · Pain Rating: Rest (Reassessment) Pain Rating: Rest: 5 (moderate pain)  · Pain Rating: Activity (Reassessment) Pain Rating: Activity: 10 (severe pain)  · Pain Response to Interventions: no change in pain    20:50  · Temp (F): 97.5  · Temp (C) Temp (C): 36.4  · Heart Rate Heart Rate (beats/min): 78  · BP Systolic Systolic: 116  · BP Diastolic Diastolic (mm Hg): 69  · Respiration Rate (breaths/min) Respiration Rate (breaths/min): 17  · SpO2 (%) SpO2 (%): 98  · O2 Delivery/Oxygen Delivery Method Patient On (Oxygen Delivery Method): room air    22:26  · Pain Rating: Rest (Reassessment) Pain Rating: Rest: 3 (mild pain)  · Pain Rating: Activity (Reassessment) Pain Rating: Activity: 6 (moderate pain)  · Pain Response to Interventions: partial relief        Medication list         MEDICATIONS  (STANDING):  amLODIPine   Tablet 5 milliGRAM(s) Oral daily  baclofen 10 milliGRAM(s) Oral every 12 hours  cholecalciferol 2000 Unit(s) Oral daily  dextrose 5% + sodium chloride 0.45%. 1000 milliLiter(s) (50 mL/Hr) IV Continuous <Continuous>  escitalopram 10 milliGRAM(s) Oral at bedtime  folic acid 1 milliGRAM(s) Oral daily  gabapentin 300 milliGRAM(s) Oral three times a day  hydroxyurea 500 milliGRAM(s) Oral two times a day  lactobacillus acidophilus 1 Tablet(s) Oral every 12 hours  levothyroxine Injectable 25 MICROGram(s) IV Push at bedtime  pantoprazole  Injectable 40 milliGRAM(s) IV Push daily  piperacillin/tazobactam IVPB.. 3.375 Gram(s) IV Intermittent every 8 hours  senna 1 Tablet(s) Oral at bedtime    MEDICATIONS  (PRN):  acetaminophen  Suppository .. 650 milliGRAM(s) Rectal every 6 hours PRN Temp greater or equal to 38C (100.4F)  aluminum hydroxide/magnesium hydroxide/simethicone Suspension 30 milliLiter(s) Oral every 4 hours PRN Dyspepsia  ketorolac   Injectable 30 milliGRAM(s) IV Push every 6 hours PRN Moderate Pain (4 - 6)  LORazepam   Injectable 0.5 milliGRAM(s) IV Push every 8 hours PRN Agitation  melatonin 3 milliGRAM(s) Oral at bedtime PRN Insomnia  morphine  - Injectable 2 milliGRAM(s) IV Push every 4 hours PRN Severe Pain (7 - 10)  ondansetron Injectable 4 milliGRAM(s) IV Push every 8 hours PRN Nausea and/or Vomiting         Vitals log        ICU Vital Signs Last 24 Hrs  T(C): 36.9 (02 Aug 2023 12:29), Max: 37.2 (02 Aug 2023 05:44)  T(F): 98.5 (02 Aug 2023 12:29), Max: 99 (02 Aug 2023 05:44)  HR: 99 (02 Aug 2023 12:29) (78 - 111)  BP: 159/70 (02 Aug 2023 12:29) (116/69 - 159/70)  BP(mean): 100 (02 Aug 2023 00:22) (100 - 100)  ABP: --  ABP(mean): --  RR: 20 (02 Aug 2023 12:29) (17 - 20)  SpO2: 98% (02 Aug 2023 12:29) (92% - 98%)    O2 Parameters below as of 02 Aug 2023 12:29  Patient On (Oxygen Delivery Method): nasal cannula  O2 Flow (L/min): 2               Input and Output:  I&O's Detail      Lab Data                        11.2   3.78  )-----------( 484      ( 01 Aug 2023 18:30 )             35.6     08-01    143  |  107  |  27<H>  ----------------------------<  119<H>  4.2   |  29  |  0.85    Ca    8.8      01 Aug 2023 18:30    TPro  7.1  /  Alb  3.0<L>  /  TBili  0.5  /  DBili  x   /  AST  19  /  ALT  13  /  AlkPhos  79  08-01            Review of Systems	  weakness  frailty      Objective     Physical Examination    heart s1s2  lung dc BS  head nc      Pertinent Lab findings & Imaging      [place]:  NO   Adequate UO     I&O's Detail           Discussed with:     Cultures:	        Radiology          ACC: [id number] EXAM:  CT ABDOMEN AND PELVIS   ORDERED BY: YAHIR GALLAGHER     ACC: [id number] EXAM:  CT CHEST   ORDERED BY: YAHIR GALLAGHER     PROCEDURE DATE:  08/02/2023          INTERPRETATION:  CLINICAL INFORMATION: Shortness of breath, abdominal   pain and distention    COMPARISON: None.    CONTRAST/COMPLICATIONS:  IV Contrast: NONE  Oral Contrast: NONE  Complications: None reported at time of study completion    PROCEDURE:  CT of the Chest, Abdomen and Pelvis was performed.  Sagittal and coronal reformats were performed.    FINDINGS:  CHEST:  LUNGS AND LARGE AIRWAYS: Patent central airways. Dependent atelectatic   changes at the lung bases.    PLEURA: No pleural effusion.  VESSELS: Coronary atherosclerosis.  HEART: Heart size is normal. No pericardial effusion.  MEDIASTINUM AND DENIS: No lymphadenopathy.  CHEST WALL AND LOWER NECK: Within normal limits.    ABDOMEN AND PELVIS:  Evaluation of the solid abdominal organs is limited without IV contrast.  LIVER: Within normal limits.  BILE DUCTS: Normal caliber.  GALLBLADDER: Within normal limits.  SPLEEN: Within normal limits.  PANCREAS: Redemonstration of a 2.3 cm cystic lesion in the pancreatic   head with mild peripheral calcification, unchanged since 2016.  ADRENALS: Within normal limits.  KIDNEYS/URETERS: Within normal limits.    BLADDER: Incompletely distended.  REPRODUCTIVE ORGANS: No pelvic masses. Small calcified fibroids.    BOWEL: Moderate hiatal hernia. No bowel obstruction. Appendix is not   visualized. Extensive sigmoid diverticulosis.  PERITONEUM: No ascites.  VESSELS: IVC filter in place. Moderate atherosclerotic vascular   calcification.  RETROPERITONEUM/LYMPH NODES: No lymphadenopathy.  ABDOMINAL WALL: Within normal limits.  BONES: Mild S-shaped scoliosis of the thoracolumbar spine.    IMPRESSION:  No acute findings in the chest, abdomen, or pelvis.    Moderate hiatal hernia.    --- End of Report ---            PAIGE GORMAN MD; Attending Radiologist  This document has been electronically signed. Aug  2 2023  2:50AM

## 2023-08-02 NOTE — CONSULT NOTE ADULT - PROBLEM SELECTOR RECOMMENDATION 9
Patient seen and evaluated  Patient's wound was dressed with adaptic, 4x4 gauze, myra, tape  Rec vascular consult  Rec cont abx per id  Will cont to follow in house Patient seen and evaluated  Patient's wound was dressed with adaptic, 4x4 gauze, myra, tape  Rec vascular consult  Rec cont abx per id  Will cont to follow in house and monitor for cellulitis

## 2023-08-02 NOTE — CONSULT NOTE ADULT - SUBJECTIVE AND OBJECTIVE BOX
Patient is a 89y old  Female who presents with a chief complaint of AMS (02 Aug 2023 09:19)      HPI:  Patient is a 89-year-old female presents the emergency room with chief complaint of altered mental status.  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  Patient presents from assisted living with altered mental status concern for UTI.  Patient was last admitted to the hospital June 26 through June 30, 2023 was altered mental status x2 weeks.  Was ultimately diagnosed with acute metabolic encephalopathy secondary to UTI improved.  Patient stabilized and was ultimately discharged back to assisted living.  Presents today lethargic with urinary frequency and confusion. Son reports that there is also concern for possible cellulitis to the LLE at the site of a skin graft. Pt recently completed a course of po abx for this with no significant improvement. She has been c/o severe pain to the LLE for weeks. Seen by vascular consult , xrays obtained -found to have severe OA AND PVD ,pain management started Palliative care consult requested ,to discuss advance directives and complete MOLST  (02 Aug 2023 09:19)      Asked to see patient for ID Consult    PAST MEDICAL & SURGICAL HISTORY:  Hypertension      CVA (cerebral vascular accident)      Depression      Constipation      Neuropathy      Hyperlipidemia      Grade I diastolic dysfunction      Afib      Neuropathy      VHD (valvular heart disease)      Aortic valvar stenosis      No significant past surgical history          Allergies    per son &quot;issues with certain anitibiotics&quot; does not remember the names (Unknown)    Intolerances        REVIEW OF SYSTEMS:  All systems below were reviewed and are negative [  ]  HEENT:  ID:  Pulmonary:  Cardiac:  GI:  Renal:  Musculoskeletal:  All other systems above were reviewed and are negative   [  ]    HOME MEDICATIONS:    MEDICATIONS  (STANDING):  amLODIPine   Tablet 5 milliGRAM(s) Oral daily  baclofen 10 milliGRAM(s) Oral every 12 hours  cholecalciferol 2000 Unit(s) Oral daily  dextrose 5% + sodium chloride 0.45%. 1000 milliLiter(s) (50 mL/Hr) IV Continuous <Continuous>  escitalopram 10 milliGRAM(s) Oral at bedtime  folic acid 1 milliGRAM(s) Oral daily  gabapentin 300 milliGRAM(s) Oral three times a day  hydroxyurea 500 milliGRAM(s) Oral two times a day  lactobacillus acidophilus 1 Tablet(s) Oral every 12 hours  levothyroxine Injectable 25 MICROGram(s) IV Push at bedtime  pantoprazole  Injectable 40 milliGRAM(s) IV Push daily  piperacillin/tazobactam IVPB.. 3.375 Gram(s) IV Intermittent every 8 hours  senna 1 Tablet(s) Oral at bedtime    MEDICATIONS  (PRN):  acetaminophen  Suppository .. 650 milliGRAM(s) Rectal every 6 hours PRN Temp greater or equal to 38C (100.4F)  aluminum hydroxide/magnesium hydroxide/simethicone Suspension 30 milliLiter(s) Oral every 4 hours PRN Dyspepsia  ketorolac   Injectable 30 milliGRAM(s) IV Push every 6 hours PRN Moderate Pain (4 - 6)  LORazepam   Injectable 0.5 milliGRAM(s) IV Push every 8 hours PRN Agitation  melatonin 3 milliGRAM(s) Oral at bedtime PRN Insomnia  morphine  - Injectable 2 milliGRAM(s) IV Push every 4 hours PRN Severe Pain (7 - 10)  ondansetron Injectable 4 milliGRAM(s) IV Push every 8 hours PRN Nausea and/or Vomiting      Vital Signs Last 24 Hrs  T(C): 36.9 (02 Aug 2023 12:29), Max: 37.2 (02 Aug 2023 05:44)  T(F): 98.5 (02 Aug 2023 12:29), Max: 99 (02 Aug 2023 05:44)  HR: 99 (02 Aug 2023 12:29) (78 - 111)  BP: 159/70 (02 Aug 2023 12:29) (116/69 - 159/70)  BP(mean): 100 (02 Aug 2023 00:22) (100 - 100)  RR: 20 (02 Aug 2023 12:29) (17 - 20)  SpO2: 98% (02 Aug 2023 12:29) (92% - 98%)    Parameters below as of 02 Aug 2023 12:29  Patient On (Oxygen Delivery Method): nasal cannula  O2 Flow (L/min): 2      PHYSICAL EXAM:  HEENT:  Neck:  Lungs:  Heart:  Abdomen:  Genital/ Rectal:  Extremities:  Neurologic:  Vascular:    I&O's Summary      LABORATORY:                          11.2   3.78  )-----------( 484      ( 01 Aug 2023 18:30 )             35.6           08-01    143  |  107  |  27<H>  ----------------------------<  119<H>  4.2   |  29  |  0.85    Ca    8.8      01 Aug 2023 18:30    TPro  7.1  /  Alb  3.0<L>  /  TBili  0.5  /  DBili  x   /  AST  19  /  ALT  13  /  AlkPhos  79  08-01      Rapid Respiratory Viral Panel Result        08-01 @ 22:40  Rapid RVP [facility]  Coronovirus --  Adenovirus --  Bordetella Pertussis --  Chlamydia Pneumonia --  Entero/Rhinovirus--  HKU1 Coronovirus --  HMPV Coronovirus --  Influenza A --  Influenza AH1 --  Influenza AH1 2009 --  Influenza AH3 --  Influenza B --  Mycoplasma Pneumoniae --  NL63 Coronovirus --  OC43 Coronovirus --  Parainfluenza 1 --  Parainfluenza 2 --  Parainfluenza 3 --  Parainfluenza 4 --  Resp Syncytial Virus --      LABORATORY:    CBC Full  -  ( 01 Aug 2023 18:30 )  WBC Count : 3.78 K/uL  RBC Count : 2.99 M/uL  Hemoglobin : 11.2 g/dL  Hematocrit : 35.6 %  Platelet Count - Automated : 484 K/uL  Mean Cell Volume : 119.1 fl  Mean Cell Hemoglobin : 37.5 pg  Mean Cell Hemoglobin Concentration : 31.5 gm/dL  Auto Neutrophil # : 2.77 K/uL  Auto Lymphocyte # : 0.60 K/uL  Auto Monocyte # : 0.31 K/uL  Auto Eosinophil # : 0.03 K/uL  Auto Basophil # : 0.02 K/uL  Auto Neutrophil % : 73.3 %  Auto Lymphocyte % : 15.9 %  Auto Monocyte % : 8.2 %  Auto Eosinophil % : 0.8 %  Auto Basophil % : 0.5 %          08-01    143  |  107  |  27<H>  ----------------------------<  119<H>  4.2   |  29  |  0.85    Ca    8.8      01 Aug 2023 18:30    TPro  7.1  /  Alb  3.0<L>  /  TBili  0.5  /  DBili  x   /  AST  19  /  ALT  13  /  AlkPhos  79  08-01                                            Rapid Respiratory Viral Panel Result        08-01 @ 22:40  Rapid RVP [facility]  Coronovirus --  Adenovirus --  Bordetella Pertussis --  Chlamydia Pneumonia --  Entero/Rhinovirus--  HKU1 Coronovirus --  HMPV Coronovirus --  Influenza A --  Influenza AH1 --  Influenza AH1 2009 --  Influenza AH3 --  Influenza B --  Mycoplasma Pneumoniae --  NL63 Coronovirus --  OC43 Coronovirus --  Parainfluenza 1 --  Parainfluenza 2 --  Parainfluenza 3 --  Parainfluenza 4 --  Resp Syncytial Virus --           Patient is a 89y old  Female who presents with a chief complaint of AMS (02 Aug 2023 09:19)      HPI:  Patient is a 89-year-old female presents the emergency room with chief complaint of altered mental status.  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  Patient presents from assisted living with altered mental status concern for UTI.  Patient was last admitted to the hospital June 26 through June 30, 2023 was altered mental status x2 weeks.  Was ultimately diagnosed with acute metabolic encephalopathy secondary to UTI improved.  Patient stabilized and was ultimately discharged back to assisted living.  Presents today lethargic with urinary frequency and confusion. Son reports that there is also concern for possible cellulitis to the LLE at the site of a skin graft. Pt recently completed a course of po abx for this with no significant improvement. She has been c/o severe pain to the LLE for weeks. Seen by vascular consult , xrays obtained -found to have severe OA AND PVD ,pain management started Palliative care consult requested ,to discuss advance directives and complete MOLST  (02 Aug 2023 09:19)      Asked to see patient for ID Consult    PAST MEDICAL & SURGICAL HISTORY:  Hypertension      CVA (cerebral vascular accident)      Depression      Constipation      Neuropathy      Hyperlipidemia      Grade I diastolic dysfunction      Afib      Neuropathy      VHD (valvular heart disease)      Aortic valvar stenosis      No significant past surgical history          Allergies    per son &quot;issues with certain anitibiotics&quot; does not remember the names (Unknown)    Intolerances        REVIEW OF SYSTEMS:  All systems below were reviewed and are negative [  ]  HEENT:  ID:  Pulmonary:  Cardiac:  GI:  Renal:  Musculoskeletal:  All other systems above were reviewed and are negative   [  ]    HOME MEDICATIONS:    MEDICATIONS  (STANDING):  amLODIPine   Tablet 5 milliGRAM(s) Oral daily  baclofen 10 milliGRAM(s) Oral every 12 hours  cholecalciferol 2000 Unit(s) Oral daily  dextrose 5% + sodium chloride 0.45%. 1000 milliLiter(s) (50 mL/Hr) IV Continuous <Continuous>  escitalopram 10 milliGRAM(s) Oral at bedtime  folic acid 1 milliGRAM(s) Oral daily  gabapentin 300 milliGRAM(s) Oral three times a day  hydroxyurea 500 milliGRAM(s) Oral two times a day  lactobacillus acidophilus 1 Tablet(s) Oral every 12 hours  levothyroxine Injectable 25 MICROGram(s) IV Push at bedtime  pantoprazole  Injectable 40 milliGRAM(s) IV Push daily  piperacillin/tazobactam IVPB.. 3.375 Gram(s) IV Intermittent every 8 hours  senna 1 Tablet(s) Oral at bedtime    MEDICATIONS  (PRN):  acetaminophen  Suppository .. 650 milliGRAM(s) Rectal every 6 hours PRN Temp greater or equal to 38C (100.4F)  aluminum hydroxide/magnesium hydroxide/simethicone Suspension 30 milliLiter(s) Oral every 4 hours PRN Dyspepsia  ketorolac   Injectable 30 milliGRAM(s) IV Push every 6 hours PRN Moderate Pain (4 - 6)  LORazepam   Injectable 0.5 milliGRAM(s) IV Push every 8 hours PRN Agitation  melatonin 3 milliGRAM(s) Oral at bedtime PRN Insomnia  morphine  - Injectable 2 milliGRAM(s) IV Push every 4 hours PRN Severe Pain (7 - 10)  ondansetron Injectable 4 milliGRAM(s) IV Push every 8 hours PRN Nausea and/or Vomiting      Vital Signs Last 24 Hrs  T(C): 36.9 (02 Aug 2023 12:29), Max: 37.2 (02 Aug 2023 05:44)  T(F): 98.5 (02 Aug 2023 12:29), Max: 99 (02 Aug 2023 05:44)  HR: 99 (02 Aug 2023 12:29) (78 - 111)  BP: 159/70 (02 Aug 2023 12:29) (116/69 - 159/70)  BP(mean): 100 (02 Aug 2023 00:22) (100 - 100)  RR: 20 (02 Aug 2023 12:29) (17 - 20)  SpO2: 98% (02 Aug 2023 12:29) (92% - 98%)    Parameters below as of 02 Aug 2023 12:29  Patient On (Oxygen Delivery Method): nasal cannula  O2 Flow (L/min): 2      PHYSICAL EXAM:  HEENT: NC/AT  Neck: Soft, no tenderness  Lungs: Coarse BS bilaterally.   Heart: RRR, no murmurs.   Abdomen: Soft, no tenderness.   Genital/ Rectal: No salas catheter  Extremities: LLE wound with moderate erythema. Donor site of left thigh and left groin with old scars  Neurologic:  Vascular:    I&O's Summary      LABORATORY:                          11.2   3.78  )-----------( 484      ( 01 Aug 2023 18:30 )             35.6           08-01    143  |  107  |  27<H>  ----------------------------<  119<H>  4.2   |  29  |  0.85    Ca    8.8      01 Aug 2023 18:30    TPro  7.1  /  Alb  3.0<L>  /  TBili  0.5  /  DBili  x   /  AST  19  /  ALT  13  /  AlkPhos  79  08-01      Rapid Respiratory Viral Panel Result        08-01 @ 22:40  Rapid RVP [facility]  Coronovirus --  Adenovirus --  Bordetella Pertussis --  Chlamydia Pneumonia --  Entero/Rhinovirus--  HKU1 Coronovirus --  HMPV Coronovirus --  Influenza A --  Influenza AH1 --  Influenza AH1 2009 --  Influenza AH3 --  Influenza B --  Mycoplasma Pneumoniae --  NL63 Coronovirus --  OC43 Coronovirus --  Parainfluenza 1 --  Parainfluenza 2 --  Parainfluenza 3 --  Parainfluenza 4 --  Resp Syncytial Virus --      LABORATORY:    CBC Full  -  ( 01 Aug 2023 18:30 )  WBC Count : 3.78 K/uL  RBC Count : 2.99 M/uL  Hemoglobin : 11.2 g/dL  Hematocrit : 35.6 %  Platelet Count - Automated : 484 K/uL  Mean Cell Volume : 119.1 fl  Mean Cell Hemoglobin : 37.5 pg  Mean Cell Hemoglobin Concentration : 31.5 gm/dL  Auto Neutrophil # : 2.77 K/uL  Auto Lymphocyte # : 0.60 K/uL  Auto Monocyte # : 0.31 K/uL  Auto Eosinophil # : 0.03 K/uL  Auto Basophil # : 0.02 K/uL  Auto Neutrophil % : 73.3 %  Auto Lymphocyte % : 15.9 %  Auto Monocyte % : 8.2 %  Auto Eosinophil % : 0.8 %  Auto Basophil % : 0.5 %          08-01    143  |  107  |  27<H>  ----------------------------<  119<H>  4.2   |  29  |  0.85    Ca    8.8      01 Aug 2023 18:30    TPro  7.1  /  Alb  3.0<L>  /  TBili  0.5  /  DBili  x   /  AST  19  /  ALT  13  /  AlkPhos  79  08-01      Assessment and Plan:    1. LLE cellulitis with chronic wound.  2. Possible UTI.  3. Lethargy    . Continue iv Zosyn  . Add po Bactrim DS bid    Thank you    Patient is a 89y old  Female who presents with a chief complaint of AMS (02 Aug 2023 09:19)      HPI:  Patient is a 89-year-old female presents the emergency room with chief complaint of altered mental status.  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  Patient presents from assisted living with altered mental status concern for UTI.  Patient was last admitted to the hospital June 26 through June 30, 2023 was altered mental status x2 weeks.  Was ultimately diagnosed with acute metabolic encephalopathy secondary to UTI improved.  Patient stabilized and was ultimately discharged back to assisted living.  Presents today lethargic with urinary frequency and confusion. Son reports that there is also concern for possible cellulitis to the LLE at the site of a skin graft. Pt recently completed a course of po abx for this with no significant improvement. She has been c/o severe pain to the LLE for weeks. Seen by vascular consult , xrays obtained -found to have severe OA AND PVD ,pain management started Palliative care consult requested ,to discuss advance directives and complete MOLST  (02 Aug 2023 09:19)      Asked to see patient for ID Consult    PAST MEDICAL & SURGICAL HISTORY:  Hypertension      CVA (cerebral vascular accident)      Depression      Constipation      Neuropathy      Hyperlipidemia      Grade I diastolic dysfunction      Afib      Neuropathy      VHD (valvular heart disease)      Aortic valvar stenosis      No significant past surgical history          Allergies    per son &quot;issues with certain anitibiotics&quot; does not remember the names (Unknown)    Intolerances        REVIEW OF SYSTEMS:  No cough or SOB  No diarrhea    HOME MEDICATIONS:    MEDICATIONS  (STANDING):  amLODIPine   Tablet 5 milliGRAM(s) Oral daily  baclofen 10 milliGRAM(s) Oral every 12 hours  cholecalciferol 2000 Unit(s) Oral daily  dextrose 5% + sodium chloride 0.45%. 1000 milliLiter(s) (50 mL/Hr) IV Continuous <Continuous>  escitalopram 10 milliGRAM(s) Oral at bedtime  folic acid 1 milliGRAM(s) Oral daily  gabapentin 300 milliGRAM(s) Oral three times a day  hydroxyurea 500 milliGRAM(s) Oral two times a day  lactobacillus acidophilus 1 Tablet(s) Oral every 12 hours  levothyroxine Injectable 25 MICROGram(s) IV Push at bedtime  pantoprazole  Injectable 40 milliGRAM(s) IV Push daily  piperacillin/tazobactam IVPB.. 3.375 Gram(s) IV Intermittent every 8 hours  senna 1 Tablet(s) Oral at bedtime    MEDICATIONS  (PRN):  acetaminophen  Suppository .. 650 milliGRAM(s) Rectal every 6 hours PRN Temp greater or equal to 38C (100.4F)  aluminum hydroxide/magnesium hydroxide/simethicone Suspension 30 milliLiter(s) Oral every 4 hours PRN Dyspepsia  ketorolac   Injectable 30 milliGRAM(s) IV Push every 6 hours PRN Moderate Pain (4 - 6)  LORazepam   Injectable 0.5 milliGRAM(s) IV Push every 8 hours PRN Agitation  melatonin 3 milliGRAM(s) Oral at bedtime PRN Insomnia  morphine  - Injectable 2 milliGRAM(s) IV Push every 4 hours PRN Severe Pain (7 - 10)  ondansetron Injectable 4 milliGRAM(s) IV Push every 8 hours PRN Nausea and/or Vomiting      Vital Signs Last 24 Hrs  T(C): 36.9 (02 Aug 2023 12:29), Max: 37.2 (02 Aug 2023 05:44)  T(F): 98.5 (02 Aug 2023 12:29), Max: 99 (02 Aug 2023 05:44)  HR: 99 (02 Aug 2023 12:29) (78 - 111)  BP: 159/70 (02 Aug 2023 12:29) (116/69 - 159/70)  BP(mean): 100 (02 Aug 2023 00:22) (100 - 100)  RR: 20 (02 Aug 2023 12:29) (17 - 20)  SpO2: 98% (02 Aug 2023 12:29) (92% - 98%)    Parameters below as of 02 Aug 2023 12:29  Patient On (Oxygen Delivery Method): nasal cannula  O2 Flow (L/min): 2      PHYSICAL EXAM:  HEENT: NC/AT  Neck: Soft, no tenderness  Lungs: Coarse BS bilaterally.   Heart: RRR, no murmurs.   Abdomen: Soft, no tenderness.   Genital/ Rectal: No salas catheter  Extremities: LLE wound with moderate erythema. Donor site of left thigh and left groin with old scars  Neurologic: Awake. No focal weakness.     I&O's Summary      LABORATORY:                          11.2   3.78  )-----------( 484      ( 01 Aug 2023 18:30 )             35.6           08-01    143  |  107  |  27<H>  ----------------------------<  119<H>  4.2   |  29  |  0.85    Ca    8.8      01 Aug 2023 18:30    TPro  7.1  /  Alb  3.0<L>  /  TBili  0.5  /  DBili  x   /  AST  19  /  ALT  13  /  AlkPhos  79  08-01      Rapid Respiratory Viral Panel Result        08-01 @ 22:40  Rapid RVP [facility]  Coronovirus --  Adenovirus --  Bordetella Pertussis --  Chlamydia Pneumonia --  Entero/Rhinovirus--  HKU1 Coronovirus --  HMPV Coronovirus --  Influenza A --  Influenza AH1 --  Influenza AH1 2009 --  Influenza AH3 --  Influenza B --  Mycoplasma Pneumoniae --  NL63 Coronovirus --  OC43 Coronovirus --  Parainfluenza 1 --  Parainfluenza 2 --  Parainfluenza 3 --  Parainfluenza 4 --  Resp Syncytial Virus --      LABORATORY:    CBC Full  -  ( 01 Aug 2023 18:30 )  WBC Count : 3.78 K/uL  RBC Count : 2.99 M/uL  Hemoglobin : 11.2 g/dL  Hematocrit : 35.6 %  Platelet Count - Automated : 484 K/uL  Mean Cell Volume : 119.1 fl  Mean Cell Hemoglobin : 37.5 pg  Mean Cell Hemoglobin Concentration : 31.5 gm/dL  Auto Neutrophil # : 2.77 K/uL  Auto Lymphocyte # : 0.60 K/uL  Auto Monocyte # : 0.31 K/uL  Auto Eosinophil # : 0.03 K/uL  Auto Basophil # : 0.02 K/uL  Auto Neutrophil % : 73.3 %  Auto Lymphocyte % : 15.9 %  Auto Monocyte % : 8.2 %  Auto Eosinophil % : 0.8 %  Auto Basophil % : 0.5 %          08-01    143  |  107  |  27<H>  ----------------------------<  119<H>  4.2   |  29  |  0.85    Ca    8.8      01 Aug 2023 18:30    TPro  7.1  /  Alb  3.0<L>  /  TBili  0.5  /  DBili  x   /  AST  19  /  ALT  13  /  AlkPhos  79  08-01      Assessment and Plan:    1. LLE cellulitis with chronic wound.  2. Possible UTI.  3. Lethargy    . Continue iv Zosyn  . Add po Bactrim DS bid  . Follow all cultures.        Thank you

## 2023-08-02 NOTE — H&P ADULT - HISTORY OF PRESENT ILLNESS
Patient is a 89-year-old female presents the emergency room with chief complaint of altered mental status.  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  Patient presents from assisted living with altered mental status concern for UTI.  Patient was last admitted to the hospital June 26 through June 30, 2023 was altered mental status x2 weeks.  Was ultimately diagnosed with acute metabolic encephalopathy secondary to UTI improved.  Patient stabilized and was ultimately discharged back to assisted living.  Presents today lethargic with urinary frequency and confusion. Son reports that there is also concern for possible cellulitis to the LLE at the site of a skin graft. Pt recently completed a course of po abx for this with no significant improvement. She has been c/o severe pain to the LLE for weeks. Seen by vascular consult , xrays obtained -found to have severe OA AND PVD ,pain management started Palliative care consult requested ,to discuss advance directives and complete MOLST

## 2023-08-02 NOTE — CONSULT NOTE ADULT - SUBJECTIVE AND OBJECTIVE BOX
ams possible dementia worse in hospital setting   h/o chronic pain as per son when on gabapentin baclofen and tramadol under control if possible continue that will get pain management  spoke to son in detail

## 2023-08-02 NOTE — PATIENT PROFILE ADULT - HOME ACCESSIBILITY CONCERNS
----- Message from Abner Saxena MD sent at 5/11/2023  9:40 AM CDT -----  Please arrange CHF clinic follow-up with nurse practitioner within 1 week.  Patient has an appointment in June with Dr. Ricardo.  Thank you.  Patient will also need a basic metabolic profile next week.     none

## 2023-08-02 NOTE — PATIENT PROFILE ADULT - DO YOU NEED ADDITIONAL SERVICES TO MANAGE ANY OF THESE MEDICAL CONDITIONS AT HOME?
[Dyspnea on exertion] : dyspnea during exertion [Joint Pain] : joint pain [Joint Stiffness] : joint stiffness [Negative] : Heme/Lymph [FreeTextEntry1] : limited exercise capacity no

## 2023-08-02 NOTE — PATIENT PROFILE ADULT - FALL HARM RISK - HARM RISK INTERVENTIONS

## 2023-08-02 NOTE — H&P ADULT - TIME BILLING
75minutes spent on this visit, 50% visit time spent in care co-ordination with other attendings and counselling patient ,writing admission orders ( see complete and current orders and order section) ,requesting necessary consults ,informing family about status & plan of care .I have discussed care plan with [facility] wellness/admitting /nursing   department ,outpatient PCP , hospital consultants , ER physician & med staff .

## 2023-08-02 NOTE — CONSULT NOTE ADULT - SUBJECTIVE AND OBJECTIVE BOX
Patient is a 89y old  Female who presents with a chief complaint of lower extremity cellulitis (01 Aug 2023 20:00).  Patient had previous dressing on leg.      HPI:      PAST MEDICAL & SURGICAL HISTORY:  Hypertension      CVA (cerebral vascular accident)      Depression      Constipation      Neuropathy      Hyperlipidemia      Grade I diastolic dysfunction      Afib      Neuropathy      VHD (valvular heart disease)      Aortic valvar stenosis      No significant past surgical history          MEDICATIONS  (STANDING):  amLODIPine   Tablet 5 milliGRAM(s) Oral daily  baclofen 10 milliGRAM(s) Oral every 12 hours  cholecalciferol 2000 Unit(s) Oral daily  dextrose 5% + sodium chloride 0.45%. 1000 milliLiter(s) (50 mL/Hr) IV Continuous <Continuous>  escitalopram 10 milliGRAM(s) Oral at bedtime  folic acid 1 milliGRAM(s) Oral daily  gabapentin 300 milliGRAM(s) Oral three times a day  hydroxyurea 500 milliGRAM(s) Oral two times a day  lactobacillus acidophilus 1 Tablet(s) Oral every 12 hours  levothyroxine Injectable 25 MICROGram(s) IV Push at bedtime  pantoprazole  Injectable 40 milliGRAM(s) IV Push daily  piperacillin/tazobactam IVPB.. 3.375 Gram(s) IV Intermittent every 8 hours  senna 1 Tablet(s) Oral at bedtime    MEDICATIONS  (PRN):  acetaminophen  Suppository .. 650 milliGRAM(s) Rectal every 6 hours PRN Temp greater or equal to 38C (100.4F)  aluminum hydroxide/magnesium hydroxide/simethicone Suspension 30 milliLiter(s) Oral every 4 hours PRN Dyspepsia  ketorolac   Injectable 30 milliGRAM(s) IV Push every 6 hours PRN Moderate Pain (4 - 6)  LORazepam   Injectable 0.5 milliGRAM(s) IV Push every 8 hours PRN Agitation  melatonin 3 milliGRAM(s) Oral at bedtime PRN Insomnia  morphine  - Injectable 2 milliGRAM(s) IV Push every 4 hours PRN Severe Pain (7 - 10)  ondansetron Injectable 4 milliGRAM(s) IV Push every 8 hours PRN Nausea and/or Vomiting      Allergies    per son &quot;issues with certain anitibiotics&quot; does not remember the names (Unknown)    Intolerances        VITALS:    Vital Signs Last 24 Hrs  T(C): 36.9 (02 Aug 2023 12:29), Max: 37.2 (02 Aug 2023 05:44)  T(F): 98.5 (02 Aug 2023 12:29), Max: 99 (02 Aug 2023 05:44)  HR: 99 (02 Aug 2023 12:29) (78 - 111)  BP: 159/70 (02 Aug 2023 12:29) (116/69 - 159/70)  BP(mean): 100 (02 Aug 2023 00:22) (100 - 100)  RR: 20 (02 Aug 2023 12:29) (17 - 20)  SpO2: 98% (02 Aug 2023 12:29) (92% - 98%)    Parameters below as of 02 Aug 2023 12:29  Patient On (Oxygen Delivery Method): nasal cannula  O2 Flow (L/min): 2      LABS:                          11.2   3.78  )-----------( 484      ( 01 Aug 2023 18:30 )             35.6       08-01    143  |  107  |  27<H>  ----------------------------<  119<H>  4.2   |  29  |  0.85    Ca    8.8      01 Aug 2023 18:30    TPro  7.1  /  Alb  3.0<L>  /  TBili  0.5  /  DBili  x   /  AST  19  /  ALT  13  /  AlkPhos  79  08-01      CAPILLARY BLOOD GLUCOSE          PT/INR - ( 01 Aug 2023 18:30 )   PT: 41.3 sec;   INR: 3.67 ratio         PTT - ( 01 Aug 2023 18:30 )  PTT:56.4 sec    LOWER EXTREMITY PHYSICAL EXAM:    Gen: Patient is not AAOx3    Vasular: DP/PT non palpable B/L, Capillary fill time >3 seconds B/L  Neuro: Protective sensation intact B/L  Msk: unable to perform  Derm: There is an anterior superficial lower left leg venous ulcer that has minimal edema, mild erythema, cellulitis, minimal drainage, no mal odor, no purulence, xerosis.

## 2023-08-02 NOTE — H&P ADULT - ASSESSMENT
Patient is a 89-year-old female presents the emergency room with chief complaint of altered mental status.  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  Patient presents from assisted living with altered mental status concern for UTI.  Patient was last admitted to the hospital June 26 through June 30, 2023 was altered mental status x2 weeks.  Was ultimately diagnosed with acute metabolic encephalopathy secondary to UTI improved.  Patient stabilized and was ultimately discharged back to assisted living.  Presents today lethargic with urinary frequency and confusion. Son reports that there is also concern for possible cellulitis to the LLE at the site of a skin graft. Pt recently completed a course of po abx for this with no significant improvement. She has been c/o severe pain to the LLE for weeks. Patient is a 89-year-old female presents the emergency room with chief complaint of altered mental status.  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  Patient presents from assisted living with altered mental status concern for UTI.  Patient was last admitted to the hospital June 26 through June 30, 2023 was altered mental status x2 weeks.  Was ultimately diagnosed with acute metabolic encephalopathy secondary to UTI improved.  Patient stabilized and was ultimately discharged back to assisted living.  Presents today lethargic with urinary frequency and confusion. Son reports that there is also concern for possible cellulitis to the LLE at the site of a skin graft. Pt recently completed a course of po abx for this with no significant improvement. She has been c/o severe pain to the LLE for weeks.She has been c/o severe pain to the LLE for weeks. Seen by vascular consult , xrays obtained -found to have severe OA AND PVD ,pain management started Palliative care consult requested ,to discuss advance directives and complete MOLST

## 2023-08-02 NOTE — H&P ADULT - NSHPLABSRESULTS_GEN_ALL_CORE
< from: CT Head No Cont (08.02.23 @ 02:22) >    Technique: Noncontrast axial CT images of the head wereacquired. Coronal   and sagittal reformats were obtained.    Findings: Exam is degraded by motion and streak artifact.  The ventricles and sulci are prominent in size, compatible with   generalized parenchymal volume loss. No acute intracranial hemorrhage is   identified.  There is no extra-axial fluid collection. No mass effect or   midline shift is seen.  There is no evidence of acute territorial   infarct.  There are periventricular and subcortical white matter   hypodensities, which are nonspecific, but likely reflect moderate   microvascular ischemic disease.  The visualized paranasal sinuses are clear except for mild right   maxillary sinus mucosal thickening. The mastoid air cells are well   aerated.  No acute osseous abnormality is seen.      Impression: Limited exam. No definite evidence of acute intracranial   trauma.    < end of copied text >    < from: CT Tibia/Fibia No Cont, Left (08.02.23 @ 03:18) >    ACC: [id number] EXAM:  CT TIB FIB ONLY LT   ORDERED BY: YAHIR GALLAGHER     PROCEDURE DATE:  08/02/2023          INTERPRETATION:  CT TIBIA FIBULA LEFT dated 8/2/2023 3:18 AM    INDICATION: Pain    COMPARISON: None available.    TECHNIQUE: CT imaging of the left tibia and fibula was performed. The   data was reformatted in the axial, coronal, and sagittal planes.   Additionally, 3-D reformatted imaging was created.    FINDINGS: Motion degraded study.    OSSEOUS STRUCTURES: There is marked narrowing of the knee joint spaces   with chondrocalcinosis and marginal productive changes noted. Narrowing   at the talonavicular and middle subtalar joint spaces with sclerosis.  SYNOVIUM/ JOINT FLUID: No knee joint effusion. No popliteal cyst. No   ankle joint effusion  TENDONS: The tendons are preserved. No large tenosynovitis.  MUSCLES: Mild to moderate atrophy of the calf musculature. No   intramuscular hematoma.  NEUROVASCULAR STRUCTURES: Severe vascular calcifications are present  SUBCUTANEOUSSOFT TISSUES: There is soft tissue swelling about the ankle.   This is most prominent over the lateral ankle. There is mild extension of   edema into the dorsum of the hindfoot.    3-D reformatted imaging confirms these findings.    IMPRESSION:    Severe osteoarthritis of the knee.  Moderate osteoarthritis at the talonavicular and middle subtalar joints.  No displaced fracture though limited by motion artifacts    < end of copied text >

## 2023-08-02 NOTE — CARE COORDINATION ASSESSMENT. - OTHER PERTINENT REFERRAL INFORMATION
Spoke with Irma jimenez in the wellness dept at [facility] in [place]. Patient resides at the [facility] is Wheelchair bound. Patient is Halina lifted. Per nurse patient was minimally confused PTA. Patient was receiving PT at the facility from [facility] home care PTA. Patient presented to Er with change in mental status. Discharge plan pending continued hospital stay. Per nurse at [facility] patient can return to facility upon discharge. Will remain available to patient and family throughout hospital stay and assist with discharge planning needs

## 2023-08-03 LAB
ALBUMIN SERPL ELPH-MCNC: 2 G/DL — LOW (ref 3.3–5)
ALP SERPL-CCNC: 62 U/L — SIGNIFICANT CHANGE UP (ref 40–120)
ALT FLD-CCNC: 12 U/L — SIGNIFICANT CHANGE UP (ref 12–78)
ANION GAP SERPL CALC-SCNC: 3 MMOL/L — LOW (ref 5–17)
AST SERPL-CCNC: 16 U/L — SIGNIFICANT CHANGE UP (ref 15–37)
BASOPHILS # BLD AUTO: 0.01 K/UL — SIGNIFICANT CHANGE UP (ref 0–0.2)
BASOPHILS NFR BLD AUTO: 0.3 % — SIGNIFICANT CHANGE UP (ref 0–2)
BILIRUB SERPL-MCNC: 0.5 MG/DL — SIGNIFICANT CHANGE UP (ref 0.2–1.2)
BUN SERPL-MCNC: 11 MG/DL — SIGNIFICANT CHANGE UP (ref 7–23)
CALCIUM SERPL-MCNC: 7.8 MG/DL — LOW (ref 8.5–10.1)
CHLORIDE SERPL-SCNC: 110 MMOL/L — HIGH (ref 96–108)
CO2 SERPL-SCNC: 31 MMOL/L — SIGNIFICANT CHANGE UP (ref 22–31)
CREAT SERPL-MCNC: 0.63 MG/DL — SIGNIFICANT CHANGE UP (ref 0.5–1.3)
EGFR: 84 ML/MIN/1.73M2 — SIGNIFICANT CHANGE UP
EOSINOPHIL # BLD AUTO: 0.06 K/UL — SIGNIFICANT CHANGE UP (ref 0–0.5)
EOSINOPHIL NFR BLD AUTO: 1.7 % — SIGNIFICANT CHANGE UP (ref 0–6)
FERRITIN SERPL-MCNC: 35 NG/ML — SIGNIFICANT CHANGE UP (ref 13–330)
FOLATE SERPL-MCNC: >20 NG/ML — SIGNIFICANT CHANGE UP
GLUCOSE SERPL-MCNC: 99 MG/DL — SIGNIFICANT CHANGE UP (ref 70–99)
HCT VFR BLD CALC: 27.9 % — LOW (ref 34.5–45)
HCT VFR BLD CALC: 35.3 % — SIGNIFICANT CHANGE UP (ref 34.5–45)
HGB BLD-MCNC: 10.8 G/DL — LOW (ref 11.5–15.5)
HGB BLD-MCNC: 8.6 G/DL — LOW (ref 11.5–15.5)
IMM GRANULOCYTES NFR BLD AUTO: 1.7 % — HIGH (ref 0–0.9)
INR BLD: 4.93 RATIO — HIGH (ref 0.85–1.18)
INR BLD: 5.28 RATIO — CRITICAL HIGH (ref 0.85–1.18)
IRON SATN MFR SERPL: 31 UG/DL — SIGNIFICANT CHANGE UP (ref 30–160)
LYMPHOCYTES # BLD AUTO: 0.68 K/UL — LOW (ref 1–3.3)
LYMPHOCYTES # BLD AUTO: 19.3 % — SIGNIFICANT CHANGE UP (ref 13–44)
MCHC RBC-ENTMCNC: 30.8 GM/DL — LOW (ref 32–36)
MCHC RBC-ENTMCNC: 37.2 PG — HIGH (ref 27–34)
MCV RBC AUTO: 120.8 FL — HIGH (ref 80–100)
MONOCYTES # BLD AUTO: 0.31 K/UL — SIGNIFICANT CHANGE UP (ref 0–0.9)
MONOCYTES NFR BLD AUTO: 8.8 % — SIGNIFICANT CHANGE UP (ref 2–14)
NEUTROPHILS # BLD AUTO: 2.41 K/UL — SIGNIFICANT CHANGE UP (ref 1.8–7.4)
NEUTROPHILS NFR BLD AUTO: 68.2 % — SIGNIFICANT CHANGE UP (ref 43–77)
NRBC # BLD: 0 /100 WBCS — SIGNIFICANT CHANGE UP (ref 0–0)
PLATELET # BLD AUTO: 321 K/UL — SIGNIFICANT CHANGE UP (ref 150–400)
POTASSIUM SERPL-MCNC: 3.4 MMOL/L — LOW (ref 3.5–5.3)
POTASSIUM SERPL-SCNC: 3.4 MMOL/L — LOW (ref 3.5–5.3)
PROT SERPL-MCNC: 4.9 G/DL — LOW (ref 6–8.3)
PROTHROM AB SERPL-ACNC: 55.1 SEC — HIGH (ref 9.5–13)
PROTHROM AB SERPL-ACNC: 58.9 SEC — HIGH (ref 9.5–13)
RBC # BLD: 2.31 M/UL — LOW (ref 3.8–5.2)
RBC # BLD: 2.89 M/UL — LOW (ref 3.8–5.2)
RBC # FLD: 18.4 % — HIGH (ref 10.3–14.5)
RETICS #: 70.5 K/UL — SIGNIFICANT CHANGE UP (ref 25–125)
RETICS/RBC NFR: 2.4 % — SIGNIFICANT CHANGE UP (ref 0.5–2.5)
SODIUM SERPL-SCNC: 144 MMOL/L — SIGNIFICANT CHANGE UP (ref 135–145)
VIT B12 SERPL-MCNC: >2000 PG/ML — HIGH (ref 232–1245)
WBC # BLD: 3.53 K/UL — LOW (ref 3.8–10.5)
WBC # FLD AUTO: 3.53 K/UL — LOW (ref 3.8–10.5)

## 2023-08-03 PROCEDURE — 99232 SBSQ HOSP IP/OBS MODERATE 35: CPT

## 2023-08-03 RX ORDER — ACETAMINOPHEN 500 MG
650 TABLET ORAL EVERY 8 HOURS
Refills: 0 | Status: DISCONTINUED | OUTPATIENT
Start: 2023-08-03 | End: 2023-08-08

## 2023-08-03 RX ORDER — POTASSIUM CHLORIDE 20 MEQ
10 PACKET (EA) ORAL
Refills: 0 | Status: COMPLETED | OUTPATIENT
Start: 2023-08-03 | End: 2023-08-03

## 2023-08-03 RX ORDER — METOPROLOL TARTRATE 50 MG
25 TABLET ORAL
Refills: 0 | Status: DISCONTINUED | OUTPATIENT
Start: 2023-08-03 | End: 2023-08-08

## 2023-08-03 RX ORDER — PHYTONADIONE (VIT K1) 5 MG
2 TABLET ORAL ONCE
Refills: 0 | Status: COMPLETED | OUTPATIENT
Start: 2023-08-03 | End: 2023-08-03

## 2023-08-03 RX ORDER — LIDOCAINE 4 G/100G
2 CREAM TOPICAL DAILY
Refills: 0 | Status: DISCONTINUED | OUTPATIENT
Start: 2023-08-03 | End: 2023-08-08

## 2023-08-03 RX ADMIN — GABAPENTIN 300 MILLIGRAM(S): 400 CAPSULE ORAL at 13:07

## 2023-08-03 RX ADMIN — Medication 10 MILLIGRAM(S): at 05:18

## 2023-08-03 RX ADMIN — Medication 1 TABLET(S): at 05:18

## 2023-08-03 RX ADMIN — Medication 100 MILLIEQUIVALENT(S): at 14:00

## 2023-08-03 RX ADMIN — Medication 1 TABLET(S): at 18:25

## 2023-08-03 RX ADMIN — AMLODIPINE BESYLATE 5 MILLIGRAM(S): 2.5 TABLET ORAL at 05:19

## 2023-08-03 RX ADMIN — Medication 1 TABLET(S): at 05:19

## 2023-08-03 RX ADMIN — PIPERACILLIN AND TAZOBACTAM 25 GRAM(S): 4; .5 INJECTION, POWDER, LYOPHILIZED, FOR SOLUTION INTRAVENOUS at 13:07

## 2023-08-03 RX ADMIN — SENNA PLUS 1 TABLET(S): 8.6 TABLET ORAL at 21:13

## 2023-08-03 RX ADMIN — Medication 10 MILLIGRAM(S): at 18:25

## 2023-08-03 RX ADMIN — PANTOPRAZOLE SODIUM 40 MILLIGRAM(S): 20 TABLET, DELAYED RELEASE ORAL at 11:11

## 2023-08-03 RX ADMIN — Medication 1 TABLET(S): at 18:46

## 2023-08-03 RX ADMIN — Medication 25 MILLIGRAM(S): at 18:25

## 2023-08-03 RX ADMIN — PIPERACILLIN AND TAZOBACTAM 25 GRAM(S): 4; .5 INJECTION, POWDER, LYOPHILIZED, FOR SOLUTION INTRAVENOUS at 21:14

## 2023-08-03 RX ADMIN — GABAPENTIN 300 MILLIGRAM(S): 400 CAPSULE ORAL at 21:34

## 2023-08-03 RX ADMIN — Medication 2000 UNIT(S): at 11:11

## 2023-08-03 RX ADMIN — Medication 100.4 MILLIGRAM(S): at 15:55

## 2023-08-03 RX ADMIN — Medication 100 MILLIEQUIVALENT(S): at 11:12

## 2023-08-03 RX ADMIN — HYDROXYUREA 500 MILLIGRAM(S): 500 CAPSULE ORAL at 18:25

## 2023-08-03 RX ADMIN — HYDROXYUREA 500 MILLIGRAM(S): 500 CAPSULE ORAL at 05:35

## 2023-08-03 RX ADMIN — PIPERACILLIN AND TAZOBACTAM 25 GRAM(S): 4; .5 INJECTION, POWDER, LYOPHILIZED, FOR SOLUTION INTRAVENOUS at 05:18

## 2023-08-03 RX ADMIN — Medication 650 MILLIGRAM(S): at 22:10

## 2023-08-03 RX ADMIN — ESCITALOPRAM OXALATE 10 MILLIGRAM(S): 10 TABLET, FILM COATED ORAL at 21:13

## 2023-08-03 RX ADMIN — Medication 1 MILLIGRAM(S): at 11:11

## 2023-08-03 RX ADMIN — GABAPENTIN 300 MILLIGRAM(S): 400 CAPSULE ORAL at 05:19

## 2023-08-03 RX ADMIN — Medication 100 MILLIEQUIVALENT(S): at 12:49

## 2023-08-03 RX ADMIN — Medication 650 MILLIGRAM(S): at 21:13

## 2023-08-03 RX ADMIN — Medication 25 MICROGRAM(S): at 21:26

## 2023-08-03 NOTE — DIETITIAN INITIAL EVALUATION ADULT - OTHER INFO
Patient is a "89-year-old female presents the emergency room with chief complaint of altered mental status.  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  Patient presents from assisted living with altered mental status concern for UTI."    Visited pt at bedside this am. Pt sleeping soundly during visit. Called out pt's name 3x, pt unarousable at this time. Spoke with RN, pt not consuming any meals. PO intakes 0-25% per nursing documentation. Total feed. NKFA. No chewing/swallowing difficulties noted. No N/V/D/C per chart review. No BMs thus far; bowel regimen rx. CBW on admission 130#. No edema noted. Per comprehensive chart review, 140# x 1 year ago, 134# x 1 month ago. Skin: stage I pressure ulcer to L heel. Pt currently on soft/bite size diet, no lactose. Receiving IVFs; D5+NaCl@50ml/hr. Oral nutritional supplement not appropriate at this time. Pt not eating. Recommend adding ensure clear BID for additional kcal/protein if/when appropriate. Honor food preferences as able to optimize po intake/tolerance.

## 2023-08-03 NOTE — SWALLOW BEDSIDE ASSESSMENT ADULT - SWALLOW EVAL: DIAGNOSIS
1- functional oral management given soft and bite sized solids, puree and thin liquid textures marked by adequate bolus collection, transfer and transport. 2-mild oral dysphagia given easy to chew solids marked by prolonged mastication resulting in delayed oral preparation/AP transit. oral clearance deficits noted marked by lingual residue post swallow which pt is unable to clear with a liquid wash. 3- mild pharyngeal dysphagia given all provided consistencies marked by delayed pharyngeal swallow trigger and reduced hyolaryngeal excursion without overt s/s of penetration/aspiration noted.

## 2023-08-03 NOTE — PROGRESS NOTE ADULT - SUBJECTIVE AND OBJECTIVE BOX
[facility]       HPI:  Patient is a 89-year-old female presents the emergency room with chief complaint of altered mental status.  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  Patient presents from assisted living with altered mental status concern for UTI.  Patient was last admitted to the hospital June 26 through June 30, 2023 was altered mental status x2 weeks.  Was ultimately diagnosed with acute metabolic encephalopathy secondary to UTI improved.  Patient stabilized and was ultimately discharged back to assisted living.  Presents today lethargic with urinary frequency and confusion. Son reports that there is also concern for possible cellulitis to the LLE at the site of a skin graft. Pt recently completed a course of po abx for this with no significant improvement. She has been c/o severe pain to the LLE for weeks. Seen by vascular consult , xrays obtained -found to have severe OA AND PVD ,pain management started Palliative care consult requested ,to discuss advance directives and complete MOLST  (02 Aug 2023 09:19)        SUBJECTIVE:      ALLERGIES:  Allergies    per son &quot;issues with certain anitibiotics&quot; does not remember the names (Unknown)    Intolerances          MEDICATIONS  (STANDING):  amLODIPine   Tablet 5 milliGRAM(s) Oral daily  baclofen 10 milliGRAM(s) Oral every 12 hours  cholecalciferol 2000 Unit(s) Oral daily  dextrose 5% + sodium chloride 0.45%. 1000 milliLiter(s) (50 mL/Hr) IV Continuous <Continuous>  escitalopram 10 milliGRAM(s) Oral at bedtime  folic acid 1 milliGRAM(s) Oral daily  gabapentin 300 milliGRAM(s) Oral three times a day  hydroxyurea 500 milliGRAM(s) Oral two times a day  lactobacillus acidophilus 1 Tablet(s) Oral every 12 hours  levothyroxine Injectable 25 MICROGram(s) IV Push at bedtime  pantoprazole  Injectable 40 milliGRAM(s) IV Push daily  piperacillin/tazobactam IVPB.. 3.375 Gram(s) IV Intermittent every 8 hours  senna 1 Tablet(s) Oral at bedtime  trimethoprim  160 mG/sulfamethoxazole 800 mG 1 Tablet(s) Oral two times a day    MEDICATIONS  (PRN):  acetaminophen  Suppository .. 650 milliGRAM(s) Rectal every 6 hours PRN Temp greater or equal to 38C (100.4F)  aluminum hydroxide/magnesium hydroxide/simethicone Suspension 30 milliLiter(s) Oral every 4 hours PRN Dyspepsia  ketorolac   Injectable 30 milliGRAM(s) IV Push every 6 hours PRN Moderate Pain (4 - 6)  LORazepam   Injectable 0.5 milliGRAM(s) IV Push every 8 hours PRN Agitation  melatonin 3 milliGRAM(s) Oral at bedtime PRN Insomnia  morphine  - Injectable 2 milliGRAM(s) IV Push every 4 hours PRN Severe Pain (7 - 10)  ondansetron Injectable 4 milliGRAM(s) IV Push every 8 hours PRN Nausea and/or Vomiting      REVIEW OF SYSTEMS:  CONSTITUTIONAL: No fever,  RESPIRATORY: No cough, wheezing, shortness of breath  CARDIOVASCULAR: No chest pain, dyspnea, palpitations, dizziness, syncope, paroxysmal nocturnal dyspnea, orthopnea, or arm or leg swelling  GASTROINTESTINAL: No abdominal  or epigastric pain, nausea, vomiting,  diarrhea  NEUROLOGICAL: No headaches,  loss of strength, numbness, or tremors    Vital Signs Last 24 Hrs  T(C): 36.7 (03 Aug 2023 05:06), Max: 36.9 (02 Aug 2023 12:29)  T(F): 98.1 (03 Aug 2023 05:06), Max: 98.5 (02 Aug 2023 12:29)  HR: 90 (03 Aug 2023 05:06) (90 - 100)  BP: 117/70 (03 Aug 2023 05:06) (117/70 - 159/70)  BP(mean): --  RR: 18 (03 Aug 2023 05:06) (18 - 20)  SpO2: 98% (03 Aug 2023 05:06) (94% - 98%)    Parameters below as of 03 Aug 2023 05:06  Patient On (Oxygen Delivery Method): room air        PHYSICAL EXAM:  HEAD:  Atraumatic, Normocephalic  NECK: Supple and normal thyroid.  No JVD or carotid bruit.   HEART: S1, S2 regular , 1/6 soft ejection systolic murmur in mitral area , no thrill and no gallops .  PULMONARY: Bilateral vesicular breathing , few scattered ronchi and few scattered rales are present .  ABDOMEN: Soft nontender and positive bowl sounds   EXTREMITIES:  No clubbing, cyanosis, or pedal  edema  NEUROLOGICAL: AAOX3 , no focal deficit .    LABS:                        8.6    3.53  )-----------( 321      ( 03 Aug 2023 06:08 )             27.9     08-01    143  |  107  |  27<H>  ----------------------------<  119<H>  4.2   |  29  |  0.85    Ca    8.8      01 Aug 2023 18:30    TPro  7.1  /  Alb  3.0<L>  /  TBili  0.5  /  DBili  x   /  AST  19  /  ALT  13  /  AlkPhos  79  08-01        PT/INR - ( 01 Aug 2023 18:30 )   PT: 41.3 sec;   INR: 3.67 ratio         PTT - ( 01 Aug 2023 18:30 )  PTT:56.4 sec  Urinalysis Basic - ( 01 Aug 2023 18:30 )    Color: x / Appearance: x / SG: x / pH: x  Gluc: 119 mg/dL / Ketone: x  / Bili: x / Urobili: x   Blood: x / Protein: x / Nitrite: x   Leuk Esterase: x / RBC: x / WBC x   Sq Epi: x / Non Sq Epi: x / Bacteria: x      BNP      EKG:  ECHO:  IMAGING:    Assessment/Plan    Will continue to follow during hospital course and give further recommendations as needed . Thanks for your referral . if any questions please contact me at office ([phone] [phone])  JIM ARELLANO MD [place] [id number]  [address]  OFFICE : [phone]  CELL : [phone]  CARDIOLOGY F/U :       HPI:  Patient is a 89-year-old female presents the emergency room with chief complaint of altered mental status.  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  Patient presents from assisted living with altered mental status concern for UTI.  Patient was last admitted to the hospital June 26 through June 30, 2023 was altered mental status x2 weeks.  Was ultimately diagnosed with acute metabolic encephalopathy secondary to UTI improved.  Patient stabilized and was ultimately discharged back to assisted living.  Presents today lethargic with urinary frequency and confusion. Son reports that there is also concern for possible cellulitis to the LLE at the site of a skin graft. Pt recently completed a course of po abx for this with no significant improvement. She has been c/o severe pain to the LLE for weeks. Seen by vascular consult , xrays obtained -found to have severe OA AND PVD ,pain management started Palliative care consult requested ,to discuss advance directives and complete MOLST  (02 Aug 2023 09:19)        SUBJECTIVE: Patient feeling better .      ALLERGIES:  Allergies    per son &quot;issues with certain anitibiotics&quot; does not remember the names (Unknown)    Intolerances          MEDICATIONS  (STANDING):  amLODIPine   Tablet 5 milliGRAM(s) Oral daily  baclofen 10 milliGRAM(s) Oral every 12 hours  cholecalciferol 2000 Unit(s) Oral daily  dextrose 5% + sodium chloride 0.45%. 1000 milliLiter(s) (50 mL/Hr) IV Continuous <Continuous>  escitalopram 10 milliGRAM(s) Oral at bedtime  folic acid 1 milliGRAM(s) Oral daily  gabapentin 300 milliGRAM(s) Oral three times a day  hydroxyurea 500 milliGRAM(s) Oral two times a day  lactobacillus acidophilus 1 Tablet(s) Oral every 12 hours  levothyroxine Injectable 25 MICROGram(s) IV Push at bedtime  pantoprazole  Injectable 40 milliGRAM(s) IV Push daily  piperacillin/tazobactam IVPB.. 3.375 Gram(s) IV Intermittent every 8 hours  senna 1 Tablet(s) Oral at bedtime  trimethoprim  160 mG/sulfamethoxazole 800 mG 1 Tablet(s) Oral two times a day    MEDICATIONS  (PRN):  acetaminophen  Suppository .. 650 milliGRAM(s) Rectal every 6 hours PRN Temp greater or equal to 38C (100.4F)  aluminum hydroxide/magnesium hydroxide/simethicone Suspension 30 milliLiter(s) Oral every 4 hours PRN Dyspepsia  ketorolac   Injectable 30 milliGRAM(s) IV Push every 6 hours PRN Moderate Pain (4 - 6)  LORazepam   Injectable 0.5 milliGRAM(s) IV Push every 8 hours PRN Agitation  melatonin 3 milliGRAM(s) Oral at bedtime PRN Insomnia  morphine  - Injectable 2 milliGRAM(s) IV Push every 4 hours PRN Severe Pain (7 - 10)  ondansetron Injectable 4 milliGRAM(s) IV Push every 8 hours PRN Nausea and/or Vomiting      REVIEW OF SYSTEMS:  CONSTITUTIONAL: No fever,  RESPIRATORY: No cough, wheezing, shortness of breath  CARDIOVASCULAR: No chest pain, dyspnea, palpitations, dizziness, syncope, paroxysmal nocturnal dyspnea, orthopnea, or arm or leg swelling  GASTROINTESTINAL: No abdominal  or epigastric pain, nausea, vomiting,  diarrhea  NEUROLOGICAL: No headaches, numbness, or tremors . Mild right sided weakness present .  Skin : No itching .  Hematology : No active bleeding .  Endocrinology : No heat and cold intolerance .  Psychiatry : Patient is mild confused .  Genitourinary : No dysuria   Musculoskeletal : Patient has mild arthritis .    Vital Signs Last 24 Hrs  T(C): 36.7 (03 Aug 2023 05:06), Max: 36.9 (02 Aug 2023 12:29)  T(F): 98.1 (03 Aug 2023 05:06), Max: 98.5 (02 Aug 2023 12:29)  HR: 90 (03 Aug 2023 05:06) (90 - 100)  BP: 117/70 (03 Aug 2023 05:06) (117/70 - 159/70)  BP(mean): --  RR: 18 (03 Aug 2023 05:06) (18 - 20)  SpO2: 98% (03 Aug 2023 05:06) (94% - 98%)    Parameters below as of 03 Aug 2023 05:06  Patient On (Oxygen Delivery Method): room air        PHYSICAL EXAM:  HEAD:  Atraumatic, Normocephalic  NECK: Supple and normal thyroid.  No JVD or carotid bruit.   HEART: S1, S2 regular , 1/6 soft ejection systolic murmur in mitral area , no thrill and no gallops .  PULMONARY: Bilateral vesicular breathing , few scattered rhonchi and few scattered rales are present .  ABDOMEN: Soft nontender and positive bowl sounds   EXTREMITIES:  No clubbing, cyanosis, or pedal  edema  NEUROLOGICAL: AA and mild confused and right sided mild  weakness present   Skin : No rashes .  Musculoskeletal+++++ : No joint swellings .    LABS:                        8.6    3.53  )-----------( 321      ( 03 Aug 2023 06:08 )             27.9     08-01    143  |  107  |  27<H>  ----------------------------<  119<H>  4.2   |  29  |  0.85    Ca    8.8      01 Aug 2023 18:30    TPro  7.1  /  Alb  3.0<L>  /  TBili  0.5  /  DBili  x   /  AST  19  /  ALT  13  /  AlkPhos  79  08-01        PT/INR - ( 01 Aug 2023 18:30 )   PT: 41.3 sec;   INR: 3.67 ratio         PTT - ( 01 Aug 2023 18:30 )  PTT:56.4 sec  Urinalysis Basic - ( 01 Aug 2023 18:30 )    Color: x / Appearance: x / SG: x / pH: x  Gluc: 119 mg/dL / Ketone: x  / Bili: x / Urobili: x   Blood: x / Protein: x / Nitrite: x   Leuk Esterase: x / RBC: x / WBC x   Sq Epi: x / Non Sq Epi: x / Bacteria: x      Assessment/Plan  Patient has :  1) Left lower extremity cellulitis .  2) UTI  3) Paroxysmal atrial fibrillation and stable .  4)  H/O hypertension and stable .  5) H/O CAD   6) Coagulopathy and no active bleeding   7) Anemia .  Plan : 1) I/V antibiotics as per ID 2) Monitor hemoglobin and electrolytes 3) Monitor PT and INR 4) Rest of medications as such . 5) Prognosis guarded .  Will continue to follow during hospital course and give further recommendations as needed . Thanks for your referral . if any questions please contact me at office ([phone] cell [phone])

## 2023-08-03 NOTE — SWALLOW BEDSIDE ASSESSMENT ADULT - COMMENTS
Pt was alert, cooperative and positioned upright in bed for a clinical assessment of swallow function this PM. Per charting, pt is a "89-year-old female presents the emergency room with chief complaint of altered mental status.  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 "    Recent chest CT revealed "No acute findings in the chest, abdomen, or pelvis. Moderate hiatal hernia."     Recent head CT revealed "Limited exam. No definite evidence of acute intracranial trauma."    At the time of today's assessment, pt presents with supplemental oxygen in place via nasal cannula. She demonstrates periods of word finding difficulties as well as intermittent periods of imprecise contact of the labio-lingual articulators which at times, negatively impacts overall expressive output. Pt is able to answer y/n and 'wh' questions as well as follow verbal directives presented by clinician. Upon clinician questioning, the pt is unable to state if this is consistent with baseline and/or of recent onset. Pt was alert, cooperative and positioned upright in bed for a clinical assessment of swallow function this PM. Per charting, pt is a "89-year-old female presents the emergency room with chief complaint of altered mental status.  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 "    Recent chest CT revealed "No acute findings in the chest, abdomen, or pelvis. Moderate hiatal hernia."     Recent head CT revealed "Limited exam. No definite evidence of acute intracranial trauma."    Further chart review revealed pt is familiar to this service from clinical swallow evaluations completed during prior admissions as well as outpt MBS (please see reports for details).   At the time of today's assessment, pt presents with supplemental oxygen in place via nasal cannula. She demonstrates periods of word finding difficulties as well as intermittent periods of imprecise contact of the labio-lingual articulators which at times, negatively impacts overall expressive output. Pt is able to answer y/n and 'wh' questions as well as follow verbal directives presented by clinician. Upon clinician questioning, the pt is unable to state if this is consistent with baseline and/or of recent onset.

## 2023-08-03 NOTE — PROGRESS NOTE ADULT - SUBJECTIVE AND OBJECTIVE BOX
PROGRESS NOTE   Patient is a 89y old  Female who presents with a chief complaint of AMS (03 Aug 2023 07:33)      HPI:  Patient is a 89-year-old female presents the emergency room with chief complaint of altered mental status.  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  Patient presents from assisted living with altered mental status concern for UTI.  Patient was last admitted to the hospital June 26 through June 30, 2023 was altered mental status x2 weeks.  Was ultimately diagnosed with acute metabolic encephalopathy secondary to UTI improved.  Patient stabilized and was ultimately discharged back to assisted living.  Presents today lethargic with urinary frequency and confusion. Son reports that there is also concern for possible cellulitis to the LLE at the site of a skin graft. Pt recently completed a course of po abx for this with no significant improvement. She has been c/o severe pain to the LLE for weeks. Seen by vascular consult , xrays obtained -found to have severe OA AND PVD ,pain management started Palliative care consult requested ,to discuss advance directives and complete MOLST  (02 Aug 2023 09:19)      Vital Signs Last 24 Hrs  T(C): 36.7 (03 Aug 2023 05:06), Max: 36.9 (02 Aug 2023 12:29)  T(F): 98.1 (03 Aug 2023 05:06), Max: 98.5 (02 Aug 2023 12:29)  HR: 90 (03 Aug 2023 05:06) (90 - 100)  BP: 117/70 (03 Aug 2023 05:06) (117/70 - 159/70)  BP(mean): --  RR: 18 (03 Aug 2023 05:06) (18 - 20)  SpO2: 98% (03 Aug 2023 05:06) (94% - 98%)    Parameters below as of 03 Aug 2023 05:06  Patient On (Oxygen Delivery Method): room air                              8.6    3.53  )-----------( 321      ( 03 Aug 2023 06:08 )             27.9               08-03    144  |  110<H>  |  11  ----------------------------<  99  3.4<L>   |  31  |  0.63    Ca    7.8<L>      03 Aug 2023 06:08    TPro  4.9<L>  /  Alb  2.0<L>  /  TBili  0.5  /  DBili  x   /  AST  16  /  ALT  12  /  AlkPhos  62  08-03      PHYSICAL EXAM  GEN: REJI BERGER is a pleasant well-nourished, well developed 89y Female in no acute distress  LE Focused:  Vasular: DP/PT non palpable B/L, Capillary fill time >3 seconds B/L  Neuro: Protective sensation intact B/L  Msk: unable to perform  Derm: There is an anterior superficial lower left leg venous ulcer that has scabbed over, with noted minimal edema, mild erythema, cellulitis, minimal drainage, no mal odor, no purulence, xerosis.

## 2023-08-03 NOTE — DIETITIAN INITIAL EVALUATION ADULT - ORAL INTAKE PTA/DIET HISTORY
Pt admitted from residential. Reviewed transfer documents, pt was on a ROZ, regular texture diet with thin liquids PTA.

## 2023-08-03 NOTE — PROGRESS NOTE ADULT - PROBLEM SELECTOR PLAN 1
Patient seen and evaluated  Patient's wound was dressed with adaptic, 4x4 gauze, myra, tape  Ulcer has scabbed over  Rec cont abx per id  Will cont to follow in house and monitor for cellulitis

## 2023-08-03 NOTE — PROGRESS NOTE ADULT - ASSESSMENT
89-year-old female presents the emergency room with chief complaint of altered mental status.  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     ams  op  oa  ataxic gait  cva hx  AF  HLD  HTN  CAD  PVD  STSI    on ABX  on IVF  podiatry and ID eval noted  pt is full code - son HCP

## 2023-08-03 NOTE — DIETITIAN INITIAL EVALUATION ADULT - ADD RECOMMEND
1) Continue current diet as tolerated; consider SLP evaluation  2) Recommend ensure clear BID (if/when medically appropriate, pt currently not eating/drinking)  3) Recommend MVI daily  4) Monitor po intake, diet tolerance, weight trends, labs, GI function, skin integrity

## 2023-08-03 NOTE — CONSULT NOTE ADULT - SUBJECTIVE AND OBJECTIVE BOX
Physical Medicine and Rehabilitation Initial Evaluation    Patients acute care records reviewed and are summarized as follows:     Patient is a 89y Female with past medical history including     Radiological studies reviewed, including    Medical studies/laboratory studies reviewed, includin-03    144  |  110<H>  |  11  ----------------------------<  99  3.4<L>   |  31  |  0.63    Ca    7.8<L>      03 Aug 2023 06:08    TPro  4.9<L>  /  Alb  2.0<L>  /  TBili  0.5  /  DBili  x   /  AST  16  /  ALT  12  /  AlkPhos  62                          10.8   x     )-----------( x        ( 03 Aug 2023 14:00 )             35.3     The patient was seen and examined at bedside. Complains of lower extremity pain, but not a reliable historian. Remains confused, agitated.    ROS:  Constitutional: Denies fevers or chills  MSK: LE pain    Medications: revised regimen --   acetaminophen     Tablet .. 650 milliGRAM(s) Oral every 8 hours  acetaminophen  Suppository .. 650 milliGRAM(s) Rectal every 6 hours PRN  aluminum hydroxide/magnesium hydroxide/simethicone Suspension 30 milliLiter(s) Oral every 4 hours PRN  amLODIPine   Tablet 5 milliGRAM(s) Oral daily  cholecalciferol 2000 Unit(s) Oral daily  dextrose 5% + sodium chloride 0.45%. 1000 milliLiter(s) IV Continuous <Continuous>  escitalopram 10 milliGRAM(s) Oral at bedtime  folic acid 1 milliGRAM(s) Oral daily  gabapentin 300 milliGRAM(s) Oral three times a day  hydroxyurea 500 milliGRAM(s) Oral two times a day  ketorolac   Injectable 30 milliGRAM(s) IV Push every 6 hours PRN  lactobacillus acidophilus 1 Tablet(s) Oral every 12 hours  levothyroxine Injectable 25 MICROGram(s) IV Push at bedtime  lidocaine   4% Patch 2 Patch Transdermal daily  LORazepam   Injectable 0.5 milliGRAM(s) IV Push every 8 hours PRN  melatonin 3 milliGRAM(s) Oral at bedtime PRN  metoprolol tartrate 25 milliGRAM(s) Oral two times a day  morphine  - Injectable 2 milliGRAM(s) IV Push every 4 hours PRN  ondansetron Injectable 4 milliGRAM(s) IV Push every 8 hours PRN  pantoprazole  Injectable 40 milliGRAM(s) IV Push daily  piperacillin/tazobactam IVPB.. 3.375 Gram(s) IV Intermittent every 8 hours  senna 1 Tablet(s) Oral at bedtime  trimethoprim  160 mG/sulfamethoxazole 800 mG 1 Tablet(s) Oral two times a day      Physical Exam:   Vitals: T(C): 36.8 (23 @ 12:13), Max: 36.8 (23 @ 12:13)  HR: 83 (23 @ 12:13) (83 - 100)  BP: 120/72 (23 @ 12:13) (117/70 - 147/82)  RR: 17 (23 @ 12:13) (17 - 18)  SpO2: 96% (23 @ 12:13) (94% - 98%)    Constitutional: Gen: In no acute distress, not fully cooperative with exam and questioning due to AMS/agitation/confusion  Neuro: Sensation intact to light touch in peripheral upper and lower extremities  MSK: LE wound covered with bandage no drainage or foul smell, tenderness to palpation lower extremity musculature, no tenderness to bony prominences about the knee, greater trochs, malleoli, 5th base metatarsals  Psychiatric: Awake, oriented only to self

## 2023-08-03 NOTE — PROGRESS NOTE ADULT - SUBJECTIVE AND OBJECTIVE BOX
PROGRESS NOTE  Patient is a 89y old  Female who presents with a chief complaint of Altered mental status     (03 Aug 2023 13:04)    Chart and available morning labs /imaging are reviewed electronically , urgent issues addressed . More information  is being added upon completion of rounds , when more information is collected and management discussed with consultants , medical staff and social service/case management on the floor   OVERNIGHT  No new issues reported by medical staff . All above noted Patient is resting in a bed comfortably .Confused ,poor mentation .No distress noted     HPI:  Patient is a 89-year-old female presents the emergency room with chief complaint of altered mental status.  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  Patient presents from assisted living with altered mental status concern for UTI.  Patient was last admitted to the hospital June 26 through June 30, 2023 was altered mental status x2 weeks.  Was ultimately diagnosed with acute metabolic encephalopathy secondary to UTI improved.  Patient stabilized and was ultimately discharged back to assisted living.  Presents today lethargic with urinary frequency and confusion. Son reports that there is also concern for possible cellulitis to the LLE at the site of a skin graft. Pt recently completed a course of po abx for this with no significant improvement. She has been c/o severe pain to the LLE for weeks. Seen by vascular consult , xrays obtained -found to have severe OA AND PVD ,pain management started Palliative care consult requested ,to discuss advance directives and complete MOLST  (02 Aug 2023 09:19)    PAST MEDICAL & SURGICAL HISTORY:  Hypertension      CVA (cerebral vascular accident)      Depression      Constipation      Neuropathy      Hyperlipidemia      Grade I diastolic dysfunction      Afib      Neuropathy      VHD (valvular heart disease)      Aortic valvar stenosis      No significant past surgical history          MEDICATIONS  (STANDING):  amLODIPine   Tablet 5 milliGRAM(s) Oral daily  baclofen 10 milliGRAM(s) Oral every 12 hours  cholecalciferol 2000 Unit(s) Oral daily  dextrose 5% + sodium chloride 0.45%. 1000 milliLiter(s) (50 mL/Hr) IV Continuous <Continuous>  escitalopram 10 milliGRAM(s) Oral at bedtime  folic acid 1 milliGRAM(s) Oral daily  gabapentin 300 milliGRAM(s) Oral three times a day  hydroxyurea 500 milliGRAM(s) Oral two times a day  lactobacillus acidophilus 1 Tablet(s) Oral every 12 hours  levothyroxine Injectable 25 MICROGram(s) IV Push at bedtime  metoprolol tartrate 25 milliGRAM(s) Oral two times a day  pantoprazole  Injectable 40 milliGRAM(s) IV Push daily  piperacillin/tazobactam IVPB.. 3.375 Gram(s) IV Intermittent every 8 hours  potassium chloride  10 mEq/100 mL IVPB 10 milliEquivalent(s) IV Intermittent every 1 hour  senna 1 Tablet(s) Oral at bedtime  trimethoprim  160 mG/sulfamethoxazole 800 mG 1 Tablet(s) Oral two times a day    MEDICATIONS  (PRN):  acetaminophen  Suppository .. 650 milliGRAM(s) Rectal every 6 hours PRN Temp greater or equal to 38C (100.4F)  aluminum hydroxide/magnesium hydroxide/simethicone Suspension 30 milliLiter(s) Oral every 4 hours PRN Dyspepsia  ketorolac   Injectable 30 milliGRAM(s) IV Push every 6 hours PRN Moderate Pain (4 - 6)  LORazepam   Injectable 0.5 milliGRAM(s) IV Push every 8 hours PRN Agitation  melatonin 3 milliGRAM(s) Oral at bedtime PRN Insomnia  morphine  - Injectable 2 milliGRAM(s) IV Push every 4 hours PRN Severe Pain (7 - 10)  ondansetron Injectable 4 milliGRAM(s) IV Push every 8 hours PRN Nausea and/or Vomiting      OBJECTIVE    T(C): 36.8 (08-03-23 @ 12:13), Max: 36.8 (08-03-23 @ 12:13)  HR: 83 (08-03-23 @ 12:13) (83 - 100)  BP: 120/72 (08-03-23 @ 12:13) (117/70 - 147/82)  RR: 17 (08-03-23 @ 12:13) (17 - 18)  SpO2: 96% (08-03-23 @ 12:13) (94% - 98%)  Wt(kg): --  I&O's Summary        REVIEW OF SYSTEMS:  Patient is  unable to provide any information/ROS  due to baseline mental status.   PHYSICAL EXAM:  Appearance: NAD. VS past 24 hrs -as above   HEENT:   Moist oral mucosa. Conjunctiva clear b/l.   Neck : supple  Respiratory: Lungs CTAB.  Gastrointestinal:  Soft, nontender. No rebound. No rigidity. BS present	  Cardiovascular: RRR ,S1S2 present  Neurologic: Non-focal. Moving all extremities.  Extremities: No edema. No erythema. No calf tenderness.  Skin: No rashes, No ecchymoses, No cyanosis.	  wounds ,skin lesions-See skin assesment flow sheet   LABS:                        8.6    3.53  )-----------( 321      ( 03 Aug 2023 06:08 )             27.9     08-03    144  |  110<H>  |  11  ----------------------------<  99  3.4<L>   |  31  |  0.63    Ca    7.8<L>      03 Aug 2023 06:08    TPro  4.9<L>  /  Alb  2.0<L>  /  TBili  0.5  /  DBili  x   /  AST  16  /  ALT  12  /  AlkPhos  62  08-03    CAPILLARY BLOOD GLUCOSE        PT/INR - ( 03 Aug 2023 06:08 )   PT: 58.9 sec;   INR: 5.28 ratio         PTT - ( 01 Aug 2023 18:30 )  PTT:56.4 sec  Urinalysis Basic - ( 03 Aug 2023 06:08 )    Color: x / Appearance: x / SG: x / pH: x  Gluc: 99 mg/dL / Ketone: x  / Bili: x / Urobili: x   Blood: x / Protein: x / Nitrite: x   Leuk Esterase: x / RBC: x / WBC x   Sq Epi: x / Non Sq Epi: x / Bacteria: x        Culture - Blood (collected 01 Aug 2023 18:38)  Source: .Blood Blood-Venous  Preliminary Report (03 Aug 2023 02:02):    No growth at 24 hours    Culture - Blood (collected 01 Aug 2023 18:30)  Source: .Blood Blood-Venous  Preliminary Report (03 Aug 2023 02:02):    No growth at 24 hours      RADIOLOGY & ADDITIONAL TESTS:< from: [facility] Physiol Extremity Lower 3+ Level, BI (08.02.23 @ 16:39) >  ACC: [id number] EXAM:  PHYSIOL EXTREM LOW 3+ LEV BI   ORDERED BY: BESSY AVITIA     PROCEDURE DATE:  08/02/2023          INTERPRETATION:  Clinical Information: Peripheral vascular disease. Left   leg wounds    Technique: Bilateral lower extremity ABIs/PVR    Comparison: Arterial Doppler 8/1/2023    Findings/  Impression:  Right lower extremity: The ankle brachial index is 1.2. TBI is 1.00 with   digital pressures of 138 mmHg. The pulse waveforms are normal.    Left lower extremity: The anklebrachial index is unobtainable. TBI is   0.75 with digital pressures of 103 mmHg. The pulse waveforms are   diffusely diminished in amplitude.    Limited evaluation of the segmental blood pressures secondary to   noncompressibility and patient intolerance.  Findings in the left leg compatible with severe stenosis detected on   recent Doppler    --- End of Report ---      < end of copied text >  < from: CT Tibia/Fibia No Cont, Left (08.02.23 @ 03:18) >  ACC: [id number] EXAM:  CT TIB FIB ONLY LT   ORDERED BY: YAHIR GALLAGHER     PROCEDURE DATE:  08/02/2023          INTERPRETATION:  CT TIBIA FIBULA LEFT dated 8/2/2023 3:18 AM    INDICATION: Pain    COMPARISON: None available.    TECHNIQUE: CT imaging of the left tibia and fibula was performed. The   data was reformatted in the axial, coronal, and sagittal planes.   Additionally, 3-D reformatted imaging was created.    FINDINGS: Motion degraded study.    OSSEOUS STRUCTURES: There is marked narrowing of the knee joint spaces   with chondrocalcinosis and marginal productive changes noted. Narrowing   at the talonavicular and middle subtalar joint spaces with sclerosis.  SYNOVIUM/ JOINT FLUID: No knee joint effusion. No popliteal cyst. No   ankle joint effusion  TENDONS: The tendons are preserved. No large tenosynovitis.  MUSCLES: Mild to moderate atrophy of the calf musculature. No   intramuscular hematoma.  NEUROVASCULAR STRUCTURES: Severe vascular calcifications are present  SUBCUTANEOUSSOFT TISSUES: There is soft tissue swelling about the ankle.   This is most prominent over the lateral ankle. There is mild extension of   edema into the dorsum of the hindfoot.    3-D reformatted imaging confirms these findings.    IMPRESSION:    Severe osteoarthritis of the knee.  Moderate osteoarthritis at the talonavicular and middle subtalar joints.  No displaced fracture though limited by motion artifacts    < end of copied text >     reviewed elctronically  ASSESSMENT/PLAN: 	    25 minutes aggregate time was spent on this visit, 50% visit time spent in care co-ordination with other attendings and counselling patient .I have discussed care plan with patient / HCP/family member ,who expressed understanding of problems treatment and their effect and side effects, alternatives in details. I have asked if they have any questions and concerns and appropriately addressed them to best of my ability.

## 2023-08-03 NOTE — SWALLOW BEDSIDE ASSESSMENT ADULT - ADDITIONAL RECOMMENDATIONS
This dept to continue to f/u as schedule permits to ensure diet tolerance and trial swallow therapy. Consider formal speech-language evaluation given aforementioned communicative deficits observed and reported by pt. MD further advised to reconsult this service should concern re: diet management and/or change in status arise.

## 2023-08-03 NOTE — PROGRESS NOTE ADULT - SUBJECTIVE AND OBJECTIVE BOX
Date/Time Patient Seen:  		  Referring MD:   Data Reviewed	       Patient is a 89y old  Female who presents with a chief complaint of AMS (02 Aug 2023 19:38)      Subjective/HPI     PAST MEDICAL & SURGICAL HISTORY:  Hypertension    CVA (cerebral vascular accident)    Depression    Constipation    Neuropathy    Constipation    Hyperlipidemia    Grade I diastolic dysfunction    Afib    Neuropathy    VHD (valvular heart disease)    Aortic valvar stenosis    No significant past surgical history          Medication list         MEDICATIONS  (STANDING):  amLODIPine   Tablet 5 milliGRAM(s) Oral daily  baclofen 10 milliGRAM(s) Oral every 12 hours  cholecalciferol 2000 Unit(s) Oral daily  dextrose 5% + sodium chloride 0.45%. 1000 milliLiter(s) (50 mL/Hr) IV Continuous <Continuous>  escitalopram 10 milliGRAM(s) Oral at bedtime  folic acid 1 milliGRAM(s) Oral daily  gabapentin 300 milliGRAM(s) Oral three times a day  hydroxyurea 500 milliGRAM(s) Oral two times a day  lactobacillus acidophilus 1 Tablet(s) Oral every 12 hours  levothyroxine Injectable 25 MICROGram(s) IV Push at bedtime  pantoprazole  Injectable 40 milliGRAM(s) IV Push daily  piperacillin/tazobactam IVPB.. 3.375 Gram(s) IV Intermittent every 8 hours  senna 1 Tablet(s) Oral at bedtime  trimethoprim  160 mG/sulfamethoxazole 800 mG 1 Tablet(s) Oral two times a day    MEDICATIONS  (PRN):  acetaminophen  Suppository .. 650 milliGRAM(s) Rectal every 6 hours PRN Temp greater or equal to 38C (100.4F)  aluminum hydroxide/magnesium hydroxide/simethicone Suspension 30 milliLiter(s) Oral every 4 hours PRN Dyspepsia  ketorolac   Injectable 30 milliGRAM(s) IV Push every 6 hours PRN Moderate Pain (4 - 6)  LORazepam   Injectable 0.5 milliGRAM(s) IV Push every 8 hours PRN Agitation  melatonin 3 milliGRAM(s) Oral at bedtime PRN Insomnia  morphine  - Injectable 2 milliGRAM(s) IV Push every 4 hours PRN Severe Pain (7 - 10)  ondansetron Injectable 4 milliGRAM(s) IV Push every 8 hours PRN Nausea and/or Vomiting         Vitals log        ICU Vital Signs Last 24 Hrs  T(C): 36.7 (03 Aug 2023 05:06), Max: 36.9 (02 Aug 2023 12:29)  T(F): 98.1 (03 Aug 2023 05:06), Max: 98.5 (02 Aug 2023 12:29)  HR: 90 (03 Aug 2023 05:06) (90 - 100)  BP: 117/70 (03 Aug 2023 05:06) (117/70 - 159/70)  BP(mean): --  ABP: --  ABP(mean): --  RR: 18 (03 Aug 2023 05:06) (18 - 20)  SpO2: 98% (03 Aug 2023 05:06) (94% - 98%)    O2 Parameters below as of 03 Aug 2023 05:06  Patient On (Oxygen Delivery Method): room air                 Input and Output:  I&O's Detail      Lab Data                        11.2   3.78  )-----------( 484      ( 01 Aug 2023 18:30 )             35.6     08-01    143  |  107  |  27<H>  ----------------------------<  119<H>  4.2   |  29  |  0.85    Ca    8.8      01 Aug 2023 18:30    TPro  7.1  /  Alb  3.0<L>  /  TBili  0.5  /  DBili  x   /  AST  19  /  ALT  13  /  AlkPhos  79  08-01            Review of Systems	      Objective     Physical Examination    heart s1s2  lung dc bS  head nc      Pertinent Lab findings & Imaging      Summer:  NO   Adequate UO     I&O's Detail           Discussed with:     Cultures:	        Radiology

## 2023-08-03 NOTE — PROGRESS NOTE ADULT - ASSESSMENT
Patient is a 89-year-old female presents the emergency room with chief complaint of altered mental status.  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  Patient presents from assisted living with altered mental status concern for UTI.  Patient was last admitted to the hospital June 26 through June 30, 2023 was altered mental status x2 weeks.  Was ultimately diagnosed with acute metabolic encephalopathy secondary to UTI improved.  Patient stabilized and was ultimately discharged back to assisted living.  Presents today lethargic with urinary frequency and confusion. Son reports that there is also concern for possible cellulitis to the LLE at the site of a skin graft. Pt recently completed a course of po abx for this with no significant improvement. She has been c/o severe pain to the LLE for weeks.She has been c/o severe pain to the LLE for weeks. Seen by vascular consult , xrays obtained -found to have severe OA AND PVD ,pain management started Palliative care consult requested ,to discuss advance directives and complete MOLST

## 2023-08-03 NOTE — PROGRESS NOTE ADULT - SUBJECTIVE AND OBJECTIVE BOX
Neurology follow up note    REJI [id number]      Interval History:    Patient feels ok no new complaints.    Allergies    per son &quot;issues with certain anitibiotics&quot; does not remember the names (Unknown)    Intolerances        MEDICATIONS    acetaminophen  Suppository .. 650 milliGRAM(s) Rectal every 6 hours PRN  aluminum hydroxide/magnesium hydroxide/simethicone Suspension 30 milliLiter(s) Oral every 4 hours PRN  amLODIPine   Tablet 5 milliGRAM(s) Oral daily  baclofen 10 milliGRAM(s) Oral every 12 hours  cholecalciferol 2000 Unit(s) Oral daily  dextrose 5% + sodium chloride 0.45%. 1000 milliLiter(s) IV Continuous <Continuous>  escitalopram 10 milliGRAM(s) Oral at bedtime  folic acid 1 milliGRAM(s) Oral daily  gabapentin 300 milliGRAM(s) Oral three times a day  hydroxyurea 500 milliGRAM(s) Oral two times a day  ketorolac   Injectable 30 milliGRAM(s) IV Push every 6 hours PRN  lactobacillus acidophilus 1 Tablet(s) Oral every 12 hours  levothyroxine Injectable 25 MICROGram(s) IV Push at bedtime  LORazepam   Injectable 0.5 milliGRAM(s) IV Push every 8 hours PRN  melatonin 3 milliGRAM(s) Oral at bedtime PRN  morphine  - Injectable 2 milliGRAM(s) IV Push every 4 hours PRN  ondansetron Injectable 4 milliGRAM(s) IV Push every 8 hours PRN  pantoprazole  Injectable 40 milliGRAM(s) IV Push daily  piperacillin/tazobactam IVPB.. 3.375 Gram(s) IV Intermittent every 8 hours  senna 1 Tablet(s) Oral at bedtime  trimethoprim  160 mG/sulfamethoxazole 800 mG 1 Tablet(s) Oral two times a day              Vital Signs Last 24 Hrs  T(C): 36.7 (03 Aug 2023 05:06), Max: 36.9 (02 Aug 2023 12:29)  T(F): 98.1 (03 Aug 2023 05:06), Max: 98.5 (02 Aug 2023 12:29)  HR: 90 (03 Aug 2023 05:06) (90 - 100)  BP: 117/70 (03 Aug 2023 05:06) (117/70 - 159/70)  BP(mean): --  RR: 18 (03 Aug 2023 05:06) (18 - 20)  SpO2: 98% (03 Aug 2023 05:06) (94% - 98%)    Parameters below as of 03 Aug 2023 05:06  Patient On (Oxygen Delivery Method): room air      REVIEW OF SYSTEMS:  Could not be obtained from the patient secondary to the patient would just scream when touched but does have a history of underlying baseline dementia, but is able to hold a conversation, just forgetful, able to talk.    PHYSICAL EXAMINATION:   HEENT:  Head:  Normocephalic and atraumatic.  Eyes:  No scleral icterus.  Ears:  Hearing hard to evaluate secondary to the patient would just scream, but as per my conversation with son, intact.  NECK:  Supple.  RESPIRATORY:  Air entry bilaterally.  CARDIOVASCULAR:  S1 and S2 heard.  ABDOMEN:  Soft and nontender.  EXTREMITIES:  No clubbing was noted.      NEUROLOGIC:  The patient is was resting in bed.  The patient will keep her eyes closed, would not verbalize.  Upon touching the patient, she would scream.  Motor examination is completely limited.  From previous examinations, the patient motor, right upper and right lower 0/5, left upper was 4/5, left lower was 3/5.      LABS:  CBC Full  -  ( 03 Aug 2023 06:08 )  WBC Count : 3.53 K/uL  RBC Count : 2.31 M/uL  Hemoglobin : 8.6 g/dL  Hematocrit : 27.9 %  Platelet Count - Automated : 321 K/uL  Mean Cell Volume : 120.8 fl  Mean Cell Hemoglobin : 37.2 pg  Mean Cell Hemoglobin Concentration : 30.8 gm/dL  Auto Neutrophil # : 2.41 K/uL  Auto Lymphocyte # : 0.68 K/uL  Auto Monocyte # : 0.31 K/uL  Auto Eosinophil # : 0.06 K/uL  Auto Basophil # : 0.01 K/uL  Auto Neutrophil % : 68.2 %  Auto Lymphocyte % : 19.3 %  Auto Monocyte % : 8.8 %  Auto Eosinophil % : 1.7 %  Auto Basophil % : 0.3 %    Urinalysis Basic - ( 03 Aug 2023 06:08 )    Color: x / Appearance: x / SG: x / pH: x  Gluc: 99 mg/dL / Ketone: x  / Bili: x / Urobili: x   Blood: x / Protein: x / Nitrite: x   Leuk Esterase: x / RBC: x / WBC x   Sq Epi: x / Non Sq Epi: x / Bacteria: x      08-03    144  |  110<H>  |  11  ----------------------------<  99  3.4<L>   |  31  |  0.63    Ca    7.8<L>      03 Aug 2023 06:08    TPro  4.9<L>  /  Alb  2.0<L>  /  TBili  0.5  /  DBili  x   /  AST  16  /  ALT  12  /  AlkPhos  62  08-03    Hemoglobin A1C:     LIVER FUNCTIONS - ( 03 Aug 2023 06:08 )  Alb: 2.0 g/dL / Pro: 4.9 g/dL / ALK PHOS: 62 U/L / ALT: 12 U/L / AST: 16 U/L / GGT: x           Vitamin B12   PT/INR - ( 03 Aug 2023 06:08 )   PT: 58.9 sec;   INR: 5.28 ratio         PTT - ( 01 Aug 2023 18:30 )  PTT:56.4 sec      RADIOLOGY    ANALYSIS AND PLAN:  This is a 89-year-old with altered mental status and pain.  For episode of altered mental status, suspect most likely secondary to dementia becoming more prominent in the hospital setting, also suspect that as per my conversation with son, when the patient does start to have increasing pain, she does become a little bit more disoriented and she refuses her pain medications which worsens her cognitive and expressive abilities.  Suspect even though examination is severely limited, this is secondary to new cerebrovascular accident.  For history of hypothyroidism, continue the patient on Synthroid.  For history of hypertension, monitor systolic blood pressure.  For history of cerebrovascular accident with atrial fibrillation, continue anticoagulation with goal INR between 2 and 3.  For neuropathy, peripheral vascular disease, continue the patient on her baclofen, gabapentin, and tramadol.  As per my conversation with son, that combination appears to keep her pain at acceptable levels, but we will also get Pain Management to see if any other recommendations can be given.  For history of hyperlipidemia, continue the patient on her statin.  For mild cognitive impairment, supportive therapy.    Spoke with son, Curtis, at [phone].  Greater than 45 minutes of time was spent with the patient, plan of care, reviewing data, with greater than 50% of the visit was spent counseling and/or coordinating care with multidisciplinary healthcare team   Neurology follow up note    REJI [id number]      Interval History:    Patient resting in bed seen with AID     Allergies    per son &quot;issues with certain anitibiotics&quot; does not remember the names (Unknown)    Intolerances        MEDICATIONS    acetaminophen  Suppository .. 650 milliGRAM(s) Rectal every 6 hours PRN  aluminum hydroxide/magnesium hydroxide/simethicone Suspension 30 milliLiter(s) Oral every 4 hours PRN  amLODIPine   Tablet 5 milliGRAM(s) Oral daily  baclofen 10 milliGRAM(s) Oral every 12 hours  cholecalciferol 2000 Unit(s) Oral daily  dextrose 5% + sodium chloride 0.45%. 1000 milliLiter(s) IV Continuous <Continuous>  escitalopram 10 milliGRAM(s) Oral at bedtime  folic acid 1 milliGRAM(s) Oral daily  gabapentin 300 milliGRAM(s) Oral three times a day  hydroxyurea 500 milliGRAM(s) Oral two times a day  ketorolac   Injectable 30 milliGRAM(s) IV Push every 6 hours PRN  lactobacillus acidophilus 1 Tablet(s) Oral every 12 hours  levothyroxine Injectable 25 MICROGram(s) IV Push at bedtime  LORazepam   Injectable 0.5 milliGRAM(s) IV Push every 8 hours PRN  melatonin 3 milliGRAM(s) Oral at bedtime PRN  morphine  - Injectable 2 milliGRAM(s) IV Push every 4 hours PRN  ondansetron Injectable 4 milliGRAM(s) IV Push every 8 hours PRN  pantoprazole  Injectable 40 milliGRAM(s) IV Push daily  piperacillin/tazobactam IVPB.. 3.375 Gram(s) IV Intermittent every 8 hours  senna 1 Tablet(s) Oral at bedtime  trimethoprim  160 mG/sulfamethoxazole 800 mG 1 Tablet(s) Oral two times a day              Vital Signs Last 24 Hrs  T(C): 36.7 (03 Aug 2023 05:06), Max: 36.9 (02 Aug 2023 12:29)  T(F): 98.1 (03 Aug 2023 05:06), Max: 98.5 (02 Aug 2023 12:29)  HR: 90 (03 Aug 2023 05:06) (90 - 100)  BP: 117/70 (03 Aug 2023 05:06) (117/70 - 159/70)  BP(mean): --  RR: 18 (03 Aug 2023 05:06) (18 - 20)  SpO2: 98% (03 Aug 2023 05:06) (94% - 98%)    Parameters below as of 03 Aug 2023 05:06  Patient On (Oxygen Delivery Method): room air      REVIEW OF SYSTEMS:  Could not be obtained from the patient secondary to the patient would just scream when touched but does have a history of underlying baseline dementia, but is able to hold a conversation, just forgetful, able to talk.    PHYSICAL EXAMINATION:   HEENT:  Head:  Normocephalic and atraumatic.  Eyes:  No scleral icterus.  Ears:  Hearing hard to evaluate secondary to the patient would just scream, but as per my conversation with son, intact.  NECK:  Supple.  RESPIRATORY:  Air entry bilaterally.  CARDIOVASCULAR:  S1 and S2 heard.  ABDOMEN:  Soft and nontender.  EXTREMITIES:  No clubbing was noted.      NEUROLOGIC:  The patient is was resting in bed.  The patient will keep her eyes closed, would not verbalize.  Upon touching the patient, she would scream.  Motor examination is completely limited.  From previous examinations, the patient motor, right upper and right lower 0/5, left upper was 4/5, left lower was 3/5.      LABS:  CBC Full  -  ( 03 Aug 2023 06:08 )  WBC Count : 3.53 K/uL  RBC Count : 2.31 M/uL  Hemoglobin : 8.6 g/dL  Hematocrit : 27.9 %  Platelet Count - Automated : 321 K/uL  Mean Cell Volume : 120.8 fl  Mean Cell Hemoglobin : 37.2 pg  Mean Cell Hemoglobin Concentration : 30.8 gm/dL  Auto Neutrophil # : 2.41 K/uL  Auto Lymphocyte # : 0.68 K/uL  Auto Monocyte # : 0.31 K/uL  Auto Eosinophil # : 0.06 K/uL  Auto Basophil # : 0.01 K/uL  Auto Neutrophil % : 68.2 %  Auto Lymphocyte % : 19.3 %  Auto Monocyte % : 8.8 %  Auto Eosinophil % : 1.7 %  Auto Basophil % : 0.3 %    Urinalysis Basic - ( 03 Aug 2023 06:08 )    Color: x / Appearance: x / SG: x / pH: x  Gluc: 99 mg/dL / Ketone: x  / Bili: x / Urobili: x   Blood: x / Protein: x / Nitrite: x   Leuk Esterase: x / RBC: x / WBC x   Sq Epi: x / Non Sq Epi: x / Bacteria: x      08-03    144  |  110<H>  |  11  ----------------------------<  99  3.4<L>   |  31  |  0.63    Ca    7.8<L>      03 Aug 2023 06:08    TPro  4.9<L>  /  Alb  2.0<L>  /  TBili  0.5  /  DBili  x   /  AST  16  /  ALT  12  /  AlkPhos  62  08-03    Hemoglobin A1C:     LIVER FUNCTIONS - ( 03 Aug 2023 06:08 )  Alb: 2.0 g/dL / Pro: 4.9 g/dL / ALK PHOS: 62 U/L / ALT: 12 U/L / AST: 16 U/L / GGT: x           Vitamin B12   PT/INR - ( 03 Aug 2023 06:08 )   PT: 58.9 sec;   INR: 5.28 ratio         PTT - ( 01 Aug 2023 18:30 )  PTT:56.4 sec      RADIOLOGY    ANALYSIS AND PLAN:  This is a 89-year-old with altered mental status and pain.  For episode of altered mental status, suspect most likely secondary to dementia becoming more prominent in the hospital setting, also suspect that as per my conversation with son, when the patient does start to have increasing pain, she does become a little bit more disoriented and she refuses her pain medications which worsens her cognitive and expressive abilities.  Suspect even though examination is severely limited, this is secondary to new cerebrovascular accident.  For history of hypothyroidism, continue the patient on Synthroid.  For history of hypertension, monitor systolic blood pressure.  For history of cerebrovascular accident with atrial fibrillation, continue anticoagulation with goal INR between 2 and 3.  For neuropathy, peripheral vascular disease, continue the patient on her baclofen, gabapentin, and tramadol.  As per my conversation with son, that combination appears to keep her pain at acceptable levels, but we will also get Pain Management to see if any other recommendations can be given.  For history of hyperlipidemia, continue the patient on her statin.  For mild cognitive impairment, supportive therapy.    Spoke with sonCurtis, at [phone] 8/3  Greater than 45 minutes of time was spent with the patient, plan of care, reviewing data, with greater than 50% of the visit was spent counseling and/or coordinating care with multidisciplinary healthcare team

## 2023-08-03 NOTE — CONSULT NOTE ADULT - CONSULT REASON
Coagulopathy Coagulopathy    F05        Delirium  N39.0    UTI  D68.32  Coagulopathy due to coumadin  D63.8    Anemia chronic disease

## 2023-08-03 NOTE — SWALLOW BEDSIDE ASSESSMENT ADULT - ASR SWALLOW RECOMMEND DIAG
not indicated at this time given clear chest imaging with observed oral deficits noted on advancing solid textures

## 2023-08-03 NOTE — CONSULT NOTE ADULT - SUBJECTIVE AND OBJECTIVE BOX
INCOMPLETE NOTE.  Documentation in Progress  PT SEEN AND EVALUATED.   FULL/ADDITIONAL RECOMMENDATIONS TO FOLLOW   ***************************************************************  Patient is a 89y old  Female who presents with a chief complaint of Altered mental status     (03 Aug 2023 13:04)    HPI:  Patient is a 89-year-old female presents the emergency room with chief complaint of altered mental status.  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  Patient presents from assisted living with altered mental status concern for UTI.  Patient was last admitted to the hospital June 26 through June 30, 2023 was altered mental status x2 weeks.  Was ultimately diagnosed with acute metabolic encephalopathy secondary to UTI improved.  Patient stabilized and was ultimately discharged back to assisted living.  Presents today lethargic with urinary frequency and confusion. Son reports that there is also concern for possible cellulitis to the LLE at the site of a skin graft. Pt recently completed a course of po abx for this with no significant improvement. She has been c/o severe pain to the LLE for weeks. Seen by vascular consult , xrays obtained -found to have severe OA AND PVD ,pain management started Palliative care consult requested ,to discuss advance directives and complete MOLST  (02 Aug 2023 09:19)    PAST MEDICAL & SURGICAL HISTORY:  Hypertension      CVA (cerebral vascular accident)      Depression      Constipation      Neuropathy      Hyperlipidemia      Grade I diastolic dysfunction      Afib      Neuropathy      VHD (valvular heart disease)      Aortic valvar stenosis      No significant past surgical history         HEALTH ISSUES - PROBLEM Dx:  Venous stasis ulcer of other part of left lower leg without varicose veins    Leg wound, left    Cellulitis    Acute metabolic encephalopathy    Suspected UTI    OA (osteoarthritis)    PVD (peripheral vascular disease)    Afib    Prophylactic measure    HTN (hypertension)    Dementia          Altered mental status [R41.82]    Hypertension [I10]    CVA (cerebral vascular accident) [I63.9]    Depression [F32.9]    Constipation [K59.00]    Neuropathy [G62.9]    Constipation [K59.00]    Hyperlipidemia [E78.5]    Grade I diastolic dysfunction [I51.89]    Afib [I48.91]    Neuropathy [G62.9]    VHD (valvular heart disease) [I38]    Aortic valvar stenosis [I35.0]    No significant past surgical history [[id number]]      FAMILY HISTORY:      [SOCIAL HISTORY: ]     smoking:       EtOH:       illicit drugs:       occupation:       marital status:       Other:     [ALLERGIES/INTOLERANCES:]  Allergies    per son &quot;issues with certain anitibiotics&quot; does not remember the names (Unknown)    Intolerances        [MEDICATIONS]  MEDICATIONS  (STANDING):  amLODIPine   Tablet 5 milliGRAM(s) Oral daily  baclofen 10 milliGRAM(s) Oral every 12 hours  cholecalciferol 2000 Unit(s) Oral daily  dextrose 5% + sodium chloride 0.45%. 1000 milliLiter(s) (50 mL/Hr) IV Continuous <Continuous>  escitalopram 10 milliGRAM(s) Oral at bedtime  folic acid 1 milliGRAM(s) Oral daily  gabapentin 300 milliGRAM(s) Oral three times a day  hydroxyurea 500 milliGRAM(s) Oral two times a day  lactobacillus acidophilus 1 Tablet(s) Oral every 12 hours  levothyroxine Injectable 25 MICROGram(s) IV Push at bedtime  metoprolol tartrate 25 milliGRAM(s) Oral two times a day  pantoprazole  Injectable 40 milliGRAM(s) IV Push daily  piperacillin/tazobactam IVPB.. 3.375 Gram(s) IV Intermittent every 8 hours  senna 1 Tablet(s) Oral at bedtime  trimethoprim  160 mG/sulfamethoxazole 800 mG 1 Tablet(s) Oral two times a day    MEDICATIONS  (PRN):  acetaminophen  Suppository .. 650 milliGRAM(s) Rectal every 6 hours PRN Temp greater or equal to 38C (100.4F)  aluminum hydroxide/magnesium hydroxide/simethicone Suspension 30 milliLiter(s) Oral every 4 hours PRN Dyspepsia  ketorolac   Injectable 30 milliGRAM(s) IV Push every 6 hours PRN Moderate Pain (4 - 6)  LORazepam   Injectable 0.5 milliGRAM(s) IV Push every 8 hours PRN Agitation  melatonin 3 milliGRAM(s) Oral at bedtime PRN Insomnia  morphine  - Injectable 2 milliGRAM(s) IV Push every 4 hours PRN Severe Pain (7 - 10)  ondansetron Injectable 4 milliGRAM(s) IV Push every 8 hours PRN Nausea and/or Vomiting      [REVIEW OF SYSTEMS: ]  CONSTITUTIONAL: normal, no fever, no shakes, no chills   EYES: No eye pain, no visual disturbances, no discharge  ENMT:  no discharge  NECK: No pain, no stiffness  BREASTS: No pain, no masses, no nipple discharge  RESPIRATORY: No cough, no wheezing, no chills, no hemoptysis; No shortness of breath  CARDIOVASCULAR: No chest pain, no palpitations, no dizziness, no leg swelling  GASTROINTESTINAL: No abdominal, no epigastric pain. No nausea, no vomiting, no hematemesis; No diarrhea , no constipation. No melena, no hematochezia.  GENITOURINARY: No dysuria, no frequency, no hematuria, no incontinence  NEUROLOGICAL: No headaches, no memory loss, no loss of strength, no numbness, no tremors  SKIN: No itching, no burning, no rashes, no lesions   LYMPH NODES: No enlarged glands  ENDOCRINE: No heat or cold intolerance; No hair loss  MUSCULOSKELETAL: No joint pain or swelling; No muscle, no back, no extremity pain  PSYCHIATRIC: No depression, no anxiety, no mood swings, no difficulty sleeping  HEME/LYMPH: No easy bruising, no bleeding gums    [VITALS SIGNS 24hrs]  Vital Signs Last 24 Hrs  T(C): 36.8 (03 Aug 2023 12:13), Max: 36.8 (03 Aug 2023 12:13)  T(F): 98.2 (03 Aug 2023 12:13), Max: 98.2 (03 Aug 2023 12:13)  HR: 83 (03 Aug 2023 12:13) (83 - 100)  BP: 120/72 (03 Aug 2023 12:13) (117/70 - 147/82)  BP(mean): --  RR: 17 (03 Aug 2023 12:13) (17 - 18)  SpO2: 96% (03 Aug 2023 12:13) (94% - 98%)    Parameters below as of 03 Aug 2023 12:13  Patient On (Oxygen Delivery Method): room air      Daily     Daily     I&O's Summary    03 Aug 2023 07:01  -  03 Aug 2023 17:46  --------------------------------------------------------  IN: 240 mL / OUT: 0 mL / NET: 240 mL        [PHYSICAL EXAM]  GEN:   HEENT: normocephalic and atraumatic. EOMI. PERRL.    NECK: Supple.  No lymphadenopathy   LUNGS: Clear to auscultation.  HEART: S1S2 Regular rate and rhythm, no MRG  ABDOMEN: Soft, nontender, and nondistended.  Positive bowel sounds.    : No CVA tenderness  EXTREMITIES: Without edema.  NEUROLOGIC: grossly intact.  PSYCHIATRIC: Appropriate affect .  SKIN: No rash     [LABS: ]                        10.8   x     )-----------( x        ( 03 Aug 2023 14:00 )             35.3     CBC Full  -  ( 03 Aug 2023 14:00 )  WBC Count : x  RBC Count : 2.89 M/uL  Hemoglobin : 10.8 g/dL  Hematocrit : 35.3 %  Platelet Count - Automated : x  Mean Cell Volume : x  Mean Cell Hemoglobin : x  Mean Cell Hemoglobin Concentration : x  Auto Neutrophil # : x  Auto Lymphocyte # : x  Auto Monocyte # : x  Auto Eosinophil # : x  Auto Basophil # : x  Auto Neutrophil % : x  Auto Lymphocyte % : x  Auto Monocyte % : x  Auto Eosinophil % : x  Auto Basophil % : x    08-03    144  |  110<H>  |  11  ----------------------------<  99  3.4<L>   |  31  |  0.63    Ca    7.8<L>      03 Aug 2023 06:08    TPro  4.9<L>  /  Alb  2.0<L>  /  TBili  0.5  /  DBili  x   /  AST  16  /  ALT  12  /  AlkPhos  62  08-03    PT/INR - ( 03 Aug 2023 14:00 )   PT: 55.1 sec;   INR: 4.93 ratio         PTT - ( 01 Aug 2023 18:30 )  PTT:56.4 sec  LIVER FUNCTIONS - ( 03 Aug 2023 06:08 )  Alb: 2.0 g/dL / Pro: 4.9 g/dL / ALK PHOS: 62 U/L / ALT: 12 U/L / AST: 16 U/L / GGT: x               Urinalysis Basic - ( 03 Aug 2023 06:08 )    Color: x / Appearance: x / SG: x / pH: x  Gluc: 99 mg/dL / Ketone: x  / Bili: x / Urobili: x   Blood: x / Protein: x / Nitrite: x   Leuk Esterase: x / RBC: x / WBC x   Sq Epi: x / Non Sq Epi: x / Bacteria: x          CBC TREND (5 Days)  WBC Count: 3.53 K/uL (08-03 @ 06:08)  WBC Count: 3.78 K/uL (08-01 @ 18:30)    Hemoglobin: 10.8 g/dL (08-03 @ 14:00)  Hemoglobin: 8.6 g/dL (08-03 @ 06:08)  Hemoglobin: 11.2 g/dL (08-01 @ 18:30)    Hematocrit: 35.3 % (08-03 @ 14:00)  Hematocrit: 27.9 % (08-03 @ 06:08)  Hematocrit: 35.6 % (08-01 @ 18:30)    Platelet Count - Automated: 321 K/uL (08-03 @ 06:08)  Platelet Count - Automated: 484 K/uL (08-01 @ 18:30)    Reticulocyte Percent: 2.4 % (08-03 @ 14:00)  Reticulocyte Percent: 2.1 % (12-30 @ 08:30)           Vitamin B12, Serum: 1738 pg/mL (04-21 @ 11:18)     Folate, Serum: >20.0 ng/mL (04-21 @ 11:18)     Sedimentation Rate, Erythrocyte: 71 mm/hr (12-30 @ 08:30)                         [MICROBIOLOGY /  VIROLOGY:]  SARS-CoV-2: NotDetec (01 Aug 2023 22:40)  COVID-19 PCR: NotDetec (30 Jun 2023 07:30)  SARS-CoV-2: NotDetec (26 Jun 2023 09:21)        Culture - Blood (collected 01 Aug 2023 18:38)  Source: .Blood Blood-Venous  Preliminary Report (03 Aug 2023 02:02):    No growth at 24 hours    Culture - Blood (collected 01 Aug 2023 18:30)  Source: .Blood Blood-Venous  Preliminary Report (03 Aug 2023 02:02):    No growth at 24 hours      [PATHOLOGY]     [RADIOLOGY & ADDITIONAL STUDIES:]   CT Head No Cont:   ACC: [id number] EXAM:  CT BRAIN   ORDERED BY: YAHIR GALLAGHER     PROCEDURE DATE:  08/02/2023          INTERPRETATION:  Clinical Indication: Altered mental status.    Comparison: 6/26/2020    Technique: Noncontrast axial CT images of the head wereacquired. Coronal   and sagittal reformats were obtained.    Findings: Exam is degraded by motion and streak artifact.  The ventricles and sulci are prominent in size, compatible with   generalized parenchymal volume loss. No acute intracranial hemorrhage is   identified.  There is no extra-axial fluid collection. No mass effect or   midline shift is seen.  There is no evidence of acute territorial   infarct.  There are periventricular and subcortical white matter   hypodensities, which are nonspecific, but likely reflect moderate   microvascular ischemic disease.  The visualized paranasal sinuses are clear except for mild right   maxillary sinus mucosal thickening. The mastoid air cells are well   aerated.  No acute osseous abnormality is seen.      Impression: Limited exam. No definite evidence of acute intracranial   trauma.    --- End of Report ---            BLAYNE RUSSELL MD; Attending Radiologist  This document has been electronically signed. Aug  2 2023  2:33AM (08-02-23 @ 02:22)  CT Chest No Cont:   ACC: [id number] EXAM:  CT ABDOMEN AND PELVIS   ORDERED BY: YAHIR GALLAGHER     ACC: [id number] EXAM:  CT CHEST   ORDERED BY: YAHIR GALLAGHER     PROCEDURE DATE:  08/02/2023          INTERPRETATION:  CLINICAL INFORMATION: Shortness of breath, abdominal   pain and distention    COMPARISON: None.    CONTRAST/COMPLICATIONS:  IV Contrast: NONE  Oral Contrast: NONE  Complications: None reported at time of study completion    PROCEDURE:  CT of the Chest, Abdomen and Pelvis was performed.  [place] coronal reformats were performed.    FINDINGS:  CHEST:  LUNGS AND LARGE AIRWAYS: Patent central airways. Dependent atelectatic   changes at the lung bases.    PLEURA: No pleural effusion.  VESSELS: Coronary atherosclerosis.  HEART: Heart size is normal. No pericardial effusion.  MEDIASTINUM AND DENIS: No lymphadenopathy.  CHEST WALL AND LOWER NECK: Within normal limits.    ABDOMEN AND PELVIS:  Evaluation of the solid abdominal organs is limited without IV contrast.  LIVER: Within normal limits.  BILE DUCTS: Normal caliber.  GALLBLADDER: Within normal limits.  SPLEEN: Within normal limits.  PANCREAS: Redemonstration of a 2.3 cm cystic lesion in the pancreatic   head with mild peripheral calcification, unchanged since 2016.  ADRENALS: Withinnormal limits.  KIDNEYS/URETERS: Within normal limits.    BLADDER: Incompletely distended.  REPRODUCTIVE ORGANS: No pelvic masses. Small calcified fibroids.    BOWEL: Moderate hiatal hernia. No bowel obstruction. Appendix is not   visualized. Extensive sigmoid diverticulosis.  PERITONEUM: No ascites.  VESSELS: IVC filter in place. Moderate atherosclerotic vascular   calcification.  RETROPERITONEUM/LYMPH NODES: No lymphadenopathy.  ABDOMINAL WALL: Within normal limits.  BONES: Mild S-shaped scoliosis of the thoracolumbar spine.    IMPRESSION:  No acute findings in the chest, abdomen, or pelvis.    Moderate hiatal hernia.    --- End of Report ---            PAIGE GORMAN MD; Attending Radiologist  This document has been electronically signed. Aug 2 2023  2:50AM (08-02-23 @ 02:21)   Patient is a 89y old  Female who presents with a chief complaint of Altered mental status  (03 Aug 2023 13:04)    HPI:  Patient is a 89-year-old female presents the emergency room with chief complaint of altered mental status.  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  Patient presents from assisted living with altered mental status concern for UTI.  Patient was last admitted to the hospital  through 2023 was altered mental status x2 weeks.  Was ultimately diagnosed with acute metabolic encephalopathy secondary to UTI improved.  Patient stabilized and was ultimately discharged back to assisted living.  Presents today lethargic with urinary frequency and confusion. Son reports that there is also concern for possible cellulitis to the LLE at the site of a skin graft. Pt recently completed a course of po abx for this with no significant improvement. She has been c/o severe pain to the LLE for weeks. Seen by vascular consult , xrays obtained -found to have severe OA AND PVD ,pain management started Palliative care consult requested ,to discuss advance directives and complete MOLST  (02 Aug 2023 09:19)    PAST MEDICAL & SURGICAL HISTORY:  Hypertension  CVA (cerebral vascular accident)  Depression  Constipation  Neuropathy  Hyperlipidemia  Grade I diastolic dysfunction  Afib  Neuropathy  VHD (valvular heart disease)  Aortic valvar stenosis  No significant past surgical history      HEALTH ISSUES - PROBLEM Dx:  Venous stasis ulcer of other part of left lower leg without varicose veins  Leg wound, left  Cellulitis  Acute metabolic encephalopathy  Suspected UTI  OA (osteoarthritis)  PVD (peripheral vascular disease)  Afib  Prophylactic measure  HTN (hypertension)  Dementia    Altered mental status [R41.82]  Hypertension [I10]  CVA (cerebral vascular accident) [I63.9]  Depression [F32.9]  Constipation [K59.00]  Neuropathy [G62.9]  Constipation [K59.00]  Hyperlipidemia [E78.5]  Grade I diastolic dysfunction [I51.89]  Afib [I48.91]  Neuropathy [G62.9]  VHD (valvular heart disease) [I38]  Aortic valvar stenosis [I35.0]  No significant past surgical history [[id number]]    FAMILY HISTORY:  father   mother   son Curtis    [SOCIAL HISTORY: ]     smoking:  denies     EtOH:  denies     illicit drugs:  denies     occupation:  retired     marital status:  unknown     Other: unreliable historian    [ALLERGIES/INTOLERANCES:]  Allergies     per son &quot;issues with certain anitibiotics&quot; does not remember the names (Unknown)  Intolerances      [MEDICATIONS]  MEDICATIONS  (STANDING):  amLODIPine   Tablet 5 milliGRAM(s) Oral daily  baclofen 10 milliGRAM(s) Oral every 12 hours  cholecalciferol 2000 Unit(s) Oral daily  dextrose 5% + sodium chloride 0.45%. 1000 milliLiter(s) (50 mL/Hr) IV Continuous <Continuous>  escitalopram 10 milliGRAM(s) Oral at bedtime  folic acid 1 milliGRAM(s) Oral daily  gabapentin 300 milliGRAM(s) Oral three times a day  hydroxyurea 500 milliGRAM(s) Oral two times a day  lactobacillus acidophilus 1 Tablet(s) Oral every 12 hours  levothyroxine Injectable 25 MICROGram(s) IV Push at bedtime  metoprolol tartrate 25 milliGRAM(s) Oral two times a day  pantoprazole  Injectable 40 milliGRAM(s) IV Push daily  piperacillin/tazobactam IVPB.. 3.375 Gram(s) IV Intermittent every 8 hours  senna 1 Tablet(s) Oral at bedtime  trimethoprim  160 mG/sulfamethoxazole 800 mG 1 Tablet(s) Oral two times a day    MEDICATIONS  (PRN):  acetaminophen  Suppository .. 650 milliGRAM(s) Rectal every 6 hours PRN Temp greater or equal to 38C (100.4F)  aluminum hydroxide/magnesium hydroxide/simethicone Suspension 30 milliLiter(s) Oral every 4 hours PRN Dyspepsia  ketorolac   Injectable 30 milliGRAM(s) IV Push every 6 hours PRN Moderate Pain (4 - 6)  LORazepam   Injectable 0.5 milliGRAM(s) IV Push every 8 hours PRN Agitation  melatonin 3 milliGRAM(s) Oral at bedtime PRN Insomnia  morphine  - Injectable 2 milliGRAM(s) IV Push every 4 hours PRN Severe Pain (7 - 10)  ondansetron Injectable 4 milliGRAM(s) IV Push every 8 hours PRN Nausea and/or Vomiting      [REVIEW OF SYSTEMS: ]  unreliable historian  Confused  combative    [VITALS SIGNS 24hrs]  Vital Signs Last 24 Hrs  T(C): 36.8 (03 Aug 2023 12:13), Max: 36.8 (03 Aug 2023 12:13)  T(F): 98.2 (03 Aug 2023 12:13), Max: 98.2 (03 Aug 2023 12:13)  HR: 83 (03 Aug 2023 12:13) (83 - 100)  BP: 120/72 (03 Aug 2023 12:13) (117/70 - 147/82)  BP(mean): --  RR: 17 (03 Aug 2023 12:13) (17 - 18)  SpO2: 96% (03 Aug 2023 12:13) (94% - 98%)    Parameters below as of 03 Aug 2023 12:13  Patient On (Oxygen Delivery Method): room air    Daily   I&O's Summary    03 Aug 2023 07:01  -  03 Aug 2023 17:46  --------------------------------------------------------  IN: 240 mL / OUT: 0 mL / NET: 240 mL      [PHYSICAL EXAM]  GEN: elderly confused  HEENT: normocephalic and atraumatic. EOMI. .    NECK: Supple.  No lymphadenopathy   LUNGS: Clear to auscultation.  HEART: S1S2 Regular rate and rhythm, no MRG  ABDOMEN: Soft, nontender, and nondistended.  Positive bowel sounds.    : No CVA tenderness  EXTREMITIES: Without edema. LE dressing. ext warm  NEUROLOGIC: no focal weakness. AOx0, wont provide name. at time, verbally threatening eg "i will throw this at you"  PSYCHIATRIC: inappropriate  SKIN: No rash     [LABS: ]                        10.8   x     )-----------( x        ( 03 Aug 2023 14:00 )             35.3     CBC Full  -  ( 03 Aug 2023 14:00 )  WBC Count : x  RBC Count : 2.89 M/uL  Hemoglobin : 10.8 g/dL  Hematocrit : 35.3 %  Platelet Count - Automated : x  Mean Cell Volume : x  Mean Cell Hemoglobin : x  Mean Cell Hemoglobin Concentration : x  Auto Neutrophil # : x  Auto Lymphocyte # : x  Auto Monocyte # : x  Auto Eosinophil # : x  Auto Basophil # : x  Auto Neutrophil % : x  Auto Lymphocyte % : x  Auto Monocyte % : x  Auto Eosinophil % : x  Auto Basophil % : x    08-03  144  |  110<H>  |  11  ----------------------------<  99  3.4<L>   |  31  |  0.63  Ca    7.8<L>      03 Aug 2023 06:08    TPro  4.9<L>  /  Alb  2.0<L>  /  TBili  0.5  /  DBili  x   /  AST  16  /  ALT  12  /  AlkPhos  62  08  PT/INR - ( 03 Aug 2023 14:00 )   PT: 55.1 sec;   INR: 4.93 ratio    PTT - ( 01 Aug 2023 18:30 )  PTT:56.4 sec  LIVER FUNCTIONS - ( 03 Aug 2023 06:08 )  Alb: 2.0 g/dL / Pro: 4.9 g/dL / ALK PHOS: 62 U/L / ALT: 12 U/L / AST: 16 U/L / GGT: x             Urinalysis Basic - ( 03 Aug 2023 06:08 )  Color: x / Appearance: x / SG: x / pH: x  Gluc: 99 mg/dL / Ketone: x  / Bili: x / Urobili: x   Blood: x / Protein: x / Nitrite: x   Leuk Esterase: x / RBC: x / WBC x   Sq Epi: x / Non Sq Epi: x / Bacteria: x      CBC TREND (5 Days)  WBC Count: 3.53 K/uL ( @ 06:08)  WBC Count: 3.78 K/uL ( @ 18:30)    Hemoglobin: 10.8 g/dL ( @ 14:00)  Hemoglobin: 8.6 g/dL ( @ 06:08)  Hemoglobin: 11.2 g/dL ( @ 18:30)    Hematocrit: 35.3 % ( @ 14:00)  Hematocrit: 27.9 % ( @ 06:08)  Hematocrit: 35.6 % ( @ 18:30)    Platelet Count - Automated: 321 K/uL ( @ 06:08)  Platelet Count - Automated: 484 K/uL ( @ 18:30)    Reticulocyte Percent: 2.4 % ( @ 14:00)  Reticulocyte Percent: 2.1 % ( @ 08:30)    Vitamin B12, Serum: 1738 pg/mL ( @ 11:18)  Folate, Serum: >20.0 ng/mL ( @ 11:18)  Sedimentation Rate, Erythrocyte: 71 mm/hr ( @ 08:30)      [MICROBIOLOGY /  VIROLOGY:]  SARS-CoV-2: NotDetec (01 Aug 2023 22:40)  COVID-19 PCR: NotDetec (2023 07:30)  SARS-CoV-2: NotDetec (2023 09:21)      Culture - Blood (collected 01 Aug 2023 18:38)  Source: .Blood Blood-Venous  Preliminary Report (03 Aug 2023 02:02):    No growth at 24 hours    Culture - Blood (collected 01 Aug 2023 18:30)  Source: .Blood Blood-Venous  Preliminary Report (03 Aug 2023 02:02):    No growth at 24 hours      [PATHOLOGY]       [RADIOLOGY & ADDITIONAL STUDIES:]     CT Head No Cont:   ACC: [id number] EXAM:  CT BRAIN   ORDERED BY: YAHIR GALLAGHER   PROCEDURE DATE:  2023    INTERPRETATION:  Clinical Indication: Altered mental status.  Comparison: 2020  Technique: Noncontrast axial CT images of the head wereacquired. Coronal and sagittal reformats were obtained.  Findings: Exam is degraded by motion and streak artifact.  The ventricles and sulci are prominent in size, compatible with generalized parenchymal volume loss. No acute intracranial hemorrhage is identified.  There is no extra-axial fluid collection. No mass effect or midline shift is seen.  There is no evidence of acute territorial infarct.  There are periventricular and subcortical white matter hypodensities, which are nonspecific, but likely reflect moderate microvascular ischemic disease.  The visualized paranasal sinuses are clear except for mild right maxillary sinus mucosal thickening. The mastoid air cells are well aerated.  No acute osseous abnormality is seen.  Impression: Limited exam. No definite evidence of acute intracranial trauma.  --- End of Report ---  BLAYNE RUSSELL MD; Attending Radiologist  This document has been electronically signed. Aug  2 2023  2:33AM (23 @ 02:22)      CT Chest No Cont:   ACC: [id number] EXAM:  CT ABDOMEN AND PELVIS   ORDERED BY: YAHIR GALLAGHER   ACC: [id number] EXAM:  CT CHEST   ORDERED BY: YAHIR GALLAGHER   PROCEDURE DATE:  2023    INTERPRETATION:  CLINICAL INFORMATION: Shortness of breath, abdominal pain and distention  COMPARISON: None.  PROCEDURE:  CT of the Chest, Abdomen and Pelvis was performed.  [place] coronal reformats were performed.  FINDINGS:  CHEST:  LUNGS AND LARGE AIRWAYS: Patent central airways. Dependent atelectatic changes at the lung bases.  PLEURA: No pleural effusion.  VESSELS: Coronary atherosclerosis.  HEART: Heart size is normal. No pericardial effusion.  MEDIASTINUM AND DENIS: No lymphadenopathy.  CHEST WALL AND LOWER NECK: Within normal limits.  ABDOMEN AND PELVIS: Evaluation of the solid abdominal organs is limited without IV contrast.  LIVER: Within normal limits.  BILE DUCTS: Normal caliber.  GALLBLADDER: Within normal limits.  SPLEEN: Within normal limits.  PANCREAS: Redemonstration of a 2.3 cm cystic lesion in the pancreatic head with mild peripheral calcification, unchanged since 2016.  ADRENALS: Within normal limits.  KIDNEYS/URETERS: Within normal limits.  BLADDER: Incompletely distended.  REPRODUCTIVE ORGANS: No pelvic masses. Small calcified fibroids.  BOWEL: Moderate hiatal hernia. No bowel obstruction. Appendix is not visualized. Extensive sigmoid diverticulosis.  PERITONEUM: No ascites.  VESSELS: IVC filter in place. Moderate atherosclerotic vascular calcification.  RETROPERITONEUM/LYMPH NODES: No lymphadenopathy.  ABDOMINAL WALL: Within normal limits.  BONES: Mild S-shaped scoliosis of the thoracolumbar spine.    IMPRESSION:  No acute findings in the chest, abdomen, or pelvis.  Moderate hiatal hernia.  --- End of Report ---  PAIGE GORMAN MD; Attending Radiologist  This document has been electronically signed. Aug 2 2023  2:50AM (23 @ 02:21)

## 2023-08-03 NOTE — SWALLOW BEDSIDE ASSESSMENT ADULT - SLP PATIENT PROFILE REVIEW
[facility] Hospitalist Progress Note    SUBJECTIVE:    Not in distress. No new complaints.    OBJECTIVE:    Current Facility-Administered Medications   Medication   • acetaminophen (TYLENOL) tablet 650 mg   • traMADol (ULTRAM) tablet 50 mg   • cefTRIAXone (ROCEPHIN) syringe 2,000 mg   • VANCOMYCIN - PHARMACIST MONITORED Misc   • vancomycin (VANCOCIN) 1,000 mg in sodium chloride 0.9 % 250 mL IVPB   • lidocaine (ANECREAM/LMX) 4 % cream 1 application   • dextrose 50 % injection 25 g   • dextrose 5 % infusion   • nalbuphine (NUBAIN) injection 2.5 mg   • naLOXone (NARCAN) injection 0.1 mg   • polyethylene glycol (MIRALAX) packet 17 g   • ondansetron (ZOFRAN ODT) disintegrating tablet 4 mg    Or   • ondansetron (ZOFRAN) injection 4 mg   • docusate sodium-sennosides (SENOKOT S) 50-8.6 MG 2 tablet   • bisacodyl (DULCOLAX) suppository 10 mg   • magnesium hydroxide (MILK OF MAGNESIA) 400 MG/5ML suspension 30 mL   • tiZANidine (ZANAFLEX) tablet 4 mg   • calcium carbonate (TUMS) chewable tablet 1,000 mg   • diphenhydrAMINE (BENADRYL) capsule 25 mg   • HYDROcodone-acetaminophen (NORCO)  MG per tablet 2 tablet   • metoCLOPramide (REGLAN) tablet 5 mg    Or   • metoCLOPramide (REGLAN) injection 5 mg   • levothyroxine (SYNTHROID, LEVOTHROID) tablet 112 mcg   • dextrose 50 % injection 25 g   • dextrose 50 % injection 12.5 g   • glucagon (GLUCAGEN) injection 1 mg   • dextrose (GLUTOSE) 40 % gel 15 g   • dextrose (GLUTOSE) 40 % gel 30 g   • insulin lispro (ADMELOG,HumaLOG) - Correction Dose   • insulin lispro (ADMELOG,HumaLOG) - Correction Dose   • fluticasone (FLONASE) 50 MCG/ACT nasal spray 2 spray   • pantoprazole (PROTONIX) EC tablet 40 mg   • hydrALAZINE (APRESOLINE) injection 10 mg       I/O's    Intake/Output Summary (Last 24 hours) at 4/9/2022 1221  Last data filed at 4/9/2022 1100  Gross per 24 hour   Intake 500 ml   Output 200 ml   Net 300 ml       Last Recorded Vitals  Vital Last Value 24 Hour Range   Temperature 98.1 °F  (36.7 °C) (04/09/22 0946) Temp  Min: 98.1 °F (36.7 °C)  Max: 102.2 °F (39 °C)   Pulse 94 (04/09/22 0946) Pulse  Min: 85  Max: 117   Respiratory 16 (04/09/22 0801) Resp  Min: 16  Max: 18   Non-Invasive  Blood Pressure 131/77 (04/09/22 0946) BP  Min: 124/75  Max: 152/76   Pulse Oximetry 96 % (04/09/22 0946) SpO2  Min: 93 %  Max: 96 %     Vital Today Admitted   Weight 70.8 kg (156 lb 1.4 oz) (04/05/22 1345) Weight: 70.8 kg (156 lb) (03/30/22 1233)   Height N/A Height: 5' 6\" (167.6 cm) (03/30/22 1233)   BMI N/A BMI (Calculated): 25.18 (03/30/22 1233)       Physical Exam  Constitutional:       General: He is not in acute distress.  HENT:      Mouth/Throat:      Mouth: Mucous membranes are moist.   Neck:      Comments: Neck collar in place    Cardiovascular:      Rate and Rhythm: Normal rate and regular rhythm.      Heart sounds: Normal heart sounds. No murmur heard.  Pulmonary:      Effort: No respiratory distress.   Abdominal:      General: Bowel sounds are normal.      Palpations: Abdomen is soft.   Skin:     General: Skin is warm.   Neurological:      General: No focal deficit present.      Mental Status: He is alert.          Labs     Recent Labs   Lab 04/09/22  0341 04/08/22  0326 04/07/22  0451 04/06/22  0345   WBC 12.2* 11.8* 11.2* 13.5*   HCT 34.4* 36.3* 34.3* 36.1*   HGB 11.7* 12.1* 11.4* 12.1*    277 280 285   SODIUM 132* 132* 135 132*   POTASSIUM 3.8 3.9 4.5 4.9   CHLORIDE 99 100 103 102   CO2 26 26 28 27   CALCIUM 8.8 8.7 9.2 8.8   GLUCOSE 151* 140* 110* 166*   BUN 14 12 12 11   CREATININE 0.52* 0.59* 0.74 0.68   AST 23 17 18 20   GPT 22 17 16 19   ALKPT 120* 104 85 88   BILIRUBIN 1.1* 0.9 0.6 0.6   ALBUMIN 2.8* 2.9* 3.1* 3.0*   PHOS  --   --   --  3.7       Imaging  No results found.      ASSESSMENT/PLAN:    Cervical spondylosis, with C1-4 stenosis   s/p C1-C4 posterior laminectomy and fusion on 4/5/2022   - DC peridex   - Pain management with PRN Norco, PRN Tramadol, PRN Tylenol, topical lidocaine  cream, and PRN Narcan   - Stable for discharge from neurosurgery service, patient to follow-up in 3 weeks per Neurosurgery  - PT/OT recommends PT/OT daily  - Maintain fall precautions  - Reconsult neurosurgery as needed     Fever, etiology unknown, possible mild cervical wound cellulitis versus acute viral syndrome  - Reached a temperature of 100.3 (4/7)  - Blood cx x2, NGTD - pending  - UA abnormal for ketones 20, urobilinogen 4.0, and erythrocytes 3 to 5. No pyuria  - CXR (4/8) shows left basilar discoid atelectasis  - CT Abdomen ordered   - Continue abx coverage with IV vancomycin and ROcephin  - ID following    Hyperglycemia in a setting of type II diabetes mellitus   - BG in low to high 100's today  - Cover w/ SSI     Leukocytosis likely reactive to above   - WBC 11.8-->12.2 today, afebrile.     Anemia likely due to above   - Hgb 12.1-->11.7 today, no acute bleed noted   - Monitor    Hyponatremia likely 2/2 above- resolved  Pseudohyponatremia related to hyperglycemia        Arthritis - Pain management as per above   Hypothyroidism - Continue with levothyroxine        DVT PPx: SCD, CARMEN     DISPO: Pending medical course and further specialist input. Pending placement and CT abdomen.    LON: Unclear     PCP:  Stefanei Corey MD     Charting performed by lynn Doss for Dr. Jason Macdonald    All medical record entries made by the scribe were at my direction. I have reviewed the chart and agree that the record accurately reflects my personal performance of the history, physical exam, hospital course, and assessment and plan.   yes

## 2023-08-03 NOTE — DIETITIAN INITIAL EVALUATION ADULT - PERTINENT LABORATORY DATA
08-03    144  |  110<H>  |  11  ----------------------------<  99  3.4<L>   |  31  |  0.63    Ca    7.8<L>      03 Aug 2023 06:08    TPro  4.9<L>  /  Alb  2.0<L>  /  TBili  0.5  /  DBili  x   /  AST  16  /  ALT  12  /  AlkPhos  62  08-03

## 2023-08-03 NOTE — DIETITIAN INITIAL EVALUATION ADULT - PERTINENT MEDS FT
MEDICATIONS  (STANDING):  amLODIPine   Tablet 5 milliGRAM(s) Oral daily  baclofen 10 milliGRAM(s) Oral every 12 hours  cholecalciferol 2000 Unit(s) Oral daily  dextrose 5% + sodium chloride 0.45%. 1000 milliLiter(s) (50 mL/Hr) IV Continuous <Continuous>  escitalopram 10 milliGRAM(s) Oral at bedtime  folic acid 1 milliGRAM(s) Oral daily  gabapentin 300 milliGRAM(s) Oral three times a day  hydroxyurea 500 milliGRAM(s) Oral two times a day  lactobacillus acidophilus 1 Tablet(s) Oral every 12 hours  levothyroxine Injectable 25 MICROGram(s) IV Push at bedtime  metoprolol tartrate 25 milliGRAM(s) Oral two times a day  pantoprazole  Injectable 40 milliGRAM(s) IV Push daily  piperacillin/tazobactam IVPB.. 3.375 Gram(s) IV Intermittent every 8 hours  potassium chloride  10 mEq/100 mL IVPB 10 milliEquivalent(s) IV Intermittent every 1 hour  senna 1 Tablet(s) Oral at bedtime  trimethoprim  160 mG/sulfamethoxazole 800 mG 1 Tablet(s) Oral two times a day    MEDICATIONS  (PRN):  acetaminophen  Suppository .. 650 milliGRAM(s) Rectal every 6 hours PRN Temp greater or equal to 38C (100.4F)  aluminum hydroxide/magnesium hydroxide/simethicone Suspension 30 milliLiter(s) Oral every 4 hours PRN Dyspepsia  ketorolac   Injectable 30 milliGRAM(s) IV Push every 6 hours PRN Moderate Pain (4 - 6)  LORazepam   Injectable 0.5 milliGRAM(s) IV Push every 8 hours PRN Agitation  melatonin 3 milliGRAM(s) Oral at bedtime PRN Insomnia  morphine  - Injectable 2 milliGRAM(s) IV Push every 4 hours PRN Severe Pain (7 - 10)  ondansetron Injectable 4 milliGRAM(s) IV Push every 8 hours PRN Nausea and/or Vomiting

## 2023-08-03 NOTE — DIETITIAN INITIAL EVALUATION ADULT - PROBLEM/PLAN-4
Chyna PAT Mariza   8/7/2018   Anticoagulation Therapy Visit    Description:  75 year old female   Provider:  Emily Gutierrez, RN   Department:  [facility]           INR as of 8/7/2018     Today's INR 4.33! (8/6/2018)      Anticoagulation Summary as of 8/7/2018     INR goal 2.0-3.0   Today's INR 4.33! (8/6/2018)   Full warfarin instructions 8/7: Hold; Otherwise 3 mg on Tue; 4 mg all other days   Next INR check 8/9/2018    Indications   Afib (H) [I48.91]  Long-term (current) use of anticoagulants [Z79.01] [Z79.01]         August 2018 Details    Sun Mon Tue Wed Thu Fri Sat        1               2               3               4                 5               6               7      Hold   See details      8      4 mg         9            10               11                 12               13               14               15               16               17               18                 19               20               21               22               23               24               25                 26               27               28               29               30               31                 Date Details   08/07 This INR check       Date of next INR:  8/9/2018         How to take your warfarin dose     To take:  4 mg Take 4 of the 1 mg tablets.    Hold Do not take your warfarin dose. See the Details table to the right for additional instructions.                
DISPLAY PLAN FREE TEXT

## 2023-08-04 LAB
ANION GAP SERPL CALC-SCNC: 5 MMOL/L — SIGNIFICANT CHANGE UP (ref 5–17)
BUN SERPL-MCNC: 9 MG/DL — SIGNIFICANT CHANGE UP (ref 7–23)
CALCIUM SERPL-MCNC: 8 MG/DL — LOW (ref 8.5–10.1)
CHLORIDE SERPL-SCNC: 113 MMOL/L — HIGH (ref 96–108)
CO2 SERPL-SCNC: 28 MMOL/L — SIGNIFICANT CHANGE UP (ref 22–31)
CREAT SERPL-MCNC: 0.74 MG/DL — SIGNIFICANT CHANGE UP (ref 0.5–1.3)
EGFR: 76 ML/MIN/1.73M2 — SIGNIFICANT CHANGE UP
FERRITIN SERPL-MCNC: 36 NG/ML — SIGNIFICANT CHANGE UP (ref 13–330)
FOLATE SERPL-MCNC: >20 NG/ML — SIGNIFICANT CHANGE UP
GLUCOSE SERPL-MCNC: 109 MG/DL — HIGH (ref 70–99)
HCT VFR BLD CALC: 29.5 % — LOW (ref 34.5–45)
HGB BLD-MCNC: 9.1 G/DL — LOW (ref 11.5–15.5)
INR BLD: 1.99 RATIO — HIGH (ref 0.85–1.18)
IRON SATN MFR SERPL: 14 % — SIGNIFICANT CHANGE UP (ref 14–50)
IRON SATN MFR SERPL: 32 UG/DL — SIGNIFICANT CHANGE UP (ref 30–160)
MCHC RBC-ENTMCNC: 30.8 GM/DL — LOW (ref 32–36)
MCHC RBC-ENTMCNC: 37.1 PG — HIGH (ref 27–34)
MCV RBC AUTO: 120.4 FL — HIGH (ref 80–100)
NRBC # BLD: 0 /100 WBCS — SIGNIFICANT CHANGE UP (ref 0–0)
OB PNL STL: NEGATIVE — SIGNIFICANT CHANGE UP
PLATELET # BLD AUTO: 405 K/UL — HIGH (ref 150–400)
POTASSIUM SERPL-MCNC: 3.7 MMOL/L — SIGNIFICANT CHANGE UP (ref 3.5–5.3)
POTASSIUM SERPL-SCNC: 3.7 MMOL/L — SIGNIFICANT CHANGE UP (ref 3.5–5.3)
PROTHROM AB SERPL-ACNC: 22.8 SEC — HIGH (ref 9.5–13)
RBC # BLD: 2.45 M/UL — LOW (ref 3.8–5.2)
RBC # FLD: 17.9 % — HIGH (ref 10.3–14.5)
SODIUM SERPL-SCNC: 146 MMOL/L — HIGH (ref 135–145)
TIBC SERPL-MCNC: 234 UG/DL — SIGNIFICANT CHANGE UP (ref 220–430)
UIBC SERPL-MCNC: 202 UG/DL — SIGNIFICANT CHANGE UP (ref 110–370)
VIT B12 SERPL-MCNC: >2000 PG/ML — HIGH (ref 232–1245)
WBC # BLD: 4.36 K/UL — SIGNIFICANT CHANGE UP (ref 3.8–10.5)
WBC # FLD AUTO: 4.36 K/UL — SIGNIFICANT CHANGE UP (ref 3.8–10.5)

## 2023-08-04 RX ORDER — WARFARIN SODIUM 2.5 MG/1
1 TABLET ORAL ONCE
Refills: 0 | Status: COMPLETED | OUTPATIENT
Start: 2023-08-04 | End: 2023-08-04

## 2023-08-04 RX ORDER — FERROUS SULFATE 325(65) MG
325 TABLET ORAL DAILY
Refills: 0 | Status: DISCONTINUED | OUTPATIENT
Start: 2023-08-04 | End: 2023-08-08

## 2023-08-04 RX ORDER — WARFARIN SODIUM 2.5 MG/1
1 TABLET ORAL AT BEDTIME
Refills: 0 | Status: DISCONTINUED | OUTPATIENT
Start: 2023-08-04 | End: 2023-08-04

## 2023-08-04 RX ADMIN — PANTOPRAZOLE SODIUM 40 MILLIGRAM(S): 20 TABLET, DELAYED RELEASE ORAL at 12:00

## 2023-08-04 RX ADMIN — SODIUM CHLORIDE 50 MILLILITER(S): 9 INJECTION, SOLUTION INTRAVENOUS at 06:11

## 2023-08-04 RX ADMIN — HYDROXYUREA 500 MILLIGRAM(S): 500 CAPSULE ORAL at 05:23

## 2023-08-04 RX ADMIN — Medication 650 MILLIGRAM(S): at 21:51

## 2023-08-04 RX ADMIN — GABAPENTIN 300 MILLIGRAM(S): 400 CAPSULE ORAL at 05:23

## 2023-08-04 RX ADMIN — WARFARIN SODIUM 1 MILLIGRAM(S): 2.5 TABLET ORAL at 21:51

## 2023-08-04 RX ADMIN — GABAPENTIN 300 MILLIGRAM(S): 400 CAPSULE ORAL at 21:51

## 2023-08-04 RX ADMIN — Medication 650 MILLIGRAM(S): at 22:25

## 2023-08-04 RX ADMIN — Medication 25 MICROGRAM(S): at 23:30

## 2023-08-04 RX ADMIN — LIDOCAINE 2 PATCH: 4 CREAM TOPICAL at 12:01

## 2023-08-04 RX ADMIN — Medication 650 MILLIGRAM(S): at 05:23

## 2023-08-04 RX ADMIN — SENNA PLUS 1 TABLET(S): 8.6 TABLET ORAL at 21:51

## 2023-08-04 RX ADMIN — PIPERACILLIN AND TAZOBACTAM 25 GRAM(S): 4; .5 INJECTION, POWDER, LYOPHILIZED, FOR SOLUTION INTRAVENOUS at 05:24

## 2023-08-04 RX ADMIN — LIDOCAINE 2 PATCH: 4 CREAM TOPICAL at 19:30

## 2023-08-04 RX ADMIN — Medication 650 MILLIGRAM(S): at 06:23

## 2023-08-04 RX ADMIN — PIPERACILLIN AND TAZOBACTAM 25 GRAM(S): 4; .5 INJECTION, POWDER, LYOPHILIZED, FOR SOLUTION INTRAVENOUS at 13:26

## 2023-08-04 RX ADMIN — Medication 1 TABLET(S): at 05:24

## 2023-08-04 RX ADMIN — ESCITALOPRAM OXALATE 10 MILLIGRAM(S): 10 TABLET, FILM COATED ORAL at 21:51

## 2023-08-04 RX ADMIN — Medication 25 MILLIGRAM(S): at 05:24

## 2023-08-04 RX ADMIN — GABAPENTIN 300 MILLIGRAM(S): 400 CAPSULE ORAL at 13:27

## 2023-08-04 RX ADMIN — AMLODIPINE BESYLATE 5 MILLIGRAM(S): 2.5 TABLET ORAL at 05:24

## 2023-08-04 RX ADMIN — PIPERACILLIN AND TAZOBACTAM 25 GRAM(S): 4; .5 INJECTION, POWDER, LYOPHILIZED, FOR SOLUTION INTRAVENOUS at 21:52

## 2023-08-04 RX ADMIN — Medication 650 MILLIGRAM(S): at 13:27

## 2023-08-04 NOTE — PROGRESS NOTE ADULT - SUBJECTIVE AND OBJECTIVE BOX
Physical Medicine and Rehabilitation Subsequent Evaluation    Seen in followup for functional deficits, pain mgmt    Seen and examined at bedside, patients pain seems to be better controlled. Functional eval pending formally.    Medical studies/laboratory studies reviewed, including CBC CMP -- there is a worsening of anemia, no leukocytosis, and intact renal function.    The patient was seen and examined at bedside. Complains of lower extremity pain, but not a reliable historian. Remains confused, agitated.    ROS:  Constitutional: Denies fevers or chills  MSK: LE pain    Medications: reviewed    Physical Exam:   Vitals reviewed    Constitutional: Gen: In no acute distress, not fully cooperative with exam and questioning due to AMS/agitation/confusion  Neuro: Sensation intact to light touch in peripheral upper and lower extremities  MSK: LE wound covered with bandage no drainage or foul smell, tenderness to palpation lower extremity musculature, no tenderness to bony prominences about the knee, greater trochs, malleoli, 5th base metatarsals  Psychiatric: Awake, oriented only to self

## 2023-08-04 NOTE — PROGRESS NOTE ADULT - ASSESSMENT
89-year-old female presents the emergency room with chief complaint of altered mental status.  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     ams  op  oa  ataxic gait  cva hx  AF  HLD  HTN  CAD  PVD  STSI    PMR and HEME eval noted  on ABX  vs noted  will reach out to son to discuss GOC   89-year-old female presents the emergency room with chief complaint of altered mental status.  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     ams  op  oa  ataxic gait  cva hx  AF  HLD  HTN  CAD  PVD  STSI    PMR and HEME eval noted  on ABX  vs noted  left a message for the son this am

## 2023-08-04 NOTE — PROGRESS NOTE ADULT - SUBJECTIVE AND OBJECTIVE BOX
Date/Time Patient Seen:  		  Referring MD:   Data Reviewed	       Patient is a 89y old  Female who presents with a chief complaint of AMS (03 Aug 2023 19:24)      Subjective/HPI     PAST MEDICAL & SURGICAL HISTORY:  Hypertension    CVA (cerebral vascular accident)    Depression    Constipation    Neuropathy    Constipation    Hyperlipidemia    Grade I diastolic dysfunction    Afib    Neuropathy    VHD (valvular heart disease)    Aortic valvar stenosis    No significant past surgical history          Medication list         MEDICATIONS  (STANDING):  acetaminophen     Tablet .. 650 milliGRAM(s) Oral every 8 hours  amLODIPine   Tablet 5 milliGRAM(s) Oral daily  cholecalciferol 2000 Unit(s) Oral daily  dextrose 5% + sodium chloride 0.45%. 1000 milliLiter(s) (50 mL/Hr) IV Continuous <Continuous>  escitalopram 10 milliGRAM(s) Oral at bedtime  folic acid 1 milliGRAM(s) Oral daily  gabapentin 300 milliGRAM(s) Oral three times a day  hydroxyurea 500 milliGRAM(s) Oral two times a day  lactobacillus acidophilus 1 Tablet(s) Oral every 12 hours  levothyroxine Injectable 25 MICROGram(s) IV Push at bedtime  lidocaine   4% Patch 2 Patch Transdermal daily  metoprolol tartrate 25 milliGRAM(s) Oral two times a day  pantoprazole  Injectable 40 milliGRAM(s) IV Push daily  piperacillin/tazobactam IVPB.. 3.375 Gram(s) IV Intermittent every 8 hours  senna 1 Tablet(s) Oral at bedtime  trimethoprim  160 mG/sulfamethoxazole 800 mG 1 Tablet(s) Oral two times a day    MEDICATIONS  (PRN):  acetaminophen  Suppository .. 650 milliGRAM(s) Rectal every 6 hours PRN Temp greater or equal to 38C (100.4F)  aluminum hydroxide/magnesium hydroxide/simethicone Suspension 30 milliLiter(s) Oral every 4 hours PRN Dyspepsia  ketorolac   Injectable 30 milliGRAM(s) IV Push every 6 hours PRN Moderate Pain (4 - 6)  LORazepam   Injectable 0.5 milliGRAM(s) IV Push every 8 hours PRN Agitation  melatonin 3 milliGRAM(s) Oral at bedtime PRN Insomnia  morphine  - Injectable 2 milliGRAM(s) IV Push every 4 hours PRN Severe Pain (7 - 10)  ondansetron Injectable 4 milliGRAM(s) IV Push every 8 hours PRN Nausea and/or Vomiting         Vitals log        ICU Vital Signs Last 24 Hrs  T(C): 36.6 (04 Aug 2023 05:06), Max: 37.2 (03 Aug 2023 20:01)  T(F): 97.8 (04 Aug 2023 05:06), Max: 99 (03 Aug 2023 20:01)  HR: 78 (04 Aug 2023 05:06) (78 - 98)  BP: 132/66 (04 Aug 2023 05:06) (111/70 - 132/66)  BP(mean): --  ABP: --  ABP(mean): --  RR: 18 (04 Aug 2023 05:06) (17 - 18)  SpO2: 94% (04 Aug 2023 05:06) (94% - 96%)    O2 Parameters below as of 04 Aug 2023 05:06  Patient On (Oxygen Delivery Method): room air                 Input and Output:  I&O's Detail    03 Aug 2023 07:01  -  04 Aug 2023 05:51  --------------------------------------------------------  IN:    dextrose 5% + sodium chloride 0.45%: 500 mL    IV PiggyBack: 100 mL    Oral Fluid: 240 mL  Total IN: 840 mL    OUT:    Voided (mL): 1500 mL  Total OUT: 1500 mL    Total NET: -660 mL          Lab Data                        10.8   x     )-----------( x        ( 03 Aug 2023 14:00 )             35.3     08-03    144  |  110<H>  |  11  ----------------------------<  99  3.4<L>   |  31  |  0.63    Ca    7.8<L>      03 Aug 2023 06:08    TPro  4.9<L>  /  Alb  2.0<L>  /  TBili  0.5  /  DBili  x   /  AST  16  /  ALT  12  /  AlkPhos  62  08-03            Review of Systems	      Objective     Physical Examination    heart s1s2  lung dec BS  head nc      Pertinent Lab findings & Imaging      Summer:  NO   Adequate UO     I&O's Detail    03 Aug 2023 07:01  -  04 Aug 2023 05:51  --------------------------------------------------------  IN:    dextrose 5% + sodium chloride 0.45%: 500 mL    IV PiggyBack: 100 mL    Oral Fluid: 240 mL  Total IN: 840 mL    OUT:    Voided (mL): 1500 mL  Total OUT: 1500 mL    Total NET: -660 mL               Discussed with:     Cultures:	        Radiology

## 2023-08-04 NOTE — PROGRESS NOTE ADULT - SUBJECTIVE AND OBJECTIVE BOX
PROGRESS NOTE  Patient is a 89y old  Female who presents with a chief complaint of AMS (04 Aug 2023 07:22)    Chart and available morning labs /imaging are reviewed electronically , urgent issues addressed . More information  is being added upon completion of rounds , when more information is collected and management discussed with consultants , medical staff and social service/case management on the floor   OVERNIGHT      HPI:  Patient is a 89-year-old female presents the emergency room with chief complaint of altered mental status.  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  Patient presents from assisted living with altered mental status concern for UTI.  Patient was last admitted to the hospital June 26 through June 30, 2023 was altered mental status x2 weeks.  Was ultimately diagnosed with acute metabolic encephalopathy secondary to UTI improved.  Patient stabilized and was ultimately discharged back to assisted living.  Presents today lethargic with urinary frequency and confusion. Son reports that there is also concern for possible cellulitis to the LLE at the site of a skin graft. Pt recently completed a course of po abx for this with no significant improvement. She has been c/o severe pain to the LLE for weeks. Seen by vascular consult , xrays obtained -found to have severe OA AND PVD ,pain management started Palliative care consult requested ,to discuss advance directives and complete MOLST  (02 Aug 2023 09:19)    PAST MEDICAL & SURGICAL HISTORY:  Hypertension      CVA (cerebral vascular accident)      Depression      Constipation      Neuropathy      Hyperlipidemia      Grade I diastolic dysfunction      Afib      Neuropathy      VHD (valvular heart disease)      Aortic valvar stenosis      No significant past surgical history          MEDICATIONS  (STANDING):  acetaminophen     Tablet .. 650 milliGRAM(s) Oral every 8 hours  amLODIPine   Tablet 5 milliGRAM(s) Oral daily  cholecalciferol 2000 Unit(s) Oral daily  dextrose 5% + sodium chloride 0.45%. 1000 milliLiter(s) (50 mL/Hr) IV Continuous <Continuous>  escitalopram 10 milliGRAM(s) Oral at bedtime  folic acid 1 milliGRAM(s) Oral daily  gabapentin 300 milliGRAM(s) Oral three times a day  hydroxyurea 500 milliGRAM(s) Oral two times a day  lactobacillus acidophilus 1 Tablet(s) Oral every 12 hours  levothyroxine Injectable 25 MICROGram(s) IV Push at bedtime  lidocaine   4% Patch 2 Patch Transdermal daily  metoprolol tartrate 25 milliGRAM(s) Oral two times a day  pantoprazole  Injectable 40 milliGRAM(s) IV Push daily  piperacillin/tazobactam IVPB.. 3.375 Gram(s) IV Intermittent every 8 hours  senna 1 Tablet(s) Oral at bedtime  trimethoprim  160 mG/sulfamethoxazole 800 mG 1 Tablet(s) Oral two times a day  warfarin 1 milliGRAM(s) Oral at bedtime    MEDICATIONS  (PRN):  aluminum hydroxide/magnesium hydroxide/simethicone Suspension 30 milliLiter(s) Oral every 4 hours PRN Dyspepsia  ketorolac   Injectable 30 milliGRAM(s) IV Push every 6 hours PRN Moderate Pain (4 - 6)  LORazepam   Injectable 0.5 milliGRAM(s) IV Push every 8 hours PRN Agitation  melatonin 3 milliGRAM(s) Oral at bedtime PRN Insomnia  morphine  - Injectable 2 milliGRAM(s) IV Push every 4 hours PRN Severe Pain (7 - 10)  ondansetron Injectable 4 milliGRAM(s) IV Push every 8 hours PRN Nausea and/or Vomiting      OBJECTIVE    T(C): 36.1 (08-04-23 @ 11:15), Max: 37.2 (08-03-23 @ 20:01)  HR: 60 (08-04-23 @ 11:15) (60 - 98)  BP: 93/64 (08-04-23 @ 11:15) (93/64 - 132/66)  RR: 18 (08-04-23 @ 11:15) (17 - 18)  SpO2: 98% (08-04-23 @ 11:15) (94% - 98%)  Wt(kg): --  I&O's Summary    03 Aug 2023 07:01  -  04 Aug 2023 07:00  --------------------------------------------------------  IN: 940 mL / OUT: 1500 mL / NET: -560 mL          REVIEW OF SYSTEMS:  CONSTITUTIONAL: No fever, weight loss, or fatigue  EYES: No eye pain, visual disturbances, or discharge  ENMT:   No sinus or throat pain  NECK: No pain or stiffness  RESPIRATORY: No cough, wheezing, chills or hemoptysis; No shortness of breath  CARDIOVASCULAR: No chest pain, palpitations, dizziness, or leg swelling  GASTROINTESTINAL: No abdominal pain. No nausea, vomiting; No diarrhea or constipation. No melena or hematochezia.  GENITOURINARY: No dysuria, frequency, hematuria, or incontinence  NEUROLOGICAL: No headaches, memory loss, loss of strength, numbness, or tremors  SKIN: No itching, burning, rashes, or lesions   MUSCULOSKELETAL: No joint pain or swelling; No muscle, back, or extremity pain    PHYSICAL EXAM:  Appearance: NAD. VS past 24 hrs -as above   HEENT:   Moist oral mucosa. Conjunctiva clear b/l.   Neck : supple  Respiratory: Lungs CTAB.  Gastrointestinal:  Soft, nontender. No rebound. No rigidity. BS present	  Cardiovascular: RRR ,S1S2 present  Neurologic: Non-focal. Moving all extremities.  Extremities: No edema. No erythema. No calf tenderness.  Skin: No rashes, No ecchymoses, No cyanosis.	  wounds ,skin lesions-See skin assesment flow sheet   LABS:                        9.1    4.36  )-----------( 405      ( 04 Aug 2023 06:44 )             29.5     08-04    146<H>  |  113<H>  |  9   ----------------------------<  109<H>  3.7   |  28  |  0.74    Ca    8.0<L>      04 Aug 2023 06:44    TPro  4.9<L>  /  Alb  2.0<L>  /  TBili  0.5  /  DBili  x   /  AST  16  /  ALT  12  /  AlkPhos  62  08-03    CAPILLARY BLOOD GLUCOSE        PT/INR - ( 04 Aug 2023 06:44 )   PT: 22.8 sec;   INR: 1.99 ratio           Urinalysis Basic - ( 04 Aug 2023 06:44 )    Color: x / Appearance: x / SG: x / pH: x  Gluc: 109 mg/dL / Ketone: x  / Bili: x / Urobili: x   Blood: x / Protein: x / Nitrite: x   Leuk Esterase: x / RBC: x / WBC x   Sq Epi: x / Non Sq Epi: x / Bacteria: x        Culture - Blood (collected 01 Aug 2023 18:38)  Source: .Blood Blood-Venous  Preliminary Report (04 Aug 2023 02:02):    No growth at 48 Hours    Culture - Blood (collected 01 Aug 2023 18:30)  Source: .Blood Blood-Venous  Preliminary Report (04 Aug 2023 02:02):    No growth at 48 Hours    Culture - Urine (collected 01 Aug 2023 18:00)  Source: Clean Catch Clean Catch (Midstream)  Preliminary Report (03 Aug 2023 23:00):    10,000 - 49,000 CFU/mL Gram Negative Rods      RADIOLOGY & ADDITIONAL TESTS:   reviewed elctronically  ASSESSMENT/PLAN: 	     PROGRESS NOTE  Patient is a 89y old  Female who presents with a chief complaint of AMS (04 Aug 2023 07:22)    Chart and available morning labs /imaging are reviewed electronically , urgent issues addressed . More information  is being added upon completion of rounds , when more information is collected and management discussed with consultants , medical staff and social service/case management on the floor   OVERNIGHT  No new issues reported by medical staff . All above noted Patient is resting in a bed comfortably .Confused ,poor mentation .No distress noted     HPI:  Patient is a 89-year-old female presents the emergency room with chief complaint of altered mental status.  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  Patient presents from assisted living with altered mental status concern for UTI.  Patient was last admitted to the hospital June 26 through June 30, 2023 was altered mental status x2 weeks.  Was ultimately diagnosed with acute metabolic encephalopathy secondary to UTI improved.  Patient stabilized and was ultimately discharged back to assisted living.  Presents today lethargic with urinary frequency and confusion. Son reports that there is also concern for possible cellulitis to the LLE at the site of a skin graft. Pt recently completed a course of po abx for this with no significant improvement. She has been c/o severe pain to the LLE for weeks. Seen by vascular consult , xrays obtained -found to have severe OA AND PVD ,pain management started Palliative care consult requested ,to discuss advance directives and complete MOLST  (02 Aug 2023 09:19)    PAST MEDICAL & SURGICAL HISTORY:  Hypertension      CVA (cerebral vascular accident)      Depression      Constipation      Neuropathy      Hyperlipidemia      Grade I diastolic dysfunction      Afib      Neuropathy      VHD (valvular heart disease)      Aortic valvar stenosis      No significant past surgical history          MEDICATIONS  (STANDING):  acetaminophen     Tablet .. 650 milliGRAM(s) Oral every 8 hours  amLODIPine   Tablet 5 milliGRAM(s) Oral daily  cholecalciferol 2000 Unit(s) Oral daily  dextrose 5% + sodium chloride 0.45%. 1000 milliLiter(s) (50 mL/Hr) IV Continuous <Continuous>  escitalopram 10 milliGRAM(s) Oral at bedtime  folic acid 1 milliGRAM(s) Oral daily  gabapentin 300 milliGRAM(s) Oral three times a day  hydroxyurea 500 milliGRAM(s) Oral two times a day  lactobacillus acidophilus 1 Tablet(s) Oral every 12 hours  levothyroxine Injectable 25 MICROGram(s) IV Push at bedtime  lidocaine   4% Patch 2 Patch Transdermal daily  metoprolol tartrate 25 milliGRAM(s) Oral two times a day  pantoprazole  Injectable 40 milliGRAM(s) IV Push daily  piperacillin/tazobactam IVPB.. 3.375 Gram(s) IV Intermittent every 8 hours  senna 1 Tablet(s) Oral at bedtime  trimethoprim  160 mG/sulfamethoxazole 800 mG 1 Tablet(s) Oral two times a day  warfarin 1 milliGRAM(s) Oral at bedtime    MEDICATIONS  (PRN):  aluminum hydroxide/magnesium hydroxide/simethicone Suspension 30 milliLiter(s) Oral every 4 hours PRN Dyspepsia  ketorolac   Injectable 30 milliGRAM(s) IV Push every 6 hours PRN Moderate Pain (4 - 6)  LORazepam   Injectable 0.5 milliGRAM(s) IV Push every 8 hours PRN Agitation  melatonin 3 milliGRAM(s) Oral at bedtime PRN Insomnia  morphine  - Injectable 2 milliGRAM(s) IV Push every 4 hours PRN Severe Pain (7 - 10)  ondansetron Injectable 4 milliGRAM(s) IV Push every 8 hours PRN Nausea and/or Vomiting      OBJECTIVE    T(C): 36.1 (08-04-23 @ 11:15), Max: 37.2 (08-03-23 @ 20:01)  HR: 60 (08-04-23 @ 11:15) (60 - 98)  BP: 93/64 (08-04-23 @ 11:15) (93/64 - 132/66)  RR: 18 (08-04-23 @ 11:15) (17 - 18)  SpO2: 98% (08-04-23 @ 11:15) (94% - 98%)  Wt(kg): --  I&O's Summary    03 Aug 2023 07:01  -  04 Aug 2023 07:00  --------------------------------------------------------  IN: 940 mL / OUT: 1500 mL / NET: -560 mL          REVIEW OF SYSTEMS:  Patient is  unable to provide any information/ROS  due to baseline mental status.   PHYSICAL EXAM:  Appearance: NAD. VS past 24 hrs -as above   HEENT:   Moist oral mucosa. Conjunctiva clear b/l.   Neck : supple  Respiratory: Lungs CTAB.  Gastrointestinal:  Soft, nontender. No rebound. No rigidity. BS present	  Cardiovascular: RRR ,S1S2 present  Neurologic: Non-focal. Moving all extremities.  Extremities: No edema. No erythema. No calf tenderness.  Skin: No rashes, No ecchymoses, No cyanosis.	  wounds ,skin lesions-See skin assesment flow sheet   LABS:                        9.1    4.36  )-----------( 405      ( 04 Aug 2023 06:44 )             29.5     08-04    146<H>  |  113<H>  |  9   ----------------------------<  109<H>  3.7   |  28  |  0.74    Ca    8.0<L>      04 Aug 2023 06:44    TPro  4.9<L>  /  Alb  2.0<L>  /  TBili  0.5  /  DBili  x   /  AST  16  /  ALT  12  /  AlkPhos  62  08-03    CAPILLARY BLOOD GLUCOSE        PT/INR - ( 04 Aug 2023 06:44 )   PT: 22.8 sec;   INR: 1.99 ratio           Urinalysis Basic - ( 04 Aug 2023 06:44 )    Color: x / Appearance: x / SG: x / pH: x  Gluc: 109 mg/dL / Ketone: x  / Bili: x / Urobili: x   Blood: x / Protein: x / Nitrite: x   Leuk Esterase: x / RBC: x / WBC x   Sq Epi: x / Non Sq Epi: x / Bacteria: x        Culture - Blood (collected 01 Aug 2023 18:38)  Source: .Blood Blood-Venous  Preliminary Report (04 Aug 2023 02:02):    No growth at 48 Hours    Culture - Blood (collected 01 Aug 2023 18:30)  Source: .Blood Blood-Venous  Preliminary Report (04 Aug 2023 02:02):    No growth at 48 Hours    Culture - Urine (collected 01 Aug 2023 18:00)  Source: Clean Catch Clean Catch (Midstream)  Preliminary Report (03 Aug 2023 23:00):    10,000 - 49,000 CFU/mL Gram Negative Rods      RADIOLOGY & ADDITIONAL TESTS:   reviewed elctronically  ASSESSMENT/PLAN: 	    25 minutes aggregate time was spent on this visit, 50% visit time spent in care co-ordination with other attendings and counselling patient .I have discussed care plan with patient / HCP/family member ,who expressed understanding of problems treatment and their effect and side effects, alternatives in details. I have asked if they have any questions and concerns and appropriately addressed them to best of my ability. ACP-Advance care planning was discussed , pallitaive care issues ,CMO ,GOC ,MOLST  form ,advance directives were reviewed .All questions were answered to the best of my knowledge - 25 m

## 2023-08-04 NOTE — PROGRESS NOTE ADULT - ASSESSMENT
A/P 89y year old Female with LE pain bilaterally due to OA, PVD    Revised regimen is being tolerated   No adverse effects noted  Continue current management  Functional eval pending.    Primary team notes are reviewed  Laboratory studies reviewed including those mentioned earlier/above  Discussed management/coordinated care with primary team/referring provider.  High risk of morbidity from treatment with schedule II controlled substance medication    25 minutes spent on eval  ¦ preparing to see the patient   ¦ performing a medically appropriate examination and/or evaluation   ¦ referring and communicating with other health care professionals  ¦ documenting clinical information in the electronic or other health record

## 2023-08-04 NOTE — PROGRESS NOTE ADULT - SUBJECTIVE AND OBJECTIVE BOX
Neurology follow up note    REJI [id number]      Interval History:    Patient feels ok no new complaints.    Allergies    per son &quot;issues with certain anitibiotics&quot; does not remember the names (Unknown)    Intolerances        MEDICATIONS    acetaminophen     Tablet .. 650 milliGRAM(s) Oral every 8 hours  aluminum hydroxide/magnesium hydroxide/simethicone Suspension 30 milliLiter(s) Oral every 4 hours PRN  amLODIPine   Tablet 5 milliGRAM(s) Oral daily  cholecalciferol 2000 Unit(s) Oral daily  dextrose 5% + sodium chloride 0.45%. 1000 milliLiter(s) IV Continuous <Continuous>  escitalopram 10 milliGRAM(s) Oral at bedtime  folic acid 1 milliGRAM(s) Oral daily  gabapentin 300 milliGRAM(s) Oral three times a day  hydroxyurea 500 milliGRAM(s) Oral two times a day  ketorolac   Injectable 30 milliGRAM(s) IV Push every 6 hours PRN  lactobacillus acidophilus 1 Tablet(s) Oral every 12 hours  levothyroxine Injectable 25 MICROGram(s) IV Push at bedtime  lidocaine   4% Patch 2 Patch Transdermal daily  LORazepam   Injectable 0.5 milliGRAM(s) IV Push every 8 hours PRN  melatonin 3 milliGRAM(s) Oral at bedtime PRN  metoprolol tartrate 25 milliGRAM(s) Oral two times a day  morphine  - Injectable 2 milliGRAM(s) IV Push every 4 hours PRN  ondansetron Injectable 4 milliGRAM(s) IV Push every 8 hours PRN  pantoprazole  Injectable 40 milliGRAM(s) IV Push daily  piperacillin/tazobactam IVPB.. 3.375 Gram(s) IV Intermittent every 8 hours  senna 1 Tablet(s) Oral at bedtime  trimethoprim  160 mG/sulfamethoxazole 800 mG 1 Tablet(s) Oral two times a day              Vital Signs Last 24 Hrs  T(C): 36.6 (04 Aug 2023 05:06), Max: 37.2 (03 Aug 2023 20:01)  T(F): 97.8 (04 Aug 2023 05:06), Max: 99 (03 Aug 2023 20:01)  HR: 78 (04 Aug 2023 05:06) (78 - 98)  BP: 132/66 (04 Aug 2023 05:06) (111/70 - 132/66)  BP(mean): --  RR: 18 (04 Aug 2023 05:06) (17 - 18)  SpO2: 94% (04 Aug 2023 05:06) (94% - 96%)    Parameters below as of 04 Aug 2023 05:06  Patient On (Oxygen Delivery Method): room air    REVIEW OF SYSTEMS:  Could not be obtained from the patient secondary to the patient would just scream when touched but does have a history of underlying baseline dementia, but is able to hold a conversation, just forgetful, able to talk.    PHYSICAL EXAMINATION:   HEENT:  Head:  Normocephalic and atraumatic.  Eyes:  No scleral icterus.  Ears:  Hearing hard to evaluate secondary to the patient would just scream, but as per my conversation with son, intact.  NECK:  Supple.  RESPIRATORY:  Air entry bilaterally.  CARDIOVASCULAR:  S1 and S2 heard.  ABDOMEN:  Soft and nontender.  EXTREMITIES:  No clubbing was noted.      NEUROLOGIC:  The patient is was resting in bed.  The patient will keep her eyes closed, would not verbalize.  Upon touching the patient, she would scream.  Motor examination is completely limited.  From previous examinations, the patient motor, right upper and right lower 0/5, left upper was 4/5, left lower was 3/5.      LABS:  CBC Full  -  ( 04 Aug 2023 06:44 )  WBC Count : 4.36 K/uL  RBC Count : 2.45 M/uL  Hemoglobin : 9.1 g/dL  Hematocrit : 29.5 %  Platelet Count - Automated : 405 K/uL  Mean Cell Volume : 120.4 fl  Mean Cell Hemoglobin : 37.1 pg  Mean Cell Hemoglobin Concentration : 30.8 gm/dL  Auto Neutrophil # : x  Auto Lymphocyte # : x  Auto Monocyte # : x  Auto Eosinophil # : x  Auto Basophil # : x  Auto Neutrophil % : x  Auto Lymphocyte % : x  Auto Monocyte % : x  Auto Eosinophil % : x  Auto Basophil % : x    Urinalysis Basic - ( 04 Aug 2023 06:44 )    Color: x / Appearance: x / SG: x / pH: x  Gluc: 109 mg/dL / Ketone: x  / Bili: x / Urobili: x   Blood: x / Protein: x / Nitrite: x   Leuk Esterase: x / RBC: x / WBC x   Sq Epi: x / Non Sq Epi: x / Bacteria: x      08-04    146<H>  |  113<H>  |  9   ----------------------------<  109<H>  3.7   |  28  |  0.74    Ca    8.0<L>      04 Aug 2023 06:44    TPro  4.9<L>  /  Alb  2.0<L>  /  TBili  0.5  /  DBili  x   /  AST  16  /  ALT  12  /  AlkPhos  62  08-03    Hemoglobin A1C:     LIVER FUNCTIONS - ( 03 Aug 2023 06:08 )  Alb: 2.0 g/dL / Pro: 4.9 g/dL / ALK PHOS: 62 U/L / ALT: 12 U/L / AST: 16 U/L / GGT: x           Vitamin B12 Vitamin B12, Serum: >2000 pg/mL (08-03 @ 20:19)  Vitamin B12, Serum: >2000 pg/mL (08-03 @ 14:00)    PT/INR - ( 04 Aug 2023 06:44 )   PT: 22.8 sec;   INR: 1.99 ratio               RADIOLOGY      ANALYSIS AND PLAN:  This is a 89-year-old with altered mental status and pain.  For episode of altered mental status, suspect most likely secondary to dementia becoming more prominent in the hospital setting, also suspect that as per my conversation with son, when the patient does start to have increasing pain, she does become a little bit more disoriented and she refuses her pain medications which worsens her cognitive and expressive abilities.  Suspect even though examination is severely limited, this is secondary to new cerebrovascular accident.  For history of hypothyroidism, continue the patient on Synthroid.  For history of hypertension, monitor systolic blood pressure.  For history of cerebrovascular accident with atrial fibrillation, continue anticoagulation with goal INR between 2 and 3.  For neuropathy, peripheral vascular disease, continue the patient on her baclofen, gabapentin, and tramadol.  As per my conversation with son, that combination appears to keep her pain at acceptable levels, but we will also get Pain Management to see if any other recommendations can be given.  For history of hyperlipidemia, continue the patient on her statin.  For mild cognitive impairment, supportive therapy.    Spoke with sonCurtis, at [phone] 8/3    Greater than 45 minutes of time was spent with the patient, plan of care, reviewing data, with greater than 50% of the visit was spent counseling and/or coordinating care with multidisciplinary healthcare team       Neurology follow up note    REJI [id number]      Interval History:    Patient resting in bed     Allergies    per son &quot;issues with certain anitibiotics&quot; does not remember the names (Unknown)    Intolerances        MEDICATIONS    acetaminophen     Tablet .. 650 milliGRAM(s) Oral every 8 hours  aluminum hydroxide/magnesium hydroxide/simethicone Suspension 30 milliLiter(s) Oral every 4 hours PRN  amLODIPine   Tablet 5 milliGRAM(s) Oral daily  cholecalciferol 2000 Unit(s) Oral daily  dextrose 5% + sodium chloride 0.45%. 1000 milliLiter(s) IV Continuous <Continuous>  escitalopram 10 milliGRAM(s) Oral at bedtime  folic acid 1 milliGRAM(s) Oral daily  gabapentin 300 milliGRAM(s) Oral three times a day  hydroxyurea 500 milliGRAM(s) Oral two times a day  ketorolac   Injectable 30 milliGRAM(s) IV Push every 6 hours PRN  lactobacillus acidophilus 1 Tablet(s) Oral every 12 hours  levothyroxine Injectable 25 MICROGram(s) IV Push at bedtime  lidocaine   4% Patch 2 Patch Transdermal daily  LORazepam   Injectable 0.5 milliGRAM(s) IV Push every 8 hours PRN  melatonin 3 milliGRAM(s) Oral at bedtime PRN  metoprolol tartrate 25 milliGRAM(s) Oral two times a day  morphine  - Injectable 2 milliGRAM(s) IV Push every 4 hours PRN  ondansetron Injectable 4 milliGRAM(s) IV Push every 8 hours PRN  pantoprazole  Injectable 40 milliGRAM(s) IV Push daily  piperacillin/tazobactam IVPB.. 3.375 Gram(s) IV Intermittent every 8 hours  senna 1 Tablet(s) Oral at bedtime  trimethoprim  160 mG/sulfamethoxazole 800 mG 1 Tablet(s) Oral two times a day              Vital Signs Last 24 Hrs  T(C): 36.6 (04 Aug 2023 05:06), Max: 37.2 (03 Aug 2023 20:01)  T(F): 97.8 (04 Aug 2023 05:06), Max: 99 (03 Aug 2023 20:01)  HR: 78 (04 Aug 2023 05:06) (78 - 98)  BP: 132/66 (04 Aug 2023 05:06) (111/70 - 132/66)  BP(mean): --  RR: 18 (04 Aug 2023 05:06) (17 - 18)  SpO2: 94% (04 Aug 2023 05:06) (94% - 96%)    Parameters below as of 04 Aug 2023 05:06  Patient On (Oxygen Delivery Method): room air    REVIEW OF SYSTEMS:  Could not be obtained from the patient secondary to the patient would just scream when touched but does have a history of underlying baseline dementia, but is able to hold a conversation, just forgetful, able to talk.    PHYSICAL EXAMINATION:   HEENT:  Head:  Normocephalic and atraumatic.  Eyes:  No scleral icterus.  Ears:  Hearing hard to evaluate secondary to the patient would just scream, but as per my conversation with son, intact.  NECK:  Supple.  RESPIRATORY:  Air entry bilaterally.  CARDIOVASCULAR:  S1 and S2 heard.  ABDOMEN:  Soft and nontender.  EXTREMITIES:  No clubbing was noted.      NEUROLOGIC:  The patient is was resting in bed.  The patient will keep her eyes closed, would not verbalize.  Upon touching the patient, she would scream.  Motor examination is completely limited.  From previous examinations, the patient motor, right upper and right lower 0/5, left upper was 4/5, left lower was 3/5.      LABS:  CBC Full  -  ( 04 Aug 2023 06:44 )  WBC Count : 4.36 K/uL  RBC Count : 2.45 M/uL  Hemoglobin : 9.1 g/dL  Hematocrit : 29.5 %  Platelet Count - Automated : 405 K/uL  Mean Cell Volume : 120.4 fl  Mean Cell Hemoglobin : 37.1 pg  Mean Cell Hemoglobin Concentration : 30.8 gm/dL  Auto Neutrophil # : x  Auto Lymphocyte # : x  Auto Monocyte # : x  Auto Eosinophil # : x  Auto Basophil # : x  Auto Neutrophil % : x  Auto Lymphocyte % : x  Auto Monocyte % : x  Auto Eosinophil % : x  Auto Basophil % : x    Urinalysis Basic - ( 04 Aug 2023 06:44 )    Color: x / Appearance: x / SG: x / pH: x  Gluc: 109 mg/dL / Ketone: x  / Bili: x / Urobili: x   Blood: x / Protein: x / Nitrite: x   Leuk Esterase: x / RBC: x / WBC x   Sq Epi: x / Non Sq Epi: x / Bacteria: x      08-04    146<H>  |  113<H>  |  9   ----------------------------<  109<H>  3.7   |  28  |  0.74    Ca    8.0<L>      04 Aug 2023 06:44    TPro  4.9<L>  /  Alb  2.0<L>  /  TBili  0.5  /  DBili  x   /  AST  16  /  ALT  12  /  AlkPhos  62  08-03    Hemoglobin A1C:     LIVER FUNCTIONS - ( 03 Aug 2023 06:08 )  Alb: 2.0 g/dL / Pro: 4.9 g/dL / ALK PHOS: 62 U/L / ALT: 12 U/L / AST: 16 U/L / GGT: x           Vitamin B12 Vitamin B12, Serum: >2000 pg/mL (08-03 @ 20:19)  Vitamin B12, Serum: >2000 pg/mL (08-03 @ 14:00)    PT/INR - ( 04 Aug 2023 06:44 )   PT: 22.8 sec;   INR: 1.99 ratio               RADIOLOGY      ANALYSIS AND PLAN:  This is a 89-year-old with altered mental status and pain.  For episode of altered mental status, suspect most likely secondary to dementia becoming more prominent in the hospital setting, also suspect that as per my conversation with son, when the patient does start to have increasing pain, she does become a little bit more disoriented and she refuses her pain medications which worsens her cognitive and expressive abilities.  Suspect even though examination is severely limited, this is secondary to new cerebrovascular accident.  For history of hypothyroidism, continue the patient on Synthroid.  For history of hypertension, monitor systolic blood pressure.  For history of cerebrovascular accident with atrial fibrillation, continue anticoagulation with goal INR between 2 and 3.  For neuropathy, peripheral vascular disease, continue the patient on her , gabapentin, and tramadol.  As per my conversation with son, that combination appears to keep her pain at acceptable levels, but we will also get Pain Management to see if any other recommendations can be given.  For history of hyperlipidemia, continue the patient on her statin.  For mild cognitive impairment, supportive therapy.  pain management appreciated  spoke to son it appears toward the evening is more awake -- and it appears yesterday she was more interactive and friendly then she is normally    DR BENSON  WILL BE COVERING STARTING TOMORROW     Spoke with son, Curtis, at [phone] 8/4    Greater than 45 minutes of time was spent with the patient, plan of care, reviewing data, with greater than 50% of the visit was spent counseling and/or coordinating care with multidisciplinary healthcare team

## 2023-08-04 NOTE — PROVIDER CONTACT NOTE (OTHER) - ACTION/TREATMENT ORDERED:
Dr Adler stated she will review the patient's chart Dr Adler stated she will review the patient's chart. addendum 8/4 0420 per Dr Adler labs already  done on june 29th.

## 2023-08-04 NOTE — PROGRESS NOTE ADULT - SUBJECTIVE AND OBJECTIVE BOX
All interim records and events noted.    asleep, comfortable      MEDICATIONS  (STANDING):  acetaminophen     Tablet .. 650 milliGRAM(s) Oral every 8 hours  amLODIPine   Tablet 5 milliGRAM(s) Oral daily  cholecalciferol 2000 Unit(s) Oral daily  dextrose 5% + sodium chloride 0.45%. 1000 milliLiter(s) (50 mL/Hr) IV Continuous <Continuous>  escitalopram 10 milliGRAM(s) Oral at bedtime  folic acid 1 milliGRAM(s) Oral daily  gabapentin 300 milliGRAM(s) Oral three times a day  hydroxyurea 500 milliGRAM(s) Oral two times a day  lactobacillus acidophilus 1 Tablet(s) Oral every 12 hours  levothyroxine Injectable 25 MICROGram(s) IV Push at bedtime  lidocaine   4% Patch 2 Patch Transdermal daily  metoprolol tartrate 25 milliGRAM(s) Oral two times a day  pantoprazole  Injectable 40 milliGRAM(s) IV Push daily  piperacillin/tazobactam IVPB.. 3.375 Gram(s) IV Intermittent every 8 hours  senna 1 Tablet(s) Oral at bedtime  trimethoprim  160 mG/sulfamethoxazole 800 mG 1 Tablet(s) Oral two times a day  warfarin 1 milliGRAM(s) Oral once    MEDICATIONS  (PRN):  aluminum hydroxide/magnesium hydroxide/simethicone Suspension 30 milliLiter(s) Oral every 4 hours PRN Dyspepsia  ketorolac   Injectable 30 milliGRAM(s) IV Push every 6 hours PRN Moderate Pain (4 - 6)  LORazepam   Injectable 0.5 milliGRAM(s) IV Push every 8 hours PRN Agitation  melatonin 3 milliGRAM(s) Oral at bedtime PRN Insomnia  morphine  - Injectable 2 milliGRAM(s) IV Push every 4 hours PRN Severe Pain (7 - 10)  ondansetron Injectable 4 milliGRAM(s) IV Push every 8 hours PRN Nausea and/or Vomiting      Vital Signs Last 24 Hrs  T(C): 36.1 (04 Aug 2023 11:15), Max: 37.2 (03 Aug 2023 20:01)  T(F): 97 (04 Aug 2023 11:15), Max: 99 (03 Aug 2023 20:01)  HR: 60 (04 Aug 2023 11:15) (60 - 98)  BP: 93/64 (04 Aug 2023 11:15) (93/64 - 132/66)  BP(mean): --  RR: 18 (04 Aug 2023 11:15) (17 - 18)  SpO2: 98% (04 Aug 2023 11:15) (94% - 98%)    Parameters below as of 04 Aug 2023 11:15  Patient On (Oxygen Delivery Method): nasal cannula        PHYSICAL EXAM  General: frail elderly woman in bed, in no acute distress  Head: atraumatic, normocephalic  ENT: nose midline, buccal mucosa moist  Neck: supple, trachea midline  CV: S1 S2, regular rate and rhythm  Lungs: clear to auscultation, no wheezes/rhonchi  Abdomen: soft, nontender, bowel sounds present  Skin: no significant increased ecchymosis/petechiae        LABS:             9.1    4.36  )-----------( 405      ( 08-04 @ 06:44 )             29.5                10.8   x     )-----------( x        ( 08-03 @ 14:00 )             35.3                8.6    3.53  )-----------( 321      ( 08-03 @ 06:08 )             27.9                11.2   3.78  )-----------( 484      ( 08-01 @ 18:30 )             35.6       08-04    146<H>  |  113<H>  |  9   ----------------------------<  109<H>  3.7   |  28  |  0.74    Ca    8.0<L>      04 Aug 2023 06:44    TPro  4.9<L>  /  Alb  2.0<L>  /  TBili  0.5  /  DBili  x   /  AST  16  /  ALT  12  /  AlkPhos  62  08-03 08-04 @ 06:44  PT22.8 INR1.99  PTT--  08-03 @ 14:00  PT55.1 INR4.93  PTT--  08-03 @ 06:08  PT58.9 INR5.28  PTT--  08-01 @ 18:30  PT41.3 INR3.67  PTT56.4    iIron with Total Binding Capacity (08.03.23 @ 20:19)   Iron - Total Binding Capacity.: 234 ug/dL  % Saturation, Iron: 14 %  Iron Total: 32: Mild hemolysis. Results may be falsely elevated. ug/dL  Unsaturated Iron Binding Capacity: 202: Mild hemolysis. Results may be falsely elevated. ug/dLFerritin (08.03.23 @ 20:19)   Ferritin: 36 ng/mLVitamin B12, Serum (08.03.23 @ 20:19)   Vitamin B12, Serum: >2000 pg/mLFolate, Serum (08.03.23 @ 20:19)   Folate, Serum: >20.0 ng/mL    RADIOLOGY & ADDITIONAL STUDIES:  < from: CT Chest No Cont (08.02.23 @ 02:21) >    ACC: [id number] EXAM:  CT ABDOMEN AND PELVIS   ORDERED BY: YAHIR GALLAGHER     ACC: [id number] EXAM:  CT CHEST   ORDERED BY: YAHIR GALLAGHER     PROCEDURE DATE:  08/02/2023          INTERPRETATION:  CLINICAL INFORMATION: Shortness of breath, abdominal   pain and distention    COMPARISON: None.    CONTRAST/COMPLICATIONS:  IV Contrast: NONE  Oral Contrast: NONE  Complications: None reported at time of study completion    PROCEDURE:  CT of the Chest, Abdomen and Pelvis was performed.  [place] coronal reformats were performed.    FINDINGS:  CHEST:  LUNGS AND LARGE AIRWAYS: Patent central airways. Dependent atelectatic   changes at the lung bases.    PLEURA: No pleural effusion.  VESSELS: Coronary atherosclerosis.  HEART: Heart size is normal. No pericardial effusion.  MEDIASTINUM AND DENIS: No lymphadenopathy.  CHEST WALL AND LOWER NECK: Within normal limits.    ABDOMEN AND PELVIS:  Evaluation of the solid abdominal organs is limited without IV contrast.  LIVER: Within normal limits.  BILE DUCTS: Normal caliber.  GALLBLADDER: Within normal limits.  SPLEEN: Within normal limits.  PANCREAS: Redemonstration of a 2.3 cm cystic lesion in the pancreatic   head with mild peripheral calcification, unchanged since 2016.  ADRENALS: Withinnormal limits.  KIDNEYS/URETERS: Within normal limits.    BLADDER: Incompletely distended.  REPRODUCTIVE ORGANS: No pelvic masses. Small calcified fibroids.    BOWEL: Moderate hiatal hernia. No bowel obstruction. Appendix is not   visualized. Extensive sigmoid diverticulosis.  PERITONEUM: No ascites.  VESSELS: IVC filter in place. Moderate atherosclerotic vascular   calcification.  RETROPERITONEUM/LYMPH NODES: No lymphadenopathy.  ABDOMINAL WALL: Within normal limits.  BONES: Mild S-shaped scoliosis of the thoracolumbar spine.    IMPRESSION:  No acute findings in the chest, abdomen, or pelvis.    Moderate hiatal hernia.    --- End of Report ---    < end of copied text >      IMPRESSION/RECOMMENDATIONS:

## 2023-08-04 NOTE — PROGRESS NOTE ADULT - ASSESSMENT
[ASSESSMENT and  PLAN]  F05        Delirium  N39.0    UTI  D68.32  Coagulopathy due to coumadin  D63.8    Anemia chronic disease    Admitted with UTI  delirium  Coagulopathy due to Coumadin.   Rising INR and decline in Hgb.   on abx, PO intake poor.   Likely to have further increase in INR.     Anemia due to chronic disease  Prior B12, folate adequate.   Recheck labs    pancrease Cyst:   CT scan with Redemonstration of a 2.3 cm cystic lesion in the pancreatic head with mild peripheral calcification, unchanged since 2016.    No pelvic masses. Small calcified fibroids.    RECOMMENDATIONS    -supratherapeutc INR-post Vit K w today INR 1.99 , decr from ~5  -restart of anticoagulation as per medical need but be cautious while pt w poor po/on antibiotics, which can decr metabolism and and cause higher than expected INR again    -anemia-w/u w normal folate and B12. iron studies w low sat and low Ferritin but TIBC also only in 200s, still c/w a  degree of low iron, carlos w pt on anticoag. Will start oral iron one a day    -symptomatic management from Heme standpoint, follow serial CBC, INR, transfusion PRBC as clinically indicated    will sign off for now, please call if any questions [ASSESSMENT and  PLAN]  F05        Delirium  N39.0    UTI  D68.32  Coagulopathy due to coumadin  D63.8    Anemia chronic disease    Admitted with UTI  delirium  Coagulopathy due to Coumadin.   Rising INR and decline in Hgb.   on abx, PO intake poor.   Likely to have further increase in INR.     Anemia due to chronic disease  Prior B12, folate adequate.   Recheck labs    pancrease Cyst:   CT scan with Redemonstration of a 2.3 cm cystic lesion in the pancreatic head with mild peripheral calcification, unchanged since 2016.    No pelvic masses. Small calcified fibroids.    RECOMMENDATIONS    -supratherapeutc INR-post Vit K w today INR 1.99 , decr from ~5  -restart of anticoagulation as per medical need but be cautious while pt w poor po/on antibiotics, which can decr metabolism and and cause higher than expected INR again    -anemia-w/u w normal folate and B12. iron studies w low sat and low Ferritin but TIBC also only in 200s, still c/w a  degree of low iron, carlos w pt on anticoag. Will start oral iron one a day  -pt on Hydrea for?, may have dx of existing myeloproliferative ds, may be the reasn for the high MCV    -symptomatic management from Heme standpoint, follow serial CBC, INR, transfusion PRBC as clinically indicated    will sign off for now, please call if any questions

## 2023-08-04 NOTE — SOCIAL WORK PROGRESS NOTE - NSSWPROGRESSNOTE_GEN_ALL_CORE
EMEKA spoke with pt son Curtsi regarding outpatient hospice at [facility]. Per Curtis, he is unsure if he would like to pursue services at this time. EMEKA provided information for [facility] as [place] uses [facility] for hospice services. Curtis reports that he will further explore services independently and let EMEKA know prior to d/c if he would like referral to be sent. SW to follow and remain available for any needs.

## 2023-08-04 NOTE — PROGRESS NOTE ADULT - SUBJECTIVE AND OBJECTIVE BOX
Interval History:    CENTRAL LINE:   [  ] YES       [  ] NO  MUIR:                 [  ] YES       [  ] NO         REVIEW OF SYSTEMS:  All Systems below were reviewed and are negative [  ]  HEENT:  ID:  Pulmonary:  Cardiac:  GI:  Renal:  Musculoskeletal:  All other systems above were reviewed and are negative   [  ]      MEDICATIONS  (STANDING):  acetaminophen     Tablet .. 650 milliGRAM(s) Oral every 8 hours  amLODIPine   Tablet 5 milliGRAM(s) Oral daily  cholecalciferol 2000 Unit(s) Oral daily  dextrose 5% + sodium chloride 0.45%. 1000 milliLiter(s) (50 mL/Hr) IV Continuous <Continuous>  escitalopram 10 milliGRAM(s) Oral at bedtime  ferrous    sulfate 325 milliGRAM(s) Oral daily  folic acid 1 milliGRAM(s) Oral daily  gabapentin 300 milliGRAM(s) Oral three times a day  hydroxyurea 500 milliGRAM(s) Oral two times a day  lactobacillus acidophilus 1 Tablet(s) Oral every 12 hours  levothyroxine Injectable 25 MICROGram(s) IV Push at bedtime  lidocaine   4% Patch 2 Patch Transdermal daily  metoprolol tartrate 25 milliGRAM(s) Oral two times a day  pantoprazole  Injectable 40 milliGRAM(s) IV Push daily  piperacillin/tazobactam IVPB.. 3.375 Gram(s) IV Intermittent every 8 hours  senna 1 Tablet(s) Oral at bedtime  trimethoprim  160 mG/sulfamethoxazole 800 mG 1 Tablet(s) Oral two times a day  warfarin 1 milliGRAM(s) Oral once    MEDICATIONS  (PRN):  aluminum hydroxide/magnesium hydroxide/simethicone Suspension 30 milliLiter(s) Oral every 4 hours PRN Dyspepsia  ketorolac   Injectable 30 milliGRAM(s) IV Push every 6 hours PRN Moderate Pain (4 - 6)  LORazepam   Injectable 0.5 milliGRAM(s) IV Push every 8 hours PRN Agitation  melatonin 3 milliGRAM(s) Oral at bedtime PRN Insomnia  morphine  - Injectable 2 milliGRAM(s) IV Push every 4 hours PRN Severe Pain (7 - 10)  ondansetron Injectable 4 milliGRAM(s) IV Push every 8 hours PRN Nausea and/or Vomiting      Vital Signs Last 24 Hrs  T(C): 36.1 (04 Aug 2023 11:15), Max: 37.2 (03 Aug 2023 20:01)  T(F): 97 (04 Aug 2023 11:15), Max: 99 (03 Aug 2023 20:01)  HR: 60 (04 Aug 2023 11:15) (60 - 98)  BP: 93/64 (04 Aug 2023 11:15) (93/64 - 132/66)  BP(mean): --  RR: 18 (04 Aug 2023 11:15) (17 - 18)  SpO2: 98% (04 Aug 2023 11:15) (94% - 98%)    Parameters below as of 04 Aug 2023 11:15  Patient On (Oxygen Delivery Method): nasal cannula        I&O's Summary    03 Aug 2023 07:01  -  04 Aug 2023 07:00  --------------------------------------------------------  IN: 940 mL / OUT: 1500 mL / NET: -560 mL        PHYSICAL EXAM:  HEENT: NC/AT; PERRLA  Neck: Soft; no tenderness  Lungs: CTA bilaterally; no wheezing.   Heart:  Abdomen:  Genital/ Rectal:  Extremities:  Neurologic:  Vascular:      LABORATORY:    CBC Full  -  ( 04 Aug 2023 06:44 )  WBC Count : 4.36 K/uL  RBC Count : 2.45 M/uL  Hemoglobin : 9.1 g/dL  Hematocrit : 29.5 %  Platelet Count - Automated : 405 K/uL  Mean Cell Volume : 120.4 fl  Mean Cell Hemoglobin : 37.1 pg  Mean Cell Hemoglobin Concentration : 30.8 gm/dL  Auto Neutrophil # : x  Auto Lymphocyte # : x  Auto Monocyte # : x  Auto Eosinophil # : x  Auto Basophil # : x  Auto Neutrophil % : x  Auto Lymphocyte % : x  Auto Monocyte % : x  Auto Eosinophil % : x  Auto Basophil % : x          08-04    146<H>  |  113<H>  |  9   ----------------------------<  109<H>  3.7   |  28  |  0.74    Ca    8.0<L>      04 Aug 2023 06:44    TPro  4.9<L>  /  Alb  2.0<L>  /  TBili  0.5  /  DBili  x   /  AST  16  /  ALT  12  /  AlkPhos  62  08-03      Rapid Respiratory Viral Panel Result        08-01 @ 22:40  Rapid RVP [facility]  Coronovirus --  Adenovirus --  Bordetella Pertussis --  Chlamydia Pneumonia --  Entero/Rhinovirus--  HKU1 Coronovirus --  HMPV Coronovirus --  Influenza A --  Influenza AH1 --  Influenza AH1 2009 --  Influenza AH3 --  Influenza B --  Mycoplasma Pneumoniae --  NL63 Coronovirus --  OC43 Coronovirus --  Parainfluenza 1 --  Parainfluenza 2 --  Parainfluenza 3 --  Parainfluenza 4 --  Resp Syncytial Virus --      Assessment and Plan:          Sushil Anguiano MD   [phone].  She is afebrile  Comfortable      MEDICATIONS  (STANDING):  acetaminophen     Tablet .. 650 milliGRAM(s) Oral every 8 hours  amLODIPine   Tablet 5 milliGRAM(s) Oral daily  cholecalciferol 2000 Unit(s) Oral daily  dextrose 5% + sodium chloride 0.45%. 1000 milliLiter(s) (50 mL/Hr) IV Continuous <Continuous>  escitalopram 10 milliGRAM(s) Oral at bedtime  ferrous    sulfate 325 milliGRAM(s) Oral daily  folic acid 1 milliGRAM(s) Oral daily  gabapentin 300 milliGRAM(s) Oral three times a day  hydroxyurea 500 milliGRAM(s) Oral two times a day  lactobacillus acidophilus 1 Tablet(s) Oral every 12 hours  levothyroxine Injectable 25 MICROGram(s) IV Push at bedtime  lidocaine   4% Patch 2 Patch Transdermal daily  metoprolol tartrate 25 milliGRAM(s) Oral two times a day  pantoprazole  Injectable 40 milliGRAM(s) IV Push daily  piperacillin/tazobactam IVPB.. 3.375 Gram(s) IV Intermittent every 8 hours  senna 1 Tablet(s) Oral at bedtime  trimethoprim  160 mG/sulfamethoxazole 800 mG 1 Tablet(s) Oral two times a day  warfarin 1 milliGRAM(s) Oral once    MEDICATIONS  (PRN):  aluminum hydroxide/magnesium hydroxide/simethicone Suspension 30 milliLiter(s) Oral every 4 hours PRN Dyspepsia  ketorolac   Injectable 30 milliGRAM(s) IV Push every 6 hours PRN Moderate Pain (4 - 6)  LORazepam   Injectable 0.5 milliGRAM(s) IV Push every 8 hours PRN Agitation  melatonin 3 milliGRAM(s) Oral at bedtime PRN Insomnia  morphine  - Injectable 2 milliGRAM(s) IV Push every 4 hours PRN Severe Pain (7 - 10)  ondansetron Injectable 4 milliGRAM(s) IV Push every 8 hours PRN Nausea and/or Vomiting      Vital Signs Last 24 Hrs  T(C): 36.1 (04 Aug 2023 11:15), Max: 37.2 (03 Aug 2023 20:01)  T(F): 97 (04 Aug 2023 11:15), Max: 99 (03 Aug 2023 20:01)  HR: 60 (04 Aug 2023 11:15) (60 - 98)  BP: 93/64 (04 Aug 2023 11:15) (93/64 - 132/66)  BP(mean): --  RR: 18 (04 Aug 2023 11:15) (17 - 18)  SpO2: 98% (04 Aug 2023 11:15) (94% - 98%)    Parameters below as of 04 Aug 2023 11:15  Patient On (Oxygen Delivery Method): nasal cannula        I&O's Summary    03 Aug 2023 07:01  -  04 Aug 2023 07:00  --------------------------------------------------------  IN: 940 mL / OUT: 1500 mL / NET: -560 mL        PHYSICAL EXAM:  HEENT: NC/AT; PERRLA  Neck: Soft; no tenderness  Lungs: CTA bilaterally; no wheezing.   Heart:  Abdomen:  Genital/ Rectal:  Extremities: LLE ulcer with decreasing swelling and erythema.   Neurologic:  Vascular:      LABORATORY:    CBC Full  -  ( 04 Aug 2023 06:44 )  WBC Count : 4.36 K/uL  RBC Count : 2.45 M/uL  Hemoglobin : 9.1 g/dL  Hematocrit : 29.5 %  Platelet Count - Automated : 405 K/uL  Mean Cell Volume : 120.4 fl  Mean Cell Hemoglobin : 37.1 pg  Mean Cell Hemoglobin Concentration : 30.8 gm/dL  Auto Neutrophil # : x  Auto Lymphocyte # : x  Auto Monocyte # : x  Auto Eosinophil # : x  Auto Basophil # : x  Auto Neutrophil % : x  Auto Lymphocyte % : x  Auto Monocyte % : x  Auto Eosinophil % : x  Auto Basophil % : x          08-04    146<H>  |  113<H>  |  9   ----------------------------<  109<H>  3.7   |  28  |  0.74    Ca    8.0<L>      04 Aug 2023 06:44    TPro  4.9<L>  /  Alb  2.0<L>  /  TBili  0.5  /  DBili  x   /  AST  16  /  ALT  12  /  AlkPhos  62  08-03      Rapid Respiratory Viral Panel Result        08-01 @ 22:40  Rapid RVP [facility]  Coronovirus --  Adenovirus --  Bordetella Pertussis --  Chlamydia Pneumonia --  Entero/Rhinovirus--  HKU1 Coronovirus --  HMPV Coronovirus --  Influenza A --  Influenza AH1 --  Influenza AH1 2009 --  Influenza AH3 --  Influenza B --  Mycoplasma Pneumoniae --  NL63 Coronovirus --  OC43 Coronovirus --  Parainfluenza 1 --  Parainfluenza 2 --  Parainfluenza 3 --  Parainfluenza 4 --  Resp Syncytial Virus --      Assessment and Plan:      1. LLE cellulitis with chronic wound.  2. Possible UTI.  3. Lethargy    . Continue iv Zosyn and  po Bactrim DS bid  . Follow all cultures.  . Wound care daily.      Sushil Anguiano MD   [phone].

## 2023-08-05 LAB
-  AMIKACIN: SIGNIFICANT CHANGE UP
-  AMIKACIN: SIGNIFICANT CHANGE UP
-  AMOXICILLIN/CLAVULANIC ACID: SIGNIFICANT CHANGE UP
-  AMOXICILLIN/CLAVULANIC ACID: SIGNIFICANT CHANGE UP
-  AMPICILLIN/SULBACTAM: SIGNIFICANT CHANGE UP
-  AMPICILLIN/SULBACTAM: SIGNIFICANT CHANGE UP
-  AMPICILLIN: SIGNIFICANT CHANGE UP
-  AMPICILLIN: SIGNIFICANT CHANGE UP
-  AZTREONAM: SIGNIFICANT CHANGE UP
-  AZTREONAM: SIGNIFICANT CHANGE UP
-  CEFAZOLIN: SIGNIFICANT CHANGE UP
-  CEFAZOLIN: SIGNIFICANT CHANGE UP
-  CEFEPIME: SIGNIFICANT CHANGE UP
-  CEFEPIME: SIGNIFICANT CHANGE UP
-  CEFOXITIN: SIGNIFICANT CHANGE UP
-  CEFOXITIN: SIGNIFICANT CHANGE UP
-  CEFTRIAXONE: SIGNIFICANT CHANGE UP
-  CEFTRIAXONE: SIGNIFICANT CHANGE UP
-  CEFUROXIME: SIGNIFICANT CHANGE UP
-  CEFUROXIME: SIGNIFICANT CHANGE UP
-  CIPROFLOXACIN: SIGNIFICANT CHANGE UP
-  CIPROFLOXACIN: SIGNIFICANT CHANGE UP
-  ERTAPENEM: SIGNIFICANT CHANGE UP
-  ERTAPENEM: SIGNIFICANT CHANGE UP
-  GENTAMICIN: SIGNIFICANT CHANGE UP
-  GENTAMICIN: SIGNIFICANT CHANGE UP
-  IMIPENEM: SIGNIFICANT CHANGE UP
-  LEVOFLOXACIN: SIGNIFICANT CHANGE UP
-  LEVOFLOXACIN: SIGNIFICANT CHANGE UP
-  MEROPENEM: SIGNIFICANT CHANGE UP
-  MEROPENEM: SIGNIFICANT CHANGE UP
-  NITROFURANTOIN: SIGNIFICANT CHANGE UP
-  NITROFURANTOIN: SIGNIFICANT CHANGE UP
-  PIPERACILLIN/TAZOBACTAM: SIGNIFICANT CHANGE UP
-  PIPERACILLIN/TAZOBACTAM: SIGNIFICANT CHANGE UP
-  TOBRAMYCIN: SIGNIFICANT CHANGE UP
-  TOBRAMYCIN: SIGNIFICANT CHANGE UP
-  TRIMETHOPRIM/SULFAMETHOXAZOLE: SIGNIFICANT CHANGE UP
-  TRIMETHOPRIM/SULFAMETHOXAZOLE: SIGNIFICANT CHANGE UP
ANION GAP SERPL CALC-SCNC: 4 MMOL/L — LOW (ref 5–17)
BUN SERPL-MCNC: 7 MG/DL — SIGNIFICANT CHANGE UP (ref 7–23)
CALCIUM SERPL-MCNC: 8.3 MG/DL — LOW (ref 8.5–10.1)
CHLORIDE SERPL-SCNC: 111 MMOL/L — HIGH (ref 96–108)
CO2 SERPL-SCNC: 29 MMOL/L — SIGNIFICANT CHANGE UP (ref 22–31)
CREAT SERPL-MCNC: 0.8 MG/DL — SIGNIFICANT CHANGE UP (ref 0.5–1.3)
CULTURE RESULTS: SIGNIFICANT CHANGE UP
EGFR: 70 ML/MIN/1.73M2 — SIGNIFICANT CHANGE UP
GLUCOSE SERPL-MCNC: 98 MG/DL — SIGNIFICANT CHANGE UP (ref 70–99)
HCT VFR BLD CALC: 31.6 % — LOW (ref 34.5–45)
HGB BLD-MCNC: 9.6 G/DL — LOW (ref 11.5–15.5)
INR BLD: 1.91 RATIO — HIGH (ref 0.85–1.18)
MCHC RBC-ENTMCNC: 30.4 GM/DL — LOW (ref 32–36)
MCHC RBC-ENTMCNC: 37.2 PG — HIGH (ref 27–34)
MCV RBC AUTO: 122.5 FL — HIGH (ref 80–100)
METHOD TYPE: SIGNIFICANT CHANGE UP
METHOD TYPE: SIGNIFICANT CHANGE UP
NRBC # BLD: 0 /100 WBCS — SIGNIFICANT CHANGE UP (ref 0–0)
ORGANISM # SPEC MICROSCOPIC CNT: SIGNIFICANT CHANGE UP
PLATELET # BLD AUTO: 480 K/UL — HIGH (ref 150–400)
POTASSIUM SERPL-MCNC: 4 MMOL/L — SIGNIFICANT CHANGE UP (ref 3.5–5.3)
POTASSIUM SERPL-SCNC: 4 MMOL/L — SIGNIFICANT CHANGE UP (ref 3.5–5.3)
PROTHROM AB SERPL-ACNC: 21.9 SEC — HIGH (ref 9.5–13)
RBC # BLD: 2.58 M/UL — LOW (ref 3.8–5.2)
RBC # FLD: 18.3 % — HIGH (ref 10.3–14.5)
SODIUM SERPL-SCNC: 144 MMOL/L — SIGNIFICANT CHANGE UP (ref 135–145)
SPECIMEN SOURCE: SIGNIFICANT CHANGE UP
WBC # BLD: 5.18 K/UL — SIGNIFICANT CHANGE UP (ref 3.8–10.5)
WBC # FLD AUTO: 5.18 K/UL — SIGNIFICANT CHANGE UP (ref 3.8–10.5)

## 2023-08-05 PROCEDURE — 99232 SBSQ HOSP IP/OBS MODERATE 35: CPT

## 2023-08-05 RX ORDER — WARFARIN SODIUM 2.5 MG/1
2 TABLET ORAL ONCE
Refills: 0 | Status: COMPLETED | OUTPATIENT
Start: 2023-08-05 | End: 2023-08-05

## 2023-08-05 RX ADMIN — Medication 2000 UNIT(S): at 12:48

## 2023-08-05 RX ADMIN — SODIUM CHLORIDE 50 MILLILITER(S): 9 INJECTION, SOLUTION INTRAVENOUS at 06:32

## 2023-08-05 RX ADMIN — Medication 1 TABLET(S): at 05:45

## 2023-08-05 RX ADMIN — HYDROXYUREA 500 MILLIGRAM(S): 500 CAPSULE ORAL at 22:40

## 2023-08-05 RX ADMIN — Medication 25 MICROGRAM(S): at 22:52

## 2023-08-05 RX ADMIN — Medication 1 TABLET(S): at 22:41

## 2023-08-05 RX ADMIN — Medication 650 MILLIGRAM(S): at 15:36

## 2023-08-05 RX ADMIN — GABAPENTIN 300 MILLIGRAM(S): 400 CAPSULE ORAL at 22:41

## 2023-08-05 RX ADMIN — Medication 1 MILLIGRAM(S): at 12:47

## 2023-08-05 RX ADMIN — Medication 325 MILLIGRAM(S): at 12:52

## 2023-08-05 RX ADMIN — Medication 650 MILLIGRAM(S): at 22:40

## 2023-08-05 RX ADMIN — ESCITALOPRAM OXALATE 10 MILLIGRAM(S): 10 TABLET, FILM COATED ORAL at 22:40

## 2023-08-05 RX ADMIN — PIPERACILLIN AND TAZOBACTAM 25 GRAM(S): 4; .5 INJECTION, POWDER, LYOPHILIZED, FOR SOLUTION INTRAVENOUS at 05:46

## 2023-08-05 RX ADMIN — GABAPENTIN 300 MILLIGRAM(S): 400 CAPSULE ORAL at 05:46

## 2023-08-05 RX ADMIN — WARFARIN SODIUM 2 MILLIGRAM(S): 2.5 TABLET ORAL at 22:40

## 2023-08-05 RX ADMIN — Medication 650 MILLIGRAM(S): at 06:09

## 2023-08-05 RX ADMIN — PANTOPRAZOLE SODIUM 40 MILLIGRAM(S): 20 TABLET, DELAYED RELEASE ORAL at 12:52

## 2023-08-05 RX ADMIN — AMLODIPINE BESYLATE 5 MILLIGRAM(S): 2.5 TABLET ORAL at 05:44

## 2023-08-05 RX ADMIN — MORPHINE SULFATE 2 MILLIGRAM(S): 50 CAPSULE, EXTENDED RELEASE ORAL at 22:40

## 2023-08-05 RX ADMIN — PIPERACILLIN AND TAZOBACTAM 25 GRAM(S): 4; .5 INJECTION, POWDER, LYOPHILIZED, FOR SOLUTION INTRAVENOUS at 22:52

## 2023-08-05 RX ADMIN — PIPERACILLIN AND TAZOBACTAM 25 GRAM(S): 4; .5 INJECTION, POWDER, LYOPHILIZED, FOR SOLUTION INTRAVENOUS at 13:13

## 2023-08-05 RX ADMIN — MORPHINE SULFATE 2 MILLIGRAM(S): 50 CAPSULE, EXTENDED RELEASE ORAL at 23:10

## 2023-08-05 RX ADMIN — Medication 650 MILLIGRAM(S): at 16:30

## 2023-08-05 RX ADMIN — LIDOCAINE 2 PATCH: 4 CREAM TOPICAL at 12:48

## 2023-08-05 RX ADMIN — LIDOCAINE 2 PATCH: 4 CREAM TOPICAL at 00:26

## 2023-08-05 RX ADMIN — GABAPENTIN 300 MILLIGRAM(S): 400 CAPSULE ORAL at 13:13

## 2023-08-05 RX ADMIN — HYDROXYUREA 500 MILLIGRAM(S): 500 CAPSULE ORAL at 05:45

## 2023-08-05 RX ADMIN — Medication 650 MILLIGRAM(S): at 05:45

## 2023-08-05 RX ADMIN — Medication 25 MILLIGRAM(S): at 05:45

## 2023-08-05 NOTE — PHYSICAL THERAPY INITIAL EVALUATION ADULT - SPECIFY REASON(S)
as per EMR, pt uses rajesh lift at [facility]. Pt is intermittently awake at time of eval and unable to maintain level of alertness as per EMR, pt uses rajesh lift at [facility]. Pt is intermittently awake at time of eval and unable to maintain level of alertness. Pt is not a candidate for skilled restorative PT- unable to make goals

## 2023-08-05 NOTE — PROGRESS NOTE ADULT - SUBJECTIVE AND OBJECTIVE BOX
Neurology Follow up note    REJI [id number]    HPI:  Patient is a 89-year-old female presents the emergency room with chief complaint of altered mental status.  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  Patient presents from assisted living with altered mental status concern for UTI.  Patient was last admitted to the hospital June 26 through June 30, 2023 was altered mental status x2 weeks.  Was ultimately diagnosed with acute metabolic encephalopathy secondary to UTI improved.  Patient stabilized and was ultimately discharged back to assisted living.  Presents today lethargic with urinary frequency and confusion. Son reports that there is also concern for possible cellulitis to the LLE at the site of a skin graft. Pt recently completed a course of po abx for this with no significant improvement. She has been c/o severe pain to the LLE for weeks. Seen by vascular consult , xrays obtained -found to have severe OA AND PVD ,pain management started Palliative care consult requested ,to discuss advance directives and complete MOLST  (02 Aug 2023 09:19)      Interval History - no new events    Patient is seen, chart was reviewed and case was discussed with the treatment team.  Pt is not in any distress.   Lying on bed comfortably.     Vital Signs Last 24 Hrs  T(C): 36.6 (05 Aug 2023 11:31), Max: 36.6 (04 Aug 2023 21:25)  T(F): 97.8 (05 Aug 2023 11:31), Max: 97.9 (04 Aug 2023 21:25)  HR: 75 (05 Aug 2023 11:31) (75 - 94)  BP: 131/69 (05 Aug 2023 11:31) (120/68 - 131/69)  BP(mean): --  RR: 18 (05 Aug 2023 11:31) (18 - 18)  SpO2: 96% (05 Aug 2023 11:31) (91% - 96%)    Parameters below as of 05 Aug 2023 11:31  Patient On (Oxygen Delivery Method): nasal cannula  O2 Flow (L/min): 2          REVIEW OF SYSTEMS:    Constitutional: No fever,  Eyes: No eye pain, visual disturbances, or discharge  ENT:  No difficulty hearing, tinnitus, vertigo; No sinus or throat pain  Neck: No pain or stiffness  Respiratory: No  wheezing, chills or hemoptysis  Cardiovascular: No chest pain, palpitations, shortness of breath, dizziness or leg swelling  Gastrointestinal: No abdominal or epigastric pain. No nausea, vomiting   Genitourinary: No dysuria, frequency, hematuria or incontinence  Neurological: No headaches,, loss of strength,   Psychiatric: No  mood swings or difficulty sleeping  Musculoskeletal: No joint pain or swelling;   Skin: No itching, burning, rashes or lesions   Lymph Nodes: No enlarged glands  Endocrine: No heat or cold intolerance; No hair loss,  Allergy and Immunologic: No hives or eczema    On Neurological Examination:    Mental Status - Pt is alert, awake, oriented X2  Follows commands well and able to answer questions appropriately  .Mood and affect  normal    Speech -  Normal.    Cranial Nerves - Pupils 3 mm equal and reactive to light, extraocular eye movements intact. Pt has no visual field deficit.  Pt has  right  facial asymmetry. Facial sensation is intact.Tongue - is in midline.    Muscle tone - is increased on right.      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 Pt withdraws all extremities equally on stimulation. No asymmetry seen.    Hemiataxia right/left.    coordination:    Finger to nose: normal-left  .    Deep tendon Reflexes - 2 plus all over.          Neck Supple -  Yes.     MEDICATIONS    acetaminophen     Tablet .. 650 milliGRAM(s) Oral every 8 hours  aluminum hydroxide/magnesium hydroxide/simethicone Suspension 30 milliLiter(s) Oral every 4 hours PRN  amLODIPine   Tablet 5 milliGRAM(s) Oral daily  cholecalciferol 2000 Unit(s) Oral daily  dextrose 5% + sodium chloride 0.45%. 1000 milliLiter(s) IV Continuous <Continuous>  escitalopram 10 milliGRAM(s) Oral at bedtime  ferrous    sulfate 325 milliGRAM(s) Oral daily  folic acid 1 milliGRAM(s) Oral daily  gabapentin 300 milliGRAM(s) Oral three times a day  hydroxyurea 500 milliGRAM(s) Oral two times a day  ketorolac   Injectable 30 milliGRAM(s) IV Push every 6 hours PRN  lactobacillus acidophilus 1 Tablet(s) Oral every 12 hours  levothyroxine Injectable 25 MICROGram(s) IV Push at bedtime  lidocaine   4% Patch 2 Patch Transdermal daily  LORazepam   Injectable 0.5 milliGRAM(s) IV Push every 8 hours PRN  melatonin 3 milliGRAM(s) Oral at bedtime PRN  metoprolol tartrate 25 milliGRAM(s) Oral two times a day  morphine  - Injectable 2 milliGRAM(s) IV Push every 4 hours PRN  ondansetron Injectable 4 milliGRAM(s) IV Push every 8 hours PRN  pantoprazole  Injectable 40 milliGRAM(s) IV Push daily  piperacillin/tazobactam IVPB.. 3.375 Gram(s) IV Intermittent every 8 hours  senna 1 Tablet(s) Oral at bedtime  trimethoprim  160 mG/sulfamethoxazole 800 mG 1 Tablet(s) Oral two times a day  warfarin 2 milliGRAM(s) Oral once      Allergies    per son &quot;issues with certain anitibiotics&quot; does not remember the names (Unknown)    Intolerances        LABS:  CBC Full  -  ( 05 Aug 2023 08:56 )  WBC Count : 5.18 K/uL  RBC Count : 2.58 M/uL  Hemoglobin : 9.6 g/dL  Hematocrit : 31.6 %  Platelet Count - Automated : 480 K/uL  Mean Cell Volume : 122.5 fl  Mean Cell Hemoglobin : 37.2 pg  Mean Cell Hemoglobin Concentration : 30.4 gm/dL  Auto Neutrophil # : x  Auto Lymphocyte # : x      Urinalysis Basic - ( 05 Aug 2023 08:56 )    Color: x / Appearance: x / SG: x / pH: x  Gluc: 98 mg/dL / Ketone: x  / Bili: x / Urobili: x   Blood: x / Protein: x / Nitrite: x   Leuk Esterase: x / RBC: x / WBC x   Sq Epi: x / Non Sq Epi: x / Bacteria: x      08-05    144  |  111<H>  |  7   ----------------------------<  98  4.0   |  29  |  0.80    Ca    8.3<L>      05 Aug 2023 08:56      Hemoglobin A1C:     Vitamin B12     RADIOLOGY    ASSESSMENT AND PLAN:      seen for ams  likely ME  DEMENTIA  Hx of CVA  AF  PN    continue AC  NEURONTIN FOR neuropathy  Physical therapy evaluation.  OOB to chair/ambulation with assistance only.  Pain is accessed and addressed.  Would continue to follow.

## 2023-08-05 NOTE — PROGRESS NOTE ADULT - SUBJECTIVE AND OBJECTIVE BOX
PROGRESS NOTE   Patient is a 89y old  Female who presents with a chief complaint of AMS (05 Aug 2023 05:26)      HPI:  Patient is a 89-year-old female presents the emergency room with chief complaint of altered mental status.  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  Patient presents from assisted living with altered mental status concern for UTI.  Patient was last admitted to the hospital June 26 through June 30, 2023 was altered mental status x2 weeks.  Was ultimately diagnosed with acute metabolic encephalopathy secondary to UTI improved.  Patient stabilized and was ultimately discharged back to assisted living.  Presents today lethargic with urinary frequency and confusion. Son reports that there is also concern for possible cellulitis to the LLE at the site of a skin graft. Pt recently completed a course of po abx for this with no significant improvement. She has been c/o severe pain to the LLE for weeks. Seen by vascular consult , xrays obtained -found to have severe OA AND PVD ,pain management started Palliative care consult requested ,to discuss advance directives and complete MOLST  (02 Aug 2023 09:19)      Vital Signs Last 24 Hrs  T(C): 36.6 (05 Aug 2023 11:31), Max: 36.6 (04 Aug 2023 21:25)  T(F): 97.8 (05 Aug 2023 11:31), Max: 97.9 (04 Aug 2023 21:25)  HR: 75 (05 Aug 2023 11:31) (75 - 94)  BP: 131/69 (05 Aug 2023 11:31) (120/68 - 131/69)  BP(mean): --  RR: 18 (05 Aug 2023 11:31) (18 - 18)  SpO2: 96% (05 Aug 2023 11:31) (91% - 96%)    Parameters below as of 05 Aug 2023 11:31  Patient On (Oxygen Delivery Method): nasal cannula  O2 Flow (L/min): 2                            9.6    5.18  )-----------( 480      ( 05 Aug 2023 08:56 )             31.6               08-05    144  |  111<H>  |  7   ----------------------------<  98  4.0   |  29  |  0.80    Ca    8.3<L>      05 Aug 2023 08:56        PHYSICAL EXAM  GEN: REJI BERGER is a pleasant well-nourished, well developed 89y Female in no acute distress, alert awake, and oriented to person, place and time.   LE Focused:  Vasular: DP/PT non palpable B/L, Capillary fill time >3 seconds B/L  Neuro: Protective sensation intact B/L  Msk: unable to perform  Derm: There is an anterior superficial lower left leg venous ulcer that has scabbed over, with noted minimal edema, no erythema, cellulitis resolved, no drainage, no mal odor, no purulence, xerosis.

## 2023-08-05 NOTE — PHYSICAL THERAPY INITIAL EVALUATION ADULT - PERTINENT HX OF CURRENT PROBLEM, REHAB EVAL
89 y/o female adm 8/2 from [facility] w/ 89 y/o female adm 8/2 from [facility] w/ altered mental status and increasing LLE pain. pt w/ h/o R sided weakness secondary to CVA. CT head: negative. CT chest: negative. CT L tib/fib: severe OA at knee, no fx. BLE arterial dopplers: severe stenosis.

## 2023-08-05 NOTE — PHYSICAL THERAPY INITIAL EVALUATION ADULT - ADDITIONAL COMMENTS
Pt is a poor historian and unable to provide history secondary to being intermittently awake and altered mental status As per EMR, pt resides at [facility] and was using rajesh lift for transfers.

## 2023-08-05 NOTE — PROGRESS NOTE ADULT - SUBJECTIVE AND OBJECTIVE BOX
[facility] [place]       HPI:  Patient is a 89-year-old female presents the emergency room with chief complaint of altered mental status.  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  Patient presents from assisted living with altered mental status concern for UTI.  Patient was last admitted to the hospital June 26 through June 30, 2023 was altered mental status x2 weeks.  Was ultimately diagnosed with acute metabolic encephalopathy secondary to UTI improved.  Patient stabilized and was ultimately discharged back to assisted living.  Presents today lethargic with urinary frequency and confusion. Son reports that there is also concern for possible cellulitis to the LLE at the site of a skin graft. Pt recently completed a course of po abx for this with no significant improvement. She has been c/o severe pain to the LLE for weeks. Seen by vascular consult , xrays obtained -found to have severe OA AND PVD ,pain management started Palliative care consult requested ,to discuss advance directives and complete MOLST  (02 Aug 2023 09:19)        SUBJECTIVE:      ALLERGIES:  Allergies    per son &quot;issues with certain anitibiotics&quot; does not remember the names (Unknown)    Intolerances          MEDICATIONS  (STANDING):  acetaminophen     Tablet .. 650 milliGRAM(s) Oral every 8 hours  amLODIPine   Tablet 5 milliGRAM(s) Oral daily  cholecalciferol 2000 Unit(s) Oral daily  dextrose 5% + sodium chloride 0.45%. 1000 milliLiter(s) (50 mL/Hr) IV Continuous <Continuous>  escitalopram 10 milliGRAM(s) Oral at bedtime  ferrous    sulfate 325 milliGRAM(s) Oral daily  folic acid 1 milliGRAM(s) Oral daily  gabapentin 300 milliGRAM(s) Oral three times a day  hydroxyurea 500 milliGRAM(s) Oral two times a day  lactobacillus acidophilus 1 Tablet(s) Oral every 12 hours  levothyroxine Injectable 25 MICROGram(s) IV Push at bedtime  lidocaine   4% Patch 2 Patch Transdermal daily  metoprolol tartrate 25 milliGRAM(s) Oral two times a day  pantoprazole  Injectable 40 milliGRAM(s) IV Push daily  piperacillin/tazobactam IVPB.. 3.375 Gram(s) IV Intermittent every 8 hours  senna 1 Tablet(s) Oral at bedtime  trimethoprim  160 mG/sulfamethoxazole 800 mG 1 Tablet(s) Oral two times a day    MEDICATIONS  (PRN):  aluminum hydroxide/magnesium hydroxide/simethicone Suspension 30 milliLiter(s) Oral every 4 hours PRN Dyspepsia  ketorolac   Injectable 30 milliGRAM(s) IV Push every 6 hours PRN Moderate Pain (4 - 6)  LORazepam   Injectable 0.5 milliGRAM(s) IV Push every 8 hours PRN Agitation  melatonin 3 milliGRAM(s) Oral at bedtime PRN Insomnia  morphine  - Injectable 2 milliGRAM(s) IV Push every 4 hours PRN Severe Pain (7 - 10)  ondansetron Injectable 4 milliGRAM(s) IV Push every 8 hours PRN Nausea and/or Vomiting      REVIEW OF SYSTEMS:  CONSTITUTIONAL: No fever,  RESPIRATORY: No cough, wheezing, shortness of breath  CARDIOVASCULAR: No chest pain, dyspnea, palpitations, dizziness, syncope, paroxysmal nocturnal dyspnea, orthopnea, or arm or leg swelling  GASTROINTESTINAL: No abdominal  or epigastric pain, nausea, vomiting,  diarrhea  NEUROLOGICAL: No headaches,  loss of strength, numbness, or tremors    Vital Signs Last 24 Hrs  T(C): 36.4 (06 Aug 2023 04:33), Max: 37.1 (05 Aug 2023 20:29)  T(F): 97.5 (06 Aug 2023 04:33), Max: 98.8 (05 Aug 2023 20:29)  HR: 80 (06 Aug 2023 04:33) (75 - 97)  BP: 127/83 (06 Aug 2023 04:33) (116/70 - 131/69)  BP(mean): --  RR: 19 (06 Aug 2023 04:33) (18 - 19)  SpO2: 96% (06 Aug 2023 04:33) (95% - 96%)    Parameters below as of 05 Aug 2023 20:29  Patient On (Oxygen Delivery Method): room air        PHYSICAL EXAM:  HEAD:  Atraumatic, Normocephalic  NECK: Supple and normal thyroid.  No JVD or carotid bruit.   HEART: S1, S2 regular , 1/6 soft ejection systolic murmur in mitral area , no thrill and no gallops .  PULMONARY: Bilateral vesicular breathing , few scattered ronchi and few scattered rales are present .  ABDOMEN: Soft nontender and positive bowl sounds   EXTREMITIES:  No clubbing, cyanosis, or pedal  edema  NEUROLOGICAL: AAOX3 , no focal deficit .    LABS:                        9.6    5.18  )-----------( 480      ( 05 Aug 2023 08:56 )             31.6     08-05    144  |  111<H>  |  7   ----------------------------<  98  4.0   |  29  |  0.80    Ca    8.3<L>      05 Aug 2023 08:56          PT/INR - ( 05 Aug 2023 08:56 )   PT: 21.9 sec;   INR: 1.91 ratio           Urinalysis Basic - ( 05 Aug 2023 08:56 )    Color: x / Appearance: x / SG: x / pH: x  Gluc: 98 mg/dL / Ketone: x  / Bili: x / Urobili: x   Blood: x / Protein: x / Nitrite: x   Leuk Esterase: x / RBC: x / WBC x   Sq Epi: x / Non Sq Epi: x / Bacteria: x      BNP      EKG:  ECHO:  IMAGING:    Assessment/Plan    Will continue to follow during hospital course and give further recommendations as needed . Thanks for your referral . if any questions please contact me at office ([phone] [phone])  [facility] [place]       HPI:  Patient is a 89-year-old female presents the emergency room with chief complaint of altered mental status.  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  Patient presents from assisted living with altered mental status concern for UTI.  Patient was last admitted to the hospital June 26 through June 30, 2023 was altered mental status x2 weeks.  Was ultimately diagnosed with acute metabolic encephalopathy secondary to UTI improved.  Patient stabilized and was ultimately discharged back to assisted living.  Presents today lethargic with urinary frequency and confusion. Son reports that there is also concern for possible cellulitis to the LLE at the site of a skin graft. Pt recently completed a course of po abx for this with no significant improvement. She has been c/o severe pain to the LLE for weeks. Seen by vascular consult , xrays obtained -found to have severe OA AND PVD ,pain management started Palliative care consult requested ,to discuss advance directives and complete MOLST  (02 Aug 2023 09:19)        SUBJECTIVE:      ALLERGIES:  Allergies    per son &quot;issues with certain anitibiotics&quot; does not remember the names (Unknown)    Intolerances          MEDICATIONS  (STANDING):  acetaminophen     Tablet .. 650 milliGRAM(s) Oral every 8 hours  amLODIPine   Tablet 5 milliGRAM(s) Oral daily  cholecalciferol 2000 Unit(s) Oral daily  dextrose 5% + sodium chloride 0.45%. 1000 milliLiter(s) (50 mL/Hr) IV Continuous <Continuous>  escitalopram 10 milliGRAM(s) Oral at bedtime  ferrous    sulfate 325 milliGRAM(s) Oral daily  folic acid 1 milliGRAM(s) Oral daily  gabapentin 300 milliGRAM(s) Oral three times a day  hydroxyurea 500 milliGRAM(s) Oral two times a day  lactobacillus acidophilus 1 Tablet(s) Oral every 12 hours  levothyroxine Injectable 25 MICROGram(s) IV Push at bedtime  lidocaine   4% Patch 2 Patch Transdermal daily  metoprolol tartrate 25 milliGRAM(s) Oral two times a day  pantoprazole  Injectable 40 milliGRAM(s) IV Push daily  piperacillin/tazobactam IVPB.. 3.375 Gram(s) IV Intermittent every 8 hours  senna 1 Tablet(s) Oral at bedtime  trimethoprim  160 mG/sulfamethoxazole 800 mG 1 Tablet(s) Oral two times a day    MEDICATIONS  (PRN):  aluminum hydroxide/magnesium hydroxide/simethicone Suspension 30 milliLiter(s) Oral every 4 hours PRN Dyspepsia  ketorolac   Injectable 30 milliGRAM(s) IV Push every 6 hours PRN Moderate Pain (4 - 6)  LORazepam   Injectable 0.5 milliGRAM(s) IV Push every 8 hours PRN Agitation  melatonin 3 milliGRAM(s) Oral at bedtime PRN Insomnia  morphine  - Injectable 2 milliGRAM(s) IV Push every 4 hours PRN Severe Pain (7 - 10)  ondansetron Injectable 4 milliGRAM(s) IV Push every 8 hours PRN Nausea and/or Vomiting      REVIEW OF SYSTEMS:  CONSTITUTIONAL: No fever,  RESPIRATORY: No cough, wheezing, shortness of breath  CARDIOVASCULAR: No chest pain, dyspnea, palpitations, dizziness, syncope, paroxysmal nocturnal dyspnea, orthopnea, or arm or leg swelling  GASTROINTESTINAL: No abdominal  or epigastric pain, nausea, vomiting,  diarrhea  NEUROLOGICAL: No headaches,  loss of strength, numbness, or tremors    Vital Signs Last 24 Hrs  T(C): 36.4 (06 Aug 2023 04:33), Max: 37.1 (05 Aug 2023 20:29)  T(F): 97.5 (06 Aug 2023 04:33), Max: 98.8 (05 Aug 2023 20:29)  HR: 80 (06 Aug 2023 04:33) (75 - 97)  BP: 127/83 (06 Aug 2023 04:33) (116/70 - 131/69)  BP(mean): --  RR: 19 (06 Aug 2023 04:33) (18 - 19)  SpO2: 96% (06 Aug 2023 04:33) (95% - 96%)    Parameters below as of 05 Aug 2023 20:29  Patient On (Oxygen Delivery Method): room air        PHYSICAL EXAM:  HEAD:  Atraumatic, Normocephalic  NECK: Supple and normal thyroid.  No JVD or carotid bruit.   HEART: S1, S2 regular , 1/6 soft ejection systolic murmur in mitral area , no thrill and no gallops .  PULMONARY: Bilateral vesicular breathing , few scattered ronchi and few scattered rales are present .  ABDOMEN: Soft nontender and positive bowl sounds   EXTREMITIES:  No clubbing, cyanosis, or pedal  edema  NEUROLOGICAL: AAOX3 , no focal deficit .    LABS:                        9.6    5.18  )-----------( 480      ( 05 Aug 2023 08:56 )             31.6     08-05    144  |  111<H>  |  7   ----------------------------<  98  4.0   |  29  |  0.80    Ca    8.3<L>      05 Aug 2023 08:56          PT/INR - ( 05 Aug 2023 08:56 )   PT: 21.9 sec;   INR: 1.91 ratio           Urinalysis Basic - ( 05 Aug 2023 08:56 )    Color: x / Appearance: x / SG: x / pH: x  Gluc: 98 mg/dL / Ketone: x  / Bili: x / Urobili: x   Blood: x / Protein: x / Nitrite: x   Leuk Esterase: x / RBC: x / WBC x   Sq Epi: x / Non Sq Epi: x / Bacteria: x      Assessment/Plan  Patient has :  1) Left lower extremity cellulitis .  2) UTI  3) Paroxysmal atrial fibrillation and stable .  4)  H/O hypertension and stable .  5) H/O CAD   6) Coagulopathy and no active bleeding   7) Anemia .  Plan : 1) I/V antibiotics as per ID 2) Monitor hemoglobin and electrolytes 3) Monitor PT and INR 4) Rest of medications as such . 5) Prognosis guarded .  Will continue to follow during hospital course and give further recommendations as needed . Thanks for your referral . if any questions please contact me at office ([phone] cell [phone])      JIM ARELLANO MD [place] [id number]  [address]  OFFICE : [phone]  CELL : [phone]  CARDIOLOGY F/U :       HPI:  Patient is a 89-year-old female presents the emergency room with chief complaint of altered mental status.  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  Patient presents from assisted living with altered mental status concern for UTI.  Patient was last admitted to the hospital June 26 through June 30, 2023 was altered mental status x2 weeks.  Was ultimately diagnosed with acute metabolic encephalopathy secondary to UTI improved.  Patient stabilized and was ultimately discharged back to assisted living.  Presents today lethargic with urinary frequency and confusion. Son reports that there is also concern for possible cellulitis to the LLE at the site of a skin graft. Pt recently completed a course of po abx for this with no significant improvement. She has been c/o severe pain to the LLE for weeks. Seen by vascular consult , xrays obtained -found to have severe OA AND PVD ,pain management started Palliative care consult requested ,to discuss advance directives and complete MOLST  (02 Aug 2023 09:19)        SUBJECTIVE: Patient feeling better .      ALLERGIES:  Allergies    per son &quot;issues with certain anitibiotics&quot; does not remember the names (Unknown)    Intolerances          MEDICATIONS  (STANDING):  acetaminophen     Tablet .. 650 milliGRAM(s) Oral every 8 hours  amLODIPine   Tablet 5 milliGRAM(s) Oral daily  cholecalciferol 2000 Unit(s) Oral daily  dextrose 5% + sodium chloride 0.45%. 1000 milliLiter(s) (50 mL/Hr) IV Continuous <Continuous>  escitalopram 10 milliGRAM(s) Oral at bedtime  ferrous    sulfate 325 milliGRAM(s) Oral daily  folic acid 1 milliGRAM(s) Oral daily  gabapentin 300 milliGRAM(s) Oral three times a day  hydroxyurea 500 milliGRAM(s) Oral two times a day  lactobacillus acidophilus 1 Tablet(s) Oral every 12 hours  levothyroxine Injectable 25 MICROGram(s) IV Push at bedtime  lidocaine   4% Patch 2 Patch Transdermal daily  metoprolol tartrate 25 milliGRAM(s) Oral two times a day  pantoprazole  Injectable 40 milliGRAM(s) IV Push daily  piperacillin/tazobactam IVPB.. 3.375 Gram(s) IV Intermittent every 8 hours  senna 1 Tablet(s) Oral at bedtime  trimethoprim  160 mG/sulfamethoxazole 800 mG 1 Tablet(s) Oral two times a day    MEDICATIONS  (PRN):  aluminum hydroxide/magnesium hydroxide/simethicone Suspension 30 milliLiter(s) Oral every 4 hours PRN Dyspepsia  ketorolac   Injectable 30 milliGRAM(s) IV Push every 6 hours PRN Moderate Pain (4 - 6)  LORazepam   Injectable 0.5 milliGRAM(s) IV Push every 8 hours PRN Agitation  melatonin 3 milliGRAM(s) Oral at bedtime PRN Insomnia  morphine  - Injectable 2 milliGRAM(s) IV Push every 4 hours PRN Severe Pain (7 - 10)  ondansetron Injectable 4 milliGRAM(s) IV Push every 8 hours PRN Nausea and/or Vomiting      REVIEW OF SYSTEMS:  CONSTITUTIONAL: No fever,  RESPIRATORY: No cough, wheezing, shortness of breath  CARDIOVASCULAR: No chest pain, dyspnea, palpitations, dizziness, syncope, paroxysmal nocturnal dyspnea, orthopnea, or arm or leg swelling  GASTROINTESTINAL: No abdominal  or epigastric pain, nausea, vomiting,  diarrhea  NEUROLOGICAL: No headaches,  loss of strength, numbness, or tremors    Vital Signs Last 24 Hrs  T(C): 36.4 (06 Aug 2023 04:33), Max: 37.1 (05 Aug 2023 20:29)  T(F): 97.5 (06 Aug 2023 04:33), Max: 98.8 (05 Aug 2023 20:29)  HR: 80 (06 Aug 2023 04:33) (75 - 97)  BP: 127/83 (06 Aug 2023 04:33) (116/70 - 131/69)  BP(mean): --  RR: 19 (06 Aug 2023 04:33) (18 - 19)  SpO2: 96% (06 Aug 2023 04:33) (95% - 96%)    Parameters below as of 05 Aug 2023 20:29  Patient On (Oxygen Delivery Method): room air        PHYSICAL EXAM:  HEAD:  Atraumatic, Normocephalic  NECK: Supple and normal thyroid.  No JVD or carotid bruit.   HEART: S1, S2 regular , 1/6 soft ejection systolic murmur in mitral area , no thrill and no gallops .  PULMONARY: Bilateral vesicular breathing , few scattered ronchi and few scattered rales are present .  ABDOMEN: Soft nontender and positive bowl sounds   EXTREMITIES:  No clubbing, cyanosis, or pedal  edema  NEUROLOGICAL: AAOX3 , no focal deficit .    LABS:                        9.6    5.18  )-----------( 480      ( 05 Aug 2023 08:56 )             31.6     08-05    144  |  111<H>  |  7   ----------------------------<  98  4.0   |  29  |  0.80    Ca    8.3<L>      05 Aug 2023 08:56          PT/INR - ( 05 Aug 2023 08:56 )   PT: 21.9 sec;   INR: 1.91 ratio           Urinalysis Basic - ( 05 Aug 2023 08:56 )    Color: x / Appearance: x / SG: x / pH: x  Gluc: 98 mg/dL / Ketone: x  / Bili: x / Urobili: x   Blood: x / Protein: x / Nitrite: x   Leuk Esterase: x / RBC: x / WBC x   Sq Epi: x / Non Sq Epi: x / Bacteria: x      Assessment/Plan  Patient has :  1) Left lower extremity cellulitis .  2) UTI  3) Paroxysmal atrial fibrillation and stable .  4)  H/O hypertension and stable .  5) H/O CAD   6) Coagulopathy and no active bleeding   7) Anemia .  Plan : 1) I/V antibiotics as per ID 2) Monitor hemoglobin and electrolytes 3) Monitor PT and INR 4) Rest of medications as such . 5) Prognosis guarded .  Will continue to follow during hospital course and give further recommendations as needed . Thanks for your referral . if any questions please contact me at office ([phone] cell [phone])      JIM ARELLANO MD [place] [id number]  [address]  OFFICE : [phone]  CELL : [phone]  CARDIOLOGY F/U :       HPI:  Patient is a 89-year-old female presents the emergency room with chief complaint of altered mental status.  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  Patient presents from assisted living with altered mental status concern for UTI.  Patient was last admitted to the hospital June 26 through June 30, 2023 was altered mental status x2 weeks.  Was ultimately diagnosed with acute metabolic encephalopathy secondary to UTI improved.  Patient stabilized and was ultimately discharged back to assisted living.  Presents today lethargic with urinary frequency and confusion. Son reports that there is also concern for possible cellulitis to the LLE at the site of a skin graft. Pt recently completed a course of po abx for this with no significant improvement. She has been c/o severe pain to the LLE for weeks. Seen by vascular consult , xrays obtained -found to have severe OA AND PVD ,pain management started Palliative care consult requested ,to discuss advance directives and complete MOLST  (02 Aug 2023 09:19)        SUBJECTIVE: Patient feeling better .      ALLERGIES:  Allergies    per son &quot;issues with certain anitibiotics&quot; does not remember the names (Unknown)    Intolerances          MEDICATIONS  (STANDING):  acetaminophen     Tablet .. 650 milliGRAM(s) Oral every 8 hours  amLODIPine   Tablet 5 milliGRAM(s) Oral daily  cholecalciferol 2000 Unit(s) Oral daily  dextrose 5% + sodium chloride 0.45%. 1000 milliLiter(s) (50 mL/Hr) IV Continuous <Continuous>  escitalopram 10 milliGRAM(s) Oral at bedtime  ferrous    sulfate 325 milliGRAM(s) Oral daily  folic acid 1 milliGRAM(s) Oral daily  gabapentin 300 milliGRAM(s) Oral three times a day  hydroxyurea 500 milliGRAM(s) Oral two times a day  lactobacillus acidophilus 1 Tablet(s) Oral every 12 hours  levothyroxine Injectable 25 MICROGram(s) IV Push at bedtime  lidocaine   4% Patch 2 Patch Transdermal daily  metoprolol tartrate 25 milliGRAM(s) Oral two times a day  pantoprazole  Injectable 40 milliGRAM(s) IV Push daily  piperacillin/tazobactam IVPB.. 3.375 Gram(s) IV Intermittent every 8 hours  senna 1 Tablet(s) Oral at bedtime  trimethoprim  160 mG/sulfamethoxazole 800 mG 1 Tablet(s) Oral two times a day    MEDICATIONS  (PRN):  aluminum hydroxide/magnesium hydroxide/simethicone Suspension 30 milliLiter(s) Oral every 4 hours PRN Dyspepsia  ketorolac   Injectable 30 milliGRAM(s) IV Push every 6 hours PRN Moderate Pain (4 - 6)  LORazepam   Injectable 0.5 milliGRAM(s) IV Push every 8 hours PRN Agitation  melatonin 3 milliGRAM(s) Oral at bedtime PRN Insomnia  morphine  - Injectable 2 milliGRAM(s) IV Push every 4 hours PRN Severe Pain (7 - 10)  ondansetron Injectable 4 milliGRAM(s) IV Push every 8 hours PRN Nausea and/or Vomiting      REVIEW OF SYSTEMS:  CONSTITUTIONAL: No fever,  RESPIRATORY: No cough, wheezing, shortness of breath  CARDIOVASCULAR: No chest pain, dyspnea, palpitations, dizziness, syncope, paroxysmal nocturnal dyspnea, orthopnea, or arm or leg swelling  GASTROINTESTINAL: No abdominal  or epigastric pain, nausea, vomiting,  diarrhea  NEUROLOGICAL: No headaches, numbness, or tremors . Mild right sided weakness present .  Skin : No itching .  Hematology : No active bleeding .  Endocrinology : No heat and cold intolerance .  Psychiatry : Patient is mild confused .  Genitourinary : No dysuria   Musculoskeletal : Patient has mild arthritis .    Vital Signs Last 24 Hrs  T(C): 36.4 (06 Aug 2023 04:33), Max: 37.1 (05 Aug 2023 20:29)  T(F): 97.5 (06 Aug 2023 04:33), Max: 98.8 (05 Aug 2023 20:29)  HR: 80 (06 Aug 2023 04:33) (75 - 97)  BP: 127/83 (06 Aug 2023 04:33) (116/70 - 131/69)  BP(mean): --  RR: 19 (06 Aug 2023 04:33) (18 - 19)  SpO2: 96% (06 Aug 2023 04:33) (95% - 96%)    Parameters below as of 05 Aug 2023 20:29  Patient On (Oxygen Delivery Method): room air        PHYSICAL EXAM:  HEAD:  Atraumatic, Normocephalic  NECK: Supple and normal thyroid.  No JVD or carotid bruit.   HEART: S1, S2 irregular , 1/6 soft ejection systolic murmur in mitral area , no thrill and no gallops .  PULMONARY: Bilateral vesicular breathing , few scattered rhonchi and few scattered rales are present .  ABDOMEN: Soft nontender and positive bowl sounds   EXTREMITIES:  No clubbing, cyanosis, and has mild  pedal  edema  NEUROLOGICAL: AA and mild confused  , no focal deficit .  Skin : No rashes .  Musculoskeletal : No joint swellings .    LABS:                        9.6    5.18  )-----------( 480      ( 05 Aug 2023 08:56 )             31.6     08-05    144  |  111<H>  |  7   ----------------------------<  98  4.0   |  29  |  0.80    Ca    8.3<L>      05 Aug 2023 08:56          PT/INR - ( 05 Aug 2023 08:56 )   PT: 21.9 sec;   INR: 1.91 ratio           Urinalysis Basic - ( 05 Aug 2023 08:56 )    Color: x / Appearance: x / SG: x / pH: x  Gluc: 98 mg/dL / Ketone: x  / Bili: x / Urobili: x   Blood: x / Protein: x / Nitrite: x   Leuk Esterase: x / RBC: x / WBC x   Sq Epi: x / Non Sq Epi: x / Bacteria: x      Assessment/Plan  Patient has :  1) Left lower extremity cellulitis .  2) UTI  3) Paroxysmal atrial fibrillation and stable .  4)  H/O hypertension and stable .  5) H/O CAD   6) Coagulopathy and no active bleeding and INR better .  7) Anemia .  Plan : 1) I/V antibiotics as per ID 2) Monitor hemoglobin and electrolytes 3) Monitor PT and INR 4) Rest of medications as such . 5) Prognosis guarded .  Will continue to follow during hospital course and give further recommendations as needed . Thanks for your referral . if any questions please contact me at office ([phone] cell [phone])

## 2023-08-05 NOTE — PROGRESS NOTE ADULT - PROBLEM SELECTOR PLAN 1
Patient seen and evaluated  Patient's wound was dressed with adaptic, 4x4 gauze, myra, tape  Ulcer has scabbed over  Rec cont abx per id  Will cont to follow in house and monitor for cellulitis.    Wound Care Instructions:  -Keep dressing clean, dry, and intact to the right foot  -Apply non adhesive adaptic, 4x4 gauze, abd pad, myra and change every other day   -Monitor for any signs of infection including redness, swelling in the leg above the bandage, nausea/vomiting/fever/chills/chest pain/shortness of breath, if any are present patient must report to the emergency department immediately  -Patient is to follow up with Dr. Girard/Dr. Gan/ Dr. Dougherty  within 5 days after discharge at [facility] [place]. Please call to make an appointment [phone] Patient seen and evaluated  Patient's wound was dressed with adaptic, 4x4 gauze, myra, tape  Ulcer has scabbed over  Rec cont abx per id  Will cont to follow in house and monitor for cellulitis.    Wound Care Instructions:  -Keep dressing clean, dry, and intact to the left leg  -Apply non adhesive adaptic, 4x4 gauze, abd pad, myra and change every other day   -Monitor for any signs of infection including redness, swelling in the leg above the bandage, nausea/vomiting/fever/chills/chest pain/shortness of breath, if any are present patient must report to the emergency department immediately  -Patient is to follow up with Dr. Girard/Dr. Gan/ Dr. Dougherty  within 5 days after discharge at [facility] [place]. Please call to make an appointment [phone]

## 2023-08-05 NOTE — PROGRESS NOTE ADULT - SUBJECTIVE AND OBJECTIVE BOX
PROGRESS NOTE  Patient is a 89y old  Female who presents with a chief complaint of AMS (05 Aug 2023 14:36)    Chart and available morning labs /imaging are reviewed electronically , urgent issues addressed . More information  is being added upon completion of rounds , when more information is collected and management discussed with consultants , medical staff and social service/case management on the floor   OVERNIGHT  No new issues reported by medical staff . All above noted Patient is resting in a bed comfortably .Confused ,poor mentation .No distress noted   More awake today ,better po intake   HPI:  Patient is a 89-year-old female presents the emergency room with chief complaint of altered mental status.  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  Patient presents from assisted living with altered mental status concern for UTI.  Patient was last admitted to the hospital June 26 through June 30, 2023 was altered mental status x2 weeks.  Was ultimately diagnosed with acute metabolic encephalopathy secondary to UTI improved.  Patient stabilized and was ultimately discharged back to assisted living.  Presents today lethargic with urinary frequency and confusion. Son reports that there is also concern for possible cellulitis to the LLE at the site of a skin graft. Pt recently completed a course of po abx for this with no significant improvement. She has been c/o severe pain to the LLE for weeks. Seen by vascular consult , xrays obtained -found to have severe OA AND PVD ,pain management started Palliative care consult requested ,to discuss advance directives and complete MOLST  (02 Aug 2023 09:19)    PAST MEDICAL & SURGICAL HISTORY:  Hypertension      CVA (cerebral vascular accident)      Depression      Constipation      Neuropathy      Hyperlipidemia      Grade I diastolic dysfunction      Afib      Neuropathy      VHD (valvular heart disease)      Aortic valvar stenosis      No significant past surgical history          MEDICATIONS  (STANDING):  acetaminophen     Tablet .. 650 milliGRAM(s) Oral every 8 hours  amLODIPine   Tablet 5 milliGRAM(s) Oral daily  cholecalciferol 2000 Unit(s) Oral daily  dextrose 5% + sodium chloride 0.45%. 1000 milliLiter(s) (50 mL/Hr) IV Continuous <Continuous>  escitalopram 10 milliGRAM(s) Oral at bedtime  ferrous    sulfate 325 milliGRAM(s) Oral daily  folic acid 1 milliGRAM(s) Oral daily  gabapentin 300 milliGRAM(s) Oral three times a day  hydroxyurea 500 milliGRAM(s) Oral two times a day  lactobacillus acidophilus 1 Tablet(s) Oral every 12 hours  levothyroxine Injectable 25 MICROGram(s) IV Push at bedtime  lidocaine   4% Patch 2 Patch Transdermal daily  metoprolol tartrate 25 milliGRAM(s) Oral two times a day  pantoprazole  Injectable 40 milliGRAM(s) IV Push daily  piperacillin/tazobactam IVPB.. 3.375 Gram(s) IV Intermittent every 8 hours  senna 1 Tablet(s) Oral at bedtime  trimethoprim  160 mG/sulfamethoxazole 800 mG 1 Tablet(s) Oral two times a day  warfarin 2 milliGRAM(s) Oral once    MEDICATIONS  (PRN):  aluminum hydroxide/magnesium hydroxide/simethicone Suspension 30 milliLiter(s) Oral every 4 hours PRN Dyspepsia  ketorolac   Injectable 30 milliGRAM(s) IV Push every 6 hours PRN Moderate Pain (4 - 6)  LORazepam   Injectable 0.5 milliGRAM(s) IV Push every 8 hours PRN Agitation  melatonin 3 milliGRAM(s) Oral at bedtime PRN Insomnia  morphine  - Injectable 2 milliGRAM(s) IV Push every 4 hours PRN Severe Pain (7 - 10)  ondansetron Injectable 4 milliGRAM(s) IV Push every 8 hours PRN Nausea and/or Vomiting      OBJECTIVE    T(C): 36.6 (08-05-23 @ 11:31), Max: 36.6 (08-04-23 @ 21:25)  HR: 75 (08-05-23 @ 11:31) (75 - 94)  BP: 131/69 (08-05-23 @ 11:31) (120/68 - 131/69)  RR: 18 (08-05-23 @ 11:31) (18 - 18)  SpO2: 96% (08-05-23 @ 11:31) (91% - 96%)  Wt(kg): --  I&O's Summary    04 Aug 2023 07:01  -  05 Aug 2023 07:00  --------------------------------------------------------  IN: 350 mL / OUT: 250 mL / NET: 100 mL    05 Aug 2023 07:01  -  05 Aug 2023 15:20  --------------------------------------------------------  IN: 300 mL / OUT: 175 mL / NET: 125 mL          REVIEW OF SYSTEMS:  Patient is  unable to provide any information/ROS  due to baseline mental status.     PHYSICAL EXAM:  Appearance: NAD. VS past 24 hrs -as above   HEENT:   Moist oral mucosa. Conjunctiva clear b/l.   Neck : supple  Respiratory: Lungs CTAB.  Gastrointestinal:  Soft, nontender. No rebound. No rigidity. BS present	  Cardiovascular: RRR ,S1S2 present  Neurologic: Non-focal. Moving all extremities.  Extremities: No edema. No erythema. No calf tenderness.  Skin: No rashes, No ecchymoses, No cyanosis.	  wounds ,skin lesions-See skin assesment flow sheet   LABS:                        9.6    5.18  )-----------( 480      ( 05 Aug 2023 08:56 )             31.6     08-05    144  |  111<H>  |  7   ----------------------------<  98  4.0   |  29  |  0.80    Ca    8.3<L>      05 Aug 2023 08:56      CAPILLARY BLOOD GLUCOSE        PT/INR - ( 05 Aug 2023 08:56 )   PT: 21.9 sec;   INR: 1.91 ratio           Urinalysis Basic - ( 05 Aug 2023 08:56 )    Color: x / Appearance: x / SG: x / pH: x  Gluc: 98 mg/dL / Ketone: x  / Bili: x / Urobili: x   Blood: x / Protein: x / Nitrite: x   Leuk Esterase: x / RBC: x / WBC x   Sq Epi: x / Non Sq Epi: x / Bacteria: x        Culture - Blood (collected 01 Aug 2023 18:38)  Source: .Blood Blood-Venous  Preliminary Report (05 Aug 2023 02:02):    No growth at 72 Hours    Culture - Blood (collected 01 Aug 2023 18:30)  Source: .Blood Blood-Venous  Preliminary Report (05 Aug 2023 02:02):    No growth at 72 Hours    Culture - Urine (collected 01 Aug 2023 18:00)  Source: Clean Catch Clean Catch (Midstream)  Final Report (05 Aug 2023 09:15):    10,000 - 49,000 CFU/mL Klebsiella pneumoniae    10,000 - 49,000 CFU/mL Proteus mirabilis  Organism: Klebsiella pneumoniae  Proteus mirabilis (05 Aug 2023 09:15)  Organism: Proteus mirabilis (05 Aug 2023 09:15)  Organism: Klebsiella pneumoniae (05 Aug 2023 09:15)      RADIOLOGY & ADDITIONAL TESTS:   reviewed elctronically  ASSESSMENT/PLAN: 	    25 minutes aggregate time was spent on this visit, 50% visit time spent in care co-ordination with other attendings and counselling patient .I have discussed care plan with patient / HCP/family member ,who expressed understanding of problems treatment and their effect and side effects, alternatives in details. I have asked if they have any questions and concerns and appropriately addressed them to best of my ability.

## 2023-08-05 NOTE — PROGRESS NOTE ADULT - SUBJECTIVE AND OBJECTIVE BOX
Date/Time Patient Seen:  		  Referring MD:   Data Reviewed	       Patient is a 89y old  Female who presents with a chief complaint of AMS (04 Aug 2023 19:11)      Subjective/HPI     PAST MEDICAL & SURGICAL HISTORY:  Hypertension    CVA (cerebral vascular accident)    Depression    Constipation    Neuropathy    Constipation    Hyperlipidemia    Grade I diastolic dysfunction    Afib    Neuropathy    VHD (valvular heart disease)    Aortic valvar stenosis    No significant past surgical history          Medication list         MEDICATIONS  (STANDING):  acetaminophen     Tablet .. 650 milliGRAM(s) Oral every 8 hours  amLODIPine   Tablet 5 milliGRAM(s) Oral daily  cholecalciferol 2000 Unit(s) Oral daily  dextrose 5% + sodium chloride 0.45%. 1000 milliLiter(s) (50 mL/Hr) IV Continuous <Continuous>  escitalopram 10 milliGRAM(s) Oral at bedtime  ferrous    sulfate 325 milliGRAM(s) Oral daily  folic acid 1 milliGRAM(s) Oral daily  gabapentin 300 milliGRAM(s) Oral three times a day  hydroxyurea 500 milliGRAM(s) Oral two times a day  lactobacillus acidophilus 1 Tablet(s) Oral every 12 hours  levothyroxine Injectable 25 MICROGram(s) IV Push at bedtime  lidocaine   4% Patch 2 Patch Transdermal daily  metoprolol tartrate 25 milliGRAM(s) Oral two times a day  pantoprazole  Injectable 40 milliGRAM(s) IV Push daily  piperacillin/tazobactam IVPB.. 3.375 Gram(s) IV Intermittent every 8 hours  senna 1 Tablet(s) Oral at bedtime  trimethoprim  160 mG/sulfamethoxazole 800 mG 1 Tablet(s) Oral two times a day    MEDICATIONS  (PRN):  aluminum hydroxide/magnesium hydroxide/simethicone Suspension 30 milliLiter(s) Oral every 4 hours PRN Dyspepsia  ketorolac   Injectable 30 milliGRAM(s) IV Push every 6 hours PRN Moderate Pain (4 - 6)  LORazepam   Injectable 0.5 milliGRAM(s) IV Push every 8 hours PRN Agitation  melatonin 3 milliGRAM(s) Oral at bedtime PRN Insomnia  morphine  - Injectable 2 milliGRAM(s) IV Push every 4 hours PRN Severe Pain (7 - 10)  ondansetron Injectable 4 milliGRAM(s) IV Push every 8 hours PRN Nausea and/or Vomiting         Vitals log        ICU Vital Signs Last 24 Hrs  T(C): 36.6 (04 Aug 2023 21:25), Max: 36.6 (04 Aug 2023 21:25)  T(F): 97.9 (04 Aug 2023 21:25), Max: 97.9 (04 Aug 2023 21:25)  HR: 94 (04 Aug 2023 21:25) (60 - 94)  BP: 127/59 (04 Aug 2023 21:25) (93/64 - 127/59)  BP(mean): --  ABP: --  ABP(mean): --  RR: 18 (04 Aug 2023 21:25) (18 - 18)  SpO2: 94% (04 Aug 2023 21:25) (94% - 98%)    O2 Parameters below as of 04 Aug 2023 21:25  Patient On (Oxygen Delivery Method): nasal cannula                 Input and Output:  I&O's Detail    03 Aug 2023 07:01  -  04 Aug 2023 07:00  --------------------------------------------------------  IN:    dextrose 5% + sodium chloride 0.45%: 600 mL    IV PiggyBack: 100 mL    Oral Fluid: 240 mL  Total IN: 940 mL    OUT:    Voided (mL): 1500 mL  Total OUT: 1500 mL    Total NET: -560 mL      04 Aug 2023 07:01  -  05 Aug 2023 05:26  --------------------------------------------------------  IN:  Total IN: 0 mL    OUT:    Voided (mL): 250 mL  Total OUT: 250 mL    Total NET: -250 mL          Lab Data                        9.1    4.36  )-----------( 405      ( 04 Aug 2023 06:44 )             29.5     08-04    146<H>  |  113<H>  |  9   ----------------------------<  109<H>  3.7   |  28  |  0.74    Ca    8.0<L>      04 Aug 2023 06:44    TPro  4.9<L>  /  Alb  2.0<L>  /  TBili  0.5  /  DBili  x   /  AST  16  /  ALT  12  /  AlkPhos  62  08-03            Review of Systems	      Objective     Physical Examination    heart s1s2  lung dc BS  head nc      Pertinent Lab findings & Imaging      Summer:  NO   Adequate UO     I&O's Detail    03 Aug 2023 07:01  -  04 Aug 2023 07:00  --------------------------------------------------------  IN:    dextrose 5% + sodium chloride 0.45%: 600 mL    IV PiggyBack: 100 mL    Oral Fluid: 240 mL  Total IN: 940 mL    OUT:    Voided (mL): 1500 mL  Total OUT: 1500 mL    Total NET: -560 mL      04 Aug 2023 07:01  -  05 Aug 2023 05:26  --------------------------------------------------------  IN:  Total IN: 0 mL    OUT:    Voided (mL): 250 mL  Total OUT: 250 mL    Total NET: -250 mL               Discussed with:     Cultures:	        Radiology

## 2023-08-05 NOTE — PHYSICAL THERAPY INITIAL EVALUATION ADULT - NSPTDISCHREC_GEN_A_CORE
back to [facility]. as per EMR, pt uses [facility] at group home. Pt is intermittently awake at time of eval and unable to maintain level of alertness. d/c from PT- unable to make restorative goals for patient/No skilled PT needs

## 2023-08-05 NOTE — PROGRESS NOTE ADULT - ASSESSMENT
89-year-old female presents the emergency room with chief complaint of altered mental status.  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     ams  op  oa  ataxic gait  cva hx  AF  HLD  HTN  CAD  PVD  STSI    PMR and HEME eval noted  on ABX  vs noted  Son Curtis - not decided on Code Status and Hospice - pt remains FULL CODE

## 2023-08-06 LAB
ANION GAP SERPL CALC-SCNC: 4 MMOL/L — LOW (ref 5–17)
BUN SERPL-MCNC: 8 MG/DL — SIGNIFICANT CHANGE UP (ref 7–23)
CALCIUM SERPL-MCNC: 8.3 MG/DL — LOW (ref 8.5–10.1)
CHLORIDE SERPL-SCNC: 109 MMOL/L — HIGH (ref 96–108)
CO2 SERPL-SCNC: 28 MMOL/L — SIGNIFICANT CHANGE UP (ref 22–31)
CREAT SERPL-MCNC: 0.83 MG/DL — SIGNIFICANT CHANGE UP (ref 0.5–1.3)
EGFR: 67 ML/MIN/1.73M2 — SIGNIFICANT CHANGE UP
GLUCOSE SERPL-MCNC: 105 MG/DL — HIGH (ref 70–99)
HCT VFR BLD CALC: 35 % — SIGNIFICANT CHANGE UP (ref 34.5–45)
HGB BLD-MCNC: 10.7 G/DL — LOW (ref 11.5–15.5)
INR BLD: 3.37 RATIO — HIGH (ref 0.85–1.18)
MAGNESIUM SERPL-MCNC: 2.1 MG/DL — SIGNIFICANT CHANGE UP (ref 1.6–2.6)
MCHC RBC-ENTMCNC: 30.6 GM/DL — LOW (ref 32–36)
MCHC RBC-ENTMCNC: 37 PG — HIGH (ref 27–34)
MCV RBC AUTO: 121.1 FL — HIGH (ref 80–100)
NRBC # BLD: 0 /100 WBCS — SIGNIFICANT CHANGE UP (ref 0–0)
PHOSPHATE SERPL-MCNC: 3.3 MG/DL — SIGNIFICANT CHANGE UP (ref 2.5–4.5)
PLATELET # BLD AUTO: 557 K/UL — HIGH (ref 150–400)
POTASSIUM SERPL-MCNC: 3.8 MMOL/L — SIGNIFICANT CHANGE UP (ref 3.5–5.3)
POTASSIUM SERPL-SCNC: 3.8 MMOL/L — SIGNIFICANT CHANGE UP (ref 3.5–5.3)
PROTHROM AB SERPL-ACNC: 38.1 SEC — HIGH (ref 9.5–13)
RBC # BLD: 2.89 M/UL — LOW (ref 3.8–5.2)
RBC # FLD: 17.9 % — HIGH (ref 10.3–14.5)
SODIUM SERPL-SCNC: 141 MMOL/L — SIGNIFICANT CHANGE UP (ref 135–145)
WBC # BLD: 6.55 K/UL — SIGNIFICANT CHANGE UP (ref 3.8–10.5)
WBC # FLD AUTO: 6.55 K/UL — SIGNIFICANT CHANGE UP (ref 3.8–10.5)

## 2023-08-06 PROCEDURE — 93010 ELECTROCARDIOGRAM REPORT: CPT

## 2023-08-06 RX ORDER — WARFARIN SODIUM 2.5 MG/1
2 TABLET ORAL ONCE
Refills: 0 | Status: DISCONTINUED | OUTPATIENT
Start: 2023-08-06 | End: 2023-08-06

## 2023-08-06 RX ORDER — VANCOMYCIN HCL 1 G
750 VIAL (EA) INTRAVENOUS EVERY 24 HOURS
Refills: 0 | Status: COMPLETED | OUTPATIENT
Start: 2023-08-06 | End: 2023-08-07

## 2023-08-06 RX ADMIN — SENNA PLUS 1 TABLET(S): 8.6 TABLET ORAL at 22:30

## 2023-08-06 RX ADMIN — Medication 2000 UNIT(S): at 12:01

## 2023-08-06 RX ADMIN — Medication 3 MILLIGRAM(S): at 22:30

## 2023-08-06 RX ADMIN — Medication 650 MILLIGRAM(S): at 22:30

## 2023-08-06 RX ADMIN — ESCITALOPRAM OXALATE 10 MILLIGRAM(S): 10 TABLET, FILM COATED ORAL at 22:31

## 2023-08-06 RX ADMIN — GABAPENTIN 300 MILLIGRAM(S): 400 CAPSULE ORAL at 14:33

## 2023-08-06 RX ADMIN — Medication 1 TABLET(S): at 05:44

## 2023-08-06 RX ADMIN — LIDOCAINE 2 PATCH: 4 CREAM TOPICAL at 19:30

## 2023-08-06 RX ADMIN — HYDROXYUREA 500 MILLIGRAM(S): 500 CAPSULE ORAL at 06:26

## 2023-08-06 RX ADMIN — Medication 650 MILLIGRAM(S): at 23:30

## 2023-08-06 RX ADMIN — LIDOCAINE 2 PATCH: 4 CREAM TOPICAL at 12:19

## 2023-08-06 RX ADMIN — Medication 650 MILLIGRAM(S): at 14:33

## 2023-08-06 RX ADMIN — PIPERACILLIN AND TAZOBACTAM 25 GRAM(S): 4; .5 INJECTION, POWDER, LYOPHILIZED, FOR SOLUTION INTRAVENOUS at 05:43

## 2023-08-06 RX ADMIN — PIPERACILLIN AND TAZOBACTAM 25 GRAM(S): 4; .5 INJECTION, POWDER, LYOPHILIZED, FOR SOLUTION INTRAVENOUS at 14:34

## 2023-08-06 RX ADMIN — LIDOCAINE 2 PATCH: 4 CREAM TOPICAL at 23:31

## 2023-08-06 RX ADMIN — Medication 1 MILLIGRAM(S): at 12:01

## 2023-08-06 RX ADMIN — PANTOPRAZOLE SODIUM 40 MILLIGRAM(S): 20 TABLET, DELAYED RELEASE ORAL at 12:01

## 2023-08-06 RX ADMIN — Medication 1 TABLET(S): at 19:08

## 2023-08-06 RX ADMIN — Medication 1 TABLET(S): at 05:45

## 2023-08-06 RX ADMIN — Medication 650 MILLIGRAM(S): at 05:44

## 2023-08-06 RX ADMIN — Medication 650 MILLIGRAM(S): at 06:15

## 2023-08-06 RX ADMIN — Medication 250 MILLIGRAM(S): at 22:30

## 2023-08-06 RX ADMIN — Medication 325 MILLIGRAM(S): at 12:01

## 2023-08-06 RX ADMIN — GABAPENTIN 300 MILLIGRAM(S): 400 CAPSULE ORAL at 05:51

## 2023-08-06 RX ADMIN — GABAPENTIN 300 MILLIGRAM(S): 400 CAPSULE ORAL at 22:30

## 2023-08-06 RX ADMIN — AMLODIPINE BESYLATE 5 MILLIGRAM(S): 2.5 TABLET ORAL at 05:44

## 2023-08-06 RX ADMIN — Medication 25 MILLIGRAM(S): at 05:44

## 2023-08-06 RX ADMIN — Medication 25 MILLIGRAM(S): at 19:08

## 2023-08-06 RX ADMIN — Medication 650 MILLIGRAM(S): at 15:33

## 2023-08-06 RX ADMIN — Medication 25 MICROGRAM(S): at 22:31

## 2023-08-06 NOTE — CHART NOTE - NSCHARTNOTEFT_GEN_A_CORE
Assessment: patient seen for malnutrition follow up  89y old  Female who presents with a chief complaint of AMS   patient seen today somewhat confused . reports dislikes regular milk want lactose free and no yogurt  8/6 BM   patient remains full code         Factors impacting intake: [ ] none [ ] nausea  [ ] vomiting [ ] diarrhea [ ] constipation  [ ]chewing problems [x ] swallowing issues  [ ] other: soft and bite sized thin liquids    Diet Prescription: soft and bite sized  Intake: up to 100% per flow sheet    Current Weight: Weight 8/6 wt 127.8#  no edema       Pertinent Medications: MEDICATIONS  (STANDING):  acetaminophen     Tablet .. 650 milliGRAM(s) Oral every 8 hours  amLODIPine   Tablet 5 milliGRAM(s) Oral daily  cholecalciferol 2000 Unit(s) Oral daily  dextrose 5% + sodium chloride 0.45%. 1000 milliLiter(s) (50 mL/Hr) IV Continuous <Continuous>  escitalopram 10 milliGRAM(s) Oral at bedtime  ferrous    sulfate 325 milliGRAM(s) Oral daily  folic acid 1 milliGRAM(s) Oral daily  gabapentin 300 milliGRAM(s) Oral three times a day  hydroxyurea 500 milliGRAM(s) Oral two times a day  lactobacillus acidophilus 1 Tablet(s) Oral every 12 hours  levothyroxine Injectable 25 MICROGram(s) IV Push at bedtime  lidocaine   4% Patch 2 Patch Transdermal daily  metoprolol tartrate 25 milliGRAM(s) Oral two times a day  pantoprazole  Injectable 40 milliGRAM(s) IV Push daily  piperacillin/tazobactam IVPB.. 3.375 Gram(s) IV Intermittent every 8 hours  senna 1 Tablet(s) Oral at bedtime  trimethoprim  160 mG/sulfamethoxazole 800 mG 1 Tablet(s) Oral two times a day    MEDICATIONS  (PRN):  aluminum hydroxide/magnesium hydroxide/simethicone Suspension 30 milliLiter(s) Oral every 4 hours PRN Dyspepsia  ketorolac   Injectable 30 milliGRAM(s) IV Push every 6 hours PRN Moderate Pain (4 - 6)  LORazepam   Injectable 0.5 milliGRAM(s) IV Push every 8 hours PRN Agitation  melatonin 3 milliGRAM(s) Oral at bedtime PRN Insomnia  morphine  - Injectable 2 milliGRAM(s) IV Push every 4 hours PRN Severe Pain (7 - 10)  ondansetron Injectable 4 milliGRAM(s) IV Push every 8 hours PRN Nausea and/or Vomiting    Pertinent Labs: .1 B12 and folate WNL  Skin: left heel stage 1    Estimated Needs:   [x ] no change since previous assessment based on 58.9kg 130# 25-30kcals/kg 1472-1767kcals and 1-1.2gms protein/kg 58-70gms protein   [ ] recalculated:     Previous Nutrition Diagnosis:    [ x] Malnutrition moderate chronic    Nutrition Diagnosis is [ x] ongoing  [ ] resolved [ ] not applicable     New Nutrition Diagnosis: [x ] not applicable       Interventions:   Recommend  [ ] Change Diet To:  [ ] Nutrition Supplement  [ ] Nutrition Support  [x ] Other: recommend lactose free ensure clear BID    Monitoring and Evaluation:   [x ] PO intake [ x ] Tolerance to diet prescription [ x ] weights [ x ] labs[ x ] follow up per protocol  [ ] other:

## 2023-08-06 NOTE — CHART NOTE - NSCHARTNOTEFT_GEN_A_CORE
RN called for frequent PVCs on tele, Asymptomatic   / 85 HR 80 on RA   EKG ordered, mag phos am ordered   RN call if any changes

## 2023-08-06 NOTE — PROGRESS NOTE ADULT - SUBJECTIVE AND OBJECTIVE BOX
Neurology Follow up note    REJI [id number]    HPI:  Patient is a 89-year-old female presents the emergency room with chief complaint of altered mental status.  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  Patient presents from assisted living with altered mental status concern for UTI.  Patient was last admitted to the hospital June 26 through June 30, 2023 was altered mental status x2 weeks.  Was ultimately diagnosed with acute metabolic encephalopathy secondary to UTI improved.  Patient stabilized and was ultimately discharged back to assisted living.  Presents today lethargic with urinary frequency and confusion. Son reports that there is also concern for possible cellulitis to the LLE at the site of a skin graft. Pt recently completed a course of po abx for this with no significant improvement. She has been c/o severe pain to the LLE for weeks. Seen by vascular consult , xrays obtained -found to have severe OA AND PVD ,pain management started Palliative care consult requested ,to discuss advance directives and complete MOLST  (02 Aug 2023 09:19)      Interval History - no complaints    Patient is seen, chart was reviewed and case was discussed with the treatment team.  Pt is not in any distress.   Lying on bed comfortably.     Vital Signs Last 24 Hrs  T(C): 36.4 (06 Aug 2023 11:12), Max: 37.1 (05 Aug 2023 20:29)  T(F): 97.5 (06 Aug 2023 11:12), Max: 98.8 (05 Aug 2023 20:29)  HR: 92 (06 Aug 2023 11:12) (80 - 97)  BP: 113/64 (06 Aug 2023 11:12) (113/64 - 127/83)  BP(mean): --  RR: 20 (06 Aug 2023 11:12) (18 - 20)  SpO2: 95% (06 Aug 2023 11:12) (95% - 96%)    Parameters below as of 06 Aug 2023 11:12  Patient On (Oxygen Delivery Method): room air              REVIEW OF SYSTEMS:    Constitutional: No fever,  Eyes: No eye pain, visual disturbances, or discharge  ENT:  No difficulty hearing, tinnitus, vertigo; No sinus or throat pain  Neck: No pain or stiffness  Respiratory: No  wheezing, chills or hemoptysis  Cardiovascular: No chest pain, palpitations, shortness of breath, dizziness or leg swelling  Gastrointestinal: No abdominal or epigastric pain. No nausea, vomiting   Genitourinary: No dysuria, frequency, hematuria or incontinence  Neurological: No headaches,, loss of strength,   Psychiatric: No  mood swings or difficulty sleeping  Musculoskeletal: No joint pain or swelling;   Skin: No itching, burning, rashes or lesions   Lymph Nodes: No enlarged glands  Endocrine: No heat or cold intolerance; No hair loss,  Allergy and Immunologic: No hives or eczema    On Neurological Examination:    Mental Status - Pt is alert, awake, oriented X2  Follows commands well and able to answer questions appropriately  .Mood and affect  normal    Speech -  Normal.    Cranial Nerves - Pupils 3 mm equal and reactive to light, extraocular eye movements intact. Pt has no visual field deficit.  Pt has  right  facial asymmetry. Facial sensation is intact.Tongue - is in midline.    Muscle tone - is increased on right.      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 Pt withdraws all extremities equally on stimulation. No asymmetry seen.    Hemiataxia right/left.    coordination:    Finger to nose: normal-left  .    Deep tendon Reflexes - 2 plus all over.          Neck Supple -  Yes.     MEDICATIONS    acetaminophen     Tablet .. 650 milliGRAM(s) Oral every 8 hours  aluminum hydroxide/magnesium hydroxide/simethicone Suspension 30 milliLiter(s) Oral every 4 hours PRN  amLODIPine   Tablet 5 milliGRAM(s) Oral daily  cholecalciferol 2000 Unit(s) Oral daily  dextrose 5% + sodium chloride 0.45%. 1000 milliLiter(s) IV Continuous <Continuous>  escitalopram 10 milliGRAM(s) Oral at bedtime  ferrous    sulfate 325 milliGRAM(s) Oral daily  folic acid 1 milliGRAM(s) Oral daily  gabapentin 300 milliGRAM(s) Oral three times a day  hydroxyurea 500 milliGRAM(s) Oral two times a day  ketorolac   Injectable 30 milliGRAM(s) IV Push every 6 hours PRN  lactobacillus acidophilus 1 Tablet(s) Oral every 12 hours  levothyroxine Injectable 25 MICROGram(s) IV Push at bedtime  lidocaine   4% Patch 2 Patch Transdermal daily  LORazepam   Injectable 0.5 milliGRAM(s) IV Push every 8 hours PRN  melatonin 3 milliGRAM(s) Oral at bedtime PRN  metoprolol tartrate 25 milliGRAM(s) Oral two times a day  morphine  - Injectable 2 milliGRAM(s) IV Push every 4 hours PRN  ondansetron Injectable 4 milliGRAM(s) IV Push every 8 hours PRN  pantoprazole  Injectable 40 milliGRAM(s) IV Push daily  piperacillin/tazobactam IVPB.. 3.375 Gram(s) IV Intermittent every 8 hours  senna 1 Tablet(s) Oral at bedtime  trimethoprim  160 mG/sulfamethoxazole 800 mG 1 Tablet(s) Oral two times a day  warfarin 2 milliGRAM(s) Oral once      Allergies    per son &quot;issues with certain anitibiotics&quot; does not remember the names (Unknown)    Intolerances                          10.7   6.55  )-----------( 557      ( 06 Aug 2023 13:15 )             35.0           Hemoglobin A1C:     Vitamin B12     RADIOLOGY    ASSESSMENT AND PLAN:      seen for ams  likely ME  DEMENTIA  Hx of CVA  AF  PN    continue AC  NEURONTIN FOR neuropathy  Physical therapy evaluation.  OOB to chair/ambulation with assistance only.  Pain is accessed and addressed.  Would continue to follow.            )

## 2023-08-06 NOTE — PROGRESS NOTE ADULT - SUBJECTIVE AND OBJECTIVE BOX
Date/Time Patient Seen:  		  Referring MD:   Data Reviewed	       Patient is a 89y old  Female who presents with a chief complaint of AMS (05 Aug 2023 14:36)      Subjective/HPI     PAST MEDICAL & SURGICAL HISTORY:  Hypertension    CVA (cerebral vascular accident)    Depression    Constipation    Neuropathy    Constipation    Hyperlipidemia    Grade I diastolic dysfunction    Afib    Neuropathy    VHD (valvular heart disease)    Aortic valvar stenosis    No significant past surgical history          Medication list         MEDICATIONS  (STANDING):  acetaminophen     Tablet .. 650 milliGRAM(s) Oral every 8 hours  amLODIPine   Tablet 5 milliGRAM(s) Oral daily  cholecalciferol 2000 Unit(s) Oral daily  dextrose 5% + sodium chloride 0.45%. 1000 milliLiter(s) (50 mL/Hr) IV Continuous <Continuous>  escitalopram 10 milliGRAM(s) Oral at bedtime  ferrous    sulfate 325 milliGRAM(s) Oral daily  folic acid 1 milliGRAM(s) Oral daily  gabapentin 300 milliGRAM(s) Oral three times a day  hydroxyurea 500 milliGRAM(s) Oral two times a day  lactobacillus acidophilus 1 Tablet(s) Oral every 12 hours  levothyroxine Injectable 25 MICROGram(s) IV Push at bedtime  lidocaine   4% Patch 2 Patch Transdermal daily  metoprolol tartrate 25 milliGRAM(s) Oral two times a day  pantoprazole  Injectable 40 milliGRAM(s) IV Push daily  piperacillin/tazobactam IVPB.. 3.375 Gram(s) IV Intermittent every 8 hours  senna 1 Tablet(s) Oral at bedtime  trimethoprim  160 mG/sulfamethoxazole 800 mG 1 Tablet(s) Oral two times a day    MEDICATIONS  (PRN):  aluminum hydroxide/magnesium hydroxide/simethicone Suspension 30 milliLiter(s) Oral every 4 hours PRN Dyspepsia  ketorolac   Injectable 30 milliGRAM(s) IV Push every 6 hours PRN Moderate Pain (4 - 6)  LORazepam   Injectable 0.5 milliGRAM(s) IV Push every 8 hours PRN Agitation  melatonin 3 milliGRAM(s) Oral at bedtime PRN Insomnia  morphine  - Injectable 2 milliGRAM(s) IV Push every 4 hours PRN Severe Pain (7 - 10)  ondansetron Injectable 4 milliGRAM(s) IV Push every 8 hours PRN Nausea and/or Vomiting         Vitals log        ICU Vital Signs Last 24 Hrs  T(C): 36.4 (06 Aug 2023 04:33), Max: 37.1 (05 Aug 2023 20:29)  T(F): 97.5 (06 Aug 2023 04:33), Max: 98.8 (05 Aug 2023 20:29)  HR: 80 (06 Aug 2023 04:33) (75 - 97)  BP: 127/83 (06 Aug 2023 04:33) (116/70 - 131/69)  BP(mean): --  ABP: --  ABP(mean): --  RR: 19 (06 Aug 2023 04:33) (18 - 19)  SpO2: 96% (06 Aug 2023 04:33) (91% - 96%)    O2 Parameters below as of 05 Aug 2023 20:29  Patient On (Oxygen Delivery Method): room air                 Input and Output:  I&O's Detail    04 Aug 2023 07:01  -  05 Aug 2023 07:00  --------------------------------------------------------  IN:    dextrose 5% + sodium chloride 0.45%: 350 mL  Total IN: 350 mL    OUT:    Voided (mL): 250 mL  Total OUT: 250 mL    Total NET: 100 mL      05 Aug 2023 07:01  -  06 Aug 2023 05:41  --------------------------------------------------------  IN:    Oral Fluid: 300 mL  Total IN: 300 mL    OUT:    Voided (mL): 775 mL  Total OUT: 775 mL    Total NET: -475 mL          Lab Data                        9.6    5.18  )-----------( 480      ( 05 Aug 2023 08:56 )             31.6     08-05    144  |  111<H>  |  7   ----------------------------<  98  4.0   |  29  |  0.80    Ca    8.3<L>      05 Aug 2023 08:56              Review of Systems	      Objective     Physical Examination    heart s1s2  lung dc bS  head nc      Pertinent Lab findings & Imaging      Summer:  NO   Adequate UO     I&O's Detail    04 Aug 2023 07:01  -  05 Aug 2023 07:00  --------------------------------------------------------  IN:    dextrose 5% + sodium chloride 0.45%: 350 mL  Total IN: 350 mL    OUT:    Voided (mL): 250 mL  Total OUT: 250 mL    Total NET: 100 mL      05 Aug 2023 07:01  -  06 Aug 2023 05:41  --------------------------------------------------------  IN:    Oral Fluid: 300 mL  Total IN: 300 mL    OUT:    Voided (mL): 775 mL  Total OUT: 775 mL    Total NET: -475 mL               Discussed with:     Cultures:	        Radiology

## 2023-08-06 NOTE — PROGRESS NOTE ADULT - SUBJECTIVE AND OBJECTIVE BOX
PROGRESS NOTE  Patient is a 89y old  Female who presents with a chief complaint of AMS (06 Aug 2023 05:41)  Chart and available morning labs /imaging are reviewed electronically , urgent issues addressed . More information  is being added upon completion of rounds , when more information is collected and management discussed with consultants , medical staff and social service/case management on the floor     OVERNIGHT  No new issues reported by medical staff . All above noted Patient is resting in a bed comfortably .Denies pain ,had a breakfast  .No distress noted   Aware of d/c plan  HPI:  Patient is a 89-year-old female presents the emergency room with chief complaint of altered mental status.  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  Patient presents from assisted living with altered mental status concern for UTI.  Patient was last admitted to the hospital June 26 through June 30, 2023 was altered mental status x2 weeks.  Was ultimately diagnosed with acute metabolic encephalopathy secondary to UTI improved.  Patient stabilized and was ultimately discharged back to assisted living.  Presents today lethargic with urinary frequency and confusion. Son reports that there is also concern for possible cellulitis to the LLE at the site of a skin graft. Pt recently completed a course of po abx for this with no significant improvement. She has been c/o severe pain to the LLE for weeks. Seen by vascular consult , xrays obtained -found to have severe OA AND PVD ,pain management started Palliative care consult requested ,to discuss advance directives and complete MOLST  (02 Aug 2023 09:19)    PAST MEDICAL & SURGICAL HISTORY:  Hypertension      CVA (cerebral vascular accident)      Depression      Constipation      Neuropathy      Hyperlipidemia      Grade I diastolic dysfunction      Afib      Neuropathy      VHD (valvular heart disease)      Aortic valvar stenosis      No significant past surgical history          MEDICATIONS  (STANDING):  acetaminophen     Tablet .. 650 milliGRAM(s) Oral every 8 hours  amLODIPine   Tablet 5 milliGRAM(s) Oral daily  cholecalciferol 2000 Unit(s) Oral daily  dextrose 5% + sodium chloride 0.45%. 1000 milliLiter(s) (50 mL/Hr) IV Continuous <Continuous>  escitalopram 10 milliGRAM(s) Oral at bedtime  ferrous    sulfate 325 milliGRAM(s) Oral daily  folic acid 1 milliGRAM(s) Oral daily  gabapentin 300 milliGRAM(s) Oral three times a day  hydroxyurea 500 milliGRAM(s) Oral two times a day  lactobacillus acidophilus 1 Tablet(s) Oral every 12 hours  levothyroxine Injectable 25 MICROGram(s) IV Push at bedtime  lidocaine   4% Patch 2 Patch Transdermal daily  metoprolol tartrate 25 milliGRAM(s) Oral two times a day  pantoprazole  Injectable 40 milliGRAM(s) IV Push daily  piperacillin/tazobactam IVPB.. 3.375 Gram(s) IV Intermittent every 8 hours  senna 1 Tablet(s) Oral at bedtime  trimethoprim  160 mG/sulfamethoxazole 800 mG 1 Tablet(s) Oral two times a day    MEDICATIONS  (PRN):  aluminum hydroxide/magnesium hydroxide/simethicone Suspension 30 milliLiter(s) Oral every 4 hours PRN Dyspepsia  ketorolac   Injectable 30 milliGRAM(s) IV Push every 6 hours PRN Moderate Pain (4 - 6)  LORazepam   Injectable 0.5 milliGRAM(s) IV Push every 8 hours PRN Agitation  melatonin 3 milliGRAM(s) Oral at bedtime PRN Insomnia  morphine  - Injectable 2 milliGRAM(s) IV Push every 4 hours PRN Severe Pain (7 - 10)  ondansetron Injectable 4 milliGRAM(s) IV Push every 8 hours PRN Nausea and/or Vomiting      OBJECTIVE    T(C): 36.4 (08-06-23 @ 11:12), Max: 37.1 (08-05-23 @ 20:29)  HR: 92 (08-06-23 @ 11:12) (80 - 97)  BP: 113/64 (08-06-23 @ 11:12) (113/64 - 127/83)  RR: 20 (08-06-23 @ 11:12) (18 - 20)  SpO2: 95% (08-06-23 @ 11:12) (95% - 96%)  Wt(kg): --  I&O's Summary    05 Aug 2023 07:01  -  06 Aug 2023 07:00  --------------------------------------------------------  IN: 300 mL / OUT: 775 mL / NET: -475 mL          REVIEW OF SYSTEMS:  CONSTITUTIONAL: No fever, weight loss, or fatigue  EYES: No eye pain, visual disturbances, or discharge  ENMT:   No sinus or throat pain  NECK: No pain or stiffness  RESPIRATORY: No cough, wheezing, chills or hemoptysis; No shortness of breath  CARDIOVASCULAR: No chest pain, palpitations, dizziness, or leg swelling  GASTROINTESTINAL: No abdominal pain. No nausea, vomiting; No diarrhea or constipation. No melena or hematochezia.  GENITOURINARY: No dysuria, frequency, hematuria, or incontinence  NEUROLOGICAL: No headaches, memory loss, loss of strength, numbness, or tremors  SKIN: No itching, burning, rashes, or lesions   MUSCULOSKELETAL: No joint pain or swelling; No muscle, back, or extremity pain    PHYSICAL EXAM:  Appearance: NAD. VS past 24 hrs -as above   HEENT:   Moist oral mucosa. Conjunctiva clear b/l.   Neck : supple  Respiratory: Lungs CTAB.  Gastrointestinal:  Soft, nontender. No rebound. No rigidity. BS present	  Cardiovascular: RRR ,S1S2 present  Neurologic: Non-focal. Moving all extremities.  Extremities: No edema. No erythema. No calf tenderness.  Skin: No rashes, No ecchymoses, No cyanosis.	  wounds ,skin lesions-See skin assesment flow sheet   LABS:                        9.6    5.18  )-----------( 480      ( 05 Aug 2023 08:56 )             31.6     08-05    144  |  111<H>  |  7   ----------------------------<  98  4.0   |  29  |  0.80    Ca    8.3<L>      05 Aug 2023 08:56      CAPILLARY BLOOD GLUCOSE        PT/INR - ( 05 Aug 2023 08:56 )   PT: 21.9 sec;   INR: 1.91 ratio           Urinalysis Basic - ( 05 Aug 2023 08:56 )    Color: x / Appearance: x / SG: x / pH: x  Gluc: 98 mg/dL / Ketone: x  / Bili: x / Urobili: x   Blood: x / Protein: x / Nitrite: x   Leuk Esterase: x / RBC: x / WBC x   Sq Epi: x / Non Sq Epi: x / Bacteria: x        Culture - Blood (collected 01 Aug 2023 18:38)  Source: .Blood Blood-Venous  Preliminary Report (06 Aug 2023 02:01):    No growth at 4 days    Culture - Blood (collected 01 Aug 2023 18:30)  Source: .Blood Blood-Venous  Preliminary Report (06 Aug 2023 02:01):    No growth at 4 days    Culture - Urine (collected 01 Aug 2023 18:00)  Source: Clean Catch Clean Catch (Midstream)  Final Report (05 Aug 2023 09:15):    10,000 - 49,000 CFU/mL Klebsiella pneumoniae    10,000 - 49,000 CFU/mL Proteus mirabilis  Organism: Klebsiella pneumoniae  Proteus mirabilis (05 Aug 2023 09:15)  Organism: Proteus mirabilis (05 Aug 2023 09:15)  Organism: Klebsiella pneumoniae (05 Aug 2023 09:15)      RADIOLOGY & ADDITIONAL TESTS:   reviewed elctronically  ASSESSMENT/PLAN: 	    25 minutes aggregate time was spent on this visit, 50% visit time spent in care co-ordination with other attendings and counselling patient .I have discussed care plan with patient / HCP/family member ,who expressed understanding of problems treatment and their effect and side effects, alternatives in details. I have asked if they have any questions and concerns and appropriately addressed them to best of my ability.

## 2023-08-06 NOTE — PROGRESS NOTE ADULT - ASSESSMENT
89-year-old female presents the emergency room with chief complaint of altered mental status.  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     ams  op  oa  ataxic gait  cva hx  AF  HLD  HTN  CAD  PVD  STSI    on ZOSYN  on IVF    PMR and HEME eval noted  on ABX  vs noted  Son Curtis - not decided on Code Status and Hospice - pt remains FULL CODE

## 2023-08-06 NOTE — PROGRESS NOTE ADULT - SUBJECTIVE AND OBJECTIVE BOX
Interval History:    CENTRAL LINE:   [  ] YES       [  ] NO  MUIR:                 [  ] YES       [  ] NO         REVIEW OF SYSTEMS:  All Systems below were reviewed and are negative [  ]  HEENT:  ID:  Pulmonary:  Cardiac:  GI:  Renal:  Musculoskeletal:  All other systems above were reviewed and are negative   [  ]      MEDICATIONS  (STANDING):  acetaminophen     Tablet .. 650 milliGRAM(s) Oral every 8 hours  amLODIPine   Tablet 5 milliGRAM(s) Oral daily  cholecalciferol 2000 Unit(s) Oral daily  dextrose 5% + sodium chloride 0.45%. 1000 milliLiter(s) (50 mL/Hr) IV Continuous <Continuous>  doxycycline IVPB 100 milliGRAM(s) IV Intermittent every 12 hours  escitalopram 10 milliGRAM(s) Oral at bedtime  ferrous    sulfate 325 milliGRAM(s) Oral daily  folic acid 1 milliGRAM(s) Oral daily  gabapentin 300 milliGRAM(s) Oral three times a day  hydroxyurea 500 milliGRAM(s) Oral two times a day  lactobacillus acidophilus 1 Tablet(s) Oral every 12 hours  levothyroxine Injectable 25 MICROGram(s) IV Push at bedtime  lidocaine   4% Patch 2 Patch Transdermal daily  metoprolol tartrate 25 milliGRAM(s) Oral two times a day  pantoprazole  Injectable 40 milliGRAM(s) IV Push daily  piperacillin/tazobactam IVPB.. 3.375 Gram(s) IV Intermittent every 8 hours  senna 1 Tablet(s) Oral at bedtime    MEDICATIONS  (PRN):  aluminum hydroxide/magnesium hydroxide/simethicone Suspension 30 milliLiter(s) Oral every 4 hours PRN Dyspepsia  ketorolac   Injectable 30 milliGRAM(s) IV Push every 6 hours PRN Moderate Pain (4 - 6)  LORazepam   Injectable 0.5 milliGRAM(s) IV Push every 8 hours PRN Agitation  melatonin 3 milliGRAM(s) Oral at bedtime PRN Insomnia  morphine  - Injectable 2 milliGRAM(s) IV Push every 4 hours PRN Severe Pain (7 - 10)  ondansetron Injectable 4 milliGRAM(s) IV Push every 8 hours PRN Nausea and/or Vomiting      Vital Signs Last 24 Hrs  T(C): 36.4 (06 Aug 2023 11:12), Max: 37.1 (05 Aug 2023 20:29)  T(F): 97.5 (06 Aug 2023 11:12), Max: 98.8 (05 Aug 2023 20:29)  HR: 92 (06 Aug 2023 11:12) (80 - 97)  BP: 113/64 (06 Aug 2023 11:12) (113/64 - 127/83)  BP(mean): --  RR: 20 (06 Aug 2023 11:12) (18 - 20)  SpO2: 95% (06 Aug 2023 11:12) (95% - 96%)    Parameters below as of 06 Aug 2023 11:12  Patient On (Oxygen Delivery Method): room air        I&O's Summary    05 Aug 2023 07:01  -  06 Aug 2023 07:00  --------------------------------------------------------  IN: 300 mL / OUT: 775 mL / NET: -475 mL        PHYSICAL EXAM:  HEENT: NC/AT; PERRLA  Neck: Soft; no tenderness  Lungs: CTA bilaterally; no wheezing.   Heart:  Abdomen:  Genital/ Rectal:  Extremities:  Neurologic:  Vascular:      LABORATORY:    CBC Full  -  ( 06 Aug 2023 13:15 )  WBC Count : 6.55 K/uL  RBC Count : 2.89 M/uL  Hemoglobin : 10.7 g/dL  Hematocrit : 35.0 %  Platelet Count - Automated : 557 K/uL  Mean Cell Volume : 121.1 fl  Mean Cell Hemoglobin : 37.0 pg  Mean Cell Hemoglobin Concentration : 30.6 gm/dL  Auto Neutrophil # : x  Auto Lymphocyte # : x  Auto Monocyte # : x  Auto Eosinophil # : x  Auto Basophil # : x  Auto Neutrophil % : x  Auto Lymphocyte % : x  Auto Monocyte % : x  Auto Eosinophil % : x  Auto Basophil % : x          08-06    141  |  109<H>  |  8   ----------------------------<  105<H>  3.8   |  28  |  0.83    Ca    8.3<L>      06 Aug 2023 13:15  Phos  3.3     08-06  Mg     2.1     08-06        Rapid Respiratory Viral Panel Result        08-01 @ 22:40  Rapid RVP NotDetec  Coronovirus --  Adenovirus --  Bordetella Pertussis --  Chlamydia Pneumonia --  Entero/Rhinovirus--  HKU1 Coronovirus --  HMPV Coronovirus --  Influenza A --  Influenza AH1 --  Influenza AH1 2009 --  Influenza AH3 --  Influenza B --  Mycoplasma Pneumoniae --  NL63 Coronovirus --  OC43 Coronovirus --  Parainfluenza 1 --  Parainfluenza 2 --  Parainfluenza 3 --  Parainfluenza 4 --  Resp Syncytial Virus --      Assessment and Plan:          Sushil Anguiano MD   [phone].    She is afebrile  Still with LLE pain at the ulcer     MEDICATIONS  (STANDING):  acetaminophen     Tablet .. 650 milliGRAM(s) Oral every 8 hours  amLODIPine   Tablet 5 milliGRAM(s) Oral daily  cholecalciferol 2000 Unit(s) Oral daily  dextrose 5% + sodium chloride 0.45%. 1000 milliLiter(s) (50 mL/Hr) IV Continuous <Continuous>  doxycycline IVPB 100 milliGRAM(s) IV Intermittent every 12 hours  escitalopram 10 milliGRAM(s) Oral at bedtime  ferrous    sulfate 325 milliGRAM(s) Oral daily  folic acid 1 milliGRAM(s) Oral daily  gabapentin 300 milliGRAM(s) Oral three times a day  hydroxyurea 500 milliGRAM(s) Oral two times a day  lactobacillus acidophilus 1 Tablet(s) Oral every 12 hours  levothyroxine Injectable 25 MICROGram(s) IV Push at bedtime  lidocaine   4% Patch 2 Patch Transdermal daily  metoprolol tartrate 25 milliGRAM(s) Oral two times a day  pantoprazole  Injectable 40 milliGRAM(s) IV Push daily  piperacillin/tazobactam IVPB.. 3.375 Gram(s) IV Intermittent every 8 hours  senna 1 Tablet(s) Oral at bedtime    MEDICATIONS  (PRN):  aluminum hydroxide/magnesium hydroxide/simethicone Suspension 30 milliLiter(s) Oral every 4 hours PRN Dyspepsia  ketorolac   Injectable 30 milliGRAM(s) IV Push every 6 hours PRN Moderate Pain (4 - 6)  LORazepam   Injectable 0.5 milliGRAM(s) IV Push every 8 hours PRN Agitation  melatonin 3 milliGRAM(s) Oral at bedtime PRN Insomnia  morphine  - Injectable 2 milliGRAM(s) IV Push every 4 hours PRN Severe Pain (7 - 10)  ondansetron Injectable 4 milliGRAM(s) IV Push every 8 hours PRN Nausea and/or Vomiting      Vital Signs Last 24 Hrs  T(C): 36.4 (06 Aug 2023 11:12), Max: 37.1 (05 Aug 2023 20:29)  T(F): 97.5 (06 Aug 2023 11:12), Max: 98.8 (05 Aug 2023 20:29)  HR: 92 (06 Aug 2023 11:12) (80 - 97)  BP: 113/64 (06 Aug 2023 11:12) (113/64 - 127/83)  BP(mean): --  RR: 20 (06 Aug 2023 11:12) (18 - 20)  SpO2: 95% (06 Aug 2023 11:12) (95% - 96%)    Parameters below as of 06 Aug 2023 11:12  Patient On (Oxygen Delivery Method): room air        I&O's Summary    05 Aug 2023 07:01  -  06 Aug 2023 07:00  --------------------------------------------------------  IN: 300 mL / OUT: 775 mL / NET: -475 mL        PHYSICAL EXAM:  HEENT: NC/AT; PERRLA  Neck: Soft; no tenderness  Lungs: CTA bilaterally; no wheezing.   Heart:  Abdomen:  Genital/ Rectal:  Extremities:  Neurologic:  Vascular:      LABORATORY:    CBC Full  -  ( 06 Aug 2023 13:15 )  WBC Count : 6.55 K/uL  RBC Count : 2.89 M/uL  Hemoglobin : 10.7 g/dL  Hematocrit : 35.0 %  Platelet Count - Automated : 557 K/uL  Mean Cell Volume : 121.1 fl  Mean Cell Hemoglobin : 37.0 pg  Mean Cell Hemoglobin Concentration : 30.6 gm/dL  Auto Neutrophil # : x  Auto Lymphocyte # : x  Auto Monocyte # : x  Auto Eosinophil # : x  Auto Basophil # : x  Auto Neutrophil % : x  Auto Lymphocyte % : x  Auto Monocyte % : x  Auto Eosinophil % : x  Auto Basophil % : x          08-06    141  |  109<H>  |  8   ----------------------------<  105<H>  3.8   |  28  |  0.83    Ca    8.3<L>      06 Aug 2023 13:15  Phos  3.3     08-06  Mg     2.1     08-06        Rapid Respiratory Viral Panel Result        08-01 @ 22:40  Rapid RVP NotDete  Coronovirus --  Adenovirus --  Bordetella Pertussis --  Chlamydia Pneumonia --  Entero/Rhinovirus--  HKU1 Coronovirus --  HMPV Coronovirus --  Influenza A --  Influenza AH1 --  Influenza AH1 2009 --  Influenza AH3 --  Influenza B --  Mycoplasma Pneumoniae --  NL63 Coronovirus --  OC43 Coronovirus --  Parainfluenza 1 --  Parainfluenza 2 --  Parainfluenza 3 --  Parainfluenza 4 --  Resp Syncytial Virus --      Assessment and Plan:    1. LLE cellulitis with chronic wound.  2. Possible UTI.  3. Lethargy    . Very slow imrovement of cellulitis   . Continue iv Zosyn. Discontinue po Bactrim,  . Follow all cultures.  . Wound care daily.        Sushil Anguiano MD   [phone].    She is afebrile  Still with LLE pain at the ulcer     MEDICATIONS  (STANDING):  acetaminophen     Tablet .. 650 milliGRAM(s) Oral every 8 hours  amLODIPine   Tablet 5 milliGRAM(s) Oral daily  cholecalciferol 2000 Unit(s) Oral daily  dextrose 5% + sodium chloride 0.45%. 1000 milliLiter(s) (50 mL/Hr) IV Continuous <Continuous>  doxycycline IVPB 100 milliGRAM(s) IV Intermittent every 12 hours  escitalopram 10 milliGRAM(s) Oral at bedtime  ferrous    sulfate 325 milliGRAM(s) Oral daily  folic acid 1 milliGRAM(s) Oral daily  gabapentin 300 milliGRAM(s) Oral three times a day  hydroxyurea 500 milliGRAM(s) Oral two times a day  lactobacillus acidophilus 1 Tablet(s) Oral every 12 hours  levothyroxine Injectable 25 MICROGram(s) IV Push at bedtime  lidocaine   4% Patch 2 Patch Transdermal daily  metoprolol tartrate 25 milliGRAM(s) Oral two times a day  pantoprazole  Injectable 40 milliGRAM(s) IV Push daily  piperacillin/tazobactam IVPB.. 3.375 Gram(s) IV Intermittent every 8 hours  senna 1 Tablet(s) Oral at bedtime    MEDICATIONS  (PRN):  aluminum hydroxide/magnesium hydroxide/simethicone Suspension 30 milliLiter(s) Oral every 4 hours PRN Dyspepsia  ketorolac   Injectable 30 milliGRAM(s) IV Push every 6 hours PRN Moderate Pain (4 - 6)  LORazepam   Injectable 0.5 milliGRAM(s) IV Push every 8 hours PRN Agitation  melatonin 3 milliGRAM(s) Oral at bedtime PRN Insomnia  morphine  - Injectable 2 milliGRAM(s) IV Push every 4 hours PRN Severe Pain (7 - 10)  ondansetron Injectable 4 milliGRAM(s) IV Push every 8 hours PRN Nausea and/or Vomiting      Vital Signs Last 24 Hrs  T(C): 36.4 (06 Aug 2023 11:12), Max: 37.1 (05 Aug 2023 20:29)  T(F): 97.5 (06 Aug 2023 11:12), Max: 98.8 (05 Aug 2023 20:29)  HR: 92 (06 Aug 2023 11:12) (80 - 97)  BP: 113/64 (06 Aug 2023 11:12) (113/64 - 127/83)  BP(mean): --  RR: 20 (06 Aug 2023 11:12) (18 - 20)  SpO2: 95% (06 Aug 2023 11:12) (95% - 96%)    Parameters below as of 06 Aug 2023 11:12  Patient On (Oxygen Delivery Method): room air        I&O's Summary    05 Aug 2023 07:01  -  06 Aug 2023 07:00  --------------------------------------------------------  IN: 300 mL / OUT: 775 mL / NET: -475 mL        PHYSICAL EXAM:  HEENT: NC/AT; PERRLA  Neck: Soft; no tenderness  Lungs: CTA bilaterally; no wheezing.   Heart: RRR, no murmurs.   Abdomen: Soft, no tenderness.   Genital/ Rectal: No salas catheter.   Extremities: LLE ulcer with moderate erythema. Tender with palpations.   Neurologic: Confused.       LABORATORY:    CBC Full  -  ( 06 Aug 2023 13:15 )  WBC Count : 6.55 K/uL  RBC Count : 2.89 M/uL  Hemoglobin : 10.7 g/dL  Hematocrit : 35.0 %  Platelet Count - Automated : 557 K/uL  Mean Cell Volume : 121.1 fl  Mean Cell Hemoglobin : 37.0 pg  Mean Cell Hemoglobin Concentration : 30.6 gm/dL  Auto Neutrophil # : x  Auto Lymphocyte # : x  Auto Monocyte # : x  Auto Eosinophil # : x  Auto Basophil # : x  Auto Neutrophil % : x  Auto Lymphocyte % : x  Auto Monocyte % : x  Auto Eosinophil % : x  Auto Basophil % : x          08-06    141  |  109<H>  |  8   ----------------------------<  105<H>  3.8   |  28  |  0.83    Ca    8.3<L>      06 Aug 2023 13:15  Phos  3.3     08-06  Mg     2.1     08-06        Rapid Respiratory Viral Panel Result        08-01 @ 22:40  Rapid RVP NotDetec  Coronovirus --  Adenovirus --  Bordetella Pertussis --  Chlamydia Pneumonia --  Entero/Rhinovirus--  HKU1 Coronovirus --  HMPV Coronovirus --  Influenza A --  Influenza AH1 --  Influenza AH1 2009 --  Influenza AH3 --  Influenza B --  Mycoplasma Pneumoniae --  NL63 Coronovirus --  OC43 Coronovirus --  Parainfluenza 1 --  Parainfluenza 2 --  Parainfluenza 3 --  Parainfluenza 4 --  Resp Syncytial Virus --      Assessment and Plan:    1. LLE cellulitis with chronic wound.  2. Possible UTI.  3. Lethargy    . Very slow improvement of cellulitis.  . Urine culture with no growth. Thus low suspicion for UTI.   . Discontinue iv Zosyn and po Bactrim. Add IV Vancomycin 750 mg daily. Monitor the progression of cellulitis.   . Wound care daily.        Sushil Anguiano MD   [phone].

## 2023-08-07 LAB
ANION GAP SERPL CALC-SCNC: 4 MMOL/L — LOW (ref 5–17)
BUN SERPL-MCNC: 4 MG/DL — LOW (ref 7–23)
CALCIUM SERPL-MCNC: 8.4 MG/DL — LOW (ref 8.5–10.1)
CHLORIDE SERPL-SCNC: 110 MMOL/L — HIGH (ref 96–108)
CO2 SERPL-SCNC: 28 MMOL/L — SIGNIFICANT CHANGE UP (ref 22–31)
CREAT SERPL-MCNC: 0.78 MG/DL — SIGNIFICANT CHANGE UP (ref 0.5–1.3)
CULTURE RESULTS: SIGNIFICANT CHANGE UP
CULTURE RESULTS: SIGNIFICANT CHANGE UP
EGFR: 72 ML/MIN/1.73M2 — SIGNIFICANT CHANGE UP
GLUCOSE SERPL-MCNC: 98 MG/DL — SIGNIFICANT CHANGE UP (ref 70–99)
HCT VFR BLD CALC: 34.2 % — LOW (ref 34.5–45)
HGB BLD-MCNC: 10.7 G/DL — LOW (ref 11.5–15.5)
INR BLD: 3.84 RATIO — HIGH (ref 0.85–1.18)
MCHC RBC-ENTMCNC: 31.3 GM/DL — LOW (ref 32–36)
MCHC RBC-ENTMCNC: 37.3 PG — HIGH (ref 27–34)
MCV RBC AUTO: 119.2 FL — HIGH (ref 80–100)
NRBC # BLD: 0 /100 WBCS — SIGNIFICANT CHANGE UP (ref 0–0)
PLATELET # BLD AUTO: 705 K/UL — HIGH (ref 150–400)
POTASSIUM SERPL-MCNC: 3.5 MMOL/L — SIGNIFICANT CHANGE UP (ref 3.5–5.3)
POTASSIUM SERPL-SCNC: 3.5 MMOL/L — SIGNIFICANT CHANGE UP (ref 3.5–5.3)
PROTHROM AB SERPL-ACNC: 43.2 SEC — HIGH (ref 9.5–13)
RBC # BLD: 2.87 M/UL — LOW (ref 3.8–5.2)
RBC # FLD: 18 % — HIGH (ref 10.3–14.5)
SODIUM SERPL-SCNC: 142 MMOL/L — SIGNIFICANT CHANGE UP (ref 135–145)
SPECIMEN SOURCE: SIGNIFICANT CHANGE UP
SPECIMEN SOURCE: SIGNIFICANT CHANGE UP
WBC # BLD: 7.48 K/UL — SIGNIFICANT CHANGE UP (ref 3.8–10.5)
WBC # FLD AUTO: 7.48 K/UL — SIGNIFICANT CHANGE UP (ref 3.8–10.5)

## 2023-08-07 RX ORDER — CEPHALEXIN 500 MG
500 CAPSULE ORAL EVERY 12 HOURS
Refills: 0 | Status: DISCONTINUED | OUTPATIENT
Start: 2023-08-08 | End: 2023-08-08

## 2023-08-07 RX ADMIN — LIDOCAINE 2 PATCH: 4 CREAM TOPICAL at 12:34

## 2023-08-07 RX ADMIN — Medication 325 MILLIGRAM(S): at 12:31

## 2023-08-07 RX ADMIN — PANTOPRAZOLE SODIUM 40 MILLIGRAM(S): 20 TABLET, DELAYED RELEASE ORAL at 12:32

## 2023-08-07 RX ADMIN — SODIUM CHLORIDE 50 MILLILITER(S): 9 INJECTION, SOLUTION INTRAVENOUS at 05:59

## 2023-08-07 RX ADMIN — Medication 650 MILLIGRAM(S): at 16:58

## 2023-08-07 RX ADMIN — Medication 650 MILLIGRAM(S): at 22:26

## 2023-08-07 RX ADMIN — HYDROXYUREA 500 MILLIGRAM(S): 500 CAPSULE ORAL at 17:21

## 2023-08-07 RX ADMIN — Medication 1 TABLET(S): at 17:19

## 2023-08-07 RX ADMIN — GABAPENTIN 300 MILLIGRAM(S): 400 CAPSULE ORAL at 16:58

## 2023-08-07 RX ADMIN — AMLODIPINE BESYLATE 5 MILLIGRAM(S): 2.5 TABLET ORAL at 06:00

## 2023-08-07 RX ADMIN — MORPHINE SULFATE 2 MILLIGRAM(S): 50 CAPSULE, EXTENDED RELEASE ORAL at 01:20

## 2023-08-07 RX ADMIN — Medication 1 TABLET(S): at 06:00

## 2023-08-07 RX ADMIN — Medication 650 MILLIGRAM(S): at 06:00

## 2023-08-07 RX ADMIN — Medication 2000 UNIT(S): at 12:31

## 2023-08-07 RX ADMIN — Medication 3 MILLIGRAM(S): at 22:26

## 2023-08-07 RX ADMIN — Medication 250 MILLIGRAM(S): at 22:25

## 2023-08-07 RX ADMIN — GABAPENTIN 300 MILLIGRAM(S): 400 CAPSULE ORAL at 05:59

## 2023-08-07 RX ADMIN — MORPHINE SULFATE 2 MILLIGRAM(S): 50 CAPSULE, EXTENDED RELEASE ORAL at 17:15

## 2023-08-07 RX ADMIN — Medication 25 MILLIGRAM(S): at 06:00

## 2023-08-07 RX ADMIN — Medication 650 MILLIGRAM(S): at 06:29

## 2023-08-07 RX ADMIN — Medication 25 MILLIGRAM(S): at 17:20

## 2023-08-07 RX ADMIN — SENNA PLUS 1 TABLET(S): 8.6 TABLET ORAL at 22:26

## 2023-08-07 RX ADMIN — Medication 650 MILLIGRAM(S): at 23:00

## 2023-08-07 RX ADMIN — MORPHINE SULFATE 2 MILLIGRAM(S): 50 CAPSULE, EXTENDED RELEASE ORAL at 00:20

## 2023-08-07 RX ADMIN — LIDOCAINE 2 PATCH: 4 CREAM TOPICAL at 19:09

## 2023-08-07 RX ADMIN — HYDROXYUREA 500 MILLIGRAM(S): 500 CAPSULE ORAL at 06:00

## 2023-08-07 RX ADMIN — Medication 25 MICROGRAM(S): at 22:26

## 2023-08-07 RX ADMIN — GABAPENTIN 300 MILLIGRAM(S): 400 CAPSULE ORAL at 22:26

## 2023-08-07 RX ADMIN — Medication 1 MILLIGRAM(S): at 12:31

## 2023-08-07 NOTE — PROGRESS NOTE ADULT - PROBLEM SELECTOR PLAN 4
leg elevation , wound care team eval requested

## 2023-08-07 NOTE — CASE MANAGEMENT PROGRESS NOTE - NSCMPROGRESSNOTE_GEN_ALL_CORE
Spoke with the  wellness dept at [facility] to inform them that the patient will be ready for discharge tomorrow. placed [phone] in chart for MD to fill out. Will arrange ambulance transport in am. Referral to completed for [facility] for home care services of [facility]. Will continue to follow patient throughout hospital stay.

## 2023-08-07 NOTE — PROGRESS NOTE ADULT - PROBLEM SELECTOR PROBLEM 7
Venous stasis ulcer of other part of left lower leg without varicose veins

## 2023-08-07 NOTE — PROGRESS NOTE ADULT - SUBJECTIVE AND OBJECTIVE BOX
PROGRESS NOTE  Patient is a 89y old  Female who presents with a chief complaint of AMS (07 Aug 2023 07:15)  Chart and available morning labs /imaging are reviewed electronically , urgent issues addressed . More information  is being added upon completion of rounds , when more information is collected and management discussed with consultants , medical staff and social service/case management on the floor   OVERNIGHT  No new issues reported by medical staff . All above noted Patient is resting in a bed comfortably .Confused ,poor mentation .No distress noted   States she is feeling better Spoke to the son o na  phone , anticipate d/c in 24-48 hrs   HPI:  Patient is a 89-year-old female presents the emergency room with chief complaint of altered mental status.  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  Patient presents from assisted living with altered mental status concern for UTI.  Patient was last admitted to the hospital June 26 through June 30, 2023 was altered mental status x2 weeks.  Was ultimately diagnosed with acute metabolic encephalopathy secondary to UTI improved.  Patient stabilized and was ultimately discharged back to assisted living.  Presents today lethargic with urinary frequency and confusion. Son reports that there is also concern for possible cellulitis to the LLE at the site of a skin graft. Pt recently completed a course of po abx for this with no significant improvement. She has been c/o severe pain to the LLE for weeks. Seen by vascular consult , xrays obtained -found to have severe OA AND PVD ,pain management started Palliative care consult requested ,to discuss advance directives and complete MOLST  (02 Aug 2023 09:19)    PAST MEDICAL & SURGICAL HISTORY:  Hypertension      CVA (cerebral vascular accident)      Depression      Constipation      Neuropathy      Hyperlipidemia      Grade I diastolic dysfunction      Afib      Neuropathy      VHD (valvular heart disease)      Aortic valvar stenosis      No significant past surgical history          MEDICATIONS  (STANDING):  acetaminophen     Tablet .. 650 milliGRAM(s) Oral every 8 hours  amLODIPine   Tablet 5 milliGRAM(s) Oral daily  cholecalciferol 2000 Unit(s) Oral daily  dextrose 5% + sodium chloride 0.45%. 1000 milliLiter(s) (50 mL/Hr) IV Continuous <Continuous>  escitalopram 10 milliGRAM(s) Oral at bedtime  ferrous    sulfate 325 milliGRAM(s) Oral daily  folic acid 1 milliGRAM(s) Oral daily  gabapentin 300 milliGRAM(s) Oral three times a day  hydroxyurea 500 milliGRAM(s) Oral two times a day  lactobacillus acidophilus 1 Tablet(s) Oral every 12 hours  levothyroxine Injectable 25 MICROGram(s) IV Push at bedtime  lidocaine   4% Patch 2 Patch Transdermal daily  metoprolol tartrate 25 milliGRAM(s) Oral two times a day  pantoprazole  Injectable 40 milliGRAM(s) IV Push daily  senna 1 Tablet(s) Oral at bedtime  vancomycin  IVPB 750 milliGRAM(s) IV Intermittent every 24 hours    MEDICATIONS  (PRN):  aluminum hydroxide/magnesium hydroxide/simethicone Suspension 30 milliLiter(s) Oral every 4 hours PRN Dyspepsia  ketorolac   Injectable 30 milliGRAM(s) IV Push every 6 hours PRN Moderate Pain (4 - 6)  LORazepam   Injectable 0.5 milliGRAM(s) IV Push every 8 hours PRN Agitation  melatonin 3 milliGRAM(s) Oral at bedtime PRN Insomnia  morphine  - Injectable 2 milliGRAM(s) IV Push every 4 hours PRN Severe Pain (7 - 10)  ondansetron Injectable 4 milliGRAM(s) IV Push every 8 hours PRN Nausea and/or Vomiting      OBJECTIVE    T(C): 36.9 (08-07-23 @ 11:02), Max: 36.9 (08-07-23 @ 05:37)  HR: 94 (08-07-23 @ 11:02) (76 - 94)  BP: 124/65 (08-07-23 @ 11:02) (124/65 - 129/69)  RR: 18 (08-07-23 @ 11:02) (18 - 19)  SpO2: 90% (08-07-23 @ 11:02) (90% - 93%)  Wt(kg): --  I&O's Summary    06 Aug 2023 07:01  -  07 Aug 2023 07:00  --------------------------------------------------------  IN: 500 mL / OUT: 0 mL / NET: 500 mL          REVIEW OF SYSTEMS:  CONSTITUTIONAL: No fever, weight loss, or fatigue  EYES: No eye pain, visual disturbances, or discharge  ENMT:   No sinus or throat pain  NECK: No pain or stiffness  RESPIRATORY: No cough, wheezing, chills or hemoptysis; No shortness of breath  CARDIOVASCULAR: No chest pain, palpitations, dizziness, or leg swelling  GASTROINTESTINAL: No abdominal pain. No nausea, vomiting; No diarrhea or constipation. No melena or hematochezia.  GENITOURINARY: No dysuria, frequency, hematuria, or incontinence  NEUROLOGICAL: No headaches, memory loss, loss of strength, numbness, or tremors  SKIN: No itching, burning, rashes, or lesions   MUSCULOSKELETAL: No joint pain or swelling; No muscle, back, or extremity pain    PHYSICAL EXAM:  Appearance: NAD. VS past 24 hrs -as above   HEENT:   Moist oral mucosa. Conjunctiva clear b/l.   Neck : supple  Respiratory: Lungs CTAB.  Gastrointestinal:  Soft, nontender. No rebound. No rigidity. BS present	  Cardiovascular: RRR ,S1S2 present  Neurologic: Non-focal. Moving all extremities.  Extremities: No edema. No erythema. No calf tenderness.  Skin: No rashes, No ecchymoses, No cyanosis.	  wounds ,skin lesions-See skin assesment flow sheet   LABS:                        10.7   7.48  )-----------( 705      ( 07 Aug 2023 08:11 )             34.2     08-07    142  |  110<H>  |  4<L>  ----------------------------<  98  3.5   |  28  |  0.78    Ca    8.4<L>      07 Aug 2023 08:11  Phos  3.3     08-06  Mg     2.1     08-06      CAPILLARY BLOOD GLUCOSE        PT/INR - ( 07 Aug 2023 08:11 )   PT: 43.2 sec;   INR: 3.84 ratio           Urinalysis Basic - ( 07 Aug 2023 08:11 )    Color: x / Appearance: x / SG: x / pH: x  Gluc: 98 mg/dL / Ketone: x  / Bili: x / Urobili: x   Blood: x / Protein: x / Nitrite: x   Leuk Esterase: x / RBC: x / WBC x   Sq Epi: x / Non Sq Epi: x / Bacteria: x        Culture - Blood (collected 01 Aug 2023 18:38)  Source: .Blood Blood-Venous  Final Report (07 Aug 2023 02:00):    No growth at 5 days    Culture - Blood (collected 01 Aug 2023 18:30)  Source: .Blood Blood-Venous  Final Report (07 Aug 2023 02:00):    No growth at 5 days    Culture - Urine (collected 01 Aug 2023 18:00)  Source: Clean Catch Clean Catch (Midstream)  Final Report (05 Aug 2023 09:15):    10,000 - 49,000 CFU/mL Klebsiella pneumoniae    10,000 - 49,000 CFU/mL Proteus mirabilis  Organism: Klebsiella pneumoniae  Proteus mirabilis (05 Aug 2023 09:15)  Organism: Proteus mirabilis (05 Aug 2023 09:15)  Organism: Klebsiella pneumoniae (05 Aug 2023 09:15)  RADIOLOGY & ADDITIONAL TESTS:   reviewed elctronically  ASSESSMENT/PLAN: 	  25 minutes aggregate time was spent on this visit, 50% visit time spent in care co-ordination with other attendings and counselling patient .I have discussed care plan with patient / HCP/family member natali Acevedo on a phone today  ,who expressed understanding of problems treatment and their effect and side effects, alternatives in details. I have asked if they have any questions and concerns and appropriately addressed them to best of my ability. ACP-Advance care planning was discussed , pallitaive care issues ,CMO ,GOC ,MOLST  form ,advance directives were reviewed .All questions were answered to the best of my knowledge - 25 min

## 2023-08-07 NOTE — PROGRESS NOTE ADULT - SUBJECTIVE AND OBJECTIVE BOX
Neurology Follow up note    REJI [id number]    HPI:  Patient is a 89-year-old female presents the emergency room with chief complaint of altered mental status.  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  Patient presents from assisted living with altered mental status concern for UTI.  Patient was last admitted to the hospital June 26 through June 30, 2023 was altered mental status x2 weeks.  Was ultimately diagnosed with acute metabolic encephalopathy secondary to UTI improved.  Patient stabilized and was ultimately discharged back to assisted living.  Presents today lethargic with urinary frequency and confusion. Son reports that there is also concern for possible cellulitis to the LLE at the site of a skin graft. Pt recently completed a course of po abx for this with no significant improvement. She has been c/o severe pain to the LLE for weeks. Seen by vascular consult , xrays obtained -found to have severe OA AND PVD ,pain management started Palliative care consult requested ,to discuss advance directives and complete MOLST  (02 Aug 2023 09:19)      Interval History - no new events    Patient is seen, chart was reviewed and case was discussed with the treatment team.  Pt is not in any distress.   Lying on bed comfortably.     Vital Signs Last 24 Hrs  T(C): 36.9 (07 Aug 2023 11:02), Max: 36.9 (07 Aug 2023 05:37)  T(F): 98.4 (07 Aug 2023 11:02), Max: 98.4 (07 Aug 2023 05:37)  HR: 94 (07 Aug 2023 11:02) (76 - 94)  BP: 124/65 (07 Aug 2023 11:02) (124/65 - 129/69)  BP(mean): --  RR: 18 (07 Aug 2023 11:02) (18 - 19)  SpO2: 90% (07 Aug 2023 11:02) (90% - 93%)    Parameters below as of 07 Aug 2023 11:02  Patient On (Oxygen Delivery Method): room air                  REVIEW OF SYSTEMS:    Constitutional: No fever,  Eyes: No eye pain, visual disturbances, or discharge  ENT:  No difficulty hearing, tinnitus, vertigo; No sinus or throat pain  Neck: No pain or stiffness  Respiratory: No  wheezing, chills or hemoptysis  Cardiovascular: No chest pain, palpitations, shortness of breath, dizziness or leg swelling  Gastrointestinal: No abdominal or epigastric pain. No nausea, vomiting   Genitourinary: No dysuria, frequency, hematuria or incontinence  Neurological: No headaches,, loss of strength,   Psychiatric: No  mood swings or difficulty sleeping  Musculoskeletal: No joint pain or swelling;   Skin: No itching, burning, rashes or lesions   Lymph Nodes: No enlarged glands  Endocrine: No heat or cold intolerance; No hair loss,  Allergy and Immunologic: No hives or eczema    On Neurological Examination:    Mental Status - Pt is alert, awake, oriented X2  Follows commands well and able to answer questions appropriately  .Mood and affect  normal    Speech -  Normal.    Cranial Nerves - Pupils 3 mm equal and reactive to light, extraocular eye movements intact. Pt has no visual field deficit.  Pt has  right  facial asymmetry. Facial sensation is intact.Tongue - is in midline.    Muscle tone - is increased on right.      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 Pt withdraws all extremities equally on stimulation. No asymmetry seen.    Hemiataxia right/left.    coordination:    Finger to nose: normal-left  .    Deep tendon Reflexes - 2 plus all over.          Neck Supple -  Yes.     MEDICATIONS    acetaminophen     Tablet .. 650 milliGRAM(s) Oral every 8 hours  aluminum hydroxide/magnesium hydroxide/simethicone Suspension 30 milliLiter(s) Oral every 4 hours PRN  amLODIPine   Tablet 5 milliGRAM(s) Oral daily  cholecalciferol 2000 Unit(s) Oral daily  dextrose 5% + sodium chloride 0.45%. 1000 milliLiter(s) IV Continuous <Continuous>  escitalopram 10 milliGRAM(s) Oral at bedtime  ferrous    sulfate 325 milliGRAM(s) Oral daily  folic acid 1 milliGRAM(s) Oral daily  gabapentin 300 milliGRAM(s) Oral three times a day  hydroxyurea 500 milliGRAM(s) Oral two times a day  ketorolac   Injectable 30 milliGRAM(s) IV Push every 6 hours PRN  lactobacillus acidophilus 1 Tablet(s) Oral every 12 hours  levothyroxine Injectable 25 MICROGram(s) IV Push at bedtime  lidocaine   4% Patch 2 Patch Transdermal daily  LORazepam   Injectable 0.5 milliGRAM(s) IV Push every 8 hours PRN  melatonin 3 milliGRAM(s) Oral at bedtime PRN  metoprolol tartrate 25 milliGRAM(s) Oral two times a day  morphine  - Injectable 2 milliGRAM(s) IV Push every 4 hours PRN  ondansetron Injectable 4 milliGRAM(s) IV Push every 8 hours PRN  pantoprazole  Injectable 40 milliGRAM(s) IV Push daily  piperacillin/tazobactam IVPB.. 3.375 Gram(s) IV Intermittent every 8 hours  senna 1 Tablet(s) Oral at bedtime  trimethoprim  160 mG/sulfamethoxazole 800 mG 1 Tablet(s) Oral two times a day  warfarin 2 milliGRAM(s) Oral once      Allergies    per son &quot;issues with certain anitibiotics&quot; does not remember the names (Unknown)    Intolerances                                     10.7   7.48  )-----------( 705      ( 07 Aug 2023 08:11 )             34.2         Hemoglobin A1C:     Vitamin B12     RADIOLOGY    ASSESSMENT AND PLAN:      seen for ams  likely ME  DEMENTIA  Hx of CVA  AF  PN    continue AC  NEURONTIN FOR neuropathy  Physical therapy evaluation.  OOB to chair/ambulation with assistance only.  Pain is accessed and addressed.  Would continue to follow.

## 2023-08-07 NOTE — PROGRESS NOTE ADULT - PROBLEM SELECTOR PROBLEM 4
Leg wound, left
decreased weight-shifting ability/decreased step length

## 2023-08-07 NOTE — PROGRESS NOTE ADULT - PROBLEM SELECTOR PLAN 9
seen by vascular team , pain management ,PT/OT

## 2023-08-07 NOTE — PROGRESS NOTE ADULT - TIME BILLING
Date: 04/13/20 (Presented for ctxs, found to have HR of 180)


Ms. Chong is a 32 y.o. @ 39+ wks who presented to triage with c/o ctxs. Upon 

nursing assessment, she was found to have a HR of 180. She is known to the 

practice for an irregular heart rate and is being follow by [facility] 

[facility] d/t this irregular HR. When asked pt states that her chest feels funny. HR

noted to be 200 while provider at the bedside. Dr. Lemons consulted. EKG and 

labs ordered stat. Also consult for hospitalist to come and see patient. Denies 

shortness of breath, fever, chills, known exposure to COVID-19 pts.  Continuous 

cardiac monitoring also ordered. Charge RN, Romero noted. Pt seen and evaluated 

with Dr. Lemons.
in direct care of patient , reviewing labs and other results and adjusting medications and in discussion with other consultants , RN and PMD

## 2023-08-07 NOTE — PROGRESS NOTE ADULT - PROBLEM SELECTOR PLAN 10
Supportive care,frequent redirection,continue home medications,management of agitation as needed

## 2023-08-07 NOTE — PROGRESS NOTE ADULT - PROBLEM SELECTOR PLAN 8
pain management , PT/OT

## 2023-08-07 NOTE — PROGRESS NOTE ADULT - PROBLEM SELECTOR PLAN 5
continue home medications ,seen by card cons

## 2023-08-07 NOTE — PROGRESS NOTE ADULT - PROBLEM SELECTOR PLAN 3
septic workup ,iv zosyn ,ID cons called
septic workup ,iv zosyn ,ID cons called
septic workup ,iv vanco ,case d/w ID cons ,anticipate d/c in 24-48 hrs
septic workup ,iv zosyn ,ID cons called
septic workup ,iv zosyn ,ID cons called

## 2023-08-07 NOTE — PROGRESS NOTE ADULT - PROBLEM SELECTOR PROBLEM 1
Leg wound, left
Leg wound, left
Acute metabolic encephalopathy

## 2023-08-07 NOTE — PROGRESS NOTE ADULT - SUBJECTIVE AND OBJECTIVE BOX
Interval History:    CENTRAL LINE:   [  ] YES       [  ] NO  MUIR:                 [  ] YES       [  ] NO         REVIEW OF SYSTEMS:  All Systems below were reviewed and are negative [  ]  HEENT:  ID:  Pulmonary:  Cardiac:  GI:  Renal:  Musculoskeletal:  All other systems above were reviewed and are negative   [  ]      MEDICATIONS  (STANDING):  acetaminophen     Tablet .. 650 milliGRAM(s) Oral every 8 hours  amLODIPine   Tablet 5 milliGRAM(s) Oral daily  cholecalciferol 2000 Unit(s) Oral daily  dextrose 5% + sodium chloride 0.45%. 1000 milliLiter(s) (50 mL/Hr) IV Continuous <Continuous>  escitalopram 10 milliGRAM(s) Oral at bedtime  ferrous    sulfate 325 milliGRAM(s) Oral daily  folic acid 1 milliGRAM(s) Oral daily  gabapentin 300 milliGRAM(s) Oral three times a day  hydroxyurea 500 milliGRAM(s) Oral two times a day  lactobacillus acidophilus 1 Tablet(s) Oral every 12 hours  levothyroxine Injectable 25 MICROGram(s) IV Push at bedtime  lidocaine   4% Patch 2 Patch Transdermal daily  metoprolol tartrate 25 milliGRAM(s) Oral two times a day  pantoprazole  Injectable 40 milliGRAM(s) IV Push daily  senna 1 Tablet(s) Oral at bedtime  vancomycin  IVPB 750 milliGRAM(s) IV Intermittent every 24 hours    MEDICATIONS  (PRN):  aluminum hydroxide/magnesium hydroxide/simethicone Suspension 30 milliLiter(s) Oral every 4 hours PRN Dyspepsia  ketorolac   Injectable 30 milliGRAM(s) IV Push every 6 hours PRN Moderate Pain (4 - 6)  LORazepam   Injectable 0.5 milliGRAM(s) IV Push every 8 hours PRN Agitation  melatonin 3 milliGRAM(s) Oral at bedtime PRN Insomnia  morphine  - Injectable 2 milliGRAM(s) IV Push every 4 hours PRN Severe Pain (7 - 10)  ondansetron Injectable 4 milliGRAM(s) IV Push every 8 hours PRN Nausea and/or Vomiting      Vital Signs Last 24 Hrs  T(C): 36.9 (07 Aug 2023 11:02), Max: 36.9 (07 Aug 2023 05:37)  T(F): 98.4 (07 Aug 2023 11:02), Max: 98.4 (07 Aug 2023 05:37)  HR: 94 (07 Aug 2023 11:02) (76 - 94)  BP: 124/65 (07 Aug 2023 11:02) (124/65 - 129/69)  BP(mean): --  RR: 18 (07 Aug 2023 11:02) (18 - 19)  SpO2: 90% (07 Aug 2023 11:02) (90% - 93%)    Parameters below as of 07 Aug 2023 11:02  Patient On (Oxygen Delivery Method): room air        I&O's Summary    06 Aug 2023 07:01  -  07 Aug 2023 07:00  --------------------------------------------------------  IN: 500 mL / OUT: 0 mL / NET: 500 mL        PHYSICAL EXAM:  HEENT: NC/AT; PERRLA  Neck: Soft; no tenderness  Lungs: CTA bilaterally; no wheezing.   Heart:  Abdomen:  Genital/ Rectal:  Extremities:  Neurologic:  Vascular:      LABORATORY:    CBC Full  -  ( 07 Aug 2023 08:11 )  WBC Count : 7.48 K/uL  RBC Count : 2.87 M/uL  Hemoglobin : 10.7 g/dL  Hematocrit : 34.2 %  Platelet Count - Automated : 705 K/uL  Mean Cell Volume : 119.2 fl  Mean Cell Hemoglobin : 37.3 pg  Mean Cell Hemoglobin Concentration : 31.3 gm/dL  Auto Neutrophil # : x  Auto Lymphocyte # : x  Auto Monocyte # : x  Auto Eosinophil # : x  Auto Basophil # : x  Auto Neutrophil % : x  Auto Lymphocyte % : x  Auto Monocyte % : x  Auto Eosinophil % : x  Auto Basophil % : x          08-07    142  |  110<H>  |  4<L>  ----------------------------<  98  3.5   |  28  |  0.78    Ca    8.4<L>      07 Aug 2023 08:11  Phos  3.3     08-06  Mg     2.1     08-06        Rapid Respiratory Viral Panel Result        08-01 @ 22:40  Rapid RVP NotDetec  Coronovirus --  Adenovirus --  Bordetella Pertussis --  Chlamydia Pneumonia --  Entero/Rhinovirus--  HKU1 Coronovirus --  HMPV Coronovirus --  Influenza A --  Influenza AH1 --  Influenza AH1 2009 --  Influenza AH3 --  Influenza B --  Mycoplasma Pneumoniae --  NL63 Coronovirus --  OC43 Coronovirus --  Parainfluenza 1 --  Parainfluenza 2 --  Parainfluenza 3 --  Parainfluenza 4 --  Resp Syncytial Virus --      Assessment and Plan:          Sushil Anguiano MD   [phone].    The patient is afebrile  No new complaints      MEDICATIONS  (STANDING):  acetaminophen     Tablet .. 650 milliGRAM(s) Oral every 8 hours  amLODIPine   Tablet 5 milliGRAM(s) Oral daily  cholecalciferol 2000 Unit(s) Oral daily  dextrose 5% + sodium chloride 0.45%. 1000 milliLiter(s) (50 mL/Hr) IV Continuous <Continuous>  escitalopram 10 milliGRAM(s) Oral at bedtime  ferrous    sulfate 325 milliGRAM(s) Oral daily  folic acid 1 milliGRAM(s) Oral daily  gabapentin 300 milliGRAM(s) Oral three times a day  hydroxyurea 500 milliGRAM(s) Oral two times a day  lactobacillus acidophilus 1 Tablet(s) Oral every 12 hours  levothyroxine Injectable 25 MICROGram(s) IV Push at bedtime  lidocaine   4% Patch 2 Patch Transdermal daily  metoprolol tartrate 25 milliGRAM(s) Oral two times a day  pantoprazole  Injectable 40 milliGRAM(s) IV Push daily  senna 1 Tablet(s) Oral at bedtime  vancomycin  IVPB 750 milliGRAM(s) IV Intermittent every 24 hours    MEDICATIONS  (PRN):  aluminum hydroxide/magnesium hydroxide/simethicone Suspension 30 milliLiter(s) Oral every 4 hours PRN Dyspepsia  ketorolac   Injectable 30 milliGRAM(s) IV Push every 6 hours PRN Moderate Pain (4 - 6)  LORazepam   Injectable 0.5 milliGRAM(s) IV Push every 8 hours PRN Agitation  melatonin 3 milliGRAM(s) Oral at bedtime PRN Insomnia  morphine  - Injectable 2 milliGRAM(s) IV Push every 4 hours PRN Severe Pain (7 - 10)  ondansetron Injectable 4 milliGRAM(s) IV Push every 8 hours PRN Nausea and/or Vomiting      Vital Signs Last 24 Hrs  T(C): 36.9 (07 Aug 2023 11:02), Max: 36.9 (07 Aug 2023 05:37)  T(F): 98.4 (07 Aug 2023 11:02), Max: 98.4 (07 Aug 2023 05:37)  HR: 94 (07 Aug 2023 11:02) (76 - 94)  BP: 124/65 (07 Aug 2023 11:02) (124/65 - 129/69)  BP(mean): --  RR: 18 (07 Aug 2023 11:02) (18 - 19)  SpO2: 90% (07 Aug 2023 11:02) (90% - 93%)    Parameters below as of 07 Aug 2023 11:02  Patient On (Oxygen Delivery Method): room air        I&O's Summary    06 Aug 2023 07:01  -  07 Aug 2023 07:00  --------------------------------------------------------  IN: 500 mL / OUT: 0 mL / NET: 500 mL        PHYSICAL EXAM:  HEENT: NC/AT; PERRLA  Neck: Soft; no tenderness  Lungs: CTA bilaterally; no wheezing.   Heart: RRR, no murmurs.   Abdomen:  Soft, no tenderness.   Genital/ Rectal: No salas catheter.   Extremities: LLE ulcer with decreased erythema and swelling.   Neurologic: Confused.      LABORATORY:    CBC Full  -  ( 07 Aug 2023 08:11 )  WBC Count : 7.48 K/uL  RBC Count : 2.87 M/uL  Hemoglobin : 10.7 g/dL  Hematocrit : 34.2 %  Platelet Count - Automated : 705 K/uL  Mean Cell Volume : 119.2 fl  Mean Cell Hemoglobin : 37.3 pg  Mean Cell Hemoglobin Concentration : 31.3 gm/dL  Auto Neutrophil # : x  Auto Lymphocyte # : x  Auto Monocyte # : x  Auto Eosinophil # : x  Auto Basophil # : x  Auto Neutrophil % : x  Auto Lymphocyte % : x  Auto Monocyte % : x  Auto Eosinophil % : x  Auto Basophil % : x          08-07    142  |  110<H>  |  4<L>  ----------------------------<  98  3.5   |  28  |  0.78    Ca    8.4<L>      07 Aug 2023 08:11  Phos  3.3     08-06  Mg     2.1     08-06      Assessment and Plan:    1. LLE cellulitis with chronic wound.  2. Possible UTI.  3. Lethargy    . Urine culture with no growth. Thus low suspicion for UTI.   . Cellulitis appears improved. Discontinue IV Vancomycin after today then add po Doxycycline 100 mg bid and Keflex 500 mg bid for 7 days.  . The patient has had a chronic LLE ulcer. Suggested her son to follow with plastic surgery and wound care consultants.   . Will no longer follow.         Sushil Anguiano MD   [phone].

## 2023-08-07 NOTE — PROGRESS NOTE ADULT - SUBJECTIVE AND OBJECTIVE BOX
[facility] [place]       HPI:  Patient is a 89-year-old female presents the emergency room with chief complaint of altered mental status.  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  Patient presents from assisted living with altered mental status concern for UTI.  Patient was last admitted to the hospital June 26 through June 30, 2023 was altered mental status x2 weeks.  Was ultimately diagnosed with acute metabolic encephalopathy secondary to UTI improved.  Patient stabilized and was ultimately discharged back to assisted living.  Presents today lethargic with urinary frequency and confusion. Son reports that there is also concern for possible cellulitis to the LLE at the site of a skin graft. Pt recently completed a course of po abx for this with no significant improvement. She has been c/o severe pain to the LLE for weeks. Seen by vascular consult , xrays obtained -found to have severe OA AND PVD ,pain management started Palliative care consult requested ,to discuss advance directives and complete MOLST  (02 Aug 2023 09:19)        SUBJECTIVE:      ALLERGIES:  Allergies    per son &quot;issues with certain anitibiotics&quot; does not remember the names (Unknown)    Intolerances          MEDICATIONS  (STANDING):  acetaminophen     Tablet .. 650 milliGRAM(s) Oral every 8 hours  amLODIPine   Tablet 5 milliGRAM(s) Oral daily  cholecalciferol 2000 Unit(s) Oral daily  dextrose 5% + sodium chloride 0.45%. 1000 milliLiter(s) (50 mL/Hr) IV Continuous <Continuous>  escitalopram 10 milliGRAM(s) Oral at bedtime  ferrous    sulfate 325 milliGRAM(s) Oral daily  folic acid 1 milliGRAM(s) Oral daily  gabapentin 300 milliGRAM(s) Oral three times a day  hydroxyurea 500 milliGRAM(s) Oral two times a day  lactobacillus acidophilus 1 Tablet(s) Oral every 12 hours  levothyroxine Injectable 25 MICROGram(s) IV Push at bedtime  lidocaine   4% Patch 2 Patch Transdermal daily  metoprolol tartrate 25 milliGRAM(s) Oral two times a day  pantoprazole  Injectable 40 milliGRAM(s) IV Push daily  senna 1 Tablet(s) Oral at bedtime  vancomycin  IVPB 750 milliGRAM(s) IV Intermittent every 24 hours    MEDICATIONS  (PRN):  aluminum hydroxide/magnesium hydroxide/simethicone Suspension 30 milliLiter(s) Oral every 4 hours PRN Dyspepsia  ketorolac   Injectable 30 milliGRAM(s) IV Push every 6 hours PRN Moderate Pain (4 - 6)  LORazepam   Injectable 0.5 milliGRAM(s) IV Push every 8 hours PRN Agitation  melatonin 3 milliGRAM(s) Oral at bedtime PRN Insomnia  morphine  - Injectable 2 milliGRAM(s) IV Push every 4 hours PRN Severe Pain (7 - 10)  ondansetron Injectable 4 milliGRAM(s) IV Push every 8 hours PRN Nausea and/or Vomiting      REVIEW OF SYSTEMS:  CONSTITUTIONAL: No fever,  RESPIRATORY: No cough, wheezing, shortness of breath  CARDIOVASCULAR: No chest pain, dyspnea, palpitations, dizziness, syncope, paroxysmal nocturnal dyspnea, orthopnea, or arm or leg swelling  GASTROINTESTINAL: No abdominal  or epigastric pain, nausea, vomiting,  diarrhea  NEUROLOGICAL: No headaches,  loss of strength, numbness, or tremors    Vital Signs Last 24 Hrs  T(C): 36.9 (07 Aug 2023 05:37), Max: 36.9 (07 Aug 2023 05:37)  T(F): 98.4 (07 Aug 2023 05:37), Max: 98.4 (07 Aug 2023 05:37)  HR: 78 (07 Aug 2023 05:37) (76 - 92)  BP: 129/69 (07 Aug 2023 05:37) (113/64 - 129/69)  BP(mean): --  RR: 18 (07 Aug 2023 05:37) (18 - 20)  SpO2: 93% (07 Aug 2023 05:37) (93% - 95%)    Parameters below as of 06 Aug 2023 20:38  Patient On (Oxygen Delivery Method): room air        PHYSICAL EXAM:  HEAD:  Atraumatic, Normocephalic  NECK: Supple and normal thyroid.  No JVD or carotid bruit.   HEART: S1, S2 regular , 1/6 soft ejection systolic murmur in mitral area , no thrill and no gallops .  PULMONARY: Bilateral vesicular breathing , few scattered ronchi and few scattered rales are present .  ABDOMEN: Soft nontender and positive bowl sounds   EXTREMITIES:  No clubbing, cyanosis, or pedal  edema  NEUROLOGICAL: AAOX3 , no focal deficit .    LABS:                        10.7   6.55  )-----------( 557      ( 06 Aug 2023 13:15 )             35.0     08-06    141  |  109<H>  |  8   ----------------------------<  105<H>  3.8   |  28  |  0.83    Ca    8.3<L>      06 Aug 2023 13:15  Phos  3.3     08-06  Mg     2.1     08-06          PT/INR - ( 06 Aug 2023 13:15 )   PT: 38.1 sec;   INR: 3.37 ratio           Urinalysis Basic - ( 06 Aug 2023 13:15 )    Color: x / Appearance: x / SG: x / pH: x  Gluc: 105 mg/dL / Ketone: x  / Bili: x / Urobili: x   Blood: x / Protein: x / Nitrite: x   Leuk Esterase: x / RBC: x / WBC x   Sq Epi: x / Non Sq Epi: x / Bacteria: x      BNP      EKG:  ECHO:  IMAGING:    Assessment/Plan    Will continue to follow during hospital course and give further recommendations as needed . Thanks for your referral . if any questions please contact me at office ([phone] [phone])  [facility] [place]       HPI:  Patient is a 89-year-old female presents the emergency room with chief complaint of altered mental status.  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  Patient presents from assisted living with altered mental status concern for UTI.  Patient was last admitted to the hospital June 26 through June 30, 2023 was altered mental status x2 weeks.  Was ultimately diagnosed with acute metabolic encephalopathy secondary to UTI improved.  Patient stabilized and was ultimately discharged back to assisted living.  Presents today lethargic with urinary frequency and confusion. Son reports that there is also concern for possible cellulitis to the LLE at the site of a skin graft. Pt recently completed a course of po abx for this with no significant improvement. She has been c/o severe pain to the LLE for weeks. Seen by vascular consult , xrays obtained -found to have severe OA AND PVD ,pain management started Palliative care consult requested ,to discuss advance directives and complete MOLST  (02 Aug 2023 09:19)        SUBJECTIVE:      ALLERGIES:  Allergies    per son &quot;issues with certain anitibiotics&quot; does not remember the names (Unknown)    Intolerances          MEDICATIONS  (STANDING):  acetaminophen     Tablet .. 650 milliGRAM(s) Oral every 8 hours  amLODIPine   Tablet 5 milliGRAM(s) Oral daily  cholecalciferol 2000 Unit(s) Oral daily  dextrose 5% + sodium chloride 0.45%. 1000 milliLiter(s) (50 mL/Hr) IV Continuous <Continuous>  escitalopram 10 milliGRAM(s) Oral at bedtime  ferrous    sulfate 325 milliGRAM(s) Oral daily  folic acid 1 milliGRAM(s) Oral daily  gabapentin 300 milliGRAM(s) Oral three times a day  hydroxyurea 500 milliGRAM(s) Oral two times a day  lactobacillus acidophilus 1 Tablet(s) Oral every 12 hours  levothyroxine Injectable 25 MICROGram(s) IV Push at bedtime  lidocaine   4% Patch 2 Patch Transdermal daily  metoprolol tartrate 25 milliGRAM(s) Oral two times a day  pantoprazole  Injectable 40 milliGRAM(s) IV Push daily  senna 1 Tablet(s) Oral at bedtime  vancomycin  IVPB 750 milliGRAM(s) IV Intermittent every 24 hours    MEDICATIONS  (PRN):  aluminum hydroxide/magnesium hydroxide/simethicone Suspension 30 milliLiter(s) Oral every 4 hours PRN Dyspepsia  ketorolac   Injectable 30 milliGRAM(s) IV Push every 6 hours PRN Moderate Pain (4 - 6)  LORazepam   Injectable 0.5 milliGRAM(s) IV Push every 8 hours PRN Agitation  melatonin 3 milliGRAM(s) Oral at bedtime PRN Insomnia  morphine  - Injectable 2 milliGRAM(s) IV Push every 4 hours PRN Severe Pain (7 - 10)  ondansetron Injectable 4 milliGRAM(s) IV Push every 8 hours PRN Nausea and/or Vomiting      REVIEW OF SYSTEMS:  CONSTITUTIONAL: No fever,  RESPIRATORY: No cough, wheezing, shortness of breath  CARDIOVASCULAR: No chest pain, dyspnea, palpitations, dizziness, syncope, paroxysmal nocturnal dyspnea, orthopnea, or arm or leg swelling  GASTROINTESTINAL: No abdominal  or epigastric pain, nausea, vomiting,  diarrhea  NEUROLOGICAL: No headaches,  loss of strength, numbness, or tremors    Vital Signs Last 24 Hrs  T(C): 36.9 (07 Aug 2023 05:37), Max: 36.9 (07 Aug 2023 05:37)  T(F): 98.4 (07 Aug 2023 05:37), Max: 98.4 (07 Aug 2023 05:37)  HR: 78 (07 Aug 2023 05:37) (76 - 92)  BP: 129/69 (07 Aug 2023 05:37) (113/64 - 129/69)  BP(mean): --  RR: 18 (07 Aug 2023 05:37) (18 - 20)  SpO2: 93% (07 Aug 2023 05:37) (93% - 95%)    Parameters below as of 06 Aug 2023 20:38  Patient On (Oxygen Delivery Method): room air        PHYSICAL EXAM:  HEAD:  Atraumatic, Normocephalic  NECK: Supple and normal thyroid.  No JVD or carotid bruit.   HEART: S1, S2 regular , 1/6 soft ejection systolic murmur in mitral area , no thrill and no gallops .  PULMONARY: Bilateral vesicular breathing , few scattered ronchi and few scattered rales are present .  ABDOMEN: Soft nontender and positive bowl sounds   EXTREMITIES:  No clubbing, cyanosis, or pedal  edema  NEUROLOGICAL: AAOX3 , no focal deficit .    LABS:                        10.7   6.55  )-----------( 557      ( 06 Aug 2023 13:15 )             35.0     08-06    141  |  109<H>  |  8   ----------------------------<  105<H>  3.8   |  28  |  0.83    Ca    8.3<L>      06 Aug 2023 13:15  Phos  3.3     08-06  Mg     2.1     08-06          PT/INR - ( 06 Aug 2023 13:15 )   PT: 38.1 sec;   INR: 3.37 ratio           Urinalysis Basic - ( 06 Aug 2023 13:15 )    Color: x / Appearance: x / SG: x / pH: x  Gluc: 105 mg/dL / Ketone: x  / Bili: x / Urobili: x   Blood: x / Protein: x / Nitrite: x   Leuk Esterase: x / RBC: x / WBC x   Sq Epi: x / Non Sq Epi: x / Bacteria: x      Assessment/Plan  Patient has :  1) Left lower extremity cellulitis .  2) UTI  3) Paroxysmal atrial fibrillation and stable .  4)  H/O hypertension and stable .  5) H/O CAD   6) Coagulopathy and no active bleeding and INR better .  7) Anemia .  Plan : 1) I/V antibiotics as per ID 2) Monitor hemoglobin and electrolytes 3) Monitor PT and INR 4) Rest of medications as such . 5) Prognosis guarded .  Will continue to follow during hospital course and give further recommendations as needed . Thanks for your referral . if any questions please contact me at office ([phone] cell [phone])      JIM ARELLANO MD [place] [id number]  [address]  OFFICE : [phone]  CELL : [phone]  CARDIOLOGY F/U :       HPI:  Patient is a 89-year-old female presents the emergency room with chief complaint of altered mental status.  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  Patient presents from assisted living with altered mental status concern for UTI.  Patient was last admitted to the hospital June 26 through June 30, 2023 was altered mental status x2 weeks.  Was ultimately diagnosed with acute metabolic encephalopathy secondary to UTI improved.  Patient stabilized and was ultimately discharged back to assisted living.  Presents today lethargic with urinary frequency and confusion. Son reports that there is also concern for possible cellulitis to the LLE at the site of a skin graft. Pt recently completed a course of po abx for this with no significant improvement. She has been c/o severe pain to the LLE for weeks. Seen by vascular consult , xrays obtained -found to have severe OA AND PVD ,pain management started Palliative care consult requested ,to discuss advance directives and complete MOLST  (02 Aug 2023 09:19)        SUBJECTIVE: Patient feeling better .      ALLERGIES:  Allergies    per son &quot;issues with certain anitibiotics&quot; does not remember the names (Unknown)    Intolerances          MEDICATIONS  (STANDING):  acetaminophen     Tablet .. 650 milliGRAM(s) Oral every 8 hours  amLODIPine   Tablet 5 milliGRAM(s) Oral daily  cholecalciferol 2000 Unit(s) Oral daily  dextrose 5% + sodium chloride 0.45%. 1000 milliLiter(s) (50 mL/Hr) IV Continuous <Continuous>  escitalopram 10 milliGRAM(s) Oral at bedtime  ferrous    sulfate 325 milliGRAM(s) Oral daily  folic acid 1 milliGRAM(s) Oral daily  gabapentin 300 milliGRAM(s) Oral three times a day  hydroxyurea 500 milliGRAM(s) Oral two times a day  lactobacillus acidophilus 1 Tablet(s) Oral every 12 hours  levothyroxine Injectable 25 MICROGram(s) IV Push at bedtime  lidocaine   4% Patch 2 Patch Transdermal daily  metoprolol tartrate 25 milliGRAM(s) Oral two times a day  pantoprazole  Injectable 40 milliGRAM(s) IV Push daily  senna 1 Tablet(s) Oral at bedtime  vancomycin  IVPB 750 milliGRAM(s) IV Intermittent every 24 hours    MEDICATIONS  (PRN):  aluminum hydroxide/magnesium hydroxide/simethicone Suspension 30 milliLiter(s) Oral every 4 hours PRN Dyspepsia  ketorolac   Injectable 30 milliGRAM(s) IV Push every 6 hours PRN Moderate Pain (4 - 6)  LORazepam   Injectable 0.5 milliGRAM(s) IV Push every 8 hours PRN Agitation  melatonin 3 milliGRAM(s) Oral at bedtime PRN Insomnia  morphine  - Injectable 2 milliGRAM(s) IV Push every 4 hours PRN Severe Pain (7 - 10)  ondansetron Injectable 4 milliGRAM(s) IV Push every 8 hours PRN Nausea and/or Vomiting      REVIEW OF SYSTEMS:  CONSTITUTIONAL: No fever,  RESPIRATORY: No cough, wheezing, shortness of breath  CARDIOVASCULAR: No chest pain, dyspnea, palpitations, dizziness, syncope, paroxysmal nocturnal dyspnea, orthopnea, or arm or leg swelling  GASTROINTESTINAL: No abdominal  or epigastric pain, nausea, vomiting,  diarrhea  NEUROLOGICAL: No headaches,  loss of strength, numbness, or tremors    Vital Signs Last 24 Hrs  T(C): 36.9 (07 Aug 2023 05:37), Max: 36.9 (07 Aug 2023 05:37)  T(F): 98.4 (07 Aug 2023 05:37), Max: 98.4 (07 Aug 2023 05:37)  HR: 78 (07 Aug 2023 05:37) (76 - 92)  BP: 129/69 (07 Aug 2023 05:37) (113/64 - 129/69)  BP(mean): --  RR: 18 (07 Aug 2023 05:37) (18 - 20)  SpO2: 93% (07 Aug 2023 05:37) (93% - 95%)    Parameters below as of 06 Aug 2023 20:38  Patient On (Oxygen Delivery Method): room air        PHYSICAL EXAM:  HEAD:  Atraumatic, Normocephalic  NECK: Supple and normal thyroid.  No JVD or carotid bruit.   HEART: S1, S2 regular , 1/6 soft ejection systolic murmur in mitral area , no thrill and no gallops .  PULMONARY: Bilateral vesicular breathing , few scattered ronchi and few scattered rales are present .  ABDOMEN: Soft nontender and positive bowl sounds   EXTREMITIES:  No clubbing, cyanosis, or pedal  edema  NEUROLOGICAL: AAOX3 , no focal deficit .    LABS:                        10.7   6.55  )-----------( 557      ( 06 Aug 2023 13:15 )             35.0     08-06    141  |  109<H>  |  8   ----------------------------<  105<H>  3.8   |  28  |  0.83    Ca    8.3<L>      06 Aug 2023 13:15  Phos  3.3     08-06  Mg     2.1     08-06          PT/INR - ( 06 Aug 2023 13:15 )   PT: 38.1 sec;   INR: 3.37 ratio           Urinalysis Basic - ( 06 Aug 2023 13:15 )    Color: x / Appearance: x / SG: x / pH: x  Gluc: 105 mg/dL / Ketone: x  / Bili: x / Urobili: x   Blood: x / Protein: x / Nitrite: x   Leuk Esterase: x / RBC: x / WBC x   Sq Epi: x / Non Sq Epi: x / Bacteria: x      Assessment/Plan  Patient has :  1) Left lower extremity cellulitis .  2) UTI  3) Paroxysmal atrial fibrillation and stable .  4)  H/O hypertension and stable .  5) H/O CAD   6) Coagulopathy and no active bleeding and INR better .  7) Anemia .  Plan : 1) I/V antibiotics as per ID 2) Monitor hemoglobin and electrolytes 3) Monitor PT and INR 4) Rest of medications as such . 5) Prognosis guarded .  Will continue to follow during hospital course and give further recommendations as needed . Thanks for your referral . if any questions please contact me at office ([phone] cell [phone])      JIM ARELLANO MD [place] [id number]  [address]  OFFICE : [phone]  CELL : [phone]  CARDIOLOGY F/U :       HPI:  Patient is a 89-year-old female presents the emergency room with chief complaint of altered mental status.  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  Patient presents from assisted living with altered mental status concern for UTI.  Patient was last admitted to the hospital June 26 through June 30, 2023 was altered mental status x2 weeks.  Was ultimately diagnosed with acute metabolic encephalopathy secondary to UTI improved.  Patient stabilized and was ultimately discharged back to assisted living.  Presents today lethargic with urinary frequency and confusion. Son reports that there is also concern for possible cellulitis to the LLE at the site of a skin graft. Pt recently completed a course of po abx for this with no significant improvement. She has been c/o severe pain to the LLE for weeks. Seen by vascular consult , xrays obtained -found to have severe OA AND PVD ,pain management started Palliative care consult requested ,to discuss advance directives and complete MOLST  (02 Aug 2023 09:19)        SUBJECTIVE: Patient feeling better .      ALLERGIES:  Allergies    per son &quot;issues with certain anitibiotics&quot; does not remember the names (Unknown)    Intolerances          MEDICATIONS  (STANDING):  acetaminophen     Tablet .. 650 milliGRAM(s) Oral every 8 hours  amLODIPine   Tablet 5 milliGRAM(s) Oral daily  cholecalciferol 2000 Unit(s) Oral daily  dextrose 5% + sodium chloride 0.45%. 1000 milliLiter(s) (50 mL/Hr) IV Continuous <Continuous>  escitalopram 10 milliGRAM(s) Oral at bedtime  ferrous    sulfate 325 milliGRAM(s) Oral daily  folic acid 1 milliGRAM(s) Oral daily  gabapentin 300 milliGRAM(s) Oral three times a day  hydroxyurea 500 milliGRAM(s) Oral two times a day  lactobacillus acidophilus 1 Tablet(s) Oral every 12 hours  levothyroxine Injectable 25 MICROGram(s) IV Push at bedtime  lidocaine   4% Patch 2 Patch Transdermal daily  metoprolol tartrate 25 milliGRAM(s) Oral two times a day  pantoprazole  Injectable 40 milliGRAM(s) IV Push daily  senna 1 Tablet(s) Oral at bedtime  vancomycin  IVPB 750 milliGRAM(s) IV Intermittent every 24 hours    MEDICATIONS  (PRN):  aluminum hydroxide/magnesium hydroxide/simethicone Suspension 30 milliLiter(s) Oral every 4 hours PRN Dyspepsia  ketorolac   Injectable 30 milliGRAM(s) IV Push every 6 hours PRN Moderate Pain (4 - 6)  LORazepam   Injectable 0.5 milliGRAM(s) IV Push every 8 hours PRN Agitation  melatonin 3 milliGRAM(s) Oral at bedtime PRN Insomnia  morphine  - Injectable 2 milliGRAM(s) IV Push every 4 hours PRN Severe Pain (7 - 10)  ondansetron Injectable 4 milliGRAM(s) IV Push every 8 hours PRN Nausea and/or Vomiting      REVIEW OF SYSTEMS:  CONSTITUTIONAL: No fever,  RESPIRATORY: No cough, wheezing, shortness of breath  CARDIOVASCULAR: No chest pain, dyspnea, palpitations, dizziness, syncope, paroxysmal nocturnal dyspnea, orthopnea, or arm or leg swelling  GASTROINTESTINAL: No abdominal  or epigastric pain, nausea, vomiting,  diarrhea  NEUROLOGICAL: No headaches, numbness, or tremors . Mild right sided weakness present .  Skin : No itching .  Hematology : No active bleeding .  Endocrinology : No heat and cold intolerance .  Psychiatry : Patient is mild confused .  Genitourinary : No dysuria   Musculoskeletal : Patient has mild arthritis .    Vital Signs Last 24 Hrs  T(C): 36.9 (07 Aug 2023 05:37), Max: 36.9 (07 Aug 2023 05:37)  T(F): 98.4 (07 Aug 2023 05:37), Max: 98.4 (07 Aug 2023 05:37)  HR: 78 (07 Aug 2023 05:37) (76 - 92)  BP: 129/69 (07 Aug 2023 05:37) (113/64 - 129/69)  BP(mean): --  RR: 18 (07 Aug 2023 05:37) (18 - 20)  SpO2: 93% (07 Aug 2023 05:37) (93% - 95%)    Parameters below as of 06 Aug 2023 20:38  Patient On (Oxygen Delivery Method): room air        PHYSICAL EXAM:  HEAD:  Atraumatic, Normocephalic  NECK: Supple and normal thyroid.  No JVD or carotid bruit.   HEART: S1, S2 regular , 1/6 soft ejection systolic murmur in mitral area , no thrill and no gallops .  PULMONARY: Bilateral vesicular breathing , few scattered rhonchi and few scattered rales are present .  ABDOMEN: Soft nontender and positive bowl sounds   EXTREMITIES:  No clubbing, cyanosis, or pedal  edema  NEUROLOGICAL: AA and mild confused  , no focal deficit .  Skin : No rashes .  Musculoskeletal : No joint swellings .    LABS:                        10.7   6.55  )-----------( 557      ( 06 Aug 2023 13:15 )             35.0     08-06    141  |  109<H>  |  8   ----------------------------<  105<H>  3.8   |  28  |  0.83    Ca    8.3<L>      06 Aug 2023 13:15  Phos  3.3     08-06  Mg     2.1     08-06          PT/INR - ( 06 Aug 2023 13:15 )   PT: 38.1 sec;   INR: 3.37 ratio           Urinalysis Basic - ( 06 Aug 2023 13:15 )    Color: x / Appearance: x / SG: x / pH: x  Gluc: 105 mg/dL / Ketone: x  / Bili: x / Urobili: x   Blood: x / Protein: x / Nitrite: x   Leuk Esterase: x / RBC: x / WBC x   Sq Epi: x / Non Sq Epi: x / Bacteria: x      Assessment/Plan  Patient has :  1) Left lower extremity cellulitis and better .  2) UTI  3) Paroxysmal atrial fibrillation and stable .  4)  H/O hypertension and stable .  5) H/O CAD   6) Coagulopathy and no active bleeding and INR better .  7) Anemia .  Plan : 1) I/V antibiotics as per ID 2) Monitor hemoglobin and electrolytes 3) Monitor PT and INR 4) Rest of medications as such . 5) Prognosis guarded .  Will continue to follow during hospital course and give further recommendations as needed . Thanks for your referral . if any questions please contact me at office ([phone] cell [phone])

## 2023-08-07 NOTE — PROGRESS NOTE ADULT - SUBJECTIVE AND OBJECTIVE BOX
Date/Time Patient Seen:  		  Referring MD:   Data Reviewed	       Patient is a 89y old  Female who presents with a chief complaint of AMS (06 Aug 2023 19:48)      Subjective/HPI     PAST MEDICAL & SURGICAL HISTORY:  Hypertension    CVA (cerebral vascular accident)    Depression    Constipation    Neuropathy    Constipation    Hyperlipidemia    Grade I diastolic dysfunction    Afib    Neuropathy    VHD (valvular heart disease)    Aortic valvar stenosis    No significant past surgical history          Medication list         MEDICATIONS  (STANDING):  acetaminophen     Tablet .. 650 milliGRAM(s) Oral every 8 hours  amLODIPine   Tablet 5 milliGRAM(s) Oral daily  cholecalciferol 2000 Unit(s) Oral daily  dextrose 5% + sodium chloride 0.45%. 1000 milliLiter(s) (50 mL/Hr) IV Continuous <Continuous>  escitalopram 10 milliGRAM(s) Oral at bedtime  ferrous    sulfate 325 milliGRAM(s) Oral daily  folic acid 1 milliGRAM(s) Oral daily  gabapentin 300 milliGRAM(s) Oral three times a day  hydroxyurea 500 milliGRAM(s) Oral two times a day  lactobacillus acidophilus 1 Tablet(s) Oral every 12 hours  levothyroxine Injectable 25 MICROGram(s) IV Push at bedtime  lidocaine   4% Patch 2 Patch Transdermal daily  metoprolol tartrate 25 milliGRAM(s) Oral two times a day  pantoprazole  Injectable 40 milliGRAM(s) IV Push daily  senna 1 Tablet(s) Oral at bedtime  vancomycin  IVPB 750 milliGRAM(s) IV Intermittent every 24 hours    MEDICATIONS  (PRN):  aluminum hydroxide/magnesium hydroxide/simethicone Suspension 30 milliLiter(s) Oral every 4 hours PRN Dyspepsia  ketorolac   Injectable 30 milliGRAM(s) IV Push every 6 hours PRN Moderate Pain (4 - 6)  LORazepam   Injectable 0.5 milliGRAM(s) IV Push every 8 hours PRN Agitation  melatonin 3 milliGRAM(s) Oral at bedtime PRN Insomnia  morphine  - Injectable 2 milliGRAM(s) IV Push every 4 hours PRN Severe Pain (7 - 10)  ondansetron Injectable 4 milliGRAM(s) IV Push every 8 hours PRN Nausea and/or Vomiting         Vitals log        ICU Vital Signs Last 24 Hrs  T(C): 36.3 (06 Aug 2023 20:38), Max: 36.4 (06 Aug 2023 11:12)  T(F): 97.4 (06 Aug 2023 20:38), Max: 97.5 (06 Aug 2023 11:12)  HR: 78 (07 Aug 2023 04:43) (76 - 92)  BP: 127/70 (06 Aug 2023 20:38) (113/64 - 127/70)  BP(mean): --  ABP: --  ABP(mean): --  RR: 19 (06 Aug 2023 20:38) (19 - 20)  SpO2: 93% (06 Aug 2023 20:38) (93% - 95%)    O2 Parameters below as of 06 Aug 2023 20:38  Patient On (Oxygen Delivery Method): room air                 Input and Output:  I&O's Detail    05 Aug 2023 07:01  -  06 Aug 2023 07:00  --------------------------------------------------------  IN:    Oral Fluid: 300 mL  Total IN: 300 mL    OUT:    Voided (mL): 775 mL  Total OUT: 775 mL    Total NET: -475 mL      06 Aug 2023 07:01  -  07 Aug 2023 05:15  --------------------------------------------------------  IN:    dextrose 5% + sodium chloride 0.45%: 500 mL  Total IN: 500 mL    OUT:  Total OUT: 0 mL    Total NET: 500 mL          Lab Data                        10.7   6.55  )-----------( 557      ( 06 Aug 2023 13:15 )             35.0     08-06    141  |  109<H>  |  8   ----------------------------<  105<H>  3.8   |  28  |  0.83    Ca    8.3<L>      06 Aug 2023 13:15  Phos  3.3     08-06  Mg     2.1     08-06              Review of Systems	      Objective     Physical Examination    heart s1s2  lung dc BS  head nc      Pertinent Lab findings & Imaging      Summer:  NO   Adequate UO     I&O's Detail    05 Aug 2023 07:01  -  06 Aug 2023 07:00  --------------------------------------------------------  IN:    Oral Fluid: 300 mL  Total IN: 300 mL    OUT:    Voided (mL): 775 mL  Total OUT: 775 mL    Total NET: -475 mL      06 Aug 2023 07:01  -  07 Aug 2023 05:15  --------------------------------------------------------  IN:    dextrose 5% + sodium chloride 0.45%: 500 mL  Total IN: 500 mL    OUT:  Total OUT: 0 mL    Total NET: 500 mL               Discussed with:     Cultures:	        Radiology

## 2023-08-07 NOTE — PROGRESS NOTE ADULT - PROBLEM SELECTOR PLAN 2
septic workup ,iv zosyn ,ID cons called
RULED IN AND WAS POA -septic workup ,iv zosyn ,ID cons called
septic workup ,iv zosyn ,ID cons called
septic workup ,iv zosyn ,ID cons called
RULED IN AND WAS POA -septic workup ,iv zosyn ,ID cons called

## 2023-08-08 ENCOUNTER — TRANSCRIPTION ENCOUNTER (OUTPATIENT)
Age: 88
End: 2023-08-08

## 2023-08-08 VITALS
TEMPERATURE: 98 F | RESPIRATION RATE: 18 BRPM | SYSTOLIC BLOOD PRESSURE: 138 MMHG | DIASTOLIC BLOOD PRESSURE: 76 MMHG | HEART RATE: 97 BPM | OXYGEN SATURATION: 91 %

## 2023-08-08 DIAGNOSIS — T81.89XD OTHER COMPLICATIONS OF PROCEDURES, NOT ELSEWHERE CLASSIFIED, SUBSEQUENT ENCOUNTER: ICD-10-CM

## 2023-08-08 LAB
ANION GAP SERPL CALC-SCNC: 6 MMOL/L — SIGNIFICANT CHANGE UP (ref 5–17)
BUN SERPL-MCNC: 3 MG/DL — LOW (ref 7–23)
CALCIUM SERPL-MCNC: 8.5 MG/DL — SIGNIFICANT CHANGE UP (ref 8.5–10.1)
CHLORIDE SERPL-SCNC: 111 MMOL/L — HIGH (ref 96–108)
CO2 SERPL-SCNC: 29 MMOL/L — SIGNIFICANT CHANGE UP (ref 22–31)
CREAT SERPL-MCNC: 0.61 MG/DL — SIGNIFICANT CHANGE UP (ref 0.5–1.3)
EGFR: 84 ML/MIN/1.73M2 — SIGNIFICANT CHANGE UP
GLUCOSE SERPL-MCNC: 89 MG/DL — SIGNIFICANT CHANGE UP (ref 70–99)
HCT VFR BLD CALC: 33.8 % — LOW (ref 34.5–45)
HGB BLD-MCNC: 10.9 G/DL — LOW (ref 11.5–15.5)
INR BLD: 2.9 RATIO — HIGH (ref 0.85–1.18)
MCHC RBC-ENTMCNC: 32.2 GM/DL — SIGNIFICANT CHANGE UP (ref 32–36)
MCHC RBC-ENTMCNC: 37.8 PG — HIGH (ref 27–34)
MCV RBC AUTO: 117.4 FL — HIGH (ref 80–100)
NRBC # BLD: 0 /100 WBCS — SIGNIFICANT CHANGE UP (ref 0–0)
PLATELET # BLD AUTO: 644 K/UL — HIGH (ref 150–400)
POTASSIUM SERPL-MCNC: 3.7 MMOL/L — SIGNIFICANT CHANGE UP (ref 3.5–5.3)
POTASSIUM SERPL-SCNC: 3.7 MMOL/L — SIGNIFICANT CHANGE UP (ref 3.5–5.3)
PROTHROM AB SERPL-ACNC: 32.9 SEC — HIGH (ref 9.5–13)
RBC # BLD: 2.88 M/UL — LOW (ref 3.8–5.2)
RBC # FLD: 17.6 % — HIGH (ref 10.3–14.5)
SODIUM SERPL-SCNC: 146 MMOL/L — HIGH (ref 135–145)
WBC # BLD: 6.23 K/UL — SIGNIFICANT CHANGE UP (ref 3.8–10.5)
WBC # FLD AUTO: 6.23 K/UL — SIGNIFICANT CHANGE UP (ref 3.8–10.5)

## 2023-08-08 PROCEDURE — 84100 ASSAY OF PHOSPHORUS: CPT

## 2023-08-08 PROCEDURE — 85610 PROTHROMBIN TIME: CPT

## 2023-08-08 PROCEDURE — 93925 LOWER EXTREMITY STUDY: CPT

## 2023-08-08 PROCEDURE — 85027 COMPLETE CBC AUTOMATED: CPT

## 2023-08-08 PROCEDURE — 87186 SC STD MICRODIL/AGAR DIL: CPT

## 2023-08-08 PROCEDURE — 87040 BLOOD CULTURE FOR BACTERIA: CPT

## 2023-08-08 PROCEDURE — 85730 THROMBOPLASTIN TIME PARTIAL: CPT

## 2023-08-08 PROCEDURE — 74176 CT ABD & PELVIS W/O CONTRAST: CPT | Mod: MA

## 2023-08-08 PROCEDURE — 93970 EXTREMITY STUDY: CPT

## 2023-08-08 PROCEDURE — 85014 HEMATOCRIT: CPT

## 2023-08-08 PROCEDURE — 73700 CT LOWER EXTREMITY W/O DYE: CPT | Mod: MD

## 2023-08-08 PROCEDURE — 96361 HYDRATE IV INFUSION ADD-ON: CPT

## 2023-08-08 PROCEDURE — 85045 AUTOMATED RETICULOCYTE COUNT: CPT

## 2023-08-08 PROCEDURE — 93005 ELECTROCARDIOGRAM TRACING: CPT

## 2023-08-08 PROCEDURE — 82728 ASSAY OF FERRITIN: CPT

## 2023-08-08 PROCEDURE — 87086 URINE CULTURE/COLONY COUNT: CPT

## 2023-08-08 PROCEDURE — 0225U NFCT DS DNA&RNA 21 SARSCOV2: CPT

## 2023-08-08 PROCEDURE — 99221 1ST HOSP IP/OBS SF/LOW 40: CPT

## 2023-08-08 PROCEDURE — 82272 OCCULT BLD FECES 1-3 TESTS: CPT

## 2023-08-08 PROCEDURE — 82746 ASSAY OF FOLIC ACID SERUM: CPT

## 2023-08-08 PROCEDURE — 97161 PT EVAL LOW COMPLEX 20 MIN: CPT

## 2023-08-08 PROCEDURE — 87077 CULTURE AEROBIC IDENTIFY: CPT

## 2023-08-08 PROCEDURE — 85018 HEMOGLOBIN: CPT

## 2023-08-08 PROCEDURE — 96365 THER/PROPH/DIAG IV INF INIT: CPT

## 2023-08-08 PROCEDURE — 36415 COLL VENOUS BLD VENIPUNCTURE: CPT

## 2023-08-08 PROCEDURE — 80048 BASIC METABOLIC PNL TOTAL CA: CPT

## 2023-08-08 PROCEDURE — 73590 X-RAY EXAM OF LOWER LEG: CPT

## 2023-08-08 PROCEDURE — 92610 EVALUATE SWALLOWING FUNCTION: CPT

## 2023-08-08 PROCEDURE — 70450 CT HEAD/BRAIN W/O DYE: CPT | Mod: MA

## 2023-08-08 PROCEDURE — 83605 ASSAY OF LACTIC ACID: CPT

## 2023-08-08 PROCEDURE — 99285 EMERGENCY DEPT VISIT HI MDM: CPT | Mod: 25

## 2023-08-08 PROCEDURE — 83540 ASSAY OF IRON: CPT

## 2023-08-08 PROCEDURE — 83690 ASSAY OF LIPASE: CPT

## 2023-08-08 PROCEDURE — 93923 UPR/LXTR ART STDY 3+ LVLS: CPT

## 2023-08-08 PROCEDURE — 81001 URINALYSIS AUTO W/SCOPE: CPT

## 2023-08-08 PROCEDURE — 82607 VITAMIN B-12: CPT

## 2023-08-08 PROCEDURE — 83550 IRON BINDING TEST: CPT

## 2023-08-08 PROCEDURE — 96375 TX/PRO/DX INJ NEW DRUG ADDON: CPT

## 2023-08-08 PROCEDURE — 96372 THER/PROPH/DIAG INJ SC/IM: CPT | Mod: XU

## 2023-08-08 PROCEDURE — 80053 COMPREHEN METABOLIC PANEL: CPT

## 2023-08-08 PROCEDURE — 96376 TX/PRO/DX INJ SAME DRUG ADON: CPT

## 2023-08-08 PROCEDURE — 83735 ASSAY OF MAGNESIUM: CPT

## 2023-08-08 PROCEDURE — 85025 COMPLETE CBC W/AUTO DIFF WBC: CPT

## 2023-08-08 PROCEDURE — 71250 CT THORAX DX C-: CPT | Mod: MA

## 2023-08-08 RX ORDER — ACETAMINOPHEN 500 MG
1 TABLET ORAL
Refills: 0 | DISCHARGE

## 2023-08-08 RX ORDER — GABAPENTIN 400 MG/1
1 CAPSULE ORAL
Refills: 0 | DISCHARGE

## 2023-08-08 RX ORDER — METOPROLOL TARTRATE 50 MG
1 TABLET ORAL
Qty: 0 | Refills: 0 | DISCHARGE
Start: 2023-08-08

## 2023-08-08 RX ORDER — AMLODIPINE BESYLATE 2.5 MG/1
1 TABLET ORAL
Refills: 0 | DISCHARGE

## 2023-08-08 RX ORDER — SIMVASTATIN 20 MG/1
1 TABLET, FILM COATED ORAL
Qty: 0 | Refills: 0 | DISCHARGE

## 2023-08-08 RX ORDER — ACETAMINOPHEN 500 MG
2 TABLET ORAL
Qty: 42 | Refills: 0
Start: 2023-08-08 | End: 2023-08-14

## 2023-08-08 RX ORDER — CEPHALEXIN 500 MG
1 CAPSULE ORAL
Qty: 14 | Refills: 0
Start: 2023-08-08 | End: 2023-08-14

## 2023-08-08 RX ORDER — TRAMADOL HYDROCHLORIDE 50 MG/1
1 TABLET ORAL
Refills: 0 | DISCHARGE

## 2023-08-08 RX ORDER — FUROSEMIDE 40 MG
1 TABLET ORAL
Qty: 14 | Refills: 0
Start: 2023-08-08 | End: 2023-08-21

## 2023-08-08 RX ORDER — FOLIC ACID 0.8 MG
1 TABLET ORAL
Qty: 0 | Refills: 0 | DISCHARGE
Start: 2023-08-08

## 2023-08-08 RX ORDER — METOPROLOL TARTRATE 50 MG
1 TABLET ORAL
Qty: 28 | Refills: 0
Start: 2023-08-08 | End: 2023-08-21

## 2023-08-08 RX ORDER — FOLIC ACID 0.8 MG
1 TABLET ORAL
Qty: 0 | Refills: 0 | DISCHARGE

## 2023-08-08 RX ORDER — CHOLECALCIFEROL (VITAMIN D3) 125 MCG
1 CAPSULE ORAL
Refills: 0 | DISCHARGE

## 2023-08-08 RX ORDER — BACLOFEN 100 %
1 POWDER (GRAM) MISCELLANEOUS
Refills: 0 | DISCHARGE

## 2023-08-08 RX ORDER — HYDROXYUREA 500 MG/1
1 CAPSULE ORAL
Qty: 0 | Refills: 0 | DISCHARGE

## 2023-08-08 RX ORDER — LIDOCAINE 4 G/100G
1 CREAM TOPICAL
Qty: 10 | Refills: 0
Start: 2023-08-08 | End: 2023-08-17

## 2023-08-08 RX ORDER — SENNA PLUS 8.6 MG/1
1 TABLET ORAL
Qty: 14 | Refills: 0
Start: 2023-08-08 | End: 2023-08-21

## 2023-08-08 RX ORDER — CHOLECALCIFEROL (VITAMIN D3) 125 MCG
1 CAPSULE ORAL
Qty: 14 | Refills: 0
Start: 2023-08-08 | End: 2023-08-21

## 2023-08-08 RX ORDER — LACTOBACILLUS ACIDOPHILUS 100MM CELL
2 CAPSULE ORAL
Qty: 28 | Refills: 0
Start: 2023-08-08 | End: 2023-08-21

## 2023-08-08 RX ORDER — GABAPENTIN 400 MG/1
1 CAPSULE ORAL
Qty: 0 | Refills: 0 | DISCHARGE
Start: 2023-08-08

## 2023-08-08 RX ORDER — FERROUS SULFATE 325(65) MG
1 TABLET ORAL
Qty: 14 | Refills: 0
Start: 2023-08-08 | End: 2023-08-21

## 2023-08-08 RX ORDER — AMLODIPINE BESYLATE 2.5 MG/1
1 TABLET ORAL
Qty: 0 | Refills: 0 | DISCHARGE
Start: 2023-08-08

## 2023-08-08 RX ORDER — WARFARIN SODIUM 2.5 MG/1
1 TABLET ORAL
Qty: 4 | Refills: 0
Start: 2023-08-08 | End: 2023-08-14

## 2023-08-08 RX ORDER — LEVOTHYROXINE SODIUM 125 MCG
1 TABLET ORAL
Qty: 14 | Refills: 0
Start: 2023-08-08 | End: 2023-08-21

## 2023-08-08 RX ORDER — DOCUSATE SODIUM 100 MG
2 CAPSULE ORAL
Qty: 28 | Refills: 0
Start: 2023-08-08 | End: 2023-08-21

## 2023-08-08 RX ORDER — PANTOPRAZOLE SODIUM 20 MG/1
1 TABLET, DELAYED RELEASE ORAL
Qty: 14 | Refills: 0
Start: 2023-08-08 | End: 2023-08-21

## 2023-08-08 RX ORDER — FAMOTIDINE 10 MG/ML
1 INJECTION INTRAVENOUS
Refills: 0 | DISCHARGE

## 2023-08-08 RX ORDER — SIMVASTATIN 20 MG/1
1 TABLET, FILM COATED ORAL
Qty: 14 | Refills: 0
Start: 2023-08-08 | End: 2023-08-21

## 2023-08-08 RX ORDER — ACETAMINOPHEN 500 MG
2 TABLET ORAL
Qty: 0 | Refills: 0 | DISCHARGE
Start: 2023-08-08

## 2023-08-08 RX ORDER — LIDOCAINE 4 G/100G
1 CREAM TOPICAL
Qty: 0 | Refills: 0 | DISCHARGE
Start: 2023-08-08

## 2023-08-08 RX ORDER — ACETAMINOPHEN 500 MG
2 TABLET ORAL
Refills: 0 | DISCHARGE

## 2023-08-08 RX ORDER — FOLIC ACID 0.8 MG
1 TABLET ORAL
Qty: 14 | Refills: 0
Start: 2023-08-08 | End: 2023-08-21

## 2023-08-08 RX ORDER — COLLAGENASE CLOSTRIDIUM HIST. 250 UNIT/G
1 OINTMENT (GRAM) TOPICAL DAILY
Refills: 0 | Status: DISCONTINUED | OUTPATIENT
Start: 2023-08-08 | End: 2023-08-08

## 2023-08-08 RX ORDER — HYDROXYUREA 500 MG/1
1 CAPSULE ORAL
Qty: 0 | Refills: 0 | DISCHARGE
Start: 2023-08-08

## 2023-08-08 RX ORDER — ESCITALOPRAM OXALATE 10 MG/1
1 TABLET, FILM COATED ORAL
Qty: 14 | Refills: 0
Start: 2023-08-08 | End: 2023-08-21

## 2023-08-08 RX ORDER — GABAPENTIN 400 MG/1
1 CAPSULE ORAL
Qty: 42 | Refills: 0
Start: 2023-08-08 | End: 2023-08-21

## 2023-08-08 RX ORDER — AMLODIPINE BESYLATE 2.5 MG/1
1 TABLET ORAL
Qty: 14 | Refills: 0
Start: 2023-08-08 | End: 2023-08-21

## 2023-08-08 RX ORDER — DOCUSATE SODIUM 100 MG
2 CAPSULE ORAL
Refills: 0 | DISCHARGE

## 2023-08-08 RX ORDER — HYDROXYUREA 500 MG/1
1 CAPSULE ORAL
Qty: 28 | Refills: 0
Start: 2023-08-08 | End: 2023-08-21

## 2023-08-08 RX ORDER — CEPHALEXIN 500 MG
1 CAPSULE ORAL
Qty: 0 | Refills: 0 | DISCHARGE
Start: 2023-08-08

## 2023-08-08 RX ORDER — LIDOCAINE 4 G/100G
1 CREAM TOPICAL
Qty: 2 | Refills: 0
Start: 2023-08-08

## 2023-08-08 RX ADMIN — Medication 650 MILLIGRAM(S): at 05:06

## 2023-08-08 RX ADMIN — Medication 500 MILLIGRAM(S): at 05:05

## 2023-08-08 RX ADMIN — Medication 2000 UNIT(S): at 13:35

## 2023-08-08 RX ADMIN — GABAPENTIN 300 MILLIGRAM(S): 400 CAPSULE ORAL at 13:35

## 2023-08-08 RX ADMIN — Medication 50 MILLIGRAM(S): at 05:09

## 2023-08-08 RX ADMIN — HYDROXYUREA 500 MILLIGRAM(S): 500 CAPSULE ORAL at 05:05

## 2023-08-08 RX ADMIN — Medication 1 TABLET(S): at 05:05

## 2023-08-08 RX ADMIN — Medication 650 MILLIGRAM(S): at 06:00

## 2023-08-08 RX ADMIN — LIDOCAINE 2 PATCH: 4 CREAM TOPICAL at 00:00

## 2023-08-08 RX ADMIN — Medication 325 MILLIGRAM(S): at 11:56

## 2023-08-08 RX ADMIN — GABAPENTIN 300 MILLIGRAM(S): 400 CAPSULE ORAL at 05:08

## 2023-08-08 RX ADMIN — PANTOPRAZOLE SODIUM 40 MILLIGRAM(S): 20 TABLET, DELAYED RELEASE ORAL at 11:57

## 2023-08-08 RX ADMIN — Medication 25 MILLIGRAM(S): at 05:05

## 2023-08-08 RX ADMIN — AMLODIPINE BESYLATE 5 MILLIGRAM(S): 2.5 TABLET ORAL at 05:05

## 2023-08-08 RX ADMIN — Medication 1 MILLIGRAM(S): at 11:56

## 2023-08-08 RX ADMIN — Medication 0.5 MILLIGRAM(S): at 12:23

## 2023-08-08 RX ADMIN — LIDOCAINE 2 PATCH: 4 CREAM TOPICAL at 11:57

## 2023-08-08 NOTE — DISCHARGE NOTE PROVIDER - ATTENDING DISCHARGE PHYSICAL EXAM:
Please order patients bloodwork for well visit in december Attending Discharge Physical Examination:

## 2023-08-08 NOTE — PROGRESS NOTE ADULT - ASSESSMENT
89-year-old female presents the emergency room with chief complaint of altered mental status.  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     ams  op  oa  ataxic gait  cva hx  AF  HLD  HTN  CAD  PVD  STSI    on ABX  dc planning  vs noted  CM follow up reviewed    PMR and HEME eval noted  on ABX  vs noted  Son Curtis - not decided on Code Status and Hospice - pt remains FULL CODE

## 2023-08-08 NOTE — DISCHARGE NOTE NURSING/CASE MANAGEMENT/SOCIAL WORK - NSDCPEFALRISK_GEN_ALL_CORE
For information on Fall & Injury Prevention, visit: [email or web address] OR  [email or web address] OR  https://www.cdc.gov/steadi/patient.html

## 2023-08-08 NOTE — CONSULT NOTE ADULT - PROVIDER SPECIALTY LIST ADULT
Neurology
Vascular Surgery
Heme/Onc
Infectious Disease
Physiatry
Cardiology
Palliative Care
Podiatry
Wound Care

## 2023-08-08 NOTE — DISCHARGE NOTE PROVIDER - CARE PROVIDER_API CALL
Adrian Gan  Recon Rearfoot/Ankle Surgery  [address] [place] [address]  Phone: [phone]  Fax: [phone]  Follow Up Time: 1 week    Sushil Anguiano  Infectious Disease  [address] [place] [address]  Phone: [phone]  Fax: [phone]  Follow Up Time: 2 weeks    Stefano Bazan  Physical/Rehab Medicine  [address] [place] [address]  Phone: [phone]  Fax: [phone]  Follow Up Time: 2 weeks   Adrian Gan Rearfoot/Ankle Surgery  [address] [place] [address]  Phone: [phone]  Fax: [phone]  Follow Up Time: 1 week    Sushil Anguiano  Infectious Disease  [address] [place] [address]  Phone: [phone]  Fax: [phone]  Follow Up Time: 2 weeks    Stefano Bazan  Physical/Rehab Medicine  [address] [place] [address]  Phone: [phone]  Fax: [phone]  Follow Up Time: 2 weeks    Lorelei Yoon  Vascular Surgery  [address] [place] [id number]  Phone: [phone]  Fax: [phone]  Follow Up Time: 1 month

## 2023-08-08 NOTE — DISCHARGE NOTE PROVIDER - NSDCCPCAREPLAN_GEN_ALL_CORE_FT
PRINCIPAL DISCHARGE DIAGNOSIS  Diagnosis: AMS (altered mental status)  Assessment and Plan of Treatment:       SECONDARY DISCHARGE DIAGNOSES  Diagnosis: Acute metabolic encephalopathy  Assessment and Plan of Treatment:     Diagnosis: Cellulitis  Assessment and Plan of Treatment:     Diagnosis: Leg wound, left  Assessment and Plan of Treatment:     Diagnosis: HTN (hypertension)  Assessment and Plan of Treatment:     Diagnosis: Afib  Assessment and Plan of Treatment:     Diagnosis: Venous stasis ulcer of other part of left lower leg without varicose veins  Assessment and Plan of Treatment:     Diagnosis: OA (osteoarthritis)  Assessment and Plan of Treatment:     Diagnosis: Dementia  Assessment and Plan of Treatment:     Diagnosis: PVD (peripheral vascular disease)  Assessment and Plan of Treatment:

## 2023-08-08 NOTE — PROGRESS NOTE ADULT - NUTRITIONAL ASSESSMENT
This patient has been assessed with a concern for Malnutrition and has been determined to have a diagnosis/diagnoses of Moderate protein-calorie malnutrition.    This patient is being managed with:   Diet Soft and Bite Sized-  Lactose Restricted (Milk Sugar Intoler.)  Supplement Feeding Modality:  Oral  Ensure Clear Cans or Servings Per Day:  1       Frequency:  Two Times a day  Entered: Aug  6 2023  1:54PM  
This patient has been assessed with a concern for Malnutrition and has been determined to have a diagnosis/diagnoses of Moderate protein-calorie malnutrition.    This patient is being managed with:   Diet Soft and Bite Sized-  Entered: Aug  4 2023  7:35AM  
This patient has been assessed with a concern for Malnutrition and has been determined to have a diagnosis/diagnoses of Moderate protein-calorie malnutrition.    This patient is being managed with:   Diet Soft and Bite Sized-  No Lactose  Entered: Aug  3 2023  1:27AM  
This patient has been assessed with a concern for Malnutrition and has been determined to have a diagnosis/diagnoses of Moderate protein-calorie malnutrition.    This patient is being managed with:   Diet Soft and Bite Sized-  Entered: Aug  4 2023  7:35AM  
This patient has been assessed with a concern for Malnutrition and has been determined to have a diagnosis/diagnoses of Moderate protein-calorie malnutrition.    This patient is being managed with:   Diet Soft and Bite Sized-  Entered: Aug  4 2023  7:35AM  
This patient has been assessed with a concern for Malnutrition and has been determined to have a diagnosis/diagnoses of Moderate protein-calorie malnutrition.    This patient is being managed with:   Diet Soft and Bite Sized-  Lactose Restricted (Milk Sugar Intoler.)  Supplement Feeding Modality:  Oral  Ensure Clear Cans or Servings Per Day:  1       Frequency:  Two Times a day  Entered: Aug  6 2023  1:54PM

## 2023-08-08 NOTE — PROGRESS NOTE ADULT - PROVIDER SPECIALTY LIST ADULT
Cardiology
Cardiology
Heme/Onc
Infectious Disease
Neurology
Palliative Care
Cardiology
Infectious Disease
Neurology
Neurology
Palliative Care
Palliative Care
Physiatry
Podiatry
Hospitalist
Palliative Care
Podiatry
Hospitalist

## 2023-08-08 NOTE — DISCHARGE NOTE PROVIDER - PROVIDER TOKENS
PROVIDER:[TOKEN:[[id number]],FOLLOWUP:[1 week]],PROVIDER:[TOKEN:[[id number]],FOLLOWUP:[2 weeks]],PROVIDER:[TOKEN:[[id number]],FOLLOWUP:[2 weeks]] PROVIDER:[TOKEN:[[id number]],FOLLOWUP:[1 week]],PROVIDER:[TOKEN:[[id number]],FOLLOWUP:[2 weeks]],PROVIDER:[TOKEN:[[id number]],FOLLOWUP:[2 weeks]],PROVIDER:[TOKEN:[[id number]],FOLLOWUP:[1 month]]

## 2023-08-08 NOTE — CONSULT NOTE ADULT - CONSULT REQUESTED DATE/TIME
02-Aug-2023
02-Aug-2023 08:51
01-Aug-2023 20:00
02-Aug-2023 00:32
03-Aug-2023 17:45
02-Aug-2023 15:33
08-Aug-2023 13:00
02-Aug-2023 14:47
03-Aug-2023 19:24

## 2023-08-08 NOTE — CONSULT NOTE ADULT - SUBJECTIVE AND OBJECTIVE BOX
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 Cellulitis resolved but several areas that appear to be slough are noted    PAST MEDICAL & SURGICAL HISTORY:  Hypertension      CVA (cerebral vascular accident)      Depression      Constipation      Neuropathy      Hyperlipidemia      Grade I diastolic dysfunction      Afib      Neuropathy      VHD (valvular heart disease)      Aortic valvar stenosis      No significant past surgical history          Allergies    per son &quot;issues with certain anitibiotics&quot; does not remember the names (Unknown)    Intolerances        MEDICATIONS  (STANDING):  acetaminophen     Tablet .. 650 milliGRAM(s) Oral every 8 hours  amLODIPine   Tablet 5 milliGRAM(s) Oral daily  cephalexin 500 milliGRAM(s) Oral every 12 hours  cholecalciferol 2000 Unit(s) Oral daily  collagenase Ointment 1 Application(s) Topical daily  dextrose 5% + sodium chloride 0.45%. 1000 milliLiter(s) (50 mL/Hr) IV Continuous <Continuous>  doxycycline monohydrate Suspension 50 milliGRAM(s) Oral every 12 hours  escitalopram 10 milliGRAM(s) Oral at bedtime  ferrous    sulfate 325 milliGRAM(s) Oral daily  folic acid 1 milliGRAM(s) Oral daily  gabapentin 300 milliGRAM(s) Oral three times a day  hydroxyurea 500 milliGRAM(s) Oral two times a day  lactobacillus acidophilus 1 Tablet(s) Oral every 12 hours  levothyroxine Injectable 25 MICROGram(s) IV Push at bedtime  lidocaine   4% Patch 2 Patch Transdermal daily  metoprolol tartrate 25 milliGRAM(s) Oral two times a day  pantoprazole  Injectable 40 milliGRAM(s) IV Push daily  senna 1 Tablet(s) Oral at bedtime    MEDICATIONS  (PRN):  aluminum hydroxide/magnesium hydroxide/simethicone Suspension 30 milliLiter(s) Oral every 4 hours PRN Dyspepsia  LORazepam   Injectable 0.5 milliGRAM(s) IV Push every 8 hours PRN Agitation  melatonin 3 milliGRAM(s) Oral at bedtime PRN Insomnia  morphine  - Injectable 2 milliGRAM(s) IV Push every 4 hours PRN Severe Pain (7 - 10)  ondansetron Injectable 4 milliGRAM(s) IV Push every 8 hours PRN Nausea and/or Vomiting      FAMILY HISTORY:          ROS:  CONSTITUTIONAL: No fever, weight loss, or fatigue  EYES: No eye pain, visual disturbances, or discharge  ENMT:  No difficulty hearing, tinnitus, vertigo; No sinus or throat pain  NECK: No pain or stiffness  BREASTS: No pain, masses, or nipple discharge  RESPIRATORY: No cough, wheezing, chills or hemoptysis; No shortness of breath  CARDIOVASCULAR: No chest pain, palpitations, dizziness, or leg swelling  GASTROINTESTINAL: No abdominal or epigastric pain. No nausea, vomiting, or hematemesis; No diarrhea or constipation. No melena or hematochezia.  GENITOURINARY: No dysuria, frequency, hematuria, or incontinence  NEUROLOGICAL: No headaches, memory loss, loss of strength, numbness, or tremors  SKIN: No itching, burning, rashes, or lesions   LYMPH NODES: No enlarged glands  ENDOCRINE: No heat or cold intolerance; No hair loss  MUSCULOSKELETAL: No joint pain or swelling; No muscle, back, or extremity pain  PSYCHIATRIC: No depression, anxiety, mood swings, or difficulty sleeping  HEME/LYMPH: No easy bruising, or bleeding gums  ALLERGY AND IMMUNOLOGIC: No hives or eczema    PHYSICAL EXAM-      Vital Signs Last 24 Hrs  T(C): 36.8 (08 Aug 2023 11:23), Max: 36.8 (08 Aug 2023 11:23)  T(F): 98.2 (08 Aug 2023 11:23), Max: 98.2 (08 Aug 2023 11:23)  HR: 97 (08 Aug 2023 11:23) (78 - 97)  BP: 138/76 (08 Aug 2023 11:23) (123/64 - 138/76)  BP(mean): --  RR: 18 (08 Aug 2023 11:23) (18 - 18)  SpO2: 91% (08 Aug 2023 11:23) (91% - 95%)    Parameters below as of 08 Aug 2023 11:23  Patient On (Oxygen Delivery Method): room air        Constitutional: well developed, well nourished, no apparent distress, alert, oriented x 3.   Pulmonary: no respiratory distress, normal respiratory rhythm and effort, lungs are clear to auscultation/percussion. No CVA tenderness.  Cardiovascular: heart rate normal, normal sinus rhythm; no murmurs, gallops, rubs, heaves or thrills   Abdomen: soft, non-tender, +BS, no guarding/rebound/rigidity.  Vascular:   ENMT:  Neck:  Extremites: left shin with several areas of slough are noted no signs of infection at this time  Skin:                            10.9   6.23  )-----------( 644      ( 08 Aug 2023 06:45 )             33.8     08-08    146<H>  |  111<H>  |  3<L>  ----------------------------<  89  3.7   |  29  |  0.61    Ca    8.5      08 Aug 2023 06:45        Radiology      Impression:      Recommendations:

## 2023-08-08 NOTE — DISCHARGE NOTE NURSING/CASE MANAGEMENT/SOCIAL WORK - PATIENT PORTAL LINK FT
Pt. Here for bilateral genicular nerve blocks.  Medications, allergies and history reviewed with patient.     You can access the FollowMyHealth Patient Portal offered by [facility] by registering at the following website: [email or web address]. By joining [facility]’s FollowMyHealth portal, you will also be able to view your health information using other applications (apps) compatible with our system.

## 2023-08-08 NOTE — DISCHARGE NOTE PROVIDER - NSDCCAREPROVSEEN_GEN_ALL_CORE_FT
Ladonna, Betty Prince, Pipe aTo, Lorelei Jimenez, Myesha Cade, Gus Moralez, Twan Diaz, Nivia Reynolds, Rufino Davis, Zahra Perdomo, Malu Bazan, Stefano Anguiano, Sushil Best, Анна Maldonado, Ishmael De La Rosa, Swapnil Proctor, Lynn Stearns, Koffi

## 2023-08-08 NOTE — CONSULT NOTE ADULT - PROBLEM SELECTOR RECOMMENDATION 9
collagenase QD,DD  elevate lower leg  patient should be evaluated by operating surgeon to R/O recurrent skin Ca   refer patient to wound care for follow up

## 2023-08-08 NOTE — DISCHARGE NOTE PROVIDER - HOSPITAL COURSE
Problem/Plan - 1:  ·  Problem: Acute metabolic encephalopathy.   ·  Plan: likely 2/2 to uti and LLE cellulitis ,poa.    Problem/Plan - 2:  ·  Problem: Suspected UTI.   ·  Plan: RULED IN AND WAS POA -septic workup ,iv zosyn ,ID cons called.    Problem/Plan - 3:  ·  Problem: Cellulitis.   ·  Plan: septic workup ,iv vanco ,case d/w ID cons ,anticipate d/c in 24-48 hrs.    Problem/Plan - 4:  ·  Problem: Leg wound, left.   ·  Plan: leg elevation , wound care team eval requested.    Problem/Plan - 5:  ·  Problem: Afib.   ·  Plan: continue home medications ,seen by card cons.    Problem/Plan - 6:  ·  Problem: HTN (hypertension).   ·  Plan: continue home medications.    Problem/Plan - 7:  ·  Problem: Venous stasis ulcer of other part of left lower leg without varicose veins.   ·  Plan: topical care.    Problem/Plan - 8:  ·  Problem: OA (osteoarthritis).   ·  Plan: pain management , PT/OT.    Problem/Plan - 9:  ·  Problem: PVD (peripheral vascular disease).   ·  Plan: seen by vascular team , pain management ,PT/OT.    Problem/Plan - 10:  ·  Problem: Dementia.   ·  Plan; Supportive care,frequent redirection,continue home medications,management of agitation as needed.    Problem/Plan - 11:  ·  Problem: Prophylactic measure.   ·  Plan: Gastrointestinal stress ulcer prophylaxis and DVT prophylaxis administered.

## 2023-08-08 NOTE — CONSULT NOTE ADULT - ASSESSMENT
89-year-old female presents the emergency room with chief complaint of altered mental status.  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     ams  op  oa  ataxic gait  cva hx  AF  HLD  HTN  CAD  PVD  STSI      
A/P 89y year old Female with LE pain bilaterally due to OA, PVD    Recommend addition of lidocaine patches, standing dose of tylenol.   Will discontinue baclofen as patients pain seems to be more oa and pvd related, and baclofen may contribute to her confusion/ams.  Will continue to monitor for now -- PRN dosing not optimal because patient not cognitively able to call/remember to call.    Primary team notes are reviewed  Laboratory studies reviewed including those mentioned earlier/above  Discussed management/coordinated care with primary team/referring provider.  High risk of morbidity from treatment with schedule II controlled substance medication    55 minutes spent on eval  ¦ preparing to see the patient   ¦ performing a medically appropriate examination and/or evaluation   ¦ ordering medications, tests, or procedures   ¦ referring and communicating with other health care professionals (when not separately reported)   ¦ documenting clinical information in the electronic or other health record   
area of chronic wound left shin S/P surgical removal squamous cell carcinoma
Cellulitis  Venous ulcer left leg
[ASSESSMENT and  PLAN]  F05        Delirium  N39.0    UTI  D68.32  Coagulopathy due to coumadin  D63.8    Anemia chronic disease    Admitted with UTI  delirium  Coagulopathy due to Coumadin.   Rising INR and decline in Hgb.   on abx, PO intake poor.   Likely to have further increase in INR.     Anemia due to chronic disease  Prior B12, folate adequate.   Recheck labs    pancrease Cyst:   CT scan with Redemonstration of a 2.3 cm cystic lesion in the pancreatic head with mild peripheral calcification, unchanged since 2016.    No pelvic masses. Small calcified fibroids.    RECOMMENDATIONS    Follow CBC.   Transfuse PRBC as clinically indicated.   Transfuse PRBC if Hgb <7.0 or if symptomatic.   Repeat   B12, Folate, ferritin.     Dose Vitamin K 2mg or 1.5mg IV once.   Avoid excess over correction.     DVT Prophylaxis  on coumadin, INR supratherapeutic.     mgmt UTI per medicine and ID.   mgmt behavioral issues and dementia per medicine.   On escitalopram     Thank you for consulting us.     Discussed with Dr Best.     > xxxxxx minutes spent in direct patient care, examining and counseling patient,  reviewing  the notes, lab data/ imaging , discussion with multidisciplinary team.

## 2023-08-08 NOTE — DISCHARGE NOTE PROVIDER - NSDCMRMEDTOKEN_GEN_ALL_CORE_FT
acetaminophen 325 mg oral tablet: 2 tab(s) orally every 8 hours  amLODIPine 5 mg oral tablet: 1 tab(s) orally once a day  baclofen 10 mg oral tablet: 1 tab(s) orally 2 times a day  benzonatate 100 mg oral capsule: 1 cap(s) orally 3 times a day as needed  Biofreeze 4% topical gel: Apply topically to affected area 2 times a day  cephalexin 500 mg oral capsule: 1 cap(s) orally every 12 hours  cholecalciferol 50 mcg (2000 intl units) oral tablet: 1 tab(s) orally once a day  docusate sodium 100 mg oral capsule: 2 cap(s) orally once a day (at bedtime)  doxycycline 25 mg/5 mL oral liquid: 10 milliliter(s) orally every 12 hours  escitalopram 20 mg oral tablet: 1 tab(s) orally once a day  famotidine 20 mg oral tablet: 1 tab(s) orally once a day  folic acid 1 mg oral tablet: 1 tab(s) orally once a day  furosemide 20 mg oral tablet: 1 tab(s) orally once a day  gabapentin 300 mg oral capsule: 1 cap(s) orally 3 times a day  hydroxyurea 500 mg oral capsule: 1 cap(s) orally 2 times a day  lactobacillus acidophilus oral tablet: 2 tab(s) orally once a day  levothyroxine 50 mcg (0.05 mg) oral tablet: 1 tab(s) orally once a day  lidocaine 4% topical film: 1 patch once a day cut in half and place on both knees  metoprolol tartrate 25 mg oral tablet: 1 tab(s) orally 2 times a day  senna oral tablet: 1 tab(s) orally once a day (at bedtime)  simvastatin 10 mg oral tablet: 1 tab(s) orally once a day (at bedtime)  warfarin 1 mg oral tablet: 1 tab(s) orally once a day HOLD FOR INR ABOVE 3.0 .NEXT INR 07/03/23 .   acetaminophen 325 mg oral tablet: 2 tab(s) orally every 8 hours  amLODIPine 5 mg oral tablet: 1 tab(s) orally once a day  cephalexin 500 mg oral capsule: 1 cap(s) orally every 12 hours  cholecalciferol 50 mcg (2000 intl units) oral tablet: 1 tab(s) orally once a day  doxycycline monohydrate 100 mg oral tablet: 1 tab(s) orally 2 times a day  escitalopram 20 mg oral tablet: 1 tab(s) orally once a day  ferrous sulfate 325 mg (65 mg elemental iron) oral tablet: 1 tab(s) orally once a day  folic acid 1 mg oral tablet: 1 tab(s) orally once a day  furosemide 20 mg oral tablet: 1 tab(s) orally once a day  gabapentin 300 mg oral capsule: 1 cap(s) orally 3 times a day  hydroxyurea 500 mg oral capsule: 1 cap(s) orally 2 times a day  lactobacillus acidophilus oral tablet: 2 tab(s) orally once a day  levothyroxine 50 mcg (0.05 mg) oral tablet: 1 tab(s) orally once a day  Lidocare Pain Relief Patch 4% topical film: Apply topically to affected area once a day cut in half and apply to both knees  metoprolol tartrate 25 mg oral tablet: 1 tab(s) orally 2 times a day  pantoprazole 40 mg oral delayed release tablet: 1 tab(s) orally once a day  simvastatin 10 mg oral tablet: 1 tab(s) orally once a day (at bedtime)  warfarin 1 mg oral tablet: 1 tab(s) orally every other day HOLD FOR INR ABOVE 3.0 ,NEXT INR 08/09/23

## 2023-08-08 NOTE — PROGRESS NOTE ADULT - SUBJECTIVE AND OBJECTIVE BOX
Neurology Follow up note    REJI [id number]    HPI:  Patient is a 89-year-old female presents the emergency room with chief complaint of altered mental status.  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  Patient presents from assisted living with altered mental status concern for UTI.  Patient was last admitted to the hospital June 26 through June 30, 2023 was altered mental status x2 weeks.  Was ultimately diagnosed with acute metabolic encephalopathy secondary to UTI improved.  Patient stabilized and was ultimately discharged back to assisted living.  Presents today lethargic with urinary frequency and confusion. Son reports that there is also concern for possible cellulitis to the LLE at the site of a skin graft. Pt recently completed a course of po abx for this with no significant improvement. She has been c/o severe pain to the LLE for weeks. Seen by vascular consult , xrays obtained -found to have severe OA AND PVD ,pain management started Palliative care consult requested ,to discuss advance directives and complete MOLST  (02 Aug 2023 09:19)      Interval History - no new events    Patient is seen, chart was reviewed and case was discussed with the treatment team.  Pt is not in any distress.   Lying on bed comfortably.     Vital Signs Last 24 Hrs  T(C): 36.8 (08 Aug 2023 11:23), Max: 36.8 (08 Aug 2023 11:23)  T(F): 98.2 (08 Aug 2023 11:23), Max: 98.2 (08 Aug 2023 11:23)  HR: 97 (08 Aug 2023 11:23) (78 - 97)  BP: 138/76 (08 Aug 2023 11:23) (123/64 - 138/76)  BP(mean): --  RR: 18 (08 Aug 2023 11:23) (18 - 18)  SpO2: 91% (08 Aug 2023 11:23) (91% - 95%)    Parameters below as of 08 Aug 2023 11:23  Patient On (Oxygen Delivery Method): room air                      REVIEW OF SYSTEMS:    Constitutional: No fever,  Eyes: No eye pain, visual disturbances, or discharge  ENT:  No difficulty hearing, tinnitus, vertigo; No sinus or throat pain  Neck: No pain or stiffness  Respiratory: No  wheezing, chills or hemoptysis  Cardiovascular: No chest pain, palpitations, shortness of breath, dizziness or leg swelling  Gastrointestinal: No abdominal or epigastric pain. No nausea, vomiting   Genitourinary: No dysuria, frequency, hematuria or incontinence  Neurological: No headaches,, loss of strength,   Psychiatric: No  mood swings or difficulty sleeping  Musculoskeletal: No joint pain or swelling;   Skin: No itching, burning, rashes or lesions   Lymph Nodes: No enlarged glands  Endocrine: No heat or cold intolerance; No hair loss,  Allergy and Immunologic: No hives or eczema    On Neurological Examination:    Mental Status - Pt is alert, awake, oriented X2  Follows commands well and able to answer questions appropriately  .Mood and affect  normal    Speech -  Normal.    Cranial Nerves - Pupils 3 mm equal and reactive to light, extraocular eye movements intact. Pt has no visual field deficit.  Pt has  right  facial asymmetry. Facial sensation is intact.Tongue - is in midline.    Muscle tone - is increased on right.      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 Pt withdraws all extremities equally on stimulation. No asymmetry seen.    Hemiataxia right/left.    coordination:    Finger to nose: normal-left  .    Deep tendon Reflexes - 2 plus all over.          Neck Supple -  Yes.     MEDICATIONS    acetaminophen     Tablet .. 650 milliGRAM(s) Oral every 8 hours  aluminum hydroxide/magnesium hydroxide/simethicone Suspension 30 milliLiter(s) Oral every 4 hours PRN  amLODIPine   Tablet 5 milliGRAM(s) Oral daily  cholecalciferol 2000 Unit(s) Oral daily  dextrose 5% + sodium chloride 0.45%. 1000 milliLiter(s) IV Continuous <Continuous>  escitalopram 10 milliGRAM(s) Oral at bedtime  ferrous    sulfate 325 milliGRAM(s) Oral daily  folic acid 1 milliGRAM(s) Oral daily  gabapentin 300 milliGRAM(s) Oral three times a day  hydroxyurea 500 milliGRAM(s) Oral two times a day  ketorolac   Injectable 30 milliGRAM(s) IV Push every 6 hours PRN  lactobacillus acidophilus 1 Tablet(s) Oral every 12 hours  levothyroxine Injectable 25 MICROGram(s) IV Push at bedtime  lidocaine   4% Patch 2 Patch Transdermal daily  LORazepam   Injectable 0.5 milliGRAM(s) IV Push every 8 hours PRN  melatonin 3 milliGRAM(s) Oral at bedtime PRN  metoprolol tartrate 25 milliGRAM(s) Oral two times a day  morphine  - Injectable 2 milliGRAM(s) IV Push every 4 hours PRN  ondansetron Injectable 4 milliGRAM(s) IV Push every 8 hours PRN  pantoprazole  Injectable 40 milliGRAM(s) IV Push daily  piperacillin/tazobactam IVPB.. 3.375 Gram(s) IV Intermittent every 8 hours  senna 1 Tablet(s) Oral at bedtime  trimethoprim  160 mG/sulfamethoxazole 800 mG 1 Tablet(s) Oral two times a day  warfarin 2 milliGRAM(s) Oral once      Allergies    per son &quot;issues with certain anitibiotics&quot; does not remember the names (Unknown)    Intolerances                                                10.9   6.23  )-----------( 644      ( 08 Aug 2023 06:45 )             33.8         Hemoglobin A1C:     Vitamin B12     RADIOLOGY    ASSESSMENT AND PLAN:      seen for ams  likely ME  DEMENTIA  Hx of CVA  AF  PN    continue AC  NEURONTIN FOR neuropathy  Physical therapy evaluation.  OOB to chair/ambulation with assistance only.  Pain is accessed and addressed.  Would continue to follow.

## 2023-08-08 NOTE — DISCHARGE NOTE PROVIDER - NSDCFUADDINST_GEN_ALL_CORE_FT
Wound Care Instructions:  -Keep dressing clean, dry, and intact to the left leg  -Apply non adhesive adaptic, 4x4 gauze, abd pad, myra and change every other day   -Monitor for any signs of infection including redness, swelling in the leg above the bandage, nausea/vomiting/fever/chills/chest pain/shortness of breath, if any are present patient must report to the emergency department immediately   Diet -lactose free as per patient request  Wound Care Instructions:  -Keep dressing clean, dry, and intact to the left leg  -Apply non adhesive adaptic, 4x4 gauze, abd pad, myra and change every other day   -Monitor for any signs of infection including redness, swelling in the leg above the bandage, nausea/vomiting/fever/chills/chest pain/shortness of breath, if any are present patient must report to the emergency department immediately

## 2023-08-08 NOTE — PROGRESS NOTE ADULT - SUBJECTIVE AND OBJECTIVE BOX
PROGRESS NOTE  Patient is a 89y old  Female who presents with a chief complaint of AMS (08 Aug 2023 13:41)  Chart and available morning labs /imaging are reviewed electronically , urgent issues addressed . More information  is being added upon completion of rounds , when more information is collected and management discussed with consultants , medical staff and social service/case management on the floor     OVERNIGHT  Cellulitis improved ,cleared b y ID for d/c on 7 days of po abx   No new issues reported by medical staff . All above noted Patient is resting in a bed comfortably .Confused ,poor mentation .No distress noted   HPI:  Patient is a 89-year-old female presents the emergency room with chief complaint of altered mental status.  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  Patient presents from assisted living with altered mental status concern for UTI.  Patient was last admitted to the hospital June 26 through June 30, 2023 was altered mental status x2 weeks.  Was ultimately diagnosed with acute metabolic encephalopathy secondary to UTI improved.  Patient stabilized and was ultimately discharged back to assisted living.  Presents today lethargic with urinary frequency and confusion. Son reports that there is also concern for possible cellulitis to the LLE at the site of a skin graft. Pt recently completed a course of po abx for this with no significant improvement. She has been c/o severe pain to the LLE for weeks. Seen by vascular consult , xrays obtained -found to have severe OA AND PVD ,pain management started Palliative care consult requested ,to discuss advance directives and complete MOLST  (02 Aug 2023 09:19)    PAST MEDICAL & SURGICAL HISTORY:  Hypertension      CVA (cerebral vascular accident)      Depression      Constipation      Neuropathy      Hyperlipidemia      Grade I diastolic dysfunction      Afib      Neuropathy      VHD (valvular heart disease)      Aortic valvar stenosis      No significant past surgical history          MEDICATIONS  (STANDING):  acetaminophen     Tablet .. 650 milliGRAM(s) Oral every 8 hours  amLODIPine   Tablet 5 milliGRAM(s) Oral daily  cephalexin 500 milliGRAM(s) Oral every 12 hours  cholecalciferol 2000 Unit(s) Oral daily  collagenase Ointment 1 Application(s) Topical daily  dextrose 5% + sodium chloride 0.45%. 1000 milliLiter(s) (50 mL/Hr) IV Continuous <Continuous>  doxycycline monohydrate Suspension 50 milliGRAM(s) Oral every 12 hours  escitalopram 10 milliGRAM(s) Oral at bedtime  ferrous    sulfate 325 milliGRAM(s) Oral daily  folic acid 1 milliGRAM(s) Oral daily  gabapentin 300 milliGRAM(s) Oral three times a day  hydroxyurea 500 milliGRAM(s) Oral two times a day  lactobacillus acidophilus 1 Tablet(s) Oral every 12 hours  levothyroxine Injectable 25 MICROGram(s) IV Push at bedtime  lidocaine   4% Patch 2 Patch Transdermal daily  metoprolol tartrate 25 milliGRAM(s) Oral two times a day  pantoprazole  Injectable 40 milliGRAM(s) IV Push daily  senna 1 Tablet(s) Oral at bedtime    MEDICATIONS  (PRN):  aluminum hydroxide/magnesium hydroxide/simethicone Suspension 30 milliLiter(s) Oral every 4 hours PRN Dyspepsia  LORazepam   Injectable 0.5 milliGRAM(s) IV Push every 8 hours PRN Agitation  melatonin 3 milliGRAM(s) Oral at bedtime PRN Insomnia  morphine  - Injectable 2 milliGRAM(s) IV Push every 4 hours PRN Severe Pain (7 - 10)  ondansetron Injectable 4 milliGRAM(s) IV Push every 8 hours PRN Nausea and/or Vomiting      OBJECTIVE    T(C): 36.8 (08-08-23 @ 11:23), Max: 36.8 (08-08-23 @ 11:23)  HR: 97 (08-08-23 @ 11:23) (78 - 97)  BP: 138/76 (08-08-23 @ 11:23) (123/64 - 138/76)  RR: 18 (08-08-23 @ 11:23) (18 - 18)  SpO2: 91% (08-08-23 @ 11:23) (91% - 95%)  Wt(kg): --  I&O's Summary    07 Aug 2023 07:01  -  08 Aug 2023 07:00  --------------------------------------------------------  IN: 0 mL / OUT: 600 mL / NET: -600 mL          REVIEW OF SYSTEMS:  CONSTITUTIONAL: No fever, weight loss, or fatigue  EYES: No eye pain, visual disturbances, or discharge  ENMT:   No sinus or throat pain  NECK: No pain or stiffness  RESPIRATORY: No cough, wheezing, chills or hemoptysis; No shortness of breath  CARDIOVASCULAR: No chest pain, palpitations, dizziness, or leg swelling  GASTROINTESTINAL: No abdominal pain. No nausea, vomiting; No diarrhea or constipation. No melena or hematochezia.  GENITOURINARY: No dysuria, frequency, hematuria, or incontinence  NEUROLOGICAL: No headaches, memory loss, loss of strength, numbness, or tremors  SKIN: No itching, burning, rashes, or lesions   MUSCULOSKELETAL: No joint pain or swelling; No muscle, back, or extremity pain    PHYSICAL EXAM:  Appearance: NAD. VS past 24 hrs -as above   HEENT:   Moist oral mucosa. Conjunctiva clear b/l.   Neck : supple  Respiratory: Lungs CTAB.  Gastrointestinal:  Soft, nontender. No rebound. No rigidity. BS present	  Cardiovascular: RRR ,S1S2 present  Neurologic: Non-focal. Moving all extremities.  Extremities: No edema. No erythema. No calf tenderness.  Skin: No rashes, No ecchymoses, No cyanosis.	  wounds ,skin lesions-See skin assesment flow sheet   LABS:                        10.9   6.23  )-----------( 644      ( 08 Aug 2023 06:45 )             33.8     08-08    146<H>  |  111<H>  |  3<L>  ----------------------------<  89  3.7   |  29  |  0.61    Ca    8.5      08 Aug 2023 06:45      CAPILLARY BLOOD GLUCOSE        PT/INR - ( 08 Aug 2023 06:45 )   PT: 32.9 sec;   INR: 2.90 ratio           Urinalysis Basic - ( 08 Aug 2023 06:45 )    Color: x / Appearance: x / SG: x / pH: x  Gluc: 89 mg/dL / Ketone: x  / Bili: x / Urobili: x   Blood: x / Protein: x / Nitrite: x   Leuk Esterase: x / RBC: x / WBC x   Sq Epi: x / Non Sq Epi: x / Bacteria: x        Culture - Blood (collected 01 Aug 2023 18:38)  Source: .Blood Blood-Venous  Final Report (07 Aug 2023 02:00):    No growth at 5 days    Culture - Blood (collected 01 Aug 2023 18:30)  Source: .Blood Blood-Venous  Final Report (07 Aug 2023 02:00):    No growth at 5 days    Culture - Urine (collected 01 Aug 2023 18:00)  Source: Clean Catch Clean Catch (Midstream)  Final Report (05 Aug 2023 09:15):    10,000 - 49,000 CFU/mL Klebsiella pneumoniae    10,000 - 49,000 CFU/mL Proteus mirabilis  Organism: Klebsiella pneumoniae  Proteus mirabilis (05 Aug 2023 09:15)  Organism: Proteus mirabilis (05 Aug 2023 09:15)  Organism: Klebsiella pneumoniae (05 Aug 2023 09:15)      RADIOLOGY & ADDITIONAL TESTS:   reviewed elctronically  ASSESSMENT/PLAN: 	    Patient was seen and examined on a day of discharge . Plan of care , discharge medications and recommendations discussed with consultants and clearance for discharge obtained .Social service , case management  and medical staff are aware of plan. Family is notified. Discharge summary  is  prepared electronically-see separate document prepared by me .75minutes spent on this visit, 50% visit time spent in care co-ordination with other attendings and counselling patient  I have discussed care plan with patient and HCP son Curtis ,expressed understanding of problems treatment and their effect and side effects, alternatives in detail,I have asked if they have any questions and concerns and appropriately addressed them to best of my ability [phone] COMPLETED 3 PAGES ,spoke to UNIQUE Henriquez from [facility]  service  ,aware that patient needs dermatology f/up within 1-2 weeks after d/c and continue collagenase for topical care as per wound care MD romero ( Seen  prior to d/c )

## 2023-08-08 NOTE — PROGRESS NOTE ADULT - SUBJECTIVE AND OBJECTIVE BOX
Date/Time Patient Seen:  		  Referring MD:   Data Reviewed	       Patient is a 89y old  Female who presents with a chief complaint of AMS (07 Aug 2023 18:56)      Subjective/HPI     PAST MEDICAL & SURGICAL HISTORY:  Hypertension    CVA (cerebral vascular accident)    Depression    Constipation    Neuropathy    Constipation    Hyperlipidemia    Grade I diastolic dysfunction    Afib    Neuropathy    VHD (valvular heart disease)    Aortic valvar stenosis    No significant past surgical history          Medication list         MEDICATIONS  (STANDING):  acetaminophen     Tablet .. 650 milliGRAM(s) Oral every 8 hours  amLODIPine   Tablet 5 milliGRAM(s) Oral daily  cephalexin 500 milliGRAM(s) Oral every 12 hours  cholecalciferol 2000 Unit(s) Oral daily  dextrose 5% + sodium chloride 0.45%. 1000 milliLiter(s) (50 mL/Hr) IV Continuous <Continuous>  doxycycline monohydrate Suspension 50 milliGRAM(s) Oral every 12 hours  escitalopram 10 milliGRAM(s) Oral at bedtime  ferrous    sulfate 325 milliGRAM(s) Oral daily  folic acid 1 milliGRAM(s) Oral daily  gabapentin 300 milliGRAM(s) Oral three times a day  hydroxyurea 500 milliGRAM(s) Oral two times a day  lactobacillus acidophilus 1 Tablet(s) Oral every 12 hours  levothyroxine Injectable 25 MICROGram(s) IV Push at bedtime  lidocaine   4% Patch 2 Patch Transdermal daily  metoprolol tartrate 25 milliGRAM(s) Oral two times a day  pantoprazole  Injectable 40 milliGRAM(s) IV Push daily  senna 1 Tablet(s) Oral at bedtime    MEDICATIONS  (PRN):  aluminum hydroxide/magnesium hydroxide/simethicone Suspension 30 milliLiter(s) Oral every 4 hours PRN Dyspepsia  LORazepam   Injectable 0.5 milliGRAM(s) IV Push every 8 hours PRN Agitation  melatonin 3 milliGRAM(s) Oral at bedtime PRN Insomnia  morphine  - Injectable 2 milliGRAM(s) IV Push every 4 hours PRN Severe Pain (7 - 10)  ondansetron Injectable 4 milliGRAM(s) IV Push every 8 hours PRN Nausea and/or Vomiting         Vitals log        ICU Vital Signs Last 24 Hrs  T(C): 36.7 (08 Aug 2023 04:35), Max: 36.9 (07 Aug 2023 05:37)  T(F): 98 (08 Aug 2023 04:35), Max: 98.4 (07 Aug 2023 05:37)  HR: 78 (08 Aug 2023 04:35) (78 - 94)  BP: 123/64 (07 Aug 2023 20:17) (123/64 - 129/69)  BP(mean): --  ABP: --  ABP(mean): --  RR: 18 (08 Aug 2023 04:35) (18 - 18)  SpO2: 95% (08 Aug 2023 04:35) (90% - 95%)    O2 Parameters below as of 08 Aug 2023 04:35  Patient On (Oxygen Delivery Method): room air                 Input and Output:  I&O's Detail    06 Aug 2023 07:01  -  07 Aug 2023 07:00  --------------------------------------------------------  IN:    dextrose 5% + sodium chloride 0.45%: 500 mL  Total IN: 500 mL    OUT:  Total OUT: 0 mL    Total NET: 500 mL      07 Aug 2023 07:01  -  08 Aug 2023 05:17  --------------------------------------------------------  IN:  Total IN: 0 mL    OUT:    Voided (mL): 600 mL  Total OUT: 600 mL    Total NET: -600 mL          Lab Data                        10.7   7.48  )-----------( 705      ( 07 Aug 2023 08:11 )             34.2     08-07    142  |  110<H>  |  4<L>  ----------------------------<  98  3.5   |  28  |  0.78    Ca    8.4<L>      07 Aug 2023 08:11  Phos  3.3     08-06  Mg     2.1     08-06              Review of Systems	      Objective     Physical Examination    heart s1s2  lung dc BS  head nc      Pertinent Lab findings & Imaging      Summer:  NO   Adequate UO     I&O's Detail    06 Aug 2023 07:01  -  07 Aug 2023 07:00  --------------------------------------------------------  IN:    dextrose 5% + sodium chloride 0.45%: 500 mL  Total IN: 500 mL    OUT:  Total OUT: 0 mL    Total NET: 500 mL      07 Aug 2023 07:01  -  08 Aug 2023 05:17  --------------------------------------------------------  IN:  Total IN: 0 mL    OUT:    Voided (mL): 600 mL  Total OUT: 600 mL    Total NET: -600 mL               Discussed with:     Cultures:	        Radiology

## 2023-08-18 ENCOUNTER — INPATIENT (INPATIENT)
Facility: HOSPITAL | Age: 88
LOS: 4 days | Discharge: NURSING, REHAB OR LONG-TERM CARE | DRG: 300 | End: 2023-08-23
Attending: INTERNAL MEDICINE | Admitting: INTERNAL MEDICINE
Payer: MEDICARE

## 2023-08-18 VITALS
DIASTOLIC BLOOD PRESSURE: 69 MMHG | HEIGHT: 66 IN | OXYGEN SATURATION: 98 % | TEMPERATURE: 98 F | HEART RATE: 71 BPM | RESPIRATION RATE: 16 BRPM | WEIGHT: 125 LBS | SYSTOLIC BLOOD PRESSURE: 115 MMHG

## 2023-08-18 DIAGNOSIS — I82.409 ACUTE EMBOLISM AND THROMBOSIS OF UNSPECIFIED DEEP VEINS OF UNSPECIFIED LOWER EXTREMITY: ICD-10-CM

## 2023-08-18 DIAGNOSIS — F32.9 MAJOR DEPRESSIVE DISORDER, SINGLE EPISODE, UNSPECIFIED: ICD-10-CM

## 2023-08-18 DIAGNOSIS — E03.9 HYPOTHYROIDISM, UNSPECIFIED: ICD-10-CM

## 2023-08-18 DIAGNOSIS — Z29.9 ENCOUNTER FOR PROPHYLACTIC MEASURES, UNSPECIFIED: ICD-10-CM

## 2023-08-18 DIAGNOSIS — I10 ESSENTIAL (PRIMARY) HYPERTENSION: ICD-10-CM

## 2023-08-18 DIAGNOSIS — K21.9 GASTRO-ESOPHAGEAL REFLUX DISEASE WITHOUT ESOPHAGITIS: ICD-10-CM

## 2023-08-18 DIAGNOSIS — I48.91 UNSPECIFIED ATRIAL FIBRILLATION: ICD-10-CM

## 2023-08-18 DIAGNOSIS — S81.802A UNSPECIFIED OPEN WOUND, LEFT LOWER LEG, INITIAL ENCOUNTER: ICD-10-CM

## 2023-08-18 DIAGNOSIS — I25.10 ATHEROSCLEROTIC HEART DISEASE OF NATIVE CORONARY ARTERY WITHOUT ANGINA PECTORIS: ICD-10-CM

## 2023-08-18 DIAGNOSIS — I38 ENDOCARDITIS, VALVE UNSPECIFIED: ICD-10-CM

## 2023-08-18 LAB
ALBUMIN SERPL ELPH-MCNC: 2.5 G/DL — LOW (ref 3.3–5)
ALP SERPL-CCNC: 70 U/L — SIGNIFICANT CHANGE UP (ref 40–120)
ALT FLD-CCNC: 13 U/L — SIGNIFICANT CHANGE UP (ref 12–78)
ANION GAP SERPL CALC-SCNC: 6 MMOL/L — SIGNIFICANT CHANGE UP (ref 5–17)
ANISOCYTOSIS BLD QL: SLIGHT — SIGNIFICANT CHANGE UP
APTT BLD: 35.4 SEC — SIGNIFICANT CHANGE UP (ref 24.5–35.6)
APTT BLD: 40.3 SEC — HIGH (ref 24.5–35.6)
AST SERPL-CCNC: 23 U/L — SIGNIFICANT CHANGE UP (ref 15–37)
BASOPHILS # BLD AUTO: 0.04 K/UL — SIGNIFICANT CHANGE UP (ref 0–0.2)
BASOPHILS NFR BLD AUTO: 0.5 % — SIGNIFICANT CHANGE UP (ref 0–2)
BILIRUB SERPL-MCNC: 0.3 MG/DL — SIGNIFICANT CHANGE UP (ref 0.2–1.2)
BLD GP AB SCN SERPL QL: SIGNIFICANT CHANGE UP
BUN SERPL-MCNC: 16 MG/DL — SIGNIFICANT CHANGE UP (ref 7–23)
CALCIUM SERPL-MCNC: 8.4 MG/DL — LOW (ref 8.5–10.1)
CHLORIDE SERPL-SCNC: 107 MMOL/L — SIGNIFICANT CHANGE UP (ref 96–108)
CO2 SERPL-SCNC: 26 MMOL/L — SIGNIFICANT CHANGE UP (ref 22–31)
CREAT SERPL-MCNC: 0.68 MG/DL — SIGNIFICANT CHANGE UP (ref 0.5–1.3)
DACRYOCYTES BLD QL SMEAR: SLIGHT — SIGNIFICANT CHANGE UP
EGFR: 82 ML/MIN/1.73M2 — SIGNIFICANT CHANGE UP
EOSINOPHIL # BLD AUTO: 0.08 K/UL — SIGNIFICANT CHANGE UP (ref 0–0.5)
EOSINOPHIL NFR BLD AUTO: 0.9 % — SIGNIFICANT CHANGE UP (ref 0–6)
GLUCOSE SERPL-MCNC: 99 MG/DL — SIGNIFICANT CHANGE UP (ref 70–99)
HCT VFR BLD CALC: 32.1 % — LOW (ref 34.5–45)
HGB BLD-MCNC: 10.1 G/DL — LOW (ref 11.5–15.5)
IMM GRANULOCYTES NFR BLD AUTO: 6.1 % — HIGH (ref 0–0.9)
INR BLD: 1.57 RATIO — HIGH (ref 0.85–1.18)
INR BLD: 1.65 RATIO — HIGH (ref 0.85–1.18)
LYMPHOCYTES # BLD AUTO: 1.01 K/UL — SIGNIFICANT CHANGE UP (ref 1–3.3)
LYMPHOCYTES # BLD AUTO: 11.8 % — LOW (ref 13–44)
MACROCYTES BLD QL: SLIGHT — SIGNIFICANT CHANGE UP
MCHC RBC-ENTMCNC: 31.5 GM/DL — LOW (ref 32–36)
MCHC RBC-ENTMCNC: 37.4 PG — HIGH (ref 27–34)
MCV RBC AUTO: 118.9 FL — HIGH (ref 80–100)
MICROCYTES BLD QL: SLIGHT — SIGNIFICANT CHANGE UP
MONOCYTES # BLD AUTO: 0.41 K/UL — SIGNIFICANT CHANGE UP (ref 0–0.9)
MONOCYTES NFR BLD AUTO: 4.8 % — SIGNIFICANT CHANGE UP (ref 2–14)
NEUTROPHILS # BLD AUTO: 6.49 K/UL — SIGNIFICANT CHANGE UP (ref 1.8–7.4)
NEUTROPHILS NFR BLD AUTO: 75.9 % — SIGNIFICANT CHANGE UP (ref 43–77)
NRBC # BLD: 0 /100 WBCS — SIGNIFICANT CHANGE UP (ref 0–0)
PLAT MORPH BLD: NORMAL — SIGNIFICANT CHANGE UP
PLATELET # BLD AUTO: 534 K/UL — HIGH (ref 150–400)
POIKILOCYTOSIS BLD QL AUTO: SLIGHT — SIGNIFICANT CHANGE UP
POLYCHROMASIA BLD QL SMEAR: SLIGHT — SIGNIFICANT CHANGE UP
POTASSIUM SERPL-MCNC: 4.4 MMOL/L — SIGNIFICANT CHANGE UP (ref 3.5–5.3)
POTASSIUM SERPL-SCNC: 4.4 MMOL/L — SIGNIFICANT CHANGE UP (ref 3.5–5.3)
PROT SERPL-MCNC: 6.4 G/DL — SIGNIFICANT CHANGE UP (ref 6–8.3)
PROTHROM AB SERPL-ACNC: 18.1 SEC — HIGH (ref 9.5–13)
PROTHROM AB SERPL-ACNC: 19 SEC — HIGH (ref 9.5–13)
RBC # BLD: 2.7 M/UL — LOW (ref 3.8–5.2)
RBC # FLD: 16.8 % — HIGH (ref 10.3–14.5)
RBC BLD AUTO: ABNORMAL
SODIUM SERPL-SCNC: 139 MMOL/L — SIGNIFICANT CHANGE UP (ref 135–145)
WBC # BLD: 8.55 K/UL — SIGNIFICANT CHANGE UP (ref 3.8–10.5)
WBC # FLD AUTO: 8.55 K/UL — SIGNIFICANT CHANGE UP (ref 3.8–10.5)

## 2023-08-18 PROCEDURE — 99285 EMERGENCY DEPT VISIT HI MDM: CPT

## 2023-08-18 PROCEDURE — 93010 ELECTROCARDIOGRAM REPORT: CPT

## 2023-08-18 PROCEDURE — 99223 1ST HOSP IP/OBS HIGH 75: CPT

## 2023-08-18 PROCEDURE — 71045 X-RAY EXAM CHEST 1 VIEW: CPT | Mod: 26

## 2023-08-18 RX ORDER — PANTOPRAZOLE SODIUM 20 MG/1
40 TABLET, DELAYED RELEASE ORAL
Refills: 0 | Status: DISCONTINUED | OUTPATIENT
Start: 2023-08-18 | End: 2023-08-23

## 2023-08-18 RX ORDER — LEVOTHYROXINE SODIUM 125 MCG
50 TABLET ORAL DAILY
Refills: 0 | Status: DISCONTINUED | OUTPATIENT
Start: 2023-08-18 | End: 2023-08-23

## 2023-08-18 RX ORDER — FOLIC ACID 0.8 MG
1 TABLET ORAL DAILY
Refills: 0 | Status: DISCONTINUED | OUTPATIENT
Start: 2023-08-18 | End: 2023-08-23

## 2023-08-18 RX ORDER — COLLAGENASE CLOSTRIDIUM HIST. 250 UNIT/G
1 OINTMENT (GRAM) TOPICAL DAILY
Refills: 0 | Status: DISCONTINUED | OUTPATIENT
Start: 2023-08-18 | End: 2023-08-23

## 2023-08-18 RX ORDER — TRAMADOL HYDROCHLORIDE 50 MG/1
50 TABLET ORAL EVERY 6 HOURS
Refills: 0 | Status: DISCONTINUED | OUTPATIENT
Start: 2023-08-18 | End: 2023-08-23

## 2023-08-18 RX ORDER — AMLODIPINE BESYLATE 2.5 MG/1
5 TABLET ORAL DAILY
Refills: 0 | Status: DISCONTINUED | OUTPATIENT
Start: 2023-08-18 | End: 2023-08-23

## 2023-08-18 RX ORDER — LACTOBACILLUS ACIDOPHILUS 100MM CELL
1 CAPSULE ORAL
Refills: 0 | Status: DISCONTINUED | OUTPATIENT
Start: 2023-08-18 | End: 2023-08-23

## 2023-08-18 RX ORDER — SIMVASTATIN 20 MG/1
10 TABLET, FILM COATED ORAL AT BEDTIME
Refills: 0 | Status: DISCONTINUED | OUTPATIENT
Start: 2023-08-18 | End: 2023-08-23

## 2023-08-18 RX ORDER — SENNA PLUS 8.6 MG/1
2 TABLET ORAL AT BEDTIME
Refills: 0 | Status: DISCONTINUED | OUTPATIENT
Start: 2023-08-18 | End: 2023-08-23

## 2023-08-18 RX ORDER — FERROUS SULFATE 325(65) MG
325 TABLET ORAL DAILY
Refills: 0 | Status: DISCONTINUED | OUTPATIENT
Start: 2023-08-18 | End: 2023-08-23

## 2023-08-18 RX ORDER — FUROSEMIDE 40 MG
20 TABLET ORAL DAILY
Refills: 0 | Status: DISCONTINUED | OUTPATIENT
Start: 2023-08-18 | End: 2023-08-23

## 2023-08-18 RX ORDER — HEPARIN SODIUM 5000 [USP'U]/ML
2000 INJECTION INTRAVENOUS; SUBCUTANEOUS EVERY 6 HOURS
Refills: 0 | Status: DISCONTINUED | OUTPATIENT
Start: 2023-08-18 | End: 2023-08-20

## 2023-08-18 RX ORDER — CHOLECALCIFEROL (VITAMIN D3) 125 MCG
2000 CAPSULE ORAL DAILY
Refills: 0 | Status: DISCONTINUED | OUTPATIENT
Start: 2023-08-18 | End: 2023-08-23

## 2023-08-18 RX ORDER — TRAMADOL HYDROCHLORIDE 50 MG/1
50 TABLET ORAL THREE TIMES A DAY
Refills: 0 | Status: DISCONTINUED | OUTPATIENT
Start: 2023-08-18 | End: 2023-08-18

## 2023-08-18 RX ORDER — MORPHINE SULFATE 50 MG/1
2 CAPSULE, EXTENDED RELEASE ORAL EVERY 6 HOURS
Refills: 0 | Status: DISCONTINUED | OUTPATIENT
Start: 2023-08-18 | End: 2023-08-23

## 2023-08-18 RX ORDER — ESCITALOPRAM OXALATE 10 MG/1
20 TABLET, FILM COATED ORAL DAILY
Refills: 0 | Status: DISCONTINUED | OUTPATIENT
Start: 2023-08-18 | End: 2023-08-23

## 2023-08-18 RX ORDER — HEPARIN SODIUM 5000 [USP'U]/ML
4500 INJECTION INTRAVENOUS; SUBCUTANEOUS ONCE
Refills: 0 | Status: COMPLETED | OUTPATIENT
Start: 2023-08-18 | End: 2023-08-18

## 2023-08-18 RX ORDER — LANOLIN ALCOHOL/MO/W.PET/CERES
3 CREAM (GRAM) TOPICAL AT BEDTIME
Refills: 0 | Status: DISCONTINUED | OUTPATIENT
Start: 2023-08-18 | End: 2023-08-23

## 2023-08-18 RX ORDER — HEPARIN SODIUM 5000 [USP'U]/ML
4500 INJECTION INTRAVENOUS; SUBCUTANEOUS EVERY 6 HOURS
Refills: 0 | Status: DISCONTINUED | OUTPATIENT
Start: 2023-08-18 | End: 2023-08-20

## 2023-08-18 RX ORDER — ACETAMINOPHEN 500 MG
650 TABLET ORAL EVERY 6 HOURS
Refills: 0 | Status: DISCONTINUED | OUTPATIENT
Start: 2023-08-18 | End: 2023-08-23

## 2023-08-18 RX ORDER — ACETAMINOPHEN 500 MG
650 TABLET ORAL EVERY 6 HOURS
Refills: 0 | Status: DISCONTINUED | OUTPATIENT
Start: 2023-08-18 | End: 2023-08-18

## 2023-08-18 RX ORDER — ONDANSETRON 8 MG/1
4 TABLET, FILM COATED ORAL EVERY 8 HOURS
Refills: 0 | Status: DISCONTINUED | OUTPATIENT
Start: 2023-08-18 | End: 2023-08-23

## 2023-08-18 RX ORDER — HEPARIN SODIUM 5000 [USP'U]/ML
INJECTION INTRAVENOUS; SUBCUTANEOUS
Qty: 25000 | Refills: 0 | Status: DISCONTINUED | OUTPATIENT
Start: 2023-08-18 | End: 2023-08-20

## 2023-08-18 RX ORDER — GABAPENTIN 400 MG/1
300 CAPSULE ORAL THREE TIMES A DAY
Refills: 0 | Status: DISCONTINUED | OUTPATIENT
Start: 2023-08-18 | End: 2023-08-23

## 2023-08-18 RX ORDER — METOPROLOL TARTRATE 50 MG
25 TABLET ORAL
Refills: 0 | Status: DISCONTINUED | OUTPATIENT
Start: 2023-08-18 | End: 2023-08-23

## 2023-08-18 RX ADMIN — TRAMADOL HYDROCHLORIDE 50 MILLIGRAM(S): 50 TABLET ORAL at 19:32

## 2023-08-18 RX ADMIN — Medication 650 MILLIGRAM(S): at 17:48

## 2023-08-18 RX ADMIN — TRAMADOL HYDROCHLORIDE 50 MILLIGRAM(S): 50 TABLET ORAL at 23:52

## 2023-08-18 RX ADMIN — HEPARIN SODIUM 4500 UNIT(S): 5000 INJECTION INTRAVENOUS; SUBCUTANEOUS at 17:49

## 2023-08-18 RX ADMIN — GABAPENTIN 300 MILLIGRAM(S): 400 CAPSULE ORAL at 21:53

## 2023-08-18 RX ADMIN — SIMVASTATIN 10 MILLIGRAM(S): 20 TABLET, FILM COATED ORAL at 21:53

## 2023-08-18 RX ADMIN — SENNA PLUS 2 TABLET(S): 8.6 TABLET ORAL at 21:53

## 2023-08-18 RX ADMIN — HEPARIN SODIUM 1100 UNIT(S)/HR: 5000 INJECTION INTRAVENOUS; SUBCUTANEOUS at 17:51

## 2023-08-18 RX ADMIN — Medication 650 MILLIGRAM(S): at 23:52

## 2023-08-18 RX ADMIN — Medication 1 TABLET(S): at 18:09

## 2023-08-18 RX ADMIN — Medication 25 MILLIGRAM(S): at 17:48

## 2023-08-18 NOTE — CONSULT NOTE ADULT - ASSESSMENT
89 F [facility] in from [facility] for after being found to have a DVT in her left Common femoral, great saphenous, popliteal vein  Past medical history of CVA on Coumadin with right-sided weakness, hypertension hyperlipidemia CAD history of A-fib on Coumadin.     op  oa  ataxic gait  cva hx  AF  HLD  HTN  CAD  PVD  STSI  dvt  weakness

## 2023-08-18 NOTE — CONSULT NOTE ADULT - ASSESSMENT
Initial evaluation/Pulmonary Critical Care consultation requested on 8/18/2023  by Dr BLANCO    from Dr De La Rosa   Patient examined chart reviewed    HOSPITAL ADMISSION   PATIENT CAME  FROM (if information available)      REASON FOR VISIT  .. Management of problems listed below      PHYSICAL EXAM    HEENT Unremarkable  atraumatic   RESP Fair air entry  Harsh breath sound   CARDIAC S1 S2 No S3     NO JVD    ABDOMEN No hepatosplenomegaly   PEDAL EDEMA present No calf tenderness  NO rash     GENERAL DATA .     GOC.    .. 8/18/2023 full code   ICU STAY.   .. none  COVID.   ..    BEST PRACTICE ISSUES.    HOB ELEVATN.   .. Yes  DVT PPLX.   ..    8/18/2023 iv ufh   SPEECH SWALLOW RECOMMENDATIONS.    ..        DIET.    ..  8/18/2023 soft bite   IV fl.  ..   STRESS ULCER PPLX.   ..   8/18/2023 protonix 40    INFECTION PPLX.   ..     ALLGY.  ..  certain abio                        WT.  ..  8/18/2023 56  BMI.  ..    8/18/2023 20   PROCEDURES.    ABGS.   .     VS/ PO/IO/ VENT/ DRIPS.   8/18/2023 afeb 71 115/60   8/18/2023 ra 98%         DOA C/C.    . 8/18/2023 l leg dvt on coumadin 1 sent for eval             INITIAL PRESENTATION.   . 8/18/2023 · HPI Objective Statement: 89 F [facility] in [facility] for after being found to have a DVT in her left Common femoral, great saphenous, popliteal vein.  Right lower extremity negative for DVT.  Ultrasound done today. patient reports left leg pain. patient denies fevers, chills, chest pain, shortness of breath. patient on coumadin with INR of 2.28 on 8/14.  PMH.   . PMH DVT   . A fib   . Valvular heart disease   . HFPEF   . Aortic stenosis   . CVA r sided weakness   HOME MEDS.   . lidocaine patch protonix 40 warfarin levoxyl 50 lasix 20 metoprolol 25.2 hydroxyurea 500.2 amlodipine 5 escitalopram 20   COURSE.   . Pulm consulted 8/18/2023 breakthrough dvt while on coumadin    PROBLEMS/ASSESSMENT/RECOMMENDATIONS (A/R).  PULMONARY.  . GAS EXCHANGE.  .. A/R.   .. Monitor pulse oximetry and target po 90-95%    Venous thromboembolism.  .. 8/18/2023 inr 165  .. 8/18/2023 5p iv ufh   .. As INR was subtherapeutic this was not warfarin failure and pt may be restarted on warfarin with the usual 24-48 h overlap once therapeutic     INFECTION.  .. w 8/18/2023 w 8.5   .. cxr 8/18/2023 napd   .. No active infection suspectd     . A fib.  .. 8/18/2023 metoprolol 25.2   .. On anticoagn    . CHF  .. 6/28/2023 echo pasp 39 la sl dilated n lvsf  .. 8/18/2023 lasix 20     HEMAT  .. Hb 8/18/2023 Hb 10   .. plt 8/18/2023 plt 534     RENAL   .. Na 8/18/2023 Na 139   .. K 8/18/2023 k 4.4   .. Cr 8/18/2023 Cr .6    MAIN ISSUES.  . Venous thromboembolism while on warfarin poa 8/18/2023   .. 8/18/2023 IV ufh started  . A fib chronic   . Chrnic chf  .. Lasix continued     TIME SPENT.   . Over 55 minutes aggregate care time spent on encounter; activities included   direct patient care, counseling and/or coordinating care reviewing notes, lab data/ imaging , discussion with multidisciplinary team/ patient  /family and explaining in detail risks, benefits, alternatives  of the recommendations     AB MENDOZA 89 f 8/18/2023 3/15/1932 DR YURI CARRINGTON

## 2023-08-18 NOTE — H&P ADULT - PROBLEM SELECTOR PLAN 9
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

## 2023-08-18 NOTE — ED ADULT NURSE NOTE - NSFALLUNIVINTERV_ED_ALL_ED
Bed/Stretcher in lowest position, wheels locked, appropriate side rails in place/Call bell, personal items and telephone in reach/Instruct patient to call for assistance before getting out of bed/chair/stretcher/Non-slip footwear applied when patient is off stretcher/[place] to call system/Physically safe environment - no spills, clutter or unnecessary equipment/Purposeful proactive rounding/Room/bathroom lighting operational, light cord in reach

## 2023-08-18 NOTE — H&P ADULT - ASSESSMENT
89 Female with   Past medical history of CVA on Coumadin with right-sided weakness, hypertension hyperlipidemia CAD history of A-fib on Coumadin recently admitted at [facility] with acute metabolic encephalopathy 2/2 to uti and cellulitis of LE  .  P sent in from [facility] today  for after being found to have a DVT in her left Common femoral, great saphenous, popliteal vein.  Right lower extremity negative for DVT.  Ultrasound done today. patient reports left leg pain. patient denies fevers, chills, chest pain, shortness of breath. patient on coumadin with INR of 2.28 on 8/14 ,today in ER 1.57 .Admitted for vascular& hematology eval since patient developed DVT on coumadin .US venous  was done beginning of month during recent hospitalization and was negative for DVT Palliative care consult requested ,to discuss advance directives and complete MOLST During previous admission son was consulted regarding residential hospice at [facility] and palliative care issues /GOC discussion -could not decide on code status and patient remained full code .

## 2023-08-18 NOTE — ED PROVIDER NOTE - CONSIDERATION OF ADMISSION OBSERVATION
Pending results: for further work up, treatment or evaluation. For AC, other intervention Consideration of Admission/Observation

## 2023-08-18 NOTE — CONSULT NOTE ADULT - SUBJECTIVE AND OBJECTIVE BOX
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  P sent in from [facility] today  for after being found to have a DVT in her left Common femoral, great saphenous, popliteal vein.  Right lower extremity negative for DVT.  Ultrasound done today. patient reports left leg pain. patient denies fevers, chills, chest pain, shortness of breath. patient on coumadin with INR of 2.28 on 8/14 ,today in ER 1.57 .Admitted for vascular& hematology eval since patient developed DVT on coumadin .US venous  was done beginning of month during recent hospitalization and was negative for DVT Palliative care consult requested ,to discuss advance directives and complete MOLST During previous admission son was consulted regarding residential hospice at [facility] and palliative care issues /GOC discussion -could not decide on code status and patient remained full code . (18 Aug 2023 16:14)      REVIEW OF SYSTEMS:    CONSTITUTIONAL: No fever, weight loss, or fatigue  EYES: No eye pain, visual disturbances, or discharge  ENMT:  No difficulty hearing, tinnitus, vertigo; No sinus or throat pain  NECK: No pain or stiffness  BREASTS: No pain, masses, or nipple discharge  RESPIRATORY: No cough, wheezing, chills or hemoptysis; No shortness of breath  CARDIOVASCULAR: No chest pain, palpitations, dizziness, or leg swelling  GASTROINTESTINAL: No abdominal or epigastric pain. No nausea, vomiting, or hematemesis; No diarrhea or constipation. No melena or hematochezia.  GENITOURINARY: No dysuria, frequency, hematuria, or incontinence  NEUROLOGICAL: No headaches, memory loss, loss of strength, numbness, or tremors  SKIN: No itching, burning, rashes, or lesions   LYMPH NODES: No enlarged glands  ENDOCRINE: No heat or cold intolerance; No hair loss  MUSCULOSKELETAL: No joint pain or swelling; No muscle, back, or extremity pain  PSYCHIATRIC: No depression, anxiety, mood swings, or difficulty sleeping  HEME/LYMPH: No easy bruising, or bleeding gums  ALLERGY AND IMMUNOLOGIC: No hives or eczema    MEDICATIONS  (STANDING):  acetaminophen     Tablet .. 650 milliGRAM(s) Oral every 6 hours  amLODIPine   Tablet 5 milliGRAM(s) Oral daily  cholecalciferol 2000 Unit(s) Oral daily  collagenase Ointment 1 Application(s) Topical daily  escitalopram 20 milliGRAM(s) Oral daily  ferrous    sulfate 325 milliGRAM(s) Oral daily  folic acid 1 milliGRAM(s) Oral daily  furosemide    Tablet 20 milliGRAM(s) Oral daily  gabapentin 300 milliGRAM(s) Oral three times a day  heparin   Injectable 4500 Unit(s) IV Push once  heparin  Infusion.  Unit(s)/Hr (11 mL/Hr) IV Continuous <Continuous>  lactobacillus acidophilus 1 Tablet(s) Oral two times a day with meals  levothyroxine 50 MICROGram(s) Oral daily  metoprolol tartrate 25 milliGRAM(s) Oral two times a day  pantoprazole    Tablet 40 milliGRAM(s) Oral before breakfast  senna 2 Tablet(s) Oral at bedtime  simvastatin 10 milliGRAM(s) Oral at bedtime  traMADol 50 milliGRAM(s) Oral every 6 hours    MEDICATIONS  (PRN):  aluminum hydroxide/magnesium hydroxide/simethicone Suspension 30 milliLiter(s) Oral every 4 hours PRN Dyspepsia  heparin   Injectable 4500 Unit(s) IV Push every 6 hours PRN For aPTT less than 40  heparin   Injectable 2000 Unit(s) IV Push every 6 hours PRN For aPTT between 40 - 57  melatonin 3 milliGRAM(s) Oral at bedtime PRN Insomnia  morphine  - Injectable 2 milliGRAM(s) IV Push every 6 hours PRN Severe Pain (7 - 10)  ondansetron Injectable 4 milliGRAM(s) IV Push every 8 hours PRN Nausea and/or Vomiting      ALLERGIES: per son &quot;issues with certain anitibiotics&quot; does not remember the names (Unknown)      Social History:     FAMILY HISTORY:      PAST MEDICAL & SURGICAL HISTORY:  Hypertension      CVA (cerebral vascular accident)      Depression      Constipation      Neuropathy      Hyperlipidemia      Grade I diastolic dysfunction      Afib      Neuropathy      VHD (valvular heart disease)      Aortic valvar stenosis      Hypothyroidism      GERD (gastroesophageal reflux disease)      No significant past surgical history            PHYSICAL EXAMINATION:  -----------------------------  T(C): 36.8 (08-18-23 @ 13:02), Max: 36.8 (08-18-23 @ 13:02)  HR: 71 (08-18-23 @ 13:02) (71 - 71)  BP: 115/69 (08-18-23 @ 13:02) (115/69 - 115/69)  RR: 16 (08-18-23 @ 13:02) (16 - 16)  SpO2: 98% (08-18-23 @ 13:02) (98% - 98%)  Wt(kg): --    Height (cm): 167.6 (08-18 @ 13:02)  Weight (kg): 56.7 (08-18 @ 13:02)  BMI (kg/m2): 20.2 (08-18 @ 13:02)  BSA (m2): 1.64 (08-18 @ 13:02)    Constitutional: well developed, normal appearance, well groomed, well nourished, no deformities and no acute distress.   Eyes: the conjunctiva exhibited no abnormalities and the eyelids demonstrated no xanthelasmas.   HEENT: normal oral mucosa, no oral pallor and no oral cyanosis.   Neck: normal jugular venous A waves present, normal jugular venous V waves present and no jugular venous castillo A waves.   Pulmonary: no respiratory distress, normal respiratory rhythm and effort, no accessory muscle use and lungs were clear to auscultation bilaterally.   Cardiovascular: heart rate and rhythm were normal, normal S1 and S2 and no murmur, gallop, rub, heave or thrill are present.   Abdomen: soft, non-tender, no hepato-splenomegaly and no abdominal mass palpated.   Musculoskeletal: the gait could not be assessed..   Extremities: no clubbing of the fingernails, no localized cyanosis, no petechial hemorrhages and no ischemic changes.   Skin: normal skin color and pigmentation, no rash, no venous stasis, no skin lesions, no skin ulcer and no xanthoma was observed.   Psychiatric: oriented to person, place, and time, the affect was normal, the mood was normal and not feeling anxious.     LABS:   --------  08-18    139  |  107  |  16  ----------------------------<  99  4.4   |  26  |  0.68    Ca    8.4<L>      18 Aug 2023 14:10    TPro  6.4  /  Alb  2.5<L>  /  TBili  0.3  /  DBili  x   /  AST  23  /  ALT  13  /  AlkPhos  70  08-18                         10.1   8.55  )-----------( 534      ( 18 Aug 2023 14:10 )             32.1     PT/INR - ( 18 Aug 2023 16:44 )   PT: 19.0 sec;   INR: 1.65 ratio         PTT - ( 18 Aug 2023 16:44 )  PTT:40.3 sec            RADIOLOGY:  -----------------        ECG:     ECHO

## 2023-08-18 NOTE — H&P ADULT - NSHPSOURCEINFORD_GEN_ALL_CORE
At checkout patient remembered that she would like a rx for triamcinolone cream for her eczema.    Chart(s)/Patient/Physician/Provider/Other Healthcare Facility

## 2023-08-18 NOTE — PROGRESS NOTE ADULT - SUBJECTIVE AND OBJECTIVE BOX
The patient is being admitted for LLE DVT  Low suspicion for cellulitis  Monitor off antibiotics.  Anticoagulation

## 2023-08-18 NOTE — H&P ADULT - TIME BILLING
75minutes spent on this visit, 50% visit time spent in care co-ordination with other attendings and counselling patient ,writing admission orders ( see complete and current orders and order section) ,requesting necessary consults ,informing family about status & plan of care .I have discussed care plan with [facility] wellness/admitting /nursing   department ,outpatient PCP , hospital consultants , ER physician & med staff .

## 2023-08-18 NOTE — CONSULT NOTE ADULT - SUBJECTIVE AND OBJECTIVE BOX
Vascular Attending: Heather (covering for Juarez)    HPI:  Ms. Wilcox is a 89 yo female, pmh CVA with R hemiplegia, DVT with IVC filter, Afib (on Coumadin), h/o L leg melanoma, HLD, presenting from assisted living with duplex results concerning for LLE DVT. She was recently here for a UTI, known to the vascular service for previous clot in IVC (2021) and LLE cellulitis (08/2023), discharged ten days ago. She is reporting LLE pain and burning at the site of skin graft. Patient is non ambulatory and reports that she has a history of vascular disease and chronic wounds. She usually wears compression stockings but reports that they are too tight. She denies any fever, chills, CP, SOB. Most recent INR was on 8/14 noted to be 2.28.     PAST MEDICAL & SURGICAL HISTORY:  Hypertension    CVA (cerebral vascular accident)    Depression    Constipation    Neuropathy    Hyperlipidemia    Grade I diastolic dysfunction    Afib    Neuropathy    VHD (valvular heart disease)    Aortic valvar stenosis    No significant past surgical history    Allergies  per son "issues with certain anitibiotics"; does not remember the names (Unknown)    Vital Signs Last 24 Hrs  T(C): 36.8 (18 Aug 2023 13:02), Max: 36.8 (18 Aug 2023 13:02)  T(F): 98.3 (18 Aug 2023 13:02), Max: 98.3 (18 Aug 2023 13:02)  HR: 71 (18 Aug 2023 13:02) (71 - 71)  BP: 115/69 (18 Aug 2023 13:02) (115/69 - 115/69)  BP(mean): --  RR: 16 (18 Aug 2023 13:02) (16 - 16)  SpO2: 98% (18 Aug 2023 13:02) (98% - 98%)    Parameters below as of 18 Aug 2023 13:02  Patient On (Oxygen Delivery Method): room air    PHYSICAL EXAM:  Constitutional: AAOx3, no acute distress  HEENT: NCAT, airway patent  Cardiovascular: RRR, pulses present bilaterally  Respiratory: nonlabored breathing  Gastrointestinal: abdomen soft, nontender, non distended  Neuro: no focal deficits  Extremities: bilateral pitting edema +4, DP/PT pulses faint, trophic changes b/l,     LABS:                        10.1   8.55  )-----------( 534      ( 18 Aug 2023 14:10 )             32.1     08-18    139  |  107  |  16  ----------------------------<  99  4.4   |  26  |  0.68    Ca    8.4<L>      18 Aug 2023 14:10    TPro  6.4  /  Alb  2.5<L>  /  TBili  0.3  /  DBili  x   /  AST  23  /  ALT  13  /  AlkPhos  70  08-18    PT/INR - ( 18 Aug 2023 14:10 )   PT: 18.1 sec;   INR: 1.57 ratio         PTT - ( 18 Aug 2023 14:10 )  PTT:35.4 sec  Urinalysis Basic - ( 18 Aug 2023 14:10 )    Color: x / Appearance: x / SG: x / pH: x  Gluc: 99 mg/dL / Ketone: x  / Bili: x / Urobili: x   Blood: x / Protein: x / Nitrite: x   Leuk Esterase: x / RBC: x / WBC x   Sq Epi: x / Non Sq Epi: x / Bacteria: x       Vascular Attending: Heather (covering for Juarez)    HPI:  Ms. Wilcox is a 89 yo female, pmh CVA with R hemiplegia, DVT with IVC filter, Afib (on Coumadin), h/o L leg melanoma, HLD, presenting from assisted living with duplex results concerning for LLE DVT. She was recently here for a UTI, known to the vascular service for previous clot in IVC (2021) and LLE cellulitis (08/2023), discharged ten days ago. She is reporting LLE pain and burning at the site of skin graft. Patient is non ambulatory and reports that she has a history of vascular disease and chronic wounds. She usually wears compression stockings but reports that they are too tight. She denies any fever, chills, CP, SOB. Most recent INR was on 8/14 noted to be 2.28.     PAST MEDICAL & SURGICAL HISTORY:  Hypertension    CVA (cerebral vascular accident)    Depression    Constipation    Neuropathy    Hyperlipidemia    Grade I diastolic dysfunction    Afib    Neuropathy    VHD (valvular heart disease)    Aortic valvar stenosis    No significant past surgical history    Allergies  per son "issues with certain anitibiotics"; does not remember the names (Unknown)    Vital Signs Last 24 Hrs  T(C): 36.8 (18 Aug 2023 13:02), Max: 36.8 (18 Aug 2023 13:02)  T(F): 98.3 (18 Aug 2023 13:02), Max: 98.3 (18 Aug 2023 13:02)  HR: 71 (18 Aug 2023 13:02) (71 - 71)  BP: 115/69 (18 Aug 2023 13:02) (115/69 - 115/69)  BP(mean): --  RR: 16 (18 Aug 2023 13:02) (16 - 16)  SpO2: 98% (18 Aug 2023 13:02) (98% - 98%)    Parameters below as of 18 Aug 2023 13:02  Patient On (Oxygen Delivery Method): room air    PHYSICAL EXAM:  Constitutional: AAOx3, no acute distress  HEENT: NCAT, airway patent  Cardiovascular: RRR, pulses present bilaterally  Respiratory: nonlabored breathing  Gastrointestinal: abdomen soft, nontender, non distended  Neuro: no focal deficits  Extremities: bilateral pitting edema +4, DP/PT pulses faint, trophic changes b/l, left leg with scar noted at skin graft site, several wounds on vera, non weeping. L Knee appears green from reported bio-freeze for arthritis    LABS:                        10.1   8.55  )-----------( 534      ( 18 Aug 2023 14:10 )             32.1     08-18    139  |  107  |  16  ----------------------------<  99  4.4   |  26  |  0.68    Ca    8.4<L>      18 Aug 2023 14:10    TPro  6.4  /  Alb  2.5<L>  /  TBili  0.3  /  DBili  x   /  AST  23  /  ALT  13  /  AlkPhos  70  08-18    PT/INR - ( 18 Aug 2023 14:10 )   PT: 18.1 sec;   INR: 1.57 ratio         PTT - ( 18 Aug 2023 14:10 )  PTT:35.4 sec  Urinalysis Basic - ( 18 Aug 2023 14:10 )    Color: x / Appearance: x / SG: x / pH: x  Gluc: 99 mg/dL / Ketone: x  / Bili: x / Urobili: x   Blood: x / Protein: x / Nitrite: x   Leuk Esterase: x / RBC: x / WBC x   Sq Epi: x / Non Sq Epi: x / Bacteria: x       Vascular Attending: Heather (covering for Juarez)    HPI:  Ms. Wilcox is a 89 yo female, pmh CVA with R hemiplegia, DVT with IVC filter, Afib (on Coumadin), h/o L leg melanoma, HLD, presenting from assisted living with duplex results concerning for LLE DVT. She was recently here for a UTI, known to the vascular service for previous clot in IVC (2021) and LLE cellulitis (08/2023), discharged ten days ago. She is reporting LLE pain and burning at the site of skin graft. Patient is non ambulatory and reports that she has a history of vascular disease and chronic wounds. She usually wears compression stockings but reports that they are too tight. She denies any fever, chills, CP, SOB. Most recent INR was on 8/14 noted to be 2.28.     PAST MEDICAL & SURGICAL HISTORY:  Hypertension    CVA (cerebral vascular accident)    Depression    Constipation    Neuropathy    Hyperlipidemia    Grade I diastolic dysfunction    Afib    Neuropathy    VHD (valvular heart disease)    Aortic valvar stenosis    No significant past surgical history    Allergies  per son "issues with certain anitibiotics"; does not remember the names (Unknown)    Vital Signs Last 24 Hrs  T(C): 36.8 (18 Aug 2023 13:02), Max: 36.8 (18 Aug 2023 13:02)  T(F): 98.3 (18 Aug 2023 13:02), Max: 98.3 (18 Aug 2023 13:02)  HR: 71 (18 Aug 2023 13:02) (71 - 71)  BP: 115/69 (18 Aug 2023 13:02) (115/69 - 115/69)  BP(mean): --  RR: 16 (18 Aug 2023 13:02) (16 - 16)  SpO2: 98% (18 Aug 2023 13:02) (98% - 98%)    Parameters below as of 18 Aug 2023 13:02  Patient On (Oxygen Delivery Method): room air    PHYSICAL EXAM:  Constitutional: AAOx3, no acute distress  HEENT: NCAT, airway patent  Cardiovascular: RRR, pulses present bilaterally  Respiratory: nonlabored breathing  Gastrointestinal: abdomen soft, nontender, non distended  Neuro: no focal deficits  Extremities: bilateral pitting edema +4, DP/PT pulses faint, trophic changes b/l, left leg with scar noted at skin graft site, several wounds on vera, non weeping. L Knee appears green from reported bio-freeze for arthritis    LABS:                        10.1   8.55  )-----------( 534      ( 18 Aug 2023 14:10 )             32.1     08-18    139  |  107  |  16  ----------------------------<  99  4.4   |  26  |  0.68    Ca    8.4<L>      18 Aug 2023 14:10    TPro  6.4  /  Alb  2.5<L>  /  TBili  0.3  /  DBili  x   /  AST  23  /  ALT  13  /  AlkPhos  70  08-18    PT/INR - ( 18 Aug 2023 14:10 )   PT: 18.1 sec;   INR: 1.57 ratio         PTT - ( 18 Aug 2023 14:10 )  PTT:35.4 sec  Urinalysis Basic - ( 18 Aug 2023 14:10 )    Color: x / Appearance: x / SG: x / pH: x  Gluc: 99 mg/dL / Ketone: x  / Bili: x / Urobili: x   Blood: x / Protein: x / Nitrite: x   Leuk Esterase: x / RBC: x / WBC x   Sq Epi: x / Non Sq Epi: x / Bacteria: x    RADIOLOGY:   outpatient duplex reviewed

## 2023-08-18 NOTE — ED PROVIDER NOTE - OBJECTIVE STATEMENT
89 F sent in from [facility] for after being found to have a DVT in her left Common femoral, great saphenous, popliteal vein.  Right lower extremity negative for DVT.  Ultrasound done today. patient reports left leg pain. patient denies fevers, chills, chest pain, shortness of breath. patient on coumadin with INR of 2.28 on 8/14.

## 2023-08-18 NOTE — H&P ADULT - NSHPLABSRESULTS_GEN_ALL_CORE
FINDINGS:  Heart/Vascular: The heart size, mediastinum, hilum and aorta are within   normal limits for projection.  Pulmonary: Midline trachea. There is no focal infiltrate, pleural   effusion or congestion. Elevated right diaphragm.  Bones: There is no fracture.  Lines and catheter: None    Impression:    Chronic blunting left costophrenic angle likely represents pleural   thickening.    No acute pulmonary disease.    < end of copied text >    < from: TTE W or WO Ultrasound Enhancing Agent (06.28.23 @ 08:33) >        1. Technically difficult image quality.   2. The left ventricular systolic function is normal.   3. Mildly enlarged right ventricular cavity size.   4. The left atrium is mildly dilated.   5. Mild thickening of the aortic valve leaflets.   6. There is mild calcification of the mitral valve annulus.   7. Mild tricuspid regurgitation.   8. Estimated pulmonary artery systolic pressure is 39 mmHg.    ________________________________________________________________________________________  FINDINGS:     Left Ventricle:  The left ventricular systolic function is normal.     Right Ventricle:  Mildly enlarged right ventricular cavity size.     Left Atrium:  The left atrium is mildly dilated.     Right Atrium:  The right atrium is normal.     Aortic Valve:  The aortic valve appears trileaflet. There is mild thickening of the aortic valve leaflets.     Mitral Valve:  There is mild calcification of the mitral valve annulus.     Tricuspid Valve:  There is mild tricuspid regurgitation. Estimated pulmonary artery systolic pressure is 39 mmHg.     Pulmonic Valve:  The pulmonic valve was not well visualized.    < end of copied text >

## 2023-08-18 NOTE — CONSULT NOTE ADULT - ASSESSMENT
89 yo female  89 yo female presenting with dvt in LLE    Plan:  - continue anticoagulation  - IVC filter in place  - repeat doppler in 4-5 days to monitor progression  - cards consult  - vascular surgery to sign off, available for reconsult prn    D/w Dr. Romero

## 2023-08-18 NOTE — H&P ADULT - HISTORY OF PRESENT ILLNESS
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  P sent in from [facility] today  for after being found to have a DVT in her left Common femoral, great saphenous, popliteal vein.  Right lower extremity negative for DVT.  Ultrasound done today. patient reports left leg pain. patient denies fevers, chills, chest pain, shortness of breath. patient on coumadin with INR of 2.28 on 8/14 ,today in ER 1.57 .Admitted for vascular& hematology eval since patient developed DVT on coumadin .US venous  was done beginning of month during recent hospitalization and was negative for DVT Palliative care consult requested ,to discuss advance directives and complete MOLST During previous admission son was consulted regarding residential hospice at [facility] and palliative care issues /GOC discussion -could not decide on code status and patient remained full code .

## 2023-08-18 NOTE — CONSULT NOTE ADULT - SUBJECTIVE AND OBJECTIVE BOX
Date/Time Patient Seen:  		  Referring MD:   Data Reviewed	       Patient is a 89y old  Female who presents with a chief complaint of     Subjective/HPI   89 [place] [facility] for after being found to have a DVT in her left Common femoral, great saphenous, popliteal vein  PAST MEDICAL & SURGICAL HISTORY:  Hypertension    CVA (cerebral vascular accident)    Depression    Constipation    Neuropathy    Constipation    Hyperlipidemia    Grade I diastolic dysfunction    Afib    Neuropathy    VHD (valvular heart disease)    Aortic valvar stenosis    No significant past surgical history    Neuropathy     VHD (valvular heart disease).     PAST SURGICAL HISTORY:  No significant past surgical history.     Tobacco Usage:  · Tobacco Usage	Unknown if ever smoked    ALLERGIES AND HOME MEDICATIONS:   Allergies:        Allergies:  	per son "issues with certain anitibiotics" does not remember the names [freetext allergy; no alerts]: Drug, Unknown, per son "issues with certain anitibiotics" does not remember the names    Home Medications:   * Patient Currently Takes Medications as of 08-Aug-2023 11:52 documented in Structured Notes  · 	Lidocare Pain Relief Patch 4% topical film: Apply topically to affected area once a day cut in half and apply to both knees  · 	pantoprazole 40 mg oral delayed release tablet: 1 tab(s) orally once a day  · 	lactobacillus acidophilus oral tablet: 2 tab(s) orally once a day  · 	cephalexin 500 mg oral capsule: 1 cap(s) orally every 12 hours  · 	doxycycline monohydrate 100 mg oral tablet: 1 tab(s) orally 2 times a day  · 	warfarin 1 mg oral tablet: 1 tab(s) orally every other day HOLD FOR INR ABOVE 3.0 ,NEXT INR 08/09/23  · 	acetaminophen 325 mg oral tablet: 2 tab(s) orally every 8 hours  · 	folic acid 1 mg oral tablet: 1 tab(s) orally once a day  · 	cholecalciferol 50 mcg (2000 intl units) oral tablet: 1 tab(s) orally once a day  · 	levothyroxine 50 mcg (0.05 mg) oral tablet: 1 tab(s) orally once a day  · 	furosemide 20 mg oral tablet: 1 tab(s) orally once a day  · 	ferrous sulfate 325 mg (65 mg elemental iron) oral tablet: 1 tab(s) orally once a day  · 	metoprolol tartrate 25 mg oral tablet: 1 tab(s) orally 2 times a day  · 	hydroxyurea 500 mg oral capsule: 1 cap(s) orally 2 times a day  · 	amLODIPine 5 mg oral tablet: 1 tab(s) orally once a day  · 	simvastatin 10 mg oral tablet: 1 tab(s) orally once a day (at bedtime)  · 	escitalopram 20 mg oral tablet: 1 tab(s) orally once a day  · 	gabapentin 300 mg oral capsule: 1 cap(s) orally 3 times a day    VITAL SIGNS ([place]):    ,,ED ADULT Flow Sheet:    18-Aug-2023 13:01  · COVID-19 Result COVID-19 Result: NEGATIVE  · COVID-19  Test Type COVID-19  Test Type: MOLECULAR PCR  · COVID-19 Ordering Facility COVID-19 Ordering Facility: [facility]  · COVID-19 Result Date/Time COVID-19 Result Date/Time: 02-Aug-2023 00:42  · Patient is active on the Oncology Registry in [facility]?: Yes    13:02  · Temp (F): 98.3  · Temp (C) Temp (C): 36.8  · Temp site Temp Site: oral  · Heart Rate Heart Rate (beats/min): 71  · BP Systolic Systolic: 115  · BP Diastolic Diastolic (mm Hg): 69  · Respiration Rate (breaths/min) Respiration Rate (breaths/min): 16  · SpO2 (%) SpO2 (%): 98  · O2 Delivery/Oxygen Delivery Method Patient On (Oxygen Delivery Method): room air  · Dosing Weight (KILOGRAMS) Dosing Weight (KILOGRAMS): 56.7  · Dosing Weight  (POUNDS) Dosing Weight (POUNDS): 125  · Height type Height Type: stated  · Height (FEET) Height (FEET): 5  · Height (INCHES) Height (INCHES): 6  · Height (CENTIMETERS) Height (CENTIMETERS): 167.64  · BSA (m2): 1.64  · BMI (kG/m2) BMI (kG/m2): 20.2  · Ideal Body Weight(kg) Ideal Body Weight(kg): 59  · SpO2 (%) SpO2 (%): 98  · Preferred Language to Address Healthcare Preferred Language to Address Healthcare: English        Medication list         MEDICATIONS  (STANDING):  pantoprazole    Tablet 40 milliGRAM(s) Oral before breakfast    MEDICATIONS  (PRN):  acetaminophen     Tablet .. 650 milliGRAM(s) Oral every 6 hours PRN Temp greater or equal to 38C (100.4F), Mild Pain (1 - 3)  aluminum hydroxide/magnesium hydroxide/simethicone Suspension 30 milliLiter(s) Oral every 4 hours PRN Dyspepsia  melatonin 3 milliGRAM(s) Oral at bedtime PRN Insomnia  morphine  - Injectable 2 milliGRAM(s) IV Push every 6 hours PRN Severe Pain (7 - 10)  ondansetron Injectable 4 milliGRAM(s) IV Push every 8 hours PRN Nausea and/or Vomiting  traMADol 50 milliGRAM(s) Oral three times a day PRN Moderate Pain (4 - 6)         Vitals log        ICU Vital Signs Last 24 Hrs  T(C): 36.8 (18 Aug 2023 13:02), Max: 36.8 (18 Aug 2023 13:02)  T(F): 98.3 (18 Aug 2023 13:02), Max: 98.3 (18 Aug 2023 13:02)  HR: 71 (18 Aug 2023 13:02) (71 - 71)  BP: 115/69 (18 Aug 2023 13:02) (115/69 - 115/69)  BP(mean): --  ABP: --  ABP(mean): --  RR: 16 (18 Aug 2023 13:02) (16 - 16)  SpO2: 98% (18 Aug 2023 13:02) (98% - 98%)    O2 Parameters below as of 18 Aug 2023 13:02  Patient On (Oxygen Delivery Method): room air                 Input and Output:  I&O's Detail      Lab Data                        10.1   8.55  )-----------( 534      ( 18 Aug 2023 14:10 )             32.1     08-18    139  |  107  |  16  ----------------------------<  99  4.4   |  26  |  0.68    Ca    8.4<L>      18 Aug 2023 14:10    TPro  6.4  /  Alb  2.5<L>  /  TBili  0.3  /  DBili  x   /  AST  23  /  ALT  13  /  AlkPhos  70  08-18            Review of Systems	    weakness    Objective     Physical Examination        Pertinent Lab findings & Imaging      Wheeler:  NO   Adequate UO     I&O's Detail           Discussed with:     Cultures:	        Radiology    ACC: [id number] EXAM:  XR CHEST AP OR PA 1V   ORDERED BY: KAROLY A HEIDI     PROCEDURE DATE:  08/18/2023          INTERPRETATION:  INDICATION: DVT of left leg    Portable chest 2:01 PM    COMPARISON: 6/26/2023    Patient rotated.    FINDINGS:  Heart/Vascular: The heart size, mediastinum, hilum and aorta are within   normal limits for projection.  Pulmonary: Midline trachea. There is no focal infiltrate, pleural   effusion or congestion. Elevated right diaphragm.  Bones: There is no fracture.  Lines and catheter: None    Impression:    Chronic blunting left costophrenic angle likely represents pleural   thickening.    No acute pulmonary disease.    --- End of Report ---             ZANDRA MARIE DO; Attending Radiologist  This document has been electronically signed. Aug 18 2023  2:51PM

## 2023-08-18 NOTE — H&P ADULT - NSICDXPASTMEDICALHX_GEN_ALL_CORE_FT
PAST MEDICAL HISTORY:  Afib     Aortic valvar stenosis     Constipation     CVA (cerebral vascular accident)     Depression     GERD (gastroesophageal reflux disease)     Grade I diastolic dysfunction     Hyperlipidemia     Hypertension     Hypothyroidism     Neuropathy     Neuropathy     VHD (valvular heart disease)

## 2023-08-18 NOTE — CONSULT NOTE ADULT - SUBJECTIVE AND OBJECTIVE BOX
CHIEF COMPLAINT/ REASON FOR VISIT  .. Patient was seen to address the  issue listed under PROBLEM LIST which is located toward bottom of this note     REJI AB SOLIS APER 03    Allergies    per son &quot;issues with certain anitibiotics&quot; does not remember the names (Unknown)    Intolerances        PAST MEDICAL & SURGICAL HISTORY:  Hypertension      CVA (cerebral vascular accident)      Depression      Constipation      Neuropathy      Hyperlipidemia      Grade I diastolic dysfunction      Afib      Neuropathy      VHD (valvular heart disease)      Aortic valvar stenosis      Hypothyroidism      GERD (gastroesophageal reflux disease)      No significant past surgical history          FAMILY HISTORY:      Home Medications:  benzonatate 100 mg oral capsule: 1 cap(s) orally 3 times a day as needed for  cough (18 Aug 2023 15:32)  Biofreeze 4% topical gel: Apply topically to affected area 2 times a day to R shoulder and L knee (18 Aug 2023 15:34)  docusate sodium 100 mg oral capsule: 2 cap(s) orally once a day (at bedtime) (18 Aug 2023 15:36)  famotidine 20 mg oral tablet: 1 tab(s) orally once a day (18 Aug 2023 15:38)  miconazole 2% topical cream: Apply topically to affected area 2 times a day (18 Aug 2023 15:30)  senna (sennosides) 8.6 mg oral tablet: 1 tab(s) orally once a day as needed for  constipation (18 Aug 2023 15:43)  traMADol 50 mg oral tablet: 1 tab(s) orally every 6 hours as needed for pain (18 Aug 2023 15:45)  traMADol 50 mg oral tablet: 1 tab(s) orally 2 times a day (18 Aug 2023 15:45)  Tylenol Extra Strength 500 mg oral tablet: 2 tab(s) orally once a day (in the morning) and 1 tablet twice a day (18 Aug 2023 15:54)      MEDICATIONS  (STANDING):  acetaminophen     Tablet .. 650 milliGRAM(s) Oral every 6 hours  amLODIPine   Tablet 5 milliGRAM(s) Oral daily  cholecalciferol 2000 Unit(s) Oral daily  collagenase Ointment 1 Application(s) Topical daily  escitalopram 20 milliGRAM(s) Oral daily  ferrous    sulfate 325 milliGRAM(s) Oral daily  folic acid 1 milliGRAM(s) Oral daily  furosemide    Tablet 20 milliGRAM(s) Oral daily  gabapentin 300 milliGRAM(s) Oral three times a day  heparin  Infusion.  Unit(s)/Hr (11 mL/Hr) IV Continuous <Continuous>  lactobacillus acidophilus 1 Tablet(s) Oral two times a day with meals  levothyroxine 50 MICROGram(s) Oral daily  metoprolol tartrate 25 milliGRAM(s) Oral two times a day  pantoprazole    Tablet 40 milliGRAM(s) Oral before breakfast  senna 2 Tablet(s) Oral at bedtime  simvastatin 10 milliGRAM(s) Oral at bedtime  traMADol 50 milliGRAM(s) Oral every 6 hours    MEDICATIONS  (PRN):  aluminum hydroxide/magnesium hydroxide/simethicone Suspension 30 milliLiter(s) Oral every 4 hours PRN Dyspepsia  heparin   Injectable 4500 Unit(s) IV Push every 6 hours PRN For aPTT less than 40  heparin   Injectable 2000 Unit(s) IV Push every 6 hours PRN For aPTT between 40 - 57  melatonin 3 milliGRAM(s) Oral at bedtime PRN Insomnia  morphine  - Injectable 2 milliGRAM(s) IV Push every 6 hours PRN Severe Pain (7 - 10)  ondansetron Injectable 4 milliGRAM(s) IV Push every 8 hours PRN Nausea and/or Vomiting              Vital Signs Last 24 Hrs  T(C): 36.8 (18 Aug 2023 13:02), Max: 36.8 (18 Aug 2023 13:02)  T(F): 98.3 (18 Aug 2023 13:02), Max: 98.3 (18 Aug 2023 13:02)  HR: 71 (18 Aug 2023 13:02) (71 - 71)  BP: 115/69 (18 Aug 2023 13:02) (115/69 - 115/69)  BP(mean): --  RR: 16 (18 Aug 2023 13:02) (16 - 16)  SpO2: 98% (18 Aug 2023 13:02) (98% - 98%)    Parameters below as of 18 Aug 2023 13:02  Patient On (Oxygen Delivery Method): room air                  LABS:                        10.1   8.55  )-----------( 534      ( 18 Aug 2023 14:10 )             32.1     08-18    139  |  107  |  16  ----------------------------<  99  4.4   |  26  |  0.68    Ca    8.4<L>      18 Aug 2023 14:10    TPro  6.4  /  Alb  2.5<L>  /  TBili  0.3  /  DBili  x   /  AST  23  /  ALT  13  /  AlkPhos  70  08-18    PT/INR - ( 18 Aug 2023 16:44 )   PT: 19.0 sec;   INR: 1.65 ratio         PTT - ( 18 Aug 2023 16:44 )  PTT:40.3 sec  Urinalysis Basic - ( 18 Aug 2023 14:10 )    Color: x / Appearance: x / SG: x / pH: x  Gluc: 99 mg/dL / Ketone: x  / Bili: x / Urobili: x   Blood: x / Protein: x / Nitrite: x   Leuk Esterase: x / RBC: x / WBC x   Sq Epi: x / Non Sq Epi: x / Bacteria: x            WBC:  WBC Count: 8.55 K/uL (08-18 @ 14:10)      MICROBIOLOGY:  RECENT CULTURES:              PT/INR - ( 18 Aug 2023 16:44 )   PT: 19.0 sec;   INR: 1.65 ratio         PTT - ( 18 Aug 2023 16:44 )  PTT:40.3 sec    Sodium:  Sodium: 139 mmol/L (08-18 @ 14:10)      0.68 mg/dL 08-18 @ 14:10      Hemoglobin:  Hemoglobin: 10.1 g/dL (08-18 @ 14:10)      Platelets: Platelet Count - Automated: 534 K/uL (08-18 @ 14:10)      LIVER FUNCTIONS - ( 18 Aug 2023 14:10 )  Alb: 2.5 g/dL / Pro: 6.4 g/dL / ALK PHOS: 70 U/L / ALT: 13 U/L / AST: 23 U/L / GGT: x             Urinalysis Basic - ( 18 Aug 2023 14:10 )    Color: x / Appearance: x / SG: x / pH: x  Gluc: 99 mg/dL / Ketone: x  / Bili: x / Urobili: x   Blood: x / Protein: x / Nitrite: x   Leuk Esterase: x / RBC: x / WBC x   Sq Epi: x / Non Sq Epi: x / Bacteria: x        RADIOLOGY & ADDITIONAL STUDIES:      MICROBIOLOGY:  RECENT CULTURES:

## 2023-08-18 NOTE — PATIENT PROFILE ADULT - FALL HARM RISK - HARM RISK INTERVENTIONS

## 2023-08-18 NOTE — ED PROVIDER NOTE - CLINICAL SUMMARY MEDICAL DECISION MAKING FREE TEXT BOX
sent [facility] due to DVT. patient reports she is here to have clot dissolved. check labs, contact admitting / heme or vascular. sent from [facility] due to DVT. patient reports she is here to have clot dissolved. check labs, contact admitting / heme or vascular.    case discussed with admitting Dr. Kellie WILLOUGHBY, vascular consult requested, surgery PA contacted. AC as per vascular recommendations.

## 2023-08-18 NOTE — CONSULT NOTE ADULT - ASSESSMENT
dvt - extensive on doppler out pt  start heparin  ashd - atrial fib paf  hypertension  s/p cva rt sided weakness  neuropathy  dyslipidemia

## 2023-08-19 LAB
ALBUMIN SERPL ELPH-MCNC: 2.2 G/DL — LOW (ref 3.3–5)
ALP SERPL-CCNC: 60 U/L — SIGNIFICANT CHANGE UP (ref 40–120)
ALT FLD-CCNC: 10 U/L — LOW (ref 12–78)
ANION GAP SERPL CALC-SCNC: 5 MMOL/L — SIGNIFICANT CHANGE UP (ref 5–17)
APTT BLD: 76.2 SEC — HIGH (ref 24.5–35.6)
AST SERPL-CCNC: 17 U/L — SIGNIFICANT CHANGE UP (ref 15–37)
BASOPHILS # BLD AUTO: 0.05 K/UL — SIGNIFICANT CHANGE UP (ref 0–0.2)
BASOPHILS NFR BLD AUTO: 0.7 % — SIGNIFICANT CHANGE UP (ref 0–2)
BILIRUB SERPL-MCNC: 0.4 MG/DL — SIGNIFICANT CHANGE UP (ref 0.2–1.2)
BUN SERPL-MCNC: 9 MG/DL — SIGNIFICANT CHANGE UP (ref 7–23)
CALCIUM SERPL-MCNC: 8 MG/DL — LOW (ref 8.5–10.1)
CHLORIDE SERPL-SCNC: 108 MMOL/L — SIGNIFICANT CHANGE UP (ref 96–108)
CHOLEST SERPL-MCNC: 69 MG/DL — SIGNIFICANT CHANGE UP
CO2 SERPL-SCNC: 31 MMOL/L — SIGNIFICANT CHANGE UP (ref 22–31)
CREAT SERPL-MCNC: 0.51 MG/DL — SIGNIFICANT CHANGE UP (ref 0.5–1.3)
EGFR: 88 ML/MIN/1.73M2 — SIGNIFICANT CHANGE UP
EOSINOPHIL # BLD AUTO: 0.14 K/UL — SIGNIFICANT CHANGE UP (ref 0–0.5)
EOSINOPHIL NFR BLD AUTO: 2 % — SIGNIFICANT CHANGE UP (ref 0–6)
GLUCOSE SERPL-MCNC: 86 MG/DL — SIGNIFICANT CHANGE UP (ref 70–99)
HCT VFR BLD CALC: 28.5 % — LOW (ref 34.5–45)
HCT VFR BLD CALC: 28.6 % — LOW (ref 34.5–45)
HDLC SERPL-MCNC: 31 MG/DL — LOW
HGB BLD-MCNC: 9.1 G/DL — LOW (ref 11.5–15.5)
HGB BLD-MCNC: 9.2 G/DL — LOW (ref 11.5–15.5)
IMM GRANULOCYTES NFR BLD AUTO: 1.6 % — HIGH (ref 0–0.9)
INR BLD: 1.59 RATIO — HIGH (ref 0.85–1.18)
LIPID PNL WITH DIRECT LDL SERPL: 21 MG/DL — SIGNIFICANT CHANGE UP
LYMPHOCYTES # BLD AUTO: 1.28 K/UL — SIGNIFICANT CHANGE UP (ref 1–3.3)
LYMPHOCYTES # BLD AUTO: 18.4 % — SIGNIFICANT CHANGE UP (ref 13–44)
MCHC RBC-ENTMCNC: 31.8 GM/DL — LOW (ref 32–36)
MCHC RBC-ENTMCNC: 32.3 GM/DL — SIGNIFICANT CHANGE UP (ref 32–36)
MCHC RBC-ENTMCNC: 37.4 PG — HIGH (ref 27–34)
MCHC RBC-ENTMCNC: 38 PG — HIGH (ref 27–34)
MCV RBC AUTO: 117.7 FL — HIGH (ref 80–100)
MCV RBC AUTO: 117.8 FL — HIGH (ref 80–100)
MONOCYTES # BLD AUTO: 0.39 K/UL — SIGNIFICANT CHANGE UP (ref 0–0.9)
MONOCYTES NFR BLD AUTO: 5.6 % — SIGNIFICANT CHANGE UP (ref 2–14)
NEUTROPHILS # BLD AUTO: 4.99 K/UL — SIGNIFICANT CHANGE UP (ref 1.8–7.4)
NEUTROPHILS NFR BLD AUTO: 71.7 % — SIGNIFICANT CHANGE UP (ref 43–77)
NON HDL CHOLESTEROL: 38 MG/DL — SIGNIFICANT CHANGE UP
NRBC # BLD: 0 /100 WBCS — SIGNIFICANT CHANGE UP (ref 0–0)
NRBC # BLD: 0 /100 WBCS — SIGNIFICANT CHANGE UP (ref 0–0)
NT-PROBNP SERPL-SCNC: 2429 PG/ML — HIGH (ref 0–450)
PLATELET # BLD AUTO: 525 K/UL — HIGH (ref 150–400)
PLATELET # BLD AUTO: 536 K/UL — HIGH (ref 150–400)
POTASSIUM SERPL-MCNC: 3.4 MMOL/L — LOW (ref 3.5–5.3)
POTASSIUM SERPL-SCNC: 3.4 MMOL/L — LOW (ref 3.5–5.3)
PROT SERPL-MCNC: 5.4 G/DL — LOW (ref 6–8.3)
PROTHROM AB SERPL-ACNC: 18.3 SEC — HIGH (ref 9.5–13)
RBC # BLD: 2.42 M/UL — LOW (ref 3.8–5.2)
RBC # BLD: 2.43 M/UL — LOW (ref 3.8–5.2)
RBC # FLD: 16.7 % — HIGH (ref 10.3–14.5)
RBC # FLD: 16.7 % — HIGH (ref 10.3–14.5)
SODIUM SERPL-SCNC: 144 MMOL/L — SIGNIFICANT CHANGE UP (ref 135–145)
TRIGL SERPL-MCNC: 82 MG/DL — SIGNIFICANT CHANGE UP
WBC # BLD: 6.96 K/UL — SIGNIFICANT CHANGE UP (ref 3.8–10.5)
WBC # BLD: 8.13 K/UL — SIGNIFICANT CHANGE UP (ref 3.8–10.5)
WBC # FLD AUTO: 6.96 K/UL — SIGNIFICANT CHANGE UP (ref 3.8–10.5)
WBC # FLD AUTO: 8.13 K/UL — SIGNIFICANT CHANGE UP (ref 3.8–10.5)

## 2023-08-19 PROCEDURE — 93970 EXTREMITY STUDY: CPT | Mod: 26

## 2023-08-19 PROCEDURE — 99233 SBSQ HOSP IP/OBS HIGH 50: CPT

## 2023-08-19 RX ORDER — POTASSIUM CHLORIDE 20 MEQ
40 PACKET (EA) ORAL ONCE
Refills: 0 | Status: COMPLETED | OUTPATIENT
Start: 2023-08-19 | End: 2023-08-19

## 2023-08-19 RX ADMIN — TRAMADOL HYDROCHLORIDE 50 MILLIGRAM(S): 50 TABLET ORAL at 05:57

## 2023-08-19 RX ADMIN — SIMVASTATIN 10 MILLIGRAM(S): 20 TABLET, FILM COATED ORAL at 23:15

## 2023-08-19 RX ADMIN — TRAMADOL HYDROCHLORIDE 50 MILLIGRAM(S): 50 TABLET ORAL at 18:37

## 2023-08-19 RX ADMIN — Medication 20 MILLIGRAM(S): at 05:04

## 2023-08-19 RX ADMIN — Medication 650 MILLIGRAM(S): at 00:46

## 2023-08-19 RX ADMIN — GABAPENTIN 300 MILLIGRAM(S): 400 CAPSULE ORAL at 05:03

## 2023-08-19 RX ADMIN — Medication 1 APPLICATION(S): at 11:42

## 2023-08-19 RX ADMIN — TRAMADOL HYDROCHLORIDE 50 MILLIGRAM(S): 50 TABLET ORAL at 23:42

## 2023-08-19 RX ADMIN — GABAPENTIN 300 MILLIGRAM(S): 400 CAPSULE ORAL at 23:15

## 2023-08-19 RX ADMIN — SENNA PLUS 2 TABLET(S): 8.6 TABLET ORAL at 23:15

## 2023-08-19 RX ADMIN — Medication 650 MILLIGRAM(S): at 12:41

## 2023-08-19 RX ADMIN — GABAPENTIN 300 MILLIGRAM(S): 400 CAPSULE ORAL at 13:48

## 2023-08-19 RX ADMIN — TRAMADOL HYDROCHLORIDE 50 MILLIGRAM(S): 50 TABLET ORAL at 17:37

## 2023-08-19 RX ADMIN — TRAMADOL HYDROCHLORIDE 50 MILLIGRAM(S): 50 TABLET ORAL at 11:43

## 2023-08-19 RX ADMIN — HEPARIN SODIUM 1100 UNIT(S)/HR: 5000 INJECTION INTRAVENOUS; SUBCUTANEOUS at 19:27

## 2023-08-19 RX ADMIN — TRAMADOL HYDROCHLORIDE 50 MILLIGRAM(S): 50 TABLET ORAL at 00:46

## 2023-08-19 RX ADMIN — Medication 2000 UNIT(S): at 11:42

## 2023-08-19 RX ADMIN — Medication 40 MILLIEQUIVALENT(S): at 11:41

## 2023-08-19 RX ADMIN — TRAMADOL HYDROCHLORIDE 50 MILLIGRAM(S): 50 TABLET ORAL at 05:03

## 2023-08-19 RX ADMIN — Medication 650 MILLIGRAM(S): at 11:41

## 2023-08-19 RX ADMIN — Medication 25 MILLIGRAM(S): at 05:03

## 2023-08-19 RX ADMIN — Medication 650 MILLIGRAM(S): at 05:04

## 2023-08-19 RX ADMIN — ESCITALOPRAM OXALATE 20 MILLIGRAM(S): 10 TABLET, FILM COATED ORAL at 11:42

## 2023-08-19 RX ADMIN — Medication 1 TABLET(S): at 07:39

## 2023-08-19 RX ADMIN — Medication 1 MILLIGRAM(S): at 11:41

## 2023-08-19 RX ADMIN — Medication 650 MILLIGRAM(S): at 17:37

## 2023-08-19 RX ADMIN — Medication 650 MILLIGRAM(S): at 23:42

## 2023-08-19 RX ADMIN — Medication 1 TABLET(S): at 17:37

## 2023-08-19 RX ADMIN — Medication 325 MILLIGRAM(S): at 11:41

## 2023-08-19 RX ADMIN — HEPARIN SODIUM 1100 UNIT(S)/HR: 5000 INJECTION INTRAVENOUS; SUBCUTANEOUS at 09:03

## 2023-08-19 RX ADMIN — AMLODIPINE BESYLATE 5 MILLIGRAM(S): 2.5 TABLET ORAL at 05:03

## 2023-08-19 RX ADMIN — Medication 25 MILLIGRAM(S): at 17:37

## 2023-08-19 RX ADMIN — Medication 650 MILLIGRAM(S): at 05:57

## 2023-08-19 RX ADMIN — PANTOPRAZOLE SODIUM 40 MILLIGRAM(S): 20 TABLET, DELAYED RELEASE ORAL at 05:03

## 2023-08-19 RX ADMIN — HEPARIN SODIUM 1100 UNIT(S)/HR: 5000 INJECTION INTRAVENOUS; SUBCUTANEOUS at 07:22

## 2023-08-19 RX ADMIN — HEPARIN SODIUM 1100 UNIT(S)/HR: 5000 INJECTION INTRAVENOUS; SUBCUTANEOUS at 00:48

## 2023-08-19 RX ADMIN — Medication 50 MICROGRAM(S): at 05:04

## 2023-08-19 NOTE — PROGRESS NOTE ADULT - SUBJECTIVE AND OBJECTIVE BOX
Patient is a 89y Female with a known history of :  DVT of lower limb, acute [I82.409]    Afib [I48.91]    VHD (valvular heart disease) [I38]    Prophylactic measure [Z29.9]    Hypothyroidism [E03.9]    MDD (major depressive disorder) [F32.9]    GERD (gastroesophageal reflux disease) [K21.9]    HTN (hypertension) [I10]    CAD (coronary artery disease) [I25.10]    Leg wound, left [S81.642A]      HPI:  89 Female with   Past medical history of CVA on Coumadin with right-sided weakness, hypertension hyperlipidemia CAD history of A-fib on Coumadin recently admitted at [facility] with acute metabolic encephalopathy 2/2 to uti and cellulitis of LE  .  P sent in from [facility] today  for after being found to have a DVT in her left Common femoral, great saphenous, popliteal vein.  Right lower extremity negative for DVT.  Ultrasound done today. patient reports left leg pain. patient denies fevers, chills, chest pain, shortness of breath. patient on coumadin with INR of 2.28 on 8/14 ,today in ER 1.57 .Admitted for vascular& hematology eval since patient developed DVT on coumadin .US venous  was done beginning of month during recent hospitalization and was negative for DVT Palliative care consult requested ,to discuss advance directives and complete MOLST During previous admission son was consulted regarding residential hospice at [facility] and palliative care issues /GOC discussion -could not decide on code status and patient remained full code . (18 Aug 2023 16:14)      REVIEW OF SYSTEMS:    CONSTITUTIONAL: No fever, weight loss, or fatigue  EYES: No eye pain, visual disturbances, or discharge  ENMT:  No difficulty hearing, tinnitus, vertigo; No sinus or throat pain  NECK: No pain or stiffness  BREASTS: No pain, masses, or nipple discharge  RESPIRATORY: No cough, wheezing, chills or hemoptysis; No shortness of breath  CARDIOVASCULAR: No chest pain, palpitations, dizziness, or leg swelling  GASTROINTESTINAL: No abdominal or epigastric pain. No nausea, vomiting, or hematemesis; No diarrhea or constipation. No melena or hematochezia.  GENITOURINARY: No dysuria, frequency, hematuria, or incontinence  NEUROLOGICAL: No headaches, memory loss, loss of strength, numbness, or tremors  SKIN: No itching, burning, rashes, or lesions   LYMPH NODES: No enlarged glands  ENDOCRINE: No heat or cold intolerance; No hair loss  MUSCULOSKELETAL: No joint pain or swelling; No muscle, back, or extremity pain  PSYCHIATRIC: No depression, anxiety, mood swings, or difficulty sleeping  HEME/LYMPH: No easy bruising, or bleeding gums  ALLERGY AND IMMUNOLOGIC: No hives or eczema    MEDICATIONS  (STANDING):  acetaminophen     Tablet .. 650 milliGRAM(s) Oral every 6 hours  amLODIPine   Tablet 5 milliGRAM(s) Oral daily  cholecalciferol 2000 Unit(s) Oral daily  collagenase Ointment 1 Application(s) Topical daily  escitalopram 20 milliGRAM(s) Oral daily  ferrous    sulfate 325 milliGRAM(s) Oral daily  folic acid 1 milliGRAM(s) Oral daily  furosemide    Tablet 20 milliGRAM(s) Oral daily  gabapentin 300 milliGRAM(s) Oral three times a day  heparin  Infusion.  Unit(s)/Hr (11 mL/Hr) IV Continuous <Continuous>  lactobacillus acidophilus 1 Tablet(s) Oral two times a day with meals  levothyroxine 50 MICROGram(s) Oral daily  metoprolol tartrate 25 milliGRAM(s) Oral two times a day  pantoprazole    Tablet 40 milliGRAM(s) Oral before breakfast  potassium chloride    Tablet ER 40 milliEquivalent(s) Oral once  senna 2 Tablet(s) Oral at bedtime  simvastatin 10 milliGRAM(s) Oral at bedtime  traMADol 50 milliGRAM(s) Oral every 6 hours    MEDICATIONS  (PRN):  aluminum hydroxide/magnesium hydroxide/simethicone Suspension 30 milliLiter(s) Oral every 4 hours PRN Dyspepsia  heparin   Injectable 2000 Unit(s) IV Push every 6 hours PRN For aPTT between 40 - 57  heparin   Injectable 4500 Unit(s) IV Push every 6 hours PRN For aPTT less than 40  melatonin 3 milliGRAM(s) Oral at bedtime PRN Insomnia  morphine  - Injectable 2 milliGRAM(s) IV Push every 6 hours PRN Severe Pain (7 - 10)  ondansetron Injectable 4 milliGRAM(s) IV Push every 8 hours PRN Nausea and/or Vomiting      ALLERGIES: per son &quot;issues with certain anitibiotics&quot; does not remember the names (Unknown)      FAMILY HISTORY:      PHYSICAL EXAMINATION:  -----------------------------  T(C): 36.6 (08-19-23 @ 04:53), Max: 36.9 (08-18-23 @ 20:15)  HR: 75 (08-19-23 @ 04:53) (71 - 79)  BP: 143/74 (08-19-23 @ 04:53) (115/69 - 143/74)  RR: 18 (08-19-23 @ 04:57) (16 - 18)  SpO2: 94% (08-19-23 @ 04:57) (78% - 98%)  Wt(kg): --    08-18 @ 07:01  -  08-19 @ 07:00  --------------------------------------------------------  IN:    Oral Fluid: 120 mL  Total IN: 120 mL    OUT:    Voided (mL): 450 mL  Total OUT: 450 mL    Total NET: -330 mL        Height (cm): 167.6 (08-18 @ 13:02)  Weight (kg): 56.7 (08-18 @ 13:02)  BMI (kg/m2): 20.2 (08-18 @ 13:02)  BSA (m2): 1.64 (08-18 @ 13:02)    VITALS  T(C): 36.6 (08-19-23 @ 04:53), Max: 36.9 (08-18-23 @ 20:15)  HR: 75 (08-19-23 @ 04:53) (71 - 79)  BP: 143/74 (08-19-23 @ 04:53) (115/69 - 143/74)  RR: 18 (08-19-23 @ 04:57) (16 - 18)  SpO2: 94% (08-19-23 @ 04:57) (78% - 98%)    Constitutional: well developed, normal appearance, well groomed, well nourished, no deformities and no acute distress.   Eyes: the conjunctiva exhibited no abnormalities and the eyelids demonstrated no xanthelasmas.   HEENT: normal oral mucosa, no oral pallor and no oral cyanosis.   Neck: normal jugular venous A waves present, normal jugular venous V waves present and no jugular venous castillo A waves.   Pulmonary: no respiratory distress, normal respiratory rhythm and effort, no accessory muscle use and lungs were clear to auscultation bilaterally.   Cardiovascular: heart rate and rhythm were normal, normal S1 and S2 and no murmur, gallop, rub, heave or thrill are present.   Abdomen: soft, non-tender, no hepato-splenomegaly and no abdominal mass palpated.   Musculoskeletal: the gait could not be assessed..   Extremities: no clubbing of the fingernails, no localized cyanosis, no petechial hemorrhages and no ischemic changes.   Skin: normal skin color and pigmentation, no rash, no venous stasis, no skin lesions, no skin ulcer and no xanthoma was observed.   Psychiatric: oriented to person, place, and time, the affect was normal, the mood was normal and not feeling anxious.     LABS:   --------  08-19    144  |  108  |  9   ----------------------------<  86  3.4<L>   |  31  |  0.51    Ca    8.0<L>      19 Aug 2023 06:27    TPro  5.4<L>  /  Alb  2.2<L>  /  TBili  0.4  /  DBili  x   /  AST  17  /  ALT  10<L>  /  AlkPhos  60  08-19                         9.2    6.96  )-----------( 536      ( 19 Aug 2023 06:27 )             28.5     PT/INR - ( 19 Aug 2023 06:27 )   PT: 18.3 sec;   INR: 1.59 ratio         PTT - ( 19 Aug 2023 06:27 )  PTT:58.3 sec            RADIOLOGY:  -----------------    ECG:     ECHO:

## 2023-08-19 NOTE — DIETITIAN INITIAL EVALUATION ADULT - SIGNS/SYMPTOMS
as evidenced by previous swallow evaluation with dysphagia soft and bite sized diet recommended  as evidenced by muscle /fat loss and weight loss

## 2023-08-19 NOTE — DIETITIAN INITIAL EVALUATION ADULT - ETIOLOGY
related to presumed inadequate oral intake in setting of chronic disease and advanced age related to motor causes

## 2023-08-19 NOTE — DIETITIAN INITIAL EVALUATION ADULT - OTHER INFO
Reason for Admission: left leg pain  History of Present Illness:   89 Female with   Past medical history of CVA on Coumadin with right-sided weakness, hypertension hyperlipidemia CAD history of A-fib on Coumadin recently admitted at [facility] with acute metabolic encephalopathy 2/2 to uti and cellulitis of LE  .  P sent in from [facility] today  for after being found to have a DVT in her left Common femoral, great saphenous, popliteal vein.  Right lower extremity negative for DVT.  Ultrasound done today. patient reports left leg pain. patient denies fevers, chills, chest pain, shortness of breath. patient on coumadin with INR of 2.28 on 8/14 ,today in ER 1.57 .Admitted for vascular& hematology eval since patient developed DVT on coumadin .US venous  was done beginning of month during recent hospitalization and was negative for DVT Palliative care consult requested ,to discuss advance directives and complete MOLST During previous admission son was consulted regarding residential hospice at [facility] and palliative care issues /GOC discussion -could not decide on code status and patient remained full code .  weight this admit 125# weight ~ 1 year ago 140#  8/18 fecal incontinence

## 2023-08-19 NOTE — CONSULT NOTE ADULT - SUBJECTIVE AND OBJECTIVE BOX
All records reviewed.    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 Recently post hospitalization x 2 for change in MS assoc w UTI, last one first week [id number] which adm also w supratherapetuic INR 5s, subseq decr to 1s and discharged 3s)  Adm w leg pain and Duplex done as outpatient reports DVT left common femoral, popliteal, also greater saphenous v. 8/14 INR by reprt 2.28  During last adm pt also c/o left leg pain and dx'd w cellulitis, Duplex done at that time (8/1/23) no DVT, CT c/a/p 8/2/23 also no sig ds and showed the IVC filter    on adm here 8/18--INR 1.57 Hgb 10.1 plts 534, subseq Hgb same day 9.1, todahy stable at 9.2. During last adm Hgb 11s--->8s    started on IV Heparin    PAST MEDICAL & SURGICAL HISTORY:  Hypertension      CVA (cerebral vascular accident)      Depression      Constipation      Neuropathy      Hyperlipidemia      Grade I diastolic dysfunction      Afib      Neuropathy      VHD (valvular heart disease)      Aortic valvar stenosis      Hypothyroidism      GERD (gastroesophageal reflux disease)      No significant past surgical history          Review of System:  left leg pain, improved. no gross bleeds    MEDICATIONS  (STANDING):  acetaminophen     Tablet .. 650 milliGRAM(s) Oral every 6 hours  amLODIPine   Tablet 5 milliGRAM(s) Oral daily  cholecalciferol 2000 Unit(s) Oral daily  collagenase Ointment 1 Application(s) Topical daily  escitalopram 20 milliGRAM(s) Oral daily  ferrous    sulfate 325 milliGRAM(s) Oral daily  folic acid 1 milliGRAM(s) Oral daily  furosemide    Tablet 20 milliGRAM(s) Oral daily  gabapentin 300 milliGRAM(s) Oral three times a day  heparin  Infusion.  Unit(s)/Hr (11 mL/Hr) IV Continuous <Continuous>  lactobacillus acidophilus 1 Tablet(s) Oral two times a day with meals  levothyroxine 50 MICROGram(s) Oral daily  metoprolol tartrate 25 milliGRAM(s) Oral two times a day  pantoprazole    Tablet 40 milliGRAM(s) Oral before breakfast  senna 2 Tablet(s) Oral at bedtime  simvastatin 10 milliGRAM(s) Oral at bedtime  traMADol 50 milliGRAM(s) Oral every 6 hours    MEDICATIONS  (PRN):  aluminum hydroxide/magnesium hydroxide/simethicone Suspension 30 milliLiter(s) Oral every 4 hours PRN Dyspepsia  heparin   Injectable 2000 Unit(s) IV Push every 6 hours PRN For aPTT between 40 - 57  heparin   Injectable 4500 Unit(s) IV Push every 6 hours PRN For aPTT less than 40  melatonin 3 milliGRAM(s) Oral at bedtime PRN Insomnia  morphine  - Injectable 2 milliGRAM(s) IV Push every 6 hours PRN Severe Pain (7 - 10)  ondansetron Injectable 4 milliGRAM(s) IV Push every 8 hours PRN Nausea and/or Vomiting      Allergies    per son &quot;issues with certain anitibiotics&quot; does not remember the names (Unknown)    Intolerances        SOCIAL HISTORY:  denies active Etoh or tobacco    FAMILY HISTORY:  denies    Vital Signs Last 24 Hrs  T(C): 36.6 (19 Aug 2023 04:53), Max: 36.9 (18 Aug 2023 20:15)  T(F): 97.9 (19 Aug 2023 04:53), Max: 98.5 (18 Aug 2023 20:15)  HR: 75 (19 Aug 2023 04:53) (71 - 79)  BP: 143/74 (19 Aug 2023 04:53) (115/69 - 143/74)  BP(mean): --  RR: 18 (19 Aug 2023 04:57) (16 - 18)  SpO2: 94% (19 Aug 2023 04:57) (78% - 98%)    Parameters below as of 19 Aug 2023 04:57  Patient On (Oxygen Delivery Method): nasal cannula  O2 Flow (L/min): 4      PHYSICAL EXAM:      General:thin elderly woman in bed, in no acute distress  Eyes:sclera anicteric, pupils equal and EOMI  ENMT:buccal mucosa moist, nose midline  Neck:supple, trachea midline  Lungs:clear, no wheeze/rhonchi  Cardiovascular:regular rate and rhythm, S1 S2  Abdomen:soft, nontender, no organomegaly present, bowel sounds normal  Neurological:alert and oriented , answers questons appropriately  Skin:no increased ecchymosis/petechiae/purpura  Lymph Nodes:no palpable cervical/supraclavicular lymph nodes enlargements  Extremities:no cyanosis/clubbing/edema        LABS:                        9.2    6.96  )-----------( 536      ( 19 Aug 2023 06:27 )             28.5     08-19 @ 06:27  WBC6.96  RBC2.42 Hgb9.2 Hct28.5  IYI114.8  Qeys825  Auto Uushss90.7 Band-- Auto Lymph18.4 Atypical Lymph-- Reactive Lymph-- Auto Mono5.6 Auto Eos2.0 Auto Baso0.7        08-18 @ 23:54  WBC8.13  RBC2.43 Hgb9.1 Hct28.6  MRI175.7  Ezcc903  Auto Neutro-- Band-- Auto Lymph-- Atypical Lymph-- Reactive Lymph-- Auto Mono-- Auto Eos-- Auto Baso--        08-18 @ 14:10  WBC8.55  RBC2.70 Hgb10.1 Hct32.1  AAA898.9  Rcys715  Auto Earfcz43.9 Band-- Auto Lymph11.8 Atypical Lymph-- Reactive Lymph-- Auto Mono4.8 Auto Eos0.9 Auto Baso0.5          08-19    144  |  108  |  9   ----------------------------<  86  3.4<L>   |  31  |  0.51    Ca    8.0<L>      19 Aug 2023 06:27    TPro  5.4<L>  /  Alb  2.2<L>  /  TBili  0.4  /  DBili  x   /  AST  17  /  ALT  10<L>  /  AlkPhos  60  08-19 08-19 @ 06:27  PT18.3 INR1.59  PTT58.3  08-18 @ 23:54  PT-- INR--  PTT76.2  08-18 @ 16:44  PT19.0 INR1.65  PTT40.3  08-18 @ 14:10  PT18.1 INR1.57  PTT35.4    Urinalysis Basic - ( 19 Aug 2023 06:27 )    Color: x / Appearance: x / SG: x / pH: x  Gluc: 86 mg/dL / Ketone: x  / Bili: x / Urobili: x   Blood: x / Protein: x / Nitrite: x   Leuk Esterase: x / RBC: x / WBC x   Sq Epi: x / Non Sq Epi: x / Bacteria: x        PERIPHERAL BLOOD SMEAR REVIEW:      RADIOLOGY & ADDITIONAL STUDIES:

## 2023-08-19 NOTE — PROGRESS NOTE ADULT - SUBJECTIVE AND OBJECTIVE BOX
PROGRESS NOTE  Patient is a 89y old  Female who presents with a chief complaint of left leg pain (19 Aug 2023 05:51)    Chart and available morning labs /imaging are reviewed electronically , urgent issues addressed . More information  is being added upon completion of rounds , when more information is collected and management discussed with consultants , medical staff and social service/case management on the floor   OVERNIGHT      HPI:  89 Female with   Past medical history of CVA on Coumadin with right-sided weakness, hypertension hyperlipidemia CAD history of A-fib on Coumadin recently admitted at [facility] with acute metabolic encephalopathy 2/2 to uti and cellulitis of LE  .  P sent in from [facility] today  for after being found to have a DVT in her left Common femoral, great saphenous, popliteal vein.  Right lower extremity negative for DVT.  Ultrasound done today. patient reports left leg pain. patient denies fevers, chills, chest pain, shortness of breath. patient on coumadin with INR of 2.28 on 8/14 ,today in ER 1.57 .Admitted for vascular& hematology eval since patient developed DVT on coumadin .US venous  was done beginning of month during recent hospitalization and was negative for DVT Palliative care consult requested ,to discuss advance directives and complete MOLST During previous admission son was consulted regarding residential hospice at [facility] and palliative care issues /GOC discussion -could not decide on code status and patient remained full code . (18 Aug 2023 16:14)    PAST MEDICAL & SURGICAL HISTORY:  Hypertension      CVA (cerebral vascular accident)      Depression      Constipation      Neuropathy      Hyperlipidemia      Grade I diastolic dysfunction      Afib      Neuropathy      VHD (valvular heart disease)      Aortic valvar stenosis      Hypothyroidism      GERD (gastroesophageal reflux disease)      No significant past surgical history          MEDICATIONS  (STANDING):  acetaminophen     Tablet .. 650 milliGRAM(s) Oral every 6 hours  amLODIPine   Tablet 5 milliGRAM(s) Oral daily  cholecalciferol 2000 Unit(s) Oral daily  collagenase Ointment 1 Application(s) Topical daily  escitalopram 20 milliGRAM(s) Oral daily  ferrous    sulfate 325 milliGRAM(s) Oral daily  folic acid 1 milliGRAM(s) Oral daily  furosemide    Tablet 20 milliGRAM(s) Oral daily  gabapentin 300 milliGRAM(s) Oral three times a day  heparin  Infusion.  Unit(s)/Hr (11 mL/Hr) IV Continuous <Continuous>  lactobacillus acidophilus 1 Tablet(s) Oral two times a day with meals  levothyroxine 50 MICROGram(s) Oral daily  metoprolol tartrate 25 milliGRAM(s) Oral two times a day  pantoprazole    Tablet 40 milliGRAM(s) Oral before breakfast  senna 2 Tablet(s) Oral at bedtime  simvastatin 10 milliGRAM(s) Oral at bedtime  traMADol 50 milliGRAM(s) Oral every 6 hours    MEDICATIONS  (PRN):  aluminum hydroxide/magnesium hydroxide/simethicone Suspension 30 milliLiter(s) Oral every 4 hours PRN Dyspepsia  heparin   Injectable 2000 Unit(s) IV Push every 6 hours PRN For aPTT between 40 - 57  heparin   Injectable 4500 Unit(s) IV Push every 6 hours PRN For aPTT less than 40  melatonin 3 milliGRAM(s) Oral at bedtime PRN Insomnia  morphine  - Injectable 2 milliGRAM(s) IV Push every 6 hours PRN Severe Pain (7 - 10)  ondansetron Injectable 4 milliGRAM(s) IV Push every 8 hours PRN Nausea and/or Vomiting      OBJECTIVE    T(C): 36.6 (08-19-23 @ 04:53), Max: 36.9 (08-18-23 @ 20:15)  HR: 75 (08-19-23 @ 04:53) (71 - 79)  BP: 143/74 (08-19-23 @ 04:53) (115/69 - 143/74)  RR: 18 (08-19-23 @ 04:57) (16 - 18)  SpO2: 94% (08-19-23 @ 04:57) (78% - 98%)  Wt(kg): --  I&O's Summary    18 Aug 2023 07:01  -  19 Aug 2023 07:00  --------------------------------------------------------  IN: 120 mL / OUT: 450 mL / NET: -330 mL          REVIEW OF SYSTEMS:  CONSTITUTIONAL: No fever, weight loss, or fatigue  EYES: No eye pain, visual disturbances, or discharge  ENMT:   No sinus or throat pain  NECK: No pain or stiffness  RESPIRATORY: No cough, wheezing, chills or hemoptysis; No shortness of breath  CARDIOVASCULAR: No chest pain, palpitations, dizziness, or leg swelling  GASTROINTESTINAL: No abdominal pain. No nausea, vomiting; No diarrhea or constipation. No melena or hematochezia.  GENITOURINARY: No dysuria, frequency, hematuria, or incontinence  NEUROLOGICAL: No headaches, memory loss, loss of strength, numbness, or tremors  SKIN: No itching, burning, rashes, or lesions   MUSCULOSKELETAL: No joint pain or swelling; No muscle, back, or extremity pain    PHYSICAL EXAM:  Appearance: NAD. VS past 24 hrs -as above   HEENT:   Moist oral mucosa. Conjunctiva clear b/l.   Neck : supple  Respiratory: Lungs CTAB.  Gastrointestinal:  Soft, nontender. No rebound. No rigidity. BS present	  Cardiovascular: RRR ,S1S2 present  Neurologic: Non-focal. Moving all extremities.  Extremities: No edema. No erythema. No calf tenderness.  Skin: No rashes, No ecchymoses, No cyanosis.	  wounds ,skin lesions-See skin assesment flow sheet   LABS:                        9.2    6.96  )-----------( 536      ( 19 Aug 2023 06:27 )             28.5     08-19    144  |  108  |  9   ----------------------------<  86  3.4<L>   |  31  |  0.51    Ca    8.0<L>      19 Aug 2023 06:27    TPro  5.4<L>  /  Alb  2.2<L>  /  TBili  0.4  /  DBili  x   /  AST  17  /  ALT  10<L>  /  AlkPhos  60  08-19    CAPILLARY BLOOD GLUCOSE        PT/INR - ( 19 Aug 2023 06:27 )   PT: 18.3 sec;   INR: 1.59 ratio         PTT - ( 19 Aug 2023 06:27 )  PTT:58.3 sec  Urinalysis Basic - ( 19 Aug 2023 06:27 )    Color: x / Appearance: x / SG: x / pH: x  Gluc: 86 mg/dL / Ketone: x  / Bili: x / Urobili: x   Blood: x / Protein: x / Nitrite: x   Leuk Esterase: x / RBC: x / WBC x   Sq Epi: x / Non Sq Epi: x / Bacteria: x        RADIOLOGY & ADDITIONAL TESTS:   reviewed elctronically  ASSESSMENT/PLAN: 	     PROGRESS NOTE  Patient is a 89y old  Female who presents with a chief complaint of left leg pain (19 Aug 2023 05:51)    Chart and available morning labs /imaging are reviewed electronically , urgent issues addressed . More information  is being added upon completion of rounds , when more information is collected and management discussed with consultants , medical staff and social service/case management on the floor   OVERNIGHT  No new issues reported by medical staff . All above noted Patient is resting in a bed comfortably .Leg pain improved .No distress noted     HPI:  89 Female with   Past medical history of CVA on Coumadin with right-sided weakness, hypertension hyperlipidemia CAD history of A-fib on Coumadin recently admitted at [facility] with acute metabolic encephalopathy 2/2 to uti and cellulitis of LE  .  P sent in from [facility] today  for after being found to have a DVT in her left Common femoral, great saphenous, popliteal vein.  Right lower extremity negative for DVT.  Ultrasound done today. patient reports left leg pain. patient denies fevers, chills, chest pain, shortness of breath. patient on coumadin with INR of 2.28 on 8/14 ,today in ER 1.57 .Admitted for vascular& hematology eval since patient developed DVT on coumadin .US venous  was done beginning of month during recent hospitalization and was negative for DVT Palliative care consult requested ,to discuss advance directives and complete MOLST During previous admission son was consulted regarding residential hospice at [facility] and palliative care issues /GOC discussion -could not decide on code status and patient remained full code . (18 Aug 2023 16:14)    PAST MEDICAL & SURGICAL HISTORY:  Hypertension      CVA (cerebral vascular accident)      Depression      Constipation      Neuropathy      Hyperlipidemia      Grade I diastolic dysfunction      Afib      Neuropathy      VHD (valvular heart disease)      Aortic valvar stenosis      Hypothyroidism      GERD (gastroesophageal reflux disease)      No significant past surgical history          MEDICATIONS  (STANDING):  acetaminophen     Tablet .. 650 milliGRAM(s) Oral every 6 hours  amLODIPine   Tablet 5 milliGRAM(s) Oral daily  cholecalciferol 2000 Unit(s) Oral daily  collagenase Ointment 1 Application(s) Topical daily  escitalopram 20 milliGRAM(s) Oral daily  ferrous    sulfate 325 milliGRAM(s) Oral daily  folic acid 1 milliGRAM(s) Oral daily  furosemide    Tablet 20 milliGRAM(s) Oral daily  gabapentin 300 milliGRAM(s) Oral three times a day  heparin  Infusion.  Unit(s)/Hr (11 mL/Hr) IV Continuous <Continuous>  lactobacillus acidophilus 1 Tablet(s) Oral two times a day with meals  levothyroxine 50 MICROGram(s) Oral daily  metoprolol tartrate 25 milliGRAM(s) Oral two times a day  pantoprazole    Tablet 40 milliGRAM(s) Oral before breakfast  senna 2 Tablet(s) Oral at bedtime  simvastatin 10 milliGRAM(s) Oral at bedtime  traMADol 50 milliGRAM(s) Oral every 6 hours    MEDICATIONS  (PRN):  aluminum hydroxide/magnesium hydroxide/simethicone Suspension 30 milliLiter(s) Oral every 4 hours PRN Dyspepsia  heparin   Injectable 2000 Unit(s) IV Push every 6 hours PRN For aPTT between 40 - 57  heparin   Injectable 4500 Unit(s) IV Push every 6 hours PRN For aPTT less than 40  melatonin 3 milliGRAM(s) Oral at bedtime PRN Insomnia  morphine  - Injectable 2 milliGRAM(s) IV Push every 6 hours PRN Severe Pain (7 - 10)  ondansetron Injectable 4 milliGRAM(s) IV Push every 8 hours PRN Nausea and/or Vomiting      OBJECTIVE    T(C): 36.6 (08-19-23 @ 04:53), Max: 36.9 (08-18-23 @ 20:15)  HR: 75 (08-19-23 @ 04:53) (71 - 79)  BP: 143/74 (08-19-23 @ 04:53) (115/69 - 143/74)  RR: 18 (08-19-23 @ 04:57) (16 - 18)  SpO2: 94% (08-19-23 @ 04:57) (78% - 98%)  Wt(kg): --  I&O's Summary    18 Aug 2023 07:01  -  19 Aug 2023 07:00  --------------------------------------------------------  IN: 120 mL / OUT: 450 mL / NET: -330 mL          REVIEW OF SYSTEMS:  CONSTITUTIONAL: No fever, weight loss, or fatigue  EYES: No eye pain, visual disturbances, or discharge  ENMT:   No sinus or throat pain  NECK: No pain or stiffness  RESPIRATORY: No cough, wheezing, chills or hemoptysis; No shortness of breath  CARDIOVASCULAR: No chest pain, palpitations, dizziness, or leg swelling  GASTROINTESTINAL: No abdominal pain. No nausea, vomiting; No diarrhea or constipation. No melena or hematochezia.  GENITOURINARY: No dysuria, frequency, hematuria, or incontinence  NEUROLOGICAL: No headaches, memory loss, loss of strength, numbness, or tremors  SKIN: No itching, burning, rashes, or lesions   MUSCULOSKELETAL: No joint pain or swelling; No muscle, back, or extremity pain    PHYSICAL EXAM:  Appearance: NAD. VS past 24 hrs -as above   HEENT:   Moist oral mucosa. Conjunctiva clear b/l.   Neck : supple  Respiratory: Lungs CTAB.  Gastrointestinal:  Soft, nontender. No rebound. No rigidity. BS present	  Cardiovascular: RRR ,S1S2 present  Neurologic: Non-focal. Moving all extremities.  Extremities: No edema. No erythema. No calf tenderness.  Skin: No rashes, No ecchymoses, No cyanosis.	  wounds ,skin lesions-See skin assesment flow sheet   LABS:                        9.2    6.96  )-----------( 536      ( 19 Aug 2023 06:27 )             28.5     08-19    144  |  108  |  9   ----------------------------<  86  3.4<L>   |  31  |  0.51    Ca    8.0<L>      19 Aug 2023 06:27    TPro  5.4<L>  /  Alb  2.2<L>  /  TBili  0.4  /  DBili  x   /  AST  17  /  ALT  10<L>  /  AlkPhos  60  08-19    CAPILLARY BLOOD GLUCOSE        PT/INR - ( 19 Aug 2023 06:27 )   PT: 18.3 sec;   INR: 1.59 ratio         PTT - ( 19 Aug 2023 06:27 )  PTT:58.3 sec  Urinalysis Basic - ( 19 Aug 2023 06:27 )    Color: x / Appearance: x / SG: x / pH: x  Gluc: 86 mg/dL / Ketone: x  / Bili: x / Urobili: x   Blood: x / Protein: x / Nitrite: x   Leuk Esterase: x / RBC: x / WBC x   Sq Epi: x / Non Sq Epi: x / Bacteria: x        RADIOLOGY & ADDITIONAL TESTS:   reviewed elctronically  ASSESSMENT/PLAN: 	    45 minutes aggregate time was spent on this visit, 50% visit time spent in care co-ordination with other attendings and counselling patient .I have discussed care plan with patient / HCP/family member ,who expressed understanding of problems treatment and their effect and side effects, alternatives in details. I have asked if they have any questions and concerns and appropriately addressed them to best of my ability.

## 2023-08-19 NOTE — PROGRESS NOTE ADULT - PROBLEM SELECTOR PLAN 1
leg elevation ,pain management ,vascular surgery cons and hem cons requested reg further AC plan Heparin drip , leg elevation ,pain management ,vascular surgery cons and hem cons requested

## 2023-08-19 NOTE — DIETITIAN NUTRITION RISK NOTIFICATION - ADDITIONAL COMMENTS/DIETITIAN RECOMMENDATIONS
recommend ensure clear BID  recommend MVI   follow weights with estimated 10% weight loss in ~ 1 year

## 2023-08-19 NOTE — PROGRESS NOTE ADULT - SUBJECTIVE AND OBJECTIVE BOX
Date/Time Patient Seen:  		  Referring MD:   Data Reviewed	       Patient is a 89y old  Female who presents with a chief complaint of left leg pain (18 Aug 2023 19:23)      Subjective/HPI     PAST MEDICAL & SURGICAL HISTORY:  Hypertension    CVA (cerebral vascular accident)    Depression    Constipation    Neuropathy    Constipation    Hyperlipidemia    Grade I diastolic dysfunction    Afib    Neuropathy    VHD (valvular heart disease)    Aortic valvar stenosis    Hypothyroidism    GERD (gastroesophageal reflux disease)    No significant past surgical history          Medication list         MEDICATIONS  (STANDING):  acetaminophen     Tablet .. 650 milliGRAM(s) Oral every 6 hours  amLODIPine   Tablet 5 milliGRAM(s) Oral daily  cholecalciferol 2000 Unit(s) Oral daily  collagenase Ointment 1 Application(s) Topical daily  escitalopram 20 milliGRAM(s) Oral daily  ferrous    sulfate 325 milliGRAM(s) Oral daily  folic acid 1 milliGRAM(s) Oral daily  furosemide    Tablet 20 milliGRAM(s) Oral daily  gabapentin 300 milliGRAM(s) Oral three times a day  heparin  Infusion.  Unit(s)/Hr (11 mL/Hr) IV Continuous <Continuous>  lactobacillus acidophilus 1 Tablet(s) Oral two times a day with meals  levothyroxine 50 MICROGram(s) Oral daily  metoprolol tartrate 25 milliGRAM(s) Oral two times a day  pantoprazole    Tablet 40 milliGRAM(s) Oral before breakfast  senna 2 Tablet(s) Oral at bedtime  simvastatin 10 milliGRAM(s) Oral at bedtime  traMADol 50 milliGRAM(s) Oral every 6 hours    MEDICATIONS  (PRN):  aluminum hydroxide/magnesium hydroxide/simethicone Suspension 30 milliLiter(s) Oral every 4 hours PRN Dyspepsia  heparin   Injectable 2000 Unit(s) IV Push every 6 hours PRN For aPTT between 40 - 57  heparin   Injectable 4500 Unit(s) IV Push every 6 hours PRN For aPTT less than 40  melatonin 3 milliGRAM(s) Oral at bedtime PRN Insomnia  morphine  - Injectable 2 milliGRAM(s) IV Push every 6 hours PRN Severe Pain (7 - 10)  ondansetron Injectable 4 milliGRAM(s) IV Push every 8 hours PRN Nausea and/or Vomiting         Vitals log        ICU Vital Signs Last 24 Hrs  T(C): 36.6 (19 Aug 2023 04:53), Max: 36.9 (18 Aug 2023 20:15)  T(F): 97.9 (19 Aug 2023 04:53), Max: 98.5 (18 Aug 2023 20:15)  HR: 75 (19 Aug 2023 04:53) (71 - 79)  BP: 143/74 (19 Aug 2023 04:53) (115/69 - 143/74)  BP(mean): --  ABP: --  ABP(mean): --  RR: 18 (19 Aug 2023 04:57) (16 - 18)  SpO2: 94% (19 Aug 2023 04:57) (78% - 98%)    O2 Parameters below as of 19 Aug 2023 04:57  Patient On (Oxygen Delivery Method): nasal cannula  O2 Flow (L/min): 4               Input and Output:  I&O's Detail    18 Aug 2023 07:01  -  19 Aug 2023 05:21  --------------------------------------------------------  IN:    Oral Fluid: 120 mL  Total IN: 120 mL    OUT:  Total OUT: 0 mL    Total NET: 120 mL          Lab Data                        9.1    8.13  )-----------( 525      ( 18 Aug 2023 23:54 )             28.6     08-18    139  |  107  |  16  ----------------------------<  99  4.4   |  26  |  0.68    Ca    8.4<L>      18 Aug 2023 14:10    TPro  6.4  /  Alb  2.5<L>  /  TBili  0.3  /  DBili  x   /  AST  23  /  ALT  13  /  AlkPhos  70  08-18            Review of Systems	      Objective     Physical Examination    heart s1s2  lung dc BS  head nc      Pertinent Lab findings & Imaging      Summer:  NO   Adequate UO     I&O's Detail    18 Aug 2023 07:01  -  19 Aug 2023 05:21  --------------------------------------------------------  IN:    Oral Fluid: 120 mL  Total IN: 120 mL    OUT:  Total OUT: 0 mL    Total NET: 120 mL               Discussed with:     Cultures:	        Radiology

## 2023-08-19 NOTE — DIETITIAN INITIAL EVALUATION ADULT - ORAL INTAKE PTA/DIET HISTORY
patient seen states dislikes soft and bite sized meals. requesting toast and hard cooked eggs this morning per RN. patient states is lactose free PTA.  refusing ensure supplement . RD sent message to MD to consider add ensure clear BID. per previous admit solid foods taken from [facility] but noted early August speech evaluation recommends soft and bite sized thin liquids.   no food allergies   education deferred at this time

## 2023-08-19 NOTE — PROGRESS NOTE ADULT - SUBJECTIVE AND OBJECTIVE BOX
CHIEF COMPLAINT/ REASON FOR VISIT  .. Patient was seen to address the  issue listed under PROBLEM LIST which is located toward bottom of this note     REJI BERGER    IRMA 1EAS [address] W[address]    Allergies    per son &quot;issues with certain anitibiotics&quot; does not remember the names (Unknown)    Intolerances        PAST MEDICAL & SURGICAL HISTORY:  Hypertension      CVA (cerebral vascular accident)      Depression      Constipation      Neuropathy      Hyperlipidemia      Grade I diastolic dysfunction      Afib      Neuropathy      VHD (valvular heart disease)      Aortic valvar stenosis      Hypothyroidism      GERD (gastroesophageal reflux disease)      No significant past surgical history          FAMILY HISTORY:      Home Medications:  benzonatate 100 mg oral capsule: 1 cap(s) orally 3 times a day as needed for  cough (18 Aug 2023 15:32)  Biofreeze 4% topical gel: Apply topically to affected area 2 times a day to R shoulder and L knee (18 Aug 2023 15:34)  docusate sodium 100 mg oral capsule: 2 cap(s) orally once a day (at bedtime) (18 Aug 2023 15:36)  famotidine 20 mg oral tablet: 1 tab(s) orally once a day (18 Aug 2023 15:38)  miconazole 2% topical cream: Apply topically to affected area 2 times a day (18 Aug 2023 15:30)  senna (sennosides) 8.6 mg oral tablet: 1 tab(s) orally once a day as needed for  constipation (18 Aug 2023 15:43)  traMADol 50 mg oral tablet: 1 tab(s) orally every 6 hours as needed for pain (18 Aug 2023 15:45)  traMADol 50 mg oral tablet: 1 tab(s) orally 2 times a day (18 Aug 2023 15:45)  Tylenol Extra Strength 500 mg oral tablet: 2 tab(s) orally once a day (in the morning) and 1 tablet twice a day (18 Aug 2023 15:54)      MEDICATIONS  (STANDING):  acetaminophen     Tablet .. 650 milliGRAM(s) Oral every 6 hours  amLODIPine   Tablet 5 milliGRAM(s) Oral daily  cholecalciferol 2000 Unit(s) Oral daily  collagenase Ointment 1 Application(s) Topical daily  escitalopram 20 milliGRAM(s) Oral daily  ferrous    sulfate 325 milliGRAM(s) Oral daily  folic acid 1 milliGRAM(s) Oral daily  furosemide    Tablet 20 milliGRAM(s) Oral daily  gabapentin 300 milliGRAM(s) Oral three times a day  heparin  Infusion.  Unit(s)/Hr (11 mL/Hr) IV Continuous <Continuous>  lactobacillus acidophilus 1 Tablet(s) Oral two times a day with meals  levothyroxine 50 MICROGram(s) Oral daily  metoprolol tartrate 25 milliGRAM(s) Oral two times a day  pantoprazole    Tablet 40 milliGRAM(s) Oral before breakfast  senna 2 Tablet(s) Oral at bedtime  simvastatin 10 milliGRAM(s) Oral at bedtime  traMADol 50 milliGRAM(s) Oral every 6 hours    MEDICATIONS  (PRN):  aluminum hydroxide/magnesium hydroxide/simethicone Suspension 30 milliLiter(s) Oral every 4 hours PRN Dyspepsia  heparin   Injectable 2000 Unit(s) IV Push every 6 hours PRN For aPTT between 40 - 57  heparin   Injectable 4500 Unit(s) IV Push every 6 hours PRN For aPTT less than 40  melatonin 3 milliGRAM(s) Oral at bedtime PRN Insomnia  morphine  - Injectable 2 milliGRAM(s) IV Push every 6 hours PRN Severe Pain (7 - 10)  ondansetron Injectable 4 milliGRAM(s) IV Push every 8 hours PRN Nausea and/or Vomiting              Vital Signs Last 24 Hrs  T(C): 36.6 (19 Aug 2023 04:53), Max: 36.9 (18 Aug 2023 20:15)  T(F): 97.9 (19 Aug 2023 04:53), Max: 98.5 (18 Aug 2023 20:15)  HR: 75 (19 Aug 2023 04:53) (71 - 79)  BP: 143/74 (19 Aug 2023 04:53) (115/69 - 143/74)  BP(mean): --  RR: 18 (19 Aug 2023 04:57) (16 - 18)  SpO2: 94% (19 Aug 2023 04:57) (78% - 98%)    Parameters below as of 19 Aug 2023 04:57  Patient On (Oxygen Delivery Method): nasal cannula  O2 Flow (L/min): 4        08-18-23 @ 07:01  -  08-19-23 @ 05:51  --------------------------------------------------------  IN: 120 mL / OUT: 450 mL / NET: -330 mL              LABS:                        9.1    8.13  )-----------( 525      ( 18 Aug 2023 23:54 )             28.6     08-18    139  |  107  |  16  ----------------------------<  99  4.4   |  26  |  0.68    Ca    8.4<L>      18 Aug 2023 14:10    TPro  6.4  /  Alb  2.5<L>  /  TBili  0.3  /  DBili  x   /  AST  23  /  ALT  13  /  AlkPhos  70  08-18    PT/INR - ( 18 Aug 2023 16:44 )   PT: 19.0 sec;   INR: 1.65 ratio         PTT - ( 18 Aug 2023 23:54 )  PTT:76.2 sec  Urinalysis Basic - ( 18 Aug 2023 14:10 )    Color: x / Appearance: x / SG: x / pH: x  Gluc: 99 mg/dL / Ketone: x  / Bili: x / Urobili: x   Blood: x / Protein: x / Nitrite: x   Leuk Esterase: x / RBC: x / WBC x   Sq Epi: x / Non Sq Epi: x / Bacteria: x            WBC:  WBC Count: 8.13 K/uL (08-18 @ 23:54)  WBC Count: 8.55 K/uL (08-18 @ 14:10)      MICROBIOLOGY:  RECENT CULTURES:              PT/INR - ( 18 Aug 2023 16:44 )   PT: 19.0 sec;   INR: 1.65 ratio         PTT - ( 18 Aug 2023 23:54 )  PTT:76.2 sec    Sodium:  Sodium: 139 mmol/L (08-18 @ 14:10)      0.68 mg/dL 08-18 @ 14:10      Hemoglobin:  Hemoglobin: 9.1 g/dL (08-18 @ 23:54)  Hemoglobin: 10.1 g/dL (08-18 @ 14:10)      Platelets: Platelet Count - Automated: 525 K/uL (08-18 @ 23:54)  Platelet Count - Automated: 534 K/uL (08-18 @ 14:10)      LIVER FUNCTIONS - ( 18 Aug 2023 14:10 )  Alb: 2.5 g/dL / Pro: 6.4 g/dL / ALK PHOS: 70 U/L / ALT: 13 U/L / AST: 23 U/L / GGT: x             Urinalysis Basic - ( 18 Aug 2023 14:10 )    Color: x / Appearance: x / SG: x / pH: x  Gluc: 99 mg/dL / Ketone: x  / Bili: x / Urobili: x   Blood: x / Protein: x / Nitrite: x   Leuk Esterase: x / RBC: x / WBC x   Sq Epi: x / Non Sq Epi: x / Bacteria: x        RADIOLOGY & ADDITIONAL STUDIES:      MICROBIOLOGY:  RECENT CULTURES:

## 2023-08-19 NOTE — DIETITIAN NUTRITION RISK NOTIFICATION - TREATMENT: THE FOLLOWING DIET HAS BEEN RECOMMENDED
Diet, Soft and Bite Sized:   Lactose Restricted (Milk Sugar Intoler.)  Supplement Feeding Modality:  Oral  Ensure Clear Cans or Servings Per Day:  2       Frequency:  Two Times a day (08-19-23 @ 14:13) [Pending Verification By Attending]  Diet, Soft and Bite Sized (08-18-23 @ 16:09) [Active]

## 2023-08-19 NOTE — DIETITIAN INITIAL EVALUATION ADULT - PERTINENT LABORATORY DATA
08-19    144  |  108  |  9   ----------------------------<  86  3.4<L>   |  31  |  0.51    Ca    8.0<L>      19 Aug 2023 06:27    TPro  5.4<L>  /  Alb  2.2<L>  /  TBili  0.4  /  DBili  x   /  AST  17  /  ALT  10<L>  /  AlkPhos  60  08-19

## 2023-08-19 NOTE — CONSULT NOTE ADULT - ASSESSMENT
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 Recently post hospitalization x 2 for change in [facility] assoc w UTI, last one first week [id number] which adm also w supratherapetuic INR 5s, subseq decr to 1s and discharged 3s)  Adm w leg pain and Duplex done as outpatient reports DVT left common femoral, popliteal, also greater saphenous v. 8/14 INR by reprt 2.28  During last adm pt also c/o left leg pain and dx'd w cellulitis, Duplex done at that time (8/1/23) no DVT, CT c/a/p 8/2/23 also no sig ds and showed the IVC filter    on adm here 8/18--INR 1.57 Hgb 10.1 plts 534, subseq Hgb same day 9.1, todahy stable at 9.2. During last adm Hgb 11s--->8s  eGFR 82    started on IV Heparin    -left leg DVT while on Coumadin may not be Coumadin failure, as INR on adm was subtherapeutic  -even though using DOACs ie Eliquis would avoid the issue of non-therapeutic INR, the reason for pt being on Coumadin may be for the stroke, therefore if wants to change to DOAC, need to consult Neurology  -in any way, even w LE DVT, pt has IVC filter in place so much less risk of resultant PE  -also continue Hydrea to keep platelets <600k (adequate at present time), to decr additional thromboembolic risk  -recommend re-initiate therapeutic Coumadin when preparing for discharge ie INR ~2-3    discussed w pt  discussed w Medicine  thank you, please call if any questions

## 2023-08-19 NOTE — DIETITIAN INITIAL EVALUATION ADULT - PERTINENT MEDS FT
MEDICATIONS  (STANDING):  acetaminophen     Tablet .. 650 milliGRAM(s) Oral every 6 hours  amLODIPine   Tablet 5 milliGRAM(s) Oral daily  cholecalciferol 2000 Unit(s) Oral daily  collagenase Ointment 1 Application(s) Topical daily  escitalopram 20 milliGRAM(s) Oral daily  ferrous    sulfate 325 milliGRAM(s) Oral daily  folic acid 1 milliGRAM(s) Oral daily  furosemide    Tablet 20 milliGRAM(s) Oral daily  gabapentin 300 milliGRAM(s) Oral three times a day  heparin  Infusion.  Unit(s)/Hr (11 mL/Hr) IV Continuous <Continuous>  lactobacillus acidophilus 1 Tablet(s) Oral two times a day with meals  levothyroxine 50 MICROGram(s) Oral daily  metoprolol tartrate 25 milliGRAM(s) Oral two times a day  pantoprazole    Tablet 40 milliGRAM(s) Oral before breakfast  senna 2 Tablet(s) Oral at bedtime  simvastatin 10 milliGRAM(s) Oral at bedtime  traMADol 50 milliGRAM(s) Oral every 6 hours    MEDICATIONS  (PRN):  aluminum hydroxide/magnesium hydroxide/simethicone Suspension 30 milliLiter(s) Oral every 4 hours PRN Dyspepsia  heparin   Injectable 2000 Unit(s) IV Push every 6 hours PRN For aPTT between 40 - 57  heparin   Injectable 4500 Unit(s) IV Push every 6 hours PRN For aPTT less than 40  melatonin 3 milliGRAM(s) Oral at bedtime PRN Insomnia  morphine  - Injectable 2 milliGRAM(s) IV Push every 6 hours PRN Severe Pain (7 - 10)  ondansetron Injectable 4 milliGRAM(s) IV Push every 8 hours PRN Nausea and/or Vomiting

## 2023-08-20 LAB
ANION GAP SERPL CALC-SCNC: 5 MMOL/L — SIGNIFICANT CHANGE UP (ref 5–17)
APTT BLD: 152 SEC — CRITICAL HIGH (ref 24.5–35.6)
APTT BLD: 52.6 SEC — HIGH (ref 24.5–35.6)
BUN SERPL-MCNC: 6 MG/DL — LOW (ref 7–23)
CALCIUM SERPL-MCNC: 8 MG/DL — LOW (ref 8.5–10.1)
CHLORIDE SERPL-SCNC: 106 MMOL/L — SIGNIFICANT CHANGE UP (ref 96–108)
CO2 SERPL-SCNC: 29 MMOL/L — SIGNIFICANT CHANGE UP (ref 22–31)
CREAT SERPL-MCNC: 0.45 MG/DL — LOW (ref 0.5–1.3)
EGFR: 91 ML/MIN/1.73M2 — SIGNIFICANT CHANGE UP
GLUCOSE SERPL-MCNC: 80 MG/DL — SIGNIFICANT CHANGE UP (ref 70–99)
HCT VFR BLD CALC: 28.8 % — LOW (ref 34.5–45)
HGB BLD-MCNC: 9.1 G/DL — LOW (ref 11.5–15.5)
INR BLD: 1.59 RATIO — HIGH (ref 0.85–1.18)
MCHC RBC-ENTMCNC: 31.6 GM/DL — LOW (ref 32–36)
MCHC RBC-ENTMCNC: 37.8 PG — HIGH (ref 27–34)
MCV RBC AUTO: 119.5 FL — HIGH (ref 80–100)
NRBC # BLD: 0 /100 WBCS — SIGNIFICANT CHANGE UP (ref 0–0)
PLATELET # BLD AUTO: 494 K/UL — HIGH (ref 150–400)
POTASSIUM SERPL-MCNC: 4.5 MMOL/L — SIGNIFICANT CHANGE UP (ref 3.5–5.3)
POTASSIUM SERPL-SCNC: 4.5 MMOL/L — SIGNIFICANT CHANGE UP (ref 3.5–5.3)
PROTHROM AB SERPL-ACNC: 18.4 SEC — HIGH (ref 9.5–13)
RBC # BLD: 2.41 M/UL — LOW (ref 3.8–5.2)
RBC # FLD: 16.7 % — HIGH (ref 10.3–14.5)
SODIUM SERPL-SCNC: 140 MMOL/L — SIGNIFICANT CHANGE UP (ref 135–145)
WBC # BLD: 6.24 K/UL — SIGNIFICANT CHANGE UP (ref 3.8–10.5)
WBC # FLD AUTO: 6.24 K/UL — SIGNIFICANT CHANGE UP (ref 3.8–10.5)

## 2023-08-20 PROCEDURE — 99233 SBSQ HOSP IP/OBS HIGH 50: CPT

## 2023-08-20 RX ORDER — HEPARIN SODIUM 5000 [USP'U]/ML
900 INJECTION INTRAVENOUS; SUBCUTANEOUS
Qty: 25000 | Refills: 0 | Status: DISCONTINUED | OUTPATIENT
Start: 2023-08-20 | End: 2023-08-22

## 2023-08-20 RX ORDER — HEPARIN SODIUM 5000 [USP'U]/ML
INJECTION INTRAVENOUS; SUBCUTANEOUS
Qty: 25000 | Refills: 0 | Status: DISCONTINUED | OUTPATIENT
Start: 2023-08-20 | End: 2023-08-20

## 2023-08-20 RX ORDER — HEPARIN SODIUM 5000 [USP'U]/ML
4500 INJECTION INTRAVENOUS; SUBCUTANEOUS ONCE
Refills: 0 | Status: DISCONTINUED | OUTPATIENT
Start: 2023-08-20 | End: 2023-08-20

## 2023-08-20 RX ORDER — HEPARIN SODIUM 5000 [USP'U]/ML
4500 INJECTION INTRAVENOUS; SUBCUTANEOUS EVERY 6 HOURS
Refills: 0 | Status: DISCONTINUED | OUTPATIENT
Start: 2023-08-20 | End: 2023-08-20

## 2023-08-20 RX ORDER — HEPARIN SODIUM 5000 [USP'U]/ML
4500 INJECTION INTRAVENOUS; SUBCUTANEOUS EVERY 6 HOURS
Refills: 0 | Status: DISCONTINUED | OUTPATIENT
Start: 2023-08-20 | End: 2023-08-22

## 2023-08-20 RX ORDER — HEPARIN SODIUM 5000 [USP'U]/ML
2000 INJECTION INTRAVENOUS; SUBCUTANEOUS EVERY 6 HOURS
Refills: 0 | Status: DISCONTINUED | OUTPATIENT
Start: 2023-08-20 | End: 2023-08-22

## 2023-08-20 RX ORDER — HEPARIN SODIUM 5000 [USP'U]/ML
2000 INJECTION INTRAVENOUS; SUBCUTANEOUS EVERY 6 HOURS
Refills: 0 | Status: DISCONTINUED | OUTPATIENT
Start: 2023-08-20 | End: 2023-08-20

## 2023-08-20 RX ORDER — WARFARIN SODIUM 2.5 MG/1
2.5 TABLET ORAL ONCE
Refills: 0 | Status: COMPLETED | OUTPATIENT
Start: 2023-08-20 | End: 2023-08-20

## 2023-08-20 RX ADMIN — Medication 650 MILLIGRAM(S): at 23:02

## 2023-08-20 RX ADMIN — WARFARIN SODIUM 2.5 MILLIGRAM(S): 2.5 TABLET ORAL at 23:02

## 2023-08-20 RX ADMIN — TRAMADOL HYDROCHLORIDE 50 MILLIGRAM(S): 50 TABLET ORAL at 23:32

## 2023-08-20 RX ADMIN — Medication 650 MILLIGRAM(S): at 00:12

## 2023-08-20 RX ADMIN — Medication 20 MILLIGRAM(S): at 06:46

## 2023-08-20 RX ADMIN — GABAPENTIN 300 MILLIGRAM(S): 400 CAPSULE ORAL at 13:11

## 2023-08-20 RX ADMIN — Medication 650 MILLIGRAM(S): at 07:16

## 2023-08-20 RX ADMIN — HEPARIN SODIUM 1100 UNIT(S)/HR: 5000 INJECTION INTRAVENOUS; SUBCUTANEOUS at 19:14

## 2023-08-20 RX ADMIN — ESCITALOPRAM OXALATE 20 MILLIGRAM(S): 10 TABLET, FILM COATED ORAL at 11:07

## 2023-08-20 RX ADMIN — Medication 650 MILLIGRAM(S): at 06:46

## 2023-08-20 RX ADMIN — AMLODIPINE BESYLATE 5 MILLIGRAM(S): 2.5 TABLET ORAL at 06:42

## 2023-08-20 RX ADMIN — TRAMADOL HYDROCHLORIDE 50 MILLIGRAM(S): 50 TABLET ORAL at 23:02

## 2023-08-20 RX ADMIN — Medication 650 MILLIGRAM(S): at 17:31

## 2023-08-20 RX ADMIN — GABAPENTIN 300 MILLIGRAM(S): 400 CAPSULE ORAL at 23:02

## 2023-08-20 RX ADMIN — Medication 1 TABLET(S): at 08:08

## 2023-08-20 RX ADMIN — Medication 1 TABLET(S): at 17:31

## 2023-08-20 RX ADMIN — TRAMADOL HYDROCHLORIDE 50 MILLIGRAM(S): 50 TABLET ORAL at 18:32

## 2023-08-20 RX ADMIN — SENNA PLUS 2 TABLET(S): 8.6 TABLET ORAL at 23:01

## 2023-08-20 RX ADMIN — TRAMADOL HYDROCHLORIDE 50 MILLIGRAM(S): 50 TABLET ORAL at 00:12

## 2023-08-20 RX ADMIN — Medication 25 MILLIGRAM(S): at 06:42

## 2023-08-20 RX ADMIN — HEPARIN SODIUM 2000 UNIT(S): 5000 INJECTION INTRAVENOUS; SUBCUTANEOUS at 21:35

## 2023-08-20 RX ADMIN — TRAMADOL HYDROCHLORIDE 50 MILLIGRAM(S): 50 TABLET ORAL at 06:46

## 2023-08-20 RX ADMIN — PANTOPRAZOLE SODIUM 40 MILLIGRAM(S): 20 TABLET, DELAYED RELEASE ORAL at 06:42

## 2023-08-20 RX ADMIN — Medication 2000 UNIT(S): at 11:07

## 2023-08-20 RX ADMIN — TRAMADOL HYDROCHLORIDE 50 MILLIGRAM(S): 50 TABLET ORAL at 17:32

## 2023-08-20 RX ADMIN — TRAMADOL HYDROCHLORIDE 50 MILLIGRAM(S): 50 TABLET ORAL at 11:07

## 2023-08-20 RX ADMIN — TRAMADOL HYDROCHLORIDE 50 MILLIGRAM(S): 50 TABLET ORAL at 12:07

## 2023-08-20 RX ADMIN — HEPARIN SODIUM 900 UNIT(S)/HR: 5000 INJECTION INTRAVENOUS; SUBCUTANEOUS at 21:28

## 2023-08-20 RX ADMIN — SIMVASTATIN 10 MILLIGRAM(S): 20 TABLET, FILM COATED ORAL at 23:01

## 2023-08-20 RX ADMIN — Medication 25 MILLIGRAM(S): at 17:31

## 2023-08-20 RX ADMIN — GABAPENTIN 300 MILLIGRAM(S): 400 CAPSULE ORAL at 06:46

## 2023-08-20 RX ADMIN — Medication 325 MILLIGRAM(S): at 11:07

## 2023-08-20 RX ADMIN — TRAMADOL HYDROCHLORIDE 50 MILLIGRAM(S): 50 TABLET ORAL at 07:16

## 2023-08-20 RX ADMIN — Medication 1 APPLICATION(S): at 11:08

## 2023-08-20 RX ADMIN — Medication 650 MILLIGRAM(S): at 23:32

## 2023-08-20 RX ADMIN — Medication 50 MICROGRAM(S): at 06:42

## 2023-08-20 RX ADMIN — Medication 1 MILLIGRAM(S): at 11:07

## 2023-08-20 RX ADMIN — HEPARIN SODIUM 0 UNIT(S)/HR: 5000 INJECTION INTRAVENOUS; SUBCUTANEOUS at 08:56

## 2023-08-20 RX ADMIN — HEPARIN SODIUM 1100 UNIT(S)/HR: 5000 INJECTION INTRAVENOUS; SUBCUTANEOUS at 07:29

## 2023-08-20 NOTE — PROGRESS NOTE ADULT - SUBJECTIVE AND OBJECTIVE BOX
Patient is a 89y Female with a known history of :  DVT of lower limb, acute [I82.409]    Afib [I48.91]    VHD (valvular heart disease) [I38]    Prophylactic measure [Z29.9]    Hypothyroidism [E03.9]    MDD (major depressive disorder) [F32.9]    GERD (gastroesophageal reflux disease) [K21.9]    HTN (hypertension) [I10]    CAD (coronary artery disease) [I25.10]    Leg wound, left [S81.832A]      HPI:  89 Female with   Past medical history of CVA on Coumadin with right-sided weakness, hypertension hyperlipidemia CAD history of A-fib on Coumadin recently admitted at [facility] with acute metabolic encephalopathy 2/2 to uti and cellulitis of LE  .  P sent in from [facility] today  for after being found to have a DVT in her left Common femoral, great saphenous, popliteal vein.  Right lower extremity negative for DVT.  Ultrasound done today. patient reports left leg pain. patient denies fevers, chills, chest pain, shortness of breath. patient on coumadin with INR of 2.28 on 8/14 ,today in ER 1.57 .Admitted for vascular& hematology eval since patient developed DVT on coumadin .US venous  was done beginning of month during recent hospitalization and was negative for DVT Palliative care consult requested ,to discuss advance directives and complete MOLST During previous admission son was consulted regarding residential hospice at [facility] and palliative care issues /GOC discussion -could not decide on code status and patient remained full code . (18 Aug 2023 16:14)      REVIEW OF SYSTEMS:    CONSTITUTIONAL: No fever, weight loss, or fatigue  EYES: No eye pain, visual disturbances, or discharge  ENMT:  No difficulty hearing, tinnitus, vertigo; No sinus or throat pain  NECK: No pain or stiffness  BREASTS: No pain, masses, or nipple discharge  RESPIRATORY: No cough, wheezing, chills or hemoptysis; No shortness of breath  CARDIOVASCULAR: No chest pain, palpitations, dizziness, or leg swelling  GASTROINTESTINAL: No abdominal or epigastric pain. No nausea, vomiting, or hematemesis; No diarrhea or constipation. No melena or hematochezia.  GENITOURINARY: No dysuria, frequency, hematuria, or incontinence  NEUROLOGICAL: No headaches, memory loss, loss of strength, numbness, or tremors  SKIN: No itching, burning, rashes, or lesions   LYMPH NODES: No enlarged glands  ENDOCRINE: No heat or cold intolerance; No hair loss  MUSCULOSKELETAL: No joint pain or swelling; No muscle, back, or extremity pain  PSYCHIATRIC: No depression, anxiety, mood swings, or difficulty sleeping  HEME/LYMPH: No easy bruising, or bleeding gums  ALLERGY AND IMMUNOLOGIC: No hives or eczema    MEDICATIONS  (STANDING):  acetaminophen     Tablet .. 650 milliGRAM(s) Oral every 6 hours  amLODIPine   Tablet 5 milliGRAM(s) Oral daily  cholecalciferol 2000 Unit(s) Oral daily  collagenase Ointment 1 Application(s) Topical daily  escitalopram 20 milliGRAM(s) Oral daily  ferrous    sulfate 325 milliGRAM(s) Oral daily  folic acid 1 milliGRAM(s) Oral daily  furosemide    Tablet 20 milliGRAM(s) Oral daily  gabapentin 300 milliGRAM(s) Oral three times a day  heparin  Infusion.  Unit(s)/Hr (11 mL/Hr) IV Continuous <Continuous>  lactobacillus acidophilus 1 Tablet(s) Oral two times a day with meals  levothyroxine 50 MICROGram(s) Oral daily  metoprolol tartrate 25 milliGRAM(s) Oral two times a day  pantoprazole    Tablet 40 milliGRAM(s) Oral before breakfast  senna 2 Tablet(s) Oral at bedtime  simvastatin 10 milliGRAM(s) Oral at bedtime  traMADol 50 milliGRAM(s) Oral every 6 hours    MEDICATIONS  (PRN):  aluminum hydroxide/magnesium hydroxide/simethicone Suspension 30 milliLiter(s) Oral every 4 hours PRN Dyspepsia  heparin   Injectable 2000 Unit(s) IV Push every 6 hours PRN For aPTT between 40 - 57  heparin   Injectable 4500 Unit(s) IV Push every 6 hours PRN For aPTT less than 40  melatonin 3 milliGRAM(s) Oral at bedtime PRN Insomnia  morphine  - Injectable 2 milliGRAM(s) IV Push every 6 hours PRN Severe Pain (7 - 10)  ondansetron Injectable 4 milliGRAM(s) IV Push every 8 hours PRN Nausea and/or Vomiting      ALLERGIES: per son &quot;issues with certain anitibiotics&quot; does not remember the names (Unknown)      FAMILY HISTORY:      PHYSICAL EXAMINATION:  -----------------------------  T(C): 36.7 (08-20-23 @ 05:09), Max: 37.3 (08-19-23 @ 13:01)  HR: 79 (08-20-23 @ 05:09) (76 - 84)  BP: 125/55 (08-20-23 @ 05:09) (125/55 - 142/64)  RR: 18 (08-20-23 @ 05:09) (18 - 18)  SpO2: 97% (08-20-23 @ 05:09) (94% - 97%)  Wt(kg): --    08-19 @ 07:01  -  08-20 @ 07:00  --------------------------------------------------------  IN:    Heparin Infusion: 143 mL  Total IN: 143 mL    OUT:    Voided (mL): 1300 mL  Total OUT: 1300 mL    Total NET: -1157 mL            VITALS  T(C): 36.7 (08-20-23 @ 05:09), Max: 37.3 (08-19-23 @ 13:01)  HR: 79 (08-20-23 @ 05:09) (76 - 84)  BP: 125/55 (08-20-23 @ 05:09) (125/55 - 142/64)  RR: 18 (08-20-23 @ 05:09) (18 - 18)  SpO2: 97% (08-20-23 @ 05:09) (94% - 97%)    Constitutional: well developed, normal appearance, well groomed, well nourished, no deformities and no acute distress.   Eyes: the conjunctiva exhibited no abnormalities and the eyelids demonstrated no xanthelasmas.   HEENT: normal oral mucosa, no oral pallor and no oral cyanosis.   Neck: normal jugular venous A waves present, normal jugular venous V waves present and no jugular venous castillo A waves.   Pulmonary: no respiratory distress, normal respiratory rhythm and effort, no accessory muscle use and lungs were clear to auscultation bilaterally.   Cardiovascular: heart rate and rhythm were normal, normal S1 and S2 and no murmur, gallop, rub, heave or thrill are present.   Abdomen: soft, non-tender, no hepato-splenomegaly and no abdominal mass palpated.   Musculoskeletal: the gait could not be assessed..   Extremities: no clubbing of the fingernails, no localized cyanosis, no petechial hemorrhages and no ischemic changes.   Skin: normal skin color and pigmentation, no rash, no venous stasis, no skin lesions, no skin ulcer and no xanthoma was observed.   Psychiatric: oriented to person, place, and time, the affect was normal, the mood was normal and not feeling anxious.     LABS:   --------  08-20    140  |  106  |  6<L>  ----------------------------<  80  4.5   |  29  |  0.45<L>    Ca    8.0<L>      20 Aug 2023 06:09    TPro  5.4<L>  /  Alb  2.2<L>  /  TBili  0.4  /  DBili  x   /  AST  17  /  ALT  10<L>  /  AlkPhos  60  08-19                         9.1    6.24  )-----------( 494      ( 20 Aug 2023 06:09 )             28.8     PT/INR - ( 20 Aug 2023 06:09 )   PT: 18.4 sec;   INR: 1.59 ratio         PTT - ( 20 Aug 2023 06:09 )  PTT:152.0 sec      69 mg/dL, --, 31 mg/dL<L>, 82 mg/dL        RADIOLOGY:  -----------------    ECG:     ECHO:

## 2023-08-20 NOTE — PHYSICAL THERAPY INITIAL EVALUATION ADULT - ADDITIONAL COMMENTS
Patient lives in assisted living, patient utilized [facility] for transfers, W/C primary form of locomotion.

## 2023-08-20 NOTE — PROGRESS NOTE ADULT - SUBJECTIVE AND OBJECTIVE BOX
PROGRESS NOTE  Patient is a 89y old  Female who presents with a chief complaint of left leg pain (20 Aug 2023 09:02)    Chart and available morning labs /imaging are reviewed electronically , urgent issues addressed . More information  is being added upon completion of rounds , when more information is collected and management discussed with consultants , medical staff and social service/case management on the floor   OVERNIGHT  No new issues reported by medical staff . All above noted Patient is resting in a bed comfortably . .No distress noted   Requests diet to be changed to regular that she gets at [facility] ,RD cons requested .Coumadin resumed as per hemonc recommendation ,case d/w Dr De La Rosa and Dr Haynes -discharge on coumadin .Repeated DOPPLER US -NEG FOR DVT    HPI:  89 Female with   Past medical history of CVA on Coumadin with right-sided weakness, hypertension hyperlipidemia CAD history of A-fib on Coumadin recently admitted at [facility] with acute metabolic encephalopathy 2/2 to uti and cellulitis of LE  .  P sent in from [facility] today  for after being found to have a DVT in her left Common femoral, great saphenous, popliteal vein.  Right lower extremity negative for DVT.  Ultrasound done today. patient reports left leg pain. patient denies fevers, chills, chest pain, shortness of breath. patient on coumadin with INR of 2.28 on 8/14 ,today in ER 1.57 .Admitted for vascular& hematology eval since patient developed DVT on coumadin .US venous  was done beginning of month during recent hospitalization and was negative for DVT Palliative care consult requested ,to discuss advance directives and complete MOLST During previous admission son was consulted regarding residential hospice at [facility] and palliative care issues /GOC discussion -could not decide on code status and patient remained full code . (18 Aug 2023 16:14)    PAST MEDICAL & SURGICAL HISTORY:  Hypertension      CVA (cerebral vascular accident)      Depression      Constipation      Neuropathy      Hyperlipidemia      Grade I diastolic dysfunction      Afib      Neuropathy      VHD (valvular heart disease)      Aortic valvar stenosis      Hypothyroidism      GERD (gastroesophageal reflux disease)      No significant past surgical history          MEDICATIONS  (STANDING):  acetaminophen     Tablet .. 650 milliGRAM(s) Oral every 6 hours  amLODIPine   Tablet 5 milliGRAM(s) Oral daily  cholecalciferol 2000 Unit(s) Oral daily  collagenase Ointment 1 Application(s) Topical daily  escitalopram 20 milliGRAM(s) Oral daily  ferrous    sulfate 325 milliGRAM(s) Oral daily  folic acid 1 milliGRAM(s) Oral daily  furosemide    Tablet 20 milliGRAM(s) Oral daily  gabapentin 300 milliGRAM(s) Oral three times a day  heparin  Infusion.  Unit(s)/Hr (11 mL/Hr) IV Continuous <Continuous>  lactobacillus acidophilus 1 Tablet(s) Oral two times a day with meals  levothyroxine 50 MICROGram(s) Oral daily  metoprolol tartrate 25 milliGRAM(s) Oral two times a day  pantoprazole    Tablet 40 milliGRAM(s) Oral before breakfast  senna 2 Tablet(s) Oral at bedtime  simvastatin 10 milliGRAM(s) Oral at bedtime  traMADol 50 milliGRAM(s) Oral every 6 hours  warfarin 2.5 milliGRAM(s) Oral once    MEDICATIONS  (PRN):  aluminum hydroxide/magnesium hydroxide/simethicone Suspension 30 milliLiter(s) Oral every 4 hours PRN Dyspepsia  heparin   Injectable 4500 Unit(s) IV Push every 6 hours PRN For aPTT less than 40  heparin   Injectable 2000 Unit(s) IV Push every 6 hours PRN For aPTT between 40 - 57  melatonin 3 milliGRAM(s) Oral at bedtime PRN Insomnia  morphine  - Injectable 2 milliGRAM(s) IV Push every 6 hours PRN Severe Pain (7 - 10)  ondansetron Injectable 4 milliGRAM(s) IV Push every 8 hours PRN Nausea and/or Vomiting      OBJECTIVE    T(C): 36.7 (08-20-23 @ 13:03), Max: 36.9 (08-19-23 @ 20:14)  HR: 80 (08-20-23 @ 13:03) (79 - 84)  BP: 127/71 (08-20-23 @ 13:03) (125/55 - 136/74)  RR: 19 (08-20-23 @ 13:03) (18 - 19)  SpO2: 95% (08-20-23 @ 13:03) (94% - 97%)  Wt(kg): --  I&O's Summary    19 Aug 2023 07:01  -  20 Aug 2023 07:00  --------------------------------------------------------  IN: 143 mL / OUT: 1300 mL / NET: -1157 mL          REVIEW OF SYSTEMS:  CONSTITUTIONAL: No fever, weight loss, or fatigue  EYES: No eye pain, visual disturbances, or discharge  ENMT:   No sinus or throat pain  NECK: No pain or stiffness  RESPIRATORY: No cough, wheezing, chills or hemoptysis; No shortness of breath  CARDIOVASCULAR: No chest pain, palpitations, dizziness, or leg swelling  GASTROINTESTINAL: No abdominal pain. No nausea, vomiting; No diarrhea or constipation. No melena or hematochezia.  GENITOURINARY: No dysuria, frequency, hematuria, or incontinence  NEUROLOGICAL: No headaches, memory loss, loss of strength, numbness, or tremors  SKIN: No itching, burning, rashes, or lesions   MUSCULOSKELETAL: No joint pain or swelling; No muscle, back, or extremity pain    PHYSICAL EXAM:  Appearance: NAD. VS past 24 hrs -as above   HEENT:   Moist oral mucosa. Conjunctiva clear b/l.   Neck : supple  Respiratory: Lungs CTAB.  Gastrointestinal:  Soft, nontender. No rebound. No rigidity. BS present	  Cardiovascular: RRR ,S1S2 present  Neurologic: Non-focal. Moving all extremities.  Extremities: No edema. No erythema. No calf tenderness.  Skin: No rashes, No ecchymoses, No cyanosis.	  wounds ,skin lesions-See skin assesment flow sheet   LABS:                        9.1    6.24  )-----------( 494      ( 20 Aug 2023 06:09 )             28.8     08-20    140  |  106  |  6<L>  ----------------------------<  80  4.5   |  29  |  0.45<L>    Ca    8.0<L>      20 Aug 2023 06:09    TPro  5.4<L>  /  Alb  2.2<L>  /  TBili  0.4  /  DBili  x   /  AST  17  /  ALT  10<L>  /  AlkPhos  60  08-19    CAPILLARY BLOOD GLUCOSE        PT/INR - ( 20 Aug 2023 06:09 )   PT: 18.4 sec;   INR: 1.59 ratio         PTT - ( 20 Aug 2023 06:09 )  PTT:152.0 sec  Urinalysis Basic - ( 20 Aug 2023 06:09 )    Color: x / Appearance: x / SG: x / pH: x  Gluc: 80 mg/dL / Ketone: x  / Bili: x / Urobili: x   Blood: x / Protein: x / Nitrite: x   Leuk Esterase: x / RBC: x / WBC x   Sq Epi: x / Non Sq Epi: x / Bacteria: x        RADIOLOGY & ADDITIONAL TESTS:   reviewed elctronically  ASSESSMENT/PLAN: 	    25 minutes aggregate time was spent on this visit, 50% visit time spent in care co-ordination with other attendings and counselling patient .I have discussed care plan with patient / HCP/family member ,who expressed understanding of problems treatment and their effect and side effects, alternatives in details. I have asked if they have any questions and concerns and appropriately addressed them to best of my ability.

## 2023-08-20 NOTE — PROGRESS NOTE ADULT - SUBJECTIVE AND OBJECTIVE BOX
CHIEF COMPLAINT/ REASON FOR VISIT  .. Patient was seen to address the  issue listed under PROBLEM LIST which is located toward bottom of this note     REJI BERGER    IRMA 1EAS 113 W[address]    Allergies    per son &quot;issues with certain anitibiotics&quot; does not remember the names (Unknown)    Intolerances        PAST MEDICAL & SURGICAL HISTORY:  Hypertension      CVA (cerebral vascular accident)      Depression      Constipation      Neuropathy      Hyperlipidemia      Grade I diastolic dysfunction      Afib      Neuropathy      VHD (valvular heart disease)      Aortic valvar stenosis      Hypothyroidism      GERD (gastroesophageal reflux disease)      No significant past surgical history          FAMILY HISTORY:      Home Medications:  benzonatate 100 mg oral capsule: 1 cap(s) orally 3 times a day as needed for  cough (18 Aug 2023 15:32)  Biofreeze 4% topical gel: Apply topically to affected area 2 times a day to R shoulder and L knee (18 Aug 2023 15:34)  docusate sodium 100 mg oral capsule: 2 cap(s) orally once a day (at bedtime) (18 Aug 2023 15:36)  famotidine 20 mg oral tablet: 1 tab(s) orally once a day (18 Aug 2023 15:38)  miconazole 2% topical cream: Apply topically to affected area 2 times a day (18 Aug 2023 15:30)  senna (sennosides) 8.6 mg oral tablet: 1 tab(s) orally once a day as needed for  constipation (18 Aug 2023 15:43)  traMADol 50 mg oral tablet: 1 tab(s) orally every 6 hours as needed for pain (18 Aug 2023 15:45)  traMADol 50 mg oral tablet: 1 tab(s) orally 2 times a day (18 Aug 2023 15:45)  Tylenol Extra Strength 500 mg oral tablet: 2 tab(s) orally once a day (in the morning) and 1 tablet twice a day (18 Aug 2023 15:54)      MEDICATIONS  (STANDING):  acetaminophen     Tablet .. 650 milliGRAM(s) Oral every 6 hours  amLODIPine   Tablet 5 milliGRAM(s) Oral daily  cholecalciferol 2000 Unit(s) Oral daily  collagenase Ointment 1 Application(s) Topical daily  escitalopram 20 milliGRAM(s) Oral daily  ferrous    sulfate 325 milliGRAM(s) Oral daily  folic acid 1 milliGRAM(s) Oral daily  furosemide    Tablet 20 milliGRAM(s) Oral daily  gabapentin 300 milliGRAM(s) Oral three times a day  heparin  Infusion.  Unit(s)/Hr (11 mL/Hr) IV Continuous <Continuous>  lactobacillus acidophilus 1 Tablet(s) Oral two times a day with meals  levothyroxine 50 MICROGram(s) Oral daily  metoprolol tartrate 25 milliGRAM(s) Oral two times a day  pantoprazole    Tablet 40 milliGRAM(s) Oral before breakfast  senna 2 Tablet(s) Oral at bedtime  simvastatin 10 milliGRAM(s) Oral at bedtime  traMADol 50 milliGRAM(s) Oral every 6 hours    MEDICATIONS  (PRN):  aluminum hydroxide/magnesium hydroxide/simethicone Suspension 30 milliLiter(s) Oral every 4 hours PRN Dyspepsia  heparin   Injectable 2000 Unit(s) IV Push every 6 hours PRN For aPTT between 40 - 57  heparin   Injectable 4500 Unit(s) IV Push every 6 hours PRN For aPTT less than 40  melatonin 3 milliGRAM(s) Oral at bedtime PRN Insomnia  morphine  - Injectable 2 milliGRAM(s) IV Push every 6 hours PRN Severe Pain (7 - 10)  ondansetron Injectable 4 milliGRAM(s) IV Push every 8 hours PRN Nausea and/or Vomiting              Vital Signs Last 24 Hrs  T(C): 36.7 (20 Aug 2023 05:09), Max: 37.3 (19 Aug 2023 13:01)  T(F): 98 (20 Aug 2023 05:09), Max: 99.1 (19 Aug 2023 13:01)  HR: 79 (20 Aug 2023 05:09) (76 - 84)  BP: 125/55 (20 Aug 2023 05:09) (125/55 - 142/64)  BP(mean): --  RR: 18 (20 Aug 2023 05:09) (18 - 18)  SpO2: 97% (20 Aug 2023 05:09) (94% - 97%)    Parameters below as of 20 Aug 2023 05:09  Patient On (Oxygen Delivery Method): nasal cannula  O2 Flow (L/min): 4        08-19-23 @ 07:01  -  08-20-23 @ 07:00  --------------------------------------------------------  IN: 143 mL / OUT: 1300 mL / NET: -1157 mL              LABS:                        9.1    6.24  )-----------( 494      ( 20 Aug 2023 06:09 )             28.8     08-20    140  |  106  |  6<L>  ----------------------------<  80  4.5   |  29  |  0.45<L>    Ca    8.0<L>      20 Aug 2023 06:09    TPro  5.4<L>  /  Alb  2.2<L>  /  TBili  0.4  /  DBili  x   /  AST  17  /  ALT  10<L>  /  AlkPhos  60  08-19    PT/INR - ( 19 Aug 2023 06:27 )   PT: 18.3 sec;   INR: 1.59 ratio         PTT - ( 19 Aug 2023 06:27 )  PTT:58.3 sec  Urinalysis Basic - ( 20 Aug 2023 06:09 )    Color: x / Appearance: x / SG: x / pH: x  Gluc: 80 mg/dL / Ketone: x  / Bili: x / Urobili: x   Blood: x / Protein: x / Nitrite: x   Leuk Esterase: x / RBC: x / WBC x   Sq Epi: x / Non Sq Epi: x / Bacteria: x            WBC:  WBC Count: 6.24 K/uL (08-20 @ 06:09)  WBC Count: 6.96 K/uL (08-19 @ 06:27)  WBC Count: 8.13 K/uL (08-18 @ 23:54)  WBC Count: 8.55 K/uL (08-18 @ 14:10)      MICROBIOLOGY:  RECENT CULTURES:              PT/INR - ( 19 Aug 2023 06:27 )   PT: 18.3 sec;   INR: 1.59 ratio         PTT - ( 19 Aug 2023 06:27 )  PTT:58.3 sec    Sodium:  Sodium: 140 mmol/L (08-20 @ 06:09)  Sodium: 144 mmol/L (08-19 @ 06:27)  Sodium: 139 mmol/L (08-18 @ 14:10)      0.45 mg/dL 08-20 @ 06:09  0.51 mg/dL 08-19 @ 06:27  0.68 mg/dL 08-18 @ 14:10      Hemoglobin:  Hemoglobin: 9.1 g/dL (08-20 @ 06:09)  Hemoglobin: 9.2 g/dL (08-19 @ 06:27)  Hemoglobin: 9.1 g/dL (08-18 @ 23:54)  Hemoglobin: 10.1 g/dL (08-18 @ 14:10)      Platelets: Platelet Count - Automated: 494 K/uL (08-20 @ 06:09)  Platelet Count - Automated: 536 K/uL (08-19 @ 06:27)  Platelet Count - Automated: 525 K/uL (08-18 @ 23:54)  Platelet Count - Automated: 534 K/uL (08-18 @ 14:10)      LIVER FUNCTIONS - ( 19 Aug 2023 06:27 )  Alb: 2.2 g/dL / Pro: 5.4 g/dL / ALK PHOS: 60 U/L / ALT: 10 U/L / AST: 17 U/L / GGT: x             Urinalysis Basic - ( 20 Aug 2023 06:09 )    Color: x / Appearance: x / SG: x / pH: x  Gluc: 80 mg/dL / Ketone: x  / Bili: x / Urobili: x   Blood: x / Protein: x / Nitrite: x   Leuk Esterase: x / RBC: x / WBC x   Sq Epi: x / Non Sq Epi: x / Bacteria: x        RADIOLOGY & ADDITIONAL STUDIES:      MICROBIOLOGY:  RECENT CULTURES:

## 2023-08-20 NOTE — PHYSICAL THERAPY INITIAL EVALUATION ADULT - PATIENT PROFILE REVIEW, REHAB EVAL
yes
Discharged
Continue Regimen: Retin-A Micropump QPM \\nAczone 7.5% gel with pump QAM
Plan: Discussed spironolactone in detail. Pt reports acne has gotten better over the last week and will cont to monitor- if flares again, will call for rx
Detail Level: Zone
Render In Strict Bullet Format?: No

## 2023-08-20 NOTE — PHYSICAL THERAPY INITIAL EVALUATION ADULT - PERTINENT HX OF CURRENT PROBLEM, REHAB EVAL
89 Female with   Past medical history of CVA on Coumadin with right-sided weakness, hypertension hyperlipidemia CAD history of A-fib on Coumadin recently admitted at [facility] with acute metabolic encephalopathy 2/2 to uti and cellulitis of LE  .  P sent in from [facility] today  for after being found to have a DVT in her left Common femoral, great saphenous, popliteal vein.  Right lower extremity negative for DVT.  Ultrasound done today. patient reports left leg pain. patient denies fevers, chills, chest pain, shortness of breath. patient on coumadin with INR of 2.28 on 8/14 ,today in ER 1.57 .Admitted for vascular& hematology eval since patient developed DVT on coumadin .US venous  was done beginning of month during recent hospitalization and was negative for DVT Palliative care consult requested ,to discuss advance directives and complete MOLST During previous admission son was consulted regarding residential hospice at [facility] and palliative care issues /GOC discussion -could not decide on code status and patient remained full code

## 2023-08-20 NOTE — PROGRESS NOTE ADULT - SUBJECTIVE AND OBJECTIVE BOX
Date/Time Patient Seen:  		  Referring MD:   Data Reviewed	       Patient is a 89y old  Female who presents with a chief complaint of Acute embolism and thrombosis of deep vein of lower extremity     (19 Aug 2023 14:14)      Subjective/HPI     PAST MEDICAL & SURGICAL HISTORY:  Hypertension    CVA (cerebral vascular accident)    Depression    Constipation    Neuropathy    Constipation    Hyperlipidemia    Grade I diastolic dysfunction    Afib    Neuropathy    VHD (valvular heart disease)    Aortic valvar stenosis    Hypothyroidism    GERD (gastroesophageal reflux disease)    No significant past surgical history          Medication list         MEDICATIONS  (STANDING):  acetaminophen     Tablet .. 650 milliGRAM(s) Oral every 6 hours  amLODIPine   Tablet 5 milliGRAM(s) Oral daily  cholecalciferol 2000 Unit(s) Oral daily  collagenase Ointment 1 Application(s) Topical daily  escitalopram 20 milliGRAM(s) Oral daily  ferrous    sulfate 325 milliGRAM(s) Oral daily  folic acid 1 milliGRAM(s) Oral daily  furosemide    Tablet 20 milliGRAM(s) Oral daily  gabapentin 300 milliGRAM(s) Oral three times a day  heparin  Infusion.  Unit(s)/Hr (11 mL/Hr) IV Continuous <Continuous>  lactobacillus acidophilus 1 Tablet(s) Oral two times a day with meals  levothyroxine 50 MICROGram(s) Oral daily  metoprolol tartrate 25 milliGRAM(s) Oral two times a day  pantoprazole    Tablet 40 milliGRAM(s) Oral before breakfast  senna 2 Tablet(s) Oral at bedtime  simvastatin 10 milliGRAM(s) Oral at bedtime  traMADol 50 milliGRAM(s) Oral every 6 hours    MEDICATIONS  (PRN):  aluminum hydroxide/magnesium hydroxide/simethicone Suspension 30 milliLiter(s) Oral every 4 hours PRN Dyspepsia  heparin   Injectable 2000 Unit(s) IV Push every 6 hours PRN For aPTT between 40 - 57  heparin   Injectable 4500 Unit(s) IV Push every 6 hours PRN For aPTT less than 40  melatonin 3 milliGRAM(s) Oral at bedtime PRN Insomnia  morphine  - Injectable 2 milliGRAM(s) IV Push every 6 hours PRN Severe Pain (7 - 10)  ondansetron Injectable 4 milliGRAM(s) IV Push every 8 hours PRN Nausea and/or Vomiting         Vitals log        ICU Vital Signs Last 24 Hrs  T(C): 36.7 (20 Aug 2023 05:09), Max: 37.3 (19 Aug 2023 13:01)  T(F): 98 (20 Aug 2023 05:09), Max: 99.1 (19 Aug 2023 13:01)  HR: 79 (20 Aug 2023 05:09) (76 - 84)  BP: 125/55 (20 Aug 2023 05:09) (125/55 - 142/64)  BP(mean): --  ABP: --  ABP(mean): --  RR: 18 (20 Aug 2023 05:09) (18 - 18)  SpO2: 97% (20 Aug 2023 05:09) (94% - 97%)    O2 Parameters below as of 20 Aug 2023 05:09  Patient On (Oxygen Delivery Method): nasal cannula  O2 Flow (L/min): 4               Input and Output:  I&O's Detail    18 Aug 2023 07:01  -  19 Aug 2023 07:00  --------------------------------------------------------  IN:    Oral Fluid: 120 mL  Total IN: 120 mL    OUT:    Voided (mL): 450 mL  Total OUT: 450 mL    Total NET: -330 mL      19 Aug 2023 07:01  -  20 Aug 2023 06:11  --------------------------------------------------------  IN:    Heparin Infusion: 121 mL  Total IN: 121 mL    OUT:    Voided (mL): 1300 mL  Total OUT: 1300 mL    Total NET: -1179 mL          Lab Data                        9.2    6.96  )-----------( 536      ( 19 Aug 2023 06:27 )             28.5     08-19    144  |  108  |  9   ----------------------------<  86  3.4<L>   |  31  |  0.51    Ca    8.0<L>      19 Aug 2023 06:27    TPro  5.4<L>  /  Alb  2.2<L>  /  TBili  0.4  /  DBili  x   /  AST  17  /  ALT  10<L>  /  AlkPhos  60  08-19            Review of Systems	      Objective     Physical Examination    heart s1s2  lug dc BS  head nc      Pertinent Lab findings & Imaging      Summer:  NO   Adequate UO     I&O's Detail    18 Aug 2023 07:01  -  19 Aug 2023 07:00  --------------------------------------------------------  IN:    Oral Fluid: 120 mL  Total IN: 120 mL    OUT:    Voided (mL): 450 mL  Total OUT: 450 mL    Total NET: -330 mL      19 Aug 2023 07:01  -  20 Aug 2023 06:11  --------------------------------------------------------  IN:    Heparin Infusion: 121 mL  Total IN: 121 mL    OUT:    Voided (mL): 1300 mL  Total OUT: 1300 mL    Total NET: -1179 mL               Discussed with:     Cultures:	        Radiology

## 2023-08-21 LAB
ANION GAP SERPL CALC-SCNC: 3 MMOL/L — LOW (ref 5–17)
APTT BLD: 75 SEC — HIGH (ref 24.5–35.6)
APTT BLD: 84.3 SEC — HIGH (ref 24.5–35.6)
BUN SERPL-MCNC: 5 MG/DL — LOW (ref 7–23)
CALCIUM SERPL-MCNC: 8 MG/DL — LOW (ref 8.5–10.1)
CHLORIDE SERPL-SCNC: 102 MMOL/L — SIGNIFICANT CHANGE UP (ref 96–108)
CO2 SERPL-SCNC: 34 MMOL/L — HIGH (ref 22–31)
CREAT SERPL-MCNC: 0.59 MG/DL — SIGNIFICANT CHANGE UP (ref 0.5–1.3)
EGFR: 85 ML/MIN/1.73M2 — SIGNIFICANT CHANGE UP
GLUCOSE SERPL-MCNC: 94 MG/DL — SIGNIFICANT CHANGE UP (ref 70–99)
HCT VFR BLD CALC: 30.2 % — LOW (ref 34.5–45)
HCT VFR BLD CALC: 31.5 % — LOW (ref 34.5–45)
HGB BLD-MCNC: 10 G/DL — LOW (ref 11.5–15.5)
HGB BLD-MCNC: 9.3 G/DL — LOW (ref 11.5–15.5)
INR BLD: 1.56 RATIO — HIGH (ref 0.85–1.18)
MCHC RBC-ENTMCNC: 30.8 GM/DL — LOW (ref 32–36)
MCHC RBC-ENTMCNC: 31.7 GM/DL — LOW (ref 32–36)
MCHC RBC-ENTMCNC: 37.1 PG — HIGH (ref 27–34)
MCHC RBC-ENTMCNC: 37.7 PG — HIGH (ref 27–34)
MCV RBC AUTO: 118.9 FL — HIGH (ref 80–100)
MCV RBC AUTO: 120.3 FL — HIGH (ref 80–100)
NRBC # BLD: 0 /100 WBCS — SIGNIFICANT CHANGE UP (ref 0–0)
NRBC # BLD: 0 /100 WBCS — SIGNIFICANT CHANGE UP (ref 0–0)
PLATELET # BLD AUTO: 340 K/UL — SIGNIFICANT CHANGE UP (ref 150–400)
PLATELET # BLD AUTO: 546 K/UL — HIGH (ref 150–400)
POTASSIUM SERPL-MCNC: 3.6 MMOL/L — SIGNIFICANT CHANGE UP (ref 3.5–5.3)
POTASSIUM SERPL-SCNC: 3.6 MMOL/L — SIGNIFICANT CHANGE UP (ref 3.5–5.3)
PROTHROM AB SERPL-ACNC: 18 SEC — HIGH (ref 9.5–13)
RBC # BLD: 2.51 M/UL — LOW (ref 3.8–5.2)
RBC # BLD: 2.65 M/UL — LOW (ref 3.8–5.2)
RBC # FLD: 16.6 % — HIGH (ref 10.3–14.5)
RBC # FLD: 17.2 % — HIGH (ref 10.3–14.5)
SODIUM SERPL-SCNC: 139 MMOL/L — SIGNIFICANT CHANGE UP (ref 135–145)
WBC # BLD: 7.52 K/UL — SIGNIFICANT CHANGE UP (ref 3.8–10.5)
WBC # BLD: 9.05 K/UL — SIGNIFICANT CHANGE UP (ref 3.8–10.5)
WBC # FLD AUTO: 7.52 K/UL — SIGNIFICANT CHANGE UP (ref 3.8–10.5)
WBC # FLD AUTO: 9.05 K/UL — SIGNIFICANT CHANGE UP (ref 3.8–10.5)

## 2023-08-21 PROCEDURE — 99233 SBSQ HOSP IP/OBS HIGH 50: CPT

## 2023-08-21 RX ORDER — WARFARIN SODIUM 2.5 MG/1
3.5 TABLET ORAL ONCE
Refills: 0 | Status: COMPLETED | OUTPATIENT
Start: 2023-08-21 | End: 2023-08-21

## 2023-08-21 RX ADMIN — Medication 25 MILLIGRAM(S): at 05:33

## 2023-08-21 RX ADMIN — Medication 650 MILLIGRAM(S): at 18:23

## 2023-08-21 RX ADMIN — TRAMADOL HYDROCHLORIDE 50 MILLIGRAM(S): 50 TABLET ORAL at 12:47

## 2023-08-21 RX ADMIN — HEPARIN SODIUM 900 UNIT(S)/HR: 5000 INJECTION INTRAVENOUS; SUBCUTANEOUS at 05:25

## 2023-08-21 RX ADMIN — HEPARIN SODIUM 900 UNIT(S)/HR: 5000 INJECTION INTRAVENOUS; SUBCUTANEOUS at 07:23

## 2023-08-21 RX ADMIN — GABAPENTIN 300 MILLIGRAM(S): 400 CAPSULE ORAL at 05:33

## 2023-08-21 RX ADMIN — TRAMADOL HYDROCHLORIDE 50 MILLIGRAM(S): 50 TABLET ORAL at 05:34

## 2023-08-21 RX ADMIN — Medication 2000 UNIT(S): at 11:40

## 2023-08-21 RX ADMIN — GABAPENTIN 300 MILLIGRAM(S): 400 CAPSULE ORAL at 13:23

## 2023-08-21 RX ADMIN — Medication 25 MILLIGRAM(S): at 18:23

## 2023-08-21 RX ADMIN — Medication 650 MILLIGRAM(S): at 12:47

## 2023-08-21 RX ADMIN — Medication 50 MICROGRAM(S): at 05:34

## 2023-08-21 RX ADMIN — Medication 650 MILLIGRAM(S): at 06:04

## 2023-08-21 RX ADMIN — TRAMADOL HYDROCHLORIDE 50 MILLIGRAM(S): 50 TABLET ORAL at 11:41

## 2023-08-21 RX ADMIN — Medication 650 MILLIGRAM(S): at 05:34

## 2023-08-21 RX ADMIN — Medication 1 TABLET(S): at 08:06

## 2023-08-21 RX ADMIN — Medication 20 MILLIGRAM(S): at 05:34

## 2023-08-21 RX ADMIN — AMLODIPINE BESYLATE 5 MILLIGRAM(S): 2.5 TABLET ORAL at 05:34

## 2023-08-21 RX ADMIN — Medication 650 MILLIGRAM(S): at 11:40

## 2023-08-21 RX ADMIN — ESCITALOPRAM OXALATE 20 MILLIGRAM(S): 10 TABLET, FILM COATED ORAL at 11:41

## 2023-08-21 RX ADMIN — HEPARIN SODIUM 900 UNIT(S)/HR: 5000 INJECTION INTRAVENOUS; SUBCUTANEOUS at 11:34

## 2023-08-21 RX ADMIN — TRAMADOL HYDROCHLORIDE 50 MILLIGRAM(S): 50 TABLET ORAL at 06:04

## 2023-08-21 RX ADMIN — Medication 325 MILLIGRAM(S): at 11:41

## 2023-08-21 RX ADMIN — Medication 1 APPLICATION(S): at 11:43

## 2023-08-21 RX ADMIN — HEPARIN SODIUM 900 UNIT(S)/HR: 5000 INJECTION INTRAVENOUS; SUBCUTANEOUS at 19:29

## 2023-08-21 RX ADMIN — SIMVASTATIN 10 MILLIGRAM(S): 20 TABLET, FILM COATED ORAL at 22:27

## 2023-08-21 RX ADMIN — WARFARIN SODIUM 3.5 MILLIGRAM(S): 2.5 TABLET ORAL at 22:28

## 2023-08-21 RX ADMIN — PANTOPRAZOLE SODIUM 40 MILLIGRAM(S): 20 TABLET, DELAYED RELEASE ORAL at 05:33

## 2023-08-21 RX ADMIN — Medication 1 MILLIGRAM(S): at 11:41

## 2023-08-21 RX ADMIN — Medication 1 TABLET(S): at 18:23

## 2023-08-21 RX ADMIN — GABAPENTIN 300 MILLIGRAM(S): 400 CAPSULE ORAL at 22:27

## 2023-08-21 RX ADMIN — TRAMADOL HYDROCHLORIDE 50 MILLIGRAM(S): 50 TABLET ORAL at 19:23

## 2023-08-21 RX ADMIN — Medication 650 MILLIGRAM(S): at 19:23

## 2023-08-21 RX ADMIN — TRAMADOL HYDROCHLORIDE 50 MILLIGRAM(S): 50 TABLET ORAL at 18:23

## 2023-08-21 NOTE — PROGRESS NOTE ADULT - PROBLEM SELECTOR PLAN 1
Repeated Doppler is negative - continue Heparin drip , leg elevation ,pain management ,Coumadin resumed as per hematology recommendation ,daily PT/INR

## 2023-08-21 NOTE — CARE COORDINATION ASSESSMENT. - NSDCPLANSERVICES_GEN_ALL_CORE
Anticipated dc back to [facility] when medically cleared, per Grecia at [facility], the pt doesn't need an RN referral for wound care, the patient reportedly has an RN at [facility].   CM to follow, SW to remain available./Other

## 2023-08-21 NOTE — CHART NOTE - NSCHARTNOTEFT_GEN_A_CORE
Assessment: Pt seen for nutrition follow-up. Chart reviewed, hospital course noted.    Brief hx: Pt is a "89 Female with past medical history of CVA on Coumadin with right-sided weakness, hypertension hyperlipidemia CAD history of A-fib on Coumadin recently admitted at [facility] with acute metabolic encephalopathy 2/2 to uti and cellulitis of LE."    Swallow evaluation completed this am (8/21); SLP recommended soft and bite sized solids with thin fluids. Pt currently on soft/bite size, lactose free diet with ensure clear BID.     Factors impacting intake: [ ] none [ ] nausea  [ ] vomiting [ ] diarrhea [ ] constipation  [ ]chewing problems [ ] swallowing issues  [ ] other:     Diet Prescription: Diet, Soft and Bite Sized:   Lactose Restricted (Milk Sugar Intoler.)  Supplement Feeding Modality:  Oral  Ensure Clear Cans or Servings Per Day:  2       Frequency:  Two Times a day (08-19-23 @ 14:13) [Active]    Intake:     Current Weight: Weight (kg): 56.7 (08-18 @ 13:02), 8/21 126.1# (2+ L leg, 3+ R leg edema noted)  % Weight Change    Pertinent Medications: MEDICATIONS  (STANDING):  acetaminophen     Tablet .. 650 milliGRAM(s) Oral every 6 hours  amLODIPine   Tablet 5 milliGRAM(s) Oral daily  cholecalciferol 2000 Unit(s) Oral daily  collagenase Ointment 1 Application(s) Topical daily  escitalopram 20 milliGRAM(s) Oral daily  ferrous    sulfate 325 milliGRAM(s) Oral daily  folic acid 1 milliGRAM(s) Oral daily  furosemide    Tablet 20 milliGRAM(s) Oral daily  gabapentin 300 milliGRAM(s) Oral three times a day  heparin  Infusion. 900 Unit(s)/Hr (9 mL/Hr) IV Continuous <Continuous>  lactobacillus acidophilus 1 Tablet(s) Oral two times a day with meals  levothyroxine 50 MICROGram(s) Oral daily  metoprolol tartrate 25 milliGRAM(s) Oral two times a day  pantoprazole    Tablet 40 milliGRAM(s) Oral before breakfast  senna 2 Tablet(s) Oral at bedtime  simvastatin 10 milliGRAM(s) Oral at bedtime  traMADol 50 milliGRAM(s) Oral every 6 hours  warfarin 3.5 milliGRAM(s) Oral once    MEDICATIONS  (PRN):  aluminum hydroxide/magnesium hydroxide/simethicone Suspension 30 milliLiter(s) Oral every 4 hours PRN Dyspepsia  heparin   Injectable 4500 Unit(s) IV Push every 6 hours PRN For aPTT less than 40  heparin   Injectable 2000 Unit(s) IV Push every 6 hours PRN For aPTT between 40 - 57  melatonin 3 milliGRAM(s) Oral at bedtime PRN Insomnia  morphine  - Injectable 2 milliGRAM(s) IV Push every 6 hours PRN Severe Pain (7 - 10)  ondansetron Injectable 4 milliGRAM(s) IV Push every 8 hours PRN Nausea and/or Vomiting    Pertinent Labs: 08-21 Na139 mmol/L Glu 94 mg/dL K+ 3.6 mmol/L Cr  0.59 mg/dL BUN 5 mg/dL<L> 08-19 Alb 2.2 g/dL<L> 08-19 Chol 69 mg/dL LDL --    HDL 31 mg/dL<L> Trig 82 mg/dL    Skin: L leg wound    Estimated Needs:   [X] no change since previous assessment: based on #/56.6kg  30-35kcal/kg (1698-1981kcal)  1.2-1.4g pro/kg (68-79gm protein)  [ ] recalculated:     Previous Nutrition Diagnosis:   [ ] Inadequate Energy Intake [ ]Inadequate Oral Intake [ ] Excessive Energy Intake   [ ] Underweight [ ] Increased Nutrient Needs [ ] Overweight/Obesity [X] Swallowing Difficulty  [ ] Altered GI Function [ ] Unintended Weight Loss [ ] Food & Nutrition Related Knowledge Deficit [X] Malnutrition (moderate/chronic)    Nutrition Diagnosis is [X] ongoing  [ ] resolved [ ] not applicable     New Nutrition Diagnosis: [X] not applicable     Interventions:   Recommend  [ ] Change Diet To:  [ ] Nutrition Supplement  [ ] Nutrition Support  [X] Other: Continue current diet as ordered; honor food preferences as able to optimize po intake/tolerance  Assistance/encouragement at meal times as needed  Recommend MVI/minerals daily    Monitoring and Evaluation:   [X] PO intake [ x ] Tolerance to diet prescription [ x ] weights [ x ] labs[ x ] follow up per protocol  [X] other: s/s GI distress, bowel function, skin integrity/ edema Assessment: Pt seen for nutrition follow-up. New referral ordered for food preferences. Chart reviewed, hospital course noted.    Brief hx: Pt is a "89 Female with past medical history of CVA on Coumadin with right-sided weakness, hypertension hyperlipidemia CAD history of A-fib on Coumadin recently admitted at [facility] with acute metabolic encephalopathy 2/2 to uti and cellulitis of LE."    Visited pt at bedside this afternoon. Observed fair/good intake of lunch meal today (~75% of meal). Pt reports good appetite. Continues to report dislike to diet texture/consistency. Swallow evaluation completed this am (8/21); SLP recommended soft and bite sized solids with thin liquids. Pt currently on soft/bite size, lactose free diet with ensure clear BID. Pt reports fair intake of supplement.  Pt denies N/V. Reports diarrhea today. Recommend d/c bowel regimen. Food preferences obtained to optimize po intake/tolerance.    Factors impacting intake: [X] none [ ] nausea  [ ] vomiting [ ] diarrhea [ ] constipation  [ ]chewing problems [ ] swallowing issues  [ ] other:     Diet Prescription: Diet, Soft and Bite Sized:   Lactose Restricted (Milk Sugar Intoler.)  Supplement Feeding Modality:  Oral  Ensure Clear Cans or Servings Per Day:  2       Frequency:  Two Times a day (08-19-23 @ 14:13) [Active]    Intake: fair    Current Weight: Weight (kg): 56.7 (08-18 @ 13:02), 8/21 126.1# (2+ L leg, 3+ R leg edema noted)  % Weight Change    Pertinent Medications: MEDICATIONS  (STANDING):  acetaminophen     Tablet .. 650 milliGRAM(s) Oral every 6 hours  amLODIPine   Tablet 5 milliGRAM(s) Oral daily  cholecalciferol 2000 Unit(s) Oral daily  collagenase Ointment 1 Application(s) Topical daily  escitalopram 20 milliGRAM(s) Oral daily  ferrous    sulfate 325 milliGRAM(s) Oral daily  folic acid 1 milliGRAM(s) Oral daily  furosemide    Tablet 20 milliGRAM(s) Oral daily  gabapentin 300 milliGRAM(s) Oral three times a day  heparin  Infusion. 900 Unit(s)/Hr (9 mL/Hr) IV Continuous <Continuous>  lactobacillus acidophilus 1 Tablet(s) Oral two times a day with meals  levothyroxine 50 MICROGram(s) Oral daily  metoprolol tartrate 25 milliGRAM(s) Oral two times a day  pantoprazole    Tablet 40 milliGRAM(s) Oral before breakfast  senna 2 Tablet(s) Oral at bedtime  simvastatin 10 milliGRAM(s) Oral at bedtime  traMADol 50 milliGRAM(s) Oral every 6 hours  warfarin 3.5 milliGRAM(s) Oral once    MEDICATIONS  (PRN):  aluminum hydroxide/magnesium hydroxide/simethicone Suspension 30 milliLiter(s) Oral every 4 hours PRN Dyspepsia  heparin   Injectable 4500 Unit(s) IV Push every 6 hours PRN For aPTT less than 40  heparin   Injectable 2000 Unit(s) IV Push every 6 hours PRN For aPTT between 40 - 57  melatonin 3 milliGRAM(s) Oral at bedtime PRN Insomnia  morphine  - Injectable 2 milliGRAM(s) IV Push every 6 hours PRN Severe Pain (7 - 10)  ondansetron Injectable 4 milliGRAM(s) IV Push every 8 hours PRN Nausea and/or Vomiting    Pertinent Labs: 08-21 Na139 mmol/L Glu 94 mg/dL K+ 3.6 mmol/L Cr  0.59 mg/dL BUN 5 mg/dL<L> 08-19 Alb 2.2 g/dL<L> 08-19 Chol 69 mg/dL LDL --    HDL 31 mg/dL<L> Trig 82 mg/dL    Skin: L leg wound    Estimated Needs:   [X] no change since previous assessment: based on #/56.6kg  30-35kcal/kg (1698-1981kcal)  1.2-1.4g pro/kg (68-79gm protein)  [ ] recalculated:     Previous Nutrition Diagnosis:   [ ] Inadequate Energy Intake [ ]Inadequate Oral Intake [ ] Excessive Energy Intake   [ ] Underweight [ ] Increased Nutrient Needs [ ] Overweight/Obesity [X] Swallowing Difficulty  [ ] Altered GI Function [ ] Unintended Weight Loss [ ] Food & Nutrition Related Knowledge Deficit [X] Malnutrition (moderate/chronic)    Nutrition Diagnosis is [X] ongoing  [ ] resolved [ ] not applicable     New Nutrition Diagnosis: [X] not applicable     Interventions:   Recommend  [ ] Change Diet To:  [ ] Nutrition Supplement  [ ] Nutrition Support  [X] Other: Continue current diet as ordered; honor food preferences as able to optimize po intake/tolerance  Assistance/encouragement at meal times as needed  Recommend MVI/minerals daily    Monitoring and Evaluation:   [X] PO intake [ x ] Tolerance to diet prescription [ x ] weights [ x ] labs[ x ] follow up per protocol  [X] other: s/s GI distress, bowel function, skin integrity/ edema

## 2023-08-21 NOTE — PROGRESS NOTE ADULT - SUBJECTIVE AND OBJECTIVE BOX
PROGRESS NOTE  Patient is a 89y old  Female who presents with a chief complaint of left leg pain (21 Aug 2023 08:25)    Chart and available morning labs /imaging are reviewed electronically , urgent issues addressed . More information  is being added upon completion of rounds , when more information is collected and management discussed with consultants , medical staff and social service/case management on the floor   OVERNIGHT  No new issues reported by medical staff . All above noted Patient is resting in a bed comfortably  .No distress noted   INR remains subtherapeutic   HPI:  89 Female with   Past medical history of CVA on Coumadin with right-sided weakness, hypertension hyperlipidemia CAD history of A-fib on Coumadin recently admitted at [facility] with acute metabolic encephalopathy 2/2 to uti and cellulitis of LE  .  P sent in from [facility] today  for after being found to have a DVT in her left Common femoral, great saphenous, popliteal vein.  Right lower extremity negative for DVT.  Ultrasound done today. patient reports left leg pain. patient denies fevers, chills, chest pain, shortness of breath. patient on coumadin with INR of 2.28 on 8/14 ,today in ER 1.57 .Admitted for vascular& hematology eval since patient developed DVT on coumadin .US venous  was done beginning of month during recent hospitalization and was negative for DVT Palliative care consult requested ,to discuss advance directives and complete MOLST During previous admission son was consulted regarding residential hospice at [facility] and palliative care issues /GOC discussion -could not decide on code status and patient remained full code . (18 Aug 2023 16:14)    PAST MEDICAL & SURGICAL HISTORY:  Hypertension      CVA (cerebral vascular accident)      Depression      Constipation      Neuropathy      Hyperlipidemia      Grade I diastolic dysfunction      Afib      Neuropathy      VHD (valvular heart disease)      Aortic valvar stenosis      Hypothyroidism      GERD (gastroesophageal reflux disease)      No significant past surgical history          MEDICATIONS  (STANDING):  acetaminophen     Tablet .. 650 milliGRAM(s) Oral every 6 hours  amLODIPine   Tablet 5 milliGRAM(s) Oral daily  cholecalciferol 2000 Unit(s) Oral daily  collagenase Ointment 1 Application(s) Topical daily  escitalopram 20 milliGRAM(s) Oral daily  ferrous    sulfate 325 milliGRAM(s) Oral daily  folic acid 1 milliGRAM(s) Oral daily  furosemide    Tablet 20 milliGRAM(s) Oral daily  gabapentin 300 milliGRAM(s) Oral three times a day  heparin  Infusion. 900 Unit(s)/Hr (9 mL/Hr) IV Continuous <Continuous>  lactobacillus acidophilus 1 Tablet(s) Oral two times a day with meals  levothyroxine 50 MICROGram(s) Oral daily  metoprolol tartrate 25 milliGRAM(s) Oral two times a day  pantoprazole    Tablet 40 milliGRAM(s) Oral before breakfast  senna 2 Tablet(s) Oral at bedtime  simvastatin 10 milliGRAM(s) Oral at bedtime  traMADol 50 milliGRAM(s) Oral every 6 hours  warfarin 3.5 milliGRAM(s) Oral once    MEDICATIONS  (PRN):  aluminum hydroxide/magnesium hydroxide/simethicone Suspension 30 milliLiter(s) Oral every 4 hours PRN Dyspepsia  heparin   Injectable 2000 Unit(s) IV Push every 6 hours PRN For aPTT between 40 - 57  heparin   Injectable 4500 Unit(s) IV Push every 6 hours PRN For aPTT less than 40  melatonin 3 milliGRAM(s) Oral at bedtime PRN Insomnia  morphine  - Injectable 2 milliGRAM(s) IV Push every 6 hours PRN Severe Pain (7 - 10)  ondansetron Injectable 4 milliGRAM(s) IV Push every 8 hours PRN Nausea and/or Vomiting      OBJECTIVE    T(C): 36.3 (08-21-23 @ 13:08), Max: 36.6 (08-21-23 @ 05:19)  HR: 73 (08-21-23 @ 13:08) (73 - 82)  BP: 114/64 (08-21-23 @ 13:08) (114/64 - 123/73)  RR: 18 (08-21-23 @ 13:08) (17 - 18)  SpO2: 91% (08-21-23 @ 13:08) (91% - 95%)  Wt(kg): --  I&O's Summary    20 Aug 2023 07:01  -  21 Aug 2023 07:00  --------------------------------------------------------  IN: 117 mL / OUT: 0 mL / NET: 117 mL          REVIEW OF SYSTEMS:  CONSTITUTIONAL: No fever, weight loss, or fatigue  EYES: No eye pain, visual disturbances, or discharge  ENMT:   No sinus or throat pain  NECK: No pain or stiffness  RESPIRATORY: No cough, wheezing, chills or hemoptysis; No shortness of breath  CARDIOVASCULAR: No chest pain, palpitations, dizziness, or leg swelling  GASTROINTESTINAL: No abdominal pain. No nausea, vomiting; No diarrhea or constipation. No melena or hematochezia.  GENITOURINARY: No dysuria, frequency, hematuria, or incontinence  NEUROLOGICAL: No headaches, memory loss, loss of strength, numbness, or tremors  SKIN: No itching, burning, rashes, or lesions   MUSCULOSKELETAL: No joint pain or swelling; No muscle, back, or extremity pain    PHYSICAL EXAM:  Appearance: NAD. VS past 24 hrs -as above   HEENT:   Moist oral mucosa. Conjunctiva clear b/l.   Neck : supple  Respiratory: Lungs CTAB.  Gastrointestinal:  Soft, nontender. No rebound. No rigidity. BS present	  Cardiovascular: RRR ,S1S2 present  Neurologic: Non-focal. Moving all extremities.  Extremities: No edema. No erythema. No calf tenderness.  Skin: No rashes, No ecchymoses, No cyanosis.	  wounds ,skin lesions-See skin assesment flow sheet   LABS:                        9.3    7.52  )-----------( 546      ( 21 Aug 2023 06:20 )             30.2     08-21    139  |  102  |  5<L>  ----------------------------<  94  3.6   |  34<H>  |  0.59    Ca    8.0<L>      21 Aug 2023 06:20      CAPILLARY BLOOD GLUCOSE        PT/INR - ( 21 Aug 2023 06:20 )   PT: 18.0 sec;   INR: 1.56 ratio         PTT - ( 21 Aug 2023 10:41 )  PTT:84.3 sec  Urinalysis Basic - ( 21 Aug 2023 06:20 )    Color: x / Appearance: x / SG: x / pH: x  Gluc: 94 mg/dL / Ketone: x  / Bili: x / Urobili: x   Blood: x / Protein: x / Nitrite: x   Leuk Esterase: x / RBC: x / WBC x   Sq Epi: x / Non Sq Epi: x / Bacteria: x        RADIOLOGY & ADDITIONAL TESTS:   reviewed elctronically  ASSESSMENT/PLAN: 	  25 minutes aggregate time was spent on this visit, 50% visit time spent in care co-ordination with other attendings and counselling patient .I have discussed care plan with patient / HCP/family member  son VINCENT ON A PHONE ,who expressed understanding of problems treatment and their effect and side effects, alternatives in details. I have asked if they have any questions and concerns and appropriately addressed them to best of my ability. ACP-Advance care planning was discussed , pallitaive care issues ,CMO ,GOC ,MOLST  form ,advance directives were reviewed .All questions were answered to the best of my knowledge - 25 min spent.

## 2023-08-21 NOTE — SWALLOW BEDSIDE ASSESSMENT ADULT - SWALLOW EVAL: DIAGNOSIS
Patient presents with mild oral dysphagia and overtly functional pharyngeal stage. Oral stages were marked by mildly reduced mastication efficiency for regular solids, improved with easy to chew/soft bite sized. Pharyngeal stage was assessed via digital palpation and marked by a +prompt swallow trigger and +hyolaryngeal elevation. No overt s/s of penetration/aspiration exhibited.

## 2023-08-21 NOTE — PROGRESS NOTE ADULT - SUBJECTIVE AND OBJECTIVE BOX
CHIEF COMPLAINT/ REASON FOR VISIT  .. Patient was seen to address the  issue listed under PROBLEM LIST which is located toward bottom of this note     REJI BERGER    IRMA 1EAS 113 W[address]    Allergies    per son &quot;issues with certain anitibiotics&quot; does not remember the names (Unknown)    Intolerances        PAST MEDICAL & SURGICAL HISTORY:  Hypertension      CVA (cerebral vascular accident)      Depression      Constipation      Neuropathy      Hyperlipidemia      Grade I diastolic dysfunction      Afib      Neuropathy      VHD (valvular heart disease)      Aortic valvar stenosis      Hypothyroidism      GERD (gastroesophageal reflux disease)      No significant past surgical history          FAMILY HISTORY:      Home Medications:  benzonatate 100 mg oral capsule: 1 cap(s) orally 3 times a day as needed for  cough (18 Aug 2023 15:32)  Biofreeze 4% topical gel: Apply topically to affected area 2 times a day to R shoulder and L knee (18 Aug 2023 15:34)  docusate sodium 100 mg oral capsule: 2 cap(s) orally once a day (at bedtime) (18 Aug 2023 15:36)  famotidine 20 mg oral tablet: 1 tab(s) orally once a day (18 Aug 2023 15:38)  miconazole 2% topical cream: Apply topically to affected area 2 times a day (18 Aug 2023 15:30)  senna (sennosides) 8.6 mg oral tablet: 1 tab(s) orally once a day as needed for  constipation (18 Aug 2023 15:43)  traMADol 50 mg oral tablet: 1 tab(s) orally every 6 hours as needed for pain (18 Aug 2023 15:45)  traMADol 50 mg oral tablet: 1 tab(s) orally 2 times a day (18 Aug 2023 15:45)  Tylenol Extra Strength 500 mg oral tablet: 2 tab(s) orally once a day (in the morning) and 1 tablet twice a day (18 Aug 2023 15:54)      MEDICATIONS  (STANDING):  acetaminophen     Tablet .. 650 milliGRAM(s) Oral every 6 hours  amLODIPine   Tablet 5 milliGRAM(s) Oral daily  cholecalciferol 2000 Unit(s) Oral daily  collagenase Ointment 1 Application(s) Topical daily  escitalopram 20 milliGRAM(s) Oral daily  ferrous    sulfate 325 milliGRAM(s) Oral daily  folic acid 1 milliGRAM(s) Oral daily  furosemide    Tablet 20 milliGRAM(s) Oral daily  gabapentin 300 milliGRAM(s) Oral three times a day  heparin  Infusion. 900 Unit(s)/Hr (9 mL/Hr) IV Continuous <Continuous>  lactobacillus acidophilus 1 Tablet(s) Oral two times a day with meals  levothyroxine 50 MICROGram(s) Oral daily  metoprolol tartrate 25 milliGRAM(s) Oral two times a day  pantoprazole    Tablet 40 milliGRAM(s) Oral before breakfast  senna 2 Tablet(s) Oral at bedtime  simvastatin 10 milliGRAM(s) Oral at bedtime  traMADol 50 milliGRAM(s) Oral every 6 hours    MEDICATIONS  (PRN):  aluminum hydroxide/magnesium hydroxide/simethicone Suspension 30 milliLiter(s) Oral every 4 hours PRN Dyspepsia  heparin   Injectable 4500 Unit(s) IV Push every 6 hours PRN For aPTT less than 40  heparin   Injectable 2000 Unit(s) IV Push every 6 hours PRN For aPTT between 40 - 57  melatonin 3 milliGRAM(s) Oral at bedtime PRN Insomnia  morphine  - Injectable 2 milliGRAM(s) IV Push every 6 hours PRN Severe Pain (7 - 10)  ondansetron Injectable 4 milliGRAM(s) IV Push every 8 hours PRN Nausea and/or Vomiting              Vital Signs Last 24 Hrs  T(C): 36.6 (21 Aug 2023 05:19), Max: 36.7 (20 Aug 2023 13:03)  T(F): 97.8 (21 Aug 2023 05:19), Max: 98.1 (20 Aug 2023 13:03)  HR: 79 (21 Aug 2023 05:19) (79 - 82)  BP: 123/73 (21 Aug 2023 05:19) (117/64 - 127/71)  BP(mean): --  RR: 18 (21 Aug 2023 05:19) (17 - 19)  SpO2: 92% (21 Aug 2023 05:19) (92% - 95%)    Parameters below as of 21 Aug 2023 05:19  Patient On (Oxygen Delivery Method): nasal cannula          08-19-23 @ 07:01  -  08-20-23 @ 07:00  --------------------------------------------------------  IN: 143 mL / OUT: 1300 mL / NET: -1157 mL    08-20-23 @ 07:01  -  08-21-23 @ 06:10  --------------------------------------------------------  IN: 81 mL / OUT: 0 mL / NET: 81 mL              LABS:                        10.0   9.05  )-----------( 340      ( 21 Aug 2023 04:23 )             31.5     08-20    140  |  106  |  6<L>  ----------------------------<  80  4.5   |  29  |  0.45<L>    Ca    8.0<L>      20 Aug 2023 06:09    TPro  5.4<L>  /  Alb  2.2<L>  /  TBili  0.4  /  DBili  x   /  AST  17  /  ALT  10<L>  /  AlkPhos  60  08-19    PT/INR - ( 20 Aug 2023 06:09 )   PT: 18.4 sec;   INR: 1.59 ratio         PTT - ( 21 Aug 2023 04:23 )  PTT:75.0 sec  Urinalysis Basic - ( 20 Aug 2023 06:09 )    Color: x / Appearance: x / SG: x / pH: x  Gluc: 80 mg/dL / Ketone: x  / Bili: x / Urobili: x   Blood: x / Protein: x / Nitrite: x   Leuk Esterase: x / RBC: x / WBC x   Sq Epi: x / Non Sq Epi: x / Bacteria: x            WBC:  WBC Count: 9.05 K/uL (08-21 @ 04:23)  WBC Count: 6.24 K/uL (08-20 @ 06:09)  WBC Count: 6.96 K/uL (08-19 @ 06:27)  WBC Count: 8.13 K/uL (08-18 @ 23:54)  WBC Count: 8.55 K/uL (08-18 @ 14:10)      MICROBIOLOGY:  RECENT CULTURES:              PT/INR - ( 20 Aug 2023 06:09 )   PT: 18.4 sec;   INR: 1.59 ratio         PTT - ( 21 Aug 2023 04:23 )  PTT:75.0 sec    Sodium:  Sodium: 140 mmol/L (08-20 @ 06:09)  Sodium: 144 mmol/L (08-19 @ 06:27)  Sodium: 139 mmol/L (08-18 @ 14:10)      0.45 mg/dL 08-20 @ 06:09  0.51 mg/dL 08-19 @ 06:27  0.68 mg/dL 08-18 @ 14:10      Hemoglobin:  Hemoglobin: 10.0 g/dL (08-21 @ 04:23)  Hemoglobin: 9.1 g/dL (08-20 @ 06:09)  Hemoglobin: 9.2 g/dL (08-19 @ 06:27)  Hemoglobin: 9.1 g/dL (08-18 @ 23:54)  Hemoglobin: 10.1 g/dL (08-18 @ 14:10)      Platelets: Platelet Count - Automated: 340 K/uL (08-21 @ 04:23)  Platelet Count - Automated: 494 K/uL (08-20 @ 06:09)  Platelet Count - Automated: 536 K/uL (08-19 @ 06:27)  Platelet Count - Automated: 525 K/uL (08-18 @ 23:54)  Platelet Count - Automated: 534 K/uL (08-18 @ 14:10)      LIVER FUNCTIONS - ( 19 Aug 2023 06:27 )  Alb: 2.2 g/dL / Pro: 5.4 g/dL / ALK PHOS: 60 U/L / ALT: 10 U/L / AST: 17 U/L / GGT: x             Urinalysis Basic - ( 20 Aug 2023 06:09 )    Color: x / Appearance: x / SG: x / pH: x  Gluc: 80 mg/dL / Ketone: x  / Bili: x / Urobili: x   Blood: x / Protein: x / Nitrite: x   Leuk Esterase: x / RBC: x / WBC x   Sq Epi: x / Non Sq Epi: x / Bacteria: x        RADIOLOGY & ADDITIONAL STUDIES:      MICROBIOLOGY:  RECENT CULTURES:

## 2023-08-21 NOTE — CARE COORDINATION ASSESSMENT. - REASON FOR CONSULT
coordination of care/discharge planning Pt unable to provide information for assessment at this time. SW reached out to pt's son/Curtis at ([phone]) in order to conduct assessment and to discuss SW roles with good understanding./coordination of care/discharge planning

## 2023-08-21 NOTE — SWALLOW BEDSIDE ASSESSMENT ADULT - COMMENTS
H&P: 89 Female with   Past medical history of CVA on Coumadin with right-sided weakness, hypertension hyperlipidemia CAD history of A-fib on Coumadin recently admitted at [facility] with acute metabolic encephalopathy 2/2 to uti and cellulitis of LE Admitted for vascular& hematology eval since patient developed DVT on coumadin .US venous  was done beginning of month during recent hospitalization and was negative for DVT Palliative care consult requested ,to discuss advance directives and complete MOLST    CT Chest 8/2: Patent central airways    Pt is known to this dept. and seen for prior bedside swallow evaluation on 8/2/23 at which time she was recommended soft and bite sized solids with thin fluids, please see full report for details    Pt was received seated upright in bed awake, alert, in NAD, eating breakfast, able to follow simple commands and communicated verbally with SLP. Pt denied dysphagia upon probing.

## 2023-08-21 NOTE — PROGRESS NOTE ADULT - SUBJECTIVE AND OBJECTIVE BOX
Date/Time Patient Seen:  		  Referring MD:   Data Reviewed	       Patient is a 89y old  Female who presents with a chief complaint of left leg pain (20 Aug 2023 09:20)      Subjective/HPI     PAST MEDICAL & SURGICAL HISTORY:  Hypertension    CVA (cerebral vascular accident)    Depression    Constipation    Neuropathy    Constipation    Hyperlipidemia    Grade I diastolic dysfunction    Afib    Neuropathy    VHD (valvular heart disease)    Aortic valvar stenosis    Hypothyroidism    GERD (gastroesophageal reflux disease)    No significant past surgical history          Medication list         MEDICATIONS  (STANDING):  acetaminophen     Tablet .. 650 milliGRAM(s) Oral every 6 hours  amLODIPine   Tablet 5 milliGRAM(s) Oral daily  cholecalciferol 2000 Unit(s) Oral daily  collagenase Ointment 1 Application(s) Topical daily  escitalopram 20 milliGRAM(s) Oral daily  ferrous    sulfate 325 milliGRAM(s) Oral daily  folic acid 1 milliGRAM(s) Oral daily  furosemide    Tablet 20 milliGRAM(s) Oral daily  gabapentin 300 milliGRAM(s) Oral three times a day  heparin  Infusion. 900 Unit(s)/Hr (9 mL/Hr) IV Continuous <Continuous>  lactobacillus acidophilus 1 Tablet(s) Oral two times a day with meals  levothyroxine 50 MICROGram(s) Oral daily  metoprolol tartrate 25 milliGRAM(s) Oral two times a day  pantoprazole    Tablet 40 milliGRAM(s) Oral before breakfast  senna 2 Tablet(s) Oral at bedtime  simvastatin 10 milliGRAM(s) Oral at bedtime  traMADol 50 milliGRAM(s) Oral every 6 hours    MEDICATIONS  (PRN):  aluminum hydroxide/magnesium hydroxide/simethicone Suspension 30 milliLiter(s) Oral every 4 hours PRN Dyspepsia  heparin   Injectable 2000 Unit(s) IV Push every 6 hours PRN For aPTT between 40 - 57  heparin   Injectable 4500 Unit(s) IV Push every 6 hours PRN For aPTT less than 40  melatonin 3 milliGRAM(s) Oral at bedtime PRN Insomnia  morphine  - Injectable 2 milliGRAM(s) IV Push every 6 hours PRN Severe Pain (7 - 10)  ondansetron Injectable 4 milliGRAM(s) IV Push every 8 hours PRN Nausea and/or Vomiting         Vitals log        ICU Vital Signs Last 24 Hrs  T(C): 36.4 (20 Aug 2023 19:37), Max: 36.7 (20 Aug 2023 13:03)  T(F): 97.5 (20 Aug 2023 19:37), Max: 98.1 (20 Aug 2023 13:03)  HR: 82 (20 Aug 2023 19:37) (80 - 82)  BP: 117/64 (20 Aug 2023 19:37) (117/64 - 127/71)  BP(mean): --  ABP: --  ABP(mean): --  RR: 17 (20 Aug 2023 19:37) (17 - 19)  SpO2: 95% (20 Aug 2023 19:37) (95% - 95%)    O2 Parameters below as of 20 Aug 2023 19:37  Patient On (Oxygen Delivery Method): nasal cannula                 Input and Output:  I&O's Detail    19 Aug 2023 07:01  -  20 Aug 2023 07:00  --------------------------------------------------------  IN:    Heparin Infusion: 143 mL  Total IN: 143 mL    OUT:    Voided (mL): 1300 mL  Total OUT: 1300 mL    Total NET: -1157 mL      20 Aug 2023 07:01  -  21 Aug 2023 05:09  --------------------------------------------------------  IN:    Heparin Infusion: 81 mL  Total IN: 81 mL    OUT:  Total OUT: 0 mL    Total NET: 81 mL          Lab Data                        10.0   9.05  )-----------( 340      ( 21 Aug 2023 04:23 )             31.5     08-20    140  |  106  |  6<L>  ----------------------------<  80  4.5   |  29  |  0.45<L>    Ca    8.0<L>      20 Aug 2023 06:09    TPro  5.4<L>  /  Alb  2.2<L>  /  TBili  0.4  /  DBili  x   /  AST  17  /  ALT  10<L>  /  AlkPhos  60  08-19            Review of Systems	      Objective     Physical Examination    heart s1s2  lung dc BS  head nc      Pertinent Lab findings & Imaging      Summer:  NO   Adequate UO     I&O's Detail    19 Aug 2023 07:01  -  20 Aug 2023 07:00  --------------------------------------------------------  IN:    Heparin Infusion: 143 mL  Total IN: 143 mL    OUT:    Voided (mL): 1300 mL  Total OUT: 1300 mL    Total NET: -1157 mL      20 Aug 2023 07:01  -  21 Aug 2023 05:09  --------------------------------------------------------  IN:    Heparin Infusion: 81 mL  Total IN: 81 mL    OUT:  Total OUT: 0 mL    Total NET: 81 mL               Discussed with:     Cultures:	        Radiology

## 2023-08-21 NOTE — CARE COORDINATION ASSESSMENT. - NSPASTMEDSURGHISTORY_GEN_ALL_CORE_FT
PAST MEDICAL & SURGICAL HISTORY:  Constipation      Depression      CVA (cerebral vascular accident)      Hypertension      No significant past surgical history      Hyperlipidemia      Neuropathy      Neuropathy      Afib      Grade I diastolic dysfunction      Aortic valvar stenosis      VHD (valvular heart disease)      GERD (gastroesophageal reflux disease)      Hypothyroidism

## 2023-08-21 NOTE — PROGRESS NOTE ADULT - SUBJECTIVE AND OBJECTIVE BOX
Patient is a 89y Female with a known history of :  DVT of lower limb, acute [I82.409]    Afib [I48.91]    VHD (valvular heart disease) [I38]    Prophylactic measure [Z29.9]    Hypothyroidism [E03.9]    MDD (major depressive disorder) [F32.9]    GERD (gastroesophageal reflux disease) [K21.9]    HTN (hypertension) [I10]    CAD (coronary artery disease) [I25.10]    Leg wound, left [S81.352A]      HPI:  89 Female with   Past medical history of CVA on Coumadin with right-sided weakness, hypertension hyperlipidemia CAD history of A-fib on Coumadin recently admitted at [facility] with acute metabolic encephalopathy 2/2 to uti and cellulitis of LE  .  P sent in from [facility] today  for after being found to have a DVT in her left Common femoral, great saphenous, popliteal vein.  Right lower extremity negative for DVT.  Ultrasound done today. patient reports left leg pain. patient denies fevers, chills, chest pain, shortness of breath. patient on coumadin with INR of 2.28 on 8/14 ,today in ER 1.57 .Admitted for vascular& hematology eval since patient developed DVT on coumadin .US venous  was done beginning of month during recent hospitalization and was negative for DVT Palliative care consult requested ,to discuss advance directives and complete MOLST During previous admission son was consulted regarding residential hospice at [facility] and palliative care issues /GOC discussion -could not decide on code status and patient remained full code . (18 Aug 2023 16:14)      REVIEW OF SYSTEMS:    CONSTITUTIONAL: No fever, weight loss, or fatigue  EYES: No eye pain, visual disturbances, or discharge  ENMT:  No difficulty hearing, tinnitus, vertigo; No sinus or throat pain  NECK: No pain or stiffness  BREASTS: No pain, masses, or nipple discharge  RESPIRATORY: No cough, wheezing, chills or hemoptysis; No shortness of breath  CARDIOVASCULAR: No chest pain, palpitations, dizziness, or leg swelling  GASTROINTESTINAL: No abdominal or epigastric pain. No nausea, vomiting, or hematemesis; No diarrhea or constipation. No melena or hematochezia.  GENITOURINARY: No dysuria, frequency, hematuria, or incontinence  NEUROLOGICAL: No headaches, memory loss, loss of strength, numbness, or tremors  SKIN: No itching, burning, rashes, or lesions   LYMPH NODES: No enlarged glands  ENDOCRINE: No heat or cold intolerance; No hair loss  MUSCULOSKELETAL: No joint pain or swelling; No muscle, back, or extremity pain  PSYCHIATRIC: No depression, anxiety, mood swings, or difficulty sleeping  HEME/LYMPH: No easy bruising, or bleeding gums  ALLERGY AND IMMUNOLOGIC: No hives or eczema    MEDICATIONS  (STANDING):  acetaminophen     Tablet .. 650 milliGRAM(s) Oral every 6 hours  amLODIPine   Tablet 5 milliGRAM(s) Oral daily  cholecalciferol 2000 Unit(s) Oral daily  collagenase Ointment 1 Application(s) Topical daily  escitalopram 20 milliGRAM(s) Oral daily  ferrous    sulfate 325 milliGRAM(s) Oral daily  folic acid 1 milliGRAM(s) Oral daily  furosemide    Tablet 20 milliGRAM(s) Oral daily  gabapentin 300 milliGRAM(s) Oral three times a day  heparin  Infusion. 900 Unit(s)/Hr (9 mL/Hr) IV Continuous <Continuous>  lactobacillus acidophilus 1 Tablet(s) Oral two times a day with meals  levothyroxine 50 MICROGram(s) Oral daily  metoprolol tartrate 25 milliGRAM(s) Oral two times a day  pantoprazole    Tablet 40 milliGRAM(s) Oral before breakfast  senna 2 Tablet(s) Oral at bedtime  simvastatin 10 milliGRAM(s) Oral at bedtime  traMADol 50 milliGRAM(s) Oral every 6 hours    MEDICATIONS  (PRN):  aluminum hydroxide/magnesium hydroxide/simethicone Suspension 30 milliLiter(s) Oral every 4 hours PRN Dyspepsia  heparin   Injectable 2000 Unit(s) IV Push every 6 hours PRN For aPTT between 40 - 57  heparin   Injectable 4500 Unit(s) IV Push every 6 hours PRN For aPTT less than 40  melatonin 3 milliGRAM(s) Oral at bedtime PRN Insomnia  morphine  - Injectable 2 milliGRAM(s) IV Push every 6 hours PRN Severe Pain (7 - 10)  ondansetron Injectable 4 milliGRAM(s) IV Push every 8 hours PRN Nausea and/or Vomiting      ALLERGIES: per son &quot;issues with certain anitibiotics&quot; does not remember the names (Unknown)      FAMILY HISTORY:      PHYSICAL EXAMINATION:  -----------------------------  T(C): 36.6 (08-21-23 @ 05:19), Max: 36.7 (08-20-23 @ 13:03)  HR: 79 (08-21-23 @ 05:19) (79 - 82)  BP: 123/73 (08-21-23 @ 05:19) (117/64 - 127/71)  RR: 18 (08-21-23 @ 05:19) (17 - 19)  SpO2: 92% (08-21-23 @ 05:19) (92% - 95%)  Wt(kg): --    08-20 @ 07:01  -  08-21 @ 07:00  --------------------------------------------------------  IN:    Heparin Infusion: 117 mL  Total IN: 117 mL    OUT:  Total OUT: 0 mL    Total NET: 117 mL            VITALS  T(C): 36.6 (08-21-23 @ 05:19), Max: 36.7 (08-20-23 @ 13:03)  HR: 79 (08-21-23 @ 05:19) (79 - 82)  BP: 123/73 (08-21-23 @ 05:19) (117/64 - 127/71)  RR: 18 (08-21-23 @ 05:19) (17 - 19)  SpO2: 92% (08-21-23 @ 05:19) (92% - 95%)    Constitutional: well developed, normal appearance, well groomed, well nourished, no deformities and no acute distress.   Eyes: the conjunctiva exhibited no abnormalities and the eyelids demonstrated no xanthelasmas.   HEENT: normal oral mucosa, no oral pallor and no oral cyanosis.   Neck: normal jugular venous A waves present, normal jugular venous V waves present and no jugular venous castillo A waves.   Pulmonary: no respiratory distress, normal respiratory rhythm and effort, no accessory muscle use and lungs were clear to auscultation bilaterally.   Cardiovascular: heart rate and rhythm were normal, normal S1 and S2 and no murmur, gallop, rub, heave or thrill are present.   Abdomen: soft, non-tender, no hepato-splenomegaly and no abdominal mass palpated.   Musculoskeletal: the gait could not be assessed..   Extremities: no clubbing of the fingernails, no localized cyanosis, no petechial hemorrhages and no ischemic changes.   Skin: normal skin color and pigmentation, no rash, no venous stasis, no skin lesions, no skin ulcer and no xanthoma was observed.   Psychiatric: oriented to person, place, and time, the affect was normal, the mood was normal and not feeling anxious.     LABS:   --------  08-21    139  |  102  |  5<L>  ----------------------------<  94  3.6   |  34<H>  |  0.59    Ca    8.0<L>      21 Aug 2023 06:20                           9.3    7.52  )-----------( 546      ( 21 Aug 2023 06:20 )             30.2     PT/INR - ( 21 Aug 2023 06:20 )   PT: 18.0 sec;   INR: 1.56 ratio         PTT - ( 21 Aug 2023 04:23 )  PTT:75.0 sec            RADIOLOGY:  -----------------    ECG:     ECHO:

## 2023-08-21 NOTE — SWALLOW BEDSIDE ASSESSMENT ADULT - SWALLOW EVAL: RECOMMENDED DIET
Initial Anesthesia Post-op Note    Patient: Dominga Carbajal  Procedure(s) Performed: LEFT SMALL FINGER OPEN VERSUS CLOSED REDUCTION, PINNING - LEFT  Anesthesia type: MAC    Vitals Value Taken Time   Temp 97.4 10/14/22 0825   Pulse 78 10/14/22 0824   Resp 26 10/14/22 0824   SpO2 95 % 10/14/22 0824   /57 10/14/22 0823   Vitals shown include unvalidated device data.      Patient Location: PACU Phase 1  Post-op Vital Signs:stable  Level of Consciousness: responds to stimulation  Respiratory Status: spontaneous ventilation and unassisted  Cardiovascular blood pressure returned to baseline and stable  Hydration: euvolemic  Pain Management: well controlled  Handoff: Handoff to receiving clinician was performed and questions were answered  Vomiting: none  Nausea: None  Airway Patency:patent  Post-op Assessment: awake, alert, appropriately conversant, or baseline, no complications, patient tolerated procedure well with no complications, no evidence of recall, dentition within defined limits, moving all extremities and No Corneal Abrasion  Comments: SV, VSS, pt appears comfortable. Responsive to stimulation. Report to RN.      There were no known complications for this encounter.   Soft and bite sized solids with thin fluids

## 2023-08-21 NOTE — CARE COORDINATION ASSESSMENT. - NSCAREPROVIDERS_GEN_ALL_CORE_FT
CARE PROVIDERS:  Accepting Physician: Анна Best  Administration: Jalil Preciado  Administration: Yfn Riddle  Admitting: Анна Best  Attending: Анна Best  Case Management: Aisha Da Silva  Consultant: Sushil Anguiano  Consultant: Moira Jacques  Consultant: Swapnil De La Rosa  Consultant: Rachael Frazier  Consultant: Ishmael Maldonado  Consultant: Weil, Patricia  Consultant: Lynn Proctor  Consultant: Scotty Garduno  Covering Team: Lynn Proctor  Covering Team: Mery Renner  ED Attending: Felipe Herrera  ED Nurse: Edilma Wheeler/Billing & Coding: Key Atwood  Nurse: Abby Post  Ordered: ADM, User  Ordered: Doctor, Unknown  Ordered: ServiceAccount, [facility] HospitalMLM  Outpatient Provider: Facundo Valentine  Outpatient Provider: Mike Urias  Outpatient Provider: Fritz Kauffman  Outpatient Provider: Pipe Prince  PCA/Nursing Assistant: Stewart Adkins  PCA/Nursing Assistant: Winter Gonzalez  Registered Dietitian: Thais Muñoz  Respiratory Therapy: Ismael Covarrubias  : Irish Ulrich  Speech Pathology: Ailyn Bradley  Speech Pathology: Daisy Knight

## 2023-08-22 ENCOUNTER — TRANSCRIPTION ENCOUNTER (OUTPATIENT)
Age: 88
End: 2023-08-22

## 2023-08-22 LAB
ANION GAP SERPL CALC-SCNC: 6 MMOL/L — SIGNIFICANT CHANGE UP (ref 5–17)
APTT BLD: 57 SEC — HIGH (ref 24.5–35.6)
BUN SERPL-MCNC: 6 MG/DL — LOW (ref 7–23)
CALCIUM SERPL-MCNC: 8.2 MG/DL — LOW (ref 8.5–10.1)
CHLORIDE SERPL-SCNC: 104 MMOL/L — SIGNIFICANT CHANGE UP (ref 96–108)
CO2 SERPL-SCNC: 32 MMOL/L — HIGH (ref 22–31)
CREAT SERPL-MCNC: 0.51 MG/DL — SIGNIFICANT CHANGE UP (ref 0.5–1.3)
EGFR: 88 ML/MIN/1.73M2 — SIGNIFICANT CHANGE UP
GLUCOSE SERPL-MCNC: 88 MG/DL — SIGNIFICANT CHANGE UP (ref 70–99)
HCT VFR BLD CALC: 27.2 % — LOW (ref 34.5–45)
HGB BLD-MCNC: 8.6 G/DL — LOW (ref 11.5–15.5)
INR BLD: 2.32 RATIO — HIGH (ref 0.85–1.18)
MCHC RBC-ENTMCNC: 31.6 GM/DL — LOW (ref 32–36)
MCHC RBC-ENTMCNC: 37.2 PG — HIGH (ref 27–34)
MCV RBC AUTO: 117.7 FL — HIGH (ref 80–100)
NRBC # BLD: 0 /100 WBCS — SIGNIFICANT CHANGE UP (ref 0–0)
PLATELET # BLD AUTO: 517 K/UL — HIGH (ref 150–400)
POTASSIUM SERPL-MCNC: 3.5 MMOL/L — SIGNIFICANT CHANGE UP (ref 3.5–5.3)
POTASSIUM SERPL-SCNC: 3.5 MMOL/L — SIGNIFICANT CHANGE UP (ref 3.5–5.3)
PROTHROM AB SERPL-ACNC: 26.5 SEC — HIGH (ref 9.5–13)
RBC # BLD: 2.31 M/UL — LOW (ref 3.8–5.2)
RBC # FLD: 16.5 % — HIGH (ref 10.3–14.5)
SARS-COV-2 RNA SPEC QL NAA+PROBE: SIGNIFICANT CHANGE UP
SODIUM SERPL-SCNC: 142 MMOL/L — SIGNIFICANT CHANGE UP (ref 135–145)
WBC # BLD: 7.01 K/UL — SIGNIFICANT CHANGE UP (ref 3.8–10.5)
WBC # FLD AUTO: 7.01 K/UL — SIGNIFICANT CHANGE UP (ref 3.8–10.5)

## 2023-08-22 PROCEDURE — 99233 SBSQ HOSP IP/OBS HIGH 50: CPT

## 2023-08-22 RX ORDER — WARFARIN SODIUM 2.5 MG/1
1 TABLET ORAL
Qty: 4 | Refills: 0
Start: 2023-08-22 | End: 2023-08-28

## 2023-08-22 RX ORDER — IPRATROPIUM/ALBUTEROL SULFATE 18-103MCG
3 AEROSOL WITH ADAPTER (GRAM) INHALATION EVERY 6 HOURS
Refills: 0 | Status: DISCONTINUED | OUTPATIENT
Start: 2023-08-22 | End: 2023-08-23

## 2023-08-22 RX ORDER — MICONAZOLE NITRATE 2 %
1 CREAM (GRAM) TOPICAL
Refills: 0 | DISCHARGE

## 2023-08-22 RX ORDER — LANOLIN ALCOHOL/MO/W.PET/CERES
1 CREAM (GRAM) TOPICAL
Qty: 0 | Refills: 0 | DISCHARGE
Start: 2023-08-22

## 2023-08-22 RX ORDER — WARFARIN SODIUM 2.5 MG/1
2 TABLET ORAL ONCE
Refills: 0 | Status: COMPLETED | OUTPATIENT
Start: 2023-08-22 | End: 2023-08-22

## 2023-08-22 RX ORDER — COLLAGENASE CLOSTRIDIUM HIST. 250 UNIT/G
1 OINTMENT (GRAM) TOPICAL
Qty: 1 | Refills: 0
Start: 2023-08-22 | End: 2023-09-04

## 2023-08-22 RX ADMIN — TRAMADOL HYDROCHLORIDE 50 MILLIGRAM(S): 50 TABLET ORAL at 01:31

## 2023-08-22 RX ADMIN — GABAPENTIN 300 MILLIGRAM(S): 400 CAPSULE ORAL at 14:17

## 2023-08-22 RX ADMIN — Medication 25 MILLIGRAM(S): at 17:12

## 2023-08-22 RX ADMIN — Medication 650 MILLIGRAM(S): at 12:00

## 2023-08-22 RX ADMIN — Medication 650 MILLIGRAM(S): at 18:00

## 2023-08-22 RX ADMIN — Medication 650 MILLIGRAM(S): at 01:01

## 2023-08-22 RX ADMIN — Medication 20 MILLIGRAM(S): at 06:36

## 2023-08-22 RX ADMIN — PANTOPRAZOLE SODIUM 40 MILLIGRAM(S): 20 TABLET, DELAYED RELEASE ORAL at 06:37

## 2023-08-22 RX ADMIN — Medication 650 MILLIGRAM(S): at 23:44

## 2023-08-22 RX ADMIN — Medication 1 TABLET(S): at 08:04

## 2023-08-22 RX ADMIN — SIMVASTATIN 10 MILLIGRAM(S): 20 TABLET, FILM COATED ORAL at 21:10

## 2023-08-22 RX ADMIN — Medication 1 APPLICATION(S): at 11:11

## 2023-08-22 RX ADMIN — Medication 1 TABLET(S): at 17:11

## 2023-08-22 RX ADMIN — TRAMADOL HYDROCHLORIDE 50 MILLIGRAM(S): 50 TABLET ORAL at 06:48

## 2023-08-22 RX ADMIN — TRAMADOL HYDROCHLORIDE 50 MILLIGRAM(S): 50 TABLET ORAL at 06:17

## 2023-08-22 RX ADMIN — ESCITALOPRAM OXALATE 20 MILLIGRAM(S): 10 TABLET, FILM COATED ORAL at 11:07

## 2023-08-22 RX ADMIN — TRAMADOL HYDROCHLORIDE 50 MILLIGRAM(S): 50 TABLET ORAL at 12:00

## 2023-08-22 RX ADMIN — Medication 650 MILLIGRAM(S): at 11:07

## 2023-08-22 RX ADMIN — TRAMADOL HYDROCHLORIDE 50 MILLIGRAM(S): 50 TABLET ORAL at 01:01

## 2023-08-22 RX ADMIN — Medication 650 MILLIGRAM(S): at 06:48

## 2023-08-22 RX ADMIN — Medication 650 MILLIGRAM(S): at 06:18

## 2023-08-22 RX ADMIN — Medication 650 MILLIGRAM(S): at 01:31

## 2023-08-22 RX ADMIN — GABAPENTIN 300 MILLIGRAM(S): 400 CAPSULE ORAL at 21:20

## 2023-08-22 RX ADMIN — WARFARIN SODIUM 2 MILLIGRAM(S): 2.5 TABLET ORAL at 21:10

## 2023-08-22 RX ADMIN — AMLODIPINE BESYLATE 5 MILLIGRAM(S): 2.5 TABLET ORAL at 06:36

## 2023-08-22 RX ADMIN — Medication 2000 UNIT(S): at 11:06

## 2023-08-22 RX ADMIN — TRAMADOL HYDROCHLORIDE 50 MILLIGRAM(S): 50 TABLET ORAL at 17:11

## 2023-08-22 RX ADMIN — Medication 1 MILLIGRAM(S): at 11:07

## 2023-08-22 RX ADMIN — GABAPENTIN 300 MILLIGRAM(S): 400 CAPSULE ORAL at 06:47

## 2023-08-22 RX ADMIN — HEPARIN SODIUM 900 UNIT(S)/HR: 5000 INJECTION INTRAVENOUS; SUBCUTANEOUS at 01:07

## 2023-08-22 RX ADMIN — Medication 650 MILLIGRAM(S): at 17:11

## 2023-08-22 RX ADMIN — Medication 50 MICROGRAM(S): at 06:37

## 2023-08-22 RX ADMIN — TRAMADOL HYDROCHLORIDE 50 MILLIGRAM(S): 50 TABLET ORAL at 18:00

## 2023-08-22 RX ADMIN — TRAMADOL HYDROCHLORIDE 50 MILLIGRAM(S): 50 TABLET ORAL at 23:44

## 2023-08-22 RX ADMIN — Medication 25 MILLIGRAM(S): at 06:36

## 2023-08-22 RX ADMIN — Medication 325 MILLIGRAM(S): at 11:07

## 2023-08-22 RX ADMIN — TRAMADOL HYDROCHLORIDE 50 MILLIGRAM(S): 50 TABLET ORAL at 11:07

## 2023-08-22 RX ADMIN — SENNA PLUS 2 TABLET(S): 8.6 TABLET ORAL at 21:10

## 2023-08-22 NOTE — CASE MANAGEMENT PROGRESS NOTE - NSCMPROGRESSNOTE_GEN_ALL_CORE
Patient was for transition back to [facility] with home O2. Patient refuse to accept the  POC when it was delivered. CM notified Marga from   [facility]. Curtis, son ws made aware of patient's refuse. He will speak to pt when he visits this evening. Bedside nurse Pamela aware. Dr tripp also made, Pulmonary will follow also. Ambulance rescheduled for tomorrow.

## 2023-08-22 NOTE — PROGRESS NOTE ADULT - SUBJECTIVE AND OBJECTIVE BOX
Patient is a 89y Female with a known history of :  DVT of lower limb, acute [I82.409]    Afib [I48.91]    VHD (valvular heart disease) [I38]    Prophylactic measure [Z29.9]    Hypothyroidism [E03.9]    MDD (major depressive disorder) [F32.9]    GERD (gastroesophageal reflux disease) [K21.9]    HTN (hypertension) [I10]    CAD (coronary artery disease) [I25.10]    Leg wound, left [S81.212A]      HPI:  89 Female with   Past medical history of CVA on Coumadin with right-sided weakness, hypertension hyperlipidemia CAD history of A-fib on Coumadin recently admitted at [facility] with acute metabolic encephalopathy 2/2 to uti and cellulitis of LE  .  P sent in from [facility] today  for after being found to have a DVT in her left Common femoral, great saphenous, popliteal vein.  Right lower extremity negative for DVT.  Ultrasound done today. patient reports left leg pain. patient denies fevers, chills, chest pain, shortness of breath. patient on coumadin with INR of 2.28 on 8/14 ,today in ER 1.57 .Admitted for vascular& hematology eval since patient developed DVT on coumadin .US venous  was done beginning of month during recent hospitalization and was negative for DVT Palliative care consult requested ,to discuss advance directives and complete MOLST During previous admission son was consulted regarding residential hospice at [facility] and palliative care issues /GOC discussion -could not decide on code status and patient remained full code . (18 Aug 2023 16:14)      REVIEW OF SYSTEMS:    CONSTITUTIONAL: No fever, weight loss, or fatigue  EYES: No eye pain, visual disturbances, or discharge  ENMT:  No difficulty hearing, tinnitus, vertigo; No sinus or throat pain  NECK: No pain or stiffness  BREASTS: No pain, masses, or nipple discharge  RESPIRATORY: No cough, wheezing, chills or hemoptysis; No shortness of breath  CARDIOVASCULAR: No chest pain, palpitations, dizziness, or leg swelling  GASTROINTESTINAL: No abdominal or epigastric pain. No nausea, vomiting, or hematemesis; No diarrhea or constipation. No melena or hematochezia.  GENITOURINARY: No dysuria, frequency, hematuria, or incontinence  NEUROLOGICAL: No headaches, memory loss, loss of strength, numbness, or tremors  SKIN: No itching, burning, rashes, or lesions   LYMPH NODES: No enlarged glands  ENDOCRINE: No heat or cold intolerance; No hair loss  MUSCULOSKELETAL: No joint pain or swelling; No muscle, back, or extremity pain  PSYCHIATRIC: No depression, anxiety, mood swings, or difficulty sleeping  HEME/LYMPH: No easy bruising, or bleeding gums  ALLERGY AND IMMUNOLOGIC: No hives or eczema    MEDICATIONS  (STANDING):  acetaminophen     Tablet .. 650 milliGRAM(s) Oral every 6 hours  amLODIPine   Tablet 5 milliGRAM(s) Oral daily  cholecalciferol 2000 Unit(s) Oral daily  collagenase Ointment 1 Application(s) Topical daily  escitalopram 20 milliGRAM(s) Oral daily  ferrous    sulfate 325 milliGRAM(s) Oral daily  folic acid 1 milliGRAM(s) Oral daily  furosemide    Tablet 20 milliGRAM(s) Oral daily  gabapentin 300 milliGRAM(s) Oral three times a day  lactobacillus acidophilus 1 Tablet(s) Oral two times a day with meals  levothyroxine 50 MICROGram(s) Oral daily  metoprolol tartrate 25 milliGRAM(s) Oral two times a day  pantoprazole    Tablet 40 milliGRAM(s) Oral before breakfast  senna 2 Tablet(s) Oral at bedtime  simvastatin 10 milliGRAM(s) Oral at bedtime  traMADol 50 milliGRAM(s) Oral every 6 hours    MEDICATIONS  (PRN):  aluminum hydroxide/magnesium hydroxide/simethicone Suspension 30 milliLiter(s) Oral every 4 hours PRN Dyspepsia  melatonin 3 milliGRAM(s) Oral at bedtime PRN Insomnia  morphine  - Injectable 2 milliGRAM(s) IV Push every 6 hours PRN Severe Pain (7 - 10)  ondansetron Injectable 4 milliGRAM(s) IV Push every 8 hours PRN Nausea and/or Vomiting      ALLERGIES: per son &quot;issues with certain anitibiotics&quot; does not remember the names (Unknown)      FAMILY HISTORY:      PHYSICAL EXAMINATION:  -----------------------------  T(C): 36.3 (08-22-23 @ 05:45), Max: 37.1 (08-21-23 @ 20:37)  HR: 71 (08-22-23 @ 05:45) (71 - 89)  BP: 123/71 (08-22-23 @ 05:45) (114/64 - 127/68)  RR: 17 (08-22-23 @ 05:45) (17 - 18)  SpO2: 99% (08-22-23 @ 05:45) (91% - 99%)  Wt(kg): --    08-21 @ 07:01  -  08-22 @ 07:00  --------------------------------------------------------  IN:    Heparin Infusion: 108 mL    Oral Fluid: 500 mL  Total IN: 608 mL    OUT:    Voided (mL): 500 mL  Total OUT: 500 mL    Total NET: 108 mL            VITALS  T(C): 36.3 (08-22-23 @ 05:45), Max: 37.1 (08-21-23 @ 20:37)  HR: 71 (08-22-23 @ 05:45) (71 - 89)  BP: 123/71 (08-22-23 @ 05:45) (114/64 - 127/68)  RR: 17 (08-22-23 @ 05:45) (17 - 18)  SpO2: 99% (08-22-23 @ 05:45) (91% - 99%)    Constitutional: well developed, normal appearance, well groomed, well nourished, no deformities and no acute distress.   Eyes: the conjunctiva exhibited no abnormalities and the eyelids demonstrated no xanthelasmas.   HEENT: normal oral mucosa, no oral pallor and no oral cyanosis.   Neck: normal jugular venous A waves present, normal jugular venous V waves present and no jugular venous castillo A waves.   Pulmonary: no respiratory distress, normal respiratory rhythm and effort, no accessory muscle use and lungs were clear to auscultation bilaterally.   Cardiovascular: heart rate and rhythm were normal, normal S1 and S2 and no murmur, gallop, rub, heave or thrill are present.   Abdomen: soft, non-tender, no hepato-splenomegaly and no abdominal mass palpated.   Musculoskeletal: the gait could not be assessed..   Extremities: no clubbing of the fingernails, no localized cyanosis, no petechial hemorrhages and no ischemic changes.   Skin: normal skin color and pigmentation, no rash, no venous stasis, no skin lesions, no skin ulcer and no xanthoma was observed.   Psychiatric: oriented to person, place, and time, the affect was normal, the mood was normal and not feeling anxious.     LABS:   --------  08-22    142  |  104  |  6<L>  ----------------------------<  88  3.5   |  32<H>  |  0.51    Ca    8.2<L>      22 Aug 2023 05:52                           8.6    7.01  )-----------( 517      ( 22 Aug 2023 05:52 )             27.2     PT/INR - ( 22 Aug 2023 05:52 )   PT: 26.5 sec;   INR: 2.32 ratio         PTT - ( 22 Aug 2023 05:52 )  PTT:57.0 sec            RADIOLOGY:  -----------------    ECG:     ECHO:

## 2023-08-22 NOTE — DISCHARGE NOTE PROVIDER - HOSPITAL COURSE
Problem/Plan - 1:  ·  Problem: DVT of lower limb, acute.   ·  Plan: Repeated Doppler is negative - continue Heparin drip , leg elevation ,pain management ,Coumadin resumed as per hematology recommendation ,daily PT/INR.    Problem/Plan - 2:  ·  Problem: Afib.   ·  Plan: on coumadin and heparin.    Problem/Plan - 3:  ·  Problem: VHD (valvular heart disease).   ·  Plan: continue home medications.    Problem/Plan - 4:  ·  Problem: HTN (hypertension).   ·  Plan: continue home medications.    Problem/Plan - 5:  ·  Problem: CAD (coronary artery disease).   ·  Plan: as per card cons.    Problem/Plan - 6:  ·  Problem: Hypothyroidism.   ·  Plan: continue home medications.    Problem/Plan - 7:  ·  Problem: MDD (major depressive disorder).   ·  Plan: continue home medications.    Problem/Plan - 8:  ·  Problem: GERD (gastroesophageal reflux disease).   ·  Plan: ppi.    Problem/Plan - 9:  ·  Problem: Leg wound, left.   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    Problem/Plan - 10:  ·  Problem: Prophylactic measure.   ·  Plan; Gastrointestinal stress ulcer prophylaxis and DVT prophylaxis administered. Problem/Plan - 1:  ·  Problem: DVT of lower limb, acute.   ·  Plan: Repeated Doppler is negative - continue Heparin drip , leg elevation ,pain management ,Coumadin resumed as per hematology recommendation ,daily PT/INR.    Problem/Plan - 2:  ·  Problem: Afib.   ·  Plan: on coumadin     Problem/Plan - 3:  ·  Problem: VHD (valvular heart disease).   ·  Plan: continue home medications.    Problem/Plan - 4:  ·  Problem: HTN (hypertension).   ·  Plan: continue home medications.    Problem/Plan - 5:  ·  Problem: CAD (coronary artery disease).   ·  Plan: as per card cons.    Problem/Plan - 6:  ·  Problem: Hypothyroidism.   ·  Plan: continue home medications.    Problem/Plan - 7:  ·  Problem: MDD (major depressive disorder).   ·  Plan: continue home medications.    Problem/Plan - 8:  ·  Problem: GERD (gastroesophageal reflux disease).   ·  Plan: ppi.    Problem/Plan - 9:  ·  Problem: Leg wound, left.   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    Problem/Plan - 10:  ·  Problem: Prophylactic measure.   ·  Plan; Gastrointestinal stress ulcer prophylaxis and DVT prophylaxis administered. Problem/Plan - 1:  ·  Problem: COPD  ·  Plan: Patient requiring Oxygen Therapy 2L NC     Problem/Plan - 2:  ·  Problem: Afib.   ·  Plan: on coumadin     Problem/Plan - 3:  ·  Problem: VHD (valvular heart disease).   ·  Plan: continue home medications.    Problem/Plan - 4:  ·  Problem: HTN (hypertension).   ·  Plan: continue home medications.    Problem/Plan - 5:  ·  Problem: CAD (coronary artery disease).   ·  Plan: as per card cons.    Problem/Plan - 6:  ·  Problem: Hypothyroidism.   ·  Plan: continue home medications.    Problem/Plan - 7:  ·  Problem: MDD (major depressive disorder).   ·  Plan: continue home medications.    Problem/Plan - 8:  ·  Problem: GERD (gastroesophageal reflux disease).   ·  Plan: ppi.    Problem/Plan - 9:  ·  Problem: Leg wound, left.   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    Problem/Plan - 10:  ·  Problem: Prophylactic measure.   ·  Plan; Gastrointestinal stress ulcer prophylaxis and DVT prophylaxis administered.

## 2023-08-22 NOTE — DISCHARGE NOTE PROVIDER - DETAILS OF MALNUTRITION DIAGNOSIS/DIAGNOSES
This patient has been assessed with a concern for Malnutrition and was treated during this hospitalization for the following Nutrition diagnosis/diagnoses:     -  08/19/2023: Moderate protein-calorie malnutrition

## 2023-08-22 NOTE — PROGRESS NOTE ADULT - SUBJECTIVE AND OBJECTIVE BOX
Neurology Follow up note    REJI [id number]    HPI:  89 Female with   Past medical history of CVA on Coumadin with right-sided weakness, hypertension hyperlipidemia CAD history of A-fib on Coumadin recently admitted at [facility] with acute metabolic encephalopathy 2/2 to uti and cellulitis of LE  .  P sent in from [facility] today  for after being found to have a DVT in her left Common femoral, great saphenous, popliteal vein.  Right lower extremity negative for DVT.  Ultrasound done today. patient reports left leg pain. patient denies fevers, chills, chest pain, shortness of breath. patient on coumadin with INR of 2.28 on 8/14 ,today in ER 1.57 .Admitted for vascular& hematology eval since patient developed DVT on coumadin .US venous  was done beginning of month during recent hospitalization and was negative for DVT Palliative care consult requested ,to discuss advance directives and complete MOLST During previous admission son was consulted regarding residential hospice at [facility] and palliative care issues /GOC discussion -could not decide on code status and patient remained full code . (18 Aug 2023 16:14)      Interval History -no new events    Patient is seen, chart was reviewed and case was discussed with the treatment team.  Pt is not in any distress.   Lying on bed comfortably. .    Vital Signs Last 24 Hrs  T(C): 36.4 (22 Aug 2023 12:20), Max: 37.1 (21 Aug 2023 20:37)  T(F): 97.5 (22 Aug 2023 12:20), Max: 98.7 (21 Aug 2023 20:37)  HR: 71 (22 Aug 2023 12:20) (71 - 89)  BP: 108/70 (22 Aug 2023 12:20) (108/70 - 127/68)  BP(mean): --  RR: 18 (22 Aug 2023 12:20) (17 - 18)  SpO2: 97% (22 Aug 2023 12:20) (87% - 99%)    Parameters below as of 22 Aug 2023 12:20  Patient On (Oxygen Delivery Method): nasal cannula  O2 Flow (L/min): 2          REVIEW OF SYSTEMS:    Constitutional: No fever, weight loss or fatigue  Eyes: No eye pain, visual disturbances, or discharge  ENT:  No difficulty hearing, tinnitus, vertigo; No sinus or throat pain  Neck: No pain or stiffness  Respiratory: No cough, wheezing, chills or hemoptysis  Cardiovascular: No chest pain, palpitations, shortness of breath, dizziness or leg swelling  Gastrointestinal: No abdominal or epigastric pain. No nausea, vomiting or hematemesis;   Genitourinary: No dysuria, frequency, hematuria or incontinence  Neurological: No headaches, memory loss, loss of strength, numbness or tremors  Psychiatric: No depression, anxiety, mood swings or difficulty sleeping  Musculoskeletal: No joint pain  No muscle, back pain  Skin: No itching, burning, rashes or lesions   Lymph Nodes: No enlarged glands  Endocrine: No heat or cold intolerance; No hair loss,  Allergy and Immunologic: No hives or eczema    On Neurological Examination:    Mental Status - Pt is alert, awake, oriented X3.. Follows commands well and able to answer questions appropriately.Mood and affect  normal    Speech -  Normal.     Cranial Nerves - Pupils 3 mm equal and reactive to light, extraocular eye movements intact. Pt has no visual field deficit.  Pt has  right  facial asymmetry. Facial sensation is intact.Tongue - is in midline.    Muscle tone - is increased on right.      Motor Exam - residual right hemiparesis; 1-2/5   No drift. No shaking or tremors.    Sensory Exam -. Pt withdraws all extremities equally on stimulation. No asymmetry seen. No complaints of tingling, numbness.      coordination:    Finger to nose: normal    Deep tendon Reflexes - 2 plus all over.      Neck Supple -  Yes.     MEDICATIONS    acetaminophen     Tablet .. 650 milliGRAM(s) Oral every 6 hours  aluminum hydroxide/magnesium hydroxide/simethicone Suspension 30 milliLiter(s) Oral every 4 hours PRN  amLODIPine   Tablet 5 milliGRAM(s) Oral daily  cholecalciferol 2000 Unit(s) Oral daily  collagenase Ointment 1 Application(s) Topical daily  escitalopram 20 milliGRAM(s) Oral daily  ferrous    sulfate 325 milliGRAM(s) Oral daily  folic acid 1 milliGRAM(s) Oral daily  furosemide    Tablet 20 milliGRAM(s) Oral daily  gabapentin 300 milliGRAM(s) Oral three times a day  lactobacillus acidophilus 1 Tablet(s) Oral two times a day with meals  levothyroxine 50 MICROGram(s) Oral daily  melatonin 3 milliGRAM(s) Oral at bedtime PRN  metoprolol tartrate 25 milliGRAM(s) Oral two times a day  morphine  - Injectable 2 milliGRAM(s) IV Push every 6 hours PRN  ondansetron Injectable 4 milliGRAM(s) IV Push every 8 hours PRN  pantoprazole    Tablet 40 milliGRAM(s) Oral before breakfast  senna 2 Tablet(s) Oral at bedtime  simvastatin 10 milliGRAM(s) Oral at bedtime  traMADol 50 milliGRAM(s) Oral every 6 hours      Allergies    per son &quot;issues with certain anitibiotics&quot; does not remember the names (Unknown)    Intolerances        LABS:  CBC Full  -  ( 22 Aug 2023 05:52 )  WBC Count : 7.01 K/uL  RBC Count : 2.31 M/uL  Hemoglobin : 8.6 g/dL  Hematocrit : 27.2 %  Platelet Count - Automated : 517 K/uL  Mean Cell Volume : 117.7 fl  Mean Cell Hemoglobin : 37.2 pg  Mean Cell Hemoglobin Concentration : 31.6 gm/dL  Auto Neutrophil # : x  Auto Lymphocyte # : x  Auto Monocyte # : x      Urinalysis Basic - ( 22 Aug 2023 05:52 )    Color: x / Appearance: x / SG: x / pH: x  Gluc: 88 mg/dL / Ketone: x  / Bili: x / Urobili: x   Blood: x / Protein: x / Nitrite: x   Leuk Esterase: x / RBC: x / WBC x   Sq Epi: x / Non Sq Epi: x / Bacteria: x      08-22    142  |  104  |  6<L>  ----------------------------<  88  3.5   |  32<H>  |  0.51    Ca    8.2<L>      22 Aug 2023 05:52      Hemoglobin A1C:     Vitamin B12     RADIOLOGY    ASSESSMENT AND PLAN:      recurrent DVT  hx of cva with residual hemiparesis    For stroke prevention- AP therapy was advised not long term AC  Neuro follow up PRN  Pain is accessed and addressed.  Would continue to follow.

## 2023-08-22 NOTE — DISCHARGE NOTE PROVIDER - CARE PROVIDER_API CALL
Facundo Valentine  Infectious Disease  [address] [place] [address]  Phone: [phone]  Fax: [phone]  Follow Up Time: 1 week    Twan Moralez  Cardiology  [address] [place] [address] [address]  [place] [address]  Phone: [phone]  Fax: [phone]  Follow Up Time: 2 weeks    Iram HaynesEvaristo  Medical Oncology  [address] [facility], [address] [address]  Phone: [phone]  Fax: [phone]  Follow Up Time: 1 month

## 2023-08-22 NOTE — DISCHARGE NOTE PROVIDER - PROVIDER TOKENS
PROVIDER:[TOKEN:[[id number]],FOLLOWUP:[1 week]],PROVIDER:[TOKEN:[[id number]],FOLLOWUP:[2 weeks]],PROVIDER:[TOKEN:[[id number]],FOLLOWUP:[1 month]]

## 2023-08-22 NOTE — PROGRESS NOTE ADULT - SUBJECTIVE AND OBJECTIVE BOX
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  P sent in from [facility] today  for after being found to have a DVT in her left Common femoral, great saphenous, popliteal vein.  Right lower extremity negative for DVT.  Ultrasound done today. patient reports left leg pain. patient denies fevers, chills, chest pain, shortness of breath. patient on coumadin with INR of 2.28 on 8/14 ,today in ER 1.57 .Admitted for vascular& hematology eval since patient developed DVT on coumadin .US venous  was done beginning of month during recent hospitalization and was negative for DVT Palliative care consult requested ,to discuss advance directives and complete MOLST During previous admission son was consulted regarding residential hospice at [facility] and palliative care issues /GOC discussion -could not decide on code status and patient remained full code . (18 Aug 2023 16:14)    PAST MEDICAL & SURGICAL HISTORY:  Hypertension      CVA (cerebral vascular accident)      Depression      Constipation      Neuropathy      Hyperlipidemia      Grade I diastolic dysfunction      Afib      Neuropathy      VHD (valvular heart disease)      Aortic valvar stenosis      Hypothyroidism      GERD (gastroesophageal reflux disease)      No significant past surgical history          MEDICATIONS  (STANDING):  acetaminophen     Tablet .. 650 milliGRAM(s) Oral every 6 hours  amLODIPine   Tablet 5 milliGRAM(s) Oral daily  cholecalciferol 2000 Unit(s) Oral daily  collagenase Ointment 1 Application(s) Topical daily  escitalopram 20 milliGRAM(s) Oral daily  ferrous    sulfate 325 milliGRAM(s) Oral daily  folic acid 1 milliGRAM(s) Oral daily  furosemide    Tablet 20 milliGRAM(s) Oral daily  gabapentin 300 milliGRAM(s) Oral three times a day  lactobacillus acidophilus 1 Tablet(s) Oral two times a day with meals  levothyroxine 50 MICROGram(s) Oral daily  metoprolol tartrate 25 milliGRAM(s) Oral two times a day  pantoprazole    Tablet 40 milliGRAM(s) Oral before breakfast  senna 2 Tablet(s) Oral at bedtime  simvastatin 10 milliGRAM(s) Oral at bedtime  traMADol 50 milliGRAM(s) Oral every 6 hours    MEDICATIONS  (PRN):  aluminum hydroxide/magnesium hydroxide/simethicone Suspension 30 milliLiter(s) Oral every 4 hours PRN Dyspepsia  melatonin 3 milliGRAM(s) Oral at bedtime PRN Insomnia  morphine  - Injectable 2 milliGRAM(s) IV Push every 6 hours PRN Severe Pain (7 - 10)  ondansetron Injectable 4 milliGRAM(s) IV Push every 8 hours PRN Nausea and/or Vomiting      OBJECTIVE    T(C): 36.4 (08-22-23 @ 12:20), Max: 37.1 (08-21-23 @ 20:37)  HR: 71 (08-22-23 @ 12:20) (71 - 89)  BP: 108/70 (08-22-23 @ 12:20) (108/70 - 127/68)  RR: 18 (08-22-23 @ 12:20) (17 - 18)  SpO2: 97% (08-22-23 @ 12:20) (87% - 99%)  Wt(kg): --  I&O's Summary    21 Aug 2023 07:01  -  22 Aug 2023 07:00  --------------------------------------------------------  IN: 608 mL / OUT: 500 mL / NET: 108 mL          REVIEW OF SYSTEMS:  CONSTITUTIONAL: No fever, weight loss, or fatigue  EYES: No eye pain, visual disturbances, or discharge  ENMT:   No sinus or throat pain  NECK: No pain or stiffness  RESPIRATORY: No cough, wheezing, chills or hemoptysis; No shortness of breath  CARDIOVASCULAR: No chest pain, palpitations, dizziness, or leg swelling  GASTROINTESTINAL: No abdominal pain. No nausea, vomiting; No diarrhea or constipation. No melena or hematochezia.  GENITOURINARY: No dysuria, frequency, hematuria, or incontinence  NEUROLOGICAL: No headaches, memory loss, loss of strength, numbness, or tremors  SKIN: No itching, burning, rashes, or lesions   MUSCULOSKELETAL: No joint pain or swelling; No muscle, back, or extremity pain    PHYSICAL EXAM:  Appearance: NAD. VS past 24 hrs -as above   HEENT:   Moist oral mucosa. Conjunctiva clear b/l.   Neck : supple  Respiratory: Lungs CTAB.  Gastrointestinal:  Soft, nontender. No rebound. No rigidity. BS present	  Cardiovascular: RRR ,S1S2 present  Neurologic: Non-focal. Moving all extremities.  Extremities: No edema. No erythema. No calf tenderness.  Skin: No rashes, No ecchymoses, No cyanosis.	  wounds ,skin lesions-See skin assesment flow sheet   LABS:                        8.6    7.01  )-----------( 517      ( 22 Aug 2023 05:52 )             27.2     08-22    142  |  104  |  6<L>  ----------------------------<  88  3.5   |  32<H>  |  0.51    Ca    8.2<L>      22 Aug 2023 05:52      CAPILLARY BLOOD GLUCOSE        PT/INR - ( 22 Aug 2023 05:52 )   PT: 26.5 sec;   INR: 2.32 ratio         PTT - ( 22 Aug 2023 05:52 )  PTT:57.0 sec  Urinalysis Basic - ( 22 Aug 2023 05:52 )    Color: x / Appearance: x / SG: x / pH: x  Gluc: 88 mg/dL / Ketone: x  / Bili: x / Urobili: x   Blood: x / Protein: x / Nitrite: x   Leuk Esterase: x / RBC: x / WBC x   Sq Epi: x / Non Sq Epi: x / Bacteria: x        RADIOLOGY & ADDITIONAL TESTS:   reviewed elctronically  ASSESSMENT/PLAN: 	     PROGRESS NOTE  Patient is a 89y old  Female who presents with a chief complaint of left leg pain (22 Aug 2023 08:57)    Chart and available morning labs /imaging are reviewed electronically , urgent issues addressed . More information  is being added upon completion of rounds , when more information is collected and management discussed with consultants , medical staff and social service/case management on the floor   OVERNIGHT    No new issues reported by medical staff . 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  P sent in from [facility] today  for after being found to have a DVT in her left Common femoral, great saphenous, popliteal vein.  Right lower extremity negative for DVT.  Ultrasound done today. patient reports left leg pain. patient denies fevers, chills, chest pain, shortness of breath. patient on coumadin with INR of 2.28 on 8/14 ,today in ER 1.57 .Admitted for vascular& hematology eval since patient developed DVT on coumadin .US venous  was done beginning of month during recent hospitalization and was negative for DVT Palliative care consult requested ,to discuss advance directives and complete MOLST During previous admission son was consulted regarding residential hospice at [facility] and palliative care issues /GOC discussion -could not decide on code status and patient remained full code . (18 Aug 2023 16:14)    PAST MEDICAL & SURGICAL HISTORY:  Hypertension      CVA (cerebral vascular accident)      Depression      Constipation      Neuropathy      Hyperlipidemia      Grade I diastolic dysfunction      Afib      Neuropathy      VHD (valvular heart disease)      Aortic valvar stenosis      Hypothyroidism      GERD (gastroesophageal reflux disease)      No significant past surgical history          MEDICATIONS  (STANDING):  acetaminophen     Tablet .. 650 milliGRAM(s) Oral every 6 hours  amLODIPine   Tablet 5 milliGRAM(s) Oral daily  cholecalciferol 2000 Unit(s) Oral daily  collagenase Ointment 1 Application(s) Topical daily  escitalopram 20 milliGRAM(s) Oral daily  ferrous    sulfate 325 milliGRAM(s) Oral daily  folic acid 1 milliGRAM(s) Oral daily  furosemide    Tablet 20 milliGRAM(s) Oral daily  gabapentin 300 milliGRAM(s) Oral three times a day  lactobacillus acidophilus 1 Tablet(s) Oral two times a day with meals  levothyroxine 50 MICROGram(s) Oral daily  metoprolol tartrate 25 milliGRAM(s) Oral two times a day  pantoprazole    Tablet 40 milliGRAM(s) Oral before breakfast  senna 2 Tablet(s) Oral at bedtime  simvastatin 10 milliGRAM(s) Oral at bedtime  traMADol 50 milliGRAM(s) Oral every 6 hours    MEDICATIONS  (PRN):  aluminum hydroxide/magnesium hydroxide/simethicone Suspension 30 milliLiter(s) Oral every 4 hours PRN Dyspepsia  melatonin 3 milliGRAM(s) Oral at bedtime PRN Insomnia  morphine  - Injectable 2 milliGRAM(s) IV Push every 6 hours PRN Severe Pain (7 - 10)  ondansetron Injectable 4 milliGRAM(s) IV Push every 8 hours PRN Nausea and/or Vomiting      OBJECTIVE    T(C): 36.4 (08-22-23 @ 12:20), Max: 37.1 (08-21-23 @ 20:37)  HR: 71 (08-22-23 @ 12:20) (71 - 89)  BP: 108/70 (08-22-23 @ 12:20) (108/70 - 127/68)  RR: 18 (08-22-23 @ 12:20) (17 - 18)  SpO2: 97% (08-22-23 @ 12:20) (87% - 99%)  Wt(kg): --  I&O's Summary    21 Aug 2023 07:01  -  22 Aug 2023 07:00  --------------------------------------------------------  IN: 608 mL / OUT: 500 mL / NET: 108 mL          REVIEW OF SYSTEMS:  CONSTITUTIONAL: No fever, weight loss, or fatigue  EYES: No eye pain, visual disturbances, or discharge  ENMT:   No sinus or throat pain  NECK: No pain or stiffness  RESPIRATORY: No cough, wheezing, chills or hemoptysis; No shortness of breath  CARDIOVASCULAR: No chest pain, palpitations, dizziness, or leg swelling  GASTROINTESTINAL: No abdominal pain. No nausea, vomiting; No diarrhea or constipation. No melena or hematochezia.  GENITOURINARY: No dysuria, frequency, hematuria, or incontinence  NEUROLOGICAL: No headaches, memory loss, loss of strength, numbness, or tremors  SKIN: No itching, burning, rashes, or lesions   MUSCULOSKELETAL: No joint pain or swelling; No muscle, back, or extremity pain    PHYSICAL EXAM:  Appearance: NAD. VS past 24 hrs -as above   HEENT:   Moist oral mucosa. Conjunctiva clear b/l.   Neck : supple  Respiratory: Lungs CTAB.  Gastrointestinal:  Soft, nontender. No rebound. No rigidity. BS present	  Cardiovascular: RRR ,S1S2 present  Neurologic: Non-focal. Moving all extremities.  Extremities: No edema. No erythema. No calf tenderness.  Skin: No rashes, No ecchymoses, No cyanosis.	  wounds ,skin lesions-See skin assesment flow sheet   LABS:                        8.6    7.01  )-----------( 517      ( 22 Aug 2023 05:52 )             27.2     08-22    142  |  104  |  6<L>  ----------------------------<  88  3.5   |  32<H>  |  0.51    Ca    8.2<L>      22 Aug 2023 05:52      CAPILLARY BLOOD GLUCOSE        PT/INR - ( 22 Aug 2023 05:52 )   PT: 26.5 sec;   INR: 2.32 ratio         PTT - ( 22 Aug 2023 05:52 )  PTT:57.0 sec  Urinalysis Basic - ( 22 Aug 2023 05:52 )    Color: x / Appearance: x / SG: x / pH: x  Gluc: 88 mg/dL / Ketone: x  / Bili: x / Urobili: x   Blood: x / Protein: x / Nitrite: x   Leuk Esterase: x / RBC: x / WBC x   Sq Epi: x / Non Sq Epi: x / Bacteria: x        RADIOLOGY & ADDITIONAL TESTS:   reviewed elctronically  ASSESSMENT/PLAN: 	  Patient was seen and examined on a day of discharge . Plan of care , discharge medications and recommendations discussed with consultants and clearance for discharge obtained .Social service , case management  and medical staff are aware of plan. Family is notified. Discharge summary  is  prepared electronically-see separate document prepared by me .75minutes spent on this visit, 50% visit time spent in care co-ordination with other attendings and counselling patient  I have discussed care plan with patient and HCP,expressed understanding of problems treatment and their effect and side effects, alternatives in detail,I have asked if they have any questions and concerns and appropriately addressed them to best of my ability

## 2023-08-22 NOTE — PROGRESS NOTE ADULT - SUBJECTIVE AND OBJECTIVE BOX
CHIEF COMPLAINT/ REASON FOR VISIT  .. Patient was seen to address the  issue listed under PROBLEM LIST which is located toward bottom of this note     REJI BERGER    IRMA 1EAS 113 W[address]    Allergies    per son &quot;issues with certain anitibiotics&quot; does not remember the names (Unknown)    Intolerances        PAST MEDICAL & SURGICAL HISTORY:  Hypertension      CVA (cerebral vascular accident)      Depression      Constipation      Neuropathy      Hyperlipidemia      Grade I diastolic dysfunction      Afib      Neuropathy      VHD (valvular heart disease)      Aortic valvar stenosis      Hypothyroidism      GERD (gastroesophageal reflux disease)      No significant past surgical history          FAMILY HISTORY:      Home Medications:  benzonatate 100 mg oral capsule: 1 cap(s) orally 3 times a day as needed for  cough (18 Aug 2023 15:32)  Biofreeze 4% topical gel: Apply topically to affected area 2 times a day to R shoulder and L knee (18 Aug 2023 15:34)  docusate sodium 100 mg oral capsule: 2 cap(s) orally once a day (at bedtime) (18 Aug 2023 15:36)  famotidine 20 mg oral tablet: 1 tab(s) orally once a day (18 Aug 2023 15:38)  miconazole 2% topical cream: Apply topically to affected area 2 times a day (18 Aug 2023 15:30)  senna (sennosides) 8.6 mg oral tablet: 1 tab(s) orally once a day as needed for  constipation (18 Aug 2023 15:43)  traMADol 50 mg oral tablet: 1 tab(s) orally every 6 hours as needed for pain (18 Aug 2023 15:45)  traMADol 50 mg oral tablet: 1 tab(s) orally 2 times a day (18 Aug 2023 15:45)  Tylenol Extra Strength 500 mg oral tablet: 2 tab(s) orally once a day (in the morning) and 1 tablet twice a day (18 Aug 2023 15:54)      MEDICATIONS  (STANDING):  acetaminophen     Tablet .. 650 milliGRAM(s) Oral every 6 hours  amLODIPine   Tablet 5 milliGRAM(s) Oral daily  cholecalciferol 2000 Unit(s) Oral daily  collagenase Ointment 1 Application(s) Topical daily  escitalopram 20 milliGRAM(s) Oral daily  ferrous    sulfate 325 milliGRAM(s) Oral daily  folic acid 1 milliGRAM(s) Oral daily  furosemide    Tablet 20 milliGRAM(s) Oral daily  gabapentin 300 milliGRAM(s) Oral three times a day  heparin  Infusion. 900 Unit(s)/Hr (9 mL/Hr) IV Continuous <Continuous>  lactobacillus acidophilus 1 Tablet(s) Oral two times a day with meals  levothyroxine 50 MICROGram(s) Oral daily  metoprolol tartrate 25 milliGRAM(s) Oral two times a day  pantoprazole    Tablet 40 milliGRAM(s) Oral before breakfast  senna 2 Tablet(s) Oral at bedtime  simvastatin 10 milliGRAM(s) Oral at bedtime  traMADol 50 milliGRAM(s) Oral every 6 hours    MEDICATIONS  (PRN):  aluminum hydroxide/magnesium hydroxide/simethicone Suspension 30 milliLiter(s) Oral every 4 hours PRN Dyspepsia  heparin   Injectable 2000 Unit(s) IV Push every 6 hours PRN For aPTT between 40 - 57  heparin   Injectable 4500 Unit(s) IV Push every 6 hours PRN For aPTT less than 40  melatonin 3 milliGRAM(s) Oral at bedtime PRN Insomnia  morphine  - Injectable 2 milliGRAM(s) IV Push every 6 hours PRN Severe Pain (7 - 10)  ondansetron Injectable 4 milliGRAM(s) IV Push every 8 hours PRN Nausea and/or Vomiting              Vital Signs Last 24 Hrs  T(C): 37.1 (21 Aug 2023 20:37), Max: 37.1 (21 Aug 2023 20:37)  T(F): 98.7 (21 Aug 2023 20:37), Max: 98.7 (21 Aug 2023 20:37)  HR: 75 (21 Aug 2023 20:37) (73 - 89)  BP: 124/75 (21 Aug 2023 20:37) (114/64 - 127/68)  BP(mean): --  RR: 17 (21 Aug 2023 20:37) (17 - 18)  SpO2: 99% (21 Aug 2023 20:37) (91% - 99%)    Parameters below as of 21 Aug 2023 20:37  Patient On (Oxygen Delivery Method): nasal cannula          08-20-23 @ 07:01  -  08-21-23 @ 07:00  --------------------------------------------------------  IN: 117 mL / OUT: 0 mL / NET: 117 mL    08-21-23 @ 07:01  -  08-22-23 @ 05:41  --------------------------------------------------------  IN: 313 mL / OUT: 400 mL / NET: -87 mL              LABS:                        9.3    7.52  )-----------( 546      ( 21 Aug 2023 06:20 )             30.2     08-21    139  |  102  |  5<L>  ----------------------------<  94  3.6   |  34<H>  |  0.59    Ca    8.0<L>      21 Aug 2023 06:20      PT/INR - ( 21 Aug 2023 06:20 )   PT: 18.0 sec;   INR: 1.56 ratio         PTT - ( 21 Aug 2023 10:41 )  PTT:84.3 sec  Urinalysis Basic - ( 21 Aug 2023 06:20 )    Color: x / Appearance: x / SG: x / pH: x  Gluc: 94 mg/dL / Ketone: x  / Bili: x / Urobili: x   Blood: x / Protein: x / Nitrite: x   Leuk Esterase: x / RBC: x / WBC x   Sq Epi: x / Non Sq Epi: x / Bacteria: x            WBC:  WBC Count: 7.52 K/uL (08-21 @ 06:20)  WBC Count: 9.05 K/uL (08-21 @ 04:23)  WBC Count: 6.24 K/uL (08-20 @ 06:09)  WBC Count: 6.96 K/uL (08-19 @ 06:27)  WBC Count: 8.13 K/uL (08-18 @ 23:54)  WBC Count: 8.55 K/uL (08-18 @ 14:10)      MICROBIOLOGY:  RECENT CULTURES:              PT/INR - ( 21 Aug 2023 06:20 )   PT: 18.0 sec;   INR: 1.56 ratio         PTT - ( 21 Aug 2023 10:41 )  PTT:84.3 sec    Sodium:  Sodium: 139 mmol/L (08-21 @ 06:20)  Sodium: 140 mmol/L (08-20 @ 06:09)  Sodium: 144 mmol/L (08-19 @ 06:27)  Sodium: 139 mmol/L (08-18 @ 14:10)      0.59 mg/dL 08-21 @ 06:20  0.45 mg/dL 08-20 @ 06:09  0.51 mg/dL 08-19 @ 06:27  0.68 mg/dL 08-18 @ 14:10      Hemoglobin:  Hemoglobin: 9.3 g/dL (08-21 @ 06:20)  Hemoglobin: 10.0 g/dL (08-21 @ 04:23)  Hemoglobin: 9.1 g/dL (08-20 @ 06:09)  Hemoglobin: 9.2 g/dL (08-19 @ 06:27)  Hemoglobin: 9.1 g/dL (08-18 @ 23:54)  Hemoglobin: 10.1 g/dL (08-18 @ 14:10)      Platelets: Platelet Count - Automated: 546 K/uL (08-21 @ 06:20)  Platelet Count - Automated: 340 K/uL (08-21 @ 04:23)  Platelet Count - Automated: 494 K/uL (08-20 @ 06:09)  Platelet Count - Automated: 536 K/uL (08-19 @ 06:27)  Platelet Count - Automated: 525 K/uL (08-18 @ 23:54)  Platelet Count - Automated: 534 K/uL (08-18 @ 14:10)          Urinalysis Basic - ( 21 Aug 2023 06:20 )    Color: x / Appearance: x / SG: x / pH: x  Gluc: 94 mg/dL / Ketone: x  / Bili: x / Urobili: x   Blood: x / Protein: x / Nitrite: x   Leuk Esterase: x / RBC: x / WBC x   Sq Epi: x / Non Sq Epi: x / Bacteria: x        RADIOLOGY & ADDITIONAL STUDIES:      MICROBIOLOGY:  RECENT CULTURES:

## 2023-08-22 NOTE — DISCHARGE NOTE PROVIDER - NSDCMRMEDTOKEN_GEN_ALL_CORE_FT
amLODIPine 5 mg oral tablet: 1 tab(s) orally once a day  benzonatate 100 mg oral capsule: 1 cap(s) orally 3 times a day as needed for  cough  Biofreeze 4% topical gel: Apply topically to affected area 2 times a day to R shoulder and L knee  cholecalciferol 50 mcg (2000 intl units) oral tablet: 1 tab(s) orally once a day  collagenase 250 units/g topical ointment: Apply topically to affected area once a day LL extremity wound  docusate sodium 100 mg oral capsule: 2 cap(s) orally once a day (at bedtime)  escitalopram 20 mg oral tablet: 1 tab(s) orally once a day  famotidine 20 mg oral tablet: 1 tab(s) orally once a day  ferrous sulfate 325 mg (65 mg elemental iron) oral tablet: 1 tab(s) orally once a day  folic acid 1 mg oral tablet: 1 tab(s) orally once a day  furosemide 20 mg oral tablet: 1 tab(s) orally once a day  gabapentin 300 mg oral capsule: 1 cap(s) orally 3 times a day  hydroxyurea 500 mg oral capsule: 1 cap(s) orally 2 times a day  lactobacillus acidophilus oral tablet: 2 tab(s) orally once a day  levothyroxine 50 mcg (0.05 mg) oral tablet: 1 tab(s) orally once a day  melatonin 3 mg oral tablet: 1 tab(s) orally once a day (at bedtime) As needed Insomnia  metoprolol tartrate 25 mg oral tablet: 1 tab(s) orally 2 times a day  pantoprazole 40 mg oral delayed release tablet: 1 tab(s) orally once a day  senna (sennosides) 8.6 mg oral tablet: 1 tab(s) orally once a day as needed for  constipation  simvastatin 10 mg oral tablet: 1 tab(s) orally once a day (at bedtime)  traMADol 50 mg oral tablet: 1 tab(s) orally every 6 hours as needed for pain  traMADol 50 mg oral tablet: 1 tab(s) orally 2 times a day  Tylenol Extra Strength 500 mg oral tablet: 2 tab(s) orally once a day (in the morning) and 1 tablet twice a day  warfarin 1 mg oral tablet: 1 tab(s) orally every other day HOLD FOR INR ABOVE 3.0 ,NEXT INR 08/25/23   amLODIPine 5 mg oral tablet: 1 tab(s) orally once a day  benzonatate 100 mg oral capsule: 1 cap(s) orally 3 times a day as needed for  cough  Biofreeze 4% topical gel: Apply topically to affected area 2 times a day to R shoulder and L knee  cholecalciferol 50 mcg (2000 intl units) oral tablet: 1 tab(s) orally once a day  collagenase 250 units/g topical ointment: Apply topically to affected area once a day LL extremity wound  docusate sodium 100 mg oral capsule: 2 cap(s) orally once a day (at bedtime)  escitalopram 20 mg oral tablet: 1 tab(s) orally once a day  famotidine 20 mg oral tablet: 1 tab(s) orally once a day  ferrous sulfate 325 mg (65 mg elemental iron) oral tablet: 1 tab(s) orally once a day  folic acid 1 mg oral tablet: 1 tab(s) orally once a day  furosemide 20 mg oral tablet: 1 tab(s) orally once a day  gabapentin 300 mg oral capsule: 1 cap(s) orally 3 times a day  hydroxyurea 500 mg oral capsule: 1 cap(s) orally 2 times a day  lactobacillus acidophilus oral tablet: 2 tab(s) orally once a day  levothyroxine 50 mcg (0.05 mg) oral tablet: 1 tab(s) orally once a day  melatonin 3 mg oral tablet: 1 tab(s) orally once a day (at bedtime) As needed Insomnia  metoprolol tartrate 25 mg oral tablet: 1 tab(s) orally 2 times a day  OXYGEN THERAPY: 2L  NC  pantoprazole 40 mg oral delayed release tablet: 1 tab(s) orally once a day  senna (sennosides) 8.6 mg oral tablet: 1 tab(s) orally once a day as needed for  constipation  simvastatin 10 mg oral tablet: 1 tab(s) orally once a day (at bedtime)  traMADol 50 mg oral tablet: 1 tab(s) orally every 6 hours as needed for pain  traMADol 50 mg oral tablet: 1 tab(s) orally 2 times a day  Tylenol Extra Strength 500 mg oral tablet: 2 tab(s) orally once a day (in the morning) and 1 tablet twice a day  warfarin 1 mg oral tablet: 1 tab(s) orally every other day HOLD FOR INR ABOVE 3.0 ,NEXT INR 08/25/23

## 2023-08-22 NOTE — PROGRESS NOTE ADULT - SUBJECTIVE AND OBJECTIVE BOX
Date/Time Patient Seen:  		  Referring MD:   Data Reviewed	       Patient is a 89y old  Female who presents with a chief complaint of left leg pain (21 Aug 2023 09:07)      Subjective/HPI     PAST MEDICAL & SURGICAL HISTORY:  Hypertension    CVA (cerebral vascular accident)    Depression    Constipation    Neuropathy    Constipation    Hyperlipidemia    Grade I diastolic dysfunction    Afib    Neuropathy    VHD (valvular heart disease)    Aortic valvar stenosis    Hypothyroidism    GERD (gastroesophageal reflux disease)    No significant past surgical history          Medication list         MEDICATIONS  (STANDING):  acetaminophen     Tablet .. 650 milliGRAM(s) Oral every 6 hours  amLODIPine   Tablet 5 milliGRAM(s) Oral daily  cholecalciferol 2000 Unit(s) Oral daily  collagenase Ointment 1 Application(s) Topical daily  escitalopram 20 milliGRAM(s) Oral daily  ferrous    sulfate 325 milliGRAM(s) Oral daily  folic acid 1 milliGRAM(s) Oral daily  furosemide    Tablet 20 milliGRAM(s) Oral daily  gabapentin 300 milliGRAM(s) Oral three times a day  heparin  Infusion. 900 Unit(s)/Hr (9 mL/Hr) IV Continuous <Continuous>  lactobacillus acidophilus 1 Tablet(s) Oral two times a day with meals  levothyroxine 50 MICROGram(s) Oral daily  metoprolol tartrate 25 milliGRAM(s) Oral two times a day  pantoprazole    Tablet 40 milliGRAM(s) Oral before breakfast  senna 2 Tablet(s) Oral at bedtime  simvastatin 10 milliGRAM(s) Oral at bedtime  traMADol 50 milliGRAM(s) Oral every 6 hours    MEDICATIONS  (PRN):  aluminum hydroxide/magnesium hydroxide/simethicone Suspension 30 milliLiter(s) Oral every 4 hours PRN Dyspepsia  heparin   Injectable 2000 Unit(s) IV Push every 6 hours PRN For aPTT between 40 - 57  heparin   Injectable 4500 Unit(s) IV Push every 6 hours PRN For aPTT less than 40  melatonin 3 milliGRAM(s) Oral at bedtime PRN Insomnia  morphine  - Injectable 2 milliGRAM(s) IV Push every 6 hours PRN Severe Pain (7 - 10)  ondansetron Injectable 4 milliGRAM(s) IV Push every 8 hours PRN Nausea and/or Vomiting         Vitals log        ICU Vital Signs Last 24 Hrs  T(C): 37.1 (21 Aug 2023 20:37), Max: 37.1 (21 Aug 2023 20:37)  T(F): 98.7 (21 Aug 2023 20:37), Max: 98.7 (21 Aug 2023 20:37)  HR: 75 (21 Aug 2023 20:37) (73 - 89)  BP: 124/75 (21 Aug 2023 20:37) (114/64 - 127/68)  BP(mean): --  ABP: --  ABP(mean): --  RR: 17 (21 Aug 2023 20:37) (17 - 18)  SpO2: 99% (21 Aug 2023 20:37) (91% - 99%)    O2 Parameters below as of 21 Aug 2023 20:37  Patient On (Oxygen Delivery Method): nasal cannula                 Input and Output:  I&O's Detail    20 Aug 2023 07:01  -  21 Aug 2023 07:00  --------------------------------------------------------  IN:    Heparin Infusion: 117 mL  Total IN: 117 mL    OUT:  Total OUT: 0 mL    Total NET: 117 mL      21 Aug 2023 07:01  -  22 Aug 2023 05:10  --------------------------------------------------------  IN:    Heparin Infusion: 63 mL    Oral Fluid: 250 mL  Total IN: 313 mL    OUT:    Voided (mL): 400 mL  Total OUT: 400 mL    Total NET: -87 mL          Lab Data                        9.3    7.52  )-----------( 546      ( 21 Aug 2023 06:20 )             30.2     08-21    139  |  102  |  5<L>  ----------------------------<  94  3.6   |  34<H>  |  0.59    Ca    8.0<L>      21 Aug 2023 06:20              Review of Systems	      Objective     Physical Examination    heart s1s2  lung dc BS  head nc      Pertinent Lab findings & Imaging      Summer:  NO   Adequate UO     I&O's Detail    20 Aug 2023 07:01  -  21 Aug 2023 07:00  --------------------------------------------------------  IN:    Heparin Infusion: 117 mL  Total IN: 117 mL    OUT:  Total OUT: 0 mL    Total NET: 117 mL      21 Aug 2023 07:01  -  22 Aug 2023 05:10  --------------------------------------------------------  IN:    Heparin Infusion: 63 mL    Oral Fluid: 250 mL  Total IN: 313 mL    OUT:    Voided (mL): 400 mL  Total OUT: 400 mL    Total NET: -87 mL               Discussed with:     Cultures:	        Radiology

## 2023-08-22 NOTE — DISCHARGE NOTE PROVIDER - NSDCHHHOMEBOUND_GEN_ALL_CORE
----- Message from Amy Laughlin MD sent at 12/23/2020  9:06 AM CST -----  Results are within normal limits except mildly elevated glucose hemoglobin 1 AC is 6.0 elevated triglycerides and low good cholesterol encourage eating good fat and should cut back on carbohydrates continue other medications, please advise patient.   Ataxic gait

## 2023-08-22 NOTE — PROGRESS NOTE ADULT - NSPROGADDITIONALINFOA_GEN_ALL_CORE
DISCHARGE TO [facility] WHEN ARRANGED BY  , D/W HEMA FATIMA DISCHARGE TO [facility] WHEN ARRANGED BY  , D/W HEMA FATIMA .Patient requires home oxygen 2 L /M NASAL CANNULA ,o2 sat on RA is  87 %  All acute illnesses and/or exacerbations have been treated and resolved; patient is in a chronic stable state. Alternative treatment methods have been tried and failed.  Requires oxygen due to combination of CHRONIC CHF and underlying COPD ( as per pulm consult evaluation)

## 2023-08-22 NOTE — DISCHARGE NOTE PROVIDER - NSDCCAREPROVSEEN_GEN_ALL_CORE_FT
Freddie, Rachael Riley, Nettie Jacques, Moira Anguiano, Sushil Best, Анна Maldonado, Ishmael De La Rosa, Swapnil Garduno, Scotty Proctor, Birendra K Weil, Danielle

## 2023-08-22 NOTE — DISCHARGE NOTE PROVIDER - NSDCFUADDINST_GEN_ALL_CORE_FT
lactose restricted diet  lactose restricted diet     Apply non adhesive adaptic, 4x4 gauze, abd pad, myra and change every other day    lactose restricted diet

## 2023-08-23 ENCOUNTER — TRANSCRIPTION ENCOUNTER (OUTPATIENT)
Age: 88
End: 2023-08-23

## 2023-08-23 VITALS
OXYGEN SATURATION: 93 % | DIASTOLIC BLOOD PRESSURE: 59 MMHG | TEMPERATURE: 98 F | HEART RATE: 78 BPM | SYSTOLIC BLOOD PRESSURE: 115 MMHG | RESPIRATION RATE: 18 BRPM

## 2023-08-23 LAB
HCT VFR BLD CALC: 30.3 % — LOW (ref 34.5–45)
HGB BLD-MCNC: 9.3 G/DL — LOW (ref 11.5–15.5)
INR BLD: 4.5 RATIO — HIGH (ref 0.85–1.18)
MCHC RBC-ENTMCNC: 30.7 GM/DL — LOW (ref 32–36)
MCHC RBC-ENTMCNC: 36.9 PG — HIGH (ref 27–34)
MCV RBC AUTO: 120.2 FL — HIGH (ref 80–100)
NRBC # BLD: 0 /100 WBCS — SIGNIFICANT CHANGE UP (ref 0–0)
PLATELET # BLD AUTO: 542 K/UL — HIGH (ref 150–400)
PROTHROM AB SERPL-ACNC: 50.4 SEC — HIGH (ref 9.5–13)
RBC # BLD: 2.52 M/UL — LOW (ref 3.8–5.2)
RBC # FLD: 16.4 % — HIGH (ref 10.3–14.5)
WBC # BLD: 7.69 K/UL — SIGNIFICANT CHANGE UP (ref 3.8–10.5)
WBC # FLD AUTO: 7.69 K/UL — SIGNIFICANT CHANGE UP (ref 3.8–10.5)

## 2023-08-23 PROCEDURE — 85610 PROTHROMBIN TIME: CPT

## 2023-08-23 PROCEDURE — 93005 ELECTROCARDIOGRAM TRACING: CPT

## 2023-08-23 PROCEDURE — 97162 PT EVAL MOD COMPLEX 30 MIN: CPT

## 2023-08-23 PROCEDURE — 86901 BLOOD TYPING SEROLOGIC RH(D): CPT

## 2023-08-23 PROCEDURE — 99285 EMERGENCY DEPT VISIT HI MDM: CPT

## 2023-08-23 PROCEDURE — 93970 EXTREMITY STUDY: CPT

## 2023-08-23 PROCEDURE — 99233 SBSQ HOSP IP/OBS HIGH 50: CPT

## 2023-08-23 PROCEDURE — 85025 COMPLETE CBC W/AUTO DIFF WBC: CPT

## 2023-08-23 PROCEDURE — 85027 COMPLETE CBC AUTOMATED: CPT

## 2023-08-23 PROCEDURE — 71045 X-RAY EXAM CHEST 1 VIEW: CPT

## 2023-08-23 PROCEDURE — 87635 SARS-COV-2 COVID-19 AMP PRB: CPT

## 2023-08-23 PROCEDURE — 83880 ASSAY OF NATRIURETIC PEPTIDE: CPT

## 2023-08-23 PROCEDURE — 80061 LIPID PANEL: CPT

## 2023-08-23 PROCEDURE — 86850 RBC ANTIBODY SCREEN: CPT

## 2023-08-23 PROCEDURE — 96374 THER/PROPH/DIAG INJ IV PUSH: CPT

## 2023-08-23 PROCEDURE — 92610 EVALUATE SWALLOWING FUNCTION: CPT

## 2023-08-23 PROCEDURE — 80053 COMPREHEN METABOLIC PANEL: CPT

## 2023-08-23 PROCEDURE — 36415 COLL VENOUS BLD VENIPUNCTURE: CPT

## 2023-08-23 PROCEDURE — 85730 THROMBOPLASTIN TIME PARTIAL: CPT

## 2023-08-23 PROCEDURE — 80048 BASIC METABOLIC PNL TOTAL CA: CPT

## 2023-08-23 PROCEDURE — 86900 BLOOD TYPING SEROLOGIC ABO: CPT

## 2023-08-23 RX ORDER — WARFARIN SODIUM 2.5 MG/1
1 TABLET ORAL
Qty: 4 | Refills: 0
Start: 2023-08-23 | End: 2023-08-29

## 2023-08-23 RX ADMIN — Medication 1 MILLIGRAM(S): at 11:11

## 2023-08-23 RX ADMIN — Medication 1 APPLICATION(S): at 11:08

## 2023-08-23 RX ADMIN — Medication 20 MILLIGRAM(S): at 05:23

## 2023-08-23 RX ADMIN — Medication 650 MILLIGRAM(S): at 05:52

## 2023-08-23 RX ADMIN — Medication 650 MILLIGRAM(S): at 11:41

## 2023-08-23 RX ADMIN — Medication 650 MILLIGRAM(S): at 05:22

## 2023-08-23 RX ADMIN — ESCITALOPRAM OXALATE 20 MILLIGRAM(S): 10 TABLET, FILM COATED ORAL at 11:11

## 2023-08-23 RX ADMIN — Medication 50 MICROGRAM(S): at 05:23

## 2023-08-23 RX ADMIN — TRAMADOL HYDROCHLORIDE 50 MILLIGRAM(S): 50 TABLET ORAL at 05:22

## 2023-08-23 RX ADMIN — TRAMADOL HYDROCHLORIDE 50 MILLIGRAM(S): 50 TABLET ORAL at 11:41

## 2023-08-23 RX ADMIN — Medication 1 TABLET(S): at 08:03

## 2023-08-23 RX ADMIN — Medication 650 MILLIGRAM(S): at 00:14

## 2023-08-23 RX ADMIN — Medication 325 MILLIGRAM(S): at 11:11

## 2023-08-23 RX ADMIN — GABAPENTIN 300 MILLIGRAM(S): 400 CAPSULE ORAL at 05:23

## 2023-08-23 RX ADMIN — TRAMADOL HYDROCHLORIDE 50 MILLIGRAM(S): 50 TABLET ORAL at 11:11

## 2023-08-23 RX ADMIN — Medication 25 MILLIGRAM(S): at 05:23

## 2023-08-23 RX ADMIN — Medication 650 MILLIGRAM(S): at 11:11

## 2023-08-23 RX ADMIN — AMLODIPINE BESYLATE 5 MILLIGRAM(S): 2.5 TABLET ORAL at 05:23

## 2023-08-23 RX ADMIN — Medication 2000 UNIT(S): at 11:11

## 2023-08-23 RX ADMIN — TRAMADOL HYDROCHLORIDE 50 MILLIGRAM(S): 50 TABLET ORAL at 00:14

## 2023-08-23 RX ADMIN — TRAMADOL HYDROCHLORIDE 50 MILLIGRAM(S): 50 TABLET ORAL at 05:52

## 2023-08-23 RX ADMIN — PANTOPRAZOLE SODIUM 40 MILLIGRAM(S): 20 TABLET, DELAYED RELEASE ORAL at 05:23

## 2023-08-23 NOTE — PROGRESS NOTE ADULT - SUBJECTIVE AND OBJECTIVE BOX
Date/Time Patient Seen:  		  Referring MD:   Data Reviewed	       Patient is a 89y old  Female who presents with a chief complaint of left leg pain (22 Aug 2023 16:19)      Subjective/HPI     PAST MEDICAL & SURGICAL HISTORY:  Hypertension    CVA (cerebral vascular accident)    Depression    Constipation    Neuropathy    Constipation    Hyperlipidemia    Grade I diastolic dysfunction    Afib    Neuropathy    VHD (valvular heart disease)    Aortic valvar stenosis    Hypothyroidism    GERD (gastroesophageal reflux disease)    No significant past surgical history          Medication list         MEDICATIONS  (STANDING):  acetaminophen     Tablet .. 650 milliGRAM(s) Oral every 6 hours  amLODIPine   Tablet 5 milliGRAM(s) Oral daily  cholecalciferol 2000 Unit(s) Oral daily  collagenase Ointment 1 Application(s) Topical daily  escitalopram 20 milliGRAM(s) Oral daily  ferrous    sulfate 325 milliGRAM(s) Oral daily  folic acid 1 milliGRAM(s) Oral daily  furosemide    Tablet 20 milliGRAM(s) Oral daily  gabapentin 300 milliGRAM(s) Oral three times a day  lactobacillus acidophilus 1 Tablet(s) Oral two times a day with meals  levothyroxine 50 MICROGram(s) Oral daily  metoprolol tartrate 25 milliGRAM(s) Oral two times a day  pantoprazole    Tablet 40 milliGRAM(s) Oral before breakfast  senna 2 Tablet(s) Oral at bedtime  simvastatin 10 milliGRAM(s) Oral at bedtime  traMADol 50 milliGRAM(s) Oral every 6 hours    MEDICATIONS  (PRN):  albuterol/ipratropium for Nebulization 3 milliLiter(s) Nebulizer every 6 hours PRN Shortness of Breath and/or Wheezing  aluminum hydroxide/magnesium hydroxide/simethicone Suspension 30 milliLiter(s) Oral every 4 hours PRN Dyspepsia  melatonin 3 milliGRAM(s) Oral at bedtime PRN Insomnia  morphine  - Injectable 2 milliGRAM(s) IV Push every 6 hours PRN Severe Pain (7 - 10)  ondansetron Injectable 4 milliGRAM(s) IV Push every 8 hours PRN Nausea and/or Vomiting         Vitals log        ICU Vital Signs Last 24 Hrs  T(C): 36.7 (22 Aug 2023 21:03), Max: 36.7 (22 Aug 2023 21:03)  T(F): 98 (22 Aug 2023 21:03), Max: 98 (22 Aug 2023 21:03)  HR: 85 (22 Aug 2023 21:03) (71 - 85)  BP: 113/71 (22 Aug 2023 21:03) (108/70 - 123/71)  BP(mean): --  ABP: --  ABP(mean): --  RR: 17 (22 Aug 2023 21:03) (17 - 18)  SpO2: 94% (22 Aug 2023 21:03) (87% - 99%)    O2 Parameters below as of 22 Aug 2023 21:03  Patient On (Oxygen Delivery Method): nasal cannula  O2 Flow (L/min): 2               Input and Output:  I&O's Detail    21 Aug 2023 07:01  -  22 Aug 2023 07:00  --------------------------------------------------------  IN:    Heparin Infusion: 108 mL    Oral Fluid: 500 mL  Total IN: 608 mL    OUT:    Voided (mL): 500 mL  Total OUT: 500 mL    Total NET: 108 mL          Lab Data                        8.6    7.01  )-----------( 517      ( 22 Aug 2023 05:52 )             27.2     08-22    142  |  104  |  6<L>  ----------------------------<  88  3.5   |  32<H>  |  0.51    Ca    8.2<L>      22 Aug 2023 05:52              Review of Systems	      Objective     Physical Examination    heart s1s2  lung dc BS  head nc      Pertinent Lab findings & Imaging      Summer:  NO   Adequate UO     I&O's Detail    21 Aug 2023 07:01  -  22 Aug 2023 07:00  --------------------------------------------------------  IN:    Heparin Infusion: 108 mL    Oral Fluid: 500 mL  Total IN: 608 mL    OUT:    Voided (mL): 500 mL  Total OUT: 500 mL    Total NET: 108 mL               Discussed with:     Cultures:	        Radiology

## 2023-08-23 NOTE — PROGRESS NOTE ADULT - SUBJECTIVE AND OBJECTIVE BOX
CHIEF COMPLAINT/ REASON FOR VISIT  .. Patient was seen to address the  issue listed under PROBLEM LIST which is located toward bottom of this note     REJI JOSEJOEL    IRMA 1EAS 113 [address]    Allergies    per son &quot;issues with certain anitibiotics&quot; does not remember the names (Unknown)    Intolerances        PAST MEDICAL & SURGICAL HISTORY:  Hypertension      CVA (cerebral vascular accident)      Depression      Constipation      Neuropathy      Hyperlipidemia      Grade I diastolic dysfunction      Afib      Neuropathy      VHD (valvular heart disease)      Aortic valvar stenosis      Hypothyroidism      GERD (gastroesophageal reflux disease)      No significant past surgical history          FAMILY HISTORY:      Home Medications:  benzonatate 100 mg oral capsule: 1 cap(s) orally 3 times a day as needed for  cough (18 Aug 2023 15:32)  Biofreeze 4% topical gel: Apply topically to affected area 2 times a day to R shoulder and L knee (18 Aug 2023 15:34)  docusate sodium 100 mg oral capsule: 2 cap(s) orally once a day (at bedtime) (18 Aug 2023 15:36)  famotidine 20 mg oral tablet: 1 tab(s) orally once a day (18 Aug 2023 15:38)  melatonin 3 mg oral tablet: 1 tab(s) orally once a day (at bedtime) As needed Insomnia (22 Aug 2023 07:14)  senna (sennosides) 8.6 mg oral tablet: 1 tab(s) orally once a day as needed for  constipation (18 Aug 2023 15:43)  traMADol 50 mg oral tablet: 1 tab(s) orally every 6 hours as needed for pain (18 Aug 2023 15:45)  traMADol 50 mg oral tablet: 1 tab(s) orally 2 times a day (18 Aug 2023 15:45)  Tylenol Extra Strength 500 mg oral tablet: 2 tab(s) orally once a day (in the morning) and 1 tablet twice a day (18 Aug 2023 15:54)      MEDICATIONS  (STANDING):  acetaminophen     Tablet .. 650 milliGRAM(s) Oral every 6 hours  amLODIPine   Tablet 5 milliGRAM(s) Oral daily  cholecalciferol 2000 Unit(s) Oral daily  collagenase Ointment 1 Application(s) Topical daily  escitalopram 20 milliGRAM(s) Oral daily  ferrous    sulfate 325 milliGRAM(s) Oral daily  folic acid 1 milliGRAM(s) Oral daily  furosemide    Tablet 20 milliGRAM(s) Oral daily  gabapentin 300 milliGRAM(s) Oral three times a day  lactobacillus acidophilus 1 Tablet(s) Oral two times a day with meals  levothyroxine 50 MICROGram(s) Oral daily  metoprolol tartrate 25 milliGRAM(s) Oral two times a day  pantoprazole    Tablet 40 milliGRAM(s) Oral before breakfast  senna 2 Tablet(s) Oral at bedtime  simvastatin 10 milliGRAM(s) Oral at bedtime  traMADol 50 milliGRAM(s) Oral every 6 hours    MEDICATIONS  (PRN):  albuterol/ipratropium for Nebulization 3 milliLiter(s) Nebulizer every 6 hours PRN Shortness of Breath and/or Wheezing  aluminum hydroxide/magnesium hydroxide/simethicone Suspension 30 milliLiter(s) Oral every 4 hours PRN Dyspepsia  melatonin 3 milliGRAM(s) Oral at bedtime PRN Insomnia  morphine  - Injectable 2 milliGRAM(s) IV Push every 6 hours PRN Severe Pain (7 - 10)  ondansetron Injectable 4 milliGRAM(s) IV Push every 8 hours PRN Nausea and/or Vomiting              Vital Signs Last 24 Hrs  T(C): 36.7 (23 Aug 2023 05:27), Max: 36.7 (22 Aug 2023 21:03)  T(F): 98 (23 Aug 2023 05:27), Max: 98 (22 Aug 2023 21:03)  HR: 71 (23 Aug 2023 05:27) (71 - 85)  BP: 112/63 (23 Aug 2023 05:27) (108/70 - 123/71)  BP(mean): --  RR: 18 (23 Aug 2023 05:27) (17 - 18)  SpO2: 98% (23 Aug 2023 05:27) (87% - 99%)    Parameters below as of 23 Aug 2023 05:27  Patient On (Oxygen Delivery Method): nasal cannula  O2 Flow (L/min): 2        08-21-23 @ 07:01  -  08-22-23 @ 07:00  --------------------------------------------------------  IN: 608 mL / OUT: 500 mL / NET: 108 mL              LABS:                        8.6    7.01  )-----------( 517      ( 22 Aug 2023 05:52 )             27.2     08-22    142  |  104  |  6<L>  ----------------------------<  88  3.5   |  32<H>  |  0.51    Ca    8.2<L>      22 Aug 2023 05:52      PT/INR - ( 22 Aug 2023 05:52 )   PT: 26.5 sec;   INR: 2.32 ratio         PTT - ( 22 Aug 2023 05:52 )  PTT:57.0 sec  Urinalysis Basic - ( 22 Aug 2023 05:52 )    Color: x / Appearance: x / SG: x / pH: x  Gluc: 88 mg/dL / Ketone: x  / Bili: x / Urobili: x   Blood: x / Protein: x / Nitrite: x   Leuk Esterase: x / RBC: x / WBC x   Sq Epi: x / Non Sq Epi: x / Bacteria: x            WBC:  WBC Count: 7.01 K/uL (08-22 @ 05:52)  WBC Count: 7.52 K/uL (08-21 @ 06:20)  WBC Count: 9.05 K/uL (08-21 @ 04:23)  WBC Count: 6.24 K/uL (08-20 @ 06:09)  WBC Count: 6.96 K/uL (08-19 @ 06:27)      MICROBIOLOGY:  RECENT CULTURES:              PT/INR - ( 22 Aug 2023 05:52 )   PT: 26.5 sec;   INR: 2.32 ratio         PTT - ( 22 Aug 2023 05:52 )  PTT:57.0 sec    Sodium:  Sodium: 142 mmol/L (08-22 @ 05:52)  Sodium: 139 mmol/L (08-21 @ 06:20)  Sodium: 140 mmol/L (08-20 @ 06:09)  Sodium: 144 mmol/L (08-19 @ 06:27)      0.51 mg/dL 08-22 @ 05:52  0.59 mg/dL 08-21 @ 06:20  0.45 mg/dL 08-20 @ 06:09  0.51 mg/dL 08-19 @ 06:27      Hemoglobin:  Hemoglobin: 8.6 g/dL (08-22 @ 05:52)  Hemoglobin: 9.3 g/dL (08-21 @ 06:20)  Hemoglobin: 10.0 g/dL (08-21 @ 04:23)  Hemoglobin: 9.1 g/dL (08-20 @ 06:09)  Hemoglobin: 9.2 g/dL (08-19 @ 06:27)      Platelets: Platelet Count - Automated: 517 K/uL (08-22 @ 05:52)  Platelet Count - Automated: 546 K/uL (08-21 @ 06:20)  Platelet Count - Automated: 340 K/uL (08-21 @ 04:23)  Platelet Count - Automated: 494 K/uL (08-20 @ 06:09)  Platelet Count - Automated: 536 K/uL (08-19 @ 06:27)          Urinalysis Basic - ( 22 Aug 2023 05:52 )    Color: x / Appearance: x / SG: x / pH: x  Gluc: 88 mg/dL / Ketone: x  / Bili: x / Urobili: x   Blood: x / Protein: x / Nitrite: x   Leuk Esterase: x / RBC: x / WBC x   Sq Epi: x / Non Sq Epi: x / Bacteria: x        RADIOLOGY & ADDITIONAL STUDIES:      MICROBIOLOGY:  RECENT CULTURES:

## 2023-08-23 NOTE — PROGRESS NOTE ADULT - PROBLEM SELECTOR PLAN 2
on coumadin -card cons , further OAC plan as per multidisciplinary team due to new DVT
on coumadin
on coumadin
on coumadin -card cons , further OAC plan as per multidisciplinary team due to new DVT
on coumadin and heparin

## 2023-08-23 NOTE — PROGRESS NOTE ADULT - SUBJECTIVE AND OBJECTIVE BOX
PROGRESS NOTE  Patient is a 89y old  Female who presents with a chief complaint of left leg pain (23 Aug 2023 09:35)    Chart and available morning labs /imaging are reviewed electronically , urgent issues addressed . More information  is being added upon completion of rounds , when more information is collected and management discussed with consultants , medical staff and social service/case management on the floor   OVERNIGHT  No new issues reported by medical staff . All above noted Patient is resting in a bed comfortably .Confused ,poor mentation .No distress noted     HPI:  89 Female with   Past medical history of CVA on Coumadin with right-sided weakness, hypertension hyperlipidemia CAD history of A-fib on Coumadin recently admitted at [facility] with acute metabolic encephalopathy 2/2 to uti and cellulitis of LE  .  P sent in from [facility] today  for after being found to have a DVT in her left Common femoral, great saphenous, popliteal vein.  Right lower extremity negative for DVT.  Ultrasound done today. patient reports left leg pain. patient denies fevers, chills, chest pain, shortness of breath. patient on coumadin with INR of 2.28 on 8/14 ,today in ER 1.57 .Admitted for vascular& hematology eval since patient developed DVT on coumadin .US venous  was done beginning of month during recent hospitalization and was negative for DVT Palliative care consult requested ,to discuss advance directives and complete MOLST During previous admission son was consulted regarding residential hospice at [facility] and palliative care issues /GOC discussion -could not decide on code status and patient remained full code . (18 Aug 2023 16:14)    PAST MEDICAL & SURGICAL HISTORY:  Hypertension      CVA (cerebral vascular accident)      Depression      Constipation      Neuropathy      Hyperlipidemia      Grade I diastolic dysfunction      Afib      Neuropathy      VHD (valvular heart disease)      Aortic valvar stenosis      Hypothyroidism      GERD (gastroesophageal reflux disease)      No significant past surgical history          MEDICATIONS  (STANDING):  acetaminophen     Tablet .. 650 milliGRAM(s) Oral every 6 hours  amLODIPine   Tablet 5 milliGRAM(s) Oral daily  cholecalciferol 2000 Unit(s) Oral daily  collagenase Ointment 1 Application(s) Topical daily  escitalopram 20 milliGRAM(s) Oral daily  ferrous    sulfate 325 milliGRAM(s) Oral daily  folic acid 1 milliGRAM(s) Oral daily  furosemide    Tablet 20 milliGRAM(s) Oral daily  gabapentin 300 milliGRAM(s) Oral three times a day  lactobacillus acidophilus 1 Tablet(s) Oral two times a day with meals  levothyroxine 50 MICROGram(s) Oral daily  metoprolol tartrate 25 milliGRAM(s) Oral two times a day  pantoprazole    Tablet 40 milliGRAM(s) Oral before breakfast  senna 2 Tablet(s) Oral at bedtime  simvastatin 10 milliGRAM(s) Oral at bedtime  traMADol 50 milliGRAM(s) Oral every 6 hours    MEDICATIONS  (PRN):  albuterol/ipratropium for Nebulization 3 milliLiter(s) Nebulizer every 6 hours PRN Shortness of Breath and/or Wheezing  aluminum hydroxide/magnesium hydroxide/simethicone Suspension 30 milliLiter(s) Oral every 4 hours PRN Dyspepsia  melatonin 3 milliGRAM(s) Oral at bedtime PRN Insomnia  morphine  - Injectable 2 milliGRAM(s) IV Push every 6 hours PRN Severe Pain (7 - 10)  ondansetron Injectable 4 milliGRAM(s) IV Push every 8 hours PRN Nausea and/or Vomiting      OBJECTIVE    T(C): 36.9 (08-23-23 @ 12:26), Max: 36.9 (08-23-23 @ 12:26)  HR: 78 (08-23-23 @ 12:26) (71 - 85)  BP: 115/59 (08-23-23 @ 12:26) (112/63 - 115/59)  RR: 18 (08-23-23 @ 12:26) (17 - 18)  SpO2: 93% (08-23-23 @ 12:26) (93% - 98%)  Wt(kg): --  I&O's Summary        REVIEW OF SYSTEMS:  CONSTITUTIONAL: No fever, weight loss, or fatigue  EYES: No eye pain, visual disturbances, or discharge  ENMT:   No sinus or throat pain  NECK: No pain or stiffness  RESPIRATORY: No cough, wheezing, chills or hemoptysis; No shortness of breath  CARDIOVASCULAR: No chest pain, palpitations, dizziness, or leg swelling  GASTROINTESTINAL: No abdominal pain. No nausea, vomiting; No diarrhea or constipation. No melena or hematochezia.  GENITOURINARY: No dysuria, frequency, hematuria, or incontinence  NEUROLOGICAL: No headaches, memory loss, loss of strength, numbness, or tremors  SKIN: No itching, burning, rashes, or lesions   MUSCULOSKELETAL: No joint pain or swelling; No muscle, back, or extremity pain    PHYSICAL EXAM:  Appearance: NAD. VS past 24 hrs -as above   HEENT:   Moist oral mucosa. Conjunctiva clear b/l.   Neck : supple  Respiratory: Lungs CTAB.  Gastrointestinal:  Soft, nontender. No rebound. No rigidity. BS present	  Cardiovascular: RRR ,S1S2 present  Neurologic: Non-focal. Moving all extremities.  Extremities: No edema. No erythema. No calf tenderness.  Skin: No rashes, No ecchymoses, No cyanosis.	  wounds ,skin lesions-See skin assesment flow sheet   LABS:                        9.3    7.69  )-----------( 542      ( 23 Aug 2023 08:01 )             30.3     08-22    142  |  104  |  6<L>  ----------------------------<  88  3.5   |  32<H>  |  0.51    Ca    8.2<L>      22 Aug 2023 05:52      CAPILLARY BLOOD GLUCOSE        PT/INR - ( 23 Aug 2023 08:01 )   PT: 50.4 sec;   INR: 4.50 ratio         PTT - ( 22 Aug 2023 05:52 )  PTT:57.0 sec  Urinalysis Basic - ( 22 Aug 2023 05:52 )    Color: x / Appearance: x / SG: x / pH: x  Gluc: 88 mg/dL / Ketone: x  / Bili: x / Urobili: x   Blood: x / Protein: x / Nitrite: x   Leuk Esterase: x / RBC: x / WBC x   Sq Epi: x / Non Sq Epi: x / Bacteria: x        RADIOLOGY & ADDITIONAL TESTS:   reviewed elctronically  ASSESSMENT/PLAN: 	    Patient was seen and examined on a day of discharge . Plan of care , discharge medications and recommendations discussed with consultants and clearance for discharge obtained .Social service , case management  and medical staff are aware of plan. Family is notified. Discharge summary  is  prepared electronically-see separate document prepared by me .75minutes spent on this visit, 50% visit time spent in care co-ordination with other attendings and counselling patient  I have discussed care plan with patient and HCP,expressed understanding of problems treatment and their effect and side effects, alternatives in detail,I have asked if they have any questions and concerns and appropriately addressed them to best of my ability

## 2023-08-23 NOTE — DISCHARGE NOTE NURSING/CASE MANAGEMENT/SOCIAL WORK - NSDCFUADDAPPT_GEN_ALL_CORE_FT
Please schedule appointment with patient dermatologist to evaluate LLE wound to rule out reoccurrence of carcinoma ( son is aware )

## 2023-08-23 NOTE — DISCHARGE NOTE NURSING/CASE MANAGEMENT/SOCIAL WORK - PATIENT PORTAL LINK FT
You can access the FollowMyHealth Patient Portal offered by [facility] by registering at the following website: [email or web address]. By joining [employer]’s FollowMyHealth portal, you will also be able to view your health information using other applications (apps) compatible with our system.

## 2023-08-23 NOTE — PROGRESS NOTE ADULT - SUBJECTIVE AND OBJECTIVE BOX
Patient is a 89y Female with a known history of :  DVT of lower limb, acute [I82.409]    Afib [I48.91]    VHD (valvular heart disease) [I38]    Prophylactic measure [Z29.9]    Hypothyroidism [E03.9]    MDD (major depressive disorder) [F32.9]    GERD (gastroesophageal reflux disease) [K21.9]    HTN (hypertension) [I10]    CAD (coronary artery disease) [I25.10]    Leg wound, left [S81.892A]      HPI:  89 Female with   Past medical history of CVA on Coumadin with right-sided weakness, hypertension hyperlipidemia CAD history of A-fib on Coumadin recently admitted at [facility] with acute metabolic encephalopathy 2/2 to uti and cellulitis of LE  .  P sent in from [facility] today  for after being found to have a DVT in her left Common femoral, great saphenous, popliteal vein.  Right lower extremity negative for DVT.  Ultrasound done today. patient reports left leg pain. patient denies fevers, chills, chest pain, shortness of breath. patient on coumadin with INR of 2.28 on 8/14 ,today in ER 1.57 .Admitted for vascular& hematology eval since patient developed DVT on coumadin .US venous  was done beginning of month during recent hospitalization and was negative for DVT Palliative care consult requested ,to discuss advance directives and complete MOLST During previous admission son was consulted regarding residential hospice at [facility] and palliative care issues /GOC discussion -could not decide on code status and patient remained full code . (18 Aug 2023 16:14)      REVIEW OF SYSTEMS:    CONSTITUTIONAL: No fever, weight loss, or fatigue  EYES: No eye pain, visual disturbances, or discharge  ENMT:  No difficulty hearing, tinnitus, vertigo; No sinus or throat pain  NECK: No pain or stiffness  BREASTS: No pain, masses, or nipple discharge  RESPIRATORY: No cough, wheezing, chills or hemoptysis; No shortness of breath  CARDIOVASCULAR: No chest pain, palpitations, dizziness, or leg swelling  GASTROINTESTINAL: No abdominal or epigastric pain. No nausea, vomiting, or hematemesis; No diarrhea or constipation. No melena or hematochezia.  GENITOURINARY: No dysuria, frequency, hematuria, or incontinence  NEUROLOGICAL: No headaches, memory loss, loss of strength, numbness, or tremors  SKIN: No itching, burning, rashes, or lesions   LYMPH NODES: No enlarged glands  ENDOCRINE: No heat or cold intolerance; No hair loss  MUSCULOSKELETAL: No joint pain or swelling; No muscle, back, or extremity pain  PSYCHIATRIC: No depression, anxiety, mood swings, or difficulty sleeping  HEME/LYMPH: No easy bruising, or bleeding gums  ALLERGY AND IMMUNOLOGIC: No hives or eczema    MEDICATIONS  (STANDING):  acetaminophen     Tablet .. 650 milliGRAM(s) Oral every 6 hours  amLODIPine   Tablet 5 milliGRAM(s) Oral daily  cholecalciferol 2000 Unit(s) Oral daily  collagenase Ointment 1 Application(s) Topical daily  escitalopram 20 milliGRAM(s) Oral daily  ferrous    sulfate 325 milliGRAM(s) Oral daily  folic acid 1 milliGRAM(s) Oral daily  furosemide    Tablet 20 milliGRAM(s) Oral daily  gabapentin 300 milliGRAM(s) Oral three times a day  lactobacillus acidophilus 1 Tablet(s) Oral two times a day with meals  levothyroxine 50 MICROGram(s) Oral daily  metoprolol tartrate 25 milliGRAM(s) Oral two times a day  pantoprazole    Tablet 40 milliGRAM(s) Oral before breakfast  senna 2 Tablet(s) Oral at bedtime  simvastatin 10 milliGRAM(s) Oral at bedtime  traMADol 50 milliGRAM(s) Oral every 6 hours    MEDICATIONS  (PRN):  albuterol/ipratropium for Nebulization 3 milliLiter(s) Nebulizer every 6 hours PRN Shortness of Breath and/or Wheezing  aluminum hydroxide/magnesium hydroxide/simethicone Suspension 30 milliLiter(s) Oral every 4 hours PRN Dyspepsia  melatonin 3 milliGRAM(s) Oral at bedtime PRN Insomnia  morphine  - Injectable 2 milliGRAM(s) IV Push every 6 hours PRN Severe Pain (7 - 10)  ondansetron Injectable 4 milliGRAM(s) IV Push every 8 hours PRN Nausea and/or Vomiting      ALLERGIES: per son &quot;issues with certain anitibiotics&quot; does not remember the names (Unknown)      FAMILY HISTORY:      PHYSICAL EXAMINATION:  -----------------------------  T(C): 36.7 (08-23-23 @ 05:27), Max: 36.7 (08-22-23 @ 21:03)  HR: 71 (08-23-23 @ 05:27) (71 - 85)  BP: 112/63 (08-23-23 @ 05:27) (108/70 - 113/71)  RR: 18 (08-23-23 @ 05:27) (17 - 18)  SpO2: 98% (08-23-23 @ 05:27) (87% - 98%)  Wt(kg): --        VITALS  T(C): 36.7 (08-23-23 @ 05:27), Max: 36.7 (08-22-23 @ 21:03)  HR: 71 (08-23-23 @ 05:27) (71 - 85)  BP: 112/63 (08-23-23 @ 05:27) (108/70 - 113/71)  RR: 18 (08-23-23 @ 05:27) (17 - 18)  SpO2: 98% (08-23-23 @ 05:27) (87% - 98%)    Constitutional: well developed, normal appearance, well groomed, well nourished, no deformities and no acute distress.   Eyes: the conjunctiva exhibited no abnormalities and the eyelids demonstrated no xanthelasmas.   HEENT: normal oral mucosa, no oral pallor and no oral cyanosis.   Neck: normal jugular venous A waves present, normal jugular venous V waves present and no jugular venous castillo A waves.   Pulmonary: no respiratory distress, normal respiratory rhythm and effort, no accessory muscle use and lungs were clear to auscultation bilaterally.   Cardiovascular: heart rate and rhythm were normal, normal S1 and S2 and no murmur, gallop, rub, heave or thrill are present.   Abdomen: soft, non-tender, no hepato-splenomegaly and no abdominal mass palpated.   Musculoskeletal: the gait could not be assessed..   Extremities: no clubbing of the fingernails, no localized cyanosis, no petechial hemorrhages and no ischemic changes.   Skin: normal skin color and pigmentation, no rash, no venous stasis, no skin lesions, no skin ulcer and no xanthoma was observed.   Psychiatric: oriented to person, place, and time, the affect was normal, the mood was normal and not feeling anxious.     LABS:   --------  08-22    142  |  104  |  6<L>  ----------------------------<  88  3.5   |  32<H>  |  0.51    Ca    8.2<L>      22 Aug 2023 05:52                           9.3    7.69  )-----------( 542      ( 23 Aug 2023 08:01 )             30.3     PT/INR - ( 22 Aug 2023 05:52 )   PT: 26.5 sec;   INR: 2.32 ratio         PTT - ( 22 Aug 2023 05:52 )  PTT:57.0 sec            RADIOLOGY:  -----------------    ECG:     ECHO:

## 2023-08-23 NOTE — PROGRESS NOTE ADULT - SUBJECTIVE AND OBJECTIVE BOX
Neurology Follow up note    REJI [id number]    HPI:  89 Female with   Past medical history of CVA on Coumadin with right-sided weakness, hypertension hyperlipidemia CAD history of A-fib on Coumadin recently admitted at [facility] with acute metabolic encephalopathy 2/2 to uti and cellulitis of LE  .  P sent in from [facility] today  for after being found to have a DVT in her left Common femoral, great saphenous, popliteal vein.  Right lower extremity negative for DVT.  Ultrasound done today. patient reports left leg pain. patient denies fevers, chills, chest pain, shortness of breath. patient on coumadin with INR of 2.28 on 8/14 ,today in ER 1.57 .Admitted for vascular& hematology eval since patient developed DVT on coumadin .US venous  was done beginning of month during recent hospitalization and was negative for DVT Palliative care consult requested ,to discuss advance directives and complete MOLST During previous admission son was consulted regarding residential hospice at [facility] and palliative care issues /GOC discussion -could not decide on code status and patient remained full code . (18 Aug 2023 16:14)      Interval History -no new weakness    Patient is seen, chart was reviewed and case was discussed with the treatment team.  Pt is not in any distress.   Lying on bed comfortably. .    Vital Signs Last 24 Hrs  T(C): 36.9 (23 Aug 2023 12:26), Max: 36.9 (23 Aug 2023 12:26)  T(F): 98.4 (23 Aug 2023 12:26), Max: 98.4 (23 Aug 2023 12:26)  HR: 78 (23 Aug 2023 12:26) (71 - 85)  BP: 115/59 (23 Aug 2023 12:26) (112/63 - 115/59)  BP(mean): --  RR: 18 (23 Aug 2023 12:26) (17 - 18)  SpO2: 93% (23 Aug 2023 12:26) (93% - 98%)    Parameters below as of 23 Aug 2023 12:26  Patient On (Oxygen Delivery Method): nasal cannula  O2 Flow (L/min): 2          REVIEW OF SYSTEMS:    Constitutional: No fever, weight loss or fatigue  Eyes: No eye pain, visual disturbances, or discharge  ENT:  No difficulty hearing, tinnitus, vertigo; No sinus or throat pain  Neck: No pain or stiffness  Respiratory: No cough, wheezing, chills or hemoptysis  Cardiovascular: No chest pain, palpitations, shortness of breath, dizziness or leg swelling  Gastrointestinal: No abdominal or epigastric pain. No nausea, vomiting or hematemesis;   Genitourinary: No dysuria, frequency, hematuria or incontinence  Neurological: No headaches, memory loss, loss of strength, numbness or tremors  Psychiatric: No depression, anxiety, mood swings or difficulty sleeping  Musculoskeletal: No joint pain  No muscle, back pain  Skin: No itching, burning, rashes or lesions   Lymph Nodes: No enlarged glands  Endocrine: No heat or cold intolerance; No hair loss,  Allergy and Immunologic: No hives or eczema    On Neurological Examination:    Mental Status - Pt is alert, awake, oriented X3.. Follows commands well and able to answer questions appropriately.Mood and affect  normal    Speech -  Normal.     Cranial Nerves - Pupils 3 mm equal and reactive to light, extraocular eye movements intact. Pt has no visual field deficit.  Pt has  right  facial asymmetry. Facial sensation is intact.Tongue - is in midline.    Muscle tone - is increased on right.      Motor Exam - residual right hemiparesis; 1-2/5   No drift. No shaking or tremors.    Sensory Exam -. Pt withdraws all extremities equally on stimulation. No asymmetry seen. No complaints of tingling, numbness.      coordination:    Finger to nose: normal    Deep tendon Reflexes - 2 plus all over.      Neck Supple -  Yes.    MEDICATIONS  (STANDING):  acetaminophen     Tablet .. 650 milliGRAM(s) Oral every 6 hours  amLODIPine   Tablet 5 milliGRAM(s) Oral daily  cholecalciferol 2000 Unit(s) Oral daily  collagenase Ointment 1 Application(s) Topical daily  escitalopram 20 milliGRAM(s) Oral daily  ferrous    sulfate 325 milliGRAM(s) Oral daily  folic acid 1 milliGRAM(s) Oral daily  furosemide    Tablet 20 milliGRAM(s) Oral daily  gabapentin 300 milliGRAM(s) Oral three times a day  lactobacillus acidophilus 1 Tablet(s) Oral two times a day with meals  levothyroxine 50 MICROGram(s) Oral daily  metoprolol tartrate 25 milliGRAM(s) Oral two times a day  pantoprazole    Tablet 40 milliGRAM(s) Oral before breakfast  senna 2 Tablet(s) Oral at bedtime  simvastatin 10 milliGRAM(s) Oral at bedtime  traMADol 50 milliGRAM(s) Oral every 6 hours    MEDICATIONS  (PRN):  albuterol/ipratropium for Nebulization 3 milliLiter(s) Nebulizer every 6 hours PRN Shortness of Breath and/or Wheezing  aluminum hydroxide/magnesium hydroxide/simethicone Suspension 30 milliLiter(s) Oral every 4 hours PRN Dyspepsia  melatonin 3 milliGRAM(s) Oral at bedtime PRN Insomnia  morphine  - Injectable 2 milliGRAM(s) IV Push every 6 hours PRN Severe Pain (7 - 10)  ondansetron Injectable 4 milliGRAM(s) IV Push every 8 hours PRN Nausea and/or Vomiting      Allergies    per son &quot;issues with certain anitibiotics&quot; does not remember the names (Unknown)    Intolerances    08-22    142  |  104  |  6<L>  ----------------------------<  88  3.5   |  32<H>  |  0.51    Ca    8.2<L>      22 Aug 2023 05:52    05:52      Hemoglobin A1C:     Vitamin B12     RADIOLOGY    ASSESSMENT AND PLAN:      recurrent DVT  hx of cva with residual hemiparesis    For stroke prevention- AP therapy was advised not long term AC  Neuro follow up PRN  Pain is accessed and addressed.

## 2023-08-23 NOTE — PROGRESS NOTE ADULT - NUTRITIONAL ASSESSMENT
This patient has been assessed with a concern for Malnutrition and has been determined to have a diagnosis/diagnoses of Moderate protein-calorie malnutrition.    This patient is being managed with:   Diet Soft and Bite Sized-  Lactose Restricted (Milk Sugar Intoler.)  Supplement Feeding Modality:  Oral  Ensure Clear Cans or Servings Per Day:  2       Frequency:  Two Times a day  Entered: Aug 19 2023  2:13PM  

## 2023-08-23 NOTE — PROGRESS NOTE ADULT - PROBLEM SELECTOR PROBLEM 3
VHD (valvular heart disease)

## 2023-08-23 NOTE — PROGRESS NOTE ADULT - ASSESSMENT
89 [place] [facility] [place] [facility] for after being found to have a DVT in her left Common femoral, great saphenous, popliteal vein  Past medical history of CVA on Coumadin with right-sided weakness, hypertension hyperlipidemia CAD history of A-fib on Coumadin.     op  oa  ataxic gait  cva hx  AF  HLD  HTN  CAD  PVD  STSI  dvt  weakness    on AC  pulm eval noted  full code  spoke with SON
89 F sent in from [facility] for after being found to have a DVT in her left Common femoral, great saphenous, popliteal vein  Past medical history of CVA on Coumadin with right-sided weakness, hypertension hyperlipidemia CAD history of A-fib on Coumadin.     op  oa  ataxic gait  cva hx  AF  HLD  HTN  CAD  PVD  STSI  dvt  weakness    on AC  pulm eval noted  full code  spoke with SON
89 Female with   Past medical history of CVA on Coumadin with right-sided weakness, hypertension hyperlipidemia CAD history of A-fib on Coumadin recently admitted at [facility] with acute metabolic encephalopathy 2/2 to uti and cellulitis of LE  .  P sent in from [facility] today  for after being found to have a DVT in her left Common femoral, great saphenous, popliteal vein.  Right lower extremity negative for DVT.  Ultrasound done today. patient reports left leg pain. patient denies fevers, chills, chest pain, shortness of breath. patient on coumadin with INR of 2.28 on 8/14 ,today in ER 1.57 .Admitted for vascular& hematology eval since patient developed DVT on coumadin .US venous  was done beginning of month during recent hospitalization and was negative for DVT Palliative care consult requested ,to discuss advance directives and complete MOLST During previous admission son was consulted regarding residential hospice at [facility] and palliative care issues /GOC discussion -could not decide on code status and patient remained full code .
dvt - extensive left leg on doppler out pt  start heparin  ashd - atrial fib paf  hypertension  s/p cva rt sided weakness  neuropathy  dyslipidemia
dvt - extensive left leg on doppler out pt  start heparin  ashd - atrial fib paf  hypertension  s/p cva rt sided weakness  neuropathy  dyslipidemia
89 F [facility] in from [facility] for after being found to have a DVT in her left Common femoral, great saphenous, popliteal vein  Past medical history of CVA on Coumadin with right-sided weakness, hypertension hyperlipidemia CAD history of A-fib on Coumadin.     op  oa  ataxic gait  cva hx  AF  HLD  HTN  CAD  PVD  STSI  dvt  weakness    dc planning for ROSARIO  on AC  diuresis  pultoby romero noted  full code  spoke with SON
89 F sent in from [facility] for after being found to have a DVT in her left Common femoral, great saphenous, popliteal vein  Past medical history of CVA on Coumadin with right-sided weakness, hypertension hyperlipidemia CAD history of A-fib on Coumadin.     op  oa  ataxic gait  cva hx  AF  HLD  HTN  CAD  PVD  STSI  dvt  weakness    on AC  pulm eval noted  full code  spoke with SON
dvt - extensive left leg on doppler out pt  start heparin  ashd - atrial fib paf  hypertension  s/p cva rt sided weakness  neuropathy  dyslipidemia
89 F [facility] in from [facility] for after being found to have a DVT in her left Common femoral, great saphenous, popliteal vein  Past medical history of CVA on Coumadin with right-sided weakness, hypertension hyperlipidemia CAD history of A-fib on Coumadin.     op  oa  ataxic gait  cva hx  AF  HLD  HTN  CAD  PVD  STSI  dvt  weakness    on AC  diuresis  pultoby romero noted  full code  spoke with SON  
89 Female with   Past medical history of CVA on Coumadin with right-sided weakness, hypertension hyperlipidemia CAD history of A-fib on Coumadin recently admitted at [facility] with acute metabolic encephalopathy 2/2 to uti and cellulitis of LE  .  P sent in from [facility] today  for after being found to have a DVT in her left Common femoral, great saphenous, popliteal vein.  Right lower extremity negative for DVT.  Ultrasound done today. patient reports left leg pain. patient denies fevers, chills, chest pain, shortness of breath. patient on coumadin with INR of 2.28 on 8/14 ,today in ER 1.57 .Admitted for vascular& hematology eval since patient developed DVT on coumadin .US venous  was done beginning of month during recent hospitalization and was negative for DVT Palliative care consult requested ,to discuss advance directives and complete MOLST During previous admission son was consulted regarding residential hospice at [facility] and palliative care issues /GOC discussion -could not decide on code status and patient remained full code .
89 Female with   Past medical history of CVA on Coumadin with right-sided weakness, hypertension hyperlipidemia CAD history of A-fib on Coumadin recently admitted at [facility] with acute metabolic encephalopathy 2/2 to uti and cellulitis of LE  .  P sent in from [facility] today  for after being found to have a DVT in her left Common femoral, great saphenous, popliteal vein.  Right lower extremity negative for DVT.  Ultrasound done today. patient reports left leg pain. patient denies fevers, chills, chest pain, shortness of breath. patient on coumadin with INR of 2.28 on 8/14 ,today in ER 1.57 .Admitted for vascular& hematology eval since patient developed DVT on coumadin .US venous  was done beginning of month during recent hospitalization and was negative for DVT Palliative care consult requested ,to discuss advance directives and complete MOLST During previous admission son was consulted regarding residential hospice at [facility] and palliative care issues /GOC discussion -could not decide on code status and patient remained full code .
dvt - extensive left leg on doppler out pt  start heparin  ashd - atrial fib paf  hypertension  s/p cva rt sided weakness  neuropathy  dyslipidemia
dvt - extensive left leg on doppler out pt  start heparin  ashd - atrial fib paf  hypertension  s/p cva rt sided weakness  neuropathy  dyslipidemia
  REASON FOR VISIT  .. Management of problems listed below        REVIEW OF SYMPTOMS   Able to give ROS  Yes     RELIABILITY +/-   CONSTITUTIONAL Weakness Yes    ENDOCRINE  No heat or cold intolerance    ALLERGY No hives  Sore throat No stridor  RESP Shortness of breath YES   NEURO New weakness No   CARDIAC   Palpitations No         PHYSICAL EXAM    HEENT Unremarkable  atraumatic   RESP Fair air entry  Harsh breath sound   CARDIAC S1 S2 No S3     NO JVD    ABDOMEN No hepatosplenomegaly   PEDAL EDEMA present No calf tenderness  NO rash       GENERAL DATA .     GOC.    .. 8/18/2023 full code   ICU STAY.   .. none  COVID.   ..    BEST PRACTICE ISSUES.    HOB ELEVATN.   .. Yes  DVT PPLX.   ..    8/18/2023 iv ufh   SPEECH SWALLOW RECOMMENDATIONS.    ..        DIET.    ..  8/18/2023 soft bite   IV fl.  ..   STRESS ULCER PPLX.   ..   8/18/2023 protonix 40    INFECTION PPLX.   ..     ALLGY.  ..  certain abio                        WT.  ..  8/18/2023 56  BMI.  ..    8/18/2023 20   PROCEDURES.    ABGS.   .     VS/ PO/IO/ VENT/ DRIPS.  8/21/2023 afeb 70 120/70   8/21/2023 nc 91%     DOA C/C.    . 8/18/2023 l leg dvt on coumadin 1 sent for eval             INITIAL PRESENTATION.   . 8/18/2023 · HPI Objective Statement: 89 F [facility] for after being found to have a DVT in her left Common femoral, great saphenous, popliteal vein.  Right lower extremity negative for DVT.  Ultrasound done today. patient reports left leg pain. patient denies fevers, chills, chest pain, shortness of breath. patient on coumadin with INR of 2.28 on 8/14.  PMH.   . PMH DVT   . A fib   . Valvular heart disease   . HFPEF   . Aortic stenosis   . CVA r sided weakness   HOME MEDS.   . lidocaine patch protonix 40 warfarin levoxyl 50 lasix 20 metoprolol 25.2 hydroxyurea 500.2 amlodipine 5 escitalopram 20   COURSE.   . Pulm consulted 8/18/2023 breakthrough dvt while on coumadin    PROBLEMS/ASSESSMENT/RECOMMENDATIONS (A/R).  PULMONARY.  . GAS EXCHANGE.  .. A/R.   .. Monitor pulse oximetry and target po 90-95%    Venous thromboembolism.  .. 8/18/2023 inr 165  .. 8/19/2023 v duplx (-)   .. 8/18/2023 5p iv ufh   .. As INR was subtherapeutic  (8/18 ) this was not warfarin failure and pt may be restarted on warfarin with the usual 24-48 h overlap once therapeutic     INFECTION.  .. w 8/18-8/19/2023 w 8.5 - 6.9   .. cxr 8/18/2023 napd   .. No active infection suspectd     . A fib.  .. 8/18/2023 metoprolol 25.2   .. On anticoagn    . CHF  .. bnp 8/19/2023 2429   .. 6/28/2023 echo pasp 39 la sl dilated n lvsf  .. 8/18/2023 lasix 20     HEMAT  .. Hb 8/18-8/19-8/20/2023 Hb 10 - 9.2 - 9.1   .. plt 8/18/2023 plt 534     RENAL   .. Na 8/18/2023 Na 139   .. K 8/18/2023 k 4.4   .. Cr 8/18/2023 Cr .6    MAIN ISSUES.  . Venous thromboembolism while on warfarin poa 8/18/2023   .. 8/18/2023 IV ufh started  .. As INR was subtherapeutic  (8/18 ) this was not warfarin failure and pt may be restarted on warfarin with the usual 24-48 h overlap once therapeutic   . A fib chronic   . Chrnic chf  .. Lasix continued     TIME SPENT.   . Over 36 minutes aggregate care time spent on encounter; activities included   direct patient care, counseling and/or coordinating care reviewing notes, lab data/ imaging , discussion with multidisciplinary team/ patient  /family and explaining in detail risks, benefits, alternatives  of the recommendations     AB MENDOZA 89 f 8/18/2023 3/15/1932 DR YURI CARRINGTON   
  REASON FOR VISIT  .. Management of problems listed below        REVIEW OF SYMPTOMS   Able to give ROS  Yes     RELIABILITY +/-   CONSTITUTIONAL Weakness Yes    ENDOCRINE  No heat or cold intolerance    ALLERGY No hives  Sore throat No stridor  RESP Shortness of breath YES   NEURO New weakness No   CARDIAC   Palpitations No         PHYSICAL EXAM    HEENT Unremarkable  atraumatic   RESP Fair air entry  Harsh breath sound   CARDIAC S1 S2 No S3     NO JVD    ABDOMEN No hepatosplenomegaly   PEDAL EDEMA present No calf tenderness  NO rash       GENERAL DATA .     GOC.    .. 8/18/2023 full code   ICU STAY.   .. none  COVID.   ..    BEST PRACTICE ISSUES.    HOB ELEVATN.   .. Yes  DVT PPLX.   ..    8/18/2023 iv ufh   SPEECH SWALLOW RECOMMENDATIONS.    ..        DIET.    ..  8/18/2023 soft bite   IV fl.  ..   STRESS ULCER PPLX.   ..   8/18/2023 protonix 40    INFECTION PPLX.   ..     ALLGY.  ..  certain abio                        WT.  ..  8/18/2023 56  BMI.  ..    8/18/2023 20   PROCEDURES.  ..     ABGS.   .     VS/ PO/IO/ VENT/ DRIPS.  8/22/2023 afeb 75 120/70   8/22/2023 ra 87%     DOA C/C.    . 8/18/2023 l leg dvt on coumadin 1 sent for eval             INITIAL PRESENTATION.   . 8/18/2023 · HPI Objective Statement: 89 F [facility] for after being found to have a DVT in her left Common femoral, great saphenous, popliteal vein.  Right lower extremity negative for DVT.  Ultrasound done today. patient reports left leg pain. patient denies fevers, chills, chest pain, shortness of breath. patient on coumadin with INR of 2.28 on 8/14.  PMH.   . PMH DVT   . A fib   . Valvular heart disease   . HFPEF   . Aortic stenosis   . CVA r sided weakness   HOME MEDS.   . lidocaine patch protonix 40 warfarin levoxyl 50 lasix 20 metoprolol 25.2 hydroxyurea 500.2 amlodipine 5 escitalopram 20   COURSE.   . Pulm consulted 8/18/2023 breakthrough dvt while on coumadin    PROBLEMS/ASSESSMENT/RECOMMENDATIONS (A/R).  PULMONARY.  . GAS EXCHANGE.  .. A/R.   .. Monitor pulse oximetry and target po 90-95%    . COPD   .. 8/22/2023 duoneb p   .. may have underlying copd contributing to her hypoxia     . NEED FOR HOME OXYGEN   .. 8/22/2023 ra rest 87%  .. 8/22/2023 Hypoxia likely sec new diagnosed copd and underlying CHF and VTE   .. 8/22/2023 will need home oxygen 2l 24/7     Venous thromboembolism.  .. 8/18/2023 inr 165  .. 8/19/2023 v duplx (-)   .. 8/18/2023 5p iv ufh   .. As INR was subtherapeutic  (8/18 ) this was not warfarin failure and pt may be restarted on warfarin with the usual 24-48 h overlap once therapeutic     INFECTION.  .. w 8/18-8/19/2023 w 8.5 - 6.9   .. cxr 8/18/2023 napd   .. No active infection suspectd     . A fib.  .. inr 8/22/2023 inr 232   .. 8/18/2023 metoprolol 25.2   .. On anticoagn    . CHF  .. bnp 8/19/2023 2429   .. 6/28/2023 echo pasp 39 la sl dilated n lvsf  .. 8/18/2023 lasix 20     HEMAT  .. Hb 8/18-8/19-8/20-8/22/2023 Hb 10 - 9.2 - 9.1 - 8.6   .. plt 8/18/2023 plt 534     RENAL   .. Na 8/18/2023 Na 139   .. K 8/18/2023 k 4.4   .. Cr 8/18/2023 Cr .6    MAIN ISSUES.  . VTE   . Venous thromboembolism while on warfarin poa 8/18/2023   .. 8/18/2023 IV ufh started  .. As INR was subtherapeutic  (8/18 ) this was not warfarin failure and pt may be restarted on warfarin with the usual 24-48 h overlap once therapeutic   . A fib  . A fib chronic   . CHF   . Chrnic chf  .. Lasix continued   . NEED FOR HOME OXYGEN   .. 8/22/2023 ra rest 87%  .. 8/22/2023 will need home oxygen 2l for new diagnosed COPD and CHF     TIME SPENT.   . Over 36 minutes aggregate care time spent on encounter; activities included   direct patient care, counseling and/or coordinating care reviewing notes, lab data/ imaging , discussion with multidisciplinary team/ patient  /family and explaining in detail risks, benefits, alternatives  of the recommendations     AB MENDOZA 89 f 8/18/2023 3/15/1932 DR YURI CARRINGTON   
  REASON FOR VISIT  .. Management of problems listed below        REVIEW OF SYMPTOMS   Able to give ROS  Yes     RELIABILITY +/-   CONSTITUTIONAL Weakness Yes    ENDOCRINE  No heat or cold intolerance    ALLERGY No hives  Sore throat No stridor  RESP Shortness of breath YES   NEURO New weakness No   CARDIAC   Palpitations No         PHYSICAL EXAM    HEENT Unremarkable  atraumatic   RESP Fair air entry  Harsh breath sound   CARDIAC S1 S2 No S3     NO JVD    ABDOMEN No hepatosplenomegaly   PEDAL EDEMA present No calf tenderness  NO rash     GENERAL DATA .     GOC.    .. 8/18/2023 full code   ICU STAY.   .. none  COVID.   ..    BEST PRACTICE ISSUES.    HOB ELEVATN.   .. Yes  DVT PPLX.   ..    8/18/2023 iv ufh   SPEECH SWALLOW RECOMMENDATIONS.    ..        DIET.    ..  8/18/2023 soft bite   IV fl.  ..   STRESS ULCER PPLX.   ..   8/18/2023 protonix 40    INFECTION PPLX.   ..     ALLGY.  ..  certain abio                        WT.  ..  8/18/2023 56  BMI.  ..    8/18/2023 20     ABGS.   .     VS/ PO/IO/ VENT/ DRIPS.  8/23/2023 afeb 71 110/60   8/23/2023 2l 93%     DOA C/C.    . 8/18/2023 l leg dvt on coumadin 1 sent for eval             INITIAL PRESENTATION.   . 8/18/2023 · HPI Objective Statement: 89 F [place] [facility] for after being found to have a DVT in her left Common femoral, great saphenous, popliteal vein.  Right lower extremity negative for DVT.  Ultrasound done today. patient reports left leg pain. patient denies fevers, chills, chest pain, shortness of breath. patient on coumadin with INR of 2.28 on 8/14.  PMH.   . PMH DVT   . A fib   . Valvular heart disease   . HFPEF   . Aortic stenosis   . CVA r sided weakness   HOME MEDS.   . lidocaine patch protonix 40 warfarin levoxyl 50 lasix 20 metoprolol 25.2 hydroxyurea 500.2 amlodipine 5 escitalopram 20   COURSE.   . Pulm consulted 8/18/2023 breakthrough dvt while on coumadin    PROBLEMS/ASSESSMENT/RECOMMENDATIONS (A/R).  PULMONARY.  . GAS EXCHANGE.  .. A/R.   .. Monitor pulse oximetry and target po 90-95%    . COPD   .. 8/22/2023 duoneb p   .. may have underlying copd contributing to her hypoxia     . NEED FOR HOME OXYGEN   .. 8/22/2023 ra rest 87%  .. 8/22/2023 Hypoxia likely sec new diagnosed copd and underlying CHF and VTE   .. 8/22/2023 will need home oxygen 2l 24/7     Venous thromboembolism.  .. 8/18/2023 inr 165  .. 8/19/2023 v duplx (-)   .. 8/18/2023 5p iv ufh   .. As INR was subtherapeutic  (8/18 ) this was not warfarin failure and pt may be restarted on warfarin with the usual 24-48 h overlap once therapeutic     INFECTION.  .. w 8/18-8/19/2023 w 8.5 - 6.9   .. cxr 8/18/2023 napd   .. No active infection suspectd     . A fib.  .. inr 8/22/2023 inr 232   .. 8/18/2023 metoprolol 25.2   .. On anticoagn    . CHF  .. bnp 8/19/2023 2429   .. 6/28/2023 echo pasp 39 la sl dilated n lvsf  .. 8/18/2023 lasix 20     HEMAT  .. Hb 8/18-8/19-8/20-8/22/2023 Hb 10 - 9.2 - 9.1 - 8.6   .. plt 8/18/2023 plt 534     RENAL   .. Na 8/18/2023 Na 139   .. K 8/18/2023 k 4.4   .. Cr 8/18/2023 Cr .6    MAIN ISSUES.  . VTE   . Venous thromboembolism while on warfarin poa 8/18/2023   .. 8/18/2023 IV ufh started  .. As INR was subtherapeutic  (8/18 ) this was not warfarin failure and pt may be restarted on warfarin with the usual 24-48 h overlap once therapeutic   . A fib  . A fib chronic   . CHF   . Chrnic chf  .. Lasix continued   . NEED FOR HOME OXYGEN   .. 8/22/2023 ra rest 87%  .. 8/22/2023 will need home oxygen 2l for new diagnosed COPD and CHF     TIME SPENT.   . Over 36 minutes aggregate care time spent on encounter; activities included   direct patient care, counseling and/or coordinating care reviewing notes, lab data/ imaging , discussion with multidisciplinary team/ patient  /family and explaining in detail risks, benefits, alternatives  of the recommendations     AB MENDOZA 89 f 8/18/2023 3/15/1932 DR YURI CARRINGTON   
  REASON FOR VISIT  .. Management of problems listed below      PHYSICAL EXAM    HEENT Unremarkable  atraumatic   RESP Fair air entry  Harsh breath sound   CARDIAC S1 S2 No S3     NO JVD    ABDOMEN No hepatosplenomegaly   PEDAL EDEMA present No calf tenderness  NO rash       GENERAL DATA .     GOC.    .. 8/18/2023 full code   ICU STAY.   .. none  COVID.   ..    BEST PRACTICE ISSUES.    HOB ELEVATN.   .. Yes  DVT PPLX.   ..    8/18/2023 iv ufh   SPEECH SWALLOW RECOMMENDATIONS.    ..        DIET.    ..  8/18/2023 soft bite   IV fl.  ..   STRESS ULCER PPLX.   ..   8/18/2023 protonix 40    INFECTION PPLX.   ..     ALLGY.  ..  certain abio                        WT.  ..  8/18/2023 56  BMI.  ..    8/18/2023 20   PROCEDURES.    ABGS.   .     VS/ PO/IO/ VENT/ DRIPS.  8/19/2023 99f 76 140/60   8/19/2023 ra 93%      DOA C/C.    . 8/18/2023 l leg dvt on coumadin 1 sent for eval             INITIAL PRESENTATION.   . 8/18/2023 · HPI Objective Statement: 89 F [facility] for after being found to have a DVT in her left Common femoral, great saphenous, popliteal vein.  Right lower extremity negative for DVT.  Ultrasound done today. patient reports left leg pain. patient denies fevers, chills, chest pain, shortness of breath. patient on coumadin with INR of 2.28 on 8/14.  PMH.   . PMH DVT   . A fib   . Valvular heart disease   . HFPEF   . Aortic stenosis   . CVA r sided weakness   HOME MEDS.   . lidocaine patch protonix 40 warfarin levoxyl 50 lasix 20 metoprolol 25.2 hydroxyurea 500.2 amlodipine 5 escitalopram 20   COURSE.   . Pulm consulted 8/18/2023 breakthrough dvt while on coumadin    PROBLEMS/ASSESSMENT/RECOMMENDATIONS (A/R).  PULMONARY.  . GAS EXCHANGE.  .. A/R.   .. Monitor pulse oximetry and target po 90-95%    Venous thromboembolism.  .. 8/18/2023 inr 165  .. 8/19/2023 v duplx (-)   .. 8/18/2023 5p iv ufh   .. As INR was subtherapeutic  (8/18 ) this was not warfarin failure and pt may be restarted on warfarin with the usual 24-48 h overlap once therapeutic     INFECTION.  .. w 8/18-8/19/2023 w 8.5 - 6.9   .. cxr 8/18/2023 napd   .. No active infection suspectd     . A fib.  .. 8/18/2023 metoprolol 25.2   .. On anticoagn    . CHF  .. bnp 8/19/2023 2429   .. 6/28/2023 echo pasp 39 la sl dilated n lvsf  .. 8/18/2023 lasix 20     HEMAT  .. Hb 8/18-8/19/2023 Hb 10 - 9.2   .. plt 8/18/2023 plt 534     RENAL   .. Na 8/18/2023 Na 139   .. K 8/18/2023 k 4.4   .. Cr 8/18/2023 Cr .6    MAIN ISSUES.  . Venous thromboembolism while on warfarin poa 8/18/2023   .. 8/18/2023 IV ufh started  .. As INR was subtherapeutic  (8/18 ) this was not warfarin failure and pt may be restarted on warfarin with the usual 24-48 h overlap once therapeutic   . A fib chronic   . Chrnic chf  .. Lasix continued     TIME SPENT.   . Over 36 minutes aggregate care time spent on encounter; activities included   direct patient care, counseling and/or coordinating care reviewing notes, lab data/ imaging , discussion with multidisciplinary team/ patient  /family and explaining in detail risks, benefits, alternatives  of the recommendations     AB MENDOZA 89 f 8/18/2023 3/15/1932 DR YURI CARRINGTON  
  REASON FOR VISIT  .. Management of problems listed below      PHYSICAL EXAM    HEENT Unremarkable  atraumatic   RESP Fair air entry  Harsh breath sound   CARDIAC S1 S2 No S3     NO JVD    ABDOMEN No hepatosplenomegaly   PEDAL EDEMA present No calf tenderness  NO rash       GENERAL DATA .     GOC.    .. 8/18/2023 full code   ICU STAY.   .. none  COVID.   ..    BEST PRACTICE ISSUES.    HOB ELEVATN.   .. Yes  DVT PPLX.   ..    8/18/2023 iv ufh   SPEECH SWALLOW RECOMMENDATIONS.    ..        DIET.    ..  8/18/2023 soft bite   IV fl.  ..   STRESS ULCER PPLX.   ..   8/18/2023 protonix 40    INFECTION PPLX.   ..     ALLGY.  ..  certain abio                        WT.  ..  8/18/2023 56  BMI.  ..    8/18/2023 20   PROCEDURES.  ..     ABGS.   .     VS/ PO/IO/ VENT/ DRIPS.  8/20/2023 afeb 80 120/70   8/20/2023 nc 95%     DOA C/C.    . 8/18/2023 l leg dvt on coumadin 1 sent for eval             INITIAL PRESENTATION.   . 8/18/2023 · HPI Objective Statement: 89 F [place] for after being found to have a DVT in her left Common femoral, great saphenous, popliteal vein.  Right lower extremity negative for DVT.  Ultrasound done today. patient reports left leg pain. patient denies fevers, chills, chest pain, shortness of breath. patient on coumadin with INR of 2.28 on 8/14.  PMH.   . PMH DVT   . A fib   . Valvular heart disease   . HFPEF   . Aortic stenosis   . CVA r sided weakness   HOME MEDS.   . lidocaine patch protonix 40 warfarin levoxyl 50 lasix 20 metoprolol 25.2 hydroxyurea 500.2 amlodipine 5 escitalopram 20   COURSE.   . Pulm consulted 8/18/2023 breakthrough dvt while on coumadin    PROBLEMS/ASSESSMENT/RECOMMENDATIONS (A/R).  PULMONARY.  . GAS EXCHANGE.  .. A/R.   .. Monitor pulse oximetry and target po 90-95%    Venous thromboembolism.  .. 8/18/2023 inr 165  .. 8/19/2023 v duplx (-)   .. 8/18/2023 5p iv ufh   .. As INR was subtherapeutic  (8/18 ) this was not warfarin failure and pt may be restarted on warfarin with the usual 24-48 h overlap once therapeutic     INFECTION.  .. w 8/18-8/19/2023 w 8.5 - 6.9   .. cxr 8/18/2023 napd   .. No active infection suspectd     . A fib.  .. 8/18/2023 metoprolol 25.2   .. On anticoagn    . CHF  .. bnp 8/19/2023 2429   .. 6/28/2023 echo pasp 39 la sl dilated n lvsf  .. 8/18/2023 lasix 20     HEMAT  .. Hb 8/18-8/19-8/20/2023 Hb 10 - 9.2 - 9.1   .. plt 8/18/2023 plt 534     RENAL   .. Na 8/18/2023 Na 139   .. K 8/18/2023 k 4.4   .. Cr 8/18/2023 Cr .6    MAIN ISSUES.  . Venous thromboembolism while on warfarin poa 8/18/2023   .. 8/18/2023 IV ufh started  .. As INR was subtherapeutic  (8/18 ) this was not warfarin failure and pt may be restarted on warfarin with the usual 24-48 h overlap once therapeutic   . A fib chronic   . Chrnic chf  .. Lasix continued     TIME SPENT.   . Over 36 minutes aggregate care time spent on encounter; activities included   direct patient care, counseling and/or coordinating care reviewing notes, lab data/ imaging , discussion with multidisciplinary team/ patient  /family and explaining in detail risks, benefits, alternatives  of the recommendations     AB MENDOZA 89 f 8/18/2023 3/15/1932 DR YURI CARRINGTON     
89 Female with   Past medical history of CVA on Coumadin with right-sided weakness, hypertension hyperlipidemia CAD history of A-fib on Coumadin recently admitted at [facility] with acute metabolic encephalopathy 2/2 to uti and cellulitis of LE  .  P sent in from [facility] today  for after being found to have a DVT in her left Common femoral, great saphenous, popliteal vein.  Right lower extremity negative for DVT.  Ultrasound done today. patient reports left leg pain. patient denies fevers, chills, chest pain, shortness of breath. patient on coumadin with INR of 2.28 on 8/14 ,today in ER 1.57 .Admitted for vascular& hematology eval since patient developed DVT on coumadin .US venous  was done beginning of month during recent hospitalization and was negative for DVT Palliative care consult requested ,to discuss advance directives and complete MOLST During previous admission son was consulted regarding residential hospice at [facility] and palliative care issues /GOC discussion -could not decide on code status and patient remained full code .
89 Female with   Past medical history of CVA on Coumadin with right-sided weakness, hypertension hyperlipidemia CAD history of A-fib on Coumadin recently admitted at [facility] with acute metabolic encephalopathy 2/2 to uti and cellulitis of LE  .  P sent in from [facility] today  for after being found to have a DVT in her left Common femoral, great saphenous, popliteal vein.  Right lower extremity negative for DVT.  Ultrasound done today. patient reports left leg pain. patient denies fevers, chills, chest pain, shortness of breath. patient on coumadin with INR of 2.28 on 8/14 ,today in ER 1.57 .Admitted for vascular& hematology eval since patient developed DVT on coumadin .US venous  was done beginning of month during recent hospitalization and was negative for DVT Palliative care consult requested ,to discuss advance directives and complete MOLST During previous admission son was consulted regarding residential hospice at [facility] and palliative care issues /GOC discussion -could not decide on code status and patient remained full code .

## 2023-08-23 NOTE — CASE MANAGEMENT PROGRESS NOTE - NSCMPROGRESSNOTE_GEN_ALL_CORE
Patient is transition home today. Discharge notification given to patient and her son. [facility] to pickup patient  at 1pm. Patient is known to [facility], at  facility request referral initiated and sent that agency. Patient new on Oxygen, referral for home O2 was initiated and sent [facility]. POC delivered to bed side  and to facility.

## 2023-08-23 NOTE — PROGRESS NOTE ADULT - PROBLEM SELECTOR PROBLEM 1
DVT of lower limb, acute

## 2023-08-23 NOTE — DISCHARGE NOTE NURSING/CASE MANAGEMENT/SOCIAL WORK - NSDCPEPTCOWAFU_GEN_ALL_CORE
Go for blood tests as directed. Because your dose is based on the PT/INR blood test, it is very important that you get your blood tested on the scheduled date and time and to keep your health care provider appointments.   Please follow up with your doctor within 3 days of discharge to schedule your next blood test.
Ambulatory
none known

## 2023-08-23 NOTE — PROGRESS NOTE ADULT - REASON FOR ADMISSION
left leg pain

## 2023-08-23 NOTE — DISCHARGE NOTE NURSING/CASE MANAGEMENT/SOCIAL WORK - NSDCPEFALRISK_GEN_ALL_CORE
For information on Fall & Injury Prevention, visit: [email or web address] OR  [email or web address] OR  https://www.cdc.gov/steadi/patient.html
no diabetes and no thyroid trouble.

## 2023-08-23 NOTE — PROGRESS NOTE ADULT - PROVIDER SPECIALTY LIST ADULT
Cardiology
Neurology
Palliative Care
Cardiology
Cardiology
Infectious Disease
Neurology
Palliative Care
Palliative Care
Pulmonology
Pulmonology
Palliative Care
Pulmonology
Cardiology
Hospitalist
Palliative Care
Cardiology
Hospitalist

## 2023-08-24 ENCOUNTER — TRANSCRIPTION ENCOUNTER (OUTPATIENT)
Age: 88
End: 2023-08-24

## 2023-08-24 PROBLEM — K21.9 GASTRO-ESOPHAGEAL REFLUX DISEASE WITHOUT ESOPHAGITIS: Chronic | Status: ACTIVE | Noted: 2023-08-18

## 2023-08-24 PROBLEM — E03.9 HYPOTHYROIDISM, UNSPECIFIED: Chronic | Status: ACTIVE | Noted: 2023-08-18

## 2023-08-25 ENCOUNTER — TRANSCRIPTION ENCOUNTER (OUTPATIENT)
Age: 88
End: 2023-08-25

## 2023-08-29 ENCOUNTER — APPOINTMENT (OUTPATIENT)
Dept: CARE COORDINATION | Facility: HOME HEALTH | Age: 88
End: 2023-08-29
Payer: MEDICARE

## 2023-08-29 ENCOUNTER — TRANSCRIPTION ENCOUNTER (OUTPATIENT)
Age: 88
End: 2023-08-29

## 2023-08-29 VITALS
DIASTOLIC BLOOD PRESSURE: 61 MMHG | OXYGEN SATURATION: 96 % | SYSTOLIC BLOOD PRESSURE: 108 MMHG | HEART RATE: 94 BPM | RESPIRATION RATE: 14 BRPM

## 2023-08-29 DIAGNOSIS — C43.72 MALIGNANT MELANOMA OF LEFT LOWER LIMB, INCLUDING HIP: ICD-10-CM

## 2023-08-29 DIAGNOSIS — I48.91 UNSPECIFIED ATRIAL FIBRILLATION: ICD-10-CM

## 2023-08-29 DIAGNOSIS — I80.9 PHLEBITIS AND THROMBOPHLEBITIS OF UNSPECIFIED SITE: ICD-10-CM

## 2023-08-29 DIAGNOSIS — I10 ESSENTIAL (PRIMARY) HYPERTENSION: ICD-10-CM

## 2023-08-29 DIAGNOSIS — E03.9 HYPOTHYROIDISM, UNSPECIFIED: ICD-10-CM

## 2023-08-29 PROCEDURE — 99495 TRANSJ CARE MGMT MOD F2F 14D: CPT

## 2023-08-29 RX ORDER — MENTHOL 40 MG/ML
4 GEL TOPICAL
Refills: 0 | Status: ACTIVE | COMMUNITY
Start: 2023-08-29

## 2023-08-29 RX ORDER — AMLODIPINE BESYLATE 5 MG/1
5 TABLET ORAL
Qty: 90 | Refills: 2 | Status: ACTIVE | COMMUNITY
Start: 2023-08-29

## 2023-08-29 RX ORDER — BENZONATATE 100 MG/1
100 CAPSULE ORAL 3 TIMES DAILY
Qty: 90 | Refills: 0 | Status: ACTIVE | COMMUNITY
Start: 2023-08-29

## 2023-08-29 RX ORDER — MICONAZOLE NITRATE 20 MG/G
2 CREAM TOPICAL TWICE DAILY
Refills: 0 | Status: ACTIVE | COMMUNITY
Start: 2023-08-29

## 2023-08-29 RX ORDER — FAMOTIDINE 20 MG/1
20 TABLET, FILM COATED ORAL DAILY
Refills: 0 | Status: ACTIVE | COMMUNITY
Start: 2023-08-29

## 2023-08-29 RX ORDER — ESCITALOPRAM OXALATE 20 MG/1
20 TABLET ORAL DAILY
Qty: 30 | Refills: 2 | Status: ACTIVE | COMMUNITY
Start: 2023-08-29

## 2023-08-29 RX ORDER — TRAMADOL HYDROCHLORIDE 50 MG/1
50 TABLET, COATED ORAL TWICE DAILY
Refills: 0 | Status: ACTIVE | COMMUNITY
Start: 2023-08-29

## 2023-08-29 RX ORDER — SIMVASTATIN 10 MG/1
10 TABLET, FILM COATED ORAL
Refills: 0 | Status: ACTIVE | COMMUNITY
Start: 2023-08-29

## 2023-08-29 RX ORDER — FUROSEMIDE 20 MG/1
20 TABLET ORAL DAILY
Qty: 14 | Refills: 0 | Status: ACTIVE | COMMUNITY
Start: 2023-08-29

## 2023-08-29 RX ORDER — WARFARIN 1 MG/1
1 TABLET ORAL EVERY OTHER DAY
Refills: 0 | Status: ACTIVE | COMMUNITY
Start: 2023-08-29

## 2023-08-29 RX ORDER — GLUCOSAMINE HCL/CHONDROITIN SU 500-400 MG
3 CAPSULE ORAL
Refills: 0 | Status: ACTIVE | COMMUNITY
Start: 2023-08-29

## 2023-08-29 RX ORDER — GABAPENTIN 300 MG/1
300 CAPSULE ORAL
Qty: 270 | Refills: 3 | Status: ACTIVE | COMMUNITY
Start: 2023-08-29

## 2023-08-29 RX ORDER — POVIDINE IODINE 10 MG/ML
10 SWAB TOPICAL
Refills: 0 | Status: ACTIVE | COMMUNITY
Start: 2023-08-29

## 2023-08-29 RX ORDER — LEVOTHYROXINE SODIUM 0.05 MG/1
50 TABLET ORAL DAILY
Refills: 0 | Status: ACTIVE | COMMUNITY
Start: 2023-08-29

## 2023-08-29 RX ORDER — SENNOSIDES 8.6 MG TABLETS 8.6 MG/1
8.6 TABLET ORAL
Refills: 0 | Status: ACTIVE | COMMUNITY
Start: 2023-08-29

## 2023-08-29 RX ORDER — FOLIC ACID 1 MG/1
1 TABLET ORAL DAILY
Refills: 0 | Status: ACTIVE | COMMUNITY
Start: 2023-08-29

## 2023-08-29 NOTE — REVIEW OF SYSTEMS
[Muscle Weakness] : muscle weakness [Memory Loss] : memory loss [Negative] : Heme/Lymph [de-identified] : LLE lesion

## 2023-08-29 NOTE — PHYSICAL EXAM
[No Acute Distress] : no acute distress [Normal Sclera/Conjunctiva] : normal sclera/conjunctiva [Normal Outer Ear/Nose] : the outer ears and nose were normal in appearance [No JVD] : no jugular venous distention [No Respiratory Distress] : no respiratory distress  [Clear to Auscultation] : lungs were clear to auscultation bilaterally [Normal Rate] : normal rate  [No Edema] : there was no peripheral edema [Soft] : abdomen soft [Normal Affect] : the affect was normal [de-identified] : Left lower extremity lesion [de-identified] : right sided weakness

## 2023-08-29 NOTE — HISTORY OF PRESENT ILLNESS
[Post-hospitalization from ___ Hospital] : Post-hospitalization from [unfilled] Hospital [Admitted on: ___] : The patient was admitted on [unfilled] [Discharged on ___] : discharged on [unfilled] [Discharge Summary] : discharge summary [Discharge Med List] : discharge medication list [Med Reconciliation] : medication reconciliation has been completed [Patient Contacted By: ____] : and contacted by [unfilled] [FreeTextEntry3] : Patient was contacted by TCM RN on 8/24/23 for 24/48 hour call and documentation is present in the Clinical Viewer  [FreeTextEntry2] : 89 Female with   Past medical history of CVA on Coumadin with right-sided weakness, hypertension hyperlipidemia CAD history of A-fib on Coumadin recently admitted at [facility] with acute metabolic encephalopathy 2/2 to uti and cellulitis of LE  .  Pt sent in from [facility], found to have a DVT in her left Common femoral, great saphenous, popliteal vein.  Right lower extremity negative for DVT.  Since dc, she denies cp/sob/pnd/holden/orthopnea.  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  She has appt with derm, unable to recall date but states her son takes her.  TCM numbers provided for any questions/concerns.

## 2023-10-06 ENCOUNTER — EMERGENCY (EMERGENCY)
Facility: HOSPITAL | Age: 88
LOS: 1 days | Discharge: HOME | End: 2023-10-06
Attending: EMERGENCY MEDICINE | Admitting: INTERNAL MEDICINE
Payer: MEDICARE

## 2023-10-06 VITALS
HEART RATE: 92 BPM | TEMPERATURE: 98 F | SYSTOLIC BLOOD PRESSURE: 116 MMHG | HEIGHT: 66 IN | DIASTOLIC BLOOD PRESSURE: 72 MMHG | RESPIRATION RATE: 18 BRPM | OXYGEN SATURATION: 92 %

## 2023-10-06 VITALS
TEMPERATURE: 98 F | SYSTOLIC BLOOD PRESSURE: 127 MMHG | HEART RATE: 85 BPM | DIASTOLIC BLOOD PRESSURE: 79 MMHG | RESPIRATION RATE: 18 BRPM | OXYGEN SATURATION: 93 %

## 2023-10-06 LAB
APTT BLD: 42.1 SEC — HIGH (ref 24.5–35.6)
INR BLD: 1.87 RATIO — HIGH (ref 0.85–1.18)
PROTHROM AB SERPL-ACNC: 21.5 SEC — HIGH (ref 9.5–13)

## 2023-10-06 PROCEDURE — 99285 EMERGENCY DEPT VISIT HI MDM: CPT

## 2023-10-06 PROCEDURE — 70450 CT HEAD/BRAIN W/O DYE: CPT | Mod: 26,MA

## 2023-10-06 PROCEDURE — 73562 X-RAY EXAM OF KNEE 3: CPT | Mod: 26,LT

## 2023-10-06 PROCEDURE — 85730 THROMBOPLASTIN TIME PARTIAL: CPT

## 2023-10-06 PROCEDURE — 73562 X-RAY EXAM OF KNEE 3: CPT

## 2023-10-06 PROCEDURE — 99284 EMERGENCY DEPT VISIT MOD MDM: CPT | Mod: 25

## 2023-10-06 PROCEDURE — 72125 CT NECK SPINE W/O DYE: CPT | Mod: 26,MA

## 2023-10-06 PROCEDURE — 73552 X-RAY EXAM OF FEMUR 2/>: CPT | Mod: 26,LT

## 2023-10-06 PROCEDURE — 70450 CT HEAD/BRAIN W/O DYE: CPT | Mod: MA

## 2023-10-06 PROCEDURE — 36415 COLL VENOUS BLD VENIPUNCTURE: CPT

## 2023-10-06 PROCEDURE — 73502 X-RAY EXAM HIP UNI 2-3 VIEWS: CPT

## 2023-10-06 PROCEDURE — 73502 X-RAY EXAM HIP UNI 2-3 VIEWS: CPT | Mod: 26,LT

## 2023-10-06 PROCEDURE — 72125 CT NECK SPINE W/O DYE: CPT | Mod: MA

## 2023-10-06 PROCEDURE — 85610 PROTHROMBIN TIME: CPT

## 2023-10-06 PROCEDURE — 73552 X-RAY EXAM OF FEMUR 2/>: CPT

## 2023-10-06 RX ORDER — ACETAMINOPHEN 500 MG
650 TABLET ORAL ONCE
Refills: 0 | Status: COMPLETED | OUTPATIENT
Start: 2023-10-06 | End: 2023-10-06

## 2023-10-06 RX ADMIN — Medication 650 MILLIGRAM(S): at 03:47

## 2023-10-06 NOTE — ED PROVIDER NOTE - PHYSICAL EXAMINATION
Left knee: Mild diffuse swelling, decreased range of motion due to pain.  Patient states this is chronic.  Patient has the knee flexed.  Normal distal strength and sensation equal bilaterally.  No acute hip tenderness.

## 2023-10-06 NOTE — ED ADULT NURSE REASSESSMENT NOTE - NS ED NURSE REASSESS COMMENT FT1
Pt to dc to SNF, paperwork  given to EMS.  Pt son took cell phone home with him so it would not get lost.

## 2023-10-06 NOTE — ED PROVIDER NOTE - CARE PLAN
Principal Discharge DX:	Fall at home  Secondary Diagnosis:	Head trauma  Secondary Diagnosis:	Knee injury   1

## 2023-10-06 NOTE — ED PROVIDER NOTE - MUSCULOSKELETAL, MLM
Spine appears normal, no spinal tend (c,t,l),  range of motion is not limited, no muscle or joint tenderness except as noted

## 2023-10-06 NOTE — ED ADULT NURSE NOTE - OBJECTIVE STATEMENT
Pt BIBEMS from [facility] after unwitnessed fall out of wheelchair.  Pt is on coumadin.  Denies hitting head.  No LOC.  No chest pain or SOB.  No n/v/d.  c/o toe pain, left.  Maintain comfort and safety.

## 2023-10-06 NOTE — ED PROVIDER NOTE - DIFFERENTIAL DIAGNOSIS
Differential Diagnosis Rule out acute intracranial hemorrhage, abnormal coagulation studies, cervical spine fracture, hip or knee fracture, other acute pathology

## 2023-10-06 NOTE — ED PROVIDER NOTE - CLINICAL SUMMARY MEDICAL DECISION MAKING FREE TEXT BOX
Mechanical fall out of her wheelchair today with possible knee injury, no known head trauma.  Will check INR, CT head neck, x-ray hip and knee, outpatient follow-up

## 2023-10-06 NOTE — ED ADULT NURSE NOTE - CHIEF COMPLAINT QUOTE
per ems from [facility] with unwitnessed fall. pt denies fall. staff said pt was in a wheelchair and then five minutes later they came back and pt was on the floor. pt on coumadin

## 2023-10-06 NOTE — ED PROVIDER NOTE - MUSCULOSKELETAL [+], MLM
Leora,     We are always striving for excellence. Should you receive a patient experience survey in the mail, we would appreciate if you would take a few moments to give us your feedback. These surveys let us know our strengths as well as areas of opportunity for improvement to better serve you.    Thank you for your time,  Dasha Hamm MA    Your test results will be communicated to you via : My [facility], Telephone or Letter.   If you have not received your test results in one week, please contact the clinic at [phone].    
JOINT PAIN

## 2023-10-06 NOTE — ED ADULT TRIAGE NOTE - HOW PATIENT ADDRESSED, PROFILE
[address] WALK-IN  [address] [facility] [address]  [address] [address]  Dept: [phone]  Dept Fax: [phone]    Jefferson Torres is a 52 y.o. female who presents today for her medical conditions/complaints of   Chief Complaint   Patient presents with    Pharyngitis     C/o sore throat for last 3 days           HPI:     /86   Pulse 96   Temp 97.4 °F (36.3 °C) (Temporal)   Resp 14   Ht 5' 4\" (1.626 m)   Wt 290 lb (131.5 kg)   LMP 09/15/2021 Comment: VERY HEAVY PERIODS  SpO2 96%   BMI 49.78 kg/m²       Pharyngitis  This is a new problem. Episode onset: x 2 days. The problem occurs constantly. Associated symptoms include chills, coughing, nausea, a sore throat and vomiting. Pertinent negatives include no abdominal pain, change in bowel habit, chest pain, fatigue, fever or visual change. Nothing aggravates the symptoms. She has tried acetaminophen and drinking (as wel as cough medications) for the symptoms. The treatment provided no relief. Cough  This is a new problem. The current episode started in the past 7 days. The problem has been unchanged. The cough is Productive of sputum. Associated symptoms include chills, postnasal drip, a sore throat and shortness of breath. Pertinent negatives include no chest pain, ear pain, fever, hemoptysis or wheezing. She has tried OTC cough suppressant for the symptoms. The treatment provided mild relief. There is no history of asthma, bronchitis, COPD or pneumonia. Patient has chronic bilateral lower extremity edema. She has not taken any at home COVID19 testing.        Past Medical History:   Diagnosis Date    Anemia     Anxiety     Carpal tunnel syndrome     Depression     Endometriosis     Gestational diabetes     x 3 children    Headache     migraines    History of blood transfusion     Iron deficiency anemia     Kidney stones     May-Thurner syndrome     Obesity     Scleroderma ([address] Snores     Under care of team     PCP - Emma Nugent CNP - LAST VISIT 2021    Under care of team     VASCULAR  - DR. RIBEIRO - 2021 - SEES FOR MAY-THURNER SYNDROME    Under care of team     HEM/ONC - DR. PERKINS - DINAH WHITMORE - LAST VISIT - 2021    Wears glasses         Past Surgical History:   Procedure Laterality Date     SECTION  x 3    CHOLECYSTECTOMY      DILATION AND CURETTAGE OF UTERUS N/A 2021     DILATATION AND CURETTAGE HYSTEROSCOPY MYOSURE (N/A )    DILATION AND CURETTAGE OF UTERUS N/A 2021    ER visit night of surgery for hypertension, hyperglycemia. ENDOMETRIAL ABLATION  2021    GASTRIC BYPASS SURGERY      PABLO- EN- Y    HYSTERECTOMY (CERVIX STATUS UNKNOWN) N/A 10/26/2021    XI ROBOTIC LAPAROSCOPIC MODIFIED RADICAL HYSTERECTOMY, BILATERAL SALPINGO OOPHORECTOMY, CYTOLOGIC WASHINGS, CYSTOSCOPY performed by Hayden Saunders MD at [address]      growth removed from outer area of uterus    PELVIC LAPAROSCOPY      AMBER AND BSO (CERVIX REMOVED)  10/26/2021    ROBOTIC LAPAROSCOPIC MODIFIED RADICAL HYSTERECTOMY, BILATERAL SALPINGO OOPHORECTOMY, CYTOLOGIC WASHINGS, CYSTOSCOPY    TOENAIL EXCISION      VASCULAR SURGERY  2016    to evaluate May Rader syndrome effects on  blood vessels       Family History   Problem Relation Age of Onset    Colon Cancer Mother     Early Death Father         accident    Diabetes Sister     High Blood Pressure Sister     Diabetes Brother     High Blood Pressure Brother     Diabetes Sister     High Blood Pressure Sister        Social History     Tobacco Use    Smoking status: Never    Smokeless tobacco: Never   Substance Use Topics    Alcohol use: No        Prior to Visit Medications    Medication Sig Taking?  Authorizing Provider   benzonatate (TESSALON PERLES) 100 MG capsule Take 2 capsules by mouth 3 times daily as needed for Cough Yes JUSTICE Kellogg - CNP   fluticasone (FLONASE) 50 MCG/ACT nasal spray 2 sprays by Nasal route daily Yes JUSTICE Kellogg CNP   fluticasone (FLONASE) 50 MCG/ACT nasal spray 2 sprays by Nasal route daily  JUSTICE Kellogg CNP   losartan (COZAAR) 50 MG tablet Take 1 tablet by mouth daily  JUSTICE Fuentes CNP   Oyster Shell 500 MG TABS Take 1 tablet by mouth 3 times daily  JUSTICE Wilson CNP   Multiple Vitamins-Minerals (THERAPEUTIC MULTIVITAMIN-MINERALS) tablet Take 1 tablet by mouth daily  JUSTICE Wilson CNP   vitamin B-12 (CYANOCOBALAMIN) 1000 MCG tablet Take 1,000 mcg by mouth daily  Historical Provider, MD   zinc 50 MG CAPS Take 50 mg by mouth daily  [facility] OBDULIO MATHUR MD   busPIRone (BUSPAR) 10 MG tablet TAKE 1 TABLET BY MOUTH THREE TIMES A DAY AS NEEDED FOR ANXIETY  JUSTICE Fuentes CNP   vitamin D3 (CHOLECALCIFEROL) 25 MCG (1000 UT) TABS tablet Take 1 tablet by mouth daily  [facility] OBDULIO MATHUR MD   ondansetron (ZOFRAN ODT) 4 MG disintegrating tablet Take 1 tablet by mouth every 8 hours as needed for Nausea  Naveen Shaffer MD   Nutritional Supplements (ESTROVEN PO) Take by mouth daily  Historical Provider, MD   acetaminophen (AMINOFEN) 325 MG tablet Take 2 tablets by mouth every 6 hours as needed for Pain  Catina Graham,    Elastic Bandages & Supports (JOBST ULTRASHEER 20-30MMHG LG) MISC Wear daily, ok to remove in the evenings  Radha Lowery MD   Multiple Vitamins-Minerals (MULTI-JULIO CÉSAR PO) Take by mouth daily  Historical Provider, MD   calcium carbonate (OYSTER SHELL CALCIUM 500 MG) 1250 (500 Ca) MG tablet Take 1 tablet by mouth daily  Historical Provider, MD       Allergies   Allergen Reactions    Contrast [Iodides] Hives     Chest pain, HTN  Able to handle if the pre-medication prep is followed.     Trileptal [Oxcarbazepine] Shortness Of Breath     Red neck rash/ visual changes    Morphine Other (See Comments)     Red streak up arm at iv site/ and hives         Subjective:      Review of Systems   Constitutional:  Positive for chills. Negative for fatigue and fever. HENT:  Positive for postnasal drip and sore throat. Negative for dental problem, ear pain, hearing loss, mouth sores, sinus pressure and sinus pain. Eyes: Negative. Respiratory:  Positive for cough and shortness of breath. Negative for hemoptysis and wheezing. Cardiovascular:  Negative for chest pain, palpitations and leg swelling. Gastrointestinal:  Positive for nausea and vomiting. Negative for abdominal pain, change in bowel habit and diarrhea. Genitourinary: Negative. Musculoskeletal: Negative. Skin: Negative. Neurological: Negative. Psychiatric/Behavioral: Negative. Objective:     Physical Exam  Constitutional:       General: She is not in acute distress. Appearance: She is obese. She is not ill-appearing, toxic-appearing or diaphoretic. HENT:      Head: Normocephalic. Right Ear: Tympanic membrane, ear canal and external ear normal.      Left Ear: Tympanic membrane, ear canal and external ear normal.      Nose: Nose normal.      Mouth/Throat:      Mouth: Mucous membranes are moist.      Pharynx: No oropharyngeal exudate or posterior oropharyngeal erythema. Comments: + PND  Eyes:      General:         Right eye: No discharge. Left eye: No discharge. Conjunctiva/sclera: Conjunctivae normal.   Cardiovascular:      Rate and Rhythm: Normal rate and regular rhythm. Heart sounds: Normal heart sounds. Pulmonary:      Effort: Pulmonary effort is normal. No respiratory distress. Breath sounds: Normal breath sounds. No stridor. No wheezing, rhonchi or rales. Abdominal:      General: Abdomen is flat. Tenderness: There is no guarding. Musculoskeletal:      Right lower le+ Edema (Chronic per patient, swelling is not a new finding) present. Left lower leg: 3+ Edema (Chronic per patient, swelling is not a new finding) present. Right ankle: Swelling present. Left ankle: Swelling present. Skin:     General: Skin is warm. Coloration: Skin is not pale. Findings: No rash. Neurological:      General: No focal deficit present. Mental Status: She is alert. Psychiatric:         Mood and Affect: Mood normal.         MEDICAL DECISION MAKING Assessment/Plan:     Kenisha Farrell was seen today for pharyngitis. Diagnoses and all orders for this visit:    Sore throat  -     POCT rapid strep A    Encounter for screening for COVID-19  -     COVID-19; Future    Acute cough  -     benzonatate (TESSALON PERLES) 100 MG capsule; Take 2 capsules by mouth 3 times daily as needed for Cough    Post-nasal drip  -     fluticasone (FLONASE) 50 MCG/ACT nasal spray; 2 sprays by Nasal route daily    Viral URI    POC strep was negative today. Will swab for COVID19, treatment may alter based on COVID19 results. AVS with COVID19 isolation guidance provided to patient. Tessalon perles for cough. Flonase rx for PND. I believe this to be a viral URI at this time, patient appeared non-toxic on physical exam.  The patient does not have clinical findings suggestive of pneumonia. There is no abnormal vital signs (pulse is not greater than 100/ minute, respirations are not greater than 24/ minute, temperature is not greater than 38 degrees Celsius, or oxygen saturation less than 95 percent) There is no tachypnea, rales, or signs of parenchymal consolidation on exam. There are no changes in mental status or behavioral changes. Pt to fill and take medications as prescribed. Rest, increase fluids. Return if no improvement in symptoms. Go to the ER for any emergent concern. Results for orders placed or performed in visit on 10/25/22   POCT rapid strep A   Result Value Ref Range    Strep A Ag None Detected None Detected       Patient counseled:     Patient given educational materials - see patientinstructions. Discussed use, benefit, and side effects of prescribed medications. All patient questions answered.  Pt verbalized understanding. Instructed to continue current medications, diet and exercise. Patient agreed with treatment plan. Follow up as directed.      Electronically signed by JUSTICE Duenas CNP on 10/25/2022 at 12:08 PM Rafaela

## 2023-10-06 NOTE — ED PROVIDER NOTE - OBJECTIVE STATEMENT
89-year-old female with a history of hypertension, thrombophlebitis, hemiplegia following CVA, atrial fibrillation on Coumadin, hyperlipidemia, GERD, depression, hypothyroidism presents with status post fall out of her wheelchair today.  Patient states she ended up on the ground, but did not quite fall out of the wheelchair.  Patient with left knee pain.  Patient denies head injury.  No loss of consciousness.  Unknown when last coags checked.  No other acute injury or complaints.

## 2023-10-06 NOTE — ED PROVIDER NOTE - CARE PROVIDER_API CALL
Maxwell Toth  Orthopaedic Surgery  [address], [address]  [place] [address]  Phone: [phone]  Fax: [phone]  Follow Up Time: 1-3 Days

## 2023-10-06 NOTE — ED ADULT NURSE NOTE - NSFALLHARMRISKINTERV_ED_ALL_ED

## 2023-10-06 NOTE — ED PROVIDER NOTE - NSFOLLOWUPINSTRUCTIONS_ED_ALL_ED_FT
1) Follow-up with your Primary Medical Doctor or referred doctor. Call today / next business day for prompt follow-up.  2) Return to Emergency room for any worsening or persistent pain, dizziness, difficulty walking, feeling unsteady, vomiting, visual changes, numbness, tingling, any unknown fluid coming out of nose or ears, any changes in your speech,  worsening or persistent headaches,  weakness, fever, or any other concerning symptoms.  3) See attached instruction sheets for additional information, including information regarding signs and symptoms to look out for, reasons to seek immediate care and other important instructions.  4) You should avoid any activities where you may hit your head until cleared by your doctor, at least two weeks.  5) Follow-up with Neurology, especially if your symptoms persist.  Dr Lynn Proctor: [phone]  [facility] ([facility]): [phone]  [facility]: [phone]  6) Tylenol as needed for pain

## 2023-10-06 NOTE — ED PROVIDER NOTE - PATIENT PORTAL LINK FT
You can access the FollowMyHealth Patient Portal offered by [facility] by registering at the following website: [email or web address]. By joining [facility]’s FollowMyHealth portal, you will also be able to view your health information using other applications (apps) compatible with our system.

## 2023-10-06 NOTE — ED ADULT TRIAGE NOTE - CHIEF COMPLAINT QUOTE
[facility]s from [facility] with unwitnessed fall. pt denies fall. staff said pt was in a wheelchair and then five minutes later they came back and pt was on the floor. pt on coumadin

## 2023-10-06 NOTE — ED PROVIDER NOTE - CONSTITUTIONAL, MLM
Well appearing, awake, alert, oriented to person, place ,situation and in no apparent distress. normal...

## 2023-11-22 ENCOUNTER — INPATIENT (INPATIENT)
Facility: HOSPITAL | Age: 88
LOS: 8 days | Discharge: NURSING, REHAB OR LONG-TERM CARE | DRG: 602 | End: 2023-12-01
Attending: INTERNAL MEDICINE | Admitting: INTERNAL MEDICINE
Payer: MEDICARE

## 2023-11-22 VITALS
TEMPERATURE: 98 F | WEIGHT: 119.93 LBS | HEART RATE: 81 BPM | HEIGHT: 66 IN | DIASTOLIC BLOOD PRESSURE: 63 MMHG | SYSTOLIC BLOOD PRESSURE: 110 MMHG | OXYGEN SATURATION: 97 % | RESPIRATION RATE: 16 BRPM

## 2023-11-22 DIAGNOSIS — L03.116 CELLULITIS OF LEFT LOWER LIMB: ICD-10-CM

## 2023-11-22 LAB
ALBUMIN SERPL ELPH-MCNC: 2.5 G/DL — LOW (ref 3.3–5)
ALBUMIN SERPL ELPH-MCNC: 2.5 G/DL — LOW (ref 3.3–5)
ALP SERPL-CCNC: 50 U/L — SIGNIFICANT CHANGE UP (ref 40–120)
ALP SERPL-CCNC: 50 U/L — SIGNIFICANT CHANGE UP (ref 40–120)
ALT FLD-CCNC: 13 U/L — SIGNIFICANT CHANGE UP (ref 12–78)
ALT FLD-CCNC: 13 U/L — SIGNIFICANT CHANGE UP (ref 12–78)
ANION GAP SERPL CALC-SCNC: 8 MMOL/L — SIGNIFICANT CHANGE UP (ref 5–17)
ANION GAP SERPL CALC-SCNC: 8 MMOL/L — SIGNIFICANT CHANGE UP (ref 5–17)
APTT BLD: 49.9 SEC — HIGH (ref 24.5–35.6)
APTT BLD: 49.9 SEC — HIGH (ref 24.5–35.6)
AST SERPL-CCNC: 13 U/L — LOW (ref 15–37)
AST SERPL-CCNC: 13 U/L — LOW (ref 15–37)
BASOPHILS # BLD AUTO: 0 K/UL — SIGNIFICANT CHANGE UP (ref 0–0.2)
BASOPHILS # BLD AUTO: 0 K/UL — SIGNIFICANT CHANGE UP (ref 0–0.2)
BASOPHILS NFR BLD AUTO: 0 % — SIGNIFICANT CHANGE UP (ref 0–2)
BASOPHILS NFR BLD AUTO: 0 % — SIGNIFICANT CHANGE UP (ref 0–2)
BILIRUB SERPL-MCNC: 0.3 MG/DL — SIGNIFICANT CHANGE UP (ref 0.2–1.2)
BILIRUB SERPL-MCNC: 0.3 MG/DL — SIGNIFICANT CHANGE UP (ref 0.2–1.2)
BUN SERPL-MCNC: 17 MG/DL — SIGNIFICANT CHANGE UP (ref 7–23)
BUN SERPL-MCNC: 17 MG/DL — SIGNIFICANT CHANGE UP (ref 7–23)
CALCIUM SERPL-MCNC: 8.2 MG/DL — LOW (ref 8.5–10.1)
CALCIUM SERPL-MCNC: 8.2 MG/DL — LOW (ref 8.5–10.1)
CHLORIDE SERPL-SCNC: 104 MMOL/L — SIGNIFICANT CHANGE UP (ref 96–108)
CHLORIDE SERPL-SCNC: 104 MMOL/L — SIGNIFICANT CHANGE UP (ref 96–108)
CO2 SERPL-SCNC: 30 MMOL/L — SIGNIFICANT CHANGE UP (ref 22–31)
CO2 SERPL-SCNC: 30 MMOL/L — SIGNIFICANT CHANGE UP (ref 22–31)
CREAT SERPL-MCNC: 0.64 MG/DL — SIGNIFICANT CHANGE UP (ref 0.5–1.3)
CREAT SERPL-MCNC: 0.64 MG/DL — SIGNIFICANT CHANGE UP (ref 0.5–1.3)
EGFR: 83 ML/MIN/1.73M2 — SIGNIFICANT CHANGE UP
EGFR: 83 ML/MIN/1.73M2 — SIGNIFICANT CHANGE UP
EOSINOPHIL # BLD AUTO: 0.08 K/UL — SIGNIFICANT CHANGE UP (ref 0–0.5)
EOSINOPHIL # BLD AUTO: 0.08 K/UL — SIGNIFICANT CHANGE UP (ref 0–0.5)
EOSINOPHIL NFR BLD AUTO: 1 % — SIGNIFICANT CHANGE UP (ref 0–6)
EOSINOPHIL NFR BLD AUTO: 1 % — SIGNIFICANT CHANGE UP (ref 0–6)
GLUCOSE SERPL-MCNC: 104 MG/DL — HIGH (ref 70–99)
GLUCOSE SERPL-MCNC: 104 MG/DL — HIGH (ref 70–99)
HCT VFR BLD CALC: 32.6 % — LOW (ref 34.5–45)
HCT VFR BLD CALC: 32.6 % — LOW (ref 34.5–45)
HGB BLD-MCNC: 10.1 G/DL — LOW (ref 11.5–15.5)
HGB BLD-MCNC: 10.1 G/DL — LOW (ref 11.5–15.5)
INR BLD: 3.88 RATIO — HIGH (ref 0.85–1.18)
INR BLD: 3.88 RATIO — HIGH (ref 0.85–1.18)
LACTATE SERPL-SCNC: 0.7 MMOL/L — SIGNIFICANT CHANGE UP (ref 0.7–2)
LACTATE SERPL-SCNC: 0.7 MMOL/L — SIGNIFICANT CHANGE UP (ref 0.7–2)
LYMPHOCYTES # BLD AUTO: 0.59 K/UL — LOW (ref 1–3.3)
LYMPHOCYTES # BLD AUTO: 0.59 K/UL — LOW (ref 1–3.3)
LYMPHOCYTES # BLD AUTO: 7 % — LOW (ref 13–44)
LYMPHOCYTES # BLD AUTO: 7 % — LOW (ref 13–44)
MCHC RBC-ENTMCNC: 31 GM/DL — LOW (ref 32–36)
MCHC RBC-ENTMCNC: 31 GM/DL — LOW (ref 32–36)
MCHC RBC-ENTMCNC: 34.1 PG — HIGH (ref 27–34)
MCHC RBC-ENTMCNC: 34.1 PG — HIGH (ref 27–34)
MCV RBC AUTO: 110.1 FL — HIGH (ref 80–100)
MCV RBC AUTO: 110.1 FL — HIGH (ref 80–100)
MONOCYTES # BLD AUTO: 0.85 K/UL — SIGNIFICANT CHANGE UP (ref 0–0.9)
MONOCYTES # BLD AUTO: 0.85 K/UL — SIGNIFICANT CHANGE UP (ref 0–0.9)
MONOCYTES NFR BLD AUTO: 10 % — SIGNIFICANT CHANGE UP (ref 2–14)
MONOCYTES NFR BLD AUTO: 10 % — SIGNIFICANT CHANGE UP (ref 2–14)
NEUTROPHILS # BLD AUTO: 6.95 K/UL — SIGNIFICANT CHANGE UP (ref 1.8–7.4)
NEUTROPHILS # BLD AUTO: 6.95 K/UL — SIGNIFICANT CHANGE UP (ref 1.8–7.4)
NEUTROPHILS NFR BLD AUTO: 81 % — HIGH (ref 43–77)
NEUTROPHILS NFR BLD AUTO: 81 % — HIGH (ref 43–77)
NRBC # BLD: SIGNIFICANT CHANGE UP /100 WBCS (ref 0–0)
NRBC # BLD: SIGNIFICANT CHANGE UP /100 WBCS (ref 0–0)
PLATELET # BLD AUTO: 646 K/UL — HIGH (ref 150–400)
PLATELET # BLD AUTO: 646 K/UL — HIGH (ref 150–400)
POTASSIUM SERPL-MCNC: 4.3 MMOL/L — SIGNIFICANT CHANGE UP (ref 3.5–5.3)
POTASSIUM SERPL-MCNC: 4.3 MMOL/L — SIGNIFICANT CHANGE UP (ref 3.5–5.3)
POTASSIUM SERPL-SCNC: 4.3 MMOL/L — SIGNIFICANT CHANGE UP (ref 3.5–5.3)
POTASSIUM SERPL-SCNC: 4.3 MMOL/L — SIGNIFICANT CHANGE UP (ref 3.5–5.3)
PROT SERPL-MCNC: 6.1 G/DL — SIGNIFICANT CHANGE UP (ref 6–8.3)
PROT SERPL-MCNC: 6.1 G/DL — SIGNIFICANT CHANGE UP (ref 6–8.3)
PROTHROM AB SERPL-ACNC: 43.6 SEC — HIGH (ref 9.5–13)
PROTHROM AB SERPL-ACNC: 43.6 SEC — HIGH (ref 9.5–13)
RBC # BLD: 2.96 M/UL — LOW (ref 3.8–5.2)
RBC # BLD: 2.96 M/UL — LOW (ref 3.8–5.2)
RBC # FLD: 17.2 % — HIGH (ref 10.3–14.5)
RBC # FLD: 17.2 % — HIGH (ref 10.3–14.5)
SODIUM SERPL-SCNC: 142 MMOL/L — SIGNIFICANT CHANGE UP (ref 135–145)
SODIUM SERPL-SCNC: 142 MMOL/L — SIGNIFICANT CHANGE UP (ref 135–145)
WBC # BLD: 8.47 K/UL — SIGNIFICANT CHANGE UP (ref 3.8–10.5)
WBC # BLD: 8.47 K/UL — SIGNIFICANT CHANGE UP (ref 3.8–10.5)
WBC # FLD AUTO: 8.47 K/UL — SIGNIFICANT CHANGE UP (ref 3.8–10.5)
WBC # FLD AUTO: 8.47 K/UL — SIGNIFICANT CHANGE UP (ref 3.8–10.5)

## 2023-11-22 PROCEDURE — 93010 ELECTROCARDIOGRAM REPORT: CPT

## 2023-11-22 PROCEDURE — 99285 EMERGENCY DEPT VISIT HI MDM: CPT | Mod: FS

## 2023-11-22 PROCEDURE — 73590 X-RAY EXAM OF LOWER LEG: CPT | Mod: 26,LT

## 2023-11-22 PROCEDURE — 71045 X-RAY EXAM CHEST 1 VIEW: CPT | Mod: 26

## 2023-11-22 RX ORDER — FAMOTIDINE 10 MG/ML
1 INJECTION INTRAVENOUS
Refills: 0 | DISCHARGE

## 2023-11-22 RX ORDER — METOPROLOL TARTRATE 50 MG
1 TABLET ORAL
Refills: 0 | DISCHARGE

## 2023-11-22 RX ORDER — AMLODIPINE BESYLATE 2.5 MG/1
1 TABLET ORAL
Refills: 0 | DISCHARGE

## 2023-11-22 RX ORDER — PIPERACILLIN AND TAZOBACTAM 4; .5 G/20ML; G/20ML
3.38 INJECTION, POWDER, LYOPHILIZED, FOR SOLUTION INTRAVENOUS EVERY 8 HOURS
Refills: 0 | Status: DISCONTINUED | OUTPATIENT
Start: 2023-11-23 | End: 2023-11-24

## 2023-11-22 RX ORDER — HYDROXYUREA 500 MG/1
1 CAPSULE ORAL
Refills: 0 | DISCHARGE

## 2023-11-22 RX ORDER — TRAMADOL HYDROCHLORIDE 50 MG/1
1 TABLET ORAL
Refills: 0 | DISCHARGE

## 2023-11-22 RX ORDER — LANOLIN ALCOHOL/MO/W.PET/CERES
1 CREAM (GRAM) TOPICAL AT BEDTIME
Refills: 0 | Status: DISCONTINUED | OUTPATIENT
Start: 2023-11-22 | End: 2023-11-22

## 2023-11-22 RX ORDER — ACETAMINOPHEN 500 MG
650 TABLET ORAL EVERY 6 HOURS
Refills: 0 | Status: DISCONTINUED | OUTPATIENT
Start: 2023-11-22 | End: 2023-12-01

## 2023-11-22 RX ORDER — CHOLECALCIFEROL (VITAMIN D3) 125 MCG
1 CAPSULE ORAL
Refills: 0 | DISCHARGE

## 2023-11-22 RX ORDER — PIPERACILLIN AND TAZOBACTAM 4; .5 G/20ML; G/20ML
3.38 INJECTION, POWDER, LYOPHILIZED, FOR SOLUTION INTRAVENOUS ONCE
Refills: 0 | Status: COMPLETED | OUTPATIENT
Start: 2023-11-22 | End: 2023-11-22

## 2023-11-22 RX ORDER — LANOLIN ALCOHOL/MO/W.PET/CERES
1 CREAM (GRAM) TOPICAL
Refills: 0 | DISCHARGE

## 2023-11-22 RX ORDER — CHOLECALCIFEROL (VITAMIN D3) 125 MCG
2000 CAPSULE ORAL DAILY
Refills: 0 | Status: DISCONTINUED | OUTPATIENT
Start: 2023-11-22 | End: 2023-12-01

## 2023-11-22 RX ORDER — VANCOMYCIN HCL 1 G
1000 VIAL (EA) INTRAVENOUS ONCE
Refills: 0 | Status: COMPLETED | OUTPATIENT
Start: 2023-11-22 | End: 2023-11-22

## 2023-11-22 RX ORDER — ESCITALOPRAM OXALATE 10 MG/1
20 TABLET, FILM COATED ORAL DAILY
Refills: 0 | Status: DISCONTINUED | OUTPATIENT
Start: 2023-11-22 | End: 2023-12-01

## 2023-11-22 RX ORDER — SENNA PLUS 8.6 MG/1
2 TABLET ORAL
Refills: 0 | DISCHARGE

## 2023-11-22 RX ORDER — SIMVASTATIN 20 MG/1
1 TABLET, FILM COATED ORAL
Refills: 0 | DISCHARGE

## 2023-11-22 RX ORDER — WARFARIN SODIUM 2.5 MG/1
1 TABLET ORAL ONCE
Refills: 0 | Status: DISCONTINUED | OUTPATIENT
Start: 2023-11-22 | End: 2023-11-22

## 2023-11-22 RX ORDER — TRAMADOL HYDROCHLORIDE 50 MG/1
50 TABLET ORAL ONCE
Refills: 0 | Status: DISCONTINUED | OUTPATIENT
Start: 2023-11-22 | End: 2023-11-22

## 2023-11-22 RX ORDER — LACTOBACILLUS ACIDOPHILUS 100MM CELL
1 CAPSULE ORAL DAILY
Refills: 0 | Status: DISCONTINUED | OUTPATIENT
Start: 2023-11-22 | End: 2023-12-01

## 2023-11-22 RX ORDER — GABAPENTIN 400 MG/1
300 CAPSULE ORAL THREE TIMES A DAY
Refills: 0 | Status: DISCONTINUED | OUTPATIENT
Start: 2023-11-22 | End: 2023-12-01

## 2023-11-22 RX ORDER — METOPROLOL TARTRATE 50 MG
25 TABLET ORAL
Refills: 0 | Status: DISCONTINUED | OUTPATIENT
Start: 2023-11-22 | End: 2023-12-01

## 2023-11-22 RX ORDER — DOCUSATE SODIUM 100 MG
2 CAPSULE ORAL
Refills: 0 | DISCHARGE

## 2023-11-22 RX ORDER — SIMVASTATIN 20 MG/1
10 TABLET, FILM COATED ORAL AT BEDTIME
Refills: 0 | Status: DISCONTINUED | OUTPATIENT
Start: 2023-11-22 | End: 2023-12-01

## 2023-11-22 RX ORDER — GABAPENTIN 400 MG/1
1 CAPSULE ORAL
Refills: 0 | DISCHARGE

## 2023-11-22 RX ORDER — SENNA PLUS 8.6 MG/1
1 TABLET ORAL
Refills: 0 | DISCHARGE

## 2023-11-22 RX ORDER — SODIUM CHLORIDE 9 MG/ML
500 INJECTION INTRAMUSCULAR; INTRAVENOUS; SUBCUTANEOUS ONCE
Refills: 0 | Status: COMPLETED | OUTPATIENT
Start: 2023-11-22 | End: 2023-11-22

## 2023-11-22 RX ORDER — ACETAMINOPHEN 500 MG
1000 TABLET ORAL ONCE
Refills: 0 | Status: COMPLETED | OUTPATIENT
Start: 2023-11-22 | End: 2023-11-22

## 2023-11-22 RX ORDER — PIPERACILLIN AND TAZOBACTAM 4; .5 G/20ML; G/20ML
3.38 INJECTION, POWDER, LYOPHILIZED, FOR SOLUTION INTRAVENOUS ONCE
Refills: 0 | Status: DISCONTINUED | OUTPATIENT
Start: 2023-11-22 | End: 2023-11-24

## 2023-11-22 RX ORDER — FUROSEMIDE 40 MG
1 TABLET ORAL
Refills: 0 | DISCHARGE

## 2023-11-22 RX ORDER — MICONAZOLE NITRATE 2 %
1 CREAM (GRAM) TOPICAL
Refills: 0 | DISCHARGE

## 2023-11-22 RX ORDER — ESCITALOPRAM OXALATE 10 MG/1
1 TABLET, FILM COATED ORAL
Refills: 0 | DISCHARGE

## 2023-11-22 RX ORDER — FOLIC ACID 0.8 MG
1 TABLET ORAL DAILY
Refills: 0 | Status: DISCONTINUED | OUTPATIENT
Start: 2023-11-22 | End: 2023-12-01

## 2023-11-22 RX ORDER — AMLODIPINE BESYLATE 2.5 MG/1
5 TABLET ORAL DAILY
Refills: 0 | Status: DISCONTINUED | OUTPATIENT
Start: 2023-11-22 | End: 2023-12-01

## 2023-11-22 RX ORDER — SENNA PLUS 8.6 MG/1
1 TABLET ORAL DAILY
Refills: 0 | Status: DISCONTINUED | OUTPATIENT
Start: 2023-11-22 | End: 2023-12-01

## 2023-11-22 RX ORDER — CHOLECALCIFEROL (VITAMIN D3) 125 MCG
400 CAPSULE ORAL DAILY
Refills: 0 | Status: DISCONTINUED | OUTPATIENT
Start: 2023-11-22 | End: 2023-11-22

## 2023-11-22 RX ORDER — FAMOTIDINE 10 MG/ML
20 INJECTION INTRAVENOUS DAILY
Refills: 0 | Status: DISCONTINUED | OUTPATIENT
Start: 2023-11-22 | End: 2023-12-01

## 2023-11-22 RX ORDER — LEVOTHYROXINE SODIUM 125 MCG
50 TABLET ORAL DAILY
Refills: 0 | Status: DISCONTINUED | OUTPATIENT
Start: 2023-11-22 | End: 2023-12-01

## 2023-11-22 RX ORDER — TRAMADOL HYDROCHLORIDE 50 MG/1
50 TABLET ORAL EVERY 6 HOURS
Refills: 0 | Status: DISCONTINUED | OUTPATIENT
Start: 2023-11-22 | End: 2023-11-29

## 2023-11-22 RX ORDER — FOLIC ACID 0.8 MG
1 TABLET ORAL
Refills: 0 | DISCHARGE

## 2023-11-22 RX ORDER — LANOLIN ALCOHOL/MO/W.PET/CERES
3 CREAM (GRAM) TOPICAL AT BEDTIME
Refills: 0 | Status: DISCONTINUED | OUTPATIENT
Start: 2023-11-22 | End: 2023-12-01

## 2023-11-22 RX ORDER — HYDROXYUREA 500 MG/1
500 CAPSULE ORAL
Refills: 0 | Status: DISCONTINUED | OUTPATIENT
Start: 2023-11-22 | End: 2023-12-01

## 2023-11-22 RX ORDER — ACETAMINOPHEN 500 MG
2 TABLET ORAL
Refills: 0 | DISCHARGE

## 2023-11-22 RX ADMIN — GABAPENTIN 300 MILLIGRAM(S): 400 CAPSULE ORAL at 21:37

## 2023-11-22 RX ADMIN — Medication 3 MILLIGRAM(S): at 21:29

## 2023-11-22 RX ADMIN — SIMVASTATIN 10 MILLIGRAM(S): 20 TABLET, FILM COATED ORAL at 21:29

## 2023-11-22 RX ADMIN — PIPERACILLIN AND TAZOBACTAM 200 GRAM(S): 4; .5 INJECTION, POWDER, LYOPHILIZED, FOR SOLUTION INTRAVENOUS at 21:29

## 2023-11-22 RX ADMIN — TRAMADOL HYDROCHLORIDE 50 MILLIGRAM(S): 50 TABLET ORAL at 21:29

## 2023-11-22 RX ADMIN — SODIUM CHLORIDE 1000 MILLILITER(S): 9 INJECTION INTRAMUSCULAR; INTRAVENOUS; SUBCUTANEOUS at 16:42

## 2023-11-22 RX ADMIN — Medication 250 MILLIGRAM(S): at 17:07

## 2023-11-22 RX ADMIN — Medication 400 MILLIGRAM(S): at 16:42

## 2023-11-22 NOTE — ED ADULT TRIAGE NOTE - GLASGOW COMA SCALE: SCORE, MLM
Wife called and states that he needs refills sent to pharmacy. Requested:     carvedilol (COREG) 12.5 MG tablet   Last written: 12/21/22  For: 180 with 2 refills. clopidogrel (PLAVIX) 75 MG tablet   LW: 12/21/22  For: 90 with 2 refills. ezetimibe (ZETIA) 10 MG tablet   LW: 3/3/22  For: 90 with 3 refills. gabapentin (NEURONTIN) 600 MG tablet   LW: 2/23/23  For: 180 with 1 refill     losartan (COZAAR) 50 MG tablet   LW: 3/3/22  For: 90 with 3 refills.
15

## 2023-11-22 NOTE — ED ADULT NURSE NOTE - NSFALLHARMRISKINTERV_ED_ALL_ED

## 2023-11-22 NOTE — ED PROVIDER NOTE - PHYSICAL EXAMINATION
Gen: Well appearing in NAD.   ENT: oral mucosa moist   Head: atraumatic  Heart: s1/s2, RRR  Lung: CTA b/l, no wheezing/rhonchi or rales.   Abd: soft, NT/ND, no rebound or guarding  Msk: + multiple open wounds on RLE with purulent drainage.    Neuro: AAO x3. Right hemiparesis from prior CVA  Skin: Normal for race. No rashes  Psych: Alert and oriented

## 2023-11-22 NOTE — ED ADULT NURSE NOTE - NS ED NURSE DISCH DISPOSITION
Progress Notes by Yoly Davey PT, DPT at 18 09:00 AM     Author:  Yoly Davey PT, DPT Service:  (none) Author Type:  Physical Therapist     Filed:  18 11:20 AM Encounter Date:  2018 Status:  Signed     :  Yoly Davey PT, DPT (Physical Therapist)            MEDICARE  PHYSICAL THERAPY DAILY PROGRESS NOTE   DIAGNOSIS:    1. Closed nondisplaced fracture of medial malleolus of right tibia with routine healing, subsequent encounter    2. Abnormal gait    3. Muscle weakness    4. Limited joint range of motion           INSURANCE  BENEFITS:   Primary Insurance  Insurance company: [employer] Medicare Phone number: [phone]   Contact name: Raymon Policy number: [id number]   Effective date: 2015 Patient  1942       Plan Type: Medicare HMO  Co-pay amount(if applicable): $35  Deductible: $0  Co-Insurance: 100%  Out of pocket:$6700  Out of pocket met: No remains $6501.8  Benefit maximums: Based on Medical Necessity  Are there a limited number of units/modalities per day? Based on Medical Necessity  Precert needed: Yes, Through Clinical Intake team at [phone] after evaluation  Carve Out: No  4th Quarter Carry Over: No  Additional comments: [phone]    PHYSICIAN RECOMMENDATIONS: Evaluate and Treat     ATTENDANCE: Patient has been seen for[CW1.1C] 7[CW1.1M] visits between 2017 and[CW1.1C]  2018[CW1.1T]. Progress summary due on or before 10th visit with G-codes.    SUBJECTIVE:[CW1.1C]  Son[CW1.2M] present today for interpretation[CW1.1C]   Patient reports she has a little pain at the bone but not too bad today.[CW1.2M]      OBJECTIVE PROGRESS TOWARDS GOALS:    Short term goals - (2 weeks)   · Patient will report increased walking tolerance while grocery shopping to 15 minutes without the CAM boot. (2018 met per patient )  · Dorsiflexion will increase to 5 degrees to allow for smooth toe clearance during swing phase of gait (2017 met  after stretching)   · Patient will negotiate 6 inch step 10 times with 2/10 pain to prepare for stair climbing  (1/16/2018 met )  · Patient will be independent with home program (Met per patient report - dong exercises at home 1/04/18)  · Patient will improve single leg stance time to 15 seconds per lower extremity with single upper extremity assist (working on strength and stability with therapeutic exercise 1/9/2018 )    Long term goals - (4 weeks)   · Dorsiflexion will increase to 10 degrees to allow for smooth toe clearance during swing phase of gait (1/9/2018 met passively after manual therapy.)  · Patient will be able to squat from a standard chair 10 times with good mechanics, good eccentric control and no pain to show improved functional strength.[CW1.1C]  (1/18/2018 working towards goal with therapeutic exercises[CW1.2T]- progressed today towards goal[CW1.2M])[CW1.2T]    · Patient will be able to stand for 30 minutes to do daily activity like prepare a meal, or clean the house without limitation due to pain.   · Patient will squat, lift and carry 10# for 50 feet to show improved functional strength.  · Patient will improve step laterally over a 6-8inch hurtle with intermittent single upper extremity assist to simulate stepping into and out of the shower safely.  (1/16/2018 met with step overs)      Observations:  no major swelling in the right foot/ankle complex observed     Gait:  patient wearing CAM boot on right with typical gait pattern- shortened stride, reduced right stance time, mild hip hiking with mild circumducted gait.    Active ankle range of motion in degrees (*=pain):   Right  12/19/2017  Left  12/19/2017    Dorsiflexion 0*; 12/22/2017 5 degrees after stretching 15   Plantar Flexion 43 55   Inversion 15 20   Eversion 14 20       Passive ankle range of motion in degrees (*=pain):   Right  12/19/2017  left  1/9/2018    Dorsiflexion 5* 10   Plantar Flexion 50 -   Inversion 20 -   Eversion 20* -      Strength per manual muscle testing (*=pain):    Right  12/19/2017  Left  12/19/2017    Ankle Dorsiflexion 4-/5 5/5   Ankle Plantar Flexion unable to perform a heel raise in standing 3 heel raises standing with upper extremities support   Ankle Inversion 4-/5 5/5   Ankle Eversion 4-/5 5/5     Palpation -[CW1.1C] 1/18/2018[CW1.3T]   Tissue tightness softens with soft tissue mobilization[CW1.1C] in the peroneals and anterior tibialis[CW1.2M]     Balance:  Single Leg Stance -    Right  12/19/2017 Left  12/19/2017   Eyes Open 3 seconds in boot 12 seconds     Functional Observation assessment:  Sit to stand:[CW1.1C] +1 airex needed not to use momentum without cues[CW1.2M]   Air squat:[CW1.1C] Nt[CW1.2M]  Stairs:[CW1.1C]reciprocal with 1 hand rail needed[CW1.2M]    FUNCTIONAL LIMITATIONS REPORTING:  Mobility: Walking & Moving Around -  Current Status -  - CK - 40-59%  Projected Goal -  - CH - 0%    TREATMENT TODAY:    Time in: 9:00    Time out[CW1.1C]:[CW1.1M] 9:58[CW1.2M]     Manual Therapy to  increase range of motion and decrease myofascial tightness:  97140 x 1 units - 1[CW1.1C]3[CW1.1M] minutes   soft tissue mobilization of the gastroc and lower leg today    Therapeutic Exercise to increase range of motion, improve balance, increase strength and instruct in a home exercise program:  97110 x 1 units -[CW1.1C] 40[CW1.2M]  minutes  first 1[CW1.1C]1[CW1.1M] minutes 1:1 with therapist    passive range of motion with end range holds in all planes at the ankle and toes - done in prone 90-90 position today     Name: Rani TORRES   Injury: medial malleolus fracture 10/29/17 RIGHT ANKLE    Precautions: none- 100% Portuguese speaking  Today’s Exercises:   · standing heel raises x 30  · gastroc stretch on 1/2 foam roll 30\"x3  · side steps in bars orange 5  laps cues for foot placement   · 3 way hip kicks with single limb stance BALANCE focus (no band to be added ever)  · squat to chair[CW1.1C] without airex 2x10 (last  session needed cues to avoid momentum, less cues today)[CW1.2M]  · tandem balance 2x30\"- moderately difficulty on 1/11/2018[CW1.1C]   · 4[CW1.2M] inch step up[CW1.1C] with airex base[CW1.2M] x20  · 3\" lateral step downs x10 each way  · forwards functional lunge x10 each  ·  reciprocal stairs x5 laps on mini stairs[CW1.1C]  · 1/18/2018[CW1.4T] new  · 6\" britney x2 with airex x2; 4 laps forward, 4 laps lateral at bars[CW1.2M]     Home exercise program (last updated 12/19/2017): Patient issued written home exercise program.  Patient demonstrated exercises correctly and was instructed to call with questions.   12/19/2017 VHI ISSUED to patient and loaded to [employer] for details- Orange and peach bands issued.  12/22/2017 reviewed weaning from the boot    ASSESSMENT/TREATMENT RESPONSE:      Patient's response to treatment:[CW1.1C] Progressed gait stability and balance exercises to include compliant surfaces. Patient become fatigued with sit to stands, step ups, and reciprocal stairs, continued progress towards strength goals noted.[CW1.2M]    Functional improvement noted:[CW1.1C] No boost today for sit to stands, improving gradually with[CW1.2M] lower extremit[CW1.2T]y strength.[CW1.2M]      PLAN:   Patient will be seen 2 times per week for a total of 8 visits recommended towards therapy goals and original plan of care   Continue per primary therapist     Certification Dates:  Medicare certification signed from: 12/19/2017 to 90 days.    Therapist Signature: Electronically Signed by:    Yoly Davey PT, DPT ,[CW1.1C] 1/18/2018[CW1.1T]             Revision History        User Key Date/Time User Provider Type Action    > CW1.4 01/18/18 11:20 AM Yoly Davey PT, [place] Physical Therapist Sign     CW1.3 01/18/18 11:15 AM Yoly Davey PT, DPT Physical Therapist      CW1.2 01/18/18 11:13 AM Yoly Davey PT, [place] Physical Therapist      CW1.1 01/18/18 09:00 AM Yoly Davey, KAY, DPT Physical  Therapist     C - Copied, M - Manual, T - Template             Admitted

## 2023-11-22 NOTE — PATIENT PROFILE ADULT - FALL HARM RISK - HARM RISK INTERVENTIONS
Assistance with ambulation/Assistance OOB with selected safe patient handling equipment/Communicate Risk of Fall with Harm to all staff/Discuss with provider need for PT consult/Monitor gait and stability/Reinforce activity limits and safety measures with patient and family/Tailored Fall Risk Interventions/Visual Cue: Yellow wristband and red socks/Bed in lowest position, wheels locked, appropriate side rails in place/Call bell, personal items and telephone in reach/Instruct patient to call for assistance before getting out of bed or chair/Non-slip footwear when patient is out of bed/[place] to call system/Physically safe environment - no spills, clutter or unnecessary equipment/Purposeful Proactive Rounding/Room/bathroom lighting operational, light cord in reach

## 2023-11-22 NOTE — ED ADULT NURSE REASSESSMENT NOTE - NS ED NURSE REASSESS COMMENT FT1
Pt resting comfortably in bed at this time, offers no complaints.  Denies any chest pain or SOB.  No n/v/d.  Dressings to BLE- clean dry and intact.  IV abx given as ordered.  Pt incontinent of urine and stool; red blanchable buttocks noted.  moisture associated dermatitis under bilat breasts and groin noted.  Awaiting medical bed.  Maintain comfort and safety.

## 2023-11-22 NOTE — PROGRESS NOTE ADULT - SUBJECTIVE AND OBJECTIVE BOX
Chart reviewed  Patient with LLE cellulitis, failed oral Keflex  Continue IV Zosyn for now  Full consult to follow.

## 2023-11-22 NOTE — ED PROVIDER NOTE - OBJECTIVE STATEMENT
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  Patient was given a course of Keflex, with no resolution.  Patient then given a second course of Keflex, with no further resolution.  No known fever.  No trauma.  No other acute injury or complaints.  Per [facility] papers pt has NKDA

## 2023-11-22 NOTE — CONSULT NOTE ADULT - SUBJECTIVE AND OBJECTIVE BOX
HPI:  89-year-old female seen for left lower extremity wounds on skin, subcutaneous tissue, fat.  Patient's son at bedside relates that his mother has had approximately 3-4 bouts of cellulitis of the left lower extremity.  Patient's son relates that his mother was seen by vascular surgeon who recommended continuing local wound care without any vascular intervention at the time.      PAST MEDICAL & SURGICAL HISTORY:  Hypertension      CVA (cerebral vascular accident)      Depression      Constipation      Neuropathy      Hyperlipidemia      Grade I diastolic dysfunction      Afib      Neuropathy      VHD (valvular heart disease)      Aortic valvar stenosis      Hypothyroidism      GERD (gastroesophageal reflux disease)      No significant past surgical history          Allergies    per son &quot;issues with certain anitibiotics&quot; does not remember the names - Patient has tolerated zosyn, ceftriaxone, bactrim, and vancomycin on past admissions. (Unknown)    Intolerances        MEDICATIONS  (STANDING):  amLODIPine   Tablet 5 milliGRAM(s) Oral daily  cholecalciferol 2000 Unit(s) Oral daily  escitalopram 20 milliGRAM(s) Oral daily  famotidine    Tablet 20 milliGRAM(s) Oral daily  folic acid 1 milliGRAM(s) Oral daily  gabapentin 300 milliGRAM(s) Oral three times a day  hydroxyurea 500 milliGRAM(s) Oral two times a day  lactobacillus acidophilus 1 Tablet(s) Oral daily  levothyroxine 50 MICROGram(s) Oral daily  melatonin 3 milliGRAM(s) Oral at bedtime  metoprolol tartrate 25 milliGRAM(s) Oral two times a day  piperacillin/tazobactam IVPB.- 3.375 Gram(s) IV Intermittent once  senna 1 Tablet(s) Oral daily  simvastatin 10 milliGRAM(s) Oral at bedtime    MEDICATIONS  (PRN):  acetaminophen     Tablet .. 650 milliGRAM(s) Oral every 6 hours PRN Temp greater or equal to 38C (100.4F), Mild Pain (1 - 3)  traMADol 50 milliGRAM(s) Oral every 6 hours PRN pain      Social History:      FAMILY HISTORY:      Vital Signs Last 24 Hrs  T(C): 36.9 (22 Nov 2023 22:02), Max: 36.9 (22 Nov 2023 19:22)  T(F): 98.4 (22 Nov 2023 22:02), Max: 98.5 (22 Nov 2023 19:22)  HR: 87 (22 Nov 2023 22:02) (81 - 107)  BP: 135/64 (22 Nov 2023 22:02) (110/63 - 135/64)  BP(mean): --  RR: 17 (22 Nov 2023 22:02) (16 - 17)  SpO2: 91% (22 Nov 2023 22:02) (91% - 97%)    Parameters below as of 22 Nov 2023 22:02  Patient On (Oxygen Delivery Method): room air        PHYSICAL EXAM:  Vascular: DP & PT nonpalpable bilaterally, Capillary refill 4 seconds  Neurological: Light touch sensation diminished to the right lower extremity, intact to the left lower extremity  Musculoskeletal: 4/5 strength in all quadrants to the left lower extremity, 1/5 to the right lower extremity  Dermatological: Left lower leg wounds down skin, subcutaneous tissue, fat, no probe to bone, periwound erythema appreciated, no fluctuance, no proximal streaking, no purulence at this time.                          10.1   8.47  )-----------( 646      ( 22 Nov 2023 16:15 )             32.6       11-22    142  |  104  |  17  ----------------------------<  104<H>  4.3   |  30  |  0.64    Ca    8.2<L>      22 Nov 2023 18:00    TPro  6.1  /  Alb  2.5<L>  /  TBili  0.3  /  DBili  x   /  AST  13<L>  /  ALT  13  /  AlkPhos  50  11-22      PT/INR - ( 22 Nov 2023 18:00 )   PT: 43.6 sec;   INR: 3.88 ratio         PTT - ( 22 Nov 2023 18:00 )  PTT:49.9 sec

## 2023-11-22 NOTE — ED ADULT NURSE REASSESSMENT NOTE - NSFALLHARMRISKINTERV_ED_ALL_ED

## 2023-11-22 NOTE — CONSULT NOTE ADULT - PROBLEM SELECTOR RECOMMENDATION 9
Patient examined and evaluated this time.  Discussed the possible etiology and treatment options with patient and patient's son at bedside.  All questions answered to satisfaction and patient and patient's son verbalized understanding.  Continue local wound care with nursing dressing change at this time.  No surgical interventions planned at this time.  Recommend vascular surgery evaluation.

## 2023-11-22 NOTE — ED PROVIDER NOTE - CLINICAL SUMMARY MEDICAL DECISION MAKING FREE TEXT BOX
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  Patient was given a course of Keflex, with no resolution.  Patient then given a second course of Keflex, with no further resolution.  No known fever.  No trauma.  No other acute injury or complaints.  Exam: Nontoxic and well-appearing.  Left lower leg wound posteriorly with some erythema.  Normal cap refill.  Normal respiratory effort.  CTA BL, no W/R/R.  Chronic right-sided hemiparesis.  No other acute findings on exam.  Left leg wound with cellulitis, despite 2 courses of Keflex.  Will check labs, IV antibiotics, wound care consultation, admission

## 2023-11-22 NOTE — ED ADULT NURSE NOTE - OBJECTIVE STATEMENT
Patient received to room 10 c/o cellulitis left shin, failed PO antibiotics.  Lower extremity was wrapped in ace wrap, wound os draining yellow purulent drainage.  Patient states pain is 10/10.  She states she has not slept in days because of the pain.  Patient refusing to allow RN to undress her and put on a hospital gown. Patient received to room 10 c/o cellulitis left shin, failed PO antibiotics.  Lower extremity was wrapped in ace wrap, wound is draining yellow purulent drainage.  Both legs appear to be discolored due to poor circulation.  Left leg wounds are likely venous stasis ulcers.  Wounds are along the anterior aspect of the left shin and one 3 cm. wound is on the posterior aspect.  Patient states pain is 10/10.  She states she has not slept in days because of the pain.  Patient refusing to allow RN to undress her and put on a hospital gown.

## 2023-11-23 DIAGNOSIS — S81.802A UNSPECIFIED OPEN WOUND, LEFT LOWER LEG, INITIAL ENCOUNTER: ICD-10-CM

## 2023-11-23 LAB
ALBUMIN SERPL ELPH-MCNC: 2.3 G/DL — LOW (ref 3.3–5)
ALBUMIN SERPL ELPH-MCNC: 2.3 G/DL — LOW (ref 3.3–5)
ALP SERPL-CCNC: 53 U/L — SIGNIFICANT CHANGE UP (ref 40–120)
ALP SERPL-CCNC: 53 U/L — SIGNIFICANT CHANGE UP (ref 40–120)
ALT FLD-CCNC: 11 U/L — LOW (ref 12–78)
ALT FLD-CCNC: 11 U/L — LOW (ref 12–78)
ANION GAP SERPL CALC-SCNC: 7 MMOL/L — SIGNIFICANT CHANGE UP (ref 5–17)
ANION GAP SERPL CALC-SCNC: 7 MMOL/L — SIGNIFICANT CHANGE UP (ref 5–17)
APPEARANCE UR: CLEAR — SIGNIFICANT CHANGE UP
APPEARANCE UR: CLEAR — SIGNIFICANT CHANGE UP
APTT BLD: 48.5 SEC — HIGH (ref 24.5–35.6)
APTT BLD: 48.5 SEC — HIGH (ref 24.5–35.6)
AST SERPL-CCNC: 14 U/L — LOW (ref 15–37)
AST SERPL-CCNC: 14 U/L — LOW (ref 15–37)
BILIRUB DIRECT SERPL-MCNC: 0.1 MG/DL — SIGNIFICANT CHANGE UP (ref 0–0.3)
BILIRUB DIRECT SERPL-MCNC: 0.1 MG/DL — SIGNIFICANT CHANGE UP (ref 0–0.3)
BILIRUB INDIRECT FLD-MCNC: 0.4 MG/DL — SIGNIFICANT CHANGE UP (ref 0.2–1)
BILIRUB INDIRECT FLD-MCNC: 0.4 MG/DL — SIGNIFICANT CHANGE UP (ref 0.2–1)
BILIRUB SERPL-MCNC: 0.5 MG/DL — SIGNIFICANT CHANGE UP (ref 0.2–1.2)
BILIRUB SERPL-MCNC: 0.5 MG/DL — SIGNIFICANT CHANGE UP (ref 0.2–1.2)
BILIRUB UR-MCNC: NEGATIVE — SIGNIFICANT CHANGE UP
BILIRUB UR-MCNC: NEGATIVE — SIGNIFICANT CHANGE UP
BUN SERPL-MCNC: 10 MG/DL — SIGNIFICANT CHANGE UP (ref 7–23)
BUN SERPL-MCNC: 10 MG/DL — SIGNIFICANT CHANGE UP (ref 7–23)
CALCIUM SERPL-MCNC: 7.9 MG/DL — LOW (ref 8.5–10.1)
CALCIUM SERPL-MCNC: 7.9 MG/DL — LOW (ref 8.5–10.1)
CHLORIDE SERPL-SCNC: 106 MMOL/L — SIGNIFICANT CHANGE UP (ref 96–108)
CHLORIDE SERPL-SCNC: 106 MMOL/L — SIGNIFICANT CHANGE UP (ref 96–108)
CO2 SERPL-SCNC: 29 MMOL/L — SIGNIFICANT CHANGE UP (ref 22–31)
CO2 SERPL-SCNC: 29 MMOL/L — SIGNIFICANT CHANGE UP (ref 22–31)
COLOR SPEC: YELLOW — SIGNIFICANT CHANGE UP
COLOR SPEC: YELLOW — SIGNIFICANT CHANGE UP
CREAT SERPL-MCNC: 0.51 MG/DL — SIGNIFICANT CHANGE UP (ref 0.5–1.3)
CREAT SERPL-MCNC: 0.51 MG/DL — SIGNIFICANT CHANGE UP (ref 0.5–1.3)
DIFF PNL FLD: NEGATIVE — SIGNIFICANT CHANGE UP
DIFF PNL FLD: NEGATIVE — SIGNIFICANT CHANGE UP
EGFR: 88 ML/MIN/1.73M2 — SIGNIFICANT CHANGE UP
EGFR: 88 ML/MIN/1.73M2 — SIGNIFICANT CHANGE UP
GLUCOSE SERPL-MCNC: 91 MG/DL — SIGNIFICANT CHANGE UP (ref 70–99)
GLUCOSE SERPL-MCNC: 91 MG/DL — SIGNIFICANT CHANGE UP (ref 70–99)
GLUCOSE UR QL: NEGATIVE MG/DL — SIGNIFICANT CHANGE UP
GLUCOSE UR QL: NEGATIVE MG/DL — SIGNIFICANT CHANGE UP
IRON SATN MFR SERPL: 10 % — LOW (ref 14–50)
IRON SATN MFR SERPL: 10 % — LOW (ref 14–50)
IRON SATN MFR SERPL: 24 UG/DL — LOW (ref 30–160)
IRON SATN MFR SERPL: 24 UG/DL — LOW (ref 30–160)
KETONES UR-MCNC: NEGATIVE MG/DL — SIGNIFICANT CHANGE UP
KETONES UR-MCNC: NEGATIVE MG/DL — SIGNIFICANT CHANGE UP
LEUKOCYTE ESTERASE UR-ACNC: NEGATIVE — SIGNIFICANT CHANGE UP
LEUKOCYTE ESTERASE UR-ACNC: NEGATIVE — SIGNIFICANT CHANGE UP
NITRITE UR-MCNC: NEGATIVE — SIGNIFICANT CHANGE UP
NITRITE UR-MCNC: NEGATIVE — SIGNIFICANT CHANGE UP
PH UR: 5.5 — SIGNIFICANT CHANGE UP (ref 5–8)
PH UR: 5.5 — SIGNIFICANT CHANGE UP (ref 5–8)
POTASSIUM SERPL-MCNC: 3.3 MMOL/L — LOW (ref 3.5–5.3)
POTASSIUM SERPL-MCNC: 3.3 MMOL/L — LOW (ref 3.5–5.3)
POTASSIUM SERPL-SCNC: 3.3 MMOL/L — LOW (ref 3.5–5.3)
POTASSIUM SERPL-SCNC: 3.3 MMOL/L — LOW (ref 3.5–5.3)
PROT SERPL-MCNC: 5.9 G/DL — LOW (ref 6–8.3)
PROT SERPL-MCNC: 5.9 G/DL — LOW (ref 6–8.3)
PROT UR-MCNC: NEGATIVE MG/DL — SIGNIFICANT CHANGE UP
PROT UR-MCNC: NEGATIVE MG/DL — SIGNIFICANT CHANGE UP
SODIUM SERPL-SCNC: 142 MMOL/L — SIGNIFICANT CHANGE UP (ref 135–145)
SODIUM SERPL-SCNC: 142 MMOL/L — SIGNIFICANT CHANGE UP (ref 135–145)
SP GR SPEC: 1.01 — SIGNIFICANT CHANGE UP (ref 1–1.03)
SP GR SPEC: 1.01 — SIGNIFICANT CHANGE UP (ref 1–1.03)
TIBC SERPL-MCNC: 255 UG/DL — SIGNIFICANT CHANGE UP (ref 220–430)
TIBC SERPL-MCNC: 255 UG/DL — SIGNIFICANT CHANGE UP (ref 220–430)
TSH SERPL-MCNC: 3.06 UIU/ML — SIGNIFICANT CHANGE UP (ref 0.36–3.74)
TSH SERPL-MCNC: 3.06 UIU/ML — SIGNIFICANT CHANGE UP (ref 0.36–3.74)
UIBC SERPL-MCNC: 230 UG/DL — SIGNIFICANT CHANGE UP (ref 110–370)
UIBC SERPL-MCNC: 230 UG/DL — SIGNIFICANT CHANGE UP (ref 110–370)
URATE SERPL-MCNC: 4 MG/DL — SIGNIFICANT CHANGE UP (ref 2.5–7)
URATE SERPL-MCNC: 4 MG/DL — SIGNIFICANT CHANGE UP (ref 2.5–7)
UROBILINOGEN FLD QL: 0.2 MG/DL — SIGNIFICANT CHANGE UP (ref 0.2–1)
UROBILINOGEN FLD QL: 0.2 MG/DL — SIGNIFICANT CHANGE UP (ref 0.2–1)
VIT B12 SERPL-MCNC: 1011 PG/ML — SIGNIFICANT CHANGE UP (ref 232–1245)
VIT B12 SERPL-MCNC: 1011 PG/ML — SIGNIFICANT CHANGE UP (ref 232–1245)

## 2023-11-23 PROCEDURE — 99221 1ST HOSP IP/OBS SF/LOW 40: CPT

## 2023-11-23 RX ORDER — POTASSIUM CHLORIDE 20 MEQ
40 PACKET (EA) ORAL ONCE
Refills: 0 | Status: COMPLETED | OUTPATIENT
Start: 2023-11-23 | End: 2023-11-23

## 2023-11-23 RX ADMIN — HYDROXYUREA 500 MILLIGRAM(S): 500 CAPSULE ORAL at 10:04

## 2023-11-23 RX ADMIN — GABAPENTIN 300 MILLIGRAM(S): 400 CAPSULE ORAL at 05:44

## 2023-11-23 RX ADMIN — TRAMADOL HYDROCHLORIDE 50 MILLIGRAM(S): 50 TABLET ORAL at 21:13

## 2023-11-23 RX ADMIN — Medication 3 MILLIGRAM(S): at 21:06

## 2023-11-23 RX ADMIN — Medication 25 MILLIGRAM(S): at 17:17

## 2023-11-23 RX ADMIN — TRAMADOL HYDROCHLORIDE 50 MILLIGRAM(S): 50 TABLET ORAL at 22:10

## 2023-11-23 RX ADMIN — Medication 2000 UNIT(S): at 11:59

## 2023-11-23 RX ADMIN — TRAMADOL HYDROCHLORIDE 50 MILLIGRAM(S): 50 TABLET ORAL at 15:29

## 2023-11-23 RX ADMIN — SENNA PLUS 1 TABLET(S): 8.6 TABLET ORAL at 12:00

## 2023-11-23 RX ADMIN — Medication 40 MILLIEQUIVALENT(S): at 21:06

## 2023-11-23 RX ADMIN — PIPERACILLIN AND TAZOBACTAM 25 GRAM(S): 4; .5 INJECTION, POWDER, LYOPHILIZED, FOR SOLUTION INTRAVENOUS at 17:18

## 2023-11-23 RX ADMIN — TRAMADOL HYDROCHLORIDE 50 MILLIGRAM(S): 50 TABLET ORAL at 06:20

## 2023-11-23 RX ADMIN — ESCITALOPRAM OXALATE 20 MILLIGRAM(S): 10 TABLET, FILM COATED ORAL at 11:59

## 2023-11-23 RX ADMIN — PIPERACILLIN AND TAZOBACTAM 25 GRAM(S): 4; .5 INJECTION, POWDER, LYOPHILIZED, FOR SOLUTION INTRAVENOUS at 11:59

## 2023-11-23 RX ADMIN — GABAPENTIN 300 MILLIGRAM(S): 400 CAPSULE ORAL at 14:29

## 2023-11-23 RX ADMIN — HYDROXYUREA 500 MILLIGRAM(S): 500 CAPSULE ORAL at 21:25

## 2023-11-23 RX ADMIN — FAMOTIDINE 20 MILLIGRAM(S): 10 INJECTION INTRAVENOUS at 11:59

## 2023-11-23 RX ADMIN — Medication 1 MILLIGRAM(S): at 11:59

## 2023-11-23 RX ADMIN — Medication 1 TABLET(S): at 11:59

## 2023-11-23 RX ADMIN — GABAPENTIN 300 MILLIGRAM(S): 400 CAPSULE ORAL at 21:06

## 2023-11-23 RX ADMIN — TRAMADOL HYDROCHLORIDE 50 MILLIGRAM(S): 50 TABLET ORAL at 05:50

## 2023-11-23 RX ADMIN — SIMVASTATIN 10 MILLIGRAM(S): 20 TABLET, FILM COATED ORAL at 21:06

## 2023-11-23 RX ADMIN — TRAMADOL HYDROCHLORIDE 50 MILLIGRAM(S): 50 TABLET ORAL at 14:29

## 2023-11-23 RX ADMIN — Medication 50 MICROGRAM(S): at 05:50

## 2023-11-23 NOTE — CONSULT NOTE ADULT - ASSESSMENT
89-year-old female with a history of hypertension, phlebitis, CVA with hemiplegia affecting the right side, atrial fibrillation, malignant melanoma of the skin, GERD, depression, diverticulitis, UTI, hypothyroid was BIB EMS from [facility] with has a cellulitis in the left lower leg with chronic wounds. 89-year-old female with a history of hypertension, phlebitis, CVA with hemiplegia affecting the right side, atrial fibrillation, malignant melanoma of the skin, GERD, depression, diverticulitis, UTI, hypothyroid was BIB EMS from [facility] with has a cellulitis in the left lower leg with chronic wounds.    htn  phlebitis  OP  OA  CVA  AF  melanoma  frail  weak  elderly  diverticular disease  depression  GERD  STSI eval    GOC -  son hcp  pt has living WILL  pt is full code  does not want to live on machines

## 2023-11-23 NOTE — H&P ADULT - HISTORY OF PRESENT ILLNESS
89-year-old female with a history of hypertension, phlebitis, CVA with hemiplegia affecting the right side, atrial fibrillation, malignant melanoma of the skin, GERD, depression, diverticulitis, UTI, hypothyroid was BIB EMS from [facility] with has a cellulitis in the left lower leg with chronic wounds.  Patient was given a course of Keflex, with no resolution.  Patient then given a second course of Keflex, with no further resolution.  No known fever.  No trauma.  No other acute injury or complaints.  Per [facility] papers pt has NKDA

## 2023-11-23 NOTE — CONSULT NOTE ADULT - SUBJECTIVE AND OBJECTIVE BOX
Date/Time Patient Seen:  		  Referring MD:   Data Reviewed	       Patient is a 89y old  Female who presents with a chief complaint of     Subjective/HPI     · Chief Complaint: The patient is a 89y Female complaining of lower leg pain/injury.  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  Patient was given a course of Keflex, with no resolution.  Patient then given a second course of Keflex, with no further resolution.  No known fever.  No trauma.  No other acute injury or complaints.  Per NH papers pt has NKDA    PAST MEDICAL & SURGICAL HISTORY:  Hypertension    CVA (cerebral vascular accident)    Depression    Constipation    Neuropathy    Constipation    Hyperlipidemia    Grade I diastolic dysfunction    Afib    Neuropathy    VHD (valvular heart disease)    Aortic valvar stenosis    Hypothyroidism    GERD (gastroesophageal reflux disease)    No significant past surgical history    GERD (gastroesophageal reflux disease)     Grade I diastolic dysfunction     Hyperlipidemia     Hypertension     Hypothyroidism     Neuropathy     Neuropathy     VHD (valvular heart disease).     PAST SURGICAL HISTORY:  No significant past surgical history.     Tobacco Usage:  · Tobacco Usage	Unknown if ever smoked    ALLERGIES AND HOME MEDICATIONS:   Allergies:        Allergies:  	per son "issues with certain anitibiotics" does not remember the names - Patient has tolerated zosyn, ceftriaxone, bactrim, and vancomycin on past admissions. [freetext allergy; no alerts]: Drug, Unknown, per son "issues with certain anitibiotics" does not remember the names - Patient has tolerated zosyn, ceftriaxone, bactrim, and vancomycin on past admissions.    Home Medications:   * Patient Currently Takes Medications as of 23-Aug-2023 11:28 documented in Structured Notes  · 	OXYGEN THERAPY: 2L  NC  · 	collagenase 250 units/g topical ointment: Apply topically to affected area once a day LL extremity wound  · 	warfarin 1 mg oral tablet: 1 tab(s) orally every other day HOLD FOR INR ABOVE 3.0 ,NEXT INR 08/25/23  · 	melatonin 3 mg oral tablet: 1 tab(s) orally once a day (at bedtime) As needed Insomnia  · 	pantoprazole 40 mg oral delayed release tablet: 1 tab(s) orally once a day  · 	lactobacillus acidophilus oral tablet: 2 tab(s) orally once a day  · 	cholecalciferol 50 mcg (2000 intl units) oral tablet: 1 tab(s) orally once a day  · 	folic acid 1 mg oral tablet: 1 tab(s) orally once a day  · 	levothyroxine 50 mcg (0.05 mg) oral tablet: 1 tab(s) orally once a day  · 	furosemide 20 mg oral tablet: 1 tab(s) orally once a day  · 	ferrous sulfate 325 mg (65 mg elemental iron) oral tablet: 1 tab(s) orally once a day  · 	hydroxyurea 500 mg oral capsule: 1 cap(s) orally 2 times a day  · 	amLODIPine 5 mg oral tablet: 1 tab(s) orally once a day  · 	metoprolol tartrate 25 mg oral tablet: 1 tab(s) orally 2 times a day  · 	escitalopram 20 mg oral tablet: 1 tab(s) orally once a day  · 	simvastatin 10 mg oral tablet: 1 tab(s) orally once a day (at bedtime)  · 	gabapentin 300 mg oral capsule: 1 cap(s) orally 3 times a day  · 	benzonatate 100 mg oral capsule: 1 cap(s) orally 3 times a day as needed for  cough  · 	traMADol 50 mg oral tablet: 1 tab(s) orally 2 times a day  · 	Tylenol Extra Strength 500 mg oral tablet: 2 tab(s) orally once a day (in the morning) and 1 tablet twice a day  · 	famotidine 20 mg oral tablet: 1 tab(s) orally once a day  · 	senna (sennosides) 8.6 mg oral tablet: 1 tab(s) orally once a day as needed for  constipation  · 	docusate sodium 100 mg oral capsule: 2 cap(s) orally once a day (at bedtime)  · 	Biofreeze 4% topical gel: Apply topically to affected area 2 times a day to R shoulder and L knee  · 	traMADol 50 mg oral tablet: 1 tab(s) orally every 6 hours as needed for pain      Medication list         MEDICATIONS  (STANDING):  amLODIPine   Tablet 5 milliGRAM(s) Oral daily  cholecalciferol 2000 Unit(s) Oral daily  escitalopram 20 milliGRAM(s) Oral daily  famotidine    Tablet 20 milliGRAM(s) Oral daily  folic acid 1 milliGRAM(s) Oral daily  gabapentin 300 milliGRAM(s) Oral three times a day  hydroxyurea 500 milliGRAM(s) Oral two times a day  lactobacillus acidophilus 1 Tablet(s) Oral daily  levothyroxine 50 MICROGram(s) Oral daily  melatonin 3 milliGRAM(s) Oral at bedtime  metoprolol tartrate 25 milliGRAM(s) Oral two times a day  piperacillin/tazobactam IVPB.- 3.375 Gram(s) IV Intermittent once  piperacillin/tazobactam IVPB.. 3.375 Gram(s) IV Intermittent every 8 hours  senna 1 Tablet(s) Oral daily  simvastatin 10 milliGRAM(s) Oral at bedtime    MEDICATIONS  (PRN):  acetaminophen     Tablet .. 650 milliGRAM(s) Oral every 6 hours PRN Temp greater or equal to 38C (100.4F), Mild Pain (1 - 3)  traMADol 50 milliGRAM(s) Oral every 6 hours PRN pain         Vitals log        ICU Vital Signs Last 24 Hrs  T(C): 37.7 (23 Nov 2023 05:07), Max: 37.7 (23 Nov 2023 05:07)  T(F): 99.8 (23 Nov 2023 05:07), Max: 99.8 (23 Nov 2023 05:07)  HR: 98 (23 Nov 2023 05:07) (81 - 107)  BP: 102/60 (23 Nov 2023 05:07) (102/60 - 135/64)  BP(mean): --  ABP: --  ABP(mean): --  RR: 17 (23 Nov 2023 05:07) (16 - 17)  SpO2: 97% (23 Nov 2023 05:07) (91% - 97%)    O2 Parameters below as of 23 Nov 2023 05:07  Patient On (Oxygen Delivery Method): room air                 Input and Output:  I&O's Detail      Lab Data                        10.1   8.47  )-----------( 646      ( 22 Nov 2023 16:15 )             32.6     11-22    142  |  104  |  17  ----------------------------<  104<H>  4.3   |  30  |  0.64    Ca    8.2<L>      22 Nov 2023 18:00    TPro  6.1  /  Alb  2.5<L>  /  TBili  0.3  /  DBili  x   /  AST  13<L>  /  ALT  13  /  AlkPhos  50  11-22            Review of Systems	      Objective     Physical Examination        Pertinent Lab findings & Imaging      Wheeler:  NO   Adequate UO     I&O's Detail           Discussed with:     Cultures:	        Radiology    ACC: [id number] EXAM:  CT BRAIN   ORDERED BY: GEOGRE SHERIDAN     ACC: [id number] EXAM:  CT CERVICAL SPINE   ORDERED BY: GEORGE SHERIDAN     PROCEDURE DATE:  10/06/2023          INTERPRETATION:  CLINICAL INDICATION: sp fall today    TECHNIQUE: CT axial images of the head and cervical spine were obtained   without intravenous contrast. Computer-reconstructed coronal and sagittal   images were obtained.    COMPARISON: 8/2/2023. 2/8/2009.    FINDINGS:    Head CT: Patient motion degrades images despite repeat scan. There is no   obvious large intracranial hemorrhage, mass effect or midline shift,   given the extent of artifact. Nonspecific moderate periventricular and   subcortical white matter hypodensities likely represent microvascular   ischemic changes/infarcts. Evaluation of infarct is limited due to   artifact. There is stable cerebral volume loss with ventricular   dilatation.    There is no depressed skull fracture. There is sinus mucosal thickening   with bilateral maxillary sinus mucus retention cysts/polyps. Right   mastoid opacification again noted. Left tympanomastoid region is   unremarkable.    Cervical spine CT: Decreased bone mineralization. A normal cervical   lordosis is maintained. There is no obvious displaced fracture or   traumatic malalignment in the cervical spine. Stable endplate depression   at the cervicothoracic spine. The craniocervical junction is preserved.   Minimal asymmetry of the lateral atlantodental interval is again noted.   There is stable multilevel anterolisthesis of the cervicothoracic spine.    There are multilevel degenerative changes characterized by uncovertebral   degenerative change, disc osteophyte complex and facet arthropathy in the   cervical spine with resultant neural foraminal narrowing and spinal canal   stenosis. Right C2-C3 facet fusion.    There is no significant prevertebral soft tissue edema. Evaluation of the   spinal canal content is limited on this study.    Visualized upper chest: No pneumothorax. Stable right apical opacity.    IMPRESSION:    Head CT: Motion degraded study. No obvious large acute intracranial   hemorrhage or displaced skull fracture. If there is continued clinical   suspicion and when the patient is able to cooperate after adequate   sedation, short-term follow-up or MRI may be obtained for further   evaluation.    Cervical spine CT: No obvious displaced fracture or traumatic   malalignment in the cervical spine. If there is clinical suspicion for   acute fracture or ligamentous/cord injury, MRI may be obtained for   further evaluation.    --- End of Report ---            HILARIO JORDAN MD; Attending Radiologist  This document has been electronically signed. Oct  6 2023  3:58AM                           Date/Time Patient Seen:  		  Referring MD:   Data Reviewed	       Patient is a 89y old  Female who presents with a chief complaint of     Subjective/HPI     · Chief Complaint: The patient is a 89y Female complaining of lower leg pain/injury.  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  Patient was given a course of Keflex, with no resolution.  Patient then given a second course of Keflex, with no further resolution.  No known fever.  No trauma.  No other acute injury or complaints.  Per NH papers pt has NKDA    PAST MEDICAL & SURGICAL HISTORY:  Hypertension    CVA (cerebral vascular accident)    Depression    Constipation    Neuropathy    Constipation    Hyperlipidemia    Grade I diastolic dysfunction    Afib    Neuropathy    VHD (valvular heart disease)    Aortic valvar stenosis    Hypothyroidism    GERD (gastroesophageal reflux disease)    No significant past surgical history    GERD (gastroesophageal reflux disease)     Grade I diastolic dysfunction     Hyperlipidemia     Hypertension     Hypothyroidism     Neuropathy     Neuropathy     VHD (valvular heart disease).     PAST SURGICAL HISTORY:  No significant past surgical history.     Tobacco Usage:  · Tobacco Usage	Unknown if ever smoked    ALLERGIES AND HOME MEDICATIONS:   Allergies:        Allergies:  	per son "issues with certain anitibiotics" does not remember the names - Patient has tolerated zosyn, ceftriaxone, bactrim, and vancomycin on past admissions. [freetext allergy; no alerts]: Drug, Unknown, per son "issues with certain anitibiotics" does not remember the names - Patient has tolerated zosyn, ceftriaxone, bactrim, and vancomycin on past admissions.    Home Medications:   * Patient Currently Takes Medications as of 23-Aug-2023 11:28 documented in Structured Notes  · 	OXYGEN THERAPY: 2L  NC  · 	collagenase 250 units/g topical ointment: Apply topically to affected area once a day LL extremity wound  · 	warfarin 1 mg oral tablet: 1 tab(s) orally every other day HOLD FOR INR ABOVE 3.0 ,NEXT INR 08/25/23  · 	melatonin 3 mg oral tablet: 1 tab(s) orally once a day (at bedtime) As needed Insomnia  · 	pantoprazole 40 mg oral delayed release tablet: 1 tab(s) orally once a day  · 	lactobacillus acidophilus oral tablet: 2 tab(s) orally once a day  · 	cholecalciferol 50 mcg (2000 intl units) oral tablet: 1 tab(s) orally once a day  · 	folic acid 1 mg oral tablet: 1 tab(s) orally once a day  · 	levothyroxine 50 mcg (0.05 mg) oral tablet: 1 tab(s) orally once a day  · 	furosemide 20 mg oral tablet: 1 tab(s) orally once a day  · 	ferrous sulfate 325 mg (65 mg elemental iron) oral tablet: 1 tab(s) orally once a day  · 	hydroxyurea 500 mg oral capsule: 1 cap(s) orally 2 times a day  · 	amLODIPine 5 mg oral tablet: 1 tab(s) orally once a day  · 	metoprolol tartrate 25 mg oral tablet: 1 tab(s) orally 2 times a day  · 	escitalopram 20 mg oral tablet: 1 tab(s) orally once a day  · 	simvastatin 10 mg oral tablet: 1 tab(s) orally once a day (at bedtime)  · 	gabapentin 300 mg oral capsule: 1 cap(s) orally 3 times a day  · 	benzonatate 100 mg oral capsule: 1 cap(s) orally 3 times a day as needed for  cough  · 	traMADol 50 mg oral tablet: 1 tab(s) orally 2 times a day  · 	Tylenol Extra Strength 500 mg oral tablet: 2 tab(s) orally once a day (in the morning) and 1 tablet twice a day  · 	famotidine 20 mg oral tablet: 1 tab(s) orally once a day  · 	senna (sennosides) 8.6 mg oral tablet: 1 tab(s) orally once a day as needed for  constipation  · 	docusate sodium 100 mg oral capsule: 2 cap(s) orally once a day (at bedtime)  · 	Biofreeze 4% topical gel: Apply topically to affected area 2 times a day to R shoulder and L knee  · 	traMADol 50 mg oral tablet: 1 tab(s) orally every 6 hours as needed for pain      Medication list         MEDICATIONS  (STANDING):  amLODIPine   Tablet 5 milliGRAM(s) Oral daily  cholecalciferol 2000 Unit(s) Oral daily  escitalopram 20 milliGRAM(s) Oral daily  famotidine    Tablet 20 milliGRAM(s) Oral daily  folic acid 1 milliGRAM(s) Oral daily  gabapentin 300 milliGRAM(s) Oral three times a day  hydroxyurea 500 milliGRAM(s) Oral two times a day  lactobacillus acidophilus 1 Tablet(s) Oral daily  levothyroxine 50 MICROGram(s) Oral daily  melatonin 3 milliGRAM(s) Oral at bedtime  metoprolol tartrate 25 milliGRAM(s) Oral two times a day  piperacillin/tazobactam IVPB.- 3.375 Gram(s) IV Intermittent once  piperacillin/tazobactam IVPB.. 3.375 Gram(s) IV Intermittent every 8 hours  senna 1 Tablet(s) Oral daily  simvastatin 10 milliGRAM(s) Oral at bedtime    MEDICATIONS  (PRN):  acetaminophen     Tablet .. 650 milliGRAM(s) Oral every 6 hours PRN Temp greater or equal to 38C (100.4F), Mild Pain (1 - 3)  traMADol 50 milliGRAM(s) Oral every 6 hours PRN pain         Vitals log        ICU Vital Signs Last 24 Hrs  T(C): 37.7 (23 Nov 2023 05:07), Max: 37.7 (23 Nov 2023 05:07)  T(F): 99.8 (23 Nov 2023 05:07), Max: 99.8 (23 Nov 2023 05:07)  HR: 98 (23 Nov 2023 05:07) (81 - 107)  BP: 102/60 (23 Nov 2023 05:07) (102/60 - 135/64)  BP(mean): --  ABP: --  ABP(mean): --  RR: 17 (23 Nov 2023 05:07) (16 - 17)  SpO2: 97% (23 Nov 2023 05:07) (91% - 97%)    O2 Parameters below as of 23 Nov 2023 05:07  Patient On (Oxygen Delivery Method): room air                 Input and Output:  I&O's Detail      Lab Data                        10.1   8.47  )-----------( 646      ( 22 Nov 2023 16:15 )             32.6     11-22    142  |  104  |  17  ----------------------------<  104<H>  4.3   |  30  |  0.64    Ca    8.2<L>      22 Nov 2023 18:00    TPro  6.1  /  Alb  2.5<L>  /  TBili  0.3  /  DBili  x   /  AST  13<L>  /  ALT  13  /  AlkPhos  50  11-22            Review of Systems	  weakness      Objective     Physical Examination    heart s1s2  lung dc BS  head nc      Pertinent Lab findings & Imaging      Wheeler:  NO   Adequate UO     I&O's Detail           Discussed with:     Cultures:	        Radiology    ACC: [id number] EXAM:  CT BRAIN   ORDERED BY: GEORGE SHERIDAN     ACC: [id number] EXAM:  CT CERVICAL SPINE   ORDERED BY: GEORGE SHERIDAN     PROCEDURE DATE:  10/06/2023          INTERPRETATION:  CLINICAL INDICATION: sp fall today    TECHNIQUE: CT axial images of the head and cervical spine were obtained   without intravenous contrast. Computer-reconstructed coronal and sagittal   images were obtained.    COMPARISON: 8/2/2023. 2/8/2009.    FINDINGS:    Head CT: Patient motion degrades images despite repeat scan. There is no   obvious large intracranial hemorrhage, mass effect or midline shift,   given the extent of artifact. Nonspecific moderate periventricular and   subcortical white matter hypodensities likely represent microvascular   ischemic changes/infarcts. Evaluation of infarct is limited due to   artifact. There is stable cerebral volume loss with ventricular   dilatation.    There is no depressed skull fracture. There is sinus mucosal thickening   with bilateral maxillary sinus mucus retention cysts/polyps. Right   mastoid opacification again noted. Left tympanomastoid region is   unremarkable.    Cervical spine CT: Decreased bone mineralization. A normal cervical   lordosis is maintained. There is no obvious displaced fracture or   traumatic malalignment in the cervical spine. Stable endplate depression   at the cervicothoracic spine. The craniocervical junction is preserved.   Minimal asymmetry of the lateral atlantodental interval is again noted.   There is stable multilevel anterolisthesis of the cervicothoracic spine.    There are multilevel degenerative changes characterized by uncovertebral   degenerative change, disc osteophyte complex and facet arthropathy in the   cervical spine with resultant neural foraminal narrowing and spinal canal   stenosis. Right C2-C3 facet fusion.    There is no significant prevertebral soft tissue edema. Evaluation of the   spinal canal content is limited on this study.    Visualized upper chest: No pneumothorax. Stable right apical opacity.    IMPRESSION:    Head CT: Motion degraded study. No obvious large acute intracranial   hemorrhage or displaced skull fracture. If there is continued clinical   suspicion and when the patient is able to cooperate after adequate   sedation, short-term follow-up or MRI may be obtained for further   evaluation.    Cervical spine CT: No obvious displaced fracture or traumatic   malalignment in the cervical spine. If there is clinical suspicion for   acute fracture or ligamentous/cord injury, MRI may be obtained for   further evaluation.    --- End of Report ---            HILARIO JORDAN MD; Attending Radiologist  This document has been electronically signed. Oct  6 2023  3:58AM

## 2023-11-23 NOTE — H&P ADULT - ASSESSMENT
89-year-old female with a history of hypertension, phlebitis, CVA with hemiplegia affecting the right side, atrial fibrillation, malignant melanoma of the skin, GERD, depression, diverticulitis, UTI, hypothyroid was BIB EMS from [facility] with has a cellulitis in the left lower leg with chronic wounds.  Patient was given a course of Keflex, with no resolution.  Patient then given a second course of Keflex, with no further resolution.  No known fever.  No trauma.  No other acute injury or complaints.  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  Patient was given a course of Keflex, with no resolution.  Patient then given a second course of Keflex, with no further resolution.  No known fever.  No trauma.  No other acute injury or complaints.  Per NH papers pt has NKDA    1 Cellulitis  - cw abx  - podiatry and ID fu   - fu cultures  - will monitor    2 Afib  - check INR and dose coumadin daily  - will monitor     3 HTN  - cw home meds  - DASH diet    4 Hypothyroid  - cw synthroid

## 2023-11-23 NOTE — H&P ADULT - NSHPLABSRESULTS_GEN_ALL_CORE
Lab Results:  CBC  CBC Full  -  ( 22 Nov 2023 16:15 )  WBC Count : 8.47 K/uL  RBC Count : 2.96 M/uL  Hemoglobin : 10.1 g/dL  Hematocrit : 32.6 %  Platelet Count - Automated : 646 K/uL  Mean Cell Volume : 110.1 fl  Mean Cell Hemoglobin : 34.1 pg  Mean Cell Hemoglobin Concentration : 31.0 gm/dL  Auto Neutrophil # : 6.95 K/uL  Auto Lymphocyte # : 0.59 K/uL  Auto Monocyte # : 0.85 K/uL  Auto Eosinophil # : 0.08 K/uL  Auto Basophil # : 0.00 K/uL  Auto Neutrophil % : 81.0 %  Auto Lymphocyte % : 7.0 %  Auto Monocyte % : 10.0 %  Auto Eosinophil % : 1.0 %  Auto Basophil % : 0.0 %    .		Differential:	[] Automated		[] Manual  Chemistry                        10.1   8.47  )-----------( 646      ( 22 Nov 2023 16:15 )             32.6     11-22    142  |  104  |  17  ----------------------------<  104<H>  4.3   |  30  |  0.64    Ca    8.2<L>      22 Nov 2023 18:00    TPro  6.1  /  Alb  2.5<L>  /  TBili  0.3  /  DBili  x   /  AST  13<L>  /  ALT  13  /  AlkPhos  50  11-22    LIVER FUNCTIONS - ( 22 Nov 2023 18:00 )  Alb: 2.5 g/dL / Pro: 6.1 g/dL / ALK PHOS: 50 U/L / ALT: 13 U/L / AST: 13 U/L / GGT: x           PT/INR - ( 22 Nov 2023 18:00 )   PT: 43.6 sec;   INR: 3.88 ratio         PTT - ( 22 Nov 2023 18:00 )  PTT:49.9 sec  Urinalysis Basic - ( 22 Nov 2023 18:00 )    Color: x / Appearance: x / SG: x / pH: x  Gluc: 104 mg/dL / Ketone: x  / Bili: x / Urobili: x   Blood: x / Protein: x / Nitrite: x   Leuk Esterase: x / RBC: x / WBC x   Sq Epi: x / Non Sq Epi: x / Bacteria: x            MICROBIOLOGY/CULTURES:      RADIOLOGY RESULTS: reviewed

## 2023-11-24 LAB
ALBUMIN SERPL ELPH-MCNC: 2.3 G/DL — LOW (ref 3.3–5)
ALBUMIN SERPL ELPH-MCNC: 2.3 G/DL — LOW (ref 3.3–5)
ALP SERPL-CCNC: 52 U/L — SIGNIFICANT CHANGE UP (ref 40–120)
ALP SERPL-CCNC: 52 U/L — SIGNIFICANT CHANGE UP (ref 40–120)
ALT FLD-CCNC: 9 U/L — LOW (ref 12–78)
ALT FLD-CCNC: 9 U/L — LOW (ref 12–78)
ANION GAP SERPL CALC-SCNC: 7 MMOL/L — SIGNIFICANT CHANGE UP (ref 5–17)
ANION GAP SERPL CALC-SCNC: 7 MMOL/L — SIGNIFICANT CHANGE UP (ref 5–17)
AST SERPL-CCNC: 16 U/L — SIGNIFICANT CHANGE UP (ref 15–37)
AST SERPL-CCNC: 16 U/L — SIGNIFICANT CHANGE UP (ref 15–37)
BILIRUB SERPL-MCNC: 0.5 MG/DL — SIGNIFICANT CHANGE UP (ref 0.2–1.2)
BILIRUB SERPL-MCNC: 0.5 MG/DL — SIGNIFICANT CHANGE UP (ref 0.2–1.2)
BUN SERPL-MCNC: 8 MG/DL — SIGNIFICANT CHANGE UP (ref 7–23)
BUN SERPL-MCNC: 8 MG/DL — SIGNIFICANT CHANGE UP (ref 7–23)
CALCIUM SERPL-MCNC: 8.1 MG/DL — LOW (ref 8.5–10.1)
CALCIUM SERPL-MCNC: 8.1 MG/DL — LOW (ref 8.5–10.1)
CHLORIDE SERPL-SCNC: 106 MMOL/L — SIGNIFICANT CHANGE UP (ref 96–108)
CHLORIDE SERPL-SCNC: 106 MMOL/L — SIGNIFICANT CHANGE UP (ref 96–108)
CO2 SERPL-SCNC: 28 MMOL/L — SIGNIFICANT CHANGE UP (ref 22–31)
CO2 SERPL-SCNC: 28 MMOL/L — SIGNIFICANT CHANGE UP (ref 22–31)
CREAT SERPL-MCNC: 0.54 MG/DL — SIGNIFICANT CHANGE UP (ref 0.5–1.3)
CREAT SERPL-MCNC: 0.54 MG/DL — SIGNIFICANT CHANGE UP (ref 0.5–1.3)
EGFR: 87 ML/MIN/1.73M2 — SIGNIFICANT CHANGE UP
EGFR: 87 ML/MIN/1.73M2 — SIGNIFICANT CHANGE UP
GLUCOSE SERPL-MCNC: 89 MG/DL — SIGNIFICANT CHANGE UP (ref 70–99)
GLUCOSE SERPL-MCNC: 89 MG/DL — SIGNIFICANT CHANGE UP (ref 70–99)
HCT VFR BLD CALC: 31.6 % — LOW (ref 34.5–45)
HCT VFR BLD CALC: 31.6 % — LOW (ref 34.5–45)
HGB BLD-MCNC: 9.7 G/DL — LOW (ref 11.5–15.5)
HGB BLD-MCNC: 9.7 G/DL — LOW (ref 11.5–15.5)
INR BLD: 2.49 RATIO — HIGH (ref 0.85–1.18)
INR BLD: 2.49 RATIO — HIGH (ref 0.85–1.18)
MCHC RBC-ENTMCNC: 30.7 GM/DL — LOW (ref 32–36)
MCHC RBC-ENTMCNC: 30.7 GM/DL — LOW (ref 32–36)
MCHC RBC-ENTMCNC: 33.8 PG — SIGNIFICANT CHANGE UP (ref 27–34)
MCHC RBC-ENTMCNC: 33.8 PG — SIGNIFICANT CHANGE UP (ref 27–34)
MCV RBC AUTO: 110.1 FL — HIGH (ref 80–100)
MCV RBC AUTO: 110.1 FL — HIGH (ref 80–100)
NRBC # BLD: 0 /100 WBCS — SIGNIFICANT CHANGE UP (ref 0–0)
NRBC # BLD: 0 /100 WBCS — SIGNIFICANT CHANGE UP (ref 0–0)
PLATELET # BLD AUTO: 605 K/UL — HIGH (ref 150–400)
PLATELET # BLD AUTO: 605 K/UL — HIGH (ref 150–400)
POTASSIUM SERPL-MCNC: 4.1 MMOL/L — SIGNIFICANT CHANGE UP (ref 3.5–5.3)
POTASSIUM SERPL-MCNC: 4.1 MMOL/L — SIGNIFICANT CHANGE UP (ref 3.5–5.3)
POTASSIUM SERPL-SCNC: 4.1 MMOL/L — SIGNIFICANT CHANGE UP (ref 3.5–5.3)
POTASSIUM SERPL-SCNC: 4.1 MMOL/L — SIGNIFICANT CHANGE UP (ref 3.5–5.3)
PROT SERPL-MCNC: 6 G/DL — SIGNIFICANT CHANGE UP (ref 6–8.3)
PROT SERPL-MCNC: 6 G/DL — SIGNIFICANT CHANGE UP (ref 6–8.3)
PROTHROM AB SERPL-ACNC: 28.4 SEC — HIGH (ref 9.5–13)
PROTHROM AB SERPL-ACNC: 28.4 SEC — HIGH (ref 9.5–13)
RBC # BLD: 2.87 M/UL — LOW (ref 3.8–5.2)
RBC # BLD: 2.87 M/UL — LOW (ref 3.8–5.2)
RBC # FLD: 17.3 % — HIGH (ref 10.3–14.5)
RBC # FLD: 17.3 % — HIGH (ref 10.3–14.5)
SODIUM SERPL-SCNC: 141 MMOL/L — SIGNIFICANT CHANGE UP (ref 135–145)
SODIUM SERPL-SCNC: 141 MMOL/L — SIGNIFICANT CHANGE UP (ref 135–145)
WBC # BLD: 8.74 K/UL — SIGNIFICANT CHANGE UP (ref 3.8–10.5)
WBC # BLD: 8.74 K/UL — SIGNIFICANT CHANGE UP (ref 3.8–10.5)
WBC # FLD AUTO: 8.74 K/UL — SIGNIFICANT CHANGE UP (ref 3.8–10.5)
WBC # FLD AUTO: 8.74 K/UL — SIGNIFICANT CHANGE UP (ref 3.8–10.5)

## 2023-11-24 RX ORDER — WARFARIN SODIUM 2.5 MG/1
1 TABLET ORAL ONCE
Refills: 0 | Status: COMPLETED | OUTPATIENT
Start: 2023-11-24 | End: 2023-11-24

## 2023-11-24 RX ORDER — VANCOMYCIN HCL 1 G
750 VIAL (EA) INTRAVENOUS EVERY 12 HOURS
Refills: 0 | Status: DISCONTINUED | OUTPATIENT
Start: 2023-11-24 | End: 2023-11-25

## 2023-11-24 RX ADMIN — Medication 3 MILLIGRAM(S): at 21:17

## 2023-11-24 RX ADMIN — TRAMADOL HYDROCHLORIDE 50 MILLIGRAM(S): 50 TABLET ORAL at 17:38

## 2023-11-24 RX ADMIN — TRAMADOL HYDROCHLORIDE 50 MILLIGRAM(S): 50 TABLET ORAL at 05:22

## 2023-11-24 RX ADMIN — TRAMADOL HYDROCHLORIDE 50 MILLIGRAM(S): 50 TABLET ORAL at 06:20

## 2023-11-24 RX ADMIN — GABAPENTIN 300 MILLIGRAM(S): 400 CAPSULE ORAL at 14:18

## 2023-11-24 RX ADMIN — TRAMADOL HYDROCHLORIDE 50 MILLIGRAM(S): 50 TABLET ORAL at 18:30

## 2023-11-24 RX ADMIN — PIPERACILLIN AND TAZOBACTAM 25 GRAM(S): 4; .5 INJECTION, POWDER, LYOPHILIZED, FOR SOLUTION INTRAVENOUS at 08:44

## 2023-11-24 RX ADMIN — GABAPENTIN 300 MILLIGRAM(S): 400 CAPSULE ORAL at 05:17

## 2023-11-24 RX ADMIN — Medication 25 MILLIGRAM(S): at 17:38

## 2023-11-24 RX ADMIN — FAMOTIDINE 20 MILLIGRAM(S): 10 INJECTION INTRAVENOUS at 11:27

## 2023-11-24 RX ADMIN — TRAMADOL HYDROCHLORIDE 50 MILLIGRAM(S): 50 TABLET ORAL at 11:26

## 2023-11-24 RX ADMIN — Medication 1 MILLIGRAM(S): at 11:26

## 2023-11-24 RX ADMIN — HYDROXYUREA 500 MILLIGRAM(S): 500 CAPSULE ORAL at 10:05

## 2023-11-24 RX ADMIN — Medication 1 TABLET(S): at 11:26

## 2023-11-24 RX ADMIN — Medication 50 MICROGRAM(S): at 05:17

## 2023-11-24 RX ADMIN — TRAMADOL HYDROCHLORIDE 50 MILLIGRAM(S): 50 TABLET ORAL at 12:26

## 2023-11-24 RX ADMIN — HYDROXYUREA 500 MILLIGRAM(S): 500 CAPSULE ORAL at 17:35

## 2023-11-24 RX ADMIN — WARFARIN SODIUM 1 MILLIGRAM(S): 2.5 TABLET ORAL at 21:17

## 2023-11-24 RX ADMIN — Medication 2000 UNIT(S): at 11:26

## 2023-11-24 RX ADMIN — Medication 250 MILLIGRAM(S): at 17:38

## 2023-11-24 RX ADMIN — GABAPENTIN 300 MILLIGRAM(S): 400 CAPSULE ORAL at 21:17

## 2023-11-24 RX ADMIN — PIPERACILLIN AND TAZOBACTAM 25 GRAM(S): 4; .5 INJECTION, POWDER, LYOPHILIZED, FOR SOLUTION INTRAVENOUS at 00:34

## 2023-11-24 RX ADMIN — SIMVASTATIN 10 MILLIGRAM(S): 20 TABLET, FILM COATED ORAL at 21:17

## 2023-11-24 RX ADMIN — SENNA PLUS 1 TABLET(S): 8.6 TABLET ORAL at 11:27

## 2023-11-24 RX ADMIN — ESCITALOPRAM OXALATE 20 MILLIGRAM(S): 10 TABLET, FILM COATED ORAL at 11:26

## 2023-11-24 NOTE — CARE COORDINATION ASSESSMENT. - NSCAREPROVIDERS_GEN_ALL_CORE_FT
CARE PROVIDERS:  Accepting Physician: Анна Best  Administration: Yfn Riddle  Administration: Joel Huizar  Admitting: Анна Best  Attending: Анна Best  Case Management: Myesha Christine  Consultant: Ishmael Maldonado  Consultant: Sushil Anguiano  Consultant: Swapnil De La Rosa  Consultant: Adrian Gan  Covering Team: Tayler Laguerre  ED ACP: Danii Loza  ED Attending: Mihai Costa  ED Nurse: Gilda Duval  HIM/Billing & Coding: Pamela Torres  Nurse: Ramandeep Dumas  Nurse: Cecilia Perez  Ordered: ADM, User  Outpatient Provider: Pipe Prince  Outpatient Provider: Mike Urias  Outpatient Provider: Pahlavan, Mohsen  Outpatient Provider: Fritz Kauffman  Outpatient Provider: Facundo Valentine  Override: Ramandeep Dumas  Override: Marga Garcia  Override: Magalis Bosch  PCA/Nursing Assistant: Danii Gan  PCA/Nursing Assistant: Mony Smith  PCA/Nursing Assistant: Dali Gonzalez  Primary Team: Rg Moralez  Registered Dietitian: Thais Muñoz  : Abbi Ibarra  : Melisa Brandon

## 2023-11-24 NOTE — PROGRESS NOTE ADULT - SUBJECTIVE AND OBJECTIVE BOX
Date/Time Patient Seen:  		  Referring MD:   Data Reviewed	       Patient is a 89y old  Female who presents with a chief complaint of cellulitis (23 Nov 2023 07:40)      Subjective/HPI     PAST MEDICAL & SURGICAL HISTORY:  Hypertension    CVA (cerebral vascular accident)    Depression    Constipation    Neuropathy    Constipation    Hyperlipidemia    Grade I diastolic dysfunction    Afib    Neuropathy    VHD (valvular heart disease)    Aortic valvar stenosis    Hypothyroidism    GERD (gastroesophageal reflux disease)    No significant past surgical history          Medication list         MEDICATIONS  (STANDING):  amLODIPine   Tablet 5 milliGRAM(s) Oral daily  cholecalciferol 2000 Unit(s) Oral daily  escitalopram 20 milliGRAM(s) Oral daily  famotidine    Tablet 20 milliGRAM(s) Oral daily  folic acid 1 milliGRAM(s) Oral daily  gabapentin 300 milliGRAM(s) Oral three times a day  hydroxyurea 500 milliGRAM(s) Oral two times a day  lactobacillus acidophilus 1 Tablet(s) Oral daily  levothyroxine 50 MICROGram(s) Oral daily  melatonin 3 milliGRAM(s) Oral at bedtime  metoprolol tartrate 25 milliGRAM(s) Oral two times a day  piperacillin/tazobactam IVPB.- 3.375 Gram(s) IV Intermittent once  piperacillin/tazobactam IVPB.. 3.375 Gram(s) IV Intermittent every 8 hours  senna 1 Tablet(s) Oral daily  simvastatin 10 milliGRAM(s) Oral at bedtime    MEDICATIONS  (PRN):  acetaminophen     Tablet .. 650 milliGRAM(s) Oral every 6 hours PRN Temp greater or equal to 38C (100.4F), Mild Pain (1 - 3)  traMADol 50 milliGRAM(s) Oral every 6 hours PRN Moderate Pain (4 - 6)         Vitals log        ICU Vital Signs Last 24 Hrs  T(C): 36.5 (24 Nov 2023 05:08), Max: 37 (23 Nov 2023 12:07)  T(F): 97.7 (24 Nov 2023 05:08), Max: 98.6 (23 Nov 2023 12:07)  HR: 98 (24 Nov 2023 05:08) (81 - 99)  BP: 147/73 (24 Nov 2023 05:08) (144/79 - 154/83)  BP(mean): --  ABP: --  ABP(mean): --  RR: 17 (24 Nov 2023 05:08) (17 - 18)  SpO2: 91% (24 Nov 2023 05:08) (91% - 91%)    O2 Parameters below as of 24 Nov 2023 05:08  Patient On (Oxygen Delivery Method): room air                 Input and Output:  I&O's Detail    22 Nov 2023 07:01  -  23 Nov 2023 07:00  --------------------------------------------------------  IN:  Total IN: 0 mL    OUT:    Voided (mL): 600 mL  Total OUT: 600 mL    Total NET: -600 mL          Lab Data                        10.1   8.47  )-----------( 646      ( 22 Nov 2023 16:15 )             32.6     11-23    142  |  106  |  10  ----------------------------<  91  3.3<L>   |  29  |  0.51    Ca    7.9<L>      23 Nov 2023 08:46    TPro  5.9<L>  /  Alb  2.3<L>  /  TBili  0.5  /  DBili  0.1  /  AST  14<L>  /  ALT  11<L>  /  AlkPhos  53  11-23            Review of Systems	      Objective     Physical Examination    heart s1s2  lung dc BS  head nc      Pertinent Lab findings & Imaging      Summer:  NO   Adequate UO     I&O's Detail    22 Nov 2023 07:01  -  23 Nov 2023 07:00  --------------------------------------------------------  IN:  Total IN: 0 mL    OUT:    Voided (mL): 600 mL  Total OUT: 600 mL    Total NET: -600 mL               Discussed with:     Cultures:	        Radiology

## 2023-11-24 NOTE — PROGRESS NOTE ADULT - SUBJECTIVE AND OBJECTIVE BOX
Patient is a 89y old  Female who presents with a chief complaint of cellulitis (24 Nov 2023 05:11)    Date of servie : 11-24-23 @ 11:47  INTERVAL HPI/OVERNIGHT EVENTS:  T(C): 36.7 (11-24-23 @ 11:35), Max: 37 (11-23-23 @ 12:07)  HR: 95 (11-24-23 @ 11:35) (81 - 99)  BP: 130/67 (11-24-23 @ 11:35) (130/67 - 154/83)  RR: 18 (11-24-23 @ 11:35) (17 - 18)  SpO2: 90% (11-24-23 @ 11:35) (90% - 91%)  Wt(kg): --  I&O's Summary    23 Nov 2023 07:01  -  24 Nov 2023 07:00  --------------------------------------------------------  IN: 0 mL / OUT: 600 mL / NET: -600 mL        LABS:                        9.7    8.74  )-----------( 605      ( 24 Nov 2023 07:50 )             31.6     11-24    141  |  106  |  8   ----------------------------<  89  4.1   |  28  |  0.54    Ca    8.1<L>      24 Nov 2023 07:50    TPro  6.0  /  Alb  2.3<L>  /  TBili  0.5  /  DBili  x   /  AST  16  /  ALT  9<L>  /  AlkPhos  52  11-24    PT/INR - ( 24 Nov 2023 07:50 )   PT: 28.4 sec;   INR: 2.49 ratio         PTT - ( 23 Nov 2023 08:46 )  PTT:48.5 sec  Urinalysis Basic - ( 24 Nov 2023 07:50 )    Color: x / Appearance: x / SG: x / pH: x  Gluc: 89 mg/dL / Ketone: x  / Bili: x / Urobili: x   Blood: x / Protein: x / Nitrite: x   Leuk Esterase: x / RBC: x / WBC x   Sq Epi: x / Non Sq Epi: x / Bacteria: x      CAPILLARY BLOOD GLUCOSE            Urinalysis Basic - ( 24 Nov 2023 07:50 )    Color: x / Appearance: x / SG: x / pH: x  Gluc: 89 mg/dL / Ketone: x  / Bili: x / Urobili: x   Blood: x / Protein: x / Nitrite: x   Leuk Esterase: x / RBC: x / WBC x   Sq Epi: x / Non Sq Epi: x / Bacteria: x        MEDICATIONS  (STANDING):  amLODIPine   Tablet 5 milliGRAM(s) Oral daily  cholecalciferol 2000 Unit(s) Oral daily  escitalopram 20 milliGRAM(s) Oral daily  famotidine    Tablet 20 milliGRAM(s) Oral daily  folic acid 1 milliGRAM(s) Oral daily  gabapentin 300 milliGRAM(s) Oral three times a day  hydroxyurea 500 milliGRAM(s) Oral two times a day  lactobacillus acidophilus 1 Tablet(s) Oral daily  levothyroxine 50 MICROGram(s) Oral daily  melatonin 3 milliGRAM(s) Oral at bedtime  metoprolol tartrate 25 milliGRAM(s) Oral two times a day  piperacillin/tazobactam IVPB.- 3.375 Gram(s) IV Intermittent once  piperacillin/tazobactam IVPB.. 3.375 Gram(s) IV Intermittent every 8 hours  senna 1 Tablet(s) Oral daily  simvastatin 10 milliGRAM(s) Oral at bedtime  warfarin 1 milliGRAM(s) Oral once    MEDICATIONS  (PRN):  acetaminophen     Tablet .. 650 milliGRAM(s) Oral every 6 hours PRN Temp greater or equal to 38C (100.4F), Mild Pain (1 - 3)  traMADol 50 milliGRAM(s) Oral every 6 hours PRN Moderate Pain (4 - 6)          PHYSICAL EXAM:  GENERAL: NAD, well-groomed, well-developed  HEAD:  Atraumatic, Normocephalic  CHEST/LUNG: Clear to percussion bilaterally; No rales, rhonchi, wheezing, or rubs  HEART: Regular rate and rhythm; No murmurs, rubs, or gallops  ABDOMEN: Soft, Nontender, Nondistended; Bowel sounds present  EXTREMITIES:  2+ Peripheral Pulses, No clubbing, cyanosis, or edema  LYMPH: No lymphadenopathy noted  SKIN: No rashes or lesions    Care Discussed with Consultants/Other Providers [ ] YES  [ ] NO

## 2023-11-24 NOTE — CONSULT NOTE ADULT - SUBJECTIVE AND OBJECTIVE BOX
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  Patient was given a course of Keflex, with no resolution.  Patient then given a second course of Keflex, with no further resolution.  No known fever.  No trauma.  No other acute injury or complaints.  Per NH papers pt has NKDA (23 Nov 2023 07:40)      Asked to see patient for ID Consult    PAST MEDICAL & SURGICAL HISTORY:  Hypertension      CVA (cerebral vascular accident)      Depression      Constipation      Neuropathy      Hyperlipidemia      Grade I diastolic dysfunction      Afib      Neuropathy      VHD (valvular heart disease)      Aortic valvar stenosis      Hypothyroidism      GERD (gastroesophageal reflux disease)      No significant past surgical history          Allergies    per son &quot;issues with certain anitibiotics&quot; does not remember the names - Patient has tolerated zosyn, ceftriaxone, bactrim, and vancomycin on past admissions. (Unknown)    Intolerances        REVIEW OF SYSTEMS:  All systems below were reviewed and are negative [  ]  HEENT:  ID:  Pulmonary:  Cardiac:  GI:  Renal:  Musculoskeletal:  All other systems above were reviewed and are negative   [  ]    HOME MEDICATIONS:    MEDICATIONS  (STANDING):  amLODIPine   Tablet 5 milliGRAM(s) Oral daily  cholecalciferol 2000 Unit(s) Oral daily  escitalopram 20 milliGRAM(s) Oral daily  famotidine    Tablet 20 milliGRAM(s) Oral daily  folic acid 1 milliGRAM(s) Oral daily  gabapentin 300 milliGRAM(s) Oral three times a day  hydroxyurea 500 milliGRAM(s) Oral two times a day  lactobacillus acidophilus 1 Tablet(s) Oral daily  levothyroxine 50 MICROGram(s) Oral daily  melatonin 3 milliGRAM(s) Oral at bedtime  metoprolol tartrate 25 milliGRAM(s) Oral two times a day  senna 1 Tablet(s) Oral daily  simvastatin 10 milliGRAM(s) Oral at bedtime  vancomycin  IVPB 750 milliGRAM(s) IV Intermittent every 12 hours  warfarin 1 milliGRAM(s) Oral once    MEDICATIONS  (PRN):  acetaminophen     Tablet .. 650 milliGRAM(s) Oral every 6 hours PRN Temp greater or equal to 38C (100.4F), Mild Pain (1 - 3)  traMADol 50 milliGRAM(s) Oral every 6 hours PRN Moderate Pain (4 - 6)      Vital Signs Last 24 Hrs  T(C): 36.7 (24 Nov 2023 11:35), Max: 37 (23 Nov 2023 20:42)  T(F): 98 (24 Nov 2023 11:35), Max: 98.6 (23 Nov 2023 20:42)  HR: 95 (24 Nov 2023 11:35) (81 - 99)  BP: 130/67 (24 Nov 2023 11:35) (130/67 - 154/83)  BP(mean): --  RR: 18 (24 Nov 2023 11:35) (17 - 18)  SpO2: 90% (24 Nov 2023 11:35) (90% - 91%)    Parameters below as of 24 Nov 2023 11:35  Patient On (Oxygen Delivery Method): room air        PHYSICAL EXAM:  HEENT: NC/At, PERRLA  Neck: Soft  Lungs: Coarse BS bilaterally, no wheezing.  Heart: RRR, no murmurs.   Abdomen: Soft, no tenderness.   Genital/ Rectal: No salas catheter.  Extremities: LLE with chronic wound and surrounding erythema. Tender with palpations  Neurologic: Confused.  Vascular:    I&O's Summary    23 Nov 2023 07:01  -  24 Nov 2023 07:00  --------------------------------------------------------  IN: 0 mL / OUT: 600 mL / NET: -600 mL        LABORATORY:                          9.7    8.74  )-----------( 605      ( 24 Nov 2023 07:50 )             31.6           11-24    141  |  106  |  8   ----------------------------<  89  4.1   |  28  |  0.54    Ca    8.1<L>      24 Nov 2023 07:50    TPro  6.0  /  Alb  2.3<L>  /  TBili  0.5  /  DBili  x   /  AST  16  /  ALT  9<L>  /  AlkPhos  52  11-24          LABORATORY:    CBC Full  -  ( 24 Nov 2023 07:50 )  WBC Count : 8.74 K/uL  RBC Count : 2.87 M/uL  Hemoglobin : 9.7 g/dL  Hematocrit : 31.6 %  Platelet Count - Automated : 605 K/uL  Mean Cell Volume : 110.1 fl  Mean Cell Hemoglobin : 33.8 pg  Mean Cell Hemoglobin Concentration : 30.7 gm/dL  Auto Neutrophil # : x  Auto Lymphocyte # : x  Auto Monocyte # : x  Auto Eosinophil # : x  Auto Basophil # : x  Auto Neutrophil % : x  Auto Lymphocyte % : x  Auto Monocyte % : x  Auto Eosinophil % : x  Auto Basophil % : x          11-24    141  |  106  |  8   ----------------------------<  89  4.1   |  28  |  0.54    Ca    8.1<L>      24 Nov 2023 07:50    TPro  6.0  /  Alb  2.3<L>  /  TBili  0.5  /  DBili  x   /  AST  16  /  ALT  9<L>  /  AlkPhos  52  11-24                    Wound culture:                11-22 @ 16:15  Organism --  Culture w/ gram stain --  Specimen Source .Blood Blood-Peripheral    Wound culture:                11-22 @ 16:10  Organism --  Culture w/ gram stain --  Specimen Source .Blood Blood-Peripheral        Abscess culture:             11-22 @ 16:15  Organism --  Gram Stain --  Specimen Source .Blood Blood-Peripheral    Abscess culture:             11-22 @ 16:10  Organism --  Gram Stain --  Specimen Source .Blood Blood-Peripheral            Tissue culture:           11-22 @ 16:15  Organism --  Gram Stain --  Specimen Source .Blood Blood-Peripheral    Tissue culture:           11-22 @ 16:10  Organism --  Gram Stain --  Specimen Source .Blood Blood-Peripheral        Body Fluid Smear & Culture:                        11-22 @ 16:15  AFB Smear  --  Culture Acid Fast Body Fluid w/ Smear  --  Culture Acid Fast Smear Concentrated   --    Culture Results:       No growth at 24 hours  Specimen Source .Blood Blood-Peripheral    Body Fluid Smear & Culture:                        11-22 @ 16:10  AFB Smear  --  Culture Acid Fast Body Fluid w/ Smear  --  Culture Acid Fast Smear Concentrated   --    Culture Results:       No growth at 24 hours  Specimen Source .Blood Blood-Peripheral      Assessment and plan:    1. LLE cellulitis  2. LLE chronic wounds.    . No improvement with cellulitis.  . Discontinue IV Zosyn.  . Add IV Vancomycin 1 gm q12h.   . 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  Patient was given a course of Keflex, with no resolution.  Patient then given a second course of Keflex, with no further resolution.  No known fever.  No trauma.  No other acute injury or complaints.  Per NH papers pt has NKDA (23 Nov 2023 07:40)        PAST MEDICAL & SURGICAL HISTORY:  Hypertension      CVA (cerebral vascular accident)      Depression      Constipation      Neuropathy      Hyperlipidemia      Grade I diastolic dysfunction      Afib      Neuropathy      VHD (valvular heart disease)      Aortic valvar stenosis      Hypothyroidism      GERD (gastroesophageal reflux disease)      No significant past surgical history          Allergies    per son &quot;issues with certain anitibiotics&quot; does not remember the names - Patient has tolerated zosyn, ceftriaxone, bactrim, and vancomycin on past admissions. (Unknown)    Intolerances        REVIEW OF SYSTEMS:  No cough or SOB  No diarrhea.    HOME MEDICATIONS:    MEDICATIONS  (STANDING):  amLODIPine   Tablet 5 milliGRAM(s) Oral daily  cholecalciferol 2000 Unit(s) Oral daily  escitalopram 20 milliGRAM(s) Oral daily  famotidine    Tablet 20 milliGRAM(s) Oral daily  folic acid 1 milliGRAM(s) Oral daily  gabapentin 300 milliGRAM(s) Oral three times a day  hydroxyurea 500 milliGRAM(s) Oral two times a day  lactobacillus acidophilus 1 Tablet(s) Oral daily  levothyroxine 50 MICROGram(s) Oral daily  melatonin 3 milliGRAM(s) Oral at bedtime  metoprolol tartrate 25 milliGRAM(s) Oral two times a day  senna 1 Tablet(s) Oral daily  simvastatin 10 milliGRAM(s) Oral at bedtime  vancomycin  IVPB 750 milliGRAM(s) IV Intermittent every 12 hours  warfarin 1 milliGRAM(s) Oral once    MEDICATIONS  (PRN):  acetaminophen     Tablet .. 650 milliGRAM(s) Oral every 6 hours PRN Temp greater or equal to 38C (100.4F), Mild Pain (1 - 3)  traMADol 50 milliGRAM(s) Oral every 6 hours PRN Moderate Pain (4 - 6)      Vital Signs Last 24 Hrs  T(C): 36.7 (24 Nov 2023 11:35), Max: 37 (23 Nov 2023 20:42)  T(F): 98 (24 Nov 2023 11:35), Max: 98.6 (23 Nov 2023 20:42)  HR: 95 (24 Nov 2023 11:35) (81 - 99)  BP: 130/67 (24 Nov 2023 11:35) (130/67 - 154/83)  BP(mean): --  RR: 18 (24 Nov 2023 11:35) (17 - 18)  SpO2: 90% (24 Nov 2023 11:35) (90% - 91%)    Parameters below as of 24 Nov 2023 11:35  Patient On (Oxygen Delivery Method): room air        PHYSICAL EXAM:  HEENT: NC/At, PERRLA  Neck: Soft  Lungs: Coarse BS bilaterally, no wheezing.  Heart: RRR, no murmurs.   Abdomen: Soft, no tenderness.   Genital/ Rectal: No salas catheter.  Extremities: LLE with chronic wound and surrounding erythema. Tender with palpations  Neurologic: Confused.  Vascular:    I&O's Summary    23 Nov 2023 07:01  -  24 Nov 2023 07:00  --------------------------------------------------------  IN: 0 mL / OUT: 600 mL / NET: -600 mL        LABORATORY:                          9.7    8.74  )-----------( 605      ( 24 Nov 2023 07:50 )             31.6           11-24    141  |  106  |  8   ----------------------------<  89  4.1   |  28  |  0.54    Ca    8.1<L>      24 Nov 2023 07:50    TPro  6.0  /  Alb  2.3<L>  /  TBili  0.5  /  DBili  x   /  AST  16  /  ALT  9<L>  /  AlkPhos  52  11-24          LABORATORY:    CBC Full  -  ( 24 Nov 2023 07:50 )  WBC Count : 8.74 K/uL  RBC Count : 2.87 M/uL  Hemoglobin : 9.7 g/dL  Hematocrit : 31.6 %  Platelet Count - Automated : 605 K/uL  Mean Cell Volume : 110.1 fl  Mean Cell Hemoglobin : 33.8 pg  Mean Cell Hemoglobin Concentration : 30.7 gm/dL  Auto Neutrophil # : x  Auto Lymphocyte # : x  Auto Monocyte # : x  Auto Eosinophil # : x  Auto Basophil # : x  Auto Neutrophil % : x  Auto Lymphocyte % : x  Auto Monocyte % : x  Auto Eosinophil % : x  Auto Basophil % : x          11-24    141  |  106  |  8   ----------------------------<  89  4.1   |  28  |  0.54    Ca    8.1<L>      24 Nov 2023 07:50    TPro  6.0  /  Alb  2.3<L>  /  TBili  0.5  /  DBili  x   /  AST  16  /  ALT  9<L>  /  AlkPhos  52  11-24                    Wound culture:                11-22 @ 16:15  Organism --  Culture w/ gram stain --  Specimen Source .Blood Blood-Peripheral    Wound culture:                11-22 @ 16:10  Organism --  Culture w/ gram stain --  Specimen Source .Blood Blood-Peripheral        Abscess culture:             11-22 @ 16:15  Organism --  Gram Stain --  Specimen Source .Blood Blood-Peripheral    Abscess culture:             11-22 @ 16:10  Organism --  Gram Stain --  Specimen Source .Blood Blood-Peripheral            Tissue culture:           11-22 @ 16:15  Organism --  Gram Stain --  Specimen Source .Blood Blood-Peripheral    Tissue culture:           11-22 @ 16:10  Organism --  Gram Stain --  Specimen Source .Blood Blood-Peripheral        Body Fluid Smear & Culture:                        11-22 @ 16:15  AFB Smear  --  Culture Acid Fast Body Fluid w/ Smear  --  Culture Acid Fast Smear Concentrated   --    Culture Results:       No growth at 24 hours  Specimen Source .Blood Blood-Peripheral    Body Fluid Smear & Culture:                        11-22 @ 16:10  AFB Smear  --  Culture Acid Fast Body Fluid w/ Smear  --  Culture Acid Fast Smear Concentrated   --    Culture Results:       No growth at 24 hours  Specimen Source .Blood Blood-Peripheral      Assessment and plan:    1. LLE cellulitis  2. Chronic  LLE chronic wounds.  3. History of melanoma.       . No improvement with cellulitis.  . Discontinue IV Zosyn.  . Add IV Vancomycin 1 gm q12h. Vanco trough before the 4th dose  . Wound care as per podiatry    Thank you

## 2023-11-24 NOTE — CARE COORDINATION ASSESSMENT. - OTHER PERTINENT REFERRAL INFORMATION
Mrs. Rafaela Wilcox is a 89y/o , domiciled white female, w/ PMHx as indicated below, who presents to [facility] secondary to cellulitis.  secured collateral information from patient's assisted living facility wellness dept for completion of care coordination assessment. Patient is a resident of [facility] in [place] where she receives assist w/ all activities of daily living (ADLs); she utilizes a wheelchair and is transferred using a Halina lit device. Mrs. Wilcox has been receiving home care services through the agency [facility] for her wound care, and patient has not been utilizing any home O2 services prior to admission. On discharge, all medications should be e-scribed to [facility] [place], and a [facility]-[id number] form would be needed if there are significant changes to medications.

## 2023-11-24 NOTE — CAREGIVER ENGAGEMENT NOTE - CAREGIVER EDUCATION NOTES - FREE TEXT
Mrs. Rafaela Wilcox is a 89y/o , domiciled white female, w/ PMHx as indicated below, who presents to [facility] secondary to cellulitis.  secured collateral information from patient's assisted living facility wellness dept for completion of care coordination assessment. Patient is a resident of [facility] in [place] where she receives assist w/ all activities of daily living (ADLs); she utilizes a wheelchair and is transferred using a Halina lit device. Mrs. Wilcox has been receiving home care services through the agency [facility] for her wound care, and patient has not been utilizing any home O2 services prior to admission. On discharge, all medications should be e-scribed to [facility] [place], and a [facility]-[id number] form would be needed if there are significant changes to medications.

## 2023-11-25 LAB
ALBUMIN SERPL ELPH-MCNC: 2.3 G/DL — LOW (ref 3.3–5)
ALBUMIN SERPL ELPH-MCNC: 2.3 G/DL — LOW (ref 3.3–5)
ALP SERPL-CCNC: 54 U/L — SIGNIFICANT CHANGE UP (ref 40–120)
ALP SERPL-CCNC: 54 U/L — SIGNIFICANT CHANGE UP (ref 40–120)
ALT FLD-CCNC: 9 U/L — LOW (ref 12–78)
ALT FLD-CCNC: 9 U/L — LOW (ref 12–78)
ANION GAP SERPL CALC-SCNC: 5 MMOL/L — SIGNIFICANT CHANGE UP (ref 5–17)
ANION GAP SERPL CALC-SCNC: 5 MMOL/L — SIGNIFICANT CHANGE UP (ref 5–17)
AST SERPL-CCNC: 14 U/L — LOW (ref 15–37)
AST SERPL-CCNC: 14 U/L — LOW (ref 15–37)
BILIRUB SERPL-MCNC: 0.4 MG/DL — SIGNIFICANT CHANGE UP (ref 0.2–1.2)
BILIRUB SERPL-MCNC: 0.4 MG/DL — SIGNIFICANT CHANGE UP (ref 0.2–1.2)
BUN SERPL-MCNC: 7 MG/DL — SIGNIFICANT CHANGE UP (ref 7–23)
BUN SERPL-MCNC: 7 MG/DL — SIGNIFICANT CHANGE UP (ref 7–23)
CALCIUM SERPL-MCNC: 8.5 MG/DL — SIGNIFICANT CHANGE UP (ref 8.5–10.1)
CALCIUM SERPL-MCNC: 8.5 MG/DL — SIGNIFICANT CHANGE UP (ref 8.5–10.1)
CHLORIDE SERPL-SCNC: 107 MMOL/L — SIGNIFICANT CHANGE UP (ref 96–108)
CHLORIDE SERPL-SCNC: 107 MMOL/L — SIGNIFICANT CHANGE UP (ref 96–108)
CO2 SERPL-SCNC: 31 MMOL/L — SIGNIFICANT CHANGE UP (ref 22–31)
CO2 SERPL-SCNC: 31 MMOL/L — SIGNIFICANT CHANGE UP (ref 22–31)
CREAT SERPL-MCNC: 0.54 MG/DL — SIGNIFICANT CHANGE UP (ref 0.5–1.3)
CREAT SERPL-MCNC: 0.54 MG/DL — SIGNIFICANT CHANGE UP (ref 0.5–1.3)
EGFR: 87 ML/MIN/1.73M2 — SIGNIFICANT CHANGE UP
EGFR: 87 ML/MIN/1.73M2 — SIGNIFICANT CHANGE UP
GLUCOSE SERPL-MCNC: 95 MG/DL — SIGNIFICANT CHANGE UP (ref 70–99)
GLUCOSE SERPL-MCNC: 95 MG/DL — SIGNIFICANT CHANGE UP (ref 70–99)
HCT VFR BLD CALC: 31.3 % — LOW (ref 34.5–45)
HCT VFR BLD CALC: 31.3 % — LOW (ref 34.5–45)
HGB BLD-MCNC: 9.9 G/DL — LOW (ref 11.5–15.5)
HGB BLD-MCNC: 9.9 G/DL — LOW (ref 11.5–15.5)
INR BLD: 2.15 RATIO — HIGH (ref 0.85–1.18)
INR BLD: 2.15 RATIO — HIGH (ref 0.85–1.18)
MCHC RBC-ENTMCNC: 31.6 GM/DL — LOW (ref 32–36)
MCHC RBC-ENTMCNC: 31.6 GM/DL — LOW (ref 32–36)
MCHC RBC-ENTMCNC: 35 PG — HIGH (ref 27–34)
MCHC RBC-ENTMCNC: 35 PG — HIGH (ref 27–34)
MCV RBC AUTO: 110.6 FL — HIGH (ref 80–100)
MCV RBC AUTO: 110.6 FL — HIGH (ref 80–100)
NRBC # BLD: 0 /100 WBCS — SIGNIFICANT CHANGE UP (ref 0–0)
NRBC # BLD: 0 /100 WBCS — SIGNIFICANT CHANGE UP (ref 0–0)
PLATELET # BLD AUTO: 586 K/UL — HIGH (ref 150–400)
PLATELET # BLD AUTO: 586 K/UL — HIGH (ref 150–400)
POTASSIUM SERPL-MCNC: 4.6 MMOL/L — SIGNIFICANT CHANGE UP (ref 3.5–5.3)
POTASSIUM SERPL-MCNC: 4.6 MMOL/L — SIGNIFICANT CHANGE UP (ref 3.5–5.3)
POTASSIUM SERPL-SCNC: 4.6 MMOL/L — SIGNIFICANT CHANGE UP (ref 3.5–5.3)
POTASSIUM SERPL-SCNC: 4.6 MMOL/L — SIGNIFICANT CHANGE UP (ref 3.5–5.3)
PROT SERPL-MCNC: 5.8 G/DL — LOW (ref 6–8.3)
PROT SERPL-MCNC: 5.8 G/DL — LOW (ref 6–8.3)
PROTHROM AB SERPL-ACNC: 24.6 SEC — HIGH (ref 9.5–13)
PROTHROM AB SERPL-ACNC: 24.6 SEC — HIGH (ref 9.5–13)
RBC # BLD: 2.83 M/UL — LOW (ref 3.8–5.2)
RBC # BLD: 2.83 M/UL — LOW (ref 3.8–5.2)
RBC # FLD: 17.1 % — HIGH (ref 10.3–14.5)
RBC # FLD: 17.1 % — HIGH (ref 10.3–14.5)
SODIUM SERPL-SCNC: 143 MMOL/L — SIGNIFICANT CHANGE UP (ref 135–145)
SODIUM SERPL-SCNC: 143 MMOL/L — SIGNIFICANT CHANGE UP (ref 135–145)
VANCOMYCIN TROUGH SERPL-MCNC: 4.5 UG/ML — LOW (ref 10–20)
VANCOMYCIN TROUGH SERPL-MCNC: 4.5 UG/ML — LOW (ref 10–20)
WBC # BLD: 8.23 K/UL — SIGNIFICANT CHANGE UP (ref 3.8–10.5)
WBC # BLD: 8.23 K/UL — SIGNIFICANT CHANGE UP (ref 3.8–10.5)
WBC # FLD AUTO: 8.23 K/UL — SIGNIFICANT CHANGE UP (ref 3.8–10.5)
WBC # FLD AUTO: 8.23 K/UL — SIGNIFICANT CHANGE UP (ref 3.8–10.5)

## 2023-11-25 RX ORDER — WARFARIN SODIUM 2.5 MG/1
1 TABLET ORAL ONCE
Refills: 0 | Status: COMPLETED | OUTPATIENT
Start: 2023-11-25 | End: 2023-11-25

## 2023-11-25 RX ORDER — ACETAMINOPHEN 500 MG
1000 TABLET ORAL ONCE
Refills: 0 | Status: COMPLETED | OUTPATIENT
Start: 2023-11-25 | End: 2023-11-25

## 2023-11-25 RX ORDER — VANCOMYCIN HCL 1 G
1000 VIAL (EA) INTRAVENOUS EVERY 12 HOURS
Refills: 0 | Status: DISCONTINUED | OUTPATIENT
Start: 2023-11-26 | End: 2023-11-28

## 2023-11-25 RX ADMIN — TRAMADOL HYDROCHLORIDE 50 MILLIGRAM(S): 50 TABLET ORAL at 10:53

## 2023-11-25 RX ADMIN — TRAMADOL HYDROCHLORIDE 50 MILLIGRAM(S): 50 TABLET ORAL at 20:19

## 2023-11-25 RX ADMIN — SENNA PLUS 1 TABLET(S): 8.6 TABLET ORAL at 13:16

## 2023-11-25 RX ADMIN — GABAPENTIN 300 MILLIGRAM(S): 400 CAPSULE ORAL at 21:51

## 2023-11-25 RX ADMIN — Medication 1 TABLET(S): at 13:16

## 2023-11-25 RX ADMIN — Medication 400 MILLIGRAM(S): at 22:49

## 2023-11-25 RX ADMIN — Medication 2000 UNIT(S): at 13:22

## 2023-11-25 RX ADMIN — Medication 1 MILLIGRAM(S): at 13:16

## 2023-11-25 RX ADMIN — Medication 50 MICROGRAM(S): at 05:14

## 2023-11-25 RX ADMIN — HYDROXYUREA 500 MILLIGRAM(S): 500 CAPSULE ORAL at 05:14

## 2023-11-25 RX ADMIN — SIMVASTATIN 10 MILLIGRAM(S): 20 TABLET, FILM COATED ORAL at 21:51

## 2023-11-25 RX ADMIN — GABAPENTIN 300 MILLIGRAM(S): 400 CAPSULE ORAL at 13:17

## 2023-11-25 RX ADMIN — GABAPENTIN 300 MILLIGRAM(S): 400 CAPSULE ORAL at 05:14

## 2023-11-25 RX ADMIN — FAMOTIDINE 20 MILLIGRAM(S): 10 INJECTION INTRAVENOUS at 13:16

## 2023-11-25 RX ADMIN — Medication 1000 MILLIGRAM(S): at 23:29

## 2023-11-25 RX ADMIN — TRAMADOL HYDROCHLORIDE 50 MILLIGRAM(S): 50 TABLET ORAL at 11:15

## 2023-11-25 RX ADMIN — Medication 250 MILLIGRAM(S): at 19:36

## 2023-11-25 RX ADMIN — ESCITALOPRAM OXALATE 20 MILLIGRAM(S): 10 TABLET, FILM COATED ORAL at 13:16

## 2023-11-25 RX ADMIN — WARFARIN SODIUM 1 MILLIGRAM(S): 2.5 TABLET ORAL at 21:58

## 2023-11-25 RX ADMIN — HYDROXYUREA 500 MILLIGRAM(S): 500 CAPSULE ORAL at 19:36

## 2023-11-25 RX ADMIN — TRAMADOL HYDROCHLORIDE 50 MILLIGRAM(S): 50 TABLET ORAL at 19:36

## 2023-11-25 RX ADMIN — Medication 3 MILLIGRAM(S): at 21:51

## 2023-11-25 RX ADMIN — Medication 250 MILLIGRAM(S): at 05:14

## 2023-11-25 NOTE — PROGRESS NOTE ADULT - SUBJECTIVE AND OBJECTIVE BOX
Patient is a 89y old  Female who presents with a chief complaint of cellulitis (25 Nov 2023 05:10)    Date of servie : 11-25-23 @ 11:06  INTERVAL HPI/OVERNIGHT EVENTS:  T(C): 36.8 (11-25-23 @ 04:48), Max: 37.1 (11-24-23 @ 20:08)  HR: 82 (11-25-23 @ 04:48) (79 - 95)  BP: 133/65 (11-25-23 @ 04:48) (124/64 - 156/76)  RR: 17 (11-25-23 @ 04:48) (17 - 18)  SpO2: 90% (11-25-23 @ 04:48) (90% - 90%)  Wt(kg): --  I&O's Summary    24 Nov 2023 07:01  -  25 Nov 2023 07:00  --------------------------------------------------------  IN: 0 mL / OUT: 800 mL / NET: -800 mL        LABS:                        9.9    8.23  )-----------( 586      ( 25 Nov 2023 07:15 )             31.3     11-25    143  |  107  |  7   ----------------------------<  95  4.6   |  31  |  0.54    Ca    8.5      25 Nov 2023 07:15    TPro  5.8<L>  /  Alb  2.3<L>  /  TBili  0.4  /  DBili  x   /  AST  14<L>  /  ALT  9<L>  /  AlkPhos  54  11-25    PT/INR - ( 25 Nov 2023 07:15 )   PT: 24.6 sec;   INR: 2.15 ratio           Urinalysis Basic - ( 25 Nov 2023 07:15 )    Color: x / Appearance: x / SG: x / pH: x  Gluc: 95 mg/dL / Ketone: x  / Bili: x / Urobili: x   Blood: x / Protein: x / Nitrite: x   Leuk Esterase: x / RBC: x / WBC x   Sq Epi: x / Non Sq Epi: x / Bacteria: x      CAPILLARY BLOOD GLUCOSE            Urinalysis Basic - ( 25 Nov 2023 07:15 )    Color: x / Appearance: x / SG: x / pH: x  Gluc: 95 mg/dL / Ketone: x  / Bili: x / Urobili: x   Blood: x / Protein: x / Nitrite: x   Leuk Esterase: x / RBC: x / WBC x   Sq Epi: x / Non Sq Epi: x / Bacteria: x        MEDICATIONS  (STANDING):  amLODIPine   Tablet 5 milliGRAM(s) Oral daily  cholecalciferol 2000 Unit(s) Oral daily  escitalopram 20 milliGRAM(s) Oral daily  famotidine    Tablet 20 milliGRAM(s) Oral daily  folic acid 1 milliGRAM(s) Oral daily  gabapentin 300 milliGRAM(s) Oral three times a day  hydroxyurea 500 milliGRAM(s) Oral two times a day  lactobacillus acidophilus 1 Tablet(s) Oral daily  levothyroxine 50 MICROGram(s) Oral daily  melatonin 3 milliGRAM(s) Oral at bedtime  metoprolol tartrate 25 milliGRAM(s) Oral two times a day  senna 1 Tablet(s) Oral daily  simvastatin 10 milliGRAM(s) Oral at bedtime  vancomycin  IVPB 750 milliGRAM(s) IV Intermittent every 12 hours  warfarin 1 milliGRAM(s) Oral once    MEDICATIONS  (PRN):  acetaminophen     Tablet .. 650 milliGRAM(s) Oral every 6 hours PRN Temp greater or equal to 38C (100.4F), Mild Pain (1 - 3)  traMADol 50 milliGRAM(s) Oral every 6 hours PRN Moderate Pain (4 - 6)          PHYSICAL EXAM:  GENERAL: NAD, well-groomed, well-developed  HEAD:  Atraumatic, Normocephalic  CHEST/LUNG: Clear to percussion bilaterally; No rales, rhonchi, wheezing, or rubs  HEART: Regular rate and rhythm; No murmurs, rubs, or gallops  ABDOMEN: Soft, Nontender, Nondistended; Bowel sounds present  EXTREMITIES:  2+ Peripheral Pulses, No clubbing, cyanosis, or edema  LYMPH: No lymphadenopathy noted  SKIN: No rashes or lesions    Care Discussed with Consultants/Other Providers [ ] YES  [ ] NO

## 2023-11-25 NOTE — PROGRESS NOTE ADULT - SUBJECTIVE AND OBJECTIVE BOX
Date/Time Patient Seen:  		  Referring MD:   Data Reviewed	       Patient is a 89y old  Female who presents with a chief complaint of cellulitis (24 Nov 2023 16:12)      Subjective/HPI     PAST MEDICAL & SURGICAL HISTORY:  Hypertension    CVA (cerebral vascular accident)    Depression    Constipation    Neuropathy    Constipation    Hyperlipidemia    Grade I diastolic dysfunction    Afib    Neuropathy    VHD (valvular heart disease)    Aortic valvar stenosis    Hypothyroidism    GERD (gastroesophageal reflux disease)    No significant past surgical history          Medication list         MEDICATIONS  (STANDING):  amLODIPine   Tablet 5 milliGRAM(s) Oral daily  cholecalciferol 2000 Unit(s) Oral daily  escitalopram 20 milliGRAM(s) Oral daily  famotidine    Tablet 20 milliGRAM(s) Oral daily  folic acid 1 milliGRAM(s) Oral daily  gabapentin 300 milliGRAM(s) Oral three times a day  hydroxyurea 500 milliGRAM(s) Oral two times a day  lactobacillus acidophilus 1 Tablet(s) Oral daily  levothyroxine 50 MICROGram(s) Oral daily  melatonin 3 milliGRAM(s) Oral at bedtime  metoprolol tartrate 25 milliGRAM(s) Oral two times a day  senna 1 Tablet(s) Oral daily  simvastatin 10 milliGRAM(s) Oral at bedtime  vancomycin  IVPB 750 milliGRAM(s) IV Intermittent every 12 hours    MEDICATIONS  (PRN):  acetaminophen     Tablet .. 650 milliGRAM(s) Oral every 6 hours PRN Temp greater or equal to 38C (100.4F), Mild Pain (1 - 3)  traMADol 50 milliGRAM(s) Oral every 6 hours PRN Moderate Pain (4 - 6)         Vitals log        ICU Vital Signs Last 24 Hrs  T(C): 36.8 (25 Nov 2023 04:48), Max: 37.1 (24 Nov 2023 20:08)  T(F): 98.2 (25 Nov 2023 04:48), Max: 98.7 (24 Nov 2023 20:08)  HR: 82 (25 Nov 2023 04:48) (79 - 95)  BP: 133/65 (25 Nov 2023 04:48) (124/64 - 156/76)  BP(mean): --  ABP: --  ABP(mean): --  RR: 17 (25 Nov 2023 04:48) (17 - 18)  SpO2: 90% (25 Nov 2023 04:48) (90% - 90%)    O2 Parameters below as of 25 Nov 2023 04:48  Patient On (Oxygen Delivery Method): room air                 Input and Output:  I&O's Detail    23 Nov 2023 07:01  -  24 Nov 2023 07:00  --------------------------------------------------------  IN:  Total IN: 0 mL    OUT:    Voided (mL): 600 mL  Total OUT: 600 mL    Total NET: -600 mL      24 Nov 2023 07:01  -  25 Nov 2023 05:11  --------------------------------------------------------  IN:  Total IN: 0 mL    OUT:    Voided (mL): 300 mL  Total OUT: 300 mL    Total NET: -300 mL          Lab Data                        9.7    8.74  )-----------( 605      ( 24 Nov 2023 07:50 )             31.6     11-24    141  |  106  |  8   ----------------------------<  89  4.1   |  28  |  0.54    Ca    8.1<L>      24 Nov 2023 07:50    TPro  6.0  /  Alb  2.3<L>  /  TBili  0.5  /  DBili  x   /  AST  16  /  ALT  9<L>  /  AlkPhos  52  11-24            Review of Systems	      Objective     Physical Examination    heart s1s2  lung dc BS  head nc      Pertinent Lab findings & Imaging      Summer:  NO   Adequate UO     I&O's Detail    23 Nov 2023 07:01  -  24 Nov 2023 07:00  --------------------------------------------------------  IN:  Total IN: 0 mL    OUT:    Voided (mL): 600 mL  Total OUT: 600 mL    Total NET: -600 mL      24 Nov 2023 07:01  -  25 Nov 2023 05:11  --------------------------------------------------------  IN:  Total IN: 0 mL    OUT:    Voided (mL): 300 mL  Total OUT: 300 mL    Total NET: -300 mL               Discussed with:     Cultures:	        Radiology

## 2023-11-26 LAB
INR BLD: 2.05 RATIO — HIGH (ref 0.85–1.18)
INR BLD: 2.05 RATIO — HIGH (ref 0.85–1.18)
PROTHROM AB SERPL-ACNC: 23.5 SEC — HIGH (ref 9.5–13)
PROTHROM AB SERPL-ACNC: 23.5 SEC — HIGH (ref 9.5–13)

## 2023-11-26 RX ORDER — WARFARIN SODIUM 2.5 MG/1
2 TABLET ORAL ONCE
Refills: 0 | Status: COMPLETED | OUTPATIENT
Start: 2023-11-26 | End: 2023-11-26

## 2023-11-26 RX ORDER — ACETAMINOPHEN 500 MG
1000 TABLET ORAL ONCE
Refills: 0 | Status: COMPLETED | OUTPATIENT
Start: 2023-11-26 | End: 2023-11-26

## 2023-11-26 RX ADMIN — Medication 1 TABLET(S): at 12:53

## 2023-11-26 RX ADMIN — ESCITALOPRAM OXALATE 20 MILLIGRAM(S): 10 TABLET, FILM COATED ORAL at 12:53

## 2023-11-26 RX ADMIN — Medication 250 MILLIGRAM(S): at 06:00

## 2023-11-26 RX ADMIN — TRAMADOL HYDROCHLORIDE 50 MILLIGRAM(S): 50 TABLET ORAL at 12:53

## 2023-11-26 RX ADMIN — Medication 650 MILLIGRAM(S): at 04:33

## 2023-11-26 RX ADMIN — TRAMADOL HYDROCHLORIDE 50 MILLIGRAM(S): 50 TABLET ORAL at 06:29

## 2023-11-26 RX ADMIN — GABAPENTIN 300 MILLIGRAM(S): 400 CAPSULE ORAL at 06:29

## 2023-11-26 RX ADMIN — SIMVASTATIN 10 MILLIGRAM(S): 20 TABLET, FILM COATED ORAL at 22:07

## 2023-11-26 RX ADMIN — Medication 1 MILLIGRAM(S): at 12:53

## 2023-11-26 RX ADMIN — Medication 25 MILLIGRAM(S): at 18:29

## 2023-11-26 RX ADMIN — Medication 650 MILLIGRAM(S): at 10:54

## 2023-11-26 RX ADMIN — TRAMADOL HYDROCHLORIDE 50 MILLIGRAM(S): 50 TABLET ORAL at 13:25

## 2023-11-26 RX ADMIN — WARFARIN SODIUM 2 MILLIGRAM(S): 2.5 TABLET ORAL at 22:09

## 2023-11-26 RX ADMIN — Medication 250 MILLIGRAM(S): at 18:28

## 2023-11-26 RX ADMIN — GABAPENTIN 300 MILLIGRAM(S): 400 CAPSULE ORAL at 14:28

## 2023-11-26 RX ADMIN — Medication 3 MILLIGRAM(S): at 22:07

## 2023-11-26 RX ADMIN — Medication 2000 UNIT(S): at 12:53

## 2023-11-26 RX ADMIN — GABAPENTIN 300 MILLIGRAM(S): 400 CAPSULE ORAL at 22:07

## 2023-11-26 RX ADMIN — Medication 50 MICROGRAM(S): at 06:30

## 2023-11-26 RX ADMIN — HYDROXYUREA 500 MILLIGRAM(S): 500 CAPSULE ORAL at 06:42

## 2023-11-26 RX ADMIN — Medication 650 MILLIGRAM(S): at 22:07

## 2023-11-26 RX ADMIN — Medication 650 MILLIGRAM(S): at 11:42

## 2023-11-26 RX ADMIN — HYDROXYUREA 500 MILLIGRAM(S): 500 CAPSULE ORAL at 18:29

## 2023-11-26 RX ADMIN — SENNA PLUS 1 TABLET(S): 8.6 TABLET ORAL at 12:53

## 2023-11-26 RX ADMIN — FAMOTIDINE 20 MILLIGRAM(S): 10 INJECTION INTRAVENOUS at 12:53

## 2023-11-26 NOTE — PROGRESS NOTE ADULT - SUBJECTIVE AND OBJECTIVE BOX
PROGRESS NOTE  Patient is a 89y old  Female who presents with a chief complaint of cellulitis (26 Nov 2023 05:13)    Chart and available morning labs /imaging are reviewed electronically , urgent issues addressed . More information  is being added upon completion of rounds , when more information is collected and management discussed with consultants , medical staff and social service/case management on the floor   OVERNIGHT  No new issues reported by medical staff . 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 with has a cellulitis in the left lower leg with chronic wounds.  Patient was given a course of Keflex, with no resolution.  Patient then given a second course of Keflex, with no further resolution.  No known fever.  No trauma.  No other acute injury or complaints.  Per [facility] papers pt has NKDA (23 Nov 2023 07:40)    PAST MEDICAL & SURGICAL HISTORY:  Hypertension      CVA (cerebral vascular accident)      Depression      Constipation      Neuropathy      Hyperlipidemia      Grade I diastolic dysfunction      Afib      Neuropathy      VHD (valvular heart disease)      Aortic valvar stenosis      Hypothyroidism      GERD (gastroesophageal reflux disease)      No significant past surgical history          MEDICATIONS  (STANDING):  amLODIPine   Tablet 5 milliGRAM(s) Oral daily  cholecalciferol 2000 Unit(s) Oral daily  escitalopram 20 milliGRAM(s) Oral daily  famotidine    Tablet 20 milliGRAM(s) Oral daily  folic acid 1 milliGRAM(s) Oral daily  gabapentin 300 milliGRAM(s) Oral three times a day  hydroxyurea 500 milliGRAM(s) Oral two times a day  lactobacillus acidophilus 1 Tablet(s) Oral daily  levothyroxine 50 MICROGram(s) Oral daily  melatonin 3 milliGRAM(s) Oral at bedtime  metoprolol tartrate 25 milliGRAM(s) Oral two times a day  senna 1 Tablet(s) Oral daily  simvastatin 10 milliGRAM(s) Oral at bedtime  vancomycin  IVPB 1000 milliGRAM(s) IV Intermittent every 12 hours  warfarin 2 milliGRAM(s) Oral once    MEDICATIONS  (PRN):  acetaminophen     Tablet .. 650 milliGRAM(s) Oral every 6 hours PRN Temp greater or equal to 38C (100.4F), Mild Pain (1 - 3)  traMADol 50 milliGRAM(s) Oral every 6 hours PRN Moderate Pain (4 - 6)      OBJECTIVE    T(C): 36.8 (11-26-23 @ 12:33), Max: 36.8 (11-25-23 @ 20:22)  HR: 85 (11-26-23 @ 12:33) (85 - 95)  BP: 118/75 (11-26-23 @ 12:33) (118/75 - 151/74)  RR: 18 (11-26-23 @ 12:33) (17 - 18)  SpO2: 91% (11-26-23 @ 12:33) (91% - 91%)  Wt(kg): --  I&O's Summary    25 Nov 2023 07:01  -  26 Nov 2023 07:00  --------------------------------------------------------  IN: 0 mL / OUT: 750 mL / NET: -750 mL          REVIEW OF SYSTEMS:  CONSTITUTIONAL: No fever, weight loss, or fatigue  EYES: No eye pain, visual disturbances, or discharge  ENMT:   No sinus or throat pain  NECK: No pain or stiffness  RESPIRATORY: No cough, wheezing, chills or hemoptysis; No shortness of breath  CARDIOVASCULAR: No chest pain, palpitations, dizziness, or leg swelling  GASTROINTESTINAL: No abdominal pain. No nausea, vomiting; No diarrhea or constipation. No melena or hematochezia.  GENITOURINARY: No dysuria, frequency, hematuria, or incontinence  NEUROLOGICAL: No headaches, memory loss, loss of strength, numbness, or tremors  SKIN: No itching, burning, rashes, or lesions   MUSCULOSKELETAL: No joint pain or swelling; No muscle, back, or extremity pain    PHYSICAL EXAM:  Appearance: NAD. VS past 24 hrs -as above   HEENT:   Moist oral mucosa. Conjunctiva clear b/l.   Neck : supple  Respiratory: Lungs CTAB.  Gastrointestinal:  Soft, nontender. No rebound. No rigidity. BS present	  Cardiovascular: RRR ,S1S2 present  Neurologic: Non-focal. Moving all extremities.  Extremities: No edema. No erythema. No calf tenderness.  Skin: No rashes, No ecchymoses, No cyanosis.	  wounds ,skin lesions-See skin assesment flow sheet   LABS:                        9.9    8.23  )-----------( 586      ( 25 Nov 2023 07:15 )             31.3     11-25    143  |  107  |  7   ----------------------------<  95  4.6   |  31  |  0.54    Ca    8.5      25 Nov 2023 07:15    TPro  5.8<L>  /  Alb  2.3<L>  /  TBili  0.4  /  DBili  x   /  AST  14<L>  /  ALT  9<L>  /  AlkPhos  54  11-25    CAPILLARY BLOOD GLUCOSE        PT/INR - ( 26 Nov 2023 08:05 )   PT: 23.5 sec;   INR: 2.05 ratio           Urinalysis Basic - ( 25 Nov 2023 07:15 )    Color: x / Appearance: x / SG: x / pH: x  Gluc: 95 mg/dL / Ketone: x  / Bili: x / Urobili: x   Blood: x / Protein: x / Nitrite: x   Leuk Esterase: x / RBC: x / WBC x   Sq Epi: x / Non Sq Epi: x / Bacteria: x        Culture - Blood (collected 22 Nov 2023 16:15)  Source: .Blood Blood-Peripheral  Preliminary Report (26 Nov 2023 01:01):    No growth at 72 Hours    Culture - Blood (collected 22 Nov 2023 16:10)  Source: .Blood Blood-Peripheral  Preliminary Report (26 Nov 2023 01:01):    No growth at 72 Hours      RADIOLOGY & ADDITIONAL TESTS:   reviewed elctronically  ASSESSMENT/PLAN: 	    25 minutes aggregate time was spent on this visit, 50% visit time spent in care co-ordination with other attendings and counselling patient .I have discussed care plan with patient / HCP/family member ,who expressed understanding of problems treatment and their effect and side effects, alternatives in details. I have asked if they have any questions and concerns and appropriately addressed them to best of my ability.

## 2023-11-26 NOTE — PROGRESS NOTE ADULT - SUBJECTIVE AND OBJECTIVE BOX
Date/Time Patient Seen:  		  Referring MD:   Data Reviewed	       Patient is a 89y old  Female who presents with a chief complaint of cellulitis (25 Nov 2023 11:06)      Subjective/HPI     PAST MEDICAL & SURGICAL HISTORY:  Hypertension    CVA (cerebral vascular accident)    Depression    Constipation    Neuropathy    Constipation    Hyperlipidemia    Grade I diastolic dysfunction    Afib    Neuropathy    VHD (valvular heart disease)    Aortic valvar stenosis    Hypothyroidism    GERD (gastroesophageal reflux disease)    No significant past surgical history          Medication list         MEDICATIONS  (STANDING):  amLODIPine   Tablet 5 milliGRAM(s) Oral daily  cholecalciferol 2000 Unit(s) Oral daily  escitalopram 20 milliGRAM(s) Oral daily  famotidine    Tablet 20 milliGRAM(s) Oral daily  folic acid 1 milliGRAM(s) Oral daily  gabapentin 300 milliGRAM(s) Oral three times a day  hydroxyurea 500 milliGRAM(s) Oral two times a day  lactobacillus acidophilus 1 Tablet(s) Oral daily  levothyroxine 50 MICROGram(s) Oral daily  melatonin 3 milliGRAM(s) Oral at bedtime  metoprolol tartrate 25 milliGRAM(s) Oral two times a day  senna 1 Tablet(s) Oral daily  simvastatin 10 milliGRAM(s) Oral at bedtime  vancomycin  IVPB 1000 milliGRAM(s) IV Intermittent every 12 hours    MEDICATIONS  (PRN):  acetaminophen     Tablet .. 650 milliGRAM(s) Oral every 6 hours PRN Temp greater or equal to 38C (100.4F), Mild Pain (1 - 3)  traMADol 50 milliGRAM(s) Oral every 6 hours PRN Moderate Pain (4 - 6)         Vitals log        ICU Vital Signs Last 24 Hrs  T(C): 36.8 (25 Nov 2023 20:22), Max: 37.1 (25 Nov 2023 11:31)  T(F): 98.2 (25 Nov 2023 20:22), Max: 98.8 (25 Nov 2023 11:31)  HR: 94 (25 Nov 2023 20:22) (89 - 94)  BP: 151/74 (25 Nov 2023 20:22) (110/62 - 151/74)  BP(mean): --  ABP: --  ABP(mean): --  RR: 17 (25 Nov 2023 20:22) (17 - 18)  SpO2: 91% (25 Nov 2023 20:22) (91% - 92%)    O2 Parameters below as of 25 Nov 2023 20:22  Patient On (Oxygen Delivery Method): room air                 Input and Output:  I&O's Detail    24 Nov 2023 07:01  -  25 Nov 2023 07:00  --------------------------------------------------------  IN:  Total IN: 0 mL    OUT:    Voided (mL): 800 mL  Total OUT: 800 mL    Total NET: -800 mL      25 Nov 2023 07:01  -  26 Nov 2023 05:14  --------------------------------------------------------  IN:  Total IN: 0 mL    OUT:    Voided (mL): 650 mL  Total OUT: 650 mL    Total NET: -650 mL          Lab Data                        9.9    8.23  )-----------( 586      ( 25 Nov 2023 07:15 )             31.3     11-25    143  |  107  |  7   ----------------------------<  95  4.6   |  31  |  0.54    Ca    8.5      25 Nov 2023 07:15    TPro  5.8<L>  /  Alb  2.3<L>  /  TBili  0.4  /  DBili  x   /  AST  14<L>  /  ALT  9<L>  /  AlkPhos  54  11-25            Review of Systems	      Objective     Physical Examination    heart s1s2  lung dc BS  head nc      Pertinent Lab findings & Imaging      Summer:  NO   Adequate UO     I&O's Detail    24 Nov 2023 07:01  -  25 Nov 2023 07:00  --------------------------------------------------------  IN:  Total IN: 0 mL    OUT:    Voided (mL): 800 mL  Total OUT: 800 mL    Total NET: -800 mL      25 Nov 2023 07:01  -  26 Nov 2023 05:14  --------------------------------------------------------  IN:  Total IN: 0 mL    OUT:    Voided (mL): 650 mL  Total OUT: 650 mL    Total NET: -650 mL               Discussed with:     Cultures:	        Radiology

## 2023-11-27 LAB
ANION GAP SERPL CALC-SCNC: 4 MMOL/L — LOW (ref 5–17)
ANION GAP SERPL CALC-SCNC: 4 MMOL/L — LOW (ref 5–17)
APTT BLD: 43.6 SEC — HIGH (ref 24.5–35.6)
APTT BLD: 43.6 SEC — HIGH (ref 24.5–35.6)
BUN SERPL-MCNC: 7 MG/DL — SIGNIFICANT CHANGE UP (ref 7–23)
BUN SERPL-MCNC: 7 MG/DL — SIGNIFICANT CHANGE UP (ref 7–23)
CALCIUM SERPL-MCNC: 8.1 MG/DL — LOW (ref 8.5–10.1)
CALCIUM SERPL-MCNC: 8.1 MG/DL — LOW (ref 8.5–10.1)
CHLORIDE SERPL-SCNC: 109 MMOL/L — HIGH (ref 96–108)
CHLORIDE SERPL-SCNC: 109 MMOL/L — HIGH (ref 96–108)
CO2 SERPL-SCNC: 30 MMOL/L — SIGNIFICANT CHANGE UP (ref 22–31)
CO2 SERPL-SCNC: 30 MMOL/L — SIGNIFICANT CHANGE UP (ref 22–31)
CREAT SERPL-MCNC: 0.5 MG/DL — SIGNIFICANT CHANGE UP (ref 0.5–1.3)
CREAT SERPL-MCNC: 0.5 MG/DL — SIGNIFICANT CHANGE UP (ref 0.5–1.3)
EGFR: 88 ML/MIN/1.73M2 — SIGNIFICANT CHANGE UP
EGFR: 88 ML/MIN/1.73M2 — SIGNIFICANT CHANGE UP
GLUCOSE SERPL-MCNC: 94 MG/DL — SIGNIFICANT CHANGE UP (ref 70–99)
GLUCOSE SERPL-MCNC: 94 MG/DL — SIGNIFICANT CHANGE UP (ref 70–99)
HCT VFR BLD CALC: 30.5 % — LOW (ref 34.5–45)
HCT VFR BLD CALC: 30.5 % — LOW (ref 34.5–45)
HGB BLD-MCNC: 9.7 G/DL — LOW (ref 11.5–15.5)
HGB BLD-MCNC: 9.7 G/DL — LOW (ref 11.5–15.5)
INR BLD: 2.49 RATIO — HIGH (ref 0.85–1.18)
INR BLD: 2.49 RATIO — HIGH (ref 0.85–1.18)
INR BLD: 2.68 RATIO — HIGH (ref 0.85–1.18)
INR BLD: 2.68 RATIO — HIGH (ref 0.85–1.18)
MCHC RBC-ENTMCNC: 31.8 GM/DL — LOW (ref 32–36)
MCHC RBC-ENTMCNC: 31.8 GM/DL — LOW (ref 32–36)
MCHC RBC-ENTMCNC: 34.6 PG — HIGH (ref 27–34)
MCHC RBC-ENTMCNC: 34.6 PG — HIGH (ref 27–34)
MCV RBC AUTO: 108.9 FL — HIGH (ref 80–100)
MCV RBC AUTO: 108.9 FL — HIGH (ref 80–100)
NRBC # BLD: 0 /100 WBCS — SIGNIFICANT CHANGE UP (ref 0–0)
NRBC # BLD: 0 /100 WBCS — SIGNIFICANT CHANGE UP (ref 0–0)
PLATELET # BLD AUTO: 651 K/UL — HIGH (ref 150–400)
PLATELET # BLD AUTO: 651 K/UL — HIGH (ref 150–400)
POTASSIUM SERPL-MCNC: 3.6 MMOL/L — SIGNIFICANT CHANGE UP (ref 3.5–5.3)
POTASSIUM SERPL-MCNC: 3.6 MMOL/L — SIGNIFICANT CHANGE UP (ref 3.5–5.3)
POTASSIUM SERPL-SCNC: 3.6 MMOL/L — SIGNIFICANT CHANGE UP (ref 3.5–5.3)
POTASSIUM SERPL-SCNC: 3.6 MMOL/L — SIGNIFICANT CHANGE UP (ref 3.5–5.3)
PROTHROM AB SERPL-ACNC: 28.4 SEC — HIGH (ref 9.5–13)
PROTHROM AB SERPL-ACNC: 28.4 SEC — HIGH (ref 9.5–13)
PROTHROM AB SERPL-ACNC: 30.5 SEC — HIGH (ref 9.5–13)
PROTHROM AB SERPL-ACNC: 30.5 SEC — HIGH (ref 9.5–13)
RBC # BLD: 2.8 M/UL — LOW (ref 3.8–5.2)
RBC # BLD: 2.8 M/UL — LOW (ref 3.8–5.2)
RBC # FLD: 17.1 % — HIGH (ref 10.3–14.5)
RBC # FLD: 17.1 % — HIGH (ref 10.3–14.5)
SODIUM SERPL-SCNC: 143 MMOL/L — SIGNIFICANT CHANGE UP (ref 135–145)
SODIUM SERPL-SCNC: 143 MMOL/L — SIGNIFICANT CHANGE UP (ref 135–145)
VANCOMYCIN TROUGH SERPL-MCNC: 11.2 UG/ML — SIGNIFICANT CHANGE UP (ref 10–20)
VANCOMYCIN TROUGH SERPL-MCNC: 11.2 UG/ML — SIGNIFICANT CHANGE UP (ref 10–20)
WBC # BLD: 8.54 K/UL — SIGNIFICANT CHANGE UP (ref 3.8–10.5)
WBC # BLD: 8.54 K/UL — SIGNIFICANT CHANGE UP (ref 3.8–10.5)
WBC # FLD AUTO: 8.54 K/UL — SIGNIFICANT CHANGE UP (ref 3.8–10.5)
WBC # FLD AUTO: 8.54 K/UL — SIGNIFICANT CHANGE UP (ref 3.8–10.5)

## 2023-11-27 PROCEDURE — 93970 EXTREMITY STUDY: CPT | Mod: 26

## 2023-11-27 RX ORDER — WARFARIN SODIUM 2.5 MG/1
1 TABLET ORAL ONCE
Refills: 0 | Status: DISCONTINUED | OUTPATIENT
Start: 2023-11-27 | End: 2023-11-27

## 2023-11-27 RX ADMIN — GABAPENTIN 300 MILLIGRAM(S): 400 CAPSULE ORAL at 22:43

## 2023-11-27 RX ADMIN — Medication 50 MICROGRAM(S): at 06:35

## 2023-11-27 RX ADMIN — Medication 1 MILLIGRAM(S): at 11:56

## 2023-11-27 RX ADMIN — FAMOTIDINE 20 MILLIGRAM(S): 10 INJECTION INTRAVENOUS at 11:54

## 2023-11-27 RX ADMIN — ESCITALOPRAM OXALATE 20 MILLIGRAM(S): 10 TABLET, FILM COATED ORAL at 11:52

## 2023-11-27 RX ADMIN — Medication 250 MILLIGRAM(S): at 19:25

## 2023-11-27 RX ADMIN — HYDROXYUREA 500 MILLIGRAM(S): 500 CAPSULE ORAL at 19:24

## 2023-11-27 RX ADMIN — Medication 250 MILLIGRAM(S): at 07:03

## 2023-11-27 RX ADMIN — AMLODIPINE BESYLATE 5 MILLIGRAM(S): 2.5 TABLET ORAL at 06:35

## 2023-11-27 RX ADMIN — GABAPENTIN 300 MILLIGRAM(S): 400 CAPSULE ORAL at 06:35

## 2023-11-27 RX ADMIN — GABAPENTIN 300 MILLIGRAM(S): 400 CAPSULE ORAL at 13:42

## 2023-11-27 RX ADMIN — Medication 1000 MILLIGRAM(S): at 01:00

## 2023-11-27 RX ADMIN — TRAMADOL HYDROCHLORIDE 50 MILLIGRAM(S): 50 TABLET ORAL at 20:00

## 2023-11-27 RX ADMIN — Medication 400 MILLIGRAM(S): at 00:03

## 2023-11-27 RX ADMIN — Medication 25 MILLIGRAM(S): at 19:24

## 2023-11-27 RX ADMIN — TRAMADOL HYDROCHLORIDE 50 MILLIGRAM(S): 50 TABLET ORAL at 19:41

## 2023-11-27 RX ADMIN — SENNA PLUS 1 TABLET(S): 8.6 TABLET ORAL at 11:55

## 2023-11-27 RX ADMIN — Medication 25 MILLIGRAM(S): at 06:35

## 2023-11-27 RX ADMIN — Medication 3 MILLIGRAM(S): at 22:43

## 2023-11-27 RX ADMIN — Medication 2000 UNIT(S): at 11:57

## 2023-11-27 RX ADMIN — SIMVASTATIN 10 MILLIGRAM(S): 20 TABLET, FILM COATED ORAL at 22:43

## 2023-11-27 RX ADMIN — TRAMADOL HYDROCHLORIDE 50 MILLIGRAM(S): 50 TABLET ORAL at 11:49

## 2023-11-27 RX ADMIN — TRAMADOL HYDROCHLORIDE 50 MILLIGRAM(S): 50 TABLET ORAL at 12:12

## 2023-11-27 RX ADMIN — HYDROXYUREA 500 MILLIGRAM(S): 500 CAPSULE ORAL at 06:36

## 2023-11-27 RX ADMIN — Medication 1 TABLET(S): at 11:53

## 2023-11-27 NOTE — PROGRESS NOTE ADULT - SUBJECTIVE AND OBJECTIVE BOX
Interval History:    CENTRAL LINE:   [  ] YES       [  ] NO  MUIR:                 [  ] YES       [  ] NO         REVIEW OF SYSTEMS:  All Systems below were reviewed and are negative [  ]  HEENT:  ID:  Pulmonary:  Cardiac:  GI:  Renal:  Musculoskeletal:  All other systems above were reviewed and are negative   [  ]      MEDICATIONS  (STANDING):  amLODIPine   Tablet 5 milliGRAM(s) Oral daily  cholecalciferol 2000 Unit(s) Oral daily  escitalopram 20 milliGRAM(s) Oral daily  famotidine    Tablet 20 milliGRAM(s) Oral daily  folic acid 1 milliGRAM(s) Oral daily  gabapentin 300 milliGRAM(s) Oral three times a day  hydroxyurea 500 milliGRAM(s) Oral two times a day  lactobacillus acidophilus 1 Tablet(s) Oral daily  levothyroxine 50 MICROGram(s) Oral daily  melatonin 3 milliGRAM(s) Oral at bedtime  metoprolol tartrate 25 milliGRAM(s) Oral two times a day  senna 1 Tablet(s) Oral daily  simvastatin 10 milliGRAM(s) Oral at bedtime  vancomycin  IVPB 1000 milliGRAM(s) IV Intermittent every 12 hours    MEDICATIONS  (PRN):  acetaminophen     Tablet .. 650 milliGRAM(s) Oral every 6 hours PRN Temp greater or equal to 38C (100.4F), Mild Pain (1 - 3)  traMADol 50 milliGRAM(s) Oral every 6 hours PRN Moderate Pain (4 - 6)      Vital Signs Last 24 Hrs  T(C): 36.7 (27 Nov 2023 12:44), Max: 37.1 (27 Nov 2023 05:05)  T(F): 98.1 (27 Nov 2023 12:44), Max: 98.7 (27 Nov 2023 05:05)  HR: 82 (27 Nov 2023 12:44) (82 - 84)  BP: 139/71 (27 Nov 2023 12:44) (139/71 - 150/71)  BP(mean): --  RR: 18 (27 Nov 2023 12:44) (18 - 18)  SpO2: 99% (27 Nov 2023 12:44) (85% - 99%)    Parameters below as of 27 Nov 2023 12:44  Patient On (Oxygen Delivery Method): nasal cannula        I&O's Summary    26 Nov 2023 07:01  -  27 Nov 2023 07:00  --------------------------------------------------------  IN: 0 mL / OUT: 1 mL / NET: -1 mL        PHYSICAL EXAM:  HEENT: NC/AT; PERRLA  Neck: Soft; no tenderness  Lungs: CTA bilaterally; no wheezing.   Heart:  Abdomen:  Genital/ Rectal:  Extremities:  Neurologic:  Vascular:      LABORATORY:    CBC Full  -  ( 27 Nov 2023 05:57 )  WBC Count : 8.54 K/uL  RBC Count : 2.80 M/uL  Hemoglobin : 9.7 g/dL  Hematocrit : 30.5 %  Platelet Count - Automated : 651 K/uL  Mean Cell Volume : 108.9 fl  Mean Cell Hemoglobin : 34.6 pg  Mean Cell Hemoglobin Concentration : 31.8 gm/dL  Auto Neutrophil # : x  Auto Lymphocyte # : x  Auto Monocyte # : x  Auto Eosinophil # : x  Auto Basophil # : x  Auto Neutrophil % : x  Auto Lymphocyte % : x  Auto Monocyte % : x  Auto Eosinophil % : x  Auto Basophil % : x          11-27    143  |  109<H>  |  7   ----------------------------<  94  3.6   |  30  |  0.50    Ca    8.1<L>      27 Nov 2023 05:57            Assessment and Plan:          Sushil Anguiano MD   [phone].  She is afebrile  Comfortable     MEDICATIONS  (STANDING):  amLODIPine   Tablet 5 milliGRAM(s) Oral daily  cholecalciferol 2000 Unit(s) Oral daily  escitalopram 20 milliGRAM(s) Oral daily  famotidine    Tablet 20 milliGRAM(s) Oral daily  folic acid 1 milliGRAM(s) Oral daily  gabapentin 300 milliGRAM(s) Oral three times a day  hydroxyurea 500 milliGRAM(s) Oral two times a day  lactobacillus acidophilus 1 Tablet(s) Oral daily  levothyroxine 50 MICROGram(s) Oral daily  melatonin 3 milliGRAM(s) Oral at bedtime  metoprolol tartrate 25 milliGRAM(s) Oral two times a day  senna 1 Tablet(s) Oral daily  simvastatin 10 milliGRAM(s) Oral at bedtime  vancomycin  IVPB 1000 milliGRAM(s) IV Intermittent every 12 hours    MEDICATIONS  (PRN):  acetaminophen     Tablet .. 650 milliGRAM(s) Oral every 6 hours PRN Temp greater or equal to 38C (100.4F), Mild Pain (1 - 3)  traMADol 50 milliGRAM(s) Oral every 6 hours PRN Moderate Pain (4 - 6)      Vital Signs Last 24 Hrs  T(C): 36.7 (27 Nov 2023 12:44), Max: 37.1 (27 Nov 2023 05:05)  T(F): 98.1 (27 Nov 2023 12:44), Max: 98.7 (27 Nov 2023 05:05)  HR: 82 (27 Nov 2023 12:44) (82 - 84)  BP: 139/71 (27 Nov 2023 12:44) (139/71 - 150/71)  BP(mean): --  RR: 18 (27 Nov 2023 12:44) (18 - 18)  SpO2: 99% (27 Nov 2023 12:44) (85% - 99%)    Parameters below as of 27 Nov 2023 12:44  Patient On (Oxygen Delivery Method): nasal cannula        I&O's Summary    26 Nov 2023 07:01  -  27 Nov 2023 07:00  --------------------------------------------------------  IN: 0 mL / OUT: 1 mL / NET: -1 mL        PHYSICAL EXAM:  HEENT: NC/AT; PERRLA  Neck: Soft; no tenderness  Lungs: CTA bilaterally; no wheezing.   Heart:  Abdomen:  Genital/ Rectal: No salas   Extremities: Decreasing erythema of LLE.   Neurologic: Awake.   Vascular:      LABORATORY:    CBC Full  -  ( 27 Nov 2023 05:57 )  WBC Count : 8.54 K/uL  RBC Count : 2.80 M/uL  Hemoglobin : 9.7 g/dL  Hematocrit : 30.5 %  Platelet Count - Automated : 651 K/uL  Mean Cell Volume : 108.9 fl  Mean Cell Hemoglobin : 34.6 pg  Mean Cell Hemoglobin Concentration : 31.8 gm/dL  Auto Neutrophil # : x  Auto Lymphocyte # : x  Auto Monocyte # : x  Auto Eosinophil # : x  Auto Basophil # : x  Auto Neutrophil % : x  Auto Lymphocyte % : x  Auto Monocyte % : x  Auto Eosinophil % : x  Auto Basophil % : x          11-27    143  |  109<H>  |  7   ----------------------------<  94  3.6   |  30  |  0.50    Ca    8.1<L>      27 Nov 2023 05:57      Assessment and Plan:    1. LLE cellulitis  2. Chronic  LLE chronic wounds.  3. History of melanoma.     . Cellulitis is improving.   . Continue IV Vancomycin 1 gm q12h, Repeat trough level tomorrow.       Sushil Anguiano MD   [phone].  She is afebrile  Comfortable       MEDICATIONS  (STANDING):  amLODIPine   Tablet 5 milliGRAM(s) Oral daily  cholecalciferol 2000 Unit(s) Oral daily  escitalopram 20 milliGRAM(s) Oral daily  famotidine    Tablet 20 milliGRAM(s) Oral daily  folic acid 1 milliGRAM(s) Oral daily  gabapentin 300 milliGRAM(s) Oral three times a day  hydroxyurea 500 milliGRAM(s) Oral two times a day  lactobacillus acidophilus 1 Tablet(s) Oral daily  levothyroxine 50 MICROGram(s) Oral daily  melatonin 3 milliGRAM(s) Oral at bedtime  metoprolol tartrate 25 milliGRAM(s) Oral two times a day  senna 1 Tablet(s) Oral daily  simvastatin 10 milliGRAM(s) Oral at bedtime  vancomycin  IVPB 1000 milliGRAM(s) IV Intermittent every 12 hours    MEDICATIONS  (PRN):  acetaminophen     Tablet .. 650 milliGRAM(s) Oral every 6 hours PRN Temp greater or equal to 38C (100.4F), Mild Pain (1 - 3)  traMADol 50 milliGRAM(s) Oral every 6 hours PRN Moderate Pain (4 - 6)      Vital Signs Last 24 Hrs  T(C): 36.7 (27 Nov 2023 12:44), Max: 37.1 (27 Nov 2023 05:05)  T(F): 98.1 (27 Nov 2023 12:44), Max: 98.7 (27 Nov 2023 05:05)  HR: 82 (27 Nov 2023 12:44) (82 - 84)  BP: 139/71 (27 Nov 2023 12:44) (139/71 - 150/71)  BP(mean): --  RR: 18 (27 Nov 2023 12:44) (18 - 18)  SpO2: 99% (27 Nov 2023 12:44) (85% - 99%)    Parameters below as of 27 Nov 2023 12:44  Patient On (Oxygen Delivery Method): nasal cannula        I&O's Summary    26 Nov 2023 07:01  -  27 Nov 2023 07:00  --------------------------------------------------------  IN: 0 mL / OUT: 1 mL / NET: -1 mL        PHYSICAL EXAM:  HEENT: NC/AT; PERRLA  Neck: Soft; no tenderness  Lungs: CTA bilaterally; no wheezing.   Heart: RRR, no murmurs.   Abdomen: Soft, no tenderness.   Genital/ Rectal: No salas   Extremities: Decreasing erythema of LLE. LLE wpund with clean dressings.   Neurologic: Awake.       LABORATORY:    CBC Full  -  ( 27 Nov 2023 05:57 )  WBC Count : 8.54 K/uL  RBC Count : 2.80 M/uL  Hemoglobin : 9.7 g/dL  Hematocrit : 30.5 %  Platelet Count - Automated : 651 K/uL  Mean Cell Volume : 108.9 fl  Mean Cell Hemoglobin : 34.6 pg  Mean Cell Hemoglobin Concentration : 31.8 gm/dL  Auto Neutrophil # : x  Auto Lymphocyte # : x  Auto Monocyte # : x  Auto Eosinophil # : x  Auto Basophil # : x  Auto Neutrophil % : x  Auto Lymphocyte % : x  Auto Monocyte % : x  Auto Eosinophil % : x  Auto Basophil % : x          11-27    143  |  109<H>  |  7   ----------------------------<  94  3.6   |  30  |  0.50    Ca    8.1<L>      27 Nov 2023 05:57      Assessment and Plan:    1. LLE cellulitis  2. Chronic  LLE chronic wounds.  3. History of melanoma.     . Cellulitis is improving slowly.   . Continue IV Vancomycin 1 gm q12h. Vancomycin trough today was 11. Repeat trough level tomorrow (discussed with pharmacy).       Sushil Anguiano MD   [phone].

## 2023-11-27 NOTE — CONSULT NOTE ADULT - SUBJECTIVE AND OBJECTIVE BOX
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  Patient was given a course of Keflex, with no resolution.  Patient then given a second course of Keflex, with no further resolution.  No known fever.  No trauma.  No other acute injury or complaints.  Per NH papers pt has NKDA (23 Nov 2023 07:40)    Vascular Hx: Pt has been seen by vascular surgery in past for nonhealing wounds. Vascular testing has been performed and reviewed. Pt was not a surgical candidate due to comorbid conditions.    PAST MEDICAL & SURGICAL HISTORY:  Hypertension  CVA (cerebral vascular accident)  Depression  Constipation  Neuropathy  Hyperlipidemia  Grade I diastolic dysfunction  Afib  Neuropathy  VHD (valvular heart disease)  Aortic valvar stenosis  Hypothyroidism  GERD (gastroesophageal reflux disease)  No significant past surgical history      REVIEW OF SYSTEMS- pt is poor historian  General: denies  Skin/Breast: denies	  Ophthalmologic: denies	  ENMT:	denies  Respiratory and Thorax: SOB, cough, phlegm	  Cardiovascular:	denies  Gastrointestinal:	denies  Genitourinary:	incontinent  Musculoskeletal:	 denies  Neurological:	denies  Psychiatric:	denies  Hematology/Lymphatics:	 denies  Endocrine: denies    MEDICATIONS  (STANDING):  amLODIPine   Tablet 5 milliGRAM(s) Oral daily  cholecalciferol 2000 Unit(s) Oral daily  escitalopram 20 milliGRAM(s) Oral daily  famotidine    Tablet 20 milliGRAM(s) Oral daily  folic acid 1 milliGRAM(s) Oral daily  gabapentin 300 milliGRAM(s) Oral three times a day  hydroxyurea 500 milliGRAM(s) Oral two times a day  lactobacillus acidophilus 1 Tablet(s) Oral daily  levothyroxine 50 MICROGram(s) Oral daily  melatonin 3 milliGRAM(s) Oral at bedtime  metoprolol tartrate 25 milliGRAM(s) Oral two times a day  senna 1 Tablet(s) Oral daily  simvastatin 10 milliGRAM(s) Oral at bedtime  vancomycin  IVPB 1000 milliGRAM(s) IV Intermittent every 12 hours  warfarin 1 milliGRAM(s) Oral once    MEDICATIONS  (PRN):  acetaminophen     Tablet .. 650 milliGRAM(s) Oral every 6 hours PRN Temp greater or equal to 38C (100.4F), Mild Pain (1 - 3)  traMADol 50 milliGRAM(s) Oral every 6 hours PRN Moderate Pain (4 - 6)      Allergies    per son &quot;issues with certain anitibiotics&quot; does not remember the names - Patient has tolerated zosyn, ceftriaxone, bactrim, and vancomycin on past admissions. (Unknown)    Intolerances        SOCIAL HISTORY: From nursing facility; no ETOH; non smoker      Vital Signs Last 24 Hrs  T(C): 37.1 (27 Nov 2023 05:05), Max: 37.1 (27 Nov 2023 05:05)  T(F): 98.7 (27 Nov 2023 05:05), Max: 98.7 (27 Nov 2023 05:05)  HR: 84 (27 Nov 2023 05:05) (84 - 86)  BP: 150/71 (27 Nov 2023 05:05) (118/75 - 150/71)  BP(mean): --  RR: 18 (27 Nov 2023 05:05) (17 - 18)  SpO2: 93% (27 Nov 2023 05:33) (85% - 93%)    Parameters below as of 27 Nov 2023 05:33  Patient On (Oxygen Delivery Method): nasal cannula  O2 Flow (L/min): 2      PHYSICAL EXAM:  Constitutional: Elderly F in NAD  Neck: No JVD  Respiratory: coarse scattered rhonci  Cardiovascular: S1, S2   Gastrointestinal: soft, ND, NT  Extremities: LLE wound with dressing in place; LLE contracted vs. pt uncooperative and will not allow for extension of LE  Neurological: A&O x 2, no cooperative this am    Pulses:   Right:                                                                          Left:  FEM [x ]2+ [ ]1+ [ ]doppler                                             FEM [x ]2+ [ ]1+ [ ]doppler    POP [ ]2+ [ ]1+ [ ]doppler                                             POP [ ]2+ [ ]1+ [ ]doppler    DP [ ]2+ [ ]1+ [ x]doppler                                                DP [ ]2+ [ ]1+ [ x]doppler  PT[ ]2+ [ ]1+ [ ]doppler                                                  PT [ ]2+ [ ]1+ [ ]doppler      LABS:                        9.7    8.54  )-----------( 651      ( 27 Nov 2023 05:57 )             30.5     11-27    143  |  109<H>  |  7   ----------------------------<  94  3.6   |  30  |  0.50    Ca    8.1<L>      27 Nov 2023 05:57      PT/INR - ( 27 Nov 2023 05:57 )   PT: 28.4 sec;   INR: 2.49 ratio           Urinalysis Basic - ( 27 Nov 2023 05:57 )    Color: x / Appearance: x / SG: x / pH: x  Gluc: 94 mg/dL / Ketone: x  / Bili: x / Urobili: x   Blood: x / Protein: x / Nitrite: x   Leuk Esterase: x / RBC: x / WBC x   Sq Epi: x / Non Sq Epi: x / Bacteria: x        RADIOLOGY & ADDITIONAL STUDIES    < from: [facility] Physiol Extremity Lower 3+ Level, BI (08.02.23 @ 16:39) >    ACC: [id number] EXAM:  PHYSIOL EXTREM LOW 3+ LEV BI   ORDERED BY: BESSY AVITIA     PROCEDURE DATE:  08/02/2023          INTERPRETATION:  Clinical Information: Peripheral vascular disease. Left   leg wounds    Technique: Bilateral lower extremity ABIs/PVR    Comparison: Arterial Doppler 8/1/2023    Findings/  Impression:  Right lower extremity: The ankle brachial index is 1.2. TBI is 1.00 with   digital pressures of 138 mmHg. The pulse waveforms are normal.    Left lower extremity: The anklebrachial index is unobtainable. TBI is   0.75 with digital pressures of 103 mmHg. The pulse waveforms are   diffusely diminished in amplitude.    Limited evaluation of the segmental blood pressures secondary to   noncompressibility and patient intolerance.  Findings in the left leg compatible with severe stenosis detected on   recent Doppler    < end of copied text >

## 2023-11-27 NOTE — PROGRESS NOTE ADULT - SUBJECTIVE AND OBJECTIVE BOX
Date/Time Patient Seen:  		  Referring MD:   Data Reviewed	       Patient is a 89y old  Female who presents with a chief complaint of cellulitis (26 Nov 2023 21:49)      Subjective/HPI     PAST MEDICAL & SURGICAL HISTORY:  Hypertension    CVA (cerebral vascular accident)    Depression    Constipation    Neuropathy    Constipation    Hyperlipidemia    Grade I diastolic dysfunction    Afib    Neuropathy    VHD (valvular heart disease)    Aortic valvar stenosis    Hypothyroidism    GERD (gastroesophageal reflux disease)    No significant past surgical history          Medication list         MEDICATIONS  (STANDING):  amLODIPine   Tablet 5 milliGRAM(s) Oral daily  cholecalciferol 2000 Unit(s) Oral daily  escitalopram 20 milliGRAM(s) Oral daily  famotidine    Tablet 20 milliGRAM(s) Oral daily  folic acid 1 milliGRAM(s) Oral daily  gabapentin 300 milliGRAM(s) Oral three times a day  hydroxyurea 500 milliGRAM(s) Oral two times a day  lactobacillus acidophilus 1 Tablet(s) Oral daily  levothyroxine 50 MICROGram(s) Oral daily  melatonin 3 milliGRAM(s) Oral at bedtime  metoprolol tartrate 25 milliGRAM(s) Oral two times a day  senna 1 Tablet(s) Oral daily  simvastatin 10 milliGRAM(s) Oral at bedtime  vancomycin  IVPB 1000 milliGRAM(s) IV Intermittent every 12 hours    MEDICATIONS  (PRN):  acetaminophen     Tablet .. 650 milliGRAM(s) Oral every 6 hours PRN Temp greater or equal to 38C (100.4F), Mild Pain (1 - 3)  traMADol 50 milliGRAM(s) Oral every 6 hours PRN Moderate Pain (4 - 6)         Vitals log        ICU Vital Signs Last 24 Hrs  T(C): 36.7 (26 Nov 2023 19:56), Max: 36.8 (26 Nov 2023 12:33)  T(F): 98.1 (26 Nov 2023 19:56), Max: 98.3 (26 Nov 2023 12:33)  HR: 86 (26 Nov 2023 19:56) (85 - 95)  BP: 136/70 (26 Nov 2023 19:56) (118/75 - 148/74)  BP(mean): --  ABP: --  ABP(mean): --  RR: 17 (26 Nov 2023 19:56) (17 - 18)  SpO2: 91% (26 Nov 2023 19:56) (91% - 91%)    O2 Parameters below as of 26 Nov 2023 19:56  Patient On (Oxygen Delivery Method): room air                 Input and Output:  I&O's Detail    25 Nov 2023 07:01  -  26 Nov 2023 07:00  --------------------------------------------------------  IN:  Total IN: 0 mL    OUT:    Voided (mL): 750 mL  Total OUT: 750 mL    Total NET: -750 mL      26 Nov 2023 07:01  -  27 Nov 2023 05:07  --------------------------------------------------------  IN:  Total IN: 0 mL    OUT:    Voided (mL): 1 mL  Total OUT: 1 mL    Total NET: -1 mL          Lab Data                        9.9    8.23  )-----------( 586      ( 25 Nov 2023 07:15 )             31.3     11-25    143  |  107  |  7   ----------------------------<  95  4.6   |  31  |  0.54    Ca    8.5      25 Nov 2023 07:15    TPro  5.8<L>  /  Alb  2.3<L>  /  TBili  0.4  /  DBili  x   /  AST  14<L>  /  ALT  9<L>  /  AlkPhos  54  11-25            Review of Systems	      Objective     Physical Examination    heart s1s2  lung dc BS  head nc      Pertinent Lab findings & Imaging      Summer:  NO   Adequate UO     I&O's Detail    25 Nov 2023 07:01  -  26 Nov 2023 07:00  --------------------------------------------------------  IN:  Total IN: 0 mL    OUT:    Voided (mL): 750 mL  Total OUT: 750 mL    Total NET: -750 mL      26 Nov 2023 07:01  -  27 Nov 2023 05:07  --------------------------------------------------------  IN:  Total IN: 0 mL    OUT:    Voided (mL): 1 mL  Total OUT: 1 mL    Total NET: -1 mL               Discussed with:     Cultures:	        Radiology

## 2023-11-27 NOTE — PHARMACOTHERAPY INTERVENTION NOTE - COMMENTS
Recommended to increase vancomycin dose from 750mg q12 hrs to 1000 mg IV q12h. As per [employer] calculations, predicted vancomycin AUC/SINDY for the previous regimen is 327 mg/L*h, which is lower than the therapeutic range of 400 - 600 mg/L*h. Increasing the dose is predicted to yield an AUC/SINDY of 436 mg/L*h.     Camila Preciado, PharmD  Clinical Pharmacy Specialist, Infectious Diseases  Tele-Antimicrobial Stewardship Program (Tele-ASP)  Tele-ASP Phone: [phone] 
Patient is a 89 year old female with A fib, being treated with Warfarin. Discussed with Dr. Best. Patients INR jumped 0.63 in 24 hours. Recommending holding tonight's dose at INR is therapeutic. Accepted and order discontinued  
Patient is a 89 yo F ordered for vancomycin 1000 mg q12h for cellulitis. Discussed with ID Dr. Anguiano and recommended trough for 11/28 17:00 to help guide with dosing. Recommendation accepted and order entered.
Recommended ordering a vancomycin trough level for 11/27 @0500 with AM labs prior to the 3rd dose on vancomycin regimen 1000mg IV q12hrs to assist with further vancomycin dosing optimization.     Camila Preciado, PharmD  Clinical Pharmacy Specialist, Infectious Diseases  Tele-Antimicrobial Stewardship Program (Tele-ASP)  Tele-ASP Phone: [phone]

## 2023-11-27 NOTE — CASE MANAGEMENT PROGRESS NOTE - NSCMPROGRESSNOTE_GEN_ALL_CORE
Discussed patient in interdisciplinary rounds.  Patient remains on IV Vanco for a left leg cellulitis.  Anticipate return to [facility] when D/C ready.  Will remain available from a case management perspective.

## 2023-11-27 NOTE — CONSULT NOTE ADULT - ASSESSMENT
89 y/o female w/ PMH CVA on Coumadin with right-sided weakness, hypertension hyperlipidemia CAD history of A-fib on Coumadin presenting from assisted living.  Vascular surgery consulted for LLE wounds  LISSY/PVR's reviewed from 8/2023  Not a surgical candidate sec to co morbities and declining health  Conservative management  Local wound care as per podiatry  Discussed with Chapito Pizarro

## 2023-11-27 NOTE — PROGRESS NOTE ADULT - SUBJECTIVE AND OBJECTIVE BOX
PROGRESS NOTE  Patient is a 89y old  Female who presents with a chief complaint of cellulitis (27 Nov 2023 11:35)    Chart and available morning labs /imaging are reviewed electronically , urgent issues addressed . More information  is being added upon completion of rounds , when more information is collected and management discussed with consultants , medical staff and social service/case management on the floor   OVERNIGHT    No new issues reported by medical staff . All above noted Patient is resting in a bed comfortably . 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  Patient was given a course of Keflex, with no resolution.  Patient then given a second course of Keflex, with no further resolution.  No known fever.  No trauma.  No other acute injury or complaints.  Per NH papers pt has NKDA (23 Nov 2023 07:40)    PAST MEDICAL & SURGICAL HISTORY:  Hypertension      CVA (cerebral vascular accident)      Depression      Constipation      Neuropathy      Hyperlipidemia      Grade I diastolic dysfunction      Afib      Neuropathy      VHD (valvular heart disease)      Aortic valvar stenosis      Hypothyroidism      GERD (gastroesophageal reflux disease)      No significant past surgical history          MEDICATIONS  (STANDING):  amLODIPine   Tablet 5 milliGRAM(s) Oral daily  cholecalciferol 2000 Unit(s) Oral daily  escitalopram 20 milliGRAM(s) Oral daily  famotidine    Tablet 20 milliGRAM(s) Oral daily  folic acid 1 milliGRAM(s) Oral daily  gabapentin 300 milliGRAM(s) Oral three times a day  hydroxyurea 500 milliGRAM(s) Oral two times a day  lactobacillus acidophilus 1 Tablet(s) Oral daily  levothyroxine 50 MICROGram(s) Oral daily  melatonin 3 milliGRAM(s) Oral at bedtime  metoprolol tartrate 25 milliGRAM(s) Oral two times a day  senna 1 Tablet(s) Oral daily  simvastatin 10 milliGRAM(s) Oral at bedtime  vancomycin  IVPB 1000 milliGRAM(s) IV Intermittent every 12 hours  warfarin 1 milliGRAM(s) Oral once    MEDICATIONS  (PRN):  acetaminophen     Tablet .. 650 milliGRAM(s) Oral every 6 hours PRN Temp greater or equal to 38C (100.4F), Mild Pain (1 - 3)  traMADol 50 milliGRAM(s) Oral every 6 hours PRN Moderate Pain (4 - 6)      OBJECTIVE    T(C): 36.7 (11-27-23 @ 12:44), Max: 37.1 (11-27-23 @ 05:05)  HR: 82 (11-27-23 @ 12:44) (82 - 86)  BP: 139/71 (11-27-23 @ 12:44) (134/81 - 150/71)  RR: 18 (11-27-23 @ 12:44) (17 - 18)  SpO2: 99% (11-27-23 @ 12:44) (85% - 99%)  Wt(kg): --  I&O's Summary    26 Nov 2023 07:01  -  27 Nov 2023 07:00  --------------------------------------------------------  IN: 0 mL / OUT: 1 mL / NET: -1 mL          REVIEW OF SYSTEMS:  CONSTITUTIONAL: No fever, weight loss, or fatigue  EYES: No eye pain, visual disturbances, or discharge  ENMT:   No sinus or throat pain  NECK: No pain or stiffness  RESPIRATORY: No cough, wheezing, chills or hemoptysis; No shortness of breath  CARDIOVASCULAR: No chest pain, palpitations, dizziness, or leg swelling  GASTROINTESTINAL: No abdominal pain. No nausea, vomiting; No diarrhea or constipation. No melena or hematochezia.  GENITOURINARY: No dysuria, frequency, hematuria, or incontinence  NEUROLOGICAL: No headaches, memory loss, loss of strength, numbness, or tremors  SKIN: No itching, burning, rashes, or lesions   MUSCULOSKELETAL: No joint pain or swelling; No muscle, back, or extremity pain    PHYSICAL EXAM:  Appearance: NAD. VS past 24 hrs -as above   HEENT:   Moist oral mucosa. Conjunctiva clear b/l.   Neck : supple  Respiratory: Lungs CTAB.  Gastrointestinal:  Soft, nontender. No rebound. No rigidity. BS present	  Cardiovascular: RRR ,S1S2 present  Neurologic: Non-focal. Moving all extremities.  Extremities: No edema. No erythema. No calf tenderness.  Skin: No rashes, No ecchymoses, No cyanosis.	  wounds ,skin lesions-See skin assesment flow sheet   LABS:                        9.7    8.54  )-----------( 651      ( 27 Nov 2023 05:57 )             30.5     11-27    143  |  109<H>  |  7   ----------------------------<  94  3.6   |  30  |  0.50    Ca    8.1<L>      27 Nov 2023 05:57      CAPILLARY BLOOD GLUCOSE        PT/INR - ( 27 Nov 2023 11:00 )   PT: 30.5 sec;   INR: 2.68 ratio         PTT - ( 27 Nov 2023 11:00 )  PTT:43.6 sec  Urinalysis Basic - ( 27 Nov 2023 05:57 )    Color: x / Appearance: x / SG: x / pH: x  Gluc: 94 mg/dL / Ketone: x  / Bili: x / Urobili: x   Blood: x / Protein: x / Nitrite: x   Leuk Esterase: x / RBC: x / WBC x   Sq Epi: x / Non Sq Epi: x / Bacteria: x        Culture - Blood (collected 22 Nov 2023 16:15)  Source: .Blood Blood-Peripheral  Preliminary Report (27 Nov 2023 01:01):    No growth at 4 days    Culture - Blood (collected 22 Nov 2023 16:10)  Source: .Blood Blood-Peripheral  Preliminary Report (27 Nov 2023 01:01):    No growth at 4 days      RADIOLOGY & ADDITIONAL TESTS:   reviewed elctronically  ASSESSMENT/PLAN: 	  25 minutes aggregate time was spent on this visit, 50% visit time spent in care co-ordination with other attendings and counselling patient .I have discussed care plan with patient / HCP/family member ,who expressed understanding of problems treatment and their effect and side effects, alternatives in details. I have asked if they have any questions and concerns and appropriately addressed them to best of my ability. ACP-Advance care planning was discussed , pallitaive care issues ,CMO ,GOC ,MOLST  form ,advance directives were reviewed .All questions were answered to the best of my knowledge - 25 m

## 2023-11-28 ENCOUNTER — TRANSCRIPTION ENCOUNTER (OUTPATIENT)
Age: 88
End: 2023-11-28

## 2023-11-28 DIAGNOSIS — I38 ENDOCARDITIS, VALVE UNSPECIFIED: ICD-10-CM

## 2023-11-28 DIAGNOSIS — I48.91 UNSPECIFIED ATRIAL FIBRILLATION: ICD-10-CM

## 2023-11-28 DIAGNOSIS — Z29.9 ENCOUNTER FOR PROPHYLACTIC MEASURES, UNSPECIFIED: ICD-10-CM

## 2023-11-28 DIAGNOSIS — F32.9 MAJOR DEPRESSIVE DISORDER, SINGLE EPISODE, UNSPECIFIED: ICD-10-CM

## 2023-11-28 DIAGNOSIS — L03.116 CELLULITIS OF LEFT LOWER LIMB: ICD-10-CM

## 2023-11-28 DIAGNOSIS — G62.9 POLYNEUROPATHY, UNSPECIFIED: ICD-10-CM

## 2023-11-28 DIAGNOSIS — K21.9 GASTRO-ESOPHAGEAL REFLUX DISEASE WITHOUT ESOPHAGITIS: ICD-10-CM

## 2023-11-28 DIAGNOSIS — E03.9 HYPOTHYROIDISM, UNSPECIFIED: ICD-10-CM

## 2023-11-28 DIAGNOSIS — L03.115 CELLULITIS OF RIGHT LOWER LIMB: ICD-10-CM

## 2023-11-28 LAB
ANION GAP SERPL CALC-SCNC: 8 MMOL/L — SIGNIFICANT CHANGE UP (ref 5–17)
ANION GAP SERPL CALC-SCNC: 8 MMOL/L — SIGNIFICANT CHANGE UP (ref 5–17)
APTT BLD: 44.7 SEC — HIGH (ref 24.5–35.6)
APTT BLD: 44.7 SEC — HIGH (ref 24.5–35.6)
BUN SERPL-MCNC: 7 MG/DL — SIGNIFICANT CHANGE UP (ref 7–23)
BUN SERPL-MCNC: 7 MG/DL — SIGNIFICANT CHANGE UP (ref 7–23)
CALCIUM SERPL-MCNC: 8.1 MG/DL — LOW (ref 8.5–10.1)
CALCIUM SERPL-MCNC: 8.1 MG/DL — LOW (ref 8.5–10.1)
CHLORIDE SERPL-SCNC: 106 MMOL/L — SIGNIFICANT CHANGE UP (ref 96–108)
CHLORIDE SERPL-SCNC: 106 MMOL/L — SIGNIFICANT CHANGE UP (ref 96–108)
CO2 SERPL-SCNC: 28 MMOL/L — SIGNIFICANT CHANGE UP (ref 22–31)
CO2 SERPL-SCNC: 28 MMOL/L — SIGNIFICANT CHANGE UP (ref 22–31)
CREAT SERPL-MCNC: 0.52 MG/DL — SIGNIFICANT CHANGE UP (ref 0.5–1.3)
CREAT SERPL-MCNC: 0.52 MG/DL — SIGNIFICANT CHANGE UP (ref 0.5–1.3)
CULTURE RESULTS: SIGNIFICANT CHANGE UP
EGFR: 88 ML/MIN/1.73M2 — SIGNIFICANT CHANGE UP
EGFR: 88 ML/MIN/1.73M2 — SIGNIFICANT CHANGE UP
GLUCOSE SERPL-MCNC: 121 MG/DL — HIGH (ref 70–99)
GLUCOSE SERPL-MCNC: 121 MG/DL — HIGH (ref 70–99)
HCT VFR BLD CALC: 35 % — SIGNIFICANT CHANGE UP (ref 34.5–45)
HCT VFR BLD CALC: 35 % — SIGNIFICANT CHANGE UP (ref 34.5–45)
HGB BLD-MCNC: 10.6 G/DL — LOW (ref 11.5–15.5)
HGB BLD-MCNC: 10.6 G/DL — LOW (ref 11.5–15.5)
INR BLD: 2.91 RATIO — HIGH (ref 0.85–1.18)
INR BLD: 2.91 RATIO — HIGH (ref 0.85–1.18)
MCHC RBC-ENTMCNC: 30.3 GM/DL — LOW (ref 32–36)
MCHC RBC-ENTMCNC: 30.3 GM/DL — LOW (ref 32–36)
MCHC RBC-ENTMCNC: 33.3 PG — SIGNIFICANT CHANGE UP (ref 27–34)
MCHC RBC-ENTMCNC: 33.3 PG — SIGNIFICANT CHANGE UP (ref 27–34)
MCV RBC AUTO: 110.1 FL — HIGH (ref 80–100)
MCV RBC AUTO: 110.1 FL — HIGH (ref 80–100)
NRBC # BLD: 0 /100 WBCS — SIGNIFICANT CHANGE UP (ref 0–0)
NRBC # BLD: 0 /100 WBCS — SIGNIFICANT CHANGE UP (ref 0–0)
PLATELET # BLD AUTO: 663 K/UL — HIGH (ref 150–400)
PLATELET # BLD AUTO: 663 K/UL — HIGH (ref 150–400)
POTASSIUM SERPL-MCNC: 3.6 MMOL/L — SIGNIFICANT CHANGE UP (ref 3.5–5.3)
POTASSIUM SERPL-MCNC: 3.6 MMOL/L — SIGNIFICANT CHANGE UP (ref 3.5–5.3)
POTASSIUM SERPL-SCNC: 3.6 MMOL/L — SIGNIFICANT CHANGE UP (ref 3.5–5.3)
POTASSIUM SERPL-SCNC: 3.6 MMOL/L — SIGNIFICANT CHANGE UP (ref 3.5–5.3)
PROTHROM AB SERPL-ACNC: 33 SEC — HIGH (ref 9.5–13)
PROTHROM AB SERPL-ACNC: 33 SEC — HIGH (ref 9.5–13)
RBC # BLD: 3.18 M/UL — LOW (ref 3.8–5.2)
RBC # BLD: 3.18 M/UL — LOW (ref 3.8–5.2)
RBC # FLD: 17 % — HIGH (ref 10.3–14.5)
RBC # FLD: 17 % — HIGH (ref 10.3–14.5)
SODIUM SERPL-SCNC: 142 MMOL/L — SIGNIFICANT CHANGE UP (ref 135–145)
SODIUM SERPL-SCNC: 142 MMOL/L — SIGNIFICANT CHANGE UP (ref 135–145)
SPECIMEN SOURCE: SIGNIFICANT CHANGE UP
VANCOMYCIN TROUGH SERPL-MCNC: 21.2 UG/ML — HIGH (ref 10–20)
VANCOMYCIN TROUGH SERPL-MCNC: 21.2 UG/ML — HIGH (ref 10–20)
WBC # BLD: 11.39 K/UL — HIGH (ref 3.8–10.5)
WBC # BLD: 11.39 K/UL — HIGH (ref 3.8–10.5)
WBC # FLD AUTO: 11.39 K/UL — HIGH (ref 3.8–10.5)
WBC # FLD AUTO: 11.39 K/UL — HIGH (ref 3.8–10.5)

## 2023-11-28 PROCEDURE — 99232 SBSQ HOSP IP/OBS MODERATE 35: CPT

## 2023-11-28 RX ORDER — VANCOMYCIN HCL 1 G
750 VIAL (EA) INTRAVENOUS EVERY 12 HOURS
Refills: 0 | Status: COMPLETED | OUTPATIENT
Start: 2023-11-29 | End: 2023-11-30

## 2023-11-28 RX ADMIN — Medication 3 MILLIGRAM(S): at 22:28

## 2023-11-28 RX ADMIN — TRAMADOL HYDROCHLORIDE 50 MILLIGRAM(S): 50 TABLET ORAL at 17:00

## 2023-11-28 RX ADMIN — SENNA PLUS 1 TABLET(S): 8.6 TABLET ORAL at 11:07

## 2023-11-28 RX ADMIN — HYDROXYUREA 500 MILLIGRAM(S): 500 CAPSULE ORAL at 17:01

## 2023-11-28 RX ADMIN — Medication 1 TABLET(S): at 11:07

## 2023-11-28 RX ADMIN — ESCITALOPRAM OXALATE 20 MILLIGRAM(S): 10 TABLET, FILM COATED ORAL at 11:07

## 2023-11-28 RX ADMIN — TRAMADOL HYDROCHLORIDE 50 MILLIGRAM(S): 50 TABLET ORAL at 17:30

## 2023-11-28 RX ADMIN — GABAPENTIN 300 MILLIGRAM(S): 400 CAPSULE ORAL at 13:16

## 2023-11-28 RX ADMIN — Medication 1 MILLIGRAM(S): at 11:07

## 2023-11-28 RX ADMIN — SIMVASTATIN 10 MILLIGRAM(S): 20 TABLET, FILM COATED ORAL at 22:28

## 2023-11-28 RX ADMIN — Medication 250 MILLIGRAM(S): at 18:33

## 2023-11-28 RX ADMIN — GABAPENTIN 300 MILLIGRAM(S): 400 CAPSULE ORAL at 22:28

## 2023-11-28 RX ADMIN — GABAPENTIN 300 MILLIGRAM(S): 400 CAPSULE ORAL at 06:07

## 2023-11-28 RX ADMIN — Medication 50 MICROGRAM(S): at 06:08

## 2023-11-28 RX ADMIN — Medication 25 MILLIGRAM(S): at 06:10

## 2023-11-28 RX ADMIN — FAMOTIDINE 20 MILLIGRAM(S): 10 INJECTION INTRAVENOUS at 11:07

## 2023-11-28 RX ADMIN — TRAMADOL HYDROCHLORIDE 50 MILLIGRAM(S): 50 TABLET ORAL at 07:07

## 2023-11-28 RX ADMIN — Medication 2000 UNIT(S): at 11:07

## 2023-11-28 RX ADMIN — TRAMADOL HYDROCHLORIDE 50 MILLIGRAM(S): 50 TABLET ORAL at 06:07

## 2023-11-28 RX ADMIN — AMLODIPINE BESYLATE 5 MILLIGRAM(S): 2.5 TABLET ORAL at 06:07

## 2023-11-28 RX ADMIN — HYDROXYUREA 500 MILLIGRAM(S): 500 CAPSULE ORAL at 06:08

## 2023-11-28 RX ADMIN — Medication 250 MILLIGRAM(S): at 06:08

## 2023-11-28 NOTE — PROVIDER CONTACT NOTE (MEDICATION) - ACTION/TREATMENT ORDERED:
Per MD stop current vanco infusion  pharmacy to adjust dose
MD notified  MD to contact pharmacy and call me back

## 2023-11-28 NOTE — PROGRESS NOTE ADULT - SUBJECTIVE AND OBJECTIVE BOX
SUBJECTIVE:  89y year old Female seen at [facility] [id number] for left lower extremity wounds on skin, subcutaneous tissue, fat with cellulitis. Left lower leg cellulitis improved.    Allergies    per son &quot;issues with certain anitibiotics&quot; does not remember the names - Patient has tolerated zosyn, ceftriaxone, bactrim, and vancomycin on past admissions. (Unknown)    Intolerances        MEDICATIONS  (STANDING):  amLODIPine   Tablet 5 milliGRAM(s) Oral daily  cholecalciferol 2000 Unit(s) Oral daily  escitalopram 20 milliGRAM(s) Oral daily  famotidine    Tablet 20 milliGRAM(s) Oral daily  folic acid 1 milliGRAM(s) Oral daily  gabapentin 300 milliGRAM(s) Oral three times a day  hydroxyurea 500 milliGRAM(s) Oral two times a day  lactobacillus acidophilus 1 Tablet(s) Oral daily  levothyroxine 50 MICROGram(s) Oral daily  melatonin 3 milliGRAM(s) Oral at bedtime  metoprolol tartrate 25 milliGRAM(s) Oral two times a day  senna 1 Tablet(s) Oral daily  simvastatin 10 milliGRAM(s) Oral at bedtime    MEDICATIONS  (PRN):  acetaminophen     Tablet .. 650 milliGRAM(s) Oral every 6 hours PRN Temp greater or equal to 38C (100.4F), Mild Pain (1 - 3)  traMADol 50 milliGRAM(s) Oral every 6 hours PRN Moderate Pain (4 - 6)      Vital Signs Last 24 Hrs  T(C): 36.3 (28 Nov 2023 20:21), Max: 36.9 (28 Nov 2023 05:19)  T(F): 97.4 (28 Nov 2023 20:21), Max: 98.4 (28 Nov 2023 05:19)  HR: 100 (28 Nov 2023 20:21) (82 - 100)  BP: 131/76 (28 Nov 2023 20:21) (114/71 - 155/74)  BP(mean): --  RR: 18 (28 Nov 2023 20:21) (18 - 18)  SpO2: 96% (28 Nov 2023 20:21) (90% - 96%)    Parameters below as of 28 Nov 2023 20:21  Patient On (Oxygen Delivery Method): room air        PHYSICAL EXAM:  Vascular: DP & PT nonpalpable bilaterally, Capillary refill 4 seconds  Neurological: Light touch sensation diminished to the right lower extremity, intact to the left lower extremity  Musculoskeletal: 4/5 strength in all quadrants to the left lower extremity, 1/5 to the right lower extremity  Dermatological: Left lower leg wounds down skin, subcutaneous tissue, fat, green drainage noted on dressings, no probe to bone, periwound erythema improved, no fluctuance, no proximal streaking, no purulence at this time.                          10.6   11.39 )-----------( 663      ( 28 Nov 2023 09:40 )             35.0       11-28    142  |  106  |  7   ----------------------------<  121<H>  3.6   |  28  |  0.52    Ca    8.1<L>      28 Nov 2023 09:40        PT/INR - ( 28 Nov 2023 09:40 )   PT: 33.0 sec;   INR: 2.91 ratio         PTT - ( 28 Nov 2023 09:40 )  PTT:44.7 sec

## 2023-11-28 NOTE — PROVIDER CONTACT NOTE (MEDICATION) - SITUATION
MD notified of elevated vanco trough 21.2
Vanco trough resulted on patient 21.2  vanco currently running

## 2023-11-28 NOTE — DISCHARGE NOTE PROVIDER - NSDCMRMEDTOKEN_GEN_ALL_CORE_FT
acetaminophen 500 mg oral tablet: 1 tab(s) orally 2 times a day  acetaminophen 500 mg oral tablet: 2 tab(s) orally once a day (in the afternoon)  Acidophilus oral tablet: 2 tab(s) orally once a day  amLODIPine 5 mg oral tablet: 1 tab(s) orally once a day  Biofreeze 4% topical gel: Apply topically to affected area 2 times a day  cholecalciferol 50 mcg (2000 intl units) oral tablet: 1 tab(s) orally once a day  escitalopram 20 mg oral tablet: 1 tab(s) orally once a day  famotidine 20 mg oral tablet: 1 tab(s) orally once a day  folic acid 1 mg oral tablet: 1 tab(s) orally once a day  furosemide 20 mg oral tablet: 1 tab(s) orally once a day  gabapentin 300 mg oral capsule: 1 cap(s) orally 3 times a day  hydroxyurea 500 mg oral capsule: 1 tab(s) orally 2 times a day  levothyroxine 50 mcg (0.05 mg) oral tablet: 1 tab(s) orally once a day  melatonin 3 mg oral tablet: 1 tab(s) orally once a day  metoprolol tartrate 25 mg oral tablet: 1 tab(s) orally 2 times a day  miconazole 2% topical cream: Apply topically to affected area once a day  senna (sennosides) 8.6 mg oral tablet: 2 tab(s) orally once a day  simvastatin 10 mg oral tablet: 1 tab(s) orally once a day  THC Oil 20:1: 0.5 ml sublingually 2 times a day as needed  traMADol 50 mg oral tablet: 1 tab(s) orally 2 times a day hold for lethargy  traMADol 50 mg oral tablet: 1 tab(s) orally every 6 hours as needed for pain  warfarin 1 mg oral tablet: 1 tab(s) orally on monday, tuesday, wednesday, thursday, friday, and saturday   acetaminophen 500 mg oral tablet: 1 tab(s) orally 2 times a day  acetaminophen 500 mg oral tablet: 2 tab(s) orally once a day (in the afternoon)  Acidophilus oral tablet: 2 tab(s) orally once a day  amLODIPine 5 mg oral tablet: 1 tab(s) orally once a day  Biofreeze 4% topical gel: Apply topically to affected area 2 times a day  cholecalciferol 50 mcg (2000 intl units) oral tablet: 1 tab(s) orally once a day  ciprofloxacin 500 mg oral tablet: 1 tab(s) orally every 12 hours  doxycycline monohydrate 50 mg oral capsule: 2 cap(s) orally every 12 hours  escitalopram 20 mg oral tablet: 1 tab(s) orally once a day  famotidine 20 mg oral tablet: 1 tab(s) orally once a day  folic acid 1 mg oral tablet: 1 tab(s) orally once a day  furosemide 20 mg oral tablet: 1 tab(s) orally once a day  gabapentin 300 mg oral capsule: 1 cap(s) orally 3 times a day  hydroxyurea 500 mg oral capsule: 1 tab(s) orally 2 times a day  levothyroxine 50 mcg (0.05 mg) oral tablet: 1 tab(s) orally once a day  melatonin 3 mg oral tablet: 1 tab(s) orally once a day  metoprolol tartrate 25 mg oral tablet: 1 tab(s) orally 2 times a day  miconazole 2% topical cream: Apply topically to affected area once a day  senna (sennosides) 8.6 mg oral tablet: 2 tab(s) orally once a day  simvastatin 10 mg oral tablet: 1 tab(s) orally once a day  THC Oil 20:1: 0.5 ml sublingually 2 times a day as needed  traMADol 50 mg oral tablet: 1 tab(s) orally 2 times a day hold for lethargy  traMADol 50 mg oral tablet: 1 tab(s) orally every 6 hours as needed for pain  warfarin 1 mg oral tablet: 1 tab(s) orally every other day hold for inr above  3.0   acetaminophen 500 mg oral tablet: 1 tab(s) orally 2 times a day  acetaminophen 500 mg oral tablet: 2 tab(s) orally once a day (in the afternoon)  Acidophilus oral tablet: 2 tab(s) orally once a day  amLODIPine 5 mg oral tablet: 1 tab(s) orally once a day  Biofreeze 4% topical gel: Apply topically to affected area 2 times a day  cholecalciferol 50 mcg (2000 intl units) oral tablet: 1 tab(s) orally once a day  ciprofloxacin 500 mg oral tablet: 1 tab(s) orally 2 times a day  doxycycline monohydrate 50 mg oral capsule: 2 cap(s) orally 2 times a day  escitalopram 20 mg oral tablet: 1 tab(s) orally once a day  famotidine 20 mg oral tablet: 1 tab(s) orally once a day  folic acid 1 mg oral tablet: 1 tab(s) orally once a day  furosemide 20 mg oral tablet: 1 tab(s) orally once a day  gabapentin 300 mg oral capsule: 1 cap(s) orally 3 times a day  hydroxyurea 500 mg oral capsule: 1 tab(s) orally 2 times a day  levothyroxine 50 mcg (0.05 mg) oral tablet: 1 tab(s) orally once a day  melatonin 3 mg oral tablet: 1 tab(s) orally once a day  metoprolol tartrate 25 mg oral tablet: 1 tab(s) orally 2 times a day  miconazole 2% topical cream: Apply topically to affected area once a day  senna (sennosides) 8.6 mg oral tablet: 2 tab(s) orally once a day  simvastatin 10 mg oral tablet: 1 tab(s) orally once a day  traMADol 50 mg oral tablet: 1 tab(s) orally 2 times a day hold for lethargy  traMADol 50 mg oral tablet: 1 tab(s) orally every 6 hours as needed for pain  warfarin 1 mg oral tablet: 1 tab(s) orally every other day hold for inr above  3.0

## 2023-11-28 NOTE — DISCHARGE NOTE PROVIDER - NSDCCAREPROVSEEN_GEN_ALL_CORE_FT
Chapito Winters, Lorelei Riley, Nettie Jimenez, Myesha Gan, Adrian Anguiano, Sushil Best, Анна Maldonado, Ishmael De La Rosa, Swapnil Marie, Jennifer

## 2023-11-28 NOTE — PROGRESS NOTE ADULT - SUBJECTIVE AND OBJECTIVE BOX
Interval History:    CENTRAL LINE:   [  ] YES       [  ] NO  MUIR:                 [  ] YES       [  ] NO         REVIEW OF SYSTEMS:  All Systems below were reviewed and are negative [  ]  HEENT:  ID:  Pulmonary:  Cardiac:  GI:  Renal:  Musculoskeletal:  All other systems above were reviewed and are negative   [  ]      MEDICATIONS  (STANDING):  amLODIPine   Tablet 5 milliGRAM(s) Oral daily  cholecalciferol 2000 Unit(s) Oral daily  escitalopram 20 milliGRAM(s) Oral daily  famotidine    Tablet 20 milliGRAM(s) Oral daily  folic acid 1 milliGRAM(s) Oral daily  gabapentin 300 milliGRAM(s) Oral three times a day  hydroxyurea 500 milliGRAM(s) Oral two times a day  lactobacillus acidophilus 1 Tablet(s) Oral daily  levothyroxine 50 MICROGram(s) Oral daily  melatonin 3 milliGRAM(s) Oral at bedtime  metoprolol tartrate 25 milliGRAM(s) Oral two times a day  senna 1 Tablet(s) Oral daily  simvastatin 10 milliGRAM(s) Oral at bedtime    MEDICATIONS  (PRN):  acetaminophen     Tablet .. 650 milliGRAM(s) Oral every 6 hours PRN Temp greater or equal to 38C (100.4F), Mild Pain (1 - 3)  traMADol 50 milliGRAM(s) Oral every 6 hours PRN Moderate Pain (4 - 6)      Vital Signs Last 24 Hrs  T(C): 36.8 (28 Nov 2023 11:27), Max: 36.9 (28 Nov 2023 05:19)  T(F): 98.3 (28 Nov 2023 11:27), Max: 98.4 (28 Nov 2023 05:19)  HR: 82 (28 Nov 2023 17:00) (82 - 100)  BP: 122/68 (28 Nov 2023 17:00) (114/71 - 155/74)  BP(mean): --  RR: 18 (28 Nov 2023 11:27) (18 - 18)  SpO2: 91% (28 Nov 2023 11:27) (90% - 92%)    Parameters below as of 28 Nov 2023 11:27  Patient On (Oxygen Delivery Method): room air        I&O's Summary      PHYSICAL EXAM:  HEENT: NC/AT; PERRLA  Neck: Soft; no tenderness  Lungs: CTA bilaterally; no wheezing.   Heart:  Abdomen:  Genital/ Rectal:  Extremities:  Neurologic:  Vascular:      LABORATORY:    CBC Full  -  ( 28 Nov 2023 09:40 )  WBC Count : 11.39 K/uL  RBC Count : 3.18 M/uL  Hemoglobin : 10.6 g/dL  Hematocrit : 35.0 %  Platelet Count - Automated : 663 K/uL  Mean Cell Volume : 110.1 fl  Mean Cell Hemoglobin : 33.3 pg  Mean Cell Hemoglobin Concentration : 30.3 gm/dL  Auto Neutrophil # : x  Auto Lymphocyte # : x  Auto Monocyte # : x  Auto Eosinophil # : x  Auto Basophil # : x  Auto Neutrophil % : x  Auto Lymphocyte % : x  Auto Monocyte % : x  Auto Eosinophil % : x  Auto Basophil % : x          11-28    142  |  106  |  7   ----------------------------<  121<H>  3.6   |  28  |  0.52    Ca    8.1<L>      28 Nov 2023 09:40            Assessment and Plan:          Sushil Anguiano MD   [phone].    She is afebrile  Still has severe LLE pain     MEDICATIONS  (STANDING):  amLODIPine   Tablet 5 milliGRAM(s) Oral daily  cholecalciferol 2000 Unit(s) Oral daily  escitalopram 20 milliGRAM(s) Oral daily  famotidine    Tablet 20 milliGRAM(s) Oral daily  folic acid 1 milliGRAM(s) Oral daily  gabapentin 300 milliGRAM(s) Oral three times a day  hydroxyurea 500 milliGRAM(s) Oral two times a day  lactobacillus acidophilus 1 Tablet(s) Oral daily  levothyroxine 50 MICROGram(s) Oral daily  melatonin 3 milliGRAM(s) Oral at bedtime  metoprolol tartrate 25 milliGRAM(s) Oral two times a day  senna 1 Tablet(s) Oral daily  simvastatin 10 milliGRAM(s) Oral at bedtime    MEDICATIONS  (PRN):  acetaminophen     Tablet .. 650 milliGRAM(s) Oral every 6 hours PRN Temp greater or equal to 38C (100.4F), Mild Pain (1 - 3)  traMADol 50 milliGRAM(s) Oral every 6 hours PRN Moderate Pain (4 - 6)      Vital Signs Last 24 Hrs  T(C): 36.8 (28 Nov 2023 11:27), Max: 36.9 (28 Nov 2023 05:19)  T(F): 98.3 (28 Nov 2023 11:27), Max: 98.4 (28 Nov 2023 05:19)  HR: 82 (28 Nov 2023 17:00) (82 - 100)  BP: 122/68 (28 Nov 2023 17:00) (114/71 - 155/74)  BP(mean): --  RR: 18 (28 Nov 2023 11:27) (18 - 18)  SpO2: 91% (28 Nov 2023 11:27) (90% - 92%)    Parameters below as of 28 Nov 2023 11:27  Patient On (Oxygen Delivery Method): room air        I&O's Summary      PHYSICAL EXAM:  HEENT: NC/AT; PERRLA  Neck: Soft; no tenderness  Lungs: CTA bilaterally; no wheezing.   Heart:  Abdomen:  Genital/ Rectal:  Extremities:  Neurologic:  Vascular:      LABORATORY:    CBC Full  -  ( 28 Nov 2023 09:40 )  WBC Count : 11.39 K/uL  RBC Count : 3.18 M/uL  Hemoglobin : 10.6 g/dL  Hematocrit : 35.0 %  Platelet Count - Automated : 663 K/uL  Mean Cell Volume : 110.1 fl  Mean Cell Hemoglobin : 33.3 pg  Mean Cell Hemoglobin Concentration : 30.3 gm/dL  Auto Neutrophil # : x  Auto Lymphocyte # : x  Auto Monocyte # : x  Auto Eosinophil # : x  Auto Basophil # : x  Auto Neutrophil % : x  Auto Lymphocyte % : x  Auto Monocyte % : x  Auto Eosinophil % : x  Auto Basophil % : x          11-28    142  |  106  |  7   ----------------------------<  121<H>  3.6   |  28  |  0.52    Ca    8.1<L>      28 Nov 2023 09:40    Assessment and Plan:    1. LLE cellulitis  2. Chronic  LLE chronic wounds.  3. History of melanoma.     . Cellulitis is improving slowly. Not ready for discharge.   . High Vanco trough of 21 today. HoLd the pm dose today and restart at 750 mg q12h tomorrow. Repeat Vanco trough before the next dose tomorrow.           Sushil Anguiano MD   [phone].    She is afebrile  Still has severe LLE pain     MEDICATIONS  (STANDING):  amLODIPine   Tablet 5 milliGRAM(s) Oral daily  cholecalciferol 2000 Unit(s) Oral daily  escitalopram 20 milliGRAM(s) Oral daily  famotidine    Tablet 20 milliGRAM(s) Oral daily  folic acid 1 milliGRAM(s) Oral daily  gabapentin 300 milliGRAM(s) Oral three times a day  hydroxyurea 500 milliGRAM(s) Oral two times a day  lactobacillus acidophilus 1 Tablet(s) Oral daily  levothyroxine 50 MICROGram(s) Oral daily  melatonin 3 milliGRAM(s) Oral at bedtime  metoprolol tartrate 25 milliGRAM(s) Oral two times a day  senna 1 Tablet(s) Oral daily  simvastatin 10 milliGRAM(s) Oral at bedtime    MEDICATIONS  (PRN):  acetaminophen     Tablet .. 650 milliGRAM(s) Oral every 6 hours PRN Temp greater or equal to 38C (100.4F), Mild Pain (1 - 3)  traMADol 50 milliGRAM(s) Oral every 6 hours PRN Moderate Pain (4 - 6)      Vital Signs Last 24 Hrs  T(C): 36.8 (28 Nov 2023 11:27), Max: 36.9 (28 Nov 2023 05:19)  T(F): 98.3 (28 Nov 2023 11:27), Max: 98.4 (28 Nov 2023 05:19)  HR: 82 (28 Nov 2023 17:00) (82 - 100)  BP: 122/68 (28 Nov 2023 17:00) (114/71 - 155/74)  BP(mean): --  RR: 18 (28 Nov 2023 11:27) (18 - 18)  SpO2: 91% (28 Nov 2023 11:27) (90% - 92%)    Parameters below as of 28 Nov 2023 11:27  Patient On (Oxygen Delivery Method): room air        I&O's Summary      PHYSICAL EXAM:  HEENT: NC/AT; PERRLA  Neck: Soft; no tenderness  Lungs: CTA bilaterally; no wheezing.   Heart: RRR, no murmurs  Abdomen: Soft, no tenderness.   Extremities: LLE wound with surrounding erythema. Very tender.   Neurologic: Confused.         LABORATORY:    CBC Full  -  ( 28 Nov 2023 09:40 )  WBC Count : 11.39 K/uL  RBC Count : 3.18 M/uL  Hemoglobin : 10.6 g/dL  Hematocrit : 35.0 %  Platelet Count - Automated : 663 K/uL  Mean Cell Volume : 110.1 fl  Mean Cell Hemoglobin : 33.3 pg  Mean Cell Hemoglobin Concentration : 30.3 gm/dL  Auto Neutrophil # : x  Auto Lymphocyte # : x  Auto Monocyte # : x  Auto Eosinophil # : x  Auto Basophil # : x  Auto Neutrophil % : x  Auto Lymphocyte % : x  Auto Monocyte % : x  Auto Eosinophil % : x  Auto Basophil % : x          11-28    142  |  106  |  7   ----------------------------<  121<H>  3.6   |  28  |  0.52    Ca    8.1<L>      28 Nov 2023 09:40    Assessment and Plan:    1. LLE cellulitis  2. Chronic  LLE chronic wounds.  3. History of melanoma.     . Cellulitis is improving slowly. Not ready for discharge.   . High Vanco trough of 21 today. HoLd the pm dose today and restart at 750 mg q12h tomorrow. Repeat Vanco trough before the next dose tomorrow.           Sushil Anguiano MD   [phone].

## 2023-11-28 NOTE — DISCHARGE NOTE PROVIDER - NSDCFUADDAPPT_GEN_ALL_CORE_FT
Left lower leg wounds down skin, subcutaneous tissue, fat  Left lower leg cellulitis   Continue local wound care and dressing changes tiw

## 2023-11-28 NOTE — PROGRESS NOTE ADULT - SUBJECTIVE AND OBJECTIVE BOX
PROGRESS NOTE  Patient is a 89y old  Female who presents with a chief complaint of cellulitis (28 Nov 2023 05:12)    Chart and available morning labs /imaging are reviewed electronically , urgent issues addressed . More information  is being added upon completion of rounds , when more information is collected and management discussed with consultants , medical staff and social service/case management on the floor   OVERNIGHT  No new issues reported by medical staff . 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  Patient was given a course of Keflex, with no resolution.  Patient then given a second course of Keflex, with no further resolution.  No known fever.  No trauma.  No other acute injury or complaints.  Per NH papers pt has NKDA (23 Nov 2023 07:40)    PAST MEDICAL & SURGICAL HISTORY:  Hypertension      CVA (cerebral vascular accident)      Depression      Constipation      Neuropathy      Hyperlipidemia      Grade I diastolic dysfunction      Afib      Neuropathy      VHD (valvular heart disease)      Aortic valvar stenosis      Hypothyroidism      GERD (gastroesophageal reflux disease)      No significant past surgical history          MEDICATIONS  (STANDING):  amLODIPine   Tablet 5 milliGRAM(s) Oral daily  cholecalciferol 2000 Unit(s) Oral daily  escitalopram 20 milliGRAM(s) Oral daily  famotidine    Tablet 20 milliGRAM(s) Oral daily  folic acid 1 milliGRAM(s) Oral daily  gabapentin 300 milliGRAM(s) Oral three times a day  hydroxyurea 500 milliGRAM(s) Oral two times a day  lactobacillus acidophilus 1 Tablet(s) Oral daily  levothyroxine 50 MICROGram(s) Oral daily  melatonin 3 milliGRAM(s) Oral at bedtime  metoprolol tartrate 25 milliGRAM(s) Oral two times a day  senna 1 Tablet(s) Oral daily  simvastatin 10 milliGRAM(s) Oral at bedtime  vancomycin  IVPB 1000 milliGRAM(s) IV Intermittent every 12 hours    MEDICATIONS  (PRN):  acetaminophen     Tablet .. 650 milliGRAM(s) Oral every 6 hours PRN Temp greater or equal to 38C (100.4F), Mild Pain (1 - 3)  traMADol 50 milliGRAM(s) Oral every 6 hours PRN Moderate Pain (4 - 6)      OBJECTIVE    T(C): 36.8 (11-28-23 @ 11:27), Max: 36.9 (11-28-23 @ 05:19)  HR: 82 (11-28-23 @ 17:00) (82 - 100)  BP: 122/68 (11-28-23 @ 17:00) (114/71 - 155/74)  RR: 18 (11-28-23 @ 11:27) (18 - 18)  SpO2: 91% (11-28-23 @ 11:27) (90% - 92%)  Wt(kg): --  I&O's Summary        REVIEW OF SYSTEMS:  CONSTITUTIONAL: No fever, weight loss, or fatigue  EYES: No eye pain, visual disturbances, or discharge  ENMT:   No sinus or throat pain  NECK: No pain or stiffness  RESPIRATORY: No cough, wheezing, chills or hemoptysis; No shortness of breath  CARDIOVASCULAR: No chest pain, palpitations, dizziness, or leg swelling  GASTROINTESTINAL: No abdominal pain. No nausea, vomiting; No diarrhea or constipation. No melena or hematochezia.  GENITOURINARY: No dysuria, frequency, hematuria, or incontinence  NEUROLOGICAL: No headaches, memory loss, loss of strength, numbness, or tremors  SKIN: No itching, burning, rashes, or lesions   MUSCULOSKELETAL: No joint pain or swelling; No muscle, back, or extremity pain    PHYSICAL EXAM:  Appearance: NAD. VS past 24 hrs -as above   HEENT:   Moist oral mucosa. Conjunctiva clear b/l.   Neck : supple  Respiratory: Lungs CTAB.  Gastrointestinal:  Soft, nontender. No rebound. No rigidity. BS present	  Cardiovascular: RRR ,S1S2 present  Neurologic: Non-focal. Moving all extremities.  Extremities: No edema. No erythema. No calf tenderness.  Skin: No rashes, No ecchymoses, No cyanosis.	  wounds ,skin lesions-See skin assesment flow sheet   LABS:                        10.6   11.39 )-----------( 663      ( 28 Nov 2023 09:40 )             35.0     11-28    142  |  106  |  7   ----------------------------<  121<H>  3.6   |  28  |  0.52    Ca    8.1<L>      28 Nov 2023 09:40      CAPILLARY BLOOD GLUCOSE        PT/INR - ( 28 Nov 2023 09:40 )   PT: 33.0 sec;   INR: 2.91 ratio         PTT - ( 28 Nov 2023 09:40 )  PTT:44.7 sec  Urinalysis Basic - ( 28 Nov 2023 09:40 )    Color: x / Appearance: x / SG: x / pH: x  Gluc: 121 mg/dL / Ketone: x  / Bili: x / Urobili: x   Blood: x / Protein: x / Nitrite: x   Leuk Esterase: x / RBC: x / WBC x   Sq Epi: x / Non Sq Epi: x / Bacteria: x        Culture - Blood (collected 22 Nov 2023 16:15)  Source: .Blood Blood-Peripheral  Final Report (28 Nov 2023 01:00):    No growth at 5 days    Culture - Blood (collected 22 Nov 2023 16:10)  Source: .Blood Blood-Peripheral  Final Report (28 Nov 2023 01:00):    No growth at 5 days      RADIOLOGY & ADDITIONAL TESTS:   reviewed elctronically  ASSESSMENT/PLAN: 	    25 minutes aggregate time was spent on this visit, 50% visit time spent in care co-ordination with other attendings and counselling patient .I have discussed care plan with patient / HCP/family member ,who expressed understanding of problems treatment and their effect and side effects, alternatives in details. I have asked if they have any questions and concerns and appropriately addressed them to best of my ability. ACP-Advance care planning was discussed , pallitaive care issues ,CMO ,GOC ,MOLST  form ,advance directives were reviewed .All questions were answered to the best of my knowledge - 25 m

## 2023-11-28 NOTE — DISCHARGE NOTE PROVIDER - DETAILS OF MALNUTRITION DIAGNOSIS/DIAGNOSES
This patient has been assessed with a concern for Malnutrition and was treated during this hospitalization for the following Nutrition diagnosis/diagnoses:     -  11/29/2023: Moderate protein-calorie malnutrition

## 2023-11-28 NOTE — DISCHARGE NOTE PROVIDER - NSDCCPCAREPLAN_GEN_ALL_CORE_FT
PRINCIPAL DISCHARGE DIAGNOSIS  Diagnosis: Cellulitis of left lower extremity  Assessment and Plan of Treatment: you were given wound care and IV antibiotics with improvement.  Please complete course of oral antibiotics.  Follow up with PMD in 1 week.  Follow up at wound care clinic within 5 days of discharge.        SECONDARY DISCHARGE DIAGNOSES  Diagnosis: Afib  Assessment and Plan of Treatment: Continue with Coumadin per INR.    Diagnosis: HTN (hypertension)  Assessment and Plan of Treatment: Continue current medical management.    Diagnosis: Hypothyroidism  Assessment and Plan of Treatment: Continue current medical management.    Diagnosis: Valvular heart disease  Assessment and Plan of Treatment: Continue current medical management.     PRINCIPAL DISCHARGE DIAGNOSIS  Diagnosis: Cellulitis of left lower extremity  Assessment and Plan of Treatment: you were given wound care and IV antibiotics with improvement.  Please complete course of oral antibiotics.  Follow up with PMD in 1 week.  Follow up at wound care clinic within 5 days of discharge.        SECONDARY DISCHARGE DIAGNOSES  Diagnosis: Leg wound, left  Assessment and Plan of Treatment:     Diagnosis: Afib  Assessment and Plan of Treatment: Continue with Coumadin per INR.    Diagnosis: Hypothyroidism  Assessment and Plan of Treatment: Continue current medical management.    Diagnosis: HTN (hypertension)  Assessment and Plan of Treatment: Continue current medical management.    Diagnosis: Valvular heart disease  Assessment and Plan of Treatment: Continue current medical management.

## 2023-11-28 NOTE — PROGRESS NOTE ADULT - SUBJECTIVE AND OBJECTIVE BOX
Date/Time Patient Seen:  		  Referring MD:   Data Reviewed	       Patient is a 89y old  Female who presents with a chief complaint of cellulitis (27 Nov 2023 20:02)      Subjective/HPI     PAST MEDICAL & SURGICAL HISTORY:  Hypertension    CVA (cerebral vascular accident)    Depression    Constipation    Neuropathy    Constipation    Hyperlipidemia    Grade I diastolic dysfunction    Afib    Neuropathy    VHD (valvular heart disease)    Aortic valvar stenosis    Hypothyroidism    GERD (gastroesophageal reflux disease)    No significant past surgical history          Medication list         MEDICATIONS  (STANDING):  amLODIPine   Tablet 5 milliGRAM(s) Oral daily  cholecalciferol 2000 Unit(s) Oral daily  escitalopram 20 milliGRAM(s) Oral daily  famotidine    Tablet 20 milliGRAM(s) Oral daily  folic acid 1 milliGRAM(s) Oral daily  gabapentin 300 milliGRAM(s) Oral three times a day  hydroxyurea 500 milliGRAM(s) Oral two times a day  lactobacillus acidophilus 1 Tablet(s) Oral daily  levothyroxine 50 MICROGram(s) Oral daily  melatonin 3 milliGRAM(s) Oral at bedtime  metoprolol tartrate 25 milliGRAM(s) Oral two times a day  senna 1 Tablet(s) Oral daily  simvastatin 10 milliGRAM(s) Oral at bedtime  vancomycin  IVPB 1000 milliGRAM(s) IV Intermittent every 12 hours    MEDICATIONS  (PRN):  acetaminophen     Tablet .. 650 milliGRAM(s) Oral every 6 hours PRN Temp greater or equal to 38C (100.4F), Mild Pain (1 - 3)  traMADol 50 milliGRAM(s) Oral every 6 hours PRN Moderate Pain (4 - 6)         Vitals log        ICU Vital Signs Last 24 Hrs  T(C): 36.7 (27 Nov 2023 20:20), Max: 36.7 (27 Nov 2023 12:44)  T(F): 98.1 (27 Nov 2023 20:20), Max: 98.1 (27 Nov 2023 12:44)  HR: 100 (27 Nov 2023 20:20) (82 - 100)  BP: 129/77 (27 Nov 2023 20:20) (129/77 - 139/71)  BP(mean): --  ABP: --  ABP(mean): --  RR: 18 (27 Nov 2023 20:20) (18 - 18)  SpO2: 92% (27 Nov 2023 20:20) (92% - 99%)    O2 Parameters below as of 27 Nov 2023 20:20  Patient On (Oxygen Delivery Method): room air                 Input and Output:  I&O's Detail    26 Nov 2023 07:01  -  27 Nov 2023 07:00  --------------------------------------------------------  IN:  Total IN: 0 mL    OUT:    Voided (mL): 1 mL  Total OUT: 1 mL    Total NET: -1 mL          Lab Data                        9.7    8.54  )-----------( 651      ( 27 Nov 2023 05:57 )             30.5     11-27    143  |  109<H>  |  7   ----------------------------<  94  3.6   |  30  |  0.50    Ca    8.1<L>      27 Nov 2023 05:57              Review of Systems	      Objective     Physical Examination    heart s1s2  lung dc BS  head nc      Pertinent Lab findings & Imaging      Summer:  NO   Adequate UO     I&O's Detail    26 Nov 2023 07:01  -  27 Nov 2023 07:00  --------------------------------------------------------  IN:  Total IN: 0 mL    OUT:    Voided (mL): 1 mL  Total OUT: 1 mL    Total NET: -1 mL               Discussed with:     Cultures:	        Radiology

## 2023-11-28 NOTE — PHYSICAL THERAPY INITIAL EVALUATION ADULT - PERTINENT HX OF CURRENT PROBLEM, REHAB EVAL
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  Patient was given a course of Keflex, with no resolution.  Patient then given a second course of Keflex, with no further resolution.  No known fever.  No trauma.  No other acute injury or complaints.  Per NH papers pt has NKDA

## 2023-11-28 NOTE — DISCHARGE NOTE PROVIDER - CARE PROVIDER_API CALL
Twan Moralez  Cardiology  [address] [place] [address], [facility] [address]  [place] [address]  Phone: [phone]  Fax: [phone]  Follow Up Time: 2 weeks   Twan Moralez  Cardiology  [address] [place] [address], [address]  [place] [address]  Phone: [phone]  Fax: [phone]  Follow Up Time: 2 weeks    Adrian Gan-Jim  Foot Surgery  [address] [place] [id number]  Phone: [phone]  Fax: [phone]  Follow Up Time: 1 week

## 2023-11-28 NOTE — DISCHARGE NOTE PROVIDER - NSDCFUADDINST_GEN_ALL_CORE_FT
WOUND CARE 1. Remove all left lower leg dressings.  2. Dress wound with adaptic, Aquacel, and dry, sterile dressing.  3. Change dressings daily and monitor for any signs of infection TIW .  4. Patient is to follow up in the [facility] within 1 week upon discharge.

## 2023-11-28 NOTE — DISCHARGE NOTE PROVIDER - HOSPITAL COURSE
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  Patient was given a course of Keflex, with no resolution.  Patient then given a second course of Keflex, with no further resolution.  No known fever.  No trauma.  No other acute injury or complaints.  Per NH papers pt has NKDA    1 Cellulitis  - cw abx  - podiatry and ID fu   - fu cultures  - will monitor    2 Afib  - check INR and dose coumadin daily  - will monitor     3 HTN  - cw home meds  - DASH diet    4 Hypothyroid  - cw synthroid      Problem/Plan - 1:  ·  Problem: Cellulitis of left leg.   ·  Plan: . Cellulitis is improving slowly.   . Continue IV Vancomycin 1 gm q12h. Vancomycin trough today was 11. Repeat trough level tomorrow (discussed with pharmacy). Vascular surgery consulted for LLE wounds  LISSY/PVR's reviewed from 8/2023  Not a surgical candidate sec to co morbities and declining health  Conservative management and local wound care Seen by podiatry team as well.    Problem/Plan - 2:  ·  Problem: Leg wound, left.   ·  Plan: Left lower leg wounds down skin, subcutaneous tissue, fat -podiatry , vascular and wound clinic f/up after discharge.    Problem/Plan - 3:  ·  Problem: Afib.   ·  Plan: continue home medications.    Problem/Plan - 4:  ·  Problem: Hypothyroidism.   ·  Plan: continue home medications.    Problem/Plan - 5:  ·  Problem: VHD (valvular heart disease).   ·  Plan: continue home medications.    Problem/Plan - 6:  ·  Problem: Neuropathy.   ·  Plan: pain management , PT/OT.    Problem/Plan - 7:  ·  Problem: GERD (gastroesophageal reflux disease).   ·  Plan: PPI.    Problem/Plan - 8:  ·  Problem: MDD (major depressive disorder).   ·  Plan: continue home medications.    Problem/Plan - 9:  ·  Problem: Prophylactic measure.   ·  Plan: Gastrointestinal stress ulcer prophylaxis and DVT prophylaxis administered.

## 2023-11-29 LAB
ANION GAP SERPL CALC-SCNC: 7 MMOL/L — SIGNIFICANT CHANGE UP (ref 5–17)
ANION GAP SERPL CALC-SCNC: 7 MMOL/L — SIGNIFICANT CHANGE UP (ref 5–17)
APTT BLD: 44.8 SEC — HIGH (ref 24.5–35.6)
APTT BLD: 44.8 SEC — HIGH (ref 24.5–35.6)
BUN SERPL-MCNC: 6 MG/DL — LOW (ref 7–23)
BUN SERPL-MCNC: 6 MG/DL — LOW (ref 7–23)
CALCIUM SERPL-MCNC: 8.1 MG/DL — LOW (ref 8.5–10.1)
CALCIUM SERPL-MCNC: 8.1 MG/DL — LOW (ref 8.5–10.1)
CHLORIDE SERPL-SCNC: 106 MMOL/L — SIGNIFICANT CHANGE UP (ref 96–108)
CHLORIDE SERPL-SCNC: 106 MMOL/L — SIGNIFICANT CHANGE UP (ref 96–108)
CO2 SERPL-SCNC: 29 MMOL/L — SIGNIFICANT CHANGE UP (ref 22–31)
CO2 SERPL-SCNC: 29 MMOL/L — SIGNIFICANT CHANGE UP (ref 22–31)
CREAT SERPL-MCNC: 0.57 MG/DL — SIGNIFICANT CHANGE UP (ref 0.5–1.3)
CREAT SERPL-MCNC: 0.57 MG/DL — SIGNIFICANT CHANGE UP (ref 0.5–1.3)
EGFR: 86 ML/MIN/1.73M2 — SIGNIFICANT CHANGE UP
EGFR: 86 ML/MIN/1.73M2 — SIGNIFICANT CHANGE UP
GLUCOSE SERPL-MCNC: 98 MG/DL — SIGNIFICANT CHANGE UP (ref 70–99)
GLUCOSE SERPL-MCNC: 98 MG/DL — SIGNIFICANT CHANGE UP (ref 70–99)
HCT VFR BLD CALC: 34.7 % — SIGNIFICANT CHANGE UP (ref 34.5–45)
HCT VFR BLD CALC: 34.7 % — SIGNIFICANT CHANGE UP (ref 34.5–45)
HGB BLD-MCNC: 10.6 G/DL — LOW (ref 11.5–15.5)
HGB BLD-MCNC: 10.6 G/DL — LOW (ref 11.5–15.5)
INR BLD: 2.68 RATIO — HIGH (ref 0.85–1.18)
INR BLD: 2.68 RATIO — HIGH (ref 0.85–1.18)
MCHC RBC-ENTMCNC: 30.5 GM/DL — LOW (ref 32–36)
MCHC RBC-ENTMCNC: 30.5 GM/DL — LOW (ref 32–36)
MCHC RBC-ENTMCNC: 33.4 PG — SIGNIFICANT CHANGE UP (ref 27–34)
MCHC RBC-ENTMCNC: 33.4 PG — SIGNIFICANT CHANGE UP (ref 27–34)
MCV RBC AUTO: 109.5 FL — HIGH (ref 80–100)
MCV RBC AUTO: 109.5 FL — HIGH (ref 80–100)
NRBC # BLD: 0 /100 WBCS — SIGNIFICANT CHANGE UP (ref 0–0)
NRBC # BLD: 0 /100 WBCS — SIGNIFICANT CHANGE UP (ref 0–0)
PLATELET # BLD AUTO: 597 K/UL — HIGH (ref 150–400)
PLATELET # BLD AUTO: 597 K/UL — HIGH (ref 150–400)
POTASSIUM SERPL-MCNC: 3.6 MMOL/L — SIGNIFICANT CHANGE UP (ref 3.5–5.3)
POTASSIUM SERPL-MCNC: 3.6 MMOL/L — SIGNIFICANT CHANGE UP (ref 3.5–5.3)
POTASSIUM SERPL-SCNC: 3.6 MMOL/L — SIGNIFICANT CHANGE UP (ref 3.5–5.3)
POTASSIUM SERPL-SCNC: 3.6 MMOL/L — SIGNIFICANT CHANGE UP (ref 3.5–5.3)
PROTHROM AB SERPL-ACNC: 30.5 SEC — HIGH (ref 9.5–13)
PROTHROM AB SERPL-ACNC: 30.5 SEC — HIGH (ref 9.5–13)
RBC # BLD: 3.17 M/UL — LOW (ref 3.8–5.2)
RBC # BLD: 3.17 M/UL — LOW (ref 3.8–5.2)
RBC # FLD: 16.7 % — HIGH (ref 10.3–14.5)
RBC # FLD: 16.7 % — HIGH (ref 10.3–14.5)
SODIUM SERPL-SCNC: 142 MMOL/L — SIGNIFICANT CHANGE UP (ref 135–145)
SODIUM SERPL-SCNC: 142 MMOL/L — SIGNIFICANT CHANGE UP (ref 135–145)
VANCOMYCIN TROUGH SERPL-MCNC: 15.7 UG/ML — SIGNIFICANT CHANGE UP (ref 10–20)
VANCOMYCIN TROUGH SERPL-MCNC: 15.7 UG/ML — SIGNIFICANT CHANGE UP (ref 10–20)
WBC # BLD: 8.85 K/UL — SIGNIFICANT CHANGE UP (ref 3.8–10.5)
WBC # BLD: 8.85 K/UL — SIGNIFICANT CHANGE UP (ref 3.8–10.5)
WBC # FLD AUTO: 8.85 K/UL — SIGNIFICANT CHANGE UP (ref 3.8–10.5)
WBC # FLD AUTO: 8.85 K/UL — SIGNIFICANT CHANGE UP (ref 3.8–10.5)

## 2023-11-29 PROCEDURE — 99232 SBSQ HOSP IP/OBS MODERATE 35: CPT

## 2023-11-29 RX ORDER — ACETAMINOPHEN 500 MG
1000 TABLET ORAL ONCE
Refills: 0 | Status: COMPLETED | OUTPATIENT
Start: 2023-11-29 | End: 2023-11-29

## 2023-11-29 RX ORDER — TRAMADOL HYDROCHLORIDE 50 MG/1
25 TABLET ORAL EVERY 8 HOURS
Refills: 0 | Status: DISCONTINUED | OUTPATIENT
Start: 2023-11-29 | End: 2023-11-29

## 2023-11-29 RX ORDER — CIPROFLOXACIN LACTATE 400MG/40ML
500 VIAL (ML) INTRAVENOUS EVERY 12 HOURS
Refills: 0 | Status: DISCONTINUED | OUTPATIENT
Start: 2023-11-30 | End: 2023-12-01

## 2023-11-29 RX ADMIN — TRAMADOL HYDROCHLORIDE 50 MILLIGRAM(S): 50 TABLET ORAL at 14:26

## 2023-11-29 RX ADMIN — SENNA PLUS 1 TABLET(S): 8.6 TABLET ORAL at 11:13

## 2023-11-29 RX ADMIN — GABAPENTIN 300 MILLIGRAM(S): 400 CAPSULE ORAL at 21:36

## 2023-11-29 RX ADMIN — Medication 50 MICROGRAM(S): at 07:09

## 2023-11-29 RX ADMIN — TRAMADOL HYDROCHLORIDE 50 MILLIGRAM(S): 50 TABLET ORAL at 20:26

## 2023-11-29 RX ADMIN — HYDROXYUREA 500 MILLIGRAM(S): 500 CAPSULE ORAL at 07:09

## 2023-11-29 RX ADMIN — FAMOTIDINE 20 MILLIGRAM(S): 10 INJECTION INTRAVENOUS at 11:12

## 2023-11-29 RX ADMIN — SIMVASTATIN 10 MILLIGRAM(S): 20 TABLET, FILM COATED ORAL at 21:36

## 2023-11-29 RX ADMIN — Medication 1 TABLET(S): at 11:13

## 2023-11-29 RX ADMIN — Medication 250 MILLIGRAM(S): at 17:10

## 2023-11-29 RX ADMIN — TRAMADOL HYDROCHLORIDE 50 MILLIGRAM(S): 50 TABLET ORAL at 15:26

## 2023-11-29 RX ADMIN — HYDROXYUREA 500 MILLIGRAM(S): 500 CAPSULE ORAL at 17:21

## 2023-11-29 RX ADMIN — Medication 650 MILLIGRAM(S): at 08:25

## 2023-11-29 RX ADMIN — Medication 2000 UNIT(S): at 11:13

## 2023-11-29 RX ADMIN — ESCITALOPRAM OXALATE 20 MILLIGRAM(S): 10 TABLET, FILM COATED ORAL at 11:12

## 2023-11-29 RX ADMIN — Medication 1000 MILLIGRAM(S): at 22:10

## 2023-11-29 RX ADMIN — GABAPENTIN 300 MILLIGRAM(S): 400 CAPSULE ORAL at 13:05

## 2023-11-29 RX ADMIN — Medication 25 MILLIGRAM(S): at 17:10

## 2023-11-29 RX ADMIN — Medication 3 MILLIGRAM(S): at 21:36

## 2023-11-29 RX ADMIN — Medication 400 MILLIGRAM(S): at 21:38

## 2023-11-29 RX ADMIN — GABAPENTIN 300 MILLIGRAM(S): 400 CAPSULE ORAL at 07:10

## 2023-11-29 RX ADMIN — TRAMADOL HYDROCHLORIDE 50 MILLIGRAM(S): 50 TABLET ORAL at 21:00

## 2023-11-29 RX ADMIN — Medication 1 MILLIGRAM(S): at 11:13

## 2023-11-29 RX ADMIN — Medication 250 MILLIGRAM(S): at 07:09

## 2023-11-29 NOTE — SWALLOW BEDSIDE ASSESSMENT ADULT - COMMENTS
H&P: 89-year-old female with a history of hypertension, phlebitis, CVA with hemiplegia affecting the right side, atrial fibrillation, malignant melanoma of the skin, GERD, depression, diverticulitis, UTI, hypothyroid was BIB EMS from [facility] with has a cellulitis in the left lower leg with chronic wounds.  Patient was given a course of Keflex, with no resolution.  Patient then given a second course of Keflex, with no further resolution.  No known fever.  No trauma.  No other acute injury or complaints. 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 with has a cellulitis in the left lower leg with chronic wounds.  Patient was given a course of Keflex, with no resolution.  Patient then given a second course of Keflex, with no further resolution.  No known fever.  No trauma.  No other acute injury or complaints.    Pt is known to this service and seen during prior admissions. She was rx soft and bite sized solids with thin liquids. Please see full reports in chart for details.    Pt received semi-upright supine in bed awake, alert, in NAD, on RA, able to follow commands and communicated verbally with SLP. Pt denies dysphagia PTA.

## 2023-11-29 NOTE — DIETITIAN INITIAL EVALUATION ADULT - NAME AND PHONE
Alissa Rizvi, MS, RD, CDN, [facility]

## 2023-11-29 NOTE — PROGRESS NOTE ADULT - SUBJECTIVE AND OBJECTIVE BOX
Interval History:    CENTRAL LINE:   [  ] YES       [  ] NO  MUIR:                 [  ] YES       [  ] NO         REVIEW OF SYSTEMS:  All Systems below were reviewed and are negative [  ]  HEENT:  ID:  Pulmonary:  Cardiac:  GI:  Renal:  Musculoskeletal:  All other systems above were reviewed and are negative   [  ]      MEDICATIONS  (STANDING):  amLODIPine   Tablet 5 milliGRAM(s) Oral daily  cholecalciferol 2000 Unit(s) Oral daily  escitalopram 20 milliGRAM(s) Oral daily  famotidine    Tablet 20 milliGRAM(s) Oral daily  folic acid 1 milliGRAM(s) Oral daily  gabapentin 300 milliGRAM(s) Oral three times a day  hydroxyurea 500 milliGRAM(s) Oral two times a day  lactobacillus acidophilus 1 Tablet(s) Oral daily  levothyroxine 50 MICROGram(s) Oral daily  melatonin 3 milliGRAM(s) Oral at bedtime  metoprolol tartrate 25 milliGRAM(s) Oral two times a day  senna 1 Tablet(s) Oral daily  simvastatin 10 milliGRAM(s) Oral at bedtime  vancomycin  IVPB 750 milliGRAM(s) IV Intermittent every 12 hours    MEDICATIONS  (PRN):  acetaminophen     Tablet .. 650 milliGRAM(s) Oral every 6 hours PRN Temp greater or equal to 38C (100.4F), Mild Pain (1 - 3)  traMADol 50 milliGRAM(s) Oral every 6 hours PRN Moderate Pain (4 - 6)      Vital Signs Last 24 Hrs  T(C): 36.8 (29 Nov 2023 19:52), Max: 36.8 (29 Nov 2023 04:48)  T(F): 98.2 (29 Nov 2023 19:52), Max: 98.2 (29 Nov 2023 04:48)  HR: 85 (29 Nov 2023 19:52) (70 - 98)  BP: 152/69 (29 Nov 2023 19:52) (110/69 - 162/96)  BP(mean): --  RR: 18 (29 Nov 2023 19:52) (17 - 18)  SpO2: 92% (29 Nov 2023 19:52) (90% - 92%)    Parameters below as of 29 Nov 2023 19:52  Patient On (Oxygen Delivery Method): nasal cannula        I&O's Summary      PHYSICAL EXAM:  HEENT: NC/AT; PERRLA  Neck: Soft; no tenderness  Lungs: CTA bilaterally; no wheezing.   Heart:  Abdomen:  Genital/ Rectal:  Extremities:  Neurologic:  Vascular:      LABORATORY:    CBC Full  -  ( 29 Nov 2023 06:03 )  WBC Count : 8.85 K/uL  RBC Count : 3.17 M/uL  Hemoglobin : 10.6 g/dL  Hematocrit : 34.7 %  Platelet Count - Automated : 597 K/uL  Mean Cell Volume : 109.5 fl  Mean Cell Hemoglobin : 33.4 pg  Mean Cell Hemoglobin Concentration : 30.5 gm/dL  Auto Neutrophil # : x  Auto Lymphocyte # : x  Auto Monocyte # : x  Auto Eosinophil # : x  Auto Basophil # : x  Auto Neutrophil % : x  Auto Lymphocyte % : x  Auto Monocyte % : x  Auto Eosinophil % : x  Auto Basophil % : x          11-29    142  |  106  |  6<L>  ----------------------------<  98  3.6   |  29  |  0.57    Ca    8.1<L>      29 Nov 2023 06:03            Assessment and Plan:          Sushil Anguiano MD   [phone].    She is afebrile  Comfortable.       MEDICATIONS  (STANDING):  amLODIPine   Tablet 5 milliGRAM(s) Oral daily  cholecalciferol 2000 Unit(s) Oral daily  escitalopram 20 milliGRAM(s) Oral daily  famotidine    Tablet 20 milliGRAM(s) Oral daily  folic acid 1 milliGRAM(s) Oral daily  gabapentin 300 milliGRAM(s) Oral three times a day  hydroxyurea 500 milliGRAM(s) Oral two times a day  lactobacillus acidophilus 1 Tablet(s) Oral daily  levothyroxine 50 MICROGram(s) Oral daily  melatonin 3 milliGRAM(s) Oral at bedtime  metoprolol tartrate 25 milliGRAM(s) Oral two times a day  senna 1 Tablet(s) Oral daily  simvastatin 10 milliGRAM(s) Oral at bedtime  vancomycin  IVPB 750 milliGRAM(s) IV Intermittent every 12 hours    MEDICATIONS  (PRN):  acetaminophen     Tablet .. 650 milliGRAM(s) Oral every 6 hours PRN Temp greater or equal to 38C (100.4F), Mild Pain (1 - 3)  traMADol 50 milliGRAM(s) Oral every 6 hours PRN Moderate Pain (4 - 6)      Vital Signs Last 24 Hrs  T(C): 36.8 (29 Nov 2023 19:52), Max: 36.8 (29 Nov 2023 04:48)  T(F): 98.2 (29 Nov 2023 19:52), Max: 98.2 (29 Nov 2023 04:48)  HR: 85 (29 Nov 2023 19:52) (70 - 98)  BP: 152/69 (29 Nov 2023 19:52) (110/69 - 162/96)  BP(mean): --  RR: 18 (29 Nov 2023 19:52) (17 - 18)  SpO2: 92% (29 Nov 2023 19:52) (90% - 92%)    Parameters below as of 29 Nov 2023 19:52  Patient On (Oxygen Delivery Method): nasal cannula        I&O's Summary      PHYSICAL EXAM:  HEENT: NC/AT; PERRLA  Neck: Soft; no tenderness  Lungs: CTA bilaterally; no wheezing.   Heart:  Abdomen:  Genital/ Rectal:  Extremities: Decreasing erythema of LLE.   Neurologic:  Vascular:      LABORATORY:    CBC Full  -  ( 29 Nov 2023 06:03 )  WBC Count : 8.85 K/uL  RBC Count : 3.17 M/uL  Hemoglobin : 10.6 g/dL  Hematocrit : 34.7 %  Platelet Count - Automated : 597 K/uL  Mean Cell Volume : 109.5 fl  Mean Cell Hemoglobin : 33.4 pg  Mean Cell Hemoglobin Concentration : 30.5 gm/dL  Auto Neutrophil # : x  Auto Lymphocyte # : x  Auto Monocyte # : x  Auto Eosinophil # : x  Auto Basophil # : x  Auto Neutrophil % : x  Auto Lymphocyte % : x  Auto Monocyte % : x  Auto Eosinophil % : x  Auto Basophil % : x      142  |  106  |  6<L>  ----------------------------<  98  3.6   |  29  |  0.57    Ca    8.1<L>      29 Nov 2023 06:03      Assessment and Plan:    1. LLE cellulitis  2. Chronic  LLE chronic wounds.  3. History of melanoma.     . Cellulitis is improving.   . Discontinue IV Vancomycin after the last dose in AM tomorrow then add po Doxycycline 100 mg tara bid and Cipro 500 mg bid for 7 days to complete treatment for cellulitis.  , Wound care daily  , Discharge planning.       Sushil Anguiano MD   [phone].    She is afebrile  Comfortable.       MEDICATIONS  (STANDING):  amLODIPine   Tablet 5 milliGRAM(s) Oral daily  cholecalciferol 2000 Unit(s) Oral daily  escitalopram 20 milliGRAM(s) Oral daily  famotidine    Tablet 20 milliGRAM(s) Oral daily  folic acid 1 milliGRAM(s) Oral daily  gabapentin 300 milliGRAM(s) Oral three times a day  hydroxyurea 500 milliGRAM(s) Oral two times a day  lactobacillus acidophilus 1 Tablet(s) Oral daily  levothyroxine 50 MICROGram(s) Oral daily  melatonin 3 milliGRAM(s) Oral at bedtime  metoprolol tartrate 25 milliGRAM(s) Oral two times a day  senna 1 Tablet(s) Oral daily  simvastatin 10 milliGRAM(s) Oral at bedtime  vancomycin  IVPB 750 milliGRAM(s) IV Intermittent every 12 hours    MEDICATIONS  (PRN):  acetaminophen     Tablet .. 650 milliGRAM(s) Oral every 6 hours PRN Temp greater or equal to 38C (100.4F), Mild Pain (1 - 3)  traMADol 50 milliGRAM(s) Oral every 6 hours PRN Moderate Pain (4 - 6)      Vital Signs Last 24 Hrs  T(C): 36.8 (29 Nov 2023 19:52), Max: 36.8 (29 Nov 2023 04:48)  T(F): 98.2 (29 Nov 2023 19:52), Max: 98.2 (29 Nov 2023 04:48)  HR: 85 (29 Nov 2023 19:52) (70 - 98)  BP: 152/69 (29 Nov 2023 19:52) (110/69 - 162/96)  BP(mean): --  RR: 18 (29 Nov 2023 19:52) (17 - 18)  SpO2: 92% (29 Nov 2023 19:52) (90% - 92%)    Parameters below as of 29 Nov 2023 19:52  Patient On (Oxygen Delivery Method): nasal cannula        I&O's Summary      PHYSICAL EXAM:  HEENT: NC/AT; PERRLA  Neck: Soft; no tenderness  Lungs: CTA bilaterally; no wheezing.   Heart: RRR, no murmurs.   Abdomen: Soft, no tenderness.   Genital/ Rectal: No salas catheter  Extremities: Decreasing erythema of LLE.   Neurologic: Awake.      LABORATORY:    CBC Full  -  ( 29 Nov 2023 06:03 )  WBC Count : 8.85 K/uL  RBC Count : 3.17 M/uL  Hemoglobin : 10.6 g/dL  Hematocrit : 34.7 %  Platelet Count - Automated : 597 K/uL  Mean Cell Volume : 109.5 fl  Mean Cell Hemoglobin : 33.4 pg  Mean Cell Hemoglobin Concentration : 30.5 gm/dL  Auto Neutrophil # : x  Auto Lymphocyte # : x  Auto Monocyte # : x  Auto Eosinophil # : x  Auto Basophil # : x  Auto Neutrophil % : x  Auto Lymphocyte % : x  Auto Monocyte % : x  Auto Eosinophil % : x  Auto Basophil % : x      142  |  106  |  6<L>  ----------------------------<  98  3.6   |  29  |  0.57    Ca    8.1<L>      29 Nov 2023 06:03      Assessment and Plan:    1. LLE cellulitis  2. Chronic  LLE chronic wounds.  3. History of melanoma.     . Cellulitis is improving.   . Discontinue IV Vancomycin after the last dose in AM tomorrow then add po Doxycycline 100 mg tara bid and Cipro 500 mg bid for 7 days to complete treatment for cellulitis.  , Wound care daily  , Discharge planning.       Sushil Anguiano MD   [phone].

## 2023-11-29 NOTE — DIETITIAN INITIAL EVALUATION ADULT - PERTINENT MEDS FT
MEDICATIONS  (STANDING):  amLODIPine   Tablet 5 milliGRAM(s) Oral daily  cholecalciferol 2000 Unit(s) Oral daily  escitalopram 20 milliGRAM(s) Oral daily  famotidine    Tablet 20 milliGRAM(s) Oral daily  folic acid 1 milliGRAM(s) Oral daily  gabapentin 300 milliGRAM(s) Oral three times a day  hydroxyurea 500 milliGRAM(s) Oral two times a day  lactobacillus acidophilus 1 Tablet(s) Oral daily  levothyroxine 50 MICROGram(s) Oral daily  melatonin 3 milliGRAM(s) Oral at bedtime  metoprolol tartrate 25 milliGRAM(s) Oral two times a day  senna 1 Tablet(s) Oral daily  simvastatin 10 milliGRAM(s) Oral at bedtime  vancomycin  IVPB 750 milliGRAM(s) IV Intermittent every 12 hours    MEDICATIONS  (PRN):  acetaminophen     Tablet .. 650 milliGRAM(s) Oral every 6 hours PRN Temp greater or equal to 38C (100.4F), Mild Pain (1 - 3)  traMADol 50 milliGRAM(s) Oral every 6 hours PRN Moderate Pain (4 - 6)

## 2023-11-29 NOTE — DIETITIAN INITIAL EVALUATION ADULT - PERTINENT LABORATORY DATA
11-29    142  |  106  |  6<L>  ----------------------------<  98  3.6   |  29  |  0.57    Ca    8.1<L>      29 Nov 2023 06:03

## 2023-11-29 NOTE — SWALLOW BEDSIDE ASSESSMENT ADULT - ADDITIONAL RECOMMENDATIONS
1. SLP will continue to follow while patient is in house as schedule permits to monitor diet tolerance

## 2023-11-29 NOTE — DIETITIAN INITIAL EVALUATION ADULT - ADD RECOMMEND
will honor pt's food preferences/tolerances when requested  provide pt with lactose free milk/milk products

## 2023-11-29 NOTE — DIETITIAN INITIAL EVALUATION ADULT - OTHER INFO
89 y/o female adm with cellulitis of left lower extremity. PMH GERD, hypothyroidism, neuropathy, afib, HLD, constipation, depression, CVA, HTN. Pt visited at bedside this morning. Pt reports, appetite is good, tolerating meals. No problems chewing or swallowing. No recent wt changes noted, as per pt. Pt can not tolerate lactose. Will provide pt with lactose milk. Food preferences obtained, menus adjusted. Pt declined Ensure supplements at this time as pt feels she is consuming adequate amounts of food. EMR reviewed. UBW from over 1 year ago was 140#. Pt currently weighs 125#, as per pt. Back in 8/2023, pt's wt was 125#. Wt stable since Aug.

## 2023-11-29 NOTE — CHART NOTE - NSCHARTNOTEFT_GEN_A_CORE
2233:  RN called as pt complaining of pain from cellulitis, states that it was improved by IV tylenol in the past.  will order IV tylenol at this time  RN to call with any changes
RN called as Vanco trough 21.1, RN just started next dose of Vanco.  Called pharmacy, who recommends hold current Vanco dose, will order new Vanco trough in AM and will recalculate based on that.  Instructed RN to stop current Vanco dose.  RN to notify with any changes

## 2023-11-29 NOTE — SWALLOW BEDSIDE ASSESSMENT ADULT - SWALLOW EVAL: DIAGNOSIS
Patient presents with overtly functional oral and pharyngeal stages of swallow for regular solids and thin fluids. There were no overt s/s of penetration/aspiration exhibited.

## 2023-11-29 NOTE — PROGRESS NOTE ADULT - SUBJECTIVE AND OBJECTIVE BOX
Date/Time Patient Seen:  		  Referring MD:   Data Reviewed	       Patient is a 89y old  Female who presents with a chief complaint of cellulitis (28 Nov 2023 21:11)      Subjective/HPI     PAST MEDICAL & SURGICAL HISTORY:  Hypertension    CVA (cerebral vascular accident)    Depression    Constipation    Neuropathy    Constipation    Hyperlipidemia    Grade I diastolic dysfunction    Afib    Neuropathy    VHD (valvular heart disease)    Aortic valvar stenosis    Hypothyroidism    GERD (gastroesophageal reflux disease)    No significant past surgical history          Medication list         MEDICATIONS  (STANDING):  amLODIPine   Tablet 5 milliGRAM(s) Oral daily  cholecalciferol 2000 Unit(s) Oral daily  escitalopram 20 milliGRAM(s) Oral daily  famotidine    Tablet 20 milliGRAM(s) Oral daily  folic acid 1 milliGRAM(s) Oral daily  gabapentin 300 milliGRAM(s) Oral three times a day  hydroxyurea 500 milliGRAM(s) Oral two times a day  lactobacillus acidophilus 1 Tablet(s) Oral daily  levothyroxine 50 MICROGram(s) Oral daily  melatonin 3 milliGRAM(s) Oral at bedtime  metoprolol tartrate 25 milliGRAM(s) Oral two times a day  senna 1 Tablet(s) Oral daily  simvastatin 10 milliGRAM(s) Oral at bedtime  vancomycin  IVPB 750 milliGRAM(s) IV Intermittent every 12 hours    MEDICATIONS  (PRN):  acetaminophen     Tablet .. 650 milliGRAM(s) Oral every 6 hours PRN Temp greater or equal to 38C (100.4F), Mild Pain (1 - 3)  traMADol 50 milliGRAM(s) Oral every 6 hours PRN Moderate Pain (4 - 6)         Vitals log        ICU Vital Signs Last 24 Hrs  T(C): 36.8 (29 Nov 2023 04:48), Max: 36.9 (28 Nov 2023 05:19)  T(F): 98.2 (29 Nov 2023 04:48), Max: 98.4 (28 Nov 2023 05:19)  HR: 70 (29 Nov 2023 04:48) (70 - 100)  BP: 110/69 (29 Nov 2023 04:48) (110/69 - 155/74)  BP(mean): --  ABP: --  ABP(mean): --  RR: 17 (29 Nov 2023 04:48) (17 - 18)  SpO2: 90% (29 Nov 2023 04:48) (90% - 96%)    O2 Parameters below as of 29 Nov 2023 04:48  Patient On (Oxygen Delivery Method): room air                 Input and Output:  I&O's Detail      Lab Data                        10.6   11.39 )-----------( 663      ( 28 Nov 2023 09:40 )             35.0     11-28    142  |  106  |  7   ----------------------------<  121<H>  3.6   |  28  |  0.52    Ca    8.1<L>      28 Nov 2023 09:40              Review of Systems	      Objective     Physical Examination    heart s1s2  lung dc BS  head nc      Pertinent Lab findings & Imaging      Summer:  NO   Adequate UO     I&O's Detail           Discussed with:     Cultures:	        Radiology

## 2023-11-30 ENCOUNTER — TRANSCRIPTION ENCOUNTER (OUTPATIENT)
Age: 88
End: 2023-11-30

## 2023-11-30 DIAGNOSIS — J96.01 ACUTE RESPIRATORY FAILURE WITH HYPOXIA: ICD-10-CM

## 2023-11-30 LAB
ANION GAP SERPL CALC-SCNC: 6 MMOL/L — SIGNIFICANT CHANGE UP (ref 5–17)
ANION GAP SERPL CALC-SCNC: 6 MMOL/L — SIGNIFICANT CHANGE UP (ref 5–17)
APTT BLD: 43.5 SEC — HIGH (ref 24.5–35.6)
APTT BLD: 43.5 SEC — HIGH (ref 24.5–35.6)
BUN SERPL-MCNC: 6 MG/DL — LOW (ref 7–23)
BUN SERPL-MCNC: 6 MG/DL — LOW (ref 7–23)
CALCIUM SERPL-MCNC: 8.1 MG/DL — LOW (ref 8.5–10.1)
CALCIUM SERPL-MCNC: 8.1 MG/DL — LOW (ref 8.5–10.1)
CHLORIDE SERPL-SCNC: 107 MMOL/L — SIGNIFICANT CHANGE UP (ref 96–108)
CHLORIDE SERPL-SCNC: 107 MMOL/L — SIGNIFICANT CHANGE UP (ref 96–108)
CO2 SERPL-SCNC: 28 MMOL/L — SIGNIFICANT CHANGE UP (ref 22–31)
CO2 SERPL-SCNC: 28 MMOL/L — SIGNIFICANT CHANGE UP (ref 22–31)
CREAT SERPL-MCNC: 0.49 MG/DL — LOW (ref 0.5–1.3)
CREAT SERPL-MCNC: 0.49 MG/DL — LOW (ref 0.5–1.3)
EGFR: 89 ML/MIN/1.73M2 — SIGNIFICANT CHANGE UP
EGFR: 89 ML/MIN/1.73M2 — SIGNIFICANT CHANGE UP
GLUCOSE SERPL-MCNC: 86 MG/DL — SIGNIFICANT CHANGE UP (ref 70–99)
GLUCOSE SERPL-MCNC: 86 MG/DL — SIGNIFICANT CHANGE UP (ref 70–99)
HCT VFR BLD CALC: 32.1 % — LOW (ref 34.5–45)
HCT VFR BLD CALC: 32.1 % — LOW (ref 34.5–45)
HGB BLD-MCNC: 9.7 G/DL — LOW (ref 11.5–15.5)
HGB BLD-MCNC: 9.7 G/DL — LOW (ref 11.5–15.5)
INR BLD: 2.25 RATIO — HIGH (ref 0.85–1.18)
INR BLD: 2.25 RATIO — HIGH (ref 0.85–1.18)
MCHC RBC-ENTMCNC: 30.2 GM/DL — LOW (ref 32–36)
MCHC RBC-ENTMCNC: 30.2 GM/DL — LOW (ref 32–36)
MCHC RBC-ENTMCNC: 33.1 PG — SIGNIFICANT CHANGE UP (ref 27–34)
MCHC RBC-ENTMCNC: 33.1 PG — SIGNIFICANT CHANGE UP (ref 27–34)
MCV RBC AUTO: 109.6 FL — HIGH (ref 80–100)
MCV RBC AUTO: 109.6 FL — HIGH (ref 80–100)
NRBC # BLD: 0 /100 WBCS — SIGNIFICANT CHANGE UP (ref 0–0)
NRBC # BLD: 0 /100 WBCS — SIGNIFICANT CHANGE UP (ref 0–0)
PLATELET # BLD AUTO: 628 K/UL — HIGH (ref 150–400)
PLATELET # BLD AUTO: 628 K/UL — HIGH (ref 150–400)
POTASSIUM SERPL-MCNC: 3.8 MMOL/L — SIGNIFICANT CHANGE UP (ref 3.5–5.3)
POTASSIUM SERPL-MCNC: 3.8 MMOL/L — SIGNIFICANT CHANGE UP (ref 3.5–5.3)
POTASSIUM SERPL-SCNC: 3.8 MMOL/L — SIGNIFICANT CHANGE UP (ref 3.5–5.3)
POTASSIUM SERPL-SCNC: 3.8 MMOL/L — SIGNIFICANT CHANGE UP (ref 3.5–5.3)
PROTHROM AB SERPL-ACNC: 25.7 SEC — HIGH (ref 9.5–13)
PROTHROM AB SERPL-ACNC: 25.7 SEC — HIGH (ref 9.5–13)
RBC # BLD: 2.93 M/UL — LOW (ref 3.8–5.2)
RBC # BLD: 2.93 M/UL — LOW (ref 3.8–5.2)
RBC # FLD: 16.7 % — HIGH (ref 10.3–14.5)
RBC # FLD: 16.7 % — HIGH (ref 10.3–14.5)
SODIUM SERPL-SCNC: 141 MMOL/L — SIGNIFICANT CHANGE UP (ref 135–145)
SODIUM SERPL-SCNC: 141 MMOL/L — SIGNIFICANT CHANGE UP (ref 135–145)
WBC # BLD: 7.82 K/UL — SIGNIFICANT CHANGE UP (ref 3.8–10.5)
WBC # BLD: 7.82 K/UL — SIGNIFICANT CHANGE UP (ref 3.8–10.5)
WBC # FLD AUTO: 7.82 K/UL — SIGNIFICANT CHANGE UP (ref 3.8–10.5)
WBC # FLD AUTO: 7.82 K/UL — SIGNIFICANT CHANGE UP (ref 3.8–10.5)

## 2023-11-30 PROCEDURE — 99231 SBSQ HOSP IP/OBS SF/LOW 25: CPT

## 2023-11-30 PROCEDURE — 71045 X-RAY EXAM CHEST 1 VIEW: CPT | Mod: 26

## 2023-11-30 RX ORDER — CIPROFLOXACIN LACTATE 400MG/40ML
1 VIAL (ML) INTRAVENOUS
Qty: 14 | Refills: 0
Start: 2023-11-30 | End: 2023-12-06

## 2023-11-30 RX ORDER — WARFARIN SODIUM 2.5 MG/1
1 TABLET ORAL
Qty: 5 | Refills: 0
Start: 2023-11-30 | End: 2023-12-09

## 2023-11-30 RX ORDER — LACTOBACILLUS ACIDOPHILUS 100MM CELL
2 CAPSULE ORAL
Refills: 0 | DISCHARGE

## 2023-11-30 RX ORDER — WARFARIN SODIUM 2.5 MG/1
1 TABLET ORAL ONCE
Refills: 0 | Status: COMPLETED | OUTPATIENT
Start: 2023-11-30 | End: 2023-11-30

## 2023-11-30 RX ORDER — TRAMADOL HYDROCHLORIDE 50 MG/1
50 TABLET ORAL EVERY 6 HOURS
Refills: 0 | Status: DISCONTINUED | OUTPATIENT
Start: 2023-11-30 | End: 2023-12-01

## 2023-11-30 RX ORDER — LACTOBACILLUS ACIDOPHILUS 100MM CELL
2 CAPSULE ORAL
Qty: 28 | Refills: 0
Start: 2023-11-30 | End: 2023-12-13

## 2023-11-30 RX ORDER — WARFARIN SODIUM 2.5 MG/1
1 TABLET ORAL
Refills: 0 | DISCHARGE

## 2023-11-30 RX ORDER — ACETAMINOPHEN 500 MG
1000 TABLET ORAL ONCE
Refills: 0 | Status: COMPLETED | OUTPATIENT
Start: 2023-11-30 | End: 2023-11-30

## 2023-11-30 RX ADMIN — AMLODIPINE BESYLATE 5 MILLIGRAM(S): 2.5 TABLET ORAL at 05:08

## 2023-11-30 RX ADMIN — TRAMADOL HYDROCHLORIDE 50 MILLIGRAM(S): 50 TABLET ORAL at 06:10

## 2023-11-30 RX ADMIN — SIMVASTATIN 10 MILLIGRAM(S): 20 TABLET, FILM COATED ORAL at 21:28

## 2023-11-30 RX ADMIN — Medication 100 MILLIGRAM(S): at 18:32

## 2023-11-30 RX ADMIN — GABAPENTIN 300 MILLIGRAM(S): 400 CAPSULE ORAL at 13:41

## 2023-11-30 RX ADMIN — WARFARIN SODIUM 1 MILLIGRAM(S): 2.5 TABLET ORAL at 21:28

## 2023-11-30 RX ADMIN — Medication 2000 UNIT(S): at 12:18

## 2023-11-30 RX ADMIN — Medication 500 MILLIGRAM(S): at 05:07

## 2023-11-30 RX ADMIN — TRAMADOL HYDROCHLORIDE 50 MILLIGRAM(S): 50 TABLET ORAL at 13:17

## 2023-11-30 RX ADMIN — Medication 3 MILLIGRAM(S): at 21:28

## 2023-11-30 RX ADMIN — GABAPENTIN 300 MILLIGRAM(S): 400 CAPSULE ORAL at 21:28

## 2023-11-30 RX ADMIN — Medication 25 MILLIGRAM(S): at 18:32

## 2023-11-30 RX ADMIN — FAMOTIDINE 20 MILLIGRAM(S): 10 INJECTION INTRAVENOUS at 12:18

## 2023-11-30 RX ADMIN — GABAPENTIN 300 MILLIGRAM(S): 400 CAPSULE ORAL at 05:07

## 2023-11-30 RX ADMIN — Medication 1 TABLET(S): at 12:18

## 2023-11-30 RX ADMIN — Medication 500 MILLIGRAM(S): at 18:33

## 2023-11-30 RX ADMIN — HYDROXYUREA 500 MILLIGRAM(S): 500 CAPSULE ORAL at 05:08

## 2023-11-30 RX ADMIN — Medication 1000 MILLIGRAM(S): at 20:09

## 2023-11-30 RX ADMIN — Medication 50 MICROGRAM(S): at 05:07

## 2023-11-30 RX ADMIN — Medication 100 MILLIGRAM(S): at 05:08

## 2023-11-30 RX ADMIN — Medication 400 MILLIGRAM(S): at 19:45

## 2023-11-30 RX ADMIN — SENNA PLUS 1 TABLET(S): 8.6 TABLET ORAL at 12:18

## 2023-11-30 RX ADMIN — Medication 25 MILLIGRAM(S): at 05:07

## 2023-11-30 RX ADMIN — Medication 250 MILLIGRAM(S): at 05:06

## 2023-11-30 RX ADMIN — ESCITALOPRAM OXALATE 20 MILLIGRAM(S): 10 TABLET, FILM COATED ORAL at 12:18

## 2023-11-30 RX ADMIN — HYDROXYUREA 500 MILLIGRAM(S): 500 CAPSULE ORAL at 18:33

## 2023-11-30 RX ADMIN — TRAMADOL HYDROCHLORIDE 50 MILLIGRAM(S): 50 TABLET ORAL at 12:18

## 2023-11-30 RX ADMIN — Medication 1 MILLIGRAM(S): at 12:18

## 2023-11-30 NOTE — PROGRESS NOTE ADULT - PROBLEM SELECTOR PLAN 6
pain management , PT/OT
pain management , PT/OT
likely 2/2 TOn HEART DISEASE - AS,VHD AND UNDERLYING CHF ( patient is on Lasix maintenance tx  at home ) D/C ON HOME OXYGEN 2 LITERS NC

## 2023-11-30 NOTE — CONSULT NOTE ADULT - ASSESSMENT
Initial evaluation/Pulmonary Critical Care consultation requested  DR BLANCO  on  11/30/2023  from Dr Swapnil De La Rosa    Patient examined chart reviewed    HOSPITAL ADMISSION   PATIENT CAME  FROM (if information available)      GENERAL DATA .     GOC.  ..    ICU STAY.  ..   COVID. ..     BEST PRACTICE ISSUES.    HOB ELEVATN.  .. Yes    DVT PPLX.  ..  11/22 COUMADIN   STRESS ULCER PPLX.  ..  11/22 FAMOTIDINE    INFECTION PPLX.    ..          SPEECH SWALLOW RECOMMENDATIONS.   ..     11/29 reg dit    DIET.   ..  11/22 DASH      IV fl. ..   BOLUS. ..       ABGS.   .  VS/ PO/IO/ VENT/ DRIPS.   11/30/2023 afeb 84 160/80  11/30/2023 ra 87%      SUMMARY.  . 11/30/2023 Asked to see patient for home oxygen as she desaturates on RA   . PMHx .   .. DVT   .. A fib   .. Valvular heart disease   .. HFPEF   .... 6/28/2023 echo pasp 39 la sl dilated n lvsf  .. Aortic stenosis   .. CVA r sided weakness   . HOME MEDS .  . ISSUES CURRENT ADMISSION .  .. LLE CELLULITIS POA 11/22  .. VANCO 11/22 -> CIPRO 11/29 DOXY 11/29   .. A fib on WARFARIN   .. HYPOXIA   .. ANEMIA    PROBLEM/ASSESMENT/PLAN.  . LLE CELLULITIS POA 11/22  .. w 11/30/2023 w 7.8  .. VANCO 11/22 -> CIPRO 11/29 DOXY 11/29     . A fib on WARFARIN   .. INR 11/30/2023 INR 2.2     . HYPOXIA   .. Home O2 as needed to keep po above 88%     . ANEMIA  .. Hb 11/30/2023 Hb 9.7   .. monitor     TIME SPENT.  . Over 55 minutes aggregate care time spent on encounter; activities included   direct patient care, counseling and/or coordinating care reviewing notes, lab data/ imaging , discussion with multidisciplinary team/ patient  /family and explaining in detail risks, benefits, alternatives  of the recommendations     PATIENT.  . AB MENDOZA

## 2023-11-30 NOTE — CONSULT NOTE ADULT - SUBJECTIVE AND OBJECTIVE BOX
CHIEF COMPLAINT/ REASON FOR VISIT  .. Patient was seen to address the  issue listed under PROBLEM LIST which is located toward bottom of this note     REJI BERGER    [facility] 3WES 354 W1    Allergies    per son &quot;issues with certain anitibiotics&quot; does not remember the names - Patient has tolerated zosyn, ceftriaxone, bactrim, and vancomycin on past admissions. (Unknown)    Intolerances        PAST MEDICAL & SURGICAL HISTORY:  Hypertension      CVA (cerebral vascular accident)      Depression      Constipation      Neuropathy      Hyperlipidemia      Grade I diastolic dysfunction      Afib      Neuropathy      VHD (valvular heart disease)      Aortic valvar stenosis      Hypothyroidism      GERD (gastroesophageal reflux disease)      No significant past surgical history          FAMILY HISTORY:      Home Medications:  acetaminophen 500 mg oral tablet: 1 tab(s) orally 2 times a day (22 Nov 2023 20:06)  acetaminophen 500 mg oral tablet: 2 tab(s) orally once a day (in the afternoon) (22 Nov 2023 20:06)  amLODIPine 5 mg oral tablet: 1 tab(s) orally once a day (22 Nov 2023 20:06)  Biofreeze 4% topical gel: Apply topically to affected area 2 times a day (22 Nov 2023 20:06)  cholecalciferol 50 mcg (2000 intl units) oral tablet: 1 tab(s) orally once a day (22 Nov 2023 20:06)  escitalopram 20 mg oral tablet: 1 tab(s) orally once a day (22 Nov 2023 20:06)  famotidine 20 mg oral tablet: 1 tab(s) orally once a day (22 Nov 2023 20:06)  folic acid 1 mg oral tablet: 1 tab(s) orally once a day (22 Nov 2023 20:06)  furosemide 20 mg oral tablet: 1 tab(s) orally once a day (22 Nov 2023 20:06)  gabapentin 300 mg oral capsule: 1 cap(s) orally 3 times a day (22 Nov 2023 20:06)  hydroxyurea 500 mg oral capsule: 1 tab(s) orally 2 times a day (22 Nov 2023 20:06)  levothyroxine 50 mcg (0.05 mg) oral tablet: 1 tab(s) orally once a day (22 Nov 2023 20:06)  melatonin 3 mg oral tablet: 1 tab(s) orally once a day (22 Nov 2023 20:06)  metoprolol tartrate 25 mg oral tablet: 1 tab(s) orally 2 times a day (22 Nov 2023 20:06)  miconazole 2% topical cream: Apply topically to affected area once a day (22 Nov 2023 20:06)  senna (sennosides) 8.6 mg oral tablet: 2 tab(s) orally once a day (22 Nov 2023 20:06)  simvastatin 10 mg oral tablet: 1 tab(s) orally once a day (22 Nov 2023 20:06)  traMADol 50 mg oral tablet: 1 tab(s) orally 2 times a day hold for lethargy (22 Nov 2023 20:06)  traMADol 50 mg oral tablet: 1 tab(s) orally every 6 hours as needed for pain (22 Nov 2023 20:06)      MEDICATIONS  (STANDING):  amLODIPine   Tablet 5 milliGRAM(s) Oral daily  cholecalciferol 2000 Unit(s) Oral daily  ciprofloxacin     Tablet 500 milliGRAM(s) Oral every 12 hours  doxycycline monohydrate Capsule 100 milliGRAM(s) Oral every 12 hours  escitalopram 20 milliGRAM(s) Oral daily  famotidine    Tablet 20 milliGRAM(s) Oral daily  folic acid 1 milliGRAM(s) Oral daily  gabapentin 300 milliGRAM(s) Oral three times a day  hydroxyurea 500 milliGRAM(s) Oral two times a day  lactobacillus acidophilus 1 Tablet(s) Oral daily  levothyroxine 50 MICROGram(s) Oral daily  melatonin 3 milliGRAM(s) Oral at bedtime  metoprolol tartrate 25 milliGRAM(s) Oral two times a day  senna 1 Tablet(s) Oral daily  simvastatin 10 milliGRAM(s) Oral at bedtime  warfarin 1 milliGRAM(s) Oral once    MEDICATIONS  (PRN):  acetaminophen     Tablet .. 650 milliGRAM(s) Oral every 6 hours PRN Temp greater or equal to 38C (100.4F), Mild Pain (1 - 3)  traMADol 50 milliGRAM(s) Oral every 6 hours PRN Moderate Pain (4 - 6)      Diet, DASH/TLC:   Sodium & Cholesterol Restricted (11-22-23 @ 19:38) [Active]          Vital Signs Last 24 Hrs  T(C): 37.1 (30 Nov 2023 11:35), Max: 37.1 (30 Nov 2023 11:35)  T(F): 98.8 (30 Nov 2023 11:35), Max: 98.8 (30 Nov 2023 11:35)  HR: 106 (30 Nov 2023 11:35) (80 - 106)  BP: 165/72 (30 Nov 2023 11:35) (145/72 - 165/72)  BP(mean): --  RR: 18 (30 Nov 2023 11:40) (18 - 18)  SpO2: 87% (30 Nov 2023 11:40) (87% - 97%)    Parameters below as of 30 Nov 2023 11:40  Patient On (Oxygen Delivery Method): room air                  LABS:                        9.7    7.82  )-----------( 628      ( 30 Nov 2023 06:30 )             32.1     11-30    141  |  107  |  6<L>  ----------------------------<  86  3.8   |  28  |  0.49<L>    Ca    8.1<L>      30 Nov 2023 06:30      PT/INR - ( 30 Nov 2023 06:30 )   PT: 25.7 sec;   INR: 2.25 ratio         PTT - ( 30 Nov 2023 06:30 )  PTT:43.5 sec  Urinalysis Basic - ( 30 Nov 2023 06:30 )    Color: x / Appearance: x / SG: x / pH: x  Gluc: 86 mg/dL / Ketone: x  / Bili: x / Urobili: x   Blood: x / Protein: x / Nitrite: x   Leuk Esterase: x / RBC: x / WBC x   Sq Epi: x / Non Sq Epi: x / Bacteria: x            WBC:  WBC Count: 7.82 K/uL (11-30 @ 06:30)  WBC Count: 8.85 K/uL (11-29 @ 06:03)  WBC Count: 11.39 K/uL (11-28 @ 09:40)  WBC Count: 8.54 K/uL (11-27 @ 05:57)      MICROBIOLOGY:  RECENT CULTURES:              PT/INR - ( 30 Nov 2023 06:30 )   PT: 25.7 sec;   INR: 2.25 ratio         PTT - ( 30 Nov 2023 06:30 )  PTT:43.5 sec    Sodium:  Sodium: 141 mmol/L (11-30 @ 06:30)  Sodium: 142 mmol/L (11-29 @ 06:03)  Sodium: 142 mmol/L (11-28 @ 09:40)  Sodium: 143 mmol/L (11-27 @ 05:57)      0.49 mg/dL 11-30 @ 06:30  0.57 mg/dL 11-29 @ 06:03  0.52 mg/dL 11-28 @ 09:40  0.50 mg/dL 11-27 @ 05:57      Hemoglobin:  Hemoglobin: 9.7 g/dL (11-30 @ 06:30)  Hemoglobin: 10.6 g/dL (11-29 @ 06:03)  Hemoglobin: 10.6 g/dL (11-28 @ 09:40)  Hemoglobin: 9.7 g/dL (11-27 @ 05:57)      Platelets: Platelet Count - Automated: 628 K/uL (11-30 @ 06:30)  Platelet Count - Automated: 597 K/uL (11-29 @ 06:03)  Platelet Count - Automated: 663 K/uL (11-28 @ 09:40)  Platelet Count - Automated: 651 K/uL (11-27 @ 05:57)          Urinalysis Basic - ( 30 Nov 2023 06:30 )    Color: x / Appearance: x / SG: x / pH: x  Gluc: 86 mg/dL / Ketone: x  / Bili: x / Urobili: x   Blood: x / Protein: x / Nitrite: x   Leuk Esterase: x / RBC: x / WBC x   Sq Epi: x / Non Sq Epi: x / Bacteria: x        RADIOLOGY & ADDITIONAL STUDIES:      MICROBIOLOGY:  RECENT CULTURES:

## 2023-11-30 NOTE — PROGRESS NOTE ADULT - PROBLEM SELECTOR PROBLEM 1
Leg wound, left
Leg wound, left
Cellulitis of left leg

## 2023-11-30 NOTE — PROGRESS NOTE ADULT - SUBJECTIVE AND OBJECTIVE BOX
Date/Time Patient Seen:  		  Referring MD:   Data Reviewed	       Patient is a 89y old  Female who presents with a chief complaint of cellulitis (29 Nov 2023 20:21)      Subjective/HPI     PAST MEDICAL & SURGICAL HISTORY:  Hypertension    CVA (cerebral vascular accident)    Depression    Constipation    Neuropathy    Constipation    Hyperlipidemia    Grade I diastolic dysfunction    Afib    Neuropathy    VHD (valvular heart disease)    Aortic valvar stenosis    Hypothyroidism    GERD (gastroesophageal reflux disease)    No significant past surgical history          Medication list         MEDICATIONS  (STANDING):  amLODIPine   Tablet 5 milliGRAM(s) Oral daily  cholecalciferol 2000 Unit(s) Oral daily  ciprofloxacin     Tablet 500 milliGRAM(s) Oral every 12 hours  doxycycline monohydrate Capsule 100 milliGRAM(s) Oral every 12 hours  escitalopram 20 milliGRAM(s) Oral daily  famotidine    Tablet 20 milliGRAM(s) Oral daily  folic acid 1 milliGRAM(s) Oral daily  gabapentin 300 milliGRAM(s) Oral three times a day  hydroxyurea 500 milliGRAM(s) Oral two times a day  lactobacillus acidophilus 1 Tablet(s) Oral daily  levothyroxine 50 MICROGram(s) Oral daily  melatonin 3 milliGRAM(s) Oral at bedtime  metoprolol tartrate 25 milliGRAM(s) Oral two times a day  senna 1 Tablet(s) Oral daily  simvastatin 10 milliGRAM(s) Oral at bedtime    MEDICATIONS  (PRN):  acetaminophen     Tablet .. 650 milliGRAM(s) Oral every 6 hours PRN Temp greater or equal to 38C (100.4F), Mild Pain (1 - 3)         Vitals log        ICU Vital Signs Last 24 Hrs  T(C): 36.8 (30 Nov 2023 05:25), Max: 36.8 (29 Nov 2023 19:52)  T(F): 98.2 (30 Nov 2023 05:25), Max: 98.2 (29 Nov 2023 19:52)  HR: 84 (30 Nov 2023 05:25) (80 - 98)  BP: 162/81 (30 Nov 2023 05:25) (135/75 - 162/96)  BP(mean): --  ABP: --  ABP(mean): --  RR: 18 (30 Nov 2023 05:25) (18 - 18)  SpO2: 91% (30 Nov 2023 05:25) (91% - 97%)    O2 Parameters below as of 30 Nov 2023 05:25  Patient On (Oxygen Delivery Method): nasal cannula                 Input and Output:  I&O's Detail      Lab Data                        10.6   8.85  )-----------( 597      ( 29 Nov 2023 06:03 )             34.7     11-29    142  |  106  |  6<L>  ----------------------------<  98  3.6   |  29  |  0.57    Ca    8.1<L>      29 Nov 2023 06:03              Review of Systems	      Objective     Physical Examination    heart s1s2  lung dc BS  head nc      Pertinent Lab findings & Imaging      Summer:  NO   Adequate UO     I&O's Detail           Discussed with:     Cultures:	        Radiology

## 2023-11-30 NOTE — PROGRESS NOTE ADULT - SUBJECTIVE AND OBJECTIVE BOX
Interval History:    CENTRAL LINE:   [  ] YES       [  ] NO  MUIR:                 [  ] YES       [  ] NO         REVIEW OF SYSTEMS:  All Systems below were reviewed and are negative [  ]  HEENT:  ID:  Pulmonary:  Cardiac:  GI:  Renal:  Musculoskeletal:  All other systems above were reviewed and are negative   [  ]      MEDICATIONS  (STANDING):  amLODIPine   Tablet 5 milliGRAM(s) Oral daily  cholecalciferol 2000 Unit(s) Oral daily  ciprofloxacin     Tablet 500 milliGRAM(s) Oral every 12 hours  doxycycline monohydrate Capsule 100 milliGRAM(s) Oral every 12 hours  escitalopram 20 milliGRAM(s) Oral daily  famotidine    Tablet 20 milliGRAM(s) Oral daily  folic acid 1 milliGRAM(s) Oral daily  gabapentin 300 milliGRAM(s) Oral three times a day  hydroxyurea 500 milliGRAM(s) Oral two times a day  lactobacillus acidophilus 1 Tablet(s) Oral daily  levothyroxine 50 MICROGram(s) Oral daily  melatonin 3 milliGRAM(s) Oral at bedtime  metoprolol tartrate 25 milliGRAM(s) Oral two times a day  senna 1 Tablet(s) Oral daily  simvastatin 10 milliGRAM(s) Oral at bedtime  warfarin 1 milliGRAM(s) Oral once    MEDICATIONS  (PRN):  acetaminophen     Tablet .. 650 milliGRAM(s) Oral every 6 hours PRN Temp greater or equal to 38C (100.4F), Mild Pain (1 - 3)  traMADol 50 milliGRAM(s) Oral every 6 hours PRN Moderate Pain (4 - 6)      Vital Signs Last 24 Hrs  T(C): 37.1 (30 Nov 2023 11:35), Max: 37.1 (30 Nov 2023 11:35)  T(F): 98.8 (30 Nov 2023 11:35), Max: 98.8 (30 Nov 2023 11:35)  HR: 106 (30 Nov 2023 11:35) (80 - 106)  BP: 164/75 (30 Nov 2023 18:39) (145/72 - 165/72)  BP(mean): --  RR: 18 (30 Nov 2023 11:40) (18 - 18)  SpO2: 87% (30 Nov 2023 11:40) (87% - 97%)    Parameters below as of 30 Nov 2023 11:40  Patient On (Oxygen Delivery Method): room air        I&O's Summary      PHYSICAL EXAM:  HEENT: NC/AT; PERRLA  Neck: Soft; no tenderness  Lungs: CTA bilaterally; no wheezing.   Heart:  Abdomen:  Genital/ Rectal:  Extremities:  Neurologic:  Vascular:      LABORATORY:    CBC Full  -  ( 30 Nov 2023 06:30 )  WBC Count : 7.82 K/uL  RBC Count : 2.93 M/uL  Hemoglobin : 9.7 g/dL  Hematocrit : 32.1 %  Platelet Count - Automated : 628 K/uL  Mean Cell Volume : 109.6 fl  Mean Cell Hemoglobin : 33.1 pg  Mean Cell Hemoglobin Concentration : 30.2 gm/dL  Auto Neutrophil # : x  Auto Lymphocyte # : x  Auto Monocyte # : x  Auto Eosinophil # : x  Auto Basophil # : x  Auto Neutrophil % : x  Auto Lymphocyte % : x  Auto Monocyte % : x  Auto Eosinophil % : x  Auto Basophil % : x          11-30    141  |  107  |  6<L>  ----------------------------<  86  3.8   |  28  |  0.49<L>    Ca    8.1<L>      30 Nov 2023 06:30            Assessment and Plan:          Sushil Anguiano MD   [phone].    No fevers  Comfortable       MEDICATIONS  (STANDING):  amLODIPine   Tablet 5 milliGRAM(s) Oral daily  cholecalciferol 2000 Unit(s) Oral daily  ciprofloxacin     Tablet 500 milliGRAM(s) Oral every 12 hours  doxycycline monohydrate Capsule 100 milliGRAM(s) Oral every 12 hours  escitalopram 20 milliGRAM(s) Oral daily  famotidine    Tablet 20 milliGRAM(s) Oral daily  folic acid 1 milliGRAM(s) Oral daily  gabapentin 300 milliGRAM(s) Oral three times a day  hydroxyurea 500 milliGRAM(s) Oral two times a day  lactobacillus acidophilus 1 Tablet(s) Oral daily  levothyroxine 50 MICROGram(s) Oral daily  melatonin 3 milliGRAM(s) Oral at bedtime  metoprolol tartrate 25 milliGRAM(s) Oral two times a day  senna 1 Tablet(s) Oral daily  simvastatin 10 milliGRAM(s) Oral at bedtime  warfarin 1 milliGRAM(s) Oral once    MEDICATIONS  (PRN):  acetaminophen     Tablet .. 650 milliGRAM(s) Oral every 6 hours PRN Temp greater or equal to 38C (100.4F), Mild Pain (1 - 3)  traMADol 50 milliGRAM(s) Oral every 6 hours PRN Moderate Pain (4 - 6)      Vital Signs Last 24 Hrs  T(C): 37.1 (30 Nov 2023 11:35), Max: 37.1 (30 Nov 2023 11:35)  T(F): 98.8 (30 Nov 2023 11:35), Max: 98.8 (30 Nov 2023 11:35)  HR: 106 (30 Nov 2023 11:35) (80 - 106)  BP: 164/75 (30 Nov 2023 18:39) (145/72 - 165/72)  BP(mean): --  RR: 18 (30 Nov 2023 11:40) (18 - 18)  SpO2: 87% (30 Nov 2023 11:40) (87% - 97%)    Parameters below as of 30 Nov 2023 11:40  Patient On (Oxygen Delivery Method): room air        I&O's Summary      PHYSICAL EXAM:  HEENT: NC/AT; PERRLA  Neck: Soft; no tenderness  Lungs: CTA bilaterally; no wheezing.   Heart:  Abdomen:  Genital/ Rectal:  Extremities:  Neurologic:  Vascular:      LABORATORY:    CBC Full  -  ( 30 Nov 2023 06:30 )  WBC Count : 7.82 K/uL  RBC Count : 2.93 M/uL  Hemoglobin : 9.7 g/dL  Hematocrit : 32.1 %  Platelet Count - Automated : 628 K/uL  Mean Cell Volume : 109.6 fl  Mean Cell Hemoglobin : 33.1 pg  Mean Cell Hemoglobin Concentration : 30.2 gm/dL  Auto Neutrophil # : x  Auto Lymphocyte # : x  Auto Monocyte # : x  Auto Eosinophil # : x  Auto Basophil # : x  Auto Neutrophil % : x  Auto Lymphocyte % : x  Auto Monocyte % : x  Auto Eosinophil % : x  Auto Basophil % : x          11-30    141  |  107  |  6<L>  ----------------------------<  86  3.8   |  28  |  0.49<L>    Ca    8.1<L>      30 Nov 2023 06:30    Assessment and Plan:    1. LLE cellulitis  2. Chronic  LLE chronic wounds.  3. History of melanoma.     . Cellulitis is improving.   . Discontinue IV Vancomycin after the last dose in AM today  then add po Doxycycline 100 mg tara bid and Cipro 500 mg bid for 7 days to complete treatment for cellulitis.  , Wound care daily  , Discharge planning.         Sushil Anguiano MD   [phone].    No fevers  Comfortable       MEDICATIONS  (STANDING):  amLODIPine   Tablet 5 milliGRAM(s) Oral daily  cholecalciferol 2000 Unit(s) Oral daily  ciprofloxacin     Tablet 500 milliGRAM(s) Oral every 12 hours  doxycycline monohydrate Capsule 100 milliGRAM(s) Oral every 12 hours  escitalopram 20 milliGRAM(s) Oral daily  famotidine    Tablet 20 milliGRAM(s) Oral daily  folic acid 1 milliGRAM(s) Oral daily  gabapentin 300 milliGRAM(s) Oral three times a day  hydroxyurea 500 milliGRAM(s) Oral two times a day  lactobacillus acidophilus 1 Tablet(s) Oral daily  levothyroxine 50 MICROGram(s) Oral daily  melatonin 3 milliGRAM(s) Oral at bedtime  metoprolol tartrate 25 milliGRAM(s) Oral two times a day  senna 1 Tablet(s) Oral daily  simvastatin 10 milliGRAM(s) Oral at bedtime  warfarin 1 milliGRAM(s) Oral once    MEDICATIONS  (PRN):  acetaminophen     Tablet .. 650 milliGRAM(s) Oral every 6 hours PRN Temp greater or equal to 38C (100.4F), Mild Pain (1 - 3)  traMADol 50 milliGRAM(s) Oral every 6 hours PRN Moderate Pain (4 - 6)      Vital Signs Last 24 Hrs  T(C): 37.1 (30 Nov 2023 11:35), Max: 37.1 (30 Nov 2023 11:35)  T(F): 98.8 (30 Nov 2023 11:35), Max: 98.8 (30 Nov 2023 11:35)  HR: 106 (30 Nov 2023 11:35) (80 - 106)  BP: 164/75 (30 Nov 2023 18:39) (145/72 - 165/72)  BP(mean): --  RR: 18 (30 Nov 2023 11:40) (18 - 18)  SpO2: 87% (30 Nov 2023 11:40) (87% - 97%)    Parameters below as of 30 Nov 2023 11:40  Patient On (Oxygen Delivery Method): room air        I&O's Summary      PHYSICAL EXAM:  HEENT: NC/AT; PERRLA  Neck: Soft; no tenderness  Lungs: CTA bilaterally; no wheezing.   Heart: RRR, no murmurs.  Abdomen: Soft, no tenderness.   Genital/ Rectal: No salas catheter.  Extremities: LLE wound with decreasing swelling and erythema.   Neurologic: Confused.         LABORATORY:    CBC Full  -  ( 30 Nov 2023 06:30 )  WBC Count : 7.82 K/uL  RBC Count : 2.93 M/uL  Hemoglobin : 9.7 g/dL  Hematocrit : 32.1 %  Platelet Count - Automated : 628 K/uL  Mean Cell Volume : 109.6 fl  Mean Cell Hemoglobin : 33.1 pg  Mean Cell Hemoglobin Concentration : 30.2 gm/dL  Auto Neutrophil # : x  Auto Lymphocyte # : x  Auto Monocyte # : x  Auto Eosinophil # : x  Auto Basophil # : x  Auto Neutrophil % : x  Auto Lymphocyte % : x  Auto Monocyte % : x  Auto Eosinophil % : x  Auto Basophil % : x          11-30    141  |  107  |  6<L>  ----------------------------<  86  3.8   |  28  |  0.49<L>    Ca    8.1<L>      30 Nov 2023 06:30    Assessment and Plan:    1. LLE cellulitis  2. Chronic  LLE chronic wounds.  3. History of melanoma.     . Cellulitis is improving.   . Discontinue IV Vancomycin after the last dose in AM today  then add po Doxycycline 100 mg tara bid and Cipro 500 mg bid for 7 days to complete treatment for cellulitis.  , Wound care daily  , Discharge planning.         Sushil Anguiano MD   [phone].

## 2023-11-30 NOTE — CASE MANAGEMENT PROGRESS NOTE - NSCMPROGRESSNOTE_GEN_ALL_CORE
Pt was cleared medically for transition back to [facility]. This CM spoke with Irma from [facility] @ [phone] who was made aware. [phone] was faxed to [phone]. Pt will resume services with [facility] for wound care 3x weekly. Bedside RN was advised to send the pt back to the [facility] with wound care supplies for the visiting nurse. Ambulance was set up for 4 PM to transport the pt back to the [facility]. It was noted that the pt needs home oxygen and notes were filled out by Dr. Best and script sent to [facility] for a portable to be delivered to the bedside and concentrator to the facility. It was then noted that the pt was sent back to the [facility] a few months ago from [facility] with home oxygen portable and concentrator. I spoke to Irma at the [facility] who confirmed that the concentrator is in the pt's room but not the portable. I asked her to check the closet as the son Curtis suggested it should be in the room. Pt was not using it for a very months.     The [facility] called this CM back to state that they can not take the pt back in the afternoon due to only having one nurse in the facility. They requested for the pt to return to the [facility] tomorrow morning. Ambulance was cancelled for today and re-set up for tomorrow morning at 10AM. Bedside nurse made aware. CM team to follow up on portable oxygen prior to discharge. The son was made aware of the change in discharge for tomorrow at 10AM. Dr. Best made aware that the discharge was cancelled for today and changed to tomorrow.

## 2023-11-30 NOTE — CAREGIVER ENGAGEMENT NOTE - CAREGIVER EDUCATION - TYPES DISCUSSED
Assisted living/Discharge plan/DME/Home Care Services/Insurance benefits/Post-acute care agency contact/Post-discharge escalation process/Transportation coordination/Transportation letter provided
Discharge plan/DME/Home Care Services/Transportation coordination

## 2023-11-30 NOTE — PROGRESS NOTE ADULT - PROBLEM SELECTOR PLAN 2
Left lower leg wounds down skin, subcutaneous tissue, fat -podiatry , vascular and wound clinic f/up after discharge
Left lower leg wounds down skin, subcutaneous tissue, fat -podiatry , vascular and wound clinic f/up after discharge .Seen by podiatry /wound care MD -Left lower leg wounds down skin, subcutaneous tissue, fat  Left lower leg cellulitis -continue current rx , f/up after discharge in wound care clinic Dermatology f/up as outpatient  after cellulitis resolves ( hx of skin ca LLE)
Left lower leg wounds down skin, subcutaneous tissue, fat -podiatry , vascular and wound clinic f/up after discharge .Seen by podiatry /wound care MD -Left lower leg wounds down skin, subcutaneous tissue, fat  Left lower leg cellulitis -continue current rx , f/up after discharge in wound care clinic Dermatology f/up as outpatient  after cellulitis resolves ( hx of skin ca LLE)

## 2023-11-30 NOTE — PROGRESS NOTE ADULT - PROBLEM SELECTOR PROBLEM 8
MDD (major depressive disorder)
GERD (gastroesophageal reflux disease)
MDD (major depressive disorder)

## 2023-11-30 NOTE — PROGRESS NOTE ADULT - PROBLEM SELECTOR PLAN 9
Gastrointestinal stress ulcer prophylaxis and DVT prophylaxis administered
continue home medications
Gastrointestinal stress ulcer prophylaxis and DVT prophylaxis administered

## 2023-11-30 NOTE — PROGRESS NOTE ADULT - PROBLEM SELECTOR PLAN 1
Patient examined and evaluated this time.  Continue local wound care and dressing changes at this time.  No surgical interventions planned at this time.  Antibiotics as per infectious disease recommendations.
Patient examined and evaluated this time.  Continue local wound care and dressing changes at this time.  No surgical interventions planned at this time.  Antibiotics as per infectious disease recommendations.    Wound Care Instructions:  1. Cleanse wound with sterile saline solution and gently pat dry.  2. Dress wound with adaptic and dry, sterile dressing.  3. Change dressings daily and monitor for any signs of infection.  4. Patient is to [facility] up [facility] within 1 week upon discharge.
. Cellulitis is improving slowly.   . Continue IV Vancomycin 1 gm q12h. Vancomycin trough today was 11. Repeat trough level tomorrow (discussed with pharmacy). Vascular surgery consulted for LLE wounds  LISSY/PVR's reviewed from 8/2023  Not a surgical candidate sec to co morbities and declining health  Conservative management and local wound care Seen by podiatry team as well

## 2023-11-30 NOTE — DISCHARGE NOTE NURSING/CASE MANAGEMENT/SOCIAL WORK - PATIENT PORTAL LINK FT
You can access the FollowMyHealth Patient Portal offered by [facility] by registering at the following website: [email or web address]. By joining [facility]’s FollowMyHealth portal, you will also be able to view your health information using other applications (apps) compatible with our system.

## 2023-11-30 NOTE — PROGRESS NOTE ADULT - SUBJECTIVE AND OBJECTIVE BOX
SUBJECTIVE:  89y year old Female seen at [facility] 3WES 354 W1 for left lower extremity wounds on skin, subcutaneous tissue, fat with cellulitis. Left lower leg cellulitis improved.    Allergies    per son &quot;issues with certain anitibiotics&quot; does not remember the names - Patient has tolerated zosyn, ceftriaxone, bactrim, and vancomycin on past admissions. (Unknown)    Intolerances        MEDICATIONS  (STANDING):  amLODIPine   Tablet 5 milliGRAM(s) Oral daily  cholecalciferol 2000 Unit(s) Oral daily  ciprofloxacin     Tablet 500 milliGRAM(s) Oral every 12 hours  doxycycline monohydrate Capsule 100 milliGRAM(s) Oral every 12 hours  escitalopram 20 milliGRAM(s) Oral daily  famotidine    Tablet 20 milliGRAM(s) Oral daily  folic acid 1 milliGRAM(s) Oral daily  gabapentin 300 milliGRAM(s) Oral three times a day  hydroxyurea 500 milliGRAM(s) Oral two times a day  lactobacillus acidophilus 1 Tablet(s) Oral daily  levothyroxine 50 MICROGram(s) Oral daily  melatonin 3 milliGRAM(s) Oral at bedtime  metoprolol tartrate 25 milliGRAM(s) Oral two times a day  senna 1 Tablet(s) Oral daily  simvastatin 10 milliGRAM(s) Oral at bedtime    MEDICATIONS  (PRN):  acetaminophen     Tablet .. 650 milliGRAM(s) Oral every 6 hours PRN Temp greater or equal to 38C (100.4F), Mild Pain (1 - 3)  traMADol 50 milliGRAM(s) Oral every 6 hours PRN Moderate Pain (4 - 6)      Vital Signs Last 24 Hrs  T(C): 36.8 (30 Nov 2023 05:25), Max: 36.8 (29 Nov 2023 19:52)  T(F): 98.2 (30 Nov 2023 05:25), Max: 98.2 (29 Nov 2023 19:52)  HR: 84 (30 Nov 2023 05:25) (80 - 98)  BP: 162/81 (30 Nov 2023 05:25) (135/75 - 162/96)  BP(mean): --  RR: 18 (30 Nov 2023 05:25) (18 - 18)  SpO2: 91% (30 Nov 2023 05:25) (91% - 97%)    Parameters below as of 30 Nov 2023 05:25  Patient On (Oxygen Delivery Method): nasal cannula        PHYSICAL EXAM:  Vascular: DP & PT nonpalpable bilaterally, Capillary refill 4 seconds  Neurological: Light touch sensation diminished to the right lower extremity, intact to the left lower extremity  Musculoskeletal: 4/5 strength in all quadrants to the left lower extremity, 1/5 to the right lower extremity  Dermatological: Left lower leg wounds down skin, subcutaneous tissue, fat, green drainage noted on dressings, no probe to bone, periwound erythema improved, no fluctuance, no proximal streaking, no purulence at this time.                          10.6   8.85  )-----------( 597      ( 29 Nov 2023 06:03 )             34.7       11-29    142  |  106  |  6<L>  ----------------------------<  98  3.6   |  29  |  0.57    Ca    8.1<L>      29 Nov 2023 06:03        PT/INR - ( 29 Nov 2023 06:03 )   PT: 30.5 sec;   INR: 2.68 ratio         PTT - ( 29 Nov 2023 06:03 )  PTT:44.8 sec

## 2023-11-30 NOTE — CASE MANAGEMENT PROGRESS NOTE - NSCMPROGRESSNOTE_GEN_ALL_CORE
Discussed on rounds this am.  Primary RN aware to assess pt O2 sats on room air.  Discharge order noted in EMR, MD to complete d/c summary with f2f and 3122 ( on chart).  Call placed to Mayankaleksander Learychandnimayra to confirm referral for [facility] for reinstatement of services, VM message left with CM contact information.  Attempted to call Elmira ROSARIO x 2 this am, unable to reach wellness, will continue to follow up.  Call placed to son Curtis re: above.  He requests to speak to MD re: labs, readiness for d/c etc.  He was made aware pt was seen by ID last pm and transitioned to PO abx.  He is in agreement for [facility] services to be resumed, CM to arrange ambulance, but is requesting to speak to MD prior.  He is aware CM will d/w MD and will d/w him again prior to final d/c  plans.  CM will remain available.

## 2023-11-30 NOTE — DISCHARGE NOTE NURSING/CASE MANAGEMENT/SOCIAL WORK - NSDCPEFALRISK_GEN_ALL_CORE
For information on Fall & Injury Prevention, visit: [email or web address] OR  [email or web address] OR  https://www.cdc.gov/steadi/patient.html

## 2023-11-30 NOTE — PROGRESS NOTE ADULT - NUTRITIONAL ASSESSMENT
This patient has been assessed with a concern for Malnutrition and has been determined to have a diagnosis/diagnoses of Moderate protein-calorie malnutrition.    This patient is being managed with:   Diet DASH/TLC-  Sodium & Cholesterol Restricted  Entered: Nov 22 2023  7:37PM  
This patient has been assessed with a concern for Malnutrition and has been determined to have a diagnosis/diagnoses of Moderate protein-calorie malnutrition.    This patient is being managed with:   Diet DASH/TLC-  Sodium & Cholesterol Restricted  Entered: Nov 22 2023  7:37PM

## 2023-11-30 NOTE — PROGRESS NOTE ADULT - SUBJECTIVE AND OBJECTIVE BOX
PROGRESS NOTE  Patient is a 89y old  Female who presents with a chief complaint of cellulitis (30 Nov 2023 07:50)  Chart and available morning labs /imaging are reviewed electronically , urgent issues addressed . More information  is being added upon completion of rounds , when more information is collected and management discussed with consultants , medical staff and social service/case management on the floor     OVERNIGHT  No new issues reported by medical staff . 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  Patient was given a course of Keflex, with no resolution.  Patient then given a second course of Keflex, with no further resolution.  No known fever.  No trauma.  No other acute injury or complaints.  Per NH papers pt has NKDA (23 Nov 2023 07:40)    PAST MEDICAL & SURGICAL HISTORY:  Hypertension      CVA (cerebral vascular accident)      Depression      Constipation      Neuropathy      Hyperlipidemia      Grade I diastolic dysfunction      Afib      Neuropathy      VHD (valvular heart disease)      Aortic valvar stenosis      Hypothyroidism      GERD (gastroesophageal reflux disease)      No significant past surgical history          MEDICATIONS  (STANDING):  amLODIPine   Tablet 5 milliGRAM(s) Oral daily  cholecalciferol 2000 Unit(s) Oral daily  ciprofloxacin     Tablet 500 milliGRAM(s) Oral every 12 hours  doxycycline monohydrate Capsule 100 milliGRAM(s) Oral every 12 hours  escitalopram 20 milliGRAM(s) Oral daily  famotidine    Tablet 20 milliGRAM(s) Oral daily  folic acid 1 milliGRAM(s) Oral daily  gabapentin 300 milliGRAM(s) Oral three times a day  hydroxyurea 500 milliGRAM(s) Oral two times a day  lactobacillus acidophilus 1 Tablet(s) Oral daily  levothyroxine 50 MICROGram(s) Oral daily  melatonin 3 milliGRAM(s) Oral at bedtime  metoprolol tartrate 25 milliGRAM(s) Oral two times a day  senna 1 Tablet(s) Oral daily  simvastatin 10 milliGRAM(s) Oral at bedtime    MEDICATIONS  (PRN):  acetaminophen     Tablet .. 650 milliGRAM(s) Oral every 6 hours PRN Temp greater or equal to 38C (100.4F), Mild Pain (1 - 3)  traMADol 50 milliGRAM(s) Oral every 6 hours PRN Moderate Pain (4 - 6)      OBJECTIVE    T(C): 37.1 (11-30-23 @ 11:35), Max: 37.1 (11-30-23 @ 11:35)  HR: 106 (11-30-23 @ 11:35) (80 - 106)  BP: 165/72 (11-30-23 @ 11:35) (145/72 - 165/72)  RR: 18 (11-30-23 @ 11:40) (18 - 18)  SpO2: 87% (11-30-23 @ 11:40) (87% - 97%)  Wt(kg): --  I&O's Summary        REVIEW OF SYSTEMS:  CONSTITUTIONAL: No fever, weight loss, or fatigue  EYES: No eye pain, visual disturbances, or discharge  ENMT:   No sinus or throat pain  NECK: No pain or stiffness  RESPIRATORY: No cough, wheezing, chills or hemoptysis; No shortness of breath  CARDIOVASCULAR: No chest pain, palpitations, dizziness, or leg swelling  GASTROINTESTINAL: No abdominal pain. No nausea, vomiting; No diarrhea or constipation. No melena or hematochezia.  GENITOURINARY: No dysuria, frequency, hematuria, or incontinence  NEUROLOGICAL: No headaches, memory loss, loss of strength, numbness, or tremors  SKIN: No itching, burning, rashes, or lesions   MUSCULOSKELETAL: No joint pain or swelling; No muscle, back, or extremity pain    PHYSICAL EXAM:  Appearance: NAD. VS past 24 hrs -as above   HEENT:   Moist oral mucosa. Conjunctiva clear b/l.   Neck : supple  Respiratory: Lungs CTAB.  Gastrointestinal:  Soft, nontender. No rebound. No rigidity. BS present	  Cardiovascular: RRR ,S1S2 present  Neurologic: Non-focal. Moving all extremities.  Extremities: No edema. No erythema. No calf tenderness.  Skin: No rashes, No ecchymoses, No cyanosis.	  wounds ,skin lesions-See skin assesment flow sheet   LABS:                        9.7    7.82  )-----------( 628      ( 30 Nov 2023 06:30 )             32.1     11-30    141  |  107  |  6<L>  ----------------------------<  86  3.8   |  28  |  0.49<L>    Ca    8.1<L>      30 Nov 2023 06:30      CAPILLARY BLOOD GLUCOSE        PT/INR - ( 30 Nov 2023 06:30 )   PT: 25.7 sec;   INR: 2.25 ratio         PTT - ( 30 Nov 2023 06:30 )  PTT:43.5 sec  Urinalysis Basic - ( 30 Nov 2023 06:30 )    Color: x / Appearance: x / SG: x / pH: x  Gluc: 86 mg/dL / Ketone: x  / Bili: x / Urobili: x   Blood: x / Protein: x / Nitrite: x   Leuk Esterase: x / RBC: x / WBC x   Sq Epi: x / Non Sq Epi: x / Bacteria: x        Culture - Blood (collected 22 Nov 2023 16:15)  Source: .Blood Blood-Peripheral  Final Report (28 Nov 2023 01:00):    No growth at 5 days    Culture - Blood (collected 22 Nov 2023 16:10)  Source: .Blood Blood-Peripheral  Final Report (28 Nov 2023 01:00):    No growth at 5 days      RADIOLOGY & ADDITIONAL TESTS:   reviewed elctronically  ASSESSMENT/PLAN: 	Patient was seen and examined on a day of discharge . Plan of care , discharge medications and recommendations discussed with consultants and clearance for discharge obtained .Social service , case management  and medical staff are aware of plan. Family is notified. Discharge summary  is  prepared electronically-see separate document prepared by me .75minutes spent on this visit, 50% visit time spent in care co-ordination with other attendings and counselling patient 3122 4 PAGES COMPLETED BY ME ,RX SENT OXYGEN SUPPLY PROVIDED ,d/w Dr De La Rosa ,pulm cons   I have discussed care plan with patient and HCP,expressed understanding of problems treatment and their effect and side effects, alternatives in detail,I have asked if they have any questions and concerns and appropriately addressed them to best of my ability

## 2023-12-01 VITALS
DIASTOLIC BLOOD PRESSURE: 67 MMHG | TEMPERATURE: 98 F | HEART RATE: 89 BPM | RESPIRATION RATE: 17 BRPM | OXYGEN SATURATION: 90 % | SYSTOLIC BLOOD PRESSURE: 155 MMHG

## 2023-12-01 LAB
ANION GAP SERPL CALC-SCNC: 9 MMOL/L — SIGNIFICANT CHANGE UP (ref 5–17)
ANION GAP SERPL CALC-SCNC: 9 MMOL/L — SIGNIFICANT CHANGE UP (ref 5–17)
BUN SERPL-MCNC: 5 MG/DL — LOW (ref 7–23)
BUN SERPL-MCNC: 5 MG/DL — LOW (ref 7–23)
CALCIUM SERPL-MCNC: 8 MG/DL — LOW (ref 8.5–10.1)
CALCIUM SERPL-MCNC: 8 MG/DL — LOW (ref 8.5–10.1)
CHLORIDE SERPL-SCNC: 107 MMOL/L — SIGNIFICANT CHANGE UP (ref 96–108)
CHLORIDE SERPL-SCNC: 107 MMOL/L — SIGNIFICANT CHANGE UP (ref 96–108)
CO2 SERPL-SCNC: 27 MMOL/L — SIGNIFICANT CHANGE UP (ref 22–31)
CO2 SERPL-SCNC: 27 MMOL/L — SIGNIFICANT CHANGE UP (ref 22–31)
CREAT SERPL-MCNC: 0.46 MG/DL — LOW (ref 0.5–1.3)
CREAT SERPL-MCNC: 0.46 MG/DL — LOW (ref 0.5–1.3)
EGFR: 90 ML/MIN/1.73M2 — SIGNIFICANT CHANGE UP
EGFR: 90 ML/MIN/1.73M2 — SIGNIFICANT CHANGE UP
GLUCOSE SERPL-MCNC: 87 MG/DL — SIGNIFICANT CHANGE UP (ref 70–99)
GLUCOSE SERPL-MCNC: 87 MG/DL — SIGNIFICANT CHANGE UP (ref 70–99)
HCT VFR BLD CALC: 32.6 % — LOW (ref 34.5–45)
HCT VFR BLD CALC: 32.6 % — LOW (ref 34.5–45)
HGB BLD-MCNC: 9.9 G/DL — LOW (ref 11.5–15.5)
HGB BLD-MCNC: 9.9 G/DL — LOW (ref 11.5–15.5)
INR BLD: 2.49 RATIO — HIGH (ref 0.85–1.18)
INR BLD: 2.49 RATIO — HIGH (ref 0.85–1.18)
MCHC RBC-ENTMCNC: 30.4 GM/DL — LOW (ref 32–36)
MCHC RBC-ENTMCNC: 30.4 GM/DL — LOW (ref 32–36)
MCHC RBC-ENTMCNC: 33.4 PG — SIGNIFICANT CHANGE UP (ref 27–34)
MCHC RBC-ENTMCNC: 33.4 PG — SIGNIFICANT CHANGE UP (ref 27–34)
MCV RBC AUTO: 110.1 FL — HIGH (ref 80–100)
MCV RBC AUTO: 110.1 FL — HIGH (ref 80–100)
NRBC # BLD: 0 /100 WBCS — SIGNIFICANT CHANGE UP (ref 0–0)
NRBC # BLD: 0 /100 WBCS — SIGNIFICANT CHANGE UP (ref 0–0)
NT-PROBNP SERPL-SCNC: 1473 PG/ML — HIGH (ref 0–450)
NT-PROBNP SERPL-SCNC: 1473 PG/ML — HIGH (ref 0–450)
PLATELET # BLD AUTO: 678 K/UL — HIGH (ref 150–400)
PLATELET # BLD AUTO: 678 K/UL — HIGH (ref 150–400)
POTASSIUM SERPL-MCNC: 3.7 MMOL/L — SIGNIFICANT CHANGE UP (ref 3.5–5.3)
POTASSIUM SERPL-MCNC: 3.7 MMOL/L — SIGNIFICANT CHANGE UP (ref 3.5–5.3)
POTASSIUM SERPL-SCNC: 3.7 MMOL/L — SIGNIFICANT CHANGE UP (ref 3.5–5.3)
POTASSIUM SERPL-SCNC: 3.7 MMOL/L — SIGNIFICANT CHANGE UP (ref 3.5–5.3)
PROTHROM AB SERPL-ACNC: 28.4 SEC — HIGH (ref 9.5–13)
PROTHROM AB SERPL-ACNC: 28.4 SEC — HIGH (ref 9.5–13)
RBC # BLD: 2.96 M/UL — LOW (ref 3.8–5.2)
RBC # BLD: 2.96 M/UL — LOW (ref 3.8–5.2)
RBC # FLD: 16.4 % — HIGH (ref 10.3–14.5)
RBC # FLD: 16.4 % — HIGH (ref 10.3–14.5)
SODIUM SERPL-SCNC: 143 MMOL/L — SIGNIFICANT CHANGE UP (ref 135–145)
SODIUM SERPL-SCNC: 143 MMOL/L — SIGNIFICANT CHANGE UP (ref 135–145)
WBC # BLD: 9.39 K/UL — SIGNIFICANT CHANGE UP (ref 3.8–10.5)
WBC # BLD: 9.39 K/UL — SIGNIFICANT CHANGE UP (ref 3.8–10.5)
WBC # FLD AUTO: 9.39 K/UL — SIGNIFICANT CHANGE UP (ref 3.8–10.5)
WBC # FLD AUTO: 9.39 K/UL — SIGNIFICANT CHANGE UP (ref 3.8–10.5)

## 2023-12-01 RX ADMIN — Medication 50 MICROGRAM(S): at 06:15

## 2023-12-01 RX ADMIN — Medication 25 MILLIGRAM(S): at 06:15

## 2023-12-01 RX ADMIN — AMLODIPINE BESYLATE 5 MILLIGRAM(S): 2.5 TABLET ORAL at 06:15

## 2023-12-01 RX ADMIN — GABAPENTIN 300 MILLIGRAM(S): 400 CAPSULE ORAL at 06:15

## 2023-12-01 RX ADMIN — Medication 500 MILLIGRAM(S): at 06:16

## 2023-12-01 RX ADMIN — Medication 100 MILLIGRAM(S): at 06:16

## 2023-12-01 RX ADMIN — HYDROXYUREA 500 MILLIGRAM(S): 500 CAPSULE ORAL at 06:16

## 2023-12-01 NOTE — PROGRESS NOTE ADULT - ASSESSMENT
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    htn  phlebitis  OP  OA  CVA  AF  melanoma  frail  weak  elderly  diverticular disease  depression  GERD  STSI eval    ID eval noted, on VANCO  vs noted  labs reviewed  imaging reviewed  vascular eval noted - not a surgical candidate    GOC -  son hcp  pt has living WILL  pt is full code  does not want to live on machines
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    htn  phlebitis  OP  OA  CVA  AF  melanoma  frail  weak  elderly  diverticular disease  depression  GERD  STSI eval    ID eval noted, on VANCO  vs noted  labs reviewed  imaging reviewed  vascular eval noted - not a surgical candidate    GOC -  son hcp  pt has living WILL  pt is full code  does not want to live on machines
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  Patient was given a course of Keflex, with no resolution.  Patient then given a second course of Keflex, with no further resolution.  No known fever.  No trauma.  No other acute injury or complaints.  Per NH papers pt has NKDA    1 Cellulitis  - cw abx  - podiatry and ID fu   - fu cultures  - will monitor    2 Afib  - check INR and dose coumadin daily  - will monitor     3 HTN  - cw home meds  - DASH diet    4 Hypothyroid  - cw synthroid    
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    htn  phlebitis  OP  OA  CVA  AF  melanoma  frail  weak  elderly  diverticular disease  depression  GERD  STSI eval    ID eval noted, on ABX rx regimen -   vs noted  labs reviewed  imaging reviewed  vascular eval noted - not a surgical candidate    GOC -  son hcp  pt has living WILL  pt is full code  does not want to live on machines
Left lower leg wounds down skin, subcutaneous tissue, fat  Left lower leg cellulitis
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    htn  phlebitis  OP  OA  CVA  AF  melanoma  frail  weak  elderly  diverticular disease  depression  GERD  STSI eval    GOC -  son hcp  pt has living WILL  pt is full code  does not want to live on machines
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    htn  phlebitis  OP  OA  CVA  AF  melanoma  frail  weak  elderly  diverticular disease  depression  GERD  STSI eval    ID eval noted, on VANCO  vs noted  labs reviewed  imaging reviewed    GOC -  son hcp  pt has living WILL  pt is full code  does not want to live on machines
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 with has a cellulitis in the left lower leg with chronic wounds.  Patient was given a course of Keflex, with no resolution.  Patient then given a second course of Keflex, with no further resolution.  No known fever.  No trauma.  No other acute injury or complaints.  Per NH papers pt has NKDA    1 Cellulitis  - cw abx  - podiatry and ID fu   - fu cultures  - will monitor    2 Afib  - check INR and dose coumadin daily  - will monitor     3 HTN  - cw home meds  - DASH diet    4 Hypothyroid  - cw synthroid
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  Patient was given a course of Keflex, with no resolution.  Patient then given a second course of Keflex, with no further resolution.  No known fever.  No trauma.  No other acute injury or complaints.  Per NH papers pt has NKDA    1 Cellulitis  - cw abx  - podiatry and ID fu   - fu cultures  - will monitor    2 Afib  - check INR and dose coumadin daily  - will monitor     3 HTN  - cw home meds  - DASH diet    4 Hypothyroid  - cw synthroid    
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  Patient was given a course of Keflex, with no resolution.  Patient then given a second course of Keflex, with no further resolution.  No known fever.  No trauma.  No other acute injury or complaints.  Per NH papers pt has NKDA    1 Cellulitis  - cw abx  - podiatry and ID fu   - fu cultures  - will monitor    2 Afib  - check INR and dose coumadin daily  - will monitor     3 HTN  - cw home meds  - DASH diet    4 Hypothyroid  - cw synthroid    
  REASON FOR VISIT  .. Management of problems listed below      ROS  . Patient unable to give ROS     PHYSICAL EXAM    HEENT Unremarkable  atraumatic   RESP Fair air entry  Harsh breath sound   CARDIAC S1 S2 No S3     NO JVD    ABDOMEN No hepatosplenomegaly   PEDAL EDEMA present No calf tenderness  NO rash       GENERAL DATA .     GOC.  ..    ICU STAY.  ..   COVID. ..     BEST PRACTICE ISSUES.    HOB ELEVATN.  .. Yes    DVT PPLX.  ..  11/22 COUMADIN   STRESS ULCER PPLX.  ..  11/22 FAMOTIDINE    INFECTION PPLX.    ..          SPEECH SWALLOW RECOMMENDATIONS.   ..     11/29 reg dit    DIET.   ..  11/22 DASH      IV fl. ..   BOLUS. ..     ALLGY. ..     certain antibios  WT. ..  11/30/2023 56  BMI. .. 11/30/2023 20    PROCEDURES. ..     ABGS.   .  VS/ PO/IO/ VENT/ DRIPS.   12/1/2023 afeb 100 160/80   12/1/2023 2l 92%     SUMMARY.  . 11/30/2023 Asked to see patient for home oxygen as she desaturates on RA   . PMHx .   .. DVT   .. A fib   .. Valvular heart disease   .. HFPEF   .... 6/28/2023 echo pasp 39 la sl dilated n lvsf  .. Aortic stenosis   .. CVA r sided weakness   . HOME MEDS .  . ISSUES CURRENT ADMISSION .  .. LLE CELLULITIS POA 11/22  .. VANCO 11/22 -> CIPRO 11/29 DOXY 11/29   .. A fib on WARFARIN   .. HYPOXIA   .. ANEMIA    PROBLEM/ASSESMENT/PLAN.  . LLE CELLULITIS POA 11/22  .. w 11/30/2023 w 7.8  .. VANCO 11/22 -> CIPRO 11/29 DOXY 11/29     . A fib on WARFARIN   .. INR 11/30/2023 INR 2.2     . HYPOXIA   .. Home O2 as needed to keep po above 88%     . ANEMIA  .. Hb 11/30/2023 Hb 9.7   .. monitor     TIME SPENT.  . Over 36 minutes aggregate care time spent on encounter; activities included   direct patient care, counseling and/or coordinating care reviewing notes, lab data/ imaging , discussion with multidisciplinary team/ patient  /family and explaining in detail risks, benefits, alternatives  of the recommendations     PATIENT.  . AB MENDOZA  
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    htn  phlebitis  OP  OA  CVA  AF  melanoma  frail  weak  elderly  diverticular disease  depression  GERD  STSI eval    ID eval noted  vs noted  labs reviewed  imaging reviewed    GOC -  son hcp  pt has living WILL  pt is full code  does not want to live on machines
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    htn  phlebitis  OP  OA  CVA  AF  melanoma  frail  weak  elderly  diverticular disease  depression  GERD  STSI eval    ID eval noted  vs noted  labs reviewed  imaging reviewed    GOC -  son hcp  pt has living WILL  pt is full code  does not want to live on machines
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  Patient was given a course of Keflex, with no resolution.  Patient then given a second course of Keflex, with no further resolution.  No known fever.  No trauma.  No other acute injury or complaints.  Per NH papers pt has NKDA    1 Cellulitis  - cw abx  - podiatry and ID fu   - fu cultures  - will monitor    2 Afib  - check INR and dose coumadin daily  - will monitor     3 HTN  - cw home meds  - DASH diet    4 Hypothyroid  - cw synthroid    
89-year-old female with a history of hypertension, phlebitis, CVA with hemiplegia affecting the right side, atrial fibrillation, malignant melanoma of the skin, GERD, depression, diverticulitis, UTI, hypothyroid was BIB EMS from [facility] with has a cellulitis in the left lower leg with chronic wounds.  Patient was given a course of Keflex, with no resolution.  Patient then given a second course of Keflex, with no further resolution.  No known fever.  No trauma.  No other acute injury or complaints.  Per NH papers pt has NKDA    1 Cellulitis  - cw abx  - podiatry and ID fu   - fu cultures  - will monitor    2 Afib  - check INR and dose coumadin daily  - will monitor     3 HTN  - cw home meds  - DASH diet    4 Hypothyroid  - cw synthroid    
Left lower leg wounds down skin, subcutaneous tissue, fat  Left lower leg cellulitis
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  Patient was given a course of Keflex, with no resolution.  Patient then given a second course of Keflex, with no further resolution.  No known fever.  No trauma.  No other acute injury or complaints.  Per NH papers pt has NKDA    1 Cellulitis  - cw abx  - podiatry and ID fu   - fu cultures  - will monitor    2 Afib  - check INR and dose coumadin daily  - will monitor     3 HTN  - cw home meds  - DASH diet    4 Hypothyroid  - cw synthroid

## 2023-12-01 NOTE — CASE MANAGEMENT PROGRESS NOTE - NSCMPROGRESSNOTE_GEN_ALL_CORE
Received a call from Irma from [facility] that the pt does have the portable and concentrator in her room. The pt is cleared for transition back today. Resident has a 10AM scheduled ambulance. Bedside RN aware. Pt was cleared again by Dr. Best. CM team to remain available for post acute needs.

## 2023-12-01 NOTE — PROGRESS NOTE ADULT - REASON FOR ADMISSION
cellulitis

## 2023-12-01 NOTE — PROGRESS NOTE ADULT - PROVIDER SPECIALTY LIST ADULT
Infectious Disease
Infectious Disease
Palliative Care
Infectious Disease
Podiatry
Hospitalist
Infectious Disease
Palliative Care
Pulmonology
Hospitalist
Palliative Care
Palliative Care
Podiatry
Podiatry
Hospitalist

## 2023-12-01 NOTE — PROGRESS NOTE ADULT - SUBJECTIVE AND OBJECTIVE BOX
CHIEF COMPLAINT/ REASON FOR VISIT  .. Patient was seen to address the  issue listed under PROBLEM LIST which is located toward bottom of this note     REJI BERGER    [facility] [id number] W1    Allergies    per son &quot;issues with certain anitibiotics&quot; does not remember the names - Patient has tolerated zosyn, ceftriaxone, bactrim, and vancomycin on past admissions. (Unknown)    Intolerances        PAST MEDICAL & SURGICAL HISTORY:  Hypertension      CVA (cerebral vascular accident)      Depression      Constipation      Neuropathy      Hyperlipidemia      Grade I diastolic dysfunction      Afib      Neuropathy      VHD (valvular heart disease)      Aortic valvar stenosis      Hypothyroidism      GERD (gastroesophageal reflux disease)      No significant past surgical history          FAMILY HISTORY:      Home Medications:  acetaminophen 500 mg oral tablet: 1 tab(s) orally 2 times a day (22 Nov 2023 20:06)  acetaminophen 500 mg oral tablet: 2 tab(s) orally once a day (in the afternoon) (22 Nov 2023 20:06)  amLODIPine 5 mg oral tablet: 1 tab(s) orally once a day (22 Nov 2023 20:06)  Biofreeze 4% topical gel: Apply topically to affected area 2 times a day (22 Nov 2023 20:06)  cholecalciferol 50 mcg (2000 intl units) oral tablet: 1 tab(s) orally once a day (22 Nov 2023 20:06)  escitalopram 20 mg oral tablet: 1 tab(s) orally once a day (22 Nov 2023 20:06)  famotidine 20 mg oral tablet: 1 tab(s) orally once a day (22 Nov 2023 20:06)  folic acid 1 mg oral tablet: 1 tab(s) orally once a day (22 Nov 2023 20:06)  furosemide 20 mg oral tablet: 1 tab(s) orally once a day (22 Nov 2023 20:06)  gabapentin 300 mg oral capsule: 1 cap(s) orally 3 times a day (22 Nov 2023 20:06)  hydroxyurea 500 mg oral capsule: 1 tab(s) orally 2 times a day (22 Nov 2023 20:06)  levothyroxine 50 mcg (0.05 mg) oral tablet: 1 tab(s) orally once a day (22 Nov 2023 20:06)  melatonin 3 mg oral tablet: 1 tab(s) orally once a day (22 Nov 2023 20:06)  metoprolol tartrate 25 mg oral tablet: 1 tab(s) orally 2 times a day (22 Nov 2023 20:06)  miconazole 2% topical cream: Apply topically to affected area once a day (22 Nov 2023 20:06)  senna (sennosides) 8.6 mg oral tablet: 2 tab(s) orally once a day (22 Nov 2023 20:06)  simvastatin 10 mg oral tablet: 1 tab(s) orally once a day (22 Nov 2023 20:06)  traMADol 50 mg oral tablet: 1 tab(s) orally 2 times a day hold for lethargy (22 Nov 2023 20:06)  traMADol 50 mg oral tablet: 1 tab(s) orally every 6 hours as needed for pain (22 Nov 2023 20:06)      MEDICATIONS  (STANDING):  amLODIPine   Tablet 5 milliGRAM(s) Oral daily  cholecalciferol 2000 Unit(s) Oral daily  ciprofloxacin     Tablet 500 milliGRAM(s) Oral every 12 hours  doxycycline monohydrate Capsule 100 milliGRAM(s) Oral every 12 hours  escitalopram 20 milliGRAM(s) Oral daily  famotidine    Tablet 20 milliGRAM(s) Oral daily  folic acid 1 milliGRAM(s) Oral daily  gabapentin 300 milliGRAM(s) Oral three times a day  hydroxyurea 500 milliGRAM(s) Oral two times a day  lactobacillus acidophilus 1 Tablet(s) Oral daily  levothyroxine 50 MICROGram(s) Oral daily  melatonin 3 milliGRAM(s) Oral at bedtime  metoprolol tartrate 25 milliGRAM(s) Oral two times a day  senna 1 Tablet(s) Oral daily  simvastatin 10 milliGRAM(s) Oral at bedtime    MEDICATIONS  (PRN):  acetaminophen     Tablet .. 650 milliGRAM(s) Oral every 6 hours PRN Temp greater or equal to 38C (100.4F), Mild Pain (1 - 3)  traMADol 50 milliGRAM(s) Oral every 6 hours PRN Moderate Pain (4 - 6)      Diet, DASH/TLC:   Sodium & Cholesterol Restricted (11-22-23 @ 19:38) [Active]          Vital Signs Last 24 Hrs  T(C): 36.8 (01 Dec 2023 04:59), Max: 37.1 (30 Nov 2023 11:35)  T(F): 98.2 (01 Dec 2023 04:59), Max: 98.8 (30 Nov 2023 11:35)  HR: 89 (01 Dec 2023 04:59) (89 - 106)  BP: 155/67 (01 Dec 2023 04:59) (155/67 - 168/80)  BP(mean): --  RR: 17 (01 Dec 2023 04:59) (17 - 18)  SpO2: 90% (01 Dec 2023 04:59) (87% - 93%)    Parameters below as of 01 Dec 2023 04:59  Patient On (Oxygen Delivery Method): nasal cannula  O2 Flow (L/min): 2        11-30-23 @ 07:01  -  12-01-23 @ 05:55  --------------------------------------------------------  IN: 60 mL / OUT: 0 mL / NET: 60 mL              LABS:                        9.7    7.82  )-----------( 628      ( 30 Nov 2023 06:30 )             32.1     11-30    141  |  107  |  6<L>  ----------------------------<  86  3.8   |  28  |  0.49<L>    Ca    8.1<L>      30 Nov 2023 06:30      PT/INR - ( 30 Nov 2023 06:30 )   PT: 25.7 sec;   INR: 2.25 ratio         PTT - ( 30 Nov 2023 06:30 )  PTT:43.5 sec  Urinalysis Basic - ( 30 Nov 2023 06:30 )    Color: x / Appearance: x / SG: x / pH: x  Gluc: 86 mg/dL / Ketone: x  / Bili: x / Urobili: x   Blood: x / Protein: x / Nitrite: x   Leuk Esterase: x / RBC: x / WBC x   Sq Epi: x / Non Sq Epi: x / Bacteria: x            WBC:  WBC Count: 7.82 K/uL (11-30 @ 06:30)  WBC Count: 8.85 K/uL (11-29 @ 06:03)  WBC Count: 11.39 K/uL (11-28 @ 09:40)  WBC Count: 8.54 K/uL (11-27 @ 05:57)      MICROBIOLOGY:  RECENT CULTURES:              PT/INR - ( 30 Nov 2023 06:30 )   PT: 25.7 sec;   INR: 2.25 ratio         PTT - ( 30 Nov 2023 06:30 )  PTT:43.5 sec    Sodium:  Sodium: 141 mmol/L (11-30 @ 06:30)  Sodium: 142 mmol/L (11-29 @ 06:03)  Sodium: 142 mmol/L (11-28 @ 09:40)  Sodium: 143 mmol/L (11-27 @ 05:57)      0.49 mg/dL 11-30 @ 06:30  0.57 mg/dL 11-29 @ 06:03  0.52 mg/dL 11-28 @ 09:40  0.50 mg/dL 11-27 @ 05:57      Hemoglobin:  Hemoglobin: 9.7 g/dL (11-30 @ 06:30)  Hemoglobin: 10.6 g/dL (11-29 @ 06:03)  Hemoglobin: 10.6 g/dL (11-28 @ 09:40)  Hemoglobin: 9.7 g/dL (11-27 @ 05:57)      Platelets: Platelet Count - Automated: 628 K/uL (11-30 @ 06:30)  Platelet Count - Automated: 597 K/uL (11-29 @ 06:03)  Platelet Count - Automated: 663 K/uL (11-28 @ 09:40)  Platelet Count - Automated: 651 K/uL (11-27 @ 05:57)          Urinalysis Basic - ( 30 Nov 2023 06:30 )    Color: x / Appearance: x / SG: x / pH: x  Gluc: 86 mg/dL / Ketone: x  / Bili: x / Urobili: x   Blood: x / Protein: x / Nitrite: x   Leuk Esterase: x / RBC: x / WBC x   Sq Epi: x / Non Sq Epi: x / Bacteria: x        RADIOLOGY & ADDITIONAL STUDIES:      MICROBIOLOGY:  RECENT CULTURES:

## 2023-12-13 PROCEDURE — 87040 BLOOD CULTURE FOR BACTERIA: CPT

## 2023-12-13 PROCEDURE — 97162 PT EVAL MOD COMPLEX 30 MIN: CPT

## 2023-12-13 PROCEDURE — 96374 THER/PROPH/DIAG INJ IV PUSH: CPT

## 2023-12-13 PROCEDURE — 82607 VITAMIN B-12: CPT

## 2023-12-13 PROCEDURE — 80076 HEPATIC FUNCTION PANEL: CPT

## 2023-12-13 PROCEDURE — 85025 COMPLETE CBC W/AUTO DIFF WBC: CPT

## 2023-12-13 PROCEDURE — 99285 EMERGENCY DEPT VISIT HI MDM: CPT

## 2023-12-13 PROCEDURE — 93970 EXTREMITY STUDY: CPT

## 2023-12-13 PROCEDURE — 83605 ASSAY OF LACTIC ACID: CPT

## 2023-12-13 PROCEDURE — 36415 COLL VENOUS BLD VENIPUNCTURE: CPT

## 2023-12-13 PROCEDURE — 84550 ASSAY OF BLOOD/URIC ACID: CPT

## 2023-12-13 PROCEDURE — 83540 ASSAY OF IRON: CPT

## 2023-12-13 PROCEDURE — 85610 PROTHROMBIN TIME: CPT

## 2023-12-13 PROCEDURE — 80053 COMPREHEN METABOLIC PANEL: CPT

## 2023-12-13 PROCEDURE — 83550 IRON BINDING TEST: CPT

## 2023-12-13 PROCEDURE — 80048 BASIC METABOLIC PNL TOTAL CA: CPT

## 2023-12-13 PROCEDURE — 93005 ELECTROCARDIOGRAM TRACING: CPT

## 2023-12-13 PROCEDURE — 81003 URINALYSIS AUTO W/O SCOPE: CPT

## 2023-12-13 PROCEDURE — 84443 ASSAY THYROID STIM HORMONE: CPT

## 2023-12-13 PROCEDURE — 71045 X-RAY EXAM CHEST 1 VIEW: CPT

## 2023-12-13 PROCEDURE — 85027 COMPLETE CBC AUTOMATED: CPT

## 2023-12-13 PROCEDURE — 73590 X-RAY EXAM OF LOWER LEG: CPT

## 2023-12-13 PROCEDURE — 83880 ASSAY OF NATRIURETIC PEPTIDE: CPT

## 2023-12-13 PROCEDURE — 80202 ASSAY OF VANCOMYCIN: CPT

## 2023-12-13 PROCEDURE — 82962 GLUCOSE BLOOD TEST: CPT

## 2023-12-13 PROCEDURE — 85730 THROMBOPLASTIN TIME PARTIAL: CPT

## 2023-12-13 PROCEDURE — 96375 TX/PRO/DX INJ NEW DRUG ADDON: CPT

## 2023-12-13 PROCEDURE — 92610 EVALUATE SWALLOWING FUNCTION: CPT

## 2024-01-03 ENCOUNTER — INPATIENT (INPATIENT)
Facility: HOSPITAL | Age: 89
LOS: 6 days | Discharge: HOME | DRG: 300 | End: 2024-01-10
Attending: INTERNAL MEDICINE | Admitting: INTERNAL MEDICINE
Payer: MEDICARE

## 2024-01-03 VITALS
OXYGEN SATURATION: 98 % | TEMPERATURE: 98 F | HEIGHT: 66 IN | WEIGHT: 110.01 LBS | HEART RATE: 66 BPM | RESPIRATION RATE: 18 BRPM | DIASTOLIC BLOOD PRESSURE: 48 MMHG | SYSTOLIC BLOOD PRESSURE: 100 MMHG

## 2024-01-03 DIAGNOSIS — Z94.5 SKIN TRANSPLANT STATUS: Chronic | ICD-10-CM

## 2024-01-03 DIAGNOSIS — K59.00 CONSTIPATION, UNSPECIFIED: ICD-10-CM

## 2024-01-03 DIAGNOSIS — S81.802A UNSPECIFIED OPEN WOUND, LEFT LOWER LEG, INITIAL ENCOUNTER: ICD-10-CM

## 2024-01-03 DIAGNOSIS — I51.89 OTHER ILL-DEFINED HEART DISEASES: ICD-10-CM

## 2024-01-03 DIAGNOSIS — K21.9 GASTRO-ESOPHAGEAL REFLUX DISEASE WITHOUT ESOPHAGITIS: ICD-10-CM

## 2024-01-03 DIAGNOSIS — F32.9 MAJOR DEPRESSIVE DISORDER, SINGLE EPISODE, UNSPECIFIED: ICD-10-CM

## 2024-01-03 DIAGNOSIS — E03.9 HYPOTHYROIDISM, UNSPECIFIED: ICD-10-CM

## 2024-01-03 DIAGNOSIS — Z29.9 ENCOUNTER FOR PROPHYLACTIC MEASURES, UNSPECIFIED: ICD-10-CM

## 2024-01-03 DIAGNOSIS — G62.9 POLYNEUROPATHY, UNSPECIFIED: ICD-10-CM

## 2024-01-03 DIAGNOSIS — L03.116 CELLULITIS OF LEFT LOWER LIMB: ICD-10-CM

## 2024-01-03 DIAGNOSIS — I48.91 UNSPECIFIED ATRIAL FIBRILLATION: ICD-10-CM

## 2024-01-03 DIAGNOSIS — I10 ESSENTIAL (PRIMARY) HYPERTENSION: ICD-10-CM

## 2024-01-03 DIAGNOSIS — T14.8XXA OTHER INJURY OF UNSPECIFIED BODY REGION, INITIAL ENCOUNTER: ICD-10-CM

## 2024-01-03 DIAGNOSIS — I35.0 NONRHEUMATIC AORTIC (VALVE) STENOSIS: ICD-10-CM

## 2024-01-03 LAB
ALBUMIN SERPL ELPH-MCNC: 2.2 G/DL — LOW (ref 3.3–5)
ALBUMIN SERPL ELPH-MCNC: 2.2 G/DL — LOW (ref 3.3–5)
ALP SERPL-CCNC: 77 U/L — SIGNIFICANT CHANGE UP (ref 40–120)
ALP SERPL-CCNC: 77 U/L — SIGNIFICANT CHANGE UP (ref 40–120)
ALT FLD-CCNC: 10 U/L — LOW (ref 12–78)
ALT FLD-CCNC: 10 U/L — LOW (ref 12–78)
ANION GAP SERPL CALC-SCNC: 5 MMOL/L — SIGNIFICANT CHANGE UP (ref 5–17)
ANION GAP SERPL CALC-SCNC: 5 MMOL/L — SIGNIFICANT CHANGE UP (ref 5–17)
ANISOCYTOSIS BLD QL: SLIGHT — SIGNIFICANT CHANGE UP
ANISOCYTOSIS BLD QL: SLIGHT — SIGNIFICANT CHANGE UP
AST SERPL-CCNC: 18 U/L — SIGNIFICANT CHANGE UP (ref 15–37)
AST SERPL-CCNC: 18 U/L — SIGNIFICANT CHANGE UP (ref 15–37)
BASOPHILS # BLD AUTO: 0 K/UL — SIGNIFICANT CHANGE UP (ref 0–0.2)
BASOPHILS # BLD AUTO: 0 K/UL — SIGNIFICANT CHANGE UP (ref 0–0.2)
BASOPHILS NFR BLD AUTO: 0 % — SIGNIFICANT CHANGE UP (ref 0–2)
BASOPHILS NFR BLD AUTO: 0 % — SIGNIFICANT CHANGE UP (ref 0–2)
BILIRUB SERPL-MCNC: 0.4 MG/DL — SIGNIFICANT CHANGE UP (ref 0.2–1.2)
BILIRUB SERPL-MCNC: 0.4 MG/DL — SIGNIFICANT CHANGE UP (ref 0.2–1.2)
BUN SERPL-MCNC: 20 MG/DL — SIGNIFICANT CHANGE UP (ref 7–23)
BUN SERPL-MCNC: 20 MG/DL — SIGNIFICANT CHANGE UP (ref 7–23)
CALCIUM SERPL-MCNC: 7.7 MG/DL — LOW (ref 8.5–10.1)
CALCIUM SERPL-MCNC: 7.7 MG/DL — LOW (ref 8.5–10.1)
CHLORIDE SERPL-SCNC: 111 MMOL/L — HIGH (ref 96–108)
CHLORIDE SERPL-SCNC: 111 MMOL/L — HIGH (ref 96–108)
CO2 SERPL-SCNC: 25 MMOL/L — SIGNIFICANT CHANGE UP (ref 22–31)
CO2 SERPL-SCNC: 25 MMOL/L — SIGNIFICANT CHANGE UP (ref 22–31)
CREAT SERPL-MCNC: 0.72 MG/DL — SIGNIFICANT CHANGE UP (ref 0.5–1.3)
CREAT SERPL-MCNC: 0.72 MG/DL — SIGNIFICANT CHANGE UP (ref 0.5–1.3)
EGFR: 79 ML/MIN/1.73M2 — SIGNIFICANT CHANGE UP
EGFR: 79 ML/MIN/1.73M2 — SIGNIFICANT CHANGE UP
EOSINOPHIL # BLD AUTO: 0.39 K/UL — SIGNIFICANT CHANGE UP (ref 0–0.5)
EOSINOPHIL # BLD AUTO: 0.39 K/UL — SIGNIFICANT CHANGE UP (ref 0–0.5)
EOSINOPHIL NFR BLD AUTO: 3 % — SIGNIFICANT CHANGE UP (ref 0–6)
EOSINOPHIL NFR BLD AUTO: 3 % — SIGNIFICANT CHANGE UP (ref 0–6)
GLUCOSE SERPL-MCNC: 95 MG/DL — SIGNIFICANT CHANGE UP (ref 70–99)
GLUCOSE SERPL-MCNC: 95 MG/DL — SIGNIFICANT CHANGE UP (ref 70–99)
HCT VFR BLD CALC: 35.8 % — SIGNIFICANT CHANGE UP (ref 34.5–45)
HCT VFR BLD CALC: 35.8 % — SIGNIFICANT CHANGE UP (ref 34.5–45)
HGB BLD-MCNC: 10.9 G/DL — LOW (ref 11.5–15.5)
HGB BLD-MCNC: 10.9 G/DL — LOW (ref 11.5–15.5)
LACTATE SERPL-SCNC: 1.2 MMOL/L — SIGNIFICANT CHANGE UP (ref 0.7–2)
LACTATE SERPL-SCNC: 1.2 MMOL/L — SIGNIFICANT CHANGE UP (ref 0.7–2)
LACTATE SERPL-SCNC: 2.4 MMOL/L — HIGH (ref 0.7–2)
LACTATE SERPL-SCNC: 2.4 MMOL/L — HIGH (ref 0.7–2)
LYMPHOCYTES # BLD AUTO: 0.39 K/UL — LOW (ref 1–3.3)
LYMPHOCYTES # BLD AUTO: 0.39 K/UL — LOW (ref 1–3.3)
LYMPHOCYTES # BLD AUTO: 3 % — LOW (ref 13–44)
LYMPHOCYTES # BLD AUTO: 3 % — LOW (ref 13–44)
MACROCYTES BLD QL: SLIGHT — SIGNIFICANT CHANGE UP
MACROCYTES BLD QL: SLIGHT — SIGNIFICANT CHANGE UP
MANUAL SMEAR VERIFICATION: SIGNIFICANT CHANGE UP
MANUAL SMEAR VERIFICATION: SIGNIFICANT CHANGE UP
MCHC RBC-ENTMCNC: 30.4 GM/DL — LOW (ref 32–36)
MCHC RBC-ENTMCNC: 30.4 GM/DL — LOW (ref 32–36)
MCHC RBC-ENTMCNC: 34.3 PG — HIGH (ref 27–34)
MCHC RBC-ENTMCNC: 34.3 PG — HIGH (ref 27–34)
MCV RBC AUTO: 112.6 FL — HIGH (ref 80–100)
MCV RBC AUTO: 112.6 FL — HIGH (ref 80–100)
MICROCYTES BLD QL: SLIGHT — SIGNIFICANT CHANGE UP
MICROCYTES BLD QL: SLIGHT — SIGNIFICANT CHANGE UP
MONOCYTES # BLD AUTO: 0.13 K/UL — SIGNIFICANT CHANGE UP (ref 0–0.9)
MONOCYTES # BLD AUTO: 0.13 K/UL — SIGNIFICANT CHANGE UP (ref 0–0.9)
MONOCYTES NFR BLD AUTO: 1 % — LOW (ref 2–14)
MONOCYTES NFR BLD AUTO: 1 % — LOW (ref 2–14)
NEUTROPHILS # BLD AUTO: 12.11 K/UL — HIGH (ref 1.8–7.4)
NEUTROPHILS # BLD AUTO: 12.11 K/UL — HIGH (ref 1.8–7.4)
NEUTROPHILS NFR BLD AUTO: 91 % — HIGH (ref 43–77)
NEUTROPHILS NFR BLD AUTO: 91 % — HIGH (ref 43–77)
NEUTS BAND # BLD: 2 % — SIGNIFICANT CHANGE UP (ref 0–8)
NEUTS BAND # BLD: 2 % — SIGNIFICANT CHANGE UP (ref 0–8)
NEUTS HYPERSEG # BLD: PRESENT — SIGNIFICANT CHANGE UP
NEUTS HYPERSEG # BLD: PRESENT — SIGNIFICANT CHANGE UP
NRBC # BLD: 0 /100 WBCS — SIGNIFICANT CHANGE UP (ref 0–0)
NRBC # BLD: 0 /100 WBCS — SIGNIFICANT CHANGE UP (ref 0–0)
NRBC # BLD: SIGNIFICANT CHANGE UP /100 WBCS (ref 0–0)
NRBC # BLD: SIGNIFICANT CHANGE UP /100 WBCS (ref 0–0)
PLAT MORPH BLD: NORMAL — SIGNIFICANT CHANGE UP
PLAT MORPH BLD: NORMAL — SIGNIFICANT CHANGE UP
PLATELET # BLD AUTO: 793 K/UL — HIGH (ref 150–400)
PLATELET # BLD AUTO: 793 K/UL — HIGH (ref 150–400)
POTASSIUM SERPL-MCNC: 3.6 MMOL/L — SIGNIFICANT CHANGE UP (ref 3.5–5.3)
POTASSIUM SERPL-MCNC: 3.6 MMOL/L — SIGNIFICANT CHANGE UP (ref 3.5–5.3)
POTASSIUM SERPL-SCNC: 3.6 MMOL/L — SIGNIFICANT CHANGE UP (ref 3.5–5.3)
POTASSIUM SERPL-SCNC: 3.6 MMOL/L — SIGNIFICANT CHANGE UP (ref 3.5–5.3)
PROT SERPL-MCNC: 6.1 G/DL — SIGNIFICANT CHANGE UP (ref 6–8.3)
PROT SERPL-MCNC: 6.1 G/DL — SIGNIFICANT CHANGE UP (ref 6–8.3)
RBC # BLD: 3.18 M/UL — LOW (ref 3.8–5.2)
RBC # BLD: 3.18 M/UL — LOW (ref 3.8–5.2)
RBC # FLD: 18.4 % — HIGH (ref 10.3–14.5)
RBC # FLD: 18.4 % — HIGH (ref 10.3–14.5)
RBC BLD AUTO: ABNORMAL
RBC BLD AUTO: ABNORMAL
SODIUM SERPL-SCNC: 141 MMOL/L — SIGNIFICANT CHANGE UP (ref 135–145)
SODIUM SERPL-SCNC: 141 MMOL/L — SIGNIFICANT CHANGE UP (ref 135–145)
WBC # BLD: 13.02 K/UL — HIGH (ref 3.8–10.5)
WBC # BLD: 13.02 K/UL — HIGH (ref 3.8–10.5)
WBC # FLD AUTO: 13.02 K/UL — HIGH (ref 3.8–10.5)
WBC # FLD AUTO: 13.02 K/UL — HIGH (ref 3.8–10.5)

## 2024-01-03 PROCEDURE — 99285 EMERGENCY DEPT VISIT HI MDM: CPT

## 2024-01-03 PROCEDURE — 99221 1ST HOSP IP/OBS SF/LOW 40: CPT

## 2024-01-03 PROCEDURE — 93010 ELECTROCARDIOGRAM REPORT: CPT

## 2024-01-03 PROCEDURE — 71045 X-RAY EXAM CHEST 1 VIEW: CPT | Mod: 26

## 2024-01-03 RX ORDER — CHOLECALCIFEROL (VITAMIN D3) 125 MCG
2000 CAPSULE ORAL DAILY
Refills: 0 | Status: DISCONTINUED | OUTPATIENT
Start: 2024-01-03 | End: 2024-01-10

## 2024-01-03 RX ORDER — TRAMADOL HYDROCHLORIDE 50 MG/1
50 TABLET ORAL EVERY 6 HOURS
Refills: 0 | Status: DISCONTINUED | OUTPATIENT
Start: 2024-01-03 | End: 2024-01-06

## 2024-01-03 RX ORDER — SENNA PLUS 8.6 MG/1
2 TABLET ORAL AT BEDTIME
Refills: 0 | Status: DISCONTINUED | OUTPATIENT
Start: 2024-01-03 | End: 2024-01-10

## 2024-01-03 RX ORDER — AMLODIPINE BESYLATE 2.5 MG/1
5 TABLET ORAL DAILY
Refills: 0 | Status: DISCONTINUED | OUTPATIENT
Start: 2024-01-03 | End: 2024-01-10

## 2024-01-03 RX ORDER — ACETAMINOPHEN 500 MG
750 TABLET ORAL ONCE
Refills: 0 | Status: DISCONTINUED | OUTPATIENT
Start: 2024-01-03 | End: 2024-01-03

## 2024-01-03 RX ORDER — SODIUM CHLORIDE 9 MG/ML
1000 INJECTION, SOLUTION INTRAVENOUS
Refills: 0 | Status: DISCONTINUED | OUTPATIENT
Start: 2024-01-03 | End: 2024-01-10

## 2024-01-03 RX ORDER — SIMVASTATIN 20 MG/1
10 TABLET, FILM COATED ORAL AT BEDTIME
Refills: 0 | Status: DISCONTINUED | OUTPATIENT
Start: 2024-01-03 | End: 2024-01-10

## 2024-01-03 RX ORDER — LACTOBACILLUS ACIDOPHILUS 100MM CELL
1 CAPSULE ORAL EVERY 12 HOURS
Refills: 0 | Status: DISCONTINUED | OUTPATIENT
Start: 2024-01-03 | End: 2024-01-10

## 2024-01-03 RX ORDER — ONDANSETRON 8 MG/1
4 TABLET, FILM COATED ORAL EVERY 8 HOURS
Refills: 0 | Status: DISCONTINUED | OUTPATIENT
Start: 2024-01-03 | End: 2024-01-10

## 2024-01-03 RX ORDER — MAGNESIUM HYDROXIDE 400 MG/1
30 TABLET, CHEWABLE ORAL DAILY
Refills: 0 | Status: DISCONTINUED | OUTPATIENT
Start: 2024-01-03 | End: 2024-01-10

## 2024-01-03 RX ORDER — PIPERACILLIN AND TAZOBACTAM 4; .5 G/20ML; G/20ML
3.38 INJECTION, POWDER, LYOPHILIZED, FOR SOLUTION INTRAVENOUS ONCE
Refills: 0 | Status: COMPLETED | OUTPATIENT
Start: 2024-01-03 | End: 2024-01-03

## 2024-01-03 RX ORDER — METOPROLOL TARTRATE 50 MG
25 TABLET ORAL
Refills: 0 | Status: DISCONTINUED | OUTPATIENT
Start: 2024-01-03 | End: 2024-01-10

## 2024-01-03 RX ORDER — POLYETHYLENE GLYCOL 3350 17 G/17G
17 POWDER, FOR SOLUTION ORAL DAILY
Refills: 0 | Status: DISCONTINUED | OUTPATIENT
Start: 2024-01-03 | End: 2024-01-10

## 2024-01-03 RX ORDER — LEVOTHYROXINE SODIUM 125 MCG
50 TABLET ORAL DAILY
Refills: 0 | Status: DISCONTINUED | OUTPATIENT
Start: 2024-01-03 | End: 2024-01-10

## 2024-01-03 RX ORDER — VANCOMYCIN HCL 1 G
1000 VIAL (EA) INTRAVENOUS ONCE
Refills: 0 | Status: COMPLETED | OUTPATIENT
Start: 2024-01-03 | End: 2024-01-03

## 2024-01-03 RX ORDER — FOLIC ACID 0.8 MG
1 TABLET ORAL DAILY
Refills: 0 | Status: DISCONTINUED | OUTPATIENT
Start: 2024-01-03 | End: 2024-01-10

## 2024-01-03 RX ORDER — CEFEPIME 1 G/1
2000 INJECTION, POWDER, FOR SOLUTION INTRAMUSCULAR; INTRAVENOUS EVERY 12 HOURS
Refills: 0 | Status: DISCONTINUED | OUTPATIENT
Start: 2024-01-03 | End: 2024-01-05

## 2024-01-03 RX ORDER — PANTOPRAZOLE SODIUM 20 MG/1
40 TABLET, DELAYED RELEASE ORAL
Refills: 0 | Status: DISCONTINUED | OUTPATIENT
Start: 2024-01-03 | End: 2024-01-10

## 2024-01-03 RX ORDER — ACETAMINOPHEN 500 MG
650 TABLET ORAL EVERY 6 HOURS
Refills: 0 | Status: DISCONTINUED | OUTPATIENT
Start: 2024-01-03 | End: 2024-01-06

## 2024-01-03 RX ORDER — HYDROXYUREA 500 MG/1
500 CAPSULE ORAL
Refills: 0 | Status: DISCONTINUED | OUTPATIENT
Start: 2024-01-03 | End: 2024-01-10

## 2024-01-03 RX ORDER — MORPHINE SULFATE 50 MG/1
2 CAPSULE, EXTENDED RELEASE ORAL EVERY 6 HOURS
Refills: 0 | Status: DISCONTINUED | OUTPATIENT
Start: 2024-01-03 | End: 2024-01-06

## 2024-01-03 RX ORDER — SODIUM CHLORIDE 9 MG/ML
1000 INJECTION INTRAMUSCULAR; INTRAVENOUS; SUBCUTANEOUS ONCE
Refills: 0 | Status: COMPLETED | OUTPATIENT
Start: 2024-01-03 | End: 2024-01-03

## 2024-01-03 RX ORDER — GABAPENTIN 400 MG/1
300 CAPSULE ORAL THREE TIMES A DAY
Refills: 0 | Status: DISCONTINUED | OUTPATIENT
Start: 2024-01-03 | End: 2024-01-10

## 2024-01-03 RX ORDER — MORPHINE SULFATE 50 MG/1
2 CAPSULE, EXTENDED RELEASE ORAL ONCE
Refills: 0 | Status: DISCONTINUED | OUTPATIENT
Start: 2024-01-03 | End: 2024-01-03

## 2024-01-03 RX ORDER — LANOLIN ALCOHOL/MO/W.PET/CERES
3 CREAM (GRAM) TOPICAL AT BEDTIME
Refills: 0 | Status: DISCONTINUED | OUTPATIENT
Start: 2024-01-03 | End: 2024-01-10

## 2024-01-03 RX ORDER — ESCITALOPRAM OXALATE 10 MG/1
20 TABLET, FILM COATED ORAL DAILY
Refills: 0 | Status: DISCONTINUED | OUTPATIENT
Start: 2024-01-03 | End: 2024-01-10

## 2024-01-03 RX ADMIN — SENNA PLUS 2 TABLET(S): 8.6 TABLET ORAL at 23:36

## 2024-01-03 RX ADMIN — PIPERACILLIN AND TAZOBACTAM 3.38 GRAM(S): 4; .5 INJECTION, POWDER, LYOPHILIZED, FOR SOLUTION INTRAVENOUS at 16:05

## 2024-01-03 RX ADMIN — SODIUM CHLORIDE 40 MILLILITER(S): 9 INJECTION, SOLUTION INTRAVENOUS at 18:58

## 2024-01-03 RX ADMIN — SODIUM CHLORIDE 1000 MILLILITER(S): 9 INJECTION INTRAMUSCULAR; INTRAVENOUS; SUBCUTANEOUS at 15:32

## 2024-01-03 RX ADMIN — SIMVASTATIN 10 MILLIGRAM(S): 20 TABLET, FILM COATED ORAL at 23:36

## 2024-01-03 RX ADMIN — MORPHINE SULFATE 2 MILLIGRAM(S): 50 CAPSULE, EXTENDED RELEASE ORAL at 18:21

## 2024-01-03 RX ADMIN — CEFEPIME 100 MILLIGRAM(S): 1 INJECTION, POWDER, FOR SOLUTION INTRAMUSCULAR; INTRAVENOUS at 18:21

## 2024-01-03 RX ADMIN — Medication 650 MILLIGRAM(S): at 22:00

## 2024-01-03 RX ADMIN — Medication 250 MILLIGRAM(S): at 16:30

## 2024-01-03 RX ADMIN — MORPHINE SULFATE 2 MILLIGRAM(S): 50 CAPSULE, EXTENDED RELEASE ORAL at 23:50

## 2024-01-03 RX ADMIN — SODIUM CHLORIDE 1000 MILLILITER(S): 9 INJECTION INTRAMUSCULAR; INTRAVENOUS; SUBCUTANEOUS at 16:10

## 2024-01-03 RX ADMIN — GABAPENTIN 300 MILLIGRAM(S): 400 CAPSULE ORAL at 23:35

## 2024-01-03 RX ADMIN — PIPERACILLIN AND TAZOBACTAM 200 GRAM(S): 4; .5 INJECTION, POWDER, LYOPHILIZED, FOR SOLUTION INTRAVENOUS at 15:32

## 2024-01-03 RX ADMIN — MORPHINE SULFATE 2 MILLIGRAM(S): 50 CAPSULE, EXTENDED RELEASE ORAL at 23:38

## 2024-01-03 NOTE — H&P ADULT - PROBLEM SELECTOR PLAN 2
Get wound culture today, ID and wound care cons requested   . Added IV Cefepime 2 gm q12h. Septic workup sent , trend CBC , temps   . Wound care team / suggest surgery evaluation for wound debridement   . Wound care daily. Leg ELEVATION .pain management , Dvt prophylaxis

## 2024-01-03 NOTE — CONSULT NOTE ADULT - SUBJECTIVE AND OBJECTIVE BOX
S : 89y year old Female seen at bedside for Left lower leg wound and cellulitis.      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  Patient was hospitalized in 2023  g with altered mental status concern for UTI , also  cellulitis to the LLE at the site of a skin graft. Pt recently completed a course of po abx for this with no significant improvement. bib ems with wounds to left lower leg for unknown period of time, now weeping and draining, pt c/o pain but pt is poor historian She was admitted in november 2023 with LLE cellulitis and seen by wound care MD & ID -discharged on po doxycycline and cipro & with topical care .Admitted for septic workup , iv abx ,pain management and wound care  (03 Jan 2024 18:15)      Patient admits to  (-) Fevers, (-) Chills, (-) Nausea, (-) Vomiting, (-) Shortness of Breath      PMH: Hypertension    CVA (cerebral vascular accident)    Depression    Constipation    Neuropathy    Constipation    Hyperlipidemia    Grade I diastolic dysfunction    Afib    Neuropathy    VHD (valvular heart disease)    Aortic valvar stenosis    Hypothyroidism    GERD (gastroesophageal reflux disease)      PSH:No significant past surgical history    H/O skin graft        Allergies:per son &quot;issues with certain anitibiotics&quot; does not remember the names - Patient has tolerated zosyn, ceftriaxone, bactrim, and vancomycin on past admissions. (Unknown)      Labs:                          10.9   13.02 )-----------( 793      ( 03 Jan 2024 14:10 )             35.8     WBC Trend  13.02<H> Date (01-03 @ 14:10)      Chem  01-03    141  |  111<H>  |  20  ----------------------------<  95  3.6   |  25  |  0.72    Ca    7.7<L>      03 Jan 2024 14:10    TPro  6.1  /  Alb  2.2<L>  /  TBili  0.4  /  DBili  x   /  AST  18  /  ALT  10<L>  /  AlkPhos  77  01-03          T(F): 98 (01-03-24 @ 16:44), Max: 98 (01-03-24 @ 16:44)  HR: 64 (01-03-24 @ 16:44) (64 - 66)  BP: 104/56 (01-03-24 @ 16:44) (100/48 - 104/56)  RR: 18 (01-03-24 @ 16:44) (18 - 18)  SpO2: 97% (01-03-24 @ 16:44) (97% - 98%)  Wt(kg): --    O:   General: Pleasant  female NAD & AOX3.    Integument:  Skin warm, dry and supple bilateral.    Noted cellulitic changes left lower leg  Ulceration Left lower leg, anterior, medial, lateral aspects, - hyperkeratotic border, wound base fibronecrotic, + edema, + justo-wound erythema, + drainage, + fluctuance, - tracking/tunneling, - probe to bone.   Vascular: Dorsalis Pedis and Posterior Tibial pulses unpalpable.  Capillary re-fill time greater then 3 seconds digits 1-5 bilateral.    Neuro: Protective sensation intact to the level of the digits bilateral.  MSK: unable to perform due to mental status       S : 89y year old Female seen at bedside for Left lower leg wound and cellulitis.      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  Patient was hospitalized in 2023  g with altered mental status concern for UTI , also  cellulitis to the LLE at the site of a skin graft. Pt recently completed a course of po abx for this with no significant improvement. bib ems with wounds to left lower leg for unknown period of time, now weeping and draining, pt c/o pain but pt is poor historian She was admitted in november 2023 with LLE cellulitis and seen by wound care MD & ID -discharged on po doxycycline and cipro & with topical care .Admitted for septic workup , iv abx ,pain management and wound care  (03 Jan 2024 18:15)      Patient admits to  (-) Fevers, (-) Chills, (-) Nausea, (-) Vomiting, (-) Shortness of Breath      PMH: Hypertension    CVA (cerebral vascular accident)    Depression    Constipation    Neuropathy    Constipation    Hyperlipidemia    Grade I diastolic dysfunction    Afib    Neuropathy    VHD (valvular heart disease)    Aortic valvar stenosis    Hypothyroidism    GERD (gastroesophageal reflux disease)      PSH:No significant past surgical history    H/O skin graft        Allergies:per son &quot;issues with certain anitibiotics&quot; does not remember the names - Patient has tolerated zosyn, ceftriaxone, bactrim, and vancomycin on past admissions. (Unknown)      Labs:                          10.9   13.02 )-----------( 793      ( 03 Jan 2024 14:10 )             35.8     WBC Trend  13.02<H> Date (01-03 @ 14:10)      Chem  01-03    141  |  111<H>  |  20  ----------------------------<  95  3.6   |  25  |  0.72    Ca    7.7<L>      03 Jan 2024 14:10    TPro  6.1  /  Alb  2.2<L>  /  TBili  0.4  /  DBili  x   /  AST  18  /  ALT  10<L>  /  AlkPhos  77  01-03          T(F): 98 (01-03-24 @ 16:44), Max: 98 (01-03-24 @ 16:44)  HR: 64 (01-03-24 @ 16:44) (64 - 66)  BP: 104/56 (01-03-24 @ 16:44) (100/48 - 104/56)  RR: 18 (01-03-24 @ 16:44) (18 - 18)  SpO2: 97% (01-03-24 @ 16:44) (97% - 98%)  Wt(kg): --    O:   General: Pleasant  female NAD & AOX3.    Integument:  Skin warm, dry and supple bilateral.    Noted cellulitic changes left lower leg  Ulceration Left lower leg, anterior, medial, lateral aspects, - hyperkeratotic border, wound base fibronecrotic, + edema, + justo-wound erythema, + drainage, + fluctuance, - tracking/tunneling, - probe to bone.   Vascular: Dorsalis Pedis and Posterior Tibial pulses unpalpable.  Capillary re-fill time greater then 3 seconds digits 1-5 bilateral.    Neuro: Protective sensation intact to the level of the digits bilateral.  MSK: unable to perform due to mental status

## 2024-01-03 NOTE — PATIENT PROFILE ADULT - NSPROEXTENSIONSOFSELF_GEN_A_NUR
Radha Edwards is a 66 year old female presenting For a pre op exam.  Patient will have cholecystectomy on 10/29/2020 with Dr Jay Cuadra at [facility]    Denies known Latex allergy or symptoms of Latex sensitivity.  Medications reviewed and updated.  Health Maintenance Due   Topic Date Due   • Shingles Vaccine (1 of 2) 07/25/2004   • Pneumococcal Vaccine 65+ (1 of 1 - PPSV23) 07/25/2019   • Influenza Vaccine (1) 09/01/2020   • Medicare Wellness Visit  10/22/2020       Patient is due for topics listed above, she wishes to proceed with Immunization(s) Influenza and Pneumococcal, but is not proceeding with Immunization(s) Shingles at this time. The following has occurred: Orders placed for Immunization(s) Influenza and Pneumococcal.   patient requesting Low Dose Influenza        CHIEF COMPLAINT:  Radha Edwards is here today for Subsequent Annual Medicare Wellness Visit.      Medication verified and med list updated    Refills needed today?  Yes         Patient would like communication of their results via:   Cell Phone:   Telephone Information:   Mobile [phone]         CHOLESTEROL (mg/dL)   Date Value   10/24/2019 247 (H)     HDL (mg/dL)   Date Value   10/24/2019 61     No components found for: CHOLHDLRATIO  TRIGLYCERIDE (mg/dL)   Date Value   10/24/2019 113     CALCULATED LDL (mg/dL)   Date Value   10/24/2019 163 (H)      Glucose (mg/dL)   Date Value   10/05/2020 80   10/24/2019 91              none

## 2024-01-03 NOTE — PATIENT PROFILE ADULT - FUNCTIONAL ASSESSMENT - BASIC MOBILITY ASSESSMENT TYPE
-I do not recommend colonoscopy at this time to see if colonoscopy is over the last 15 years.  I have encouraged the patient contacted promptly if he notes that changes bowel habits or gross rectal bleeding. Admission

## 2024-01-03 NOTE — CONSULT NOTE ADULT - SUBJECTIVE AND OBJECTIVE BOX
CHIEF COMPLAINT/ REASON FOR VISIT  .. Patient was seen to address the  issue listed under PROBLEM LIST which is located toward bottom of this note     REJI BERGER    [facility] 33    Allergies    per son &quot;issues with certain anitibiotics&quot; does not remember the names - Patient has tolerated zosyn, ceftriaxone, bactrim, and vancomycin on past admissions. (Unknown)    Intolerances        PAST MEDICAL & SURGICAL HISTORY:  Hypertension      CVA (cerebral vascular accident)      Depression      Constipation      Neuropathy      Hyperlipidemia      Grade I diastolic dysfunction      Afib      Neuropathy      VHD (valvular heart disease)      Aortic valvar stenosis      Hypothyroidism      GERD (gastroesophageal reflux disease)      H/O skin graft          FAMILY HISTORY:      Home Medications:  acetaminophen 500 mg oral tablet: 1 tab(s) orally 2 times a day (22 Nov 2023 20:06)  acetaminophen 500 mg oral tablet: 2 tab(s) orally once a day (in the afternoon) (22 Nov 2023 20:06)  amLODIPine 5 mg oral tablet: 1 tab(s) orally once a day (22 Nov 2023 20:06)  Biofreeze 4% topical gel: Apply topically to affected area 2 times a day (22 Nov 2023 20:06)  cholecalciferol 50 mcg (2000 intl units) oral tablet: 1 tab(s) orally once a day (22 Nov 2023 20:06)  escitalopram 20 mg oral tablet: 1 tab(s) orally once a day (22 Nov 2023 20:06)  famotidine 20 mg oral tablet: 1 tab(s) orally once a day (22 Nov 2023 20:06)  folic acid 1 mg oral tablet: 1 tab(s) orally once a day (22 Nov 2023 20:06)  furosemide 20 mg oral tablet: 1 tab(s) orally once a day (22 Nov 2023 20:06)  gabapentin 300 mg oral capsule: 1 cap(s) orally 3 times a day (22 Nov 2023 20:06)  hydroxyurea 500 mg oral capsule: 1 tab(s) orally 2 times a day (22 Nov 2023 20:06)  levothyroxine 50 mcg (0.05 mg) oral tablet: 1 tab(s) orally once a day (22 Nov 2023 20:06)  melatonin 3 mg oral tablet: 1 tab(s) orally once a day (22 Nov 2023 20:06)  metoprolol tartrate 25 mg oral tablet: 1 tab(s) orally 2 times a day (22 Nov 2023 20:06)  miconazole 2% topical cream: Apply topically to affected area once a day (22 Nov 2023 20:06)  senna (sennosides) 8.6 mg oral tablet: 2 tab(s) orally once a day (22 Nov 2023 20:06)  simvastatin 10 mg oral tablet: 1 tab(s) orally once a day (22 Nov 2023 20:06)  traMADol 50 mg oral tablet: 1 tab(s) orally 2 times a day hold for lethargy (22 Nov 2023 20:06)  traMADol 50 mg oral tablet: 1 tab(s) orally every 6 hours as needed for pain (22 Nov 2023 20:06)      MEDICATIONS  (STANDING):  amLODIPine   Tablet 5 milliGRAM(s) Oral daily  cefepime   IVPB 2000 milliGRAM(s) IV Intermittent every 12 hours  cholecalciferol 2000 Unit(s) Oral daily  dextrose 5% + sodium chloride 0.45%. 1000 milliLiter(s) (40 mL/Hr) IV Continuous <Continuous>  escitalopram 20 milliGRAM(s) Oral daily  folic acid 1 milliGRAM(s) Oral daily  gabapentin 300 milliGRAM(s) Oral three times a day  hydroxyurea 500 milliGRAM(s) Oral two times a day  lactobacillus acidophilus 1 Tablet(s) Oral every 12 hours  levothyroxine 50 MICROGram(s) Oral daily  metoprolol tartrate 25 milliGRAM(s) Oral two times a day  pantoprazole    Tablet 40 milliGRAM(s) Oral before breakfast  polyethylene glycol 3350 17 Gram(s) Oral daily  senna 2 Tablet(s) Oral at bedtime  simvastatin 10 milliGRAM(s) Oral at bedtime    MEDICATIONS  (PRN):  acetaminophen     Tablet .. 650 milliGRAM(s) Oral every 6 hours PRN Temp greater or equal to 38C (100.4F), Mild Pain (1 - 3)  aluminum hydroxide/magnesium hydroxide/simethicone Suspension 30 milliLiter(s) Oral every 4 hours PRN Dyspepsia  bisacodyl Suppository 10 milliGRAM(s) Rectal daily PRN Constipation  magnesium hydroxide Suspension 30 milliLiter(s) Oral daily PRN Constipation  melatonin 3 milliGRAM(s) Oral at bedtime PRN Insomnia  morphine  - Injectable 2 milliGRAM(s) IV Push every 6 hours PRN Severe Pain (7 - 10)  ondansetron Injectable 4 milliGRAM(s) IV Push every 8 hours PRN Nausea and/or Vomiting  traMADol 50 milliGRAM(s) Oral every 6 hours PRN Moderate Pain (4 - 6)      Diet, DASH/TLC:   Sodium & Cholesterol Restricted  Easy to Chew (EASYTOCHEW) (01-03-24 @ 18:42) [Active]          Vital Signs Last 24 Hrs  T(C): 36.7 (03 Jan 2024 16:44), Max: 36.7 (03 Jan 2024 16:44)  T(F): 98 (03 Jan 2024 16:44), Max: 98 (03 Jan 2024 16:44)  HR: 64 (03 Jan 2024 16:44) (64 - 66)  BP: 104/56 (03 Jan 2024 16:44) (100/48 - 104/56)  BP(mean): --  RR: 18 (03 Jan 2024 16:44) (18 - 18)  SpO2: 97% (03 Jan 2024 16:44) (97% - 98%)    Parameters below as of 03 Jan 2024 16:44  Patient On (Oxygen Delivery Method): room air                  LABS:                        10.9   13.02 )-----------( 793      ( 03 Jan 2024 14:10 )             35.8     01-03    141  |  111<H>  |  20  ----------------------------<  95  3.6   |  25  |  0.72    Ca    7.7<L>      03 Jan 2024 14:10    TPro  6.1  /  Alb  2.2<L>  /  TBili  0.4  /  DBili  x   /  AST  18  /  ALT  10<L>  /  AlkPhos  77  01-03      Urinalysis Basic - ( 03 Jan 2024 14:10 )    Color: x / Appearance: x / SG: x / pH: x  Gluc: 95 mg/dL / Ketone: x  / Bili: x / Urobili: x   Blood: x / Protein: x / Nitrite: x   Leuk Esterase: x / RBC: x / WBC x   Sq Epi: x / Non Sq Epi: x / Bacteria: x            WBC:  WBC Count: 13.02 K/uL (01-03 @ 14:10)      MICROBIOLOGY:  RECENT CULTURES:                  Sodium:  Sodium: 141 mmol/L (01-03 @ 14:10)      0.72 mg/dL 01-03 @ 14:10      Hemoglobin:  Hemoglobin: 10.9 g/dL (01-03 @ 14:10)      Platelets: Platelet Count - Automated: 793 K/uL (01-03 @ 14:10)      LIVER FUNCTIONS - ( 03 Jan 2024 14:10 )  Alb: 2.2 g/dL / Pro: 6.1 g/dL / ALK PHOS: 77 U/L / ALT: 10 U/L / AST: 18 U/L / GGT: x             Urinalysis Basic - ( 03 Jan 2024 14:10 )    Color: x / Appearance: x / SG: x / pH: x  Gluc: 95 mg/dL / Ketone: x  / Bili: x / Urobili: x   Blood: x / Protein: x / Nitrite: x   Leuk Esterase: x / RBC: x / WBC x   Sq Epi: x / Non Sq Epi: x / Bacteria: x        RADIOLOGY & ADDITIONAL STUDIES:      MICROBIOLOGY:  RECENT CULTURES:

## 2024-01-03 NOTE — PATIENT PROFILE ADULT - ARRIVAL FROM
[place] assisted living/Assisted living facility [place] [facility]isted living facility

## 2024-01-03 NOTE — H&P ADULT - TIME BILLING
55minutes spent on this visit, 50% visit time spent in care co-ordination with other attendings and counselling patient ,writing admission orders ( see complete and current orders and order section) ,requesting necessary consults ,informing family about status & plan of care .I have discussed care plan with [facility] wellness/admitting /nursing   department ,outpatient PCP , hospital consultants , ER physician & med staff . 55minutes spent on this visit, 50% visit time spent in care co-ordination with other attendings and counselling patient ,writing admission orders ( see complete and current orders and order section) ,requesting necessary consults ,informing family about status & plan of care .I have discussed care plan with [facility] wellness/admitting /nursing   department ,outpatient PCP , hospital consultants , ER physician & med staff .

## 2024-01-03 NOTE — H&P ADULT - PROBLEM SELECTOR PLAN 1
Get wound culture today, ID and wound care cons requested   . Added IV Cefepime 2 gm q12h.  . Wound care team / suggest surgery evaluation for wound debridement and skin biopsies to evaluate for other causes of chronic leg ulcer (Pyoderma gangrenosum?)  . Wound care daily. Leg ELEVATION .pain management , Dvt prophylaxis

## 2024-01-03 NOTE — H&P ADULT - NSHPLABSRESULTS_GEN_ALL_CORE
< from: TTE W or WO Ultrasound Enhancing Agent (06.28.23 @ 08:33) >    CONCLUSIONS:      1. Technically difficult image quality.   2. The left ventricular systolic function is normal.   3. Mildly enlarged right ventricular cavity size.   4. The left atrium is mildly dilated.   5. Mild thickening of the aortic valve leaflets.   6. There is mild calcification of the mitral valve annulus.   7. Mild tricuspid regurgitation.   8. Estimated pulmonary artery systolic pressure is 39 mmHg.    ________________________________________________________________________________________  FINDINGS:     Left Ventricle:  The left ventricular systolic function is normal.     Right Ventricle:  Mildly enlarged right ventricular cavity size.     Left Atrium:  The left atrium is mildly dilated.     Right Atrium:  The right atrium is normal.     Aortic Valve:  The aortic valve appears trileaflet. There is mild thickening of the aortic valve leaflets.     Mitral Valve:  There is mild calcification of the mitral valve annulus.     Tricuspid Valve:  There is mild tricuspid regurgitation. Estimated pulmonary artery systolic pressure is 39 mmHg.     Pulmonic Valve:  The pulmonic valve was not well visualized.     Systemic Veins:  The inferior vena cava is normal (normal <2.1cm) with normal inspiratory collapse (normal >50%) consistent with normal right atrial pressure (~3, range 0-5mmHg).  ____________________________________________________________________  Quantitative Data:  Left Ventricle Measurements: (Indexed to BSA)     IVSd (2D):   1.0 cm  LVPWd (2D):  1.2 cm  LVIDd (2D):  3.9 cm  LVIDs (2D):  2.6 cm  LV Mass:     139 g  82.4 g/m²     MV E Vmax: 0.97 m/s  MV A Vmax: 1.12 m/s  MV E/A:    0.87  MV DT:     183 msec    Aorta Measurements:     Ao Root s, 2D: 3.1 cm       Left Atrium Measurements: (Indexed to BSA)  LA Diam 2D: 4.00cm       LVOT / RVOT/ Qp/Qs Data: (Indexed to BSA)  LVOT Diameter: 1.80 cm  LVOT Vmax:     0.96 m/s  LVOT VTI:      19.50 cm  LVOT SV:       49.6 ml  29.32 ml/m²    Aortic Valve Measurements:  AV Vmax:           184.0 cm/s  AV Peak Gradient:  13.5 mmHg  AV Mean Gradient:  7.0 mmHg  AV VTI:            29.3 cm  AV VTI Ratio:      0.67  AoV EOA, Contin:   1.69 cm²  AoV EOA, Contin i: 1.00 cm²/m²    Mitral Valve Measurements:     MV E Vmax: 1.0 m/s  MV A Vmax: 1.1 m/s  MV E/A:    0.9       Tricuspid Valve Measurements:     TV Vmax:          3.1 m/s  TV Peak Gradient: 39.4 mmHg  TR Vmax:          3.0 m/s  TR Peak Gradient: 36.0 mmHg  RA Pressure:      3 mmHg    < end of copied text >

## 2024-01-03 NOTE — ED ADULT NURSE NOTE - CHIEF COMPLAINT QUOTE
Patient BIBA from [facility] [place] for LLE wound infection and pain. Patient BIBA from [facility] [place] for LLE wound infection and pain.

## 2024-01-03 NOTE — ED ADULT NURSE REASSESSMENT NOTE - NS ED NURSE REASSESS COMMENT FT1
Received pt from change of shift to 3B, pt is from [facility], c/o infection of open wound LLE. Pt's leg is wrapped in gauze. Pt is A+Ox4, calm and cooperative, able to move all extremities. Pt has CVA hx, residual right sided weakness. Unlabored breathing at this time. No distress noted. MD evaluation in progress. Received pt from change of shift to 3B, pt is from [facility], c/o infection of open wound LLE. Pt's leg is wrapped in gauze. Pt is A+Ox4, calm and cooperative, able to move all extremities. Pt has CVA hx, residual right sided weakness. Unlabored breathing at this time. No distress noted. MD evaluation in progress.

## 2024-01-03 NOTE — CONSULT NOTE ADULT - ASSESSMENT
Initial evaluation/Pulmonary Critical Care consultation requested by  DR BLANCO on  1/3/2024 from Dr Swapnil De La Rosa    Patient examined chart reviewed    HOSPITAL ADMISSION   PATIENT CAME  FROM (if information available)      GENERAL DATA .   GOC.  ..  1/3/2024 FULL CODE  ICU STAY.  .. NO  COVID. ..       BEST PRACTICE ISSUES.    HOB ELEVATN.  .. Yes  DVT PPLX.  ..   ..        DIET.   ..  1/3/2024 DASH   IV fl. .. 1/3/2024 d5 1/2 40    STRESS ULCER PPLX.   .  ..  1/3/2024 PROTONIX 40      ALLGY. .. certain antibiotics       WT. ..  1/3/2024 49  BMI. .. 1/3/2024 27     ABGS.   .  VS/ PO/IO/ VENT/ DRIPS.   1/3/2024 afeb 64 100/50   1/3/2024 ra 97%      PRESENTATION.  . CC 1/3/2024.From [facility] for LLE wound infection and pain   . HPI 1/3/2024.  . 89yo female bib ems with wounds to left lower leg for unknown period of time, now weeping and draining, pt c/o pain but pt is poor historian  . 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 Pulm consulted 1/3/2024 for abn cxr   . PMHx .   .. DVT   .. A fib on coumadin   .. Valvular heart disease   .. HFPEF   .... 6/28/2023 echo pasp 39 la sl dilated n lvsf  .. Aortic stenosis   .. CVA r sided weakness   .. Cellulitis  .... VANCO 11/22/2023 -> CIPRO 11/29 DOXY 11/29     . HOME MEDS .warfarin levoxyl 50 pepcid metoprolol 25.2 gabapentin 300.3 lasix 20 escitalopram 20     PROBLEM DATA.  INFECTION  CELLULITIS L LOWER EXTREMITY  .. w 1/3/2024 w 13   .. la 1/3/2024 la 2.4   .. cxr 1/3/2024 cxr improving l base infiltr improvd from 11/30/2023   .. 1/3/2024 CEFEPIME 2.2 X 7D     HEMAT  .. Hb 1/3/2024 Hb 10.9    RENAL   .. Na 1/3/2024 Na 141   .. CO2 1/3/2024 CO2 25   .. Cr 1/3/2024 Cr .7     . HYPOTHYROID  .. 1/3/2024 LEVOXYL 50     . SUMMARY.     .  89[facility] female from [facility] for worsening LLE wound   . 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 Pulm consulted 1/3/2024 for abn cxr   . OVERALL ASSESSMENT/PLAN.   . CELLULITIS L LO EXTR   .. 1/3/2024 Seen by ID   .. 1/3/2024 Started on cefepime   .. No pneumonia suspected   . A fib  .. Continue coumadin     . DISPO.  .. Follow cultures adjusr antibio     TIME SPENT.  . Over 55 minutes aggregate care time spent on encounter; activities included   direct patient care, counseling and/or coordinating care reviewing notes, lab data/ imaging , discussion with multidisciplinary team/ patient  /family and explaining in detail risks, benefits, alternatives  of the recommendations     PATIENT.  . AB BROOKS        Initial evaluation/Pulmonary Critical Care consultation requested by  DR BLANCO on  1/3/2024 from Dr Swapnil De La Rosa    Patient examined chart reviewed    HOSPITAL ADMISSION   PATIENT CAME  FROM (if information available)      GENERAL DATA .   GOC.  ..  1/3/2024 FULL CODE  ICU STAY.  .. NO  COVID. ..       BEST PRACTICE ISSUES.    HOB ELEVATN.  .. Yes  DVT PPLX.  ..   ..        DIET.   ..  1/3/2024 DASH   IV fl. .. 1/3/2024 d5 1/2 40    STRESS ULCER PPLX.   .  ..  1/3/2024 PROTONIX 40      ALLGY. .. certain antibiotics       WT. ..  1/3/2024 49  BMI. .. 1/3/2024 27     ABGS.   .  VS/ PO/IO/ VENT/ DRIPS.   1/3/2024 afeb 64 100/50   1/3/2024 ra 97%      PRESENTATION.  . CC 1/3/2024.From [facility] for LLE wound infection and pain   . HPI 1/3/2024.  . 89yo female bib ems with wounds to left lower leg for unknown period of time, now weeping and draining, pt c/o pain but pt is poor historian  . 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 Pulm consulted 1/3/2024 for abn cxr   . PMHx .   .. DVT   .. A fib on coumadin   .. Valvular heart disease   .. HFPEF   .... 6/28/2023 echo pasp 39 la sl dilated n lvsf  .. Aortic stenosis   .. CVA r sided weakness   .. Cellulitis  .... VANCO 11/22/2023 -> CIPRO 11/29 DOXY 11/29     . HOME MEDS .warfarin levoxyl 50 pepcid metoprolol 25.2 gabapentin 300.3 lasix 20 escitalopram 20     PROBLEM DATA.  INFECTION  CELLULITIS L LOWER EXTREMITY  .. w 1/3/2024 w 13   .. la 1/3/2024 la 2.4   .. cxr 1/3/2024 cxr improving l base infiltr improvd from 11/30/2023   .. 1/3/2024 CEFEPIME 2.2 X 7D     HEMAT  .. Hb 1/3/2024 Hb 10.9    RENAL   .. Na 1/3/2024 Na 141   .. CO2 1/3/2024 CO2 25   .. Cr 1/3/2024 Cr .7     . HYPOTHYROID  .. 1/3/2024 LEVOXYL 50     . SUMMARY.     .  89yo female from [facility] for worsening LLE wound   . 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 Pulm consulted 1/3/2024 for abn cxr   . OVERALL ASSESSMENT/PLAN.   . CELLULITIS L LO EXTR   .. 1/3/2024 Seen by ID   .. 1/3/2024 Started on cefepime   .. No pneumonia suspected   . A fib  .. Continue coumadin     . DISPO.  .. Follow cultures adjusr antibio     TIME SPENT.  . Over 55 minutes aggregate care time spent on encounter; activities included   direct patient care, counseling and/or coordinating care reviewing notes, lab data/ imaging , discussion with multidisciplinary team/ patient  /family and explaining in detail risks, benefits, alternatives  of the recommendations     PATIENT.  . AB BROOKS

## 2024-01-03 NOTE — ED PROVIDER NOTE - OBJECTIVE STATEMENT
89yo female bib ems with wounds to left lower leg for unknown period of time, now weeping and draining, pt c/o pain but pt is poor historian

## 2024-01-03 NOTE — CONSULT NOTE ADULT - SUBJECTIVE AND OBJECTIVE BOX
Patient is a 89y old  Female who presents with a chief complaint of     HPI:      Asked to see patient for ID Consult    PAST MEDICAL & SURGICAL HISTORY:  Hypertension      CVA (cerebral vascular accident)      Depression      Constipation      Neuropathy      Hyperlipidemia      Grade I diastolic dysfunction      Afib      Neuropathy      VHD (valvular heart disease)      Aortic valvar stenosis      Hypothyroidism      GERD (gastroesophageal reflux disease)      No significant past surgical history          Allergies    per son &quot;issues with certain anitibiotics&quot; does not remember the names - Patient has tolerated zosyn, ceftriaxone, bactrim, and vancomycin on past admissions. (Unknown)    Intolerances        REVIEW OF SYSTEMS:  All systems below were reviewed and are negative [  ]  HEENT:  ID:  Pulmonary:  Cardiac:  GI:  Renal:  Musculoskeletal:  All other systems above were reviewed and are negative   [  ]    HOME MEDICATIONS:    MEDICATIONS  (STANDING):    MEDICATIONS  (PRN):      Vital Signs Last 24 Hrs  T(C): 36.7 (03 Jan 2024 16:44), Max: 36.7 (03 Jan 2024 16:44)  T(F): 98 (03 Jan 2024 16:44), Max: 98 (03 Jan 2024 16:44)  HR: 64 (03 Jan 2024 16:44) (64 - 66)  BP: 104/56 (03 Jan 2024 16:44) (100/48 - 104/56)  BP(mean): --  RR: 18 (03 Jan 2024 16:44) (18 - 18)  SpO2: 97% (03 Jan 2024 16:44) (97% - 98%)    Parameters below as of 03 Jan 2024 16:44  Patient On (Oxygen Delivery Method): room air        PHYSICAL EXAM:  HEENT:  Neck:  Lungs:  Heart:  Abdomen:  Genital/ Rectal:  Extremities:  Neurologic:  Vascular:    I&O's Summary      LABORATORY:                          10.9   13.02 )-----------( 793      ( 03 Jan 2024 14:10 )             35.8           01-03    141  |  111<H>  |  20  ----------------------------<  95  3.6   |  25  |  0.72    Ca    7.7<L>      03 Jan 2024 14:10    TPro  6.1  /  Alb  2.2<L>  /  TBili  0.4  /  DBili  x   /  AST  18  /  ALT  10<L>  /  AlkPhos  77  01-03          LABORATORY:    CBC Full  -  ( 03 Jan 2024 14:10 )  WBC Count : 13.02 K/uL  RBC Count : 3.18 M/uL  Hemoglobin : 10.9 g/dL  Hematocrit : 35.8 %  Platelet Count - Automated : 793 K/uL  Mean Cell Volume : 112.6 fl  Mean Cell Hemoglobin : 34.3 pg  Mean Cell Hemoglobin Concentration : 30.4 gm/dL  Auto Neutrophil # : 12.11 K/uL  Auto Lymphocyte # : 0.39 K/uL  Auto Monocyte # : 0.13 K/uL  Auto Eosinophil # : 0.39 K/uL  Auto Basophil # : 0.00 K/uL  Auto Neutrophil % : 91.0 %  Auto Lymphocyte % : 3.0 %  Auto Monocyte % : 1.0 %  Auto Eosinophil % : 3.0 %  Auto Basophil % : 0.0 %          01-03    141  |  111<H>  |  20  ----------------------------<  95  3.6   |  25  |  0.72    Ca    7.7<L>      03 Jan 2024 14:10    TPro  6.1  /  Alb  2.2<L>  /  TBili  0.4  /  DBili  x   /  AST  18  /  ALT  10<L>  /  AlkPhos  77  01-03                                                   Patient is a 89y old  Female who presents with a chief complaint of LLE wound.     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 She was followed by wound care consultants at the [facility]. She was admitted here last month and treated for LLE cellulitis.         PAST MEDICAL & SURGICAL HISTORY:  Hypertension      CVA (cerebral vascular accident)      Depression      Constipation      Neuropathy      Hyperlipidemia      Grade I diastolic dysfunction      Afib      Neuropathy      VHD (valvular heart disease)      Aortic valvar stenosis      Hypothyroidism      GERD (gastroesophageal reflux disease)      No significant past surgical history          Allergies    per son &quot;issues with certain anitibiotics&quot; does not remember the names - Patient has tolerated zosyn, ceftriaxone, bactrim, and vancomycin on past admissions. (Unknown)    Intolerances        REVIEW OF SYSTEMS:  No cough   No SOB.    HOME MEDICATIONS:    MEDICATIONS  (STANDING):    MEDICATIONS  (PRN):      Vital Signs Last 24 Hrs  T(C): 36.7 (03 Jan 2024 16:44), Max: 36.7 (03 Jan 2024 16:44)  T(F): 98 (03 Jan 2024 16:44), Max: 98 (03 Jan 2024 16:44)  HR: 64 (03 Jan 2024 16:44) (64 - 66)  BP: 104/56 (03 Jan 2024 16:44) (100/48 - 104/56)  BP(mean): --  RR: 18 (03 Jan 2024 16:44) (18 - 18)  SpO2: 97% (03 Jan 2024 16:44) (97% - 98%)    Parameters below as of 03 Jan 2024 16:44  Patient On (Oxygen Delivery Method): room air        PHYSICAL EXAM:  HEENT: NC/AT, PERRLA.   Neck: Soft, no masses.   Lungs: Coarse BS bilaterally, no wheezing.   Heart: RRR, no murmurs.   Abdomen: Soft, no tenderness  Genital/ Rectal: No salas   Extremities: LLE with large wounds and greenish discharge. Tender with palpations.   Neurologic: Confused.       I&O's Summary      LABORATORY:                          10.9   13.02 )-----------( 793      ( 03 Jan 2024 14:10 )             35.8           01-03    141  |  111<H>  |  20  ----------------------------<  95  3.6   |  25  |  0.72    Ca    7.7<L>      03 Jan 2024 14:10    TPro  6.1  /  Alb  2.2<L>  /  TBili  0.4  /  DBili  x   /  AST  18  /  ALT  10<L>  /  AlkPhos  77  01-03          LABORATORY:    CBC Full  -  ( 03 Jan 2024 14:10 )  WBC Count : 13.02 K/uL  RBC Count : 3.18 M/uL  Hemoglobin : 10.9 g/dL  Hematocrit : 35.8 %  Platelet Count - Automated : 793 K/uL  Mean Cell Volume : 112.6 fl  Mean Cell Hemoglobin : 34.3 pg  Mean Cell Hemoglobin Concentration : 30.4 gm/dL  Auto Neutrophil # : 12.11 K/uL  Auto Lymphocyte # : 0.39 K/uL  Auto Monocyte # : 0.13 K/uL  Auto Eosinophil # : 0.39 K/uL  Auto Basophil # : 0.00 K/uL  Auto Neutrophil % : 91.0 %  Auto Lymphocyte % : 3.0 %  Auto Monocyte % : 1.0 %  Auto Eosinophil % : 3.0 %  Auto Basophil % : 0.0 %          01-03    141  |  111<H>  |  20  ----------------------------<  95  3.6   |  25  |  0.72    Ca    7.7<L>      03 Jan 2024 14:10    TPro  6.1  /  Alb  2.2<L>  /  TBili  0.4  /  DBili  x   /  AST  18  /  ALT  10<L>  /  AlkPhos  77  01-03      Assessment and Plan:    1. Worsening of LLE wounds.  2. Chronic LLE pain.    . Get wound culture today,  . Add IV Cefepime 2 gm q12h.  . Would suggest surgery evaluation for wound debridement and skin biopsies to evaluate for other causes of chronic leg ulcer (Pyoderma gangrenosum?)  . Wound care daily.    Thank you.                                              Patient is a 89y old  Female who presents with a chief complaint of LLE wound.     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 She was followed by wound care consultants at the [facility]. She was admitted here last month and treated for LLE cellulitis.         PAST MEDICAL & SURGICAL HISTORY:  Hypertension      CVA (cerebral vascular accident)      Depression      Constipation      Neuropathy      Hyperlipidemia      Grade I diastolic dysfunction      Afib      Neuropathy      VHD (valvular heart disease)      Aortic valvar stenosis      Hypothyroidism      GERD (gastroesophageal reflux disease)      No significant past surgical history          Allergies    per son &quot;issues with certain anitibiotics&quot; does not remember the names - Patient has tolerated zosyn, ceftriaxone, bactrim, and vancomycin on past admissions. (Unknown)    Intolerances        REVIEW OF SYSTEMS:  No cough   No SOB.    HOME MEDICATIONS:    MEDICATIONS  (STANDING):    MEDICATIONS  (PRN):      Vital Signs Last 24 Hrs  T(C): 36.7 (03 Jan 2024 16:44), Max: 36.7 (03 Jan 2024 16:44)  T(F): 98 (03 Jan 2024 16:44), Max: 98 (03 Jan 2024 16:44)  HR: 64 (03 Jan 2024 16:44) (64 - 66)  BP: 104/56 (03 Jan 2024 16:44) (100/48 - 104/56)  BP(mean): --  RR: 18 (03 Jan 2024 16:44) (18 - 18)  SpO2: 97% (03 Jan 2024 16:44) (97% - 98%)    Parameters below as of 03 Jan 2024 16:44  Patient On (Oxygen Delivery Method): room air        PHYSICAL EXAM:  HEENT: NC/AT, PERRLA.   Neck: Soft, no masses.   Lungs: Coarse BS bilaterally, no wheezing.   Heart: RRR, no murmurs.   Abdomen: Soft, no tenderness  Genital/ Rectal: No salas   Extremities: LLE with large wounds and greenish discharge. Tender with palpations.   Neurologic: Confused.       I&O's Summary      LABORATORY:                          10.9   13.02 )-----------( 793      ( 03 Jan 2024 14:10 )             35.8           01-03    141  |  111<H>  |  20  ----------------------------<  95  3.6   |  25  |  0.72    Ca    7.7<L>      03 Jan 2024 14:10    TPro  6.1  /  Alb  2.2<L>  /  TBili  0.4  /  DBili  x   /  AST  18  /  ALT  10<L>  /  AlkPhos  77  01-03          LABORATORY:    CBC Full  -  ( 03 Jan 2024 14:10 )  WBC Count : 13.02 K/uL  RBC Count : 3.18 M/uL  Hemoglobin : 10.9 g/dL  Hematocrit : 35.8 %  Platelet Count - Automated : 793 K/uL  Mean Cell Volume : 112.6 fl  Mean Cell Hemoglobin : 34.3 pg  Mean Cell Hemoglobin Concentration : 30.4 gm/dL  Auto Neutrophil # : 12.11 K/uL  Auto Lymphocyte # : 0.39 K/uL  Auto Monocyte # : 0.13 K/uL  Auto Eosinophil # : 0.39 K/uL  Auto Basophil # : 0.00 K/uL  Auto Neutrophil % : 91.0 %  Auto Lymphocyte % : 3.0 %  Auto Monocyte % : 1.0 %  Auto Eosinophil % : 3.0 %  Auto Basophil % : 0.0 %          01-03    141  |  111<H>  |  20  ----------------------------<  95  3.6   |  25  |  0.72    Ca    7.7<L>      03 Jan 2024 14:10    TPro  6.1  /  Alb  2.2<L>  /  TBili  0.4  /  DBili  x   /  AST  18  /  ALT  10<L>  /  AlkPhos  77  01-03      Assessment and Plan:    1. Worsening of LLE wounds.  2. Chronic LLE pain.    . Get wound culture today,  . Add IV Cefepime 2 gm q12h.  . Would suggest surgery evaluation for wound debridement and skin biopsies to evaluate for other causes of chronic leg ulcer (Pyoderma gangrenosum?)  . Wound care daily.    Thank you.

## 2024-01-03 NOTE — ED ADULT NURSE NOTE - NSFALLRISKINTERV_ED_ALL_ED
Assistance OOB with selected safe patient handling equipment if applicable/Assistance with ambulation/Communicate fall risk and risk factors to all staff, patient, and family/Monitor gait and stability/Provide visual cue: yellow wristband, yellow gown, etc/Reinforce activity limits and safety measures with patient and family/Call bell, personal items and telephone in reach/Instruct patient to call for assistance before getting out of bed/chair/stretcher/Non-slip footwear applied when patient is off stretcher/[place] to call system/Physically safe environment - no spills, clutter or unnecessary equipment/Purposeful Proactive Rounding/Room/bathroom lighting operational, light cord in reach Assistance OOB with selected safe patient handling equipment if applicable/Assistance with ambulation/Communicate fall risk and risk factors to all staff, patient, and family/Monitor gait and stability/Provide visual cue: yellow wristband, yellow gown, etc/Reinforce activity limits and safety measures with patient and family/Call bell, personal items and telephone in reach/Instruct patient to call for assistance before getting out of bed/chair/stretcher/Non-slip footwear applied when patient is off stretcher/[place] to call system/Physically safe environment - no spills, clutter or unnecessary equipment/Purposeful Proactive Rounding/Room/bathroom lighting operational, light cord in reach

## 2024-01-03 NOTE — H&P ADULT - SKIN COMMENTS
LLE - DIRTY  DEVITALIZED TISSUE PRESENT  NECROTIC TISSUE PRESENT  VISIBLE DEBRIS , contracted llePRESSURE ULCER(S)multiple open weeping wounds to leg

## 2024-01-03 NOTE — H&P ADULT - PROBLEM SELECTOR PROBLEM 9
PATIENT PRESENTS TO ER WITH MOM WITH REPORT OF SHORTNESS OF BREATH STARTING THIS AM AFTER HE TOOK (2) FOCUS GUMMIES. MOTHER REPORTS HE USUALLY TAKES ONE A DAY BUT SISTER GAVE HIM TWO THIS AM. DOES REPORT ONE EPISODE OF DIARRHEA THIS AM   Constipation

## 2024-01-03 NOTE — ED ADULT TRIAGE NOTE - CHIEF COMPLAINT QUOTE
Patient BIBA from [facility] [place] for LLE wound infection and pain. Patient BIBA from [facility] [place] for LLE wound infection and pain.

## 2024-01-03 NOTE — PATIENT PROFILE ADULT - FALL HARM RISK - HARM RISK INTERVENTIONS
Assistance with ambulation/Assistance OOB with selected safe patient handling equipment/Communicate Risk of Fall with Harm to all staff/Discuss with provider need for PT consult/Monitor gait and stability/Provide patient with walking aids - walker, cane, crutches/Reinforce activity limits and safety measures with patient and family/Tailored Fall Risk Interventions/Visual Cue: Yellow wristband and red socks/Bed in lowest position, wheels locked, appropriate side rails in place/Call bell, personal items and telephone in reach/Instruct patient to call for assistance before getting out of bed or chair/Non-slip footwear when patient is out of bed/[place] to call system/Physically safe environment - no spills, clutter or unnecessary equipment/Purposeful Proactive Rounding/Room/bathroom lighting operational, light cord in reach Assistance with ambulation/Assistance OOB with selected safe patient handling equipment/Communicate Risk of Fall with Harm to all staff/Discuss with provider need for PT consult/Monitor gait and stability/Provide patient with walking aids - walker, cane, crutches/Reinforce activity limits and safety measures with patient and family/Tailored Fall Risk Interventions/Visual Cue: Yellow wristband and red socks/Bed in lowest position, wheels locked, appropriate side rails in place/Call bell, personal items and telephone in reach/Instruct patient to call for assistance before getting out of bed or chair/Non-slip footwear when patient is out of bed/[place] to call system/Physically safe environment - no spills, clutter or unnecessary equipment/Purposeful Proactive Rounding/Room/bathroom lighting operational, light cord in reach

## 2024-01-03 NOTE — PATIENT PROFILE ADULT - FUNCTIONAL ASSESSMENT - BASIC MOBILITY 6.
1-calculated by average/Not able to assess (calculate score using [facility] averaging method)

## 2024-01-03 NOTE — H&P ADULT - HISTORY OF PRESENT ILLNESS
89yo female  ,prison resident with hx of Past medical history of CVA on Coumadin with right-sided weakness, hypertension hyperlipidemia CAD history of A-fib on Coumadin.  Patient was hospitalized in 2023  g with altered mental status concern for UTI , also  cellulitis to the LLE at the site of a skin graft. Pt recently completed a course of po abx for this with no significant improvement. bib ems with wounds to left lower leg for unknown period of time, now weeping and draining, pt c/o pain but pt is poor historian She was admitted in november 2023 with LLE cellulitis and seen by wound care MD & ID -discharged on po doxycycline and cipro & with topical care .Admitted for septic workup , iv abx ,pain management and wound care  89yo female  ,longterm resident with hx of Past medical history of CVA on Coumadin with right-sided weakness, hypertension hyperlipidemia CAD history of A-fib on Coumadin.  Patient was hospitalized in 2023  g with altered mental status concern for UTI , also  cellulitis to the LLE at the site of a skin graft. Pt recently completed a course of po abx for this with no significant improvement. 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  Patient was hospitalized in 2023  g with altered mental status concern for UTI , also  cellulitis to the LLE at the site of a skin graft. Pt recently completed a course of po abx for this with no significant improvement. 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  Patient was hospitalized in 2023  g with altered mental status concern for UTI , also  cellulitis to the LLE at the site of a skin graft. Pt recently completed a course of po abx for this with no significant improvement. bib ems with wounds to left lower leg for unknown period of time, now weeping and draining, pt c/o pain but pt is poor historian She was admitted in november 2023 with LLE cellulitis and seen by wound care MD & ID -discharged on po doxycycline and cipro & with topical care .Admitted for septic workup , iv abx ,pain management and wound care

## 2024-01-03 NOTE — ED ADULT TRIAGE NOTE - ARRIVAL FROM
[facility] [place]/Assisted living [facility] [place]/Assisted living [facility]

## 2024-01-03 NOTE — H&P ADULT - ASSESSMENT
89yo female  ,[facility] resident with hx of Past medical history of CVA on Coumadin with right-sided weakness, hypertension hyperlipidemia CAD history of A-fib on Coumadin.  Patient was hospitalized in 2023  g with altered mental status concern for UTI , also  cellulitis to the LLE at the site of a skin graft. Pt recently completed a course of po abx for this with no significant improvement. bib ems with wounds to left lower leg for unknown period of time, now weeping and draining, pt c/o pain but pt is poor historian She was admitted in november 2023 with LLE cellulitis and seen by wound care MD & ID -discharged on po doxycycline and cipro & with topical care .Admitted for septic workup , iv abx ,pain management and wound care  89yo female  ,[facility] resident with hx of Past medical history of CVA on Coumadin with right-sided weakness, hypertension hyperlipidemia CAD history of A-fib on Coumadin.  Patient was hospitalized in 2023  g with altered mental status concern for UTI , also  cellulitis to the LLE at the site of a skin graft. Pt recently completed a course of po abx for this with no significant improvement. 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  Patient was hospitalized in 2023  g with altered mental status concern for UTI , also  cellulitis to the LLE at the site of a skin graft. Pt recently completed a course of po abx for this with no significant improvement. 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  Patient was hospitalized in 2023  g with altered mental status concern for UTI , also  cellulitis to the LLE at the site of a skin graft. Pt recently completed a course of po abx for this with no significant improvement. bib ems with wounds to left lower leg for unknown period of time, now weeping and draining, pt c/o pain but pt is poor historian She was admitted in november 2023 with LLE cellulitis and seen by wound care MD & ID -discharged on po doxycycline and cipro & with topical care .Admitted for septic workup , iv abx ,pain management and wound care

## 2024-01-04 DIAGNOSIS — Z51.5 ENCOUNTER FOR PALLIATIVE CARE: ICD-10-CM

## 2024-01-04 DIAGNOSIS — R53.81 OTHER MALAISE: ICD-10-CM

## 2024-01-04 DIAGNOSIS — Z71.89 OTHER SPECIFIED COUNSELING: ICD-10-CM

## 2024-01-04 LAB
ALBUMIN SERPL ELPH-MCNC: 2.3 G/DL — LOW (ref 3.3–5)
ALBUMIN SERPL ELPH-MCNC: 2.3 G/DL — LOW (ref 3.3–5)
ALP SERPL-CCNC: 85 U/L — SIGNIFICANT CHANGE UP (ref 40–120)
ALP SERPL-CCNC: 85 U/L — SIGNIFICANT CHANGE UP (ref 40–120)
ALT FLD-CCNC: 9 U/L — LOW (ref 12–78)
ALT FLD-CCNC: 9 U/L — LOW (ref 12–78)
ANION GAP SERPL CALC-SCNC: 4 MMOL/L — LOW (ref 5–17)
ANION GAP SERPL CALC-SCNC: 4 MMOL/L — LOW (ref 5–17)
AST SERPL-CCNC: 17 U/L — SIGNIFICANT CHANGE UP (ref 15–37)
AST SERPL-CCNC: 17 U/L — SIGNIFICANT CHANGE UP (ref 15–37)
BASOPHILS # BLD AUTO: 0.1 K/UL — SIGNIFICANT CHANGE UP (ref 0–0.2)
BASOPHILS # BLD AUTO: 0.1 K/UL — SIGNIFICANT CHANGE UP (ref 0–0.2)
BASOPHILS NFR BLD AUTO: 0.5 % — SIGNIFICANT CHANGE UP (ref 0–2)
BASOPHILS NFR BLD AUTO: 0.5 % — SIGNIFICANT CHANGE UP (ref 0–2)
BILIRUB SERPL-MCNC: 0.5 MG/DL — SIGNIFICANT CHANGE UP (ref 0.2–1.2)
BILIRUB SERPL-MCNC: 0.5 MG/DL — SIGNIFICANT CHANGE UP (ref 0.2–1.2)
BUN SERPL-MCNC: 16 MG/DL — SIGNIFICANT CHANGE UP (ref 7–23)
BUN SERPL-MCNC: 16 MG/DL — SIGNIFICANT CHANGE UP (ref 7–23)
CALCIUM SERPL-MCNC: 8.1 MG/DL — LOW (ref 8.5–10.1)
CALCIUM SERPL-MCNC: 8.1 MG/DL — LOW (ref 8.5–10.1)
CHLORIDE SERPL-SCNC: 109 MMOL/L — HIGH (ref 96–108)
CHLORIDE SERPL-SCNC: 109 MMOL/L — HIGH (ref 96–108)
CO2 SERPL-SCNC: 26 MMOL/L — SIGNIFICANT CHANGE UP (ref 22–31)
CO2 SERPL-SCNC: 26 MMOL/L — SIGNIFICANT CHANGE UP (ref 22–31)
CREAT SERPL-MCNC: 0.58 MG/DL — SIGNIFICANT CHANGE UP (ref 0.5–1.3)
CREAT SERPL-MCNC: 0.58 MG/DL — SIGNIFICANT CHANGE UP (ref 0.5–1.3)
EGFR: 85 ML/MIN/1.73M2 — SIGNIFICANT CHANGE UP
EGFR: 85 ML/MIN/1.73M2 — SIGNIFICANT CHANGE UP
EOSINOPHIL # BLD AUTO: 0.08 K/UL — SIGNIFICANT CHANGE UP (ref 0–0.5)
EOSINOPHIL # BLD AUTO: 0.08 K/UL — SIGNIFICANT CHANGE UP (ref 0–0.5)
EOSINOPHIL NFR BLD AUTO: 0.4 % — SIGNIFICANT CHANGE UP (ref 0–6)
EOSINOPHIL NFR BLD AUTO: 0.4 % — SIGNIFICANT CHANGE UP (ref 0–6)
GLUCOSE SERPL-MCNC: 146 MG/DL — HIGH (ref 70–99)
GLUCOSE SERPL-MCNC: 146 MG/DL — HIGH (ref 70–99)
GRAM STN FLD: ABNORMAL
GRAM STN FLD: ABNORMAL
HCT VFR BLD CALC: 34.9 % — SIGNIFICANT CHANGE UP (ref 34.5–45)
HCT VFR BLD CALC: 34.9 % — SIGNIFICANT CHANGE UP (ref 34.5–45)
HGB BLD-MCNC: 10.7 G/DL — LOW (ref 11.5–15.5)
HGB BLD-MCNC: 10.7 G/DL — LOW (ref 11.5–15.5)
IMM GRANULOCYTES NFR BLD AUTO: 8.2 % — HIGH (ref 0–0.9)
IMM GRANULOCYTES NFR BLD AUTO: 8.2 % — HIGH (ref 0–0.9)
INR BLD: 4.56 RATIO — HIGH (ref 0.85–1.18)
INR BLD: 4.56 RATIO — HIGH (ref 0.85–1.18)
LYMPHOCYTES # BLD AUTO: 0.25 K/UL — LOW (ref 1–3.3)
LYMPHOCYTES # BLD AUTO: 0.25 K/UL — LOW (ref 1–3.3)
LYMPHOCYTES # BLD AUTO: 1.4 % — LOW (ref 13–44)
LYMPHOCYTES # BLD AUTO: 1.4 % — LOW (ref 13–44)
MCHC RBC-ENTMCNC: 30.7 GM/DL — LOW (ref 32–36)
MCHC RBC-ENTMCNC: 30.7 GM/DL — LOW (ref 32–36)
MCHC RBC-ENTMCNC: 34.2 PG — HIGH (ref 27–34)
MCHC RBC-ENTMCNC: 34.2 PG — HIGH (ref 27–34)
MCV RBC AUTO: 111.5 FL — HIGH (ref 80–100)
MCV RBC AUTO: 111.5 FL — HIGH (ref 80–100)
MONOCYTES # BLD AUTO: 0.68 K/UL — SIGNIFICANT CHANGE UP (ref 0–0.9)
MONOCYTES # BLD AUTO: 0.68 K/UL — SIGNIFICANT CHANGE UP (ref 0–0.9)
MONOCYTES NFR BLD AUTO: 3.7 % — SIGNIFICANT CHANGE UP (ref 2–14)
MONOCYTES NFR BLD AUTO: 3.7 % — SIGNIFICANT CHANGE UP (ref 2–14)
NEUTROPHILS # BLD AUTO: 15.8 K/UL — HIGH (ref 1.8–7.4)
NEUTROPHILS # BLD AUTO: 15.8 K/UL — HIGH (ref 1.8–7.4)
NEUTROPHILS NFR BLD AUTO: 85.8 % — HIGH (ref 43–77)
NEUTROPHILS NFR BLD AUTO: 85.8 % — HIGH (ref 43–77)
NRBC # BLD: 0 /100 WBCS — SIGNIFICANT CHANGE UP (ref 0–0)
NRBC # BLD: 0 /100 WBCS — SIGNIFICANT CHANGE UP (ref 0–0)
PLATELET # BLD AUTO: 733 K/UL — HIGH (ref 150–400)
PLATELET # BLD AUTO: 733 K/UL — HIGH (ref 150–400)
POTASSIUM SERPL-MCNC: 3.7 MMOL/L — SIGNIFICANT CHANGE UP (ref 3.5–5.3)
POTASSIUM SERPL-MCNC: 3.7 MMOL/L — SIGNIFICANT CHANGE UP (ref 3.5–5.3)
POTASSIUM SERPL-SCNC: 3.7 MMOL/L — SIGNIFICANT CHANGE UP (ref 3.5–5.3)
POTASSIUM SERPL-SCNC: 3.7 MMOL/L — SIGNIFICANT CHANGE UP (ref 3.5–5.3)
PROCALCITONIN SERPL-MCNC: 0.18 NG/ML — HIGH
PROCALCITONIN SERPL-MCNC: 0.18 NG/ML — HIGH
PROT SERPL-MCNC: 6.3 G/DL — SIGNIFICANT CHANGE UP (ref 6–8.3)
PROT SERPL-MCNC: 6.3 G/DL — SIGNIFICANT CHANGE UP (ref 6–8.3)
PROTHROM AB SERPL-ACNC: 51 SEC — HIGH (ref 9.5–13)
PROTHROM AB SERPL-ACNC: 51 SEC — HIGH (ref 9.5–13)
RBC # BLD: 3.13 M/UL — LOW (ref 3.8–5.2)
RBC # BLD: 3.13 M/UL — LOW (ref 3.8–5.2)
RBC # FLD: 18.2 % — HIGH (ref 10.3–14.5)
RBC # FLD: 18.2 % — HIGH (ref 10.3–14.5)
SODIUM SERPL-SCNC: 139 MMOL/L — SIGNIFICANT CHANGE UP (ref 135–145)
SODIUM SERPL-SCNC: 139 MMOL/L — SIGNIFICANT CHANGE UP (ref 135–145)
SPECIMEN SOURCE: SIGNIFICANT CHANGE UP
SPECIMEN SOURCE: SIGNIFICANT CHANGE UP
WBC # BLD: 18.43 K/UL — HIGH (ref 3.8–10.5)
WBC # BLD: 18.43 K/UL — HIGH (ref 3.8–10.5)
WBC # FLD AUTO: 18.43 K/UL — HIGH (ref 3.8–10.5)
WBC # FLD AUTO: 18.43 K/UL — HIGH (ref 3.8–10.5)

## 2024-01-04 PROCEDURE — 99497 ADVNCD CARE PLAN 30 MIN: CPT | Mod: 25

## 2024-01-04 PROCEDURE — 99222 1ST HOSP IP/OBS MODERATE 55: CPT

## 2024-01-04 RX ADMIN — Medication 1 TABLET(S): at 06:02

## 2024-01-04 RX ADMIN — SENNA PLUS 2 TABLET(S): 8.6 TABLET ORAL at 21:49

## 2024-01-04 RX ADMIN — Medication 1 TABLET(S): at 17:17

## 2024-01-04 RX ADMIN — Medication 25 MILLIGRAM(S): at 06:02

## 2024-01-04 RX ADMIN — MORPHINE SULFATE 2 MILLIGRAM(S): 50 CAPSULE, EXTENDED RELEASE ORAL at 10:48

## 2024-01-04 RX ADMIN — MORPHINE SULFATE 2 MILLIGRAM(S): 50 CAPSULE, EXTENDED RELEASE ORAL at 18:19

## 2024-01-04 RX ADMIN — MORPHINE SULFATE 2 MILLIGRAM(S): 50 CAPSULE, EXTENDED RELEASE ORAL at 09:48

## 2024-01-04 RX ADMIN — Medication 25 MILLIGRAM(S): at 17:17

## 2024-01-04 RX ADMIN — Medication 2000 UNIT(S): at 11:30

## 2024-01-04 RX ADMIN — GABAPENTIN 300 MILLIGRAM(S): 400 CAPSULE ORAL at 21:49

## 2024-01-04 RX ADMIN — SIMVASTATIN 10 MILLIGRAM(S): 20 TABLET, FILM COATED ORAL at 21:49

## 2024-01-04 RX ADMIN — POLYETHYLENE GLYCOL 3350 17 GRAM(S): 17 POWDER, FOR SOLUTION ORAL at 11:30

## 2024-01-04 RX ADMIN — HYDROXYUREA 500 MILLIGRAM(S): 500 CAPSULE ORAL at 17:17

## 2024-01-04 RX ADMIN — GABAPENTIN 300 MILLIGRAM(S): 400 CAPSULE ORAL at 06:01

## 2024-01-04 RX ADMIN — Medication 1 MILLIGRAM(S): at 11:30

## 2024-01-04 RX ADMIN — CEFEPIME 100 MILLIGRAM(S): 1 INJECTION, POWDER, FOR SOLUTION INTRAMUSCULAR; INTRAVENOUS at 17:17

## 2024-01-04 RX ADMIN — PANTOPRAZOLE SODIUM 40 MILLIGRAM(S): 20 TABLET, DELAYED RELEASE ORAL at 06:07

## 2024-01-04 RX ADMIN — MORPHINE SULFATE 2 MILLIGRAM(S): 50 CAPSULE, EXTENDED RELEASE ORAL at 17:19

## 2024-01-04 RX ADMIN — CEFEPIME 100 MILLIGRAM(S): 1 INJECTION, POWDER, FOR SOLUTION INTRAMUSCULAR; INTRAVENOUS at 06:02

## 2024-01-04 RX ADMIN — ESCITALOPRAM OXALATE 20 MILLIGRAM(S): 10 TABLET, FILM COATED ORAL at 11:31

## 2024-01-04 RX ADMIN — Medication 50 MICROGRAM(S): at 06:01

## 2024-01-04 RX ADMIN — HYDROXYUREA 500 MILLIGRAM(S): 500 CAPSULE ORAL at 06:01

## 2024-01-04 RX ADMIN — GABAPENTIN 300 MILLIGRAM(S): 400 CAPSULE ORAL at 14:47

## 2024-01-04 RX ADMIN — AMLODIPINE BESYLATE 5 MILLIGRAM(S): 2.5 TABLET ORAL at 06:02

## 2024-01-04 NOTE — PHYSICAL THERAPY INITIAL EVALUATION ADULT - ACTIVE RANGE OF MOTION EXAMINATION, REHAB EVAL
LLE not tested due to contractures and pain in LLE; R knee in ext unable to flex, R ankle unable to mobilize, R shoulder AAROM to 40 degrees, unable to mobilize fingers due to contractures/Right UE Active ROM was WFL (within functional limits)/deficits as listed below

## 2024-01-04 NOTE — PROGRESS NOTE ADULT - SUBJECTIVE AND OBJECTIVE BOX
CHIEF COMPLAINT/ REASON FOR VISIT  .. Patient was seen to address the  issue listed under PROBLEM LIST which is located toward bottom of this note     REJI BERGER    [facility] [address]    Allergies    per son &quot;issues with certain anitibiotics&quot; does not remember the names - Patient has tolerated zosyn, ceftriaxone, bactrim, and vancomycin on past admissions. (Unknown)    Intolerances        PAST MEDICAL & SURGICAL HISTORY:  Hypertension      CVA (cerebral vascular accident)      Depression      Constipation      Neuropathy      Hyperlipidemia      Grade I diastolic dysfunction      Afib      Neuropathy      VHD (valvular heart disease)      Aortic valvar stenosis      Hypothyroidism      GERD (gastroesophageal reflux disease)      H/O skin graft          FAMILY HISTORY:      Home Medications:  acetaminophen 500 mg oral tablet: 1 tab(s) orally 2 times a day (22 Nov 2023 20:06)  acetaminophen 500 mg oral tablet: 2 tab(s) orally once a day (in the afternoon) (22 Nov 2023 20:06)  amLODIPine 5 mg oral tablet: 1 tab(s) orally once a day (22 Nov 2023 20:06)  Biofreeze 4% topical gel: Apply topically to affected area 2 times a day (22 Nov 2023 20:06)  cholecalciferol 50 mcg (2000 intl units) oral tablet: 1 tab(s) orally once a day (22 Nov 2023 20:06)  escitalopram 20 mg oral tablet: 1 tab(s) orally once a day (22 Nov 2023 20:06)  famotidine 20 mg oral tablet: 1 tab(s) orally once a day (22 Nov 2023 20:06)  folic acid 1 mg oral tablet: 1 tab(s) orally once a day (22 Nov 2023 20:06)  furosemide 20 mg oral tablet: 1 tab(s) orally once a day (22 Nov 2023 20:06)  gabapentin 300 mg oral capsule: 1 cap(s) orally 3 times a day (22 Nov 2023 20:06)  hydroxyurea 500 mg oral capsule: 1 tab(s) orally 2 times a day (22 Nov 2023 20:06)  levothyroxine 50 mcg (0.05 mg) oral tablet: 1 tab(s) orally once a day (22 Nov 2023 20:06)  melatonin 3 mg oral tablet: 1 tab(s) orally once a day (22 Nov 2023 20:06)  metoprolol tartrate 25 mg oral tablet: 1 tab(s) orally 2 times a day (22 Nov 2023 20:06)  miconazole 2% topical cream: Apply topically to affected area once a day (22 Nov 2023 20:06)  senna (sennosides) 8.6 mg oral tablet: 2 tab(s) orally once a day (22 Nov 2023 20:06)  simvastatin 10 mg oral tablet: 1 tab(s) orally once a day (22 Nov 2023 20:06)  traMADol 50 mg oral tablet: 1 tab(s) orally 2 times a day hold for lethargy (22 Nov 2023 20:06)  traMADol 50 mg oral tablet: 1 tab(s) orally every 6 hours as needed for pain (22 Nov 2023 20:06)      MEDICATIONS  (STANDING):  amLODIPine   Tablet 5 milliGRAM(s) Oral daily  cefepime   IVPB 2000 milliGRAM(s) IV Intermittent every 12 hours  cholecalciferol 2000 Unit(s) Oral daily  dextrose 5% + sodium chloride 0.45%. 1000 milliLiter(s) (40 mL/Hr) IV Continuous <Continuous>  escitalopram 20 milliGRAM(s) Oral daily  folic acid 1 milliGRAM(s) Oral daily  gabapentin 300 milliGRAM(s) Oral three times a day  hydroxyurea 500 milliGRAM(s) Oral two times a day  lactobacillus acidophilus 1 Tablet(s) Oral every 12 hours  levothyroxine 50 MICROGram(s) Oral daily  metoprolol tartrate 25 milliGRAM(s) Oral two times a day  pantoprazole    Tablet 40 milliGRAM(s) Oral before breakfast  polyethylene glycol 3350 17 Gram(s) Oral daily  senna 2 Tablet(s) Oral at bedtime  simvastatin 10 milliGRAM(s) Oral at bedtime    MEDICATIONS  (PRN):  acetaminophen     Tablet .. 650 milliGRAM(s) Oral every 6 hours PRN Temp greater or equal to 38C (100.4F), Mild Pain (1 - 3)  aluminum hydroxide/magnesium hydroxide/simethicone Suspension 30 milliLiter(s) Oral every 4 hours PRN Dyspepsia  bisacodyl Suppository 10 milliGRAM(s) Rectal daily PRN Constipation  magnesium hydroxide Suspension 30 milliLiter(s) Oral daily PRN Constipation  melatonin 3 milliGRAM(s) Oral at bedtime PRN Insomnia  morphine  - Injectable 2 milliGRAM(s) IV Push every 6 hours PRN Severe Pain (7 - 10)  ondansetron Injectable 4 milliGRAM(s) IV Push every 8 hours PRN Nausea and/or Vomiting  traMADol 50 milliGRAM(s) Oral every 6 hours PRN Moderate Pain (4 - 6)      Diet, DASH/TLC:   Sodium & Cholesterol Restricted  Easy to Chew (EASYTOCHEW) (01-03-24 @ 18:42) [Active]          Vital Signs Last 24 Hrs  T(C): 36.6 (04 Jan 2024 05:09), Max: 36.9 (03 Jan 2024 21:24)  T(F): 97.8 (04 Jan 2024 05:09), Max: 98.5 (03 Jan 2024 21:24)  HR: 100 (04 Jan 2024 05:09) (64 - 100)  BP: 133/70 (04 Jan 2024 05:09) (100/48 - 133/70)  BP(mean): --  RR: 18 (04 Jan 2024 05:09) (17 - 18)  SpO2: 97% (04 Jan 2024 05:09) (90% - 98%)    Parameters below as of 04 Jan 2024 05:09  Patient On (Oxygen Delivery Method): room air          01-03-24 @ 07:01  -  01-04-24 @ 07:00  --------------------------------------------------------  IN: 0 mL / OUT: 400 mL / NET: -400 mL              LABS:                        10.7   18.43 )-----------( 733      ( 04 Jan 2024 07:14 )             34.9     01-04    139  |  109<H>  |  16  ----------------------------<  146<H>  3.7   |  26  |  0.58    Ca    8.1<L>      04 Jan 2024 07:14    TPro  6.3  /  Alb  2.3<L>  /  TBili  0.5  /  DBili  x   /  AST  17  /  ALT  9<L>  /  AlkPhos  85  01-04    PT/INR - ( 04 Jan 2024 07:14 )   PT: 51.0 sec;   INR: 4.56 ratio           Urinalysis Basic - ( 04 Jan 2024 07:14 )    Color: x / Appearance: x / SG: x / pH: x  Gluc: 146 mg/dL / Ketone: x  / Bili: x / Urobili: x   Blood: x / Protein: x / Nitrite: x   Leuk Esterase: x / RBC: x / WBC x   Sq Epi: x / Non Sq Epi: x / Bacteria: x            WBC:  WBC Count: 18.43 K/uL (01-04 @ 07:14)  WBC Count: 13.02 K/uL (01-03 @ 14:10)      MICROBIOLOGY:  RECENT CULTURES:  01-03 .Abscess Leg - Left XXXX   No polymorphonuclear cells seen per low power field  Numerous Gram positive cocci in pairs seen per oil power field  Numerous Gram Negative Rods seen per oil power field XXXX                PT/INR - ( 04 Jan 2024 07:14 )   PT: 51.0 sec;   INR: 4.56 ratio             Sodium:  Sodium: 139 mmol/L (01-04 @ 07:14)  Sodium: 141 mmol/L (01-03 @ 14:10)      0.58 mg/dL 01-04 @ 07:14  0.72 mg/dL 01-03 @ 14:10      Hemoglobin:  Hemoglobin: 10.7 g/dL (01-04 @ 07:14)  Hemoglobin: 10.9 g/dL (01-03 @ 14:10)      Platelets: Platelet Count - Automated: 733 K/uL (01-04 @ 07:14)  Platelet Count - Automated: 793 K/uL (01-03 @ 14:10)      LIVER FUNCTIONS - ( 04 Jan 2024 07:14 )  Alb: 2.3 g/dL / Pro: 6.3 g/dL / ALK PHOS: 85 U/L / ALT: 9 U/L / AST: 17 U/L / GGT: x             Urinalysis Basic - ( 04 Jan 2024 07:14 )    Color: x / Appearance: x / SG: x / pH: x  Gluc: 146 mg/dL / Ketone: x  / Bili: x / Urobili: x   Blood: x / Protein: x / Nitrite: x   Leuk Esterase: x / RBC: x / WBC x   Sq Epi: x / Non Sq Epi: x / Bacteria: x        RADIOLOGY & ADDITIONAL STUDIES:      MICROBIOLOGY:  RECENT CULTURES:  01-03 .Abscess Leg - Left XXXX   No polymorphonuclear cells seen per low power field  Numerous Gram positive cocci in pairs seen per oil power field  Numerous Gram Negative Rods seen per oil power field XXXX

## 2024-01-04 NOTE — CARE COORDINATION ASSESSMENT. - LIVING ARRANGEMENTS, PROFILE
lives at [facility]/assisted living lives at [facility] long term/assisted living

## 2024-01-04 NOTE — PHYSICAL THERAPY INITIAL EVALUATION ADULT - PERTINENT HX OF CURRENT PROBLEM, REHAB EVAL
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 Patient was hospitalized in 2023 with altered mental status concern for UTI, also cellulitis to the LLE at the site of a skin graft. Pt recently completed a course of po abx for this with no significant improvement. BIB EMS with wounds to left lower leg for unknown period of time, now weeping and draining, pt c/o pain but pt is poor historian She was admitted in November 2023 with LLE cellulitis and seen by wound care MD & ID -discharged on po doxycycline and cipro & with topical care. Admitted for septic workup, iv abx ,pain management and wound car. 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 Patient was hospitalized in 2023 with altered mental status concern for UTI, also cellulitis to the LLE at the site of a skin graft. Pt recently completed a course of po abx for this with no significant improvement. BIB EMS with wounds to left lower leg for unknown period of time, now weeping and draining, pt c/o pain but pt is poor historian She was admitted in November 2023 with LLE cellulitis and seen by wound care MD & ID -discharged on po doxycycline and cipro & with topical care. Admitted for septic workup, iv abx ,pain management and wound car.

## 2024-01-04 NOTE — PROGRESS NOTE ADULT - ASSESSMENT
REASON FOR VISIT  .. Management of problems listed below        REVIEW OF SYMPTOMS   Able to give ROS  Yes     RELIABILITY +/-   CONSTITUTIONAL Weakness Yes    ENDOCRINE  No heat or cold intolerance    ALLERGY No hives  Sore throat No stridor  RESP Shortness of breath YES   NEURO New weakness No   CARDIAC   Palpitations No         PHYSICAL EXAM    HEENT Unremarkable  atraumatic   RESP Fair air entry  Harsh breath sound   CARDIAC S1 S2 No S3     NO JVD    ABDOMEN No hepatosplenomegaly   PEDAL EDEMA present No calf tenderness  NO rash       GENERAL DATA .   GOC.   .. 1/4/2024 dnr   ..  1/3/2024 FULL CODE  ICU STAY.  .. NO  COVID. ..       BEST PRACTICE ISSUES.    HOB ELEVATN.  .. Yes  DVT PPLX.  ..   ..        DIET.   ..  1/3/2024 DASH   IV fl. .. 1/3/2024 d5 1/2 40    STRESS ULCER PPLX.   .  ..  1/3/2024 PROTONIX 40      ALLGY. .. certain antibiotics       WT. ..  1/3/2024 49  BMI. .. 1/3/2024 27     ABGS.   .  VS/ PO/IO/ VENT/ DRIPS.   1/4/2024 afeb 100 130/70   1/4/2024 ra 96%     PRESENTATION.  . CC 1/3/2024.From [facility] for LLE wound infection and pain   . HPI 1/3/2024.  . 89yo female bib ems with wounds to left lower leg for unknown period of time, now weeping and draining, pt c/o pain but pt is poor historian  . 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 Pulm consulted 1/3/2024 for abn cxr   . PMHx .   .. DVT   .. A fib on coumadin   .. Valvular heart disease   .. HFPEF   .... 6/28/2023 echo pasp 39 la sl dilated n lvsf  .. Aortic stenosis   .. CVA r sided weakness   .. Cellulitis  .... VANCO 11/22/2023 -> CIPRO 11/29 DOXY 11/29     . HOME MEDS .warfarin levoxyl 50 pepcid metoprolol 25.2 gabapentin 300.3 lasix 20 escitalopram 20     PROBLEM DATA.  INFECTION  CELLULITIS L LOWER EXTREMITY  .. w 1/3-1/4/2024 w 13 - 18  .. pr 1/4/2024 pr .18   .. la 1/3/2024 la 2.4   .. cxr 1/3/2024 cxr improving l base infiltr improvd from 11/30/2023   .. 1/3/2024 CEFEPIME 2.2 X 7D     . A fib  .. INR 1/4/2024 INR 4.5     HEMAT  .. Hb 1/3-1/4/2024 Hb 10.9 - 10.7     RENAL   .. Na 1/3/2024 Na 141   .. CO2 1/3/2024 CO2 25   .. Cr 1/3/2024 Cr .7     . HYPOTHYROID  .. 1/3/2024 LEVOXYL 50   . .    . SUMMARY.     .  89yo female from [facility] for worsening LLE wound   . 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 Pulm consulted 1/3/2024 for abn cxr   . OVERALL ASSESSMENT/PLAN.   . CELLULITIS L LO EXTR   .. 1/3/2024 Seen by ID   .. 1/3/2024 Started on cefepime   .. No pneumonia suspected   . A fib  .. Continue coumadin     . DISPO.  .. Follow cultures adjusr antibio     TIME SPENT.  . Over 36 minutes aggregate care time spent on encounter; activities included   direct patient care, counseling and/or coordinating care reviewing notes, lab data/ imaging , discussion with multidisciplinary team/ patient  /family and explaining in detail risks, benefits, alternatives  of the recommendations     PATIENT.  . AB BROOKS      REASON FOR VISIT  .. Management of problems listed below        REVIEW OF SYMPTOMS   Able to give ROS  Yes     RELIABILITY +/-   CONSTITUTIONAL Weakness Yes    ENDOCRINE  No heat or cold intolerance    ALLERGY No hives  Sore throat No stridor  RESP Shortness of breath YES   NEURO New weakness No   CARDIAC   Palpitations No         PHYSICAL EXAM    HEENT Unremarkable  atraumatic   RESP Fair air entry  Harsh breath sound   CARDIAC S1 S2 No S3     NO JVD    ABDOMEN No hepatosplenomegaly   PEDAL EDEMA present No calf tenderness  NO rash       GENERAL DATA .   GOC.   .. 1/4/2024 dnr   ..  1/3/2024 FULL CODE  ICU STAY.  .. NO  COVID. ..       BEST PRACTICE ISSUES.    HOB ELEVATN.  .. Yes  DVT PPLX.  ..   ..        DIET.   ..  1/3/2024 DASH   IV fl. .. 1/3/2024 d5 1/2 40    STRESS ULCER PPLX.   .  ..  1/3/2024 PROTONIX 40      ALLGY. .. certain antibiotics       WT. ..  1/3/2024 49  BMI. .. 1/3/2024 27     ABGS.   .  VS/ PO/IO/ VENT/ DRIPS.   1/4/2024 afeb 100 130/70   1/4/2024 ra 96%     PRESENTATION.  . CC 1/3/2024.From [facility] for LLE wound infection and pain   . HPI 1/3/2024.  . 89yo female bib ems with wounds to left lower leg for unknown period of time, now weeping and draining, pt c/o pain but pt is poor historian  . 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 Pulm consulted 1/3/2024 for abn cxr   . PMHx .   .. DVT   .. A fib on coumadin   .. Valvular heart disease   .. HFPEF   .... 6/28/2023 echo pasp 39 la sl dilated n lvsf  .. Aortic stenosis   .. CVA r sided weakness   .. Cellulitis  .... VANCO 11/22/2023 -> CIPRO 11/29 DOXY 11/29     . HOME MEDS .warfarin levoxyl 50 pepcid metoprolol 25.2 gabapentin 300.3 lasix 20 escitalopram 20     PROBLEM DATA.  INFECTION  CELLULITIS L LOWER EXTREMITY  .. w 1/3-1/4/2024 w 13 - 18  .. pr 1/4/2024 pr .18   .. la 1/3/2024 la 2.4   .. cxr 1/3/2024 cxr improving l base infiltr improvd from 11/30/2023   .. 1/3/2024 CEFEPIME 2.2 X 7D     . A fib  .. INR 1/4/2024 INR 4.5     HEMAT  .. Hb 1/3-1/4/2024 Hb 10.9 - 10.7     RENAL   .. Na 1/3/2024 Na 141   .. CO2 1/3/2024 CO2 25   .. Cr 1/3/2024 Cr .7     . HYPOTHYROID  .. 1/3/2024 LEVOXYL 50   . .    . SUMMARY.     .  89yo female from [facility] for worsening LLE wound   . 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 Pulm consulted 1/3/2024 for abn cxr   . OVERALL ASSESSMENT/PLAN.   . CELLULITIS L LO EXTR   .. 1/3/2024 Seen by ID   .. 1/3/2024 Started on cefepime   .. No pneumonia suspected   . A fib  .. Continue coumadin     . DISPO.  .. Follow cultures adjusr antibio     TIME SPENT.  . Over 36 minutes aggregate care time spent on encounter; activities included   direct patient care, counseling and/or coordinating care reviewing notes, lab data/ imaging , discussion with multidisciplinary team/ patient  /family and explaining in detail risks, benefits, alternatives  of the recommendations     PATIENT.  . AB BROOKS

## 2024-01-04 NOTE — CONSULT NOTE ADULT - CONVERSATION DETAILS
Discussed with son Curtis, reviewed advanced directives  son agreed DNR/DNI is appropriate based on patients wishes.  discussed completion of MOLST form to reflect this, son verbalized understanding  MOLST completed on this date for DNR/DNI status.    Plan for ongoing medical management otherwise and treatment of cellulitis per primary team

## 2024-01-04 NOTE — PROVIDER CONTACT NOTE (CRITICAL VALUE NOTIFICATION) - TEST AND RESULT REPORTED:
abscess culture collected 1/3. No polymorphonuclear cells seen per low power field   numerous gram positive cocci in pairs see pe oil power field . numerous gram negative rods seen per oil power field
LACTATE 2.4

## 2024-01-04 NOTE — PATIENT CHOICE NOTE. - NSPTCHOICESTATE_GEN_ALL_CORE

## 2024-01-04 NOTE — DIETITIAN INITIAL EVALUATION ADULT - ADD RECOMMEND
1) Continue current diet as ordered; assistance/encouragement at meal times as needed   2) Recommend MVI daily  3) Monitor po intake, diet tolerance, weight trends, labs, GI function, skin integrity

## 2024-01-04 NOTE — DIETITIAN INITIAL EVALUATION ADULT - PERTINENT MEDS FT
MEDICATIONS  (STANDING):  amLODIPine   Tablet 5 milliGRAM(s) Oral daily  cefepime   IVPB 2000 milliGRAM(s) IV Intermittent every 12 hours  cholecalciferol 2000 Unit(s) Oral daily  dextrose 5% + sodium chloride 0.45%. 1000 milliLiter(s) (40 mL/Hr) IV Continuous <Continuous>  escitalopram 20 milliGRAM(s) Oral daily  folic acid 1 milliGRAM(s) Oral daily  gabapentin 300 milliGRAM(s) Oral three times a day  hydroxyurea 500 milliGRAM(s) Oral two times a day  lactobacillus acidophilus 1 Tablet(s) Oral every 12 hours  levothyroxine 50 MICROGram(s) Oral daily  metoprolol tartrate 25 milliGRAM(s) Oral two times a day  pantoprazole    Tablet 40 milliGRAM(s) Oral before breakfast  polyethylene glycol 3350 17 Gram(s) Oral daily  senna 2 Tablet(s) Oral at bedtime  simvastatin 10 milliGRAM(s) Oral at bedtime    MEDICATIONS  (PRN):  acetaminophen     Tablet .. 650 milliGRAM(s) Oral every 6 hours PRN Temp greater or equal to 38C (100.4F), Mild Pain (1 - 3)  aluminum hydroxide/magnesium hydroxide/simethicone Suspension 30 milliLiter(s) Oral every 4 hours PRN Dyspepsia  bisacodyl Suppository 10 milliGRAM(s) Rectal daily PRN Constipation  magnesium hydroxide Suspension 30 milliLiter(s) Oral daily PRN Constipation  melatonin 3 milliGRAM(s) Oral at bedtime PRN Insomnia  morphine  - Injectable 2 milliGRAM(s) IV Push every 6 hours PRN Severe Pain (7 - 10)  ondansetron Injectable 4 milliGRAM(s) IV Push every 8 hours PRN Nausea and/or Vomiting  traMADol 50 milliGRAM(s) Oral every 6 hours PRN Moderate Pain (4 - 6)

## 2024-01-04 NOTE — CARE COORDINATION ASSESSMENT. - OTHER PERTINENT DISCHARGE PLANNING INFORMATION:
I spoke with patient's son Curtis over telephone @[phone] and Grecia in wellness @ [facility]. patient is oriented x2-3 at baseline, contracted and receives VN with [facility] for wound care 3x/week. Patient's son is requesting HCS to be changed to different agency as he is not happy with [facility]. Grecia at [facility] requesting [facility] and son agrees with plan. [facility] needs short form if stay < 3days and if >3 days then needs 3122 faxed to [phone]. New and changed meds sent to [facility] in [place]. Plan for BELÉN to return to [facility]. CM will continue to follow. HC referral initiated-awaiting wound care orders prior to DC.  I spoke with patient's son Curtis over telephone @[phone] and Grecia in wellness @ [facility]. patient is oriented x2-3 at baseline, contracted and receives VN with [facility] for wound care 3x/week. Patient's son is requesting HCS to be changed to different agency as he is not happy with [facility]. Grecia at [facility] requesting [facility] and son agrees with plan. [facility] needs short form if stay < 3days and if >3 days then needs [phone] faxed to [phone]. New and changed meds sent to [facility] in [place]. Plan for BELÉN to return to [facility]. CM will continue to follow. HC referral initiated-awaiting wound care orders prior to DC.

## 2024-01-04 NOTE — CONSULT NOTE ADULT - PROBLEM SELECTOR RECOMMENDATION 4
thank you for including us in the care of this patient  please contact our team if we can be of further assistance    Emily Turner MD, [facility]; Palliative Care Attending, Ethicist. [phone] thank you for including us in the care of this patient  please contact our team if we can be of further assistance    Emily Turner MD, [facility]; Palliative Care Attending, Ethicist. [phone] thank you for including us in the care of this patient  Based on chart review and evaluation, patient is not a hospice candidate at present time  please contact our team if we can be of further assistance    Emily Turner MD, [facility]; Palliative Care Attending, Ethicist. [phone] thank you for including us in the care of this patient  Based on chart review and evaluation, patient is not a hospice candidate at present time  please contact our team if we can be of further assistance    Emily Turner MD, [facility]; Palliative Care Attending, Ethicist. [phone]

## 2024-01-04 NOTE — DIETITIAN NUTRITION RISK NOTIFICATION - TREATMENT: THE FOLLOWING DIET HAS BEEN RECOMMENDED
Diet, DASH/TLC:   Sodium & Cholesterol Restricted  Easy to Chew (EASYTOCHEW) (01-03-24 @ 18:42) [Active]

## 2024-01-04 NOTE — PATIENT CHOICE NOTE. - NSPTCHOICENOTES_GEN_ALL_CORE
Transition information and choice lists provided Transition information and choice lists provided. Chose [facility] Transition information and choice lists provided. Chose [facility]

## 2024-01-04 NOTE — CARE COORDINATION ASSESSMENT. - NS SW HOMECARE DISCIPLINE
Patient has TLC HC at [facility] but pt's son and nursing home are requesting to change HCS to [facility] as son is not happy with nurse wound visits./skilled nursing Patient has TLC HC at [facility] but pt's son and [facility] are requesting to change HCS to [facility] as son is not happy with nurse wound visits./skilled nursing

## 2024-01-04 NOTE — PHYSICAL THERAPY INITIAL EVALUATION ADULT - IMPAIRMENTS CONTRIBUTING IMPAIRED BED MOBILITY, REHAB EVAL
ataxic/impaired balance/cognition/impaired coordination/impaired motor control/pain/decreased ROM/decreased strength

## 2024-01-04 NOTE — DIETITIAN INITIAL EVALUATION ADULT - ORAL INTAKE PTA/DIET HISTORY
Pt admitted from intermediate. Reviewed transfer documents, pt was on a regular, mechanical soft diet PTA.  Pt admitted from correction. Reviewed transfer documents, pt was on a regular, mechanical soft diet PTA.

## 2024-01-04 NOTE — PROGRESS NOTE ADULT - SUBJECTIVE AND OBJECTIVE BOX
Interval History:    CENTRAL LINE:   [  ] YES       [  ] NO  MUIR:                 [  ] YES       [  ] NO         REVIEW OF SYSTEMS:  All Systems below were reviewed and are negative [  ]  HEENT:  ID:  Pulmonary:  Cardiac:  GI:  Renal:  Musculoskeletal:  All other systems above were reviewed and are negative   [  ]      MEDICATIONS  (STANDING):  amLODIPine   Tablet 5 milliGRAM(s) Oral daily  cefepime   IVPB 2000 milliGRAM(s) IV Intermittent every 12 hours  cholecalciferol 2000 Unit(s) Oral daily  dextrose 5% + sodium chloride 0.45%. 1000 milliLiter(s) (40 mL/Hr) IV Continuous <Continuous>  escitalopram 20 milliGRAM(s) Oral daily  folic acid 1 milliGRAM(s) Oral daily  gabapentin 300 milliGRAM(s) Oral three times a day  hydroxyurea 500 milliGRAM(s) Oral two times a day  lactobacillus acidophilus 1 Tablet(s) Oral every 12 hours  levothyroxine 50 MICROGram(s) Oral daily  metoprolol tartrate 25 milliGRAM(s) Oral two times a day  pantoprazole    Tablet 40 milliGRAM(s) Oral before breakfast  polyethylene glycol 3350 17 Gram(s) Oral daily  senna 2 Tablet(s) Oral at bedtime  simvastatin 10 milliGRAM(s) Oral at bedtime    MEDICATIONS  (PRN):  acetaminophen     Tablet .. 650 milliGRAM(s) Oral every 6 hours PRN Temp greater or equal to 38C (100.4F), Mild Pain (1 - 3)  aluminum hydroxide/magnesium hydroxide/simethicone Suspension 30 milliLiter(s) Oral every 4 hours PRN Dyspepsia  bisacodyl Suppository 10 milliGRAM(s) Rectal daily PRN Constipation  magnesium hydroxide Suspension 30 milliLiter(s) Oral daily PRN Constipation  melatonin 3 milliGRAM(s) Oral at bedtime PRN Insomnia  morphine  - Injectable 2 milliGRAM(s) IV Push every 6 hours PRN Severe Pain (7 - 10)  ondansetron Injectable 4 milliGRAM(s) IV Push every 8 hours PRN Nausea and/or Vomiting  traMADol 50 milliGRAM(s) Oral every 6 hours PRN Moderate Pain (4 - 6)      Vital Signs Last 24 Hrs  T(C): 37 (04 Jan 2024 12:21), Max: 37 (04 Jan 2024 12:21)  T(F): 98.6 (04 Jan 2024 12:21), Max: 98.6 (04 Jan 2024 12:21)  HR: 105 (04 Jan 2024 17:22) (64 - 105)  BP: 134/62 (04 Jan 2024 17:22) (118/69 - 134/85)  BP(mean): --  RR: 18 (04 Jan 2024 12:21) (17 - 18)  SpO2: 92% (04 Jan 2024 12:21) (90% - 97%)    Parameters below as of 04 Jan 2024 12:21  Patient On (Oxygen Delivery Method): room air        I&O's Summary    03 Jan 2024 07:01  -  04 Jan 2024 07:00  --------------------------------------------------------  IN: 0 mL / OUT: 400 mL / NET: -400 mL        PHYSICAL EXAM:  HEENT: NC/AT; PERRLA  Neck: Soft; no tenderness  Lungs: CTA bilaterally; no wheezing.   Heart:  Abdomen:  Genital/ Rectal:  Extremities:  Neurologic:  Vascular:      LABORATORY:    CBC Full  -  ( 04 Jan 2024 07:14 )  WBC Count : 18.43 K/uL  RBC Count : 3.13 M/uL  Hemoglobin : 10.7 g/dL  Hematocrit : 34.9 %  Platelet Count - Automated : 733 K/uL  Mean Cell Volume : 111.5 fl  Mean Cell Hemoglobin : 34.2 pg  Mean Cell Hemoglobin Concentration : 30.7 gm/dL  Auto Neutrophil # : 15.80 K/uL  Auto Lymphocyte # : 0.25 K/uL  Auto Monocyte # : 0.68 K/uL  Auto Eosinophil # : 0.08 K/uL  Auto Basophil # : 0.10 K/uL  Auto Neutrophil % : 85.8 %  Auto Lymphocyte % : 1.4 %  Auto Monocyte % : 3.7 %  Auto Eosinophil % : 0.4 %  Auto Basophil % : 0.5 %      ESR:                   01-04 @ 07:14  --    C-Reactive Protein:     01-04 @ 07:14  --    Procalcitonin:           01-04 @ 07:14   0.18      01-04    139  |  109<H>  |  16  ----------------------------<  146<H>  3.7   |  26  |  0.58    Ca    8.1<L>      04 Jan 2024 07:14    TPro  6.3  /  Alb  2.3<L>  /  TBili  0.5  /  DBili  x   /  AST  17  /  ALT  9<L>  /  AlkPhos  85  01-04          Assessment and Plan:          Sushil Anguiano MD   [phone].      Interval History:    CENTRAL LINE:   [  ] YES       [  ] NO  MUIR:                 [  ] YES       [  ] NO         REVIEW OF SYSTEMS:  All Systems below were reviewed and are negative [  ]  HEENT:  ID:  Pulmonary:  Cardiac:  GI:  Renal:  Musculoskeletal:  All other systems above were reviewed and are negative   [  ]      MEDICATIONS  (STANDING):  amLODIPine   Tablet 5 milliGRAM(s) Oral daily  cefepime   IVPB 2000 milliGRAM(s) IV Intermittent every 12 hours  cholecalciferol 2000 Unit(s) Oral daily  dextrose 5% + sodium chloride 0.45%. 1000 milliLiter(s) (40 mL/Hr) IV Continuous <Continuous>  escitalopram 20 milliGRAM(s) Oral daily  folic acid 1 milliGRAM(s) Oral daily  gabapentin 300 milliGRAM(s) Oral three times a day  hydroxyurea 500 milliGRAM(s) Oral two times a day  lactobacillus acidophilus 1 Tablet(s) Oral every 12 hours  levothyroxine 50 MICROGram(s) Oral daily  metoprolol tartrate 25 milliGRAM(s) Oral two times a day  pantoprazole    Tablet 40 milliGRAM(s) Oral before breakfast  polyethylene glycol 3350 17 Gram(s) Oral daily  senna 2 Tablet(s) Oral at bedtime  simvastatin 10 milliGRAM(s) Oral at bedtime    MEDICATIONS  (PRN):  acetaminophen     Tablet .. 650 milliGRAM(s) Oral every 6 hours PRN Temp greater or equal to 38C (100.4F), Mild Pain (1 - 3)  aluminum hydroxide/magnesium hydroxide/simethicone Suspension 30 milliLiter(s) Oral every 4 hours PRN Dyspepsia  bisacodyl Suppository 10 milliGRAM(s) Rectal daily PRN Constipation  magnesium hydroxide Suspension 30 milliLiter(s) Oral daily PRN Constipation  melatonin 3 milliGRAM(s) Oral at bedtime PRN Insomnia  morphine  - Injectable 2 milliGRAM(s) IV Push every 6 hours PRN Severe Pain (7 - 10)  ondansetron Injectable 4 milliGRAM(s) IV Push every 8 hours PRN Nausea and/or Vomiting  traMADol 50 milliGRAM(s) Oral every 6 hours PRN Moderate Pain (4 - 6)      Vital Signs Last 24 Hrs  T(C): 37 (04 Jan 2024 12:21), Max: 37 (04 Jan 2024 12:21)  T(F): 98.6 (04 Jan 2024 12:21), Max: 98.6 (04 Jan 2024 12:21)  HR: 105 (04 Jan 2024 17:22) (64 - 105)  BP: 134/62 (04 Jan 2024 17:22) (118/69 - 134/85)  BP(mean): --  RR: 18 (04 Jan 2024 12:21) (17 - 18)  SpO2: 92% (04 Jan 2024 12:21) (90% - 97%)    Parameters below as of 04 Jan 2024 12:21  Patient On (Oxygen Delivery Method): room air        I&O's Summary    03 Jan 2024 07:01  -  04 Jan 2024 07:00  --------------------------------------------------------  IN: 0 mL / OUT: 400 mL / NET: -400 mL        PHYSICAL EXAM:  HEENT: NC/AT; PERRLA  Neck: Soft; no tenderness  Lungs: CTA bilaterally; no wheezing.   Heart:  Abdomen:  Genital/ Rectal:  Extremities:  Neurologic:  Vascular:      LABORATORY:    CBC Full  -  ( 04 Jan 2024 07:14 )  WBC Count : 18.43 K/uL  RBC Count : 3.13 M/uL  Hemoglobin : 10.7 g/dL  Hematocrit : 34.9 %  Platelet Count - Automated : 733 K/uL  Mean Cell Volume : 111.5 fl  Mean Cell Hemoglobin : 34.2 pg  Mean Cell Hemoglobin Concentration : 30.7 gm/dL  Auto Neutrophil # : 15.80 K/uL  Auto Lymphocyte # : 0.25 K/uL  Auto Monocyte # : 0.68 K/uL  Auto Eosinophil # : 0.08 K/uL  Auto Basophil # : 0.10 K/uL  Auto Neutrophil % : 85.8 %  Auto Lymphocyte % : 1.4 %  Auto Monocyte % : 3.7 %  Auto Eosinophil % : 0.4 %  Auto Basophil % : 0.5 %      ESR:                   01-04 @ 07:14  --    C-Reactive Protein:     01-04 @ 07:14  --    Procalcitonin:           01-04 @ 07:14   0.18      01-04    139  |  109<H>  |  16  ----------------------------<  146<H>  3.7   |  26  |  0.58    Ca    8.1<L>      04 Jan 2024 07:14    TPro  6.3  /  Alb  2.3<L>  /  TBili  0.5  /  DBili  x   /  AST  17  /  ALT  9<L>  /  AlkPhos  85  01-04          Assessment and Plan:          Sushil Anguiano MD   [phone].    She is afebrile  No new events    MEDICATIONS  (STANDING):  amLODIPine   Tablet 5 milliGRAM(s) Oral daily  cefepime   IVPB 2000 milliGRAM(s) IV Intermittent every 12 hours  cholecalciferol 2000 Unit(s) Oral daily  dextrose 5% + sodium chloride 0.45%. 1000 milliLiter(s) (40 mL/Hr) IV Continuous <Continuous>  escitalopram 20 milliGRAM(s) Oral daily  folic acid 1 milliGRAM(s) Oral daily  gabapentin 300 milliGRAM(s) Oral three times a day  hydroxyurea 500 milliGRAM(s) Oral two times a day  lactobacillus acidophilus 1 Tablet(s) Oral every 12 hours  levothyroxine 50 MICROGram(s) Oral daily  metoprolol tartrate 25 milliGRAM(s) Oral two times a day  pantoprazole    Tablet 40 milliGRAM(s) Oral before breakfast  polyethylene glycol 3350 17 Gram(s) Oral daily  senna 2 Tablet(s) Oral at bedtime  simvastatin 10 milliGRAM(s) Oral at bedtime    MEDICATIONS  (PRN):  acetaminophen     Tablet .. 650 milliGRAM(s) Oral every 6 hours PRN Temp greater or equal to 38C (100.4F), Mild Pain (1 - 3)  aluminum hydroxide/magnesium hydroxide/simethicone Suspension 30 milliLiter(s) Oral every 4 hours PRN Dyspepsia  bisacodyl Suppository 10 milliGRAM(s) Rectal daily PRN Constipation  magnesium hydroxide Suspension 30 milliLiter(s) Oral daily PRN Constipation  melatonin 3 milliGRAM(s) Oral at bedtime PRN Insomnia  morphine  - Injectable 2 milliGRAM(s) IV Push every 6 hours PRN Severe Pain (7 - 10)  ondansetron Injectable 4 milliGRAM(s) IV Push every 8 hours PRN Nausea and/or Vomiting  traMADol 50 milliGRAM(s) Oral every 6 hours PRN Moderate Pain (4 - 6)      Vital Signs Last 24 Hrs  T(C): 37 (04 Jan 2024 12:21), Max: 37 (04 Jan 2024 12:21)  T(F): 98.6 (04 Jan 2024 12:21), Max: 98.6 (04 Jan 2024 12:21)  HR: 105 (04 Jan 2024 17:22) (64 - 105)  BP: 134/62 (04 Jan 2024 17:22) (118/69 - 134/85)  BP(mean): --  RR: 18 (04 Jan 2024 12:21) (17 - 18)  SpO2: 92% (04 Jan 2024 12:21) (90% - 97%)    Parameters below as of 04 Jan 2024 12:21  Patient On (Oxygen Delivery Method): room air        I&O's Summary    03 Jan 2024 07:01  -  04 Jan 2024 07:00  --------------------------------------------------------  IN: 0 mL / OUT: 400 mL / NET: -400 mL        PHYSICAL EXAM:  HEENT: NC/AT; PERRLA  Neck: Soft; no tenderness  Lungs: CTA bilaterally; no wheezing.   Heart:  Abdomen:  Genital/ Rectal:  Extremities:  Neurologic:  Vascular:      LABORATORY:    CBC Full  -  ( 04 Jan 2024 07:14 )  WBC Count : 18.43 K/uL  RBC Count : 3.13 M/uL  Hemoglobin : 10.7 g/dL  Hematocrit : 34.9 %  Platelet Count - Automated : 733 K/uL  Mean Cell Volume : 111.5 fl  Mean Cell Hemoglobin : 34.2 pg  Mean Cell Hemoglobin Concentration : 30.7 gm/dL  Auto Neutrophil # : 15.80 K/uL  Auto Lymphocyte # : 0.25 K/uL  Auto Monocyte # : 0.68 K/uL  Auto Eosinophil # : 0.08 K/uL  Auto Basophil # : 0.10 K/uL  Auto Neutrophil % : 85.8 %  Auto Lymphocyte % : 1.4 %  Auto Monocyte % : 3.7 %  Auto Eosinophil % : 0.4 %  Auto Basophil % : 0.5 %      ESR:                   01-04 @ 07:14  --    C-Reactive Protein:     01-04 @ 07:14  --    Procalcitonin:           01-04 @ 07:14   0.18      01-04    139  |  109<H>  |  16  ----------------------------<  146<H>  3.7   |  26  |  0.58    Ca    8.1<L>      04 Jan 2024 07:14    TPro  6.3  /  Alb  2.3<L>  /  TBili  0.5  /  DBili  x   /  AST  17  /  ALT  9<L>  /  AlkPhos  85  01-04          Assessment and Plan:    1. Worsening of LLE wounds.  2. Chronic LLE pain.    . Get wound culture today,  . Add IV Cefepime 2 gm q12h.  . Would suggest surgery evalu      Sushil Anguiano MD   [phone].    She is afebrile  No new events    MEDICATIONS  (STANDING):  amLODIPine   Tablet 5 milliGRAM(s) Oral daily  cefepime   IVPB 2000 milliGRAM(s) IV Intermittent every 12 hours  cholecalciferol 2000 Unit(s) Oral daily  dextrose 5% + sodium chloride 0.45%. 1000 milliLiter(s) (40 mL/Hr) IV Continuous <Continuous>  escitalopram 20 milliGRAM(s) Oral daily  folic acid 1 milliGRAM(s) Oral daily  gabapentin 300 milliGRAM(s) Oral three times a day  hydroxyurea 500 milliGRAM(s) Oral two times a day  lactobacillus acidophilus 1 Tablet(s) Oral every 12 hours  levothyroxine 50 MICROGram(s) Oral daily  metoprolol tartrate 25 milliGRAM(s) Oral two times a day  pantoprazole    Tablet 40 milliGRAM(s) Oral before breakfast  polyethylene glycol 3350 17 Gram(s) Oral daily  senna 2 Tablet(s) Oral at bedtime  simvastatin 10 milliGRAM(s) Oral at bedtime    MEDICATIONS  (PRN):  acetaminophen     Tablet .. 650 milliGRAM(s) Oral every 6 hours PRN Temp greater or equal to 38C (100.4F), Mild Pain (1 - 3)  aluminum hydroxide/magnesium hydroxide/simethicone Suspension 30 milliLiter(s) Oral every 4 hours PRN Dyspepsia  bisacodyl Suppository 10 milliGRAM(s) Rectal daily PRN Constipation  magnesium hydroxide Suspension 30 milliLiter(s) Oral daily PRN Constipation  melatonin 3 milliGRAM(s) Oral at bedtime PRN Insomnia  morphine  - Injectable 2 milliGRAM(s) IV Push every 6 hours PRN Severe Pain (7 - 10)  ondansetron Injectable 4 milliGRAM(s) IV Push every 8 hours PRN Nausea and/or Vomiting  traMADol 50 milliGRAM(s) Oral every 6 hours PRN Moderate Pain (4 - 6)      Vital Signs Last 24 Hrs  T(C): 37 (04 Jan 2024 12:21), Max: 37 (04 Jan 2024 12:21)  T(F): 98.6 (04 Jan 2024 12:21), Max: 98.6 (04 Jan 2024 12:21)  HR: 105 (04 Jan 2024 17:22) (64 - 105)  BP: 134/62 (04 Jan 2024 17:22) (118/69 - 134/85)  BP(mean): --  RR: 18 (04 Jan 2024 12:21) (17 - 18)  SpO2: 92% (04 Jan 2024 12:21) (90% - 97%)    Parameters below as of 04 Jan 2024 12:21  Patient On (Oxygen Delivery Method): room air        I&O's Summary    03 Jan 2024 07:01  -  04 Jan 2024 07:00  --------------------------------------------------------  IN: 0 mL / OUT: 400 mL / NET: -400 mL        PHYSICAL EXAM:  HEENT: NC/AT; PERRLA  Neck: Soft; no tenderness  Lungs: CTA bilaterally; no wheezing.   Heart:  Abdomen:  Genital/ Rectal:  Extremities:  Neurologic:  Vascular:      LABORATORY:    CBC Full  -  ( 04 Jan 2024 07:14 )  WBC Count : 18.43 K/uL  RBC Count : 3.13 M/uL  Hemoglobin : 10.7 g/dL  Hematocrit : 34.9 %  Platelet Count - Automated : 733 K/uL  Mean Cell Volume : 111.5 fl  Mean Cell Hemoglobin : 34.2 pg  Mean Cell Hemoglobin Concentration : 30.7 gm/dL  Auto Neutrophil # : 15.80 K/uL  Auto Lymphocyte # : 0.25 K/uL  Auto Monocyte # : 0.68 K/uL  Auto Eosinophil # : 0.08 K/uL  Auto Basophil # : 0.10 K/uL  Auto Neutrophil % : 85.8 %  Auto Lymphocyte % : 1.4 %  Auto Monocyte % : 3.7 %  Auto Eosinophil % : 0.4 %  Auto Basophil % : 0.5 %      ESR:                   01-04 @ 07:14  --    C-Reactive Protein:     01-04 @ 07:14  --    Procalcitonin:           01-04 @ 07:14   0.18      01-04    139  |  109<H>  |  16  ----------------------------<  146<H>  3.7   |  26  |  0.58    Ca    8.1<L>      04 Jan 2024 07:14    TPro  6.3  /  Alb  2.3<L>  /  TBili  0.5  /  DBili  x   /  AST  17  /  ALT  9<L>  /  AlkPhos  85  01-04          Assessment and Plan:    1. Worsening of LLE wounds.  2. Chronic LLE pain.    . Get wound culture today,  . Add IV Cefepime 2 gm q12h.  . Would suggest surgery evalu      Sushil Anguiano MD   [phone].    She is afebrile  No new events    MEDICATIONS  (STANDING):  amLODIPine   Tablet 5 milliGRAM(s) Oral daily  cefepime   IVPB 2000 milliGRAM(s) IV Intermittent every 12 hours  cholecalciferol 2000 Unit(s) Oral daily  dextrose 5% + sodium chloride 0.45%. 1000 milliLiter(s) (40 mL/Hr) IV Continuous <Continuous>  escitalopram 20 milliGRAM(s) Oral daily  folic acid 1 milliGRAM(s) Oral daily  gabapentin 300 milliGRAM(s) Oral three times a day  hydroxyurea 500 milliGRAM(s) Oral two times a day  lactobacillus acidophilus 1 Tablet(s) Oral every 12 hours  levothyroxine 50 MICROGram(s) Oral daily  metoprolol tartrate 25 milliGRAM(s) Oral two times a day  pantoprazole    Tablet 40 milliGRAM(s) Oral before breakfast  polyethylene glycol 3350 17 Gram(s) Oral daily  senna 2 Tablet(s) Oral at bedtime  simvastatin 10 milliGRAM(s) Oral at bedtime    MEDICATIONS  (PRN):  acetaminophen     Tablet .. 650 milliGRAM(s) Oral every 6 hours PRN Temp greater or equal to 38C (100.4F), Mild Pain (1 - 3)  aluminum hydroxide/magnesium hydroxide/simethicone Suspension 30 milliLiter(s) Oral every 4 hours PRN Dyspepsia  bisacodyl Suppository 10 milliGRAM(s) Rectal daily PRN Constipation  magnesium hydroxide Suspension 30 milliLiter(s) Oral daily PRN Constipation  melatonin 3 milliGRAM(s) Oral at bedtime PRN Insomnia  morphine  - Injectable 2 milliGRAM(s) IV Push every 6 hours PRN Severe Pain (7 - 10)  ondansetron Injectable 4 milliGRAM(s) IV Push every 8 hours PRN Nausea and/or Vomiting  traMADol 50 milliGRAM(s) Oral every 6 hours PRN Moderate Pain (4 - 6)      Vital Signs Last 24 Hrs  T(C): 37 (04 Jan 2024 12:21), Max: 37 (04 Jan 2024 12:21)  T(F): 98.6 (04 Jan 2024 12:21), Max: 98.6 (04 Jan 2024 12:21)  HR: 105 (04 Jan 2024 17:22) (64 - 105)  BP: 134/62 (04 Jan 2024 17:22) (118/69 - 134/85)  BP(mean): --  RR: 18 (04 Jan 2024 12:21) (17 - 18)  SpO2: 92% (04 Jan 2024 12:21) (90% - 97%)    Parameters below as of 04 Jan 2024 12:21  Patient On (Oxygen Delivery Method): room air        I&O's Summary    03 Jan 2024 07:01  -  04 Jan 2024 07:00  --------------------------------------------------------  IN: 0 mL / OUT: 400 mL / NET: -400 mL        PHYSICAL EXAM:  HEENT: NC/AT; PERRLA  Neck: Soft; no tenderness  Lungs: Coarse BS bilaterally; no wheezing.   Heart: RRR, no murmurs.   Abdomen: Soft, no tenderness.   Genital/ Rectal: No salas catheter.   Extremities: Large wounds on LLE.   Neurologic: Confused.      LABORATORY:    CBC Full  -  ( 04 Jan 2024 07:14 )  WBC Count : 18.43 K/uL  RBC Count : 3.13 M/uL  Hemoglobin : 10.7 g/dL  Hematocrit : 34.9 %  Platelet Count - Automated : 733 K/uL  Mean Cell Volume : 111.5 fl  Mean Cell Hemoglobin : 34.2 pg  Mean Cell Hemoglobin Concentration : 30.7 gm/dL  Auto Neutrophil # : 15.80 K/uL  Auto Lymphocyte # : 0.25 K/uL  Auto Monocyte # : 0.68 K/uL  Auto Eosinophil # : 0.08 K/uL  Auto Basophil # : 0.10 K/uL  Auto Neutrophil % : 85.8 %  Auto Lymphocyte % : 1.4 %  Auto Monocyte % : 3.7 %  Auto Eosinophil % : 0.4 %  Auto Basophil % : 0.5 %      ESR:                   01-04 @ 07:14  --    C-Reactive Protein:     01-04 @ 07:14  --    Procalcitonin:           01-04 @ 07:14   0.18      01-04    139  |  109<H>  |  16  ----------------------------<  146<H>  3.7   |  26  |  0.58    Ca    8.1<L>      04 Jan 2024 07:14    TPro  6.3  /  Alb  2.3<L>  /  TBili  0.5  /  DBili  x   /  AST  17  /  ALT  9<L>  /  AlkPhos  85  01-04          Assessment and Plan:    1. Worsening of LLE wounds.  2. Chronic LLE pain.    . Follow wound culture today,  . Continue IV Cefepime 2 gm q12h.  . Despite having been treated with several courses of antibiotics, the LLE ulcers did not improve and are getting worse. Would suggest getting skin biopsies of the ulcers with surgery/wound care consultant to look for other possibilities including pyoderma gangrenosum.   . Wound care daily.      Sushil Anguiano MD   [phone].    She is afebrile  No new events    MEDICATIONS  (STANDING):  amLODIPine   Tablet 5 milliGRAM(s) Oral daily  cefepime   IVPB 2000 milliGRAM(s) IV Intermittent every 12 hours  cholecalciferol 2000 Unit(s) Oral daily  dextrose 5% + sodium chloride 0.45%. 1000 milliLiter(s) (40 mL/Hr) IV Continuous <Continuous>  escitalopram 20 milliGRAM(s) Oral daily  folic acid 1 milliGRAM(s) Oral daily  gabapentin 300 milliGRAM(s) Oral three times a day  hydroxyurea 500 milliGRAM(s) Oral two times a day  lactobacillus acidophilus 1 Tablet(s) Oral every 12 hours  levothyroxine 50 MICROGram(s) Oral daily  metoprolol tartrate 25 milliGRAM(s) Oral two times a day  pantoprazole    Tablet 40 milliGRAM(s) Oral before breakfast  polyethylene glycol 3350 17 Gram(s) Oral daily  senna 2 Tablet(s) Oral at bedtime  simvastatin 10 milliGRAM(s) Oral at bedtime    MEDICATIONS  (PRN):  acetaminophen     Tablet .. 650 milliGRAM(s) Oral every 6 hours PRN Temp greater or equal to 38C (100.4F), Mild Pain (1 - 3)  aluminum hydroxide/magnesium hydroxide/simethicone Suspension 30 milliLiter(s) Oral every 4 hours PRN Dyspepsia  bisacodyl Suppository 10 milliGRAM(s) Rectal daily PRN Constipation  magnesium hydroxide Suspension 30 milliLiter(s) Oral daily PRN Constipation  melatonin 3 milliGRAM(s) Oral at bedtime PRN Insomnia  morphine  - Injectable 2 milliGRAM(s) IV Push every 6 hours PRN Severe Pain (7 - 10)  ondansetron Injectable 4 milliGRAM(s) IV Push every 8 hours PRN Nausea and/or Vomiting  traMADol 50 milliGRAM(s) Oral every 6 hours PRN Moderate Pain (4 - 6)      Vital Signs Last 24 Hrs  T(C): 37 (04 Jan 2024 12:21), Max: 37 (04 Jan 2024 12:21)  T(F): 98.6 (04 Jan 2024 12:21), Max: 98.6 (04 Jan 2024 12:21)  HR: 105 (04 Jan 2024 17:22) (64 - 105)  BP: 134/62 (04 Jan 2024 17:22) (118/69 - 134/85)  BP(mean): --  RR: 18 (04 Jan 2024 12:21) (17 - 18)  SpO2: 92% (04 Jan 2024 12:21) (90% - 97%)    Parameters below as of 04 Jan 2024 12:21  Patient On (Oxygen Delivery Method): room air        I&O's Summary    03 Jan 2024 07:01  -  04 Jan 2024 07:00  --------------------------------------------------------  IN: 0 mL / OUT: 400 mL / NET: -400 mL        PHYSICAL EXAM:  HEENT: NC/AT; PERRLA  Neck: Soft; no tenderness  Lungs: Coarse BS bilaterally; no wheezing.   Heart: RRR, no murmurs.   Abdomen: Soft, no tenderness.   Genital/ Rectal: No salas catheter.   Extremities: Large wounds on LLE.   Neurologic: Confused.      LABORATORY:    CBC Full  -  ( 04 Jan 2024 07:14 )  WBC Count : 18.43 K/uL  RBC Count : 3.13 M/uL  Hemoglobin : 10.7 g/dL  Hematocrit : 34.9 %  Platelet Count - Automated : 733 K/uL  Mean Cell Volume : 111.5 fl  Mean Cell Hemoglobin : 34.2 pg  Mean Cell Hemoglobin Concentration : 30.7 gm/dL  Auto Neutrophil # : 15.80 K/uL  Auto Lymphocyte # : 0.25 K/uL  Auto Monocyte # : 0.68 K/uL  Auto Eosinophil # : 0.08 K/uL  Auto Basophil # : 0.10 K/uL  Auto Neutrophil % : 85.8 %  Auto Lymphocyte % : 1.4 %  Auto Monocyte % : 3.7 %  Auto Eosinophil % : 0.4 %  Auto Basophil % : 0.5 %      ESR:                   01-04 @ 07:14  --    C-Reactive Protein:     01-04 @ 07:14  --    Procalcitonin:           01-04 @ 07:14   0.18      01-04    139  |  109<H>  |  16  ----------------------------<  146<H>  3.7   |  26  |  0.58    Ca    8.1<L>      04 Jan 2024 07:14    TPro  6.3  /  Alb  2.3<L>  /  TBili  0.5  /  DBili  x   /  AST  17  /  ALT  9<L>  /  AlkPhos  85  01-04          Assessment and Plan:    1. Worsening of LLE wounds.  2. Chronic LLE pain.    . Follow wound culture today,  . Continue IV Cefepime 2 gm q12h.  . Despite having been treated with several courses of antibiotics, the LLE ulcers did not improve and are getting worse. Would suggest getting skin biopsies of the ulcers with surgery/wound care consultant to look for other possibilities including pyoderma gangrenosum.   . Wound care daily.      Sushil Anguiano MD   [phone].

## 2024-01-04 NOTE — CONSULT NOTE ADULT - ASSESSMENT
89yo F presents from senior care for lower extremity wounds and cellulitis. palliative involved for GOC/ACP. 89[facility] F [facility] cellulitis. palliative involved for GOC/ACP.

## 2024-01-04 NOTE — PROGRESS NOTE ADULT - ASSESSMENT
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  Patient was hospitalized in 2023  g with altered mental status concern for UTI , also  cellulitis to the LLE at the site of a skin graft. Pt recently completed a course of po abx for this with no significant improvement. bib ems with wounds to left lower leg for unknown period of time, now weeping and draining, pt c/o pain but pt is poor historian She was admitted in november 2023 with LLE cellulitis and seen by wound care MD & ID -discharged on po doxycycline and cipro & with topical care .Admitted for septic workup , iv abx ,pain management and wound care  89yo female  ,prison resident with hx of Past medical history of CVA on Coumadin with right-sided weakness, hypertension hyperlipidemia CAD history of A-fib on Coumadinhypertension, phlebitis, 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  Patient was hospitalized in 2023  g with altered mental status concern for UTI , also  cellulitis to the LLE at the site of a skin graft. Pt recently completed a course of po abx for this with no significant improvement. bib ems with wounds to left lower leg for unknown period of time, now weeping and draining, pt c/o pain but pt is poor historian She was admitted in november 2023 with LLE cellulitis and seen by wound care MD & ID -discharged on po doxycycline and cipro & with topical care .Admitted for septic workup , iv abx ,pain management and wound care

## 2024-01-04 NOTE — DIETITIAN INITIAL EVALUATION ADULT - OTHER INFO
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 She was followed by wound care consultants at the [facility]. She was admitted here last month and treated for LLE cellulitis."    Visited pt at bedside this am. Pt reports poor appetite/intake. Spoke with RN, minimal po intake of breakfast meal this am. Only consumed few mouthfuls. No food allergies noted. Per chart review, pt with lactose intolerance. Diet office made aware. Will send lactaid milk. Pt denies chewing/swallowing difficulties. No N/V/D/C. No BMs thus far; bowel regimen rx. CBW on admission 110#. Per chart review, wt of 125# (11/23), 130# (8/23). Wt loss appears ongoing. 2+ BL foot/sugey, R leg edema noted. Skin: IAD, cellulitis to LLE. Pt currently on DASH/TLC, easy to chew diet. Diet education not appropriate at this time. Pt declines oral nutritional supplements. Will honor food preferences as able to optimize po intake/tolerance. RD remains available.  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 She was followed by wound care consultants at the [facility]. She was admitted here last month and treated for LLE cellulitis."    Visited pt at bedside this am. Pt reports poor appetite/intake. Spoke with RN, minimal po intake of breakfast meal this am. Only consumed few mouthfuls. No food allergies noted. Per chart review, pt with lactose intolerance. Diet office made aware. Will send lactaid milk. Pt denies chewing/swallowing difficulties. No N/V/D/C. No BMs thus far; bowel regimen rx. CBW on admission 110#. Per chart review, wt of 125# (11/23), 130# (8/23). Wt loss appears ongoing. 2+ BL foot/sugey, R leg edema noted. Skin: IAD, cellulitis to LLE. Pt currently on DASH/TLC, easy to chew diet. Diet education not appropriate at this time. Pt declines oral nutritional supplements. Will honor food preferences as able to optimize po intake/tolerance. RD remains available.

## 2024-01-04 NOTE — CONSULT NOTE ADULT - PROBLEM SELECTOR RECOMMENDATION 3
HCP and living will reviewed  discussed with son as patient without capacity  DNR/DNI confirmed and MOLST completed today  >16' spent on ACP

## 2024-01-04 NOTE — PROGRESS NOTE ADULT - SUBJECTIVE AND OBJECTIVE BOX
PROGRESS NOTE  Patient is a 89y old  Female who presents with a chief complaint of LLE OPEN WOUND (03 Jan 2024 19:52)  Chart and available morning labs /imaging are reviewed electronically , urgent issues addressed . 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  Patient was hospitalized in 2023  g with altered mental status concern for UTI , also  cellulitis to the LLE at the site of a skin graft. Pt recently completed a course of po abx for this with no significant improvement. bib ems with wounds to left lower leg for unknown period of time, now weeping and draining, pt c/o pain but pt is poor historian She was admitted in november 2023 with LLE cellulitis and seen by wound care MD & ID -discharged on po doxycycline and cipro & with topical care .Admitted for septic workup , iv abx ,pain management and wound care  (03 Jan 2024 18:15)    PAST MEDICAL & SURGICAL HISTORY:  CVA (cerebral vascular accident)      VHD (valvular heart disease)      Hyperlipidemia      Neuropathy      Hypertension      Depression      Constipation      Neuropathy      Grade I diastolic dysfunction      GERD (gastroesophageal reflux disease)      Aortic valvar stenosis      Afib      Hypothyroidism      H/O skin graft          MEDICATIONS  (STANDING):  amLODIPine   Tablet 5 milliGRAM(s) Oral daily  cefepime   IVPB 2000 milliGRAM(s) IV Intermittent every 12 hours  cholecalciferol 2000 Unit(s) Oral daily  dextrose 5% + sodium chloride 0.45%. 1000 milliLiter(s) (40 mL/Hr) IV Continuous <Continuous>  escitalopram 20 milliGRAM(s) Oral daily  folic acid 1 milliGRAM(s) Oral daily  gabapentin 300 milliGRAM(s) Oral three times a day  hydroxyurea 500 milliGRAM(s) Oral two times a day  lactobacillus acidophilus 1 Tablet(s) Oral every 12 hours  levothyroxine 50 MICROGram(s) Oral daily  metoprolol tartrate 25 milliGRAM(s) Oral two times a day  pantoprazole    Tablet 40 milliGRAM(s) Oral before breakfast  polyethylene glycol 3350 17 Gram(s) Oral daily  senna 2 Tablet(s) Oral at bedtime  simvastatin 10 milliGRAM(s) Oral at bedtime    MEDICATIONS  (PRN):  acetaminophen     Tablet .. 650 milliGRAM(s) Oral every 6 hours PRN Temp greater or equal to 38C (100.4F), Mild Pain (1 - 3)  aluminum hydroxide/magnesium hydroxide/simethicone Suspension 30 milliLiter(s) Oral every 4 hours PRN Dyspepsia  bisacodyl Suppository 10 milliGRAM(s) Rectal daily PRN Constipation  magnesium hydroxide Suspension 30 milliLiter(s) Oral daily PRN Constipation  melatonin 3 milliGRAM(s) Oral at bedtime PRN Insomnia  morphine  - Injectable 2 milliGRAM(s) IV Push every 6 hours PRN Severe Pain (7 - 10)  ondansetron Injectable 4 milliGRAM(s) IV Push every 8 hours PRN Nausea and/or Vomiting  traMADol 50 milliGRAM(s) Oral every 6 hours PRN Moderate Pain (4 - 6)      OBJECTIVE    T(C): 36.6 (01-04-24 @ 05:09), Max: 36.9 (01-03-24 @ 21:24)  HR: 100 (01-04-24 @ 05:09) (64 - 100)  BP: 133/70 (01-04-24 @ 05:09) (100/48 - 133/70)  RR: 18 (01-04-24 @ 05:09) (17 - 18)  SpO2: 97% (01-04-24 @ 05:09) (90% - 98%)  Wt(kg): --  I&O's Summary    03 Jan 2024 07:01  -  04 Jan 2024 07:00  --------------------------------------------------------  IN: 0 mL / OUT: 400 mL / NET: -400 mL          REVIEW OF SYSTEMS:  CONSTITUTIONAL: No fever, weight loss, or fatigue  EYES: No eye pain, visual disturbances, or discharge  ENMT:   No sinus or throat pain  NECK: No pain or stiffness  RESPIRATORY: No cough, wheezing, chills or hemoptysis; No shortness of breath  CARDIOVASCULAR: No chest pain, palpitations, dizziness, or leg swelling  GASTROINTESTINAL: No abdominal pain. No nausea, vomiting; No diarrhea or constipation. No melena or hematochezia.  GENITOURINARY: No dysuria, frequency, hematuria, or incontinence  NEUROLOGICAL: No headaches, memory loss, loss of strength, numbness, or tremors  SKIN: No itching, burning, rashes, or lesions   MUSCULOSKELETAL: No joint pain or swelling; No muscle, back, or extremity pain    PHYSICAL EXAM:  Appearance: NAD. VS past 24 hrs -as above   HEENT:   Moist oral mucosa. Conjunctiva clear b/l.   Neck : supple  Respiratory: Lungs CTAB.  Gastrointestinal:  Soft, nontender. No rebound. No rigidity. BS present	  Cardiovascular: RRR ,S1S2 present  Neurologic: Non-focal. Moving all extremities.  Extremities:lle wounds  Skin: No rashes, No ecchymoses, No cyanosis.	  wounds ,skin lesions-See skin assesment flow sheet   LABS:                        10.7   18.43 )-----------( 733      ( 04 Jan 2024 07:14 )             34.9     01-04    139  |  109<H>  |  16  ----------------------------<  146<H>  3.7   |  26  |  0.58    Ca    8.1<L>      04 Jan 2024 07:14    TPro  6.3  /  Alb  2.3<L>  /  TBili  0.5  /  DBili  x   /  AST  17  /  ALT  9<L>  /  AlkPhos  85  01-04    CAPILLARY BLOOD GLUCOSE          Urinalysis Basic - ( 04 Jan 2024 07:14 )    Color: x / Appearance: x / SG: x / pH: x  Gluc: 146 mg/dL / Ketone: x  / Bili: x / Urobili: x   Blood: x / Protein: x / Nitrite: x   Leuk Esterase: x / RBC: x / WBC x   Sq Epi: x / Non Sq Epi: x / Bacteria: x        Culture - Abscess with Gram Stain (collected 03 Jan 2024 17:30)  Source: .Abscess Leg - Left  Gram Stain (04 Jan 2024 05:52):    No polymorphonuclear cells seen per low power field    Numerous Gram positive cocci in pairs seen per oil power field    Numerous Gram Negative Rods seen per oil power field      RADIOLOGY & ADDITIONAL TESTS:   reviewed elctronically  ASSESSMENT/PLAN: 	     PROGRESS NOTE  Patient is a 89y old  Female who presents with a chief complaint of LLE OPEN WOUND (03 Jan 2024 19:52)  Chart and available morning labs /imaging are reviewed electronically , urgent issues addressed . 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  Patient was hospitalized in 2023  g with altered mental status concern for UTI , also  cellulitis to the LLE at the site of a skin graft. Pt recently completed a course of po abx for this with no significant improvement. bib ems with wounds to left lower leg for unknown period of time, now weeping and draining, pt c/o pain but pt is poor historian She was admitted in november 2023 with LLE cellulitis and seen by wound care MD & ID -discharged on po doxycycline and cipro & with topical care .Admitted for septic workup , iv abx ,pain management and wound care  (03 Jan 2024 18:15)    PAST MEDICAL & SURGICAL HISTORY:  CVA (cerebral vascular accident)      VHD (valvular heart disease)      Hyperlipidemia      Neuropathy      Hypertension      Depression      Constipation      Neuropathy      Grade I diastolic dysfunction      GERD (gastroesophageal reflux disease)      Aortic valvar stenosis      Afib      Hypothyroidism      H/O skin graft          MEDICATIONS  (STANDING):  amLODIPine   Tablet 5 milliGRAM(s) Oral daily  cefepime   IVPB 2000 milliGRAM(s) IV Intermittent every 12 hours  cholecalciferol 2000 Unit(s) Oral daily  dextrose 5% + sodium chloride 0.45%. 1000 milliLiter(s) (40 mL/Hr) IV Continuous <Continuous>  escitalopram 20 milliGRAM(s) Oral daily  folic acid 1 milliGRAM(s) Oral daily  gabapentin 300 milliGRAM(s) Oral three times a day  hydroxyurea 500 milliGRAM(s) Oral two times a day  lactobacillus acidophilus 1 Tablet(s) Oral every 12 hours  levothyroxine 50 MICROGram(s) Oral daily  metoprolol tartrate 25 milliGRAM(s) Oral two times a day  pantoprazole    Tablet 40 milliGRAM(s) Oral before breakfast  polyethylene glycol 3350 17 Gram(s) Oral daily  senna 2 Tablet(s) Oral at bedtime  simvastatin 10 milliGRAM(s) Oral at bedtime    MEDICATIONS  (PRN):  acetaminophen     Tablet .. 650 milliGRAM(s) Oral every 6 hours PRN Temp greater or equal to 38C (100.4F), Mild Pain (1 - 3)  aluminum hydroxide/magnesium hydroxide/simethicone Suspension 30 milliLiter(s) Oral every 4 hours PRN Dyspepsia  bisacodyl Suppository 10 milliGRAM(s) Rectal daily PRN Constipation  magnesium hydroxide Suspension 30 milliLiter(s) Oral daily PRN Constipation  melatonin 3 milliGRAM(s) Oral at bedtime PRN Insomnia  morphine  - Injectable 2 milliGRAM(s) IV Push every 6 hours PRN Severe Pain (7 - 10)  ondansetron Injectable 4 milliGRAM(s) IV Push every 8 hours PRN Nausea and/or Vomiting  traMADol 50 milliGRAM(s) Oral every 6 hours PRN Moderate Pain (4 - 6)      OBJECTIVE    T(C): 36.6 (01-04-24 @ 05:09), Max: 36.9 (01-03-24 @ 21:24)  HR: 100 (01-04-24 @ 05:09) (64 - 100)  BP: 133/70 (01-04-24 @ 05:09) (100/48 - 133/70)  RR: 18 (01-04-24 @ 05:09) (17 - 18)  SpO2: 97% (01-04-24 @ 05:09) (90% - 98%)  Wt(kg): --  I&O's Summary    03 Jan 2024 07:01  -  04 Jan 2024 07:00  --------------------------------------------------------  IN: 0 mL / OUT: 400 mL / NET: -400 mL          REVIEW OF SYSTEMS:  CONSTITUTIONAL: No fever, weight loss, or fatigue  EYES: No eye pain, visual disturbances, or discharge  ENMT:   No sinus or throat pain  NECK: No pain or stiffness  RESPIRATORY: No cough, wheezing, chills or hemoptysis; No shortness of breath  CARDIOVASCULAR: No chest pain, palpitations, dizziness, or leg swelling  GASTROINTESTINAL: No abdominal pain. No nausea, vomiting; No diarrhea or constipation. No melena or hematochezia.  GENITOURINARY: No dysuria, frequency, hematuria, or incontinence  NEUROLOGICAL: No headaches, memory loss, loss of strength, numbness, or tremors  SKIN: No itching, burning, rashes, or lesions   MUSCULOSKELETAL: No joint pain or swelling; No muscle, back, or extremity pain    PHYSICAL EXAM:  Appearance: NAD. VS past 24 hrs -as above   HEENT:   Moist oral mucosa. Conjunctiva clear b/l.   Neck : supple  Respiratory: Lungs CTAB.  Gastrointestinal:  Soft, nontender. No rebound. No rigidity. BS present	  Cardiovascular: RRR ,S1S2 present  Neurologic: Non-focal. Moving all extremities.  Extremities:lle wounds  Skin: No rashes, No ecchymoses, No cyanosis.	  wounds ,skin lesions-See skin assesment flow sheet   LABS:                        10.7   18.43 )-----------( 733      ( 04 Jan 2024 07:14 )             34.9     01-04    139  |  109<H>  |  16  ----------------------------<  146<H>  3.7   |  26  |  0.58    Ca    8.1<L>      04 Jan 2024 07:14    TPro  6.3  /  Alb  2.3<L>  /  TBili  0.5  /  DBili  x   /  AST  17  /  ALT  9<L>  /  AlkPhos  85  01-04    CAPILLARY BLOOD GLUCOSE          Urinalysis Basic - ( 04 Jan 2024 07:14 )    Color: x / Appearance: x / SG: x / pH: x  Gluc: 146 mg/dL / Ketone: x  / Bili: x / Urobili: x   Blood: x / Protein: x / Nitrite: x   Leuk Esterase: x / RBC: x / WBC x   Sq Epi: x / Non Sq Epi: x / Bacteria: x        Culture - Abscess with Gram Stain (collected 03 Jan 2024 17:30)  Source: .Abscess Leg - Left  Gram Stain (04 Jan 2024 05:52):    No polymorphonuclear cells seen per low power field    Numerous Gram positive cocci in pairs seen per oil power field    Numerous Gram Negative Rods seen per oil power field      RADIOLOGY & ADDITIONAL TESTS:   reviewed elctronically  ASSESSMENT/PLAN: 	     PROGRESS NOTE  Patient is a 89y old  Female who presents with a chief complaint of LLE OPEN WOUND (03 Jan 2024 19:52)  Chart and available morning labs /imaging are reviewed electronically , urgent issues addressed . More information  is being added upon completion of rounds , when more information is collected and management discussed with consultants , medical staff and social service/case management on the floor     OVERNIGHT    No new issues reported by medical staff . 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  Patient was hospitalized in 2023  g with altered mental status concern for UTI , also  cellulitis to the LLE at the site of a skin graft. Pt recently completed a course of po abx for this with no significant improvement. bib ems with wounds to left lower leg for unknown period of time, now weeping and draining, pt c/o pain but pt is poor historian She was admitted in november 2023 with LLE cellulitis and seen by wound care MD & ID -discharged on po doxycycline and cipro & with topical care .Admitted for septic workup , iv abx ,pain management and wound care  (03 Jan 2024 18:15)    PAST MEDICAL & SURGICAL HISTORY:  CVA (cerebral vascular accident)      VHD (valvular heart disease)      Hyperlipidemia      Neuropathy      Hypertension      Depression      Constipation      Neuropathy      Grade I diastolic dysfunction      GERD (gastroesophageal reflux disease)      Aortic valvar stenosis      Afib      Hypothyroidism      H/O skin graft          MEDICATIONS  (STANDING):  amLODIPine   Tablet 5 milliGRAM(s) Oral daily  cefepime   IVPB 2000 milliGRAM(s) IV Intermittent every 12 hours  cholecalciferol 2000 Unit(s) Oral daily  dextrose 5% + sodium chloride 0.45%. 1000 milliLiter(s) (40 mL/Hr) IV Continuous <Continuous>  escitalopram 20 milliGRAM(s) Oral daily  folic acid 1 milliGRAM(s) Oral daily  gabapentin 300 milliGRAM(s) Oral three times a day  hydroxyurea 500 milliGRAM(s) Oral two times a day  lactobacillus acidophilus 1 Tablet(s) Oral every 12 hours  levothyroxine 50 MICROGram(s) Oral daily  metoprolol tartrate 25 milliGRAM(s) Oral two times a day  pantoprazole    Tablet 40 milliGRAM(s) Oral before breakfast  polyethylene glycol 3350 17 Gram(s) Oral daily  senna 2 Tablet(s) Oral at bedtime  simvastatin 10 milliGRAM(s) Oral at bedtime    MEDICATIONS  (PRN):  acetaminophen     Tablet .. 650 milliGRAM(s) Oral every 6 hours PRN Temp greater or equal to 38C (100.4F), Mild Pain (1 - 3)  aluminum hydroxide/magnesium hydroxide/simethicone Suspension 30 milliLiter(s) Oral every 4 hours PRN Dyspepsia  bisacodyl Suppository 10 milliGRAM(s) Rectal daily PRN Constipation  magnesium hydroxide Suspension 30 milliLiter(s) Oral daily PRN Constipation  melatonin 3 milliGRAM(s) Oral at bedtime PRN Insomnia  morphine  - Injectable 2 milliGRAM(s) IV Push every 6 hours PRN Severe Pain (7 - 10)  ondansetron Injectable 4 milliGRAM(s) IV Push every 8 hours PRN Nausea and/or Vomiting  traMADol 50 milliGRAM(s) Oral every 6 hours PRN Moderate Pain (4 - 6)      OBJECTIVE    T(C): 36.6 (01-04-24 @ 05:09), Max: 36.9 (01-03-24 @ 21:24)  HR: 100 (01-04-24 @ 05:09) (64 - 100)  BP: 133/70 (01-04-24 @ 05:09) (100/48 - 133/70)  RR: 18 (01-04-24 @ 05:09) (17 - 18)  SpO2: 97% (01-04-24 @ 05:09) (90% - 98%)  Wt(kg): --  I&O's Summary    03 Jan 2024 07:01  -  04 Jan 2024 07:00  --------------------------------------------------------  IN: 0 mL / OUT: 400 mL / NET: -400 mL          REVIEW OF SYSTEMS:  CONSTITUTIONAL: No fever, weight loss, or fatigue  EYES: No eye pain, visual disturbances, or discharge  ENMT:   No sinus or throat pain  NECK: No pain or stiffness  RESPIRATORY: No cough, wheezing, chills or hemoptysis; No shortness of breath  CARDIOVASCULAR: No chest pain, palpitations, dizziness, or leg swelling  GASTROINTESTINAL: No abdominal pain. No nausea, vomiting; No diarrhea or constipation. No melena or hematochezia.  GENITOURINARY: No dysuria, frequency, hematuria, or incontinence  NEUROLOGICAL: No headaches, memory loss, loss of strength, numbness, or tremors  SKIN: No itching, burning, rashes, or lesions   MUSCULOSKELETAL: No joint pain or swelling; No muscle, back, or extremity pain    PHYSICAL EXAM:  Appearance: NAD. VS past 24 hrs -as above   HEENT:   Moist oral mucosa. Conjunctiva clear b/l.   Neck : supple  Respiratory: Lungs CTAB.  Gastrointestinal:  Soft, nontender. No rebound. No rigidity. BS present	  Cardiovascular: RRR ,S1S2 present  Neurologic: Non-focal. Moving all extremities.  Extremities:lle wounds  Skin: No rashes, No ecchymoses, No cyanosis.	  wounds ,skin lesions-See skin assesment flow sheet   LABS:                        10.7   18.43 )-----------( 733      ( 04 Jan 2024 07:14 )             34.9     01-04    139  |  109<H>  |  16  ----------------------------<  146<H>  3.7   |  26  |  0.58    Ca    8.1<L>      04 Jan 2024 07:14    TPro  6.3  /  Alb  2.3<L>  /  TBili  0.5  /  DBili  x   /  AST  17  /  ALT  9<L>  /  AlkPhos  85  01-04    CAPILLARY BLOOD GLUCOSE          Urinalysis Basic - ( 04 Jan 2024 07:14 )    Color: x / Appearance: x / SG: x / pH: x  Gluc: 146 mg/dL / Ketone: x  / Bili: x / Urobili: x   Blood: x / Protein: x / Nitrite: x   Leuk Esterase: x / RBC: x / WBC x   Sq Epi: x / Non Sq Epi: x / Bacteria: x        Culture - Abscess with Gram Stain (collected 03 Jan 2024 17:30)  Source: .Abscess Leg - Left  Gram Stain (04 Jan 2024 05:52):    No polymorphonuclear cells seen per low power field    Numerous Gram positive cocci in pairs seen per oil power field    Numerous Gram Negative Rods seen per oil power field      RADIOLOGY & ADDITIONAL TESTS:   reviewed elctronically  ASSESSMENT/PLAN: 	    25 minutes aggregate time was spent on this visit, 50% visit time spent in care co-ordination with other attendings and counselling patient .I have discussed care plan with patient / HCP/family member ,who expressed understanding of problems treatment and their effect and side effects, alternatives in details. I have asked if they have any questions and concerns and appropriately addressed them to best of my ability. b PROGRESS NOTE  Patient is a 89y old  Female who presents with a chief complaint of LLE OPEN WOUND (03 Jan 2024 19:52)  Chart and available morning labs /imaging are reviewed electronically , urgent issues addressed . More information  is being added upon completion of rounds , when more information is collected and management discussed with consultants , medical staff and social service/case management on the floor     OVERNIGHT    No new issues reported by medical staff . 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  Patient was hospitalized in 2023  g with altered mental status concern for UTI , also  cellulitis to the LLE at the site of a skin graft. Pt recently completed a course of po abx for this with no significant improvement. bib ems with wounds to left lower leg for unknown period of time, now weeping and draining, pt c/o pain but pt is poor historian She was admitted in november 2023 with LLE cellulitis and seen by wound care MD & ID -discharged on po doxycycline and cipro & with topical care .Admitted for septic workup , iv abx ,pain management and wound care  (03 Jan 2024 18:15)    PAST MEDICAL & SURGICAL HISTORY:  CVA (cerebral vascular accident)      VHD (valvular heart disease)      Hyperlipidemia      Neuropathy      Hypertension      Depression      Constipation      Neuropathy      Grade I diastolic dysfunction      GERD (gastroesophageal reflux disease)      Aortic valvar stenosis      Afib      Hypothyroidism      H/O skin graft          MEDICATIONS  (STANDING):  amLODIPine   Tablet 5 milliGRAM(s) Oral daily  cefepime   IVPB 2000 milliGRAM(s) IV Intermittent every 12 hours  cholecalciferol 2000 Unit(s) Oral daily  dextrose 5% + sodium chloride 0.45%. 1000 milliLiter(s) (40 mL/Hr) IV Continuous <Continuous>  escitalopram 20 milliGRAM(s) Oral daily  folic acid 1 milliGRAM(s) Oral daily  gabapentin 300 milliGRAM(s) Oral three times a day  hydroxyurea 500 milliGRAM(s) Oral two times a day  lactobacillus acidophilus 1 Tablet(s) Oral every 12 hours  levothyroxine 50 MICROGram(s) Oral daily  metoprolol tartrate 25 milliGRAM(s) Oral two times a day  pantoprazole    Tablet 40 milliGRAM(s) Oral before breakfast  polyethylene glycol 3350 17 Gram(s) Oral daily  senna 2 Tablet(s) Oral at bedtime  simvastatin 10 milliGRAM(s) Oral at bedtime    MEDICATIONS  (PRN):  acetaminophen     Tablet .. 650 milliGRAM(s) Oral every 6 hours PRN Temp greater or equal to 38C (100.4F), Mild Pain (1 - 3)  aluminum hydroxide/magnesium hydroxide/simethicone Suspension 30 milliLiter(s) Oral every 4 hours PRN Dyspepsia  bisacodyl Suppository 10 milliGRAM(s) Rectal daily PRN Constipation  magnesium hydroxide Suspension 30 milliLiter(s) Oral daily PRN Constipation  melatonin 3 milliGRAM(s) Oral at bedtime PRN Insomnia  morphine  - Injectable 2 milliGRAM(s) IV Push every 6 hours PRN Severe Pain (7 - 10)  ondansetron Injectable 4 milliGRAM(s) IV Push every 8 hours PRN Nausea and/or Vomiting  traMADol 50 milliGRAM(s) Oral every 6 hours PRN Moderate Pain (4 - 6)      OBJECTIVE    T(C): 36.6 (01-04-24 @ 05:09), Max: 36.9 (01-03-24 @ 21:24)  HR: 100 (01-04-24 @ 05:09) (64 - 100)  BP: 133/70 (01-04-24 @ 05:09) (100/48 - 133/70)  RR: 18 (01-04-24 @ 05:09) (17 - 18)  SpO2: 97% (01-04-24 @ 05:09) (90% - 98%)  Wt(kg): --  I&O's Summary    03 Jan 2024 07:01  -  04 Jan 2024 07:00  --------------------------------------------------------  IN: 0 mL / OUT: 400 mL / NET: -400 mL          REVIEW OF SYSTEMS:  CONSTITUTIONAL: No fever, weight loss, or fatigue  EYES: No eye pain, visual disturbances, or discharge  ENMT:   No sinus or throat pain  NECK: No pain or stiffness  RESPIRATORY: No cough, wheezing, chills or hemoptysis; No shortness of breath  CARDIOVASCULAR: No chest pain, palpitations, dizziness, or leg swelling  GASTROINTESTINAL: No abdominal pain. No nausea, vomiting; No diarrhea or constipation. No melena or hematochezia.  GENITOURINARY: No dysuria, frequency, hematuria, or incontinence  NEUROLOGICAL: No headaches, memory loss, loss of strength, numbness, or tremors  SKIN: No itching, burning, rashes, or lesions   MUSCULOSKELETAL: No joint pain or swelling; No muscle, back, or extremity pain    PHYSICAL EXAM:  Appearance: NAD. VS past 24 hrs -as above   HEENT:   Moist oral mucosa. Conjunctiva clear b/l.   Neck : supple  Respiratory: Lungs CTAB.  Gastrointestinal:  Soft, nontender. No rebound. No rigidity. BS present	  Cardiovascular: RRR ,S1S2 present  Neurologic: Non-focal. Moving all extremities.  Extremities:lle wounds  Skin: No rashes, No ecchymoses, No cyanosis.	  wounds ,skin lesions-See skin assesment flow sheet   LABS:                        10.7   18.43 )-----------( 733      ( 04 Jan 2024 07:14 )             34.9     01-04    139  |  109<H>  |  16  ----------------------------<  146<H>  3.7   |  26  |  0.58    Ca    8.1<L>      04 Jan 2024 07:14    TPro  6.3  /  Alb  2.3<L>  /  TBili  0.5  /  DBili  x   /  AST  17  /  ALT  9<L>  /  AlkPhos  85  01-04    CAPILLARY BLOOD GLUCOSE          Urinalysis Basic - ( 04 Jan 2024 07:14 )    Color: x / Appearance: x / SG: x / pH: x  Gluc: 146 mg/dL / Ketone: x  / Bili: x / Urobili: x   Blood: x / Protein: x / Nitrite: x   Leuk Esterase: x / RBC: x / WBC x   Sq Epi: x / Non Sq Epi: x / Bacteria: x        Culture - Abscess with Gram Stain (collected 03 Jan 2024 17:30)  Source: .Abscess Leg - Left  Gram Stain (04 Jan 2024 05:52):    No polymorphonuclear cells seen per low power field    Numerous Gram positive cocci in pairs seen per oil power field    Numerous Gram Negative Rods seen per oil power field      RADIOLOGY & ADDITIONAL TESTS:   reviewed elctronically  ASSESSMENT/PLAN: 	    25 minutes aggregate time was spent on this visit, 50% visit time spent in care co-ordination with other attendings and counselling patient .I have discussed care plan with patient / HCP/family member ,who expressed understanding of problems treatment and their effect and side effects, alternatives in details. I have asked if they have any questions and concerns and appropriately addressed them to best of my ability. b

## 2024-01-04 NOTE — CARE COORDINATION ASSESSMENT. - NSPASTMEDSURGHISTORY_GEN_ALL_CORE_FT
PAST MEDICAL & SURGICAL HISTORY:  Constipation      Depression      CVA (cerebral vascular accident)      Hypertension      Hyperlipidemia      Neuropathy      Neuropathy      Afib      Grade I diastolic dysfunction      Aortic valvar stenosis      VHD (valvular heart disease)      GERD (gastroesophageal reflux disease)      Hypothyroidism      H/O skin graft

## 2024-01-04 NOTE — DIETITIAN INITIAL EVALUATION ADULT - PERTINENT LABORATORY DATA
01-04    139  |  109<H>  |  16  ----------------------------<  146<H>  3.7   |  26  |  0.58    Ca    8.1<L>      04 Jan 2024 07:14    TPro  6.3  /  Alb  2.3<L>  /  TBili  0.5  /  DBili  x   /  AST  17  /  ALT  9<L>  /  AlkPhos  85  01-04

## 2024-01-04 NOTE — PHYSICAL THERAPY INITIAL EVALUATION ADULT - ADDITIONAL COMMENTS
Pt is from assisted living facility. Utilized rajesh lift for OOB to wheelchair. Nonambulatory at baseline. Requires assist for dressing/bathing.

## 2024-01-04 NOTE — CARE COORDINATION ASSESSMENT. - NSCAREPROVIDERS_GEN_ALL_CORE_FT
CARE PROVIDERS:  Accepting Physician: Анна Best  Administration: Yfn Riddle  Administration: Joel Huizar  Administration: Monica Ramirez  Admitting: Анна Best  Attending: Анна Best  Case Management: Ave Tran  Case Management: Aisha Da Silva  Consultant: Emily Turner  Consultant: Rufino Reynolds  Consultant: Sushil Anguiano  Consultant: Swapnil De La Rosa  Covering Team: Tayler Laguerre  ED Attending: Alesia Jenkins  ED Nurse: Key Rios  Nurse: Miriam Hill  Nurse: Abby Post  Nurse: Janette Greer  Ordered: ADM, User  Outpatient Provider: Pipe Prince  Outpatient Provider: Mike Urias  Outpatient Provider: Pahlavan, Mohsen  Outpatient Provider: Fritz Kauffman  Outpatient Provider: Facundo Valentine  Override: Pamela Cooper  Override: Vivi Arti  PCA/Nursing Assistant: Sweetie Hernandez  PCA/Nursing Assistant: Elena Borges  Primary Team: Nettie Riley  Registered Dietitian: Thais Muñoz  : Chio Warren  Wound Care Specialist Nurse: Jennifer Reed   CARE PROVIDERS:  Accepting Physician: Анна Best  Administration: Yfn Riddle  Administration: Joel Huizar  Administration: Monica Ramirez  Admitting: Анна Best  Attending: Анна Best  Case Management: Ave Tran  Case Management: Aisha Da Silva  Consultant: Emily Turner  Consultant: Rufino Reynolds  Consultant: Sushil Anguiano  Consultant: Swapnil De La Rosa  Covering Team: Tayler Laguerre  ED Attending: Alesia Jenkins  ED Nurse: Key Rios  Nurse: Miriam Hill  Nurse: Abby Post  Nurse: Janette Greer  Ordered: ADM, User  Outpatient Provider: Pipe Prince  Outpatient Provider: Mike Urias  Outpatient Provider: Pahlavan, Mohsen  Outpatient Provider: Fritz Kauffman  Outpatient Provider: Facundo Valentnie  Override: Pamela Cooper  Override: Vivi Arti  PCA/Nursing Assistant: Sweetie Hernandez  PCA/Nursing Assistant: Elena Borges  Primary Team: Nettie Riley  Registered Dietitian: Thais Muñoz  : Chio Warren  Wound Care Specialist Nurse: Jennifer Reed   CARE PROVIDERS:  Accepting Physician: Анна Best  Administration: Yfn Riddle  Administration: Joel Huizar  Administration: Monica Ramirez  Admitting: Анна Best  Attending: Анна Best  Case Management: Ave Tran  Case Management: Aisha Da Silva  Consultant: Emily Turner  Consultant: Rufino Reynolds  Consultant: Sushil Anguiano  Consultant: Swapnil De La Rosa  Covering Team: Tayler Laguerre  ED Attending: Alesia Jenkins  ED Nurse: Key Rios  Nurse: Miriam Hill  Nurse: Abby Post  Nurse: Janette Greer  Ordered: ADM, User  Outpatient Provider: Pipe Prince  Outpatient Provider: Mike Urias  Outpatient Provider: Pahlavan, Mohsen  Outpatient Provider: Fritz Kauffman  Outpatient Provider: Facundo Valentine  Override: Pamela Cooper  Override: Arti Trinidad  PCA/Nursing Assistant: Elena Borges  Primary Team: Nettie Riley  Registered Dietitian: Thais Muñoz  : Chio Warren  Wound Care Specialist Nurse: Jennifer Reed   CARE PROVIDERS:  Accepting Physician: Анна Best  Administration: Yfn Riddle  Administration: Joel Huizar  Administration: Monica Ramirez  Admitting: Анна Best  Attending: Анна Best  Case Management: Ave Tran  Case Management: Aisha Da Silva  Consultant: Emily Turner  Consultant: Rufino Reynolds  Consultant: Sushil Anguiano  Consultant: Swapnil De La Rosa  Covering Team: Tayler Laguerre  ED Attending: lAesia Jenkins  ED Nurse: Key Rios  Nurse: Miriam Hill  Nurse: Abby Post  Nurse: Janette Greer  Ordered: ADM, User  Outpatient Provider: Pipe Prince  Outpatient Provider: Mike Urias  Outpatient Provider: Pahlavan, Mohsen  Outpatient Provider: Fritz Kauffman  Outpatient Provider: Facundo Valentine  Override: Pamela Cooper  Override: Arti Trinidad  PCA/Nursing Assistant: Elena Borges  Primary Team: Nettie Riley  Registered Dietitian: Thais Muñoz  : Chio Warren  Wound Care Specialist Nurse: Jennifer Reed

## 2024-01-04 NOTE — CONSULT NOTE ADULT - PROBLEM SELECTOR RECOMMENDATION 9
Chart reviewed and Patient evaluated  Obtained wound culture to be sent to Pathology  Applied xeroform with dry sterile dressing  Rec IV antibiotics As Per ID  Rec admit to hospitalist team  Rec vascular consult  Dressing changes every other day with: xeroform, 4x4, abd, myra  Podiatry will follow while in house.   will discuss care plan  with all  Attendings
PPS 50%  PT eval, from [facility]

## 2024-01-04 NOTE — CARE COORDINATION ASSESSMENT. - MET/SPOKE WITH
spoke with son Curtis over phone and s/w Grecia at [facility]/natali spoke with son Curtis over phone and s/w Grecia at [facility]/natali

## 2024-01-04 NOTE — CONSULT NOTE ADULT - SUBJECTIVE AND OBJECTIVE BOX
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  Patient was hospitalized in 2023  g with altered mental status concern for UTI , also  cellulitis to the LLE at the site of a skin graft. Pt recently completed a course of po abx for this with no significant improvement. bib ems with wounds to left lower leg for unknown period of time, now weeping and draining, pt c/o pain but pt is poor historian She was admitted in november 2023 with LLE cellulitis and seen by wound care MD & ID -discharged on po doxycycline and cipro & with topical care .Admitted for septic workup , iv abx ,pain management and wound care  (03 Jan 2024 18:15)    PERTINENT PM/SXH:   Hypertension    CVA (cerebral vascular accident)    Depression    Constipation    Neuropathy    Constipation    Hyperlipidemia    Grade I diastolic dysfunction    Afib    Neuropathy    VHD (valvular heart disease)    Aortic valvar stenosis    -Hypothyroidism    GERD (gastroesophageal reflux disease)      No significant past surgical history    H/O skin graft      FAMILY HISTORY: no known medical history in first degree relatives    Family Hx substance abuse [ ]yes [x ]no  ITEMS NOT CHECKED ARE NOT PRESENT    SOCIAL HISTORY:   Significant other/partner[ ]  Children[x ]  Shinto/Spirituality:  Substance hx:  [ ]   Tobacco hx:  [ ]   Alcohol hx: [ ]   Home Opioid hx:  [ ] I-Stop Reference No:  Living Situation: [ ]Home  [ ]Long term care  [ ]Rehab [x ]Other- [facility]    ADVANCE DIRECTIVES:    DNR/MOLST  [ ]  Living Will  [ ]   DECISION MAKER(s):  [ x] Health Care Proxy(s)  [ ] Surrogate(s)  [ ] Guardian           Name(s): Phone Number(s): Curtis (son)   [phone]    BASELINE (I)ADL(s) (prior to admission):  [place]: [ ]Total  [x Moderate [ ]Dependent    Allergies    per son &quot;issues with certain anitibiotics&quot; does not remember the names - Patient has tolerated zosyn, ceftriaxone, bactrim, and vancomycin on past admissions. (Unknown)    Intolerances    MEDICATIONS  (STANDING):  amLODIPine   Tablet 5 milliGRAM(s) Oral daily  cefepime   IVPB 2000 milliGRAM(s) IV Intermittent every 12 hours  cholecalciferol 2000 Unit(s) Oral daily  dextrose 5% + sodium chloride 0.45%. 1000 milliLiter(s) (40 mL/Hr) IV Continuous <Continuous>  escitalopram 20 milliGRAM(s) Oral daily  folic acid 1 milliGRAM(s) Oral daily  gabapentin 300 milliGRAM(s) Oral three times a day  hydroxyurea 500 milliGRAM(s) Oral two times a day  lactobacillus acidophilus 1 Tablet(s) Oral every 12 hours  levothyroxine 50 MICROGram(s) Oral daily  metoprolol tartrate 25 milliGRAM(s) Oral two times a day  pantoprazole    Tablet 40 milliGRAM(s) Oral before breakfast  polyethylene glycol 3350 17 Gram(s) Oral daily  senna 2 Tablet(s) Oral at bedtime  simvastatin 10 milliGRAM(s) Oral at bedtime    MEDICATIONS  (PRN):  acetaminophen     Tablet .. 650 milliGRAM(s) Oral every 6 hours PRN Temp greater or equal to 38C (100.4F), Mild Pain (1 - 3)  aluminum hydroxide/magnesium hydroxide/simethicone Suspension 30 milliLiter(s) Oral every 4 hours PRN Dyspepsia  bisacodyl Suppository 10 milliGRAM(s) Rectal daily PRN Constipation  magnesium hydroxide Suspension 30 milliLiter(s) Oral daily PRN Constipation  melatonin 3 milliGRAM(s) Oral at bedtime PRN Insomnia  morphine  - Injectable 2 milliGRAM(s) IV Push every 6 hours PRN Severe Pain (7 - 10)  ondansetron Injectable 4 milliGRAM(s) IV Push every 8 hours PRN Nausea and/or Vomiting  traMADol 50 milliGRAM(s) Oral every 6 hours PRN Moderate Pain (4 - 6)    PRESENT SYMPTOMS: [ ]Unable to self-report  [ ] CPOT [ ] PAINADs [ ] RDOS  Source if other than patient:  [ ]Family   [ ]Team     Pain: [ ]yes [x ]no  QOL impact -   Location -                    Aggravating factors -  Quality -  Radiation -  Timing-  Severity (0-10 scale):  Minimal acceptable level (0-10 scale):     CPOT:    https://www.[facility].org/getattachment/[email or web address]/Critical-Care-Pain-Observation-Tool-(CPOT)    PAIN AD Score:   http://geriatrictoolkit.[facility]/cog/painad.pdf (press ctrl +  left click to view)    Dyspnea:                           [ ]Mild [ ]Moderate [ ]Severe      RDOS:  0 to 2  minimal or no respiratory distress   3  mild distress  4 to 6 moderate distress  >7 severe distress  https://homecareinformation.net/handouts/hen/Respiratory_Distress_Observation_Scale.pdf (Ctrl +  left click to view)     Anxiety:                             [ ]Mild [ ]Moderate [ ]Severe  Fatigue:                             [ ]Mild [ ]Moderate [ ]Severe  Nausea:                             [ ]Mild [ ]Moderate [ ]Severe  Loss of appetite:              [ ]Mild [ ]Moderate [ ]Severe  Constipation:                    [ ]Mild [ ]Moderate [ ]Severe    PCSSQ[Palliative Care Spiritual Screening Question]   Severity (0-10):  Score of 4 or > indicate consideration of Chaplaincy referral.  Chaplaincy Referral: [ ] yes [ ] refused [ ] following [ x] Deferred     Caregiver [place]? : [ ] yes [ ] no [x ] Deferred [ ] Declined             Social work referral [ ] Patient & Family Centered Care Referral [ ]     Anticipatory Grief present?:  [ ] yes [ ] no  [ x] Deferred                  Social work referral [ ] Chaplaincy Referral[ ]      Other Symptoms:  [x ]All other review of systems negative     Palliative Performance Status Version 2:     50    %    http://[facility].org/files/news/palliative_performance_scale_ppsv2.pdf  PHYSICAL EXAM:  Vital Signs Last 24 Hrs  T(C): 36.6 (04 Jan 2024 05:09), Max: 36.9 (03 Jan 2024 21:24)  T(F): 97.8 (04 Jan 2024 05:09), Max: 98.5 (03 Jan 2024 21:24)  HR: 100 (04 Jan 2024 05:09) (64 - 100)  BP: 133/70 (04 Jan 2024 05:09) (100/48 - 133/70)  BP(mean): --  RR: 18 (04 Jan 2024 05:09) (17 - 18)  SpO2: 97% (04 Jan 2024 05:09) (90% - 98%)    Parameters below as of 04 Jan 2024 05:09  Patient On (Oxygen Delivery Method): room air     I&O's Summary    03 Jan 2024 07:01  -  04 Jan 2024 07:00  --------------------------------------------------------  IN: 0 mL / OUT: 400 mL / NET: -400 mL      GENERAL: [ ]Cachexia    [x ]Alert  [x]Oriented x2   [ ]Lethargic  [ ]Unarousable  [ ]Verbal  [ ]Non-Verbal  Behavioral:   [ ] Anxiety  [ ] Delirium [ x] Agitation [ ] Other  HEENT:  [ x]Normal   [ ]Dry mouth   [ ]ET Tube/Trach  [ ]Oral lesions  PULMONARY:   [x ]Clear [ ]Tachypnea  [ ]Audible excessive secretions   [ ]Rhonchi        [ ]Right [ ]Left [ ]Bilateral  [ ]Crackles        [ ]Right [ ]Left [ ]Bilateral  [ ]Wheezing     [ ]Right [ ]Left [ ]Bilateral  [ ]Diminished breath sounds [ ]right [ ]left [ ]bilateral  CARDIOVASCULAR:    [ ]Regular [x ]Irregular [ ]Tachy  [ ]Art [ x]Murmur [ ]Other  GASTROINTESTINAL:  [x ]Soft  [ ]Distended   [ ]+BS  [ x]Non tender [ ]Tender  [ ]Other [ ]PEG [ ]OGT/ NGT  Last BM:  GENITOURINARY:  [ ]Normal [x ] Incontinent   [ ]Oliguria/Anuria   [ ]Wheeler  MUSCULOSKELETAL:   [ ]Normal   [x ]Weakness  [ ]Bed/Wheelchair bound [ ]Edema  NEUROLOGIC:   [ ]No focal deficits  [ x]Cognitive impairment  [ ]Dysphagia [ ]Dysarthria [ ]Paresis [ ]Other   SKIN:   [ ]Normal  [ xRash  [ ]Other  [ ]Pressure ulcer(s)       Present on admission [ x]y [ ]n    CRITICAL CARE:  [ ] Shock Present  [ ]Septic [ ]Cardiogenic [ ]Neurologic [ ]Hypovolemic  [ ]  Vasopressors [ ]  Inotropes   [ ]Respiratory failure present [ ]Mechanical ventilation [ ]Non-invasive ventilatory support [ ]High flow    [ ]Acute  [ ]Chronic [ ]Hypoxic  [ ]Hypercarbic [ ]Other  [ ]Other organ failure     LABS:                        10.7   18.43 )-----------( 293      ( 04 Jan 2024 07:14 )             34.9   01-04    139  |  109<H>  |  16  ----------------------------<  146<H>  3.7   |  26  |  0.58    Ca    8.1<L>      04 Jan 2024 07:14    TPro  6.3  /  Alb  2.3<L>  /  TBili  0.5  /  DBili  x   /  AST  17  /  ALT  9<L>  /  AlkPhos  85  01-04  PT/INR - ( 04 Jan 2024 07:14 )   PT: 51.0 sec;   INR: 4.56 ratio             Urinalysis Basic - ( 04 Jan 2024 07:14 )    Color: x / Appearance: x / SG: x / pH: x  Gluc: 146 mg/dL / Ketone: x  / Bili: x / Urobili: x   Blood: x / Protein: x / Nitrite: x   Leuk Esterase: x / RBC: x / WBC x   Sq Epi: x / Non Sq Epi: x / Bacteria: x      RADIOLOGY & ADDITIONAL STUDIES:  < from: Xray Chest 1 View-PORTABLE IMMEDIATE (01.03.24 @ 15:27) >    ACC: [id number] EXAM:  XR CHEST PORTABLE IMMED 1V   ORDERED BY: PAZ WONG     PROCEDURE DATE:  01/03/2024          INTERPRETATION:  AP semierect chest on January 3, 2024 at 3:22 PM.   Patient is short of breath with cough.    Patient's chin obscures the apices.    Heart magnified by technique.    There is a mild left base effusion improved from November 30, 2023.    IMPRESSION: Improving left base effusion.    --- End of Report ---            LEANA NAM MD; Attending Radiologist  This document has been electronically signed. Luther  3 2024  3:28PM    < end of copied text >      PROTEIN CALORIE MALNUTRITION PRESENT: [ ]mild [ ]moderate [ ]severe [ ]underweight [ ]morbid obesity  https://www.andeal.org/vault/2440/web/files/ONC/Table_Clinical%20Characteristics%20to%20Document%20Malnutrition-White%20JV%20et%20al%202012.pdf    Height (cm): 167.6 (01-03-24 @ 14:10), 167.6 (11-22-23 @ 22:02), 167.6 (10-06-23 @ 00:03)  Weight (kg): 49.9 (01-03-24 @ 14:10), 56.4 (11-22-23 @ 22:02), 56.7 (08-18-23 @ 13:02)  BMI (kg/m2): 17.8 (01-03-24 @ 14:10), 20.1 (11-22-23 @ 22:02), 20.2 (10-06-23 @ 00:03)    [ ]PPSV2 < or = to 30% [ ]significant weight loss  [ ]poor nutritional intake  [ ]anasarca[ ]Artificial Nutrition      Other REFERRALS:  [ ]Hospice  [ ]Child Life  [ ]Social Work  [ x]Case management [ ]Holistic Therapy    HPI:  89yo female  ,[facility] resident with hx of Past medical history of CVA on Coumadin with right-sided weakness, hypertension hyperlipidemia CAD history of A-fib on Coumadinhypertension, phlebitis, 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  Patient was hospitalized in 2023  g with altered mental status concern for UTI , also  cellulitis to the LLE at the site of a skin graft. Pt recently completed a course of po abx for this with no significant improvement. bib ems with wounds to left lower leg for unknown period of time, now weeping and draining, pt c/o pain but pt is poor historian She was admitted in november 2023 with LLE cellulitis and seen by wound care MD & ID -discharged on po doxycycline and cipro & with topical care .Admitted for septic workup , iv abx ,pain management and wound care  (03 Jan 2024 18:15)    PERTINENT PM/SXH:   Hypertension    CVA (cerebral vascular accident)    Depression    Constipation    Neuropathy    Constipation    Hyperlipidemia    Grade I diastolic dysfunction    Afib    Neuropathy    VHD (valvular heart disease)    Aortic valvar stenosis    -Hypothyroidism    GERD (gastroesophageal reflux disease)      No significant past surgical history    H/O skin graft      FAMILY HISTORY: no known medical history in first degree relatives    Family Hx substance abuse [ ]yes [x ]no  ITEMS NOT CHECKED ARE NOT PRESENT    SOCIAL HISTORY:   Significant other/partner[ ]  Children[x ]  Mormon/Spirituality:  Substance hx:  [ ]   Tobacco hx:  [ ]   Alcohol hx: [ ]   Home Opioid hx:  [ ] I-Stop Reference No:  Living Situation: [ ]Home  [ ]Long term care  [ ]Rehab [x ]Other- [facility]    ADVANCE DIRECTIVES:    DNR/MOLST  [ ]  Living Will  [ ]   DECISION MAKER(s):  [ x] Health Care Proxy(s)  [ ] Surrogate(s)  [ ] Guardian           Name(s): Phone Number(s): Curtis (son)   [phone]    BASELINE (I)ADL(s) (prior to admission):  [place]: [ ]Total  [x Moderate [ ]Dependent    Allergies    per son &quot;issues with certain anitibiotics&quot; does not remember the names - Patient has tolerated zosyn, ceftriaxone, bactrim, and vancomycin on past admissions. (Unknown)    Intolerances    MEDICATIONS  (STANDING):  amLODIPine   Tablet 5 milliGRAM(s) Oral daily  cefepime   IVPB 2000 milliGRAM(s) IV Intermittent every 12 hours  cholecalciferol 2000 Unit(s) Oral daily  dextrose 5% + sodium chloride 0.45%. 1000 milliLiter(s) (40 mL/Hr) IV Continuous <Continuous>  escitalopram 20 milliGRAM(s) Oral daily  folic acid 1 milliGRAM(s) Oral daily  gabapentin 300 milliGRAM(s) Oral three times a day  hydroxyurea 500 milliGRAM(s) Oral two times a day  lactobacillus acidophilus 1 Tablet(s) Oral every 12 hours  levothyroxine 50 MICROGram(s) Oral daily  metoprolol tartrate 25 milliGRAM(s) Oral two times a day  pantoprazole    Tablet 40 milliGRAM(s) Oral before breakfast  polyethylene glycol 3350 17 Gram(s) Oral daily  senna 2 Tablet(s) Oral at bedtime  simvastatin 10 milliGRAM(s) Oral at bedtime    MEDICATIONS  (PRN):  acetaminophen     Tablet .. 650 milliGRAM(s) Oral every 6 hours PRN Temp greater or equal to 38C (100.4F), Mild Pain (1 - 3)  aluminum hydroxide/magnesium hydroxide/simethicone Suspension 30 milliLiter(s) Oral every 4 hours PRN Dyspepsia  bisacodyl Suppository 10 milliGRAM(s) Rectal daily PRN Constipation  magnesium hydroxide Suspension 30 milliLiter(s) Oral daily PRN Constipation  melatonin 3 milliGRAM(s) Oral at bedtime PRN Insomnia  morphine  - Injectable 2 milliGRAM(s) IV Push every 6 hours PRN Severe Pain (7 - 10)  ondansetron Injectable 4 milliGRAM(s) IV Push every 8 hours PRN Nausea and/or Vomiting  traMADol 50 milliGRAM(s) Oral every 6 hours PRN Moderate Pain (4 - 6)    PRESENT SYMPTOMS: [ ]Unable to self-report  [ ] CPOT [ ] PAINADs [ ] RDOS  Source if other than patient:  [ ]Family   [ ]Team     Pain: [ ]yes [x ]no  QOL impact -   Location -                    Aggravating factors -  Quality -  Radiation -  Timing-  Severity (0-10 scale):  Minimal acceptable level (0-10 scale):     CPOT:    https://www.[facility].org/getattachment/[email or web address]/Critical-Care-Pain-Observation-Tool-(CPOT)    PAIN AD Score:   http://geriatrictoolkit.[facility]/cog/painad.pdf (press ctrl +  left click to view)    Dyspnea:                           [ ]Mild [ ]Moderate [ ]Severe      RDOS:  0 to 2  minimal or no respiratory distress   3  mild distress  4 to 6 moderate distress  >7 severe distress  https://homecareinformation.net/handouts/hen/Respiratory_Distress_Observation_Scale.pdf (Ctrl +  left click to view)     Anxiety:                             [ ]Mild [ ]Moderate [ ]Severe  Fatigue:                             [ ]Mild [ ]Moderate [ ]Severe  Nausea:                             [ ]Mild [ ]Moderate [ ]Severe  Loss of appetite:              [ ]Mild [ ]Moderate [ ]Severe  Constipation:                    [ ]Mild [ ]Moderate [ ]Severe    PCSSQ[Palliative Care Spiritual Screening Question]   Severity (0-10):  Score of 4 or > indicate consideration of Chaplaincy referral.  Chaplaincy Referral: [ ] yes [ ] refused [ ] following [ x] Deferred     Caregiver [place]? : [ ] yes [ ] no [x ] Deferred [ ] Declined             Social work referral [ ] Patient & Family Centered Care Referral [ ]     Anticipatory Grief present?:  [ ] yes [ ] no  [ x] Deferred                  Social work referral [ ] Chaplaincy Referral[ ]      Other Symptoms:  [x ]All other review of systems negative     Palliative Performance Status Version 2:     50    %    http://[facility].org/files/news/palliative_performance_scale_ppsv2.pdf  PHYSICAL EXAM:  Vital Signs Last 24 Hrs  T(C): 36.6 (04 Jan 2024 05:09), Max: 36.9 (03 Jan 2024 21:24)  T(F): 97.8 (04 Jan 2024 05:09), Max: 98.5 (03 Jan 2024 21:24)  HR: 100 (04 Jan 2024 05:09) (64 - 100)  BP: 133/70 (04 Jan 2024 05:09) (100/48 - 133/70)  BP(mean): --  RR: 18 (04 Jan 2024 05:09) (17 - 18)  SpO2: 97% (04 Jan 2024 05:09) (90% - 98%)    Parameters below as of 04 Jan 2024 05:09  Patient On (Oxygen Delivery Method): room air     I&O's Summary    03 Jan 2024 07:01  -  04 Jan 2024 07:00  --------------------------------------------------------  IN: 0 mL / OUT: 400 mL / NET: -400 mL      GENERAL: [ ]Cachexia    [x ]Alert  [x]Oriented x2   [ ]Lethargic  [ ]Unarousable  [ ]Verbal  [ ]Non-Verbal  Behavioral:   [ ] Anxiety  [ ] Delirium [ x] Agitation [ ] Other  HEENT:  [ x]Normal   [ ]Dry mouth   [ ]ET Tube/Trach  [ ]Oral lesions  PULMONARY:   [x ]Clear [ ]Tachypnea  [ ]Audible excessive secretions   [ ]Rhonchi        [ ]Right [ ]Left [ ]Bilateral  [ ]Crackles        [ ]Right [ ]Left [ ]Bilateral  [ ]Wheezing     [ ]Right [ ]Left [ ]Bilateral  [ ]Diminished breath sounds [ ]right [ ]left [ ]bilateral  CARDIOVASCULAR:    [ ]Regular [x ]Irregular [ ]Tachy  [ ]Art [ x]Murmur [ ]Other  GASTROINTESTINAL:  [x ]Soft  [ ]Distended   [ ]+BS  [ x]Non tender [ ]Tender  [ ]Other [ ]PEG [ ]OGT/ NGT  Last BM:  GENITOURINARY:  [ ]Normal [x ] Incontinent   [ ]Oliguria/Anuria   [ ]Wheeler  MUSCULOSKELETAL:   [ ]Normal   [x ]Weakness  [ ]Bed/Wheelchair bound [ ]Edema  NEUROLOGIC:   [ ]No focal deficits  [ x]Cognitive impairment  [ ]Dysphagia [ ]Dysarthria [ ]Paresis [ ]Other   SKIN:   [ ]Normal  [ xRash  [ ]Other  [ ]Pressure ulcer(s)       Present on admission [ x]y [ ]n    CRITICAL CARE:  [ ] Shock Present  [ ]Septic [ ]Cardiogenic [ ]Neurologic [ ]Hypovolemic  [ ]  Vasopressors [ ]  Inotropes   [ ]Respiratory failure present [ ]Mechanical ventilation [ ]Non-invasive ventilatory support [ ]High flow    [ ]Acute  [ ]Chronic [ ]Hypoxic  [ ]Hypercarbic [ ]Other  [ ]Other organ failure     LABS:                        10.7   18.43 )-----------( 313      ( 04 Jan 2024 07:14 )             34.9   01-04    139  |  109<H>  |  16  ----------------------------<  146<H>  3.7   |  26  |  0.58    Ca    8.1<L>      04 Jan 2024 07:14    TPro  6.3  /  Alb  2.3<L>  /  TBili  0.5  /  DBili  x   /  AST  17  /  ALT  9<L>  /  AlkPhos  85  01-04  PT/INR - ( 04 Jan 2024 07:14 )   PT: 51.0 sec;   INR: 4.56 ratio             Urinalysis Basic - ( 04 Jan 2024 07:14 )    Color: x / Appearance: x / SG: x / pH: x  Gluc: 146 mg/dL / Ketone: x  / Bili: x / Urobili: x   Blood: x / Protein: x / Nitrite: x   Leuk Esterase: x / RBC: x / WBC x   Sq Epi: x / Non Sq Epi: x / Bacteria: x      RADIOLOGY & ADDITIONAL STUDIES:  < from: Xray Chest 1 View-PORTABLE IMMEDIATE (01.03.24 @ 15:27) >    ACC: [id number] EXAM:  XR CHEST PORTABLE IMMED 1V   ORDERED BY: PAZ WONG     PROCEDURE DATE:  01/03/2024          INTERPRETATION:  AP semierect chest on January 3, 2024 at 3:22 PM.   Patient is short of breath with cough.    Patient's chin obscures the apices.    Heart magnified by technique.    There is a mild left base effusion improved from November 30, 2023.    IMPRESSION: Improving left base effusion.    --- End of Report ---            LEANA NAM MD; Attending Radiologist  This document has been electronically signed. Luther  3 2024  3:28PM    < end of copied text >      PROTEIN CALORIE MALNUTRITION PRESENT: [ ]mild [ ]moderate [ ]severe [ ]underweight [ ]morbid obesity  https://www.andeal.org/vault/2440/web/files/ONC/Table_Clinical%20Characteristics%20to%20Document%20Malnutrition-White%20JV%20et%20al%202012.pdf    Height (cm): 167.6 (01-03-24 @ 14:10), 167.6 (11-22-23 @ 22:02), 167.6 (10-06-23 @ 00:03)  Weight (kg): 49.9 (01-03-24 @ 14:10), 56.4 (11-22-23 @ 22:02), 56.7 (08-18-23 @ 13:02)  BMI (kg/m2): 17.8 (01-03-24 @ 14:10), 20.1 (11-22-23 @ 22:02), 20.2 (10-06-23 @ 00:03)    [ ]PPSV2 < or = to 30% [ ]significant weight loss  [ ]poor nutritional intake  [ ]anasarca[ ]Artificial Nutrition      Other REFERRALS:  [ ]Hospice  [ ]Child Life  [ ]Social Work  [ x]Case management [ ]Holistic Therapy

## 2024-01-04 NOTE — PATIENT CHOICE NOTE. - NSPOSTACUTEPROV_GEN_ALL_CORE
Home Care Cosentyx Counseling:  I discussed with the patient the risks of Cosentyx including but not limited to worsening of Crohn's disease, immunosuppression, allergic reactions and infections.  The patient understands that monitoring is required including a PPD at baseline and must alert us or the primary physician if symptoms of infection or other concerning signs are noted.

## 2024-01-04 NOTE — CAREGIVER ENGAGEMENT NOTE - CAREGIVER NAME
pt lives at [facility]. natali Acevedo best contact pt lives at [facility]. natali Acevedo best contact

## 2024-01-05 LAB
ANION GAP SERPL CALC-SCNC: 2 MMOL/L — LOW (ref 5–17)
ANION GAP SERPL CALC-SCNC: 2 MMOL/L — LOW (ref 5–17)
BUN SERPL-MCNC: 12 MG/DL — SIGNIFICANT CHANGE UP (ref 7–23)
BUN SERPL-MCNC: 12 MG/DL — SIGNIFICANT CHANGE UP (ref 7–23)
CALCIUM SERPL-MCNC: 7.9 MG/DL — LOW (ref 8.5–10.1)
CALCIUM SERPL-MCNC: 7.9 MG/DL — LOW (ref 8.5–10.1)
CHLORIDE SERPL-SCNC: 111 MMOL/L — HIGH (ref 96–108)
CHLORIDE SERPL-SCNC: 111 MMOL/L — HIGH (ref 96–108)
CO2 SERPL-SCNC: 28 MMOL/L — SIGNIFICANT CHANGE UP (ref 22–31)
CO2 SERPL-SCNC: 28 MMOL/L — SIGNIFICANT CHANGE UP (ref 22–31)
CREAT SERPL-MCNC: 0.48 MG/DL — LOW (ref 0.5–1.3)
CREAT SERPL-MCNC: 0.48 MG/DL — LOW (ref 0.5–1.3)
EGFR: 89 ML/MIN/1.73M2 — SIGNIFICANT CHANGE UP
EGFR: 89 ML/MIN/1.73M2 — SIGNIFICANT CHANGE UP
GLUCOSE SERPL-MCNC: 111 MG/DL — HIGH (ref 70–99)
GLUCOSE SERPL-MCNC: 111 MG/DL — HIGH (ref 70–99)
HCT VFR BLD CALC: 33.4 % — LOW (ref 34.5–45)
HCT VFR BLD CALC: 33.4 % — LOW (ref 34.5–45)
HGB BLD-MCNC: 10.4 G/DL — LOW (ref 11.5–15.5)
HGB BLD-MCNC: 10.4 G/DL — LOW (ref 11.5–15.5)
INR BLD: 4.29 RATIO — HIGH (ref 0.85–1.18)
INR BLD: 4.29 RATIO — HIGH (ref 0.85–1.18)
MCHC RBC-ENTMCNC: 31.1 GM/DL — LOW (ref 32–36)
MCHC RBC-ENTMCNC: 31.1 GM/DL — LOW (ref 32–36)
MCHC RBC-ENTMCNC: 34.2 PG — HIGH (ref 27–34)
MCHC RBC-ENTMCNC: 34.2 PG — HIGH (ref 27–34)
MCV RBC AUTO: 109.9 FL — HIGH (ref 80–100)
MCV RBC AUTO: 109.9 FL — HIGH (ref 80–100)
NRBC # BLD: 0 /100 WBCS — SIGNIFICANT CHANGE UP (ref 0–0)
NRBC # BLD: 0 /100 WBCS — SIGNIFICANT CHANGE UP (ref 0–0)
PLATELET # BLD AUTO: 565 K/UL — HIGH (ref 150–400)
PLATELET # BLD AUTO: 565 K/UL — HIGH (ref 150–400)
POTASSIUM SERPL-MCNC: 3.4 MMOL/L — LOW (ref 3.5–5.3)
POTASSIUM SERPL-MCNC: 3.4 MMOL/L — LOW (ref 3.5–5.3)
POTASSIUM SERPL-SCNC: 3.4 MMOL/L — LOW (ref 3.5–5.3)
POTASSIUM SERPL-SCNC: 3.4 MMOL/L — LOW (ref 3.5–5.3)
PROTHROM AB SERPL-ACNC: 48.1 SEC — HIGH (ref 9.5–13)
PROTHROM AB SERPL-ACNC: 48.1 SEC — HIGH (ref 9.5–13)
RBC # BLD: 3.04 M/UL — LOW (ref 3.8–5.2)
RBC # BLD: 3.04 M/UL — LOW (ref 3.8–5.2)
RBC # FLD: 18.4 % — HIGH (ref 10.3–14.5)
RBC # FLD: 18.4 % — HIGH (ref 10.3–14.5)
SODIUM SERPL-SCNC: 141 MMOL/L — SIGNIFICANT CHANGE UP (ref 135–145)
SODIUM SERPL-SCNC: 141 MMOL/L — SIGNIFICANT CHANGE UP (ref 135–145)
WBC # BLD: 16.69 K/UL — HIGH (ref 3.8–10.5)
WBC # BLD: 16.69 K/UL — HIGH (ref 3.8–10.5)
WBC # FLD AUTO: 16.69 K/UL — HIGH (ref 3.8–10.5)
WBC # FLD AUTO: 16.69 K/UL — HIGH (ref 3.8–10.5)

## 2024-01-05 RX ORDER — PIPERACILLIN AND TAZOBACTAM 4; .5 G/20ML; G/20ML
4.5 INJECTION, POWDER, LYOPHILIZED, FOR SOLUTION INTRAVENOUS ONCE
Refills: 0 | Status: COMPLETED | OUTPATIENT
Start: 2024-01-05 | End: 2024-01-05

## 2024-01-05 RX ORDER — POTASSIUM CHLORIDE 20 MEQ
20 PACKET (EA) ORAL
Refills: 0 | Status: COMPLETED | OUTPATIENT
Start: 2024-01-05 | End: 2024-01-05

## 2024-01-05 RX ORDER — PIPERACILLIN AND TAZOBACTAM 4; .5 G/20ML; G/20ML
4.5 INJECTION, POWDER, LYOPHILIZED, FOR SOLUTION INTRAVENOUS EVERY 12 HOURS
Refills: 0 | Status: DISCONTINUED | OUTPATIENT
Start: 2024-01-06 | End: 2024-01-06

## 2024-01-05 RX ORDER — PIPERACILLIN AND TAZOBACTAM 4; .5 G/20ML; G/20ML
4.5 INJECTION, POWDER, LYOPHILIZED, FOR SOLUTION INTRAVENOUS ONCE
Refills: 0 | Status: COMPLETED | OUTPATIENT
Start: 2024-01-06 | End: 2024-01-06

## 2024-01-05 RX ADMIN — Medication 2000 UNIT(S): at 11:09

## 2024-01-05 RX ADMIN — POLYETHYLENE GLYCOL 3350 17 GRAM(S): 17 POWDER, FOR SOLUTION ORAL at 11:08

## 2024-01-05 RX ADMIN — GABAPENTIN 300 MILLIGRAM(S): 400 CAPSULE ORAL at 14:41

## 2024-01-05 RX ADMIN — PIPERACILLIN AND TAZOBACTAM 200 GRAM(S): 4; .5 INJECTION, POWDER, LYOPHILIZED, FOR SOLUTION INTRAVENOUS at 21:24

## 2024-01-05 RX ADMIN — Medication 50 MICROGRAM(S): at 05:15

## 2024-01-05 RX ADMIN — ESCITALOPRAM OXALATE 20 MILLIGRAM(S): 10 TABLET, FILM COATED ORAL at 11:08

## 2024-01-05 RX ADMIN — GABAPENTIN 300 MILLIGRAM(S): 400 CAPSULE ORAL at 05:14

## 2024-01-05 RX ADMIN — CEFEPIME 100 MILLIGRAM(S): 1 INJECTION, POWDER, FOR SOLUTION INTRAMUSCULAR; INTRAVENOUS at 17:09

## 2024-01-05 RX ADMIN — HYDROXYUREA 500 MILLIGRAM(S): 500 CAPSULE ORAL at 05:14

## 2024-01-05 RX ADMIN — SODIUM CHLORIDE 40 MILLILITER(S): 9 INJECTION, SOLUTION INTRAVENOUS at 11:09

## 2024-01-05 RX ADMIN — SIMVASTATIN 10 MILLIGRAM(S): 20 TABLET, FILM COATED ORAL at 21:25

## 2024-01-05 RX ADMIN — Medication 25 MILLIGRAM(S): at 17:10

## 2024-01-05 RX ADMIN — Medication 20 MILLIEQUIVALENT(S): at 16:25

## 2024-01-05 RX ADMIN — CEFEPIME 100 MILLIGRAM(S): 1 INJECTION, POWDER, FOR SOLUTION INTRAMUSCULAR; INTRAVENOUS at 05:14

## 2024-01-05 RX ADMIN — AMLODIPINE BESYLATE 5 MILLIGRAM(S): 2.5 TABLET ORAL at 05:15

## 2024-01-05 RX ADMIN — MORPHINE SULFATE 2 MILLIGRAM(S): 50 CAPSULE, EXTENDED RELEASE ORAL at 16:55

## 2024-01-05 RX ADMIN — Medication 20 MILLIEQUIVALENT(S): at 14:41

## 2024-01-05 RX ADMIN — MORPHINE SULFATE 2 MILLIGRAM(S): 50 CAPSULE, EXTENDED RELEASE ORAL at 16:25

## 2024-01-05 RX ADMIN — Medication 1 TABLET(S): at 05:14

## 2024-01-05 RX ADMIN — GABAPENTIN 300 MILLIGRAM(S): 400 CAPSULE ORAL at 21:23

## 2024-01-05 RX ADMIN — SENNA PLUS 2 TABLET(S): 8.6 TABLET ORAL at 21:25

## 2024-01-05 RX ADMIN — HYDROXYUREA 500 MILLIGRAM(S): 500 CAPSULE ORAL at 17:10

## 2024-01-05 RX ADMIN — Medication 1 MILLIGRAM(S): at 11:08

## 2024-01-05 RX ADMIN — Medication 25 MILLIGRAM(S): at 05:15

## 2024-01-05 RX ADMIN — Medication 1 TABLET(S): at 17:09

## 2024-01-05 RX ADMIN — PANTOPRAZOLE SODIUM 40 MILLIGRAM(S): 20 TABLET, DELAYED RELEASE ORAL at 05:20

## 2024-01-05 NOTE — ADVANCED PRACTICE NURSE CONSULT - RECOMMEDATIONS
LLE wounds  --> Continue with podiatry recommendations as ordered.  --> Z-flow positioner to offload.    Prevention  --> Moisturize skin with Sween24 daily after bathing.  --> Continue turning and positioning q2h and utilize offloading devices as per protocol.  --> Avoid use of diapers and continue with Attends pads and daily/PRN pericare for soiling.  --> Waffle cushion when OOB to chair.  --> Nutrition Consult for optimization in pt w/ increased nutritional needs.  --> Encourage high quality protein, Julio/Prosource, MVI & Vit C to promote wound healing.   LLE including L plantar wound  --> Continue with podiatry recommendations as ordered. Cleanse with NS, pat dry. Apply Xeroform, gauze, and ABD. Secure with Damian.  --> Z-flow positioner to offload.    Prevention  --> Moisturize skin with Sween24 daily after bathing.  --> Continue turning and positioning q2h and utilize offloading devices as per protocol.  --> Avoid use of diapers and continue with Attends pads and daily/PRN pericare for soiling.  --> Waffle cushion when OOB to chair.  --> Nutrition Consult for optimization in pt w/ increased nutritional needs.  --> Encourage high quality protein, Julio/Prosource, MVI & Vit C to promote wound healing.   LLE including L plantar wound  --> Continue with podiatry recommendations as ordered. Cleanse with NS, pat dry. Apply Xeroform, gauze, and ABD. Secure with Damian.  --> Premedicate prior to dressing changes.  --> Z-flow positioner to offload.    Prevention  --> Moisturize skin with Sween24 daily after bathing.  --> Continue turning and positioning q2h and utilize offloading devices as per protocol.  --> Avoid use of diapers and continue with Attends pads and daily/PRN pericare for soiling.  --> Waffle cushion when OOB to chair.  --> Nutrition Consult for optimization in pt w/ increased nutritional needs.  --> Encourage high quality protein, Julio/Prosource, MVI & Vit C to promote wound healing.

## 2024-01-05 NOTE — PROGRESS NOTE ADULT - ASSESSMENT
REASON FOR VISIT  .. Management of problems listed below      ROS  . Patient unable to give ROS     PHYSICAL EXAM    HEENT Unremarkable  atraumatic   RESP Fair air entry  Harsh breath sound   CARDIAC S1 S2 No S3     NO JVD    ABDOMEN No hepatosplenomegaly   PEDAL EDEMA present No calf tenderness  NO rash       GENERAL DATA .   GOC.   .. 1/4/2024 dnr   ..  1/3/2024 FULL CODE  ICU STAY.  .. NO  COVID. ..       BEST PRACTICE ISSUES.    HOB ELEVATN.  .. Yes  DVT PPLX.  ..   on coumadin        DIET.   ..  1/3/2024 DASH   IV fl. .. 1/3/2024 d5 1/2 40    STRESS ULCER PPLX.   .  ..  1/3/2024 PROTONIX 40      ALLGY. .. certain antibiotics       WT. ..  1/3/2024 49  BMI. .. 1/3/2024 27     ABGS.   .  VS/ PO/IO/ VENT/ DRIPS.   1/5/2024 afeb 99 130/70   1/5/2024 ra 90%     PRESENTATION.  . CC 1/3/2024.From [facility] for LLE wound infection and pain   . HPI 1/3/2024.  . 89yo female bib ems with wounds to left lower leg for unknown period of time, now weeping and draining, pt c/o pain but pt is poor historian  . 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 Pulm consulted 1/3/2024 for abn cxr   . PMHx .   .. DVT   .. A fib on coumadin   .. Valvular heart disease   .. HFPEF   .... 6/28/2023 echo pasp 39 la sl dilated n lvsf  .. Aortic stenosis   .. CVA r sided weakness   .. Cellulitis  .... VANCO 11/22/2023 -> CIPRO 11/29 DOXY 11/29     . HOME MEDS .warfarin levoxyl 50 pepcid metoprolol 25.2 gabapentin 300.3 lasix 20 escitalopram 20     PROBLEM DATA.  INFECTION  CELLULITIS L LOWER EXTREMITY  .. w 1/3-1/4-1/5/2024 w 13 - 18- 16   .. pr 1/4/2024 pr .18   .. la 1/3/2024 la 2.4   .. cxr 1/3/2024 cxr improving l base infiltr improvd from 11/30/2023  .. bc 1/2 (-)   .. 1/3/2024 CEFEPIME 2.2 X 7D     . A fib  .. INR 1/4-1/5/2024 INR 4.5- 4.2     HEMAT  .. Hb 1/3-1/4/2024 Hb 10.9 - 10.7     RENAL   .. Na 1/3/2024 Na 141   .. CO2 1/3/2024 CO2 25   .. Cr 1/3/2024 Cr .7     . HYPOTHYROID  .. 1/3/2024 LEVOXYL 50     . SUMMARY.     .  89yo female from [facility] for worsening LLE wound   . 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 Pulm consulted 1/3/2024 for abn cxr   . OVERALL ASSESSMENT/PLAN.   . CELLULITIS L LO EXTR   .. 1/3/2024 Seen by ID   .. 1/3/2024 Started on cefepime   .. No pneumonia suspected   . A fib  .. Continue coumadin     . DISPO.  .. Follow cultures adjusr antibio     TIME SPENT.  . Over 36 minutes aggregate care time spent on encounter; activities included   direct patient care, counseling and/or coordinating care reviewing notes, lab data/ imaging , discussion with multidisciplinary team/ patient  /family and explaining in detail risks, benefits, alternatives  of the recommendations     PATIENT.  . AB BROOKS      REASON FOR VISIT  .. Management of problems listed below      ROS  . Patient unable to give ROS     PHYSICAL EXAM    HEENT Unremarkable  atraumatic   RESP Fair air entry  Harsh breath sound   CARDIAC S1 S2 No S3     NO JVD    ABDOMEN No hepatosplenomegaly   PEDAL EDEMA present No calf tenderness  NO rash       GENERAL DATA .   GOC.   .. 1/4/2024 dnr   ..  1/3/2024 FULL CODE  ICU STAY.  .. NO  COVID. ..       BEST PRACTICE ISSUES.    HOB ELEVATN.  .. Yes  DVT PPLX.  ..   on coumadin        DIET.   ..  1/3/2024 DASH   IV fl. .. 1/3/2024 d5 1/2 40    STRESS ULCER PPLX.   .  ..  1/3/2024 PROTONIX 40      ALLGY. .. certain antibiotics       WT. ..  1/3/2024 49  BMI. .. 1/3/2024 27     ABGS.   .  VS/ PO/IO/ VENT/ DRIPS.   1/5/2024 afeb 99 130/70   1/5/2024 ra 90%     PRESENTATION.  . CC 1/3/2024.From [facility] for LLE wound infection and pain   . HPI 1/3/2024.  . 89yo female bib ems with wounds to left lower leg for unknown period of time, now weeping and draining, pt c/o pain but pt is poor historian  . 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 Pulm consulted 1/3/2024 for abn cxr   . PMHx .   .. DVT   .. A fib on coumadin   .. Valvular heart disease   .. HFPEF   .... 6/28/2023 echo pasp 39 la sl dilated n lvsf  .. Aortic stenosis   .. CVA r sided weakness   .. Cellulitis  .... VANCO 11/22/2023 -> CIPRO 11/29 DOXY 11/29     . HOME MEDS .warfarin levoxyl 50 pepcid metoprolol 25.2 gabapentin 300.3 lasix 20 escitalopram 20     PROBLEM DATA.  INFECTION  CELLULITIS L LOWER EXTREMITY  .. w 1/3-1/4-1/5/2024 w 13 - 18- 16   .. pr 1/4/2024 pr .18   .. la 1/3/2024 la 2.4   .. cxr 1/3/2024 cxr improving l base infiltr improvd from 11/30/2023  .. bc 1/2 (-)   .. 1/3/2024 CEFEPIME 2.2 X 7D     . A fib  .. INR 1/4-1/5/2024 INR 4.5- 4.2     HEMAT  .. Hb 1/3-1/4/2024 Hb 10.9 - 10.7     RENAL   .. Na 1/3/2024 Na 141   .. CO2 1/3/2024 CO2 25   .. Cr 1/3/2024 Cr .7     . HYPOTHYROID  .. 1/3/2024 LEVOXYL 50     . SUMMARY.     .  89yo female from [facility] for worsening LLE wound   . 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 Pulm consulted 1/3/2024 for abn cxr   . OVERALL ASSESSMENT/PLAN.   . CELLULITIS L LO EXTR   .. 1/3/2024 Seen by ID   .. 1/3/2024 Started on cefepime   .. No pneumonia suspected   . A fib  .. Continue coumadin     . DISPO.  .. Follow cultures adjusr antibio     TIME SPENT.  . Over 36 minutes aggregate care time spent on encounter; activities included   direct patient care, counseling and/or coordinating care reviewing notes, lab data/ imaging , discussion with multidisciplinary team/ patient  /family and explaining in detail risks, benefits, alternatives  of the recommendations     PATIENT.  . AB BROOKS

## 2024-01-05 NOTE — PROGRESS NOTE ADULT - SUBJECTIVE AND OBJECTIVE BOX
CHIEF COMPLAINT/ REASON FOR VISIT  .. Patient was seen to address the  issue listed under PROBLEM LIST which is located toward bottom of this note     REJI BERGER    [facility] [address]    Allergies    per son &quot;issues with certain anitibiotics&quot; does not remember the names - Patient has tolerated zosyn, ceftriaxone, bactrim, and vancomycin on past admissions. (Unknown)    Intolerances        PAST MEDICAL & SURGICAL HISTORY:  Hypertension      CVA (cerebral vascular accident)      Depression      Constipation      Neuropathy      Hyperlipidemia      Grade I diastolic dysfunction      Afib      Neuropathy      VHD (valvular heart disease)      Aortic valvar stenosis      Hypothyroidism      GERD (gastroesophageal reflux disease)      H/O skin graft          FAMILY HISTORY:      Home Medications:  acetaminophen 500 mg oral tablet: 1 tab(s) orally 2 times a day (22 Nov 2023 20:06)  acetaminophen 500 mg oral tablet: 2 tab(s) orally once a day (in the afternoon) (22 Nov 2023 20:06)  amLODIPine 5 mg oral tablet: 1 tab(s) orally once a day (22 Nov 2023 20:06)  Biofreeze 4% topical gel: Apply topically to affected area 2 times a day (22 Nov 2023 20:06)  cholecalciferol 50 mcg (2000 intl units) oral tablet: 1 tab(s) orally once a day (22 Nov 2023 20:06)  escitalopram 20 mg oral tablet: 1 tab(s) orally once a day (22 Nov 2023 20:06)  famotidine 20 mg oral tablet: 1 tab(s) orally once a day (22 Nov 2023 20:06)  folic acid 1 mg oral tablet: 1 tab(s) orally once a day (22 Nov 2023 20:06)  furosemide 20 mg oral tablet: 1 tab(s) orally once a day (22 Nov 2023 20:06)  gabapentin 300 mg oral capsule: 1 cap(s) orally 3 times a day (22 Nov 2023 20:06)  hydroxyurea 500 mg oral capsule: 1 tab(s) orally 2 times a day (22 Nov 2023 20:06)  levothyroxine 50 mcg (0.05 mg) oral tablet: 1 tab(s) orally once a day (22 Nov 2023 20:06)  melatonin 3 mg oral tablet: 1 tab(s) orally once a day (22 Nov 2023 20:06)  metoprolol tartrate 25 mg oral tablet: 1 tab(s) orally 2 times a day (22 Nov 2023 20:06)  miconazole 2% topical cream: Apply topically to affected area once a day (22 Nov 2023 20:06)  senna (sennosides) 8.6 mg oral tablet: 2 tab(s) orally once a day (22 Nov 2023 20:06)  simvastatin 10 mg oral tablet: 1 tab(s) orally once a day (22 Nov 2023 20:06)  traMADol 50 mg oral tablet: 1 tab(s) orally 2 times a day hold for lethargy (22 Nov 2023 20:06)  traMADol 50 mg oral tablet: 1 tab(s) orally every 6 hours as needed for pain (22 Nov 2023 20:06)      MEDICATIONS  (STANDING):  amLODIPine   Tablet 5 milliGRAM(s) Oral daily  cefepime   IVPB 2000 milliGRAM(s) IV Intermittent every 12 hours  cholecalciferol 2000 Unit(s) Oral daily  dextrose 5% + sodium chloride 0.45%. 1000 milliLiter(s) (40 mL/Hr) IV Continuous <Continuous>  escitalopram 20 milliGRAM(s) Oral daily  folic acid 1 milliGRAM(s) Oral daily  gabapentin 300 milliGRAM(s) Oral three times a day  hydroxyurea 500 milliGRAM(s) Oral two times a day  lactobacillus acidophilus 1 Tablet(s) Oral every 12 hours  levothyroxine 50 MICROGram(s) Oral daily  metoprolol tartrate 25 milliGRAM(s) Oral two times a day  pantoprazole    Tablet 40 milliGRAM(s) Oral before breakfast  polyethylene glycol 3350 17 Gram(s) Oral daily  senna 2 Tablet(s) Oral at bedtime  simvastatin 10 milliGRAM(s) Oral at bedtime    MEDICATIONS  (PRN):  acetaminophen     Tablet .. 650 milliGRAM(s) Oral every 6 hours PRN Temp greater or equal to 38C (100.4F), Mild Pain (1 - 3)  aluminum hydroxide/magnesium hydroxide/simethicone Suspension 30 milliLiter(s) Oral every 4 hours PRN Dyspepsia  bisacodyl Suppository 10 milliGRAM(s) Rectal daily PRN Constipation  magnesium hydroxide Suspension 30 milliLiter(s) Oral daily PRN Constipation  melatonin 3 milliGRAM(s) Oral at bedtime PRN Insomnia  morphine  - Injectable 2 milliGRAM(s) IV Push every 6 hours PRN Severe Pain (7 - 10)  ondansetron Injectable 4 milliGRAM(s) IV Push every 8 hours PRN Nausea and/or Vomiting  traMADol 50 milliGRAM(s) Oral every 6 hours PRN Moderate Pain (4 - 6)      Diet, DASH/TLC:   Sodium & Cholesterol Restricted  Easy to Chew (EASYTOCHEW) (01-03-24 @ 18:42) [Active]          Vital Signs Last 24 Hrs  T(C): 37.1 (05 Jan 2024 05:20), Max: 37.4 (04 Jan 2024 19:43)  T(F): 98.7 (05 Jan 2024 05:20), Max: 99.3 (04 Jan 2024 19:43)  HR: 99 (05 Jan 2024 05:20) (92 - 105)  BP: 130/70 (05 Jan 2024 05:20) (103/63 - 134/85)  BP(mean): --  RR: 18 (05 Jan 2024 05:20) (18 - 18)  SpO2: 92% (05 Jan 2024 07:00) (90% - 92%)    Parameters below as of 05 Jan 2024 07:00  Patient On (Oxygen Delivery Method): room air          01-04-24 @ 07:01  -  01-05-24 @ 07:00  --------------------------------------------------------  IN: 240 mL / OUT: 0 mL / NET: 240 mL              LABS:                        10.4   16.69 )-----------( 565      ( 05 Jan 2024 06:05 )             33.4     01-04    139  |  109<H>  |  16  ----------------------------<  146<H>  3.7   |  26  |  0.58    Ca    8.1<L>      04 Jan 2024 07:14    TPro  6.3  /  Alb  2.3<L>  /  TBili  0.5  /  DBili  x   /  AST  17  /  ALT  9<L>  /  AlkPhos  85  01-04    PT/INR - ( 04 Jan 2024 07:14 )   PT: 51.0 sec;   INR: 4.56 ratio           Urinalysis Basic - ( 04 Jan 2024 07:14 )    Color: x / Appearance: x / SG: x / pH: x  Gluc: 146 mg/dL / Ketone: x  / Bili: x / Urobili: x   Blood: x / Protein: x / Nitrite: x   Leuk Esterase: x / RBC: x / WBC x   Sq Epi: x / Non Sq Epi: x / Bacteria: x            WBC:  WBC Count: 16.69 K/uL (01-05 @ 06:05)  WBC Count: 18.43 K/uL (01-04 @ 07:14)  WBC Count: 13.02 K/uL (01-03 @ 14:10)      MICROBIOLOGY:  RECENT CULTURES:  01-03 .Abscess Leg - Left XXXX   No polymorphonuclear cells seen per low power field  Numerous Gram positive cocci in pairs seen per oil power field  Numerous Gram Negative Rods seen per oil power field XXXX    01-03 .Blood Blood-Peripheral XXXX XXXX   No growth at 24 hours    01-03 .Blood Blood-Peripheral XXXX XXXX   No growth at 24 hours                PT/INR - ( 04 Jan 2024 07:14 )   PT: 51.0 sec;   INR: 4.56 ratio             Sodium:  Sodium: 139 mmol/L (01-04 @ 07:14)  Sodium: 141 mmol/L (01-03 @ 14:10)      0.58 mg/dL 01-04 @ 07:14  0.72 mg/dL 01-03 @ 14:10      Hemoglobin:  Hemoglobin: 10.4 g/dL (01-05 @ 06:05)  Hemoglobin: 10.7 g/dL (01-04 @ 07:14)  Hemoglobin: 10.9 g/dL (01-03 @ 14:10)      Platelets: Platelet Count - Automated: 565 K/uL (01-05 @ 06:05)  Platelet Count - Automated: 733 K/uL (01-04 @ 07:14)  Platelet Count - Automated: 793 K/uL (01-03 @ 14:10)      LIVER FUNCTIONS - ( 04 Jan 2024 07:14 )  Alb: 2.3 g/dL / Pro: 6.3 g/dL / ALK PHOS: 85 U/L / ALT: 9 U/L / AST: 17 U/L / GGT: x             Urinalysis Basic - ( 04 Jan 2024 07:14 )    Color: x / Appearance: x / SG: x / pH: x  Gluc: 146 mg/dL / Ketone: x  / Bili: x / Urobili: x   Blood: x / Protein: x / Nitrite: x   Leuk Esterase: x / RBC: x / WBC x   Sq Epi: x / Non Sq Epi: x / Bacteria: x        RADIOLOGY & ADDITIONAL STUDIES:      MICROBIOLOGY:  RECENT CULTURES:  01-03 .Abscess Leg - Left XXXX   No polymorphonuclear cells seen per low power field  Numerous Gram positive cocci in pairs seen per oil power field  Numerous Gram Negative Rods seen per oil power field XXXX    01-03 .Blood Blood-Peripheral XXXX XXXX   No growth at 24 hours    01-03 .Blood Blood-Peripheral XXXX XXXX   No growth at 24 hours

## 2024-01-05 NOTE — PROGRESS NOTE ADULT - ASSESSMENT
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  Patient was hospitalized in 2023  g with altered mental status concern for UTI , also  cellulitis to the LLE at the site of a skin graft. Pt recently completed a course of po abx for this with no significant improvement. 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  Patient was hospitalized in 2023  g with altered mental status concern for UTI , also  cellulitis to the LLE at the site of a skin graft. Pt recently completed a course of po abx for this with no significant improvement. bib ems with wounds to left lower leg for unknown period of time, now weeping and draining, pt c/o pain but pt is poor historian She was admitted in november 2023 with LLE cellulitis and seen by wound care MD & ID -discharged on po doxycycline and cipro & with topical care .Admitted for septic workup , iv abx ,pain management and wound care

## 2024-01-05 NOTE — ADVANCED PRACTICE NURSE CONSULT - ASSESSMENT
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  Patient was hospitalized in 2023  g with altered mental status concern for UTI , also  cellulitis to the LLE at the site of a skin graft. Pt recently completed a course of po abx for this with no significant improvement. bib ems with wounds to left lower leg for unknown period of time, now weeping and draining, pt c/o pain but pt is poor historian She was admitted in november 2023 with LLE cellulitis and seen by wound care MD & ID -discharged on po doxycycline and cipro & with topical care .Admitted for septic workup , iv abx ,pain management and wound care  (03 Jan 2024 18:15).    Upon arrival, patient awake with AMS. Thin, pale, frail, and guarded. Patient resistant to movement of her L leg and screams in pain upon palpation. CAIR boot gently removed with the help of the PCA to assess L heel. No erythema, induration, fluctuance, or breakdown noted. Between the ankle and knee is wrapped with Damian at this time. Patient strongly vocalized this area not to be touched. Plantar foot with ulceration covered with yellow slough. Unable to measure or dress due to patient's pain level. Patient refusing reapplication of CAIR boot to LLE and in agreement to elevate and float heel using pillow. 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  Patient was hospitalized in 2023  g with altered mental status concern for UTI , also  cellulitis to the LLE at the site of a skin graft. Pt recently completed a course of po abx for this with no significant improvement. bib ems with wounds to left lower leg for unknown period of time, now weeping and draining, pt c/o pain but pt is poor historian She was admitted in november 2023 with LLE cellulitis and seen by wound care MD & ID -discharged on po doxycycline and cipro & with topical care .Admitted for septic workup , iv abx ,pain management and wound care  (03 Jan 2024 18:15).    Upon arrival, patient awake with AMS. Thin, pale, frail, with LLE contracted. Patient resistant to extension of her L leg and screams in pain upon movement and palpation. CAIR boot gently removed with the help of the PCA to assess L heel. No erythema, induration, fluctuance, or breakdown noted. Between the ankle and knee is wrapped with Damian at this time. Patient strongly vocalized this area not to be touched. Plantar foot with ulceration covered with yellow slough. No drainage noted. Unable to measure or dress due to patient's pain level. Patient refusing reapplication of CAIR boot to LLE. Permission granted to elevate and float heel using a pillow. 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  Patient was hospitalized in 2023  g with altered mental status concern for UTI , also  cellulitis to the LLE at the site of a skin graft. Pt recently completed a course of po abx for this with no significant improvement. bib ems with wounds to left lower leg for unknown period of time, now weeping and draining, pt c/o pain but pt is poor historian She was admitted in november 2023 with LLE cellulitis and seen by wound care MD & ID -discharged on po doxycycline and cipro & with topical care .Admitted for septic workup , iv abx ,pain management and wound care  (03 Jan 2024 18:15).    Upon arrival, patient awake with AMS. Thin, pale, frail, with LLE contracted. Patient resistant to extension of her L leg and screams in pain upon movement and palpation. CAIR boot gently removed with the help of the PCA to assess L heel. No erythema, induration, fluctuance, or breakdown noted. Between the ankle and knee is wrapped with Damian at this time. Patient strongly vocalized this area not to be touched. Plantar foot with ulceration covered with yellow slough. No drainage noted. Unable to measure or dress due to patient's pain level. Patient refusing reapplication of CAIR boot to LLE. Permission granted to elevate and float heel using a pillow.

## 2024-01-05 NOTE — PROGRESS NOTE ADULT - SUBJECTIVE AND OBJECTIVE BOX
PROGRESS NOTE  Patient is a 89y old  Female who presents with a chief complaint of LLE OPEN WOUND (05 Jan 2024 07:10)    Chart and available morning labs /imaging are reviewed electronically , urgent issues addressed . More information  is being added upon completion of rounds , when more information is collected and management discussed with consultants , medical staff and social service/case management on the floor   OVERNIGHT  No new issues reported by medical staff . 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  Patient was hospitalized in 2023  g with altered mental status concern for UTI , also  cellulitis to the LLE at the site of a skin graft. Pt recently completed a course of po abx for this with no significant improvement. bib ems with wounds to left lower leg for unknown period of time, now weeping and draining, pt c/o pain but pt is poor historian She was admitted in november 2023 with LLE cellulitis and seen by wound care MD & ID -discharged on po doxycycline and cipro & with topical care .Admitted for septic workup , iv abx ,pain management and wound care  (03 Jan 2024 18:15)    PAST MEDICAL & SURGICAL HISTORY:  CVA (cerebral vascular accident)      VHD (valvular heart disease)      Hyperlipidemia      Neuropathy      Hypertension      Depression      Constipation      Neuropathy      Grade I diastolic dysfunction      GERD (gastroesophageal reflux disease)      Aortic valvar stenosis      Afib      Hypothyroidism      H/O skin graft          MEDICATIONS  (STANDING):  amLODIPine   Tablet 5 milliGRAM(s) Oral daily  cefepime   IVPB 2000 milliGRAM(s) IV Intermittent every 12 hours  cholecalciferol 2000 Unit(s) Oral daily  dextrose 5% + sodium chloride 0.45%. 1000 milliLiter(s) (40 mL/Hr) IV Continuous <Continuous>  escitalopram 20 milliGRAM(s) Oral daily  folic acid 1 milliGRAM(s) Oral daily  gabapentin 300 milliGRAM(s) Oral three times a day  hydroxyurea 500 milliGRAM(s) Oral two times a day  lactobacillus acidophilus 1 Tablet(s) Oral every 12 hours  levothyroxine 50 MICROGram(s) Oral daily  metoprolol tartrate 25 milliGRAM(s) Oral two times a day  pantoprazole    Tablet 40 milliGRAM(s) Oral before breakfast  polyethylene glycol 3350 17 Gram(s) Oral daily  senna 2 Tablet(s) Oral at bedtime  simvastatin 10 milliGRAM(s) Oral at bedtime    MEDICATIONS  (PRN):  acetaminophen     Tablet .. 650 milliGRAM(s) Oral every 6 hours PRN Temp greater or equal to 38C (100.4F), Mild Pain (1 - 3)  aluminum hydroxide/magnesium hydroxide/simethicone Suspension 30 milliLiter(s) Oral every 4 hours PRN Dyspepsia  bisacodyl Suppository 10 milliGRAM(s) Rectal daily PRN Constipation  magnesium hydroxide Suspension 30 milliLiter(s) Oral daily PRN Constipation  melatonin 3 milliGRAM(s) Oral at bedtime PRN Insomnia  morphine  - Injectable 2 milliGRAM(s) IV Push every 6 hours PRN Severe Pain (7 - 10)  ondansetron Injectable 4 milliGRAM(s) IV Push every 8 hours PRN Nausea and/or Vomiting  traMADol 50 milliGRAM(s) Oral every 6 hours PRN Moderate Pain (4 - 6)      OBJECTIVE    T(C): 36.6 (01-05-24 @ 12:37), Max: 37.4 (01-04-24 @ 19:43)  HR: 82 (01-05-24 @ 17:06) (82 - 99)  BP: 129/67 (01-05-24 @ 17:06) (103/63 - 130/70)  RR: 18 (01-05-24 @ 12:37) (18 - 18)  SpO2: 94% (01-05-24 @ 12:37) (90% - 94%)  Wt(kg): --  I&O's Summary    04 Jan 2024 07:01  -  05 Jan 2024 07:00  --------------------------------------------------------  IN: 240 mL / OUT: 0 mL / NET: 240 mL          REVIEW OF SYSTEMS:  Patient is  unable to provide any information/ROS  due to baseline mental status.     PHYSICAL EXAM:  Appearance: NAD. VS past 24 hrs -as above   HEENT:   Moist oral mucosa. Conjunctiva clear b/l.   Neck : supple  Respiratory: Lungs CTAB.  Gastrointestinal:  Soft, nontender. No rebound. No rigidity. BS present	  Cardiovascular: RRR ,S1S2 present  Neurologic: Non-focal. Moving all extremities.  Extremities: No edema. No erythema. No calf tenderness.  Skin: No rashes, No ecchymoses, No cyanosis.	  wounds ,skin lesions-See skin assesment flow sheet   LABS:                        10.4   16.69 )-----------( 565      ( 05 Jan 2024 06:05 )             33.4     01-05    141  |  111<H>  |  12  ----------------------------<  111<H>  3.4<L>   |  28  |  0.48<L>    Ca    7.9<L>      05 Jan 2024 06:05    TPro  6.3  /  Alb  2.3<L>  /  TBili  0.5  /  DBili  x   /  AST  17  /  ALT  9<L>  /  AlkPhos  85  01-04    CAPILLARY BLOOD GLUCOSE        PT/INR - ( 05 Jan 2024 06:05 )   PT: 48.1 sec;   INR: 4.29 ratio           Urinalysis Basic - ( 05 Jan 2024 06:05 )    Color: x / Appearance: x / SG: x / pH: x  Gluc: 111 mg/dL / Ketone: x  / Bili: x / Urobili: x   Blood: x / Protein: x / Nitrite: x   Leuk Esterase: x / RBC: x / WBC x   Sq Epi: x / Non Sq Epi: x / Bacteria: x        Culture - Other (collected 03 Jan 2024 19:56)  Source: Wound Wound  Preliminary Report (05 Jan 2024 15:15):    Culture yields growth of greater than 3 colony types of    bacteria,  which may indicate contamination and normal jessica    Call client services within 7 days if further workup is clinically    indicated.    Culture includes    Few Staphylococcus aureus    Moderate Streptococcus dysgalactiae (Group C/G) "Susceptibilities not    performed"    Numerous Pseudomonas aeruginosa    Culture - Abscess with Gram Stain (collected 03 Jan 2024 17:30)  Source: .Abscess Leg - Left  Gram Stain (04 Jan 2024 05:52):    No polymorphonuclear cells seen per low power field    Numerous Gram positive cocci in pairs seen per oil power field    Numerous Gram Negative Rods seen per oil power field  Preliminary Report (05 Jan 2024 15:23):    Numerous Pseudomonas aeruginosa    Moderate Enterococcus faecalis    Moderate Streptococcus dysgalactiae (Group C/G) "Susceptibilities not    performed"    Culture - Blood (collected 03 Jan 2024 14:55)  Source: .Blood Blood-Peripheral  Preliminary Report (05 Jan 2024 01:03):    No growth at 24 hours    Culture - Blood (collected 03 Jan 2024 14:50)  Source: .Blood Blood-Peripheral  Preliminary Report (05 Jan 2024 01:03):    No growth at 24 hours      RADIOLOGY & ADDITIONAL TESTS:   reviewed elctronically  ASSESSMENT/PLAN: 	    25 minutes aggregate time was spent on this visit, 50% visit time spent in care co-ordination with other attendings and counselling patient .I have discussed care plan with patient / HCP/family member ,who expressed understanding of problems treatment and their effect and side effects, alternatives in details. I have asked if they have any questions and concerns and appropriately addressed them to best of my ability.  PROGRESS NOTE  Patient is a 89y old  Female who presents with a chief complaint of LLE OPEN WOUND (05 Jan 2024 07:10)    Chart and available morning labs /imaging are reviewed electronically , urgent issues addressed . More information  is being added upon completion of rounds , when more information is collected and management discussed with consultants , medical staff and social service/case management on the floor   OVERNIGHT  No new issues reported by medical staff . 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  Patient was hospitalized in 2023  g with altered mental status concern for UTI , also  cellulitis to the LLE at the site of a skin graft. Pt recently completed a course of po abx for this with no significant improvement. bib ems with wounds to left lower leg for unknown period of time, now weeping and draining, pt c/o pain but pt is poor historian She was admitted in november 2023 with LLE cellulitis and seen by wound care MD & ID -discharged on po doxycycline and cipro & with topical care .Admitted for septic workup , iv abx ,pain management and wound care  (03 Jan 2024 18:15)    PAST MEDICAL & SURGICAL HISTORY:  CVA (cerebral vascular accident)      VHD (valvular heart disease)      Hyperlipidemia      Neuropathy      Hypertension      Depression      Constipation      Neuropathy      Grade I diastolic dysfunction      GERD (gastroesophageal reflux disease)      Aortic valvar stenosis      Afib      Hypothyroidism      H/O skin graft          MEDICATIONS  (STANDING):  amLODIPine   Tablet 5 milliGRAM(s) Oral daily  cefepime   IVPB 2000 milliGRAM(s) IV Intermittent every 12 hours  cholecalciferol 2000 Unit(s) Oral daily  dextrose 5% + sodium chloride 0.45%. 1000 milliLiter(s) (40 mL/Hr) IV Continuous <Continuous>  escitalopram 20 milliGRAM(s) Oral daily  folic acid 1 milliGRAM(s) Oral daily  gabapentin 300 milliGRAM(s) Oral three times a day  hydroxyurea 500 milliGRAM(s) Oral two times a day  lactobacillus acidophilus 1 Tablet(s) Oral every 12 hours  levothyroxine 50 MICROGram(s) Oral daily  metoprolol tartrate 25 milliGRAM(s) Oral two times a day  pantoprazole    Tablet 40 milliGRAM(s) Oral before breakfast  polyethylene glycol 3350 17 Gram(s) Oral daily  senna 2 Tablet(s) Oral at bedtime  simvastatin 10 milliGRAM(s) Oral at bedtime    MEDICATIONS  (PRN):  acetaminophen     Tablet .. 650 milliGRAM(s) Oral every 6 hours PRN Temp greater or equal to 38C (100.4F), Mild Pain (1 - 3)  aluminum hydroxide/magnesium hydroxide/simethicone Suspension 30 milliLiter(s) Oral every 4 hours PRN Dyspepsia  bisacodyl Suppository 10 milliGRAM(s) Rectal daily PRN Constipation  magnesium hydroxide Suspension 30 milliLiter(s) Oral daily PRN Constipation  melatonin 3 milliGRAM(s) Oral at bedtime PRN Insomnia  morphine  - Injectable 2 milliGRAM(s) IV Push every 6 hours PRN Severe Pain (7 - 10)  ondansetron Injectable 4 milliGRAM(s) IV Push every 8 hours PRN Nausea and/or Vomiting  traMADol 50 milliGRAM(s) Oral every 6 hours PRN Moderate Pain (4 - 6)      OBJECTIVE    T(C): 36.6 (01-05-24 @ 12:37), Max: 37.4 (01-04-24 @ 19:43)  HR: 82 (01-05-24 @ 17:06) (82 - 99)  BP: 129/67 (01-05-24 @ 17:06) (103/63 - 130/70)  RR: 18 (01-05-24 @ 12:37) (18 - 18)  SpO2: 94% (01-05-24 @ 12:37) (90% - 94%)  Wt(kg): --  I&O's Summary    04 Jan 2024 07:01  -  05 Jan 2024 07:00  --------------------------------------------------------  IN: 240 mL / OUT: 0 mL / NET: 240 mL          REVIEW OF SYSTEMS:  Patient is  unable to provide any information/ROS  due to baseline mental status.     PHYSICAL EXAM:  Appearance: NAD. VS past 24 hrs -as above   HEENT:   Moist oral mucosa. Conjunctiva clear b/l.   Neck : supple  Respiratory: Lungs CTAB.  Gastrointestinal:  Soft, nontender. No rebound. No rigidity. BS present	  Cardiovascular: RRR ,S1S2 present  Neurologic: Non-focal. Moving all extremities.  Extremities: No edema. No erythema. No calf tenderness.  Skin: No rashes, No ecchymoses, No cyanosis.	  wounds ,skin lesions-See skin assesment flow sheet   LABS:                        10.4   16.69 )-----------( 565      ( 05 Jan 2024 06:05 )             33.4     01-05    141  |  111<H>  |  12  ----------------------------<  111<H>  3.4<L>   |  28  |  0.48<L>    Ca    7.9<L>      05 Jan 2024 06:05    TPro  6.3  /  Alb  2.3<L>  /  TBili  0.5  /  DBili  x   /  AST  17  /  ALT  9<L>  /  AlkPhos  85  01-04    CAPILLARY BLOOD GLUCOSE        PT/INR - ( 05 Jan 2024 06:05 )   PT: 48.1 sec;   INR: 4.29 ratio           Urinalysis Basic - ( 05 Jan 2024 06:05 )    Color: x / Appearance: x / SG: x / pH: x  Gluc: 111 mg/dL / Ketone: x  / Bili: x / Urobili: x   Blood: x / Protein: x / Nitrite: x   Leuk Esterase: x / RBC: x / WBC x   Sq Epi: x / Non Sq Epi: x / Bacteria: x        Culture - Other (collected 03 Jan 2024 19:56)  Source: Wound Wound  Preliminary Report (05 Jan 2024 15:15):    Culture yields growth of greater than 3 colony types of    bacteria,  which may indicate contamination and normal jessica    Call client services within 7 days if further workup is clinically    indicated.    Culture includes    Few Staphylococcus aureus    Moderate Streptococcus dysgalactiae (Group C/G) "Susceptibilities not    performed"    Numerous Pseudomonas aeruginosa    Culture - Abscess with Gram Stain (collected 03 Jan 2024 17:30)  Source: .Abscess Leg - Left  Gram Stain (04 Jan 2024 05:52):    No polymorphonuclear cells seen per low power field    Numerous Gram positive cocci in pairs seen per oil power field    Numerous Gram Negative Rods seen per oil power field  Preliminary Report (05 Jan 2024 15:23):    Numerous Pseudomonas aeruginosa    Moderate Enterococcus faecalis    Moderate Streptococcus dysgalactiae (Group C/G) "Susceptibilities not    performed"    Culture - Blood (collected 03 Jan 2024 14:55)  Source: .Blood Blood-Peripheral  Preliminary Report (05 Jan 2024 01:03):    No growth at 24 hours    Culture - Blood (collected 03 Jan 2024 14:50)  Source: .Blood Blood-Peripheral  Preliminary Report (05 Jan 2024 01:03):    No growth at 24 hours      RADIOLOGY & ADDITIONAL TESTS:   reviewed elctronically  ASSESSMENT/PLAN: 	    25 minutes aggregate time was spent on this visit, 50% visit time spent in care co-ordination with other attendings and counselling patient .I have discussed care plan with patient / HCP/family member ,who expressed understanding of problems treatment and their effect and side effects, alternatives in details. I have asked if they have any questions and concerns and appropriately addressed them to best of my ability.

## 2024-01-05 NOTE — PROGRESS NOTE ADULT - SUBJECTIVE AND OBJECTIVE BOX
Interval History:    CENTRAL LINE:   [  ] YES       [  ] NO  MUIR:                 [  ] YES       [  ] NO         REVIEW OF SYSTEMS:  All Systems below were reviewed and are negative [  ]  HEENT:  ID:  Pulmonary:  Cardiac:  GI:  Renal:  Musculoskeletal:  All other systems above were reviewed and are negative   [  ]      MEDICATIONS  (STANDING):  amLODIPine   Tablet 5 milliGRAM(s) Oral daily  cefepime   IVPB 2000 milliGRAM(s) IV Intermittent every 12 hours  cholecalciferol 2000 Unit(s) Oral daily  dextrose 5% + sodium chloride 0.45%. 1000 milliLiter(s) (40 mL/Hr) IV Continuous <Continuous>  escitalopram 20 milliGRAM(s) Oral daily  folic acid 1 milliGRAM(s) Oral daily  gabapentin 300 milliGRAM(s) Oral three times a day  hydroxyurea 500 milliGRAM(s) Oral two times a day  lactobacillus acidophilus 1 Tablet(s) Oral every 12 hours  levothyroxine 50 MICROGram(s) Oral daily  metoprolol tartrate 25 milliGRAM(s) Oral two times a day  pantoprazole    Tablet 40 milliGRAM(s) Oral before breakfast  polyethylene glycol 3350 17 Gram(s) Oral daily  senna 2 Tablet(s) Oral at bedtime  simvastatin 10 milliGRAM(s) Oral at bedtime    MEDICATIONS  (PRN):  acetaminophen     Tablet .. 650 milliGRAM(s) Oral every 6 hours PRN Temp greater or equal to 38C (100.4F), Mild Pain (1 - 3)  aluminum hydroxide/magnesium hydroxide/simethicone Suspension 30 milliLiter(s) Oral every 4 hours PRN Dyspepsia  bisacodyl Suppository 10 milliGRAM(s) Rectal daily PRN Constipation  magnesium hydroxide Suspension 30 milliLiter(s) Oral daily PRN Constipation  melatonin 3 milliGRAM(s) Oral at bedtime PRN Insomnia  morphine  - Injectable 2 milliGRAM(s) IV Push every 6 hours PRN Severe Pain (7 - 10)  ondansetron Injectable 4 milliGRAM(s) IV Push every 8 hours PRN Nausea and/or Vomiting  traMADol 50 milliGRAM(s) Oral every 6 hours PRN Moderate Pain (4 - 6)      Vital Signs Last 24 Hrs  T(C): 36.6 (05 Jan 2024 12:37), Max: 37.1 (05 Jan 2024 05:20)  T(F): 97.8 (05 Jan 2024 12:37), Max: 98.7 (05 Jan 2024 05:20)  HR: 82 (05 Jan 2024 17:06) (82 - 99)  BP: 129/67 (05 Jan 2024 17:06) (123/70 - 130/70)  BP(mean): --  RR: 18 (05 Jan 2024 12:37) (18 - 18)  SpO2: 94% (05 Jan 2024 12:37) (90% - 94%)    Parameters below as of 05 Jan 2024 12:37  Patient On (Oxygen Delivery Method): room air        I&O's Summary    04 Jan 2024 07:01  -  05 Jan 2024 07:00  --------------------------------------------------------  IN: 240 mL / OUT: 0 mL / NET: 240 mL        PHYSICAL EXAM:  HEENT: NC/AT; PERRLA  Neck: Soft; no tenderness  Lungs: CTA bilaterally; no wheezing.   Heart:  Abdomen:  Genital/ Rectal:  Extremities:  Neurologic:  Vascular:      LABORATORY:    CBC Full  -  ( 05 Jan 2024 06:05 )  WBC Count : 16.69 K/uL  RBC Count : 3.04 M/uL  Hemoglobin : 10.4 g/dL  Hematocrit : 33.4 %  Platelet Count - Automated : 565 K/uL  Mean Cell Volume : 109.9 fl  Mean Cell Hemoglobin : 34.2 pg  Mean Cell Hemoglobin Concentration : 31.1 gm/dL  Auto Neutrophil # : x  Auto Lymphocyte # : x  Auto Monocyte # : x  Auto Eosinophil # : x  Auto Basophil # : x  Auto Neutrophil % : x  Auto Lymphocyte % : x  Auto Monocyte % : x  Auto Eosinophil % : x  Auto Basophil % : x      ESR:                   01-04 @ 07:14  --    C-Reactive Protein:     01-04 @ 07:14  --    Procalcitonin:           01-04 @ 07:14   0.18      01-05    141  |  111<H>  |  12  ----------------------------<  111<H>  3.4<L>   |  28  |  0.48<L>    Ca    7.9<L>      05 Jan 2024 06:05    TPro  6.3  /  Alb  2.3<L>  /  TBili  0.5  /  DBili  x   /  AST  17  /  ALT  9<L>  /  AlkPhos  85  01-04          Assessment and Plan:          Sushil Anguiano MD   [phone].      Interval History:    CENTRAL LINE:   [  ] YES       [  ] NO  MUIR:                 [  ] YES       [  ] NO         REVIEW OF SYSTEMS:  All Systems below were reviewed and are negative [  ]  HEENT:  ID:  Pulmonary:  Cardiac:  GI:  Renal:  Musculoskeletal:  All other systems above were reviewed and are negative   [  ]      MEDICATIONS  (STANDING):  amLODIPine   Tablet 5 milliGRAM(s) Oral daily  cefepime   IVPB 2000 milliGRAM(s) IV Intermittent every 12 hours  cholecalciferol 2000 Unit(s) Oral daily  dextrose 5% + sodium chloride 0.45%. 1000 milliLiter(s) (40 mL/Hr) IV Continuous <Continuous>  escitalopram 20 milliGRAM(s) Oral daily  folic acid 1 milliGRAM(s) Oral daily  gabapentin 300 milliGRAM(s) Oral three times a day  hydroxyurea 500 milliGRAM(s) Oral two times a day  lactobacillus acidophilus 1 Tablet(s) Oral every 12 hours  levothyroxine 50 MICROGram(s) Oral daily  metoprolol tartrate 25 milliGRAM(s) Oral two times a day  pantoprazole    Tablet 40 milliGRAM(s) Oral before breakfast  polyethylene glycol 3350 17 Gram(s) Oral daily  senna 2 Tablet(s) Oral at bedtime  simvastatin 10 milliGRAM(s) Oral at bedtime    MEDICATIONS  (PRN):  acetaminophen     Tablet .. 650 milliGRAM(s) Oral every 6 hours PRN Temp greater or equal to 38C (100.4F), Mild Pain (1 - 3)  aluminum hydroxide/magnesium hydroxide/simethicone Suspension 30 milliLiter(s) Oral every 4 hours PRN Dyspepsia  bisacodyl Suppository 10 milliGRAM(s) Rectal daily PRN Constipation  magnesium hydroxide Suspension 30 milliLiter(s) Oral daily PRN Constipation  melatonin 3 milliGRAM(s) Oral at bedtime PRN Insomnia  morphine  - Injectable 2 milliGRAM(s) IV Push every 6 hours PRN Severe Pain (7 - 10)  ondansetron Injectable 4 milliGRAM(s) IV Push every 8 hours PRN Nausea and/or Vomiting  traMADol 50 milliGRAM(s) Oral every 6 hours PRN Moderate Pain (4 - 6)      Vital Signs Last 24 Hrs  T(C): 36.6 (05 Jan 2024 12:37), Max: 37.1 (05 Jan 2024 05:20)  T(F): 97.8 (05 Jan 2024 12:37), Max: 98.7 (05 Jan 2024 05:20)  HR: 82 (05 Jan 2024 17:06) (82 - 99)  BP: 129/67 (05 Jan 2024 17:06) (123/70 - 130/70)  BP(mean): --  RR: 18 (05 Jan 2024 12:37) (18 - 18)  SpO2: 94% (05 Jan 2024 12:37) (90% - 94%)    Parameters below as of 05 Jan 2024 12:37  Patient On (Oxygen Delivery Method): room air        I&O's Summary    04 Jan 2024 07:01  -  05 Jan 2024 07:00  --------------------------------------------------------  IN: 240 mL / OUT: 0 mL / NET: 240 mL        PHYSICAL EXAM:  HEENT: NC/AT; PERRLA  Neck: Soft; no tenderness  Lungs: CTA bilaterally; no wheezing.   Heart:  Abdomen:  Genital/ Rectal:  Extremities:  Neurologic:  Vascular:      LABORATORY:    CBC Full  -  ( 05 Jan 2024 06:05 )  WBC Count : 16.69 K/uL  RBC Count : 3.04 M/uL  Hemoglobin : 10.4 g/dL  Hematocrit : 33.4 %  Platelet Count - Automated : 565 K/uL  Mean Cell Volume : 109.9 fl  Mean Cell Hemoglobin : 34.2 pg  Mean Cell Hemoglobin Concentration : 31.1 gm/dL  Auto Neutrophil # : x  Auto Lymphocyte # : x  Auto Monocyte # : x  Auto Eosinophil # : x  Auto Basophil # : x  Auto Neutrophil % : x  Auto Lymphocyte % : x  Auto Monocyte % : x  Auto Eosinophil % : x  Auto Basophil % : x      ESR:                   01-04 @ 07:14  --    C-Reactive Protein:     01-04 @ 07:14  --    Procalcitonin:           01-04 @ 07:14   0.18      01-05    141  |  111<H>  |  12  ----------------------------<  111<H>  3.4<L>   |  28  |  0.48<L>    Ca    7.9<L>      05 Jan 2024 06:05    TPro  6.3  /  Alb  2.3<L>  /  TBili  0.5  /  DBili  x   /  AST  17  /  ALT  9<L>  /  AlkPhos  85  01-04          Assessment and Plan:          Sushil Anguiano MD   [phone].    She is afebrile  Comfortable.      MEDICATIONS  (STANDING):  amLODIPine   Tablet 5 milliGRAM(s) Oral daily  cefepime   IVPB 2000 milliGRAM(s) IV Intermittent every 12 hours  cholecalciferol 2000 Unit(s) Oral daily  dextrose 5% + sodium chloride 0.45%. 1000 milliLiter(s) (40 mL/Hr) IV Continuous <Continuous>  escitalopram 20 milliGRAM(s) Oral daily  folic acid 1 milliGRAM(s) Oral daily  gabapentin 300 milliGRAM(s) Oral three times a day  hydroxyurea 500 milliGRAM(s) Oral two times a day  lactobacillus acidophilus 1 Tablet(s) Oral every 12 hours  levothyroxine 50 MICROGram(s) Oral daily  metoprolol tartrate 25 milliGRAM(s) Oral two times a day  pantoprazole    Tablet 40 milliGRAM(s) Oral before breakfast  polyethylene glycol 3350 17 Gram(s) Oral daily  senna 2 Tablet(s) Oral at bedtime  simvastatin 10 milliGRAM(s) Oral at bedtime    MEDICATIONS  (PRN):  acetaminophen     Tablet .. 650 milliGRAM(s) Oral every 6 hours PRN Temp greater or equal to 38C (100.4F), Mild Pain (1 - 3)  aluminum hydroxide/magnesium hydroxide/simethicone Suspension 30 milliLiter(s) Oral every 4 hours PRN Dyspepsia  bisacodyl Suppository 10 milliGRAM(s) Rectal daily PRN Constipation  magnesium hydroxide Suspension 30 milliLiter(s) Oral daily PRN Constipation  melatonin 3 milliGRAM(s) Oral at bedtime PRN Insomnia  morphine  - Injectable 2 milliGRAM(s) IV Push every 6 hours PRN Severe Pain (7 - 10)  ondansetron Injectable 4 milliGRAM(s) IV Push every 8 hours PRN Nausea and/or Vomiting  traMADol 50 milliGRAM(s) Oral every 6 hours PRN Moderate Pain (4 - 6)      Vital Signs Last 24 Hrs  T(C): 36.6 (05 Jan 2024 12:37), Max: 37.1 (05 Jan 2024 05:20)  T(F): 97.8 (05 Jan 2024 12:37), Max: 98.7 (05 Jan 2024 05:20)  HR: 82 (05 Jan 2024 17:06) (82 - 99)  BP: 129/67 (05 Jan 2024 17:06) (123/70 - 130/70)  BP(mean): --  RR: 18 (05 Jan 2024 12:37) (18 - 18)  SpO2: 94% (05 Jan 2024 12:37) (90% - 94%)    Parameters below as of 05 Jan 2024 12:37  Patient On (Oxygen Delivery Method): room air        I&O's Summary    04 Jan 2024 07:01  -  05 Jan 2024 07:00  --------------------------------------------------------  IN: 240 mL / OUT: 0 mL / NET: 240 mL        PHYSICAL EXAM:  HEENT: NC/AT; PERRLA  Neck: Soft; no tenderness  Lungs: CTA bilaterally; no wheezing.   Heart: RRR, no murmurs.   Abdomen: Soft, no tenderness.   Genital/ Rectal: No salas catheter.   Extremities: LLE wounds with decreasing swelling and tenderness.   Neurologic: Confused.       LABORATORY:    CBC Full  -  ( 05 Jan 2024 06:05 )  WBC Count : 16.69 K/uL  RBC Count : 3.04 M/uL  Hemoglobin : 10.4 g/dL  Hematocrit : 33.4 %  Platelet Count - Automated : 565 K/uL  Mean Cell Volume : 109.9 fl  Mean Cell Hemoglobin : 34.2 pg  Mean Cell Hemoglobin Concentration : 31.1 gm/dL  Auto Neutrophil # : x  Auto Lymphocyte # : x  Auto Monocyte # : x  Auto Eosinophil # : x  Auto Basophil # : x  Auto Neutrophil % : x  Auto Lymphocyte % : x  Auto Monocyte % : x  Auto Eosinophil % : x  Auto Basophil % : x      ESR:                   01-04 @ 07:14  --    C-Reactive Protein:     01-04 @ 07:14  --    Procalcitonin:           01-04 @ 07:14   0.18      01-05    141  |  111<H>  |  12  ----------------------------<  111<H>  3.4<L>   |  28  |  0.48<L>    Ca    7.9<L>      05 Jan 2024 06:05    TPro  6.3  /  Alb  2.3<L>  /  TBili  0.5  /  DBili  x   /  AST  17  /  ALT  9<L>  /  AlkPhos  85  01-04    Assessment and Plan:    1. Worsening of LLE wounds.  2. Chronic LLE pain.    . Woud cultures are growing many Pseudomonas aeruginosa, Enterococcus faecalis and group C Strep.   . Discontinue IV Cefepime. Add IV Zosyn 4.5 gm q8h. Follow final report of wound cultures.   . Since the wound appears improving with IV antibiotics,, agree to hold off skin biopsies for now.  . Wound care daily.          Sushil Anguiano MD   [phone].    She is afebrile  Comfortable.      MEDICATIONS  (STANDING):  amLODIPine   Tablet 5 milliGRAM(s) Oral daily  cefepime   IVPB 2000 milliGRAM(s) IV Intermittent every 12 hours  cholecalciferol 2000 Unit(s) Oral daily  dextrose 5% + sodium chloride 0.45%. 1000 milliLiter(s) (40 mL/Hr) IV Continuous <Continuous>  escitalopram 20 milliGRAM(s) Oral daily  folic acid 1 milliGRAM(s) Oral daily  gabapentin 300 milliGRAM(s) Oral three times a day  hydroxyurea 500 milliGRAM(s) Oral two times a day  lactobacillus acidophilus 1 Tablet(s) Oral every 12 hours  levothyroxine 50 MICROGram(s) Oral daily  metoprolol tartrate 25 milliGRAM(s) Oral two times a day  pantoprazole    Tablet 40 milliGRAM(s) Oral before breakfast  polyethylene glycol 3350 17 Gram(s) Oral daily  senna 2 Tablet(s) Oral at bedtime  simvastatin 10 milliGRAM(s) Oral at bedtime    MEDICATIONS  (PRN):  acetaminophen     Tablet .. 650 milliGRAM(s) Oral every 6 hours PRN Temp greater or equal to 38C (100.4F), Mild Pain (1 - 3)  aluminum hydroxide/magnesium hydroxide/simethicone Suspension 30 milliLiter(s) Oral every 4 hours PRN Dyspepsia  bisacodyl Suppository 10 milliGRAM(s) Rectal daily PRN Constipation  magnesium hydroxide Suspension 30 milliLiter(s) Oral daily PRN Constipation  melatonin 3 milliGRAM(s) Oral at bedtime PRN Insomnia  morphine  - Injectable 2 milliGRAM(s) IV Push every 6 hours PRN Severe Pain (7 - 10)  ondansetron Injectable 4 milliGRAM(s) IV Push every 8 hours PRN Nausea and/or Vomiting  traMADol 50 milliGRAM(s) Oral every 6 hours PRN Moderate Pain (4 - 6)      Vital Signs Last 24 Hrs  T(C): 36.6 (05 Jan 2024 12:37), Max: 37.1 (05 Jan 2024 05:20)  T(F): 97.8 (05 Jan 2024 12:37), Max: 98.7 (05 Jan 2024 05:20)  HR: 82 (05 Jan 2024 17:06) (82 - 99)  BP: 129/67 (05 Jan 2024 17:06) (123/70 - 130/70)  BP(mean): --  RR: 18 (05 Jan 2024 12:37) (18 - 18)  SpO2: 94% (05 Jan 2024 12:37) (90% - 94%)    Parameters below as of 05 Jan 2024 12:37  Patient On (Oxygen Delivery Method): room air        I&O's Summary    04 Jan 2024 07:01  -  05 Jan 2024 07:00  --------------------------------------------------------  IN: 240 mL / OUT: 0 mL / NET: 240 mL        PHYSICAL EXAM:  HEENT: NC/AT; PERRLA  Neck: Soft; no tenderness  Lungs: CTA bilaterally; no wheezing.   Heart: RRR, no murmurs.   Abdomen: Soft, no tenderness.   Genital/ Rectal: No salas catheter.   Extremities: LLE wounds with decreasing swelling and tenderness.   Neurologic: Confused.       LABORATORY:    CBC Full  -  ( 05 Jan 2024 06:05 )  WBC Count : 16.69 K/uL  RBC Count : 3.04 M/uL  Hemoglobin : 10.4 g/dL  Hematocrit : 33.4 %  Platelet Count - Automated : 565 K/uL  Mean Cell Volume : 109.9 fl  Mean Cell Hemoglobin : 34.2 pg  Mean Cell Hemoglobin Concentration : 31.1 gm/dL  Auto Neutrophil # : x  Auto Lymphocyte # : x  Auto Monocyte # : x  Auto Eosinophil # : x  Auto Basophil # : x  Auto Neutrophil % : x  Auto Lymphocyte % : x  Auto Monocyte % : x  Auto Eosinophil % : x  Auto Basophil % : x      ESR:                   01-04 @ 07:14  --    C-Reactive Protein:     01-04 @ 07:14  --    Procalcitonin:           01-04 @ 07:14   0.18      01-05    141  |  111<H>  |  12  ----------------------------<  111<H>  3.4<L>   |  28  |  0.48<L>    Ca    7.9<L>      05 Jan 2024 06:05    TPro  6.3  /  Alb  2.3<L>  /  TBili  0.5  /  DBili  x   /  AST  17  /  ALT  9<L>  /  AlkPhos  85  01-04    Assessment and Plan:    1. Worsening of LLE wounds.  2. Chronic LLE pain.    . Woud cultures are growing many Pseudomonas aeruginosa, Enterococcus faecalis and group C Strep.   . Discontinue IV Cefepime. Add IV Zosyn 4.5 gm q8h. Follow final report of wound cultures.   . Since the wound appears improving with IV antibiotics,, agree to hold off skin biopsies for now.  . Wound care daily.          Sushil Anguiano MD   [phone].

## 2024-01-06 LAB
-  AMPICILLIN/SULBACTAM: SIGNIFICANT CHANGE UP
-  AMPICILLIN/SULBACTAM: SIGNIFICANT CHANGE UP
-  AMPICILLIN: SIGNIFICANT CHANGE UP
-  AMPICILLIN: SIGNIFICANT CHANGE UP
-  AZTREONAM: SIGNIFICANT CHANGE UP
-  CEFAZOLIN: SIGNIFICANT CHANGE UP
-  CEFAZOLIN: SIGNIFICANT CHANGE UP
-  CEFEPIME: SIGNIFICANT CHANGE UP
-  CEFTAZIDIME: SIGNIFICANT CHANGE UP
-  CIPROFLOXACIN: SIGNIFICANT CHANGE UP
-  CLINDAMYCIN: SIGNIFICANT CHANGE UP
-  CLINDAMYCIN: SIGNIFICANT CHANGE UP
-  DAPTOMYCIN: SIGNIFICANT CHANGE UP
-  DAPTOMYCIN: SIGNIFICANT CHANGE UP
-  ERYTHROMYCIN: SIGNIFICANT CHANGE UP
-  ERYTHROMYCIN: SIGNIFICANT CHANGE UP
-  GENTAMICIN: SIGNIFICANT CHANGE UP
-  GENTAMICIN: SIGNIFICANT CHANGE UP
-  IMIPENEM: SIGNIFICANT CHANGE UP
-  LEVOFLOXACIN: SIGNIFICANT CHANGE UP
-  LINEZOLID: SIGNIFICANT CHANGE UP
-  LINEZOLID: SIGNIFICANT CHANGE UP
-  MEROPENEM: SIGNIFICANT CHANGE UP
-  OXACILLIN: SIGNIFICANT CHANGE UP
-  OXACILLIN: SIGNIFICANT CHANGE UP
-  PENICILLIN: SIGNIFICANT CHANGE UP
-  PENICILLIN: SIGNIFICANT CHANGE UP
-  PIPERACILLIN/TAZOBACTAM: SIGNIFICANT CHANGE UP
-  RIFAMPIN: SIGNIFICANT CHANGE UP
-  RIFAMPIN: SIGNIFICANT CHANGE UP
-  TETRACYCLINE: SIGNIFICANT CHANGE UP
-  TRIMETHOPRIM/SULFAMETHOXAZOLE: SIGNIFICANT CHANGE UP
-  TRIMETHOPRIM/SULFAMETHOXAZOLE: SIGNIFICANT CHANGE UP
-  VANCOMYCIN: SIGNIFICANT CHANGE UP
ALBUMIN SERPL ELPH-MCNC: 1.7 G/DL — LOW (ref 3.3–5)
ALBUMIN SERPL ELPH-MCNC: 1.7 G/DL — LOW (ref 3.3–5)
ALP SERPL-CCNC: 73 U/L — SIGNIFICANT CHANGE UP (ref 40–120)
ALP SERPL-CCNC: 73 U/L — SIGNIFICANT CHANGE UP (ref 40–120)
ALT FLD-CCNC: 9 U/L — LOW (ref 12–78)
ALT FLD-CCNC: 9 U/L — LOW (ref 12–78)
ANION GAP SERPL CALC-SCNC: 4 MMOL/L — LOW (ref 5–17)
ANION GAP SERPL CALC-SCNC: 4 MMOL/L — LOW (ref 5–17)
AST SERPL-CCNC: 15 U/L — SIGNIFICANT CHANGE UP (ref 15–37)
AST SERPL-CCNC: 15 U/L — SIGNIFICANT CHANGE UP (ref 15–37)
BILIRUB SERPL-MCNC: 0.4 MG/DL — SIGNIFICANT CHANGE UP (ref 0.2–1.2)
BILIRUB SERPL-MCNC: 0.4 MG/DL — SIGNIFICANT CHANGE UP (ref 0.2–1.2)
BUN SERPL-MCNC: 10 MG/DL — SIGNIFICANT CHANGE UP (ref 7–23)
BUN SERPL-MCNC: 10 MG/DL — SIGNIFICANT CHANGE UP (ref 7–23)
CALCIUM SERPL-MCNC: 7.7 MG/DL — LOW (ref 8.5–10.1)
CALCIUM SERPL-MCNC: 7.7 MG/DL — LOW (ref 8.5–10.1)
CHLORIDE SERPL-SCNC: 110 MMOL/L — HIGH (ref 96–108)
CHLORIDE SERPL-SCNC: 110 MMOL/L — HIGH (ref 96–108)
CO2 SERPL-SCNC: 29 MMOL/L — SIGNIFICANT CHANGE UP (ref 22–31)
CO2 SERPL-SCNC: 29 MMOL/L — SIGNIFICANT CHANGE UP (ref 22–31)
CREAT SERPL-MCNC: 0.44 MG/DL — LOW (ref 0.5–1.3)
CREAT SERPL-MCNC: 0.44 MG/DL — LOW (ref 0.5–1.3)
CULTURE RESULTS: ABNORMAL
CULTURE RESULTS: ABNORMAL
EGFR: 91 ML/MIN/1.73M2 — SIGNIFICANT CHANGE UP
EGFR: 91 ML/MIN/1.73M2 — SIGNIFICANT CHANGE UP
GLUCOSE SERPL-MCNC: 96 MG/DL — SIGNIFICANT CHANGE UP (ref 70–99)
GLUCOSE SERPL-MCNC: 96 MG/DL — SIGNIFICANT CHANGE UP (ref 70–99)
HCT VFR BLD CALC: 29.3 % — LOW (ref 34.5–45)
HCT VFR BLD CALC: 29.3 % — LOW (ref 34.5–45)
HGB BLD-MCNC: 8.9 G/DL — LOW (ref 11.5–15.5)
HGB BLD-MCNC: 8.9 G/DL — LOW (ref 11.5–15.5)
MCHC RBC-ENTMCNC: 30.4 GM/DL — LOW (ref 32–36)
MCHC RBC-ENTMCNC: 30.4 GM/DL — LOW (ref 32–36)
MCHC RBC-ENTMCNC: 33.7 PG — SIGNIFICANT CHANGE UP (ref 27–34)
MCHC RBC-ENTMCNC: 33.7 PG — SIGNIFICANT CHANGE UP (ref 27–34)
MCV RBC AUTO: 111 FL — HIGH (ref 80–100)
MCV RBC AUTO: 111 FL — HIGH (ref 80–100)
METHOD TYPE: SIGNIFICANT CHANGE UP
NRBC # BLD: 0 /100 WBCS — SIGNIFICANT CHANGE UP (ref 0–0)
NRBC # BLD: 0 /100 WBCS — SIGNIFICANT CHANGE UP (ref 0–0)
ORGANISM # SPEC MICROSCOPIC CNT: ABNORMAL
ORGANISM # SPEC MICROSCOPIC CNT: SIGNIFICANT CHANGE UP
ORGANISM # SPEC MICROSCOPIC CNT: SIGNIFICANT CHANGE UP
PLATELET # BLD AUTO: 494 K/UL — HIGH (ref 150–400)
PLATELET # BLD AUTO: 494 K/UL — HIGH (ref 150–400)
POTASSIUM SERPL-MCNC: 3.2 MMOL/L — LOW (ref 3.5–5.3)
POTASSIUM SERPL-MCNC: 3.2 MMOL/L — LOW (ref 3.5–5.3)
POTASSIUM SERPL-SCNC: 3.2 MMOL/L — LOW (ref 3.5–5.3)
POTASSIUM SERPL-SCNC: 3.2 MMOL/L — LOW (ref 3.5–5.3)
PROT SERPL-MCNC: 5.1 G/DL — LOW (ref 6–8.3)
PROT SERPL-MCNC: 5.1 G/DL — LOW (ref 6–8.3)
RBC # BLD: 2.64 M/UL — LOW (ref 3.8–5.2)
RBC # BLD: 2.64 M/UL — LOW (ref 3.8–5.2)
RBC # FLD: 18.2 % — HIGH (ref 10.3–14.5)
RBC # FLD: 18.2 % — HIGH (ref 10.3–14.5)
SODIUM SERPL-SCNC: 143 MMOL/L — SIGNIFICANT CHANGE UP (ref 135–145)
SODIUM SERPL-SCNC: 143 MMOL/L — SIGNIFICANT CHANGE UP (ref 135–145)
SPECIMEN SOURCE: SIGNIFICANT CHANGE UP
SPECIMEN SOURCE: SIGNIFICANT CHANGE UP
WBC # BLD: 11.35 K/UL — HIGH (ref 3.8–10.5)
WBC # BLD: 11.35 K/UL — HIGH (ref 3.8–10.5)
WBC # FLD AUTO: 11.35 K/UL — HIGH (ref 3.8–10.5)
WBC # FLD AUTO: 11.35 K/UL — HIGH (ref 3.8–10.5)

## 2024-01-06 PROCEDURE — 99232 SBSQ HOSP IP/OBS MODERATE 35: CPT

## 2024-01-06 RX ORDER — ACETAMINOPHEN 500 MG
1000 TABLET ORAL EVERY 8 HOURS
Refills: 0 | Status: DISCONTINUED | OUTPATIENT
Start: 2024-01-06 | End: 2024-01-10

## 2024-01-06 RX ORDER — POTASSIUM CHLORIDE 20 MEQ
40 PACKET (EA) ORAL ONCE
Refills: 0 | Status: COMPLETED | OUTPATIENT
Start: 2024-01-06 | End: 2024-01-06

## 2024-01-06 RX ORDER — TRAMADOL HYDROCHLORIDE 50 MG/1
50 TABLET ORAL
Refills: 0 | Status: DISCONTINUED | OUTPATIENT
Start: 2024-01-06 | End: 2024-01-10

## 2024-01-06 RX ORDER — MORPHINE SULFATE 50 MG/1
2 CAPSULE, EXTENDED RELEASE ORAL EVERY 4 HOURS
Refills: 0 | Status: DISCONTINUED | OUTPATIENT
Start: 2024-01-06 | End: 2024-01-10

## 2024-01-06 RX ORDER — VANCOMYCIN HCL 1 G
750 VIAL (EA) INTRAVENOUS EVERY 12 HOURS
Refills: 0 | Status: DISCONTINUED | OUTPATIENT
Start: 2024-01-06 | End: 2024-01-08

## 2024-01-06 RX ORDER — CALCIUM CARBONATE 500(1250)
1 TABLET ORAL DAILY
Refills: 0 | Status: DISCONTINUED | OUTPATIENT
Start: 2024-01-06 | End: 2024-01-10

## 2024-01-06 RX ORDER — CEFEPIME 1 G/1
2000 INJECTION, POWDER, FOR SOLUTION INTRAMUSCULAR; INTRAVENOUS EVERY 12 HOURS
Refills: 0 | Status: DISCONTINUED | OUTPATIENT
Start: 2024-01-06 | End: 2024-01-10

## 2024-01-06 RX ADMIN — MORPHINE SULFATE 2 MILLIGRAM(S): 50 CAPSULE, EXTENDED RELEASE ORAL at 03:00

## 2024-01-06 RX ADMIN — Medication 40 MILLIEQUIVALENT(S): at 09:08

## 2024-01-06 RX ADMIN — SIMVASTATIN 10 MILLIGRAM(S): 20 TABLET, FILM COATED ORAL at 23:18

## 2024-01-06 RX ADMIN — ESCITALOPRAM OXALATE 20 MILLIGRAM(S): 10 TABLET, FILM COATED ORAL at 11:35

## 2024-01-06 RX ADMIN — HYDROXYUREA 500 MILLIGRAM(S): 500 CAPSULE ORAL at 19:04

## 2024-01-06 RX ADMIN — Medication 1 MILLIGRAM(S): at 11:35

## 2024-01-06 RX ADMIN — MORPHINE SULFATE 2 MILLIGRAM(S): 50 CAPSULE, EXTENDED RELEASE ORAL at 15:00

## 2024-01-06 RX ADMIN — Medication 1 TABLET(S): at 05:27

## 2024-01-06 RX ADMIN — AMLODIPINE BESYLATE 5 MILLIGRAM(S): 2.5 TABLET ORAL at 05:27

## 2024-01-06 RX ADMIN — Medication 1000 MILLIGRAM(S): at 15:00

## 2024-01-06 RX ADMIN — HYDROXYUREA 500 MILLIGRAM(S): 500 CAPSULE ORAL at 05:27

## 2024-01-06 RX ADMIN — TRAMADOL HYDROCHLORIDE 50 MILLIGRAM(S): 50 TABLET ORAL at 08:15

## 2024-01-06 RX ADMIN — POLYETHYLENE GLYCOL 3350 17 GRAM(S): 17 POWDER, FOR SOLUTION ORAL at 11:35

## 2024-01-06 RX ADMIN — TRAMADOL HYDROCHLORIDE 50 MILLIGRAM(S): 50 TABLET ORAL at 09:00

## 2024-01-06 RX ADMIN — Medication 1000 MILLIGRAM(S): at 14:14

## 2024-01-06 RX ADMIN — PIPERACILLIN AND TAZOBACTAM 25 GRAM(S): 4; .5 INJECTION, POWDER, LYOPHILIZED, FOR SOLUTION INTRAVENOUS at 07:15

## 2024-01-06 RX ADMIN — TRAMADOL HYDROCHLORIDE 50 MILLIGRAM(S): 50 TABLET ORAL at 16:41

## 2024-01-06 RX ADMIN — SENNA PLUS 2 TABLET(S): 8.6 TABLET ORAL at 23:18

## 2024-01-06 RX ADMIN — Medication 2000 UNIT(S): at 11:35

## 2024-01-06 RX ADMIN — Medication 50 MICROGRAM(S): at 05:27

## 2024-01-06 RX ADMIN — GABAPENTIN 300 MILLIGRAM(S): 400 CAPSULE ORAL at 14:14

## 2024-01-06 RX ADMIN — PIPERACILLIN AND TAZOBACTAM 25 GRAM(S): 4; .5 INJECTION, POWDER, LYOPHILIZED, FOR SOLUTION INTRAVENOUS at 19:31

## 2024-01-06 RX ADMIN — TRAMADOL HYDROCHLORIDE 50 MILLIGRAM(S): 50 TABLET ORAL at 17:15

## 2024-01-06 RX ADMIN — MORPHINE SULFATE 2 MILLIGRAM(S): 50 CAPSULE, EXTENDED RELEASE ORAL at 14:13

## 2024-01-06 RX ADMIN — GABAPENTIN 300 MILLIGRAM(S): 400 CAPSULE ORAL at 05:27

## 2024-01-06 RX ADMIN — Medication 25 MILLIGRAM(S): at 19:04

## 2024-01-06 RX ADMIN — Medication 1000 MILLIGRAM(S): at 23:17

## 2024-01-06 RX ADMIN — MORPHINE SULFATE 2 MILLIGRAM(S): 50 CAPSULE, EXTENDED RELEASE ORAL at 09:45

## 2024-01-06 RX ADMIN — MORPHINE SULFATE 2 MILLIGRAM(S): 50 CAPSULE, EXTENDED RELEASE ORAL at 09:07

## 2024-01-06 RX ADMIN — PANTOPRAZOLE SODIUM 40 MILLIGRAM(S): 20 TABLET, DELAYED RELEASE ORAL at 06:26

## 2024-01-06 RX ADMIN — Medication 1 TABLET(S): at 19:04

## 2024-01-06 RX ADMIN — MORPHINE SULFATE 2 MILLIGRAM(S): 50 CAPSULE, EXTENDED RELEASE ORAL at 04:00

## 2024-01-06 RX ADMIN — GABAPENTIN 300 MILLIGRAM(S): 400 CAPSULE ORAL at 23:18

## 2024-01-06 NOTE — PROGRESS NOTE ADULT - ASSESSMENT
REASON FOR VISIT  .. Management of problems listed below        REVIEW OF SYMPTOMS   Able to give ROS  Yes     RELIABILITY +/-   CONSTITUTIONAL Weakness Yes    ENDOCRINE  No heat or cold intolerance    ALLERGY No hives  Sore throat No stridor  RESP Shortness of breath YES   NEURO New weakness No   CARDIAC   Palpitations No         PHYSICAL EXAM    HEENT Unremarkable  atraumatic   RESP Fair air entry  Harsh breath sound   CARDIAC S1 S2 No S3     NO JVD    ABDOMEN No hepatosplenomegaly   PEDAL EDEMA present No calf tenderness  NO rash       GENERAL DATA .   GOC.   .. 1/4/2024 dnr   ..  1/3/2024 FULL CODE  ICU STAY.  .. NO  COVID. ..       BEST PRACTICE ISSUES.    HOB ELEVATN.  .. Yes  DVT PPLX.  ..   on coumadin        DIET.   ..  1/3/2024 DASH   IV fl. .. 1/3/2024 d5 1/2 40    STRESS ULCER PPLX.   .  ..  1/3/2024 PROTONIX 40      ALLGY. .. certain antibiotics       WT. ..  1/3/2024 49  BMI. .. 1/3/2024 27     ABGS.   .  VS/ PO/IO/ VENT/ DRIPS.  1/6/2024 afeb 80 120/60   1/6/2024 ra 91%      PRESENTATION.  . CC 1/3/2024.From [facility] for LLE wound infection and pain   . HPI 1/3/2024.  . 89yo female bib ems with wounds to left lower leg for unknown period of time, now weeping and draining, pt c/o pain but pt is poor historian  . 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 Pulm consulted 1/3/2024 for abn cxr   . PMHx .   .. DVT   .. A fib on coumadin   .. Valvular heart disease   .. HFPEF   .... 6/28/2023 echo pasp 39 la sl dilated n lvsf  .. Aortic stenosis   .. CVA r sided weakness   .. Cellulitis  .... VANCO 11/22/2023 -> CIPRO 11/29 DOXY 11/29     . HOME MEDS .warfarin levoxyl 50 pepcid metoprolol 25.2 gabapentin 300.3 lasix 20 escitalopram 20     PROBLEM DATA.  INFECTION  CELLULITIS L LOWER EXTREMITY  .. w 1/3-1/4-1/5/2024 w 13 - 18- 16   .. pr 1/4/2024 pr .18   .. la 1/3/2024 la 2.4   .. cxr 1/3/2024 cxr improving l base infiltr improvd from 11/30/2023  .. bc 1/2 (-)   .. 1/3/2024 wound mrsa pseudomonas   .. 1/3/2024 pseudomonas enterococcus fecalis proteus m strept dysgalacticae   .. 1/3/2024 CEFEPIME 2.2 X 7D   .. 1/6/2024 vanco 750.2     . A fib  .. INR 1/4-1/5/2024 INR 4.5- 4.2     HEMAT  .. Hb 1/3-1/4-1/6/2024 Hb 10.9 - 10.7 - 8.9     RENAL   .. Na 1/3/2024 Na 141   .. CO2 1/3/2024 CO2 25   .. Cr 1/3/2024 Cr .7     . HYPOTHYROID  .. 1/3/2024 LEVOXYL 50     . SUMMARY.     .  89yo female from [facility] for worsening LLE wound   . 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 Pulm consulted 1/3/2024 for abn cxr   . OVERALL ASSESSMENT/PLAN.   . CELLULITIS L LO EXTR   .. 1/3/2024 Seen by ID   .. 1/3/2024 Started on cefepime   .. No pneumonia suspected   . A fib  .. Continue coumadin   . ANEMIA   .. Monitor     . DISPO.  .. Follow cultures adjusr antibio     TIME SPENT.  . Over 36 minutes aggregate care time spent on encounter; activities included   direct patient care, counseling and/or coordinating care reviewing notes, lab data/ imaging , discussion with multidisciplinary team/ patient  /family and explaining in detail risks, benefits, alternatives  of the recommendations     PATIENT.  . AB BROOKS      REASON FOR VISIT  .. Management of problems listed below        REVIEW OF SYMPTOMS   Able to give ROS  Yes     RELIABILITY +/-   CONSTITUTIONAL Weakness Yes    ENDOCRINE  No heat or cold intolerance    ALLERGY No hives  Sore throat No stridor  RESP Shortness of breath YES   NEURO New weakness No   CARDIAC   Palpitations No         PHYSICAL EXAM    HEENT Unremarkable  atraumatic   RESP Fair air entry  Harsh breath sound   CARDIAC S1 S2 No S3     NO JVD    ABDOMEN No hepatosplenomegaly   PEDAL EDEMA present No calf tenderness  NO rash       GENERAL DATA .   GOC.   .. 1/4/2024 dnr   ..  1/3/2024 FULL CODE  ICU STAY.  .. NO  COVID. ..       BEST PRACTICE ISSUES.    HOB ELEVATN.  .. Yes  DVT PPLX.  ..   on coumadin        DIET.   ..  1/3/2024 DASH   IV fl. .. 1/3/2024 d5 1/2 40    STRESS ULCER PPLX.   .  ..  1/3/2024 PROTONIX 40      ALLGY. .. certain antibiotics       WT. ..  1/3/2024 49  BMI. .. 1/3/2024 27     ABGS.   .  VS/ PO/IO/ VENT/ DRIPS.  1/6/2024 afeb 80 120/60   1/6/2024 ra 91%      PRESENTATION.  . CC 1/3/2024.From [facility] for LLE wound infection and pain   . HPI 1/3/2024.  . 89yo female bib ems with wounds to left lower leg for unknown period of time, now weeping and draining, pt c/o pain but pt is poor historian  . 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 Pulm consulted 1/3/2024 for abn cxr   . PMHx .   .. DVT   .. A fib on coumadin   .. Valvular heart disease   .. HFPEF   .... 6/28/2023 echo pasp 39 la sl dilated n lvsf  .. Aortic stenosis   .. CVA r sided weakness   .. Cellulitis  .... VANCO 11/22/2023 -> CIPRO 11/29 DOXY 11/29     . HOME MEDS .warfarin levoxyl 50 pepcid metoprolol 25.2 gabapentin 300.3 lasix 20 escitalopram 20     PROBLEM DATA.  INFECTION  CELLULITIS L LOWER EXTREMITY  .. w 1/3-1/4-1/5/2024 w 13 - 18- 16   .. pr 1/4/2024 pr .18   .. la 1/3/2024 la 2.4   .. cxr 1/3/2024 cxr improving l base infiltr improvd from 11/30/2023  .. bc 1/2 (-)   .. 1/3/2024 wound mrsa pseudomonas   .. 1/3/2024 pseudomonas enterococcus fecalis proteus m strept dysgalacticae   .. 1/3/2024 CEFEPIME 2.2 X 7D   .. 1/6/2024 vanco 750.2     . A fib  .. INR 1/4-1/5/2024 INR 4.5- 4.2     HEMAT  .. Hb 1/3-1/4-1/6/2024 Hb 10.9 - 10.7 - 8.9     RENAL   .. Na 1/3/2024 Na 141   .. CO2 1/3/2024 CO2 25   .. Cr 1/3/2024 Cr .7     . HYPOTHYROID  .. 1/3/2024 LEVOXYL 50     . SUMMARY.     .  89yo female from [facility] for worsening LLE wound   . 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 Pulm consulted 1/3/2024 for abn cxr   . OVERALL ASSESSMENT/PLAN.   . CELLULITIS L LO EXTR   .. 1/3/2024 Seen by ID   .. 1/3/2024 Started on cefepime   .. No pneumonia suspected   . A fib  .. Continue coumadin   . ANEMIA   .. Monitor     . DISPO.  .. Follow cultures adjusr antibio     TIME SPENT.  . Over 36 minutes aggregate care time spent on encounter; activities included   direct patient care, counseling and/or coordinating care reviewing notes, lab data/ imaging , discussion with multidisciplinary team/ patient  /family and explaining in detail risks, benefits, alternatives  of the recommendations     PATIENT.  . AB BROOKS

## 2024-01-06 NOTE — PROGRESS NOTE ADULT - SUBJECTIVE AND OBJECTIVE BOX
PROGRESS NOTE   Patient is a 89y old  Female who presents with a chief complaint of LLE OPEN WOUND (06 Jan 2024 09:11) Patient denies dressing changes today, pulling her legs away and using her hands to block any attempt to try and take down dressings due to dressing changes being too painful for her.        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  Patient was hospitalized in 2023  g with altered mental status concern for UTI , also  cellulitis to the LLE at the site of a skin graft. Pt recently completed a course of po abx for this with no significant improvement. bib ems with wounds to left lower leg for unknown period of time, now weeping and draining, pt c/o pain but pt is poor historian She was admitted in november 2023 with LLE cellulitis and seen by wound care MD & ID -discharged on po doxycycline and cipro & with topical care .Admitted for septic workup , iv abx ,pain management and wound care  (03 Jan 2024 18:15)      Vital Signs Last 24 Hrs  T(C): 36.6 (06 Jan 2024 05:16), Max: 36.8 (05 Jan 2024 20:40)  T(F): 97.8 (06 Jan 2024 05:16), Max: 98.2 (05 Jan 2024 20:40)  HR: 100 (06 Jan 2024 05:16) (82 - 100)  BP: 120/64 (06 Jan 2024 05:16) (120/64 - 135/76)  BP(mean): --  RR: 18 (06 Jan 2024 05:16) (17 - 18)  SpO2: 93% (06 Jan 2024 05:16) (90% - 94%)    Parameters below as of 06 Jan 2024 05:16  Patient On (Oxygen Delivery Method): room air                              8.9    11.35 )-----------( 494      ( 06 Jan 2024 09:28 )             29.3               01-06    143  |  110<H>  |  10  ----------------------------<  96  3.2<L>   |  29  |  0.44<L>    Ca    7.7<L>      06 Jan 2024 09:28    TPro  x   /  Alb  1.7<L>  /  TBili  x   /  DBili  x   /  AST  15  /  ALT  9<L>  /  AlkPhos  x   01-06      PHYSICAL EXAM  O:   General: Pleasant  female in acute distress, awake.    Integument:  Skin warm, dry and supple bilateral.    Noted cellulitic changes left lower leg  Ulceration Left lower leg, anterior, medial, lateral aspects, - hyperkeratotic border, wound base fibronecrotic, + edema, + justo-wound erythema, + drainage, + fluctuance, - tracking/tunneling, - probe to bone.   Vascular: Dorsalis Pedis and Posterior Tibial pulses unpalpable.  Capillary re-fill time greater then 3 seconds digits 1-5 bilateral.    Neuro: Protective sensation intact to the level of the digits bilateral.  MSK: unable to perform due to mental status

## 2024-01-06 NOTE — PROGRESS NOTE ADULT - SUBJECTIVE AND OBJECTIVE BOX
PROGRESS NOTE  Patient is a 89y old  Female who presents with a chief complaint of LLE OPEN WOUND (06 Jan 2024 11:18)    Chart and available morning labs /imaging are reviewed electronically , urgent issues addressed . More information  is being added upon completion of rounds , when more information is collected and management discussed with consultants , medical staff and social service/case management on the floor   OVERNIGHT  Pain in lle  reported by medical staff ,rx adjusted . 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  Patient was hospitalized in 2023  g with altered mental status concern for UTI , also  cellulitis to the LLE at the site of a skin graft. Pt recently completed a course of po abx for this with no significant improvement. bib ems with wounds to left lower leg for unknown period of time, now weeping and draining, pt c/o pain but pt is poor historian She was admitted in november 2023 with LLE cellulitis and seen by wound care MD & ID -discharged on po doxycycline and cipro & with topical care .Admitted for septic workup , iv abx ,pain management and wound care  (03 Jan 2024 18:15)    PAST MEDICAL & SURGICAL HISTORY:  CVA (cerebral vascular accident)      VHD (valvular heart disease)      Hyperlipidemia      Neuropathy      Hypertension      Depression      Constipation      Neuropathy      Grade I diastolic dysfunction      GERD (gastroesophageal reflux disease)      Aortic valvar stenosis      Afib      Hypothyroidism      H/O skin graft          MEDICATIONS  (STANDING):  acetaminophen     Tablet .. 1000 milliGRAM(s) Oral every 8 hours  amLODIPine   Tablet 5 milliGRAM(s) Oral daily  calcium carbonate   1250 mG (OsCal) 1 Tablet(s) Oral daily  cholecalciferol 2000 Unit(s) Oral daily  dextrose 5% + sodium chloride 0.45%. 1000 milliLiter(s) (40 mL/Hr) IV Continuous <Continuous>  escitalopram 20 milliGRAM(s) Oral daily  folic acid 1 milliGRAM(s) Oral daily  gabapentin 300 milliGRAM(s) Oral three times a day  hydroxyurea 500 milliGRAM(s) Oral two times a day  lactobacillus acidophilus 1 Tablet(s) Oral every 12 hours  levothyroxine 50 MICROGram(s) Oral daily  metoprolol tartrate 25 milliGRAM(s) Oral two times a day  pantoprazole    Tablet 40 milliGRAM(s) Oral before breakfast  piperacillin/tazobactam IVPB.. 4.5 Gram(s) IV Intermittent every 12 hours  polyethylene glycol 3350 17 Gram(s) Oral daily  senna 2 Tablet(s) Oral at bedtime  simvastatin 10 milliGRAM(s) Oral at bedtime    MEDICATIONS  (PRN):  aluminum hydroxide/magnesium hydroxide/simethicone Suspension 30 milliLiter(s) Oral every 4 hours PRN Dyspepsia  bisacodyl Suppository 10 milliGRAM(s) Rectal daily PRN Constipation  magnesium hydroxide Suspension 30 milliLiter(s) Oral daily PRN Constipation  melatonin 3 milliGRAM(s) Oral at bedtime PRN Insomnia  morphine  - Injectable 2 milliGRAM(s) IV Push every 4 hours PRN Severe Pain (7 - 10)  ondansetron Injectable 4 milliGRAM(s) IV Push every 8 hours PRN Nausea and/or Vomiting  traMADol 50 milliGRAM(s) Oral four times a day PRN Moderate Pain (4 - 6)      OBJECTIVE    T(C): 36.6 (01-06-24 @ 12:54), Max: 36.8 (01-05-24 @ 20:40)  HR: 81 (01-06-24 @ 12:54) (81 - 100)  BP: 129/68 (01-06-24 @ 12:54) (120/64 - 135/76)  RR: 19 (01-06-24 @ 12:54) (17 - 19)  SpO2: 91% (01-06-24 @ 12:54) (90% - 93%)  Wt(kg): --  I&O's Summary    05 Jan 2024 07:01  -  06 Jan 2024 07:00  --------------------------------------------------------  IN: 200 mL / OUT: 0 mL / NET: 200 mL          REVIEW OF SYSTEMS:  Patient is  unable to provide any information/ROS  due to baseline mental status.     PHYSICAL EXAM:  Appearance: NAD. VS past 24 hrs -as above   HEENT:   Moist oral mucosa. Conjunctiva clear b/l.   Neck : supple  Respiratory: Lungs CTAB.  Gastrointestinal:  Soft, nontender. No rebound. No rigidity. BS present	  Cardiovascular: RRR ,S1S2 present  Neurologic: Non-focal. Moving all extremities.  Extremities: No edema. No erythema. No calf tenderness. lle wounds   Skin: No rashes, No ecchymoses, No cyanosis.	  wounds ,skin lesions-See skin assesment flow sheet   LABS:                        8.9    11.35 )-----------( 494      ( 06 Jan 2024 09:28 )             29.3     01-06    143  |  110<H>  |  10  ----------------------------<  96  3.2<L>   |  29  |  0.44<L>    Ca    7.7<L>      06 Jan 2024 09:28    TPro  5.1<L>  /  Alb  1.7<L>  /  TBili  0.4  /  DBili  x   /  AST  15  /  ALT  9<L>  /  AlkPhos  73  01-06    CAPILLARY BLOOD GLUCOSE        PT/INR - ( 05 Jan 2024 06:05 )   PT: 48.1 sec;   INR: 4.29 ratio           Urinalysis Basic - ( 06 Jan 2024 09:28 )    Color: x / Appearance: x / SG: x / pH: x  Gluc: 96 mg/dL / Ketone: x  / Bili: x / Urobili: x   Blood: x / Protein: x / Nitrite: x   Leuk Esterase: x / RBC: x / WBC x   Sq Epi: x / Non Sq Epi: x / Bacteria: x        Culture - Other (collected 03 Jan 2024 19:56)  Source: Wound Wound  Final Report (06 Jan 2024 10:13):    Culture yields growth of greater than 3 colony types of    bacteria,  which may indicate contamination and normal jessica    Call client services within 7 days if further workup is clinically    indicated.    Culture includes    Few Methicillin Resistant Staphylococcus aureus    Moderate Streptococcus dysgalactiae (Group C/G) "Susceptibilities not    performed"    Numerous Pseudomonas aeruginosa Multiple Morphological Strains  Organism: Methicillin resistant Staphylococcus aureus  Pseudomonas aeruginosa (06 Jan 2024 10:13)  Organism: Pseudomonas aeruginosa (06 Jan 2024 10:13)  Organism: Methicillin resistant Staphylococcus aureus (06 Jan 2024 10:13)    Culture - Abscess with Gram Stain (collected 03 Jan 2024 17:30)  Source: .Abscess Leg - Left  Gram Stain (04 Jan 2024 05:52):    No polymorphonuclear cells seen per low power field    Numerous Gram positive cocci in pairs seen per oil power field    Numerous Gram Negative Rods seen per oil power field  Preliminary Report (05 Jan 2024 15:23):    Numerous Pseudomonas aeruginosa    Moderate Enterococcus faecalis    Moderate Streptococcus dysgalactiae (Group C/G) "Susceptibilities not    performed"    Culture - Blood (collected 03 Jan 2024 14:55)  Source: .Blood Blood-Peripheral  Preliminary Report (06 Jan 2024 01:02):    No growth at 48 Hours    Culture - Blood (collected 03 Jan 2024 14:50)  Source: .Blood Blood-Peripheral  Preliminary Report (06 Jan 2024 01:02):    No growth at 48 Hours      RADIOLOGY & ADDITIONAL TESTS:   reviewed elctronically  ASSESSMENT/PLAN: 	    25 minutes aggregate time was spent on this visit, 50% visit time spent in care co-ordination with other attendings and counselling patient .I have discussed care plan with patient / HCP/family member ,who expressed understanding of problems treatment and their effect and side effects, alternatives in details. I have asked if they have any questions and concerns and appropriately addressed them to best of my ability.  PROGRESS NOTE  Patient is a 89y old  Female who presents with a chief complaint of LLE OPEN WOUND (06 Jan 2024 11:18)    Chart and available morning labs /imaging are reviewed electronically , urgent issues addressed . More information  is being added upon completion of rounds , when more information is collected and management discussed with consultants , medical staff and social service/case management on the floor   OVERNIGHT  Pain in lle  reported by medical staff ,rx adjusted . 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  Patient was hospitalized in 2023  g with altered mental status concern for UTI , also  cellulitis to the LLE at the site of a skin graft. Pt recently completed a course of po abx for this with no significant improvement. bib ems with wounds to left lower leg for unknown period of time, now weeping and draining, pt c/o pain but pt is poor historian She was admitted in november 2023 with LLE cellulitis and seen by wound care MD & ID -discharged on po doxycycline and cipro & with topical care .Admitted for septic workup , iv abx ,pain management and wound care  (03 Jan 2024 18:15)    PAST MEDICAL & SURGICAL HISTORY:  CVA (cerebral vascular accident)      VHD (valvular heart disease)      Hyperlipidemia      Neuropathy      Hypertension      Depression      Constipation      Neuropathy      Grade I diastolic dysfunction      GERD (gastroesophageal reflux disease)      Aortic valvar stenosis      Afib      Hypothyroidism      H/O skin graft          MEDICATIONS  (STANDING):  acetaminophen     Tablet .. 1000 milliGRAM(s) Oral every 8 hours  amLODIPine   Tablet 5 milliGRAM(s) Oral daily  calcium carbonate   1250 mG (OsCal) 1 Tablet(s) Oral daily  cholecalciferol 2000 Unit(s) Oral daily  dextrose 5% + sodium chloride 0.45%. 1000 milliLiter(s) (40 mL/Hr) IV Continuous <Continuous>  escitalopram 20 milliGRAM(s) Oral daily  folic acid 1 milliGRAM(s) Oral daily  gabapentin 300 milliGRAM(s) Oral three times a day  hydroxyurea 500 milliGRAM(s) Oral two times a day  lactobacillus acidophilus 1 Tablet(s) Oral every 12 hours  levothyroxine 50 MICROGram(s) Oral daily  metoprolol tartrate 25 milliGRAM(s) Oral two times a day  pantoprazole    Tablet 40 milliGRAM(s) Oral before breakfast  piperacillin/tazobactam IVPB.. 4.5 Gram(s) IV Intermittent every 12 hours  polyethylene glycol 3350 17 Gram(s) Oral daily  senna 2 Tablet(s) Oral at bedtime  simvastatin 10 milliGRAM(s) Oral at bedtime    MEDICATIONS  (PRN):  aluminum hydroxide/magnesium hydroxide/simethicone Suspension 30 milliLiter(s) Oral every 4 hours PRN Dyspepsia  bisacodyl Suppository 10 milliGRAM(s) Rectal daily PRN Constipation  magnesium hydroxide Suspension 30 milliLiter(s) Oral daily PRN Constipation  melatonin 3 milliGRAM(s) Oral at bedtime PRN Insomnia  morphine  - Injectable 2 milliGRAM(s) IV Push every 4 hours PRN Severe Pain (7 - 10)  ondansetron Injectable 4 milliGRAM(s) IV Push every 8 hours PRN Nausea and/or Vomiting  traMADol 50 milliGRAM(s) Oral four times a day PRN Moderate Pain (4 - 6)      OBJECTIVE    T(C): 36.6 (01-06-24 @ 12:54), Max: 36.8 (01-05-24 @ 20:40)  HR: 81 (01-06-24 @ 12:54) (81 - 100)  BP: 129/68 (01-06-24 @ 12:54) (120/64 - 135/76)  RR: 19 (01-06-24 @ 12:54) (17 - 19)  SpO2: 91% (01-06-24 @ 12:54) (90% - 93%)  Wt(kg): --  I&O's Summary    05 Jan 2024 07:01  -  06 Jan 2024 07:00  --------------------------------------------------------  IN: 200 mL / OUT: 0 mL / NET: 200 mL          REVIEW OF SYSTEMS:  Patient is  unable to provide any information/ROS  due to baseline mental status.     PHYSICAL EXAM:  Appearance: NAD. VS past 24 hrs -as above   HEENT:   Moist oral mucosa. Conjunctiva clear b/l.   Neck : supple  Respiratory: Lungs CTAB.  Gastrointestinal:  Soft, nontender. No rebound. No rigidity. BS present	  Cardiovascular: RRR ,S1S2 present  Neurologic: Non-focal. Moving all extremities.  Extremities: No edema. No erythema. No calf tenderness. lle wounds   Skin: No rashes, No ecchymoses, No cyanosis.	  wounds ,skin lesions-See skin assesment flow sheet   LABS:                        8.9    11.35 )-----------( 494      ( 06 Jan 2024 09:28 )             29.3     01-06    143  |  110<H>  |  10  ----------------------------<  96  3.2<L>   |  29  |  0.44<L>    Ca    7.7<L>      06 Jan 2024 09:28    TPro  5.1<L>  /  Alb  1.7<L>  /  TBili  0.4  /  DBili  x   /  AST  15  /  ALT  9<L>  /  AlkPhos  73  01-06    CAPILLARY BLOOD GLUCOSE        PT/INR - ( 05 Jan 2024 06:05 )   PT: 48.1 sec;   INR: 4.29 ratio           Urinalysis Basic - ( 06 Jan 2024 09:28 )    Color: x / Appearance: x / SG: x / pH: x  Gluc: 96 mg/dL / Ketone: x  / Bili: x / Urobili: x   Blood: x / Protein: x / Nitrite: x   Leuk Esterase: x / RBC: x / WBC x   Sq Epi: x / Non Sq Epi: x / Bacteria: x        Culture - Other (collected 03 Jan 2024 19:56)  Source: Wound Wound  Final Report (06 Jan 2024 10:13):    Culture yields growth of greater than 3 colony types of    bacteria,  which may indicate contamination and normal jessica    Call client services within 7 days if further workup is clinically    indicated.    Culture includes    Few Methicillin Resistant Staphylococcus aureus    Moderate Streptococcus dysgalactiae (Group C/G) "Susceptibilities not    performed"    Numerous Pseudomonas aeruginosa Multiple Morphological Strains  Organism: Methicillin resistant Staphylococcus aureus  Pseudomonas aeruginosa (06 Jan 2024 10:13)  Organism: Pseudomonas aeruginosa (06 Jan 2024 10:13)  Organism: Methicillin resistant Staphylococcus aureus (06 Jan 2024 10:13)    Culture - Abscess with Gram Stain (collected 03 Jan 2024 17:30)  Source: .Abscess Leg - Left  Gram Stain (04 Jan 2024 05:52):    No polymorphonuclear cells seen per low power field    Numerous Gram positive cocci in pairs seen per oil power field    Numerous Gram Negative Rods seen per oil power field  Preliminary Report (05 Jan 2024 15:23):    Numerous Pseudomonas aeruginosa    Moderate Enterococcus faecalis    Moderate Streptococcus dysgalactiae (Group C/G) "Susceptibilities not    performed"    Culture - Blood (collected 03 Jan 2024 14:55)  Source: .Blood Blood-Peripheral  Preliminary Report (06 Jan 2024 01:02):    No growth at 48 Hours    Culture - Blood (collected 03 Jan 2024 14:50)  Source: .Blood Blood-Peripheral  Preliminary Report (06 Jan 2024 01:02):    No growth at 48 Hours      RADIOLOGY & ADDITIONAL TESTS:   reviewed elctronically  ASSESSMENT/PLAN: 	    25 minutes aggregate time was spent on this visit, 50% visit time spent in care co-ordination with other attendings and counselling patient .I have discussed care plan with patient / HCP/family member ,who expressed understanding of problems treatment and their effect and side effects, alternatives in details. I have asked if they have any questions and concerns and appropriately addressed them to best of my ability.

## 2024-01-06 NOTE — PROGRESS NOTE ADULT - SUBJECTIVE AND OBJECTIVE BOX
CHIEF COMPLAINT/ REASON FOR VISIT  .. Patient was seen to address the  issue listed under PROBLEM LIST which is located toward bottom of this note     REJI BERGER    [facility] [address] 89 [address]    Allergies    per son &quot;issues with certain anitibiotics&quot; does not remember the names - Patient has tolerated zosyn, ceftriaxone, bactrim, and vancomycin on past admissions. (Unknown)    Intolerances        PAST MEDICAL & SURGICAL HISTORY:  Hypertension      CVA (cerebral vascular accident)      Depression      Constipation      Neuropathy      Hyperlipidemia      Grade I diastolic dysfunction      Afib      Neuropathy      VHD (valvular heart disease)      Aortic valvar stenosis      Hypothyroidism      GERD (gastroesophageal reflux disease)      H/O skin graft          FAMILY HISTORY:      Home Medications:  acetaminophen 500 mg oral tablet: 1 tab(s) orally 2 times a day (22 Nov 2023 20:06)  acetaminophen 500 mg oral tablet: 2 tab(s) orally once a day (in the afternoon) (22 Nov 2023 20:06)  amLODIPine 5 mg oral tablet: 1 tab(s) orally once a day (22 Nov 2023 20:06)  Biofreeze 4% topical gel: Apply topically to affected area 2 times a day (22 Nov 2023 20:06)  cholecalciferol 50 mcg (2000 intl units) oral tablet: 1 tab(s) orally once a day (22 Nov 2023 20:06)  escitalopram 20 mg oral tablet: 1 tab(s) orally once a day (22 Nov 2023 20:06)  famotidine 20 mg oral tablet: 1 tab(s) orally once a day (22 Nov 2023 20:06)  folic acid 1 mg oral tablet: 1 tab(s) orally once a day (22 Nov 2023 20:06)  furosemide 20 mg oral tablet: 1 tab(s) orally once a day (22 Nov 2023 20:06)  gabapentin 300 mg oral capsule: 1 cap(s) orally 3 times a day (22 Nov 2023 20:06)  hydroxyurea 500 mg oral capsule: 1 tab(s) orally 2 times a day (22 Nov 2023 20:06)  levothyroxine 50 mcg (0.05 mg) oral tablet: 1 tab(s) orally once a day (22 Nov 2023 20:06)  melatonin 3 mg oral tablet: 1 tab(s) orally once a day (22 Nov 2023 20:06)  metoprolol tartrate 25 mg oral tablet: 1 tab(s) orally 2 times a day (22 Nov 2023 20:06)  miconazole 2% topical cream: Apply topically to affected area once a day (22 Nov 2023 20:06)  senna (sennosides) 8.6 mg oral tablet: 2 tab(s) orally once a day (22 Nov 2023 20:06)  simvastatin 10 mg oral tablet: 1 tab(s) orally once a day (22 Nov 2023 20:06)  traMADol 50 mg oral tablet: 1 tab(s) orally 2 times a day hold for lethargy (22 Nov 2023 20:06)  traMADol 50 mg oral tablet: 1 tab(s) orally every 6 hours as needed for pain (22 Nov 2023 20:06)      MEDICATIONS  (STANDING):  amLODIPine   Tablet 5 milliGRAM(s) Oral daily  cholecalciferol 2000 Unit(s) Oral daily  dextrose 5% + sodium chloride 0.45%. 1000 milliLiter(s) (40 mL/Hr) IV Continuous <Continuous>  escitalopram 20 milliGRAM(s) Oral daily  folic acid 1 milliGRAM(s) Oral daily  gabapentin 300 milliGRAM(s) Oral three times a day  hydroxyurea 500 milliGRAM(s) Oral two times a day  lactobacillus acidophilus 1 Tablet(s) Oral every 12 hours  levothyroxine 50 MICROGram(s) Oral daily  metoprolol tartrate 25 milliGRAM(s) Oral two times a day  pantoprazole    Tablet 40 milliGRAM(s) Oral before breakfast  piperacillin/tazobactam IVPB.. 4.5 Gram(s) IV Intermittent every 12 hours  polyethylene glycol 3350 17 Gram(s) Oral daily  senna 2 Tablet(s) Oral at bedtime  simvastatin 10 milliGRAM(s) Oral at bedtime    MEDICATIONS  (PRN):  acetaminophen     Tablet .. 650 milliGRAM(s) Oral every 6 hours PRN Temp greater or equal to 38C (100.4F), Mild Pain (1 - 3)  aluminum hydroxide/magnesium hydroxide/simethicone Suspension 30 milliLiter(s) Oral every 4 hours PRN Dyspepsia  bisacodyl Suppository 10 milliGRAM(s) Rectal daily PRN Constipation  magnesium hydroxide Suspension 30 milliLiter(s) Oral daily PRN Constipation  melatonin 3 milliGRAM(s) Oral at bedtime PRN Insomnia  morphine  - Injectable 2 milliGRAM(s) IV Push every 6 hours PRN Severe Pain (7 - 10)  ondansetron Injectable 4 milliGRAM(s) IV Push every 8 hours PRN Nausea and/or Vomiting  traMADol 50 milliGRAM(s) Oral every 6 hours PRN Moderate Pain (4 - 6)      Diet, DASH/TLC:   Sodium & Cholesterol Restricted  Easy to Chew (EASYTOCHEW) (01-03-24 @ 18:42) [Active]          Vital Signs Last 24 Hrs  T(C): 36.6 (06 Jan 2024 05:16), Max: 36.8 (05 Jan 2024 20:40)  T(F): 97.8 (06 Jan 2024 05:16), Max: 98.2 (05 Jan 2024 20:40)  HR: 100 (06 Jan 2024 05:16) (82 - 100)  BP: 120/64 (06 Jan 2024 05:16) (120/64 - 135/76)  BP(mean): --  RR: 18 (06 Jan 2024 05:16) (17 - 18)  SpO2: 93% (06 Jan 2024 05:16) (90% - 94%)    Parameters below as of 06 Jan 2024 05:16  Patient On (Oxygen Delivery Method): room air          01-05-24 @ 07:01  -  01-06-24 @ 07:00  --------------------------------------------------------  IN: 200 mL / OUT: 0 mL / NET: 200 mL              LABS:                        10.4   16.69 )-----------( 565      ( 05 Jan 2024 06:05 )             33.4     01-05    141  |  111<H>  |  12  ----------------------------<  111<H>  3.4<L>   |  28  |  0.48<L>    Ca    7.9<L>      05 Jan 2024 06:05      PT/INR - ( 05 Jan 2024 06:05 )   PT: 48.1 sec;   INR: 4.29 ratio           Urinalysis Basic - ( 05 Jan 2024 06:05 )    Color: x / Appearance: x / SG: x / pH: x  Gluc: 111 mg/dL / Ketone: x  / Bili: x / Urobili: x   Blood: x / Protein: x / Nitrite: x   Leuk Esterase: x / RBC: x / WBC x   Sq Epi: x / Non Sq Epi: x / Bacteria: x            WBC:  WBC Count: 16.69 K/uL (01-05 @ 06:05)  WBC Count: 18.43 K/uL (01-04 @ 07:14)  WBC Count: 13.02 K/uL (01-03 @ 14:10)      MICROBIOLOGY:  RECENT CULTURES:  01-03 Wound Wound XXXX XXXX   Culture yields growth of greater than 3 colony types of  bacteria,  which may indicate contamination and normal jessica  Call client services within 7 days if further workup is clinically  indicated.  Culture includes  Few Staphylococcus aureus  Moderate Streptococcus dysgalactiae (Group C/G) "Susceptibilities not  performed"  Numerous Pseudomonas aeruginosa    01-03 .Abscess Leg - Left XXXX   No polymorphonuclear cells seen per low power field  Numerous Gram positive cocci in pairs seen per oil power field  Numerous Gram Negative Rods seen per oil power field   Numerous Pseudomonas aeruginosa  Moderate Enterococcus faecalis  Moderate Streptococcus dysgalactiae (Group C/G) "Susceptibilities not  performed"    01-03 .Blood Blood-Peripheral XXXX XXXX   No growth at 48 Hours    01-03 .Blood Blood-Peripheral XXXX XXXX   No growth at 48 Hours                PT/INR - ( 05 Jan 2024 06:05 )   PT: 48.1 sec;   INR: 4.29 ratio             Sodium:  Sodium: 141 mmol/L (01-05 @ 06:05)  Sodium: 139 mmol/L (01-04 @ 07:14)  Sodium: 141 mmol/L (01-03 @ 14:10)      0.48 mg/dL 01-05 @ 06:05  0.58 mg/dL 01-04 @ 07:14  0.72 mg/dL 01-03 @ 14:10      Hemoglobin:  Hemoglobin: 10.4 g/dL (01-05 @ 06:05)  Hemoglobin: 10.7 g/dL (01-04 @ 07:14)  Hemoglobin: 10.9 g/dL (01-03 @ 14:10)      Platelets: Platelet Count - Automated: 565 K/uL (01-05 @ 06:05)  Platelet Count - Automated: 733 K/uL (01-04 @ 07:14)  Platelet Count - Automated: 793 K/uL (01-03 @ 14:10)          Urinalysis Basic - ( 05 Jan 2024 06:05 )    Color: x / Appearance: x / SG: x / pH: x  Gluc: 111 mg/dL / Ketone: x  / Bili: x / Urobili: x   Blood: x / Protein: x / Nitrite: x   Leuk Esterase: x / RBC: x / WBC x   Sq Epi: x / Non Sq Epi: x / Bacteria: x        RADIOLOGY & ADDITIONAL STUDIES:      MICROBIOLOGY:  RECENT CULTURES:  01-03 Wound Wound XXXX XXXX   Culture yields growth of greater than 3 colony types of  bacteria,  which may indicate contamination and normal jessica  Call client services within 7 days if further workup is clinically  indicated.  Culture includes  Few Staphylococcus aureus  Moderate Streptococcus dysgalactiae (Group C/G) "Susceptibilities not  performed"  Numerous Pseudomonas aeruginosa    01-03 .Abscess Leg - Left XXXX   No polymorphonuclear cells seen per low power field  Numerous Gram positive cocci in pairs seen per oil power field  Numerous Gram Negative Rods seen per oil power field   Numerous Pseudomonas aeruginosa  Moderate Enterococcus faecalis  Moderate Streptococcus dysgalactiae (Group C/G) "Susceptibilities not  performed"    01-03 .Blood Blood-Peripheral XXXX XXXX   No growth at 48 Hours    01-03 .Blood Blood-Peripheral XXXX XXXX   No growth at 48 Hours

## 2024-01-06 NOTE — PROGRESS NOTE ADULT - ASSESSMENT
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  Patient was hospitalized in 2023  g with altered mental status concern for UTI , also  cellulitis to the LLE at the site of a skin graft. Pt recently completed a course of po abx for this with no significant improvement. 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  Patient was hospitalized in 2023  g with altered mental status concern for UTI , also  cellulitis to the LLE at the site of a skin graft. Pt recently completed a course of po abx for this with no significant improvement. bib ems with wounds to left lower leg for unknown period of time, now weeping and draining, pt c/o pain but pt is poor historian She was admitted in november 2023 with LLE cellulitis and seen by wound care MD & ID -discharged on po doxycycline and cipro & with topical care .Admitted for septic workup , iv abx ,pain management and wound care

## 2024-01-06 NOTE — PROGRESS NOTE ADULT - SUBJECTIVE AND OBJECTIVE BOX
Interval History:    CENTRAL LINE:   [  ] YES       [  ] NO  MUIR:                 [  ] YES       [  ] NO         REVIEW OF SYSTEMS:  All Systems below were reviewed and are negative [  ]  HEENT:  ID:  Pulmonary:  Cardiac:  GI:  Renal:  Musculoskeletal:  All other systems above were reviewed and are negative   [  ]      MEDICATIONS  (STANDING):  acetaminophen     Tablet .. 1000 milliGRAM(s) Oral every 8 hours  amLODIPine   Tablet 5 milliGRAM(s) Oral daily  calcium carbonate   1250 mG (OsCal) 1 Tablet(s) Oral daily  cholecalciferol 2000 Unit(s) Oral daily  dextrose 5% + sodium chloride 0.45%. 1000 milliLiter(s) (40 mL/Hr) IV Continuous <Continuous>  escitalopram 20 milliGRAM(s) Oral daily  folic acid 1 milliGRAM(s) Oral daily  gabapentin 300 milliGRAM(s) Oral three times a day  hydroxyurea 500 milliGRAM(s) Oral two times a day  lactobacillus acidophilus 1 Tablet(s) Oral every 12 hours  levothyroxine 50 MICROGram(s) Oral daily  metoprolol tartrate 25 milliGRAM(s) Oral two times a day  pantoprazole    Tablet 40 milliGRAM(s) Oral before breakfast  piperacillin/tazobactam IVPB.. 4.5 Gram(s) IV Intermittent every 12 hours  polyethylene glycol 3350 17 Gram(s) Oral daily  senna 2 Tablet(s) Oral at bedtime  simvastatin 10 milliGRAM(s) Oral at bedtime    MEDICATIONS  (PRN):  aluminum hydroxide/magnesium hydroxide/simethicone Suspension 30 milliLiter(s) Oral every 4 hours PRN Dyspepsia  bisacodyl Suppository 10 milliGRAM(s) Rectal daily PRN Constipation  magnesium hydroxide Suspension 30 milliLiter(s) Oral daily PRN Constipation  melatonin 3 milliGRAM(s) Oral at bedtime PRN Insomnia  morphine  - Injectable 2 milliGRAM(s) IV Push every 4 hours PRN Severe Pain (7 - 10)  ondansetron Injectable 4 milliGRAM(s) IV Push every 8 hours PRN Nausea and/or Vomiting  traMADol 50 milliGRAM(s) Oral four times a day PRN Moderate Pain (4 - 6)      Vital Signs Last 24 Hrs  T(C): 36.6 (06 Jan 2024 12:54), Max: 36.8 (05 Jan 2024 20:40)  T(F): 97.9 (06 Jan 2024 12:54), Max: 98.2 (05 Jan 2024 20:40)  HR: 81 (06 Jan 2024 12:54) (81 - 100)  BP: 129/68 (06 Jan 2024 12:54) (120/64 - 135/76)  BP(mean): --  RR: 19 (06 Jan 2024 12:54) (17 - 19)  SpO2: 91% (06 Jan 2024 12:54) (90% - 93%)    Parameters below as of 06 Jan 2024 12:54  Patient On (Oxygen Delivery Method): room air        I&O's Summary    05 Jan 2024 07:01  -  06 Jan 2024 07:00  --------------------------------------------------------  IN: 200 mL / OUT: 0 mL / NET: 200 mL        PHYSICAL EXAM:  HEENT: NC/AT; PERRLA  Neck: Soft; no tenderness  Lungs: CTA bilaterally; no wheezing.   Heart:  Abdomen:  Genital/ Rectal:  Extremities:  Neurologic:  Vascular:      LABORATORY:    CBC Full  -  ( 06 Jan 2024 09:28 )  WBC Count : 11.35 K/uL  RBC Count : 2.64 M/uL  Hemoglobin : 8.9 g/dL  Hematocrit : 29.3 %  Platelet Count - Automated : 494 K/uL  Mean Cell Volume : 111.0 fl  Mean Cell Hemoglobin : 33.7 pg  Mean Cell Hemoglobin Concentration : 30.4 gm/dL  Auto Neutrophil # : x  Auto Lymphocyte # : x  Auto Monocyte # : x  Auto Eosinophil # : x  Auto Basophil # : x  Auto Neutrophil % : x  Auto Lymphocyte % : x  Auto Monocyte % : x  Auto Eosinophil % : x  Auto Basophil % : x      ESR:                   01-04 @ 07:14  --    C-Reactive Protein:     01-04 @ 07:14  --    Procalcitonin:           01-04 @ 07:14   0.18      01-06    143  |  110<H>  |  10  ----------------------------<  96  3.2<L>   |  29  |  0.44<L>    Ca    7.7<L>      06 Jan 2024 09:28    TPro  5.1<L>  /  Alb  1.7<L>  /  TBili  0.4  /  DBili  x   /  AST  15  /  ALT  9<L>  /  AlkPhos  73  01-06          Assessment and Plan:          Sushil Anguiano MD   [phone].      Interval History:    CENTRAL LINE:   [  ] YES       [  ] NO  MUIR:                 [  ] YES       [  ] NO         REVIEW OF SYSTEMS:  All Systems below were reviewed and are negative [  ]  HEENT:  ID:  Pulmonary:  Cardiac:  GI:  Renal:  Musculoskeletal:  All other systems above were reviewed and are negative   [  ]      MEDICATIONS  (STANDING):  acetaminophen     Tablet .. 1000 milliGRAM(s) Oral every 8 hours  amLODIPine   Tablet 5 milliGRAM(s) Oral daily  calcium carbonate   1250 mG (OsCal) 1 Tablet(s) Oral daily  cholecalciferol 2000 Unit(s) Oral daily  dextrose 5% + sodium chloride 0.45%. 1000 milliLiter(s) (40 mL/Hr) IV Continuous <Continuous>  escitalopram 20 milliGRAM(s) Oral daily  folic acid 1 milliGRAM(s) Oral daily  gabapentin 300 milliGRAM(s) Oral three times a day  hydroxyurea 500 milliGRAM(s) Oral two times a day  lactobacillus acidophilus 1 Tablet(s) Oral every 12 hours  levothyroxine 50 MICROGram(s) Oral daily  metoprolol tartrate 25 milliGRAM(s) Oral two times a day  pantoprazole    Tablet 40 milliGRAM(s) Oral before breakfast  piperacillin/tazobactam IVPB.. 4.5 Gram(s) IV Intermittent every 12 hours  polyethylene glycol 3350 17 Gram(s) Oral daily  senna 2 Tablet(s) Oral at bedtime  simvastatin 10 milliGRAM(s) Oral at bedtime    MEDICATIONS  (PRN):  aluminum hydroxide/magnesium hydroxide/simethicone Suspension 30 milliLiter(s) Oral every 4 hours PRN Dyspepsia  bisacodyl Suppository 10 milliGRAM(s) Rectal daily PRN Constipation  magnesium hydroxide Suspension 30 milliLiter(s) Oral daily PRN Constipation  melatonin 3 milliGRAM(s) Oral at bedtime PRN Insomnia  morphine  - Injectable 2 milliGRAM(s) IV Push every 4 hours PRN Severe Pain (7 - 10)  ondansetron Injectable 4 milliGRAM(s) IV Push every 8 hours PRN Nausea and/or Vomiting  traMADol 50 milliGRAM(s) Oral four times a day PRN Moderate Pain (4 - 6)      Vital Signs Last 24 Hrs  T(C): 36.6 (06 Jan 2024 12:54), Max: 36.8 (05 Jan 2024 20:40)  T(F): 97.9 (06 Jan 2024 12:54), Max: 98.2 (05 Jan 2024 20:40)  HR: 81 (06 Jan 2024 12:54) (81 - 100)  BP: 129/68 (06 Jan 2024 12:54) (120/64 - 135/76)  BP(mean): --  RR: 19 (06 Jan 2024 12:54) (17 - 19)  SpO2: 91% (06 Jan 2024 12:54) (90% - 93%)    Parameters below as of 06 Jan 2024 12:54  Patient On (Oxygen Delivery Method): room air        I&O's Summary    05 Jan 2024 07:01  -  06 Jan 2024 07:00  --------------------------------------------------------  IN: 200 mL / OUT: 0 mL / NET: 200 mL        PHYSICAL EXAM:  HEENT: NC/AT; PERRLA  Neck: Soft; no tenderness  Lungs: CTA bilaterally; no wheezing.   Heart:  Abdomen:  Genital/ Rectal:  Extremities:  Neurologic:  Vascular:      LABORATORY:    CBC Full  -  ( 06 Jan 2024 09:28 )  WBC Count : 11.35 K/uL  RBC Count : 2.64 M/uL  Hemoglobin : 8.9 g/dL  Hematocrit : 29.3 %  Platelet Count - Automated : 494 K/uL  Mean Cell Volume : 111.0 fl  Mean Cell Hemoglobin : 33.7 pg  Mean Cell Hemoglobin Concentration : 30.4 gm/dL  Auto Neutrophil # : x  Auto Lymphocyte # : x  Auto Monocyte # : x  Auto Eosinophil # : x  Auto Basophil # : x  Auto Neutrophil % : x  Auto Lymphocyte % : x  Auto Monocyte % : x  Auto Eosinophil % : x  Auto Basophil % : x      ESR:                   01-04 @ 07:14  --    C-Reactive Protein:     01-04 @ 07:14  --    Procalcitonin:           01-04 @ 07:14   0.18      01-06    143  |  110<H>  |  10  ----------------------------<  96  3.2<L>   |  29  |  0.44<L>    Ca    7.7<L>      06 Jan 2024 09:28    TPro  5.1<L>  /  Alb  1.7<L>  /  TBili  0.4  /  DBili  x   /  AST  15  /  ALT  9<L>  /  AlkPhos  73  01-06          Assessment and Plan:          Sushil Anguiano MD   [phone].    She is afebrile  Comfortable     MEDICATIONS  (STANDING):  acetaminophen     Tablet .. 1000 milliGRAM(s) Oral every 8 hours  amLODIPine   Tablet 5 milliGRAM(s) Oral daily  calcium carbonate   1250 mG (OsCal) 1 Tablet(s) Oral daily  cholecalciferol 2000 Unit(s) Oral daily  dextrose 5% + sodium chloride 0.45%. 1000 milliLiter(s) (40 mL/Hr) IV Continuous <Continuous>  escitalopram 20 milliGRAM(s) Oral daily  folic acid 1 milliGRAM(s) Oral daily  gabapentin 300 milliGRAM(s) Oral three times a day  hydroxyurea 500 milliGRAM(s) Oral two times a day  lactobacillus acidophilus 1 Tablet(s) Oral every 12 hours  levothyroxine 50 MICROGram(s) Oral daily  metoprolol tartrate 25 milliGRAM(s) Oral two times a day  pantoprazole    Tablet 40 milliGRAM(s) Oral before breakfast  piperacillin/tazobactam IVPB.. 4.5 Gram(s) IV Intermittent every 12 hours  polyethylene glycol 3350 17 Gram(s) Oral daily  senna 2 Tablet(s) Oral at bedtime  simvastatin 10 milliGRAM(s) Oral at bedtime    MEDICATIONS  (PRN):  aluminum hydroxide/magnesium hydroxide/simethicone Suspension 30 milliLiter(s) Oral every 4 hours PRN Dyspepsia  bisacodyl Suppository 10 milliGRAM(s) Rectal daily PRN Constipation  magnesium hydroxide Suspension 30 milliLiter(s) Oral daily PRN Constipation  melatonin 3 milliGRAM(s) Oral at bedtime PRN Insomnia  morphine  - Injectable 2 milliGRAM(s) IV Push every 4 hours PRN Severe Pain (7 - 10)  ondansetron Injectable 4 milliGRAM(s) IV Push every 8 hours PRN Nausea and/or Vomiting  traMADol 50 milliGRAM(s) Oral four times a day PRN Moderate Pain (4 - 6)      Vital Signs Last 24 Hrs  T(C): 36.6 (06 Jan 2024 12:54), Max: 36.8 (05 Jan 2024 20:40)  T(F): 97.9 (06 Jan 2024 12:54), Max: 98.2 (05 Jan 2024 20:40)  HR: 81 (06 Jan 2024 12:54) (81 - 100)  BP: 129/68 (06 Jan 2024 12:54) (120/64 - 135/76)  BP(mean): --  RR: 19 (06 Jan 2024 12:54) (17 - 19)  SpO2: 91% (06 Jan 2024 12:54) (90% - 93%)    Parameters below as of 06 Jan 2024 12:54  Patient On (Oxygen Delivery Method): room air        I&O's Summary    05 Jan 2024 07:01  -  06 Jan 2024 07:00  --------------------------------------------------------  IN: 200 mL / OUT: 0 mL / NET: 200 mL        PHYSICAL EXAM:  HEENT: NC/AT; PERRLA  Neck: Soft; no tenderness  Lungs: CTA bilaterally; no wheezing.   Heart:  Abdomen:  Genital/ Rectal:  Extremities:  Neurologic:  Vascular:      LABORATORY:    CBC Full  -  ( 06 Jan 2024 09:28 )  WBC Count : 11.35 K/uL  RBC Count : 2.64 M/uL  Hemoglobin : 8.9 g/dL  Hematocrit : 29.3 %  Platelet Count - Automated : 494 K/uL  Mean Cell Volume : 111.0 fl  Mean Cell Hemoglobin : 33.7 pg  Mean Cell Hemoglobin Concentration : 30.4 gm/dL  Auto Neutrophil # : x  Auto Lymphocyte # : x  Auto Monocyte # : x  Auto Eosinophil # : x  Auto Basophil # : x  Auto Neutrophil % : x  Auto Lymphocyte % : x  Auto Monocyte % : x  Auto Eosinophil % : x  Auto Basophil % : x      ESR:                   01-04 @ 07:14  --    C-Reactive Protein:     01-04 @ 07:14  --    Procalcitonin:           01-04 @ 07:14   0.18      01-06    143  |  110<H>  |  10  ----------------------------<  96  3.2<L>   |  29  |  0.44<L>    Ca    7.7<L>      06 Jan 2024 09:28    TPro  5.1<L>  /  Alb  1.7<L>  /  TBili  0.4  /  DBili  x   /  AST  15  /  ALT  9<L>  /  AlkPhos  73  01-06          Assessment and Plan:    1. Worsening of LLE wounds.  2. Chronic LLE pain.    . Woud cultures are growing many Pseudomonas aeruginosa, Enterococcus faecalis and group C Strep and MRSA  . Discontinue IV Zosyn, Add IV Cefepime and IV Vancomycin.   . Since the wound appears improving with IV antibiotics,, agree to hold off skin biopsies for now.  . Wound care daily.  . Anticipate PICc line for 2 weeks of IV abx at the rehab,           Sushil Anguiano MD   [phone].    She is afebrile  Comfortable     MEDICATIONS  (STANDING):  acetaminophen     Tablet .. 1000 milliGRAM(s) Oral every 8 hours  amLODIPine   Tablet 5 milliGRAM(s) Oral daily  calcium carbonate   1250 mG (OsCal) 1 Tablet(s) Oral daily  cholecalciferol 2000 Unit(s) Oral daily  dextrose 5% + sodium chloride 0.45%. 1000 milliLiter(s) (40 mL/Hr) IV Continuous <Continuous>  escitalopram 20 milliGRAM(s) Oral daily  folic acid 1 milliGRAM(s) Oral daily  gabapentin 300 milliGRAM(s) Oral three times a day  hydroxyurea 500 milliGRAM(s) Oral two times a day  lactobacillus acidophilus 1 Tablet(s) Oral every 12 hours  levothyroxine 50 MICROGram(s) Oral daily  metoprolol tartrate 25 milliGRAM(s) Oral two times a day  pantoprazole    Tablet 40 milliGRAM(s) Oral before breakfast  piperacillin/tazobactam IVPB.. 4.5 Gram(s) IV Intermittent every 12 hours  polyethylene glycol 3350 17 Gram(s) Oral daily  senna 2 Tablet(s) Oral at bedtime  simvastatin 10 milliGRAM(s) Oral at bedtime    MEDICATIONS  (PRN):  aluminum hydroxide/magnesium hydroxide/simethicone Suspension 30 milliLiter(s) Oral every 4 hours PRN Dyspepsia  bisacodyl Suppository 10 milliGRAM(s) Rectal daily PRN Constipation  magnesium hydroxide Suspension 30 milliLiter(s) Oral daily PRN Constipation  melatonin 3 milliGRAM(s) Oral at bedtime PRN Insomnia  morphine  - Injectable 2 milliGRAM(s) IV Push every 4 hours PRN Severe Pain (7 - 10)  ondansetron Injectable 4 milliGRAM(s) IV Push every 8 hours PRN Nausea and/or Vomiting  traMADol 50 milliGRAM(s) Oral four times a day PRN Moderate Pain (4 - 6)      Vital Signs Last 24 Hrs  T(C): 36.6 (06 Jan 2024 12:54), Max: 36.8 (05 Jan 2024 20:40)  T(F): 97.9 (06 Jan 2024 12:54), Max: 98.2 (05 Jan 2024 20:40)  HR: 81 (06 Jan 2024 12:54) (81 - 100)  BP: 129/68 (06 Jan 2024 12:54) (120/64 - 135/76)  BP(mean): --  RR: 19 (06 Jan 2024 12:54) (17 - 19)  SpO2: 91% (06 Jan 2024 12:54) (90% - 93%)    Parameters below as of 06 Jan 2024 12:54  Patient On (Oxygen Delivery Method): room air        I&O's Summary    05 Jan 2024 07:01  -  06 Jan 2024 07:00  --------------------------------------------------------  IN: 200 mL / OUT: 0 mL / NET: 200 mL        PHYSICAL EXAM:  HEENT: NC/AT; PERRLA  Neck: Soft; no tenderness  Lungs: CTA bilaterally; no wheezing.   Heart:  Abdomen:  Genital/ Rectal:  Extremities:  Neurologic:  Vascular:      LABORATORY:    CBC Full  -  ( 06 Jan 2024 09:28 )  WBC Count : 11.35 K/uL  RBC Count : 2.64 M/uL  Hemoglobin : 8.9 g/dL  Hematocrit : 29.3 %  Platelet Count - Automated : 494 K/uL  Mean Cell Volume : 111.0 fl  Mean Cell Hemoglobin : 33.7 pg  Mean Cell Hemoglobin Concentration : 30.4 gm/dL  Auto Neutrophil # : x  Auto Lymphocyte # : x  Auto Monocyte # : x  Auto Eosinophil # : x  Auto Basophil # : x  Auto Neutrophil % : x  Auto Lymphocyte % : x  Auto Monocyte % : x  Auto Eosinophil % : x  Auto Basophil % : x      ESR:                   01-04 @ 07:14  --    C-Reactive Protein:     01-04 @ 07:14  --    Procalcitonin:           01-04 @ 07:14   0.18      01-06    143  |  110<H>  |  10  ----------------------------<  96  3.2<L>   |  29  |  0.44<L>    Ca    7.7<L>      06 Jan 2024 09:28    TPro  5.1<L>  /  Alb  1.7<L>  /  TBili  0.4  /  DBili  x   /  AST  15  /  ALT  9<L>  /  AlkPhos  73  01-06          Assessment and Plan:    1. Worsening of LLE wounds.  2. Chronic LLE pain.    . Woud cultures are growing many Pseudomonas aeruginosa, Enterococcus faecalis and group C Strep and MRSA  . Discontinue IV Zosyn, Add IV Cefepime and IV Vancomycin.   . Since the wound appears improving with IV antibiotics,, agree to hold off skin biopsies for now.  . Wound care daily.  . Anticipate PICc line for 2 weeks of IV abx at the rehab,           Sushil Anguiano MD   [phone].    She is afebrile  Comfortable       MEDICATIONS  (STANDING):  acetaminophen     Tablet .. 1000 milliGRAM(s) Oral every 8 hours  amLODIPine   Tablet 5 milliGRAM(s) Oral daily  calcium carbonate   1250 mG (OsCal) 1 Tablet(s) Oral daily  cholecalciferol 2000 Unit(s) Oral daily  dextrose 5% + sodium chloride 0.45%. 1000 milliLiter(s) (40 mL/Hr) IV Continuous <Continuous>  escitalopram 20 milliGRAM(s) Oral daily  folic acid 1 milliGRAM(s) Oral daily  gabapentin 300 milliGRAM(s) Oral three times a day  hydroxyurea 500 milliGRAM(s) Oral two times a day  lactobacillus acidophilus 1 Tablet(s) Oral every 12 hours  levothyroxine 50 MICROGram(s) Oral daily  metoprolol tartrate 25 milliGRAM(s) Oral two times a day  pantoprazole    Tablet 40 milliGRAM(s) Oral before breakfast  piperacillin/tazobactam IVPB.. 4.5 Gram(s) IV Intermittent every 12 hours  polyethylene glycol 3350 17 Gram(s) Oral daily  senna 2 Tablet(s) Oral at bedtime  simvastatin 10 milliGRAM(s) Oral at bedtime    MEDICATIONS  (PRN):  aluminum hydroxide/magnesium hydroxide/simethicone Suspension 30 milliLiter(s) Oral every 4 hours PRN Dyspepsia  bisacodyl Suppository 10 milliGRAM(s) Rectal daily PRN Constipation  magnesium hydroxide Suspension 30 milliLiter(s) Oral daily PRN Constipation  melatonin 3 milliGRAM(s) Oral at bedtime PRN Insomnia  morphine  - Injectable 2 milliGRAM(s) IV Push every 4 hours PRN Severe Pain (7 - 10)  ondansetron Injectable 4 milliGRAM(s) IV Push every 8 hours PRN Nausea and/or Vomiting  traMADol 50 milliGRAM(s) Oral four times a day PRN Moderate Pain (4 - 6)      Vital Signs Last 24 Hrs  T(C): 36.6 (06 Jan 2024 12:54), Max: 36.8 (05 Jan 2024 20:40)  T(F): 97.9 (06 Jan 2024 12:54), Max: 98.2 (05 Jan 2024 20:40)  HR: 81 (06 Jan 2024 12:54) (81 - 100)  BP: 129/68 (06 Jan 2024 12:54) (120/64 - 135/76)  BP(mean): --  RR: 19 (06 Jan 2024 12:54) (17 - 19)  SpO2: 91% (06 Jan 2024 12:54) (90% - 93%)    Parameters below as of 06 Jan 2024 12:54  Patient On (Oxygen Delivery Method): room air        I&O's Summary    05 Jan 2024 07:01  -  06 Jan 2024 07:00  --------------------------------------------------------  IN: 200 mL / OUT: 0 mL / NET: 200 mL        PHYSICAL EXAM:  HEENT: NC/AT; PERRLA  Neck: Soft; no tenderness  Lungs: Coarse BS bilaterally; no wheezing.   Heart: RRR, no murmurs.   Abdomen: Soft, no tenderness.   Genital/ Rectal: No salas catheter.   Extremities: LLE wound with clean dressing.   Neurologic: Confused. No focal weakness.       LABORATORY:    CBC Full  -  ( 06 Jan 2024 09:28 )  WBC Count : 11.35 K/uL  RBC Count : 2.64 M/uL  Hemoglobin : 8.9 g/dL  Hematocrit : 29.3 %  Platelet Count - Automated : 494 K/uL  Mean Cell Volume : 111.0 fl  Mean Cell Hemoglobin : 33.7 pg  Mean Cell Hemoglobin Concentration : 30.4 gm/dL  Auto Neutrophil # : x  Auto Lymphocyte # : x  Auto Monocyte # : x  Auto Eosinophil # : x  Auto Basophil # : x  Auto Neutrophil % : x  Auto Lymphocyte % : x  Auto Monocyte % : x  Auto Eosinophil % : x  Auto Basophil % : x      ESR:                   01-04 @ 07:14  --    C-Reactive Protein:     01-04 @ 07:14  --    Procalcitonin:           01-04 @ 07:14   0.18      01-06    143  |  110<H>  |  10  ----------------------------<  96  3.2<L>   |  29  |  0.44<L>    Ca    7.7<L>      06 Jan 2024 09:28    TPro  5.1<L>  /  Alb  1.7<L>  /  TBili  0.4  /  DBili  x   /  AST  15  /  ALT  9<L>  /  AlkPhos  73  01-06      Assessment and Plan:    1. Worsening of LLE wounds.  2. Chronic LLE pain.    . Wound cultures are growing many Pseudomonas aeruginosa, Enterococcus faecalis and group C Strep and MRSA  . Discontinue IV Zosyn. Add IV Cefepime and IV Vancomycin.   . Since the wound appears improving with IV antibiotics, agree to hold off skin biopsies for now.  . Wound care daily.  . Anticipate PICC  line for 2 weeks of IV abx at the rehab since there are no options for oral antibiotics.       Sushil Anguiano MD   [phone].    She is afebrile  Comfortable       MEDICATIONS  (STANDING):  acetaminophen     Tablet .. 1000 milliGRAM(s) Oral every 8 hours  amLODIPine   Tablet 5 milliGRAM(s) Oral daily  calcium carbonate   1250 mG (OsCal) 1 Tablet(s) Oral daily  cholecalciferol 2000 Unit(s) Oral daily  dextrose 5% + sodium chloride 0.45%. 1000 milliLiter(s) (40 mL/Hr) IV Continuous <Continuous>  escitalopram 20 milliGRAM(s) Oral daily  folic acid 1 milliGRAM(s) Oral daily  gabapentin 300 milliGRAM(s) Oral three times a day  hydroxyurea 500 milliGRAM(s) Oral two times a day  lactobacillus acidophilus 1 Tablet(s) Oral every 12 hours  levothyroxine 50 MICROGram(s) Oral daily  metoprolol tartrate 25 milliGRAM(s) Oral two times a day  pantoprazole    Tablet 40 milliGRAM(s) Oral before breakfast  piperacillin/tazobactam IVPB.. 4.5 Gram(s) IV Intermittent every 12 hours  polyethylene glycol 3350 17 Gram(s) Oral daily  senna 2 Tablet(s) Oral at bedtime  simvastatin 10 milliGRAM(s) Oral at bedtime    MEDICATIONS  (PRN):  aluminum hydroxide/magnesium hydroxide/simethicone Suspension 30 milliLiter(s) Oral every 4 hours PRN Dyspepsia  bisacodyl Suppository 10 milliGRAM(s) Rectal daily PRN Constipation  magnesium hydroxide Suspension 30 milliLiter(s) Oral daily PRN Constipation  melatonin 3 milliGRAM(s) Oral at bedtime PRN Insomnia  morphine  - Injectable 2 milliGRAM(s) IV Push every 4 hours PRN Severe Pain (7 - 10)  ondansetron Injectable 4 milliGRAM(s) IV Push every 8 hours PRN Nausea and/or Vomiting  traMADol 50 milliGRAM(s) Oral four times a day PRN Moderate Pain (4 - 6)      Vital Signs Last 24 Hrs  T(C): 36.6 (06 Jan 2024 12:54), Max: 36.8 (05 Jan 2024 20:40)  T(F): 97.9 (06 Jan 2024 12:54), Max: 98.2 (05 Jan 2024 20:40)  HR: 81 (06 Jan 2024 12:54) (81 - 100)  BP: 129/68 (06 Jan 2024 12:54) (120/64 - 135/76)  BP(mean): --  RR: 19 (06 Jan 2024 12:54) (17 - 19)  SpO2: 91% (06 Jan 2024 12:54) (90% - 93%)    Parameters below as of 06 Jan 2024 12:54  Patient On (Oxygen Delivery Method): room air        I&O's Summary    05 Jan 2024 07:01  -  06 Jan 2024 07:00  --------------------------------------------------------  IN: 200 mL / OUT: 0 mL / NET: 200 mL        PHYSICAL EXAM:  HEENT: NC/AT; PERRLA  Neck: Soft; no tenderness  Lungs: Coarse BS bilaterally; no wheezing.   Heart: RRR, no murmurs.   Abdomen: Soft, no tenderness.   Genital/ Rectal: No salas catheter.   Extremities: LLE wound with clean dressing.   Neurologic: Confused. No focal weakness.       LABORATORY:    CBC Full  -  ( 06 Jan 2024 09:28 )  WBC Count : 11.35 K/uL  RBC Count : 2.64 M/uL  Hemoglobin : 8.9 g/dL  Hematocrit : 29.3 %  Platelet Count - Automated : 494 K/uL  Mean Cell Volume : 111.0 fl  Mean Cell Hemoglobin : 33.7 pg  Mean Cell Hemoglobin Concentration : 30.4 gm/dL  Auto Neutrophil # : x  Auto Lymphocyte # : x  Auto Monocyte # : x  Auto Eosinophil # : x  Auto Basophil # : x  Auto Neutrophil % : x  Auto Lymphocyte % : x  Auto Monocyte % : x  Auto Eosinophil % : x  Auto Basophil % : x      ESR:                   01-04 @ 07:14  --    C-Reactive Protein:     01-04 @ 07:14  --    Procalcitonin:           01-04 @ 07:14   0.18      01-06    143  |  110<H>  |  10  ----------------------------<  96  3.2<L>   |  29  |  0.44<L>    Ca    7.7<L>      06 Jan 2024 09:28    TPro  5.1<L>  /  Alb  1.7<L>  /  TBili  0.4  /  DBili  x   /  AST  15  /  ALT  9<L>  /  AlkPhos  73  01-06      Assessment and Plan:    1. Worsening of LLE wounds.  2. Chronic LLE pain.    . Wound cultures are growing many Pseudomonas aeruginosa, Enterococcus faecalis and group C Strep and MRSA  . Discontinue IV Zosyn. Add IV Cefepime and IV Vancomycin.   . Since the wound appears improving with IV antibiotics, agree to hold off skin biopsies for now.  . Wound care daily.  . Anticipate PICC  line for 2 weeks of IV abx at the rehab since there are no options for oral antibiotics.       Sushil Anguiano MD   [phone].

## 2024-01-07 LAB
-  AMOXICILLIN/CLAVULANIC ACID: SIGNIFICANT CHANGE UP
-  AMOXICILLIN/CLAVULANIC ACID: SIGNIFICANT CHANGE UP
-  AMPICILLIN/SULBACTAM: SIGNIFICANT CHANGE UP
-  AMPICILLIN/SULBACTAM: SIGNIFICANT CHANGE UP
-  AMPICILLIN: SIGNIFICANT CHANGE UP
-  AMPICILLIN: SIGNIFICANT CHANGE UP
-  AZTREONAM: SIGNIFICANT CHANGE UP
-  AZTREONAM: SIGNIFICANT CHANGE UP
-  CEFAZOLIN: SIGNIFICANT CHANGE UP
-  CEFAZOLIN: SIGNIFICANT CHANGE UP
-  CEFEPIME: SIGNIFICANT CHANGE UP
-  CEFEPIME: SIGNIFICANT CHANGE UP
-  CEFOXITIN: SIGNIFICANT CHANGE UP
-  CEFOXITIN: SIGNIFICANT CHANGE UP
-  CEFTRIAXONE: SIGNIFICANT CHANGE UP
-  CEFTRIAXONE: SIGNIFICANT CHANGE UP
-  CIPROFLOXACIN: SIGNIFICANT CHANGE UP
-  CIPROFLOXACIN: SIGNIFICANT CHANGE UP
-  ERTAPENEM: SIGNIFICANT CHANGE UP
-  ERTAPENEM: SIGNIFICANT CHANGE UP
-  GENTAMICIN: SIGNIFICANT CHANGE UP
-  GENTAMICIN: SIGNIFICANT CHANGE UP
-  LEVOFLOXACIN: SIGNIFICANT CHANGE UP
-  LEVOFLOXACIN: SIGNIFICANT CHANGE UP
-  MEROPENEM: SIGNIFICANT CHANGE UP
-  MEROPENEM: SIGNIFICANT CHANGE UP
-  PIPERACILLIN/TAZOBACTAM: SIGNIFICANT CHANGE UP
-  PIPERACILLIN/TAZOBACTAM: SIGNIFICANT CHANGE UP
-  TOBRAMYCIN: SIGNIFICANT CHANGE UP
-  TOBRAMYCIN: SIGNIFICANT CHANGE UP
-  TRIMETHOPRIM/SULFAMETHOXAZOLE: SIGNIFICANT CHANGE UP
-  TRIMETHOPRIM/SULFAMETHOXAZOLE: SIGNIFICANT CHANGE UP
ANION GAP SERPL CALC-SCNC: 1 MMOL/L — LOW (ref 5–17)
ANION GAP SERPL CALC-SCNC: 1 MMOL/L — LOW (ref 5–17)
BUN SERPL-MCNC: 10 MG/DL — SIGNIFICANT CHANGE UP (ref 7–23)
BUN SERPL-MCNC: 10 MG/DL — SIGNIFICANT CHANGE UP (ref 7–23)
CALCIUM SERPL-MCNC: 8.1 MG/DL — LOW (ref 8.5–10.1)
CALCIUM SERPL-MCNC: 8.1 MG/DL — LOW (ref 8.5–10.1)
CHLORIDE SERPL-SCNC: 108 MMOL/L — SIGNIFICANT CHANGE UP (ref 96–108)
CHLORIDE SERPL-SCNC: 108 MMOL/L — SIGNIFICANT CHANGE UP (ref 96–108)
CO2 SERPL-SCNC: 30 MMOL/L — SIGNIFICANT CHANGE UP (ref 22–31)
CO2 SERPL-SCNC: 30 MMOL/L — SIGNIFICANT CHANGE UP (ref 22–31)
CREAT SERPL-MCNC: 0.38 MG/DL — LOW (ref 0.5–1.3)
CREAT SERPL-MCNC: 0.38 MG/DL — LOW (ref 0.5–1.3)
EGFR: 95 ML/MIN/1.73M2 — SIGNIFICANT CHANGE UP
EGFR: 95 ML/MIN/1.73M2 — SIGNIFICANT CHANGE UP
GLUCOSE SERPL-MCNC: 91 MG/DL — SIGNIFICANT CHANGE UP (ref 70–99)
GLUCOSE SERPL-MCNC: 91 MG/DL — SIGNIFICANT CHANGE UP (ref 70–99)
HCT VFR BLD CALC: 32.5 % — LOW (ref 34.5–45)
HCT VFR BLD CALC: 32.5 % — LOW (ref 34.5–45)
HGB BLD-MCNC: 10 G/DL — LOW (ref 11.5–15.5)
HGB BLD-MCNC: 10 G/DL — LOW (ref 11.5–15.5)
INR BLD: 3.06 RATIO — HIGH (ref 0.85–1.18)
INR BLD: 3.06 RATIO — HIGH (ref 0.85–1.18)
MCHC RBC-ENTMCNC: 30.8 GM/DL — LOW (ref 32–36)
MCHC RBC-ENTMCNC: 30.8 GM/DL — LOW (ref 32–36)
MCHC RBC-ENTMCNC: 34.5 PG — HIGH (ref 27–34)
MCHC RBC-ENTMCNC: 34.5 PG — HIGH (ref 27–34)
MCV RBC AUTO: 112.1 FL — HIGH (ref 80–100)
MCV RBC AUTO: 112.1 FL — HIGH (ref 80–100)
METHOD TYPE: SIGNIFICANT CHANGE UP
METHOD TYPE: SIGNIFICANT CHANGE UP
NRBC # BLD: 0 /100 WBCS — SIGNIFICANT CHANGE UP (ref 0–0)
NRBC # BLD: 0 /100 WBCS — SIGNIFICANT CHANGE UP (ref 0–0)
PLATELET # BLD AUTO: 707 K/UL — HIGH (ref 150–400)
PLATELET # BLD AUTO: 707 K/UL — HIGH (ref 150–400)
POTASSIUM SERPL-MCNC: 3.9 MMOL/L — SIGNIFICANT CHANGE UP (ref 3.5–5.3)
POTASSIUM SERPL-MCNC: 3.9 MMOL/L — SIGNIFICANT CHANGE UP (ref 3.5–5.3)
POTASSIUM SERPL-SCNC: 3.9 MMOL/L — SIGNIFICANT CHANGE UP (ref 3.5–5.3)
POTASSIUM SERPL-SCNC: 3.9 MMOL/L — SIGNIFICANT CHANGE UP (ref 3.5–5.3)
PROTHROM AB SERPL-ACNC: 34.7 SEC — HIGH (ref 9.5–13)
PROTHROM AB SERPL-ACNC: 34.7 SEC — HIGH (ref 9.5–13)
RBC # BLD: 2.9 M/UL — LOW (ref 3.8–5.2)
RBC # BLD: 2.9 M/UL — LOW (ref 3.8–5.2)
RBC # FLD: 18 % — HIGH (ref 10.3–14.5)
RBC # FLD: 18 % — HIGH (ref 10.3–14.5)
SODIUM SERPL-SCNC: 139 MMOL/L — SIGNIFICANT CHANGE UP (ref 135–145)
SODIUM SERPL-SCNC: 139 MMOL/L — SIGNIFICANT CHANGE UP (ref 135–145)
WBC # BLD: 11.46 K/UL — HIGH (ref 3.8–10.5)
WBC # BLD: 11.46 K/UL — HIGH (ref 3.8–10.5)
WBC # FLD AUTO: 11.46 K/UL — HIGH (ref 3.8–10.5)
WBC # FLD AUTO: 11.46 K/UL — HIGH (ref 3.8–10.5)

## 2024-01-07 RX ADMIN — Medication 1 MILLIGRAM(S): at 15:41

## 2024-01-07 RX ADMIN — MORPHINE SULFATE 2 MILLIGRAM(S): 50 CAPSULE, EXTENDED RELEASE ORAL at 15:40

## 2024-01-07 RX ADMIN — MORPHINE SULFATE 2 MILLIGRAM(S): 50 CAPSULE, EXTENDED RELEASE ORAL at 03:45

## 2024-01-07 RX ADMIN — CEFEPIME 100 MILLIGRAM(S): 1 INJECTION, POWDER, FOR SOLUTION INTRAMUSCULAR; INTRAVENOUS at 18:30

## 2024-01-07 RX ADMIN — Medication 1000 MILLIGRAM(S): at 07:27

## 2024-01-07 RX ADMIN — Medication 1000 MILLIGRAM(S): at 00:17

## 2024-01-07 RX ADMIN — ESCITALOPRAM OXALATE 20 MILLIGRAM(S): 10 TABLET, FILM COATED ORAL at 15:41

## 2024-01-07 RX ADMIN — Medication 2000 UNIT(S): at 15:41

## 2024-01-07 RX ADMIN — MORPHINE SULFATE 2 MILLIGRAM(S): 50 CAPSULE, EXTENDED RELEASE ORAL at 10:17

## 2024-01-07 RX ADMIN — Medication 1000 MILLIGRAM(S): at 15:40

## 2024-01-07 RX ADMIN — GABAPENTIN 300 MILLIGRAM(S): 400 CAPSULE ORAL at 15:40

## 2024-01-07 RX ADMIN — Medication 250 MILLIGRAM(S): at 10:11

## 2024-01-07 RX ADMIN — MORPHINE SULFATE 2 MILLIGRAM(S): 50 CAPSULE, EXTENDED RELEASE ORAL at 01:35

## 2024-01-07 RX ADMIN — Medication 1 TABLET(S): at 15:41

## 2024-01-07 RX ADMIN — AMLODIPINE BESYLATE 5 MILLIGRAM(S): 2.5 TABLET ORAL at 07:28

## 2024-01-07 RX ADMIN — CEFEPIME 100 MILLIGRAM(S): 1 INJECTION, POWDER, FOR SOLUTION INTRAMUSCULAR; INTRAVENOUS at 07:26

## 2024-01-07 RX ADMIN — MORPHINE SULFATE 2 MILLIGRAM(S): 50 CAPSULE, EXTENDED RELEASE ORAL at 21:24

## 2024-01-07 RX ADMIN — HYDROXYUREA 500 MILLIGRAM(S): 500 CAPSULE ORAL at 07:27

## 2024-01-07 RX ADMIN — HYDROXYUREA 500 MILLIGRAM(S): 500 CAPSULE ORAL at 18:34

## 2024-01-07 RX ADMIN — Medication 250 MILLIGRAM(S): at 21:31

## 2024-01-07 NOTE — PROGRESS NOTE ADULT - ASSESSMENT
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  Patient was hospitalized in 2023  g with altered mental status concern for UTI , also  cellulitis to the LLE at the site of a skin graft. Pt recently completed a course of po abx for this with no significant improvement. 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  Patient was hospitalized in 2023  g with altered mental status concern for UTI , also  cellulitis to the LLE at the site of a skin graft. Pt recently completed a course of po abx for this with no significant improvement. bib ems with wounds to left lower leg for unknown period of time, now weeping and draining, pt c/o pain but pt is poor historian She was admitted in november 2023 with LLE cellulitis and seen by wound care MD & ID -discharged on po doxycycline and cipro & with topical care .Admitted for septic workup , iv abx ,pain management and wound care

## 2024-01-07 NOTE — PROGRESS NOTE ADULT - SUBJECTIVE AND OBJECTIVE BOX
PROGRESS NOTE  Patient is a 89y old  Female who presents with a chief complaint of LLE OPEN WOUND (07 Jan 2024 08:37)    Chart and available morning labs /imaging are reviewed electronically , urgent issues addressed . More information  is being added upon completion of rounds , when more information is collected and management discussed with consultants , medical staff and social service/case management on the floor   OVERNIGHT  No new issues reported by medical staff . 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  Patient was hospitalized in 2023  g with altered mental status concern for UTI , also  cellulitis to the LLE at the site of a skin graft. Pt recently completed a course of po abx for this with no significant improvement. bib ems with wounds to left lower leg for unknown period of time, now weeping and draining, pt c/o pain but pt is poor historian She was admitted in november 2023 with LLE cellulitis and seen by wound care MD & ID -discharged on po doxycycline and cipro & with topical care .Admitted for septic workup , iv abx ,pain management and wound care  (03 Jan 2024 18:15)    PAST MEDICAL & SURGICAL HISTORY:  CVA (cerebral vascular accident)      VHD (valvular heart disease)      Hyperlipidemia      Neuropathy      Hypertension      Depression      Constipation      Neuropathy      Grade I diastolic dysfunction      GERD (gastroesophageal reflux disease)      Aortic valvar stenosis      Afib      Hypothyroidism      H/O skin graft          MEDICATIONS  (STANDING):  acetaminophen     Tablet .. 1000 milliGRAM(s) Oral every 8 hours  amLODIPine   Tablet 5 milliGRAM(s) Oral daily  calcium carbonate   1250 mG (OsCal) 1 Tablet(s) Oral daily  cefepime   IVPB 2000 milliGRAM(s) IV Intermittent every 12 hours  cholecalciferol 2000 Unit(s) Oral daily  dextrose 5% + sodium chloride 0.45%. 1000 milliLiter(s) (40 mL/Hr) IV Continuous <Continuous>  escitalopram 20 milliGRAM(s) Oral daily  folic acid 1 milliGRAM(s) Oral daily  gabapentin 300 milliGRAM(s) Oral three times a day  hydroxyurea 500 milliGRAM(s) Oral two times a day  lactobacillus acidophilus 1 Tablet(s) Oral every 12 hours  levothyroxine 50 MICROGram(s) Oral daily  metoprolol tartrate 25 milliGRAM(s) Oral two times a day  pantoprazole    Tablet 40 milliGRAM(s) Oral before breakfast  polyethylene glycol 3350 17 Gram(s) Oral daily  senna 2 Tablet(s) Oral at bedtime  simvastatin 10 milliGRAM(s) Oral at bedtime  vancomycin  IVPB 750 milliGRAM(s) IV Intermittent every 12 hours    MEDICATIONS  (PRN):  aluminum hydroxide/magnesium hydroxide/simethicone Suspension 30 milliLiter(s) Oral every 4 hours PRN Dyspepsia  bisacodyl Suppository 10 milliGRAM(s) Rectal daily PRN Constipation  magnesium hydroxide Suspension 30 milliLiter(s) Oral daily PRN Constipation  melatonin 3 milliGRAM(s) Oral at bedtime PRN Insomnia  morphine  - Injectable 2 milliGRAM(s) IV Push every 4 hours PRN Severe Pain (7 - 10)  ondansetron Injectable 4 milliGRAM(s) IV Push every 8 hours PRN Nausea and/or Vomiting  traMADol 50 milliGRAM(s) Oral four times a day PRN Moderate Pain (4 - 6)      OBJECTIVE    T(C): 36.8 (01-07-24 @ 05:38), Max: 36.8 (01-06-24 @ 20:57)  HR: 78 (01-07-24 @ 05:38) (78 - 93)  BP: 111/74 (01-07-24 @ 05:38) (111/74 - 129/68)  RR: 18 (01-07-24 @ 05:38) (18 - 19)  SpO2: 95% (01-07-24 @ 05:38) (91% - 95%)  Wt(kg): --  I&O's Summary    06 Jan 2024 07:01  -  07 Jan 2024 07:00  --------------------------------------------------------  IN: 570 mL / OUT: 0 mL / NET: 570 mL          REVIEW OF SYSTEMS:  Patient is  unable to provide any information/ROS  due to baseline mental status.     PHYSICAL EXAM:  Appearance: NAD. VS past 24 hrs -as above   HEENT:   Moist oral mucosa. Conjunctiva clear b/l.   Neck : supple  Respiratory: Lungs CTAB.  Gastrointestinal:  Soft, nontender. No rebound. No rigidity. BS present	  Cardiovascular: RRR ,S1S2 present  Neurologic: Non-focal. Moving all extremities.  Extremities: No edema. No erythema. No calf tenderness.  Skin: No rashes, No ecchymoses, No cyanosis.	  wounds ,skin lesions-See skin assesment flow sheet   LABS:                        10.0   11.46 )-----------( 707      ( 07 Jan 2024 06:26 )             32.5     01-07    139  |  108  |  10  ----------------------------<  91  3.9   |  30  |  0.38<L>    Ca    8.1<L>      07 Jan 2024 06:26    TPro  5.1<L>  /  Alb  1.7<L>  /  TBili  0.4  /  DBili  x   /  AST  15  /  ALT  9<L>  /  AlkPhos  73  01-06    CAPILLARY BLOOD GLUCOSE        PT/INR - ( 07 Jan 2024 06:26 )   PT: 34.7 sec;   INR: 3.06 ratio           Urinalysis Basic - ( 07 Jan 2024 06:26 )    Color: x / Appearance: x / SG: x / pH: x  Gluc: 91 mg/dL / Ketone: x  / Bili: x / Urobili: x   Blood: x / Protein: x / Nitrite: x   Leuk Esterase: x / RBC: x / WBC x   Sq Epi: x / Non Sq Epi: x / Bacteria: x        Culture - Other (collected 03 Jan 2024 19:56)  Source: Wound Wound  Final Report (06 Jan 2024 10:13):    Culture yields growth of greater than 3 colony types of    bacteria,  which may indicate contamination and normal jessica    Call client services within 7 days if further workup is clinically    indicated.    Culture includes    Few Methicillin Resistant Staphylococcus aureus    Moderate Streptococcus dysgalactiae (Group C/G) "Susceptibilities not    performed"    Numerous Pseudomonas aeruginosa Multiple Morphological Strains  Organism: Methicillin resistant Staphylococcus aureus  Pseudomonas aeruginosa (06 Jan 2024 10:13)  Organism: Pseudomonas aeruginosa (06 Jan 2024 10:13)  Organism: Methicillin resistant Staphylococcus aureus (06 Jan 2024 10:13)    Culture - Abscess with Gram Stain (collected 03 Jan 2024 17:30)  Source: .Abscess Leg - Left  Gram Stain (04 Jan 2024 05:52):    No polymorphonuclear cells seen per low power field    Numerous Gram positive cocci in pairs seen per oil power field    Numerous Gram Negative Rods seen per oil power field  Preliminary Report (06 Jan 2024 15:04):    Numerous Pseudomonas aeruginosa    Moderate Enterococcus faecalis    Moderate Proteus mirabilis    Moderate Streptococcus dysgalactiae (Group C/G) "Susceptibilities not    performed"  Organism: Pseudomonas aeruginosa  Enterococcus faecalis (06 Jan 2024 15:03)  Organism: Enterococcus faecalis (06 Jan 2024 15:03)  Organism: Pseudomonas aeruginosa (06 Jan 2024 15:03)    Culture - Blood (collected 03 Jan 2024 14:55)  Source: .Blood Blood-Peripheral  Preliminary Report (07 Jan 2024 01:02):    No growth at 72 Hours    Culture - Blood (collected 03 Jan 2024 14:50)  Source: .Blood Blood-Peripheral  Preliminary Report (07 Jan 2024 01:02):    No growth at 72 Hours      RADIOLOGY & ADDITIONAL TESTS:   reviewed elctronically  ASSESSMENT/PLAN: 	  25 minutes aggregate time was spent on this visit, 50% visit time spent in care co-ordination with other attendings and counselling patient .I have discussed care plan with patient / HCP/family member ,who expressed understanding of problems treatment and their effect and side effects, alternatives in details. I have asked if they have any questions and concerns and appropriately addressed them to best of my ability.    PROGRESS NOTE  Patient is a 89y old  Female who presents with a chief complaint of LLE OPEN WOUND (07 Jan 2024 08:37)    Chart and available morning labs /imaging are reviewed electronically , urgent issues addressed . More information  is being added upon completion of rounds , when more information is collected and management discussed with consultants , medical staff and social service/case management on the floor   OVERNIGHT  No new issues reported by medical staff . 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  Patient was hospitalized in 2023  g with altered mental status concern for UTI , also  cellulitis to the LLE at the site of a skin graft. Pt recently completed a course of po abx for this with no significant improvement. bib ems with wounds to left lower leg for unknown period of time, now weeping and draining, pt c/o pain but pt is poor historian She was admitted in november 2023 with LLE cellulitis and seen by wound care MD & ID -discharged on po doxycycline and cipro & with topical care .Admitted for septic workup , iv abx ,pain management and wound care  (03 Jan 2024 18:15)    PAST MEDICAL & SURGICAL HISTORY:  CVA (cerebral vascular accident)      VHD (valvular heart disease)      Hyperlipidemia      Neuropathy      Hypertension      Depression      Constipation      Neuropathy      Grade I diastolic dysfunction      GERD (gastroesophageal reflux disease)      Aortic valvar stenosis      Afib      Hypothyroidism      H/O skin graft          MEDICATIONS  (STANDING):  acetaminophen     Tablet .. 1000 milliGRAM(s) Oral every 8 hours  amLODIPine   Tablet 5 milliGRAM(s) Oral daily  calcium carbonate   1250 mG (OsCal) 1 Tablet(s) Oral daily  cefepime   IVPB 2000 milliGRAM(s) IV Intermittent every 12 hours  cholecalciferol 2000 Unit(s) Oral daily  dextrose 5% + sodium chloride 0.45%. 1000 milliLiter(s) (40 mL/Hr) IV Continuous <Continuous>  escitalopram 20 milliGRAM(s) Oral daily  folic acid 1 milliGRAM(s) Oral daily  gabapentin 300 milliGRAM(s) Oral three times a day  hydroxyurea 500 milliGRAM(s) Oral two times a day  lactobacillus acidophilus 1 Tablet(s) Oral every 12 hours  levothyroxine 50 MICROGram(s) Oral daily  metoprolol tartrate 25 milliGRAM(s) Oral two times a day  pantoprazole    Tablet 40 milliGRAM(s) Oral before breakfast  polyethylene glycol 3350 17 Gram(s) Oral daily  senna 2 Tablet(s) Oral at bedtime  simvastatin 10 milliGRAM(s) Oral at bedtime  vancomycin  IVPB 750 milliGRAM(s) IV Intermittent every 12 hours    MEDICATIONS  (PRN):  aluminum hydroxide/magnesium hydroxide/simethicone Suspension 30 milliLiter(s) Oral every 4 hours PRN Dyspepsia  bisacodyl Suppository 10 milliGRAM(s) Rectal daily PRN Constipation  magnesium hydroxide Suspension 30 milliLiter(s) Oral daily PRN Constipation  melatonin 3 milliGRAM(s) Oral at bedtime PRN Insomnia  morphine  - Injectable 2 milliGRAM(s) IV Push every 4 hours PRN Severe Pain (7 - 10)  ondansetron Injectable 4 milliGRAM(s) IV Push every 8 hours PRN Nausea and/or Vomiting  traMADol 50 milliGRAM(s) Oral four times a day PRN Moderate Pain (4 - 6)      OBJECTIVE    T(C): 36.8 (01-07-24 @ 05:38), Max: 36.8 (01-06-24 @ 20:57)  HR: 78 (01-07-24 @ 05:38) (78 - 93)  BP: 111/74 (01-07-24 @ 05:38) (111/74 - 129/68)  RR: 18 (01-07-24 @ 05:38) (18 - 19)  SpO2: 95% (01-07-24 @ 05:38) (91% - 95%)  Wt(kg): --  I&O's Summary    06 Jan 2024 07:01  -  07 Jan 2024 07:00  --------------------------------------------------------  IN: 570 mL / OUT: 0 mL / NET: 570 mL          REVIEW OF SYSTEMS:  Patient is  unable to provide any information/ROS  due to baseline mental status.     PHYSICAL EXAM:  Appearance: NAD. VS past 24 hrs -as above   HEENT:   Moist oral mucosa. Conjunctiva clear b/l.   Neck : supple  Respiratory: Lungs CTAB.  Gastrointestinal:  Soft, nontender. No rebound. No rigidity. BS present	  Cardiovascular: RRR ,S1S2 present  Neurologic: Non-focal. Moving all extremities.  Extremities: No edema. No erythema. No calf tenderness.  Skin: No rashes, No ecchymoses, No cyanosis.	  wounds ,skin lesions-See skin assesment flow sheet   LABS:                        10.0   11.46 )-----------( 707      ( 07 Jan 2024 06:26 )             32.5     01-07    139  |  108  |  10  ----------------------------<  91  3.9   |  30  |  0.38<L>    Ca    8.1<L>      07 Jan 2024 06:26    TPro  5.1<L>  /  Alb  1.7<L>  /  TBili  0.4  /  DBili  x   /  AST  15  /  ALT  9<L>  /  AlkPhos  73  01-06    CAPILLARY BLOOD GLUCOSE        PT/INR - ( 07 Jan 2024 06:26 )   PT: 34.7 sec;   INR: 3.06 ratio           Urinalysis Basic - ( 07 Jan 2024 06:26 )    Color: x / Appearance: x / SG: x / pH: x  Gluc: 91 mg/dL / Ketone: x  / Bili: x / Urobili: x   Blood: x / Protein: x / Nitrite: x   Leuk Esterase: x / RBC: x / WBC x   Sq Epi: x / Non Sq Epi: x / Bacteria: x        Culture - Other (collected 03 Jan 2024 19:56)  Source: Wound Wound  Final Report (06 Jan 2024 10:13):    Culture yields growth of greater than 3 colony types of    bacteria,  which may indicate contamination and normal jessica    Call client services within 7 days if further workup is clinically    indicated.    Culture includes    Few Methicillin Resistant Staphylococcus aureus    Moderate Streptococcus dysgalactiae (Group C/G) "Susceptibilities not    performed"    Numerous Pseudomonas aeruginosa Multiple Morphological Strains  Organism: Methicillin resistant Staphylococcus aureus  Pseudomonas aeruginosa (06 Jan 2024 10:13)  Organism: Pseudomonas aeruginosa (06 Jan 2024 10:13)  Organism: Methicillin resistant Staphylococcus aureus (06 Jan 2024 10:13)    Culture - Abscess with Gram Stain (collected 03 Jan 2024 17:30)  Source: .Abscess Leg - Left  Gram Stain (04 Jan 2024 05:52):    No polymorphonuclear cells seen per low power field    Numerous Gram positive cocci in pairs seen per oil power field    Numerous Gram Negative Rods seen per oil power field  Preliminary Report (06 Jan 2024 15:04):    Numerous Pseudomonas aeruginosa    Moderate Enterococcus faecalis    Moderate Proteus mirabilis    Moderate Streptococcus dysgalactiae (Group C/G) "Susceptibilities not    performed"  Organism: Pseudomonas aeruginosa  Enterococcus faecalis (06 Jan 2024 15:03)  Organism: Enterococcus faecalis (06 Jan 2024 15:03)  Organism: Pseudomonas aeruginosa (06 Jan 2024 15:03)    Culture - Blood (collected 03 Jan 2024 14:55)  Source: .Blood Blood-Peripheral  Preliminary Report (07 Jan 2024 01:02):    No growth at 72 Hours    Culture - Blood (collected 03 Jan 2024 14:50)  Source: .Blood Blood-Peripheral  Preliminary Report (07 Jan 2024 01:02):    No growth at 72 Hours      RADIOLOGY & ADDITIONAL TESTS:   reviewed elctronically  ASSESSMENT/PLAN: 	  25 minutes aggregate time was spent on this visit, 50% visit time spent in care co-ordination with other attendings and counselling patient .I have discussed care plan with patient / HCP/family member ,who expressed understanding of problems treatment and their effect and side effects, alternatives in details. I have asked if they have any questions and concerns and appropriately addressed them to best of my ability.

## 2024-01-07 NOTE — PROGRESS NOTE ADULT - SUBJECTIVE AND OBJECTIVE BOX
Interval History:    CENTRAL LINE:   [  ] YES       [  ] NO  MUIR:                 [  ] YES       [  ] NO         REVIEW OF SYSTEMS:  All Systems below were reviewed and are negative [  ]  HEENT:  ID:  Pulmonary:  Cardiac:  GI:  Renal:  Musculoskeletal:  All other systems above were reviewed and are negative   [  ]      MEDICATIONS  (STANDING):  acetaminophen     Tablet .. 1000 milliGRAM(s) Oral every 8 hours  amLODIPine   Tablet 5 milliGRAM(s) Oral daily  calcium carbonate   1250 mG (OsCal) 1 Tablet(s) Oral daily  cefepime   IVPB 2000 milliGRAM(s) IV Intermittent every 12 hours  cholecalciferol 2000 Unit(s) Oral daily  dextrose 5% + sodium chloride 0.45%. 1000 milliLiter(s) (40 mL/Hr) IV Continuous <Continuous>  escitalopram 20 milliGRAM(s) Oral daily  folic acid 1 milliGRAM(s) Oral daily  gabapentin 300 milliGRAM(s) Oral three times a day  hydroxyurea 500 milliGRAM(s) Oral two times a day  lactobacillus acidophilus 1 Tablet(s) Oral every 12 hours  levothyroxine 50 MICROGram(s) Oral daily  metoprolol tartrate 25 milliGRAM(s) Oral two times a day  pantoprazole    Tablet 40 milliGRAM(s) Oral before breakfast  polyethylene glycol 3350 17 Gram(s) Oral daily  senna 2 Tablet(s) Oral at bedtime  simvastatin 10 milliGRAM(s) Oral at bedtime  vancomycin  IVPB 750 milliGRAM(s) IV Intermittent every 12 hours    MEDICATIONS  (PRN):  aluminum hydroxide/magnesium hydroxide/simethicone Suspension 30 milliLiter(s) Oral every 4 hours PRN Dyspepsia  bisacodyl Suppository 10 milliGRAM(s) Rectal daily PRN Constipation  magnesium hydroxide Suspension 30 milliLiter(s) Oral daily PRN Constipation  melatonin 3 milliGRAM(s) Oral at bedtime PRN Insomnia  morphine  - Injectable 2 milliGRAM(s) IV Push every 4 hours PRN Severe Pain (7 - 10)  ondansetron Injectable 4 milliGRAM(s) IV Push every 8 hours PRN Nausea and/or Vomiting  traMADol 50 milliGRAM(s) Oral four times a day PRN Moderate Pain (4 - 6)      Vital Signs Last 24 Hrs  T(C): 36.6 (07 Jan 2024 12:51), Max: 36.8 (06 Jan 2024 20:57)  T(F): 97.8 (07 Jan 2024 12:51), Max: 98.3 (06 Jan 2024 20:57)  HR: 93 (07 Jan 2024 12:51) (78 - 93)  BP: 143/77 (07 Jan 2024 12:51) (111/74 - 143/77)  BP(mean): --  RR: 19 (07 Jan 2024 12:51) (18 - 19)  SpO2: 91% (07 Jan 2024 12:51) (91% - 95%)    Parameters below as of 07 Jan 2024 12:51  Patient On (Oxygen Delivery Method): room air        I&O's Summary    06 Jan 2024 07:01  -  07 Jan 2024 07:00  --------------------------------------------------------  IN: 570 mL / OUT: 0 mL / NET: 570 mL        PHYSICAL EXAM:  HEENT: NC/AT; PERRLA  Neck: Soft; no tenderness  Lungs: CTA bilaterally; no wheezing.   Heart:  Abdomen:  Genital/ Rectal:  Extremities:  Neurologic:  Vascular:      LABORATORY:    CBC Full  -  ( 07 Jan 2024 06:26 )  WBC Count : 11.46 K/uL  RBC Count : 2.90 M/uL  Hemoglobin : 10.0 g/dL  Hematocrit : 32.5 %  Platelet Count - Automated : 707 K/uL  Mean Cell Volume : 112.1 fl  Mean Cell Hemoglobin : 34.5 pg  Mean Cell Hemoglobin Concentration : 30.8 gm/dL  Auto Neutrophil # : x  Auto Lymphocyte # : x  Auto Monocyte # : x  Auto Eosinophil # : x  Auto Basophil # : x  Auto Neutrophil % : x  Auto Lymphocyte % : x  Auto Monocyte % : x  Auto Eosinophil % : x  Auto Basophil % : x      ESR:                   01-04 @ 07:14  --    C-Reactive Protein:     01-04 @ 07:14  --    Procalcitonin:           01-04 @ 07:14   0.18      01-07    139  |  108  |  10  ----------------------------<  91  3.9   |  30  |  0.38<L>    Ca    8.1<L>      07 Jan 2024 06:26    TPro  5.1<L>  /  Alb  1.7<L>  /  TBili  0.4  /  DBili  x   /  AST  15  /  ALT  9<L>  /  AlkPhos  73  01-06          Assessment and Plan:          Sushil Anguiano MD   [phone].      Interval History:    CENTRAL LINE:   [  ] YES       [  ] NO  MUIR:                 [  ] YES       [  ] NO         REVIEW OF SYSTEMS:  All Systems below were reviewed and are negative [  ]  HEENT:  ID:  Pulmonary:  Cardiac:  GI:  Renal:  Musculoskeletal:  All other systems above were reviewed and are negative   [  ]      MEDICATIONS  (STANDING):  acetaminophen     Tablet .. 1000 milliGRAM(s) Oral every 8 hours  amLODIPine   Tablet 5 milliGRAM(s) Oral daily  calcium carbonate   1250 mG (OsCal) 1 Tablet(s) Oral daily  cefepime   IVPB 2000 milliGRAM(s) IV Intermittent every 12 hours  cholecalciferol 2000 Unit(s) Oral daily  dextrose 5% + sodium chloride 0.45%. 1000 milliLiter(s) (40 mL/Hr) IV Continuous <Continuous>  escitalopram 20 milliGRAM(s) Oral daily  folic acid 1 milliGRAM(s) Oral daily  gabapentin 300 milliGRAM(s) Oral three times a day  hydroxyurea 500 milliGRAM(s) Oral two times a day  lactobacillus acidophilus 1 Tablet(s) Oral every 12 hours  levothyroxine 50 MICROGram(s) Oral daily  metoprolol tartrate 25 milliGRAM(s) Oral two times a day  pantoprazole    Tablet 40 milliGRAM(s) Oral before breakfast  polyethylene glycol 3350 17 Gram(s) Oral daily  senna 2 Tablet(s) Oral at bedtime  simvastatin 10 milliGRAM(s) Oral at bedtime  vancomycin  IVPB 750 milliGRAM(s) IV Intermittent every 12 hours    MEDICATIONS  (PRN):  aluminum hydroxide/magnesium hydroxide/simethicone Suspension 30 milliLiter(s) Oral every 4 hours PRN Dyspepsia  bisacodyl Suppository 10 milliGRAM(s) Rectal daily PRN Constipation  magnesium hydroxide Suspension 30 milliLiter(s) Oral daily PRN Constipation  melatonin 3 milliGRAM(s) Oral at bedtime PRN Insomnia  morphine  - Injectable 2 milliGRAM(s) IV Push every 4 hours PRN Severe Pain (7 - 10)  ondansetron Injectable 4 milliGRAM(s) IV Push every 8 hours PRN Nausea and/or Vomiting  traMADol 50 milliGRAM(s) Oral four times a day PRN Moderate Pain (4 - 6)      Vital Signs Last 24 Hrs  T(C): 36.6 (07 Jan 2024 12:51), Max: 36.8 (06 Jan 2024 20:57)  T(F): 97.8 (07 Jan 2024 12:51), Max: 98.3 (06 Jan 2024 20:57)  HR: 93 (07 Jan 2024 12:51) (78 - 93)  BP: 143/77 (07 Jan 2024 12:51) (111/74 - 143/77)  BP(mean): --  RR: 19 (07 Jan 2024 12:51) (18 - 19)  SpO2: 91% (07 Jan 2024 12:51) (91% - 95%)    Parameters below as of 07 Jan 2024 12:51  Patient On (Oxygen Delivery Method): room air        I&O's Summary    06 Jan 2024 07:01  -  07 Jan 2024 07:00  --------------------------------------------------------  IN: 570 mL / OUT: 0 mL / NET: 570 mL        PHYSICAL EXAM:  HEENT: NC/AT; PERRLA  Neck: Soft; no tenderness  Lungs: CTA bilaterally; no wheezing.   Heart:  Abdomen:  Genital/ Rectal:  Extremities:  Neurologic:  Vascular:      LABORATORY:    CBC Full  -  ( 07 Jan 2024 06:26 )  WBC Count : 11.46 K/uL  RBC Count : 2.90 M/uL  Hemoglobin : 10.0 g/dL  Hematocrit : 32.5 %  Platelet Count - Automated : 707 K/uL  Mean Cell Volume : 112.1 fl  Mean Cell Hemoglobin : 34.5 pg  Mean Cell Hemoglobin Concentration : 30.8 gm/dL  Auto Neutrophil # : x  Auto Lymphocyte # : x  Auto Monocyte # : x  Auto Eosinophil # : x  Auto Basophil # : x  Auto Neutrophil % : x  Auto Lymphocyte % : x  Auto Monocyte % : x  Auto Eosinophil % : x  Auto Basophil % : x      ESR:                   01-04 @ 07:14  --    C-Reactive Protein:     01-04 @ 07:14  --    Procalcitonin:           01-04 @ 07:14   0.18      01-07    139  |  108  |  10  ----------------------------<  91  3.9   |  30  |  0.38<L>    Ca    8.1<L>      07 Jan 2024 06:26    TPro  5.1<L>  /  Alb  1.7<L>  /  TBili  0.4  /  DBili  x   /  AST  15  /  ALT  9<L>  /  AlkPhos  73  01-06          Assessment and Plan:          Sushil Anguiano MD   [phone].    She is awake  Comfortable.     MEDICATIONS  (STANDING):  acetaminophen     Tablet .. 1000 milliGRAM(s) Oral every 8 hours  amLODIPine   Tablet 5 milliGRAM(s) Oral daily  calcium carbonate   1250 mG (OsCal) 1 Tablet(s) Oral daily  cefepime   IVPB 2000 milliGRAM(s) IV Intermittent every 12 hours  cholecalciferol 2000 Unit(s) Oral daily  dextrose 5% + sodium chloride 0.45%. 1000 milliLiter(s) (40 mL/Hr) IV Continuous <Continuous>  escitalopram 20 milliGRAM(s) Oral daily  folic acid 1 milliGRAM(s) Oral daily  gabapentin 300 milliGRAM(s) Oral three times a day  hydroxyurea 500 milliGRAM(s) Oral two times a day  lactobacillus acidophilus 1 Tablet(s) Oral every 12 hours  levothyroxine 50 MICROGram(s) Oral daily  metoprolol tartrate 25 milliGRAM(s) Oral two times a day  pantoprazole    Tablet 40 milliGRAM(s) Oral before breakfast  polyethylene glycol 3350 17 Gram(s) Oral daily  senna 2 Tablet(s) Oral at bedtime  simvastatin 10 milliGRAM(s) Oral at bedtime  vancomycin  IVPB 750 milliGRAM(s) IV Intermittent every 12 hours    MEDICATIONS  (PRN):  aluminum hydroxide/magnesium hydroxide/simethicone Suspension 30 milliLiter(s) Oral every 4 hours PRN Dyspepsia  bisacodyl Suppository 10 milliGRAM(s) Rectal daily PRN Constipation  magnesium hydroxide Suspension 30 milliLiter(s) Oral daily PRN Constipation  melatonin 3 milliGRAM(s) Oral at bedtime PRN Insomnia  morphine  - Injectable 2 milliGRAM(s) IV Push every 4 hours PRN Severe Pain (7 - 10)  ondansetron Injectable 4 milliGRAM(s) IV Push every 8 hours PRN Nausea and/or Vomiting  traMADol 50 milliGRAM(s) Oral four times a day PRN Moderate Pain (4 - 6)      Vital Signs Last 24 Hrs  T(C): 36.6 (07 Jan 2024 12:51), Max: 36.8 (06 Jan 2024 20:57)  T(F): 97.8 (07 Jan 2024 12:51), Max: 98.3 (06 Jan 2024 20:57)  HR: 93 (07 Jan 2024 12:51) (78 - 93)  BP: 143/77 (07 Jan 2024 12:51) (111/74 - 143/77)  BP(mean): --  RR: 19 (07 Jan 2024 12:51) (18 - 19)  SpO2: 91% (07 Jan 2024 12:51) (91% - 95%)    Parameters below as of 07 Jan 2024 12:51  Patient On (Oxygen Delivery Method): room air        I&O's Summary    06 Jan 2024 07:01  -  07 Jan 2024 07:00  --------------------------------------------------------  IN: 570 mL / OUT: 0 mL / NET: 570 mL        PHYSICAL EXAM:  HEENT: NC/AT; PERRLA  Neck: Soft; no tenderness  Lungs: CTA bilaterally; no wheezing.   Heart:  Abdomen:  Genital/ Rectal:  Extremities:  Neurologic:  Vascular:      LABORATORY:    CBC Full  -  ( 07 Jan 2024 06:26 )  WBC Count : 11.46 K/uL  RBC Count : 2.90 M/uL  Hemoglobin : 10.0 g/dL  Hematocrit : 32.5 %  Platelet Count - Automated : 707 K/uL  Mean Cell Volume : 112.1 fl  Mean Cell Hemoglobin : 34.5 pg  Mean Cell Hemoglobin Concentration : 30.8 gm/dL  Auto Neutrophil # : x  Auto Lymphocyte # : x  Auto Monocyte # : x  Auto Eosinophil # : x  Auto Basophil # : x  Auto Neutrophil % : x  Auto Lymphocyte % : x  Auto Monocyte % : x  Auto Eosinophil % : x  Auto Basophil % : x      ESR:                   01-04 @ 07:14  --    C-Reactive Protein:     01-04 @ 07:14  --    Procalcitonin:           01-04 @ 07:14   0.18      01-07    139  |  108  |  10  ----------------------------<  91  3.9   |  30  |  0.38<L>    Ca    8.1<L>      07 Jan 2024 06:26    TPro  5.1<L>  /  Alb  1.7<L>  /  TBili  0.4  /  DBili  x   /  AST  15  /  ALT  9<L>  /  AlkPhos  73  01-06    Assessment and Plan:    1. Worsening of LLE wounds.  2. Chronic LLE pain.    . Wound cultures are growing many Pseudomonas aeruginosa, Enterococcus faecalis and group C Strep and MRSA  . Discontinue IV Zosyn. Add IV Cefepime and IV Vancomycin.   . Since the wound appears improving with IV antibiotics, agree to hold off skin biopsies for now.  . Wound care daily.  . Anticipate PICC  line for 2 weeks of IV abx at the rehab since there are no options for oral antibiotics.         Sushil Anguiano MD   [phone].    She is awake  Comfortable.     MEDICATIONS  (STANDING):  acetaminophen     Tablet .. 1000 milliGRAM(s) Oral every 8 hours  amLODIPine   Tablet 5 milliGRAM(s) Oral daily  calcium carbonate   1250 mG (OsCal) 1 Tablet(s) Oral daily  cefepime   IVPB 2000 milliGRAM(s) IV Intermittent every 12 hours  cholecalciferol 2000 Unit(s) Oral daily  dextrose 5% + sodium chloride 0.45%. 1000 milliLiter(s) (40 mL/Hr) IV Continuous <Continuous>  escitalopram 20 milliGRAM(s) Oral daily  folic acid 1 milliGRAM(s) Oral daily  gabapentin 300 milliGRAM(s) Oral three times a day  hydroxyurea 500 milliGRAM(s) Oral two times a day  lactobacillus acidophilus 1 Tablet(s) Oral every 12 hours  levothyroxine 50 MICROGram(s) Oral daily  metoprolol tartrate 25 milliGRAM(s) Oral two times a day  pantoprazole    Tablet 40 milliGRAM(s) Oral before breakfast  polyethylene glycol 3350 17 Gram(s) Oral daily  senna 2 Tablet(s) Oral at bedtime  simvastatin 10 milliGRAM(s) Oral at bedtime  vancomycin  IVPB 750 milliGRAM(s) IV Intermittent every 12 hours    MEDICATIONS  (PRN):  aluminum hydroxide/magnesium hydroxide/simethicone Suspension 30 milliLiter(s) Oral every 4 hours PRN Dyspepsia  bisacodyl Suppository 10 milliGRAM(s) Rectal daily PRN Constipation  magnesium hydroxide Suspension 30 milliLiter(s) Oral daily PRN Constipation  melatonin 3 milliGRAM(s) Oral at bedtime PRN Insomnia  morphine  - Injectable 2 milliGRAM(s) IV Push every 4 hours PRN Severe Pain (7 - 10)  ondansetron Injectable 4 milliGRAM(s) IV Push every 8 hours PRN Nausea and/or Vomiting  traMADol 50 milliGRAM(s) Oral four times a day PRN Moderate Pain (4 - 6)      Vital Signs Last 24 Hrs  T(C): 36.6 (07 Jan 2024 12:51), Max: 36.8 (06 Jan 2024 20:57)  T(F): 97.8 (07 Jan 2024 12:51), Max: 98.3 (06 Jan 2024 20:57)  HR: 93 (07 Jan 2024 12:51) (78 - 93)  BP: 143/77 (07 Jan 2024 12:51) (111/74 - 143/77)  BP(mean): --  RR: 19 (07 Jan 2024 12:51) (18 - 19)  SpO2: 91% (07 Jan 2024 12:51) (91% - 95%)    Parameters below as of 07 Jan 2024 12:51  Patient On (Oxygen Delivery Method): room air        I&O's Summary    06 Jan 2024 07:01  -  07 Jan 2024 07:00  --------------------------------------------------------  IN: 570 mL / OUT: 0 mL / NET: 570 mL        PHYSICAL EXAM:  HEENT: NC/AT; PERRLA  Neck: Soft; no tenderness  Lungs: CTA bilaterally; no wheezing.   Heart:  Abdomen:  Genital/ Rectal:  Extremities:  Neurologic:  Vascular:      LABORATORY:    CBC Full  -  ( 07 Jan 2024 06:26 )  WBC Count : 11.46 K/uL  RBC Count : 2.90 M/uL  Hemoglobin : 10.0 g/dL  Hematocrit : 32.5 %  Platelet Count - Automated : 707 K/uL  Mean Cell Volume : 112.1 fl  Mean Cell Hemoglobin : 34.5 pg  Mean Cell Hemoglobin Concentration : 30.8 gm/dL  Auto Neutrophil # : x  Auto Lymphocyte # : x  Auto Monocyte # : x  Auto Eosinophil # : x  Auto Basophil # : x  Auto Neutrophil % : x  Auto Lymphocyte % : x  Auto Monocyte % : x  Auto Eosinophil % : x  Auto Basophil % : x      ESR:                   01-04 @ 07:14  --    C-Reactive Protein:     01-04 @ 07:14  --    Procalcitonin:           01-04 @ 07:14   0.18      01-07    139  |  108  |  10  ----------------------------<  91  3.9   |  30  |  0.38<L>    Ca    8.1<L>      07 Jan 2024 06:26    TPro  5.1<L>  /  Alb  1.7<L>  /  TBili  0.4  /  DBili  x   /  AST  15  /  ALT  9<L>  /  AlkPhos  73  01-06    Assessment and Plan:    1. Worsening of LLE wounds.  2. Chronic LLE pain.    . Wound cultures are growing many Pseudomonas aeruginosa, Enterococcus faecalis and group C Strep and MRSA  . Discontinue IV Zosyn. Add IV Cefepime and IV Vancomycin.   . Since the wound appears improving with IV antibiotics, agree to hold off skin biopsies for now.  . Wound care daily.  . Anticipate PICC  line for 2 weeks of IV abx at the rehab since there are no options for oral antibiotics.         Sushil Anguiano MD   [phone].    She is awake  Comfortable.     MEDICATIONS  (STANDING):  acetaminophen     Tablet .. 1000 milliGRAM(s) Oral every 8 hours  amLODIPine   Tablet 5 milliGRAM(s) Oral daily  calcium carbonate   1250 mG (OsCal) 1 Tablet(s) Oral daily  cefepime   IVPB 2000 milliGRAM(s) IV Intermittent every 12 hours  cholecalciferol 2000 Unit(s) Oral daily  dextrose 5% + sodium chloride 0.45%. 1000 milliLiter(s) (40 mL/Hr) IV Continuous <Continuous>  escitalopram 20 milliGRAM(s) Oral daily  folic acid 1 milliGRAM(s) Oral daily  gabapentin 300 milliGRAM(s) Oral three times a day  hydroxyurea 500 milliGRAM(s) Oral two times a day  lactobacillus acidophilus 1 Tablet(s) Oral every 12 hours  levothyroxine 50 MICROGram(s) Oral daily  metoprolol tartrate 25 milliGRAM(s) Oral two times a day  pantoprazole    Tablet 40 milliGRAM(s) Oral before breakfast  polyethylene glycol 3350 17 Gram(s) Oral daily  senna 2 Tablet(s) Oral at bedtime  simvastatin 10 milliGRAM(s) Oral at bedtime  vancomycin  IVPB 750 milliGRAM(s) IV Intermittent every 12 hours    MEDICATIONS  (PRN):  aluminum hydroxide/magnesium hydroxide/simethicone Suspension 30 milliLiter(s) Oral every 4 hours PRN Dyspepsia  bisacodyl Suppository 10 milliGRAM(s) Rectal daily PRN Constipation  magnesium hydroxide Suspension 30 milliLiter(s) Oral daily PRN Constipation  melatonin 3 milliGRAM(s) Oral at bedtime PRN Insomnia  morphine  - Injectable 2 milliGRAM(s) IV Push every 4 hours PRN Severe Pain (7 - 10)  ondansetron Injectable 4 milliGRAM(s) IV Push every 8 hours PRN Nausea and/or Vomiting  traMADol 50 milliGRAM(s) Oral four times a day PRN Moderate Pain (4 - 6)      Vital Signs Last 24 Hrs  T(C): 36.6 (07 Jan 2024 12:51), Max: 36.8 (06 Jan 2024 20:57)  T(F): 97.8 (07 Jan 2024 12:51), Max: 98.3 (06 Jan 2024 20:57)  HR: 93 (07 Jan 2024 12:51) (78 - 93)  BP: 143/77 (07 Jan 2024 12:51) (111/74 - 143/77)  BP(mean): --  RR: 19 (07 Jan 2024 12:51) (18 - 19)  SpO2: 91% (07 Jan 2024 12:51) (91% - 95%)    Parameters below as of 07 Jan 2024 12:51  Patient On (Oxygen Delivery Method): room air        I&O's Summary    06 Jan 2024 07:01  -  07 Jan 2024 07:00  --------------------------------------------------------  IN: 570 mL / OUT: 0 mL / NET: 570 mL      PHYSICAL EXAM:  HEENT: NC/AT; PERRLA  Neck: Soft; no tenderness  Lungs: CTA bilaterally; no wheezing.   Heart: RRR, no murmurs.   Abdomen: Soft, no tenderness.   Genital/ Rectal: No salas catheter.  Extremities: LLE with large wounds and clean dressings.   Neurologic: Confused.       LABORATORY:    CBC Full  -  ( 07 Jan 2024 06:26 )  WBC Count : 11.46 K/uL  RBC Count : 2.90 M/uL  Hemoglobin : 10.0 g/dL  Hematocrit : 32.5 %  Platelet Count - Automated : 707 K/uL  Mean Cell Volume : 112.1 fl  Mean Cell Hemoglobin : 34.5 pg  Mean Cell Hemoglobin Concentration : 30.8 gm/dL  Auto Neutrophil # : x  Auto Lymphocyte # : x  Auto Monocyte # : x  Auto Eosinophil # : x  Auto Basophil # : x  Auto Neutrophil % : x  Auto Lymphocyte % : x  Auto Monocyte % : x  Auto Eosinophil % : x  Auto Basophil % : x      ESR:                   01-04 @ 07:14  --    C-Reactive Protein:     01-04 @ 07:14  --    Procalcitonin:           01-04 @ 07:14   0.18      01-07    139  |  108  |  10  ----------------------------<  91  3.9   |  30  |  0.38<L>    Ca    8.1<L>      07 Jan 2024 06:26    TPro  5.1<L>  /  Alb  1.7<L>  /  TBili  0.4  /  DBili  x   /  AST  15  /  ALT  9<L>  /  AlkPhos  73  01-06    Assessment and Plan:    1. Worsening of LLE wounds.  2. Chronic LLE pain.    . Wound cultures grew many Pseudomonas aeruginosa, Enterococcus faecalis and group C Strep and MRSA  . There are no options for oral antibiotics. Continue IV Cefepime 2 gm q12h and IV Vancomycin 750 mg q12h to Jan 20, 2024 to complete a 2 week course.   . Get PICC  line for 2 weeks of IV abx. Discharge planning to rehab.         Sushil Anguiano MD   [phone].    She is awake  Comfortable.     MEDICATIONS  (STANDING):  acetaminophen     Tablet .. 1000 milliGRAM(s) Oral every 8 hours  amLODIPine   Tablet 5 milliGRAM(s) Oral daily  calcium carbonate   1250 mG (OsCal) 1 Tablet(s) Oral daily  cefepime   IVPB 2000 milliGRAM(s) IV Intermittent every 12 hours  cholecalciferol 2000 Unit(s) Oral daily  dextrose 5% + sodium chloride 0.45%. 1000 milliLiter(s) (40 mL/Hr) IV Continuous <Continuous>  escitalopram 20 milliGRAM(s) Oral daily  folic acid 1 milliGRAM(s) Oral daily  gabapentin 300 milliGRAM(s) Oral three times a day  hydroxyurea 500 milliGRAM(s) Oral two times a day  lactobacillus acidophilus 1 Tablet(s) Oral every 12 hours  levothyroxine 50 MICROGram(s) Oral daily  metoprolol tartrate 25 milliGRAM(s) Oral two times a day  pantoprazole    Tablet 40 milliGRAM(s) Oral before breakfast  polyethylene glycol 3350 17 Gram(s) Oral daily  senna 2 Tablet(s) Oral at bedtime  simvastatin 10 milliGRAM(s) Oral at bedtime  vancomycin  IVPB 750 milliGRAM(s) IV Intermittent every 12 hours    MEDICATIONS  (PRN):  aluminum hydroxide/magnesium hydroxide/simethicone Suspension 30 milliLiter(s) Oral every 4 hours PRN Dyspepsia  bisacodyl Suppository 10 milliGRAM(s) Rectal daily PRN Constipation  magnesium hydroxide Suspension 30 milliLiter(s) Oral daily PRN Constipation  melatonin 3 milliGRAM(s) Oral at bedtime PRN Insomnia  morphine  - Injectable 2 milliGRAM(s) IV Push every 4 hours PRN Severe Pain (7 - 10)  ondansetron Injectable 4 milliGRAM(s) IV Push every 8 hours PRN Nausea and/or Vomiting  traMADol 50 milliGRAM(s) Oral four times a day PRN Moderate Pain (4 - 6)      Vital Signs Last 24 Hrs  T(C): 36.6 (07 Jan 2024 12:51), Max: 36.8 (06 Jan 2024 20:57)  T(F): 97.8 (07 Jan 2024 12:51), Max: 98.3 (06 Jan 2024 20:57)  HR: 93 (07 Jan 2024 12:51) (78 - 93)  BP: 143/77 (07 Jan 2024 12:51) (111/74 - 143/77)  BP(mean): --  RR: 19 (07 Jan 2024 12:51) (18 - 19)  SpO2: 91% (07 Jan 2024 12:51) (91% - 95%)    Parameters below as of 07 Jan 2024 12:51  Patient On (Oxygen Delivery Method): room air        I&O's Summary    06 Jan 2024 07:01  -  07 Jan 2024 07:00  --------------------------------------------------------  IN: 570 mL / OUT: 0 mL / NET: 570 mL      PHYSICAL EXAM:  HEENT: NC/AT; PERRLA  Neck: Soft; no tenderness  Lungs: CTA bilaterally; no wheezing.   Heart: RRR, no murmurs.   Abdomen: Soft, no tenderness.   Genital/ Rectal: No salas catheter.  Extremities: LLE with large wounds and clean dressings.   Neurologic: Confused.       LABORATORY:    CBC Full  -  ( 07 Jan 2024 06:26 )  WBC Count : 11.46 K/uL  RBC Count : 2.90 M/uL  Hemoglobin : 10.0 g/dL  Hematocrit : 32.5 %  Platelet Count - Automated : 707 K/uL  Mean Cell Volume : 112.1 fl  Mean Cell Hemoglobin : 34.5 pg  Mean Cell Hemoglobin Concentration : 30.8 gm/dL  Auto Neutrophil # : x  Auto Lymphocyte # : x  Auto Monocyte # : x  Auto Eosinophil # : x  Auto Basophil # : x  Auto Neutrophil % : x  Auto Lymphocyte % : x  Auto Monocyte % : x  Auto Eosinophil % : x  Auto Basophil % : x      ESR:                   01-04 @ 07:14  --    C-Reactive Protein:     01-04 @ 07:14  --    Procalcitonin:           01-04 @ 07:14   0.18      01-07    139  |  108  |  10  ----------------------------<  91  3.9   |  30  |  0.38<L>    Ca    8.1<L>      07 Jan 2024 06:26    TPro  5.1<L>  /  Alb  1.7<L>  /  TBili  0.4  /  DBili  x   /  AST  15  /  ALT  9<L>  /  AlkPhos  73  01-06    Assessment and Plan:    1. Worsening of LLE wounds.  2. Chronic LLE pain.    . Wound cultures grew many Pseudomonas aeruginosa, Enterococcus faecalis and group C Strep and MRSA  . There are no options for oral antibiotics. Continue IV Cefepime 2 gm q12h and IV Vancomycin 750 mg q12h to Jan 20, 2024 to complete a 2 week course.   . Get PICC  line for 2 weeks of IV abx. Discharge planning to rehab.         Sushil Anguiano MD   [phone].

## 2024-01-07 NOTE — PROGRESS NOTE ADULT - ASSESSMENT
REASON FOR VISIT  .. Management of problems listed below      ROS  . Patient unable to give ROS     PHYSICAL EXAM    HEENT Unremarkable  atraumatic   RESP Fair air entry  Harsh breath sound   CARDIAC S1 S2 No S3     NO JVD    ABDOMEN No hepatosplenomegaly   PEDAL EDEMA present No calf tenderness  NO rash       GENERAL DATA .   GOC.   .. 1/4/2024 dnr   ..  1/3/2024 FULL CODE  ICU STAY.  .. NO  COVID. ..       BEST PRACTICE ISSUES.    HOB ELEVATN.  .. Yes  DVT PPLX.  ..   on coumadin        DIET.   ..  1/3/2024 DASH   IV fl. .. 1/3/2024 d5 1/2 40    STRESS ULCER PPLX.   .  ..  1/3/2024 PROTONIX 40      ALLGY. .. certain antibiotics       WT. ..  1/3/2024 49  BMI. .. 1/3/2024 27     ABGS.   .  VS/ PO/IO/ VENT/ DRIPS.  1/7/2024 afb 90 140/70   1/7/2024 ra 91%    . SUMMARY.     .  89yo female from [facility] for worsening LLE wound   . 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 Pulm consulted 1/3/2024 for abn cxr     . OVERALL ASSESSMENT/PLAN.   . CELLULITIS L LO EXTR   .. 1/3/2024 Seen by ID   .. 1/3/2024 wound mrsa pseudomonas   .. 1/3/2024 pseudomonas enterococcus fecalis proteus m strept dysgalacticae   .. 1/3/2024 Started on cefepime   .. No pneumonia suspected     . A fib  .. Continue coumadin     . ANEMIA   .. Monitor     . DISPO.  .. Follow cultures adjusr antibio     TIME SPENT.  . Over 36 minutes aggregate care time spent on encounter; activities included   direct patient care, counseling and/or coordinating care reviewing notes, lab data/ imaging , discussion with multidisciplinary team/ patient  /family and explaining in detail risks, benefits, alternatives  of the recommendations     PATIENT.  . AB BROOKS      REASON FOR VISIT  .. Management of problems listed below      ROS  . Patient unable to give ROS     PHYSICAL EXAM    HEENT Unremarkable  atraumatic   RESP Fair air entry  Harsh breath sound   CARDIAC S1 S2 No S3     NO JVD    ABDOMEN No hepatosplenomegaly   PEDAL EDEMA present No calf tenderness  NO rash       GENERAL DATA .   GOC.   .. 1/4/2024 dnr   ..  1/3/2024 FULL CODE  ICU STAY.  .. NO  COVID. ..       BEST PRACTICE ISSUES.    HOB ELEVATN.  .. Yes  DVT PPLX.  ..   on coumadin        DIET.   ..  1/3/2024 DASH   IV fl. .. 1/3/2024 d5 1/2 40    STRESS ULCER PPLX.   .  ..  1/3/2024 PROTONIX 40      ALLGY. .. certain antibiotics       WT. ..  1/3/2024 49  BMI. .. 1/3/2024 27     ABGS.   .  VS/ PO/IO/ VENT/ DRIPS.  1/7/2024 afb 90 140/70   1/7/2024 ra 91%    . SUMMARY.     .  89yo female from [facility] for worsening LLE wound   . 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 Pulm consulted 1/3/2024 for abn cxr     . OVERALL ASSESSMENT/PLAN.   . CELLULITIS L LO EXTR   .. 1/3/2024 Seen by ID   .. 1/3/2024 wound mrsa pseudomonas   .. 1/3/2024 pseudomonas enterococcus fecalis proteus m strept dysgalacticae   .. 1/3/2024 Started on cefepime   .. No pneumonia suspected     . A fib  .. Continue coumadin     . ANEMIA   .. Monitor     . DISPO.  .. Follow cultures adjusr antibio     TIME SPENT.  . Over 36 minutes aggregate care time spent on encounter; activities included   direct patient care, counseling and/or coordinating care reviewing notes, lab data/ imaging , discussion with multidisciplinary team/ patient  /family and explaining in detail risks, benefits, alternatives  of the recommendations     PATIENT.  . AB BROOKS

## 2024-01-07 NOTE — PROGRESS NOTE ADULT - SUBJECTIVE AND OBJECTIVE BOX
CHIEF COMPLAINT/ REASON FOR VISIT  .. Patient was seen to address the  issue listed under PROBLEM LIST which is located toward bottom of this note     REJI BERGER    PLCORRIE [address] W[address]    Allergies    per son &quot;issues with certain anitibiotics&quot; does not remember the names - Patient has tolerated zosyn, ceftriaxone, bactrim, and vancomycin on past admissions. (Unknown)    Intolerances        PAST MEDICAL & SURGICAL HISTORY:  Hypertension      CVA (cerebral vascular accident)      Depression      Constipation      Neuropathy      Hyperlipidemia      Grade I diastolic dysfunction      Afib      Neuropathy      VHD (valvular heart disease)      Aortic valvar stenosis      Hypothyroidism      GERD (gastroesophageal reflux disease)      H/O skin graft          FAMILY HISTORY:      Home Medications:  acetaminophen 500 mg oral tablet: 1 tab(s) orally 2 times a day (22 Nov 2023 20:06)  acetaminophen 500 mg oral tablet: 2 tab(s) orally once a day (in the afternoon) (22 Nov 2023 20:06)  amLODIPine 5 mg oral tablet: 1 tab(s) orally once a day (22 Nov 2023 20:06)  Biofreeze 4% topical gel: Apply topically to affected area 2 times a day (22 Nov 2023 20:06)  cholecalciferol 50 mcg (2000 intl units) oral tablet: 1 tab(s) orally once a day (22 Nov 2023 20:06)  escitalopram 20 mg oral tablet: 1 tab(s) orally once a day (22 Nov 2023 20:06)  famotidine 20 mg oral tablet: 1 tab(s) orally once a day (22 Nov 2023 20:06)  folic acid 1 mg oral tablet: 1 tab(s) orally once a day (22 Nov 2023 20:06)  furosemide 20 mg oral tablet: 1 tab(s) orally once a day (22 Nov 2023 20:06)  gabapentin 300 mg oral capsule: 1 cap(s) orally 3 times a day (22 Nov 2023 20:06)  hydroxyurea 500 mg oral capsule: 1 tab(s) orally 2 times a day (22 Nov 2023 20:06)  levothyroxine 50 mcg (0.05 mg) oral tablet: 1 tab(s) orally once a day (22 Nov 2023 20:06)  melatonin 3 mg oral tablet: 1 tab(s) orally once a day (22 Nov 2023 20:06)  metoprolol tartrate 25 mg oral tablet: 1 tab(s) orally 2 times a day (22 Nov 2023 20:06)  miconazole 2% topical cream: Apply topically to affected area once a day (22 Nov 2023 20:06)  senna (sennosides) 8.6 mg oral tablet: 2 tab(s) orally once a day (22 Nov 2023 20:06)  simvastatin 10 mg oral tablet: 1 tab(s) orally once a day (22 Nov 2023 20:06)  traMADol 50 mg oral tablet: 1 tab(s) orally 2 times a day hold for lethargy (22 Nov 2023 20:06)  traMADol 50 mg oral tablet: 1 tab(s) orally every 6 hours as needed for pain (22 Nov 2023 20:06)      MEDICATIONS  (STANDING):  acetaminophen     Tablet .. 1000 milliGRAM(s) Oral every 8 hours  amLODIPine   Tablet 5 milliGRAM(s) Oral daily  calcium carbonate   1250 mG (OsCal) 1 Tablet(s) Oral daily  cefepime   IVPB 2000 milliGRAM(s) IV Intermittent every 12 hours  cholecalciferol 2000 Unit(s) Oral daily  dextrose 5% + sodium chloride 0.45%. 1000 milliLiter(s) (40 mL/Hr) IV Continuous <Continuous>  escitalopram 20 milliGRAM(s) Oral daily  folic acid 1 milliGRAM(s) Oral daily  gabapentin 300 milliGRAM(s) Oral three times a day  hydroxyurea 500 milliGRAM(s) Oral two times a day  lactobacillus acidophilus 1 Tablet(s) Oral every 12 hours  levothyroxine 50 MICROGram(s) Oral daily  metoprolol tartrate 25 milliGRAM(s) Oral two times a day  pantoprazole    Tablet 40 milliGRAM(s) Oral before breakfast  polyethylene glycol 3350 17 Gram(s) Oral daily  senna 2 Tablet(s) Oral at bedtime  simvastatin 10 milliGRAM(s) Oral at bedtime  vancomycin  IVPB 750 milliGRAM(s) IV Intermittent every 12 hours    MEDICATIONS  (PRN):  aluminum hydroxide/magnesium hydroxide/simethicone Suspension 30 milliLiter(s) Oral every 4 hours PRN Dyspepsia  bisacodyl Suppository 10 milliGRAM(s) Rectal daily PRN Constipation  magnesium hydroxide Suspension 30 milliLiter(s) Oral daily PRN Constipation  melatonin 3 milliGRAM(s) Oral at bedtime PRN Insomnia  morphine  - Injectable 2 milliGRAM(s) IV Push every 4 hours PRN Severe Pain (7 - 10)  ondansetron Injectable 4 milliGRAM(s) IV Push every 8 hours PRN Nausea and/or Vomiting  traMADol 50 milliGRAM(s) Oral four times a day PRN Moderate Pain (4 - 6)      Diet, DASH/TLC:   Sodium & Cholesterol Restricted  Easy to Chew (EASYTOCHEW) (01-03-24 @ 18:42) [Active]          Vital Signs Last 24 Hrs  T(C): 36.8 (07 Jan 2024 05:38), Max: 36.8 (06 Jan 2024 20:57)  T(F): 98.2 (07 Jan 2024 05:38), Max: 98.3 (06 Jan 2024 20:57)  HR: 78 (07 Jan 2024 05:38) (78 - 93)  BP: 111/74 (07 Jan 2024 05:38) (111/74 - 129/68)  BP(mean): --  RR: 18 (07 Jan 2024 05:38) (18 - 19)  SpO2: 95% (07 Jan 2024 05:38) (91% - 95%)    Parameters below as of 07 Jan 2024 05:38  Patient On (Oxygen Delivery Method): room air          01-06-24 @ 07:01  -  01-07-24 @ 07:00  --------------------------------------------------------  IN: 570 mL / OUT: 0 mL / NET: 570 mL              LABS:                        10.0   11.46 )-----------( 707      ( 07 Jan 2024 06:26 )             32.5     01-07    139  |  108  |  10  ----------------------------<  91  3.9   |  30  |  0.38<L>    Ca    8.1<L>      07 Jan 2024 06:26    TPro  5.1<L>  /  Alb  1.7<L>  /  TBili  0.4  /  DBili  x   /  AST  15  /  ALT  9<L>  /  AlkPhos  73  01-06    PT/INR - ( 07 Jan 2024 06:26 )   PT: 34.7 sec;   INR: 3.06 ratio           Urinalysis Basic - ( 07 Jan 2024 06:26 )    Color: x / Appearance: x / SG: x / pH: x  Gluc: 91 mg/dL / Ketone: x  / Bili: x / Urobili: x   Blood: x / Protein: x / Nitrite: x   Leuk Esterase: x / RBC: x / WBC x   Sq Epi: x / Non Sq Epi: x / Bacteria: x            WBC:  WBC Count: 11.46 K/uL (01-07 @ 06:26)  WBC Count: 11.35 K/uL (01-06 @ 09:28)  WBC Count: 16.69 K/uL (01-05 @ 06:05)  WBC Count: 18.43 K/uL (01-04 @ 07:14)  WBC Count: 13.02 K/uL (01-03 @ 14:10)      MICROBIOLOGY:  RECENT CULTURES:  01-03 Wound Wound Methicillin resistant Staphylococcus aureus  Pseudomonas aeruginosa XXXX   Culture yields growth of greater than 3 colony types of  bacteria,  which may indicate contamination and normal jessica  Call client services within 7 days if further workup is clinically  indicated.  Culture includes  Few Methicillin Resistant Staphylococcus aureus  Moderate Streptococcus dysgalactiae (Group C/G) "Susceptibilities not  performed"  Numerous Pseudomonas aeruginosa Multiple Morphological Strains    01-03 .Abscess Leg - Left Pseudomonas aeruginosa  Enterococcus faecalis   No polymorphonuclear cells seen per low power field  Numerous Gram positive cocci in pairs seen per oil power field  Numerous Gram Negative Rods seen per oil power field   Numerous Pseudomonas aeruginosa  Moderate Enterococcus faecalis  Moderate Proteus mirabilis  Moderate Streptococcus dysgalactiae (Group C/G) "Susceptibilities not  performed"    01-03 .Blood Blood-Peripheral XXXX XXXX   No growth at 72 Hours    01-03 .Blood Blood-Peripheral XXXX XXXX   No growth at 72 Hours                PT/INR - ( 07 Jan 2024 06:26 )   PT: 34.7 sec;   INR: 3.06 ratio             Sodium:  Sodium: 139 mmol/L (01-07 @ 06:26)  Sodium: 143 mmol/L (01-06 @ 09:28)  Sodium: 141 mmol/L (01-05 @ 06:05)  Sodium: 139 mmol/L (01-04 @ 07:14)  Sodium: 141 mmol/L (01-03 @ 14:10)      0.38 mg/dL 01-07 @ 06:26  0.44 mg/dL 01-06 @ 09:28  0.48 mg/dL 01-05 @ 06:05  0.58 mg/dL 01-04 @ 07:14  0.72 mg/dL 01-03 @ 14:10      Hemoglobin:  Hemoglobin: 10.0 g/dL (01-07 @ 06:26)  Hemoglobin: 8.9 g/dL (01-06 @ 09:28)  Hemoglobin: 10.4 g/dL (01-05 @ 06:05)  Hemoglobin: 10.7 g/dL (01-04 @ 07:14)  Hemoglobin: 10.9 g/dL (01-03 @ 14:10)      Platelets: Platelet Count - Automated: 707 K/uL (01-07 @ 06:26)  Platelet Count - Automated: 494 K/uL (01-06 @ 09:28)  Platelet Count - Automated: 565 K/uL (01-05 @ 06:05)  Platelet Count - Automated: 733 K/uL (01-04 @ 07:14)  Platelet Count - Automated: 793 K/uL (01-03 @ 14:10)      LIVER FUNCTIONS - ( 06 Jan 2024 09:28 )  Alb: 1.7 g/dL / Pro: 5.1 g/dL / ALK PHOS: 73 U/L / ALT: 9 U/L / AST: 15 U/L / GGT: x             Urinalysis Basic - ( 07 Jan 2024 06:26 )    Color: x / Appearance: x / SG: x / pH: x  Gluc: 91 mg/dL / Ketone: x  / Bili: x / Urobili: x   Blood: x / Protein: x / Nitrite: x   Leuk Esterase: x / RBC: x / WBC x   Sq Epi: x / Non Sq Epi: x / Bacteria: x        RADIOLOGY & ADDITIONAL STUDIES:      MICROBIOLOGY:  RECENT CULTURES:  01-03 Wound Wound Methicillin resistant Staphylococcus aureus  Pseudomonas aeruginosa XXXX   Culture yields growth of greater than 3 colony types of  bacteria,  which may indicate contamination and normal jessica  Call client services within 7 days if further workup is clinically  indicated.  Culture includes  Few Methicillin Resistant Staphylococcus aureus  Moderate Streptococcus dysgalactiae (Group C/G) "Susceptibilities not  performed"  Numerous Pseudomonas aeruginosa Multiple Morphological Strains    01-03 .Abscess Leg - Left Pseudomonas aeruginosa  Enterococcus faecalis   No polymorphonuclear cells seen per low power field  Numerous Gram positive cocci in pairs seen per oil power field  Numerous Gram Negative Rods seen per oil power field   Numerous Pseudomonas aeruginosa  Moderate Enterococcus faecalis  Moderate Proteus mirabilis  Moderate Streptococcus dysgalactiae (Group C/G) "Susceptibilities not  performed"    01-03 .Blood Blood-Peripheral XXXX XXXX   No growth at 72 Hours    01-03 .Blood Blood-Peripheral XXXX XXXX   No growth at 72 Hours

## 2024-01-08 ENCOUNTER — TRANSCRIPTION ENCOUNTER (OUTPATIENT)
Age: 89
End: 2024-01-08

## 2024-01-08 LAB
ANION GAP SERPL CALC-SCNC: 4 MMOL/L — LOW (ref 5–17)
ANION GAP SERPL CALC-SCNC: 4 MMOL/L — LOW (ref 5–17)
BUN SERPL-MCNC: 13 MG/DL — SIGNIFICANT CHANGE UP (ref 7–23)
BUN SERPL-MCNC: 13 MG/DL — SIGNIFICANT CHANGE UP (ref 7–23)
CALCIUM SERPL-MCNC: 7.9 MG/DL — LOW (ref 8.5–10.1)
CALCIUM SERPL-MCNC: 7.9 MG/DL — LOW (ref 8.5–10.1)
CHLORIDE SERPL-SCNC: 109 MMOL/L — HIGH (ref 96–108)
CHLORIDE SERPL-SCNC: 109 MMOL/L — HIGH (ref 96–108)
CO2 SERPL-SCNC: 26 MMOL/L — SIGNIFICANT CHANGE UP (ref 22–31)
CO2 SERPL-SCNC: 26 MMOL/L — SIGNIFICANT CHANGE UP (ref 22–31)
CREAT SERPL-MCNC: 0.45 MG/DL — LOW (ref 0.5–1.3)
CREAT SERPL-MCNC: 0.45 MG/DL — LOW (ref 0.5–1.3)
EGFR: 91 ML/MIN/1.73M2 — SIGNIFICANT CHANGE UP
EGFR: 91 ML/MIN/1.73M2 — SIGNIFICANT CHANGE UP
GLUCOSE SERPL-MCNC: 109 MG/DL — HIGH (ref 70–99)
GLUCOSE SERPL-MCNC: 109 MG/DL — HIGH (ref 70–99)
HCT VFR BLD CALC: 30.5 % — LOW (ref 34.5–45)
HCT VFR BLD CALC: 30.5 % — LOW (ref 34.5–45)
HGB BLD-MCNC: 9.7 G/DL — LOW (ref 11.5–15.5)
HGB BLD-MCNC: 9.7 G/DL — LOW (ref 11.5–15.5)
INR BLD: 4 RATIO — HIGH (ref 0.85–1.18)
INR BLD: 4 RATIO — HIGH (ref 0.85–1.18)
MCHC RBC-ENTMCNC: 31.8 GM/DL — LOW (ref 32–36)
MCHC RBC-ENTMCNC: 31.8 GM/DL — LOW (ref 32–36)
MCHC RBC-ENTMCNC: 34.5 PG — HIGH (ref 27–34)
MCHC RBC-ENTMCNC: 34.5 PG — HIGH (ref 27–34)
MCV RBC AUTO: 108.5 FL — HIGH (ref 80–100)
MCV RBC AUTO: 108.5 FL — HIGH (ref 80–100)
NRBC # BLD: 0 /100 WBCS — SIGNIFICANT CHANGE UP (ref 0–0)
NRBC # BLD: 0 /100 WBCS — SIGNIFICANT CHANGE UP (ref 0–0)
PLATELET # BLD AUTO: 663 K/UL — HIGH (ref 150–400)
PLATELET # BLD AUTO: 663 K/UL — HIGH (ref 150–400)
POTASSIUM SERPL-MCNC: 3.4 MMOL/L — LOW (ref 3.5–5.3)
POTASSIUM SERPL-MCNC: 3.4 MMOL/L — LOW (ref 3.5–5.3)
POTASSIUM SERPL-SCNC: 3.4 MMOL/L — LOW (ref 3.5–5.3)
POTASSIUM SERPL-SCNC: 3.4 MMOL/L — LOW (ref 3.5–5.3)
PROCALCITONIN SERPL-MCNC: 0.3 NG/ML — HIGH
PROCALCITONIN SERPL-MCNC: 0.3 NG/ML — HIGH
PROTHROM AB SERPL-ACNC: 44.9 SEC — HIGH (ref 9.5–13)
PROTHROM AB SERPL-ACNC: 44.9 SEC — HIGH (ref 9.5–13)
RBC # BLD: 2.81 M/UL — LOW (ref 3.8–5.2)
RBC # BLD: 2.81 M/UL — LOW (ref 3.8–5.2)
RBC # FLD: 17.8 % — HIGH (ref 10.3–14.5)
RBC # FLD: 17.8 % — HIGH (ref 10.3–14.5)
SODIUM SERPL-SCNC: 139 MMOL/L — SIGNIFICANT CHANGE UP (ref 135–145)
SODIUM SERPL-SCNC: 139 MMOL/L — SIGNIFICANT CHANGE UP (ref 135–145)
VANCOMYCIN TROUGH SERPL-MCNC: 16.4 UG/ML — SIGNIFICANT CHANGE UP (ref 10–20)
VANCOMYCIN TROUGH SERPL-MCNC: 16.4 UG/ML — SIGNIFICANT CHANGE UP (ref 10–20)
WBC # BLD: 17.16 K/UL — HIGH (ref 3.8–10.5)
WBC # BLD: 17.16 K/UL — HIGH (ref 3.8–10.5)
WBC # FLD AUTO: 17.16 K/UL — HIGH (ref 3.8–10.5)
WBC # FLD AUTO: 17.16 K/UL — HIGH (ref 3.8–10.5)

## 2024-01-08 RX ORDER — POTASSIUM CHLORIDE 20 MEQ
40 PACKET (EA) ORAL EVERY 4 HOURS
Refills: 0 | Status: COMPLETED | OUTPATIENT
Start: 2024-01-08 | End: 2024-01-08

## 2024-01-08 RX ORDER — ACETAMINOPHEN 500 MG
1000 TABLET ORAL ONCE
Refills: 0 | Status: COMPLETED | OUTPATIENT
Start: 2024-01-08 | End: 2024-01-08

## 2024-01-08 RX ORDER — VANCOMYCIN HCL 1 G
1000 VIAL (EA) INTRAVENOUS EVERY 24 HOURS
Refills: 0 | Status: DISCONTINUED | OUTPATIENT
Start: 2024-01-09 | End: 2024-01-10

## 2024-01-08 RX ORDER — PHYTONADIONE (VIT K1) 5 MG
5 TABLET ORAL ONCE
Refills: 0 | Status: COMPLETED | OUTPATIENT
Start: 2024-01-08 | End: 2024-01-08

## 2024-01-08 RX ADMIN — Medication 40 MILLIEQUIVALENT(S): at 11:33

## 2024-01-08 RX ADMIN — Medication 1000 MILLIGRAM(S): at 22:20

## 2024-01-08 RX ADMIN — Medication 300 MILLIGRAM(S): at 00:38

## 2024-01-08 RX ADMIN — SENNA PLUS 2 TABLET(S): 8.6 TABLET ORAL at 22:20

## 2024-01-08 RX ADMIN — Medication 1 TABLET(S): at 17:29

## 2024-01-08 RX ADMIN — ESCITALOPRAM OXALATE 20 MILLIGRAM(S): 10 TABLET, FILM COATED ORAL at 11:33

## 2024-01-08 RX ADMIN — POLYETHYLENE GLYCOL 3350 17 GRAM(S): 17 POWDER, FOR SOLUTION ORAL at 11:34

## 2024-01-08 RX ADMIN — Medication 250 MILLIGRAM(S): at 07:04

## 2024-01-08 RX ADMIN — HYDROXYUREA 500 MILLIGRAM(S): 500 CAPSULE ORAL at 17:29

## 2024-01-08 RX ADMIN — SIMVASTATIN 10 MILLIGRAM(S): 20 TABLET, FILM COATED ORAL at 22:20

## 2024-01-08 RX ADMIN — Medication 2000 UNIT(S): at 11:33

## 2024-01-08 RX ADMIN — CEFEPIME 100 MILLIGRAM(S): 1 INJECTION, POWDER, FOR SOLUTION INTRAMUSCULAR; INTRAVENOUS at 17:30

## 2024-01-08 RX ADMIN — Medication 1000 MILLIGRAM(S): at 23:20

## 2024-01-08 RX ADMIN — Medication 250 MILLIGRAM(S): at 19:13

## 2024-01-08 RX ADMIN — CEFEPIME 100 MILLIGRAM(S): 1 INJECTION, POWDER, FOR SOLUTION INTRAMUSCULAR; INTRAVENOUS at 05:06

## 2024-01-08 RX ADMIN — Medication 1 TABLET(S): at 11:41

## 2024-01-08 RX ADMIN — GABAPENTIN 300 MILLIGRAM(S): 400 CAPSULE ORAL at 22:20

## 2024-01-08 RX ADMIN — Medication 1 MILLIGRAM(S): at 11:33

## 2024-01-08 RX ADMIN — Medication 1000 MILLIGRAM(S): at 01:38

## 2024-01-08 RX ADMIN — Medication 25 MILLIGRAM(S): at 17:29

## 2024-01-08 RX ADMIN — MORPHINE SULFATE 2 MILLIGRAM(S): 50 CAPSULE, EXTENDED RELEASE ORAL at 05:20

## 2024-01-08 NOTE — PHARMACOTHERAPY INTERVENTION NOTE - COMMENTS
Patient is a 89 yo female admitted for LLE wounds, currently ordered for cefepime 2 g Q12H and vancomycin 750 mg Q12H. Based on AUC calculations, current AUC is ~601 and predicted 24hr & steady-states AUCs are supra-therapeutic > 600. Discussed with ID Dr. Anguiano and recommended reducing vancomycin dose to 1000 mg Q24H based on AUC calculations and renal function (CrCl = 39). Predicted AUCs with updated dosing of 1000 mg Q24H are therapeutic within goal 400-600. Recommendation was accepted and order was entered, next vancomycin dose due 1/9 at 19:00.

## 2024-01-08 NOTE — DISCHARGE NOTE PROVIDER - CARE PROVIDER_API CALL
Facundo Valentine  Infectious Disease  [address] [place] [address]  Phone: [phone]  Fax: [phone]  Follow Up Time: 2 weeks   Facundo Valentine  Infectious Disease  [address] [place] [address]  Phone: [phone]  Fax: [phone]  Follow Up Time: 2 weeks   Facundo Valentine  Infectious Disease  [address] [place] [address]  Phone: [phone]  Fax: [phone]  Follow Up Time: 2 weeks    Sushil Anguiano  Infectious Disease  [address] [place] [address]  Phone: [phone]  Fax: [phone]  Follow Up Time: 1 week   Facundo Valentine  Infectious Disease  [address] [place] [address]  Phone: [phone]  Fax: [phone]  Follow Up Time: 2 weeks    Sushil Anguiano  Infectious Disease  [address] [place] [address]  Phone: [phone]  Fax: [phone]  Follow Up Time: 1 week   Facundo Valentine  Infectious Disease  [address] [place] [address]  Phone: [phone]  Fax: [phone]  Follow Up Time: 2 weeks    Sushil Anguiano  Infectious Disease  [address] [place] [address]  Phone: [phone]  Fax: [phone]  Follow Up Time: 1 week    Adrian Gan-Jim  Foot Surgery  [address] [place] [address]  Phone: [phone]  Fax: [phone]  Follow Up Time: 1 week    Twan Moralez  Cardiology  [address] [place] [address], [address]  [place] [address]  Phone: [phone]  Fax: [phone]  Follow Up Time: 1 month   Facundo Valentine  Infectious Disease  [address] [place] [address]  Phone: [phone]  Fax: [phone]  Follow Up Time: 2 weeks    Sushil Anguiano  Infectious Disease  [address] [place] [address]  Phone: [phone]  Fax: [phone]  Follow Up Time: 1 week    Adrian Gan-Jim  Foot Surgery  [address] [place] [address]  Phone: [phone]  Fax: [phone]  Follow Up Time: 1 week    Twan Moralez  Cardiology  [address] [place] [address], [address]  [place] [address]  Phone: [phone]  Fax: [phone]  Follow Up Time: 1 month

## 2024-01-08 NOTE — DISCHARGE NOTE PROVIDER - NSDCCAREPROVSEEN_GEN_ALL_CORE_FT
Rosemary, Nettie Gan, Adrian Reynolds, Rufino Anguiano, Sushil Best, Анна De La Rosa, Swapnil Rosemary, Nettie Gan, Adrian Reynolds, Rufino Anguiano, Sushil Best, Анна De aL Rosa, Swapnil

## 2024-01-08 NOTE — DISCHARGE NOTE PROVIDER - NSDCFUADDAPPT_GEN_ALL_CORE_FT
Continue IV Cefepime 2 gm q12h and IV Vancomycin 1 gm daily to Jan 20, 2024 to complete a 2 week course .Monitor VANCO trough as per PCP at [facility]  Continue IV Cefepime 2 gm q12h and IV Vancomycin 1 gm daily to Jan 20, 2024 to complete a 2 week course .Monitor VANCO trough as per PCP at [facility] .Followup with private dermatologist Dr Cardoza in 3-4  weeks ( discussed with then natali Carlos) Continue IV Cefepime 2 gm q12h and IV Vancomycin 1 gm daily to Jan 20, 2024 to complete a 2 week course .Monitor VANCO trough as per PCP at [facility] .Followup with private dermatologist Dr Cardoza in 3-4  weeks ( discussed with then natali Carlos)

## 2024-01-08 NOTE — PROGRESS NOTE ADULT - SUBJECTIVE AND OBJECTIVE BOX
Interval History:    CENTRAL LINE:   [  ] YES       [  ] NO  MUIR:                 [  ] YES       [  ] NO         REVIEW OF SYSTEMS:  All Systems below were reviewed and are negative [  ]  HEENT:  ID:  Pulmonary:  Cardiac:  GI:  Renal:  Musculoskeletal:  All other systems above were reviewed and are negative   [  ]      MEDICATIONS  (STANDING):  acetaminophen     Tablet .. 1000 milliGRAM(s) Oral every 8 hours  amLODIPine   Tablet 5 milliGRAM(s) Oral daily  calcium carbonate   1250 mG (OsCal) 1 Tablet(s) Oral daily  cefepime   IVPB 2000 milliGRAM(s) IV Intermittent every 12 hours  cholecalciferol 2000 Unit(s) Oral daily  dextrose 5% + sodium chloride 0.45%. 1000 milliLiter(s) (40 mL/Hr) IV Continuous <Continuous>  escitalopram 20 milliGRAM(s) Oral daily  folic acid 1 milliGRAM(s) Oral daily  gabapentin 300 milliGRAM(s) Oral three times a day  hydroxyurea 500 milliGRAM(s) Oral two times a day  lactobacillus acidophilus 1 Tablet(s) Oral every 12 hours  levothyroxine 50 MICROGram(s) Oral daily  metoprolol tartrate 25 milliGRAM(s) Oral two times a day  pantoprazole    Tablet 40 milliGRAM(s) Oral before breakfast  polyethylene glycol 3350 17 Gram(s) Oral daily  senna 2 Tablet(s) Oral at bedtime  simvastatin 10 milliGRAM(s) Oral at bedtime    MEDICATIONS  (PRN):  aluminum hydroxide/magnesium hydroxide/simethicone Suspension 30 milliLiter(s) Oral every 4 hours PRN Dyspepsia  bisacodyl Suppository 10 milliGRAM(s) Rectal daily PRN Constipation  magnesium hydroxide Suspension 30 milliLiter(s) Oral daily PRN Constipation  melatonin 3 milliGRAM(s) Oral at bedtime PRN Insomnia  morphine  - Injectable 2 milliGRAM(s) IV Push every 4 hours PRN Severe Pain (7 - 10)  ondansetron Injectable 4 milliGRAM(s) IV Push every 8 hours PRN Nausea and/or Vomiting  traMADol 50 milliGRAM(s) Oral four times a day PRN Moderate Pain (4 - 6)      Vital Signs Last 24 Hrs  T(C): 36.8 (08 Jan 2024 12:48), Max: 37.3 (08 Jan 2024 05:40)  T(F): 98.3 (08 Jan 2024 12:48), Max: 99.2 (08 Jan 2024 05:40)  HR: 99 (08 Jan 2024 12:48) (99 - 100)  BP: 144/74 (08 Jan 2024 12:48) (116/72 - 144/74)  BP(mean): --  RR: 17 (08 Jan 2024 20:52) (17 - 17)  SpO2: 91% (08 Jan 2024 20:52) (91% - 94%)    Parameters below as of 08 Jan 2024 20:52  Patient On (Oxygen Delivery Method): room air        I&O's Summary      PHYSICAL EXAM:  HEENT: NC/AT; PERRLA  Neck: Soft; no tenderness  Lungs: CTA bilaterally; no wheezing.   Heart:  Abdomen:  Genital/ Rectal:  Extremities:  Neurologic:  Vascular:      LABORATORY:    CBC Full  -  ( 08 Jan 2024 06:23 )  WBC Count : 17.16 K/uL  RBC Count : 2.81 M/uL  Hemoglobin : 9.7 g/dL  Hematocrit : 30.5 %  Platelet Count - Automated : 663 K/uL  Mean Cell Volume : 108.5 fl  Mean Cell Hemoglobin : 34.5 pg  Mean Cell Hemoglobin Concentration : 31.8 gm/dL  Auto Neutrophil # : x  Auto Lymphocyte # : x  Auto Monocyte # : x  Auto Eosinophil # : x  Auto Basophil # : x  Auto Neutrophil % : x  Auto Lymphocyte % : x  Auto Monocyte % : x  Auto Eosinophil % : x  Auto Basophil % : x      ESR:                   01-04 @ 07:14  --    C-Reactive Protein:     01-04 @ 07:14  --    Procalcitonin:           01-04 @ 07:14   0.18      01-08    139  |  109<H>  |  13  ----------------------------<  109<H>  3.4<L>   |  26  |  0.45<L>    Ca    7.9<L>      08 Jan 2024 06:23            Assessment and Plan:          Sushil Anguiano MD   [phone].      Interval History:    CENTRAL LINE:   [  ] YES       [  ] NO  MUIR:                 [  ] YES       [  ] NO         REVIEW OF SYSTEMS:  All Systems below were reviewed and are negative [  ]  HEENT:  ID:  Pulmonary:  Cardiac:  GI:  Renal:  Musculoskeletal:  All other systems above were reviewed and are negative   [  ]      MEDICATIONS  (STANDING):  acetaminophen     Tablet .. 1000 milliGRAM(s) Oral every 8 hours  amLODIPine   Tablet 5 milliGRAM(s) Oral daily  calcium carbonate   1250 mG (OsCal) 1 Tablet(s) Oral daily  cefepime   IVPB 2000 milliGRAM(s) IV Intermittent every 12 hours  cholecalciferol 2000 Unit(s) Oral daily  dextrose 5% + sodium chloride 0.45%. 1000 milliLiter(s) (40 mL/Hr) IV Continuous <Continuous>  escitalopram 20 milliGRAM(s) Oral daily  folic acid 1 milliGRAM(s) Oral daily  gabapentin 300 milliGRAM(s) Oral three times a day  hydroxyurea 500 milliGRAM(s) Oral two times a day  lactobacillus acidophilus 1 Tablet(s) Oral every 12 hours  levothyroxine 50 MICROGram(s) Oral daily  metoprolol tartrate 25 milliGRAM(s) Oral two times a day  pantoprazole    Tablet 40 milliGRAM(s) Oral before breakfast  polyethylene glycol 3350 17 Gram(s) Oral daily  senna 2 Tablet(s) Oral at bedtime  simvastatin 10 milliGRAM(s) Oral at bedtime    MEDICATIONS  (PRN):  aluminum hydroxide/magnesium hydroxide/simethicone Suspension 30 milliLiter(s) Oral every 4 hours PRN Dyspepsia  bisacodyl Suppository 10 milliGRAM(s) Rectal daily PRN Constipation  magnesium hydroxide Suspension 30 milliLiter(s) Oral daily PRN Constipation  melatonin 3 milliGRAM(s) Oral at bedtime PRN Insomnia  morphine  - Injectable 2 milliGRAM(s) IV Push every 4 hours PRN Severe Pain (7 - 10)  ondansetron Injectable 4 milliGRAM(s) IV Push every 8 hours PRN Nausea and/or Vomiting  traMADol 50 milliGRAM(s) Oral four times a day PRN Moderate Pain (4 - 6)      Vital Signs Last 24 Hrs  T(C): 36.8 (08 Jan 2024 12:48), Max: 37.3 (08 Jan 2024 05:40)  T(F): 98.3 (08 Jan 2024 12:48), Max: 99.2 (08 Jan 2024 05:40)  HR: 99 (08 Jan 2024 12:48) (99 - 100)  BP: 144/74 (08 Jan 2024 12:48) (116/72 - 144/74)  BP(mean): --  RR: 17 (08 Jan 2024 20:52) (17 - 17)  SpO2: 91% (08 Jan 2024 20:52) (91% - 94%)    Parameters below as of 08 Jan 2024 20:52  Patient On (Oxygen Delivery Method): room air        I&O's Summary      PHYSICAL EXAM:  HEENT: NC/AT; PERRLA  Neck: Soft; no tenderness  Lungs: CTA bilaterally; no wheezing.   Heart:  Abdomen:  Genital/ Rectal:  Extremities:  Neurologic:  Vascular:      LABORATORY:    CBC Full  -  ( 08 Jan 2024 06:23 )  WBC Count : 17.16 K/uL  RBC Count : 2.81 M/uL  Hemoglobin : 9.7 g/dL  Hematocrit : 30.5 %  Platelet Count - Automated : 663 K/uL  Mean Cell Volume : 108.5 fl  Mean Cell Hemoglobin : 34.5 pg  Mean Cell Hemoglobin Concentration : 31.8 gm/dL  Auto Neutrophil # : x  Auto Lymphocyte # : x  Auto Monocyte # : x  Auto Eosinophil # : x  Auto Basophil # : x  Auto Neutrophil % : x  Auto Lymphocyte % : x  Auto Monocyte % : x  Auto Eosinophil % : x  Auto Basophil % : x      ESR:                   01-04 @ 07:14  --    C-Reactive Protein:     01-04 @ 07:14  --    Procalcitonin:           01-04 @ 07:14   0.18      01-08    139  |  109<H>  |  13  ----------------------------<  109<H>  3.4<L>   |  26  |  0.45<L>    Ca    7.9<L>      08 Jan 2024 06:23            Assessment and Plan:          Sushil Anguiano MD   [phone].    She is afebrile  Comfortable       MEDICATIONS  (STANDING):  acetaminophen     Tablet .. 1000 milliGRAM(s) Oral every 8 hours  amLODIPine   Tablet 5 milliGRAM(s) Oral daily  calcium carbonate   1250 mG (OsCal) 1 Tablet(s) Oral daily  cefepime   IVPB 2000 milliGRAM(s) IV Intermittent every 12 hours  cholecalciferol 2000 Unit(s) Oral daily  dextrose 5% + sodium chloride 0.45%. 1000 milliLiter(s) (40 mL/Hr) IV Continuous <Continuous>  escitalopram 20 milliGRAM(s) Oral daily  folic acid 1 milliGRAM(s) Oral daily  gabapentin 300 milliGRAM(s) Oral three times a day  hydroxyurea 500 milliGRAM(s) Oral two times a day  lactobacillus acidophilus 1 Tablet(s) Oral every 12 hours  levothyroxine 50 MICROGram(s) Oral daily  metoprolol tartrate 25 milliGRAM(s) Oral two times a day  pantoprazole    Tablet 40 milliGRAM(s) Oral before breakfast  polyethylene glycol 3350 17 Gram(s) Oral daily  senna 2 Tablet(s) Oral at bedtime  simvastatin 10 milliGRAM(s) Oral at bedtime    MEDICATIONS  (PRN):  aluminum hydroxide/magnesium hydroxide/simethicone Suspension 30 milliLiter(s) Oral every 4 hours PRN Dyspepsia  bisacodyl Suppository 10 milliGRAM(s) Rectal daily PRN Constipation  magnesium hydroxide Suspension 30 milliLiter(s) Oral daily PRN Constipation  melatonin 3 milliGRAM(s) Oral at bedtime PRN Insomnia  morphine  - Injectable 2 milliGRAM(s) IV Push every 4 hours PRN Severe Pain (7 - 10)  ondansetron Injectable 4 milliGRAM(s) IV Push every 8 hours PRN Nausea and/or Vomiting  traMADol 50 milliGRAM(s) Oral four times a day PRN Moderate Pain (4 - 6)      Vital Signs Last 24 Hrs  T(C): 36.8 (08 Jan 2024 12:48), Max: 37.3 (08 Jan 2024 05:40)  T(F): 98.3 (08 Jan 2024 12:48), Max: 99.2 (08 Jan 2024 05:40)  HR: 99 (08 Jan 2024 12:48) (99 - 100)  BP: 144/74 (08 Jan 2024 12:48) (116/72 - 144/74)  BP(mean): --  RR: 17 (08 Jan 2024 20:52) (17 - 17)  SpO2: 91% (08 Jan 2024 20:52) (91% - 94%)    Parameters below as of 08 Jan 2024 20:52  Patient On (Oxygen Delivery Method): room air        I&O's Summary      PHYSICAL EXAM:  HEENT: NC/AT; PERRLA  Neck: Soft; no tenderness  Lungs: CTA bilaterally; no wheezing.   Heart:  Abdomen:  Genital/ Rectal:  Extremities:  Neurologic:  Vascular:      LABORATORY:    CBC Full  -  ( 08 Jan 2024 06:23 )  WBC Count : 17.16 K/uL  RBC Count : 2.81 M/uL  Hemoglobin : 9.7 g/dL  Hematocrit : 30.5 %  Platelet Count - Automated : 663 K/uL  Mean Cell Volume : 108.5 fl  Mean Cell Hemoglobin : 34.5 pg  Mean Cell Hemoglobin Concentration : 31.8 gm/dL  Auto Neutrophil # : x  Auto Lymphocyte # : x  Auto Monocyte # : x  Auto Eosinophil # : x  Auto Basophil # : x  Auto Neutrophil % : x  Auto Lymphocyte % : x  Auto Monocyte % : x  Auto Eosinophil % : x  Auto Basophil % : x      ESR:                   01-04 @ 07:14  --    C-Reactive Protein:     01-04 @ 07:14  --    Procalcitonin:           01-04 @ 07:14   0.18      01-08    139  |  109<H>  |  13  ----------------------------<  109<H>  3.4<L>   |  26  |  0.45<L>    Ca    7.9<L>      08 Jan 2024 06:23    Assessment and Plan:      1. Worsening of LLE wounds.  2. Chronic LLE pain.    . Wound cultures grew many Pseudomonas aeruginosa, Enterococcus faecalis and group C Strep and MRSA  . There are no options for oral antibiotics. Continue IV Cefepime 2 gm q12h and IV Vancomycin 750 mg q12h to Jan 20, 2024 to complete a 2 week course.   . Get PICC  line for 2 weeks of IV abx. Discharge planning to rehab.           Sushil Anguiano MD   [phone].    She is afebrile  Comfortable       MEDICATIONS  (STANDING):  acetaminophen     Tablet .. 1000 milliGRAM(s) Oral every 8 hours  amLODIPine   Tablet 5 milliGRAM(s) Oral daily  calcium carbonate   1250 mG (OsCal) 1 Tablet(s) Oral daily  cefepime   IVPB 2000 milliGRAM(s) IV Intermittent every 12 hours  cholecalciferol 2000 Unit(s) Oral daily  dextrose 5% + sodium chloride 0.45%. 1000 milliLiter(s) (40 mL/Hr) IV Continuous <Continuous>  escitalopram 20 milliGRAM(s) Oral daily  folic acid 1 milliGRAM(s) Oral daily  gabapentin 300 milliGRAM(s) Oral three times a day  hydroxyurea 500 milliGRAM(s) Oral two times a day  lactobacillus acidophilus 1 Tablet(s) Oral every 12 hours  levothyroxine 50 MICROGram(s) Oral daily  metoprolol tartrate 25 milliGRAM(s) Oral two times a day  pantoprazole    Tablet 40 milliGRAM(s) Oral before breakfast  polyethylene glycol 3350 17 Gram(s) Oral daily  senna 2 Tablet(s) Oral at bedtime  simvastatin 10 milliGRAM(s) Oral at bedtime    MEDICATIONS  (PRN):  aluminum hydroxide/magnesium hydroxide/simethicone Suspension 30 milliLiter(s) Oral every 4 hours PRN Dyspepsia  bisacodyl Suppository 10 milliGRAM(s) Rectal daily PRN Constipation  magnesium hydroxide Suspension 30 milliLiter(s) Oral daily PRN Constipation  melatonin 3 milliGRAM(s) Oral at bedtime PRN Insomnia  morphine  - Injectable 2 milliGRAM(s) IV Push every 4 hours PRN Severe Pain (7 - 10)  ondansetron Injectable 4 milliGRAM(s) IV Push every 8 hours PRN Nausea and/or Vomiting  traMADol 50 milliGRAM(s) Oral four times a day PRN Moderate Pain (4 - 6)      Vital Signs Last 24 Hrs  T(C): 36.8 (08 Jan 2024 12:48), Max: 37.3 (08 Jan 2024 05:40)  T(F): 98.3 (08 Jan 2024 12:48), Max: 99.2 (08 Jan 2024 05:40)  HR: 99 (08 Jan 2024 12:48) (99 - 100)  BP: 144/74 (08 Jan 2024 12:48) (116/72 - 144/74)  BP(mean): --  RR: 17 (08 Jan 2024 20:52) (17 - 17)  SpO2: 91% (08 Jan 2024 20:52) (91% - 94%)    Parameters below as of 08 Jan 2024 20:52  Patient On (Oxygen Delivery Method): room air        I&O's Summary      PHYSICAL EXAM:  HEENT: NC/AT; PERRLA  Neck: Soft; no tenderness  Lungs: CTA bilaterally; no wheezing.   Heart:  Abdomen:  Genital/ Rectal:  Extremities:  Neurologic:  Vascular:      LABORATORY:    CBC Full  -  ( 08 Jan 2024 06:23 )  WBC Count : 17.16 K/uL  RBC Count : 2.81 M/uL  Hemoglobin : 9.7 g/dL  Hematocrit : 30.5 %  Platelet Count - Automated : 663 K/uL  Mean Cell Volume : 108.5 fl  Mean Cell Hemoglobin : 34.5 pg  Mean Cell Hemoglobin Concentration : 31.8 gm/dL  Auto Neutrophil # : x  Auto Lymphocyte # : x  Auto Monocyte # : x  Auto Eosinophil # : x  Auto Basophil # : x  Auto Neutrophil % : x  Auto Lymphocyte % : x  Auto Monocyte % : x  Auto Eosinophil % : x  Auto Basophil % : x      ESR:                   01-04 @ 07:14  --    C-Reactive Protein:     01-04 @ 07:14  --    Procalcitonin:           01-04 @ 07:14   0.18      01-08    139  |  109<H>  |  13  ----------------------------<  109<H>  3.4<L>   |  26  |  0.45<L>    Ca    7.9<L>      08 Jan 2024 06:23    Assessment and Plan:      1. Worsening of LLE wounds.  2. Chronic LLE pain.    . Wound cultures grew many Pseudomonas aeruginosa, Enterococcus faecalis and group C Strep and MRSA  . There are no options for oral antibiotics. Continue IV Cefepime 2 gm q12h and IV Vancomycin 750 mg q12h to Jan 20, 2024 to complete a 2 week course.   . Get PICC  line for 2 weeks of IV abx. Discharge planning to rehab.           Sushil Anguiano MD   [phone].    She is afebrile  Comfortable       MEDICATIONS  (STANDING):  acetaminophen     Tablet .. 1000 milliGRAM(s) Oral every 8 hours  amLODIPine   Tablet 5 milliGRAM(s) Oral daily  calcium carbonate   1250 mG (OsCal) 1 Tablet(s) Oral daily  cefepime   IVPB 2000 milliGRAM(s) IV Intermittent every 12 hours  cholecalciferol 2000 Unit(s) Oral daily  dextrose 5% + sodium chloride 0.45%. 1000 milliLiter(s) (40 mL/Hr) IV Continuous <Continuous>  escitalopram 20 milliGRAM(s) Oral daily  folic acid 1 milliGRAM(s) Oral daily  gabapentin 300 milliGRAM(s) Oral three times a day  hydroxyurea 500 milliGRAM(s) Oral two times a day  lactobacillus acidophilus 1 Tablet(s) Oral every 12 hours  levothyroxine 50 MICROGram(s) Oral daily  metoprolol tartrate 25 milliGRAM(s) Oral two times a day  pantoprazole    Tablet 40 milliGRAM(s) Oral before breakfast  polyethylene glycol 3350 17 Gram(s) Oral daily  senna 2 Tablet(s) Oral at bedtime  simvastatin 10 milliGRAM(s) Oral at bedtime    MEDICATIONS  (PRN):  aluminum hydroxide/magnesium hydroxide/simethicone Suspension 30 milliLiter(s) Oral every 4 hours PRN Dyspepsia  bisacodyl Suppository 10 milliGRAM(s) Rectal daily PRN Constipation  magnesium hydroxide Suspension 30 milliLiter(s) Oral daily PRN Constipation  melatonin 3 milliGRAM(s) Oral at bedtime PRN Insomnia  morphine  - Injectable 2 milliGRAM(s) IV Push every 4 hours PRN Severe Pain (7 - 10)  ondansetron Injectable 4 milliGRAM(s) IV Push every 8 hours PRN Nausea and/or Vomiting  traMADol 50 milliGRAM(s) Oral four times a day PRN Moderate Pain (4 - 6)      Vital Signs Last 24 Hrs  T(C): 36.8 (08 Jan 2024 12:48), Max: 37.3 (08 Jan 2024 05:40)  T(F): 98.3 (08 Jan 2024 12:48), Max: 99.2 (08 Jan 2024 05:40)  HR: 99 (08 Jan 2024 12:48) (99 - 100)  BP: 144/74 (08 Jan 2024 12:48) (116/72 - 144/74)  BP(mean): --  RR: 17 (08 Jan 2024 20:52) (17 - 17)  SpO2: 91% (08 Jan 2024 20:52) (91% - 94%)    Parameters below as of 08 Jan 2024 20:52  Patient On (Oxygen Delivery Method): room air        I&O's Summary      PHYSICAL EXAM:  HEENT: NC/AT; PERRLA  Neck: Soft; no tenderness  Lungs: CTA bilaterally; no wheezing.   Heart: RRR, no murmurs.  Abdomen:  Soft, no tenderness.   Genital/ Rectal: No salas catheter.   Extremities: LLE wound with decreasing tenderness.   Neurologic: Confused.      LABORATORY:    CBC Full  -  ( 08 Jan 2024 06:23 )  WBC Count : 17.16 K/uL  RBC Count : 2.81 M/uL  Hemoglobin : 9.7 g/dL  Hematocrit : 30.5 %  Platelet Count - Automated : 663 K/uL  Mean Cell Volume : 108.5 fl  Mean Cell Hemoglobin : 34.5 pg  Mean Cell Hemoglobin Concentration : 31.8 gm/dL  Auto Neutrophil # : x  Auto Lymphocyte # : x  Auto Monocyte # : x  Auto Eosinophil # : x  Auto Basophil # : x  Auto Neutrophil % : x  Auto Lymphocyte % : x  Auto Monocyte % : x  Auto Eosinophil % : x  Auto Basophil % : x      ESR:                   01-04 @ 07:14  --    C-Reactive Protein:     01-04 @ 07:14  --    Procalcitonin:           01-04 @ 07:14   0.18      01-08    139  |  109<H>  |  13  ----------------------------<  109<H>  3.4<L>   |  26  |  0.45<L>    Ca    7.9<L>      08 Jan 2024 06:23    Assessment and Plan:      1. Worsening of LLE wounds.  2. Chronic LLE pain.    . Wound cultures grew many Pseudomonas aeruginosa, Enterococcus faecalis and group C Strep and MRSA.  . Vancomycin trough was 16 but AUC was above 600. Change IV Vancomycin to 1 gm daily.   . There are no options for oral antibiotics. Continue IV Cefepime 2 gm q12h and IV Vancomycin 1 gm daily to Jan 20, 2024 to complete a 2 week course.   . Get PICC  line for 2 weeks of IV abx. OK to place Picc line with WBC of 17K since the patient has been afebrile and LLE wound is improving.   . Discharge planning to rehab.           Sushil Anguiano MD   [phone].    She is afebrile  Comfortable       MEDICATIONS  (STANDING):  acetaminophen     Tablet .. 1000 milliGRAM(s) Oral every 8 hours  amLODIPine   Tablet 5 milliGRAM(s) Oral daily  calcium carbonate   1250 mG (OsCal) 1 Tablet(s) Oral daily  cefepime   IVPB 2000 milliGRAM(s) IV Intermittent every 12 hours  cholecalciferol 2000 Unit(s) Oral daily  dextrose 5% + sodium chloride 0.45%. 1000 milliLiter(s) (40 mL/Hr) IV Continuous <Continuous>  escitalopram 20 milliGRAM(s) Oral daily  folic acid 1 milliGRAM(s) Oral daily  gabapentin 300 milliGRAM(s) Oral three times a day  hydroxyurea 500 milliGRAM(s) Oral two times a day  lactobacillus acidophilus 1 Tablet(s) Oral every 12 hours  levothyroxine 50 MICROGram(s) Oral daily  metoprolol tartrate 25 milliGRAM(s) Oral two times a day  pantoprazole    Tablet 40 milliGRAM(s) Oral before breakfast  polyethylene glycol 3350 17 Gram(s) Oral daily  senna 2 Tablet(s) Oral at bedtime  simvastatin 10 milliGRAM(s) Oral at bedtime    MEDICATIONS  (PRN):  aluminum hydroxide/magnesium hydroxide/simethicone Suspension 30 milliLiter(s) Oral every 4 hours PRN Dyspepsia  bisacodyl Suppository 10 milliGRAM(s) Rectal daily PRN Constipation  magnesium hydroxide Suspension 30 milliLiter(s) Oral daily PRN Constipation  melatonin 3 milliGRAM(s) Oral at bedtime PRN Insomnia  morphine  - Injectable 2 milliGRAM(s) IV Push every 4 hours PRN Severe Pain (7 - 10)  ondansetron Injectable 4 milliGRAM(s) IV Push every 8 hours PRN Nausea and/or Vomiting  traMADol 50 milliGRAM(s) Oral four times a day PRN Moderate Pain (4 - 6)      Vital Signs Last 24 Hrs  T(C): 36.8 (08 Jan 2024 12:48), Max: 37.3 (08 Jan 2024 05:40)  T(F): 98.3 (08 Jan 2024 12:48), Max: 99.2 (08 Jan 2024 05:40)  HR: 99 (08 Jan 2024 12:48) (99 - 100)  BP: 144/74 (08 Jan 2024 12:48) (116/72 - 144/74)  BP(mean): --  RR: 17 (08 Jan 2024 20:52) (17 - 17)  SpO2: 91% (08 Jan 2024 20:52) (91% - 94%)    Parameters below as of 08 Jan 2024 20:52  Patient On (Oxygen Delivery Method): room air        I&O's Summary      PHYSICAL EXAM:  HEENT: NC/AT; PERRLA  Neck: Soft; no tenderness  Lungs: CTA bilaterally; no wheezing.   Heart: RRR, no murmurs.  Abdomen:  Soft, no tenderness.   Genital/ Rectal: No salas catheter.   Extremities: LLE wound with decreasing tenderness.   Neurologic: Confused.      LABORATORY:    CBC Full  -  ( 08 Jan 2024 06:23 )  WBC Count : 17.16 K/uL  RBC Count : 2.81 M/uL  Hemoglobin : 9.7 g/dL  Hematocrit : 30.5 %  Platelet Count - Automated : 663 K/uL  Mean Cell Volume : 108.5 fl  Mean Cell Hemoglobin : 34.5 pg  Mean Cell Hemoglobin Concentration : 31.8 gm/dL  Auto Neutrophil # : x  Auto Lymphocyte # : x  Auto Monocyte # : x  Auto Eosinophil # : x  Auto Basophil # : x  Auto Neutrophil % : x  Auto Lymphocyte % : x  Auto Monocyte % : x  Auto Eosinophil % : x  Auto Basophil % : x      ESR:                   01-04 @ 07:14  --    C-Reactive Protein:     01-04 @ 07:14  --    Procalcitonin:           01-04 @ 07:14   0.18      01-08    139  |  109<H>  |  13  ----------------------------<  109<H>  3.4<L>   |  26  |  0.45<L>    Ca    7.9<L>      08 Jan 2024 06:23    Assessment and Plan:      1. Worsening of LLE wounds.  2. Chronic LLE pain.    . Wound cultures grew many Pseudomonas aeruginosa, Enterococcus faecalis and group C Strep and MRSA.  . Vancomycin trough was 16 but AUC was above 600. Change IV Vancomycin to 1 gm daily.   . There are no options for oral antibiotics. Continue IV Cefepime 2 gm q12h and IV Vancomycin 1 gm daily to Jan 20, 2024 to complete a 2 week course.   . Get PICC  line for 2 weeks of IV abx. OK to place Picc line with WBC of 17K since the patient has been afebrile and LLE wound is improving.   . Discharge planning to rehab.           Sushil Anguiano MD   [phone].

## 2024-01-08 NOTE — PROGRESS NOTE ADULT - ASSESSMENT
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  Patient was hospitalized in 2023  g with altered mental status concern for UTI , also  cellulitis to the LLE at the site of a skin graft. Pt recently completed a course of po abx for this with no significant improvement. 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  Patient was hospitalized in 2023  g with altered mental status concern for UTI , also  cellulitis to the LLE at the site of a skin graft. Pt recently completed a course of po abx for this with no significant improvement. bib ems with wounds to left lower leg for unknown period of time, now weeping and draining, pt c/o pain but pt is poor historian She was admitted in november 2023 with LLE cellulitis and seen by wound care MD & ID -discharged on po doxycycline and cipro & with topical care .Admitted for septic workup , iv abx ,pain management and wound care

## 2024-01-08 NOTE — PROGRESS NOTE ADULT - ASSESSMENT
REASON FOR VISIT  .. Management of problems listed below      ROS  . Patient unable to give ROS     PHYSICAL EXAM    HEENT Unremarkable  atraumatic   RESP Fair air entry  Harsh breath sound   CARDIAC S1 S2 No S3     NO JVD    ABDOMEN No hepatosplenomegaly   PEDAL EDEMA present No calf tenderness  NO rash       GENERAL DATA .   GOC.   .. 1/4/2024 dnr   ..  1/3/2024 FULL CODE  ICU STAY.  .. NO  COVID. ..       BEST PRACTICE ISSUES.    HOB ELEVATN.  .. Yes  DVT PPLX.  ..   on coumadin        DIET.   ..  1/3/2024 DASH   IV fl. .. 1/3/2024 d5 1/2 40    STRESS ULCER PPLX.   .  ..  1/3/2024 PROTONIX 40      ALLGY. .. certain antibiotics       WT. ..  1/3/2024 49  BMI. .. 1/3/2024 27     ABGS.   .  VS/ PO/IO/ VENT/ DRIPS.  1/8/2024 99f 100 116/70   1/8/2024 ra 94%     PRESENTATION.  . CC 1/3/2024.From [facility] for LLE wound infection and pain   . HPI 1/3/2024.  . 89yo female bib ems with wounds to left lower leg for unknown period of time, now weeping and draining, pt c/o pain but pt is poor historian  . 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 Pulm consulted 1/3/2024 for abn cxr   . PMHx .   .. DVT   .. A fib on coumadin   .. Valvular heart disease   .. HFPEF   .... 6/28/2023 echo pasp 39 la sl dilated n lvsf  .. Aortic stenosis   .. CVA r sided weakness   .. Cellulitis  .... VANCO 11/22/2023 -> CIPRO 11/29 DOXY 11/29     . HOME MEDS .warfarin levoxyl 50 pepcid metoprolol 25.2 gabapentin 300.3 lasix 20 escitalopram 20     PROBLEM DATA.  INFECTION  CELLULITIS L LOWER EXTREMITY  .. w 1/3-1/4-1/5-1/8/2024 w 13 - 18- 16 - 17  .. pr 1/4/2024 pr .18   .. la 1/3/2024 la 2.4   .. cxr 1/3/2024 cxr improving l base infiltr improvd from 11/30/2023  .. bc 1/2 (-)   .. 1/3/2024 wound mrsa pseudomonas   .. 1/3/2024 pseudomonas enterococcus fecalis proteus m strept dysgalacticae   .. 1/3/2024 CEFEPIME 2.2 X 7D   .. 1/6/2024 vanco 750.2     . A fib  .. INR 1/4-1/5/2024 INR 4.5- 4.2     HEMAT  .. Hb 1/3-1/4-1/6-1/8/2024 Hb 10.9 - 10.7 - 8.9 - 9.7     RENAL   .. Na 1/3-1/8/2024 Na 141 - 139  .. CO2 1/3/2024 CO2 25   .. Cr 1/3-1/8/2024 Cr .7 - .4     . HYPOTHYROID  .. 1/3/2024 LEVOXYL 50   . .    . SUMMARY.     .  89yo female from [facility] for worsening LLE wound   . 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 Pulm consulted 1/3/2024 for abn cxr     . OVERALL ASSESSMENT/PLAN.   . CELLULITIS L LO EXTR   .. 1/3/2024 Seen by ID   .. 1/3/2024 wound mrsa pseudomonas   .. 1/3/2024 pseudomonas enterococcus fecalis proteus m strept dysgalacticae   .. 1/3/2024 Started on cefepime   .. No pneumonia suspected     . A fib  .. Continue coumadin     . ANEMIA   .. Monitor     . DISPO.  .. Follow cultures adjusr antibio     TIME SPENT.  . Over 36 minutes aggregate care time spent on encounter; activities included   direct patient care, counseling and/or coordinating care reviewing notes, lab data/ imaging , discussion with multidisciplinary team/ patient  /family and explaining in detail risks, benefits, alternatives  of the recommendations     PATIENT.  . AB BROOKS      REASON FOR VISIT  .. Management of problems listed below      ROS  . Patient unable to give ROS     PHYSICAL EXAM    HEENT Unremarkable  atraumatic   RESP Fair air entry  Harsh breath sound   CARDIAC S1 S2 No S3     NO JVD    ABDOMEN No hepatosplenomegaly   PEDAL EDEMA present No calf tenderness  NO rash       GENERAL DATA .   GOC.   .. 1/4/2024 dnr   ..  1/3/2024 FULL CODE  ICU STAY.  .. NO  COVID. ..       BEST PRACTICE ISSUES.    HOB ELEVATN.  .. Yes  DVT PPLX.  ..   on coumadin        DIET.   ..  1/3/2024 DASH   IV fl. .. 1/3/2024 d5 1/2 40    STRESS ULCER PPLX.   .  ..  1/3/2024 PROTONIX 40      ALLGY. .. certain antibiotics       WT. ..  1/3/2024 49  BMI. .. 1/3/2024 27     ABGS.   .  VS/ PO/IO/ VENT/ DRIPS.  1/8/2024 99f 100 116/70   1/8/2024 ra 94%     PRESENTATION.  . CC 1/3/2024.From [facility] for LLE wound infection and pain   . HPI 1/3/2024.  . 89yo female bib ems with wounds to left lower leg for unknown period of time, now weeping and draining, pt c/o pain but pt is poor historian  . 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 Pulm consulted 1/3/2024 for abn cxr   . PMHx .   .. DVT   .. A fib on coumadin   .. Valvular heart disease   .. HFPEF   .... 6/28/2023 echo pasp 39 la sl dilated n lvsf  .. Aortic stenosis   .. CVA r sided weakness   .. Cellulitis  .... VANCO 11/22/2023 -> CIPRO 11/29 DOXY 11/29     . HOME MEDS .warfarin levoxyl 50 pepcid metoprolol 25.2 gabapentin 300.3 lasix 20 escitalopram 20     PROBLEM DATA.  INFECTION  CELLULITIS L LOWER EXTREMITY  .. w 1/3-1/4-1/5-1/8/2024 w 13 - 18- 16 - 17  .. pr 1/4/2024 pr .18   .. la 1/3/2024 la 2.4   .. cxr 1/3/2024 cxr improving l base infiltr improvd from 11/30/2023  .. bc 1/2 (-)   .. 1/3/2024 wound mrsa pseudomonas   .. 1/3/2024 pseudomonas enterococcus fecalis proteus m strept dysgalacticae   .. 1/3/2024 CEFEPIME 2.2 X 7D   .. 1/6/2024 vanco 750.2     . A fib  .. INR 1/4-1/5/2024 INR 4.5- 4.2     HEMAT  .. Hb 1/3-1/4-1/6-1/8/2024 Hb 10.9 - 10.7 - 8.9 - 9.7     RENAL   .. Na 1/3-1/8/2024 Na 141 - 139  .. CO2 1/3/2024 CO2 25   .. Cr 1/3-1/8/2024 Cr .7 - .4     . HYPOTHYROID  .. 1/3/2024 LEVOXYL 50   . .    . SUMMARY.     .  89yo female from [facility] for worsening LLE wound   . 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 Pulm consulted 1/3/2024 for abn cxr     . OVERALL ASSESSMENT/PLAN.   . CELLULITIS L LO EXTR   .. 1/3/2024 Seen by ID   .. 1/3/2024 wound mrsa pseudomonas   .. 1/3/2024 pseudomonas enterococcus fecalis proteus m strept dysgalacticae   .. 1/3/2024 Started on cefepime   .. No pneumonia suspected     . A fib  .. Continue coumadin     . ANEMIA   .. Monitor     . DISPO.  .. Follow cultures adjusr antibio     TIME SPENT.  . Over 36 minutes aggregate care time spent on encounter; activities included   direct patient care, counseling and/or coordinating care reviewing notes, lab data/ imaging , discussion with multidisciplinary team/ patient  /family and explaining in detail risks, benefits, alternatives  of the recommendations     PATIENT.  . AB BROOKS

## 2024-01-08 NOTE — DISCHARGE NOTE PROVIDER - NPI NUMBER (FOR SYSADMIN USE ONLY) :
[[id number]] [[id number]] [[id number]] [[id number]] [[id number]],[[id number]] [[id number]],[[id number]]

## 2024-01-08 NOTE — DISCHARGE NOTE PROVIDER - PROVIDER TOKENS
PROVIDER:[TOKEN:[[id number]],FOLLOWUP:[2 weeks]] PROVIDER:[TOKEN:[[id number]],FOLLOWUP:[2 weeks]],PROVIDER:[TOKEN:[[id number]],FOLLOWUP:[1 week]] PROVIDER:[TOKEN:[[id number]],FOLLOWUP:[2 weeks]],PROVIDER:[TOKEN:[[id number]],FOLLOWUP:[1 week]],PROVIDER:[TOKEN:[[id number]],FOLLOWUP:[1 week]],PROVIDER:[TOKEN:[[id number]],FOLLOWUP:[1 month]] PROVIDER:[TOKEN:[[id number]],FOLLOWUP:[2 weeks]],PROVIDER:[TOKEN:[[id number]],FOLLOWUP:[1 week]],PROVIDER:[TOKEN:[[id number]],FOLLOWUP:[1 week]],PROVIDER:[TOKEN:[[id number]],FOLLOWUP:[1 month]]

## 2024-01-08 NOTE — PROGRESS NOTE ADULT - PROBLEM SELECTOR PLAN 2
Wound cultures grew many Pseudomonas aeruginosa, Enterococcus faecalis and group C Strep and MRSA  . There are no options for oral antibiotics. Continue IV Cefepime 2 gm q12h and IV Vancomycin 750 mg q12h to Jan 20, 2024 to complete a 2 week course.   . Get PICC  line for 2 weeks of IV abx. Discharge planning to rehab. Coagulopathy reversal prior to PICC PLACEMENT ( d/w Dr Rodriguez and Dr De La Rosa -give 5 mg of vit K)

## 2024-01-08 NOTE — PROGRESS NOTE ADULT - SUBJECTIVE AND OBJECTIVE BOX
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  Patient was hospitalized in 2023  g with altered mental status concern for UTI , also  cellulitis to the LLE at the site of a skin graft. Pt recently completed a course of po abx for this with no significant improvement. bib ems with wounds to left lower leg for unknown period of time, now weeping and draining, pt c/o pain but pt is poor historian She was admitted in november 2023 with LLE cellulitis and seen by wound care MD & ID -discharged on po doxycycline and cipro & with topical care .Admitted for septic workup , iv abx ,pain management and wound care  (03 Jan 2024 18:15)      Vital Signs Last 24 Hrs  T(C): 36.8 (08 Jan 2024 12:48), Max: 37.3 (08 Jan 2024 05:40)  T(F): 98.3 (08 Jan 2024 12:48), Max: 99.2 (08 Jan 2024 05:40)  HR: 99 (08 Jan 2024 12:48) (98 - 100)  BP: 144/74 (08 Jan 2024 12:48) (116/72 - 144/74)  BP(mean): --  RR: 17 (08 Jan 2024 12:48) (16 - 17)  SpO2: 91% (08 Jan 2024 12:48) (91% - 94%)    Parameters below as of 08 Jan 2024 12:48  Patient On (Oxygen Delivery Method): room air                              9.7    17.16 )-----------( 663      ( 08 Jan 2024 06:23 )             30.5               01-08    139  |  109<H>  |  13  ----------------------------<  109<H>  3.4<L>   |  26  |  0.45<L>    Ca    7.9<L>      08 Jan 2024 06:23        PHYSICAL EXAM  General: Pleasant  female in acute distress, awake.    Integument:  Skin warm, dry and supple bilateral.    Noted cellulitic changes left lower leg  Ulceration Left lower leg, anterior, medial, lateral aspects, - hyperkeratotic border, wound base fibronecrotic, + edema, + justo-wound erythema, + drainage, + fluctuance, - tracking/tunneling, - probe to bone.   Vascular: Dorsalis Pedis and Posterior Tibial pulses unpalpable.  Capillary re-fill time greater then 3 seconds digits 1-5 bilateral.    Neuro: Protective sensation intact to the level of the digits bilateral.  MSK: unable to perform due to mental status

## 2024-01-08 NOTE — DISCHARGE NOTE PROVIDER - NSDCCPCAREPLAN_GEN_ALL_CORE_FT
PRINCIPAL DISCHARGE DIAGNOSIS  Diagnosis: Infected open wound  Assessment and Plan of Treatment: Conitnue intravenous antibiotics Continue IV Cefepime 2 gm q12h and IV Vancomycin 750 mg q12h to Jan 20, 2024 to complete a 2 week course. Dressing changes every other day with: xeroform, 4x4, abd, myra        SECONDARY DISCHARGE DIAGNOSES  Diagnosis: Left leg cellulitis  Assessment and Plan of Treatment: Conitnue intravenous antibiotics Continue IV Cefepime 2 gm q12h and IV Vancomycin 750 mg q12h to Jan 20, 2024 to complete a 2 week course.       Diagnosis: Hypertension  Assessment and Plan of Treatment:     Diagnosis: Afib  Assessment and Plan of Treatment:     Diagnosis: Grade I diastolic dysfunction  Assessment and Plan of Treatment:     Diagnosis: Hypothyroidism  Assessment and Plan of Treatment:     Diagnosis: GERD (gastroesophageal reflux disease)  Assessment and Plan of Treatment:     Diagnosis: Constipation  Assessment and Plan of Treatment:     Diagnosis: Depression  Assessment and Plan of Treatment:      PRINCIPAL DISCHARGE DIAGNOSIS  Diagnosis: Infected open wound  Assessment and Plan of Treatment: Conitnue intravenous antibiotics Continue IV Cefepime 2 gm q12h and IV Vancomycin 750 mg q12h to Jan 20, 2024 to complete a 2 week course. Dressing changes every other day with: xeroform, 4x4, abd, myra        SECONDARY DISCHARGE DIAGNOSES  Diagnosis: Left leg cellulitis  Assessment and Plan of Treatment: Conitnue intravenous antibiotics Continue IV Cefepime 2 gm q12h and IV Vancomycin 750 mg q12h to Jan 20, 2024 to complete a 2 week course.       Diagnosis: Hypertension  Assessment and Plan of Treatment:     Diagnosis: Depression  Assessment and Plan of Treatment:     Diagnosis: Afib  Assessment and Plan of Treatment:     Diagnosis: Grade I diastolic dysfunction  Assessment and Plan of Treatment:     Diagnosis: GERD (gastroesophageal reflux disease)  Assessment and Plan of Treatment:     Diagnosis: Hypothyroidism  Assessment and Plan of Treatment:     Diagnosis: Constipation  Assessment and Plan of Treatment:     Diagnosis: Anemia of chronic disease  Assessment and Plan of Treatment:

## 2024-01-08 NOTE — DISCHARGE NOTE PROVIDER - NSDCMRMEDTOKEN_GEN_ALL_CORE_FT
acetaminophen 500 mg oral tablet: 1 tab(s) orally 2 times a day  acetaminophen 500 mg oral tablet: 2 tab(s) orally once a day (in the afternoon)  Acidophilus oral tablet: 2 tab(s) orally once a day  amLODIPine 5 mg oral tablet: 1 tab(s) orally once a day  Biofreeze 4% topical gel: Apply topically to affected area 2 times a day  cholecalciferol 50 mcg (2000 intl units) oral tablet: 1 tab(s) orally once a day  ciprofloxacin 500 mg oral tablet: 1 tab(s) orally 2 times a day  doxycycline monohydrate 50 mg oral capsule: 2 cap(s) orally 2 times a day  escitalopram 20 mg oral tablet: 1 tab(s) orally once a day  famotidine 20 mg oral tablet: 1 tab(s) orally once a day  folic acid 1 mg oral tablet: 1 tab(s) orally once a day  furosemide 20 mg oral tablet: 1 tab(s) orally once a day  gabapentin 300 mg oral capsule: 1 cap(s) orally 3 times a day  hydroxyurea 500 mg oral capsule: 1 tab(s) orally 2 times a day  levothyroxine 50 mcg (0.05 mg) oral tablet: 1 tab(s) orally once a day  melatonin 3 mg oral tablet: 1 tab(s) orally once a day  metoprolol tartrate 25 mg oral tablet: 1 tab(s) orally 2 times a day  miconazole 2% topical cream: Apply topically to affected area once a day  senna (sennosides) 8.6 mg oral tablet: 2 tab(s) orally once a day  simvastatin 10 mg oral tablet: 1 tab(s) orally once a day  traMADol 50 mg oral tablet: 1 tab(s) orally 2 times a day hold for lethargy  traMADol 50 mg oral tablet: 1 tab(s) orally every 6 hours as needed for pain  warfarin 1 mg oral tablet: 1 tab(s) orally every other day hold for inr above  3.0   acetaminophen 500 mg oral tablet: 2 tab(s) orally every 8 hours  Acidophilus oral tablet: 2 tab(s) orally once a day  amLODIPine 5 mg oral tablet: 1 tab(s) orally once a day  ascorbic acid 500 mg oral tablet: 1 tab(s) orally once a day  bisacodyl 10 mg rectal suppository: 1 suppository(ies) rectal once a day As needed Constipation  calcium carbonate 1250 mg (500 mg elemental calcium) oral tablet: 1 tab(s) orally once a day  cefepime 2 g intravenous injection: 2 gram(s) intravenous every 12 hours LAST DAY 01/20/2024  cholecalciferol 50 mcg (2000 intl units) oral tablet: 1 tab(s) orally once a day  escitalopram 20 mg oral tablet: 1 tab(s) orally once a day  ferrous sulfate 325 mg (65 mg elemental iron) oral tablet: 1 tab(s) orally once a day  folic acid 1 mg oral tablet: 1 tab(s) orally once a day  furosemide 20 mg oral tablet: 1 tab(s) orally once a day  gabapentin 300 mg oral capsule: 1 cap(s) orally 3 times a day  hydroxyurea 500 mg oral capsule: 1 tab(s) orally 2 times a day  levothyroxine 50 mcg (0.05 mg) oral tablet: 1 tab(s) orally once a day  magnesium hydroxide 8% oral suspension: 30 milliliter(s) orally once a day As needed Constipation  melatonin 3 mg oral tablet: 1 tab(s) orally once a day  metoprolol tartrate 25 mg oral tablet: 1 tab(s) orally 2 times a day  miconazole 2% topical cream: Apply topically to affected area once a day  pantoprazole 40 mg oral delayed release tablet: 1 tab(s) orally once a day (before a meal)  polyethylene glycol 3350 oral powder for reconstitution: 17 gram(s) orally once a day  senna (sennosides) 8.6 mg oral tablet: 2 tab(s) orally once a day  simvastatin 10 mg oral tablet: 1 tab(s) orally once a day  traMADol 50 mg oral tablet: 1 tab(s) orally 4 times a day As needed Moderate Pain (4 - 6)  vancomycin 1 g intravenous injection: 1 gram(s) intravenous every 24 hours LAST DAY 01/20/2024

## 2024-01-08 NOTE — DISCHARGE NOTE PROVIDER - DETAILS OF MALNUTRITION DIAGNOSIS/DIAGNOSES
This patient has been assessed with a concern for Malnutrition and was treated during this hospitalization for the following Nutrition diagnosis/diagnoses:     -  01/04/2024: Moderate protein-calorie malnutrition   -  01/04/2024: Underweight (BMI < 19)

## 2024-01-08 NOTE — DISCHARGE NOTE PROVIDER - HOSPITAL COURSE
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  Patient was hospitalized in 2023  g with altered mental status concern for UTI , also  cellulitis to the LLE at the site of a skin graft. Pt recently completed a course of po abx for this with no significant improvement. bib ems with wounds to left lower leg for unknown period of time, now weeping and draining, pt c/o pain but pt is poor historian She was admitted in november 2023 with LLE cellulitis and seen by wound care MD & ID -discharged on po doxycycline and cipro & with topical care .Admitted for septic workup , iv abx ,pain management and wound care       Nutritional Assessment:  · Nutritional Assessment  This patient has been assessed with a concern for Malnutrition and has been determined to have a diagnosis/diagnoses of Moderate protein-calorie malnutrition and Underweight (BMI < 19).    This patient is being managed with:   Diet DASH/TLC-  Sodium & Cholesterol Restricted  Easy to Chew (EASYTOCHEW)  Entered: Luther  3 2024  6:42PM    Problem/Plan - 1:  ·  Problem: Open wound of left lower leg.   ·  Plan: Get wound culture today, ID and wound care cons requested   . Added IV Cefepime 2 gm q12h.  . Wound care team / suggest surgery evaluation for wound debridement and skin biopsies to evaluate for other causes of chronic leg ulcer (Pyoderma gangrenosum?)  . Wound care daily. Leg ELEVATION .pain management , Dvt prophylaxis.    Problem/Plan - 2:  ·  Problem: Left leg cellulitis.   ·  Plan: Get wound culture today, ID and wound care cons requested   . Added IV Cefepime 2 gm q12h. Septic workup sent , trend CBC , temps   . Wound care team / suggest surgery evaluation for wound debridement   . Wound care daily. Leg ELEVATION .pain management , Dvt prophylaxis.    Problem/Plan - 3:  ·  Problem: Hypertension.   ·  Plan: continue home medications.    Problem/Plan - 4:  ·  Problem: Aortic valvar stenosis.   ·  Plan: continue home medications.    Problem/Plan - 5:  ·  Problem: Afib.   ·  Plan: continue home medications ,card is following.    Problem/Plan - 6:  ·  Problem: Grade I diastolic dysfunction.   ·  Plan: continue home medications ,ins/outs , monitor fluid status closely.    Problem/Plan - 7:  ·  Problem: Hypothyroidism.   ·  Plan: continue home medications.    Problem/Plan - 8:  ·  Problem: GERD (gastroesophageal reflux disease).   ·  Plan: ppi.    Problem/Plan - 9:  ·  Problem: Constipation.   ·  Plan: bowel regimen.    Problem/Plan - 10:  ·  Problem: Neuropathy.   ·  Plan; pain management ,PT /OT.    Problem/Plan - 11:  ·  Problem: Depression.   ·  Plan: continue home medications.    Problem/Plan - 12:  ·  Problem: Prophylactic measure.   ·  Plan: Gastrointestinal stress ulcer prophylaxis and DVT prophylaxis administered.     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  Patient was hospitalized in 2023  g with altered mental status concern for UTI , also  cellulitis to the LLE at the site of a skin graft. Pt recently completed a course of po abx for this with no significant improvement. bib ems with wounds to left lower leg for unknown period of time, now weeping and draining, pt c/o pain but pt is poor historian She was admitted in november 2023 with LLE cellulitis and seen by wound care MD & ID -discharged on po doxycycline and cipro & with topical care .Admitted for septic workup , iv abx ,pain management and wound care       Nutritional Assessment:  · Nutritional Assessment  This patient has been assessed with a concern for Malnutrition and has been determined to have a diagnosis/diagnoses of Moderate protein-calorie malnutrition and Underweight (BMI < 19).    This patient is being managed with:   Diet DASH/TLC-  Sodium & Cholesterol Restricted  Easy to Chew (EASYTOCHEW)  Entered: Luther  3 2024  6:42PM    Problem/Plan - 1:  ·  Problem: Open wound of left lower leg.   ·  Plan: Get wound culture today, ID and wound care cons requested   . Added IV Cefepime 2 gm q12h.  . Wound care team / suggest surgery evaluation for wound debridement and skin biopsies to evaluate for other causes of chronic leg ulcer (Pyoderma gangrenosum?)  . Wound care daily. Leg ELEVATION .pain management , Dvt prophylaxis.    Problem/Plan - 2:  ·  Problem: Left leg cellulitis.   ·  Plan: Get wound culture today, ID and wound care cons requested   . Added IV Cefepime 2 gm q12h. Septic workup sent , trend CBC , temps   . Wound care team / suggest surgery evaluation for wound debridement   . Wound care daily. Leg ELEVATION .pain management , Dvt prophylaxis.    Problem/Plan - 3:  ·  Problem: Hypertension.   ·  Plan: continue home medications.    Problem/Plan - 4:  ·  Problem: Aortic valvar stenosis.   ·  Plan: continue home medications.    Problem/Plan - 5:  ·  Problem: Afib.   ·  Plan: continue home medications ,card is following.    Problem/Plan - 6:  ·  Problem: Grade I diastolic dysfunction.   ·  Plan: continue home medications ,ins/outs , monitor fluid status closely.    Problem/Plan - 7:  ·  Problem: Hypothyroidism.   ·  Plan: continue home medications.    Problem/Plan - 8:  ·  Problem: GERD (gastroesophageal reflux disease).   ·  Plan: ppi.    Problem/Plan - 9:  ·  Problem: Constipation.   ·  Plan: bowel regimen.    Problem/Plan - 10:  ·  Problem: Neuropathy.   ·  Plan; pain management ,PT /OT.    Problem/Plan - 11:  ·  Problem: Depression.   ·  Plan: continue home medications.    Problem/Plan - 12:  ·  Problem: Prophylactic measure.   ·  Plan: Gastrointestinal stress ulcer prophylaxis and DVT prophylaxis administered.     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  Patient was hospitalized in 2023  g with altered mental status concern for UTI , also  cellulitis to the LLE at the site of a skin graft. Pt recently completed a course of po abx for this with no significant improvement. bib ems with wounds to left lower leg for unknown period of time, now weeping and draining, pt c/o pain but pt is poor historian She was admitted in november 2023 with LLE cellulitis and seen by wound care MD & ID -discharged on po doxycycline and cipro & with topical care .Admitted for septic workup , iv abx ,pain management and wound care       Nutritional Assessment:  · Nutritional Assessment  This patient has been assessed with a concern for Malnutrition and has been determined to have a diagnosis/diagnoses of Moderate protein-calorie malnutrition and Underweight (BMI < 19).    This patient is being managed with:   Diet DASH/TLC-  Sodium & Cholesterol Restricted  Easy to Chew (EASYTOCHEW)  Entered: [place]  3 2024  6:42PM    Problem/Plan - 1:  ·  Problem: Open wound of left lower leg.   ·  Plan: Get wound culture today, ID and wound care cons requested   . Wound cultures grew many Pseudomonas aeruginosa, Enterococcus faecalis and group C Strep and MRSA.    . Added IV Cefepime 2 gm q12h.  . Wound care team / suggest surgery evaluation for wound debridement and skin biopsies to evaluate for other causes of chronic leg ulcer (Pyoderma gangrenosum?)  . Wound care daily. Leg ELEVATION .pain management , Dvt prophylaxis.    Problem/Plan - 2:  ·  Problem: Left leg cellulitis.   ·  Plan: Get wound culture today, ID and wound care cons requested   . Added IV Cefepime 2 gm q12h. Septic workup sent , trend CBC , temps   . Wound care team / suggest surgery evaluation for wound debridement   . Wound care daily. Leg ELEVATION .pain management , Dvt prophylaxis.    Problem/Plan - 3:  ·  Problem: Hypertension.   ·  Plan: continue home medications.    Problem/Plan - 4:  ·  Problem: Aortic valvar stenosis.   ·  Plan: continue home medications.    Problem/Plan - 5:  ·  Problem: Afib.   ·  Plan: continue home medications ,card is following.    Problem/Plan - 6:  ·  Problem: Grade I diastolic dysfunction.   ·  Plan: continue home medications ,ins/outs , monitor fluid status closely.    Problem/Plan - 7:  ·  Problem: Hypothyroidism.   ·  Plan: continue home medications.    Problem/Plan - 8:  ·  Problem: GERD (gastroesophageal reflux disease).   ·  Plan: ppi.    Problem/Plan - 9:  ·  Problem: Constipation.   ·  Plan: bowel regimen.    Problem/Plan - 10:  ·  Problem: Neuropathy.   ·  Plan; pain management ,PT /OT.    Problem/Plan - 11:  ·  Problem: Depression.   ·  Plan: continue home medications.    Problem/Plan - 12:  ·  Problem: Prophylactic measure.   ·  Plan: Gastrointestinal stress ulcer prophylaxis and DVT prophylaxis administered.     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  Patient was hospitalized in 2023  g with altered mental status concern for UTI , also  cellulitis to the LLE at the site of a skin graft. Pt recently completed a course of po abx for this with no significant improvement. bib ems with wounds to left lower leg for unknown period of time, now weeping and draining, pt c/o pain but pt is poor historian She was admitted in november 2023 with LLE cellulitis and seen by wound care MD & ID -discharged on po doxycycline and cipro & with topical care .Admitted for septic workup , iv abx ,pain management and wound care       Nutritional Assessment:  · Nutritional Assessment  This patient has been assessed with a concern for Malnutrition and has been determined to have a diagnosis/diagnoses of Moderate protein-calorie malnutrition and Underweight (BMI < 19).    This patient is being managed with:   Diet DASH/TLC-  Sodium & Cholesterol Restricted  Easy to Chew (EASYTOCHEW)  Entered: [place]  3 2024  6:42PM    Problem/Plan - 1:  ·  Problem: Open wound of left lower leg.   ·  Plan: Get wound culture today, ID and wound care cons requested   . Wound cultures grew many Pseudomonas aeruginosa, Enterococcus faecalis and group C Strep and MRSA.    . Added IV Cefepime 2 gm q12h.  . Wound care team / suggest surgery evaluation for wound debridement and skin biopsies to evaluate for other causes of chronic leg ulcer (Pyoderma gangrenosum?)  . Wound care daily. Leg ELEVATION .pain management , Dvt prophylaxis.    Problem/Plan - 2:  ·  Problem: Left leg cellulitis.   ·  Plan: Get wound culture today, ID and wound care cons requested   . Added IV Cefepime 2 gm q12h. Septic workup sent , trend CBC , temps   . Wound care team / suggest surgery evaluation for wound debridement   . Wound care daily. Leg ELEVATION .pain management , Dvt prophylaxis.    Problem/Plan - 3:  ·  Problem: Hypertension.   ·  Plan: continue home medications.    Problem/Plan - 4:  ·  Problem: Aortic valvar stenosis.   ·  Plan: continue home medications.    Problem/Plan - 5:  ·  Problem: Afib.   ·  Plan: continue home medications ,card is following.    Problem/Plan - 6:  ·  Problem: Grade I diastolic dysfunction.   ·  Plan: continue home medications ,ins/outs , monitor fluid status closely.    Problem/Plan - 7:  ·  Problem: Hypothyroidism.   ·  Plan: continue home medications.    Problem/Plan - 8:  ·  Problem: GERD (gastroesophageal reflux disease).   ·  Plan: ppi.    Problem/Plan - 9:  ·  Problem: Constipation.   ·  Plan: bowel regimen.    Problem/Plan - 10:  ·  Problem: Neuropathy.   ·  Plan; pain management ,PT /OT.    Problem/Plan - 11:  ·  Problem: Depression.   ·  Plan: continue home medications.    Problem/Plan - 12:  ·  Problem: Prophylactic measure.   ·  Plan: Gastrointestinal stress ulcer prophylaxis and DVT prophylaxis administered.

## 2024-01-08 NOTE — PROGRESS NOTE ADULT - SUBJECTIVE AND OBJECTIVE BOX
CHIEF COMPLAINT/ REASON FOR VISIT  .. Patient was seen to address the  issue listed under PROBLEM LIST which is located toward bottom of this note     REJI BERGER    [facility] [address]    Allergies    per son &quot;issues with certain anitibiotics&quot; does not remember the names - Patient has tolerated zosyn, ceftriaxone, bactrim, and vancomycin on past admissions. (Unknown)    Intolerances        PAST MEDICAL & SURGICAL HISTORY:  Hypertension      CVA (cerebral vascular accident)      Depression      Constipation      Neuropathy      Hyperlipidemia      Grade I diastolic dysfunction      Afib      Neuropathy      VHD (valvular heart disease)      Aortic valvar stenosis      Hypothyroidism      GERD (gastroesophageal reflux disease)      H/O skin graft          FAMILY HISTORY:      Home Medications:  acetaminophen 500 mg oral tablet: 1 tab(s) orally 2 times a day (22 Nov 2023 20:06)  acetaminophen 500 mg oral tablet: 2 tab(s) orally once a day (in the afternoon) (22 Nov 2023 20:06)  amLODIPine 5 mg oral tablet: 1 tab(s) orally once a day (22 Nov 2023 20:06)  Biofreeze 4% topical gel: Apply topically to affected area 2 times a day (22 Nov 2023 20:06)  cholecalciferol 50 mcg (2000 intl units) oral tablet: 1 tab(s) orally once a day (22 Nov 2023 20:06)  escitalopram 20 mg oral tablet: 1 tab(s) orally once a day (22 Nov 2023 20:06)  famotidine 20 mg oral tablet: 1 tab(s) orally once a day (22 Nov 2023 20:06)  folic acid 1 mg oral tablet: 1 tab(s) orally once a day (22 Nov 2023 20:06)  furosemide 20 mg oral tablet: 1 tab(s) orally once a day (22 Nov 2023 20:06)  gabapentin 300 mg oral capsule: 1 cap(s) orally 3 times a day (22 Nov 2023 20:06)  hydroxyurea 500 mg oral capsule: 1 tab(s) orally 2 times a day (22 Nov 2023 20:06)  levothyroxine 50 mcg (0.05 mg) oral tablet: 1 tab(s) orally once a day (22 Nov 2023 20:06)  melatonin 3 mg oral tablet: 1 tab(s) orally once a day (22 Nov 2023 20:06)  metoprolol tartrate 25 mg oral tablet: 1 tab(s) orally 2 times a day (22 Nov 2023 20:06)  miconazole 2% topical cream: Apply topically to affected area once a day (22 Nov 2023 20:06)  senna (sennosides) 8.6 mg oral tablet: 2 tab(s) orally once a day (22 Nov 2023 20:06)  simvastatin 10 mg oral tablet: 1 tab(s) orally once a day (22 Nov 2023 20:06)  traMADol 50 mg oral tablet: 1 tab(s) orally 2 times a day hold for lethargy (22 Nov 2023 20:06)  traMADol 50 mg oral tablet: 1 tab(s) orally every 6 hours as needed for pain (22 Nov 2023 20:06)      MEDICATIONS  (STANDING):  acetaminophen     Tablet .. 1000 milliGRAM(s) Oral every 8 hours  amLODIPine   Tablet 5 milliGRAM(s) Oral daily  calcium carbonate   1250 mG (OsCal) 1 Tablet(s) Oral daily  cefepime   IVPB 2000 milliGRAM(s) IV Intermittent every 12 hours  cholecalciferol 2000 Unit(s) Oral daily  dextrose 5% + sodium chloride 0.45%. 1000 milliLiter(s) (40 mL/Hr) IV Continuous <Continuous>  escitalopram 20 milliGRAM(s) Oral daily  folic acid 1 milliGRAM(s) Oral daily  gabapentin 300 milliGRAM(s) Oral three times a day  hydroxyurea 500 milliGRAM(s) Oral two times a day  lactobacillus acidophilus 1 Tablet(s) Oral every 12 hours  levothyroxine 50 MICROGram(s) Oral daily  metoprolol tartrate 25 milliGRAM(s) Oral two times a day  pantoprazole    Tablet 40 milliGRAM(s) Oral before breakfast  polyethylene glycol 3350 17 Gram(s) Oral daily  senna 2 Tablet(s) Oral at bedtime  simvastatin 10 milliGRAM(s) Oral at bedtime  vancomycin  IVPB 750 milliGRAM(s) IV Intermittent every 12 hours    MEDICATIONS  (PRN):  aluminum hydroxide/magnesium hydroxide/simethicone Suspension 30 milliLiter(s) Oral every 4 hours PRN Dyspepsia  bisacodyl Suppository 10 milliGRAM(s) Rectal daily PRN Constipation  magnesium hydroxide Suspension 30 milliLiter(s) Oral daily PRN Constipation  melatonin 3 milliGRAM(s) Oral at bedtime PRN Insomnia  morphine  - Injectable 2 milliGRAM(s) IV Push every 4 hours PRN Severe Pain (7 - 10)  ondansetron Injectable 4 milliGRAM(s) IV Push every 8 hours PRN Nausea and/or Vomiting  traMADol 50 milliGRAM(s) Oral four times a day PRN Moderate Pain (4 - 6)      Diet, DASH/TLC:   Sodium & Cholesterol Restricted  Easy to Chew (EASYTOCHEW) (01-03-24 @ 18:42) [Active]          Vital Signs Last 24 Hrs  T(C): 37.3 (08 Jan 2024 05:40), Max: 37.3 (08 Jan 2024 05:40)  T(F): 99.2 (08 Jan 2024 05:40), Max: 99.2 (08 Jan 2024 05:40)  HR: 100 (08 Jan 2024 05:40) (93 - 100)  BP: 116/72 (08 Jan 2024 05:40) (116/72 - 143/77)  BP(mean): --  RR: 17 (08 Jan 2024 05:40) (16 - 19)  SpO2: 94% (08 Jan 2024 05:40) (91% - 94%)    Parameters below as of 08 Jan 2024 05:40  Patient On (Oxygen Delivery Method): room air                  LABS:                        9.7    17.16 )-----------( 663      ( 08 Jan 2024 06:23 )             30.5     01-08    139  |  109<H>  |  13  ----------------------------<  109<H>  3.4<L>   |  26  |  0.45<L>    Ca    7.9<L>      08 Jan 2024 06:23    TPro  5.1<L>  /  Alb  1.7<L>  /  TBili  0.4  /  DBili  x   /  AST  15  /  ALT  9<L>  /  AlkPhos  73  01-06    PT/INR - ( 08 Jan 2024 06:23 )   PT: 44.9 sec;   INR: 4.00 ratio           Urinalysis Basic - ( 08 Jan 2024 06:23 )    Color: x / Appearance: x / SG: x / pH: x  Gluc: 109 mg/dL / Ketone: x  / Bili: x / Urobili: x   Blood: x / Protein: x / Nitrite: x   Leuk Esterase: x / RBC: x / WBC x   Sq Epi: x / Non Sq Epi: x / Bacteria: x            WBC:  WBC Count: 17.16 K/uL (01-08 @ 06:23)  WBC Count: 11.46 K/uL (01-07 @ 06:26)  WBC Count: 11.35 K/uL (01-06 @ 09:28)  WBC Count: 16.69 K/uL (01-05 @ 06:05)      MICROBIOLOGY:  RECENT CULTURES:  01-03 Wound Wound Methicillin resistant Staphylococcus aureus  Pseudomonas aeruginosa XXXX   Culture yields growth of greater than 3 colony types of  bacteria,  which may indicate contamination and normal jessica  Call client services within 7 days if further workup is clinically  indicated.  Culture includes  Few Methicillin Resistant Staphylococcus aureus  Moderate Streptococcus dysgalactiae (Group C/G) "Susceptibilities not  performed"  Numerous Pseudomonas aeruginosa Multiple Morphological Strains    01-03 .Abscess Leg - Left Pseudomonas aeruginosa  Enterococcus faecalis  Proteus mirabilis   No polymorphonuclear cells seen per low power field  Numerous Gram positive cocci in pairs seen per oil power field  Numerous Gram Negative Rods seen per oil power field   Numerous Pseudomonas aeruginosa  Moderate Enterococcus faecalis  Moderate Proteus mirabilis  Moderate Streptococcus dysgalactiae (Group C/G) "Susceptibilities not  performed"    01-03 .Blood Blood-Peripheral XXXX XXXX   No growth at 4 days    01-03 .Blood Blood-Peripheral XXXX XXXX   No growth at 4 days                PT/INR - ( 08 Jan 2024 06:23 )   PT: 44.9 sec;   INR: 4.00 ratio             Sodium:  Sodium: 139 mmol/L (01-08 @ 06:23)  Sodium: 139 mmol/L (01-07 @ 06:26)  Sodium: 143 mmol/L (01-06 @ 09:28)  Sodium: 141 mmol/L (01-05 @ 06:05)      0.45 mg/dL 01-08 @ 06:23  0.38 mg/dL 01-07 @ 06:26  0.44 mg/dL 01-06 @ 09:28  0.48 mg/dL 01-05 @ 06:05      Hemoglobin:  Hemoglobin: 9.7 g/dL (01-08 @ 06:23)  Hemoglobin: 10.0 g/dL (01-07 @ 06:26)  Hemoglobin: 8.9 g/dL (01-06 @ 09:28)  Hemoglobin: 10.4 g/dL (01-05 @ 06:05)      Platelets: Platelet Count - Automated: 663 K/uL (01-08 @ 06:23)  Platelet Count - Automated: 707 K/uL (01-07 @ 06:26)  Platelet Count - Automated: 494 K/uL (01-06 @ 09:28)  Platelet Count - Automated: 565 K/uL (01-05 @ 06:05)      LIVER FUNCTIONS - ( 06 Jan 2024 09:28 )  Alb: 1.7 g/dL / Pro: 5.1 g/dL / ALK PHOS: 73 U/L / ALT: 9 U/L / AST: 15 U/L / GGT: x             Urinalysis Basic - ( 08 Jan 2024 06:23 )    Color: x / Appearance: x / SG: x / pH: x  Gluc: 109 mg/dL / Ketone: x  / Bili: x / Urobili: x   Blood: x / Protein: x / Nitrite: x   Leuk Esterase: x / RBC: x / WBC x   Sq Epi: x / Non Sq Epi: x / Bacteria: x        RADIOLOGY & ADDITIONAL STUDIES:      MICROBIOLOGY:  RECENT CULTURES:  01-03 Wound Wound Methicillin resistant Staphylococcus aureus  Pseudomonas aeruginosa XXXX   Culture yields growth of greater than 3 colony types of  bacteria,  which may indicate contamination and normal jessica  Call client services within 7 days if further workup is clinically  indicated.  Culture includes  Few Methicillin Resistant Staphylococcus aureus  Moderate Streptococcus dysgalactiae (Group C/G) "Susceptibilities not  performed"  Numerous Pseudomonas aeruginosa Multiple Morphological Strains    01-03 .Abscess Leg - Left Pseudomonas aeruginosa  Enterococcus faecalis  Proteus mirabilis   No polymorphonuclear cells seen per low power field  Numerous Gram positive cocci in pairs seen per oil power field  Numerous Gram Negative Rods seen per oil power field   Numerous Pseudomonas aeruginosa  Moderate Enterococcus faecalis  Moderate Proteus mirabilis  Moderate Streptococcus dysgalactiae (Group C/G) "Susceptibilities not  performed"    01-03 .Blood Blood-Peripheral XXXX XXXX   No growth at 4 days    01-03 .Blood Blood-Peripheral XXXX XXXX   No growth at 4 days

## 2024-01-08 NOTE — PROGRESS NOTE ADULT - SUBJECTIVE AND OBJECTIVE BOX
PROGRESS NOTE  Patient is a 89y old  Female who presents with a chief complaint of LLE OPEN WOUND (08 Jan 2024 15:22)    Chart and available morning labs /imaging are reviewed electronically , urgent issues addressed . More information  is being added upon completion of rounds , when more information is collected and management discussed with consultants , medical staff and social service/case management on the floor   OVERNIGHT  No new issues reported by medical staff . 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  Patient was hospitalized in 2023  g with altered mental status concern for UTI , also  cellulitis to the LLE at the site of a skin graft. Pt recently completed a course of po abx for this with no significant improvement. bib ems with wounds to left lower leg for unknown period of time, now weeping and draining, pt c/o pain but pt is poor historian She was admitted in november 2023 with LLE cellulitis and seen by wound care MD & ID -discharged on po doxycycline and cipro & with topical care .Admitted for septic workup , iv abx ,pain management and wound care  (03 Jan 2024 18:15)    PAST MEDICAL & SURGICAL HISTORY:  CVA (cerebral vascular accident)      VHD (valvular heart disease)      Hyperlipidemia      Neuropathy      Hypertension      Depression      Constipation      Neuropathy      Grade I diastolic dysfunction      GERD (gastroesophageal reflux disease)      Aortic valvar stenosis      Afib      Hypothyroidism      H/O skin graft          MEDICATIONS  (STANDING):  acetaminophen     Tablet .. 1000 milliGRAM(s) Oral every 8 hours  amLODIPine   Tablet 5 milliGRAM(s) Oral daily  calcium carbonate   1250 mG (OsCal) 1 Tablet(s) Oral daily  cefepime   IVPB 2000 milliGRAM(s) IV Intermittent every 12 hours  cholecalciferol 2000 Unit(s) Oral daily  dextrose 5% + sodium chloride 0.45%. 1000 milliLiter(s) (40 mL/Hr) IV Continuous <Continuous>  escitalopram 20 milliGRAM(s) Oral daily  folic acid 1 milliGRAM(s) Oral daily  gabapentin 300 milliGRAM(s) Oral three times a day  hydroxyurea 500 milliGRAM(s) Oral two times a day  lactobacillus acidophilus 1 Tablet(s) Oral every 12 hours  levothyroxine 50 MICROGram(s) Oral daily  metoprolol tartrate 25 milliGRAM(s) Oral two times a day  pantoprazole    Tablet 40 milliGRAM(s) Oral before breakfast  polyethylene glycol 3350 17 Gram(s) Oral daily  potassium chloride   Powder 40 milliEquivalent(s) Oral every 4 hours  senna 2 Tablet(s) Oral at bedtime  simvastatin 10 milliGRAM(s) Oral at bedtime  vancomycin  IVPB 750 milliGRAM(s) IV Intermittent every 12 hours    MEDICATIONS  (PRN):  aluminum hydroxide/magnesium hydroxide/simethicone Suspension 30 milliLiter(s) Oral every 4 hours PRN Dyspepsia  bisacodyl Suppository 10 milliGRAM(s) Rectal daily PRN Constipation  magnesium hydroxide Suspension 30 milliLiter(s) Oral daily PRN Constipation  melatonin 3 milliGRAM(s) Oral at bedtime PRN Insomnia  morphine  - Injectable 2 milliGRAM(s) IV Push every 4 hours PRN Severe Pain (7 - 10)  ondansetron Injectable 4 milliGRAM(s) IV Push every 8 hours PRN Nausea and/or Vomiting  traMADol 50 milliGRAM(s) Oral four times a day PRN Moderate Pain (4 - 6)      OBJECTIVE    T(C): 36.8 (01-08-24 @ 12:48), Max: 37.3 (01-08-24 @ 05:40)  HR: 99 (01-08-24 @ 12:48) (98 - 100)  BP: 144/74 (01-08-24 @ 12:48) (116/72 - 144/74)  RR: 17 (01-08-24 @ 12:48) (16 - 17)  SpO2: 91% (01-08-24 @ 12:48) (91% - 94%)  Wt(kg): --  I&O's Summary        REVIEW OF SYSTEMS:  Patient is  unable to provide any information/ROS  due to baseline mental status.   PHYSICAL EXAM:  Appearance: NAD. VS past 24 hrs -as above   HEENT:   Moist oral mucosa. Conjunctiva clear b/l.   Neck : supple  Respiratory: Lungs CTAB.  Gastrointestinal:  Soft, nontender. No rebound. No rigidity. BS present	  Cardiovascular: RRR ,S1S2 present  Neurologic: Non-focal. Moving all extremities.  Extremities: No edema. No erythema. No calf tenderness.  Skin: No rashes, No ecchymoses, No cyanosis.	  wounds ,skin lesions-See skin assesment flow sheet   LABS:                        9.7    17.16 )-----------( 663      ( 08 Jan 2024 06:23 )             30.5     01-08    139  |  109<H>  |  13  ----------------------------<  109<H>  3.4<L>   |  26  |  0.45<L>    Ca    7.9<L>      08 Jan 2024 06:23      CAPILLARY BLOOD GLUCOSE        PT/INR - ( 08 Jan 2024 06:23 )   PT: 44.9 sec;   INR: 4.00 ratio           Urinalysis Basic - ( 08 Jan 2024 06:23 )    Color: x / Appearance: x / SG: x / pH: x  Gluc: 109 mg/dL / Ketone: x  / Bili: x / Urobili: x   Blood: x / Protein: x / Nitrite: x   Leuk Esterase: x / RBC: x / WBC x   Sq Epi: x / Non Sq Epi: x / Bacteria: x        Culture - Other (collected 03 Jan 2024 19:56)  Source: Wound Wound  Final Report (06 Jan 2024 10:13):    Culture yields growth of greater than 3 colony types of    bacteria,  which may indicate contamination and normal jessica    Call client services within 7 days if further workup is clinically    indicated.    Culture includes    Few Methicillin Resistant Staphylococcus aureus    Moderate Streptococcus dysgalactiae (Group C/G) "Susceptibilities not    performed"    Numerous Pseudomonas aeruginosa Multiple Morphological Strains  Organism: Methicillin resistant Staphylococcus aureus  Pseudomonas aeruginosa (06 Jan 2024 10:13)  Organism: Pseudomonas aeruginosa (06 Jan 2024 10:13)  Organism: Methicillin resistant Staphylococcus aureus (06 Jan 2024 10:13)    Culture - Abscess with Gram Stain (collected 03 Jan 2024 17:30)  Source: .Abscess Leg - Left  Gram Stain (04 Jan 2024 05:52):    No polymorphonuclear cells seen per low power field    Numerous Gram positive cocci in pairs seen per oil power field    Numerous Gram Negative Rods seen per oil power field  Preliminary Report (06 Jan 2024 15:04):    Numerous Pseudomonas aeruginosa    Moderate Enterococcus faecalis    Moderate Proteus mirabilis    Moderate Streptococcus dysgalactiae (Group C/G) "Susceptibilities not    performed"  Organism: Pseudomonas aeruginosa  Enterococcus faecalis  Proteus mirabilis (07 Jan 2024 12:39)  Organism: Proteus mirabilis (07 Jan 2024 12:39)  Organism: Enterococcus faecalis (06 Jan 2024 15:03)  Organism: Pseudomonas aeruginosa (06 Jan 2024 15:03)    Culture - Blood (collected 03 Jan 2024 14:55)  Source: .Blood Blood-Peripheral  Preliminary Report (08 Jan 2024 01:01):    No growth at 4 days    Culture - Blood (collected 03 Jan 2024 14:50)  Source: .Blood Blood-Peripheral  Preliminary Report (08 Jan 2024 01:01):    No growth at 4 days      RADIOLOGY & ADDITIONAL TESTS:   reviewed elctronically  ASSESSMENT/PLAN: 	    25 minutes aggregate time was spent on this visit, 50% visit time spent in care co-ordination with other attendings and counselling patient .I have discussed care plan with patient / HCP/family member  natali davis on a phone today ,who expressed understanding of problems treatment and their effect and side effects, alternatives in details. I have asked if they have any questions and concerns and appropriately addressed them to best of my ability. ACP-Advance care planning was discussed , pallitaive care issues ,CMO ,GOC ,MOLST  form ,advance directives were reviewed .All questions were answered to the best of my knowledge - 25 min spent. After impovement of symptoms and stabilisation  patient was cleared for rehabilation . Social service input requested for [facility] placement . Patient is medically stable to be transferred to rehabilitation facility ,when the bed is available and arranged by social service

## 2024-01-09 LAB
ALBUMIN SERPL ELPH-MCNC: 1.6 G/DL — LOW (ref 3.3–5)
ALBUMIN SERPL ELPH-MCNC: 1.6 G/DL — LOW (ref 3.3–5)
ALP SERPL-CCNC: 84 U/L — SIGNIFICANT CHANGE UP (ref 40–120)
ALP SERPL-CCNC: 84 U/L — SIGNIFICANT CHANGE UP (ref 40–120)
ALT FLD-CCNC: 10 U/L — LOW (ref 12–78)
ALT FLD-CCNC: 10 U/L — LOW (ref 12–78)
ANION GAP SERPL CALC-SCNC: 2 MMOL/L — LOW (ref 5–17)
ANION GAP SERPL CALC-SCNC: 2 MMOL/L — LOW (ref 5–17)
AST SERPL-CCNC: 19 U/L — SIGNIFICANT CHANGE UP (ref 15–37)
AST SERPL-CCNC: 19 U/L — SIGNIFICANT CHANGE UP (ref 15–37)
BILIRUB SERPL-MCNC: 0.4 MG/DL — SIGNIFICANT CHANGE UP (ref 0.2–1.2)
BILIRUB SERPL-MCNC: 0.4 MG/DL — SIGNIFICANT CHANGE UP (ref 0.2–1.2)
BUN SERPL-MCNC: 12 MG/DL — SIGNIFICANT CHANGE UP (ref 7–23)
BUN SERPL-MCNC: 12 MG/DL — SIGNIFICANT CHANGE UP (ref 7–23)
CALCIUM SERPL-MCNC: 7.9 MG/DL — LOW (ref 8.5–10.1)
CALCIUM SERPL-MCNC: 7.9 MG/DL — LOW (ref 8.5–10.1)
CHLORIDE SERPL-SCNC: 109 MMOL/L — HIGH (ref 96–108)
CHLORIDE SERPL-SCNC: 109 MMOL/L — HIGH (ref 96–108)
CO2 SERPL-SCNC: 27 MMOL/L — SIGNIFICANT CHANGE UP (ref 22–31)
CO2 SERPL-SCNC: 27 MMOL/L — SIGNIFICANT CHANGE UP (ref 22–31)
CREAT SERPL-MCNC: 0.39 MG/DL — LOW (ref 0.5–1.3)
CREAT SERPL-MCNC: 0.39 MG/DL — LOW (ref 0.5–1.3)
CULTURE RESULTS: ABNORMAL
CULTURE RESULTS: ABNORMAL
CULTURE RESULTS: SIGNIFICANT CHANGE UP
EGFR: 94 ML/MIN/1.73M2 — SIGNIFICANT CHANGE UP
EGFR: 94 ML/MIN/1.73M2 — SIGNIFICANT CHANGE UP
GLUCOSE SERPL-MCNC: 97 MG/DL — SIGNIFICANT CHANGE UP (ref 70–99)
GLUCOSE SERPL-MCNC: 97 MG/DL — SIGNIFICANT CHANGE UP (ref 70–99)
HCT VFR BLD CALC: 30.4 % — LOW (ref 34.5–45)
HCT VFR BLD CALC: 30.4 % — LOW (ref 34.5–45)
HGB BLD-MCNC: 9.4 G/DL — LOW (ref 11.5–15.5)
HGB BLD-MCNC: 9.4 G/DL — LOW (ref 11.5–15.5)
INR BLD: 3.07 RATIO — HIGH (ref 0.85–1.18)
INR BLD: 3.07 RATIO — HIGH (ref 0.85–1.18)
MCHC RBC-ENTMCNC: 30.9 GM/DL — LOW (ref 32–36)
MCHC RBC-ENTMCNC: 30.9 GM/DL — LOW (ref 32–36)
MCHC RBC-ENTMCNC: 34.1 PG — HIGH (ref 27–34)
MCHC RBC-ENTMCNC: 34.1 PG — HIGH (ref 27–34)
MCV RBC AUTO: 110.1 FL — HIGH (ref 80–100)
MCV RBC AUTO: 110.1 FL — HIGH (ref 80–100)
NRBC # BLD: 0 /100 WBCS — SIGNIFICANT CHANGE UP (ref 0–0)
NRBC # BLD: 0 /100 WBCS — SIGNIFICANT CHANGE UP (ref 0–0)
ORGANISM # SPEC MICROSCOPIC CNT: ABNORMAL
ORGANISM # SPEC MICROSCOPIC CNT: SIGNIFICANT CHANGE UP
ORGANISM # SPEC MICROSCOPIC CNT: SIGNIFICANT CHANGE UP
PLATELET # BLD AUTO: 630 K/UL — HIGH (ref 150–400)
PLATELET # BLD AUTO: 630 K/UL — HIGH (ref 150–400)
POTASSIUM SERPL-MCNC: 3.5 MMOL/L — SIGNIFICANT CHANGE UP (ref 3.5–5.3)
POTASSIUM SERPL-MCNC: 3.5 MMOL/L — SIGNIFICANT CHANGE UP (ref 3.5–5.3)
POTASSIUM SERPL-SCNC: 3.5 MMOL/L — SIGNIFICANT CHANGE UP (ref 3.5–5.3)
POTASSIUM SERPL-SCNC: 3.5 MMOL/L — SIGNIFICANT CHANGE UP (ref 3.5–5.3)
PROT SERPL-MCNC: 5.1 G/DL — LOW (ref 6–8.3)
PROT SERPL-MCNC: 5.1 G/DL — LOW (ref 6–8.3)
PROTHROM AB SERPL-ACNC: 34.8 SEC — HIGH (ref 9.5–13)
PROTHROM AB SERPL-ACNC: 34.8 SEC — HIGH (ref 9.5–13)
RBC # BLD: 2.76 M/UL — LOW (ref 3.8–5.2)
RBC # BLD: 2.76 M/UL — LOW (ref 3.8–5.2)
RBC # FLD: 18.1 % — HIGH (ref 10.3–14.5)
RBC # FLD: 18.1 % — HIGH (ref 10.3–14.5)
SODIUM SERPL-SCNC: 138 MMOL/L — SIGNIFICANT CHANGE UP (ref 135–145)
SODIUM SERPL-SCNC: 138 MMOL/L — SIGNIFICANT CHANGE UP (ref 135–145)
SPECIMEN SOURCE: SIGNIFICANT CHANGE UP
WBC # BLD: 12.92 K/UL — HIGH (ref 3.8–10.5)
WBC # BLD: 12.92 K/UL — HIGH (ref 3.8–10.5)
WBC # FLD AUTO: 12.92 K/UL — HIGH (ref 3.8–10.5)
WBC # FLD AUTO: 12.92 K/UL — HIGH (ref 3.8–10.5)

## 2024-01-09 PROCEDURE — 71045 X-RAY EXAM CHEST 1 VIEW: CPT | Mod: 26

## 2024-01-09 RX ADMIN — HYDROXYUREA 500 MILLIGRAM(S): 500 CAPSULE ORAL at 05:35

## 2024-01-09 RX ADMIN — Medication 2000 UNIT(S): at 13:35

## 2024-01-09 RX ADMIN — Medication 1 TABLET(S): at 05:35

## 2024-01-09 RX ADMIN — Medication 1000 MILLIGRAM(S): at 05:35

## 2024-01-09 RX ADMIN — Medication 1000 MILLIGRAM(S): at 23:53

## 2024-01-09 RX ADMIN — HYDROXYUREA 500 MILLIGRAM(S): 500 CAPSULE ORAL at 17:30

## 2024-01-09 RX ADMIN — MORPHINE SULFATE 2 MILLIGRAM(S): 50 CAPSULE, EXTENDED RELEASE ORAL at 20:42

## 2024-01-09 RX ADMIN — CEFEPIME 100 MILLIGRAM(S): 1 INJECTION, POWDER, FOR SOLUTION INTRAMUSCULAR; INTRAVENOUS at 17:31

## 2024-01-09 RX ADMIN — SIMVASTATIN 10 MILLIGRAM(S): 20 TABLET, FILM COATED ORAL at 23:53

## 2024-01-09 RX ADMIN — MORPHINE SULFATE 2 MILLIGRAM(S): 50 CAPSULE, EXTENDED RELEASE ORAL at 22:00

## 2024-01-09 RX ADMIN — Medication 1000 MILLIGRAM(S): at 13:34

## 2024-01-09 RX ADMIN — Medication 1 TABLET(S): at 13:36

## 2024-01-09 RX ADMIN — Medication 250 MILLIGRAM(S): at 18:13

## 2024-01-09 RX ADMIN — POLYETHYLENE GLYCOL 3350 17 GRAM(S): 17 POWDER, FOR SOLUTION ORAL at 13:35

## 2024-01-09 RX ADMIN — PANTOPRAZOLE SODIUM 40 MILLIGRAM(S): 20 TABLET, DELAYED RELEASE ORAL at 05:35

## 2024-01-09 RX ADMIN — Medication 1 TABLET(S): at 17:30

## 2024-01-09 RX ADMIN — ESCITALOPRAM OXALATE 20 MILLIGRAM(S): 10 TABLET, FILM COATED ORAL at 13:35

## 2024-01-09 RX ADMIN — SENNA PLUS 2 TABLET(S): 8.6 TABLET ORAL at 23:54

## 2024-01-09 RX ADMIN — Medication 1000 MILLIGRAM(S): at 06:35

## 2024-01-09 RX ADMIN — GABAPENTIN 300 MILLIGRAM(S): 400 CAPSULE ORAL at 13:34

## 2024-01-09 RX ADMIN — CEFEPIME 100 MILLIGRAM(S): 1 INJECTION, POWDER, FOR SOLUTION INTRAMUSCULAR; INTRAVENOUS at 05:34

## 2024-01-09 RX ADMIN — Medication 50 MICROGRAM(S): at 05:35

## 2024-01-09 RX ADMIN — GABAPENTIN 300 MILLIGRAM(S): 400 CAPSULE ORAL at 05:35

## 2024-01-09 RX ADMIN — Medication 1 MILLIGRAM(S): at 13:35

## 2024-01-09 RX ADMIN — Medication 25 MILLIGRAM(S): at 17:30

## 2024-01-09 RX ADMIN — Medication 25 MILLIGRAM(S): at 05:34

## 2024-01-09 RX ADMIN — AMLODIPINE BESYLATE 5 MILLIGRAM(S): 2.5 TABLET ORAL at 05:35

## 2024-01-09 RX ADMIN — GABAPENTIN 300 MILLIGRAM(S): 400 CAPSULE ORAL at 23:53

## 2024-01-09 NOTE — PROGRESS NOTE ADULT - ASSESSMENT
REASON FOR VISIT  .. Management of problems listed below      ROS  . Patient unable to give ROS     PHYSICAL EXAM    HEENT Unremarkable  atraumatic   RESP Fair air entry  Harsh breath sound   CARDIAC S1 S2 No S3     NO JVD    ABDOMEN No hepatosplenomegaly   PEDAL EDEMA present No calf tenderness  NO rash       GENERAL DATA .   GOC.   .. 1/4/2024 dnr   ..  1/3/2024 FULL CODE  ICU STAY.  .. NO  COVID. ..       BEST PRACTICE ISSUES.    HOB ELEVATN.  .. Yes  DVT PPLX.  ..   on coumadin        DIET.   ..  1/3/2024 DASH   IV fl. .. 1/3/2024 d5 1/2 40    STRESS ULCER PPLX.   .  ..  1/3/2024 PROTONIX 40      ALLGY. .. certain antibiotics       WT. ..  1/3/2024 49  BMI. .. 1/3/2024 27     ABGS.   .  VS/ PO/IO/ VENT/ DRIPS.  1/9/2024 afeb 100 100/50   1/9/2024 ra 91%     PRESENTATION.  . CC 1/3/2024.From [facility] for LLE wound infection and pain   . HPI 1/3/2024.  . 89yo female bib ems with wounds to left lower leg for unknown period of time, now weeping and draining, pt c/o pain but pt is poor historian  . 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 Pulm consulted 1/3/2024 for abn cxr   . PMHx .   .. DVT   .. A fib on coumadin   .. Valvular heart disease   .. HFPEF   .... 6/28/2023 echo pasp 39 la sl dilated n lvsf  .. Aortic stenosis   .. CVA r sided weakness   .. Cellulitis  .... VANCO 11/22/2023 -> CIPRO 11/29 DOXY 11/29     . HOME MEDS .warfarin levoxyl 50 pepcid metoprolol 25.2 gabapentin 300.3 lasix 20 escitalopram 20     PROBLEM DATA.  INFECTION  CELLULITIS L LOWER EXTREMITY  .. w 1/3-1/4-1/5-1/8-1/9/2024 w 13 - 18- 16 - 17 - 12.9  .. pr 1/4-1/9/2024 pr .18 - .3  .. la 1/3/2024 la 2.4   .. cxr 1/3/2024 cxr improving l base infiltr improvd from 11/30/2023  .. bc 1/2 (-)   .. 1/3/2024 wound mrsa pseudomonas   .. 1/3/2024 pseudomonas enterococcus fecalis proteus m strept dysgalacticae   .. 1/3/2024 CEFEPIME 2.2 X 7D   .. 1/6/2024 vanco 750.2     . A fib  .. INR 1/4-1/5-1/9/2024 INR 4.5- 4.2- 3      HEMAT  .. Hb 1/3-1/4-1/6-1/8-1/9/2024 Hb 10.9 - 10.7 - 8.9 - 9.7 - 9.4     RENAL   .. Na 1/3-1/8/2024 Na 141 - 139  .. CO2 1/3/2024 CO2 25   .. Cr 1/3-1/8/2024 Cr .7 - .4     . HYPOTHYROID  .. 1/3/2024 LEVOXYL 50   . .    . SUMMARY.     .  89yo female from [facility] for worsening LLE wound   . 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 Pulm consulted 1/3/2024 for abn cxr     . OVERALL ASSESSMENT/PLAN.   . CELLULITIS L LO EXTR   .. 1/3/2024 Seen by ID   .. 1/3/2024 wound mrsa pseudomonas   .. 1/3/2024 pseudomonas enterococcus fecalis proteus m strept dysgalacticae   .. 1/3/2024 Started on cefepime   .. No pneumonia suspected     . A fib  .. Continue coumadin     . ANEMIA   .. Monitor     . DISPO.  .. Follow cultures adjusr antibio     TIME SPENT.  . Over 36 minutes aggregate care time spent on encounter; activities included   direct patient care, counseling and/or coordinating care reviewing notes, lab data/ imaging , discussion with multidisciplinary team/ patient  /family and explaining in detail risks, benefits, alternatives  of the recommendations     PATIENT.  . AB BROOKS      REASON FOR VISIT  .. Management of problems listed below      ROS  . Patient unable to give ROS     PHYSICAL EXAM    HEENT Unremarkable  atraumatic   RESP Fair air entry  Harsh breath sound   CARDIAC S1 S2 No S3     NO JVD    ABDOMEN No hepatosplenomegaly   PEDAL EDEMA present No calf tenderness  NO rash       GENERAL DATA .   GOC.   .. 1/4/2024 dnr   ..  1/3/2024 FULL CODE  ICU STAY.  .. NO  COVID. ..       BEST PRACTICE ISSUES.    HOB ELEVATN.  .. Yes  DVT PPLX.  ..   on coumadin        DIET.   ..  1/3/2024 DASH   IV fl. .. 1/3/2024 d5 1/2 40    STRESS ULCER PPLX.   .  ..  1/3/2024 PROTONIX 40      ALLGY. .. certain antibiotics       WT. ..  1/3/2024 49  BMI. .. 1/3/2024 27     ABGS.   .  VS/ PO/IO/ VENT/ DRIPS.  1/9/2024 afeb 100 100/50   1/9/2024 ra 91%     PRESENTATION.  . CC 1/3/2024.From [facility] for LLE wound infection and pain   . HPI 1/3/2024.  . 89yo female bib ems with wounds to left lower leg for unknown period of time, now weeping and draining, pt c/o pain but pt is poor historian  . 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 Pulm consulted 1/3/2024 for abn cxr   . PMHx .   .. DVT   .. A fib on coumadin   .. Valvular heart disease   .. HFPEF   .... 6/28/2023 echo pasp 39 la sl dilated n lvsf  .. Aortic stenosis   .. CVA r sided weakness   .. Cellulitis  .... VANCO 11/22/2023 -> CIPRO 11/29 DOXY 11/29     . HOME MEDS .warfarin levoxyl 50 pepcid metoprolol 25.2 gabapentin 300.3 lasix 20 escitalopram 20     PROBLEM DATA.  INFECTION  CELLULITIS L LOWER EXTREMITY  .. w 1/3-1/4-1/5-1/8-1/9/2024 w 13 - 18- 16 - 17 - 12.9  .. pr 1/4-1/9/2024 pr .18 - .3  .. la 1/3/2024 la 2.4   .. cxr 1/3/2024 cxr improving l base infiltr improvd from 11/30/2023  .. bc 1/2 (-)   .. 1/3/2024 wound mrsa pseudomonas   .. 1/3/2024 pseudomonas enterococcus fecalis proteus m strept dysgalacticae   .. 1/3/2024 CEFEPIME 2.2 X 7D   .. 1/6/2024 vanco 750.2     . A fib  .. INR 1/4-1/5-1/9/2024 INR 4.5- 4.2- 3      HEMAT  .. Hb 1/3-1/4-1/6-1/8-1/9/2024 Hb 10.9 - 10.7 - 8.9 - 9.7 - 9.4     RENAL   .. Na 1/3-1/8/2024 Na 141 - 139  .. CO2 1/3/2024 CO2 25   .. Cr 1/3-1/8/2024 Cr .7 - .4     . HYPOTHYROID  .. 1/3/2024 LEVOXYL 50   . .    . SUMMARY.     .  89yo female from [facility] for worsening LLE wound   . 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 Pulm consulted 1/3/2024 for abn cxr     . OVERALL ASSESSMENT/PLAN.   . CELLULITIS L LO EXTR   .. 1/3/2024 Seen by ID   .. 1/3/2024 wound mrsa pseudomonas   .. 1/3/2024 pseudomonas enterococcus fecalis proteus m strept dysgalacticae   .. 1/3/2024 Started on cefepime   .. No pneumonia suspected     . A fib  .. Continue coumadin     . ANEMIA   .. Monitor     . DISPO.  .. Follow cultures adjusr antibio     TIME SPENT.  . Over 36 minutes aggregate care time spent on encounter; activities included   direct patient care, counseling and/or coordinating care reviewing notes, lab data/ imaging , discussion with multidisciplinary team/ patient  /family and explaining in detail risks, benefits, alternatives  of the recommendations     PATIENT.  . AB BROOKS

## 2024-01-09 NOTE — PROGRESS NOTE ADULT - ASSESSMENT
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  Patient was hospitalized in 2023  g with altered mental status concern for UTI , also  cellulitis to the LLE at the site of a skin graft. Pt recently completed a course of po abx for this with no significant improvement. 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  Patient was hospitalized in 2023  g with altered mental status concern for UTI , also  cellulitis to the LLE at the site of a skin graft. Pt recently completed a course of po abx for this with no significant improvement. bib ems with wounds to left lower leg for unknown period of time, now weeping and draining, pt c/o pain but pt is poor historian She was admitted in november 2023 with LLE cellulitis and seen by wound care MD & ID -discharged on po doxycycline and cipro & with topical care .Admitted for septic workup , iv abx ,pain management and wound care

## 2024-01-09 NOTE — SOCIAL WORK PROGRESS NOTE - NSSWPROGRESSNOTE_GEN_ALL_CORE
Pt for [facility] for ivabx. sw informed son who is aware of recommendation. JANE requested. Kranthi spoke w son today in re to above who states he will contact kranthi after work to discuss planning.

## 2024-01-09 NOTE — PROGRESS NOTE ADULT - SUBJECTIVE AND OBJECTIVE BOX
Interval History:    CENTRAL LINE:   [  ] YES       [  ] NO  MUIR:                 [  ] YES       [  ] NO         REVIEW OF SYSTEMS:  All Systems below were reviewed and are negative [  ]  HEENT:  ID:  Pulmonary:  Cardiac:  GI:  Renal:  Musculoskeletal:  All other systems above were reviewed and are negative   [  ]      MEDICATIONS  (STANDING):  acetaminophen     Tablet .. 1000 milliGRAM(s) Oral every 8 hours  amLODIPine   Tablet 5 milliGRAM(s) Oral daily  calcium carbonate   1250 mG (OsCal) 1 Tablet(s) Oral daily  cefepime   IVPB 2000 milliGRAM(s) IV Intermittent every 12 hours  cholecalciferol 2000 Unit(s) Oral daily  dextrose 5% + sodium chloride 0.45%. 1000 milliLiter(s) (40 mL/Hr) IV Continuous <Continuous>  escitalopram 20 milliGRAM(s) Oral daily  folic acid 1 milliGRAM(s) Oral daily  gabapentin 300 milliGRAM(s) Oral three times a day  hydroxyurea 500 milliGRAM(s) Oral two times a day  lactobacillus acidophilus 1 Tablet(s) Oral every 12 hours  levothyroxine 50 MICROGram(s) Oral daily  metoprolol tartrate 25 milliGRAM(s) Oral two times a day  pantoprazole    Tablet 40 milliGRAM(s) Oral before breakfast  polyethylene glycol 3350 17 Gram(s) Oral daily  senna 2 Tablet(s) Oral at bedtime  simvastatin 10 milliGRAM(s) Oral at bedtime  vancomycin  IVPB 1000 milliGRAM(s) IV Intermittent every 24 hours    MEDICATIONS  (PRN):  aluminum hydroxide/magnesium hydroxide/simethicone Suspension 30 milliLiter(s) Oral every 4 hours PRN Dyspepsia  bisacodyl Suppository 10 milliGRAM(s) Rectal daily PRN Constipation  magnesium hydroxide Suspension 30 milliLiter(s) Oral daily PRN Constipation  melatonin 3 milliGRAM(s) Oral at bedtime PRN Insomnia  morphine  - Injectable 2 milliGRAM(s) IV Push every 4 hours PRN Severe Pain (7 - 10)  ondansetron Injectable 4 milliGRAM(s) IV Push every 8 hours PRN Nausea and/or Vomiting  traMADol 50 milliGRAM(s) Oral four times a day PRN Moderate Pain (4 - 6)      Vital Signs Last 24 Hrs  T(C): 37.1 (09 Jan 2024 13:01), Max: 37.1 (09 Jan 2024 13:01)  T(F): 98.8 (09 Jan 2024 13:01), Max: 98.8 (09 Jan 2024 13:01)  HR: 103 (09 Jan 2024 13:01) (95 - 103)  BP: 101/53 (09 Jan 2024 13:01) (101/53 - 132/66)  BP(mean): --  RR: 19 (09 Jan 2024 13:01) (17 - 19)  SpO2: 91% (09 Jan 2024 13:01) (91% - 92%)    Parameters below as of 09 Jan 2024 13:01  Patient On (Oxygen Delivery Method): room air        I&O's Summary      PHYSICAL EXAM:  HEENT: NC/AT; PERRLA  Neck: Soft; no tenderness  Lungs: CTA bilaterally; no wheezing.   Heart:  Abdomen:  Genital/ Rectal:  Extremities:  Neurologic:  Vascular:      LABORATORY:    CBC Full  -  ( 09 Jan 2024 06:30 )  WBC Count : 12.92 K/uL  RBC Count : 2.76 M/uL  Hemoglobin : 9.4 g/dL  Hematocrit : 30.4 %  Platelet Count - Automated : 630 K/uL  Mean Cell Volume : 110.1 fl  Mean Cell Hemoglobin : 34.1 pg  Mean Cell Hemoglobin Concentration : 30.9 gm/dL  Auto Neutrophil # : x  Auto Lymphocyte # : x  Auto Monocyte # : x  Auto Eosinophil # : x  Auto Basophil # : x  Auto Neutrophil % : x  Auto Lymphocyte % : x  Auto Monocyte % : x  Auto Eosinophil % : x  Auto Basophil % : x      ESR:                   01-08 @ 22:30  --    C-Reactive Protein:     01-08 @ 22:30  --    Procalcitonin:           01-08 @ 22:30   0.30  ESR:                   01-04 @ 07:14  --    C-Reactive Protein:     01-04 @ 07:14  --    Procalcitonin:           01-04 @ 07:14   0.18      01-09    138  |  109<H>  |  12  ----------------------------<  97  3.5   |  27  |  0.39<L>    Ca    7.9<L>      09 Jan 2024 06:30    TPro  5.1<L>  /  Alb  1.6<L>  /  TBili  0.4  /  DBili  x   /  AST  19  /  ALT  10<L>  /  AlkPhos  84  01-09          Assessment and Plan:          Sushil Anguiano MD   [phone].      Interval History:    CENTRAL LINE:   [  ] YES       [  ] NO  MUIR:                 [  ] YES       [  ] NO         REVIEW OF SYSTEMS:  All Systems below were reviewed and are negative [  ]  HEENT:  ID:  Pulmonary:  Cardiac:  GI:  Renal:  Musculoskeletal:  All other systems above were reviewed and are negative   [  ]      MEDICATIONS  (STANDING):  acetaminophen     Tablet .. 1000 milliGRAM(s) Oral every 8 hours  amLODIPine   Tablet 5 milliGRAM(s) Oral daily  calcium carbonate   1250 mG (OsCal) 1 Tablet(s) Oral daily  cefepime   IVPB 2000 milliGRAM(s) IV Intermittent every 12 hours  cholecalciferol 2000 Unit(s) Oral daily  dextrose 5% + sodium chloride 0.45%. 1000 milliLiter(s) (40 mL/Hr) IV Continuous <Continuous>  escitalopram 20 milliGRAM(s) Oral daily  folic acid 1 milliGRAM(s) Oral daily  gabapentin 300 milliGRAM(s) Oral three times a day  hydroxyurea 500 milliGRAM(s) Oral two times a day  lactobacillus acidophilus 1 Tablet(s) Oral every 12 hours  levothyroxine 50 MICROGram(s) Oral daily  metoprolol tartrate 25 milliGRAM(s) Oral two times a day  pantoprazole    Tablet 40 milliGRAM(s) Oral before breakfast  polyethylene glycol 3350 17 Gram(s) Oral daily  senna 2 Tablet(s) Oral at bedtime  simvastatin 10 milliGRAM(s) Oral at bedtime  vancomycin  IVPB 1000 milliGRAM(s) IV Intermittent every 24 hours    MEDICATIONS  (PRN):  aluminum hydroxide/magnesium hydroxide/simethicone Suspension 30 milliLiter(s) Oral every 4 hours PRN Dyspepsia  bisacodyl Suppository 10 milliGRAM(s) Rectal daily PRN Constipation  magnesium hydroxide Suspension 30 milliLiter(s) Oral daily PRN Constipation  melatonin 3 milliGRAM(s) Oral at bedtime PRN Insomnia  morphine  - Injectable 2 milliGRAM(s) IV Push every 4 hours PRN Severe Pain (7 - 10)  ondansetron Injectable 4 milliGRAM(s) IV Push every 8 hours PRN Nausea and/or Vomiting  traMADol 50 milliGRAM(s) Oral four times a day PRN Moderate Pain (4 - 6)      Vital Signs Last 24 Hrs  T(C): 37.1 (09 Jan 2024 13:01), Max: 37.1 (09 Jan 2024 13:01)  T(F): 98.8 (09 Jan 2024 13:01), Max: 98.8 (09 Jan 2024 13:01)  HR: 103 (09 Jan 2024 13:01) (95 - 103)  BP: 101/53 (09 Jan 2024 13:01) (101/53 - 132/66)  BP(mean): --  RR: 19 (09 Jan 2024 13:01) (17 - 19)  SpO2: 91% (09 Jan 2024 13:01) (91% - 92%)    Parameters below as of 09 Jan 2024 13:01  Patient On (Oxygen Delivery Method): room air        I&O's Summary      PHYSICAL EXAM:  HEENT: NC/AT; PERRLA  Neck: Soft; no tenderness  Lungs: CTA bilaterally; no wheezing.   Heart:  Abdomen:  Genital/ Rectal:  Extremities:  Neurologic:  Vascular:      LABORATORY:    CBC Full  -  ( 09 Jan 2024 06:30 )  WBC Count : 12.92 K/uL  RBC Count : 2.76 M/uL  Hemoglobin : 9.4 g/dL  Hematocrit : 30.4 %  Platelet Count - Automated : 630 K/uL  Mean Cell Volume : 110.1 fl  Mean Cell Hemoglobin : 34.1 pg  Mean Cell Hemoglobin Concentration : 30.9 gm/dL  Auto Neutrophil # : x  Auto Lymphocyte # : x  Auto Monocyte # : x  Auto Eosinophil # : x  Auto Basophil # : x  Auto Neutrophil % : x  Auto Lymphocyte % : x  Auto Monocyte % : x  Auto Eosinophil % : x  Auto Basophil % : x      ESR:                   01-08 @ 22:30  --    C-Reactive Protein:     01-08 @ 22:30  --    Procalcitonin:           01-08 @ 22:30   0.30  ESR:                   01-04 @ 07:14  --    C-Reactive Protein:     01-04 @ 07:14  --    Procalcitonin:           01-04 @ 07:14   0.18      01-09    138  |  109<H>  |  12  ----------------------------<  97  3.5   |  27  |  0.39<L>    Ca    7.9<L>      09 Jan 2024 06:30    TPro  5.1<L>  /  Alb  1.6<L>  /  TBili  0.4  /  DBili  x   /  AST  19  /  ALT  10<L>  /  AlkPhos  84  01-09          Assessment and Plan:          Sushil Anguiano MD   [phone].    No fevers  Comfortable     MEDICATIONS  (STANDING):  acetaminophen     Tablet .. 1000 milliGRAM(s) Oral every 8 hours  amLODIPine   Tablet 5 milliGRAM(s) Oral daily  calcium carbonate   1250 mG (OsCal) 1 Tablet(s) Oral daily  cefepime   IVPB 2000 milliGRAM(s) IV Intermittent every 12 hours  cholecalciferol 2000 Unit(s) Oral daily  dextrose 5% + sodium chloride 0.45%. 1000 milliLiter(s) (40 mL/Hr) IV Continuous <Continuous>  escitalopram 20 milliGRAM(s) Oral daily  folic acid 1 milliGRAM(s) Oral daily  gabapentin 300 milliGRAM(s) Oral three times a day  hydroxyurea 500 milliGRAM(s) Oral two times a day  lactobacillus acidophilus 1 Tablet(s) Oral every 12 hours  levothyroxine 50 MICROGram(s) Oral daily  metoprolol tartrate 25 milliGRAM(s) Oral two times a day  pantoprazole    Tablet 40 milliGRAM(s) Oral before breakfast  polyethylene glycol 3350 17 Gram(s) Oral daily  senna 2 Tablet(s) Oral at bedtime  simvastatin 10 milliGRAM(s) Oral at bedtime  vancomycin  IVPB 1000 milliGRAM(s) IV Intermittent every 24 hours    MEDICATIONS  (PRN):  aluminum hydroxide/magnesium hydroxide/simethicone Suspension 30 milliLiter(s) Oral every 4 hours PRN Dyspepsia  bisacodyl Suppository 10 milliGRAM(s) Rectal daily PRN Constipation  magnesium hydroxide Suspension 30 milliLiter(s) Oral daily PRN Constipation  melatonin 3 milliGRAM(s) Oral at bedtime PRN Insomnia  morphine  - Injectable 2 milliGRAM(s) IV Push every 4 hours PRN Severe Pain (7 - 10)  ondansetron Injectable 4 milliGRAM(s) IV Push every 8 hours PRN Nausea and/or Vomiting  traMADol 50 milliGRAM(s) Oral four times a day PRN Moderate Pain (4 - 6)      Vital Signs Last 24 Hrs  T(C): 37.1 (09 Jan 2024 13:01), Max: 37.1 (09 Jan 2024 13:01)  T(F): 98.8 (09 Jan 2024 13:01), Max: 98.8 (09 Jan 2024 13:01)  HR: 103 (09 Jan 2024 13:01) (95 - 103)  BP: 101/53 (09 Jan 2024 13:01) (101/53 - 132/66)  BP(mean): --  RR: 19 (09 Jan 2024 13:01) (17 - 19)  SpO2: 91% (09 Jan 2024 13:01) (91% - 92%)    Parameters below as of 09 Jan 2024 13:01  Patient On (Oxygen Delivery Method): room air        I&O's Summary      PHYSICAL EXAM:  HEENT: NC/AT; PERRLA  Neck: Soft; no tenderness  Lungs: CTA bilaterally; no wheezing.   Heart:  Abdomen:  Genital/ Rectal:  Extremities:  Neurologic:  Vascular:      LABORATORY:    CBC Full  -  ( 09 Jan 2024 06:30 )  WBC Count : 12.92 K/uL  RBC Count : 2.76 M/uL  Hemoglobin : 9.4 g/dL  Hematocrit : 30.4 %  Platelet Count - Automated : 630 K/uL  Mean Cell Volume : 110.1 fl  Mean Cell Hemoglobin : 34.1 pg  Mean Cell Hemoglobin Concentration : 30.9 gm/dL  Auto Neutrophil # : x  Auto Lymphocyte # : x  Auto Monocyte # : x  Auto Eosinophil # : x  Auto Basophil # : x  Auto Neutrophil % : x  Auto Lymphocyte % : x  Auto Monocyte % : x  Auto Eosinophil % : x  Auto Basophil % : x      ESR:                   01-08 @ 22:30  --    C-Reactive Protein:     01-08 @ 22:30  --    Procalcitonin:           01-08 @ 22:30   0.30  ESR:                   01-04 @ 07:14  --    C-Reactive Protein:     01-04 @ 07:14  --    Procalcitonin:           01-04 @ 07:14   0.18      01-09    138  |  109<H>  |  12  ----------------------------<  97  3.5   |  27  |  0.39<L>    Ca    7.9<L>      09 Jan 2024 06:30    TPro  5.1<L>  /  Alb  1.6<L>  /  TBili  0.4  /  DBili  x   /  AST  19  /  ALT  10<L>  /  AlkPhos  84  01-09      Assessment and Plan:    1. Worsening of LLE wounds.  2. Chronic LLE pain.    . Wound cultures grew many Pseudomonas aeruginosa, Enterococcus faecalis and group C Strep and MRSA.  . Vancomycin trough was 16 but AUC was above 600. Change IV Vancomycin to 1 gm daily.   . There are no options for oral antibiotics. Continue IV Cefepime 2 gm q12h and IV Vancomycin 1 gm daily to Jan 20, 2024 to complete a 2 week course.   . Get PICC  line for 2 weeks of IV abx. OK to place Picc line with WBC of 17K since the patient has been afebrile and LLE wound is improving.   . Discharge planning to rehab.         Sushil Anguiano MD   [phone].    No fevers  Comfortable     MEDICATIONS  (STANDING):  acetaminophen     Tablet .. 1000 milliGRAM(s) Oral every 8 hours  amLODIPine   Tablet 5 milliGRAM(s) Oral daily  calcium carbonate   1250 mG (OsCal) 1 Tablet(s) Oral daily  cefepime   IVPB 2000 milliGRAM(s) IV Intermittent every 12 hours  cholecalciferol 2000 Unit(s) Oral daily  dextrose 5% + sodium chloride 0.45%. 1000 milliLiter(s) (40 mL/Hr) IV Continuous <Continuous>  escitalopram 20 milliGRAM(s) Oral daily  folic acid 1 milliGRAM(s) Oral daily  gabapentin 300 milliGRAM(s) Oral three times a day  hydroxyurea 500 milliGRAM(s) Oral two times a day  lactobacillus acidophilus 1 Tablet(s) Oral every 12 hours  levothyroxine 50 MICROGram(s) Oral daily  metoprolol tartrate 25 milliGRAM(s) Oral two times a day  pantoprazole    Tablet 40 milliGRAM(s) Oral before breakfast  polyethylene glycol 3350 17 Gram(s) Oral daily  senna 2 Tablet(s) Oral at bedtime  simvastatin 10 milliGRAM(s) Oral at bedtime  vancomycin  IVPB 1000 milliGRAM(s) IV Intermittent every 24 hours    MEDICATIONS  (PRN):  aluminum hydroxide/magnesium hydroxide/simethicone Suspension 30 milliLiter(s) Oral every 4 hours PRN Dyspepsia  bisacodyl Suppository 10 milliGRAM(s) Rectal daily PRN Constipation  magnesium hydroxide Suspension 30 milliLiter(s) Oral daily PRN Constipation  melatonin 3 milliGRAM(s) Oral at bedtime PRN Insomnia  morphine  - Injectable 2 milliGRAM(s) IV Push every 4 hours PRN Severe Pain (7 - 10)  ondansetron Injectable 4 milliGRAM(s) IV Push every 8 hours PRN Nausea and/or Vomiting  traMADol 50 milliGRAM(s) Oral four times a day PRN Moderate Pain (4 - 6)      Vital Signs Last 24 Hrs  T(C): 37.1 (09 Jan 2024 13:01), Max: 37.1 (09 Jan 2024 13:01)  T(F): 98.8 (09 Jan 2024 13:01), Max: 98.8 (09 Jan 2024 13:01)  HR: 103 (09 Jan 2024 13:01) (95 - 103)  BP: 101/53 (09 Jan 2024 13:01) (101/53 - 132/66)  BP(mean): --  RR: 19 (09 Jan 2024 13:01) (17 - 19)  SpO2: 91% (09 Jan 2024 13:01) (91% - 92%)    Parameters below as of 09 Jan 2024 13:01  Patient On (Oxygen Delivery Method): room air        I&O's Summary      PHYSICAL EXAM:  HEENT: NC/AT; PERRLA  Neck: Soft; no tenderness  Lungs: CTA bilaterally; no wheezing.   Heart:  Abdomen:  Genital/ Rectal:  Extremities:  Neurologic:  Vascular:      LABORATORY:    CBC Full  -  ( 09 Jan 2024 06:30 )  WBC Count : 12.92 K/uL  RBC Count : 2.76 M/uL  Hemoglobin : 9.4 g/dL  Hematocrit : 30.4 %  Platelet Count - Automated : 630 K/uL  Mean Cell Volume : 110.1 fl  Mean Cell Hemoglobin : 34.1 pg  Mean Cell Hemoglobin Concentration : 30.9 gm/dL  Auto Neutrophil # : x  Auto Lymphocyte # : x  Auto Monocyte # : x  Auto Eosinophil # : x  Auto Basophil # : x  Auto Neutrophil % : x  Auto Lymphocyte % : x  Auto Monocyte % : x  Auto Eosinophil % : x  Auto Basophil % : x      ESR:                   01-08 @ 22:30  --    C-Reactive Protein:     01-08 @ 22:30  --    Procalcitonin:           01-08 @ 22:30   0.30  ESR:                   01-04 @ 07:14  --    C-Reactive Protein:     01-04 @ 07:14  --    Procalcitonin:           01-04 @ 07:14   0.18      01-09    138  |  109<H>  |  12  ----------------------------<  97  3.5   |  27  |  0.39<L>    Ca    7.9<L>      09 Jan 2024 06:30    TPro  5.1<L>  /  Alb  1.6<L>  /  TBili  0.4  /  DBili  x   /  AST  19  /  ALT  10<L>  /  AlkPhos  84  01-09      Assessment and Plan:    1. Worsening of LLE wounds.  2. Chronic LLE pain.    . Wound cultures grew many Pseudomonas aeruginosa, Enterococcus faecalis and group C Strep and MRSA.  . Vancomycin trough was 16 but AUC was above 600. Change IV Vancomycin to 1 gm daily.   . There are no options for oral antibiotics. Continue IV Cefepime 2 gm q12h and IV Vancomycin 1 gm daily to Jan 20, 2024 to complete a 2 week course.   . Get PICC  line for 2 weeks of IV abx. OK to place Picc line with WBC of 17K since the patient has been afebrile and LLE wound is improving.   . Discharge planning to rehab.         Sushil Anguiano MD   [phone].

## 2024-01-09 NOTE — PROGRESS NOTE ADULT - SUBJECTIVE AND OBJECTIVE BOX
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  Patient was hospitalized in 2023  g with altered mental status concern for UTI , also  cellulitis to the LLE at the site of a skin graft. Pt recently completed a course of po abx for this with no significant improvement. bib ems with wounds to left lower leg for unknown period of time, now weeping and draining, pt c/o pain but pt is poor historian She was admitted in november 2023 with LLE cellulitis and seen by wound care MD & ID -discharged on po doxycycline and cipro & with topical care .Admitted for septic workup , iv abx ,pain management and wound care  (03 Jan 2024 18:15)    PAST MEDICAL & SURGICAL HISTORY:  CVA (cerebral vascular accident)      VHD (valvular heart disease)      Hyperlipidemia      Neuropathy      Hypertension      Depression      Constipation      Neuropathy      Grade I diastolic dysfunction      GERD (gastroesophageal reflux disease)      Aortic valvar stenosis      Afib      Hypothyroidism      H/O skin graft          MEDICATIONS  (STANDING):  acetaminophen     Tablet .. 1000 milliGRAM(s) Oral every 8 hours  amLODIPine   Tablet 5 milliGRAM(s) Oral daily  calcium carbonate   1250 mG (OsCal) 1 Tablet(s) Oral daily  cefepime   IVPB 2000 milliGRAM(s) IV Intermittent every 12 hours  cholecalciferol 2000 Unit(s) Oral daily  dextrose 5% + sodium chloride 0.45%. 1000 milliLiter(s) (40 mL/Hr) IV Continuous <Continuous>  escitalopram 20 milliGRAM(s) Oral daily  folic acid 1 milliGRAM(s) Oral daily  gabapentin 300 milliGRAM(s) Oral three times a day  hydroxyurea 500 milliGRAM(s) Oral two times a day  lactobacillus acidophilus 1 Tablet(s) Oral every 12 hours  levothyroxine 50 MICROGram(s) Oral daily  metoprolol tartrate 25 milliGRAM(s) Oral two times a day  pantoprazole    Tablet 40 milliGRAM(s) Oral before breakfast  polyethylene glycol 3350 17 Gram(s) Oral daily  senna 2 Tablet(s) Oral at bedtime  simvastatin 10 milliGRAM(s) Oral at bedtime  vancomycin  IVPB 1000 milliGRAM(s) IV Intermittent every 24 hours    MEDICATIONS  (PRN):  aluminum hydroxide/magnesium hydroxide/simethicone Suspension 30 milliLiter(s) Oral every 4 hours PRN Dyspepsia  bisacodyl Suppository 10 milliGRAM(s) Rectal daily PRN Constipation  magnesium hydroxide Suspension 30 milliLiter(s) Oral daily PRN Constipation  melatonin 3 milliGRAM(s) Oral at bedtime PRN Insomnia  morphine  - Injectable 2 milliGRAM(s) IV Push every 4 hours PRN Severe Pain (7 - 10)  ondansetron Injectable 4 milliGRAM(s) IV Push every 8 hours PRN Nausea and/or Vomiting  traMADol 50 milliGRAM(s) Oral four times a day PRN Moderate Pain (4 - 6)      OBJECTIVE    T(C): 36.9 (01-09-24 @ 05:27), Max: 36.9 (01-09-24 @ 05:27)  HR: 95 (01-09-24 @ 05:27) (95 - 99)  BP: 132/66 (01-09-24 @ 05:27) (132/66 - 144/74)  RR: 18 (01-09-24 @ 05:27) (17 - 18)  SpO2: 92% (01-09-24 @ 05:27) (91% - 92%)  Wt(kg): --  I&O's Summary        REVIEW OF SYSTEMS:  CONSTITUTIONAL: No fever, weight loss, or fatigue  EYES: No eye pain, visual disturbances, or discharge  ENMT:   No sinus or throat pain  NECK: No pain or stiffness  RESPIRATORY: No cough, wheezing, chills or hemoptysis; No shortness of breath  CARDIOVASCULAR: No chest pain, palpitations, dizziness, or leg swelling  GASTROINTESTINAL: No abdominal pain. No nausea, vomiting; No diarrhea or constipation. No melena or hematochezia.  GENITOURINARY: No dysuria, frequency, hematuria, or incontinence  NEUROLOGICAL: No headaches, memory loss, loss of strength, numbness, or tremors  SKIN: No itching, burning, rashes, or lesions   MUSCULOSKELETAL: No joint pain or swelling; No muscle, back, or extremity pain    PHYSICAL EXAM:  Appearance: NAD. VS past 24 hrs -as above   HEENT:   Moist oral mucosa. Conjunctiva clear b/l.   Neck : supple  Respiratory: Lungs CTAB.  Gastrointestinal:  Soft, nontender. No rebound. No rigidity. BS present	  Cardiovascular: RRR ,S1S2 present  Neurologic: Non-focal. Moving all extremities.  Extremities: No edema. No erythema. No calf tenderness.  Skin: No rashes, No ecchymoses, No cyanosis.	  wounds ,skin lesions-See skin assesment flow sheet   LABS:                        9.4    12.92 )-----------( 630      ( 09 Jan 2024 06:30 )             30.4     01-09    138  |  109<H>  |  12  ----------------------------<  97  3.5   |  27  |  0.39<L>    Ca    7.9<L>      09 Jan 2024 06:30    TPro  5.1<L>  /  Alb  1.6<L>  /  TBili  0.4  /  DBili  x   /  AST  19  /  ALT  10<L>  /  AlkPhos  84  01-09    CAPILLARY BLOOD GLUCOSE        PT/INR - ( 09 Jan 2024 06:30 )   PT: 34.8 sec;   INR: 3.07 ratio           Urinalysis Basic - ( 09 Jan 2024 06:30 )    Color: x / Appearance: x / SG: x / pH: x  Gluc: 97 mg/dL / Ketone: x  / Bili: x / Urobili: x   Blood: x / Protein: x / Nitrite: x   Leuk Esterase: x / RBC: x / WBC x   Sq Epi: x / Non Sq Epi: x / Bacteria: x        Culture - Other (collected 03 Jan 2024 19:56)  Source: Wound Wound  Final Report (06 Jan 2024 10:13):    Culture yields growth of greater than 3 colony types of    bacteria,  which may indicate contamination and normal jessica    Call client services within 7 days if further workup is clinically    indicated.    Culture includes    Few Methicillin Resistant Staphylococcus aureus    Moderate Streptococcus dysgalactiae (Group C/G) "Susceptibilities not    performed"    Numerous Pseudomonas aeruginosa Multiple Morphological Strains  Organism: Methicillin resistant Staphylococcus aureus  Pseudomonas aeruginosa (06 Jan 2024 10:13)  Organism: Pseudomonas aeruginosa (06 Jan 2024 10:13)  Organism: Methicillin resistant Staphylococcus aureus (06 Jan 2024 10:13)    Culture - Abscess with Gram Stain (collected 03 Jan 2024 17:30)  Source: .Abscess Leg - Left  Gram Stain (04 Jan 2024 05:52):    No polymorphonuclear cells seen per low power field    Numerous Gram positive cocci in pairs seen per oil power field    Numerous Gram Negative Rods seen per oil power field  Final Report (09 Jan 2024 09:54):    Numerous Pseudomonas aeruginosa    Moderate Enterococcus faecalis    Moderate Proteus mirabilis    Moderate Streptococcus dysgalactiae (Group C/G) "Susceptibilities not    performed"  Organism: Pseudomonas aeruginosa  Enterococcus faecalis  Proteus mirabilis (09 Jan 2024 09:54)  Organism: Proteus mirabilis (09 Jan 2024 09:54)  Organism: Enterococcus faecalis (09 Jan 2024 09:54)  Organism: Pseudomonas aeruginosa (09 Jan 2024 09:54)    Culture - Blood (collected 03 Jan 2024 14:55)  Source: .Blood Blood-Peripheral  Final Report (09 Jan 2024 01:01):    No growth at 5 days    Culture - Blood (collected 03 Jan 2024 14:50)  Source: .Blood Blood-Peripheral  Final Report (09 Jan 2024 01:01):    No growth at 5 days      RADIOLOGY & ADDITIONAL TESTS:   reviewed elctronically  ASSESSMENT/PLAN: 	     PROGRESS NOTE  Patient is a 89y old  Female who presents with a chief complaint of LLE OPEN WOUND (09 Jan 2024 07:33)    Chart and available morning labs /imaging are reviewed electronically , urgent issues addressed . More information  is being added upon completion of rounds , when more information is collected and management discussed with consultants , medical staff and social service/case management on the floor   OVERNIGHT  No new issues reported by medical staff . 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  Patient was hospitalized in 2023  g with altered mental status concern for UTI , also  cellulitis to the LLE at the site of a skin graft. Pt recently completed a course of po abx for this with no significant improvement. bib ems with wounds to left lower leg for unknown period of time, now weeping and draining, pt c/o pain but pt is poor historian She was admitted in november 2023 with LLE cellulitis and seen by wound care MD & ID -discharged on po doxycycline and cipro & with topical care .Admitted for septic workup , iv abx ,pain management and wound care  (03 Jan 2024 18:15)    PAST MEDICAL & SURGICAL HISTORY:  CVA (cerebral vascular accident)      VHD (valvular heart disease)      Hyperlipidemia      Neuropathy      Hypertension      Depression      Constipation      Neuropathy      Grade I diastolic dysfunction      GERD (gastroesophageal reflux disease)      Aortic valvar stenosis      Afib      Hypothyroidism      H/O skin graft          MEDICATIONS  (STANDING):  acetaminophen     Tablet .. 1000 milliGRAM(s) Oral every 8 hours  amLODIPine   Tablet 5 milliGRAM(s) Oral daily  calcium carbonate   1250 mG (OsCal) 1 Tablet(s) Oral daily  cefepime   IVPB 2000 milliGRAM(s) IV Intermittent every 12 hours  cholecalciferol 2000 Unit(s) Oral daily  dextrose 5% + sodium chloride 0.45%. 1000 milliLiter(s) (40 mL/Hr) IV Continuous <Continuous>  escitalopram 20 milliGRAM(s) Oral daily  folic acid 1 milliGRAM(s) Oral daily  gabapentin 300 milliGRAM(s) Oral three times a day  hydroxyurea 500 milliGRAM(s) Oral two times a day  lactobacillus acidophilus 1 Tablet(s) Oral every 12 hours  levothyroxine 50 MICROGram(s) Oral daily  metoprolol tartrate 25 milliGRAM(s) Oral two times a day  pantoprazole    Tablet 40 milliGRAM(s) Oral before breakfast  polyethylene glycol 3350 17 Gram(s) Oral daily  senna 2 Tablet(s) Oral at bedtime  simvastatin 10 milliGRAM(s) Oral at bedtime  vancomycin  IVPB 1000 milliGRAM(s) IV Intermittent every 24 hours    MEDICATIONS  (PRN):  aluminum hydroxide/magnesium hydroxide/simethicone Suspension 30 milliLiter(s) Oral every 4 hours PRN Dyspepsia  bisacodyl Suppository 10 milliGRAM(s) Rectal daily PRN Constipation  magnesium hydroxide Suspension 30 milliLiter(s) Oral daily PRN Constipation  melatonin 3 milliGRAM(s) Oral at bedtime PRN Insomnia  morphine  - Injectable 2 milliGRAM(s) IV Push every 4 hours PRN Severe Pain (7 - 10)  ondansetron Injectable 4 milliGRAM(s) IV Push every 8 hours PRN Nausea and/or Vomiting  traMADol 50 milliGRAM(s) Oral four times a day PRN Moderate Pain (4 - 6)      OBJECTIVE    T(C): 36.9 (01-09-24 @ 05:27), Max: 36.9 (01-09-24 @ 05:27)  HR: 95 (01-09-24 @ 05:27) (95 - 99)  BP: 132/66 (01-09-24 @ 05:27) (132/66 - 144/74)  RR: 18 (01-09-24 @ 05:27) (17 - 18)  SpO2: 92% (01-09-24 @ 05:27) (91% - 92%)  Wt(kg): --  I&O's Summary        REVIEW OF SYSTEMS:  Patient is  unable to provide any information/ROS  due to baseline mental status.     PHYSICAL EXAM:  Appearance: NAD. VS past 24 hrs -as above   HEENT:   Moist oral mucosa. Conjunctiva clear b/l.   Neck : supple  Respiratory: Lungs CTAB.  Gastrointestinal:  Soft, nontender. No rebound. No rigidity. BS present	  Cardiovascular: RRR ,S1S2 present  Neurologic: Non-focal. Moving all extremities.  Extremities: No edema. No erythema. No calf tenderness.  Skin: No rashes, No ecchymoses, No cyanosis.	  wounds ,skin lesions-See skin assesment flow sheet   LABS:                        9.4    12.92 )-----------( 630      ( 09 Jan 2024 06:30 )             30.4     01-09    138  |  109<H>  |  12  ----------------------------<  97  3.5   |  27  |  0.39<L>    Ca    7.9<L>      09 Jan 2024 06:30    TPro  5.1<L>  /  Alb  1.6<L>  /  TBili  0.4  /  DBili  x   /  AST  19  /  ALT  10<L>  /  AlkPhos  84  01-09    CAPILLARY BLOOD GLUCOSE        PT/INR - ( 09 Jan 2024 06:30 )   PT: 34.8 sec;   INR: 3.07 ratio           Urinalysis Basic - ( 09 Jan 2024 06:30 )    Color: x / Appearance: x / SG: x / pH: x  Gluc: 97 mg/dL / Ketone: x  / Bili: x / Urobili: x   Blood: x / Protein: x / Nitrite: x   Leuk Esterase: x / RBC: x / WBC x   Sq Epi: x / Non Sq Epi: x / Bacteria: x        Culture - Other (collected 03 Jan 2024 19:56)  Source: Wound Wound  Final Report (06 Jan 2024 10:13):    Culture yields growth of greater than 3 colony types of    bacteria,  which may indicate contamination and normal jessiac    Call client services within 7 days if further workup is clinically    indicated.    Culture includes    Few Methicillin Resistant Staphylococcus aureus    Moderate Streptococcus dysgalactiae (Group C/G) "Susceptibilities not    performed"    Numerous Pseudomonas aeruginosa Multiple Morphological Strains  Organism: Methicillin resistant Staphylococcus aureus  Pseudomonas aeruginosa (06 Jan 2024 10:13)  Organism: Pseudomonas aeruginosa (06 Jan 2024 10:13)  Organism: Methicillin resistant Staphylococcus aureus (06 Jan 2024 10:13)    Culture - Abscess with Gram Stain (collected 03 Jan 2024 17:30)  Source: .Abscess Leg - Left  Gram Stain (04 Jan 2024 05:52):    No polymorphonuclear cells seen per low power field    Numerous Gram positive cocci in pairs seen per oil power field    Numerous Gram Negative Rods seen per oil power field  Final Report (09 Jan 2024 09:54):    Numerous Pseudomonas aeruginosa    Moderate Enterococcus faecalis    Moderate Proteus mirabilis    Moderate Streptococcus dysgalactiae (Group C/G) "Susceptibilities not    performed"  Organism: Pseudomonas aeruginosa  Enterococcus faecalis  Proteus mirabilis (09 Jan 2024 09:54)  Organism: Proteus mirabilis (09 Jan 2024 09:54)  Organism: Enterococcus faecalis (09 Jan 2024 09:54)  Organism: Pseudomonas aeruginosa (09 Jan 2024 09:54)    Culture - Blood (collected 03 Jan 2024 14:55)  Source: .Blood Blood-Peripheral  Final Report (09 Jan 2024 01:01):    No growth at 5 days    Culture - Blood (collected 03 Jan 2024 14:50)  Source: .Blood Blood-Peripheral  Final Report (09 Jan 2024 01:01):    No growth at 5 days      RADIOLOGY & ADDITIONAL TESTS:   reviewed elctronically  ASSESSMENT/PLAN: 	    25 minutes aggregate time was spent on this visit, 50% visit time spent in care co-ordination with other attendings and counselling patient .I have discussed care plan with patient / HCP/family member ,who expressed understanding of problems treatment and their effect and side effects, alternatives in details. I have asked if they have any questions and concerns and appropriately addressed them to best of my ability. ACP-Advance care planning was discussed , pallitaive care issues ,CMO ,GOC ,MOLST  form ACP-Advance care planning was discussed , pallitaive care issues ,CMO ,GOC ,MOLST  form ,advance directives were reviewed .All questions were answered to the best of my knowledge - 25 m PROGRESS NOTE  Patient is a 89y old  Female who presents with a chief complaint of LLE OPEN WOUND (09 Jan 2024 07:33)    Chart and available morning labs /imaging are reviewed electronically , urgent issues addressed . More information  is being added upon completion of rounds , when more information is collected and management discussed with consultants , medical staff and social service/case management on the floor   OVERNIGHT  No new issues reported by medical staff . 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  Patient was hospitalized in 2023  g with altered mental status concern for UTI , also  cellulitis to the LLE at the site of a skin graft. Pt recently completed a course of po abx for this with no significant improvement. bib ems with wounds to left lower leg for unknown period of time, now weeping and draining, pt c/o pain but pt is poor historian She was admitted in november 2023 with LLE cellulitis and seen by wound care MD & ID -discharged on po doxycycline and cipro & with topical care .Admitted for septic workup , iv abx ,pain management and wound care  (03 Jan 2024 18:15)    PAST MEDICAL & SURGICAL HISTORY:  CVA (cerebral vascular accident)      VHD (valvular heart disease)      Hyperlipidemia      Neuropathy      Hypertension      Depression      Constipation      Neuropathy      Grade I diastolic dysfunction      GERD (gastroesophageal reflux disease)      Aortic valvar stenosis      Afib      Hypothyroidism      H/O skin graft          MEDICATIONS  (STANDING):  acetaminophen     Tablet .. 1000 milliGRAM(s) Oral every 8 hours  amLODIPine   Tablet 5 milliGRAM(s) Oral daily  calcium carbonate   1250 mG (OsCal) 1 Tablet(s) Oral daily  cefepime   IVPB 2000 milliGRAM(s) IV Intermittent every 12 hours  cholecalciferol 2000 Unit(s) Oral daily  dextrose 5% + sodium chloride 0.45%. 1000 milliLiter(s) (40 mL/Hr) IV Continuous <Continuous>  escitalopram 20 milliGRAM(s) Oral daily  folic acid 1 milliGRAM(s) Oral daily  gabapentin 300 milliGRAM(s) Oral three times a day  hydroxyurea 500 milliGRAM(s) Oral two times a day  lactobacillus acidophilus 1 Tablet(s) Oral every 12 hours  levothyroxine 50 MICROGram(s) Oral daily  metoprolol tartrate 25 milliGRAM(s) Oral two times a day  pantoprazole    Tablet 40 milliGRAM(s) Oral before breakfast  polyethylene glycol 3350 17 Gram(s) Oral daily  senna 2 Tablet(s) Oral at bedtime  simvastatin 10 milliGRAM(s) Oral at bedtime  vancomycin  IVPB 1000 milliGRAM(s) IV Intermittent every 24 hours    MEDICATIONS  (PRN):  aluminum hydroxide/magnesium hydroxide/simethicone Suspension 30 milliLiter(s) Oral every 4 hours PRN Dyspepsia  bisacodyl Suppository 10 milliGRAM(s) Rectal daily PRN Constipation  magnesium hydroxide Suspension 30 milliLiter(s) Oral daily PRN Constipation  melatonin 3 milliGRAM(s) Oral at bedtime PRN Insomnia  morphine  - Injectable 2 milliGRAM(s) IV Push every 4 hours PRN Severe Pain (7 - 10)  ondansetron Injectable 4 milliGRAM(s) IV Push every 8 hours PRN Nausea and/or Vomiting  traMADol 50 milliGRAM(s) Oral four times a day PRN Moderate Pain (4 - 6)      OBJECTIVE    T(C): 36.9 (01-09-24 @ 05:27), Max: 36.9 (01-09-24 @ 05:27)  HR: 95 (01-09-24 @ 05:27) (95 - 99)  BP: 132/66 (01-09-24 @ 05:27) (132/66 - 144/74)  RR: 18 (01-09-24 @ 05:27) (17 - 18)  SpO2: 92% (01-09-24 @ 05:27) (91% - 92%)  Wt(kg): --  I&O's Summary        REVIEW OF SYSTEMS:  Patient is  unable to provide any information/ROS  due to baseline mental status.     PHYSICAL EXAM:  Appearance: NAD. VS past 24 hrs -as above   HEENT:   Moist oral mucosa. Conjunctiva clear b/l.   Neck : supple  Respiratory: Lungs CTAB.  Gastrointestinal:  Soft, nontender. No rebound. No rigidity. BS present	  Cardiovascular: RRR ,S1S2 present  Neurologic: Non-focal. Moving all extremities.  Extremities: No edema. No erythema. No calf tenderness.  Skin: No rashes, No ecchymoses, No cyanosis.	  wounds ,skin lesions-See skin assesment flow sheet   LABS:                        9.4    12.92 )-----------( 630      ( 09 Jan 2024 06:30 )             30.4     01-09    138  |  109<H>  |  12  ----------------------------<  97  3.5   |  27  |  0.39<L>    Ca    7.9<L>      09 Jan 2024 06:30    TPro  5.1<L>  /  Alb  1.6<L>  /  TBili  0.4  /  DBili  x   /  AST  19  /  ALT  10<L>  /  AlkPhos  84  01-09    CAPILLARY BLOOD GLUCOSE        PT/INR - ( 09 Jan 2024 06:30 )   PT: 34.8 sec;   INR: 3.07 ratio           Urinalysis Basic - ( 09 Jan 2024 06:30 )    Color: x / Appearance: x / SG: x / pH: x  Gluc: 97 mg/dL / Ketone: x  / Bili: x / Urobili: x   Blood: x / Protein: x / Nitrite: x   Leuk Esterase: x / RBC: x / WBC x   Sq Epi: x / Non Sq Epi: x / Bacteria: x        Culture - Other (collected 03 Jan 2024 19:56)  Source: Wound Wound  Final Report (06 Jan 2024 10:13):    Culture yields growth of greater than 3 colony types of    bacteria,  which may indicate contamination and normal jessica    Call client services within 7 days if further workup is clinically    indicated.    Culture includes    Few Methicillin Resistant Staphylococcus aureus    Moderate Streptococcus dysgalactiae (Group C/G) "Susceptibilities not    performed"    Numerous Pseudomonas aeruginosa Multiple Morphological Strains  Organism: Methicillin resistant Staphylococcus aureus  Pseudomonas aeruginosa (06 Jan 2024 10:13)  Organism: Pseudomonas aeruginosa (06 Jan 2024 10:13)  Organism: Methicillin resistant Staphylococcus aureus (06 Jan 2024 10:13)    Culture - Abscess with Gram Stain (collected 03 Jan 2024 17:30)  Source: .Abscess Leg - Left  Gram Stain (04 Jan 2024 05:52):    No polymorphonuclear cells seen per low power field    Numerous Gram positive cocci in pairs seen per oil power field    Numerous Gram Negative Rods seen per oil power field  Final Report (09 Jan 2024 09:54):    Numerous Pseudomonas aeruginosa    Moderate Enterococcus faecalis    Moderate Proteus mirabilis    Moderate Streptococcus dysgalactiae (Group C/G) "Susceptibilities not    performed"  Organism: Pseudomonas aeruginosa  Enterococcus faecalis  Proteus mirabilis (09 Jan 2024 09:54)  Organism: Proteus mirabilis (09 Jan 2024 09:54)  Organism: Enterococcus faecalis (09 Jan 2024 09:54)  Organism: Pseudomonas aeruginosa (09 Jan 2024 09:54)    Culture - Blood (collected 03 Jan 2024 14:55)  Source: .Blood Blood-Peripheral  Final Report (09 Jan 2024 01:01):    No growth at 5 days    Culture - Blood (collected 03 Jan 2024 14:50)  Source: .Blood Blood-Peripheral  Final Report (09 Jan 2024 01:01):    No growth at 5 days      RADIOLOGY & ADDITIONAL TESTS:   reviewed elctronically  ASSESSMENT/PLAN: 	    25 minutes aggregate time was spent on this visit, 50% visit time spent in care co-ordination with other attendings and counselling patient .I have discussed care plan with patient / HCP/family member ,who expressed understanding of problems treatment and their effect and side effects, alternatives in details. I have asked if they have any questions and concerns and appropriately addressed them to best of my ability. ACP-Advance care planning was discussed , pallitaive care issues ,CMO ,GOC ,MOLST  form ACP-Advance care planning was discussed , pallitaive care issues ,CMO ,GOC ,MOLST  form ,advance directives were reviewed .All questions were answered to the best of my knowledge - 25 m

## 2024-01-09 NOTE — PROGRESS NOTE ADULT - SUBJECTIVE AND OBJECTIVE BOX
CHIEF COMPLAINT/ REASON FOR VISIT  .. Patient was seen to address the  issue listed under PROBLEM LIST which is located toward bottom of this note     REJI BERGER    [facility] [address] 89 [address]    Allergies    per son &quot;issues with certain anitibiotics&quot; does not remember the names - Patient has tolerated zosyn, ceftriaxone, bactrim, and vancomycin on past admissions. (Unknown)    Intolerances        PAST MEDICAL & SURGICAL HISTORY:  Hypertension      CVA (cerebral vascular accident)      Depression      Constipation      Neuropathy      Hyperlipidemia      Grade I diastolic dysfunction      Afib      Neuropathy      VHD (valvular heart disease)      Aortic valvar stenosis      Hypothyroidism      GERD (gastroesophageal reflux disease)      H/O skin graft          FAMILY HISTORY:      Home Medications:  acetaminophen 500 mg oral tablet: 1 tab(s) orally 2 times a day (22 Nov 2023 20:06)  acetaminophen 500 mg oral tablet: 2 tab(s) orally once a day (in the afternoon) (22 Nov 2023 20:06)  amLODIPine 5 mg oral tablet: 1 tab(s) orally once a day (22 Nov 2023 20:06)  Biofreeze 4% topical gel: Apply topically to affected area 2 times a day (22 Nov 2023 20:06)  cholecalciferol 50 mcg (2000 intl units) oral tablet: 1 tab(s) orally once a day (22 Nov 2023 20:06)  escitalopram 20 mg oral tablet: 1 tab(s) orally once a day (22 Nov 2023 20:06)  famotidine 20 mg oral tablet: 1 tab(s) orally once a day (22 Nov 2023 20:06)  folic acid 1 mg oral tablet: 1 tab(s) orally once a day (22 Nov 2023 20:06)  furosemide 20 mg oral tablet: 1 tab(s) orally once a day (22 Nov 2023 20:06)  gabapentin 300 mg oral capsule: 1 cap(s) orally 3 times a day (22 Nov 2023 20:06)  hydroxyurea 500 mg oral capsule: 1 tab(s) orally 2 times a day (22 Nov 2023 20:06)  levothyroxine 50 mcg (0.05 mg) oral tablet: 1 tab(s) orally once a day (22 Nov 2023 20:06)  melatonin 3 mg oral tablet: 1 tab(s) orally once a day (22 Nov 2023 20:06)  metoprolol tartrate 25 mg oral tablet: 1 tab(s) orally 2 times a day (22 Nov 2023 20:06)  miconazole 2% topical cream: Apply topically to affected area once a day (22 Nov 2023 20:06)  senna (sennosides) 8.6 mg oral tablet: 2 tab(s) orally once a day (22 Nov 2023 20:06)  simvastatin 10 mg oral tablet: 1 tab(s) orally once a day (22 Nov 2023 20:06)  traMADol 50 mg oral tablet: 1 tab(s) orally 2 times a day hold for lethargy (22 Nov 2023 20:06)  traMADol 50 mg oral tablet: 1 tab(s) orally every 6 hours as needed for pain (22 Nov 2023 20:06)      MEDICATIONS  (STANDING):  acetaminophen     Tablet .. 1000 milliGRAM(s) Oral every 8 hours  amLODIPine   Tablet 5 milliGRAM(s) Oral daily  calcium carbonate   1250 mG (OsCal) 1 Tablet(s) Oral daily  cefepime   IVPB 2000 milliGRAM(s) IV Intermittent every 12 hours  cholecalciferol 2000 Unit(s) Oral daily  dextrose 5% + sodium chloride 0.45%. 1000 milliLiter(s) (40 mL/Hr) IV Continuous <Continuous>  escitalopram 20 milliGRAM(s) Oral daily  folic acid 1 milliGRAM(s) Oral daily  gabapentin 300 milliGRAM(s) Oral three times a day  hydroxyurea 500 milliGRAM(s) Oral two times a day  lactobacillus acidophilus 1 Tablet(s) Oral every 12 hours  levothyroxine 50 MICROGram(s) Oral daily  metoprolol tartrate 25 milliGRAM(s) Oral two times a day  pantoprazole    Tablet 40 milliGRAM(s) Oral before breakfast  polyethylene glycol 3350 17 Gram(s) Oral daily  senna 2 Tablet(s) Oral at bedtime  simvastatin 10 milliGRAM(s) Oral at bedtime  vancomycin  IVPB 1000 milliGRAM(s) IV Intermittent every 24 hours    MEDICATIONS  (PRN):  aluminum hydroxide/magnesium hydroxide/simethicone Suspension 30 milliLiter(s) Oral every 4 hours PRN Dyspepsia  bisacodyl Suppository 10 milliGRAM(s) Rectal daily PRN Constipation  magnesium hydroxide Suspension 30 milliLiter(s) Oral daily PRN Constipation  melatonin 3 milliGRAM(s) Oral at bedtime PRN Insomnia  morphine  - Injectable 2 milliGRAM(s) IV Push every 4 hours PRN Severe Pain (7 - 10)  ondansetron Injectable 4 milliGRAM(s) IV Push every 8 hours PRN Nausea and/or Vomiting  traMADol 50 milliGRAM(s) Oral four times a day PRN Moderate Pain (4 - 6)      Diet, DASH/TLC:   Sodium & Cholesterol Restricted  Easy to Chew (EASYTOCHEW) (01-03-24 @ 18:42) [Active]          Vital Signs Last 24 Hrs  T(C): 36.9 (09 Jan 2024 05:27), Max: 36.9 (09 Jan 2024 05:27)  T(F): 98.4 (09 Jan 2024 05:27), Max: 98.4 (09 Jan 2024 05:27)  HR: 95 (09 Jan 2024 05:27) (95 - 99)  BP: 132/66 (09 Jan 2024 05:27) (132/66 - 144/74)  BP(mean): --  RR: 18 (09 Jan 2024 05:27) (17 - 18)  SpO2: 92% (09 Jan 2024 05:27) (91% - 92%)    Parameters below as of 09 Jan 2024 05:27  Patient On (Oxygen Delivery Method): room air                  LABS:                        9.4    12.92 )-----------( 630      ( 09 Jan 2024 06:30 )             30.4     01-08    139  |  109<H>  |  13  ----------------------------<  109<H>  3.4<L>   |  26  |  0.45<L>    Ca    7.9<L>      08 Jan 2024 06:23      PT/INR - ( 08 Jan 2024 06:23 )   PT: 44.9 sec;   INR: 4.00 ratio           Urinalysis Basic - ( 08 Jan 2024 06:23 )    Color: x / Appearance: x / SG: x / pH: x  Gluc: 109 mg/dL / Ketone: x  / Bili: x / Urobili: x   Blood: x / Protein: x / Nitrite: x   Leuk Esterase: x / RBC: x / WBC x   Sq Epi: x / Non Sq Epi: x / Bacteria: x            WBC:  WBC Count: 12.92 K/uL (01-09 @ 06:30)  WBC Count: 17.16 K/uL (01-08 @ 06:23)  WBC Count: 11.46 K/uL (01-07 @ 06:26)  WBC Count: 11.35 K/uL (01-06 @ 09:28)      MICROBIOLOGY:  RECENT CULTURES:  01-03 Wound Wound Methicillin resistant Staphylococcus aureus  Pseudomonas aeruginosa XXXX   Culture yields growth of greater than 3 colony types of  bacteria,  which may indicate contamination and normal jessica  Call client services within 7 days if further workup is clinically  indicated.  Culture includes  Few Methicillin Resistant Staphylococcus aureus  Moderate Streptococcus dysgalactiae (Group C/G) "Susceptibilities not  performed"  Numerous Pseudomonas aeruginosa Multiple Morphological Strains    01-03 .Abscess Leg - Left Pseudomonas aeruginosa  Enterococcus faecalis  Proteus mirabilis   No polymorphonuclear cells seen per low power field  Numerous Gram positive cocci in pairs seen per oil power field  Numerous Gram Negative Rods seen per oil power field   Numerous Pseudomonas aeruginosa  Moderate Enterococcus faecalis  Moderate Proteus mirabilis  Moderate Streptococcus dysgalactiae (Group C/G) "Susceptibilities not  performed"    01-03 .Blood Blood-Peripheral XXXX XXXX   No growth at 5 days    01-03 .Blood Blood-Peripheral XXXX XXXX   No growth at 5 days                PT/INR - ( 08 Jan 2024 06:23 )   PT: 44.9 sec;   INR: 4.00 ratio             Sodium:  Sodium: 139 mmol/L (01-08 @ 06:23)  Sodium: 139 mmol/L (01-07 @ 06:26)  Sodium: 143 mmol/L (01-06 @ 09:28)      0.45 mg/dL 01-08 @ 06:23  0.38 mg/dL 01-07 @ 06:26  0.44 mg/dL 01-06 @ 09:28      Hemoglobin:  Hemoglobin: 9.4 g/dL (01-09 @ 06:30)  Hemoglobin: 9.7 g/dL (01-08 @ 06:23)  Hemoglobin: 10.0 g/dL (01-07 @ 06:26)  Hemoglobin: 8.9 g/dL (01-06 @ 09:28)      Platelets: Platelet Count - Automated: 630 K/uL (01-09 @ 06:30)  Platelet Count - Automated: 663 K/uL (01-08 @ 06:23)  Platelet Count - Automated: 707 K/uL (01-07 @ 06:26)  Platelet Count - Automated: 494 K/uL (01-06 @ 09:28)          Urinalysis Basic - ( 08 Jan 2024 06:23 )    Color: x / Appearance: x / SG: x / pH: x  Gluc: 109 mg/dL / Ketone: x  / Bili: x / Urobili: x   Blood: x / Protein: x / Nitrite: x   Leuk Esterase: x / RBC: x / WBC x   Sq Epi: x / Non Sq Epi: x / Bacteria: x        RADIOLOGY & ADDITIONAL STUDIES:      MICROBIOLOGY:  RECENT CULTURES:  01-03 Wound Wound Methicillin resistant Staphylococcus aureus  Pseudomonas aeruginosa XXXX   Culture yields growth of greater than 3 colony types of  bacteria,  which may indicate contamination and normal jessica  Call client services within 7 days if further workup is clinically  indicated.  Culture includes  Few Methicillin Resistant Staphylococcus aureus  Moderate Streptococcus dysgalactiae (Group C/G) "Susceptibilities not  performed"  Numerous Pseudomonas aeruginosa Multiple Morphological Strains    01-03 .Abscess Leg - Left Pseudomonas aeruginosa  Enterococcus faecalis  Proteus mirabilis   No polymorphonuclear cells seen per low power field  Numerous Gram positive cocci in pairs seen per oil power field  Numerous Gram Negative Rods seen per oil power field   Numerous Pseudomonas aeruginosa  Moderate Enterococcus faecalis  Moderate Proteus mirabilis  Moderate Streptococcus dysgalactiae (Group C/G) "Susceptibilities not  performed"    01-03 .Blood Blood-Peripheral XXXX XXXX   No growth at 5 days    01-03 .Blood Blood-Peripheral XXXX XXXX   No growth at 5 days

## 2024-01-09 NOTE — CASE MANAGEMENT PROGRESS NOTE - NSCMPROGRESSNOTE_GEN_ALL_CORE
PICC today and possible  DC to [facility] for cont IVABX  PICC today and possible  DC to [facility] for cont IVABX

## 2024-01-10 ENCOUNTER — TRANSCRIPTION ENCOUNTER (OUTPATIENT)
Age: 89
End: 2024-01-10

## 2024-01-10 VITALS — DIASTOLIC BLOOD PRESSURE: 57 MMHG | HEART RATE: 88 BPM | SYSTOLIC BLOOD PRESSURE: 95 MMHG

## 2024-01-10 LAB
ANION GAP SERPL CALC-SCNC: 4 MMOL/L — LOW (ref 5–17)
ANION GAP SERPL CALC-SCNC: 4 MMOL/L — LOW (ref 5–17)
BUN SERPL-MCNC: 14 MG/DL — SIGNIFICANT CHANGE UP (ref 7–23)
BUN SERPL-MCNC: 14 MG/DL — SIGNIFICANT CHANGE UP (ref 7–23)
CALCIUM SERPL-MCNC: 7.9 MG/DL — LOW (ref 8.5–10.1)
CALCIUM SERPL-MCNC: 7.9 MG/DL — LOW (ref 8.5–10.1)
CHLORIDE SERPL-SCNC: 110 MMOL/L — HIGH (ref 96–108)
CHLORIDE SERPL-SCNC: 110 MMOL/L — HIGH (ref 96–108)
CO2 SERPL-SCNC: 24 MMOL/L — SIGNIFICANT CHANGE UP (ref 22–31)
CO2 SERPL-SCNC: 24 MMOL/L — SIGNIFICANT CHANGE UP (ref 22–31)
CREAT SERPL-MCNC: 0.47 MG/DL — LOW (ref 0.5–1.3)
CREAT SERPL-MCNC: 0.47 MG/DL — LOW (ref 0.5–1.3)
EGFR: 90 ML/MIN/1.73M2 — SIGNIFICANT CHANGE UP
EGFR: 90 ML/MIN/1.73M2 — SIGNIFICANT CHANGE UP
GLUCOSE SERPL-MCNC: 94 MG/DL — SIGNIFICANT CHANGE UP (ref 70–99)
GLUCOSE SERPL-MCNC: 94 MG/DL — SIGNIFICANT CHANGE UP (ref 70–99)
HCT VFR BLD CALC: 34.8 % — SIGNIFICANT CHANGE UP (ref 34.5–45)
HCT VFR BLD CALC: 34.8 % — SIGNIFICANT CHANGE UP (ref 34.5–45)
HGB BLD-MCNC: 10.8 G/DL — LOW (ref 11.5–15.5)
HGB BLD-MCNC: 10.8 G/DL — LOW (ref 11.5–15.5)
INR BLD: 2.45 RATIO — HIGH (ref 0.85–1.18)
INR BLD: 2.45 RATIO — HIGH (ref 0.85–1.18)
MCHC RBC-ENTMCNC: 31 GM/DL — LOW (ref 32–36)
MCHC RBC-ENTMCNC: 31 GM/DL — LOW (ref 32–36)
MCHC RBC-ENTMCNC: 34.3 PG — HIGH (ref 27–34)
MCHC RBC-ENTMCNC: 34.3 PG — HIGH (ref 27–34)
MCV RBC AUTO: 110.5 FL — HIGH (ref 80–100)
MCV RBC AUTO: 110.5 FL — HIGH (ref 80–100)
NRBC # BLD: 0 /100 WBCS — SIGNIFICANT CHANGE UP (ref 0–0)
NRBC # BLD: 0 /100 WBCS — SIGNIFICANT CHANGE UP (ref 0–0)
PLATELET # BLD AUTO: 656 K/UL — HIGH (ref 150–400)
PLATELET # BLD AUTO: 656 K/UL — HIGH (ref 150–400)
POTASSIUM SERPL-MCNC: 3.8 MMOL/L — SIGNIFICANT CHANGE UP (ref 3.5–5.3)
POTASSIUM SERPL-MCNC: 3.8 MMOL/L — SIGNIFICANT CHANGE UP (ref 3.5–5.3)
POTASSIUM SERPL-SCNC: 3.8 MMOL/L — SIGNIFICANT CHANGE UP (ref 3.5–5.3)
POTASSIUM SERPL-SCNC: 3.8 MMOL/L — SIGNIFICANT CHANGE UP (ref 3.5–5.3)
PROTHROM AB SERPL-ACNC: 27.9 SEC — HIGH (ref 9.5–13)
PROTHROM AB SERPL-ACNC: 27.9 SEC — HIGH (ref 9.5–13)
RBC # BLD: 3.15 M/UL — LOW (ref 3.8–5.2)
RBC # BLD: 3.15 M/UL — LOW (ref 3.8–5.2)
RBC # FLD: 18.4 % — HIGH (ref 10.3–14.5)
RBC # FLD: 18.4 % — HIGH (ref 10.3–14.5)
SODIUM SERPL-SCNC: 138 MMOL/L — SIGNIFICANT CHANGE UP (ref 135–145)
SODIUM SERPL-SCNC: 138 MMOL/L — SIGNIFICANT CHANGE UP (ref 135–145)
WBC # BLD: 16.73 K/UL — HIGH (ref 3.8–10.5)
WBC # BLD: 16.73 K/UL — HIGH (ref 3.8–10.5)
WBC # FLD AUTO: 16.73 K/UL — HIGH (ref 3.8–10.5)
WBC # FLD AUTO: 16.73 K/UL — HIGH (ref 3.8–10.5)

## 2024-01-10 PROCEDURE — 76937 US GUIDE VASCULAR ACCESS: CPT

## 2024-01-10 PROCEDURE — 85610 PROTHROMBIN TIME: CPT

## 2024-01-10 PROCEDURE — 83605 ASSAY OF LACTIC ACID: CPT

## 2024-01-10 PROCEDURE — 85027 COMPLETE CBC AUTOMATED: CPT

## 2024-01-10 PROCEDURE — 80048 BASIC METABOLIC PNL TOTAL CA: CPT

## 2024-01-10 PROCEDURE — 80202 ASSAY OF VANCOMYCIN: CPT

## 2024-01-10 PROCEDURE — 80053 COMPREHEN METABOLIC PANEL: CPT

## 2024-01-10 PROCEDURE — 96375 TX/PRO/DX INJ NEW DRUG ADDON: CPT

## 2024-01-10 PROCEDURE — C1751: CPT

## 2024-01-10 PROCEDURE — 84145 PROCALCITONIN (PCT): CPT

## 2024-01-10 PROCEDURE — 96361 HYDRATE IV INFUSION ADD-ON: CPT

## 2024-01-10 PROCEDURE — 87077 CULTURE AEROBIC IDENTIFY: CPT

## 2024-01-10 PROCEDURE — 93005 ELECTROCARDIOGRAM TRACING: CPT

## 2024-01-10 PROCEDURE — 96365 THER/PROPH/DIAG IV INF INIT: CPT

## 2024-01-10 PROCEDURE — 36573 INSJ PICC RS&I 5 YR+: CPT

## 2024-01-10 PROCEDURE — 85025 COMPLETE CBC W/AUTO DIFF WBC: CPT

## 2024-01-10 PROCEDURE — 36415 COLL VENOUS BLD VENIPUNCTURE: CPT

## 2024-01-10 PROCEDURE — 99285 EMERGENCY DEPT VISIT HI MDM: CPT

## 2024-01-10 PROCEDURE — 97162 PT EVAL MOD COMPLEX 30 MIN: CPT

## 2024-01-10 PROCEDURE — 87186 SC STD MICRODIL/AGAR DIL: CPT

## 2024-01-10 PROCEDURE — 87205 SMEAR GRAM STAIN: CPT

## 2024-01-10 PROCEDURE — 87040 BLOOD CULTURE FOR BACTERIA: CPT

## 2024-01-10 PROCEDURE — 71045 X-RAY EXAM CHEST 1 VIEW: CPT

## 2024-01-10 PROCEDURE — 87070 CULTURE OTHR SPECIMN AEROBIC: CPT

## 2024-01-10 RX ORDER — VANCOMYCIN HCL 1 G
1 VIAL (EA) INTRAVENOUS
Qty: 0 | Refills: 0 | DISCHARGE
Start: 2024-01-10

## 2024-01-10 RX ORDER — FAMOTIDINE 10 MG/ML
1 INJECTION INTRAVENOUS
Refills: 0 | DISCHARGE

## 2024-01-10 RX ORDER — TRAMADOL HYDROCHLORIDE 50 MG/1
1 TABLET ORAL
Refills: 0 | DISCHARGE

## 2024-01-10 RX ORDER — CALCIUM CARBONATE 500(1250)
1 TABLET ORAL
Qty: 0 | Refills: 0 | DISCHARGE
Start: 2024-01-10

## 2024-01-10 RX ORDER — ACETAMINOPHEN 500 MG
1 TABLET ORAL
Refills: 0 | DISCHARGE

## 2024-01-10 RX ORDER — LEVOTHYROXINE SODIUM 125 MCG
1 TABLET ORAL
Refills: 0 | DISCHARGE

## 2024-01-10 RX ORDER — ASCORBIC ACID 60 MG
500 TABLET,CHEWABLE ORAL DAILY
Refills: 0 | Status: DISCONTINUED | OUTPATIENT
Start: 2024-01-10 | End: 2024-01-10

## 2024-01-10 RX ORDER — TRAMADOL HYDROCHLORIDE 50 MG/1
1 TABLET ORAL
Qty: 0 | Refills: 0 | DISCHARGE
Start: 2024-01-10

## 2024-01-10 RX ORDER — CHLORHEXIDINE GLUCONATE 213 G/1000ML
1 SOLUTION TOPICAL
Refills: 0 | Status: DISCONTINUED | OUTPATIENT
Start: 2024-01-10 | End: 2024-01-10

## 2024-01-10 RX ORDER — FERROUS SULFATE 325(65) MG
1 TABLET ORAL
Qty: 0 | Refills: 0 | DISCHARGE
Start: 2024-01-10

## 2024-01-10 RX ORDER — ACETAMINOPHEN 500 MG
2 TABLET ORAL
Qty: 0 | Refills: 0 | DISCHARGE
Start: 2024-01-10

## 2024-01-10 RX ORDER — SODIUM CHLORIDE 9 MG/ML
10 INJECTION INTRAMUSCULAR; INTRAVENOUS; SUBCUTANEOUS
Refills: 0 | Status: DISCONTINUED | OUTPATIENT
Start: 2024-01-10 | End: 2024-01-10

## 2024-01-10 RX ORDER — CEFEPIME 1 G/1
2 INJECTION, POWDER, FOR SOLUTION INTRAMUSCULAR; INTRAVENOUS
Qty: 0 | Refills: 0 | DISCHARGE
Start: 2024-01-10

## 2024-01-10 RX ORDER — POLYETHYLENE GLYCOL 3350 17 G/17G
17 POWDER, FOR SOLUTION ORAL
Qty: 0 | Refills: 0 | DISCHARGE
Start: 2024-01-10

## 2024-01-10 RX ORDER — ACETAMINOPHEN 500 MG
2 TABLET ORAL
Refills: 0 | DISCHARGE

## 2024-01-10 RX ORDER — ASCORBIC ACID 60 MG
1 TABLET,CHEWABLE ORAL
Qty: 0 | Refills: 0 | DISCHARGE
Start: 2024-01-10

## 2024-01-10 RX ORDER — WARFARIN SODIUM 2.5 MG/1
1 TABLET ORAL
Qty: 10 | Refills: 0
Start: 2024-01-10

## 2024-01-10 RX ORDER — LEVOTHYROXINE SODIUM 125 MCG
1 TABLET ORAL
Qty: 0 | Refills: 0 | DISCHARGE
Start: 2024-01-10

## 2024-01-10 RX ORDER — MAGNESIUM HYDROXIDE 400 MG/1
30 TABLET, CHEWABLE ORAL
Qty: 0 | Refills: 0 | DISCHARGE
Start: 2024-01-10

## 2024-01-10 RX ORDER — PANTOPRAZOLE SODIUM 20 MG/1
1 TABLET, DELAYED RELEASE ORAL
Qty: 0 | Refills: 0 | DISCHARGE
Start: 2024-01-10

## 2024-01-10 RX ORDER — FERROUS SULFATE 325(65) MG
325 TABLET ORAL DAILY
Refills: 0 | Status: DISCONTINUED | OUTPATIENT
Start: 2024-01-10 | End: 2024-01-10

## 2024-01-10 RX ADMIN — Medication 325 MILLIGRAM(S): at 14:47

## 2024-01-10 RX ADMIN — Medication 1 TABLET(S): at 11:59

## 2024-01-10 RX ADMIN — Medication 2000 UNIT(S): at 11:57

## 2024-01-10 RX ADMIN — MORPHINE SULFATE 2 MILLIGRAM(S): 50 CAPSULE, EXTENDED RELEASE ORAL at 02:00

## 2024-01-10 RX ADMIN — Medication 1000 MILLIGRAM(S): at 05:31

## 2024-01-10 RX ADMIN — Medication 25 MILLIGRAM(S): at 05:31

## 2024-01-10 RX ADMIN — Medication 1000 MILLIGRAM(S): at 14:47

## 2024-01-10 RX ADMIN — TRAMADOL HYDROCHLORIDE 50 MILLIGRAM(S): 50 TABLET ORAL at 11:56

## 2024-01-10 RX ADMIN — TRAMADOL HYDROCHLORIDE 50 MILLIGRAM(S): 50 TABLET ORAL at 18:08

## 2024-01-10 RX ADMIN — Medication 1 TABLET(S): at 05:31

## 2024-01-10 RX ADMIN — HYDROXYUREA 500 MILLIGRAM(S): 500 CAPSULE ORAL at 18:06

## 2024-01-10 RX ADMIN — AMLODIPINE BESYLATE 5 MILLIGRAM(S): 2.5 TABLET ORAL at 05:32

## 2024-01-10 RX ADMIN — POLYETHYLENE GLYCOL 3350 17 GRAM(S): 17 POWDER, FOR SOLUTION ORAL at 11:57

## 2024-01-10 RX ADMIN — GABAPENTIN 300 MILLIGRAM(S): 400 CAPSULE ORAL at 14:47

## 2024-01-10 RX ADMIN — Medication 50 MICROGRAM(S): at 05:33

## 2024-01-10 RX ADMIN — Medication 500 MILLIGRAM(S): at 14:48

## 2024-01-10 RX ADMIN — TRAMADOL HYDROCHLORIDE 50 MILLIGRAM(S): 50 TABLET ORAL at 12:56

## 2024-01-10 RX ADMIN — CEFEPIME 100 MILLIGRAM(S): 1 INJECTION, POWDER, FOR SOLUTION INTRAMUSCULAR; INTRAVENOUS at 05:34

## 2024-01-10 RX ADMIN — MORPHINE SULFATE 2 MILLIGRAM(S): 50 CAPSULE, EXTENDED RELEASE ORAL at 01:13

## 2024-01-10 RX ADMIN — ESCITALOPRAM OXALATE 20 MILLIGRAM(S): 10 TABLET, FILM COATED ORAL at 11:57

## 2024-01-10 RX ADMIN — Medication 1 MILLIGRAM(S): at 11:57

## 2024-01-10 RX ADMIN — PANTOPRAZOLE SODIUM 40 MILLIGRAM(S): 20 TABLET, DELAYED RELEASE ORAL at 08:15

## 2024-01-10 RX ADMIN — Medication 3 MILLIGRAM(S): at 02:38

## 2024-01-10 RX ADMIN — Medication 1 TABLET(S): at 18:07

## 2024-01-10 RX ADMIN — GABAPENTIN 300 MILLIGRAM(S): 400 CAPSULE ORAL at 05:33

## 2024-01-10 RX ADMIN — HYDROXYUREA 500 MILLIGRAM(S): 500 CAPSULE ORAL at 05:32

## 2024-01-10 NOTE — PROGRESS NOTE ADULT - SUBJECTIVE AND OBJECTIVE BOX
Interval History:    CENTRAL LINE:   [  ] YES       [  ] NO  MUIR:                 [  ] YES       [  ] NO         REVIEW OF SYSTEMS:  All Systems below were reviewed and are negative [  ]  HEENT:  ID:  Pulmonary:  Cardiac:  GI:  Renal:  Musculoskeletal:  All other systems above were reviewed and are negative   [  ]      MEDICATIONS  (STANDING):  acetaminophen     Tablet .. 1000 milliGRAM(s) Oral every 8 hours  amLODIPine   Tablet 5 milliGRAM(s) Oral daily  ascorbic acid 500 milliGRAM(s) Oral daily  calcium carbonate   1250 mG (OsCal) 1 Tablet(s) Oral daily  cefepime   IVPB 2000 milliGRAM(s) IV Intermittent every 12 hours  chlorhexidine 4% Liquid 1 Application(s) Topical <User Schedule>  cholecalciferol 2000 Unit(s) Oral daily  escitalopram 20 milliGRAM(s) Oral daily  ferrous    sulfate 325 milliGRAM(s) Oral daily  folic acid 1 milliGRAM(s) Oral daily  gabapentin 300 milliGRAM(s) Oral three times a day  hydroxyurea 500 milliGRAM(s) Oral two times a day  lactobacillus acidophilus 1 Tablet(s) Oral every 12 hours  levothyroxine 50 MICROGram(s) Oral daily  metoprolol tartrate 25 milliGRAM(s) Oral two times a day  pantoprazole    Tablet 40 milliGRAM(s) Oral before breakfast  polyethylene glycol 3350 17 Gram(s) Oral daily  senna 2 Tablet(s) Oral at bedtime  simvastatin 10 milliGRAM(s) Oral at bedtime  vancomycin  IVPB 1000 milliGRAM(s) IV Intermittent every 24 hours    MEDICATIONS  (PRN):  aluminum hydroxide/magnesium hydroxide/simethicone Suspension 30 milliLiter(s) Oral every 4 hours PRN Dyspepsia  bisacodyl Suppository 10 milliGRAM(s) Rectal daily PRN Constipation  magnesium hydroxide Suspension 30 milliLiter(s) Oral daily PRN Constipation  melatonin 3 milliGRAM(s) Oral at bedtime PRN Insomnia  morphine  - Injectable 2 milliGRAM(s) IV Push every 4 hours PRN Severe Pain (7 - 10)  ondansetron Injectable 4 milliGRAM(s) IV Push every 8 hours PRN Nausea and/or Vomiting  sodium chloride 0.9% lock flush 10 milliLiter(s) IV Push every 1 hour PRN Pre/post blood products, medications, blood draw, and to maintain line patency  traMADol 50 milliGRAM(s) Oral four times a day PRN Moderate Pain (4 - 6)      Vital Signs Last 24 Hrs  T(C): 36.8 (10 Luther 2024 13:15), Max: 37.1 (09 Jan 2024 21:37)  T(F): 98.3 (10 Luther 2024 13:15), Max: 98.7 (09 Jan 2024 21:37)  HR: 88 (10 Luther 2024 18:24) (86 - 97)  BP: 95/57 (10 Luther 2024 18:24) (95/57 - 112/68)  BP(mean): --  RR: 18 (10 Luther 2024 13:15) (18 - 18)  SpO2: 91% (10 Luther 2024 13:15) (90% - 92%)    Parameters below as of 10 Luther 2024 13:15  Patient On (Oxygen Delivery Method): room air        I&O's Summary    10 Luther 2024 07:01  -  10 Luther 2024 19:26  --------------------------------------------------------  IN: 0 mL / OUT: 50 mL / NET: -50 mL        PHYSICAL EXAM:  HEENT: NC/AT; PERRLA  Neck: Soft; no tenderness  Lungs: CTA bilaterally; no wheezing.   Heart:  Abdomen:  Genital/ Rectal:  Extremities:  Neurologic:  Vascular:      LABORATORY:    CBC Full  -  ( 10 Luther 2024 11:50 )  WBC Count : 16.73 K/uL  RBC Count : 3.15 M/uL  Hemoglobin : 10.8 g/dL  Hematocrit : 34.8 %  Platelet Count - Automated : 656 K/uL  Mean Cell Volume : 110.5 fl  Mean Cell Hemoglobin : 34.3 pg  Mean Cell Hemoglobin Concentration : 31.0 gm/dL  Auto Neutrophil # : x  Auto Lymphocyte # : x  Auto Monocyte # : x  Auto Eosinophil # : x  Auto Basophil # : x  Auto Neutrophil % : x  Auto Lymphocyte % : x  Auto Monocyte % : x  Auto Eosinophil % : x  Auto Basophil % : x      ESR:                   01-08 @ 22:30  --    C-Reactive Protein:     01-08 @ 22:30  --    Procalcitonin:           01-08 @ 22:30   0.30  ESR:                   01-04 @ 07:14  --    C-Reactive Protein:     01-04 @ 07:14  --    Procalcitonin:           01-04 @ 07:14   0.18      01-10    138  |  110<H>  |  14  ----------------------------<  94  3.8   |  24  |  0.47<L>    Ca    7.9<L>      10 Luther 2024 11:50    TPro  5.1<L>  /  Alb  1.6<L>  /  TBili  0.4  /  DBili  x   /  AST  19  /  ALT  10<L>  /  AlkPhos  84  01-09          Assessment and Plan:          Sushil Anguiano MD   [phone].      Interval History:    CENTRAL LINE:   [  ] YES       [  ] NO  MUIR:                 [  ] YES       [  ] NO         REVIEW OF SYSTEMS:  All Systems below were reviewed and are negative [  ]  HEENT:  ID:  Pulmonary:  Cardiac:  GI:  Renal:  Musculoskeletal:  All other systems above were reviewed and are negative   [  ]      MEDICATIONS  (STANDING):  acetaminophen     Tablet .. 1000 milliGRAM(s) Oral every 8 hours  amLODIPine   Tablet 5 milliGRAM(s) Oral daily  ascorbic acid 500 milliGRAM(s) Oral daily  calcium carbonate   1250 mG (OsCal) 1 Tablet(s) Oral daily  cefepime   IVPB 2000 milliGRAM(s) IV Intermittent every 12 hours  chlorhexidine 4% Liquid 1 Application(s) Topical <User Schedule>  cholecalciferol 2000 Unit(s) Oral daily  escitalopram 20 milliGRAM(s) Oral daily  ferrous    sulfate 325 milliGRAM(s) Oral daily  folic acid 1 milliGRAM(s) Oral daily  gabapentin 300 milliGRAM(s) Oral three times a day  hydroxyurea 500 milliGRAM(s) Oral two times a day  lactobacillus acidophilus 1 Tablet(s) Oral every 12 hours  levothyroxine 50 MICROGram(s) Oral daily  metoprolol tartrate 25 milliGRAM(s) Oral two times a day  pantoprazole    Tablet 40 milliGRAM(s) Oral before breakfast  polyethylene glycol 3350 17 Gram(s) Oral daily  senna 2 Tablet(s) Oral at bedtime  simvastatin 10 milliGRAM(s) Oral at bedtime  vancomycin  IVPB 1000 milliGRAM(s) IV Intermittent every 24 hours    MEDICATIONS  (PRN):  aluminum hydroxide/magnesium hydroxide/simethicone Suspension 30 milliLiter(s) Oral every 4 hours PRN Dyspepsia  bisacodyl Suppository 10 milliGRAM(s) Rectal daily PRN Constipation  magnesium hydroxide Suspension 30 milliLiter(s) Oral daily PRN Constipation  melatonin 3 milliGRAM(s) Oral at bedtime PRN Insomnia  morphine  - Injectable 2 milliGRAM(s) IV Push every 4 hours PRN Severe Pain (7 - 10)  ondansetron Injectable 4 milliGRAM(s) IV Push every 8 hours PRN Nausea and/or Vomiting  sodium chloride 0.9% lock flush 10 milliLiter(s) IV Push every 1 hour PRN Pre/post blood products, medications, blood draw, and to maintain line patency  traMADol 50 milliGRAM(s) Oral four times a day PRN Moderate Pain (4 - 6)      Vital Signs Last 24 Hrs  T(C): 36.8 (10 Luther 2024 13:15), Max: 37.1 (09 Jan 2024 21:37)  T(F): 98.3 (10 Luther 2024 13:15), Max: 98.7 (09 Jan 2024 21:37)  HR: 88 (10 Luther 2024 18:24) (86 - 97)  BP: 95/57 (10 Luther 2024 18:24) (95/57 - 112/68)  BP(mean): --  RR: 18 (10 Luther 2024 13:15) (18 - 18)  SpO2: 91% (10 Luther 2024 13:15) (90% - 92%)    Parameters below as of 10 Luther 2024 13:15  Patient On (Oxygen Delivery Method): room air        I&O's Summary    10 Luther 2024 07:01  -  10 Luther 2024 19:26  --------------------------------------------------------  IN: 0 mL / OUT: 50 mL / NET: -50 mL        PHYSICAL EXAM:  HEENT: NC/AT; PERRLA  Neck: Soft; no tenderness  Lungs: CTA bilaterally; no wheezing.   Heart:  Abdomen:  Genital/ Rectal:  Extremities:  Neurologic:  Vascular:      LABORATORY:    CBC Full  -  ( 10 Luther 2024 11:50 )  WBC Count : 16.73 K/uL  RBC Count : 3.15 M/uL  Hemoglobin : 10.8 g/dL  Hematocrit : 34.8 %  Platelet Count - Automated : 656 K/uL  Mean Cell Volume : 110.5 fl  Mean Cell Hemoglobin : 34.3 pg  Mean Cell Hemoglobin Concentration : 31.0 gm/dL  Auto Neutrophil # : x  Auto Lymphocyte # : x  Auto Monocyte # : x  Auto Eosinophil # : x  Auto Basophil # : x  Auto Neutrophil % : x  Auto Lymphocyte % : x  Auto Monocyte % : x  Auto Eosinophil % : x  Auto Basophil % : x      ESR:                   01-08 @ 22:30  --    C-Reactive Protein:     01-08 @ 22:30  --    Procalcitonin:           01-08 @ 22:30   0.30  ESR:                   01-04 @ 07:14  --    C-Reactive Protein:     01-04 @ 07:14  --    Procalcitonin:           01-04 @ 07:14   0.18      01-10    138  |  110<H>  |  14  ----------------------------<  94  3.8   |  24  |  0.47<L>    Ca    7.9<L>      10 Luther 2024 11:50    TPro  5.1<L>  /  Alb  1.6<L>  /  TBili  0.4  /  DBili  x   /  AST  19  /  ALT  10<L>  /  AlkPhos  84  01-09          Assessment and Plan:          Sushil Anguiano MD   [phone].    She is afebrile  Sleeping in bed      MEDICATIONS  (STANDING):  acetaminophen     Tablet .. 1000 milliGRAM(s) Oral every 8 hours  amLODIPine   Tablet 5 milliGRAM(s) Oral daily  ascorbic acid 500 milliGRAM(s) Oral daily  calcium carbonate   1250 mG (OsCal) 1 Tablet(s) Oral daily  cefepime   IVPB 2000 milliGRAM(s) IV Intermittent every 12 hours  chlorhexidine 4% Liquid 1 Application(s) Topical <User Schedule>  cholecalciferol 2000 Unit(s) Oral daily  escitalopram 20 milliGRAM(s) Oral daily  ferrous    sulfate 325 milliGRAM(s) Oral daily  folic acid 1 milliGRAM(s) Oral daily  gabapentin 300 milliGRAM(s) Oral three times a day  hydroxyurea 500 milliGRAM(s) Oral two times a day  lactobacillus acidophilus 1 Tablet(s) Oral every 12 hours  levothyroxine 50 MICROGram(s) Oral daily  metoprolol tartrate 25 milliGRAM(s) Oral two times a day  pantoprazole    Tablet 40 milliGRAM(s) Oral before breakfast  polyethylene glycol 3350 17 Gram(s) Oral daily  senna 2 Tablet(s) Oral at bedtime  simvastatin 10 milliGRAM(s) Oral at bedtime  vancomycin  IVPB 1000 milliGRAM(s) IV Intermittent every 24 hours    MEDICATIONS  (PRN):  aluminum hydroxide/magnesium hydroxide/simethicone Suspension 30 milliLiter(s) Oral every 4 hours PRN Dyspepsia  bisacodyl Suppository 10 milliGRAM(s) Rectal daily PRN Constipation  magnesium hydroxide Suspension 30 milliLiter(s) Oral daily PRN Constipation  melatonin 3 milliGRAM(s) Oral at bedtime PRN Insomnia  morphine  - Injectable 2 milliGRAM(s) IV Push every 4 hours PRN Severe Pain (7 - 10)  ondansetron Injectable 4 milliGRAM(s) IV Push every 8 hours PRN Nausea and/or Vomiting  sodium chloride 0.9% lock flush 10 milliLiter(s) IV Push every 1 hour PRN Pre/post blood products, medications, blood draw, and to maintain line patency  traMADol 50 milliGRAM(s) Oral four times a day PRN Moderate Pain (4 - 6)      Vital Signs Last 24 Hrs  T(C): 36.8 (10 Luther 2024 13:15), Max: 37.1 (09 Jan 2024 21:37)  T(F): 98.3 (10 Luther 2024 13:15), Max: 98.7 (09 Jan 2024 21:37)  HR: 88 (10 Luther 2024 18:24) (86 - 97)  BP: 95/57 (10 Luther 2024 18:24) (95/57 - 112/68)  BP(mean): --  RR: 18 (10 Luther 2024 13:15) (18 - 18)  SpO2: 91% (10 Luther 2024 13:15) (90% - 92%)    Parameters below as of 10 Luther 2024 13:15  Patient On (Oxygen Delivery Method): room air        I&O's Summary    10 Luther 2024 07:01  -  10 Luther 2024 19:26  --------------------------------------------------------  IN: 0 mL / OUT: 50 mL / NET: -50 mL        PHYSICAL EXAM:  HEENT: NC/AT; PERRLA  Neck: Soft; no tenderness  Lungs: CTA bilaterally; no wheezing.   Heart:  Abdomen:  Genital/ Rectal:  Extremities:  Neurologic:  Vascular:      LABORATORY:    CBC Full  -  ( 10 Luther 2024 11:50 )  WBC Count : 16.73 K/uL  RBC Count : 3.15 M/uL  Hemoglobin : 10.8 g/dL  Hematocrit : 34.8 %  Platelet Count - Automated : 656 K/uL  Mean Cell Volume : 110.5 fl  Mean Cell Hemoglobin : 34.3 pg  Mean Cell Hemoglobin Concentration : 31.0 gm/dL  Auto Neutrophil # : x  Auto Lymphocyte # : x  Auto Monocyte # : x  Auto Eosinophil # : x  Auto Basophil # : x  Auto Neutrophil % : x  Auto Lymphocyte % : x  Auto Monocyte % : x  Auto Eosinophil % : x  Auto Basophil % : x      ESR:                   01-08 @ 22:30  --    C-Reactive Protein:     01-08 @ 22:30  --    Procalcitonin:           01-08 @ 22:30   0.30  ESR:                   01-04 @ 07:14  --    C-Reactive Protein:     01-04 @ 07:14  --    Procalcitonin:           01-04 @ 07:14   0.18      01-10    138  |  110<H>  |  14  ----------------------------<  94  3.8   |  24  |  0.47<L>    Ca    7.9<L>      10 Jan 2024 11:50    TPro  5.1<L>  /  Alb  1.6<L>  /  TBili  0.4  /  DBili  x   /  AST  19  /  ALT  10<L>  /  AlkPhos  84  01-09          Assessment and Plan:    1. Worsening of LLE wounds.  2. Chronic LLE pain.    . Wound cultures grew many Pseudomonas aeruginosa, Enterococcus faecalis and group C Strep and MRSA.  . Vancomycin trough was 16 but AUC was above 600. Change IV Vancomycin to 1 gm daily.   . There are no options for oral antibiotics. Continue IV Cefepime 2 gm q12h and IV Vancomycin 1 gm daily to Jan 20, 2024 to complete a 2 week course.   .  PICC  line is placed  for 2 weeks of IV abx.   . Discharge planning to rehab.           Sushil Anguiano MD   [phone].    She is afebrile  Sleeping in bed      MEDICATIONS  (STANDING):  acetaminophen     Tablet .. 1000 milliGRAM(s) Oral every 8 hours  amLODIPine   Tablet 5 milliGRAM(s) Oral daily  ascorbic acid 500 milliGRAM(s) Oral daily  calcium carbonate   1250 mG (OsCal) 1 Tablet(s) Oral daily  cefepime   IVPB 2000 milliGRAM(s) IV Intermittent every 12 hours  chlorhexidine 4% Liquid 1 Application(s) Topical <User Schedule>  cholecalciferol 2000 Unit(s) Oral daily  escitalopram 20 milliGRAM(s) Oral daily  ferrous    sulfate 325 milliGRAM(s) Oral daily  folic acid 1 milliGRAM(s) Oral daily  gabapentin 300 milliGRAM(s) Oral three times a day  hydroxyurea 500 milliGRAM(s) Oral two times a day  lactobacillus acidophilus 1 Tablet(s) Oral every 12 hours  levothyroxine 50 MICROGram(s) Oral daily  metoprolol tartrate 25 milliGRAM(s) Oral two times a day  pantoprazole    Tablet 40 milliGRAM(s) Oral before breakfast  polyethylene glycol 3350 17 Gram(s) Oral daily  senna 2 Tablet(s) Oral at bedtime  simvastatin 10 milliGRAM(s) Oral at bedtime  vancomycin  IVPB 1000 milliGRAM(s) IV Intermittent every 24 hours    MEDICATIONS  (PRN):  aluminum hydroxide/magnesium hydroxide/simethicone Suspension 30 milliLiter(s) Oral every 4 hours PRN Dyspepsia  bisacodyl Suppository 10 milliGRAM(s) Rectal daily PRN Constipation  magnesium hydroxide Suspension 30 milliLiter(s) Oral daily PRN Constipation  melatonin 3 milliGRAM(s) Oral at bedtime PRN Insomnia  morphine  - Injectable 2 milliGRAM(s) IV Push every 4 hours PRN Severe Pain (7 - 10)  ondansetron Injectable 4 milliGRAM(s) IV Push every 8 hours PRN Nausea and/or Vomiting  sodium chloride 0.9% lock flush 10 milliLiter(s) IV Push every 1 hour PRN Pre/post blood products, medications, blood draw, and to maintain line patency  traMADol 50 milliGRAM(s) Oral four times a day PRN Moderate Pain (4 - 6)      Vital Signs Last 24 Hrs  T(C): 36.8 (10 Luther 2024 13:15), Max: 37.1 (09 Jan 2024 21:37)  T(F): 98.3 (10 Luther 2024 13:15), Max: 98.7 (09 Jan 2024 21:37)  HR: 88 (10 Luther 2024 18:24) (86 - 97)  BP: 95/57 (10 Luther 2024 18:24) (95/57 - 112/68)  BP(mean): --  RR: 18 (10 Luther 2024 13:15) (18 - 18)  SpO2: 91% (10 Luther 2024 13:15) (90% - 92%)    Parameters below as of 10 Luther 2024 13:15  Patient On (Oxygen Delivery Method): room air        I&O's Summary    10 Luther 2024 07:01  -  10 Luther 2024 19:26  --------------------------------------------------------  IN: 0 mL / OUT: 50 mL / NET: -50 mL        PHYSICAL EXAM:  HEENT: NC/AT; PERRLA  Neck: Soft; no tenderness  Lungs: CTA bilaterally; no wheezing.   Heart:  Abdomen:  Genital/ Rectal:  Extremities:  Neurologic:  Vascular:      LABORATORY:    CBC Full  -  ( 10 Luther 2024 11:50 )  WBC Count : 16.73 K/uL  RBC Count : 3.15 M/uL  Hemoglobin : 10.8 g/dL  Hematocrit : 34.8 %  Platelet Count - Automated : 656 K/uL  Mean Cell Volume : 110.5 fl  Mean Cell Hemoglobin : 34.3 pg  Mean Cell Hemoglobin Concentration : 31.0 gm/dL  Auto Neutrophil # : x  Auto Lymphocyte # : x  Auto Monocyte # : x  Auto Eosinophil # : x  Auto Basophil # : x  Auto Neutrophil % : x  Auto Lymphocyte % : x  Auto Monocyte % : x  Auto Eosinophil % : x  Auto Basophil % : x      ESR:                   01-08 @ 22:30  --    C-Reactive Protein:     01-08 @ 22:30  --    Procalcitonin:           01-08 @ 22:30   0.30  ESR:                   01-04 @ 07:14  --    C-Reactive Protein:     01-04 @ 07:14  --    Procalcitonin:           01-04 @ 07:14   0.18      01-10    138  |  110<H>  |  14  ----------------------------<  94  3.8   |  24  |  0.47<L>    Ca    7.9<L>      10 Jan 2024 11:50    TPro  5.1<L>  /  Alb  1.6<L>  /  TBili  0.4  /  DBili  x   /  AST  19  /  ALT  10<L>  /  AlkPhos  84  01-09          Assessment and Plan:    1. Worsening of LLE wounds.  2. Chronic LLE pain.    . Wound cultures grew many Pseudomonas aeruginosa, Enterococcus faecalis and group C Strep and MRSA.  . Vancomycin trough was 16 but AUC was above 600. Change IV Vancomycin to 1 gm daily.   . There are no options for oral antibiotics. Continue IV Cefepime 2 gm q12h and IV Vancomycin 1 gm daily to Jan 20, 2024 to complete a 2 week course.   .  PICC  line is placed  for 2 weeks of IV abx.   . Discharge planning to rehab.           Sushil Anguiano MD   [phone].

## 2024-01-10 NOTE — PROGRESS NOTE ADULT - PROBLEM SELECTOR PROBLEM 4
Aortic valvar stenosis

## 2024-01-10 NOTE — CHART NOTE - NSCHARTNOTEFT_GEN_A_CORE
RN called as pt yelling and complaining of pain after receiving morphine 2 mg IV 1 hour ago    Pt evaluated at bedside. She is confused, yelling to open the door, stops yelling and appears comfortable when I examine her. She is unable to verbalize if she has pain. SHe has LLE wound with bandage.    Will give Ofirmev 1 g IV now  continue prn morphine as ordered  RN to notify with any changes
Assessment: patient seen for malnutrition follow up   89y old  Female who presents with a chief complaint of LLE OPEN WOUND , PICC placed today  food preferences per CNA noted. patient not eating lunch today  previously refused ONS  CNA states patient likes mashed potato might do better with minced and moist meats.  1/9 BM  patient is DNR /DNI      Factors impacting intake: [ ] none [ ] nausea  [ ] vomiting [ ] diarrhea [ ] constipation  [ ]chewing problems [x ] swallowing issues  [ ] other: easy to chew diet consider speech evaluation     Diet Prescription: Diet, DASH/TLC:   Sodium & Cholesterol Restricted  Easy to Chew (EASYTOCHEW) (01-03-24 @ 18:42)    Intake: up to 50% per flow sheet    Current Weight: 1/7 wt 120#      Pertinent Medications: MEDICATIONS  (STANDING):  acetaminophen     Tablet .. 1000 milliGRAM(s) Oral every 8 hours  amLODIPine   Tablet 5 milliGRAM(s) Oral daily  ascorbic acid 500 milliGRAM(s) Oral daily  calcium carbonate   1250 mG (OsCal) 1 Tablet(s) Oral daily  cefepime   IVPB 2000 milliGRAM(s) IV Intermittent every 12 hours  chlorhexidine 4% Liquid 1 Application(s) Topical <User Schedule>  cholecalciferol 2000 Unit(s) Oral daily  escitalopram 20 milliGRAM(s) Oral daily  ferrous    sulfate 325 milliGRAM(s) Oral daily  folic acid 1 milliGRAM(s) Oral daily  gabapentin 300 milliGRAM(s) Oral three times a day  hydroxyurea 500 milliGRAM(s) Oral two times a day  lactobacillus acidophilus 1 Tablet(s) Oral every 12 hours  levothyroxine 50 MICROGram(s) Oral daily  metoprolol tartrate 25 milliGRAM(s) Oral two times a day  pantoprazole    Tablet 40 milliGRAM(s) Oral before breakfast  polyethylene glycol 3350 17 Gram(s) Oral daily  senna 2 Tablet(s) Oral at bedtime  simvastatin 10 milliGRAM(s) Oral at bedtime  vancomycin  IVPB 1000 milliGRAM(s) IV Intermittent every 24 hours    MEDICATIONS  (PRN):  aluminum hydroxide/magnesium hydroxide/simethicone Suspension 30 milliLiter(s) Oral every 4 hours PRN Dyspepsia  bisacodyl Suppository 10 milliGRAM(s) Rectal daily PRN Constipation  magnesium hydroxide Suspension 30 milliLiter(s) Oral daily PRN Constipation  melatonin 3 milliGRAM(s) Oral at bedtime PRN Insomnia  morphine  - Injectable 2 milliGRAM(s) IV Push every 4 hours PRN Severe Pain (7 - 10)  ondansetron Injectable 4 milliGRAM(s) IV Push every 8 hours PRN Nausea and/or Vomiting  sodium chloride 0.9% lock flush 10 milliLiter(s) IV Push every 1 hour PRN Pre/post blood products, medications, blood draw, and to maintain line patency  traMADol 50 milliGRAM(s) Oral four times a day PRN Moderate Pain (4 - 6)      Skin: left heel un-stageable left leg wound un-stageable    Estimated Needs:   [x ] no change since previous assessment  [ ] recalculated:     Previous Nutrition Diagnosis:   [ ] Inadequate Energy Intake [ ]Inadequate Oral Intake [ ] Excessive Energy Intake   [ ] Underweight [ ] Increased Nutrient Needs [ ] Overweight/Obesity   [ ] Altered GI Function [ ] Unintended Weight Loss [ ] Food & Nutrition Related Knowledge Deficit [ x] Malnutrition moderate chronic mal    Nutrition Diagnosis is [x ] ongoing  [ ] resolved [ ] not applicable     New Nutrition Diagnosis: [x ] not applicable       Interventions:   Recommend  [ ] Change Diet To:  [ ] Nutrition Supplement  [ ] Nutrition Support  [x ] Other: consider speech evaluation if patient remains in hospital, will provide food preferences continue magic cup     Monitoring and Evaluation:   [x ] PO intake [ x ] Tolerance to diet prescription [ x ] weights [ x ] labs[ x ] follow up per protocol  [ ] other:
Assessment: patient seen for malnutrition follow up  89yFemale who presents with a chief complaint of LLE OPEN WOUND   patient seen in bed poor historian , per RN patient requesting poached eggs.   previous patient refused ONS  will provide egg salad and magic cup ice cream 290kcals and 9 gms protein per serving   patient is DNR/DNI  1/7 fecal incontinence       Factors impacting intake: [x ] none [ ] nausea  [ ] vomiting [ ] diarrhea [ ] constipation  [ ]chewing problems [ ] swallowing issues  [ ] other:     Diet Prescription: Diet, DASH/TLC:   Sodium & Cholesterol Restricted  Easy to Chew (EASYTOCHEW) (01-03-24 @ 18:42)    Intake: 0-25% this admit    Current Weight: Weight 1/7 wt 120# no edema      Pertinent Medications: MEDICATIONS  (STANDING):  acetaminophen     Tablet .. 1000 milliGRAM(s) Oral every 8 hours  amLODIPine   Tablet 5 milliGRAM(s) Oral daily  calcium carbonate   1250 mG (OsCal) 1 Tablet(s) Oral daily  cefepime   IVPB 2000 milliGRAM(s) IV Intermittent every 12 hours  cholecalciferol 2000 Unit(s) Oral daily  dextrose 5% + sodium chloride 0.45%. 1000 milliLiter(s) (40 mL/Hr) IV Continuous <Continuous>  escitalopram 20 milliGRAM(s) Oral daily  folic acid 1 milliGRAM(s) Oral daily  gabapentin 300 milliGRAM(s) Oral three times a day  hydroxyurea 500 milliGRAM(s) Oral two times a day  lactobacillus acidophilus 1 Tablet(s) Oral every 12 hours  levothyroxine 50 MICROGram(s) Oral daily  metoprolol tartrate 25 milliGRAM(s) Oral two times a day  pantoprazole    Tablet 40 milliGRAM(s) Oral before breakfast  polyethylene glycol 3350 17 Gram(s) Oral daily  senna 2 Tablet(s) Oral at bedtime  simvastatin 10 milliGRAM(s) Oral at bedtime  vancomycin  IVPB 750 milliGRAM(s) IV Intermittent every 12 hours    MEDICATIONS  (PRN):  aluminum hydroxide/magnesium hydroxide/simethicone Suspension 30 milliLiter(s) Oral every 4 hours PRN Dyspepsia  bisacodyl Suppository 10 milliGRAM(s) Rectal daily PRN Constipation  magnesium hydroxide Suspension 30 milliLiter(s) Oral daily PRN Constipation  melatonin 3 milliGRAM(s) Oral at bedtime PRN Insomnia  morphine  - Injectable 2 milliGRAM(s) IV Push every 4 hours PRN Severe Pain (7 - 10)  ondansetron Injectable 4 milliGRAM(s) IV Push every 8 hours PRN Nausea and/or Vomiting  traMADol 50 milliGRAM(s) Oral four times a day PRN Moderate Pain (4 - 6)    Pertinent Labs: .1 B12 WNL patient on folic acid supplement  Skin: left heel DTI left leg un-stageable     Estimated Needs:   [x ] no change since previous assessment based on # 58.9kg 25-30kcals/kg 1472-1767kcals and 1-1.2 kcals/kg 58-70gms protein  [ ] recalculated:     Previous Nutrition Diagnosis:    [ x] Malnutrition chronic moderate    Nutrition Diagnosis is [x ] ongoing  [ ] resolved [ ] not applicable     New Nutrition Diagnosis: [x ] not applicable       Interventions:   Recommend  [ ] Change Diet To:  [ ] Nutrition Supplement  [ ] Nutrition Support  [x ] Other: will provide magic cup BID  will add yogurt and eggsalad to trays. recommend MVI and Vit C 500mg BID    Monitoring and Evaluation:   [ x] PO intake [ x ] Tolerance to diet prescription [ x ] weights [ x ] labs[ x ] follow up per protocol  [ ] other:

## 2024-01-10 NOTE — PROGRESS NOTE ADULT - NUTRITIONAL ASSESSMENT
This patient has been assessed with a concern for Malnutrition and has been determined to have a diagnosis/diagnoses of Moderate protein-calorie malnutrition and Underweight (BMI < 19).    This patient is being managed with:   Diet DASH/TLC-  Sodium & Cholesterol Restricted  Easy to Chew (EASYTOCHEW)  Entered: Luther  3 2024  6:42PM  

## 2024-01-10 NOTE — CONSULT NOTE ADULT - CONSULT REASON
L03. 116   LLE cellulitis  D68.32     Coagulopathy due to coumadin  D63.8       Anemia chronic disease  D50.0       hx Fe def Anemia  J90          L effusion  J18.9        ? R sided infiltrate on CXR

## 2024-01-10 NOTE — PROGRESS NOTE ADULT - REASON FOR ADMISSION
LLE OPEN WOUND

## 2024-01-10 NOTE — PROGRESS NOTE ADULT - PROVIDER SPECIALTY LIST ADULT
Infectious Disease
Infectious Disease
Pulmonology
Hospitalist
Infectious Disease
Pulmonology
Infectious Disease
Pulmonology
Hospitalist
Podiatry
Hospitalist
Podiatry
Hospitalist

## 2024-01-10 NOTE — CONSULT NOTE ADULT - ASSESSMENT
[ASSESSMENT and  PLAN]  L03. 116   LLE cellulitis  D68.32     Coagulopathy due to coumadin  D63.8       Anemia chronic disease  D50.0       hx Fe def Anemia  J90          L effusion  J18.9        ? R sided infiltrate on CXR    89yo  F, resident of [facility] Admitted with LLE infection.   septic workup , iv abx ,pain management and wound care   BIBEMS wounds to left lower leg for unknown duration, +weeping, +draining, +c/o pain   pt is poor historian     Prior admission Nov 2023 with LLE cellulitis and seen by wound care MD & ID   Discharged on po doxycycline and cipro & with topical care   Pt recently completed a course of PO abx for this with no significant improvement.     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    Patient was hospitalized in 2023  with altered mental status concern for UTI , also  cellulitis to the LLE at the site of a skin graft.     Hematology asked to see for elevated INR  on ABx  poor PO intake  On coumadin for CVA.  s/p VItK 2d ago.   INR 2.5    Anemia due to chronic disease  Anemia due to Fe def. Prior Fe def in Aug.      Likely Fe def now.      Defer IV iron in setting of acute infection    LLE cellulitis  ? R side pneumonia  L effusion      RECOMMENDATIONS    IV abx per ID    Transfuse PRBC as clinically indicated.   Transfuse PRBC if Hgb <7.0 or if symptomatic.   Follow CBC    Start PO FeOS4 325mg MWF if no constipation.     IVF per medicine.   Encourage PO fluid and food intake.     DVT Prophylaxis  on Coumadin for CVA    INR mgmt per medicine and MD at [facility]    Long term prognosis poor due to poor functional status, dementia    Discussed with Dr Best.    Thank you for consulting us.   No additional recommendations at current time.   Will sign off on case for now.   Please call, or re-consult if needed.  [ASSESSMENT and  PLAN]  L03. 116   LLE cellulitis  D68.32     Coagulopathy due to coumadin  D63.8       Anemia chronic disease  D50.0       hx Fe def Anemia  J90          L effusion  J18.9        ? R sided infiltrate on CXR    89yo  F, resident of [facility] Admitted with LLE infection.   septic workup , iv abx ,pain management and wound care   BIBEMS wounds to left lower leg for unknown duration, +weeping, +draining, +c/o pain   pt is poor historian     Prior admission Nov 2023 with LLE cellulitis and seen by wound care MD & ID   Discharged on po doxycycline and cipro & with topical care   Pt recently completed a course of PO abx for this with no significant improvement.     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    Patient was hospitalized in 2023  with altered mental status concern for UTI , also  cellulitis to the LLE at the site of a skin graft.     Hematology asked to see for elevated INR  on ABx  poor PO intake  On coumadin for CVA.  s/p VItK 2d ago.   INR 2.5    Anemia due to chronic disease  Anemia due to Fe def. Prior Fe def in Aug.      Likely Fe def now.      Defer IV iron in setting of acute infection    LLE cellulitis  ? R side pneumonia  L effusion      RECOMMENDATIONS    IV abx per ID    Transfuse PRBC as clinically indicated.   Transfuse PRBC if Hgb <7.0 or if symptomatic.   Follow CBC    Start PO FeOS4 325mg MWF if no constipation.     IVF per medicine.   Encourage PO fluid and food intake.     DVT Prophylaxis  on Coumadin for CVA    INR mgmt per medicine and MD at [facility]    Long term prognosis poor due to poor functional status, dementia    Discussed with Dr Best.    Thank you for consulting us.   No additional recommendations at current time.   Will sign off on case for now.   Please call, or re-consult if needed.

## 2024-01-10 NOTE — CONSULT NOTE ADULT - SUBJECTIVE AND OBJECTIVE BOX
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  Patient was hospitalized in   g with altered mental status concern for UTI , also  cellulitis to the LLE at the site of a skin graft. Pt recently completed a course of po abx for this with no significant improvement. bib ems with wounds to left lower leg for unknown period of time, now weeping and draining, pt c/o pain but pt is poor historian She was admitted in 2023 with LLE cellulitis and seen by wound care MD & ID -discharged on po doxycycline and cipro & with topical care .Admitted for septic workup , iv abx ,pain management and wound care  (2024 18:15)      PAST MEDICAL & SURGICAL HISTORY:  Hypertension  CVA (cerebral vascular accident)  Depression  Constipation  Neuropathy  Hyperlipidemia  Grade I diastolic dysfunction  Afib  Neuropathy  VHD (valvular heart disease)  Aortic valvar stenosis  Hypothyroidism  GERD (gastroesophageal reflux disease)  H/O skin graft       HEALTH ISSUES - PROBLEM Dx:  Hypertension  Depression  Constipation  Neuropathy  Grade I diastolic dysfunction  Afib  GERD (gastroesophageal reflux disease)  Aortic valvar stenosis  Hypothyroidism  Open wound of left lower leg  Left leg cellulitis  Prophylactic measure  Debility  ACP (advance care planning)  Palliative care encounter    Other injury of unspecified body region, initial encounter [T14.8XXA]  Hypertension [I10]  CVA (cerebral vascular accident) [I63.9]  Depression [F32.9]  Constipation [K59.00]  Neuropathy [G62.9]  Constipation [K59.00]  Hyperlipidemia [E78.5]  Grade I diastolic dysfunction [I51.89]  Afib [I48.91]  Neuropathy [G62.9]  VHD (valvular heart disease) [I38]  Aortic valvar stenosis [I35.0]  Hypothyroidism [E03.9]  GERD (gastroesophageal reflux disease) [K21.9]  No significant past surgical history [[id number]]  H/O skin graft [Z94.5]  Hypertension [I10]  Depression [F32.9]  Afib [I48.91]  Grade I diastolic dysfunction [I51.89]  Left leg cellulitis [L03.116]  GERD (gastroesophageal reflux disease) [K21.9]  Hypothyroidism [E03.9]  Constipation [K59.00]      FAMILY HISTORY:  unable to obtain    [SOCIAL HISTORY: ]     smoking:  none currently     EtOH:  none currently     illicit drugs:  none currently     occupation:  Retired     marital status:       Other:       [ALLERGIES/INTOLERANCES:]  Allergies       per son &quot;issues with certain anitibiotics&quot; does not remember the names - Patient has tolerated zosyn, ceftriaxone, bactrim, and vancomycin on past admissions. (Unknown)  Intolerances        [MEDICATIONS]  MEDICATIONS  (STANDING):  acetaminophen     Tablet .. 1000 milliGRAM(s) Oral every 8 hours  amLODIPine   Tablet 5 milliGRAM(s) Oral daily  calcium carbonate   1250 mG (OsCal) 1 Tablet(s) Oral daily  cefepime   IVPB 2000 milliGRAM(s) IV Intermittent every 12 hours  chlorhexidine 4% Liquid 1 Application(s) Topical <User Schedule>  cholecalciferol 2000 Unit(s) Oral daily  dextrose 5% + sodium chloride 0.45%. 1000 milliLiter(s) (40 mL/Hr) IV Continuous <Continuous>  escitalopram 20 milliGRAM(s) Oral daily  folic acid 1 milliGRAM(s) Oral daily  gabapentin 300 milliGRAM(s) Oral three times a day  hydroxyurea 500 milliGRAM(s) Oral two times a day  lactobacillus acidophilus 1 Tablet(s) Oral every 12 hours  levothyroxine 50 MICROGram(s) Oral daily  metoprolol tartrate 25 milliGRAM(s) Oral two times a day  pantoprazole    Tablet 40 milliGRAM(s) Oral before breakfast  polyethylene glycol 3350 17 Gram(s) Oral daily  senna 2 Tablet(s) Oral at bedtime  simvastatin 10 milliGRAM(s) Oral at bedtime  vancomycin  IVPB 1000 milliGRAM(s) IV Intermittent every 24 hours    MEDICATIONS  (PRN):  aluminum hydroxide/magnesium hydroxide/simethicone Suspension 30 milliLiter(s) Oral every 4 hours PRN Dyspepsia  bisacodyl Suppository 10 milliGRAM(s) Rectal daily PRN Constipation  magnesium hydroxide Suspension 30 milliLiter(s) Oral daily PRN Constipation  melatonin 3 milliGRAM(s) Oral at bedtime PRN Insomnia  morphine  - Injectable 2 milliGRAM(s) IV Push every 4 hours PRN Severe Pain (7 - 10)  ondansetron Injectable 4 milliGRAM(s) IV Push every 8 hours PRN Nausea and/or Vomiting  sodium chloride 0.9% lock flush 10 milliLiter(s) IV Push every 1 hour PRN Pre/post blood products, medications, blood draw, and to maintain line patency  traMADol 50 milliGRAM(s) Oral four times a day PRN Moderate Pain (4 - 6)      [REVIEW OF SYSTEMS: ]  unable to obtain    [VITALS SIGNS 24hrs]  Vital Signs Last 24 Hrs  T(C): 36.9 (10 Luther 2024 11:04), Max: 37.1 (2024 21:37)  T(F): 98.5 (10 Luther 2024 11:04), Max: 98.7 (2024 21:37)  HR: 86 (10 Luther 2024 11:04) (86 - 93)  BP: 96/62 (10 Luther 2024 11:04) (96/62 - 112/68)  BP(mean): --  RR: 18 (10 Luther 2024 11:04) (18 - 18)  SpO2: 92% (10 Luther 2024 11:04) (90% - 92%)    Parameters below as of 10 Luther 2024 11:04  Patient On (Oxygen Delivery Method): room air    Daily     Daily Weight in k (10 Luther 2024 05:38)    I&O's Summary      [PHYSICAL EXAM]  GEN: elderly, chronically ill looking, frail F   HEENT: normocephalic and atraumatic. EOMI. .    NECK: Supple.  No lymphadenopathy   LUNGS: Clear to auscultation.  HEART: S1S2 Regular rate and rhythm, no MRG  ABDOMEN: Soft, nontender, and nondistended.  Positive bowel sounds.    : No CVA tenderness  EXTREMITIES: +edema.  PSYCHIATRIC: flat affect .  SKIN: No rash       [LABS: ]                        10.8   16.73 )-----------( 656      ( 10 Luther 2024 11:50 )             34.8     CBC Full  -  ( 10 Luther 2024 11:50 )  WBC Count : 16.73 K/uL  RBC Count : 3.15 M/uL  Hemoglobin : 10.8 g/dL  Hematocrit : 34.8 %  Platelet Count - Automated : 656 K/uL  Mean Cell Volume : 110.5 fl  Mean Cell Hemoglobin : 34.3 pg  Mean Cell Hemoglobin Concentration : 31.0 gm/dL  Auto Neutrophil # : x  Auto Lymphocyte # : x  Auto Monocyte # : x  Auto Eosinophil # : x  Auto Basophil # : x  Auto Neutrophil % : x  Auto Lymphocyte % : x  Auto Monocyte % : x  Auto Eosinophil % : x  Auto Basophil % : x    01-10    138  |  110<H>  |  14  ----------------------------<  94  3.8   |  24  |  0.47<L>    Ca    7.9<L>      10 Luther 2024 11:50    TPro  5.1<L>  /  Alb  1.6<L>  /  TBili  0.4  /  DBili  x   /  AST  19  /  ALT  10<L>  /  AlkPhos  84      PT/INR - ( 10 Luther 2024 11:50 )   PT: 27.9 sec;   INR: 2.45 ratio           LIVER FUNCTIONS - ( 2024 06:30 )  Alb: 1.6 g/dL / Pro: 5.1 g/dL / ALK PHOS: 84 U/L / ALT: 10 U/L / AST: 19 U/L / GGT: x               Urinalysis Basic - ( 10 Luther 2024 11:50 )    Color: x / Appearance: x / SG: x / pH: x  Gluc: 94 mg/dL / Ketone: x  / Bili: x / Urobili: x   Blood: x / Protein: x / Nitrite: x   Leuk Esterase: x / RBC: x / WBC x   Sq Epi: x / Non Sq Epi: x / Bacteria: x          CBC TREND (5 Days)  WBC Count: 16.73 K/uL (01-10 @ 11:50)  WBC Count: 12.92 K/uL ( @ 06:30)  WBC Count: 17.16 K/uL ( @ 06:23)    Hemoglobin: 10.8 g/dL (01-10 @ 11:50)  Hemoglobin: 9.4 g/dL ( @ 06:30)  Hemoglobin: 9.7 g/dL ( 06:23)    Hematocrit: 34.8 % (01-10 @ 11:50)  Hematocrit: 30.4 % ( @ 06:30)  Hematocrit: 30.5 % ( @ 06:23)    Platelet Count - Automated: 656 K/uL (01-10 @ 11:50)  Platelet Count - Automated: 630 K/uL ( @ 06:30)  Platelet Count - Automated: 663 K/uL ( @ 06:23)    Reticulocyte Percent: 2.4 % ( @ 14:00)      Ferritin: 36 ng/mL ( @ 20:19)  Ferritin: 35 ng/mL ( @ 14:00)    Iron Total: 24 ug/dL ( @ 08:46)  Iron Total: 32 ug/dL ( @ 20:19)  Iron Total: 31 ug/dL ( @ 14:00)     Vitamin B12, Serum: 1011 pg/mL ( @ 08:46)  Vitamin B12, Serum: >2000 pg/mL ( @ 20:19)  Vitamin B12, Serum: >2000 pg/mL ( @ 14:00)  Vitamin B12, Serum: 1738 pg/mL ( @ 11:18)     Folate, Serum: >20.0 ng/mL ( @ 20:19)  Folate, Serum: >20.0 ng/mL ( @ 14:00)  Folate, Serum: >20.0 ng/mL ( @ 11:18)       [MICROBIOLOGY /  VIROLOGY:]  COVID-19 PCR: NotDetec (22 Aug 2023 10:54)  SARS-CoV-2: NotDetec (01 Aug 2023 22:40)      [PATHOLOGY]       [RADIOLOGY & ADDITIONAL STUDIES:]       < from: Xray Chest 1 View- PORTABLE-Routine (Xray Chest 1 View- PORTABLE-Routine in AM.) (24 @ 09:11) >    ACC: [id number] EXAM:  XR CHEST PORTABLE ROUTINE 1V   ORDERED BY: KIMI CR     PROCEDURE DATE:  2024          INTERPRETATION:  Chest one view    HISTORY: Pneumonia    COMPARISON STUDY: 1/3/2024    Rotated expiratory view of the chest shows the heart to be normal in   size. The lungs show questionable medial right infiltrate with small left   effusion and there is no evidence of pneumothorax nor right pleural   effusion.    IMPRESSION:  Left effusion. Questionable right infiltrate. Follow-up study is   recommended as clinically warranted.    Thank you for the courtesy of this referral.  --- End of Report ---    JEM LEO MD; Attending Interventional Radiologist  This document has been electronically signed. Luther 10 2024 11:28AM   Patient is a 89y old  Female who presents with a chief complaint of LLE OPEN WOUND (10 Luther 2024 09:11)      HPI:  89yo female  ,[facility] resident with hx of Past medical history of CVA on Coumadin with right-sided weakness, hypertension hyperlipidemia CAD history of A-fib on Coumadinhypertension, phlebitis, 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  Patient was hospitalized in   g with altered mental status concern for UTI , also  cellulitis to the LLE at the site of a skin graft. Pt recently completed a course of po abx for this with no significant improvement. bib ems with wounds to left lower leg for unknown period of time, now weeping and draining, pt c/o pain but pt is poor historian She was admitted in 2023 with LLE cellulitis and seen by wound care MD & ID -discharged on po doxycycline and cipro & with topical care .Admitted for septic workup , iv abx ,pain management and wound care  (2024 18:15)      PAST MEDICAL & SURGICAL HISTORY:  Hypertension  CVA (cerebral vascular accident)  Depression  Constipation  Neuropathy  Hyperlipidemia  Grade I diastolic dysfunction  Afib  Neuropathy  VHD (valvular heart disease)  Aortic valvar stenosis  Hypothyroidism  GERD (gastroesophageal reflux disease)  H/O skin graft       HEALTH ISSUES - PROBLEM Dx:  Hypertension  Depression  Constipation  Neuropathy  Grade I diastolic dysfunction  Afib  GERD (gastroesophageal reflux disease)  Aortic valvar stenosis  Hypothyroidism  Open wound of left lower leg  Left leg cellulitis  Prophylactic measure  Debility  ACP (advance care planning)  Palliative care encounter    Other injury of unspecified body region, initial encounter [T14.8XXA]  Hypertension [I10]  CVA (cerebral vascular accident) [I63.9]  Depression [F32.9]  Constipation [K59.00]  Neuropathy [G62.9]  Constipation [K59.00]  Hyperlipidemia [E78.5]  Grade I diastolic dysfunction [I51.89]  Afib [I48.91]  Neuropathy [G62.9]  VHD (valvular heart disease) [I38]  Aortic valvar stenosis [I35.0]  Hypothyroidism [E03.9]  GERD (gastroesophageal reflux disease) [K21.9]  No significant past surgical history [[id number]]  H/O skin graft [Z94.5]  Hypertension [I10]  Depression [F32.9]  Afib [I48.91]  Grade I diastolic dysfunction [I51.89]  Left leg cellulitis [L03.116]  GERD (gastroesophageal reflux disease) [K21.9]  Hypothyroidism [E03.9]  Constipation [K59.00]      FAMILY HISTORY:  unable to obtain    [SOCIAL HISTORY: ]     smoking:  none currently     EtOH:  none currently     illicit drugs:  none currently     occupation:  Retired     marital status:       Other:       [ALLERGIES/INTOLERANCES:]  Allergies       per son &quot;issues with certain anitibiotics&quot; does not remember the names - Patient has tolerated zosyn, ceftriaxone, bactrim, and vancomycin on past admissions. (Unknown)  Intolerances        [MEDICATIONS]  MEDICATIONS  (STANDING):  acetaminophen     Tablet .. 1000 milliGRAM(s) Oral every 8 hours  amLODIPine   Tablet 5 milliGRAM(s) Oral daily  calcium carbonate   1250 mG (OsCal) 1 Tablet(s) Oral daily  cefepime   IVPB 2000 milliGRAM(s) IV Intermittent every 12 hours  chlorhexidine 4% Liquid 1 Application(s) Topical <User Schedule>  cholecalciferol 2000 Unit(s) Oral daily  dextrose 5% + sodium chloride 0.45%. 1000 milliLiter(s) (40 mL/Hr) IV Continuous <Continuous>  escitalopram 20 milliGRAM(s) Oral daily  folic acid 1 milliGRAM(s) Oral daily  gabapentin 300 milliGRAM(s) Oral three times a day  hydroxyurea 500 milliGRAM(s) Oral two times a day  lactobacillus acidophilus 1 Tablet(s) Oral every 12 hours  levothyroxine 50 MICROGram(s) Oral daily  metoprolol tartrate 25 milliGRAM(s) Oral two times a day  pantoprazole    Tablet 40 milliGRAM(s) Oral before breakfast  polyethylene glycol 3350 17 Gram(s) Oral daily  senna 2 Tablet(s) Oral at bedtime  simvastatin 10 milliGRAM(s) Oral at bedtime  vancomycin  IVPB 1000 milliGRAM(s) IV Intermittent every 24 hours    MEDICATIONS  (PRN):  aluminum hydroxide/magnesium hydroxide/simethicone Suspension 30 milliLiter(s) Oral every 4 hours PRN Dyspepsia  bisacodyl Suppository 10 milliGRAM(s) Rectal daily PRN Constipation  magnesium hydroxide Suspension 30 milliLiter(s) Oral daily PRN Constipation  melatonin 3 milliGRAM(s) Oral at bedtime PRN Insomnia  morphine  - Injectable 2 milliGRAM(s) IV Push every 4 hours PRN Severe Pain (7 - 10)  ondansetron Injectable 4 milliGRAM(s) IV Push every 8 hours PRN Nausea and/or Vomiting  sodium chloride 0.9% lock flush 10 milliLiter(s) IV Push every 1 hour PRN Pre/post blood products, medications, blood draw, and to maintain line patency  traMADol 50 milliGRAM(s) Oral four times a day PRN Moderate Pain (4 - 6)      [REVIEW OF SYSTEMS: ]  unable to obtain    [VITALS SIGNS 24hrs]  Vital Signs Last 24 Hrs  T(C): 36.9 (10 Luther 2024 11:04), Max: 37.1 (2024 21:37)  T(F): 98.5 (10 Luther 2024 11:04), Max: 98.7 (2024 21:37)  HR: 86 (10 Luther 2024 11:04) (86 - 93)  BP: 96/62 (10 Luther 2024 11:04) (96/62 - 112/68)  BP(mean): --  RR: 18 (10 Luther 2024 11:04) (18 - 18)  SpO2: 92% (10 Luther 2024 11:04) (90% - 92%)    Parameters below as of 10 Luther 2024 11:04  Patient On (Oxygen Delivery Method): room air    Daily     Daily Weight in k (10 Luther 2024 05:38)    I&O's Summary      [PHYSICAL EXAM]  GEN: elderly, chronically ill looking, frail F   HEENT: normocephalic and atraumatic. EOMI. .    NECK: Supple.  No lymphadenopathy   LUNGS: Clear to auscultation.  HEART: S1S2 Regular rate and rhythm, no MRG  ABDOMEN: Soft, nontender, and nondistended.  Positive bowel sounds.    : No CVA tenderness  EXTREMITIES: +edema.  PSYCHIATRIC: flat affect .  SKIN: No rash       [LABS: ]                        10.8   16.73 )-----------( 656      ( 10 Luther 2024 11:50 )             34.8     CBC Full  -  ( 10 Luther 2024 11:50 )  WBC Count : 16.73 K/uL  RBC Count : 3.15 M/uL  Hemoglobin : 10.8 g/dL  Hematocrit : 34.8 %  Platelet Count - Automated : 656 K/uL  Mean Cell Volume : 110.5 fl  Mean Cell Hemoglobin : 34.3 pg  Mean Cell Hemoglobin Concentration : 31.0 gm/dL  Auto Neutrophil # : x  Auto Lymphocyte # : x  Auto Monocyte # : x  Auto Eosinophil # : x  Auto Basophil # : x  Auto Neutrophil % : x  Auto Lymphocyte % : x  Auto Monocyte % : x  Auto Eosinophil % : x  Auto Basophil % : x    01-10    138  |  110<H>  |  14  ----------------------------<  94  3.8   |  24  |  0.47<L>    Ca    7.9<L>      10 Luther 2024 11:50    TPro  5.1<L>  /  Alb  1.6<L>  /  TBili  0.4  /  DBili  x   /  AST  19  /  ALT  10<L>  /  AlkPhos  84      PT/INR - ( 10 Luther 2024 11:50 )   PT: 27.9 sec;   INR: 2.45 ratio           LIVER FUNCTIONS - ( 2024 06:30 )  Alb: 1.6 g/dL / Pro: 5.1 g/dL / ALK PHOS: 84 U/L / ALT: 10 U/L / AST: 19 U/L / GGT: x               Urinalysis Basic - ( 10 Luther 2024 11:50 )    Color: x / Appearance: x / SG: x / pH: x  Gluc: 94 mg/dL / Ketone: x  / Bili: x / Urobili: x   Blood: x / Protein: x / Nitrite: x   Leuk Esterase: x / RBC: x / WBC x   Sq Epi: x / Non Sq Epi: x / Bacteria: x          CBC TREND (5 Days)  WBC Count: 16.73 K/uL (01-10 @ 11:50)  WBC Count: 12.92 K/uL ( @ 06:30)  WBC Count: 17.16 K/uL ( @ 06:23)    Hemoglobin: 10.8 g/dL (01-10 @ 11:50)  Hemoglobin: 9.4 g/dL ( @ 06:30)  Hemoglobin: 9.7 g/dL ( 06:23)    Hematocrit: 34.8 % (01-10 @ 11:50)  Hematocrit: 30.4 % ( @ 06:30)  Hematocrit: 30.5 % ( @ 06:23)    Platelet Count - Automated: 656 K/uL (01-10 @ 11:50)  Platelet Count - Automated: 630 K/uL ( @ 06:30)  Platelet Count - Automated: 663 K/uL ( @ 06:23)    Reticulocyte Percent: 2.4 % ( @ 14:00)      Ferritin: 36 ng/mL ( @ 20:19)  Ferritin: 35 ng/mL ( @ 14:00)    Iron Total: 24 ug/dL ( @ 08:46)  Iron Total: 32 ug/dL ( @ 20:19)  Iron Total: 31 ug/dL ( @ 14:00)     Vitamin B12, Serum: 1011 pg/mL ( @ 08:46)  Vitamin B12, Serum: >2000 pg/mL ( @ 20:19)  Vitamin B12, Serum: >2000 pg/mL ( @ 14:00)  Vitamin B12, Serum: 1738 pg/mL ( @ 11:18)     Folate, Serum: >20.0 ng/mL ( @ 20:19)  Folate, Serum: >20.0 ng/mL ( @ 14:00)  Folate, Serum: >20.0 ng/mL ( @ 11:18)       [MICROBIOLOGY /  VIROLOGY:]  COVID-19 PCR: NotDetec (22 Aug 2023 10:54)  SARS-CoV-2: NotDetec (01 Aug 2023 22:40)      [PATHOLOGY]       [RADIOLOGY & ADDITIONAL STUDIES:]       < from: Xray Chest 1 View- PORTABLE-Routine (Xray Chest 1 View- PORTABLE-Routine in AM.) (24 @ 09:11) >    ACC: [id number] EXAM:  XR CHEST PORTABLE ROUTINE 1V   ORDERED BY: KIMI CR     PROCEDURE DATE:  2024          INTERPRETATION:  Chest one view    HISTORY: Pneumonia    COMPARISON STUDY: 1/3/2024    Rotated expiratory view of the chest shows the heart to be normal in   size. The lungs show questionable medial right infiltrate with small left   effusion and there is no evidence of pneumothorax nor right pleural   effusion.    IMPRESSION:  Left effusion. Questionable right infiltrate. Follow-up study is   recommended as clinically warranted.    Thank you for the courtesy of this referral.  --- End of Report ---    JEM LEO MD; Attending Interventional Radiologist  This document has been electronically signed. Luther 10 2024 11:28AM

## 2024-01-10 NOTE — SOCIAL WORK PROGRESS NOTE - NSSWPROGRESSNOTE_GEN_ALL_CORE
pt for dc today to [facility]. Pt and son susan in agreement. [facility] called for 5pm . All paperwork to accompany patient. No further  services indicated at this time. Plan: [facility].  [facility] for dc today [facility]. Pt and son susan in agreement. [facility] called for 5pm . All paperwork to accompany patient. No further  services indicated at this time. Plan: [facility]  for 5pm.

## 2024-01-10 NOTE — PROGRESS NOTE ADULT - PROBLEM SELECTOR PLAN 6
continue home medications ,ins/outs , monitor fluid status closely

## 2024-01-10 NOTE — PROGRESS NOTE ADULT - PROBLEM SELECTOR PLAN 10
pain management ,PT /OT

## 2024-01-10 NOTE — DISCHARGE NOTE NURSING/CASE MANAGEMENT/SOCIAL WORK - NSDCFUADDAPPT_GEN_ALL_CORE_FT
Continue IV Cefepime 2 gm q12h and IV Vancomycin 1 gm daily to Jan 20, 2024 to complete a 2 week course .Monitor VANCO trough as per PCP at [facility] .Followup with private dermatologist Dr Cardoza in 3-4  weeks ( discussed with then natali Carlos) Continue IV Cefepime 2 gm q12h and IV Vancomycin 1 gm daily to Jan 20, 2024 to complete a 2 week course .Monitor VANCO trough as per PCP at [facility] .Followup with private dermatologist Dr Cardoza in 3-4  weeks ( discussed with then natali Carlos)

## 2024-01-10 NOTE — PROGRESS NOTE ADULT - SUBJECTIVE AND OBJECTIVE BOX
CHIEF COMPLAINT/ REASON FOR VISIT  .. Patient was seen to address the  issue listed under PROBLEM LIST which is located toward bottom of this note     REJI BERGER    PLCORRIE [address] W[address]    Allergies    per son &quot;issues with certain anitibiotics&quot; does not remember the names - Patient has tolerated zosyn, ceftriaxone, bactrim, and vancomycin on past admissions. (Unknown)    Intolerances        PAST MEDICAL & SURGICAL HISTORY:  Hypertension      CVA (cerebral vascular accident)      Depression      Constipation      Neuropathy      Hyperlipidemia      Grade I diastolic dysfunction      Afib      Neuropathy      VHD (valvular heart disease)      Aortic valvar stenosis      Hypothyroidism      GERD (gastroesophageal reflux disease)      H/O skin graft          FAMILY HISTORY:      Home Medications:  acetaminophen 500 mg oral tablet: 1 tab(s) orally 2 times a day (22 Nov 2023 20:06)  acetaminophen 500 mg oral tablet: 2 tab(s) orally once a day (in the afternoon) (22 Nov 2023 20:06)  amLODIPine 5 mg oral tablet: 1 tab(s) orally once a day (22 Nov 2023 20:06)  Biofreeze 4% topical gel: Apply topically to affected area 2 times a day (22 Nov 2023 20:06)  cholecalciferol 50 mcg (2000 intl units) oral tablet: 1 tab(s) orally once a day (22 Nov 2023 20:06)  escitalopram 20 mg oral tablet: 1 tab(s) orally once a day (22 Nov 2023 20:06)  famotidine 20 mg oral tablet: 1 tab(s) orally once a day (22 Nov 2023 20:06)  folic acid 1 mg oral tablet: 1 tab(s) orally once a day (22 Nov 2023 20:06)  furosemide 20 mg oral tablet: 1 tab(s) orally once a day (22 Nov 2023 20:06)  gabapentin 300 mg oral capsule: 1 cap(s) orally 3 times a day (22 Nov 2023 20:06)  hydroxyurea 500 mg oral capsule: 1 tab(s) orally 2 times a day (22 Nov 2023 20:06)  levothyroxine 50 mcg (0.05 mg) oral tablet: 1 tab(s) orally once a day (22 Nov 2023 20:06)  melatonin 3 mg oral tablet: 1 tab(s) orally once a day (22 Nov 2023 20:06)  metoprolol tartrate 25 mg oral tablet: 1 tab(s) orally 2 times a day (22 Nov 2023 20:06)  miconazole 2% topical cream: Apply topically to affected area once a day (22 Nov 2023 20:06)  senna (sennosides) 8.6 mg oral tablet: 2 tab(s) orally once a day (22 Nov 2023 20:06)  simvastatin 10 mg oral tablet: 1 tab(s) orally once a day (22 Nov 2023 20:06)  traMADol 50 mg oral tablet: 1 tab(s) orally 2 times a day hold for lethargy (22 Nov 2023 20:06)  traMADol 50 mg oral tablet: 1 tab(s) orally every 6 hours as needed for pain (22 Nov 2023 20:06)      MEDICATIONS  (STANDING):  acetaminophen     Tablet .. 1000 milliGRAM(s) Oral every 8 hours  amLODIPine   Tablet 5 milliGRAM(s) Oral daily  calcium carbonate   1250 mG (OsCal) 1 Tablet(s) Oral daily  cefepime   IVPB 2000 milliGRAM(s) IV Intermittent every 12 hours  cholecalciferol 2000 Unit(s) Oral daily  dextrose 5% + sodium chloride 0.45%. 1000 milliLiter(s) (40 mL/Hr) IV Continuous <Continuous>  escitalopram 20 milliGRAM(s) Oral daily  folic acid 1 milliGRAM(s) Oral daily  gabapentin 300 milliGRAM(s) Oral three times a day  hydroxyurea 500 milliGRAM(s) Oral two times a day  lactobacillus acidophilus 1 Tablet(s) Oral every 12 hours  levothyroxine 50 MICROGram(s) Oral daily  metoprolol tartrate 25 milliGRAM(s) Oral two times a day  pantoprazole    Tablet 40 milliGRAM(s) Oral before breakfast  polyethylene glycol 3350 17 Gram(s) Oral daily  senna 2 Tablet(s) Oral at bedtime  simvastatin 10 milliGRAM(s) Oral at bedtime  vancomycin  IVPB 1000 milliGRAM(s) IV Intermittent every 24 hours    MEDICATIONS  (PRN):  aluminum hydroxide/magnesium hydroxide/simethicone Suspension 30 milliLiter(s) Oral every 4 hours PRN Dyspepsia  bisacodyl Suppository 10 milliGRAM(s) Rectal daily PRN Constipation  magnesium hydroxide Suspension 30 milliLiter(s) Oral daily PRN Constipation  melatonin 3 milliGRAM(s) Oral at bedtime PRN Insomnia  morphine  - Injectable 2 milliGRAM(s) IV Push every 4 hours PRN Severe Pain (7 - 10)  ondansetron Injectable 4 milliGRAM(s) IV Push every 8 hours PRN Nausea and/or Vomiting  traMADol 50 milliGRAM(s) Oral four times a day PRN Moderate Pain (4 - 6)      Diet, DASH/TLC:   Sodium & Cholesterol Restricted  Easy to Chew (EASYTOCHEW) (01-03-24 @ 18:42) [Active]          Vital Signs Last 24 Hrs  T(C): 36.3 (10 Luther 2024 05:38), Max: 37.1 (09 Jan 2024 13:01)  T(F): 97.4 (10 Luther 2024 05:38), Max: 98.8 (09 Jan 2024 13:01)  HR: 87 (10 Luther 2024 05:38) (87 - 103)  BP: 110/63 (10 Luther 2024 05:38) (101/53 - 112/68)  BP(mean): --  RR: 18 (10 Luther 2024 05:38) (18 - 19)  SpO2: 90% (10 Luther 2024 05:38) (90% - 91%)    Parameters below as of 10 Luther 2024 05:38  Patient On (Oxygen Delivery Method): room air                  LABS:                        9.4    12.92 )-----------( 630      ( 09 Jan 2024 06:30 )             30.4     01-09    138  |  109<H>  |  12  ----------------------------<  97  3.5   |  27  |  0.39<L>    Ca    7.9<L>      09 Jan 2024 06:30    TPro  5.1<L>  /  Alb  1.6<L>  /  TBili  0.4  /  DBili  x   /  AST  19  /  ALT  10<L>  /  AlkPhos  84  01-09    PT/INR - ( 09 Jan 2024 06:30 )   PT: 34.8 sec;   INR: 3.07 ratio           Urinalysis Basic - ( 09 Jan 2024 06:30 )    Color: x / Appearance: x / SG: x / pH: x  Gluc: 97 mg/dL / Ketone: x  / Bili: x / Urobili: x   Blood: x / Protein: x / Nitrite: x   Leuk Esterase: x / RBC: x / WBC x   Sq Epi: x / Non Sq Epi: x / Bacteria: x            WBC:  WBC Count: 12.92 K/uL (01-09 @ 06:30)  WBC Count: 17.16 K/uL (01-08 @ 06:23)  WBC Count: 11.46 K/uL (01-07 @ 06:26)  WBC Count: 11.35 K/uL (01-06 @ 09:28)      MICROBIOLOGY:  RECENT CULTURES:  01-03 Wound Wound Methicillin resistant Staphylococcus aureus  Pseudomonas aeruginosa XXXX   Culture yields growth of greater than 3 colony types of  bacteria,  which may indicate contamination and normal jessica  Call client services within 7 days if further workup is clinically  indicated.  Culture includes  Few Methicillin Resistant Staphylococcus aureus  Moderate Streptococcus dysgalactiae (Group C/G) "Susceptibilities not  performed"  Numerous Pseudomonas aeruginosa Multiple Morphological Strains    01-03 .Abscess Leg - Left Pseudomonas aeruginosa  Enterococcus faecalis  Proteus mirabilis   No polymorphonuclear cells seen per low power field  Numerous Gram positive cocci in pairs seen per oil power field  Numerous Gram Negative Rods seen per oil power field   Numerous Pseudomonas aeruginosa  Moderate Enterococcus faecalis  Moderate Proteus mirabilis  Moderate Streptococcus dysgalactiae (Group C/G) "Susceptibilities not  performed"    01-03 .Blood Blood-Peripheral XXXX XXXX   No growth at 5 days    01-03 .Blood Blood-Peripheral XXXX XXXX   No growth at 5 days                PT/INR - ( 09 Jan 2024 06:30 )   PT: 34.8 sec;   INR: 3.07 ratio             Sodium:  Sodium: 138 mmol/L (01-09 @ 06:30)  Sodium: 139 mmol/L (01-08 @ 06:23)  Sodium: 139 mmol/L (01-07 @ 06:26)  Sodium: 143 mmol/L (01-06 @ 09:28)      0.39 mg/dL 01-09 @ 06:30  0.45 mg/dL 01-08 @ 06:23  0.38 mg/dL 01-07 @ 06:26  0.44 mg/dL 01-06 @ 09:28      Hemoglobin:  Hemoglobin: 9.4 g/dL (01-09 @ 06:30)  Hemoglobin: 9.7 g/dL (01-08 @ 06:23)  Hemoglobin: 10.0 g/dL (01-07 @ 06:26)  Hemoglobin: 8.9 g/dL (01-06 @ 09:28)      Platelets: Platelet Count - Automated: 630 K/uL (01-09 @ 06:30)  Platelet Count - Automated: 663 K/uL (01-08 @ 06:23)  Platelet Count - Automated: 707 K/uL (01-07 @ 06:26)  Platelet Count - Automated: 494 K/uL (01-06 @ 09:28)      LIVER FUNCTIONS - ( 09 Jan 2024 06:30 )  Alb: 1.6 g/dL / Pro: 5.1 g/dL / ALK PHOS: 84 U/L / ALT: 10 U/L / AST: 19 U/L / GGT: x             Urinalysis Basic - ( 09 Jan 2024 06:30 )    Color: x / Appearance: x / SG: x / pH: x  Gluc: 97 mg/dL / Ketone: x  / Bili: x / Urobili: x   Blood: x / Protein: x / Nitrite: x   Leuk Esterase: x / RBC: x / WBC x   Sq Epi: x / Non Sq Epi: x / Bacteria: x        RADIOLOGY & ADDITIONAL STUDIES:      MICROBIOLOGY:  RECENT CULTURES:  01-03 Wound Wound Methicillin resistant Staphylococcus aureus  Pseudomonas aeruginosa XXXX   Culture yields growth of greater than 3 colony types of  bacteria,  which may indicate contamination and normal jessica  Call client services within 7 days if further workup is clinically  indicated.  Culture includes  Few Methicillin Resistant Staphylococcus aureus  Moderate Streptococcus dysgalactiae (Group C/G) "Susceptibilities not  performed"  Numerous Pseudomonas aeruginosa Multiple Morphological Strains    01-03 .Abscess Leg - Left Pseudomonas aeruginosa  Enterococcus faecalis  Proteus mirabilis   No polymorphonuclear cells seen per low power field  Numerous Gram positive cocci in pairs seen per oil power field  Numerous Gram Negative Rods seen per oil power field   Numerous Pseudomonas aeruginosa  Moderate Enterococcus faecalis  Moderate Proteus mirabilis  Moderate Streptococcus dysgalactiae (Group C/G) "Susceptibilities not  performed"    01-03 .Blood Blood-Peripheral XXXX XXXX   No growth at 5 days    01-03 .Blood Blood-Peripheral XXXX XXXX   No growth at 5 days

## 2024-01-10 NOTE — PROGRESS NOTE ADULT - PROBLEM SELECTOR PROBLEM 1
Open wound of left lower leg

## 2024-01-10 NOTE — DISCHARGE NOTE NURSING/CASE MANAGEMENT/SOCIAL WORK - PATIENT PORTAL LINK FT
You can access the FollowMyHealth Patient Portal offered by [facility] by registering at the following website: [email or web address]. By joining [email or web address]’s FollowMyHealth portal, you will also be able to view your health information using other applications (apps) compatible with our system. You can access the FollowMyHealth Patient Portal offered by [facility] by registering at the following website: [email or web address]. By joining [email or web address]’s FollowMyHealth portal, you will also be able to view your health information using other applications (apps) compatible with our system.

## 2024-01-10 NOTE — PROGRESS NOTE ADULT - ASSESSMENT
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  Patient was hospitalized in 2023  g with altered mental status concern for UTI , also  cellulitis to the LLE at the site of a skin graft. Pt recently completed a course of po abx for this with no significant improvement. 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  Patient was hospitalized in 2023  g with altered mental status concern for UTI , also  cellulitis to the LLE at the site of a skin graft. Pt recently completed a course of po abx for this with no significant improvement. bib ems with wounds to left lower leg for unknown period of time, now weeping and draining, pt c/o pain but pt is poor historian She was admitted in november 2023 with LLE cellulitis and seen by wound care MD & ID -discharged on po doxycycline and cipro & with topical care .Admitted for septic workup , iv abx ,pain management and wound care

## 2024-01-10 NOTE — PROGRESS NOTE ADULT - PROBLEM SELECTOR PROBLEM 3
Bruna Crandall is a 5 year old male presenting with staple removal  Medications verified with patients assistance  ALLERGIES:  No Known Allergies  No Pcp  Weight without  shoes    Pharmacy verified    Patient would like communication of their results via:        Cell Phone: [phone] This is the correct phone number  Telephone Information:   Mobile [phone]     Okay to leave a message containing results? Yes     PT masked. Nurse wore PPE ( N95 mask, gloves, gown, face shield).  
Chief Complaint:  Suture Removal    History of Present Illness:  Bruna is a 5 year old male who presents with his family for staple removal.    Presents for staple removal.  Sustained 1.5cm laceration to scalp on 11/4.  Had 1 staple placed.  Reports area is healing well. Denies any bleeding or drainage.  No fevers.    REVIEW OF SYSTEMS:  Constitutional:  No fever. Normal energy    Skin:  See HPI    Reviewed past medical history, family history, medications and allergies.    Past Medical History:  There is no previous medical history on file.    Family History:  No family history on file.    Medications:    erythromycin (ILOTYCIN) ophthalmic ointment    No current facility-administered medications on file prior to visit.      Allergies:  Allergies as of 11/16/2021   • (No Known Allergies)         Immunizations:  Immunization History   Administered Date(s) Administered   • DTaP(INFANRIX) 11/03/2018       PHYSICAL EXAM:  Visit Vitals  Pulse 101   Temp 99.4 °F (37.4 °C) (Oral)   Resp 25   Wt 18.7 kg (41 lb 3.2 oz)   SpO2 99%     Constitutional:  Well developed, no acute distress, non-toxic appearance.   Integument:  1.5cm well-healing laceration of crown of scalp with 1 staple in place.     ASSESSMENT:   1. Removal of staple    Staple removed in clinic without complication.  Subsequently applied topical antibiotic to area. Provided with handouts and discussed home care.  Also provided with note for school.    PLAN:  -To continue daily application of topical antibiotic  -To follow-up if develops redness, swelling, bleeding, drainage, discharge, fevers, or concerns for wound infection.  -Discussed with family.  All questions answered.      
Hypertension

## 2024-01-10 NOTE — PROGRESS NOTE ADULT - ASSESSMENT
REASON FOR VISIT  .. Management of problems listed below      ROS  . Patient unable to give ROS     PHYSICAL EXAM    HEENT Unremarkable  atraumatic   RESP Fair air entry  Harsh breath sound   CARDIAC S1 S2 No S3     NO JVD    ABDOMEN No hepatosplenomegaly   PEDAL EDEMA present No calf tenderness  NO rash       GENERAL DATA .   GOC.  ..  1/4/2024 Full code   ICU STAY.  .. No  COVID. .. scv2 1/4/2024 (-)       BEST PRACTICE ISSUES.    HOB ELEVATN.  .. Yes  DVT PPLX.  ..   1/6 lvnx 40 (cleared ortho neuro)        DIET.   ..  1/4/2024 REGLR   IV fl. .. 1/4/2024 NS 50    STRESS ULCER PPLX.   .  ..   1/4/2024 protonix 40      ALLGY. ..  nka     WT. ..  1/4/2024 90   BMI. .. 1/4/2024 30       ABGS.   .  VS/ PO/IO/ VENT/ DRIPS.   1/10/2024 afeb 97 100/60   1/10/2024 ra 91%  . SUMMARY.     . 62 m never smoker works as [employer]  HO COVID 2021 has to lift heavy weights at times at work developed back pain few weeks ago and was given baclofen steroids and meloxicam as outpt was noted to be disoriented and febrile  and admitted 1/4/2024   . CTAP showed r psoas collection measures 2.9 cm susp for and abscess with an adjacent small collection to the right of L2 L3 disc space also susp for abscess   . No focal weakness no incontinence at admission   . Pulm consulted 1/4/2024 as he has cough     . OVERALL ASSESSMENT PLAN.   . COUGH   .. Symptomatic Rx    . PICC  .. 1/8/2024 r basilic picc     . DVT Pplx  .. cleared by spine neuro started lvnx 1/6    . HYPONATREMIA.   .. resolvd   .. In NS     . 1) Psoas abscess 2.7 cm r and 0.6 sm l   . 2)  L2 L3 spinal discitis/osteomyelitis   . 3) STREPT MITIS BACTEREMIA 1/4 (Not Gp a b or s pneum)    .. 1/4/2024 Neuro consulted-> 1/4/2024 MR W Augie lumbar plannd   .. 1/4/2024 ID consulted-> 1/4/2024 zosyn startd   .. 1/6/2023 antibios adjusted to rocephin 2g  .. 1/6 bc (-)  .. 1/6 echo no veges   .. 1/9/2024 christiano (-) vege (+) mod mr  .. 1/9/2024 repeat mr spine plannd     . SPINAL DISCITIS 1/4/2024 CTAP   .. 1/5/2024 spine specialist evaluated pt and wrote that pt has psoas abscess no epidural abscess no need for emergent spinal surgery    ....1/5 mr  l2 l3 disc osteomyelitis with adj 2.7 cm r psoas abscess and 0.7 cm l psoas abscess       . DISPO.  .. Follow  spine specialist id neuro jose luis   .. likely home iv abio till 2/17  .. repeat spine mr plannd       TIME SPENT.  . Over 36 minutes aggregate care time spent on encounter; activities included   direct patient care, counseling and/or coordinating care reviewing notes, lab data/ imaging , discussion with multidisciplinary team/ patient  /family and explaining in detail risks, benefits, alternatives  of the recommendations     PATIENT.  . MALA MURRELL 62 m 2/6/1961 1/4/2024  DR YURI BLANCO       REASON FOR VISIT  .. Management of problems listed below      ROS  . Patient unable to give ROS     PHYSICAL EXAM    HEENT Unremarkable  atraumatic   RESP Fair air entry  Harsh breath sound   CARDIAC S1 S2 No S3     NO JVD    ABDOMEN No hepatosplenomegaly   PEDAL EDEMA present No calf tenderness  NO rash       GENERAL DATA .   GOC.  ..  1/4/2024 Full code   ICU STAY.  .. No  COVID. .. scv2 1/4/2024 (-)       BEST PRACTICE ISSUES.    HOB ELEVATN.  .. Yes  DVT PPLX.  ..   1/6 lvnx 40 (cleared ortho neuro)        DIET.   ..  1/4/2024 REGLR   IV fl. .. 1/4/2024 NS 50    STRESS ULCER PPLX.   .  ..   1/4/2024 protonix 40      ALLGY. ..  nka     WT. ..  1/4/2024 90   BMI. .. 1/4/2024 30       ABGS.   .  VS/ PO/IO/ VENT/ DRIPS.   1/10/2024 afeb 97 100/60   1/10/2024 ra 91%  . SUMMARY.     . 62 m never smoker works as [employer]  HO COVID 2021 has to lift heavy weights at times at work developed back pain few weeks ago and was given baclofen steroids and meloxicam as outpt was noted to be disoriented and febrile  and admitted 1/4/2024   . CTAP showed r psoas collection measures 2.9 cm susp for and abscess with an adjacent small collection to the right of L2 L3 disc space also susp for abscess   . No focal weakness no incontinence at admission   . Pulm consulted 1/4/2024 as he has cough     . OVERALL ASSESSMENT PLAN.   . COUGH   .. Symptomatic Rx    . PICC  .. 1/8/2024 r basilic picc     . DVT Pplx  .. cleared by spine neuro started lvnx 1/6    . HYPONATREMIA.   .. resolvd   .. In NS     . 1) Psoas abscess 2.7 cm r and 0.6 sm l   . 2)  L2 L3 spinal discitis/osteomyelitis   . 3) STREPT MITIS BACTEREMIA 1/4 (Not Gp a b or s pneum)    .. 1/4/2024 Neuro consulted-> 1/4/2024 MR W Augie lumbar plannd   .. 1/4/2024 ID consulted-> 1/4/2024 zosyn startd   .. 1/6/2023 antibios adjusted to rocephin 2g  .. 1/6 bc (-)  .. 1/6 echo no veges   .. 1/9/2024 christiano (-) vege (+) mod mr  .. 1/9/2024 repeat mr spine plannd     . SPINAL DISCITIS 1/4/2024 CTAP   .. 1/5/2024 spine specialist evaluated pt and wrote that pt has psoas abscess no epidural abscess no need for emergent spinal surgery    ....1/5 mr  l2 l3 disc osteomyelitis with adj 2.7 cm r psoas abscess and 0.7 cm l psoas abscess       . DISPO.  .. Follow  spine specialist id neuro jose luis   .. likely home iv abio till 2/17  .. repeat spine mr plannd       TIME SPENT.  . Over 36 minutes aggregate care time spent on encounter; activities included   direct patient care, counseling and/or coordinating care reviewing notes, lab data/ imaging , discussion with multidisciplinary team/ patient  /family and explaining in detail risks, benefits, alternatives  of the recommendations     PATIENT.  . MALA MURRELL 62 m 2/6/1961 1/4/2024  DR YURI BLANCO

## 2024-01-10 NOTE — DISCHARGE NOTE NURSING/CASE MANAGEMENT/SOCIAL WORK - NSDCPEFALRISK_GEN_ALL_CORE
For information on Fall & Injury Prevention, visit: [email or web address] OR  [email or web address] OR  https://www.cdc.gov/steadi/patient.html For information on Fall & Injury Prevention, visit: [email or web address] OR  [email or web address] OR  https://www.cdc.gov/steadi/patient.html

## 2024-01-10 NOTE — PROGRESS NOTE ADULT - SUBJECTIVE AND OBJECTIVE BOX
PROGRESS NOTE  Patient is a 89y old  Female who presents with a chief complaint of LLE OPEN WOUND (10 Luther 2024 13:15)  Chart and available morning labs /imaging are reviewed electronically , urgent issues addressed . 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  Patient was hospitalized in 2023  g with altered mental status concern for UTI , also  cellulitis to the LLE at the site of a skin graft. Pt recently completed a course of po abx for this with no significant improvement. bib ems with wounds to left lower leg for unknown period of time, now weeping and draining, pt c/o pain but pt is poor historian She was admitted in november 2023 with LLE cellulitis and seen by wound care MD & ID -discharged on po doxycycline and cipro & with topical care .Admitted for septic workup , iv abx ,pain management and wound care  (03 Jan 2024 18:15)  PAST MEDICAL & SURGICAL HISTORY:  Hypertension  CVA (cerebral vascular accident)  Depression  Constipation  Neuropathy  Hyperlipidemia  Grade I diastolic dysfunction  Afib  Neuropathy  VHD (valvular heart disease)  Aortic valvar stenosis  Hypothyroidism  GERD (gastroesophageal reflux disease  H/O skin graft  MEDICATIONS  (STANDING):  acetaminophen     Tablet .. 1000 milliGRAM(s) Oral every 8 hours  amLODIPine   Tablet 5 milliGRAM(s) Oral daily  ascorbic acid 500 milliGRAM(s) Oral daily  calcium carbonate   1250 mG (OsCal) 1 Tablet(s) Oral daily  cefepime   IVPB 2000 milliGRAM(s) IV Intermittent every 12 hours  chlorhexidine 4% Liquid 1 Application(s) Topical <User Schedule>  cholecalciferol 2000 Unit(s) Oral daily  dextrose 5% + sodium chloride 0.45%. 1000 milliLiter(s) (40 mL/Hr) IV Continuous <Continuous>  escitalopram 20 milliGRAM(s) Oral daily  ferrous    sulfate 325 milliGRAM(s) Oral daily  folic acid 1 milliGRAM(s) Oral daily  gabapentin 300 milliGRAM(s) Oral three times a day  hydroxyurea 500 milliGRAM(s) Oral two times a day  lactobacillus acidophilus 1 Tablet(s) Oral every 12 hours  levothyroxine 50 MICROGram(s) Oral daily  metoprolol tartrate 25 milliGRAM(s) Oral two times a day  pantoprazole    Tablet 40 milliGRAM(s) Oral before breakfast  polyethylene glycol 3350 17 Gram(s) Oral daily  senna 2 Tablet(s) Oral at bedtime  simvastatin 10 milliGRAM(s) Oral at bedtime  vancomycin  IVPB 1000 milliGRAM(s) IV Intermittent every 24 hours    MEDICATIONS  (PRN):  aluminum hydroxide/magnesium hydroxide/simethicone Suspension 30 milliLiter(s) Oral every 4 hours PRN Dyspepsia  bisacodyl Suppository 10 milliGRAM(s) Rectal daily PRN Constipation  magnesium hydroxide Suspension 30 milliLiter(s) Oral daily PRN Constipation  melatonin 3 milliGRAM(s) Oral at bedtime PRN Insomnia  morphine  - Injectable 2 milliGRAM(s) IV Push every 4 hours PRN Severe Pain (7 - 10)  ondansetron Injectable 4 milliGRAM(s) IV Push every 8 hours PRN Nausea and/or Vomiting  sodium chloride 0.9% lock flush 10 milliLiter(s) IV Push every 1 hour PRN Pre/post blood products, medications, blood draw, and to maintain line patency  traMADol 50 milliGRAM(s) Oral four times a day PRN Moderate Pain (4 - 6)      OBJECTIVE    T(C): 36.8 (01-10-24 @ 13:15), Max: 37.1 (01-09-24 @ 21:37)  HR: 97 (01-10-24 @ 13:15) (86 - 97)  BP: 108/63 (01-10-24 @ 13:15) (96/62 - 112/68)  RR: 18 (01-10-24 @ 13:15) (18 - 18)  SpO2: 91% (01-10-24 @ 13:15) (90% - 92%)  Wt(kg): --  I&O's Summary  REVIEW OF SYSTEMS:  Patient is  unable to provide any information/ROS  due to baseline mental status.     PHYSICAL EXAM:  Appearance: NAD. VS past 24 hrs -as above   HEENT:   Moist oral mucosa. Conjunctiva clear b/l.   Neck : supple  Respiratory: Lungs CTAB.  Gastrointestinal:  Soft, nontender. No rebound. No rigidity. BS present	  Cardiovascular: RRR ,S1S2 present  Neurologic: Non-focal. Moving all extremities.  Extremities: No edema. No erythema. No calf tenderness.  Skin: No rashes, No ecchymoses, No cyanosis.	  wounds ,skin lesions-See skin assesment flow sheet   LABS:                        10.8   16.73 )-----------( 656      ( 10 Luther 2024 11:50 )             34.8     01-10    138  |  110<H>  |  14  ----------------------------<  94  3.8   |  24  |  0.47<L>    Ca    7.9<L>      10 Luther 2024 11:50    TPro  5.1<L>  /  Alb  1.6<L>  /  TBili  0.4  /  DBili  x   /  AST  19  /  ALT  10<L>  /  AlkPhos  84  01-09    CAPILLARY BLOOD GLUCOSE        PT/INR - ( 10 Luther 2024 11:50 )   PT: 27.9 sec;   INR: 2.45 ratio           Urinalysis Basic - ( 10 Luther 2024 11:50 )    Color: x / Appearance: x / SG: x / pH: x  Gluc: 94 mg/dL / Ketone: x  / Bili: x / Urobili: x   Blood: x / Protein: x / Nitrite: x   Leuk Esterase: x / RBC: x / WBC x   Sq Epi: x / Non Sq Epi: x / Bacteria: x        Culture - Other (collected 03 Jan 2024 19:56)  Source: Wound Wound  Final Report (06 Jan 2024 10:13):    Culture yields growth of greater than 3 colony types of    bacteria,  which may indicate contamination and normal jessica    Call client services within 7 days if further workup is clinically    indicated.    Culture includes    Few Methicillin Resistant Staphylococcus aureus    Moderate Streptococcus dysgalactiae (Group C/G) "Susceptibilities not    performed"    Numerous Pseudomonas aeruginosa Multiple Morphological Strains  Organism: Methicillin resistant Staphylococcus aureus  Pseudomonas aeruginosa (06 Jan 2024 10:13)  Organism: Pseudomonas aeruginosa (06 Jan 2024 10:13)  Organism: Methicillin resistant Staphylococcus aureus (06 Jan 2024 10:13)    Culture - Abscess with Gram Stain (collected 03 Jan 2024 17:30)  Source: .Abscess Leg - Left  Gram Stain (04 Jan 2024 05:52):    No polymorphonuclear cells seen per low power field    Numerous Gram positive cocci in pairs seen per oil power field    Numerous Gram Negative Rods seen per oil power field  Final Report (09 Jan 2024 09:54):    Numerous Pseudomonas aeruginosa    Moderate Enterococcus faecalis    Moderate Proteus mirabilis    Moderate Streptococcus dysgalactiae (Group C/G) "Susceptibilities not    performed"  Organism: Pseudomonas aeruginosa  Enterococcus faecalis  Proteus mirabilis (09 Jan 2024 09:54)  Organism: Proteus mirabilis (09 Jan 2024 09:54)  Organism: Enterococcus faecalis (09 Jan 2024 09:54)  Organism: Pseudomonas aeruginosa (09 Jan 2024 09:54)    Culture - Blood (collected 03 Jan 2024 14:55)  Source: .Blood Blood-Peripheral  Final Report (09 Jan 2024 01:01):    No growth at 5 days    Culture - Blood (collected 03 Jan 2024 14:50)  Source: .Blood Blood-Peripheral  Final Report (09 Jan 2024 01:01):    No growth at 5 days      RADIOLOGY & ADDITIONAL TESTS:   reviewed elctronically  ASSESSMENT/PLAN: 	    Patient was seen and examined on a day of discharge . Plan of care , discharge medications and recommendations discussed with consultants and clearance for discharge obtained .Social service , case management  and medical staff are aware of plan. Family is notified. Discharge summary  is  prepared electronically-see separate document prepared by me .75minutes spent on this visit, 50% visit time spent in care co-ordination with other attendings and counselling patient  I have discussed care plan with patient and HCP,expressed understanding of problems treatment and their effect and side effects, alternatives in detail,I have asked if they have any questions and concerns and appropriately addressed them to best of my ability  PROGRESS NOTE  Patient is a 89y old  Female who presents with a chief complaint of LLE OPEN WOUND (10 Luther 2024 13:15)  Chart and available morning labs /imaging are reviewed electronically , urgent issues addressed . 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  Patient was hospitalized in 2023  g with altered mental status concern for UTI , also  cellulitis to the LLE at the site of a skin graft. Pt recently completed a course of po abx for this with no significant improvement. bib ems with wounds to left lower leg for unknown period of time, now weeping and draining, pt c/o pain but pt is poor historian She was admitted in november 2023 with LLE cellulitis and seen by wound care MD & ID -discharged on po doxycycline and cipro & with topical care .Admitted for septic workup , iv abx ,pain management and wound care  (03 Jan 2024 18:15)  PAST MEDICAL & SURGICAL HISTORY:  Hypertension  CVA (cerebral vascular accident)  Depression  Constipation  Neuropathy  Hyperlipidemia  Grade I diastolic dysfunction  Afib  Neuropathy  VHD (valvular heart disease)  Aortic valvar stenosis  Hypothyroidism  GERD (gastroesophageal reflux disease  H/O skin graft  MEDICATIONS  (STANDING):  acetaminophen     Tablet .. 1000 milliGRAM(s) Oral every 8 hours  amLODIPine   Tablet 5 milliGRAM(s) Oral daily  ascorbic acid 500 milliGRAM(s) Oral daily  calcium carbonate   1250 mG (OsCal) 1 Tablet(s) Oral daily  cefepime   IVPB 2000 milliGRAM(s) IV Intermittent every 12 hours  chlorhexidine 4% Liquid 1 Application(s) Topical <User Schedule>  cholecalciferol 2000 Unit(s) Oral daily  dextrose 5% + sodium chloride 0.45%. 1000 milliLiter(s) (40 mL/Hr) IV Continuous <Continuous>  escitalopram 20 milliGRAM(s) Oral daily  ferrous    sulfate 325 milliGRAM(s) Oral daily  folic acid 1 milliGRAM(s) Oral daily  gabapentin 300 milliGRAM(s) Oral three times a day  hydroxyurea 500 milliGRAM(s) Oral two times a day  lactobacillus acidophilus 1 Tablet(s) Oral every 12 hours  levothyroxine 50 MICROGram(s) Oral daily  metoprolol tartrate 25 milliGRAM(s) Oral two times a day  pantoprazole    Tablet 40 milliGRAM(s) Oral before breakfast  polyethylene glycol 3350 17 Gram(s) Oral daily  senna 2 Tablet(s) Oral at bedtime  simvastatin 10 milliGRAM(s) Oral at bedtime  vancomycin  IVPB 1000 milliGRAM(s) IV Intermittent every 24 hours    MEDICATIONS  (PRN):  aluminum hydroxide/magnesium hydroxide/simethicone Suspension 30 milliLiter(s) Oral every 4 hours PRN Dyspepsia  bisacodyl Suppository 10 milliGRAM(s) Rectal daily PRN Constipation  magnesium hydroxide Suspension 30 milliLiter(s) Oral daily PRN Constipation  melatonin 3 milliGRAM(s) Oral at bedtime PRN Insomnia  morphine  - Injectable 2 milliGRAM(s) IV Push every 4 hours PRN Severe Pain (7 - 10)  ondansetron Injectable 4 milliGRAM(s) IV Push every 8 hours PRN Nausea and/or Vomiting  sodium chloride 0.9% lock flush 10 milliLiter(s) IV Push every 1 hour PRN Pre/post blood products, medications, blood draw, and to maintain line patency  traMADol 50 milliGRAM(s) Oral four times a day PRN Moderate Pain (4 - 6)      OBJECTIVE    T(C): 36.8 (01-10-24 @ 13:15), Max: 37.1 (01-09-24 @ 21:37)  HR: 97 (01-10-24 @ 13:15) (86 - 97)  BP: 108/63 (01-10-24 @ 13:15) (96/62 - 112/68)  RR: 18 (01-10-24 @ 13:15) (18 - 18)  SpO2: 91% (01-10-24 @ 13:15) (90% - 92%)  Wt(kg): --  I&O's Summary  REVIEW OF SYSTEMS:  Patient is  unable to provide any information/ROS  due to baseline mental status.     PHYSICAL EXAM:  Appearance: NAD. VS past 24 hrs -as above   HEENT:   Moist oral mucosa. Conjunctiva clear b/l.   Neck : supple  Respiratory: Lungs CTAB.  Gastrointestinal:  Soft, nontender. No rebound. No rigidity. BS present	  Cardiovascular: RRR ,S1S2 present  Neurologic: Non-focal. Moving all extremities.  Extremities: No edema. No erythema. No calf tenderness.  Skin: No rashes, No ecchymoses, No cyanosis.	  wounds ,skin lesions-See skin assesment flow sheet   LABS:                        10.8   16.73 )-----------( 656      ( 10 Luther 2024 11:50 )             34.8     01-10    138  |  110<H>  |  14  ----------------------------<  94  3.8   |  24  |  0.47<L>    Ca    7.9<L>      10 Luther 2024 11:50    TPro  5.1<L>  /  Alb  1.6<L>  /  TBili  0.4  /  DBili  x   /  AST  19  /  ALT  10<L>  /  AlkPhos  84  01-09    CAPILLARY BLOOD GLUCOSE        PT/INR - ( 10 Luther 2024 11:50 )   PT: 27.9 sec;   INR: 2.45 ratio           Urinalysis Basic - ( 10 Luther 2024 11:50 )    Color: x / Appearance: x / SG: x / pH: x  Gluc: 94 mg/dL / Ketone: x  / Bili: x / Urobili: x   Blood: x / Protein: x / Nitrite: x   Leuk Esterase: x / RBC: x / WBC x   Sq Epi: x / Non Sq Epi: x / Bacteria: x        Culture - Other (collected 03 Jan 2024 19:56)  Source: Wound Wound  Final Report (06 Jan 2024 10:13):    Culture yields growth of greater than 3 colony types of    bacteria,  which may indicate contamination and normal jessica    Call client services within 7 days if further workup is clinically    indicated.    Culture includes    Few Methicillin Resistant Staphylococcus aureus    Moderate Streptococcus dysgalactiae (Group C/G) "Susceptibilities not    performed"    Numerous Pseudomonas aeruginosa Multiple Morphological Strains  Organism: Methicillin resistant Staphylococcus aureus  Pseudomonas aeruginosa (06 Jan 2024 10:13)  Organism: Pseudomonas aeruginosa (06 Jan 2024 10:13)  Organism: Methicillin resistant Staphylococcus aureus (06 Jan 2024 10:13)    Culture - Abscess with Gram Stain (collected 03 Jan 2024 17:30)  Source: .Abscess Leg - Left  Gram Stain (04 Jan 2024 05:52):    No polymorphonuclear cells seen per low power field    Numerous Gram positive cocci in pairs seen per oil power field    Numerous Gram Negative Rods seen per oil power field  Final Report (09 Jan 2024 09:54):    Numerous Pseudomonas aeruginosa    Moderate Enterococcus faecalis    Moderate Proteus mirabilis    Moderate Streptococcus dysgalactiae (Group C/G) "Susceptibilities not    performed"  Organism: Pseudomonas aeruginosa  Enterococcus faecalis  Proteus mirabilis (09 Jan 2024 09:54)  Organism: Proteus mirabilis (09 Jan 2024 09:54)  Organism: Enterococcus faecalis (09 Jan 2024 09:54)  Organism: Pseudomonas aeruginosa (09 Jan 2024 09:54)    Culture - Blood (collected 03 Jan 2024 14:55)  Source: .Blood Blood-Peripheral  Final Report (09 Jan 2024 01:01):    No growth at 5 days    Culture - Blood (collected 03 Jan 2024 14:50)  Source: .Blood Blood-Peripheral  Final Report (09 Jan 2024 01:01):    No growth at 5 days      RADIOLOGY & ADDITIONAL TESTS:   reviewed elctronically  ASSESSMENT/PLAN: 	    Patient was seen and examined on a day of discharge . Plan of care , discharge medications and recommendations discussed with consultants and clearance for discharge obtained .Social service , case management  and medical staff are aware of plan. Family is notified. Discharge summary  is  prepared electronically-see separate document prepared by me .75minutes spent on this visit, 50% visit time spent in care co-ordination with other attendings and counselling patient  I have discussed care plan with patient and HCP,expressed understanding of problems treatment and their effect and side effects, alternatives in detail,I have asked if they have any questions and concerns and appropriately addressed them to best of my ability

## 2024-01-10 NOTE — PROGRESS NOTE ADULT - PROBLEM SELECTOR PROBLEM 2
Left leg cellulitis

## 2024-01-10 NOTE — PROGRESS NOTE ADULT - PROBLEM SELECTOR PLAN 1
Chart reviewed and Patient evaluated  Unable to change dressings today,  Wound cultures show MRSA, Pseudo A.  Rec IV antibiotics As Per ID  Rec vascular consult  Dressing changes every other day with: xeroform, 4x4, myra tabares   will discuss care plan  with all  Attendings.
Get wound culture today, ID and wound care cons requested   . Added IV Cefepime 2 gm q12h.  . Wound care team / suggest surgery evaluation for wound debridement and skin biopsies to evaluate for other causes of chronic leg ulcer (Pyoderma gangrenosum?)  . Wound care daily. Leg ELEVATION .pain management , Dvt prophylaxis
Chart reviewed and Patient evaluated  Unable to change dressings today,  Wound cultures show MRSA, Pseudo A.  Rec IV antibiotics As Per ID  Rec vascular consult  Dressing changes every other day with: xeroform, 4x4, myra tabares   will discuss care plan  with all  Attendings.
Get wound culture today, ID and wound care cons requested   . Added IV Cefepime 2 gm q12h.  . Wound care team / suggest surgery evaluation for wound debridement and skin biopsies to evaluate for other causes of chronic leg ulcer (Pyoderma gangrenosum?)  . Wound care daily. Leg ELEVATION .pain management , Dvt prophylaxis
. Wound cultures grew many Pseudomonas aeruginosa, Enterococcus faecalis and group C Strep and MRSA  . There are no options for oral antibiotics. Continue IV Cefepime 2 gm q12h and IV Vancomycin 750 mg q12h to Jan 20, 2024 to complete a 2 week course.   . Get PICC  line for 2 weeks of IV abx. Discharge planning to rehab. Coagulopathy reversal prior to PICC PLACEMENT ( d/w Dr Rodriguez and Dr De La Rosa -give 5 mg of vit K)

## 2024-01-10 NOTE — PROGRESS NOTE ADULT - PROBLEM SELECTOR PROBLEM 6
Grade I diastolic dysfunction

## 2024-02-28 NOTE — DISCHARGE NOTE PROVIDER - NSDCCPCAREPLAN_GEN_ALL_CORE_FT
PRINCIPAL DISCHARGE DIAGNOSIS  Diagnosis: DVT, lower extremity  Assessment and Plan of Treatment:       SECONDARY DISCHARGE DIAGNOSES  Diagnosis: DVT of lower limb, acute  Assessment and Plan of Treatment:     Diagnosis: VHD (valvular heart disease)  Assessment and Plan of Treatment:     Diagnosis: HTN (hypertension)  Assessment and Plan of Treatment:     Diagnosis: CAD (coronary artery disease)  Assessment and Plan of Treatment:     Diagnosis: Hypothyroidism  Assessment and Plan of Treatment:     Diagnosis: MDD (major depressive disorder)  Assessment and Plan of Treatment:     Diagnosis: GERD (gastroesophageal reflux disease)  Assessment and Plan of Treatment:     Diagnosis: Afib  Assessment and Plan of Treatment:     Diagnosis: Leg wound, left  Assessment and Plan of Treatment:      20 PRINCIPAL DISCHARGE DIAGNOSIS  Diagnosis: COPD exacerbation  Assessment and Plan of Treatment: Paient requiring O2 therapy; 2L NC      SECONDARY DISCHARGE DIAGNOSES  Diagnosis: Afib  Assessment and Plan of Treatment:     Diagnosis: VHD (valvular heart disease)  Assessment and Plan of Treatment:     Diagnosis: HTN (hypertension)  Assessment and Plan of Treatment:     Diagnosis: CAD (coronary artery disease)  Assessment and Plan of Treatment:     Diagnosis: Hypothyroidism  Assessment and Plan of Treatment:     Diagnosis: MDD (major depressive disorder)  Assessment and Plan of Treatment:     Diagnosis: GERD (gastroesophageal reflux disease)  Assessment and Plan of Treatment:     Diagnosis: Leg wound, left  Assessment and Plan of Treatment: WOUND CARE:   Apply non adhesive adaptic, 4x4 gauze, abd pad, myra and change every other day       Diagnosis: DVT, lower extremity  Assessment and Plan of Treatment:

## 2024-04-09 NOTE — DISCHARGE NOTE NURSING/CASE MANAGEMENT/SOCIAL WORK - CASE MANAGER'S NAME
Labs:          LABS:                        6.2    18.28 )-----------( 485      ( 09 Apr 2024 00:09 )             16.9     04-08    134<L>  |  100  |  6<L>  ----------------------------<  103<H>  4.1   |  20<L>  |  0.39    Ca    9.6      08 Apr 2024 20:32  Phos  4.7     04-08  Mg     2.30     04-08    TPro  8.1  /  Alb  4.5  /  TBili  1.8<H>  /  DBili  x   /  AST  26  /  ALT  15  /  AlkPhos  143<L>  04-08
Myesha hand RN CM

## 2024-04-09 NOTE — SWALLOW BEDSIDE ASSESSMENT ADULT - CONSISTENCIES ADMINISTERED
Report given to MICHAEL Tuttle with [facility], lab results and report summary.   soft & bite-sized pt declined to accept/pureed easy to chew/regular solid thin liquid

## 2024-05-28 NOTE — CONSULT NOTE ADULT - REASON FOR ADMISSION
PT CALLING TODAY TO ASK FOR REFILL OF TRAMADOL. PT STATES SHE LEFT MESSAGE ON FRIDAY. ASKED PT WHICH MED SHE WANTED BECAUSE ON FRIDAY SHE WAS CALLING FOR PEROCET. PT STATES EITHER ONE.    lower extremity cellulitis

## 2024-09-16 NOTE — PHYSICAL THERAPY INITIAL EVALUATION ADULT - ORIENTATION, REHAB EVAL
person/place
I have personally evaluated and examined the patient. The Attending was available to me as a supervising provider if needed.

## 2024-12-17 NOTE — DISCHARGE NOTE NURSING/CASE MANAGEMENT/SOCIAL WORK - NSDCPEPTCOWACOMP_GEN_ALL_CORE
The patient has received written discharge instructions for Warfarin/Coumadin, including the Warfarin/Coumadin discharge booklet, which contains all of the information listed below. Warfarin/Coumadin is used to prevent new blood clots and keep existing ones from getting bigger. Never skip a dose of Warfarin/Coumadin. If you forget to take your Warfarin/Coumadin, DO NOT take an extra pill to 'catch up'. NEVER TAKE A DOUBLE DOSE. Notify your doctor that you missed a dose. Take Warfarin/Coumadin in the evening at the same time. Warfarin/Coumadin may be taken with other medications or food.
show

## 2025-02-23 NOTE — ED PROVIDER NOTE - NS ED MD TWO NIGHTS YN
75 yo female w PMH GBM on hospice care (diagnosed in 2024, follows at [facility] under Dr. Hernandez, with reported attempted resection of tumor, on chemotherapy with last chemo 2 months ago), admitted for ams likely 2/2 disease progression, found to be in status epilepticus, decision for DNR/DNI reversed by family, patient intubated and stepped up to MICU, now s/p brain herniation, pending further conversations with family.     NEURO  Intubated. Not sedated. Minimally responsive despite not being on sedation.     Continues to have seizures, concern for airway protection as GBM progresses.    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 There is evidence of focal cortical hyperexcitability, more prominent in the right than the left frontotemporal regions. The background pattern shows a burst suppression ratio of 10:1, indicating severe diffuse or multifocal cerebral dysfunction, potentially exacerbated by IV sedatives. Additionally, there is severe generalized and multifocal slowing, further supporting significant underlying cerebral dysfunction. CT head with worsening midline shift. Patient got CT head that shows complete effacement of the cerebral and cerebellar sulci consistent with diffuse edema with effacement of the basal cisterns, worsening subfalcine and uncal herniation with new descending transtentorial herniation.    Continued EEG findings of seizures.  Plan:   s/p valproic acid loading dose 1200 mg, vimpat 200 mg loading  - epilepsy consulted, appreciate recs      - Keppra 1500mg BID      - Vimpat 100mg BID (monitor tele to ensure no heart block)      - Depakote 500mg q6hrs      - seizure precautions      #GBM  last chemo ~2months ago, follows at [facility].  Per Neurosurgery consult on 1/31, no acute interventions. Worsening midline shift on CT. On CT today, concern for worsening neurological status. Patient got CT head that shows complete effacement of the cerebral and cerebellar sulci consistent with diffuse edema with effacement of the basal cisterns, worsening subfalcine and uncal herniation with new descending transtentorial herniation.  -c/w decadron 4 IV q6h  -epilepsy aware  -palliative care consult     CARDIAC  #Chronic atrial fibrillation.   #afib with RVR   Plan  -bladder scans q6h   -ctm BMP  -s/p john   Home med: metoprolol 12.5 BID   - c/w Lovenox ( per NSGY, no increased risk of hemorrhage)  -start Lopressor 12.5 mg BID     #Septic shock  S/p Levophed, weaned on 2/17  - Started Midodrine 5mg TID (2/20 - ) for BP support    PULM  #intubated  - intubated on 2/1  - GOC  - consider trach moving forward    GI  - NPO with tube feeds   -Miralax and Senna standing    RENAL  - currently with normal renal function    #hypernatremia  Sodium >180, now downtrending. 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   Stopped NaCl 0.225% 160cc/hr and DDAVP 1mcg q12h on 2/19 due to overcorrection of Na 176 --> 151 from 2/18 to 2/19  - the brain swelling will worsen with worsening sodium as time progresses. 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     Currently with John.     Endo  Lantus 20U qhs and Regular Insulin 9U q6h d/sam on 2/20 due to downtrending FSGs.  Diet changed to Glucerna due to elevated glucose, CTM.   - Continue FSG and ISS q6h only    ID  #Sepsis with septic shock  #ESBL Ecoli Bacteremia  #R arm cellulitis   #ESBL ecoli UTI   Systemic inflammatory response syndrome (SIRS).   Meeting SIRS 2/4 (HR>90, WBC>12k) likely iso of UTI.   Bcx with ESBL E. coli, s/p ertapenem treatment. Patient continuing to be tachycardic, tenuous course between fevering and hypothermia.   S/p Vancomycin (2/13-2/17)  -c/w Levofloxacin (2/13-)       F: none  E: replete K<4, Mg<2  N: npo with tube feeds  VTE Prophylaxis: lovenox 40mg QD  GI: pantoprazole 40 IVP q24hrs  C: Full Code  D: micu    Lines: Peripheral IV L 22g (2/4) IV L 18 g (2/4), NG tube, John (2/14)     73 yo female w PMH GBM on hospice care (diagnosed in 2024, follows at [facility] under Dr. Hernandez, with reported attempted resection of tumor, on chemotherapy with last chemo 2 months ago), admitted for ams likely 2/2 disease progression, found to be in status epilepticus, decision for DNR/DNI reversed by family, patient intubated and stepped up to MICU, now s/p brain herniation, pending further conversations with family.     NEURO  Intubated. Not sedated. Minimally responsive despite not being on sedation. Continues to have seizures, concern for airway protection as GBM progresses.    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 There is evidence of focal cortical hyperexcitability, more prominent in the right than the left frontotemporal regions. The background pattern shows a burst suppression ratio of 10:1, indicating severe diffuse or multifocal cerebral dysfunction, potentially exacerbated by IV sedatives. Additionally, there is severe generalized and multifocal slowing, further supporting significant underlying cerebral dysfunction. CT head with worsening midline shift. Patient got CT head that shows complete effacement of the cerebral and cerebellar sulci consistent with diffuse edema with effacement of the basal cisterns, worsening subfalcine and uncal herniation with new descending transtentorial herniation.  Continued EEG findings of seizures.  s/p valproic acid loading dose 1200 mg, vimpat 200 mg loading  - epilepsy consulted, appreciate recs      - Keppra 1500mg BID      - Vimpat 100mg BID (monitor tele to ensure no heart block)      - Depakote 500mg q6hrs      - seizure precautions    #GBM  last chemo ~2months ago, follows at [facility].  Per Neurosurgery consult on 1/31, no acute interventions. Worsening midline shift on CT. On CT today, concern for worsening neurological status. Patient got CT head that shows complete effacement of the cerebral and cerebellar sulci consistent with diffuse edema with effacement of the basal cisterns, worsening subfalcine and uncal herniation with new descending transtentorial herniation.  -c/w decadron 4 IV q6h  -epilepsy aware  -palliative care consult       CARDIAC  #Chronic atrial fibrillation.   #afib with RVR   Plan  -bladder scans q6h   -ctm BMP  -s/p john   Home med: metoprolol 12.5 BID   - c/w Lovenox ( per NSGY, no increased risk of hemorrhage)  -start Lopressor 12.5 mg BID     #Septic shock  S/p Levophed, weaned on 2/17  - Started Midodrine 5mg TID (2/20 - ) for BP support      PULM  #intubated  - intubated on 2/1  - GOC  - consider trach moving forward      GI  - NPO with tube feeds   -Miralax and Senna standing      RENAL  - currently with normal renal function    #hypernatremia  Sodium >180, now downtrending. 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   Stopped NaCl 0.225% 160cc/hr and DDAVP 1mcg q12h on 2/19 due to overcorrection of Na 176 --> 151 from 2/18 to 2/19  - the brain swelling will worsen with worsening sodium as time progresses. 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     Currently with John.       ENDO  Lantus 20U qhs and Regular Insulin 9U q6h d/sam on 2/20 due to downtrending FSGs.  Diet changed to Glucerna due to elevated glucose, CTM.   - Continue FSG and ISS q6h only      ID  #Sepsis with septic shock  #ESBL Ecoli Bacteremia  #R arm cellulitis   #ESBL ecoli UTI   Systemic inflammatory response syndrome (SIRS).   Meeting SIRS 2/4 (HR>90, WBC>12k) likely iso of UTI.   Bcx with ESBL E. coli, s/p ertapenem treatment. Patient continuing to be tachycardic, tenuous course between fevering and hypothermia.   S/p Vancomycin (2/13-2/17)  -c/w Levofloxacin (2/13-)       F: none  E: replete K<4, Mg<2  N: npo with tube feeds  VTE Prophylaxis: lovenox 40mg QD  GI: pantoprazole 40 IVP q24hrs  C: Full Code  D: micu    Lines: Peripheral IV L 22g (2/4) IV L 18 g (2/4), NG tube, John (2/14)     Yes
